# Patient Record
Sex: MALE | Race: BLACK OR AFRICAN AMERICAN | NOT HISPANIC OR LATINO | Employment: UNEMPLOYED | URBAN - METROPOLITAN AREA
[De-identification: names, ages, dates, MRNs, and addresses within clinical notes are randomized per-mention and may not be internally consistent; named-entity substitution may affect disease eponyms.]

---

## 2023-10-16 ENCOUNTER — PHARMACY VISIT (OUTPATIENT)
Dept: PHARMACY | Facility: CLINIC | Age: 53
End: 2023-10-16
Payer: MEDICAID

## 2023-10-16 PROCEDURE — RXMED WILLOW AMBULATORY MEDICATION CHARGE

## 2023-10-23 ENCOUNTER — APPOINTMENT (OUTPATIENT)
Dept: RADIOLOGY | Facility: HOSPITAL | Age: 53
End: 2023-10-23
Payer: COMMERCIAL

## 2023-10-23 ENCOUNTER — HOSPITAL ENCOUNTER (INPATIENT)
Facility: HOSPITAL | Age: 53
LOS: 2 days | Discharge: HOME | End: 2023-10-26
Attending: EMERGENCY MEDICINE | Admitting: INTERNAL MEDICINE
Payer: COMMERCIAL

## 2023-10-23 DIAGNOSIS — I50.9 CONGESTIVE HEART FAILURE, UNSPECIFIED HF CHRONICITY, UNSPECIFIED HEART FAILURE TYPE (MULTI): ICD-10-CM

## 2023-10-23 DIAGNOSIS — R09.02 HYPOXIA: Primary | ICD-10-CM

## 2023-10-23 LAB
ABO GROUP (TYPE) IN BLOOD: NORMAL
ALBUMIN SERPL BCP-MCNC: 3.6 G/DL (ref 3.4–5)
ALP SERPL-CCNC: 60 U/L (ref 33–120)
ALT SERPL W P-5'-P-CCNC: 30 U/L (ref 10–52)
ANION GAP SERPL CALC-SCNC: 11 MMOL/L (ref 10–20)
ANTIBODY SCREEN: NORMAL
APTT PPP: 31 SECONDS (ref 27–38)
AST SERPL W P-5'-P-CCNC: 26 U/L (ref 9–39)
BASOPHILS # BLD AUTO: 0.02 X10*3/UL (ref 0–0.1)
BASOPHILS NFR BLD AUTO: 0.3 %
BILIRUB SERPL-MCNC: 0.6 MG/DL (ref 0–1.2)
BNP SERPL-MCNC: 3921 PG/ML (ref 0–99)
BUN SERPL-MCNC: 23 MG/DL (ref 6–23)
CALCIUM SERPL-MCNC: 8.9 MG/DL (ref 8.6–10.6)
CARDIAC TROPONIN I PNL SERPL HS: 102 NG/L (ref 0–53)
CARDIAC TROPONIN I PNL SERPL HS: 109 NG/L (ref 0–53)
CHLORIDE SERPL-SCNC: 111 MMOL/L (ref 98–107)
CO2 SERPL-SCNC: 22 MMOL/L (ref 21–32)
CREAT SERPL-MCNC: 1.42 MG/DL (ref 0.5–1.3)
EOSINOPHIL # BLD AUTO: 0.03 X10*3/UL (ref 0–0.7)
EOSINOPHIL NFR BLD AUTO: 0.4 %
ERYTHROCYTE [DISTWIDTH] IN BLOOD BY AUTOMATED COUNT: 14.4 % (ref 11.5–14.5)
GFR SERPL CREATININE-BSD FRML MDRD: 59 ML/MIN/1.73M*2
GLUCOSE SERPL-MCNC: 159 MG/DL (ref 74–99)
HCT VFR BLD AUTO: 44.1 % (ref 41–52)
HGB BLD-MCNC: 15.1 G/DL (ref 13.5–17.5)
HOLD SPECIMEN: NORMAL
IMM GRANULOCYTES # BLD AUTO: 0.02 X10*3/UL (ref 0–0.7)
IMM GRANULOCYTES NFR BLD AUTO: 0.3 % (ref 0–0.9)
INR PPP: 1.3 (ref 0.9–1.1)
LYMPHOCYTES # BLD AUTO: 0.78 X10*3/UL (ref 1.2–4.8)
LYMPHOCYTES NFR BLD AUTO: 11.2 %
MAGNESIUM SERPL-MCNC: 1.91 MG/DL (ref 1.6–2.4)
MCH RBC QN AUTO: 32.1 PG (ref 26–34)
MCHC RBC AUTO-ENTMCNC: 34.2 G/DL (ref 32–36)
MCV RBC AUTO: 94 FL (ref 80–100)
MONOCYTES # BLD AUTO: 0.38 X10*3/UL (ref 0.1–1)
MONOCYTES NFR BLD AUTO: 5.5 %
NEUTROPHILS # BLD AUTO: 5.72 X10*3/UL (ref 1.2–7.7)
NEUTROPHILS NFR BLD AUTO: 82.3 %
NRBC BLD-RTO: 0 /100 WBCS (ref 0–0)
PLATELET # BLD AUTO: 137 X10*3/UL (ref 150–450)
PMV BLD AUTO: 11.3 FL (ref 7.5–11.5)
POTASSIUM SERPL-SCNC: 4.2 MMOL/L (ref 3.5–5.3)
PROT SERPL-MCNC: 6.5 G/DL (ref 6.4–8.2)
PROTHROMBIN TIME: 14.2 SECONDS (ref 9.8–12.8)
RBC # BLD AUTO: 4.7 X10*6/UL (ref 4.5–5.9)
RH FACTOR (ANTIGEN D): NORMAL
SODIUM SERPL-SCNC: 140 MMOL/L (ref 136–145)
WBC # BLD AUTO: 7 X10*3/UL (ref 4.4–11.3)

## 2023-10-23 PROCEDURE — 86900 BLOOD TYPING SEROLOGIC ABO: CPT | Performed by: STUDENT IN AN ORGANIZED HEALTH CARE EDUCATION/TRAINING PROGRAM

## 2023-10-23 PROCEDURE — 85025 COMPLETE CBC W/AUTO DIFF WBC: CPT | Mod: CMCLAB | Performed by: EMERGENCY MEDICINE

## 2023-10-23 PROCEDURE — 76604 US EXAM CHEST: CPT | Performed by: EMERGENCY MEDICINE

## 2023-10-23 PROCEDURE — 36415 COLL VENOUS BLD VENIPUNCTURE: CPT | Performed by: STUDENT IN AN ORGANIZED HEALTH CARE EDUCATION/TRAINING PROGRAM

## 2023-10-23 PROCEDURE — 71045 X-RAY EXAM CHEST 1 VIEW: CPT | Mod: FY

## 2023-10-23 PROCEDURE — 74177 CT ABD & PELVIS W/CONTRAST: CPT | Mod: RSC | Performed by: RADIOLOGY

## 2023-10-23 PROCEDURE — 99285 EMERGENCY DEPT VISIT HI MDM: CPT | Performed by: EMERGENCY MEDICINE

## 2023-10-23 PROCEDURE — 99285 EMERGENCY DEPT VISIT HI MDM: CPT | Mod: 25,27 | Performed by: EMERGENCY MEDICINE

## 2023-10-23 PROCEDURE — 74177 CT ABD & PELVIS W/CONTRAST: CPT | Mod: RSC,FR

## 2023-10-23 PROCEDURE — 80053 COMPREHEN METABOLIC PANEL: CPT | Mod: CMCLAB | Performed by: EMERGENCY MEDICINE

## 2023-10-23 PROCEDURE — 93010 ELECTROCARDIOGRAM REPORT: CPT | Performed by: EMERGENCY MEDICINE

## 2023-10-23 PROCEDURE — 84484 ASSAY OF TROPONIN QUANT: CPT | Mod: CMCLAB | Performed by: EMERGENCY MEDICINE

## 2023-10-23 PROCEDURE — 96374 THER/PROPH/DIAG INJ IV PUSH: CPT

## 2023-10-23 PROCEDURE — 71045 X-RAY EXAM CHEST 1 VIEW: CPT | Mod: FOREIGN READ | Performed by: RADIOLOGY

## 2023-10-23 PROCEDURE — 76604 US EXAM CHEST: CPT

## 2023-10-23 PROCEDURE — 2500000004 HC RX 250 GENERAL PHARMACY W/ HCPCS (ALT 636 FOR OP/ED): Mod: SE | Performed by: EMERGENCY MEDICINE

## 2023-10-23 PROCEDURE — 71275 CT ANGIOGRAPHY CHEST: CPT | Mod: FR

## 2023-10-23 PROCEDURE — 84484 ASSAY OF TROPONIN QUANT: CPT | Performed by: EMERGENCY MEDICINE

## 2023-10-23 PROCEDURE — 83880 ASSAY OF NATRIURETIC PEPTIDE: CPT | Mod: CMCLAB | Performed by: STUDENT IN AN ORGANIZED HEALTH CARE EDUCATION/TRAINING PROGRAM

## 2023-10-23 PROCEDURE — 93308 TTE F-UP OR LMTD: CPT | Performed by: EMERGENCY MEDICINE

## 2023-10-23 PROCEDURE — 85610 PROTHROMBIN TIME: CPT | Performed by: STUDENT IN AN ORGANIZED HEALTH CARE EDUCATION/TRAINING PROGRAM

## 2023-10-23 PROCEDURE — 2550000001 HC RX 255 CONTRASTS: Mod: SE | Performed by: EMERGENCY MEDICINE

## 2023-10-23 PROCEDURE — 71275 CT ANGIOGRAPHY CHEST: CPT | Mod: RSC | Performed by: RADIOLOGY

## 2023-10-23 PROCEDURE — 36415 COLL VENOUS BLD VENIPUNCTURE: CPT | Mod: CMCLAB | Performed by: EMERGENCY MEDICINE

## 2023-10-23 PROCEDURE — 83735 ASSAY OF MAGNESIUM: CPT | Mod: CMCLAB | Performed by: EMERGENCY MEDICINE

## 2023-10-23 PROCEDURE — 83735 ASSAY OF MAGNESIUM: CPT | Performed by: EMERGENCY MEDICINE

## 2023-10-23 RX ORDER — FUROSEMIDE 10 MG/ML
100 INJECTION INTRAMUSCULAR; INTRAVENOUS ONCE
Status: COMPLETED | OUTPATIENT
Start: 2023-10-23 | End: 2023-10-23

## 2023-10-23 RX ORDER — FUROSEMIDE 10 MG/ML
40 INJECTION INTRAMUSCULAR; INTRAVENOUS ONCE
Status: DISCONTINUED | OUTPATIENT
Start: 2023-10-23 | End: 2023-10-23

## 2023-10-23 RX ORDER — SODIUM CHLORIDE 9 MG/ML
3 INJECTION, SOLUTION INTRAMUSCULAR; INTRAVENOUS; SUBCUTANEOUS AS NEEDED
Status: DISCONTINUED | OUTPATIENT
Start: 2023-10-23 | End: 2023-10-26 | Stop reason: HOSPADM

## 2023-10-23 RX ADMIN — IOHEXOL 150 ML: 350 INJECTION, SOLUTION INTRAVENOUS at 20:31

## 2023-10-23 RX ADMIN — FUROSEMIDE 100 MG: 10 INJECTION, SOLUTION INTRAVENOUS at 20:00

## 2023-10-23 ASSESSMENT — LIFESTYLE VARIABLES
HAVE YOU EVER FELT YOU SHOULD CUT DOWN ON YOUR DRINKING: NO
EVER FELT BAD OR GUILTY ABOUT YOUR DRINKING: NO
REASON UNABLE TO ASSESS: NO
HAVE PEOPLE ANNOYED YOU BY CRITICIZING YOUR DRINKING: NO
EVER HAD A DRINK FIRST THING IN THE MORNING TO STEADY YOUR NERVES TO GET RID OF A HANGOVER: NO

## 2023-10-23 ASSESSMENT — COLUMBIA-SUICIDE SEVERITY RATING SCALE - C-SSRS
2. HAVE YOU ACTUALLY HAD ANY THOUGHTS OF KILLING YOURSELF?: NO
1. IN THE PAST MONTH, HAVE YOU WISHED YOU WERE DEAD OR WISHED YOU COULD GO TO SLEEP AND NOT WAKE UP?: NO
6. HAVE YOU EVER DONE ANYTHING, STARTED TO DO ANYTHING, OR PREPARED TO DO ANYTHING TO END YOUR LIFE?: NO

## 2023-10-23 ASSESSMENT — PAIN SCALES - GENERAL: PAINLEVEL_OUTOF10: 7

## 2023-10-23 ASSESSMENT — PAIN DESCRIPTION - ORIENTATION: ORIENTATION: LEFT

## 2023-10-23 ASSESSMENT — PAIN DESCRIPTION - LOCATION: LOCATION: CHEST

## 2023-10-23 ASSESSMENT — PAIN - FUNCTIONAL ASSESSMENT: PAIN_FUNCTIONAL_ASSESSMENT: 0-10

## 2023-10-23 ASSESSMENT — PAIN DESCRIPTION - DESCRIPTORS: DESCRIPTORS: SHARP

## 2023-10-23 NOTE — ED TRIAGE NOTES
Pt reports to ED for Coughing up blood SOB and bloody stool. Pt states his SOB has started a few days ago and has been getting worse. Pt states his stool is bright red all blood no stool bowel movement. Pt has been having bloody sputum. Pt also states he has had multiple episodes of syncope without falling. Pt states his dizziness comes all of a sudden when he stands

## 2023-10-23 NOTE — ED PROCEDURE NOTE
Procedure    Performed by: Abhijeet Nielson DO  Authorized by: Inocencio Singh MD    Procedure: Cardiac Ultrasound    Findings:   Views: parasternal long, parasternal short, apical four and subxiphoid  The pericardial space was visualized and was NEGATIVE for a significant pericardial effusion.  Activity: Ventricular contractions were visualized.  LV: LV systolic function was DECREASED.  RV: RV size was DILATED.    Impression:  Cardiac: The focused cardiac ultrasound exam had ABNORMAL findings as specified.  Procedure: Thoracic Ultrasound    Findings:  R Lung Sliding: The RIGHT chest was evaluated and LUNG SLIDING was visualized.  L Lung Sliding: The LEFT chest was evaluated and LUNG SLIDING was visualized.  R Effusion: The RIGHT chest was evaluated and there was NO PLEURAL EFFUSION.  L Effusion: The LEFT chest was evaluated and there was NO PLEURAL EFFUSION.  A-lines: The RIGHT chest was evaluated and there were NO A-LINES visualized  B-lines: The RIGHT chest was evaluated and multiple B-LINES were visualized  R Consolidation: The RIGHT chest was evaluated and there was NO RIGHT CONSOLIDATION.  L Consolidation: The LEFT chest was evaluated and there was NO LEFT CONSOLIDATION.    Impression:  Thorax: The focused thoracic ultrasound exam had ABNORMAL findings as specified.                   Abhijeet Nielson DO  Resident  10/23/23 1913

## 2023-10-23 NOTE — ED PROVIDER NOTES
"CC: Shortness of Breath and Black or Bloody Stool     HPI:  Patient is a 52-year-old male with past medical history significant for hypertension, heart failure with reduced ejection fraction (10 to 15% 3/2023), COPD, polysubstance use disorder who is presenting to the emergency department with a chief concern of chest pain and shortness of breath.  Patient reports utilization of crack cocaine immediately prior to symptom onset.  Patient also reports that he has not been taking his medications over the course the past 3 days.  Patient denies any associated fevers/chills, does endorse slight hemoptysis accompanying chest pain, denies any nausea/vomiting, changes in bowel bladder function, new focal numbness weakness to bilateral arms, legs, face.    Records Reviewed:  Recent available ED and inpatient notes reviewed in EMR.    PMHx/PSHx:  Per HPI.   - has no past medical history on file.  - has no past surgical history on file.    Medications:  Reviewed in EMR. See EMR for complete list of medications and doses.    Allergies:  Patient has no known allergies.    Social History:  - Tobacco:  reports that he has never smoked. He has never used smokeless tobacco.   - Alcohol:  reports no history of alcohol use.   - Illicit Drugs:  reports current drug use. Drug: \"Crack\" cocaine.     ROS:  Per HPI.       ???????????????????????????????????????????????????????????????  Triage Vitals:  T 35.8 °C (96.4 °F)  HR 67  BP (!) 130/96  RR 24  O2 98 % None (Room air)    Physical Exam  Vitals and nursing note reviewed.   Constitutional:       General: He is not in acute distress.     Appearance: Normal appearance. He is not toxic-appearing.   HENT:      Head: Normocephalic and atraumatic.      Nose: No congestion or rhinorrhea.      Mouth/Throat:      Mouth: Mucous membranes are moist.      Pharynx: Oropharynx is clear.   Eyes:      Extraocular Movements: Extraocular movements intact.      Conjunctiva/sclera: Conjunctivae normal. "      Pupils: Pupils are equal, round, and reactive to light.   Cardiovascular:      Rate and Rhythm: Normal rate and regular rhythm.      Pulses: Normal pulses.      Heart sounds: No murmur heard.     No friction rub. No gallop.   Pulmonary:      Effort: Pulmonary effort is normal.      Breath sounds: Examination of the right-lower field reveals rhonchi. Examination of the left-lower field reveals rhonchi. Rhonchi present. No wheezing or rales.   Abdominal:      General: There is no distension.      Palpations: Abdomen is soft.      Tenderness: There is no abdominal tenderness. There is no guarding or rebound.   Musculoskeletal:         General: No swelling, deformity or signs of injury. Normal range of motion.      Right lower le+ Edema present.      Left lower le+ Edema present.   Skin:     General: Skin is warm.      Findings: No bruising, lesion or rash.   Neurological:      General: No focal deficit present.      Mental Status: He is alert and oriented to person, place, and time. Mental status is at baseline.      Cranial Nerves: No cranial nerve deficit.      Sensory: No sensory deficit.      Coordination: Coordination normal.   Psychiatric:         Mood and Affect: Mood normal.         Thought Content: Thought content normal.         Judgment: Judgment normal.       ???????????????????????????????????????????????????????????????  EKG:  EKG is obtained at 1529 is noted be normal sinus rhythm with a ventricular rate of 88 bpm, UT interval 158, QRS duration of 102, QTc of 464 there is noted ST depression in lead V6 that is seen on previous EKG from 2023, noted T wave inversion in V5 also present on previous EKG, otherwise there is no significant ST elevation, no additional signs of ischemia or infarction.  Overall interpretation is a stable study with no new signs of ischemia or infarction.    Assessment and Plan:  Patient is a 53-year-old male with past medical history as noted above presenting  emergency department the chief concern of chest pain/shortness of breath, concern for CHF exacerbation in the setting of recent medication noncompliance, potential flash pulmonary edema in the setting of cocaine use however patient's blood pressure is now relatively normotensive range, no indication for aggressive blood pressure management, no indication for positive pressure at this time.  Bedside point-of-care ultrasound is performed reveals poor squeeze it is consistent with CHF, no obvious right heart strain, B-lines bilaterally.  Patient does have hemoptysis, chest pain, shortness of breath and I am concerned for acute pulmonary embolism this patient will follow-up perform a CT angiogram to evaluate for PE.  This is performed and does not reveal any acute pulmonary embolism.  Additional labs reviewed and there is a significant elevation of the patient's BNP.  Elevated troponin with continuing elevation of delta troponin.  I believe the patient is most likely experiencing a CHF exacerbation, patient case was discussed with admissions coordinator and the patient was accepted for admission to cardiology team.  Patient was given Lasix while in the emergency department, monitored on telemetry, did not require any additional intervention while under my care.  Patient admitted to the hospital in stable condition.    ED Course:  Diagnoses as of 10/24/23 2340   Congestive heart failure, unspecified HF chronicity, unspecified heart failure type (CMS/[facility])        Social Determinants Limiting Care:      Disposition:  Admit    Hussein Cotto MD       Procedures ? SmartLinks last updated 10/23/2023 6:15 PM        Hussein Cotto MD  Resident  10/24/23 [id number]

## 2023-10-24 ENCOUNTER — APPOINTMENT (OUTPATIENT)
Dept: CARDIOLOGY | Facility: HOSPITAL | Age: 53
End: 2023-10-24
Payer: COMMERCIAL

## 2023-10-24 PROBLEM — I50.9 CONGESTIVE HEART FAILURE, UNSPECIFIED HF CHRONICITY, UNSPECIFIED HEART FAILURE TYPE (MULTI): Status: ACTIVE | Noted: 2023-10-24

## 2023-10-24 LAB
ALBUMIN SERPL BCP-MCNC: 3.4 G/DL (ref 3.4–5)
ALBUMIN SERPL BCP-MCNC: 3.6 G/DL (ref 3.4–5)
ALP SERPL-CCNC: 74 U/L (ref 33–120)
ALT SERPL W P-5'-P-CCNC: 28 U/L (ref 10–52)
AMPHETAMINES UR QL SCN: ABNORMAL
ANION GAP SERPL CALC-SCNC: 16 MMOL/L (ref 10–20)
ANION GAP SERPL CALC-SCNC: 18 MMOL/L (ref 10–20)
AST SERPL W P-5'-P-CCNC: 24 U/L (ref 9–39)
ATRIAL RATE: 88 BPM
ATRIAL RATE: 88 BPM
BARBITURATES UR QL SCN: ABNORMAL
BASOPHILS # BLD AUTO: 0.04 X10*3/UL (ref 0–0.1)
BASOPHILS NFR BLD AUTO: 0.6 %
BENZODIAZ UR QL SCN: ABNORMAL
BILIRUB SERPL-MCNC: 0.6 MG/DL (ref 0–1.2)
BUN SERPL-MCNC: 24 MG/DL (ref 6–23)
BUN SERPL-MCNC: 25 MG/DL (ref 6–23)
BZE UR QL SCN: ABNORMAL
CALCIUM SERPL-MCNC: 8.9 MG/DL (ref 8.6–10.6)
CALCIUM SERPL-MCNC: 9.4 MG/DL (ref 8.6–10.6)
CANNABINOIDS UR QL SCN: ABNORMAL
CHLORIDE SERPL-SCNC: 104 MMOL/L (ref 98–107)
CHLORIDE SERPL-SCNC: 105 MMOL/L (ref 98–107)
CO2 SERPL-SCNC: 20 MMOL/L (ref 21–32)
CO2 SERPL-SCNC: 23 MMOL/L (ref 21–32)
CREAT SERPL-MCNC: 1.56 MG/DL (ref 0.5–1.3)
CREAT SERPL-MCNC: 1.72 MG/DL (ref 0.5–1.3)
EOSINOPHIL # BLD AUTO: 0.09 X10*3/UL (ref 0–0.7)
EOSINOPHIL NFR BLD AUTO: 1.4 %
ERYTHROCYTE [DISTWIDTH] IN BLOOD BY AUTOMATED COUNT: 14.5 % (ref 11.5–14.5)
ERYTHROCYTE [DISTWIDTH] IN BLOOD BY AUTOMATED COUNT: 14.6 % (ref 11.5–14.5)
FENTANYL+NORFENTANYL UR QL SCN: ABNORMAL
GFR SERPL CREATININE-BSD FRML MDRD: 47 ML/MIN/1.73M*2
GFR SERPL CREATININE-BSD FRML MDRD: 52 ML/MIN/1.73M*2
GLUCOSE SERPL-MCNC: 142 MG/DL (ref 74–99)
GLUCOSE SERPL-MCNC: 155 MG/DL (ref 74–99)
HCT VFR BLD AUTO: 46.2 % (ref 41–52)
HCT VFR BLD AUTO: 51 % (ref 41–52)
HGB BLD-MCNC: 15.2 G/DL (ref 13.5–17.5)
HGB BLD-MCNC: 16.6 G/DL (ref 13.5–17.5)
IMM GRANULOCYTES # BLD AUTO: 0.02 X10*3/UL (ref 0–0.7)
IMM GRANULOCYTES NFR BLD AUTO: 0.3 % (ref 0–0.9)
LYMPHOCYTES # BLD AUTO: 0.98 X10*3/UL (ref 1.2–4.8)
LYMPHOCYTES NFR BLD AUTO: 15.1 %
MAGNESIUM SERPL-MCNC: 2.19 MG/DL (ref 1.6–2.4)
MCH RBC QN AUTO: 31.5 PG (ref 26–34)
MCH RBC QN AUTO: 32 PG (ref 26–34)
MCHC RBC AUTO-ENTMCNC: 32.5 G/DL (ref 32–36)
MCHC RBC AUTO-ENTMCNC: 32.9 G/DL (ref 32–36)
MCV RBC AUTO: 96 FL (ref 80–100)
MCV RBC AUTO: 99 FL (ref 80–100)
MONOCYTES # BLD AUTO: 0.39 X10*3/UL (ref 0.1–1)
MONOCYTES NFR BLD AUTO: 6 %
NEUTROPHILS # BLD AUTO: 4.99 X10*3/UL (ref 1.2–7.7)
NEUTROPHILS NFR BLD AUTO: 76.6 %
NRBC BLD-RTO: 0 /100 WBCS (ref 0–0)
NRBC BLD-RTO: 0 /100 WBCS (ref 0–0)
OPIATES UR QL SCN: ABNORMAL
OXYCODONE+OXYMORPHONE UR QL SCN: ABNORMAL
P AXIS: 79 DEGREES
P AXIS: 84 DEGREES
P OFFSET: 184 MS
P OFFSET: 185 MS
P ONSET: 129 MS
P ONSET: 135 MS
PCP UR QL SCN: ABNORMAL
PHOSPHATE SERPL-MCNC: 4.5 MG/DL (ref 2.5–4.9)
PLATELET # BLD AUTO: 138 X10*3/UL (ref 150–450)
PLATELET # BLD AUTO: 172 X10*3/UL (ref 150–450)
PMV BLD AUTO: 11.9 FL (ref 7.5–11.5)
PMV BLD AUTO: 12.1 FL (ref 7.5–11.5)
POTASSIUM SERPL-SCNC: 3.9 MMOL/L (ref 3.5–5.3)
POTASSIUM SERPL-SCNC: 4.4 MMOL/L (ref 3.5–5.3)
PR INTERVAL: 158 MS
PR INTERVAL: 162 MS
PROT SERPL-MCNC: 6.2 G/DL (ref 6.4–8.2)
Q ONSET: 210 MS
Q ONSET: 214 MS
QRS COUNT: 14 BEATS
QRS COUNT: 15 BEATS
QRS DURATION: 100 MS
QRS DURATION: 102 MS
QT INTERVAL: 384 MS
QT INTERVAL: 388 MS
QTC CALCULATION(BAZETT): 464 MS
QTC CALCULATION(BAZETT): 469 MS
QTC FREDERICIA: 436 MS
QTC FREDERICIA: 441 MS
R AXIS: -21 DEGREES
R AXIS: 103 DEGREES
RBC # BLD AUTO: 4.83 X10*6/UL (ref 4.5–5.9)
RBC # BLD AUTO: 5.18 X10*6/UL (ref 4.5–5.9)
SODIUM SERPL-SCNC: 137 MMOL/L (ref 136–145)
SODIUM SERPL-SCNC: 141 MMOL/L (ref 136–145)
T AXIS: 12 DEGREES
T AXIS: 93 DEGREES
T OFFSET: 404 MS
T OFFSET: 406 MS
VENTRICULAR RATE: 88 BPM
VENTRICULAR RATE: 88 BPM
WBC # BLD AUTO: 6.5 X10*3/UL (ref 4.4–11.3)
WBC # BLD AUTO: 7.2 X10*3/UL (ref 4.4–11.3)

## 2023-10-24 PROCEDURE — 36415 COLL VENOUS BLD VENIPUNCTURE: CPT | Mod: CMCLAB | Performed by: STUDENT IN AN ORGANIZED HEALTH CARE EDUCATION/TRAINING PROGRAM

## 2023-10-24 PROCEDURE — 1200000002 HC GENERAL ROOM WITH TELEMETRY DAILY

## 2023-10-24 PROCEDURE — 2500000004 HC RX 250 GENERAL PHARMACY W/ HCPCS (ALT 636 FOR OP/ED)

## 2023-10-24 PROCEDURE — 80053 COMPREHEN METABOLIC PANEL: CPT | Mod: CMCLAB | Performed by: STUDENT IN AN ORGANIZED HEALTH CARE EDUCATION/TRAINING PROGRAM

## 2023-10-24 PROCEDURE — 93005 ELECTROCARDIOGRAM TRACING: CPT

## 2023-10-24 PROCEDURE — 2500000001 HC RX 250 WO HCPCS SELF ADMINISTERED DRUGS (ALT 637 FOR MEDICARE OP): Performed by: NURSE PRACTITIONER

## 2023-10-24 PROCEDURE — 2500000001 HC RX 250 WO HCPCS SELF ADMINISTERED DRUGS (ALT 637 FOR MEDICARE OP): Performed by: STUDENT IN AN ORGANIZED HEALTH CARE EDUCATION/TRAINING PROGRAM

## 2023-10-24 PROCEDURE — 36415 COLL VENOUS BLD VENIPUNCTURE: CPT | Performed by: NURSE PRACTITIONER

## 2023-10-24 PROCEDURE — 80307 DRUG TEST PRSMV CHEM ANLYZR: CPT | Mod: CMCLAB | Performed by: STUDENT IN AN ORGANIZED HEALTH CARE EDUCATION/TRAINING PROGRAM

## 2023-10-24 PROCEDURE — 85025 COMPLETE CBC W/AUTO DIFF WBC: CPT | Mod: CMCLAB | Performed by: STUDENT IN AN ORGANIZED HEALTH CARE EDUCATION/TRAINING PROGRAM

## 2023-10-24 PROCEDURE — 2500000004 HC RX 250 GENERAL PHARMACY W/ HCPCS (ALT 636 FOR OP/ED): Performed by: STUDENT IN AN ORGANIZED HEALTH CARE EDUCATION/TRAINING PROGRAM

## 2023-10-24 PROCEDURE — 99223 1ST HOSP IP/OBS HIGH 75: CPT | Performed by: STUDENT IN AN ORGANIZED HEALTH CARE EDUCATION/TRAINING PROGRAM

## 2023-10-24 PROCEDURE — 80069 RENAL FUNCTION PANEL: CPT | Mod: CCI | Performed by: NURSE PRACTITIONER

## 2023-10-24 PROCEDURE — 85027 COMPLETE CBC AUTOMATED: CPT | Performed by: NURSE PRACTITIONER

## 2023-10-24 PROCEDURE — 83735 ASSAY OF MAGNESIUM: CPT | Performed by: STUDENT IN AN ORGANIZED HEALTH CARE EDUCATION/TRAINING PROGRAM

## 2023-10-24 RX ORDER — ASPIRIN 81 MG/1
81 TABLET ORAL DAILY
Status: DISCONTINUED | OUTPATIENT
Start: 2023-10-24 | End: 2023-10-26 | Stop reason: HOSPADM

## 2023-10-24 RX ORDER — MAGNESIUM SULFATE HEPTAHYDRATE 40 MG/ML
2 INJECTION, SOLUTION INTRAVENOUS ONCE
Status: COMPLETED | OUTPATIENT
Start: 2023-10-24 | End: 2023-10-24

## 2023-10-24 RX ORDER — POTASSIUM CHLORIDE 1.5 G/1.58G
40 POWDER, FOR SOLUTION ORAL ONCE
Status: COMPLETED | OUTPATIENT
Start: 2023-10-24 | End: 2023-10-24

## 2023-10-24 RX ORDER — SPIRONOLACTONE 25 MG/1
12.5 TABLET ORAL DAILY
Status: DISCONTINUED | OUTPATIENT
Start: 2023-10-24 | End: 2023-10-26 | Stop reason: HOSPADM

## 2023-10-24 RX ORDER — ATORVASTATIN CALCIUM 20 MG/1
40 TABLET, FILM COATED ORAL DAILY
Status: DISCONTINUED | OUTPATIENT
Start: 2023-10-24 | End: 2023-10-26 | Stop reason: HOSPADM

## 2023-10-24 RX ORDER — AMOXICILLIN 250 MG
1 CAPSULE ORAL NIGHTLY
Status: DISCONTINUED | OUTPATIENT
Start: 2023-10-24 | End: 2023-10-26 | Stop reason: HOSPADM

## 2023-10-24 RX ORDER — SERTRALINE HYDROCHLORIDE 50 MG/1
50 TABLET, FILM COATED ORAL DAILY
Status: DISCONTINUED | OUTPATIENT
Start: 2023-10-24 | End: 2023-10-26 | Stop reason: HOSPADM

## 2023-10-24 RX ORDER — FUROSEMIDE 10 MG/ML
80 INJECTION INTRAMUSCULAR; INTRAVENOUS EVERY 12 HOURS
Status: DISCONTINUED | OUTPATIENT
Start: 2023-10-24 | End: 2023-10-24

## 2023-10-24 RX ORDER — CARVEDILOL 12.5 MG/1
6.25 TABLET ORAL 2 TIMES DAILY
Status: DISCONTINUED | OUTPATIENT
Start: 2023-10-24 | End: 2023-10-26 | Stop reason: HOSPADM

## 2023-10-24 RX ORDER — ENOXAPARIN SODIUM 100 MG/ML
40 INJECTION SUBCUTANEOUS EVERY 24 HOURS
Status: DISCONTINUED | OUTPATIENT
Start: 2023-10-24 | End: 2023-10-26 | Stop reason: HOSPADM

## 2023-10-24 RX ADMIN — SENNOSIDES AND DOCUSATE SODIUM 1 TABLET: 50; 8.6 TABLET ORAL at 20:35

## 2023-10-24 RX ADMIN — FUROSEMIDE 80 MG: 10 INJECTION, SOLUTION INTRAVENOUS at 10:14

## 2023-10-24 RX ADMIN — POTASSIUM CHLORIDE 40 MEQ: 1.5 POWDER, FOR SOLUTION ORAL at 16:28

## 2023-10-24 RX ADMIN — SERTRALINE 50 MG: 50 TABLET, FILM COATED ORAL at 10:14

## 2023-10-24 RX ADMIN — MAGNESIUM SULFATE HEPTAHYDRATE 2 G: 40 INJECTION, SOLUTION INTRAVENOUS at 02:17

## 2023-10-24 RX ADMIN — CARVEDILOL 6.25 MG: 12.5 TABLET, FILM COATED ORAL at 10:15

## 2023-10-24 RX ADMIN — SACUBITRIL AND VALSARTAN 1 TABLET: 24; 26 TABLET, FILM COATED ORAL at 10:15

## 2023-10-24 RX ADMIN — EMPAGLIFLOZIN 10 MG: 10 TABLET, FILM COATED ORAL at 10:15

## 2023-10-24 RX ADMIN — SPIRONOLACTONE 12.5 MG: 25 TABLET, FILM COATED ORAL at 10:15

## 2023-10-24 RX ADMIN — ATORVASTATIN CALCIUM 40 MG: 20 TABLET, FILM COATED ORAL at 10:14

## 2023-10-24 RX ADMIN — SACUBITRIL AND VALSARTAN 1 TABLET: 24; 26 TABLET, FILM COATED ORAL at 20:34

## 2023-10-24 RX ADMIN — CARVEDILOL 6.25 MG: 12.5 TABLET, FILM COATED ORAL at 20:35

## 2023-10-24 RX ADMIN — CARVEDILOL 6.25 MG: 12.5 TABLET, FILM COATED ORAL at 02:17

## 2023-10-24 SDOH — SOCIAL STABILITY: SOCIAL NETWORK: HOW OFTEN DO YOU ATTENT MEETINGS OF THE CLUB OR ORGANIZATION YOU BELONG TO?: NEVER

## 2023-10-24 SDOH — SOCIAL STABILITY: SOCIAL NETWORK
DO YOU BELONG TO ANY CLUBS OR ORGANIZATIONS SUCH AS CHURCH GROUPS UNIONS, FRATERNAL OR ATHLETIC GROUPS, OR SCHOOL GROUPS?: NO

## 2023-10-24 SDOH — SOCIAL STABILITY: SOCIAL INSECURITY
WITHIN THE LAST YEAR, HAVE TO BEEN RAPED OR FORCED TO HAVE ANY KIND OF SEXUAL ACTIVITY BY YOUR PARTNER OR EX-PARTNER?: NO

## 2023-10-24 SDOH — SOCIAL STABILITY: SOCIAL NETWORK: HOW OFTEN DO YOU GET TOGETHER WITH FRIENDS OR RELATIVES?: NEVER

## 2023-10-24 SDOH — SOCIAL STABILITY: SOCIAL NETWORK: ARE YOU MARRIED, WIDOWED, DIVORCED, SEPARATED, NEVER MARRIED, OR LIVING WITH A PARTNER?: DIVORCED

## 2023-10-24 SDOH — SOCIAL STABILITY: SOCIAL INSECURITY: WITHIN THE LAST YEAR, HAVE YOU BEEN HUMILIATED OR EMOTIONALLY ABUSED IN OTHER WAYS BY YOUR PARTNER OR EX-PARTNER?: NO

## 2023-10-24 SDOH — SOCIAL STABILITY: SOCIAL INSECURITY: WERE YOU ABLE TO COMPLETE ALL THE BEHAVIORAL HEALTH SCREENINGS?: YES

## 2023-10-24 SDOH — HEALTH STABILITY: PHYSICAL HEALTH: ON AVERAGE, HOW MANY DAYS PER WEEK DO YOU ENGAGE IN MODERATE TO STRENUOUS EXERCISE (LIKE A BRISK WALK)?: 5 DAYS

## 2023-10-24 SDOH — ECONOMIC STABILITY: INCOME INSECURITY: IN THE LAST 12 MONTHS, WAS THERE A TIME WHEN YOU WERE NOT ABLE TO PAY THE MORTGAGE OR RENT ON TIME?: YES

## 2023-10-24 SDOH — SOCIAL STABILITY: SOCIAL INSECURITY
WITHIN THE LAST YEAR, HAVE YOU BEEN KICKED, HIT, SLAPPED, OR OTHERWISE PHYSICALLY HURT BY YOUR PARTNER OR EX-PARTNER?: NO

## 2023-10-24 SDOH — HEALTH STABILITY: PHYSICAL HEALTH: ON AVERAGE, HOW MANY MINUTES DO YOU ENGAGE IN EXERCISE AT THIS LEVEL?: 60 MIN

## 2023-10-24 SDOH — ECONOMIC STABILITY: FOOD INSECURITY: WITHIN THE PAST 12 MONTHS, YOU WORRIED THAT YOUR FOOD WOULD RUN OUT BEFORE YOU GOT MONEY TO BUY MORE.: OFTEN TRUE

## 2023-10-24 SDOH — ECONOMIC STABILITY: FOOD INSECURITY: WITHIN THE PAST 12 MONTHS, THE FOOD YOU BOUGHT JUST DIDN'T LAST AND YOU DIDN'T HAVE MONEY TO GET MORE.: OFTEN TRUE

## 2023-10-24 SDOH — SOCIAL STABILITY: SOCIAL INSECURITY: HAS ANYONE EVER THREATENED TO HURT YOUR FAMILY OR YOUR PETS?: NO

## 2023-10-24 SDOH — SOCIAL STABILITY: SOCIAL INSECURITY: ARE THERE ANY APPARENT SIGNS OF INJURIES/BEHAVIORS THAT COULD BE RELATED TO ABUSE/NEGLECT?: NO

## 2023-10-24 SDOH — SOCIAL STABILITY: SOCIAL INSECURITY: WITHIN THE LAST YEAR, HAVE YOU BEEN AFRAID OF YOUR PARTNER OR EX-PARTNER?: NO

## 2023-10-24 SDOH — HEALTH STABILITY: MENTAL HEALTH
STRESS IS WHEN SOMEONE FEELS TENSE, NERVOUS, ANXIOUS, OR CAN'T SLEEP AT NIGHT BECAUSE THEIR MIND IS TROUBLED. HOW STRESSED ARE YOU?: VERY MUCH

## 2023-10-24 SDOH — ECONOMIC STABILITY: INCOME INSECURITY: HOW HARD IS IT FOR YOU TO PAY FOR THE VERY BASICS LIKE FOOD, HOUSING, MEDICAL CARE, AND HEATING?: VERY HARD

## 2023-10-24 SDOH — ECONOMIC STABILITY: HOUSING INSECURITY
IN THE LAST 12 MONTHS, WAS THERE A TIME WHEN YOU DID NOT HAVE A STEADY PLACE TO SLEEP OR SLEPT IN A SHELTER (INCLUDING NOW)?: YES

## 2023-10-24 SDOH — SOCIAL STABILITY: SOCIAL NETWORK: IN A TYPICAL WEEK, HOW MANY TIMES DO YOU TALK ON THE PHONE WITH FAMILY, FRIENDS, OR NEIGHBORS?: NEVER

## 2023-10-24 SDOH — SOCIAL STABILITY: SOCIAL INSECURITY: ARE YOU OR HAVE YOU BEEN THREATENED OR ABUSED PHYSICALLY, EMOTIONALLY, OR SEXUALLY BY ANYONE?: NO

## 2023-10-24 SDOH — SOCIAL STABILITY: SOCIAL INSECURITY: DO YOU FEEL UNSAFE GOING BACK TO THE PLACE WHERE YOU ARE LIVING?: NO

## 2023-10-24 SDOH — SOCIAL STABILITY: SOCIAL NETWORK: HOW OFTEN DO YOU ATTEND CHURCH OR RELIGIOUS SERVICES?: NEVER

## 2023-10-24 SDOH — SOCIAL STABILITY: SOCIAL INSECURITY: DO YOU FEEL ANYONE HAS EXPLOITED OR TAKEN ADVANTAGE OF YOU FINANCIALLY OR OF YOUR PERSONAL PROPERTY?: NO

## 2023-10-24 SDOH — ECONOMIC STABILITY: TRANSPORTATION INSECURITY
IN THE PAST 12 MONTHS, HAS THE LACK OF TRANSPORTATION KEPT YOU FROM MEDICAL APPOINTMENTS OR FROM GETTING MEDICATIONS?: YES

## 2023-10-24 SDOH — ECONOMIC STABILITY: TRANSPORTATION INSECURITY
IN THE PAST 12 MONTHS, HAS LACK OF TRANSPORTATION KEPT YOU FROM MEETINGS, WORK, OR FROM GETTING THINGS NEEDED FOR DAILY LIVING?: YES

## 2023-10-24 SDOH — SOCIAL STABILITY: SOCIAL INSECURITY: DOES ANYONE TRY TO KEEP YOU FROM HAVING/CONTACTING OTHER FRIENDS OR DOING THINGS OUTSIDE YOUR HOME?: NO

## 2023-10-24 SDOH — SOCIAL STABILITY: SOCIAL INSECURITY: HAVE YOU HAD THOUGHTS OF HARMING ANYONE ELSE?: NO

## 2023-10-24 SDOH — SOCIAL STABILITY: SOCIAL INSECURITY: ABUSE: ADULT

## 2023-10-24 ASSESSMENT — COGNITIVE AND FUNCTIONAL STATUS - GENERAL
DAILY ACTIVITIY SCORE: 24
DAILY ACTIVITIY SCORE: 24
CLIMB 3 TO 5 STEPS WITH RAILING: A LITTLE
PATIENT BASELINE BEDBOUND: NO
MOBILITY SCORE: 24
MOBILITY SCORE: 23
MOBILITY SCORE: 24
MOBILITY SCORE: 24
DAILY ACTIVITIY SCORE: 24
DAILY ACTIVITIY SCORE: 24

## 2023-10-24 ASSESSMENT — LIFESTYLE VARIABLES
HOW OFTEN DO YOU HAVE 6 OR MORE DRINKS ON ONE OCCASION: NEVER
PRESCIPTION_ABUSE_PAST_12_MONTHS: NO
HOW OFTEN DO YOU HAVE A DRINK CONTAINING ALCOHOL: NEVER
SKIP TO QUESTIONS 9-10: 1
AUDIT-C TOTAL SCORE: 0
HOW MANY STANDARD DRINKS CONTAINING ALCOHOL DO YOU HAVE ON A TYPICAL DAY: PATIENT DOES NOT DRINK
SUBSTANCE_ABUSE_PAST_12_MONTHS: YES
AUDIT-C TOTAL SCORE: 0

## 2023-10-24 ASSESSMENT — PAIN SCALES - GENERAL
PAINLEVEL_OUTOF10: 0 - NO PAIN

## 2023-10-24 ASSESSMENT — ACTIVITIES OF DAILY LIVING (ADL)
PATIENT'S MEMORY ADEQUATE TO SAFELY COMPLETE DAILY ACTIVITIES?: YES
GROOMING: INDEPENDENT
TOILETING: INDEPENDENT
HEARING - LEFT EAR: FUNCTIONAL
DRESSING YOURSELF: INDEPENDENT
WALKS IN HOME: INDEPENDENT
HEARING - RIGHT EAR: FUNCTIONAL
FEEDING YOURSELF: INDEPENDENT
LACK_OF_TRANSPORTATION: YES
BATHING: INDEPENDENT
JUDGMENT_ADEQUATE_SAFELY_COMPLETE_DAILY_ACTIVITIES: YES
ADEQUATE_TO_COMPLETE_ADL: YES

## 2023-10-24 ASSESSMENT — COLUMBIA-SUICIDE SEVERITY RATING SCALE - C-SSRS
1. IN THE PAST MONTH, HAVE YOU WISHED YOU WERE DEAD OR WISHED YOU COULD GO TO SLEEP AND NOT WAKE UP?: NO
6. HAVE YOU EVER DONE ANYTHING, STARTED TO DO ANYTHING, OR PREPARED TO DO ANYTHING TO END YOUR LIFE?: NO
2. HAVE YOU ACTUALLY HAD ANY THOUGHTS OF KILLING YOURSELF?: NO

## 2023-10-24 ASSESSMENT — PATIENT HEALTH QUESTIONNAIRE - PHQ9
2. FEELING DOWN, DEPRESSED OR HOPELESS: NOT AT ALL
1. LITTLE INTEREST OR PLEASURE IN DOING THINGS: NOT AT ALL
SUM OF ALL RESPONSES TO PHQ9 QUESTIONS 1 & 2: 0

## 2023-10-24 ASSESSMENT — PAIN - FUNCTIONAL ASSESSMENT
PAIN_FUNCTIONAL_ASSESSMENT: 0-10
PAIN_FUNCTIONAL_ASSESSMENT: 0-10

## 2023-10-24 NOTE — CARE PLAN
The patient's goals for the shift include  to not pass out    The clinical goals for the shift include not to pass out

## 2023-10-24 NOTE — PROGRESS NOTES
Transitional Care Coordination Progress Note:  Patient discussed during interdisciplinary rounds.   Team members present: Abhijeet Prieto RN [facility], [facility] team, Nurse Manager  Plan per Medical/Surgical team: SOB, ECHO, CT PE, monitor Hgb, GI consult  Payer: [employer]  Status: inpatient  Discharge disposition: Home no needs  Potential Barriers: None  ADOD: End week vs weeknd  Abhijeet Prieto RN Transitional Care Coordinator [phone]

## 2023-10-24 NOTE — CONSULTS
"Nutrition Initial Assessment:  Reason for Assessment: Dietitian discretion (MST protocol)  Patient is a 53 y.o. male presenting with CHF, PMH of HTN, substance disorder, CKD    Nutrition History:  Food and Nutrient History: Pt reports to have a good appetite and to be tolerating diet well.  Pt denies any difficulty  chewing or swallowing. Pt with no known food allergies. Pt denies any recent weight changes, does not know what his UBW is. No other nutrition related questions or concerns.  Food Allergies/Intolerances:  None  Energy intake: Energy Intake: Good > 75 %  GI Symptoms: None     Oral Problems: None    Anthropometrics:  Height: 177.8 cm (5' 10\")  Weight: 75.8 kg (167 lb 1.7 oz)  BMI (Calculated): 23.98  IBW/kg (Dietitian Calculated): 75.45 kg  Percent of IBW: 100 %     Objective/Subjective Weight History:   10/24/23 1322 75.8 kg (167 lb 1.7 oz)   10/24/23 0107 75.8 kg (167 lb 1.7 oz)   10/23/23 1521 70.3 kg (155 lb)     Weight Change %:  Weight History / % Weight Change: No significant weight changes noted  Significant Weight Loss: No     Nutrition Focused Physical Exam Findings:    Subcutaneous Fat Loss:   Orbital Fat Pads: Well nourshed (slightly bulging fat pads)   Buccal Fat Pads: Well nourished (full, rounded cheeks)   Triceps: Well nourished (ample fat tissue)   Muscle Wasting:  Temporalis: Well nourished (well-defined muscle)  Pectoralis (Clavicular Region): Well nourished (clavicle not visible)  Deltoid/Trapezius: Well nourished (rounded appearance at arm, shoulder, neck)    Objective Data:  Nutrition Significant Labs: Reviewed  Results for orders placed or performed during the hospital encounter of 10/23/23 (from the past 24 hour(s))   Comprehensive Metabolic Panel   Result Value Ref Range    Glucose 159 (H) 74 - 99 mg/dL    Sodium 140 136 - 145 mmol/L    Potassium 4.2 3.5 - 5.3 mmol/L    Chloride 111 (H) 98 - 107 mmol/L    Bicarbonate 22 21 - 32 mmol/L    Anion Gap 11 10 - 20 mmol/L    Urea Nitrogen " 23 6 - 23 mg/dL    Creatinine 1.42 (H) 0.50 - 1.30 mg/dL    eGFR 59 (L) >60 mL/min/1.73m*2    Calcium 8.9 8.6 - 10.6 mg/dL    Albumin 3.6 3.4 - 5.0 g/dL    Alkaline Phosphatase 60 33 - 120 U/L    Total Protein 6.5 6.4 - 8.2 g/dL    AST 26 9 - 39 U/L    Bilirubin, Total 0.6 0.0 - 1.2 mg/dL    ALT 30 10 - 52 U/L   Magnesium   Result Value Ref Range    Magnesium 1.91 1.60 - 2.40 mg/dL   Comprehensive metabolic panel   Result Value Ref Range    Glucose 155 (H) 74 - 99 mg/dL    Sodium 137 136 - 145 mmol/L    Potassium 3.9 3.5 - 5.3 mmol/L    Chloride 105 98 - 107 mmol/L    Bicarbonate 20 (L) 21 - 32 mmol/L    Anion Gap 16 10 - 20 mmol/L    Urea Nitrogen 25 (H) 6 - 23 mg/dL    Creatinine 1.56 (H) 0.50 - 1.30 mg/dL    eGFR 52 (L) >60 mL/min/1.73m*2    Calcium 8.9 8.6 - 10.6 mg/dL    Albumin 3.4 3.4 - 5.0 g/dL    Alkaline Phosphatase 74 33 - 120 U/L    Total Protein 6.2 (L) 6.4 - 8.2 g/dL    AST 24 9 - 39 U/L    Bilirubin, Total 0.6 0.0 - 1.2 mg/dL    ALT 28 10 - 52 U/L   Magnesium   Result Value Ref Range    Magnesium 2.19 1.60 - 2.40 mg/dL     Nutrition Specific Mediations: All med's reviewed noting- Atorvastatin, carvedilol, enoxaparin, furosemide, sacubitril-valsartan , Sertraline, spironolactone  I/O:     Intake/Output Summary (Last 24 hours) at 10/24/2023 1326  Last data filed at 10/24/2023 1147  Gross per 24 hour   Intake 480 ml   Output 2250 ml   Net -1770 ml     Dietary Orders (From admission, onward)       Start     Ordered    10/24/23 0925  Adult diet Regular  Diet effective now        Question:  Diet type  Answer:  Regular    10/24/23 0924                     Estimated Needs:   Total Energy Estimated Needs (kCal): 1895 kCal  Total Estimated Energy Need per Day (kCal/kg): 25 kCal/kg  Total Protein Estimated Needs (g): 97.5 g  Total Protein Estimated Needs (g/kg): 1.3 g/kg    Fluids   Per team     Nutrition Diagnosis:  Malnutrition Diagnosis  Patient has Malnutrition Diagnosis: No    Nutrition Diagnosis  Patient  has Nutrition Diagnosis: No    Nutrition Interventions and Recommendations:        Nutrition Prescription:  Continue Adult regular diet as tolerated    Monitoring/Evaluation:   Food/Nutrient Related History Monitoring  Monitoring and Evaluation Plan: Energy intake  Energy Intake: Estimated energy intake  Criteria: Pt will consume >50% of meals    Time Spent/Follow-up Reminder:   Follow Up  Time Spent (min): 60 minutes  Last Date of Nutrition Visit: 10/24/23  Nutrition Follow-Up Needed?: Dietitian to reassess per policy

## 2023-10-24 NOTE — SIGNIFICANT EVENT
Mr. Leonel Yu is a 51 yo M with PMHx of HTN, HFrEF (10-15% 3/2023), substance use disorder (tobacco, crack cocaine), CKD, suspected  COPD w/Emphysema (no PFTs on file) and history of medication non-compliance who presented to [facility] ED due to c/f a CHF exacerbation.      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  - per pt report, Cath done by [facility] 2022, unknown results, unable to find records in [facility]  - admit BNP: 3921, appears only mildly overloaded on exam   - dc weight 06/17/2023: 72.8kg   - Admit weight: 75.8 (actual)   - s/p 100mg IVP Lasix ED  - CT Chest: no significant pulm edema  - no need to repeat tte at this time   - can defer ischemic eval to outpatient  - cont home Entresto 24/26mg BID, Carvedilol 6.25mg BID, Empagliflozin 10mg daily, and Spironolactone 12.5mg daily  - hold home diuretic: Torsemide 50mg daily PRN for weight gain  - 80mg IV bid lasix today   - patient with no edema of BLE but with crackles in Bilateral bases and JVD to the jaw at 45 degrees   - fluid restriction, daily standing weights, strict I&O's      Suspected COPD, stable  Small Volume Hemoptysis  - expect hemoptysis is irritation of the larynx from coughing given that he describes small specks of blood  - hx treatment for multiple COPD exacerbation's, lost to follow up (PFTs, 6MWT, f/u pulmonary nodules, as well as pulmonary rehab)  - CTPE Severe emphysema with bullous changes in the upper lungs more in the Lt side.   - CT Chest: Severe diffuse emphysematous changes with numerous large bullae, predominantly within the bilateral apices.  - on RA, no signs of acute exacerbation  - cont Spiriva Respimat, PRN Duoneb's while inpatient     BRBPR-> Hemorrhoid  -holding home ASA, will discuss resuming tomorrow   -holding DVT PPX  - Hgb stable  - had small streak of blood on stool and toilet paper, reports straining will add stool softener   - GI  no need for GI consult at this time      Hx Cocaine use  Anxiety and Depression  - UDS pos for cocaine   - cont home Sertraline 50mg daily     CKD3a  - Baseline Cr ~1.5-1.8, last normal 04/2021 was 1.15  - Cr at admission in 1.4   - Cr today 1.56  - Avoid nephrotoxins and hypotension  - Daily RFP while admitted, monitor with diuresis     Code Status: Full Code      Seen and discussed with Dr. Barraza

## 2023-10-24 NOTE — CARE PLAN
The patient's goals for the shift include  to be able to breathe better    The clinical goals for the shift include Patient will maintain pulse-ox 92% or > through shift    Patient remained stable. Diuresed with IV lasix. Continue to monitor I&Os.

## 2023-10-24 NOTE — H&P
History Of Present Illness:    Mr. Leonel Yu is a 53 yo M with PMHx of HTN, HFrEF (10-15% 3/2023), substance use disorder (tobacco, crack cocaine), CKD, suspected COPD w/Emphysema (no PFTs  on file) and history of medication non-compliance who presented to [facility] ED with SOB and c/f CHF exacerbation.     Of note, he was recently admitted in 7/2023 following significant cocaine use with c/f stroke like symptoms. He was thought to be volume overloaded and diuresed with IV Lasix boluses. Successfully weaned to RA with improvement in symptoms of SOB and no further CP with down trending troponin. Per Neuro-Stroke, MRI brain w/o pursued, completed 7/25: showing no acute infarct, recent hemorrhage,  or intracranial mass effect. Unchanged remote left cerebellar infarct. Additional trace white matter signal abnormality that may reflect trace chronic small vessel ischemic disease or sequela of migraines. LDL 97, A1C 7.1%. Per Neuro-Stroke, patient started  on ASA 81mg daily and Atorvastatin 40 mg daily.     He is now presenting again with SOB along with complaints of BRBPR and hemoptysis. Regarding SOB, he denies CP but endorses PND, orthopnea, and dyspnea with mild exertion which has been chronic over the past two years and rather unchanged now. He occasionally has FRANK for which he takes Torsemide at home which helps relieve this. Regarding his other HF medications, he does not take them regularly but takes them a few days out of the week randomly. He has not taken these in the past few days. Additionally, he endorses small specks of blood while coughing over the past few days.  Regarding BRBPR, this has occurred on and off the last few years but has been occurring the past two days and is the main reason that he led him to present today.     He also initially raises concern for passing out but upon further questioning, these episodes sound more typical of just falling asleep quickly. He denies any falls or hitting his  "head. From a social standpoint, he has been  from his wife for about 5 years and has been living in a car repair shop. He is actively using cocaine and admits to being homeless without any source of income and having tremendous difficulty in obtaining food. The car repair shop owner providing him housing and cocaine in exchange for him working there. He has been out of touch with his 5 for 5 years.     CXR in the ED showed cardiomegaly and pulm vascular congestion. CTPE with severe emphysema but no PE or overt pulmonary edema. Labs revealed an elevated BNP of 3921. Cr of 1.41 (baseline: 1.4 - 1.7). He was given 100 mg of IV Lasix and admitted for further w/u.      Last Recorded Vitals:  Vitals:    10/23/23 2030 10/23/23 2200 10/23/23 2300 10/24/23 0107   BP: (!) 135/97 128/90 (!) 122/100 126/90   BP Location: Left arm Left arm Left arm Left arm   Patient Position: Sitting Lying Lying Lying   Pulse: 95 96 86 65   Resp: (!) 37 22 26 22   Temp:    36.6 °C (97.8 °F)   TempSrc:    Temporal   SpO2: 95% 97% 97% 97%   Weight:    75.8 kg (167 lb 1.7 oz)   Height:    1.778 m (5' 10\")       Last Labs:  CBC - 10/23/2023:  3:53 PM  7.0 15.1 137    44.1      CMP - 10/23/2023:  3:53 PM  8.9 6.5 26 --- 0.6   CANCELED 3.6 30 60      PTT - 10/23/2023:  6:51 PM  1.3   14.2 31     Troponin I, High Sensitivity   Date/Time Value Ref Range Status   10/23/2023 06:51  (H) 0 - 53 ng/L Final   10/23/2023 03:53  (H) 0 - 53 ng/L Final     Troponin I   Date/Time Value Ref Range Status   07/25/2023 08:20  (H) 0 - 53 ng/L Final     Comment:     .  Less than 99th percentile of normal range cutoff-  Female and children under 18 years old <35 ng/L; Male <54 ng/L: Negative  Repeat testing should be performed if clinically indicated.   .  Female and children under 18 years old  ng/L; Male  ng/L:  Consistent with possible cardiac damage and possible increased clinical   risk. Serial measurements may help to assess " extent of myocardial damage.   .  >120 ng/L: Consistent with cardiac damage, increased clinical risk and  myocardial infarction. Serial measurements may help assess extent of   myocardial damage.   .   NOTE: Children less than 1 year old may have higher baseline troponin   levels and results should be interpreted in conjunction with the overall   clinical context.  .  NOTE: Troponin I testing is performed using a different   testing methodology at [facility] than at other   system [facility]. Direct result comparisons should only   be made within the same method.     07/24/2023 03:15  (H) 0 - 53 ng/L Final     Comment:     .  Less than 99th percentile of normal range cutoff-  Female and children under 18 years old <35 ng/L; Male <54 ng/L: Negative  Repeat testing should be performed if clinically indicated.   .  Female and children under 18 years old  ng/L; Male  ng/L:  Consistent with possible cardiac damage and possible increased clinical   risk. Serial measurements may help to assess extent of myocardial damage.   .  >120 ng/L: Consistent with cardiac damage, increased clinical risk and  myocardial infarction. Serial measurements may help assess extent of   myocardial damage.   .   NOTE: Children less than 1 year old may have higher baseline troponin   levels and results should be interpreted in conjunction with the overall   clinical context.  .  NOTE: Troponin I testing is performed using a different   testing methodology at [facility] than at other   [facility]. Direct result comparisons should only   be made within the same method.  This is a critical result.    Per Laboratory policy, critical results for this test   only qualify to the call list once per 24 hours.      07/24/2023 01:30  (HH) 0 - 53 ng/L Final     Comment:     .  Less than 99th percentile of normal range cutoff-  Female and children under 18 years old <35 ng/L; Male <54 ng/L:  Negative  Repeat testing should be performed if clinically indicated.   .  Female and children under 18 years old  ng/L; Male  ng/L:  Consistent with possible cardiac damage and possible increased clinical   risk. Serial measurements may help to assess extent of myocardial damage.   .  >120 ng/L: Consistent with cardiac damage, increased clinical risk and  myocardial infarction. Serial measurements may help assess extent of   myocardial damage.   .   NOTE: Children less than 1 year old may have higher baseline troponin   levels and results should be interpreted in conjunction with the overall   clinical context.  .  NOTE: Troponin I testing is performed using a different   testing methodology at [facility] than at other   [facility]. Direct result comparisons should only   be made within the same method.  [facility] REPORTED TO SIVAN COVINGTON, 07/24/2023 14:47       BNP   Date/Time Value Ref Range Status   10/23/2023 06:51 PM 3,921 (H) 0 - 99 pg/mL Final   07/24/2023 01:30 PM 2,257 (H) 0 - 99 pg/mL Final     Comment:     .  <100 pg/mL - Heart failure unlikely  100-299 pg/mL - Intermediate probability of acute heart  .               failure exacerbation. Correlate with clinical  .               context and patient history.    >=300 pg/mL - Heart Failure likely. Correlate with clinical  .               context and patient history.   Biotin interference may cause falsely decreased results.   Patients taking a Biotin dose of up to 5 mg/day should   refrain from taking Biotin for 24 hours before sample   collection. Providers may contact their local laboratory   for further information.     06/13/2023 08:33 AM >5000 (A) 0 - 99 pg/mL Final     Comment:     .  <100 pg/mL - Heart failure unlikely  100-299 pg/mL - Intermediate probability of acute heart  .               failure exacerbation. Correlate with clinical  .               context and patient history.    >=300 pg/mL - Heart Failure  likely. Correlate with clinical  .               context and patient history.   Biotin interference may cause falsely decreased results.   Patients taking a Biotin dose of up to 5 mg/day should   refrain from taking Biotin for 24 hours before sample   collection. Providers may contact their local laboratory   for further information.       Hemoglobin A1C   Date/Time Value Ref Range Status   07/25/2023 08:20 AM 7.1 (A) % Final     Comment:          Diagnosis of Diabetes-Adults   Non-Diabetic: < or = 5.6%   Increased risk for developing diabetes: 5.7-6.4%   Diagnostic of diabetes: > or = 6.5%  .       Monitoring of Diabetes                Age (y)     Therapeutic Goal (%)   Adults:          >18           <7.0   Pediatrics:    13-18           <7.5                   7-12           <8.0                   0- 6            7.5-8.5   American Diabetes Association. Diabetes Care 33(S1), Jan 2010.     06/13/2023 05:46 PM 7.4 (A) % Final     Comment:          Diagnosis of Diabetes-Adults   Non-Diabetic: < or = 5.6%   Increased risk for developing diabetes: 5.7-6.4%   Diagnostic of diabetes: > or = 6.5%  .       Monitoring of Diabetes                Age (y)     Therapeutic Goal (%)   Adults:          >18           <7.0   Pediatrics:    13-18           <7.5                   7-12           <8.0                   0- 6            7.5-8.5   American Diabetes Association. Diabetes Care 33(S1), Jan 2010.       VLDL   Date/Time Value Ref Range Status   07/25/2023 08:20 AM 18 0 - 40 mg/dL Final   06/18/2023 06:27 AM 29 0 - 40 mg/dL Final      Last I/O:  No intake/output data recorded.    TTE 7/2023:  CONCLUSIONS:  1. Left ventricular systolic function is severely decreased with a 15-20% estimated ejection fraction.  2. No LV thrombus seen, though there is continuous spontaneous echocontrast within the LV cavity consistent with low flow state.  3. Spectral Doppler shows an abnormal pattern of left ventricular diastolic filling.  4. There  "is severe eccentric left ventricular hypertrophy.  5. Moderately enlarged right ventricle.  6. There is reduced right ventricular systolic function.  7. The left atrium is moderately dilated.  8. Agitated saline contrast study was negative for intracardiac shunt.  9. Left ventricular cavity size is severely dilated.  10. Compared to prior echo from 3/2/2023 , no significant change is appreciated on today's study.  11. There is global hypokinesis of the left ventricle with minor regional variations.    Past Medical History:  As above      Social History:  He reports that he has never smoked. He has never used smokeless tobacco. He reports current drug use. Drug: \"Crack\" cocaine. He reports that he does not drink alcohol.    Family History:  No family history on file.     Allergies:  Patient has no known allergies.    Inpatient Medications:  Scheduled medications   Medication Dose Route Frequency    aspirin  81 mg oral Daily    atorvastatin  40 mg oral Daily    carvedilol  6.25 mg oral BID    empagliflozin  10 mg oral Daily    enoxaparin  40 mg subcutaneous q24h    magnesium sulfate  2 g intravenous Once    sacubitriL-valsartan  1 tablet oral BID    sertraline  50 mg oral Daily    spironolactone  12.5 mg oral Daily    tiotropium  2 Inhalation inhalation Daily    tiotropium  2 Inhalation inhalation Daily     PRN medications   Medication    sodium chloride (PF) 0.9%     Continuous Medications   Medication Dose Last Rate     Outpatient Medications:  Current Outpatient Medications   Medication Instructions    aspirin 81 mg EC tablet TAKE 1 TABLET BY MOUTH ONCE DAILY    atorvastatin (Lipitor) 40 mg tablet TAKE 1 TABLET BY MOUTH ONCE DAILY    carvedilol (Coreg) 6.25 mg tablet TAKE 1 TABLET BY MOUTH TWO TIMES A DAY    empagliflozin (Jardiance) 10 mg TAKE 1 TABLET BY MOUTH ONCE DAILY    sacubitriL-valsartan (Entresto) 24-26 mg tablet TAKE 1 TABLET BY MOUTH TWO TIMES A DAY    sertraline (Zoloft) 50 mg tablet TAKE 1 TABLET BY " MOUTH ONCE DAILY    spironolactone (Aldactone) 25 mg tablet TAKE 1/2 TABLET BY MOUTH ONCE DAILY    tiotropium (Spiriva Respimat) 2.5 mcg/actuation inhaler INHALE 2 PUFFS ONCE DAILY    tiotropium (Spiriva Respimat) 2.5 mcg/actuation inhaler INHALE 2 PUFFS BY MOUTH ONCE DAILY    torsemide (Demadex) 10 mg tablet TAKE 5 TABLETS BY MOUTH ONCE DAILY    torsemide (Demadex) 100 mg tablet TAKE 1/2 TABLET TABLET BY MOUTH ONCE DAILY AS NEEDED FOR WEIGHT GAIN OF 2 POUNDS OR MORE IN A DAY OR 5 POUNDS OR MORE IN A WEEK       Physical Exam:  HEENT: PEERL, EOMI, MMM, conjunctivae red  PULM: CTAB  CARDS: s1/s2, no m/r/g, trace FRANK, elevated JVD  ABD: soft, non-tender, and non-distended     Assessment/Plan   Mr. Leonel Yu is a 51 yo M with PMHx of HTN, HFrEF (10-15% 3/2023), substance use disorder (tobacco, crack cocaine), CKD, suspected  COPD w/Emphysema (no PFTs on file) and history of medication non-compliance who presented to [facility] ED due to c/f a CHF exacerbation.      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  - per pt report, Cath done by [facility] 2022, unknown results, unable to find records in HIE  - admit BNP: 3921, appears only mildly overloaded on exam   - dc weight 06/17/2023: 72.8kg   - Admit weight: 70.3 kg   - s/p 100mg IVP Lasix ED  - CT Chest: no significant pulm edema  - repeat TTE in the AM  - cont home Entresto 24/26mg BID, Carvedilol 6.25mg BID, Empagliflozin 10mg daily, and Spironolactone 12.5mg daily  - hold home diuretic: Torsemide 50mg daily PRN for weight gain  - fluid restriction, daily standing weights, strict I&O's      Suspected COPD, stable  Small Volume Hemoptysis  -expect hemoptysis is irritation of the larynx from coughing given that he describes small specks of blood  - hx treatment for multiple COPD exacerbation's, lost to follow up (PFTs, 6MWT, f/u pulmonary nodules, as well as pulmonary rehab)  - CTPE  Severe emphysema with bullous changes in the upper lungs more in the Lt side.   - CT Chest: Severe diffuse emphysematous changes with numerous large bullae, predominantly within the bilateral apices.  - on RA, no signs of acute exacerbation  - cont Spiriva Respimat, PRN Duoneb's while inpatient     BRBPR  -holding home ASA  -holding DVT PPX  -Hgb stable  -NPO at MN  -GI consult in the AM    Hx Cocaine use  Anxiety and Depression  -UDS pending  - cont home Sertraline 50mg daily     CKD3a  - Baseline Cr ~1.5-1.8, last normal 04/2021 was 1.15  - Cr at admission in 1.4   - Avoid nephrotoxins and hypotension  - Daily RFP while admitted, monitor with diuresis     Code Status: Full Code         Peripheral IV 10/23/23 18 G Right Antecubital (Active)   Site Assessment Clean;Dry;Intact 10/23/23 1554   Dressing Type Transparent 10/23/23 1554   Line Status Blood return noted;Flushed;Saline locked 10/23/23 1554   Number of days: 1       Code Status:  Full Code          Tabitha Mantilla MD

## 2023-10-25 LAB
ALBUMIN SERPL BCP-MCNC: 3.8 G/DL (ref 3.4–5)
ANION GAP SERPL CALC-SCNC: 14 MMOL/L (ref 10–20)
BUN SERPL-MCNC: 21 MG/DL (ref 6–23)
CALCIUM SERPL-MCNC: 9.7 MG/DL (ref 8.6–10.6)
CHLORIDE SERPL-SCNC: 101 MMOL/L (ref 98–107)
CO2 SERPL-SCNC: 28 MMOL/L (ref 21–32)
CREAT SERPL-MCNC: 1.63 MG/DL (ref 0.5–1.3)
ERYTHROCYTE [DISTWIDTH] IN BLOOD BY AUTOMATED COUNT: 14.3 % (ref 11.5–14.5)
GFR SERPL CREATININE-BSD FRML MDRD: 50 ML/MIN/1.73M*2
GLUCOSE SERPL-MCNC: 149 MG/DL (ref 74–99)
HCT VFR BLD AUTO: 54.3 % (ref 41–52)
HGB BLD-MCNC: 17.9 G/DL (ref 13.5–17.5)
MCH RBC QN AUTO: 31.4 PG (ref 26–34)
MCHC RBC AUTO-ENTMCNC: 33 G/DL (ref 32–36)
MCV RBC AUTO: 95 FL (ref 80–100)
NRBC BLD-RTO: 0 /100 WBCS (ref 0–0)
PHOSPHATE SERPL-MCNC: 5.2 MG/DL (ref 2.5–4.9)
PLATELET # BLD AUTO: 183 X10*3/UL (ref 150–450)
PMV BLD AUTO: 12 FL (ref 7.5–11.5)
POTASSIUM SERPL-SCNC: 4.1 MMOL/L (ref 3.5–5.3)
RBC # BLD AUTO: 5.7 X10*6/UL (ref 4.5–5.9)
SODIUM SERPL-SCNC: 139 MMOL/L (ref 136–145)
WBC # BLD AUTO: 8.6 X10*3/UL (ref 4.4–11.3)

## 2023-10-25 PROCEDURE — 2500000004 HC RX 250 GENERAL PHARMACY W/ HCPCS (ALT 636 FOR OP/ED): Performed by: NURSE PRACTITIONER

## 2023-10-25 PROCEDURE — 80069 RENAL FUNCTION PANEL: CPT | Performed by: NURSE PRACTITIONER

## 2023-10-25 PROCEDURE — 94640 AIRWAY INHALATION TREATMENT: CPT

## 2023-10-25 PROCEDURE — 2500000001 HC RX 250 WO HCPCS SELF ADMINISTERED DRUGS (ALT 637 FOR MEDICARE OP): Performed by: STUDENT IN AN ORGANIZED HEALTH CARE EDUCATION/TRAINING PROGRAM

## 2023-10-25 PROCEDURE — 2500000004 HC RX 250 GENERAL PHARMACY W/ HCPCS (ALT 636 FOR OP/ED): Performed by: STUDENT IN AN ORGANIZED HEALTH CARE EDUCATION/TRAINING PROGRAM

## 2023-10-25 PROCEDURE — 2500000002 HC RX 250 W HCPCS SELF ADMINISTERED DRUGS (ALT 637 FOR MEDICARE OP, ALT 636 FOR OP/ED): Performed by: STUDENT IN AN ORGANIZED HEALTH CARE EDUCATION/TRAINING PROGRAM

## 2023-10-25 PROCEDURE — 99233 SBSQ HOSP IP/OBS HIGH 50: CPT | Performed by: INTERNAL MEDICINE

## 2023-10-25 PROCEDURE — 94760 N-INVAS EAR/PLS OXIMETRY 1: CPT

## 2023-10-25 PROCEDURE — 36415 COLL VENOUS BLD VENIPUNCTURE: CPT | Performed by: NURSE PRACTITIONER

## 2023-10-25 PROCEDURE — 2500000001 HC RX 250 WO HCPCS SELF ADMINISTERED DRUGS (ALT 637 FOR MEDICARE OP): Performed by: NURSE PRACTITIONER

## 2023-10-25 PROCEDURE — 85027 COMPLETE CBC AUTOMATED: CPT | Performed by: NURSE PRACTITIONER

## 2023-10-25 PROCEDURE — 1200000002 HC GENERAL ROOM WITH TELEMETRY DAILY

## 2023-10-25 RX ORDER — TALC
6 POWDER (GRAM) TOPICAL NIGHTLY PRN
Status: DISCONTINUED | OUTPATIENT
Start: 2023-10-25 | End: 2023-10-26 | Stop reason: HOSPADM

## 2023-10-25 RX ORDER — FUROSEMIDE 10 MG/ML
80 INJECTION INTRAMUSCULAR; INTRAVENOUS ONCE
Status: COMPLETED | OUTPATIENT
Start: 2023-10-25 | End: 2023-10-25

## 2023-10-25 RX ADMIN — CARVEDILOL 6.25 MG: 12.5 TABLET, FILM COATED ORAL at 08:16

## 2023-10-25 RX ADMIN — TIOTROPIUM BROMIDE INHALATION SPRAY 2 PUFF: 3.12 SPRAY, METERED RESPIRATORY (INHALATION) at 10:18

## 2023-10-25 RX ADMIN — FUROSEMIDE 80 MG: 10 INJECTION, SOLUTION INTRAVENOUS at 12:36

## 2023-10-25 RX ADMIN — Medication 6 MG: at 21:43

## 2023-10-25 RX ADMIN — CARVEDILOL 6.25 MG: 12.5 TABLET, FILM COATED ORAL at 20:34

## 2023-10-25 RX ADMIN — SACUBITRIL AND VALSARTAN 1 TABLET: 24; 26 TABLET, FILM COATED ORAL at 20:34

## 2023-10-25 RX ADMIN — SPIRONOLACTONE 12.5 MG: 25 TABLET, FILM COATED ORAL at 08:16

## 2023-10-25 RX ADMIN — ATORVASTATIN CALCIUM 40 MG: 20 TABLET, FILM COATED ORAL at 08:16

## 2023-10-25 RX ADMIN — EMPAGLIFLOZIN 10 MG: 10 TABLET, FILM COATED ORAL at 08:16

## 2023-10-25 RX ADMIN — SERTRALINE 50 MG: 50 TABLET, FILM COATED ORAL at 08:17

## 2023-10-25 RX ADMIN — SENNOSIDES AND DOCUSATE SODIUM 1 TABLET: 50; 8.6 TABLET ORAL at 20:34

## 2023-10-25 RX ADMIN — ASPIRIN 81 MG: 81 TABLET, COATED ORAL at 12:36

## 2023-10-25 ASSESSMENT — COGNITIVE AND FUNCTIONAL STATUS - GENERAL
MOBILITY SCORE: 24
DAILY ACTIVITIY SCORE: 24

## 2023-10-25 ASSESSMENT — PAIN SCALES - GENERAL: PAINLEVEL_OUTOF10: 0 - NO PAIN

## 2023-10-25 ASSESSMENT — PAIN - FUNCTIONAL ASSESSMENT: PAIN_FUNCTIONAL_ASSESSMENT: 0-10

## 2023-10-25 NOTE — PROGRESS NOTES
10/25/23        Patient recommended  by thrice to see psych information passed on to provider

## 2023-10-25 NOTE — CARE PLAN
The patient's goals for the shift include  to remain safe    The clinical goals for the shift include Patient will maintain pulse-ox 92% or > through shift

## 2023-10-25 NOTE — PROGRESS NOTES
Subjective Data:  - Diuresed well overnight with one dose 80mg IV   - repeat 80mg IV once today   - consult thrive today patient agreeable to speak with them  - plan to discharge home tomorrow    Overnight Events:         Objective Data:  Last Recorded Vitals:  Vitals:    10/25/23 0615 10/25/23 0816 10/25/23 1019 10/25/23 1239   BP:  114/75  103/75   BP Location:  Left arm  Right arm   Patient Position:  Sitting  Lying   Pulse:  51 78 88   Resp:  19 22 18   Temp:  36.1 °C (96.9 °F)  36.3 °C (97.3 °F)   TempSrc:  Temporal  Temporal   SpO2:  94% 97% 97%   Weight: 73.8 kg (162 lb 11.2 oz)      Height:           Last Labs:  CBC - 10/24/2023:  7:18 PM  7.2 16.6 172    51.0      CMP - 10/24/2023:  7:18 PM  9.4 6.2 24 --- 0.6   4.5 3.6 28 74      PTT - 10/23/2023:  6:51 PM  1.3   14.2 31          Last I/O:  I/O last 3 completed shifts:  In: 1680 (22.8 mL/kg) [P.O.:1680]  Out: 3875 (52.5 mL/kg) [Urine:3875 (1.5 mL/kg/hr)]  Weight: 73.8 kg       Inpatient Medications:  Scheduled medications   Medication Dose Route Frequency    aspirin  81 mg oral Daily    atorvastatin  40 mg oral Daily    carvedilol  6.25 mg oral BID    empagliflozin  10 mg oral Daily    [Held by provider] enoxaparin  40 mg subcutaneous q24h    sacubitriL-valsartan  1 tablet oral BID    sennosides-docusate sodium  1 tablet oral Nightly    sertraline  50 mg oral Daily    spironolactone  12.5 mg oral Daily    tiotropium  2 Inhalation inhalation Daily     PRN medications   Medication    sodium chloride (PF) 0.9%     Continuous Medications   Medication Dose Last Rate       Physical Exam:  Physical Exam  Constitutional:       Appearance: He is normal weight.   HENT:      Head: Normocephalic.      Nose: Nose normal.      Mouth/Throat:      Mouth: Mucous membranes are moist.   Eyes:      Pupils: Pupils are equal, round, and reactive to light.   Neck:      Vascular: JVD (to the midneck at 45 degrees) present.   Cardiovascular:      Rate and Rhythm: Normal rate and  regular rhythm.      Pulses: Normal pulses.      Heart sounds: Normal heart sounds.   Pulmonary:      Effort: Pulmonary effort is normal.   Abdominal:      General: Bowel sounds are normal.      Palpations: Abdomen is soft.   Musculoskeletal:         General: Normal range of motion.      Cervical back: Normal range of motion.   Skin:     General: Skin is warm and dry.      Capillary Refill: Capillary refill takes less than 2 seconds.   Neurological:      General: No focal deficit present.      Mental Status: He is alert and oriented to person, place, and time. Mental status is at baseline.   Psychiatric:         Mood and Affect: Mood normal.           Assessment/Plan     Mr. Leonel Yu is a 53 yo M with PMHx of HTN, HFrEF (10-15% 3/2023), substance use disorder (tobacco, crack cocaine), CKD, suspected  COPD w/Emphysema (no PFTs on file) and history of medication non-compliance who presented to [facility] ED due to c/f a CHF exacerbation.      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  - per pt report, Cath done by [facility] 2022, unknown results, unable to find records in HIE  - admit BNP: 3921, appears only mildly overloaded on exam   - dc weight 06/17/2023: 72.8kg   - Admit weight: 75.8 (actual)   - s/p 100mg IVP Lasix ED  - CT Chest: no significant pulm edema  - no need to repeat tte at this time   - can defer ischemic eval to outpatient  - cont home Entresto 24/26mg BID, Carvedilol 6.25mg BID, Empagliflozin 10mg daily, and Spironolactone 12.5mg daily  - hold home diuretic: Torsemide 50mg daily PRN for weight gain  - 80mg IV once yesterday was negative 2.1L  - repeat 80mg IV once today and plan to transition to PO diuretics tomorrow   - patient with no edema of BLE but with crackles in Bilateral bases and JVD to the jaw at 45 degrees   - fluid restriction, daily standing weights, strict I&O's      Suspected COPD, stable  Small  Volume Hemoptysis  - expect hemoptysis is irritation of the larynx from coughing given that he describes small specks of blood  - hx treatment for multiple COPD exacerbation's, lost to follow up (PFTs, 6MWT, f/u pulmonary nodules, as well as pulmonary rehab)  - CTPE Severe emphysema with bullous changes in the upper lungs more in the Lt side.   - CT Chest: Severe diffuse emphysematous changes with numerous large bullae, predominantly within the bilateral apices.  - on RA, no signs of acute exacerbation  - cont Spiriva Respimat, PRN Duoneb's while inpatient     BRBPR-> Hemorrhoid  - resume ASA   -holding DVT PPX  - Hgb stable  - had small streak of blood on stool and toilet paper, reports straining   - added stool softener   - GI no need for GI consult at this time      Hx Cocaine use  Anxiety and Depression  - UDS pos for cocaine   - cont home Sertraline 50mg daily  - consult thrive today, patient was open to speaking with them     CKD3a  - Baseline Cr ~1.5-1.8, last normal 04/2021 was 1.15  - Cr at admission in 1.4   - Cr today 1.72 (1.56)  - Avoid nephrotoxins and hypotension  - Daily RFP while admitted, monitor with diuresis     Code Status: Full Code     Dispo pending thrive discussion to rehab vs home, diuresis      Seen and discussed with Dr. Barraza    Code Status:  Full Code      Bart Casas, APRN-CNP, DNP

## 2023-10-25 NOTE — PROGRESS NOTES
"SW consulted for pt with substance use. SW met with pt at bedside. Pt admits to using cocaine. Pt states he started using cocaine years ago to help with stress. Pt reports significant life events (losing his home, spouse and family) also contributed to his drug use. Pt states he wants a \"lady friend\" and he will stop using drugs. SW challenged  pt on his perspective and encouraged pt to consider himself when deciding to abstain from drugs. Pt reiterated he feels he needs a \"lady friend\" to help stop using drugs. Sw asked if pt would be interested in a female therapist to assist with pt's grieve following the significant life events. Pt states he would consider therapy.  SW will remain available.    CLARK Rodriguez  Socail Worker   Secure Chat    "

## 2023-10-25 NOTE — PROGRESS NOTES
10/25/23        Transitional Care Coordination Progress Note:   Patient discussed during interdisciplinary rounds.   Team members present: RN TCC interdisciplinary team   Plan per Medical/Surgical team:  SOB, ECHO, CT PE, monitor Hgb, GI consult   Discharge disposition: Home   Status-Inpatient   Payer- Payor: [employer] [facility] / Plan: [employer] [facility] / Product Type: *No Product type* /    Potential Barriers: None   ADOD: 10.27.2023   Dionicio Victoria RN [facility] [phone]

## 2023-10-26 ENCOUNTER — PHARMACY VISIT (OUTPATIENT)
Dept: PHARMACY | Facility: CLINIC | Age: 53
End: 2023-10-26
Payer: MEDICAID

## 2023-10-26 VITALS
OXYGEN SATURATION: 92 % | WEIGHT: 162.26 LBS | DIASTOLIC BLOOD PRESSURE: 66 MMHG | HEIGHT: 70 IN | TEMPERATURE: 97.1 F | RESPIRATION RATE: 20 BRPM | BODY MASS INDEX: 23.23 KG/M2 | HEART RATE: 67 BPM | SYSTOLIC BLOOD PRESSURE: 92 MMHG

## 2023-10-26 PROCEDURE — RXMED WILLOW AMBULATORY MEDICATION CHARGE

## 2023-10-26 PROCEDURE — 2500000004 HC RX 250 GENERAL PHARMACY W/ HCPCS (ALT 636 FOR OP/ED): Performed by: STUDENT IN AN ORGANIZED HEALTH CARE EDUCATION/TRAINING PROGRAM

## 2023-10-26 PROCEDURE — 2500000001 HC RX 250 WO HCPCS SELF ADMINISTERED DRUGS (ALT 637 FOR MEDICARE OP): Performed by: STUDENT IN AN ORGANIZED HEALTH CARE EDUCATION/TRAINING PROGRAM

## 2023-10-26 PROCEDURE — 99239 HOSP IP/OBS DSCHRG MGMT >30: CPT | Performed by: INTERNAL MEDICINE

## 2023-10-26 RX ORDER — TORSEMIDE 100 MG/1
100 TABLET ORAL DAILY
Qty: 15 TABLET | Refills: 3 | Status: SHIPPED | OUTPATIENT
Start: 2023-10-26 | End: 2023-12-14 | Stop reason: HOSPADM

## 2023-10-26 RX ADMIN — ASPIRIN 81 MG: 81 TABLET, COATED ORAL at 09:11

## 2023-10-26 RX ADMIN — SPIRONOLACTONE 12.5 MG: 25 TABLET, FILM COATED ORAL at 09:11

## 2023-10-26 RX ADMIN — SERTRALINE 50 MG: 50 TABLET, FILM COATED ORAL at 09:11

## 2023-10-26 RX ADMIN — SACUBITRIL AND VALSARTAN 1 TABLET: 24; 26 TABLET, FILM COATED ORAL at 09:11

## 2023-10-26 RX ADMIN — ATORVASTATIN CALCIUM 40 MG: 20 TABLET, FILM COATED ORAL at 09:11

## 2023-10-26 RX ADMIN — CARVEDILOL 6.25 MG: 12.5 TABLET, FILM COATED ORAL at 09:11

## 2023-10-26 RX ADMIN — EMPAGLIFLOZIN 10 MG: 10 TABLET, FILM COATED ORAL at 09:11

## 2023-10-26 RX ADMIN — TIOTROPIUM BROMIDE INHALATION SPRAY 2 PUFF: 3.12 SPRAY, METERED RESPIRATORY (INHALATION) at 09:35

## 2023-10-26 ASSESSMENT — COGNITIVE AND FUNCTIONAL STATUS - GENERAL
DAILY ACTIVITIY SCORE: 24
MOBILITY SCORE: 24

## 2023-10-26 ASSESSMENT — PAIN - FUNCTIONAL ASSESSMENT: PAIN_FUNCTIONAL_ASSESSMENT: 0-10

## 2023-10-26 ASSESSMENT — PAIN SCALES - GENERAL: PAINLEVEL_OUTOF10: 0 - NO PAIN

## 2023-10-26 NOTE — NURSING NOTE
Walking pulse ox completed with patient. Patient walked the nursing unit on RA with an O2 saturation greater than 92%.     Clara Rubi RN

## 2023-10-26 NOTE — DISCHARGE SUMMARY
Discharge Diagnosis  Congestive heart failure, unspecified HF chronicity, unspecified heart failure type (CMS/[facility])    Issues Requiring Follow-Up  Follow up with Heart failure clinic   Follow up with [facility] chem dependency resources     Discharge Meds     Your medication list        CHANGE how you take these medications        Instructions Last Dose Given Next Dose Due   Spiriva Respimat 2.5 mcg/actuation inhaler  Generic drug: tiotropium  What changed: Another medication with the same name was removed. Continue taking this medication, and follow the directions you see here.      INHALE 2 PUFFS ONCE DAILY       torsemide 100 mg tablet  Commonly known as: Demadex  What changed:   how much to take  how to take this  when to take this  Another medication with the same name was removed. Continue taking this medication, and follow the directions you see here.      Take 1 tablet (100 mg) by mouth once daily.              CONTINUE taking these medications        Instructions Last Dose Given Next Dose Due   aspirin 81 mg EC tablet      TAKE 1 TABLET BY MOUTH ONCE DAILY       atorvastatin 40 mg tablet  Commonly known as: Lipitor      TAKE 1 TABLET BY MOUTH ONCE DAILY       carvedilol 6.25 mg tablet  Commonly known as: Coreg      TAKE 1 TABLET BY MOUTH TWO TIMES A DAY       Entresto 24-26 mg tablet  Generic drug: sacubitriL-valsartan      TAKE 1 TABLET BY MOUTH TWO TIMES A DAY       Jardiance 10 mg  Generic drug: empagliflozin      TAKE 1 TABLET BY MOUTH ONCE DAILY       sertraline 50 mg tablet  Commonly known as: Zoloft      TAKE 1 TABLET BY MOUTH ONCE DAILY       spironolactone 25 mg tablet  Commonly known as: Aldactone      TAKE 1/2 TABLET BY MOUTH ONCE DAILY                 Where to Get Your Medications        These medications were sent to [facility]  [id number], [place] [id number]      Hours: 8AM to 6PM Mon-Fri, 8AM to 4PM Sat, 9AM to 1PM Sun Phone: [phone]   torsemide 100 mg  tablet         Test Results Pending At Discharge  Pending Labs       No current pending labs.            Hospital Course   Mr. Leonel Yu is a 51 yo M with PMHx of HTN, HFrEF (10-15% 3/2023), substance use disorder (tobacco, crack cocaine), CKD, suspected COPD w/Emphysema (no PFTs  on file) and history of medication non-compliance who presented to [facility] ED with SOB and c/f CHF exacerbation.      Of note, he was recently admitted in 7/2023 following significant cocaine use with c/f stroke like symptoms. He was thought to be volume overloaded and diuresed with IV Lasix boluses. Successfully weaned to RA with improvement in symptoms of SOB and no further CP with down trending troponin. Per Neuro-Stroke, MRI brain w/o pursued, completed 7/25: showing no acute infarct, recent hemorrhage,  or intracranial mass effect. Unchanged remote left cerebellar infarct. Additional trace white matter signal abnormality that may reflect trace chronic small vessel ischemic disease or sequela of migraines. LDL 97, A1C 7.1%. Per Neuro-Stroke, patient started  on ASA 81mg daily and Atorvastatin 40 mg daily.      He is now presenting again with SOB along with complaints of BRBPR and hemoptysis. Regarding SOB, he denies CP but endorses PND, orthopnea, and dyspnea with mild exertion which has been chronic over the past two years and rather unchanged now. He occasionally has FRANK for which he takes Torsemide at home which helps relieve this. Regarding his other HF medications, he does not take them regularly but takes them a few days out of the week randomly. He has not taken these in the past few days. Additionally, he endorses small specks of blood while coughing over the past few days.  Regarding BRBPR, this has occurred on and off the last few years but has been occurring the past two days and is the main reason that he led him to present today.      He also initially raises concern for passing out but upon further questioning, these  episodes sound more typical of just falling asleep quickly. He denies any falls or hitting his head. From a social standpoint, he has been  from his wife for about 5 years and has been living in a car repair shop. He is actively using cocaine and admits to being homeless without any source of income and having tremendous difficulty in obtaining food. The car repair shop owner providing him housing and cocaine in exchange for him working there. He has been out of touch with his 5 for 5 years.      CXR in the ED showed cardiomegaly and pulm vascular congestion. CTPE with severe emphysema but no PE or overt pulmonary edema. Labs revealed an elevated BNP of 3921. Cr of 1.41 (baseline: 1.4 - 1.7). He was given 100 mg of IV Lasix and admitted for further w/u.     Walking pulse ox by nursing above 90% and majority 95%    Diuresis over the floor course with IV lasix, total of 3.5 liters off.  Discharge weight 73.6 kg  Discharge creatine 1.61    He has follow up with heart failure clinic and new script for his home toresmide to [facility] pharmacy     Pertinent Physical Exam At Time of Discharge  Alert and oriented x 3  NAD  Warm and dry  Lungs clear  Abd soft, non distended   No swelling to legs warm       Outpatient Follow-Up  Future Appointments   Date Time Provider Department Center   11/8/2023  2:00 PM JACKIE Lynn-CNP [id number] Academic   11/30/2023  4:00 PM Bart La DO [id number] Academic         JACKIE Dinero-CNP    Seen and discussed with Dr Barraza

## 2023-10-26 NOTE — CARE PLAN
The patient's goals for the shift include      The clinical goals for the shift include Patient will maintain pulse-ox 92% or > through shift    Over the shift, the patient did make progress toward the following goals. Barriers to progression include continue to use nasal canula oxygen as needed. Recommendations to address these barriers include using nasal cannula.

## 2023-11-01 NOTE — DOCUMENTATION CLARIFICATION NOTE
"    PATIENT:               VINOD SHAFER  ACCT #:                  [id number]  MRN:                       [id number]  :                       1970  ADMIT DATE:       10/23/2023 6:08 PM  DISCH DATE:        10/26/2023 3:00 PM  RESPONDING PROVIDER #:        [id number]          PROVIDER RESPONSE TEXT:    Acute on chronic systolic HF due to chronic use of cocaine and medication noncompliance    CDI QUERY TEXT:    UH_Adverse Effect Poisoning    Instruction:    Based on your assessment of the patient and the clinical information, please provide the requested documentation by clicking on the appropriate radio button and enter any additional information if prompted.    Question: Please further clarify if there is a relationship between patient's cocaine use, cardiomyopathy and heart failure        When answering this query, please exercise your independent professional judgment. The fact that a question is being asked, does not imply that any particular answer is desired or expected.    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\"    DC Summary: \"He is actively using cocaine\"    Urine Drug Screen, 10/24: Positive for Cocaine    Treatment: IV Diuresis and GDMT for heart failure, daily weights, monitoring intake and output    Risk Factors: Homelessness, medication non-compliance  Options provided:  -- Acute on chronic systolic HF due to acute use of cocaine and cocaine induced cardiomyopathy  -- Acute on " chronic systolic HF due to chronic use of cocaine and medication noncompliance  -- Other - I will add my own diagnosis  -- Refer to Clinical Documentation Reviewer    Query created by: Eli Gonzales on 10/30/2023 5:42 PM      Electronically signed by:  SAMANTHA GONZALEZ MD 11/1/2023 8:09 AM

## 2023-12-09 ENCOUNTER — HOSPITAL ENCOUNTER (INPATIENT)
Facility: HOSPITAL | Age: 53
LOS: 5 days | Discharge: HOME | End: 2023-12-14
Attending: EMERGENCY MEDICINE | Admitting: INTERNAL MEDICINE
Payer: COMMERCIAL

## 2023-12-09 ENCOUNTER — APPOINTMENT (OUTPATIENT)
Dept: RADIOLOGY | Facility: HOSPITAL | Age: 53
End: 2023-12-09
Payer: COMMERCIAL

## 2023-12-09 DIAGNOSIS — I50.9 ACUTE ON CHRONIC CONGESTIVE HEART FAILURE, UNSPECIFIED HEART FAILURE TYPE (MULTI): ICD-10-CM

## 2023-12-09 DIAGNOSIS — I50.9 CONGESTIVE HEART FAILURE, UNSPECIFIED HF CHRONICITY, UNSPECIFIED HEART FAILURE TYPE (MULTI): ICD-10-CM

## 2023-12-09 DIAGNOSIS — F14.90 CRACK COCAINE USE: ICD-10-CM

## 2023-12-09 DIAGNOSIS — R06.02 SHORTNESS OF BREATH: Primary | ICD-10-CM

## 2023-12-09 LAB
ALBUMIN SERPL BCP-MCNC: 3.4 G/DL (ref 3.4–5)
ALP SERPL-CCNC: 72 U/L (ref 33–120)
ALT SERPL W P-5'-P-CCNC: 20 U/L (ref 10–52)
AMMONIA PLAS-SCNC: 49 UMOL/L (ref 16–53)
AMPHETAMINES UR QL SCN: ABNORMAL
ANION GAP SERPL CALC-SCNC: 11 MMOL/L (ref 10–20)
APPEARANCE UR: CLEAR
AST SERPL W P-5'-P-CCNC: 18 U/L (ref 9–39)
BARBITURATES UR QL SCN: ABNORMAL
BASOPHILS # BLD AUTO: 0.05 X10*3/UL (ref 0–0.1)
BASOPHILS NFR BLD AUTO: 0.7 %
BENZODIAZ UR QL SCN: ABNORMAL
BILIRUB SERPL-MCNC: 0.7 MG/DL (ref 0–1.2)
BILIRUB UR STRIP.AUTO-MCNC: NEGATIVE MG/DL
BNP SERPL-MCNC: 3439 PG/ML (ref 0–99)
BUN SERPL-MCNC: 24 MG/DL (ref 6–23)
BZE UR QL SCN: ABNORMAL
CALCIUM SERPL-MCNC: 8.9 MG/DL (ref 8.6–10.6)
CANNABINOIDS UR QL SCN: ABNORMAL
CARDIAC TROPONIN I PNL SERPL HS: 163 NG/L (ref 0–53)
CARDIAC TROPONIN I PNL SERPL HS: 171 NG/L (ref 0–53)
CARDIAC TROPONIN I PNL SERPL HS: 180 NG/L (ref 0–53)
CHLORIDE SERPL-SCNC: 106 MMOL/L (ref 98–107)
CO2 SERPL-SCNC: 25 MMOL/L (ref 21–32)
COLOR UR: COLORLESS
CREAT SERPL-MCNC: 1.4 MG/DL (ref 0.5–1.3)
EOSINOPHIL # BLD AUTO: 0.04 X10*3/UL (ref 0–0.7)
EOSINOPHIL NFR BLD AUTO: 0.6 %
ERYTHROCYTE [DISTWIDTH] IN BLOOD BY AUTOMATED COUNT: 14 % (ref 11.5–14.5)
EST. AVERAGE GLUCOSE BLD GHB EST-MCNC: 171 MG/DL
FENTANYL+NORFENTANYL UR QL SCN: ABNORMAL
FENTANYL+NORFENTANYL UR QL SCN: NORMAL
FLUAV RNA RESP QL NAA+PROBE: NOT DETECTED
FLUBV RNA RESP QL NAA+PROBE: NOT DETECTED
GFR SERPL CREATININE-BSD FRML MDRD: 60 ML/MIN/1.73M*2
GLUCOSE BLD MANUAL STRIP-MCNC: 115 MG/DL (ref 74–99)
GLUCOSE SERPL-MCNC: 209 MG/DL (ref 74–99)
GLUCOSE UR STRIP.AUTO-MCNC: ABNORMAL MG/DL
HBA1C MFR BLD: 7.6 %
HCT VFR BLD AUTO: 49.5 % (ref 41–52)
HGB BLD-MCNC: 16.6 G/DL (ref 13.5–17.5)
IMM GRANULOCYTES # BLD AUTO: 0.02 X10*3/UL (ref 0–0.7)
IMM GRANULOCYTES NFR BLD AUTO: 0.3 % (ref 0–0.9)
INR PPP: 1.2 (ref 0.9–1.1)
KETONES UR STRIP.AUTO-MCNC: NEGATIVE MG/DL
LEUKOCYTE ESTERASE UR QL STRIP.AUTO: NEGATIVE
LYMPHOCYTES # BLD AUTO: 0.78 X10*3/UL (ref 1.2–4.8)
LYMPHOCYTES NFR BLD AUTO: 10.8 %
MCH RBC QN AUTO: 31.6 PG (ref 26–34)
MCHC RBC AUTO-ENTMCNC: 33.5 G/DL (ref 32–36)
MCV RBC AUTO: 94 FL (ref 80–100)
MONOCYTES # BLD AUTO: 0.32 X10*3/UL (ref 0.1–1)
MONOCYTES NFR BLD AUTO: 4.4 %
NEUTROPHILS # BLD AUTO: 6.04 X10*3/UL (ref 1.2–7.7)
NEUTROPHILS NFR BLD AUTO: 83.2 %
NITRITE UR QL STRIP.AUTO: NEGATIVE
NRBC BLD-RTO: 0 /100 WBCS (ref 0–0)
OPIATES UR QL SCN: ABNORMAL
OXYCODONE+OXYMORPHONE UR QL SCN: ABNORMAL
PCP UR QL SCN: ABNORMAL
PH UR STRIP.AUTO: 7 [PH]
PLATELET # BLD AUTO: 192 X10*3/UL (ref 150–450)
POTASSIUM SERPL-SCNC: 4.1 MMOL/L (ref 3.5–5.3)
PROT SERPL-MCNC: 6.9 G/DL (ref 6.4–8.2)
PROT UR STRIP.AUTO-MCNC: NEGATIVE MG/DL
PROTHROMBIN TIME: 13.8 SECONDS (ref 9.8–12.8)
RBC # BLD AUTO: 5.25 X10*6/UL (ref 4.5–5.9)
RBC # UR STRIP.AUTO: NEGATIVE /UL
SARS-COV-2 RNA RESP QL NAA+PROBE: NOT DETECTED
SODIUM SERPL-SCNC: 138 MMOL/L (ref 136–145)
SP GR UR STRIP.AUTO: 1
TSH SERPL-ACNC: 1.46 MIU/L (ref 0.44–3.98)
UROBILINOGEN UR STRIP.AUTO-MCNC: <2 MG/DL
WBC # BLD AUTO: 7.3 X10*3/UL (ref 4.4–11.3)

## 2023-12-09 PROCEDURE — 81003 URINALYSIS AUTO W/O SCOPE: CPT | Performed by: EMERGENCY MEDICINE

## 2023-12-09 PROCEDURE — 83880 ASSAY OF NATRIURETIC PEPTIDE: CPT | Performed by: EMERGENCY MEDICINE

## 2023-12-09 PROCEDURE — 82330 ASSAY OF CALCIUM: CPT | Performed by: EMERGENCY MEDICINE

## 2023-12-09 PROCEDURE — 84484 ASSAY OF TROPONIN QUANT: CPT

## 2023-12-09 PROCEDURE — 99223 1ST HOSP IP/OBS HIGH 75: CPT | Performed by: INTERNAL MEDICINE

## 2023-12-09 PROCEDURE — 2500000001 HC RX 250 WO HCPCS SELF ADMINISTERED DRUGS (ALT 637 FOR MEDICARE OP)

## 2023-12-09 PROCEDURE — 36415 COLL VENOUS BLD VENIPUNCTURE: CPT

## 2023-12-09 PROCEDURE — 70450 CT HEAD/BRAIN W/O DYE: CPT | Performed by: RADIOLOGY

## 2023-12-09 PROCEDURE — 80053 COMPREHEN METABOLIC PANEL: CPT | Performed by: EMERGENCY MEDICINE

## 2023-12-09 PROCEDURE — 80307 DRUG TEST PRSMV CHEM ANLYZR: CPT

## 2023-12-09 PROCEDURE — 2500000001 HC RX 250 WO HCPCS SELF ADMINISTERED DRUGS (ALT 637 FOR MEDICARE OP): Mod: SE

## 2023-12-09 PROCEDURE — 99285 EMERGENCY DEPT VISIT HI MDM: CPT | Performed by: EMERGENCY MEDICINE

## 2023-12-09 PROCEDURE — 72125 CT NECK SPINE W/O DYE: CPT

## 2023-12-09 PROCEDURE — 93010 ELECTROCARDIOGRAM REPORT: CPT | Performed by: INTERNAL MEDICINE

## 2023-12-09 PROCEDURE — 84443 ASSAY THYROID STIM HORMONE: CPT

## 2023-12-09 PROCEDURE — 1200000002 HC GENERAL ROOM WITH TELEMETRY DAILY

## 2023-12-09 PROCEDURE — 85025 COMPLETE CBC W/AUTO DIFF WBC: CPT | Performed by: EMERGENCY MEDICINE

## 2023-12-09 PROCEDURE — 82947 ASSAY GLUCOSE BLOOD QUANT: CPT

## 2023-12-09 PROCEDURE — 84484 ASSAY OF TROPONIN QUANT: CPT | Performed by: EMERGENCY MEDICINE

## 2023-12-09 PROCEDURE — 82805 BLOOD GASES W/O2 SATURATION: CPT | Performed by: EMERGENCY MEDICINE

## 2023-12-09 PROCEDURE — 85610 PROTHROMBIN TIME: CPT

## 2023-12-09 PROCEDURE — 36415 COLL VENOUS BLD VENIPUNCTURE: CPT | Performed by: EMERGENCY MEDICINE

## 2023-12-09 PROCEDURE — 2500000004 HC RX 250 GENERAL PHARMACY W/ HCPCS (ALT 636 FOR OP/ED)

## 2023-12-09 PROCEDURE — 83036 HEMOGLOBIN GLYCOSYLATED A1C: CPT

## 2023-12-09 PROCEDURE — 82140 ASSAY OF AMMONIA: CPT

## 2023-12-09 PROCEDURE — 93010 ELECTROCARDIOGRAM REPORT: CPT | Performed by: EMERGENCY MEDICINE

## 2023-12-09 PROCEDURE — 70450 CT HEAD/BRAIN W/O DYE: CPT

## 2023-12-09 PROCEDURE — 87636 SARSCOV2 & INF A&B AMP PRB: CPT | Performed by: EMERGENCY MEDICINE

## 2023-12-09 PROCEDURE — 72125 CT NECK SPINE W/O DYE: CPT | Performed by: RADIOLOGY

## 2023-12-09 RX ORDER — HEPARIN SODIUM 5000 [USP'U]/ML
5000 INJECTION, SOLUTION INTRAVENOUS; SUBCUTANEOUS EVERY 8 HOURS SCHEDULED
Status: DISCONTINUED | OUTPATIENT
Start: 2023-12-09 | End: 2023-12-09

## 2023-12-09 RX ORDER — SERTRALINE HYDROCHLORIDE 50 MG/1
50 TABLET, FILM COATED ORAL DAILY
Status: DISCONTINUED | OUTPATIENT
Start: 2023-12-10 | End: 2023-12-14 | Stop reason: HOSPADM

## 2023-12-09 RX ORDER — HEPARIN SODIUM 5000 [USP'U]/ML
2000-4000 INJECTION, SOLUTION INTRAVENOUS; SUBCUTANEOUS EVERY 4 HOURS PRN
Status: DISCONTINUED | OUTPATIENT
Start: 2023-12-09 | End: 2023-12-10

## 2023-12-09 RX ORDER — ASPIRIN 81 MG/1
81 TABLET ORAL DAILY
Status: DISCONTINUED | OUTPATIENT
Start: 2023-12-09 | End: 2023-12-14 | Stop reason: HOSPADM

## 2023-12-09 RX ORDER — SPIRONOLACTONE 25 MG/1
25 TABLET ORAL DAILY
Status: DISCONTINUED | OUTPATIENT
Start: 2023-12-09 | End: 2023-12-14 | Stop reason: HOSPADM

## 2023-12-09 RX ORDER — FUROSEMIDE 10 MG/ML
40 INJECTION INTRAMUSCULAR; INTRAVENOUS ONCE
Status: COMPLETED | OUTPATIENT
Start: 2023-12-09 | End: 2023-12-09

## 2023-12-09 RX ORDER — POLYETHYLENE GLYCOL 3350 17 G/17G
17 POWDER, FOR SOLUTION ORAL DAILY PRN
Status: DISCONTINUED | OUTPATIENT
Start: 2023-12-09 | End: 2023-12-14 | Stop reason: HOSPADM

## 2023-12-09 RX ORDER — NAPROXEN SODIUM 220 MG/1
324 TABLET, FILM COATED ORAL ONCE
Status: COMPLETED | OUTPATIENT
Start: 2023-12-09 | End: 2023-12-09

## 2023-12-09 RX ORDER — CARVEDILOL 6.25 MG/1
6.25 TABLET ORAL 2 TIMES DAILY
Status: DISCONTINUED | OUTPATIENT
Start: 2023-12-09 | End: 2023-12-14 | Stop reason: HOSPADM

## 2023-12-09 RX ORDER — HEPARIN SODIUM 10000 [USP'U]/100ML
0-4000 INJECTION, SOLUTION INTRAVENOUS CONTINUOUS
Status: DISCONTINUED | OUTPATIENT
Start: 2023-12-09 | End: 2023-12-10

## 2023-12-09 RX ORDER — ATORVASTATIN CALCIUM 80 MG/1
80 TABLET, FILM COATED ORAL DAILY
Status: DISCONTINUED | OUTPATIENT
Start: 2023-12-09 | End: 2023-12-14 | Stop reason: HOSPADM

## 2023-12-09 RX ADMIN — FUROSEMIDE 40 MG: 10 INJECTION, SOLUTION INTRAVENOUS at 19:37

## 2023-12-09 RX ADMIN — CARVEDILOL 6.25 MG: 6.25 TABLET, FILM COATED ORAL at 20:59

## 2023-12-09 RX ADMIN — ATORVASTATIN CALCIUM 80 MG: 80 TABLET, FILM COATED ORAL at 20:58

## 2023-12-09 RX ADMIN — SPIRONOLACTONE 25 MG: 25 TABLET, FILM COATED ORAL at 19:36

## 2023-12-09 RX ADMIN — HEPARIN SODIUM 900 UNITS/HR: 10000 INJECTION, SOLUTION INTRAVENOUS at 22:56

## 2023-12-09 RX ADMIN — SACUBITRIL AND VALSARTAN 1 TABLET: 24; 26 TABLET, FILM COATED ORAL at 20:59

## 2023-12-09 RX ADMIN — ASPIRIN 81 MG: 81 TABLET, COATED ORAL at 19:36

## 2023-12-09 RX ADMIN — ASPIRIN 81 MG CHEWABLE TABLET 324 MG: 81 TABLET CHEWABLE at 13:53

## 2023-12-09 SDOH — SOCIAL STABILITY: SOCIAL INSECURITY: DO YOU FEEL ANYONE HAS EXPLOITED OR TAKEN ADVANTAGE OF YOU FINANCIALLY OR OF YOUR PERSONAL PROPERTY?: NO

## 2023-12-09 SDOH — SOCIAL STABILITY: SOCIAL INSECURITY: DOES ANYONE TRY TO KEEP YOU FROM HAVING/CONTACTING OTHER FRIENDS OR DOING THINGS OUTSIDE YOUR HOME?: NO

## 2023-12-09 SDOH — SOCIAL STABILITY: SOCIAL INSECURITY: ARE YOU OR HAVE YOU BEEN THREATENED OR ABUSED PHYSICALLY, EMOTIONALLY, OR SEXUALLY BY ANYONE?: NO

## 2023-12-09 SDOH — SOCIAL STABILITY: SOCIAL INSECURITY: HAVE YOU HAD THOUGHTS OF HARMING ANYONE ELSE?: NO

## 2023-12-09 SDOH — SOCIAL STABILITY: SOCIAL INSECURITY: ABUSE: ADULT

## 2023-12-09 SDOH — SOCIAL STABILITY: SOCIAL INSECURITY: DO YOU FEEL UNSAFE GOING BACK TO THE PLACE WHERE YOU ARE LIVING?: NO

## 2023-12-09 SDOH — SOCIAL STABILITY: SOCIAL INSECURITY: WERE YOU ABLE TO COMPLETE ALL THE BEHAVIORAL HEALTH SCREENINGS?: YES

## 2023-12-09 SDOH — SOCIAL STABILITY: SOCIAL INSECURITY: ARE THERE ANY APPARENT SIGNS OF INJURIES/BEHAVIORS THAT COULD BE RELATED TO ABUSE/NEGLECT?: NO

## 2023-12-09 SDOH — SOCIAL STABILITY: SOCIAL INSECURITY: HAS ANYONE EVER THREATENED TO HURT YOUR FAMILY OR YOUR PETS?: NO

## 2023-12-09 ASSESSMENT — LIFESTYLE VARIABLES
AUDIT-C TOTAL SCORE: 0
TOTAL_SCORE: 1
TOTAL_SCORE: 0
HOW OFTEN DO YOU HAVE 6 OR MORE DRINKS ON ONE OCCASION: NEVER
SKIP TO QUESTIONS 9-10: 1
HOW MANY STANDARD DRINKS CONTAINING ALCOHOL DO YOU HAVE ON A TYPICAL DAY: PATIENT DOES NOT DRINK
AUDIT-C TOTAL SCORE: 0
HOW OFTEN DO YOU HAVE A DRINK CONTAINING ALCOHOL: NEVER

## 2023-12-09 ASSESSMENT — PAIN SCALES - GENERAL
PAINLEVEL_OUTOF10: 0 - NO PAIN
PAINLEVEL_OUTOF10: 0 - NO PAIN

## 2023-12-09 ASSESSMENT — PATIENT HEALTH QUESTIONNAIRE - PHQ9
2. FEELING DOWN, DEPRESSED OR HOPELESS: SEVERAL DAYS
SUM OF ALL RESPONSES TO PHQ9 QUESTIONS 1 & 2: 1
1. LITTLE INTEREST OR PLEASURE IN DOING THINGS: NOT AT ALL

## 2023-12-09 ASSESSMENT — PAIN - FUNCTIONAL ASSESSMENT
PAIN_FUNCTIONAL_ASSESSMENT: 0-10
PAIN_FUNCTIONAL_ASSESSMENT: 0-10

## 2023-12-09 ASSESSMENT — COLUMBIA-SUICIDE SEVERITY RATING SCALE - C-SSRS
6. HAVE YOU EVER DONE ANYTHING, STARTED TO DO ANYTHING, OR PREPARED TO DO ANYTHING TO END YOUR LIFE?: NO
2. HAVE YOU ACTUALLY HAD ANY THOUGHTS OF KILLING YOURSELF?: NO
1. IN THE PAST MONTH, HAVE YOU WISHED YOU WERE DEAD OR WISHED YOU COULD GO TO SLEEP AND NOT WAKE UP?: NO

## 2023-12-09 ASSESSMENT — PAIN DESCRIPTION - DESCRIPTORS: DESCRIPTORS: STABBING;PRESSURE

## 2023-12-09 NOTE — ED TRIAGE NOTES
Reports SOB, insomnia and family reporting confusion. Pt states he had been speaking of things that aren't true or present. Also states decreased sensation and motor in left hand. Reports increase in falls, most recent fall was 3 days ago when he fell off a stool. No head injury, no thinners.     Pt is oriented during assessment, increased WOB, LS clear but diminished, lower extremity edema.

## 2023-12-09 NOTE — SIGNIFICANT EVENT
"Senior staffing note    Subjective  See excellent H+P by Dr. Ellington for more details.    Mr. Yu reports increasing orthopnea for months.  He refused to answer any further questions and became agitated.  Stated \"everyone here is lying to me, I want to get out of here.\"  Asked for food.    Per chart review he has been having orthopnea recently worsened.  He has also had left upper extremity weakness for 1 year which has also been worsening.  Over the past few days he has had inability to sleep due to chest pressure.    Review of systems: Unable to obtain due to patient's refusal to answer questions.    Medical history: Hypertension, HFrEF (EF 15-20%), polysubstance use disorder (with tobacco, crack cocaine, marijuana), CKD, depression    ED course:  He was loaded with aspirin 324 mg.    Objective  Vital signs  BP (!) 138/98   Pulse 80   Temp 36.9 °C (98.4 °F)   Resp 18   Ht 1.778 m (5' 10\")   Wt 77.1 kg (170 lb)   SpO2 99%   BMI 24.39 kg/m²     Physical exam  He refused a physical exam.  General: Awake, comfortable  HEENT: EOMI grossly, moist mucus membranes, no ear or nasal drainage  Respiratory: Breathing comfortably on nasal cannula  Neurology: Alert, no gross deficits    Labs and Imaging  /4.1/106/25/24/1 0.4  CBC 10.3/16.6/192  BNP 3400  Troponin 163 --> 180  Flu and covid negative  CT head negative for acute trauma and with C4-C5 disc protrusion and stenosis  EKG with normal sinus rhythm, Q waves in leads II and III, no ST elevations, T wave inversions in V5 and V6, ST depression in lead V6    7/2023 echo with EF 15-20% and severe LVH    Assessment and Plan  Mr. Yu is a 53-year-old man with HFrEF exacerbation and NSTEMI.  Medical history significant for hypertension, HFrEF 15-20%, polysubstance use disorder (with tobacco, crack cocaine, marijuana), CKD, depression.    NSTEMI: Troponins elevated to 100s.  No new EKG changes suggestive of ischemia.  Will trend troponin to peak with EKGs. "  Heparin drip.  NPO at midnight for possible LHC.  Increase atorvastatin to 80 mg daily, continue aspirin 81 mg daily.  Telemetry bed.    HFrEF exacerbation: EF 15-20% in July 2023.  BNP elevated to 3400 on admission.  Chest x-ray pending.  Continue home GDMT with carvedilol 6.25 mg twice daily, empagliflozin 10 mg daily, sacubitril-valsartan 24-26 mg twice daily, spironolactone 25 mg daily.  Diuresis with IV furosemide 40 mg tonight, strict I's and O's.  Discharge weight 73.6 kg on last admission.  Consider repeat echo.    Polysubstance use disorder: Urine tox screen ordered.  COWS protocol.    Fluids caution given EF  Electrolytes as needed  Cardiac diet  DVT prophylaxis: Anticoagulated with heparin    FULL CODE  NOK friend Sang Santana [phone]

## 2023-12-09 NOTE — ED PROVIDER NOTES
"HPI:      Limitations to History: somewhat sleepy during exam   Additional History Obtained from: none   External records reviewed: prior ED notes     Chief Complaint   Patient presents with    Shortness of Breath    Altered Mental Status          Leonel Yu is a 53 y.o. male with past medical history of HTN, HFrEF (10-15% 3/2023), COPD, and polysubstance abuse (smokes crack cocaine and marijuana) disorder presenting to the ED for orthopnea, with chest pressure.  Patient states he has been unable to sleep for quite some time.  He notes every time he tries to lay flat he gets very short of breath, feels as though he is unable to breathe, and has significant chest pressure.  Noted to be worse over the last few days.  Denies any fevers, chills, upper respiratory symptoms, nausea, or any vomiting. Patient noted that his weakness in his left upper extremity have been going on for the last year, but they have gotten much worse.  States that he has difficulty even holding a coffee cup.  States that he has been falling to the left as well and has had difficulty focusing his eyes when he is reading things.  Last noted crack cocaine use yesterday.      No past medical history on file.  No past surgical history on file.  Social History     Tobacco Use    Smoking status: Never    Smokeless tobacco: Never   Substance Use Topics    Alcohol use: Never    Drug use: Yes     Types: \"Crack\" cocaine     No Known Allergies    --------------------------------------------------------------------------------------------------------------------------------------    VS: As documented in the triage note and EMR flowsheet from this visit were reviewed.  Temp 36.9 °C (98.4 °F) HR 91 BP (!) 139/91 RR 20 Sat 97 % on      Physical Exam:  GEN:  no acute distress, appears comfortable. Conversational intermittently sleepy on examination.    HEENT: Normocephalic, atraumatic. Conjunctiva pink with no redness or exudates. Hearing grossly intact. " Moist mucous membranes.   CARDIO: tachycardic and regular rhythm. Normal S1, S2  without murmurs, rubs, or gallops.   PULM: Clear to auscultation bilaterally. No rales, rhonchi, or wheezes. No accessory muscle use or stridor. Speaking in full sentences.   GI: Soft, non-tender, non-distended. No rebound tenderness or guarding.   SKIN: Warm and dry, no rashes, lesions, petechiae, or purpura.  MSK: ROM intact in all 4 extremities without contractures or pain. No peripheral edema, contusions, or wounds.    NEURO: A&Ox3, No focal findings identified. Nodding off during physical exam. CN II-XII intact.  Weakness normal strength and sensation in the bilateral upper and lower extremities. No facial asymmetry. Normal finger-to-nose/heel-to-shin. No pronator drift. Steady gait. No nystagmus. A&Ox3.  PSYCH: Appropriate mood and behavior, converses and responds appropriately during exam.        ---------------------------------------------------------------------------------------------------------------------------------------    Given in the ED:   Medications   aspirin chewable tablet 324 mg (324 mg oral Given 12/9/23 0343)          Work up: All labs and imaging were independently reviewed by me.    Labs Reviewed   CBC WITH AUTO DIFFERENTIAL - Abnormal       Result Value    WBC 7.3      nRBC 0.0      RBC 5.25      Hemoglobin 16.6      Hematocrit 49.5      MCV 94      MCH 31.6      MCHC 33.5      RDW 14.0      Platelets 192      Neutrophils % 83.2      Immature Granulocytes %, Automated 0.3      Lymphocytes % 10.8      Monocytes % 4.4      Eosinophils % 0.6      Basophils % 0.7      Neutrophils Absolute 6.04      Immature Granulocytes Absolute, Automated 0.02      Lymphocytes Absolute 0.78 (*)     Monocytes Absolute 0.32      Eosinophils Absolute 0.04      Basophils Absolute 0.05     COMPREHENSIVE METABOLIC PANEL - Abnormal    Glucose 209 (*)     Sodium 138      Potassium 4.1      Chloride 106      Bicarbonate 25      Anion  Gap 11      Urea Nitrogen 24 (*)     Creatinine 1.40 (*)     eGFR 60 (*)     Calcium 8.9      Albumin 3.4      Alkaline Phosphatase 72      Total Protein 6.9      AST 18      Bilirubin, Total 0.7      ALT 20     TROPONIN I, HIGH SENSITIVITY - Abnormal    Troponin I, High Sensitivity 163 (*)     Narrative:     Less than 99th percentile of normal range cutoff-  Female and children under 18 years old <35 ng/L; Male <54 ng/L: Negative  Repeat testing should be performed if clinically indicated.     Female and children under 18 years old  ng/L; Male  ng/L:  Consistent with possible cardiac damage and possible increased clinical   risk. Serial measurements may help to assess extent of myocardial damage.     >120 ng/L: Consistent with cardiac damage, increased clinical risk and  myocardial infarction. Serial measurements may help assess extent of   myocardial damage.      NOTE: Children less than 1 year old may have higher baseline troponin   levels and results should be interpreted in conjunction with the overall   clinical context.    NOTE: Troponin I testing is performed using a different   testing methodology at [facility] than at other   [facility]. Direct result comparisons should only   be made within the same method.     B-TYPE NATRIURETIC PEPTIDE - Abnormal    BNP 3,439 (*)     Narrative:        <100 pg/mL - Heart failure unlikely  100-299 pg/mL - Intermediate probability of acute heart                  failure exacerbation. Correlate with clinical                  context and patient history.    >=300 pg/mL - Heart Failure likely. Correlate with clinical                  context and patient history.     Biotin interference may cause falsely decreased results. Patients taking a Biotin dose of up to 5 mg/day should refrain from taking Biotin for 24 hours before sample  collection. Providers may contact their local laboratory for further information.   TROPONIN I, HIGH SENSITIVITY -  Abnormal    Troponin I, High Sensitivity 180 (*)     Narrative:     Less than 99th percentile of normal range cutoff-  Female and children under 18 years old <35 ng/L; Male <54 ng/L: Negative  Repeat testing should be performed if clinically indicated.     Female and children under 18 years old  ng/L; Male  ng/L:  Consistent with possible cardiac damage and possible increased clinical   risk. Serial measurements may help to assess extent of myocardial damage.     >120 ng/L: Consistent with cardiac damage, increased clinical risk and  myocardial infarction. Serial measurements may help assess extent of   myocardial damage.      NOTE: Children less than 1 year old may have higher baseline troponin   levels and results should be interpreted in conjunction with the overall   clinical context.    NOTE: Troponin I testing is performed using a different   testing methodology at [facility] than at other   [facility]. Direct result comparisons should only   be made within the same method.     AMMONIA - Normal    Ammonia 49     SARS-COV-2 PCR, SYMPTOMATIC - Normal    Coronavirus 2019, PCR Not Detected      Narrative:     This assay has received FDA Emergency Use Authorization (EUA) and is only authorized for the duration of time that circumstances exist to justify the authorization of the emergency use of in vitro diagnostic tests for the detection of SARS-CoV-2 virus and/or diagnosis of COVID-19 infection under section 564(b)(1) of the Act, 21 U.S.C. 360bbb-3(b)(1). This assay is an in vitro diagnostic nucleic acid amplification test for the qualitative detection of SARS-CoV-2 from nasopharyngeal specimens and has been validated for use at [facility]. Negative results do not preclude COVID-19 infections and should not be used as the sole basis for diagnosis, treatment, or other management decisions.     INFLUENZA A AND B PCR - Normal    Flu A Result Not Detected       Flu B Result Not Detected      Narrative:     This assay is an in vitro diagnostic multiplex nucleic acid amplification test for the detection and discrimination of Influenza A & B from nasopharyngeal specimens, and has been validated for use at [facility]. Negative results do not preclude Influenza A/B infections, and should not be used as the sole basis for diagnosis, treatment, or other management decisions. If Influenza A/B and RSV PCR results are negative, testing for Parainfluenza virus, Adenovirus and Metapneumovirus is routinely performed for [facility] pediatric oncology and intensive care inpatients, and is available on other patients by placing an add-on request.   BLOOD GAS VENOUS FULL PANEL   URINALYSIS WITH REFLEX MICROSCOPIC AND CULTURE       CT head wo IV contrast   Final Result   CT head:        No acute intracranial hemorrhage or mass effect.        Encephalomalacia involves the left cerebellar hemisphere, similar to   previous.        CT cervical spine:        No acute fracture or traumatic subluxation of the cervical spine.        Multilevel degenerative changes of the cervical spine most pronounced   at C4-C5 with a disc protrusion resulting in at least moderate spinal   canal stenosis. Noncontrast CT is inadequate to evaluate the cervical   cord. If there symptoms of myelopathy consider MRI to further   evaluate.        I personally reviewed the images/study and I agree with the findings   as stated by Dr. Cullen.        MACRO:   None        Signed by: Kylie Wellington 12/9/2023 2:22 PM   Dictation workstation:   [id number]      CT cervical spine wo IV contrast   Final Result   CT head:        No acute intracranial hemorrhage or mass effect.        Encephalomalacia involves the left cerebellar hemisphere, similar to   previous.        CT cervical spine:        No acute fracture or traumatic subluxation of the cervical spine.        Multilevel degenerative changes of the cervical  spine most pronounced   at C4-C5 with a disc protrusion resulting in at least moderate spinal   canal stenosis. Noncontrast CT is inadequate to evaluate the cervical   cord. If there symptoms of myelopathy consider MRI to further   evaluate.        I personally reviewed the images/study and I agree with the findings   as stated by Dr. Cullen.        MACRO:   None        Signed by: Kylie Wellington 12/9/2023 2:22 PM   Dictation workstation:   [id number]      XR chest 1 view    (Results Pending)           MDM:  Briefly, this is a 53 y.o. male with past medical history of HTN, HFrEF (10-15% 3/2023), COPD, and polysubstance abuse (smokes crack cocaine and marijuana) disorder presenting to the ED with worsening heart failure in the setting of continued crack cocaine use.  Noting inability to sleep due to orthopnea and significant chest pressure.  Cardiac workup was remarkable for slightly elevated troponins from baseline, and elevated BNP, which appears to be at his baseline.  EKG showing normal sinus rhythm at 94 bpm, normal OR interval, prolonged QT interval, with left axis deviation and left ventricular hypertrophy, similar to prior EKG performed on 10/24/2023.  Currently he is on 4 L nasal cannula supplementary oxygen for persistent desaturations while moving and talking.  Lower suspicion for PE given lack of tachycardia, and symptoms which are more consistent with CHF than PE.  Pneumonia was also sent given patient's increased sleepiness on examination, and was unremarkable.  I do believe that his somnolence is likely the result of lack of sleep due to his symptoms and substance abuse.  I discussed admission with the patient for further management of his symptoms, which she was agreeable to.  Discussed the detrimental effects of continued crack cocaine use in the setting of his severe HFrEF, which she voiced understanding.  Chest x-ray is pending at the time of admission, however flu and COVID-19 were both  negative.    CT head and cervical spine were also performed given patient's significant weakness in his right upper extremity, and intermittent falling towards the left which has been present for months.  CT cervical spine did show findings of spinal canal stenosis, recommending MRI cervical spine for further workup.  Patient remained stable in the emergency department, awaiting floor bed.      Impression:   1. Shortness of breath    2. Acute on chronic congestive heart failure, unspecified heart failure type (CMS/HCC)    3. Crack cocaine use          Plan and Disposition: Admission for further cardiac workup in the setting of likely worsening EF, will need MRI of cervical spine for further workup of right upper extremity weakness in the setting of CT cervical spine findings of canal stenosis.             Symone Adrian DO  Emergency Medicine, PGY-2      Patient was seen and evaluated by the attending physician. The attending ED physician agrees with the plan. Patient and/or patient´s representative was counseled regarding labs, imaging, likely diagnosis, and plan. All questions were answered.    Disclaimer: This note was dictated by speech recognition.  Attempt at proofreading was made to minimize errors.  Errors in transcription may be present.  Please call if questions.     Symone Adrian DO  Resident  12/09/23 [id number]

## 2023-12-10 ENCOUNTER — APPOINTMENT (OUTPATIENT)
Dept: CARDIOLOGY | Facility: HOSPITAL | Age: 53
End: 2023-12-10
Payer: COMMERCIAL

## 2023-12-10 LAB
ALBUMIN SERPL BCP-MCNC: 3.3 G/DL (ref 3.4–5)
ANION GAP SERPL CALC-SCNC: 14 MMOL/L (ref 10–20)
ATRIAL RATE: 94 BPM
BASOPHILS # BLD AUTO: 0.05 X10*3/UL (ref 0–0.1)
BASOPHILS NFR BLD AUTO: 0.7 %
BUN SERPL-MCNC: 23 MG/DL (ref 6–23)
CALCIUM SERPL-MCNC: 8.9 MG/DL (ref 8.6–10.6)
CHLORIDE SERPL-SCNC: 104 MMOL/L (ref 98–107)
CO2 SERPL-SCNC: 25 MMOL/L (ref 21–32)
CREAT SERPL-MCNC: 1.56 MG/DL (ref 0.5–1.3)
EOSINOPHIL # BLD AUTO: 0.05 X10*3/UL (ref 0–0.7)
EOSINOPHIL NFR BLD AUTO: 0.7 %
ERYTHROCYTE [DISTWIDTH] IN BLOOD BY AUTOMATED COUNT: 13.6 % (ref 11.5–14.5)
GFR SERPL CREATININE-BSD FRML MDRD: 52 ML/MIN/1.73M*2
GLUCOSE SERPL-MCNC: 223 MG/DL (ref 74–99)
HCT VFR BLD AUTO: 52.9 % (ref 41–52)
HGB BLD-MCNC: 17.5 G/DL (ref 13.5–17.5)
HOLD SPECIMEN: NORMAL
IMM GRANULOCYTES # BLD AUTO: 0.02 X10*3/UL (ref 0–0.7)
IMM GRANULOCYTES NFR BLD AUTO: 0.3 % (ref 0–0.9)
LYMPHOCYTES # BLD AUTO: 1 X10*3/UL (ref 1.2–4.8)
LYMPHOCYTES NFR BLD AUTO: 14.7 %
MAGNESIUM SERPL-MCNC: 2.17 MG/DL (ref 1.6–2.4)
MCH RBC QN AUTO: 31.1 PG (ref 26–34)
MCHC RBC AUTO-ENTMCNC: 33.1 G/DL (ref 32–36)
MCV RBC AUTO: 94 FL (ref 80–100)
MONOCYTES # BLD AUTO: 0.36 X10*3/UL (ref 0.1–1)
MONOCYTES NFR BLD AUTO: 5.3 %
NEUTROPHILS # BLD AUTO: 5.34 X10*3/UL (ref 1.2–7.7)
NEUTROPHILS NFR BLD AUTO: 78.3 %
NRBC BLD-RTO: 0 /100 WBCS (ref 0–0)
P AXIS: 86 DEGREES
P OFFSET: 180 MS
P ONSET: 132 MS
PHOSPHATE SERPL-MCNC: 3.8 MG/DL (ref 2.5–4.9)
PLATELET # BLD AUTO: 187 X10*3/UL (ref 150–450)
POTASSIUM SERPL-SCNC: 4.5 MMOL/L (ref 3.5–5.3)
PR INTERVAL: 160 MS
Q ONSET: 212 MS
QRS COUNT: 16 BEATS
QRS DURATION: 102 MS
QT INTERVAL: 390 MS
QTC CALCULATION(BAZETT): 487 MS
QTC FREDERICIA: 453 MS
R AXIS: -55 DEGREES
RBC # BLD AUTO: 5.63 X10*6/UL (ref 4.5–5.9)
SODIUM SERPL-SCNC: 138 MMOL/L (ref 136–145)
T AXIS: 94 DEGREES
T OFFSET: 407 MS
UFH PPP CHRO-ACNC: 0.1 IU/ML
VENTRICULAR RATE: 94 BPM
WBC # BLD AUTO: 6.8 X10*3/UL (ref 4.4–11.3)

## 2023-12-10 PROCEDURE — 85520 HEPARIN ASSAY: CPT

## 2023-12-10 PROCEDURE — 96372 THER/PROPH/DIAG INJ SC/IM: CPT

## 2023-12-10 PROCEDURE — 2500000001 HC RX 250 WO HCPCS SELF ADMINISTERED DRUGS (ALT 637 FOR MEDICARE OP)

## 2023-12-10 PROCEDURE — 93005 ELECTROCARDIOGRAM TRACING: CPT

## 2023-12-10 PROCEDURE — 1200000002 HC GENERAL ROOM WITH TELEMETRY DAILY

## 2023-12-10 PROCEDURE — 94640 AIRWAY INHALATION TREATMENT: CPT

## 2023-12-10 PROCEDURE — 83735 ASSAY OF MAGNESIUM: CPT

## 2023-12-10 PROCEDURE — 2500000004 HC RX 250 GENERAL PHARMACY W/ HCPCS (ALT 636 FOR OP/ED)

## 2023-12-10 PROCEDURE — 80069 RENAL FUNCTION PANEL: CPT

## 2023-12-10 PROCEDURE — 2500000002 HC RX 250 W HCPCS SELF ADMINISTERED DRUGS (ALT 637 FOR MEDICARE OP, ALT 636 FOR OP/ED)

## 2023-12-10 PROCEDURE — 99232 SBSQ HOSP IP/OBS MODERATE 35: CPT

## 2023-12-10 PROCEDURE — 85025 COMPLETE CBC W/AUTO DIFF WBC: CPT

## 2023-12-10 PROCEDURE — 36415 COLL VENOUS BLD VENIPUNCTURE: CPT

## 2023-12-10 RX ORDER — HYDROXYZINE HYDROCHLORIDE 10 MG/1
10 TABLET, FILM COATED ORAL ONCE
Status: DISCONTINUED | OUTPATIENT
Start: 2023-12-10 | End: 2023-12-14 | Stop reason: HOSPADM

## 2023-12-10 RX ORDER — ONDANSETRON 4 MG/1
4 TABLET, FILM COATED ORAL EVERY 8 HOURS PRN
Status: DISCONTINUED | OUTPATIENT
Start: 2023-12-10 | End: 2023-12-14 | Stop reason: HOSPADM

## 2023-12-10 RX ORDER — ALBUTEROL SULFATE 90 UG/1
2 AEROSOL, METERED RESPIRATORY (INHALATION) EVERY 6 HOURS PRN
Status: DISCONTINUED | OUTPATIENT
Start: 2023-12-10 | End: 2023-12-14 | Stop reason: HOSPADM

## 2023-12-10 RX ORDER — HYDROXYZINE HYDROCHLORIDE 25 MG/1
25 TABLET, FILM COATED ORAL EVERY 8 HOURS PRN
Status: DISCONTINUED | OUTPATIENT
Start: 2023-12-10 | End: 2023-12-11

## 2023-12-10 RX ORDER — ENOXAPARIN SODIUM 100 MG/ML
40 INJECTION SUBCUTANEOUS EVERY 24 HOURS
Status: DISCONTINUED | OUTPATIENT
Start: 2023-12-10 | End: 2023-12-14 | Stop reason: HOSPADM

## 2023-12-10 RX ORDER — DICYCLOMINE HYDROCHLORIDE 10 MG/1
10 CAPSULE ORAL EVERY 6 HOURS PRN
Status: DISCONTINUED | OUTPATIENT
Start: 2023-12-10 | End: 2023-12-14 | Stop reason: HOSPADM

## 2023-12-10 RX ORDER — ALBUTEROL SULFATE 0.83 MG/ML
2.5 SOLUTION RESPIRATORY (INHALATION) EVERY 6 HOURS PRN
Status: DISCONTINUED | OUTPATIENT
Start: 2023-12-10 | End: 2023-12-14 | Stop reason: HOSPADM

## 2023-12-10 RX ADMIN — SERTRALINE HYDROCHLORIDE 50 MG: 50 TABLET ORAL at 10:18

## 2023-12-10 RX ADMIN — SACUBITRIL AND VALSARTAN 1 TABLET: 24; 26 TABLET, FILM COATED ORAL at 20:52

## 2023-12-10 RX ADMIN — SPIRONOLACTONE 25 MG: 25 TABLET, FILM COATED ORAL at 10:18

## 2023-12-10 RX ADMIN — TIOTROPIUM BROMIDE INHALATION SPRAY 2 PUFF: 3.12 SPRAY, METERED RESPIRATORY (INHALATION) at 10:05

## 2023-12-10 RX ADMIN — CARVEDILOL 6.25 MG: 6.25 TABLET, FILM COATED ORAL at 20:51

## 2023-12-10 RX ADMIN — ASPIRIN 81 MG: 81 TABLET, COATED ORAL at 10:18

## 2023-12-10 RX ADMIN — EMPAGLIFLOZIN 10 MG: 10 TABLET, FILM COATED ORAL at 10:18

## 2023-12-10 RX ADMIN — HYDROXYZINE HYDROCHLORIDE 25 MG: 25 TABLET, FILM COATED ORAL at 18:09

## 2023-12-10 RX ADMIN — HEPARIN SODIUM 1100 UNITS/HR: 10000 INJECTION, SOLUTION INTRAVENOUS at 03:46

## 2023-12-10 RX ADMIN — HEPARIN SODIUM 2000 UNITS: 5000 INJECTION, SOLUTION INTRAVENOUS; SUBCUTANEOUS at 03:49

## 2023-12-10 RX ADMIN — ATORVASTATIN CALCIUM 80 MG: 80 TABLET, FILM COATED ORAL at 20:52

## 2023-12-10 RX ADMIN — SACUBITRIL AND VALSARTAN 1 TABLET: 24; 26 TABLET, FILM COATED ORAL at 10:18

## 2023-12-10 RX ADMIN — ENOXAPARIN SODIUM 40 MG: 100 INJECTION SUBCUTANEOUS at 15:50

## 2023-12-10 RX ADMIN — ALBUTEROL SULFATE 2 PUFF: 90 AEROSOL, METERED RESPIRATORY (INHALATION) at 10:26

## 2023-12-10 RX ADMIN — CARVEDILOL 6.25 MG: 6.25 TABLET, FILM COATED ORAL at 10:18

## 2023-12-10 ASSESSMENT — ACTIVITIES OF DAILY LIVING (ADL)
GROOMING: INDEPENDENT
TOILETING: INDEPENDENT
PATIENT'S MEMORY ADEQUATE TO SAFELY COMPLETE DAILY ACTIVITIES?: YES
HEARING - RIGHT EAR: FUNCTIONAL
WALKS IN HOME: INDEPENDENT
FEEDING YOURSELF: INDEPENDENT
JUDGMENT_ADEQUATE_SAFELY_COMPLETE_DAILY_ACTIVITIES: YES
ADEQUATE_TO_COMPLETE_ADL: YES
BATHING: INDEPENDENT
DRESSING YOURSELF: INDEPENDENT
HEARING - LEFT EAR: FUNCTIONAL

## 2023-12-10 ASSESSMENT — COGNITIVE AND FUNCTIONAL STATUS - GENERAL
PATIENT BASELINE BEDBOUND: NO
MOBILITY SCORE: 24
DAILY ACTIVITIY SCORE: 24
MOBILITY SCORE: 24
DAILY ACTIVITIY SCORE: 24
MOBILITY SCORE: 24
DAILY ACTIVITIY SCORE: 24

## 2023-12-10 ASSESSMENT — PAIN SCALES - GENERAL
PAINLEVEL_OUTOF10: 0 - NO PAIN
PAINLEVEL_OUTOF10: 0 - NO PAIN

## 2023-12-10 ASSESSMENT — LIFESTYLE VARIABLES
TOTAL_SCORE: 0
TOTAL_SCORE: 1
TOTAL_SCORE: 3
TOTAL_SCORE: 1
TOTAL_SCORE: 11

## 2023-12-10 ASSESSMENT — PAIN - FUNCTIONAL ASSESSMENT: PAIN_FUNCTIONAL_ASSESSMENT: 0-10

## 2023-12-10 NOTE — ED NOTES
Pharmacy Medication History Review    Leonel Yu is a 53 y.o. male admitted for Shortness of breath. Pharmacy reviewed the patient's [id number] medications and allergies for accuracy.    The list below reflects the updated PTA list. Comments regarding how patient may be taking medications differently can be found in the Admit Orders Activity  Prior to Admission Medications   Prescriptions Last Dose Informant Patient Reported? Taking?   aspirin 81 mg EC tablet   Yes Yes   Sig: TAKE 1 TABLET BY MOUTH ONCE DAILY   atorvastatin (Lipitor) 40 mg tablet   Yes Yes   Sig: TAKE 1 TABLET BY MOUTH ONCE DAILY   carvedilol (Coreg) 6.25 mg tablet   Yes Yes   Sig: TAKE 1 TABLET BY MOUTH TWO TIMES A DAY   empagliflozin (Jardiance) 10 mg   Yes Yes   Sig: TAKE 1 TABLET BY MOUTH ONCE DAILY   sacubitriL-valsartan (Entresto) 24-26 mg tablet   Yes Yes   Sig: TAKE 1 TABLET BY MOUTH TWO TIMES A DAY   sertraline (Zoloft) 50 mg tablet   Yes Yes   Sig: TAKE 1 TABLET BY MOUTH ONCE DAILY   spironolactone (Aldactone) 25 mg tablet   Yes Yes   Sig: TAKE 1/2 TABLET BY MOUTH ONCE DAILY   tiotropium (Spiriva Respimat) 2.5 mcg/actuation inhaler   Yes Yes   Sig: INHALE 2 PUFFS ONCE DAILY   torsemide (Demadex) 100 mg tablet   Yes Yes   Sig: Take 1 tablet (100 mg) by mouth once daily.      Facility-Administered Medications: None        The list below reflects the updated allergy list. Please review each documented allergy for additional clarification and justification.  Allergies  Reviewed by Amaya Pérez CPhT on 12/10/2023   No Known Allergies         Patient accepts M2B at discharge. Pharmacy has been updated to [facility].    Sources used to complete the med history include:  - SureScripts  - Discharged Summary 10/26/23  - Care everywhere ([facility])  - Patient Interview (RX Vials)    Below are additional concerns with the patient's PTA list.  - Patient was drowsy during the interview however, he was able to  provide his home medications (RX vials).     Amaya Pérez  PGY-1 Pharmacy Resident   [facility]  Please reach out via [email or web address] Secure Chat for questions, or if no response call [employer] or [employer] “[email or web address]”

## 2023-12-10 NOTE — CARE PLAN
Problem: Pain - Adult  Goal: Verbalizes/displays adequate comfort level or baseline comfort level  Outcome: Progressing     Problem: Safety - Adult  Goal: Free from fall injury  Outcome: Progressing     Problem: Discharge Planning  Goal: Discharge to home or other facility with appropriate resources  Outcome: Progressing     Problem: Chronic Conditions and Co-morbidities  Goal: Patient's chronic conditions and co-morbidity symptoms are monitored and maintained or improved  Outcome: Progressing   The patient's goals for the shift include      The clinical goals for the shift include pt will remain HD stable throughout shift

## 2023-12-10 NOTE — H&P
"History and Physical    Chief Complaint   Patient presents with    Shortness of Breath    Altered Mental Status       History Of Present Illness  Leonel Yu is a 53 y.o. male  with PMHx of HTN, HFrEF (10-15% 3/2023), substance use disorder (tobacco, crack cocaine), CKD, suspected COPD w/Emphysema (no PFTs  on file) and history of medication non-compliance who presented with orthopnea.  Patient reports that he has been having shortness of breath for the past year that have gradually progressed in the last month is gotten significantly worsen than usual.  Also notes that he has been experiencing left arm weakness and slurring of speech; citing that his friends have mentioned to him that he is incoherent when having conversations, all of this associated with decrease in alertness.   In the case of shortness of breath patient reports that it is hindered his work and usual household activities.  He notes the inability to lay flat as well as waking up at night  gasping for air, his roommate has told him that he snores quite loud, and sometimes feels like he is \"choking for air\".  Patient works as a  and notes that in the past several months is related to his job has decreased quite significantly.  Patient also reports dizziness when getting up last year but seem to have resolved its own  As for his left arm weakness patient notes that began couple days ago, citing that he is unaware when things are but is slipped out of his hand but according to him overall  strength have not been too much of a problem for him.  He also notes that he is been told of how groggy he is during the day, citing that he falls asleep frequently during conversations with friends.   Patient denies current chest pain, although did endorse some when first presented to the ED. Also denies abdominal pain, headaches, nausea/vomiting, lower extremity pain.      In the ED:  He was placed on 4L of O2 for his hypoxia while ambulating " "and loaded on ASA 325mg.   EKG: normal sinus rhythm at 94 bpm, normal PA interval, prolonged QT interval, with left axis deviation and left ventricular hypertrophy. Whist is similar to previous study.     Past Medical History  Hypertension heart failure COPD CKD     Surgical History  None     Social History  Smoke; \"2 sticks\" per month  Alcohol; none  Illicit drug; crack cocaine as frequent as every day (for emotional pain)    Family History  No family history on file.  Mom; glaucoma  Father; passed away from an MI  Brother; MI    Allergies  Patient has no known allergies.    Review of Systems  Negative except noted in the HPI    Prior to Admission medications    Medication Sig Start Date End Date Taking? Authorizing Provider   aspirin 81 mg EC tablet TAKE 1 TABLET BY MOUTH ONCE DAILY 7/26/23 7/25/24  DEYVI Ramos   atorvastatin (Lipitor) 40 mg tablet TAKE 1 TABLET BY MOUTH ONCE DAILY 7/26/23 7/25/24  JACKIE Ramos-CNP   carvedilol (Coreg) 6.25 mg tablet TAKE 1 TABLET BY MOUTH TWO TIMES A DAY 7/26/23 7/25/24  DEYVI Ramos   empagliflozin (Jardiance) 10 mg TAKE 1 TABLET BY MOUTH ONCE DAILY 7/26/23 7/25/24  JACKIE Ramos-CORAZON   sacubitriL-valsartan (Entresto) 24-26 mg tablet TAKE 1 TABLET BY MOUTH TWO TIMES A DAY 7/26/23 7/25/24  JACKIE Ramos-CNP   sertraline (Zoloft) 50 mg tablet TAKE 1 TABLET BY MOUTH ONCE DAILY 7/26/23 7/25/24  DEYVI Ramos   spironolactone (Aldactone) 25 mg tablet TAKE 1/2 TABLET BY MOUTH ONCE DAILY 7/26/23 7/25/24  Chase Blanca APRN-CNP   tiotropium (Spiriva Respimat) 2.5 mcg/actuation inhaler INHALE 2 PUFFS ONCE DAILY 7/26/23 7/25/24  JACKIE Ramos-CNP   torsemide (Demadex) 100 mg tablet Take 1 tablet (100 mg) by mouth once daily. 10/26/23   DEYVI Dinero      Objective:  BP (!) 138/98   Pulse 80   Temp 36.9 °C (98.4 °F)   Resp 18   Ht 1.778 m (5' 10\")   Wt 77.1 kg (170 lb)   SpO2 99%   BMI 24.39 kg/m²    "   Physical Exam   Constitutional: Well-developed male in no acute distress.  HEENT: Normocephalic, atraumatic. PERRL. EOMI. No cervical lymphadenopathy.  Respiratory: CTA bilaterally. No wheezes, rales, or rhonchi. Normal respiratory effort.  Cardiovascular: RRR. No murmurs, gallops, or rubs. No JVD. Radial pulses 2+.  Abdominal: Soft, nondistended, nontender to palpation. Bowel sounds present. No hepatosplenomegaly or masses. No CVA tenderness.  Neuro: CN II-XII intact. UE and LE strength 5/5 bilaterally and sensation intact. Normal FTN testing.  MSK: No LE edema bilaterally.  Skin: Warm, dry. No rashes or wounds.  Psych: Appropriate mood and affect.     Relevant Results    CT head 12/9:   IMPRESSION:  CT head:  -No acute intracranial hemorrhage or mass effect.  -Encephalomalacia involves the left cerebellar hemisphere, similar to  previous.       TTE 7/2023:  CONCLUSIONS:  1. Left ventricular systolic function is severely decreased with a 15-20% estimated ejection fraction.  2. No LV thrombus seen, though there is continuous spontaneous echocontrast within the LV cavity consistent with low flow state.  3. Spectral Doppler shows an abnormal pattern of left ventricular diastolic filling.  4. There is severe eccentric left ventricular hypertrophy.  5. Moderately enlarged right ventricle.  6. There is reduced right ventricular systolic function.  7. The left atrium is moderately dilated.  8. Agitated saline contrast study was negative for intracardiac shunt.  9. Left ventricular cavity size is severely dilated.  10. Compared to prior echo from 3/2/2023 , no significant change is appreciated on today's study.  11. There is global hypokinesis of the left ventricle with minor regional variations.     Principal Problem:    Shortness of breath       Assessment/Plan:  Leonel Yu is a 53 y.o. male with PMH Leonel Yu is a 53 y.o. male  with PMHx of HTN, HFrEF (10-15% 3/2023), substance use disorder (tobacco, crack  cocaine), CKD, suspected COPD w/Emphysema (no PFTs  on file) and history of medication non-compliance who presented with orthopnea.  On presentation patient had normal vitals with elevated but stable Trop of 163 but then rise to 180, BNP of 3439.  Chest x-ray pending.  Patient dry weight on last admission (October) 73 kg, currently on admission 77.  Echo done in July showed an EF 15-20% with reduced LV systolic function. EKG showed NSR with LVH (unchanged from previous study Considering patient's current symptoms of shortness of breath and PND, patient is being worked up for CHF exacerbation versus COPD exacerbation.     #NSTEMI  #HFrEF ,with EF 15-20%  Ddx: ADHF vs PE vs myocardial drug toxicity vs arrhythmia   >patient reports he has been having chest tightness on presentation to the ED and was given ASA 324mg  >Very low suspicion for PE  d/t chest pain not pleuritic tic in nature and  normal hemodynamics with no compensatory mechanism such as tachycardia. Arrhythmia also unlikely as EKG showed NSR on admission.  >Patient last use of crack cocaine was self reported to be yesterday, although could be the cause of his chest pain, his chronic use of said illicit drug makes it not a strong causative factor, but can't be ruled out.   >Patient was recently admitted in October 2023, for CHF exacerbation, seems  to a frequent cause of admitt every few months.   >Trop 180, 163 on admission. In prior admission (109-1214)  >BNP: 3,439, (prev 3921, 2257)  >TTE 7/2023: EF 15-20% with reduced LV systolic function  >home meds: Jiadiance, carvedilol, entresto,  -Continue home meds  -Admit to tele  -NPO for possible LHC  -started heparin gtt   -Given lasix 40mg       #Unilateral upper extremity weakness  #Somnolence  >Patient notes things falling out of his hands without him knowing   > Notes a recent history of a friend's commenting on his decreased alertness during the day  >TIA  vs stroke vs hypothyroidism vs substance use.  "  -CT head showed no acute intracranial hemorrhage or mass effect. Prior Admission in July, patient  was wordked up for stroke-like symptoms then as well, with no evidence noted on CTA-head. Low suspicion and would not get an MRI at this time.   -Last recorded \"crack cocaine\" use was yesterday - should be noted, the components of said substance could be unknown.   -Urine Tox, Fentanyl unrine - ordered    -TSH ordered     #COPD  No PFTs on file  Patient not on baseline home oxygen  Currently on 4 L of oxygen, place din the Ed while patient was hypoxic  Shortness of breath on exertion and while lying down, with PND  >Home meds Spiriva  -Continue with home spiriva    #CKD  Creatinine on admission 1.40 (baseline 1.6)  On home torsemide as needed for when he notices increase in weight  Holding home torsemide  -On Lasix 40 mg p.o.    #Polysubstance misuse  History and current use of crack cocaine  Last use yesterday  -Placed COWS  -Educate patient on cessation     Fluids caution given EF  Electrolytes as needed  Cardiac diet  DVT prophylaxis: Anticoagulated with heparin     FULL CODE  NOK friend Sang Holdencock [phone]         iKm Ellington MD   PGY1, Internal Medicine  "

## 2023-12-10 NOTE — PROGRESS NOTES
" Progress Note    Leonel Yu is a 53 y.o. male on day 1 of admission presenting with Shortness of breath.    Subjective     Overnight: NAEO     Patient reports no issues this morning. Denies chest pain, abd pain, HA, f/c, n/v, Le pain. Still endroses SOB, orthopnea       Objective   Vitals:  BP (!) 133/98   Pulse 74   Temp 36.5 °C (97.7 °F)   Resp 18   Ht 1.778 m (5' 10\")   Wt 77.1 kg (170 lb)   SpO2 97%   BMI 24.39 kg/m²     Intake/Output last 3 Shifts:  I/O last 3 completed shifts:  In: - (0 mL/kg)   Out: 1450 (18.8 mL/kg) [Urine:1450 (0.5 mL/kg/hr)]  Weight: 77.1 kg     Physical Exam  Constitutional: Well-developed male in no acute distress.  HEENT: Normocephalic, atraumatic. PERRL. EOMI. No cervical lymphadenopathy.  Respiratory: mildy labored breathing, presence of crackle and rhonchi b/l , no wheezes, rales, or rhonchi. Normal respiratory effort.  Cardiovascular: RRR. No murmurs, gallops, or rubs. No JVD. Radial pulses 2+.  Abdominal: Soft, nondistended, nontender to palpation. Bowel sounds present. No hepatosplenomegaly or masses. No CVA tenderness.  Neuro: CN II-XII intact. UE and LE strength 5/5 bilaterally and sensation intact. Normal FTN testing.  MSK: No LE edema bilaterally.  Skin: Warm, dry. No rashes or wounds.  Psych: Appropriate mood and affect.         Relevant Results         Assessment/Plan   Principal Problem:    Shortness of breath    Leonel Yu is a 53 y.o. male with PMH Leonel Yu is a 53 y.o. male  with PMHx of HTN, HFrEF (10-15% 3/2023), substance use disorder (tobacco, crack cocaine), CKD, suspected COPD w/Emphysema (no PFTs  on file) and history of medication non-compliance who presented with orthopnea.  On presentation patient had normal vitals with elevated but stable Trop of 163 but then rise to 180, BNP of 3439.  Chest x-ray pending.  Patient dry weight on last admission (October) 73 kg, currently on admission 77.  Echo done in July showed an EF 15-20% with " reduced LV systolic function. EKG showed NSR with LVH (unchanged from previous study Considering patient's current symptoms of shortness of breath and PND, patient is being worked up for CHF exacerbation versus COPD exacerbation.      Update 12/10:  -Will consider starting torsemide (100mg home dose) tomorrow 12/10  -Plan is to  to get him back to dry weight 73kg. Currently at 77kg  -Goal net negative 1L. Monitoring strict I/Os  -Utox: cocaine     #NSTEMI  #HFrEF ,with EF 15-20%  Ddx: ADHF vs PE vs myocardial drug toxicity vs arrhythmia   >patient reports he has been having chest tightness on presentation to the ED and was given ASA 324mg  >Very low suspicion for PE  d/t chest pain not pleuritic tic in nature and  normal hemodynamics with no compensatory mechanism such as tachycardia. Arrhythmia also unlikely as EKG showed NSR on admission.  >Patient last use of crack cocaine was self reported to be yesterday, although could be the cause of his chest pain, his chronic use of said illicit drug makes it not a strong causative factor, but can't be ruled out.   >Patient was recently admitted in October 2023, for CHF exacerbation, seems  to a frequent cause of admitt every few months.   >Trop 180, 163 on admission. In prior admission (109-1214)  >BNP: 3,439, (prev 3921, 2257)  >TTE 7/2023: EF 15-20% with reduced LV systolic function  >home meds: Jiadiance, carvedilol, entresto,  -Continue home meds  -Admit to tele  -NPO for possible LHC  -started heparin gtt   -Started Torsemide 100mg         #Unilateral upper extremity weakness  #Somnolence  >Patient notes things falling out of his hands without him knowing   > Notes a recent history of a friend's commenting on his decreased alertness during the day  >TIA  vs stroke vs hypothyroidism vs substance use.   -CT head showed no acute intracranial hemorrhage or mass effect. Prior Admission in July, patient  was wordked up for stroke-like symptoms then as well, with no evidence  "noted on CTA-head. Low suspicion and would not get an MRI at this time.   -Last recorded \"crack cocaine\" use was yesterday - should be noted, the components of said substance could be unknown.   -Urine Tox, Fentanyl unrine - ordered    -TSH ordered      #COPD  No PFTs on file  Patient not on baseline home oxygen  Currently on 4 L of oxygen, place din the Ed while patient was hypoxic  Shortness of breath on exertion and while lying down, with PND  >Home meds Spiriva  -Continue with home spiriva     #CKD  Creatinine on admission 1.40 (baseline 1.6)  On home torsemide as needed for when he notices increase in weight  Holding home torsemide  -On Lasix 40 mg p.o.     #Polysubstance misuse  History and current use of crack cocaine  Last use yesterday  -Placed COWS  -Educate patient on cessation      Fluids caution given EF  Electrolytes as needed  Cardiac diet  DVT prophylaxis: Anticoagulated with heparin     FULL CODE  NOK friend Sang Santana [phone]         Kim Ellington MD  PGY1, Internal Medicine  "

## 2023-12-11 ENCOUNTER — APPOINTMENT (OUTPATIENT)
Dept: RADIOLOGY | Facility: HOSPITAL | Age: 53
End: 2023-12-11
Payer: COMMERCIAL

## 2023-12-11 ENCOUNTER — APPOINTMENT (OUTPATIENT)
Dept: CARDIOLOGY | Facility: HOSPITAL | Age: 53
End: 2023-12-11
Payer: COMMERCIAL

## 2023-12-11 LAB
ANION GAP BLDV CALCULATED.4IONS-SCNC: 4 MMOL/L (ref 10–25)
BASE EXCESS BLDV CALC-SCNC: 3.7 MMOL/L (ref -2–3)
BODY TEMPERATURE: 37 DEGREES CELSIUS
CA-I BLDV-SCNC: 1.15 MMOL/L (ref 1.1–1.33)
CHLORIDE BLDV-SCNC: 105 MMOL/L (ref 98–107)
GLUCOSE BLDV-MCNC: 229 MG/DL (ref 74–99)
HCO3 BLDV-SCNC: 29.7 MMOL/L (ref 22–26)
HCT VFR BLD EST: 51 % (ref 41–52)
HGB BLDV-MCNC: 17.1 G/DL (ref 13.5–17.5)
INHALED O2 CONCENTRATION: 21 %
LACTATE BLDV-SCNC: 1.2 MMOL/L (ref 0.4–2)
OXYHGB MFR BLDV: 59.3 % (ref 45–75)
PCO2 BLDV: 48 MM HG (ref 41–51)
PH BLDV: 7.4 PH (ref 7.33–7.43)
PO2 BLDV: 41 MM HG (ref 35–45)
POTASSIUM BLDV-SCNC: 4.4 MMOL/L (ref 3.5–5.3)
SAO2 % BLDV: 62 % (ref 45–75)
SODIUM BLDV-SCNC: 134 MMOL/L (ref 136–145)

## 2023-12-11 PROCEDURE — 2500000004 HC RX 250 GENERAL PHARMACY W/ HCPCS (ALT 636 FOR OP/ED)

## 2023-12-11 PROCEDURE — 96372 THER/PROPH/DIAG INJ SC/IM: CPT

## 2023-12-11 PROCEDURE — 71045 X-RAY EXAM CHEST 1 VIEW: CPT

## 2023-12-11 PROCEDURE — 1200000002 HC GENERAL ROOM WITH TELEMETRY DAILY

## 2023-12-11 PROCEDURE — 99232 SBSQ HOSP IP/OBS MODERATE 35: CPT

## 2023-12-11 PROCEDURE — 93005 ELECTROCARDIOGRAM TRACING: CPT

## 2023-12-11 PROCEDURE — 2500000001 HC RX 250 WO HCPCS SELF ADMINISTERED DRUGS (ALT 637 FOR MEDICARE OP)

## 2023-12-11 PROCEDURE — 71045 X-RAY EXAM CHEST 1 VIEW: CPT | Performed by: RADIOLOGY

## 2023-12-11 RX ORDER — FUROSEMIDE 10 MG/ML
40 INJECTION INTRAMUSCULAR; INTRAVENOUS ONCE
Status: COMPLETED | OUTPATIENT
Start: 2023-12-11 | End: 2023-12-11

## 2023-12-11 RX ADMIN — TIOTROPIUM BROMIDE INHALATION SPRAY 2 PUFF: 3.12 SPRAY, METERED RESPIRATORY (INHALATION) at 09:16

## 2023-12-11 RX ADMIN — ATORVASTATIN CALCIUM 80 MG: 80 TABLET, FILM COATED ORAL at 20:30

## 2023-12-11 RX ADMIN — ASPIRIN 81 MG: 81 TABLET, COATED ORAL at 09:16

## 2023-12-11 RX ADMIN — CARVEDILOL 6.25 MG: 6.25 TABLET, FILM COATED ORAL at 09:16

## 2023-12-11 RX ADMIN — SACUBITRIL AND VALSARTAN 1 TABLET: 24; 26 TABLET, FILM COATED ORAL at 20:30

## 2023-12-11 RX ADMIN — FUROSEMIDE 40 MG: 10 INJECTION, SOLUTION INTRAVENOUS at 11:03

## 2023-12-11 RX ADMIN — SERTRALINE HYDROCHLORIDE 50 MG: 50 TABLET ORAL at 09:16

## 2023-12-11 RX ADMIN — SPIRONOLACTONE 25 MG: 25 TABLET, FILM COATED ORAL at 09:16

## 2023-12-11 RX ADMIN — EMPAGLIFLOZIN 10 MG: 10 TABLET, FILM COATED ORAL at 09:00

## 2023-12-11 RX ADMIN — CARVEDILOL 6.25 MG: 6.25 TABLET, FILM COATED ORAL at 20:30

## 2023-12-11 RX ADMIN — SACUBITRIL AND VALSARTAN 1 TABLET: 24; 26 TABLET, FILM COATED ORAL at 09:16

## 2023-12-11 ASSESSMENT — LIFESTYLE VARIABLES: TOTAL_SCORE: 0

## 2023-12-11 ASSESSMENT — COGNITIVE AND FUNCTIONAL STATUS - GENERAL
DAILY ACTIVITIY SCORE: 24
MOBILITY SCORE: 24

## 2023-12-11 ASSESSMENT — PAIN SCALES - GENERAL
PAINLEVEL_OUTOF10: 0 - NO PAIN
PAINLEVEL_OUTOF10: 0 - NO PAIN

## 2023-12-11 ASSESSMENT — PAIN - FUNCTIONAL ASSESSMENT: PAIN_FUNCTIONAL_ASSESSMENT: 0-10

## 2023-12-11 NOTE — NURSING NOTE
Patient did ambulate with the aide and a continuous pulse ox in the hallway.  His saturation stayed consistent at 96% on RA for the majority of the walk, however, the patient was noted to have labored breathing and a his SpO2 dropped to 87% on RA upon returning to his room.  Oxygen was applied, 2L via NC, and the patient did recover quickly with the pulse ox coming up to 97%.

## 2023-12-11 NOTE — PROGRESS NOTES
12/11/2023 Care coordination  Ddx: ADHF vs PE vs myocardial drug toxicity vs arrhythmia .  Pt  states he has no services to resume, denies home 02, however does voice  some food insecurity.  States he lives with a roommate and is able to return at  discharge. SW aware. Will cont to follow.

## 2023-12-11 NOTE — HOSPITAL COURSE
Leonel Yu is a 53 y.o. male with PMH of HTN, HFrEF (10-15% 3/2023), substance use disorder (tobacco, crack cocaine), CKD, suspected COPD w/Emphysema (no PFTs  on file), and history of medication non-compliance who presented with orthopnea.  On presentation patient had normal vitals with elevated but stable Trop of 163 but then rise to 180, BNP of 3439. Echo done 07/23 showed an EF 15-20% with reduced LV systolic function. EKG showed NSR with LVH (unchanged from previous study). The patient was ultimately admitted for CHF exacerbation and was treated with lasix until his dry weight (73 kg) was achieved. During his stay the patient expressed concerns regarding food and housing insecurities so social work was consulted and the patient was provided with appropriate resources.  His hospital course was complicated by reports of SI.  Psychiatry was consulted; however, reported patient did not have any active thoughts or plan for SI and deemed him safe for discharge with outpatient follow-up.  They also started him on sertraline.  At the time of discharge, the patient was stable and his symptoms were improved.    Vitals:    12/14/23 1152   BP: 100/69   Pulse: 71   Resp: 18   Temp: 36.4 °C (97.5 °F)   SpO2: 96%     General: Resting comfortably in bed. Well developed, well nourished. Appears stated age. In no acute distress   HEENT: Normocephalic and atraumatic. EOMI, sclera non-icteric, MMM, no JVD  Cardiovascular: RRR, no r/m/g  Pulmonary: Lungs clear to auscultation bilaterally. No wheezes/ rhonchi/ rales   GI: +BS, soft, non-tender, non-distended  : No suprapubic/ flank pain  Extremities: No LE edema   Skin: warm and dry. No rashes or lesions   Neurologic: CN II-XII grossly intact. No focal neurologic deficit   Psych: Pleasant. Appropriate mood and affect     Things to follow up:  [  ] follow up medication compliance, titrate GDMT as tolerated

## 2023-12-11 NOTE — CARE PLAN
Problem: Pain - Adult  Goal: Verbalizes/displays adequate comfort level or baseline comfort level  Outcome: Progressing     Problem: Safety - Adult  Goal: Free from fall injury  Outcome: Progressing     Problem: Discharge Planning  Goal: Discharge to home or other facility with appropriate resources  Outcome: Progressing     Problem: Chronic Conditions and Co-morbidities  Goal: Patient's chronic conditions and co-morbidity symptoms are monitored and maintained or improved  Outcome: Progressing   The patient's goals for the shift include  getting better.    The clinical goals for the shift include Patient will remain free of falls during this shift.

## 2023-12-11 NOTE — PROGRESS NOTES
" Progress Note    Leonel Yu is a 53 y.o. male on day 2 of admission presenting with Shortness of breath.    Subjective   Patient endorses poor sleep due to orthopnea. He also endorses KING with walking. At this time he denies chest pain, however, continues to have shortness of breath that has since improved from yesterday.      Objective   Vitals:  /87   Pulse 73   Temp 36.7 °C (98.1 °F)   Resp 16   Ht 1.778 m (5' 10\")   Wt 77.1 kg (170 lb)   SpO2 100%   BMI 24.39 kg/m²     Intake/Output last 3 Shifts:  I/O last 3 completed shifts:  In: 260 (3.4 mL/kg) [P.O.:260]  Out: 3250 (42.1 mL/kg) [Urine:3250 (1.2 mL/kg/hr)]  Weight: 77.1 kg     Physical Exam  Constitutional: Well-developed male in no acute distress.  HEENT: Normocephalic, atraumatic. PERRL. EOMI. No cervical lymphadenopathy.  Respiratory: mildy labored breathing, presence of crackle and rhonchi b/l , no wheezes, rales, or rhonchi. Normal respiratory effort.  Cardiovascular: RRR. No murmurs, gallops, or rubs. JVD present  Abdominal: Soft, nondistended, nontender to palpation.   Neuro: CN II-XII intact. UE and LE strength 5/5 bilaterally and sensation intact. Normal FTN testing.  MSK: No LE edema bilaterally.  Skin: Warm, dry. No rashes or wounds.  Psych: Appropriate mood and affect.       Relevant Results  Results for orders placed or performed during the hospital encounter of 12/09/23 (from the past 24 hour(s))   ECG 12 Lead   Result Value Ref Range    Ventricular Rate 98 BPM    Atrial Rate 98 BPM    MT Interval 156 ms    QRS Duration 110 ms    QT Interval 390 ms    QTC Calculation(Bazett) 497 ms    P Axis 80 degrees    R Axis -36 degrees    T Axis 106 degrees    QRS Count 17 beats    Q Onset 205 ms    P Onset 127 ms    P Offset 180 ms    T Offset 400 ms    QTC Fredericia 459 ms           Assessment/Plan   Principal Problem:    Shortness of breath    Leonel Yu is a 53 y.o. male with PMH Leonel Yu is a 53 y.o. male  with PMHx of " HTN, HFrEF (10-15% 3/2023), substance use disorder (tobacco, crack cocaine), CKD, suspected COPD w/Emphysema (no PFTs  on file) and history of medication non-compliance who presented with orthopnea.  On presentation patient had normal vitals with elevated but stable Trop of 163 but then rise to 180, BNP of 3439.  Chest x-ray pending.  Patient dry weight on last admission (October) 73 kg, currently on admission 77.  Echo done in July showed an EF 15-20% with reduced LV systolic function. EKG showed NSR with LVH (unchanged from previous study Considering patient's current symptoms of shortness of breath and PND, patient is being worked up for CHF exacerbation versus COPD exacerbation.      Updates 12/11:   -Fluid status is improving; however, will continue to diaries and give Lasix 40 once.  -Will obtain walking pulse ox to assess respiratory status  -Social work consulted due to homelessness and food insecurity. Fu with recs       #NSTEMI  #HFrEF ,with EF 15-20%  # CHF Exacerbation   >Very low suspicion for PE  d/t chest pain not pleuritic tic in nature and  normal hemodynamics with no compensatory mechanism such as tachycardia. Arrhythmia also unlikely as EKG showed NSR on admission.  - dry weight on last admission (October) 73 kg, currently on admission 77.   -Patient was recently admitted in October 2023, for CHF exacerbation, seems  to a frequent cause of admitt every few months.   -Trop 180, 163 on admission. In prior admission (109-1214)  -BNP: 3,439, on admission (prev 3921, 2257)  -TTE 7/2023: EF 15-20% with reduced LV systolic function  -home meds: Jiadiance, carvedilol, entresto,    Plan   -Continue home meds  -on tele  -heparin gtt   -Fluid status is improving; however, will continue to diaries and give Lasix 40 once.  -Will obtain walking pulse ox to assess respiratory status     #Unilateral upper extremity weakness  #Somnolence  -CT head showed no acute intracranial hemorrhage or mass effect. Prior  Admission in July, patient  was wordked up for stroke-like symptoms then as well, with no evidence noted on CTA-head. Low suspicion and would not get an MRI at this time.        #COPD  No PFTs on file  Patient not on baseline home oxygen  Currently on 4 L of oxygen, place din the Ed while patient was hypoxic  Shortness of breath on exertion and while lying down, with PND  >Home meds Spiriva  -Continue with home spiriva     #CKD  Creatinine on admission 1.40 (baseline 1.6)  On home torsemide as needed for when he notices increase in weight  Holding home torsemide  -On Lasix 40 mg p.o.     #Polysubstance misuse  History and current use of crack cocaine  Last use cocaine 12/9  -Placed COWS  -Educate patient on cessation     #Housing Insecurity   #Food Insecurity   Plan  -Social work consulted due to homelessness and food insecurity.     Fluids caution given EF  Electrolytes as needed  Cardiac diet  DVT prophylaxis: Anticoagulated with heparin     FULL CODE  NOK friend Sang Santana [phone]    The patient was seen, evaluated, and discussed with Dr. Micha Posey MD   PGY-1 Internal Medicine

## 2023-12-11 NOTE — PROGRESS NOTES
SW consulted community health worker to assist with resources for food insecurity and some housing assist if patient decides to move out of friend's home. CHW saw patient at bedside and provided resources and will follow up.     Chula Posey, CASSIDYW

## 2023-12-12 LAB
ALBUMIN SERPL BCP-MCNC: 3.2 G/DL (ref 3.4–5)
ANION GAP SERPL CALC-SCNC: 12 MMOL/L (ref 10–20)
BUN SERPL-MCNC: 25 MG/DL (ref 6–23)
CALCIUM SERPL-MCNC: 8.8 MG/DL (ref 8.6–10.6)
CHLORIDE SERPL-SCNC: 105 MMOL/L (ref 98–107)
CO2 SERPL-SCNC: 26 MMOL/L (ref 21–32)
CREAT SERPL-MCNC: 1.47 MG/DL (ref 0.5–1.3)
ERYTHROCYTE [DISTWIDTH] IN BLOOD BY AUTOMATED COUNT: 13.7 % (ref 11.5–14.5)
GFR SERPL CREATININE-BSD FRML MDRD: 56 ML/MIN/1.73M*2
GLUCOSE SERPL-MCNC: 201 MG/DL (ref 74–99)
HCT VFR BLD AUTO: 53.3 % (ref 41–52)
HGB BLD-MCNC: 18.1 G/DL (ref 13.5–17.5)
MAGNESIUM SERPL-MCNC: 2.16 MG/DL (ref 1.6–2.4)
MCH RBC QN AUTO: 31.6 PG (ref 26–34)
MCHC RBC AUTO-ENTMCNC: 34 G/DL (ref 32–36)
MCV RBC AUTO: 93 FL (ref 80–100)
NRBC BLD-RTO: 0 /100 WBCS (ref 0–0)
PHOSPHATE SERPL-MCNC: 4.3 MG/DL (ref 2.5–4.9)
PLATELET # BLD AUTO: 207 X10*3/UL (ref 150–450)
POTASSIUM SERPL-SCNC: 4.2 MMOL/L (ref 3.5–5.3)
RBC # BLD AUTO: 5.72 X10*6/UL (ref 4.5–5.9)
SODIUM SERPL-SCNC: 139 MMOL/L (ref 136–145)
WBC # BLD AUTO: 7.9 X10*3/UL (ref 4.4–11.3)

## 2023-12-12 PROCEDURE — 83735 ASSAY OF MAGNESIUM: CPT

## 2023-12-12 PROCEDURE — 2500000004 HC RX 250 GENERAL PHARMACY W/ HCPCS (ALT 636 FOR OP/ED)

## 2023-12-12 PROCEDURE — 36415 COLL VENOUS BLD VENIPUNCTURE: CPT

## 2023-12-12 PROCEDURE — 1200000002 HC GENERAL ROOM WITH TELEMETRY DAILY

## 2023-12-12 PROCEDURE — 96372 THER/PROPH/DIAG INJ SC/IM: CPT

## 2023-12-12 PROCEDURE — 99232 SBSQ HOSP IP/OBS MODERATE 35: CPT | Performed by: INTERNAL MEDICINE

## 2023-12-12 PROCEDURE — 2500000001 HC RX 250 WO HCPCS SELF ADMINISTERED DRUGS (ALT 637 FOR MEDICARE OP)

## 2023-12-12 PROCEDURE — 85027 COMPLETE CBC AUTOMATED: CPT

## 2023-12-12 PROCEDURE — 80069 RENAL FUNCTION PANEL: CPT

## 2023-12-12 RX ORDER — FUROSEMIDE 10 MG/ML
60 INJECTION INTRAMUSCULAR; INTRAVENOUS ONCE
Status: COMPLETED | OUTPATIENT
Start: 2023-12-12 | End: 2023-12-12

## 2023-12-12 RX ORDER — ACETAMINOPHEN 500 MG
5 TABLET ORAL NIGHTLY PRN
Status: DISCONTINUED | OUTPATIENT
Start: 2023-12-12 | End: 2023-12-14 | Stop reason: HOSPADM

## 2023-12-12 RX ADMIN — CARVEDILOL 6.25 MG: 6.25 TABLET, FILM COATED ORAL at 21:27

## 2023-12-12 RX ADMIN — EMPAGLIFLOZIN 10 MG: 10 TABLET, FILM COATED ORAL at 08:46

## 2023-12-12 RX ADMIN — SACUBITRIL AND VALSARTAN 1 TABLET: 24; 26 TABLET, FILM COATED ORAL at 21:26

## 2023-12-12 RX ADMIN — SACUBITRIL AND VALSARTAN 1 TABLET: 24; 26 TABLET, FILM COATED ORAL at 08:46

## 2023-12-12 RX ADMIN — ATORVASTATIN CALCIUM 80 MG: 80 TABLET, FILM COATED ORAL at 21:27

## 2023-12-12 RX ADMIN — SPIRONOLACTONE 25 MG: 25 TABLET, FILM COATED ORAL at 08:46

## 2023-12-12 RX ADMIN — CARVEDILOL 6.25 MG: 6.25 TABLET, FILM COATED ORAL at 08:46

## 2023-12-12 RX ADMIN — FUROSEMIDE 60 MG: 10 INJECTION, SOLUTION INTRAVENOUS at 10:45

## 2023-12-12 RX ADMIN — SERTRALINE HYDROCHLORIDE 50 MG: 50 TABLET ORAL at 08:46

## 2023-12-12 RX ADMIN — ASPIRIN 81 MG: 81 TABLET, COATED ORAL at 08:46

## 2023-12-12 ASSESSMENT — COGNITIVE AND FUNCTIONAL STATUS - GENERAL
DAILY ACTIVITIY SCORE: 24
MOBILITY SCORE: 24

## 2023-12-12 ASSESSMENT — PAIN SCALES - GENERAL
PAINLEVEL_OUTOF10: 0 - NO PAIN
PAINLEVEL_OUTOF10: 0 - NO PAIN

## 2023-12-12 ASSESSMENT — PAIN - FUNCTIONAL ASSESSMENT: PAIN_FUNCTIONAL_ASSESSMENT: 0-10

## 2023-12-12 NOTE — CARE PLAN
Patient needed information for housing and food insecurity. I provided pantry information and a packet of low income apartments. In addition, I provided  information on rental assistance.  Will follow up.

## 2023-12-12 NOTE — PROGRESS NOTES
" Progress Note    Leonel Yu is a 53 y.o. male on day 3 of admission presenting with Shortness of breath.    Subjective   Patient endorses worsening shortness of breath this morning. Reports poor sleep due to orthopnea. He denies chest pain, palpitations, nausea, or vomiting. He is currently on 3 L NC saturating at 93% on NC.      Objective   Vitals:  /86   Pulse 74   Temp 36.4 °C (97.5 °F)   Resp 12   Ht 1.778 m (5' 10\")   Wt 77.1 kg (170 lb)   SpO2 99%   BMI 24.39 kg/m²     Intake/Output last 3 Shifts:  I/O last 3 completed shifts:  In: 115 (1.5 mL/kg) [I.V.:115 (1.5 mL/kg)]  Out: 1950 (25.3 mL/kg) [Urine:1950 (0.7 mL/kg/hr)]  Weight: 77.1 kg     Physical Exam  Constitutional: Well-developed male in no acute distress.  HEENT: Normocephalic, atraumatic. PERRL. EOMI. No cervical lymphadenopathy.  Respiratory: mildy labored breathing, presence of crackle and rhonchi b/l , no wheezes, rales, or rhonchi. Normal respiratory effort.  Cardiovascular: RRR. No murmurs, gallops, or rubs. JVD present  Abdominal: Soft, nondistended, nontender to palpation.   Neuro: CN II-XII intact. UE and LE strength 5/5 bilaterally and sensation intact. Normal FTN testing.  MSK: No LE edema bilaterally.  Skin: Warm, dry. No rashes or wounds.  Psych: Appropriate mood and affect.       Relevant Results  Results for orders placed or performed during the hospital encounter of 12/09/23 (from the past 24 hour(s))   ECG 12 Lead   Result Value Ref Range    Ventricular Rate 98 BPM    Atrial Rate 98 BPM    WI Interval 156 ms    QRS Duration 110 ms    QT Interval 390 ms    QTC Calculation(Bazett) 497 ms    P Axis 80 degrees    R Axis -36 degrees    T Axis 106 degrees    QRS Count 17 beats    Q Onset 205 ms    P Onset 127 ms    P Offset 180 ms    T Offset 400 ms    QTC Fredericia 459 ms           Assessment/Plan   Principal Problem:    Shortness of breath    Leonel Yu is a 53 y.o. male with PMH Leonel Yu is a 53 y.o. male  " with PMHx of HTN, HFrEF (10-15% 3/2023), substance use disorder (tobacco, crack cocaine), CKD, suspected COPD w/Emphysema (no PFTs  on file) and history of medication non-compliance who presented with orthopnea.  On presentation patient had normal vitals with elevated but stable Trop of 163 but then rise to 180, BNP of 3439.  Chest x-ray pending.  Patient dry weight on last admission (October) 73 kg, currently on admission 77.  Echo done in July showed an EF 15-20% with reduced LV systolic function. EKG showed NSR with LVH (unchanged from previous study Considering patient's current symptoms of shortness of breath and PND, patient is being worked up for CHF exacerbation versus COPD exacerbation.      Updates 12/12:   - continue to spot dose with Lasix for fluid removal. Gave Lasix 60 mg once will assess responsiveness with UOP  - obtain repeat walking Pulm evaluate respiratory function following Lasix.  - PT/OT eval      #HFrEF ,with EF 15-20%  # CHF Exacerbation   >Very low suspicion for PE  d/t chest pain not pleuritic tic in nature and  normal hemodynamics with no compensatory mechanism such as tachycardia. Arrhythmia also unlikely as EKG showed NSR on admission.  - dry weight on last admission (October) 73 kg, currently on admission 77.   -Patient was recently admitted in October 2023, for CHF exacerbation, seems  to a frequent cause of admitt every few months.   -Trop 180, 163 on admission. In prior admission (109-1214) likely elevated due to CHF exacerbation.   -BNP: 3,439, on admission (prev 3921, 2257)  -TTE 7/2023: EF 15-20% with reduced LV systolic function  -home meds: Jiadiance, carvedilol, entresto,    Plan   -Continue home meds  -on tele  -heparin gtt   -Fluid status is improving; however, will continue to diurese with spot dose lasix.  -Will obtain walking pulse ox to assess respiratory status     #Unilateral upper extremity weakness  #Somnolence  -CT head showed no acute intracranial hemorrhage or  mass effect. Prior Admission in July, patient  was wordked up for stroke-like symptoms then as well, with no evidence noted on CTA-head. Low suspicion and would not get an MRI at this time.        #COPD  No PFTs on file  Patient not on baseline home oxygen  Currently on 4 L of oxygen, place din the Ed while patient was hypoxic  Shortness of breath on exertion and while lying down, with PND  >Home meds Spiriva  -Continue with home spiriva     #CKD  Creatinine on admission 1.40 (baseline 1.6)  On home torsemide as needed for when he notices increase in weight  Holding home torsemide  -On Lasix 40 mg p.o.     #Polysubstance misuse  History and current use of crack cocaine  Last use cocaine 12/9  -Placed COWS  -Educate patient on cessation     #Housing Insecurity   #Food Insecurity   Plan  -Social work consulted due to homelessness and food insecurity.     Fluids caution given EF  Electrolytes as needed  Cardiac diet  DVT prophylaxis: Anticoagulated with heparin     Code Status: FULL CODE  NOK friend Sang Santana [phone]    The patient was seen, evaluated, and discussed with Dr. Micha Posey MD   PGY-1 Internal Medicine

## 2023-12-13 LAB
ALBUMIN SERPL BCP-MCNC: 3.6 G/DL (ref 3.4–5)
ANION GAP SERPL CALC-SCNC: 15 MMOL/L (ref 10–20)
BUN SERPL-MCNC: 25 MG/DL (ref 6–23)
CALCIUM SERPL-MCNC: 9.1 MG/DL (ref 8.6–10.6)
CHLORIDE SERPL-SCNC: 103 MMOL/L (ref 98–107)
CO2 SERPL-SCNC: 23 MMOL/L (ref 21–32)
CREAT SERPL-MCNC: 1.54 MG/DL (ref 0.5–1.3)
ERYTHROCYTE [DISTWIDTH] IN BLOOD BY AUTOMATED COUNT: 13.5 % (ref 11.5–14.5)
GFR SERPL CREATININE-BSD FRML MDRD: 53 ML/MIN/1.73M*2
GLUCOSE SERPL-MCNC: 223 MG/DL (ref 74–99)
HCT VFR BLD AUTO: 55.1 % (ref 41–52)
HGB BLD-MCNC: 18.6 G/DL (ref 13.5–17.5)
MAGNESIUM SERPL-MCNC: 2.41 MG/DL (ref 1.6–2.4)
MCH RBC QN AUTO: 31.3 PG (ref 26–34)
MCHC RBC AUTO-ENTMCNC: 33.8 G/DL (ref 32–36)
MCV RBC AUTO: 93 FL (ref 80–100)
NRBC BLD-RTO: 0 /100 WBCS (ref 0–0)
PHOSPHATE SERPL-MCNC: 4.3 MG/DL (ref 2.5–4.9)
PLATELET # BLD AUTO: 197 X10*3/UL (ref 150–450)
POTASSIUM SERPL-SCNC: 4.6 MMOL/L (ref 3.5–5.3)
RBC # BLD AUTO: 5.95 X10*6/UL (ref 4.5–5.9)
SODIUM SERPL-SCNC: 136 MMOL/L (ref 136–145)
WBC # BLD AUTO: 8.3 X10*3/UL (ref 4.4–11.3)

## 2023-12-13 PROCEDURE — 1200000002 HC GENERAL ROOM WITH TELEMETRY DAILY

## 2023-12-13 PROCEDURE — 2500000004 HC RX 250 GENERAL PHARMACY W/ HCPCS (ALT 636 FOR OP/ED)

## 2023-12-13 PROCEDURE — 97530 THERAPEUTIC ACTIVITIES: CPT | Mod: GP | Performed by: PHYSICAL THERAPIST

## 2023-12-13 PROCEDURE — 97530 THERAPEUTIC ACTIVITIES: CPT | Mod: GO

## 2023-12-13 PROCEDURE — 97161 PT EVAL LOW COMPLEX 20 MIN: CPT | Mod: GP | Performed by: PHYSICAL THERAPIST

## 2023-12-13 PROCEDURE — 80069 RENAL FUNCTION PANEL: CPT

## 2023-12-13 PROCEDURE — 99221 1ST HOSP IP/OBS SF/LOW 40: CPT | Performed by: PSYCHIATRY & NEUROLOGY

## 2023-12-13 PROCEDURE — 97165 OT EVAL LOW COMPLEX 30 MIN: CPT | Mod: GO

## 2023-12-13 PROCEDURE — 85027 COMPLETE CBC AUTOMATED: CPT

## 2023-12-13 PROCEDURE — 99232 SBSQ HOSP IP/OBS MODERATE 35: CPT | Performed by: INTERNAL MEDICINE

## 2023-12-13 PROCEDURE — 83735 ASSAY OF MAGNESIUM: CPT

## 2023-12-13 PROCEDURE — 36415 COLL VENOUS BLD VENIPUNCTURE: CPT

## 2023-12-13 PROCEDURE — 2500000001 HC RX 250 WO HCPCS SELF ADMINISTERED DRUGS (ALT 637 FOR MEDICARE OP)

## 2023-12-13 RX ORDER — FUROSEMIDE 10 MG/ML
20 INJECTION INTRAMUSCULAR; INTRAVENOUS ONCE
Status: COMPLETED | OUTPATIENT
Start: 2023-12-13 | End: 2023-12-13

## 2023-12-13 RX ADMIN — SACUBITRIL AND VALSARTAN 1 TABLET: 24; 26 TABLET, FILM COATED ORAL at 21:00

## 2023-12-13 RX ADMIN — CARVEDILOL 6.25 MG: 6.25 TABLET, FILM COATED ORAL at 08:23

## 2023-12-13 RX ADMIN — SPIRONOLACTONE 25 MG: 25 TABLET, FILM COATED ORAL at 08:23

## 2023-12-13 RX ADMIN — EMPAGLIFLOZIN 10 MG: 10 TABLET, FILM COATED ORAL at 08:23

## 2023-12-13 RX ADMIN — SACUBITRIL AND VALSARTAN 1 TABLET: 24; 26 TABLET, FILM COATED ORAL at 08:23

## 2023-12-13 RX ADMIN — TIOTROPIUM BROMIDE INHALATION SPRAY 2 PUFF: 3.12 SPRAY, METERED RESPIRATORY (INHALATION) at 09:36

## 2023-12-13 RX ADMIN — ASPIRIN 81 MG: 81 TABLET, COATED ORAL at 08:23

## 2023-12-13 RX ADMIN — CARVEDILOL 6.25 MG: 6.25 TABLET, FILM COATED ORAL at 21:00

## 2023-12-13 RX ADMIN — ATORVASTATIN CALCIUM 80 MG: 80 TABLET, FILM COATED ORAL at 21:00

## 2023-12-13 RX ADMIN — SERTRALINE HYDROCHLORIDE 50 MG: 50 TABLET ORAL at 08:23

## 2023-12-13 RX ADMIN — FUROSEMIDE 20 MG: 10 INJECTION, SOLUTION INTRAVENOUS at 11:27

## 2023-12-13 ASSESSMENT — COGNITIVE AND FUNCTIONAL STATUS - GENERAL
WALKING IN HOSPITAL ROOM: A LITTLE
TURNING FROM BACK TO SIDE WHILE IN FLAT BAD: A LITTLE
WALKING IN HOSPITAL ROOM: A LITTLE
DAILY ACTIVITIY SCORE: 23
MOVING FROM LYING ON BACK TO SITTING ON SIDE OF FLAT BED WITH BEDRAILS: A LITTLE
TURNING FROM BACK TO SIDE WHILE IN FLAT BAD: A LITTLE
MOBILITY SCORE: 18
MOBILITY SCORE: 18
MOBILITY SCORE: 24
MOVING FROM LYING ON BACK TO SITTING ON SIDE OF FLAT BED WITH BEDRAILS: A LITTLE
CLIMB 3 TO 5 STEPS WITH RAILING: A LITTLE
MOVING TO AND FROM BED TO CHAIR: A LITTLE
DAILY ACTIVITIY SCORE: 24
DAILY ACTIVITIY SCORE: 23
CLIMB 3 TO 5 STEPS WITH RAILING: A LITTLE
HELP NEEDED FOR BATHING: A LITTLE
DAILY ACTIVITIY SCORE: 24
HELP NEEDED FOR BATHING: A LITTLE
MOVING TO AND FROM BED TO CHAIR: A LITTLE
STANDING UP FROM CHAIR USING ARMS: A LITTLE
MOBILITY SCORE: 24
STANDING UP FROM CHAIR USING ARMS: A LITTLE

## 2023-12-13 ASSESSMENT — ACTIVITIES OF DAILY LIVING (ADL)
BATHING_ASSISTANCE: STAND BY
ADL_ASSISTANCE: INDEPENDENT
ADL_ASSISTANCE: INDEPENDENT

## 2023-12-13 ASSESSMENT — PAIN SCALES - GENERAL
PAINLEVEL_OUTOF10: 0 - NO PAIN

## 2023-12-13 ASSESSMENT — PAIN - FUNCTIONAL ASSESSMENT
PAIN_FUNCTIONAL_ASSESSMENT: 0-10

## 2023-12-13 NOTE — PROGRESS NOTES
" Progress Note    Leonel Yu is a 53 y.o. male on day 4 of admission presenting with Shortness of breath.    Subjective   Patient laying in bed comfortably. He reports his breathing has improved and he has been making appropriate urine. He has no concerns at this time.    Objective   Vitals:  /74   Pulse 74   Temp 36.1 °C (97 °F)   Resp 18   Ht 1.778 m (5' 10\")   Wt 72.9 kg (160 lb 11.5 oz)   SpO2 92%   BMI 23.06 kg/m²     Intake/Output last 3 Shifts:  I/O last 3 completed shifts:  In: - (0 mL/kg)   Out: 320 (4.4 mL/kg) [Urine:320 (0.1 mL/kg/hr)]  Weight: 72.9 kg     Physical Exam  Constitutional: Well-developed male in no acute distress.  HEENT: Normocephalic, atraumatic. PERRL. EOMI. No cervical lymphadenopathy.  Respiratory: mildy labored breathing, presence of crackle and rhonchi b/l , no wheezes, rales, or rhonchi. Normal respiratory effort.  Cardiovascular: RRR. No murmurs, gallops, or rubs.   Abdominal: Soft, nondistended, nontender to palpation.   Neuro: CN II-XII intact. UE and LE strength 5/5 bilaterally and sensation intact.   MSK: No LE edema bilaterally.  Skin: Warm, dry. No rashes or wounds.  Psych: Appropriate mood and affect.       Relevant Results  Results for orders placed or performed during the hospital encounter of 12/09/23 (from the past 24 hour(s))   Magnesium   Result Value Ref Range    Magnesium 2.16 1.60 - 2.40 mg/dL   CBC   Result Value Ref Range    WBC 7.9 4.4 - 11.3 x10*3/uL    nRBC 0.0 0.0 - 0.0 /100 WBCs    RBC 5.72 4.50 - 5.90 x10*6/uL    Hemoglobin 18.1 (H) 13.5 - 17.5 g/dL    Hematocrit 53.3 (H) 41.0 - 52.0 %    MCV 93 80 - 100 fL    MCH 31.6 26.0 - 34.0 pg    MCHC 34.0 32.0 - 36.0 g/dL    RDW 13.7 11.5 - 14.5 %    Platelets 207 150 - 450 x10*3/uL   Renal Function Panel   Result Value Ref Range    Glucose 201 (H) 74 - 99 mg/dL    Sodium 139 136 - 145 mmol/L    Potassium 4.2 3.5 - 5.3 mmol/L    Chloride 105 98 - 107 mmol/L    Bicarbonate 26 21 - 32 mmol/L    Anion " "Gap 12 10 - 20 mmol/L    Urea Nitrogen 25 (H) 6 - 23 mg/dL    Creatinine 1.47 (H) 0.50 - 1.30 mg/dL    eGFR 56 (L) >60 mL/min/1.73m*2    Calcium 8.8 8.6 - 10.6 mg/dL    Phosphorus 4.3 2.5 - 4.9 mg/dL    Albumin 3.2 (L) 3.4 - 5.0 g/dL           Assessment/Plan   Principal Problem:    Shortness of breath    Leonel Yu is a 53 y.o. male with PMH Leonel Yu is a 53 y.o. male  with PMHx of HTN, HFrEF (10-15% 3/2023), substance use disorder (tobacco, crack cocaine), CKD, suspected COPD w/Emphysema (no PFTs  on file) and history of medication non-compliance who presented with orthopnea.  On presentation patient had normal vitals with elevated but stable Trop of 163 but then rise to 180, BNP of 3439.  Chest x-ray pending.  Patient dry weight on last admission (October) 73 kg, currently on admission 77.  Echo done in July showed an EF 15-20% with reduced LV systolic function. EKG showed NSR with LVH (unchanged from previous study Considering patient's current symptoms of shortness of breath and PND, patient is being worked up for CHF exacerbation versus COPD exacerbation.      Updates 12/13:   -Psychiatry consulted due to reports of patient expressing SI.  Patient did not endorse active thoughts of SI or HI. However, he states\" I wish I was not around sometimes\"  and endorsed loneliness.  -Patient is otherwise medically cleared for discharge.  Will follow-up with social work for resources regarding housing insecurity, food insecurity,  and transportation to appointments.  - Sp Lasix 20 mg once today. Will fu with walking pulse ox     #NSTEMI  #HFrEF ,with EF 15-20%  # CHF Exacerbation   >Very low suspicion for PE  d/t chest pain not pleuritic tic in nature and  normal hemodynamics with no compensatory mechanism such as tachycardia. Arrhythmia also unlikely as EKG showed NSR on admission.  - dry weight on last admission (October) 73 kg, currently on admission 77.   -Patient was recently admitted in October 2023, for " "CHF exacerbation, seems  to a frequent cause of admitt every few months.   -Trop 180, 163 on admission. In prior admission (109-1214)  -BNP: 3,439, on admission (prev 3921, 2257)  -TTE 7/2023: EF 15-20% with reduced LV systolic function  -home meds: Jiadiance, carvedilol, entresto,    Plan   -Continue home meds  -on tele  -heparin gtt   -Fluid status is improving; however, will continue to diurese with spot dose lasix.  -Will obtain walking pulse ox to assess respiratory status     #Unilateral upper extremity weakness  #Somnolence  -CT head showed no acute intracranial hemorrhage or mass effect. Prior Admission in July, patient  was wordked up for stroke-like symptoms then as well, with no evidence noted on CTA-head. Low suspicion and would not get an MRI at this time.        #COPD  No PFTs on file  Patient not on baseline home oxygen  Currently on 4 L of oxygen, place din the Ed while patient was hypoxic  Shortness of breath on exertion and while lying down, with PND  >Home meds Spiriva  -Continue with home spiriva     #CKD  Creatinine on admission 1.40 (baseline 1.6)  On home torsemide as needed for when he notices increase in weight  Holding home torsemide  -On Lasix 40 mg p.o.     #Polysubstance misuse  History and current use of crack cocaine  Last use cocaine 12/9  -Placed COWS  -Educate patient on cessation     #Suicidal Ideation   #Housing Insecurity   #Food Insecurity   -Psychiatry consulted due to reports of patient expressing SI.  Patient did not endorse active thoughts of SI or HI. However, he states\" I wish I was not around sometimes\"  and endorsed loneliness.  -Patient is otherwise medically cleared for discharge.  Will follow-up with social work for resources regarding housing insecurity, food insecurity,  and transportation to appointments.     Fluids caution given EF  Electrolytes as needed  Cardiac diet  DVT prophylaxis: heparin     Code Status: FULL CODE  NOK friend Sang Santana " [phone]    The patient was seen, evaluated, and discussed with Dr. Micha Posey MD   PGY-1 Internal Medicine

## 2023-12-13 NOTE — PROGRESS NOTES
Physical Therapy    Physical Therapy Evaluation & Treatment    Patient Name: Leonel Yu  MRN: [id number]  Today's Date: 12/13/2023   Time Calculation  Start Time: 1150  Stop Time: 1220  Time Calculation (min): 30 min    Assessment/Plan   PT Assessment  PT Assessment Results: Decreased endurance, Impaired balance, Decreased mobility, Decreased strength  Rehab Prognosis: Good  End of Session Communication: Bedside nurse  End of Session Patient Position: Bed, 2 rail up, Alarm off, not on at start of session   IP OR SWING BED PT PLAN  Inpatient or Swing Bed: Inpatient  PT Plan  Treatment/Interventions: Bed mobility, Transfer training, Gait training, Stair training, Balance training, Therapeutic exercise, Therapeutic activity, Home exercise program, Strengthening  PT Plan: Skilled PT  PT Frequency: 2 times per week  PT Discharge Recommendations: Low intensity level of continued care  PT Recommended Transfer Status: Assist x1  PT - OK to Discharge: Yes      Subjective     General Visit Information:  General  Reason for Referral: Presented with orthopnea. patient is being worked up for CHF exacerbation versus COPD exacerbation. Psychiatry consulted due to reports of patient expressing SI  Past Medical History Relevant to Rehab: HTN, HFrEF (10-15% 3/2023), substance use disorder (tobacco, crack cocaine), CKD, suspected COPD w/Emphysema (no PFTs on file) and history of medication non-compliance  Prior to Session Communication: Bedside nurse  Patient Position Received: Bed, 2 rail up, Alarm off, not on at start of session  Preferred Learning Style: verbal  General Comment: Pt supine in bed upon arrival, pleasant and willing to particpate in PT session.  Home Living:  Home Living  Type of Home: House  Lives With:  (roommate)  Home Layout:  (2 level with basement, pt stays in basement.)  Alternate Level Stairs-Rails:  (1 handrail)  Alternate Level Stairs-Number of Steps: 10  Home Access: Stairs to enter with rails  Entrance  "Stairs-Rails: Left  Entrance Stairs-Number of Steps: 5  Bathroom Shower/Tub: Tub/shower unit  Prior Level of Function:  Prior Function Per Pt/Caregiver Report  Level of Mora: Independent with ADLs and functional transfers  ADL Assistance: Independent  Homemaking Assistance: Independent  Ambulatory Assistance: Independent  Hand Dominance: Right  Precautions:  Precautions  Medical Precautions: Fall precautions, Oxygen therapy device and L/min (3LO2, nenita falls)  Vital Signs:  Vital Signs  Heart Rate: 78  SpO2: 96 %  BP: 103/71  Patient Position: Sitting    Objective   Pain:  Pain Assessment  Pain Assessment: 0-10  Pain Score: 0 - No pain  Cognition:  Cognition  Overall Cognitive Status: Within Functional Limits  Orientation Level: Oriented X4    General Assessments:        Sensation  Light Touch: No apparent deficits    Strength  Strength Comments: BLE's WFL                 Static Sitting Balance  Static Sitting-Level of Assistance: Independent  Dynamic Sitting Balance  Dynamic Sitting-Balance:  (Independent)    Static Standing Balance  Static Standing-Level of Assistance:  (Supervision)  Dynamic Standing Balance  Dynamic Standing-Balance:  (SBA)  Functional Assessments:  Bed Mobility  Bed Mobility: Yes  Bed Mobility 1  Bed Mobility 1: Supine to sitting, Sitting to supine  Level of Assistance 1: Distant supervision  Bed Mobility Comments 1: HOB elevated, bedrail    Transfer 1  Transfer From 1: Sit to, Stand to  Transfer to 1: Stand, Sit  Technique 1: Sit to stand, Stand to sit  Transfer Device 1:  (no AD)  Transfer Level of Assistance 1: Close supervision    Ambulation/Gait Training  Ambulation/Gait Training Performed: Yes  Ambulation/Gait Training 1  Device 1: No device  Assistance 1: Close supervision  Comments/Distance (ft) 1: 2x50 ft, 80 ft, to/from bathroom (~20 ft)    Stairs  Stairs: Yes  Stairs  Rails 1: Right  Device 1: No device  Assistance 1: Close supervision  Comment/Number of Steps 1: 4-7\" steps " "(cues for sequencing and safety)  Extremity/Trunk Assessments:  RLE   RLE : Within Functional Limits  LLE   LLE : Within Functional Limits  Treatments:  Therapeutic Exercise  Therapeutic Exercise Performed:  (Seated BLE: AP, LAQ, Hip F 1x10 reps. Cued for techniques.)    Ambulation/Gait Training  Ambulation/Gait Training Performed: Yes  Ambulation/Gait Training 1  Device 1: No device  Assistance 1: Close supervision  Comments/Distance (ft) 1: 2x50 ft, 80 ft, to/from bathroom (~20 ft)    Stairs  Stairs: Yes  Stairs  Rails 1: Right  Device 1: No device  Assistance 1: Close supervision  Comment/Number of Steps 1: 4-7\" steps (cues for sequencing and safety)  Outcome Measures:  [facility] Basic Mobility  Turning from your back to your side while in a flat bed without using bedrails: A little  Moving from lying on your back to sitting on the side of a flat bed without using bedrails: A little  Moving to and from bed to chair (including a wheelchair): A little  Standing up from a chair using your arms (e.g. wheelchair or bedside chair): A little  To walk in hospital room: A little  Climbing 3-5 steps with railing: A little  Basic Mobility - Total Score: 18    Encounter Problems       Encounter Problems (Active)       Mobility       Patient will be Independent ambulation>200 ft (Progressing)       Start:  12/13/23    Expected End:  12/27/23            Pt will be Derrek ascend/descend 10 steps with 1 handrail (Progressing)       Start:  12/13/23    Expected End:  12/27/23               Pain - Adult          Transfers       Patient will be Independent with sit to stand and bed to chair transfers (Progressing)       Start:  12/13/23    Expected End:  12/27/23            Patient will be Independent with bed mobility (Progressing)       Start:  12/13/23    Expected End:  12/27/23                   Education Documentation  Precautions, taught by Marybel Vásquez, PT at 12/13/2023  4:05 PM.  Learner: Patient  Readiness: Acceptance  Method: " Explanation  Response: Verbalizes Understanding    Home Exercise Program, taught by Marybel Vásquez PT at 12/13/2023  4:05 PM.  Learner: Patient  Readiness: Acceptance  Method: Explanation  Response: Verbalizes Understanding    Mobility Training, taught by Marybel Vásquez PT at 12/13/2023  4:05 PM.  Learner: Patient  Readiness: Acceptance  Method: Explanation  Response: Verbalizes Understanding    Education Comments  No comments found.

## 2023-12-13 NOTE — CONSULTS
"Inpatient consult to Psychiatry  Consult performed by: Jordan Garrett MD  Consult ordered by: Regino Muse MD PhD  Reason for consult: suicidal ideation        HISTORY OF PRESENT ILLNESS:  Leonel Yu is a 53 y.o. male with PMH Leonel Yu is a 53 y.o. male  with PMHx of HTN, HFrEF (10-15% 3/2023), substance use disorder (tobacco, crack cocaine), CKD, suspected COPD w/Emphysema (no PFTs on file) and history of medication non-compliance who presented with orthopnea.  On presentation patient had normal vitals with elevated but stable Trop of 163 but then rise to 180, BNP of 3439.  Chest x-ray pending.  Patient dry weight on last admission (October) 73 kg, currently on admission 77.  Echo done in July showed an EF 15-20% with reduced LV systolic function. EKG showed NSR with LVH (unchanged from previous study Considering patient's current symptoms of shortness of breath and PND, patient is being worked up for CHF exacerbation versus COPD exacerbation. Psychiatry was consulted on 12/13 due to patient expressing SI and \"wish I was not around sometimes\".    On chart review, patient has a previous history of unspecified depression, anxiety, and substance use disorder (specifically cocaine). Patient was recently admitted on 6/2023 and at that time endorsed depression with suicidal ideation. From his previous admission, the patient \"states that 5 years ago, his wife of 30 years kicked him out of the house and took their 3 kids. He hasn't seen his wife or kids since then, and he states this has caused him to become depressed. He states that he has been doing crack \"ever since\", an it is the only thing that makes him forget about his pain\" (per admission note 6/2023). Prior documented suicide attempts include 3 years ago (vague endorsement of SI by overdose), 2 years ago (by antifreeze in his drink), and approximately 6 months ago (where he took all of his prescribed pills). No documented outpatient " "psychiatrist. On chart review, his outpatient psychiatric medications include Sertraline 50mg PO daily (last dispensed on 9/9/2023).    On interview, he states that he is here in the hospital because he had shortness of breath and was \"slurring his words\" and his friends were \"worried about a stroke\". He states that he has been \"sad lately\". He states that he has had thoughts of hurting himself that get worse during the holidays. He states that seeing all of the commercials and talking about people being with their families is triggering for his thoughts of self harm. He states that he has had these thoughts \"off-and-on\" for the past 5 years. He has had thoughts of putting a gun to his head and hanging himself. He states that he has never moved towards acting upon these thoughts and has not purchased any materials such as rope or a gun. He denies current access to a firearm. He states that he would never act on these thoughts because: \"I don't want my wife to have my things\" in the context of him passing away. He notes that he is estranged from his wife, but notes they are legally  but he is not allowed at the house per court order.      He states that he can help move past these thoughts by listening to country music, specifically [place]. He states that he is \"not going to survive out there.\" He denies any HI or history of violence. He does have a legal history about 4 years ago where his daughter called the  about him \"pushing\" her, denies any outstanding charges or warrants. When asked about the sertraline, he states that he \"ran out\" of his medication but later found is bottle with approximately 5 pills in the bottle. When asked about his cocaine use, he states that [facility] came to see him during last admission and says that it \"didn't work\" because he was too sad around the holidays. Denies current active SI, HI, or AVH.     PSYCHIATRIC REVIEW OF SYSTEMS  Depression: depressed mood, tearfulness, " "and recurrent thoughts of death or suicidal ideation  Anxiety: excessive worry that is difficult to control  Psychosis: negative      PSYCHIATRIC HISTORY  Prior psychiatric diagnoses: Per patient, depression and anxiety.   Prior psychiatric hospitalizations: States that he had a previous psychiatric hold at [facility]. Admission note from 6/2023 denies  Prior evaluation by [facility]: 6/2023  Prior suicide attempts:   ~3 years ago: vague endorsement of SI by OD? (Per chart review)   - 2 years ago: he put antifreeze in his drink, patient reported   - 6 month ago: took all his prescribed pills, patient reported, noting triggering around Father's day  Access to firearms: patient denies  History of self-injurious behavior: did not assess, admission note from 6/2023 denies  History of trauma/abuse: patient says he was kicked out of his house by his wife, and hasn't seen his wife or kids in 5 years  Current mental health agency: none  Current psychiatric medications: Sertraline 50 mg PO daily  Prior psychiatric medication trials/response: unknown antidepressant  Past Psychiatric Meds/Treatments/ECT: Possibly Prozac per 6/2023 note  Family psychiatric history: did not assess, denies per 6/2023 note    SUBSTANCE USE HISTORY   He reports that he has never smoked. He has never used smokeless tobacco. He reports current drug use. Drug: \"Crack\" cocaine. He reports that he does not drink alcohol.    Tobacco: denies current use, previously vaped daily  Alcohol: denies  Other substances: cocaine     - Last use: 12/9  Prior substance use disorder treatment: per patient has previously had rehab treatment at [facility] and a facility in [place]    SOCIAL HISTORY  Social History     Socioeconomic History    Marital status: Single     Spouse name: Not on file    Number of children: Not on file    Years of education: Not on file    Highest education level: Not on file   Occupational History    Not on file   Tobacco Use    Smoking status: " "Never    Smokeless tobacco: Never   Substance and Sexual Activity    Alcohol use: Never    Drug use: Yes     Types: \"Crack\" cocaine    Sexual activity: Defer   Other Topics Concern    Not on file   Social History Narrative    Not on file     Social Determinants of Health     Financial Resource Strain: High Risk (10/24/2023)    Overall Financial Resource Strain (CARDIA)     Difficulty of Paying Living Expenses: Very hard   Food Insecurity: Food Insecurity Present (10/24/2023)    Hunger Vital Sign     Worried About Running Out of Food in the Last Year: Often true     Ran Out of Food in the Last Year: Often true   Transportation Needs: Unmet Transportation Needs (10/24/2023)    PRAPARE - Transportation     Lack of Transportation (Medical): Yes     Lack of Transportation (Non-Medical): Yes   Physical Activity: Sufficiently Active (10/24/2023)    Exercise Vital Sign     Days of Exercise per Week: 5 days     Minutes of Exercise per Session: 60 min   Stress: Stress Concern Present (10/24/2023)    [place] [employer] - Occupational Stress Questionnaire     Feeling of Stress : Very much   Social Connections: Socially Isolated (10/24/2023)    Social Connection and Isolation Panel [NHANES]     Frequency of Communication with Friends and Family: Never     Frequency of Social Gatherings with Friends and Family: Never     Attends [employer]ism Services: Never     Active Member of Clubs or Organizations: No     Attends Club or Organization Meetings: Never     Marital Status:    Intimate Partner Violence: Not At Risk (10/24/2023)    Humiliation, Afraid, Rape, and Kick questionnaire     Fear of Current or Ex-Partner: No     Emotionally Abused: No     Physically Abused: No     Sexually Abused: No   Housing Stability: High Risk (10/24/2023)    Housing Stability Vital Sign     Unable to Pay for Housing in the Last Year: Yes     Number of Places Lived in the Last Year: 3     Unstable Housing in the Last Year: " "Yes      Current living situation: lives with a roommate that seems to be an unstable situation and he reports that the roommate is \"bipolar\" and frequently kicks him out of the house. 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    History of learning difficulty: from filling out safety plan with patient, evident that patient has difficulties with reading and writing. Patient endorsing some college.   Marital status:  but not living with wife for 5 years  Children: 3 adult children- thinks his children are around 30 years old, 25 years old, and 18 years old; thinks that he may have a grandchild now  Social support: states that he has very little support  Legal history: Denies outstanding charges/warrants; previous legal issue regarding his wife and children    PAST MEDICAL HISTORY  No past medical history on file.   HTN, HFrEF (10-15% 3/2023), substance use disorder (tobacco, crack cocaine), CKD, suspected COPD w/Emphysema    PAST SURGICAL HISTORY  No past surgical history on file.       FAMILY HISTORY  No family history on file.     ALLERGIES  Patient has no known allergies.    OARRS REVIEW  OARRS checked: yes  OARRS comments: reviewed and unremarkable     OBJECTIVE    VITALS      12/12/2023     7:26 PM 12/12/2023    11:03 PM 12/13/2023     4:29 AM 12/13/2023     9:02 AM 12/13/2023    11:50 AM 12/13/2023    12:00 PM 12/13/2023     3:27 PM   Vitals   Systolic 101 97 104 101 103 103 113   Diastolic 71 66 74 63 71 71 66   Heart Rate 80 83 74 72 78 78 87   Temp 36.3 °C (97.3 °F) 36.6 °C (97.9 °F) 36.1 °C (97 °F) 35.9 °C (96.6 °F)   36.5 °C (97.7 °F)   Resp 19 18 18 18   18   Weight (lb)   160.72       BMI   23.06 kg/m2       BSA (m2)   1.9 m2            MENTAL STATUS EXAM  Appearance: 54 yo M, appears stated age, appropriate grooming, in hospital " "clothes open on the front, poor dentition   Behavior/Attitude: Appropriate eye contact, calm, cooperative  Motor: No psychomotor agitation or slowing; no ataxia with gait when walking from bed to bench by window  Speech: conversational rate, tone, and volume  Mood: \"sad lately\"   Affect: constricted, mood congruent. He jokes and smiles appropriately at various times throughout the conversation.   Thought process: Linear, Goal directed, at times has to be redirected to questions.  Thought content: Interview focused, Denies current active SI. Denies HI. No paranoia, delusions, or ideas of reference. No visual or auditory hallucinations.  Orientation: grossly orientated  Attention/concentration: intact to conversation  Language: No aphasia during conversation  Fund of knowledge: fair  Insight: fair/limited- expressed that he understands many of his medical issues are related to cocaine use, but was unsure of any diagnosis from the medicine team, also unsure about use of sertraline and how often he takes his medication  Judgment: fair      MEDICAL REVIEW OF SYSTEMS  Positive for shortness of breath and diaphoresis.     HOME MEDICATIONS  Medication Documentation Review Audit       Reviewed by Amaya Pérez CPhT (Technician) on 12/10/23 at 1029      Medication Order Taking? Sig Documenting Provider Last Dose Status   aspirin 81 mg EC tablet [id number]  TAKE 1 TABLET BY MOUTH ONCE DAILY DEYVI Ramos  Active   atorvastatin (Lipitor) 40 mg tablet [id number]  TAKE 1 TABLET BY MOUTH ONCE DAILY DEYVI Ramos  Active   carvedilol (Coreg) 6.25 mg tablet [id number]  TAKE 1 TABLET BY MOUTH TWO TIMES A DAY DEYVI Ramos  Active   empagliflozin (Jardiance) 10 mg [id number]  TAKE 1 TABLET BY MOUTH ONCE DAILY DEYVI Ramos  Active   sacubitriL-valsartan (Entresto) 24-26 mg tablet [id number]  TAKE 1 TABLET BY MOUTH TWO TIMES A DAY DEYVI Ramos  Active   sertraline (Zoloft) 50 mg " tablet [id number]  TAKE 1 TABLET BY MOUTH ONCE DAILY Chase ALVERTO Evangelist, APRN-CNP  Active   spironolactone (Aldactone) 25 mg tablet [id number]  TAKE 1/2 TABLET BY MOUTH ONCE DAILY DEYVI Ramos  Active   tiotropium (Spiriva Respimat) 2.5 mcg/actuation inhaler [id number]  INHALE 2 PUFFS ONCE DAILY DEYVI Ramos  Active   torsemide (Demadex) 100 mg tablet [id number]  Take 1 tablet (100 mg) by mouth once daily. DEYVI Dinero  Active                     CURRENT MEDICATIONS  Scheduled medications  aspirin, 81 mg, oral, Daily  atorvastatin, 80 mg, oral, Daily  carvedilol, 6.25 mg, oral, BID  empagliflozin, 10 mg, oral, Daily  enoxaparin, 40 mg, subcutaneous, q24h  hydrOXYzine HCL, 10 mg, oral, Once  sacubitriL-valsartan, 1 tablet, oral, BID  sertraline, 50 mg, oral, Daily  spironolactone, 25 mg, oral, Daily  tiotropium, 2 Inhalation, inhalation, Daily        Continuous medications       PRN medications  PRN medications: albuterol, albuterol, dicyclomine, melatonin, ondansetron, polyethylene glycol     LABS  Results for orders placed or performed during the hospital encounter of 12/09/23 (from the past 24 hour(s))   Magnesium   Result Value Ref Range    Magnesium 2.41 (H) 1.60 - 2.40 mg/dL   CBC   Result Value Ref Range    WBC 8.3 4.4 - 11.3 x10*3/uL    nRBC 0.0 0.0 - 0.0 /100 WBCs    RBC 5.95 (H) 4.50 - 5.90 x10*6/uL    Hemoglobin 18.6 (H) 13.5 - 17.5 g/dL    Hematocrit 55.1 (H) 41.0 - 52.0 %    MCV 93 80 - 100 fL    MCH 31.3 26.0 - 34.0 pg    MCHC 33.8 32.0 - 36.0 g/dL    RDW 13.5 11.5 - 14.5 %    Platelets 197 150 - 450 x10*3/uL   Renal Function Panel   Result Value Ref Range    Glucose 223 (H) 74 - 99 mg/dL    Sodium 136 136 - 145 mmol/L    Potassium 4.6 3.5 - 5.3 mmol/L    Chloride 103 98 - 107 mmol/L    Bicarbonate 23 21 - 32 mmol/L    Anion Gap 15 10 - 20 mmol/L    Urea Nitrogen 25 (H) 6 - 23 mg/dL    Creatinine 1.54 (H) 0.50 - 1.30 mg/dL    eGFR 53 (L) >60 mL/min/1.73m*2    Calcium 9.1 8.6  "- 10.6 mg/dL    Phosphorus 4.3 2.5 - 4.9 mg/dL    Albumin 3.6 3.4 - 5.0 g/dL        IMAGING  No results found.     EKG on 12/11 with QTC of 497 ms     PSYCHIATRIC RISK ASSESSMENT  Violence Risk Factors:  male, current psychiatric illness, substance abuse , lower socioeconomic status, and stress/destabilizers  Acute Risk of Harm to Others is Considered: Low  Suicide Risk Factors: male, prior suicide attempts , history of trauma or abuse, current psychiatric illness, and substance abuse   Protective Factors: employment, motivation for mental health treatment   Acute Risk of Harm to Self is Considered: Low    ASSESSMENT AND PLAN  Leonel Yu is a 53 y.o. male with PMH Leonel Yu is a 53 y.o. male  with PMHx of HTN, HFrEF (10-15% 3/2023), substance use disorder (tobacco, crack cocaine), CKD, suspected COPD w/Emphysema (no PFTs  on file) and history of medication non-compliance who presented with orthopnea.  On presentation patient had normal vitals with elevated but stable Trop of 163 but then rise to 180, BNP of 3439.  Chest x-ray pending.  Patient dry weight on last admission (October) 73 kg, currently on admission 77.  Echo done in July showed an EF 15-20% with reduced LV systolic function. EKG showed NSR with LVH (unchanged from previous study Considering patient's current symptoms of shortness of breath and PND, patient is being worked up for CHF exacerbation versus COPD exacerbation. Psychiatry was consulted on 12/13 due to patient expressing SI and \"wish I was not around sometimes\".    Given the patients lack of current active suicidal ideation and lack of preparatory behaviors, we believe he has a low risk for acute harm to self at this time. We believe his symptoms are likely in the setting of the holiday season as this is a known trigger for his feelings of loneliness. His depressive symptoms and history of SI could be in the setting of adjustment disorder surrounding the trauma from the situation " with his wife and children. His depressive symptoms could also be related to withdrawal from his stimulant use. Patient was counseled on abstaining from cocaine usage and the importance of his sertraline medication and was shown which bottle of medication is to help with his mood symptoms. We also provided a mental health resources pamphlet and highlighted the Centers and [facility] as options for him which may also help with transportation. He was given the crisis hotline number (988) and a Mental Health Safety Plan was made and placed in the chart. At this time, he does not meet criteria for inpatient psych admission and there are no psychiatric barriers to discharge.    IMPRESSION  #Other specified depressive disorder   #vs stimulant induced mood disorder, depressive type  #vs MDD  #R/o adjustment disorder due to holiday triggers    #Stimulant use disorder (cocaine)  #Anxiety, per patient    RECOMMENDATIONS    Safety:  - Patient does not currently meet criteria for inpatient psychiatric admission.   - Patient does not require a 1:1 sitter from a psychiatric perspective at this time.  - Safety plan was completed with patient and copy was filed to his paper chart    Work-up:  - per primary team    Medications:  - Continue sertraline 50 mg PO daily for mood symptoms as home going medication    Follow-up:  - Patient was given list of outpatient mental health resources on 12/13/2023. In particular, discussed 988 for MH crisis and options that could support transportation.   - [facility] was discussed, however, patient declined referral while inpatient due to previous referral experience    - Discussed recommendations with primary team.  - Psychiatry will sign off    Thank you for allowing us to participate in the care of this patient. Please page [phone] with any questions or concerns.    Medication Consent  Medication Consent: n/a; consult service    Vivi Mendez, MS3    Resident Attestation    I have  reviewed and personally edited MS3 Vivi Mendez's note and made corrections where appropriate. Please see their excellent note for documentation.     Patient discussed with Dr. Garrett, who agrees with plan.     Bart Crawford MD  Adult Psychiatry, PGY-2  [facility]

## 2023-12-13 NOTE — NURSING NOTE
Walking pulse ox: starting at RA satting 95% walked around unit dropping lowest 93%. HR 120s. Pt c/o minimal SOB definitely improved from a few days ago

## 2023-12-13 NOTE — PROGRESS NOTES
12/13/2023 Care coordination  Pt was voicing difficulty keeping appointments due to transport issues.  Provided pt with phone numbers for [facility] to call and arrange transport.

## 2023-12-13 NOTE — CARE PLAN
Problem: Pain - Adult  Goal: Verbalizes/displays adequate comfort level or baseline comfort level  Outcome: Progressing     Problem: Safety - Adult  Goal: Free from fall injury  Outcome: Progressing     Problem: Discharge Planning  Goal: Discharge to home or other facility with appropriate resources  Outcome: Progressing     Problem: Chronic Conditions and Co-morbidities  Goal: Patient's chronic conditions and co-morbidity symptoms are monitored and maintained or improved  Outcome: Progressing   The patient's goals for the shift include      The clinical goals for the shift include pt will remain free from injuries r/t fall throughout the shift    Over the shift, the patient did make progress toward the following goals.

## 2023-12-13 NOTE — PROGRESS NOTES
Occupational Therapy    Evaluation/Treatment    Patient Name: Leonel Yu  MRN: [id number]  : 1970  Today's Date: 23  Time Calculation  Start Time: 934  Stop Time:   Time Calculation (min): 57 min       Assessment:  End of Session Communication: Bedside nurse  End of Session Patient Position:  (EOB- eating breakfast; RN aware)  OT Assessment Results: Decreased ADL status, Decreased endurance, Decreased IADLs (Decreased psychosocial health)    Plan:  Treatment Interventions: ADL retraining, Functional transfer training, UE strengthening/ROM, Endurance training, Compensatory technique education (Psychosocial health intervention)  OT Frequency: 1 time per week  OT Discharge Recommendations: Low intensity level of continued care      Subjective   General:   OT Received On: 23  General  Prior to Session Communication: Bedside nurse  Patient Position Received: Bed, 2 rail up  General Comment: Pt supine in bed upon arrival, willing to participate in OT eval. Session primarily focused on psychosocial health education and intervention to maximize pt safety, independence, and engagement in positive /desired I/ADLs. Pt endorses depressive symptoms, suicidal ideation, and desire for substance abuse recovery /resources- OT communicated with primary team. Pt highly appreciative of OT session this date.    Pain:  Pain Assessment  Pain Assessment: 0-10  Pain Score: 0 - No pain    Objective   Cognition:  Overall Cognitive Status: Within Functional Limits  Orientation Level: Oriented X4    Home Living:  Type of Home: House  Lives With:  (Roommate /co-worker)  Home Layout: One level  Home Access: Stairs to enter with rails  Entrance Stairs-Number of Steps: 2  Bathroom Shower/Tub: Tub/shower unit  Bathroom Toilet: Standard  Home Living Comments: Pt reports living with co-worker in current home ~3-4 years    Pt reports decreased availability to food, therefore, with decreased daily energy and  "motivation.    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    ADL:  Eating Assistance: Independent (pt eating breakfast supine in bed, HOB elevated, upon arrival)  Grooming Assistance: Independent (anticipate)  Bathing Assistance: Stand by (anticipate)  UE Dressing Assistance: Stand by (anticipate)  LE Dressing Assistance: Stand by (don /doff B socks)  Toileting Assistance with Device: Stand by (anticipate)    Bed Mobility/Transfers:   Bed Mobility 1  Bed Mobility 1: Supine to sitting, Sitting to supine  Level of Assistance 1: Distant supervision  Bed Mobility Comments 1: HOB slightly elevated, use of bedrails    Transfer 1  Transfer From 1: Sit to, Stand to  Transfer to 1: Stand, Sit  Transfer Level of Assistance 1: Close supervision  Trials/Comments 1: 2x trials during session; no AD /LOB    Sitting Balance:  Static Sitting Balance  Static Sitting-Comment/Number of Minutes: Pt sits EOB 45 minutes with close SUP to address activity tolerance, ADLs, and psychsocial health intervention    Therapy/Activity: Pt endorses increased depressive symptoms, suicidal ideation, and desire for substance abuse recovery. Pt states that his current state of mind and substance abuse is preventing him from participating in I/ADLs. Pt expresses increased \"loneliness, sadness, and frustration\" re: no contact from ex-wife and (3) children the past few years and difficulty coping with situation. Pt has identified the holiday season as a trigger for him re: cocaine use as he feels that is the only way to cope with his current struggles.     Pt and OT with long discussion re: psychosocial health intervention and resources; OT provides pt with increased " education and interactive handouts- specifically addressing substance abuse (stages of change) and positive affirmations. 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 Pt also endorses being passionate about helping and connecting with people; OT provides options towards positive community group to promote recovery and healing. Pt mildly restless and very tearful during conversation, however, expresses increased appreciation towards OT session.     Discussion communicated with RN and primary team.     Vision:  Vision - Basic Assessment  Patient Visual Report:  (Pt denies acute visual deficits)    Sensation:  Sensation Comment: Denies N/T; no apparent deficits    Strength:  Strength Comments: BUE WFL    Outcome Measures:   [facility] Daily Activity  Putting on and taking off regular lower body clothing: None  Bathing (including washing, rinsing, drying): A little  Putting on and taking off regular upper body clothing: None  Toileting, which includes using toilet, bedpan or urinal: None  Taking care of personal grooming such as brushing teeth: None  Eating Meals: None  Daily Activity - Total Score: 23    OT Adult Other Outcome Measures  4AT: Negative      Education Documentation  Handouts, taught by Rossi Kenny OT at 12/13/2023  3:17 PM.  Learner: Patient  Readiness: Acceptance  Method: Explanation  Response: Verbalizes Understanding    Body Mechanics, taught by Rossi Kenny OT at 12/13/2023  3:17 PM.  Learner: Patient  Readiness: Acceptance  Method: Explanation  Response: Verbalizes Understanding    Precautions, taught by Rossi Kenny OT at 12/13/2023  3:17 PM.  Learner: Patient  Readiness: Acceptance  Method: Explanation  Response: Verbalizes Understanding    ADL Training, taught by Rossi Kenny OT at 12/13/2023  3:17 PM.  Learner: Patient  Readiness: Acceptance  Method: Explanation  Response: Verbalizes  Understanding    Education Comments  No comments found.        Goals:  Encounter Problems       Encounter Problems (Active)       BALANCE       Pt will demo improved dynamic sitting /standing balance while engaging in I/ADL task with independent level of assistance and no LOB.  (Progressing)       Start:  12/13/23    Expected End:  12/27/23               MOBILITY       Pt will complete functional ambulation household /community distance with independent level of assistance and LRAD.  (Progressing)       Start:  12/13/23    Expected End:  12/27/23               PSYCHOSOCIAL HEALTH        Pt will identify and implement >/= two positive coping strategies during symptoms of anxiety and/or depression with minimal vc's. (Progressing)       Start:  12/13/23    Expected End:  12/27/23            Pt will identify >/= two positive I/ADL tasks to participate in on a daily and/or weekly basis with minimal verbal cues.  (Progressing)       Start:  12/13/23    Expected End:  12/27/23                   Rossi Kenny (OTR/L, OTD)  Inpatient Occupational Therapist   Rehab Office: [phone]

## 2023-12-14 ENCOUNTER — PHARMACY VISIT (OUTPATIENT)
Dept: PHARMACY | Facility: CLINIC | Age: 53
End: 2023-12-14
Payer: MEDICAID

## 2023-12-14 VITALS
SYSTOLIC BLOOD PRESSURE: 100 MMHG | RESPIRATION RATE: 18 BRPM | TEMPERATURE: 97.5 F | OXYGEN SATURATION: 96 % | WEIGHT: 159.83 LBS | HEART RATE: 71 BPM | BODY MASS INDEX: 22.88 KG/M2 | DIASTOLIC BLOOD PRESSURE: 69 MMHG | HEIGHT: 70 IN

## 2023-12-14 LAB
ALBUMIN SERPL BCP-MCNC: 3.6 G/DL (ref 3.4–5)
ANION GAP SERPL CALC-SCNC: 13 MMOL/L (ref 10–20)
ATRIAL RATE: 98 BPM
BUN SERPL-MCNC: 24 MG/DL (ref 6–23)
CALCIUM SERPL-MCNC: 9.1 MG/DL (ref 8.6–10.6)
CHLORIDE SERPL-SCNC: 104 MMOL/L (ref 98–107)
CO2 SERPL-SCNC: 25 MMOL/L (ref 21–32)
CREAT SERPL-MCNC: 1.43 MG/DL (ref 0.5–1.3)
ERYTHROCYTE [DISTWIDTH] IN BLOOD BY AUTOMATED COUNT: 13.4 % (ref 11.5–14.5)
GFR SERPL CREATININE-BSD FRML MDRD: 58 ML/MIN/1.73M*2
GLUCOSE SERPL-MCNC: 168 MG/DL (ref 74–99)
HCT VFR BLD AUTO: 53.6 % (ref 41–52)
HGB BLD-MCNC: 18.2 G/DL (ref 13.5–17.5)
MAGNESIUM SERPL-MCNC: 2.45 MG/DL (ref 1.6–2.4)
MCH RBC QN AUTO: 31.7 PG (ref 26–34)
MCHC RBC AUTO-ENTMCNC: 34 G/DL (ref 32–36)
MCV RBC AUTO: 93 FL (ref 80–100)
NRBC BLD-RTO: 0 /100 WBCS (ref 0–0)
P AXIS: 80 DEGREES
P OFFSET: 180 MS
P ONSET: 127 MS
PHOSPHATE SERPL-MCNC: 4.9 MG/DL (ref 2.5–4.9)
PLATELET # BLD AUTO: 208 X10*3/UL (ref 150–450)
POTASSIUM SERPL-SCNC: 4.8 MMOL/L (ref 3.5–5.3)
PR INTERVAL: 156 MS
Q ONSET: 205 MS
QRS COUNT: 17 BEATS
QRS DURATION: 110 MS
QT INTERVAL: 390 MS
QTC CALCULATION(BAZETT): 497 MS
QTC FREDERICIA: 459 MS
R AXIS: -36 DEGREES
RBC # BLD AUTO: 5.74 X10*6/UL (ref 4.5–5.9)
SODIUM SERPL-SCNC: 137 MMOL/L (ref 136–145)
T AXIS: 106 DEGREES
T OFFSET: 400 MS
VENTRICULAR RATE: 98 BPM
WBC # BLD AUTO: 7.3 X10*3/UL (ref 4.4–11.3)

## 2023-12-14 PROCEDURE — 83735 ASSAY OF MAGNESIUM: CPT

## 2023-12-14 PROCEDURE — 80069 RENAL FUNCTION PANEL: CPT

## 2023-12-14 PROCEDURE — 99238 HOSP IP/OBS DSCHRG MGMT 30/<: CPT | Performed by: INTERNAL MEDICINE

## 2023-12-14 PROCEDURE — 85027 COMPLETE CBC AUTOMATED: CPT

## 2023-12-14 PROCEDURE — 36415 COLL VENOUS BLD VENIPUNCTURE: CPT

## 2023-12-14 PROCEDURE — RXMED WILLOW AMBULATORY MEDICATION CHARGE

## 2023-12-14 PROCEDURE — 2500000001 HC RX 250 WO HCPCS SELF ADMINISTERED DRUGS (ALT 637 FOR MEDICARE OP)

## 2023-12-14 RX ORDER — ATORVASTATIN CALCIUM 40 MG/1
80 TABLET, FILM COATED ORAL DAILY
Qty: 60 TABLET | Refills: 1 | Status: SHIPPED | OUTPATIENT
Start: 2023-12-14

## 2023-12-14 RX ORDER — TORSEMIDE 100 MG/1
TABLET ORAL
Qty: 30 TABLET | Refills: 1 | Status: SHIPPED | OUTPATIENT
Start: 2023-12-14 | End: 2023-12-28 | Stop reason: HOSPADM

## 2023-12-14 RX ORDER — HYDROXYZINE HYDROCHLORIDE 10 MG/1
10 TABLET, FILM COATED ORAL ONCE
Qty: 1 TABLET | Refills: 0 | Status: SHIPPED | OUTPATIENT
Start: 2023-12-14 | End: 2023-12-28 | Stop reason: HOSPADM

## 2023-12-14 RX ORDER — DICYCLOMINE HYDROCHLORIDE 10 MG/1
10 CAPSULE ORAL EVERY 6 HOURS PRN
Qty: 120 CAPSULE | Refills: 0 | Status: SHIPPED | OUTPATIENT
Start: 2023-12-14 | End: 2023-12-28 | Stop reason: HOSPADM

## 2023-12-14 RX ORDER — ACETAMINOPHEN 500 MG
5 TABLET ORAL NIGHTLY PRN
Qty: 30 TABLET | Refills: 1 | Status: ON HOLD | OUTPATIENT
Start: 2023-12-14 | End: 2024-02-16

## 2023-12-14 RX ORDER — POLYETHYLENE GLYCOL 3350 17 G/17G
17 POWDER, FOR SOLUTION ORAL DAILY PRN
Qty: 30 PACKET | Refills: 1 | Status: SHIPPED | OUTPATIENT
Start: 2023-12-14 | End: 2024-02-16 | Stop reason: HOSPADM

## 2023-12-14 RX ADMIN — Medication 5 MG: at 00:28

## 2023-12-14 ASSESSMENT — PAIN SCALES - WONG BAKER: WONGBAKER_NUMERICALRESPONSE: NO HURT

## 2023-12-14 ASSESSMENT — PAIN SCALES - GENERAL
PAINLEVEL_OUTOF10: 0 - NO PAIN
PAINLEVEL_OUTOF10: 0 - NO PAIN

## 2023-12-14 ASSESSMENT — COGNITIVE AND FUNCTIONAL STATUS - GENERAL
DAILY ACTIVITIY SCORE: 24
MOBILITY SCORE: 24

## 2023-12-14 ASSESSMENT — PAIN - FUNCTIONAL ASSESSMENT: PAIN_FUNCTIONAL_ASSESSMENT: 0-10

## 2023-12-14 NOTE — NURSING NOTE
Patient has expressed that he feels depressed, anxious and tearful. He also states that he thinks that nobody understands what he is going through. 1:1 and reassurance given. This nurse offered to notify MD to see if there was something that can be ordered to help him relax and rest. Patient states that he does not want anymore medication and would prefer to deal with his feelings in his own way. Patient encouraged to call this nurse if he needs anything or if he feels that he needs to talk. Patient continues to walk around the unit. States it allows him to think.

## 2023-12-14 NOTE — DISCHARGE INSTRUCTIONS
Dear Leonel Yu,    You were admitted to [facility] 12/9/23 for a heart failure exacerbation.  Heart failure occurs when the heart does not pump blood as well as that should.  When this happens fluid often backs up and fluid can build up in your lungs causing shortness of breath.  During your hospital stay, you were given medication to remove this fluid which you will need to continue following your discharge.  Due to your concerns of stress, you were seen by psychiatry who recommended beginning sertraline, and antidepressant medication, daily and an outpatient psychiatry visit.    Medication changes:  Sertraline 50 mg once per day added    Cardiac Meds:  - Take:  - Atorvastatin 40 mg 2 tabs each night   - Aspirin 81 mg daily  Coreg 6.125 mg twice daily  Entresto 24-26 1 tablet twice daily   Jardiance 10 mg daily   Spironolactone 25 mg 0.5 tablets daily   Torsemide 100 mg daily       Appointments/follow-up:  Psychiatry appointment  Cardiology appointment  Primary care appointment    It was a pleasure taking care of you,    Your  Care Team

## 2023-12-14 NOTE — DISCHARGE SUMMARY
Discharge Diagnosis  Shortness of breath      Test Results Pending At Discharge  Pending Labs       No current pending labs.            Hospital Course  Leonel Yu is a 53 y.o. male with PMH of HTN, HFrEF (10-15% 3/2023), substance use disorder (tobacco, crack cocaine), CKD, suspected COPD w/Emphysema (no PFTs  on file), and history of medication non-compliance who presented with orthopnea.  On presentation patient had normal vitals with elevated but stable Trop of 163 but then rise to 180, BNP of 3439. Echo done 07/23 showed an EF 15-20% with reduced LV systolic function. EKG showed NSR with LVH (unchanged from previous study). The patient was ultimately admitted for CHF exacerbation and was treated with lasix until his dry weight (73 kg) was achieved. During his stay the patient expressed concerns regarding food and housing insecurities so social work was consulted and the patient was provided with appropriate resources.  His hospital course was complicated by reports of SI.  Psychiatry was consulted; however, reported patient did not have any active thoughts or plan for SI and deemed him safe for discharge with outpatient follow-up.  They also started him on sertraline.  At the time of discharge, the patient was stable and his symptoms were improved.    Vitals:    12/14/23 1152   BP: 100/69   Pulse: 71   Resp: 18   Temp: 36.4 °C (97.5 °F)   SpO2: 96%     General: Resting comfortably in bed. Well developed, well nourished. Appears stated age. In no acute distress   HEENT: Normocephalic and atraumatic. EOMI, sclera non-icteric, MMM, no JVD  Cardiovascular: RRR, no r/m/g  Pulmonary: Lungs clear to auscultation bilaterally. No wheezes/ rhonchi/ rales   GI: +BS, soft, non-tender, non-distended  : No suprapubic/ flank pain  Extremities: No LE edema   Skin: warm and dry. No rashes or lesions   Neurologic: CN II-XII grossly intact. No focal neurologic deficit   Psych: Pleasant. Appropriate mood and affect      Things to follow up:  [  ] follow up medication compliance, titrate GDMT as tolerated             Home Medications     Medication List      START taking these medications     dicyclomine 10 mg capsule; Commonly known as: Bentyl; Take 1 capsule (10   mg) by mouth every 6 hours if needed (Abdominal discomfort).   hydrOXYzine HCL 10 mg tablet; Commonly known as: Atarax; Take 1 tablet   (10 mg) by mouth 1 time for 1 dose.   melatonin 5 mg tablet; Take 1 tablet (5 mg) by mouth as needed at   bedtime for sleep.   polyethylene glycol 17 gram packet; Commonly known as: Glycolax,   Miralax; Mix 17g (1 packet) in liquid and take by mouth once daily as   needed (Constipation).     CHANGE how you take these medications     atorvastatin 40 mg tablet; Commonly known as: Lipitor; Take 2 tablets   (80 mg) by mouth once daily.; What changed: how much to take   torsemide 100 mg tablet; Commonly known as: Demadex; Take 50mg (half   tablet daily) for fluid retention). Please increase dosage to 100mg if you   notice >2lbs weight gain in 2 days; What changed: how much to take, how to   take this, when to take this, additional instructions     CONTINUE taking these medications     aspirin 81 mg EC tablet; TAKE 1 TABLET BY MOUTH ONCE DAILY   carvedilol 6.25 mg tablet; Commonly known as: Coreg; TAKE 1 TABLET BY   MOUTH TWO TIMES A DAY   Entresto 24-26 mg tablet; Generic drug: sacubitriL-valsartan; TAKE 1   TABLET BY MOUTH TWO TIMES A DAY   Jardiance 10 mg; Generic drug: empagliflozin; TAKE 1 TABLET BY MOUTH   ONCE DAILY   sertraline 50 mg tablet; Commonly known as: Zoloft; TAKE 1 TABLET BY   MOUTH ONCE DAILY   Spiriva Respimat 2.5 mcg/actuation inhaler; Generic drug: tiotropium;   INHALE 2 PUFFS ONCE DAILY   spironolactone 25 mg tablet; Commonly known as: Aldactone; TAKE 1/2   TABLET BY MOUTH ONCE DAILY       Outpatient Follow-Up  No future appointments.    Leonel Coyne MD

## 2023-12-14 NOTE — CARE PLAN
The patient's goals for the shift include      The clinical goals for the shift include pt will ambulate TID & remain HDS      Problem: Pain - Adult  Goal: Verbalizes/displays adequate comfort level or baseline comfort level  Outcome: Progressing     Problem: Safety - Adult  Goal: Free from fall injury  Outcome: Progressing     Problem: Discharge Planning  Goal: Discharge to home or other facility with appropriate resources  Outcome: Progressing     Problem: Chronic Conditions and Co-morbidities  Goal: Patient's chronic conditions and co-morbidity symptoms are monitored and maintained or improved  Outcome: Progressing

## 2023-12-21 NOTE — DOCUMENTATION CLARIFICATION NOTE
"    PATIENT:               VINOD SHAFER  ACCT #:                  [id number]  MRN:                       [id number]  :                       1970  ADMIT DATE:       2023 11:43 AM  DISCH DATE:        2023 3:37 PM  RESPONDING PROVIDER #:        [id number]          PROVIDER RESPONSE TEXT:    NSTEMI ruled out Non-ischemic myocardial injury ruled in    CDI QUERY TEXT:    [facility]    Instruction:    Based on your assessment of the patient and the clinical information, please provide the requested documentation by clicking on the appropriate radio button and enter any additional information if prompted.    Question: Is there a diagnosis indicative of the patient elevated Troponins and symptoms    When answering this query, please exercise your independent professional judgment. The fact that a question is being asked, does not imply that any particular answer is desired or expected.    The patient's clinical indicators include:  Clinical Information: 23 ED note Dr. HOA Weaver and Dr. Wolfe: \"53 y.o. male with past medical history of HTN, HFrEF (10-15% 3/2023), COPD, and polysubstance abuse (smokes crack cocaine and marijuana) disorder presenting to the ED for orthopnea, with chest pressure.\"    Clinical Indicators:  23 1106 36.9-91-/91 97percent ra    23 Positive Drug Screen for cocaine    23 H&P Dr. Phyllis Ellington  \"EKG: normal sinus rhythm at 94 bpm, normal VA interval, prolonged QT interval, with left axis deviation and left ventricular hypertrophy. Whist is similar to previous study. \"    \"Respiratory: CTA bilaterally. No wheezes, rales, or rhonchi. Normal respiratory effort.  Cardiovascular: RRR. No murmurs, gallops, or rubs. No JVD. Radial pulses 2 2 plus.\"    \"NSTEMI\"    \"patient reports he has been having chest tightness on presentation to the ED and was given ASA 324mg  Patient last use of crack cocaine was self reported to be yesterday, although could be the cause of " "his chest pain, his chronic use of said illicit drug makes it not a strong causative factor, but can't be ruled out.  Patient was recently admitted in October 2023, for CHF exacerbation, seems  to a frequent cause of admitt every few months.\"    >Trop 171, 180, 163 on admission. In prior admission (109-1214)  >BNP: 3,439, (prev 3921, 2257)    12/13/23 Card PN Dr. CANDY Posey  \"NSTEMI\"    12/14/23 Discharge Summary Dr. THELMA Coyne  \"The patient was ultimately admitted for CHF exacerbation and was treated with lasix until his dry weight (73 kg) was achieved.\"    Treatment:  oxygen  ASA  Heparin  Lasix  EKG  labs  CT Angio for PE  CXR  Telemetry    Risk Factors: CHF, HTN, COPD, polysubstance abuse (smokes crack cocaine and marijuana) disorder, homelessness, history of medication non-compliance  Options provided:  -- NSTEMI ruled out Type II MI due to cocaine ruled in  -- NSTEMI ruled out Non-ischemic myocardial injury ruled in  -- Troponin elevation due to other, Please specify additional information below  -- Other - I will add my own diagnosis  -- Refer to Clinical Documentation Reviewer    Query created by: Priyanka Norman on 12/21/2023 6:59 AM      Electronically signed by:  MELISSA POSEY MD 12/21/2023 6:50 PM          "

## 2023-12-24 ENCOUNTER — HOSPITAL ENCOUNTER (OUTPATIENT)
Facility: HOSPITAL | Age: 53
Setting detail: OBSERVATION
LOS: 1 days | Discharge: HOSPICE | End: 2023-12-28
Attending: STUDENT IN AN ORGANIZED HEALTH CARE EDUCATION/TRAINING PROGRAM | Admitting: INTERNAL MEDICINE
Payer: COMMERCIAL

## 2023-12-24 ENCOUNTER — ANCILLARY PROCEDURE (OUTPATIENT)
Dept: EMERGENCY MEDICINE | Facility: HOSPITAL | Age: 53
End: 2023-12-24
Payer: COMMERCIAL

## 2023-12-24 ENCOUNTER — APPOINTMENT (OUTPATIENT)
Dept: RADIOLOGY | Facility: HOSPITAL | Age: 53
End: 2023-12-24
Payer: COMMERCIAL

## 2023-12-24 DIAGNOSIS — I50.9 CONGESTIVE HEART FAILURE, UNSPECIFIED HF CHRONICITY, UNSPECIFIED HEART FAILURE TYPE (MULTI): ICD-10-CM

## 2023-12-24 DIAGNOSIS — R06.02 SHORTNESS OF BREATH: ICD-10-CM

## 2023-12-24 DIAGNOSIS — R45.851 SUICIDAL IDEATION: ICD-10-CM

## 2023-12-24 DIAGNOSIS — R07.2 PRECORDIAL PAIN: Primary | ICD-10-CM

## 2023-12-24 LAB
ALBUMIN SERPL BCP-MCNC: 3.5 G/DL (ref 3.4–5)
ALP SERPL-CCNC: 68 U/L (ref 33–120)
ALT SERPL W P-5'-P-CCNC: 24 U/L (ref 10–52)
AMPHETAMINES UR QL SCN: ABNORMAL
ANION GAP SERPL CALC-SCNC: 12 MMOL/L (ref 10–20)
APAP SERPL-MCNC: <10 UG/ML
AST SERPL W P-5'-P-CCNC: 19 U/L (ref 9–39)
BARBITURATES UR QL SCN: ABNORMAL
BASOPHILS # BLD AUTO: 0.06 X10*3/UL (ref 0–0.1)
BASOPHILS NFR BLD AUTO: 0.7 %
BENZODIAZ UR QL SCN: ABNORMAL
BILIRUB SERPL-MCNC: 0.5 MG/DL (ref 0–1.2)
BNP SERPL-MCNC: 1425 PG/ML (ref 0–99)
BUN SERPL-MCNC: 22 MG/DL (ref 6–23)
BZE UR QL SCN: ABNORMAL
CALCIUM SERPL-MCNC: 8.8 MG/DL (ref 8.6–10.6)
CANNABINOIDS UR QL SCN: ABNORMAL
CARDIAC TROPONIN I PNL SERPL HS: 109 NG/L (ref 0–53)
CHLORIDE SERPL-SCNC: 108 MMOL/L (ref 98–107)
CO2 SERPL-SCNC: 22 MMOL/L (ref 21–32)
CREAT SERPL-MCNC: 1.48 MG/DL (ref 0.5–1.3)
EOSINOPHIL # BLD AUTO: 0.06 X10*3/UL (ref 0–0.7)
EOSINOPHIL NFR BLD AUTO: 0.7 %
ERYTHROCYTE [DISTWIDTH] IN BLOOD BY AUTOMATED COUNT: 13.2 % (ref 11.5–14.5)
ETHANOL SERPL-MCNC: <10 MG/DL
FENTANYL+NORFENTANYL UR QL SCN: ABNORMAL
GFR SERPL CREATININE-BSD FRML MDRD: 56 ML/MIN/1.73M*2
GLUCOSE SERPL-MCNC: 152 MG/DL (ref 74–99)
HCT VFR BLD AUTO: 47.1 % (ref 41–52)
HGB BLD-MCNC: 16.5 G/DL (ref 13.5–17.5)
HOLD SPECIMEN: NORMAL
IMM GRANULOCYTES # BLD AUTO: 0.02 X10*3/UL (ref 0–0.7)
IMM GRANULOCYTES NFR BLD AUTO: 0.2 % (ref 0–0.9)
LYMPHOCYTES # BLD AUTO: 1.02 X10*3/UL (ref 1.2–4.8)
LYMPHOCYTES NFR BLD AUTO: 12.3 %
MAGNESIUM SERPL-MCNC: 2 MG/DL (ref 1.6–2.4)
MCH RBC QN AUTO: 31.5 PG (ref 26–34)
MCHC RBC AUTO-ENTMCNC: 35 G/DL (ref 32–36)
MCV RBC AUTO: 90 FL (ref 80–100)
MONOCYTES # BLD AUTO: 0.43 X10*3/UL (ref 0.1–1)
MONOCYTES NFR BLD AUTO: 5.2 %
NEUTROPHILS # BLD AUTO: 6.68 X10*3/UL (ref 1.2–7.7)
NEUTROPHILS NFR BLD AUTO: 80.9 %
NRBC BLD-RTO: 0 /100 WBCS (ref 0–0)
OPIATES UR QL SCN: ABNORMAL
OXYCODONE+OXYMORPHONE UR QL SCN: ABNORMAL
PCP UR QL SCN: ABNORMAL
PLATELET # BLD AUTO: 164 X10*3/UL (ref 150–450)
POTASSIUM SERPL-SCNC: 4.4 MMOL/L (ref 3.5–5.3)
PROT SERPL-MCNC: 6.8 G/DL (ref 6.4–8.2)
RBC # BLD AUTO: 5.23 X10*6/UL (ref 4.5–5.9)
SALICYLATES SERPL-MCNC: <3 MG/DL
SARS-COV-2 RNA RESP QL NAA+PROBE: NOT DETECTED
SODIUM SERPL-SCNC: 138 MMOL/L (ref 136–145)
WBC # BLD AUTO: 8.3 X10*3/UL (ref 4.4–11.3)

## 2023-12-24 PROCEDURE — 84484 ASSAY OF TROPONIN QUANT: CPT | Performed by: EMERGENCY MEDICINE

## 2023-12-24 PROCEDURE — 71045 X-RAY EXAM CHEST 1 VIEW: CPT

## 2023-12-24 PROCEDURE — 87635 SARS-COV-2 COVID-19 AMP PRB: CPT

## 2023-12-24 PROCEDURE — 83735 ASSAY OF MAGNESIUM: CPT

## 2023-12-24 PROCEDURE — 94640 AIRWAY INHALATION TREATMENT: CPT | Mod: 59

## 2023-12-24 PROCEDURE — 87635 SARS-COV-2 COVID-19 AMP PRB: CPT | Performed by: STUDENT IN AN ORGANIZED HEALTH CARE EDUCATION/TRAINING PROGRAM

## 2023-12-24 PROCEDURE — 99285 EMERGENCY DEPT VISIT HI MDM: CPT | Mod: 25 | Performed by: EMERGENCY MEDICINE

## 2023-12-24 PROCEDURE — 36415 COLL VENOUS BLD VENIPUNCTURE: CPT | Performed by: EMERGENCY MEDICINE

## 2023-12-24 PROCEDURE — 85025 COMPLETE CBC W/AUTO DIFF WBC: CPT

## 2023-12-24 PROCEDURE — 80143 DRUG ASSAY ACETAMINOPHEN: CPT

## 2023-12-24 PROCEDURE — 99285 EMERGENCY DEPT VISIT HI MDM: CPT | Performed by: EMERGENCY MEDICINE

## 2023-12-24 PROCEDURE — 71045 X-RAY EXAM CHEST 1 VIEW: CPT | Performed by: RADIOLOGY

## 2023-12-24 PROCEDURE — 93005 ELECTROCARDIOGRAM TRACING: CPT

## 2023-12-24 PROCEDURE — 80307 DRUG TEST PRSMV CHEM ANLYZR: CPT

## 2023-12-24 PROCEDURE — 84075 ASSAY ALKALINE PHOSPHATASE: CPT

## 2023-12-24 PROCEDURE — 36415 COLL VENOUS BLD VENIPUNCTURE: CPT

## 2023-12-24 PROCEDURE — 2500000002 HC RX 250 W HCPCS SELF ADMINISTERED DRUGS (ALT 637 FOR MEDICARE OP, ALT 636 FOR OP/ED): Mod: SE

## 2023-12-24 PROCEDURE — 83880 ASSAY OF NATRIURETIC PEPTIDE: CPT | Performed by: EMERGENCY MEDICINE

## 2023-12-24 RX ORDER — FUROSEMIDE 10 MG/ML
40 INJECTION INTRAMUSCULAR; INTRAVENOUS ONCE
Status: COMPLETED | OUTPATIENT
Start: 2023-12-24 | End: 2023-12-25

## 2023-12-24 RX ORDER — IPRATROPIUM BROMIDE AND ALBUTEROL SULFATE 2.5; .5 MG/3ML; MG/3ML
3 SOLUTION RESPIRATORY (INHALATION)
Status: COMPLETED | OUTPATIENT
Start: 2023-12-24 | End: 2023-12-24

## 2023-12-24 RX ORDER — NAPROXEN SODIUM 220 MG/1
324 TABLET, FILM COATED ORAL ONCE
Status: COMPLETED | OUTPATIENT
Start: 2023-12-24 | End: 2023-12-25

## 2023-12-24 RX ADMIN — IPRATROPIUM BROMIDE AND ALBUTEROL SULFATE 3 ML: .5; 3 SOLUTION RESPIRATORY (INHALATION) at 20:25

## 2023-12-24 RX ADMIN — IPRATROPIUM BROMIDE AND ALBUTEROL SULFATE 3 ML: .5; 3 SOLUTION RESPIRATORY (INHALATION) at 20:10

## 2023-12-24 ASSESSMENT — PAIN SCALES - GENERAL: PAINLEVEL_OUTOF10: 0 - NO PAIN

## 2023-12-24 ASSESSMENT — COLUMBIA-SUICIDE SEVERITY RATING SCALE - C-SSRS
1. IN THE PAST MONTH, HAVE YOU WISHED YOU WERE DEAD OR WISHED YOU COULD GO TO SLEEP AND NOT WAKE UP?: YES
2. HAVE YOU ACTUALLY HAD ANY THOUGHTS OF KILLING YOURSELF?: YES
6. HAVE YOU EVER DONE ANYTHING, STARTED TO DO ANYTHING, OR PREPARED TO DO ANYTHING TO END YOUR LIFE?: YES
5. HAVE YOU STARTED TO WORK OUT OR WORKED OUT THE DETAILS OF HOW TO KILL YOURSELF? DO YOU INTEND TO CARRY OUT THIS PLAN?: YES
4. HAVE YOU HAD THESE THOUGHTS AND HAD SOME INTENTION OF ACTING ON THEM?: YES

## 2023-12-24 ASSESSMENT — PAIN - FUNCTIONAL ASSESSMENT: PAIN_FUNCTIONAL_ASSESSMENT: 0-10

## 2023-12-25 PROBLEM — R07.2 PRECORDIAL PAIN: Status: ACTIVE | Noted: 2023-12-25

## 2023-12-25 LAB
ANION GAP SERPL CALC-SCNC: 13 MMOL/L (ref 10–20)
BUN SERPL-MCNC: 21 MG/DL (ref 6–23)
CALCIUM SERPL-MCNC: 8.8 MG/DL (ref 8.6–10.6)
CARDIAC TROPONIN I PNL SERPL HS: 103 NG/L (ref 0–53)
CHLORIDE SERPL-SCNC: 106 MMOL/L (ref 98–107)
CO2 SERPL-SCNC: 24 MMOL/L (ref 21–32)
CREAT SERPL-MCNC: 1.48 MG/DL (ref 0.5–1.3)
ERYTHROCYTE [DISTWIDTH] IN BLOOD BY AUTOMATED COUNT: 13.6 % (ref 11.5–14.5)
ETHANOL SERPL-MCNC: <10 MG/DL
GFR SERPL CREATININE-BSD FRML MDRD: 56 ML/MIN/1.73M*2
GLUCOSE SERPL-MCNC: 111 MG/DL (ref 74–99)
HCT VFR BLD AUTO: 51.7 % (ref 41–52)
HGB BLD-MCNC: 17.3 G/DL (ref 13.5–17.5)
MCH RBC QN AUTO: 31.3 PG (ref 26–34)
MCHC RBC AUTO-ENTMCNC: 33.5 G/DL (ref 32–36)
MCV RBC AUTO: 94 FL (ref 80–100)
NRBC BLD-RTO: 0 /100 WBCS (ref 0–0)
PLATELET # BLD AUTO: 161 X10*3/UL (ref 150–450)
POTASSIUM SERPL-SCNC: 4.1 MMOL/L (ref 3.5–5.3)
RBC # BLD AUTO: 5.52 X10*6/UL (ref 4.5–5.9)
SODIUM SERPL-SCNC: 139 MMOL/L (ref 136–145)
WBC # BLD AUTO: 5.7 X10*3/UL (ref 4.4–11.3)

## 2023-12-25 PROCEDURE — 2500000001 HC RX 250 WO HCPCS SELF ADMINISTERED DRUGS (ALT 637 FOR MEDICARE OP)

## 2023-12-25 PROCEDURE — 1100000001 HC PRIVATE ROOM DAILY

## 2023-12-25 PROCEDURE — 2500000004 HC RX 250 GENERAL PHARMACY W/ HCPCS (ALT 636 FOR OP/ED)

## 2023-12-25 PROCEDURE — 99222 1ST HOSP IP/OBS MODERATE 55: CPT | Performed by: PSYCHIATRY & NEUROLOGY

## 2023-12-25 PROCEDURE — 84484 ASSAY OF TROPONIN QUANT: CPT | Performed by: EMERGENCY MEDICINE

## 2023-12-25 PROCEDURE — 85027 COMPLETE CBC AUTOMATED: CPT

## 2023-12-25 PROCEDURE — 96374 THER/PROPH/DIAG INJ IV PUSH: CPT

## 2023-12-25 PROCEDURE — 80048 BASIC METABOLIC PNL TOTAL CA: CPT

## 2023-12-25 PROCEDURE — 36415 COLL VENOUS BLD VENIPUNCTURE: CPT | Performed by: EMERGENCY MEDICINE

## 2023-12-25 PROCEDURE — 99232 SBSQ HOSP IP/OBS MODERATE 35: CPT

## 2023-12-25 PROCEDURE — 82077 ASSAY SPEC XCP UR&BREATH IA: CPT

## 2023-12-25 PROCEDURE — 36415 COLL VENOUS BLD VENIPUNCTURE: CPT

## 2023-12-25 PROCEDURE — 2500000004 HC RX 250 GENERAL PHARMACY W/ HCPCS (ALT 636 FOR OP/ED): Mod: SE | Performed by: EMERGENCY MEDICINE

## 2023-12-25 PROCEDURE — 2500000001 HC RX 250 WO HCPCS SELF ADMINISTERED DRUGS (ALT 637 FOR MEDICARE OP): Mod: SE | Performed by: EMERGENCY MEDICINE

## 2023-12-25 PROCEDURE — 96372 THER/PROPH/DIAG INJ SC/IM: CPT | Mod: 59

## 2023-12-25 RX ORDER — ACETAMINOPHEN 500 MG
5 TABLET ORAL NIGHTLY PRN
Status: DISCONTINUED | OUTPATIENT
Start: 2023-12-25 | End: 2023-12-28 | Stop reason: HOSPADM

## 2023-12-25 RX ORDER — FUROSEMIDE 10 MG/ML
40 INJECTION INTRAMUSCULAR; INTRAVENOUS ONCE
Status: DISCONTINUED | OUTPATIENT
Start: 2023-12-25 | End: 2023-12-27

## 2023-12-25 RX ORDER — ASPIRIN 81 MG/1
81 TABLET ORAL DAILY
Status: DISCONTINUED | OUTPATIENT
Start: 2023-12-25 | End: 2023-12-28 | Stop reason: HOSPADM

## 2023-12-25 RX ORDER — CARVEDILOL 6.25 MG/1
6.25 TABLET ORAL 2 TIMES DAILY
Status: DISCONTINUED | OUTPATIENT
Start: 2023-12-25 | End: 2023-12-28 | Stop reason: HOSPADM

## 2023-12-25 RX ORDER — ATORVASTATIN CALCIUM 80 MG/1
80 TABLET, FILM COATED ORAL DAILY
Status: DISCONTINUED | OUTPATIENT
Start: 2023-12-25 | End: 2023-12-28 | Stop reason: HOSPADM

## 2023-12-25 RX ORDER — SERTRALINE HYDROCHLORIDE 50 MG/1
50 TABLET, FILM COATED ORAL DAILY
Status: DISCONTINUED | OUTPATIENT
Start: 2023-12-25 | End: 2023-12-28 | Stop reason: HOSPADM

## 2023-12-25 RX ORDER — ENOXAPARIN SODIUM 100 MG/ML
40 INJECTION SUBCUTANEOUS EVERY 24 HOURS
Status: DISCONTINUED | OUTPATIENT
Start: 2023-12-25 | End: 2023-12-28 | Stop reason: HOSPADM

## 2023-12-25 RX ORDER — TORSEMIDE 20 MG/1
50 TABLET ORAL DAILY
Status: DISCONTINUED | OUTPATIENT
Start: 2023-12-25 | End: 2023-12-28 | Stop reason: HOSPADM

## 2023-12-25 RX ORDER — POLYETHYLENE GLYCOL 3350 17 G/17G
17 POWDER, FOR SOLUTION ORAL DAILY PRN
Status: DISCONTINUED | OUTPATIENT
Start: 2023-12-25 | End: 2023-12-28 | Stop reason: HOSPADM

## 2023-12-25 RX ORDER — SPIRONOLACTONE 25 MG/1
12.5 TABLET ORAL DAILY
Status: DISCONTINUED | OUTPATIENT
Start: 2023-12-25 | End: 2023-12-28 | Stop reason: HOSPADM

## 2023-12-25 RX ADMIN — ATORVASTATIN CALCIUM 80 MG: 80 TABLET, FILM COATED ORAL at 20:45

## 2023-12-25 RX ADMIN — CARVEDILOL 6.25 MG: 6.25 TABLET, FILM COATED ORAL at 09:44

## 2023-12-25 RX ADMIN — CARVEDILOL 6.25 MG: 6.25 TABLET, FILM COATED ORAL at 03:18

## 2023-12-25 RX ADMIN — SACUBITRIL AND VALSARTAN 1 TABLET: 24; 26 TABLET, FILM COATED ORAL at 09:44

## 2023-12-25 RX ADMIN — ASPIRIN 81 MG CHEWABLE TABLET 324 MG: 81 TABLET CHEWABLE at 00:47

## 2023-12-25 RX ADMIN — FUROSEMIDE 40 MG: 10 INJECTION, SOLUTION INTRAVENOUS at 00:47

## 2023-12-25 RX ADMIN — CARVEDILOL 6.25 MG: 6.25 TABLET, FILM COATED ORAL at 20:46

## 2023-12-25 RX ADMIN — SERTRALINE HYDROCHLORIDE 50 MG: 50 TABLET ORAL at 09:45

## 2023-12-25 RX ADMIN — SPIRONOLACTONE 12.5 MG: 25 TABLET, FILM COATED ORAL at 09:44

## 2023-12-25 RX ADMIN — SACUBITRIL AND VALSARTAN 1 TABLET: 24; 26 TABLET, FILM COATED ORAL at 20:46

## 2023-12-25 RX ADMIN — SACUBITRIL AND VALSARTAN 1 TABLET: 24; 26 TABLET, FILM COATED ORAL at 03:18

## 2023-12-25 RX ADMIN — ENOXAPARIN SODIUM 40 MG: 100 INJECTION SUBCUTANEOUS at 03:23

## 2023-12-25 RX ADMIN — ATORVASTATIN CALCIUM 80 MG: 80 TABLET, FILM COATED ORAL at 03:18

## 2023-12-25 SDOH — ECONOMIC STABILITY: INCOME INSECURITY: IN THE LAST 12 MONTHS, WAS THERE A TIME WHEN YOU WERE NOT ABLE TO PAY THE MORTGAGE OR RENT ON TIME?: YES

## 2023-12-25 SDOH — SOCIAL STABILITY: SOCIAL INSECURITY: WITHIN THE LAST YEAR, HAVE YOU BEEN AFRAID OF YOUR PARTNER OR EX-PARTNER?: NO

## 2023-12-25 SDOH — SOCIAL STABILITY: SOCIAL NETWORK: HOW OFTEN DO YOU GET TOGETHER WITH FRIENDS OR RELATIVES?: NEVER

## 2023-12-25 SDOH — SOCIAL STABILITY: SOCIAL NETWORK: IN A TYPICAL WEEK, HOW MANY TIMES DO YOU TALK ON THE PHONE WITH FAMILY, FRIENDS, OR NEIGHBORS?: NEVER

## 2023-12-25 SDOH — SOCIAL STABILITY: SOCIAL INSECURITY: ABUSE: ADULT

## 2023-12-25 SDOH — SOCIAL STABILITY: SOCIAL INSECURITY: ARE THERE ANY APPARENT SIGNS OF INJURIES/BEHAVIORS THAT COULD BE RELATED TO ABUSE/NEGLECT?: UNABLE TO ASSESS

## 2023-12-25 SDOH — ECONOMIC STABILITY: FOOD INSECURITY: WITHIN THE PAST 12 MONTHS, YOU WORRIED THAT YOUR FOOD WOULD RUN OUT BEFORE YOU GOT MONEY TO BUY MORE.: OFTEN TRUE

## 2023-12-25 SDOH — SOCIAL STABILITY: SOCIAL INSECURITY: WITHIN THE LAST YEAR, HAVE YOU BEEN HUMILIATED OR EMOTIONALLY ABUSED IN OTHER WAYS BY YOUR PARTNER OR EX-PARTNER?: NO

## 2023-12-25 SDOH — ECONOMIC STABILITY: INCOME INSECURITY: HOW HARD IS IT FOR YOU TO PAY FOR THE VERY BASICS LIKE FOOD, HOUSING, MEDICAL CARE, AND HEATING?: VERY HARD

## 2023-12-25 SDOH — SOCIAL STABILITY: SOCIAL NETWORK: HOW OFTEN DO YOU ATTENT MEETINGS OF THE CLUB OR ORGANIZATION YOU BELONG TO?: NEVER

## 2023-12-25 SDOH — SOCIAL STABILITY: SOCIAL NETWORK: HOW OFTEN DO YOU ATTEND CHURCH OR RELIGIOUS SERVICES?: NEVER

## 2023-12-25 SDOH — SOCIAL STABILITY: SOCIAL INSECURITY: WERE YOU ABLE TO COMPLETE ALL THE BEHAVIORAL HEALTH SCREENINGS?: YES

## 2023-12-25 SDOH — HEALTH STABILITY: PHYSICAL HEALTH: ON AVERAGE, HOW MANY DAYS PER WEEK DO YOU ENGAGE IN MODERATE TO STRENUOUS EXERCISE (LIKE A BRISK WALK)?: 5 DAYS

## 2023-12-25 SDOH — SOCIAL STABILITY: SOCIAL INSECURITY: DOES ANYONE TRY TO KEEP YOU FROM HAVING/CONTACTING OTHER FRIENDS OR DOING THINGS OUTSIDE YOUR HOME?: UNABLE TO ASSESS

## 2023-12-25 SDOH — SOCIAL STABILITY: SOCIAL INSECURITY: ARE YOU OR HAVE YOU BEEN THREATENED OR ABUSED PHYSICALLY, EMOTIONALLY, OR SEXUALLY BY ANYONE?: UNABLE TO ASSESS

## 2023-12-25 SDOH — ECONOMIC STABILITY: HOUSING INSECURITY: IN THE LAST 12 MONTHS, HOW MANY PLACES HAVE YOU LIVED?: 3

## 2023-12-25 SDOH — SOCIAL STABILITY: SOCIAL NETWORK: ARE YOU MARRIED, WIDOWED, DIVORCED, SEPARATED, NEVER MARRIED, OR LIVING WITH A PARTNER?: DIVORCED

## 2023-12-25 SDOH — ECONOMIC STABILITY: FOOD INSECURITY: WITHIN THE PAST 12 MONTHS, THE FOOD YOU BOUGHT JUST DIDN'T LAST AND YOU DIDN'T HAVE MONEY TO GET MORE.: OFTEN TRUE

## 2023-12-25 SDOH — SOCIAL STABILITY: SOCIAL INSECURITY: HAVE YOU HAD THOUGHTS OF HARMING ANYONE ELSE?: NO

## 2023-12-25 SDOH — SOCIAL STABILITY: SOCIAL INSECURITY: DO YOU FEEL UNSAFE GOING BACK TO THE PLACE WHERE YOU ARE LIVING?: UNABLE TO ASSESS

## 2023-12-25 SDOH — HEALTH STABILITY: PHYSICAL HEALTH: ON AVERAGE, HOW MANY MINUTES DO YOU ENGAGE IN EXERCISE AT THIS LEVEL?: 60 MIN

## 2023-12-25 SDOH — SOCIAL STABILITY: SOCIAL INSECURITY: HAS ANYONE EVER THREATENED TO HURT YOUR FAMILY OR YOUR PETS?: UNABLE TO ASSESS

## 2023-12-25 SDOH — SOCIAL STABILITY: SOCIAL INSECURITY: DO YOU FEEL ANYONE HAS EXPLOITED OR TAKEN ADVANTAGE OF YOU FINANCIALLY OR OF YOUR PERSONAL PROPERTY?: UNABLE TO ASSESS

## 2023-12-25 ASSESSMENT — COGNITIVE AND FUNCTIONAL STATUS - GENERAL
DAILY ACTIVITIY SCORE: 24
MOBILITY SCORE: 23
CLIMB 3 TO 5 STEPS WITH RAILING: A LITTLE
PATIENT BASELINE BEDBOUND: NO
CLIMB 3 TO 5 STEPS WITH RAILING: A LITTLE
MOBILITY SCORE: 23
CLIMB 3 TO 5 STEPS WITH RAILING: A LITTLE
MOBILITY SCORE: 23
CLIMB 3 TO 5 STEPS WITH RAILING: A LITTLE
MOBILITY SCORE: 23

## 2023-12-25 ASSESSMENT — COLUMBIA-SUICIDE SEVERITY RATING SCALE - C-SSRS
5. HAVE YOU STARTED TO WORK OUT OR WORKED OUT THE DETAILS OF HOW TO KILL YOURSELF? DO YOU INTEND TO CARRY OUT THIS PLAN?: YES
2. HAVE YOU ACTUALLY HAD ANY THOUGHTS OF KILLING YOURSELF?: YES
6. HAVE YOU EVER DONE ANYTHING, STARTED TO DO ANYTHING, OR PREPARED TO DO ANYTHING TO END YOUR LIFE?: YES
6. HAVE YOU EVER DONE ANYTHING, STARTED TO DO ANYTHING, OR PREPARED TO DO ANYTHING TO END YOUR LIFE?: YES
1. IN THE PAST MONTH, HAVE YOU WISHED YOU WERE DEAD OR WISHED YOU COULD GO TO SLEEP AND NOT WAKE UP?: NO
4. HAVE YOU HAD THESE THOUGHTS AND HAD SOME INTENTION OF ACTING ON THEM?: YES

## 2023-12-25 ASSESSMENT — ACTIVITIES OF DAILY LIVING (ADL)
BATHING: INDEPENDENT
PATIENT'S MEMORY ADEQUATE TO SAFELY COMPLETE DAILY ACTIVITIES?: YES
HEARING - RIGHT EAR: FUNCTIONAL
JUDGMENT_ADEQUATE_SAFELY_COMPLETE_DAILY_ACTIVITIES: YES
ADEQUATE_TO_COMPLETE_ADL: YES
GROOMING: INDEPENDENT
TOILETING: INDEPENDENT
LACK_OF_TRANSPORTATION: YES
DRESSING YOURSELF: INDEPENDENT
FEEDING YOURSELF: INDEPENDENT
HEARING - LEFT EAR: FUNCTIONAL
WALKS IN HOME: INDEPENDENT

## 2023-12-25 ASSESSMENT — LIFESTYLE VARIABLES
HOW MANY STANDARD DRINKS CONTAINING ALCOHOL DO YOU HAVE ON A TYPICAL DAY: PATIENT DOES NOT DRINK
HOW OFTEN DO YOU HAVE A DRINK CONTAINING ALCOHOL: NEVER
PRESCIPTION_ABUSE_PAST_12_MONTHS: YES
EVER FELT BAD OR GUILTY ABOUT YOUR DRINKING: NO
HAVE YOU EVER FELT YOU SHOULD CUT DOWN ON YOUR DRINKING: NO
REASON UNABLE TO ASSESS: NO
HAVE PEOPLE ANNOYED YOU BY CRITICIZING YOUR DRINKING: NO
HOW OFTEN DO YOU HAVE 6 OR MORE DRINKS ON ONE OCCASION: NEVER
EVER HAD A DRINK FIRST THING IN THE MORNING TO STEADY YOUR NERVES TO GET RID OF A HANGOVER: NO
AUDIT-C TOTAL SCORE: 0
SUBSTANCE_ABUSE_PAST_12_MONTHS: YES
AUDIT-C TOTAL SCORE: 0
SKIP TO QUESTIONS 9-10: 1

## 2023-12-25 ASSESSMENT — PATIENT HEALTH QUESTIONNAIRE - PHQ9
SUM OF ALL RESPONSES TO PHQ9 QUESTIONS 1 & 2: 3
1. LITTLE INTEREST OR PLEASURE IN DOING THINGS: SEVERAL DAYS
2. FEELING DOWN, DEPRESSED OR HOPELESS: MORE THAN HALF THE DAYS

## 2023-12-25 ASSESSMENT — PAIN - FUNCTIONAL ASSESSMENT
PAIN_FUNCTIONAL_ASSESSMENT: 0-10

## 2023-12-25 ASSESSMENT — PAIN SCALES - GENERAL
PAINLEVEL_OUTOF10: 0 - NO PAIN

## 2023-12-25 NOTE — H&P
"History Of Present Illness  Leonel Yu is a 52 y/o M with PMHx HTN, HFrEF (EF 15-20% in 7/2023), substance use disorder (tobacco, crack cocaine), CKD, suspected COPD w/Emphysema (no PFTs  on file)who presented to ED with SI.  Patient states he needs to speak with a psychiatrist.  Has had ongoing thoughts of suicidal ideation, with plan to shoot himself in the head with a gun.  Smoked crack today to prevent him from doing so.  States he uses crack as a way to \"escape his emotional pain\".  Is unsure whether he takes any medication for depression.  States he picks up his medications from the pharmacy and does not ask what they are.  Stopped taking all of his medications a few days ago regardless.  States he gets into episodes where he just does not care and stops taking his medication.  Does note shortness of breath.  Has not been taking home torsemide.    ED Course:     Vitals:     T 36.7, , RR 18, /86, SaO2 96%     Labs:   CBC: WBC 8.3, Hgb 16.5, Plt 164   BMP: Na 138, K 4.4, Cl 108, CO2 22, BUN 22, Cr 1.48   Calcium 8.8   AST 19, ALT 24, alk phos 68, t. bili 0.5, t. protein 6.8, albumin 3.5   Troponin 109   BNP 1425   Urine tox positive for cocaine     Imaging:     CXR   IMPRESSION:  1. Stable severe cardiomegaly and/or pericardial effusion.  2. Mild perihilar interstitial edema. Please correlate for CHF    Interventions:   Lasix 40 IV        Home meds:   ASA 81  Atorva 40   Carvedilol 6.25 BID   Dicyclomine 10mg prn   Empagliflozin 10mg daily   Melatonin  Entresto 24-26mg   Sertraline 50mg daily   Eldon 12.5mg daily   Tiotropiom inhaler  Torsemide 50 daily, 100mg if weight gain      Past Medical History  History reviewed. No pertinent past medical history.    Surgical History  History reviewed. No pertinent surgical history.     Social History  He reports that he has never smoked. He has never used smokeless tobacco. He reports current drug use. Drug: \"Crack\" cocaine. He reports that he does " "not drink alcohol.    Family History  No family history on file.     Allergies  Patient has no known allergies.    Review of Systems  ROS as per HPI, otherwise reviewed and negative.     Physical Exam  General: pleasant, in NAD  HEENT: normocephalic, atraumatic  CV: heart regular rate and rhythm w/o murmurs  Resp: lungs with mild crackles at the bases, R > L  Abd: soft, nondistended, nontender  Ext: no peripheral edema  Neuro: grossly nonfocal, speech clear  Psych: endorses SI     Last Recorded Vitals  Blood pressure (!) 128/91, pulse 87, temperature 36.7 °C (98 °F), temperature source Oral, resp. rate 18, height 1.778 m (5' 10\"), weight 72.5 kg (159 lb 13.3 oz), SpO2 95 %.    Relevant Results  See HPI section.     Assessment/Plan   Principal Problem:    Precordial pain    Leonel Yu is a 52 y/o M with PMHx HTN, HFrEF (EF 15-20% in 7/2023), substance use disorder (tobacco, crack cocaine), CKD, suspected COPD w/Emphysema (no PFTs  on file)who presented to ED with SI, shortness of breath in the setting of not taking medications for the last couple of days.  On exam patient has mild bibasilar crackles and chest x-ray with mild perihilar edema.  Per last discharge summary, dry weight is 73 kg.  Today weight is 72.5 kg BNP is over 1000, however it was over 3000 on recent admission for heart failure exacerbation.  Patient also does not have increasing oxygen requirement.  Will start him back on his home medications and plan for psychiatry consult in the morning.    #SI  - consult Psych in AM  - home sertraline 50     #hx of HFrEF  :: EF 15-20% in 7/2023   - home regimen is torsemide 50mg daily, increase to 100mg for weight gain   - pt has not been taking for last few days -- will restart at 50mg daily given that pt is at dry weight and no increased O2 requirement   - continue home carvedilol, Entresto, ASA, atorvastatin     #hx of substance use disorder  - consider chemical dependency consult in AM   - urine tox " positive for cocaine     F: prn   E: prn   N: regular  GI: none  DVT: Lovenox     CODE STATUS: PRESUMED FULL CODE               Lis Godfrey MD

## 2023-12-25 NOTE — ED TRIAGE NOTES
Pt came in via [facility] for c/o SOB and SI pt states that about an hour ago he smoked crack to make him not feel anything anymore. Pt also endorses not taking his medications for the past week.

## 2023-12-25 NOTE — CARE PLAN
Pt admitted to [facility] from ED during shift. Pt remained HDS and free of injury. Pt has sitter at bedside - awaiting psychiatry consult in am.     Problem: Risk for Suicide  Goal: Accepts medications as prescribed/needed this shift  12/25/2023 0502 by Chen Alamo RN  Outcome: Progressing  12/25/2023 0502 by Chen Alamo RN  Outcome: Progressing     Problem: Risk for Suicide  Goal: Identifies supports this shift  12/25/2023 0502 by Chen Alamo RN  Outcome: Progressing  12/25/2023 0502 by Chen Alamo RN  Outcome: Progressing     Problem: Risk for Suicide  Goal: Makes needs known through verbalization or behaviors this shift  12/25/2023 0502 by Chen Alamo RN  Outcome: Progressing  12/25/2023 0502 by Chen Alamo RN  Outcome: Progressing     Problem: Risk for Suicide  Goal: No self harm this shift  12/25/2023 0502 by Chen Alamo RN  Outcome: Progressing  12/25/2023 0502 by Chen Alamo RN  Outcome: Progressing     Problem: Risk for Suicide  Goal: Read Safety Guidelines this shift  12/25/2023 0502 by Chen Alamo RN  Outcome: Progressing  12/25/2023 0502 by Chen Alamo RN  Outcome: Progressing     Problem: Pain  Goal: My pain/discomfort is manageable  Outcome: Progressing     Problem: Safety  Goal: Patient will be injury free during hospitalization  Outcome: Progressing     Problem: Safety  Goal: I will remain free of falls  Outcome: Progressing     Problem: Daily Care  Goal: Daily care needs are met  Outcome: Progressing

## 2023-12-25 NOTE — ED PROVIDER NOTES
"CC: No chief complaint on file.     HPI:  This patient is a 53-year-old male with history of cardiomyopathy and heart failure who presents to the emergency department with shortness of breath and suicidal ideation.  According to EMS when they picked up the patient he was tripoding, wheezing, and significant distress.  They administered a DuoNeb's and the patient had significant improvement in his symptoms.  Upon obtaining oxygen saturation at this time he was satting in the well 90s.  Upon further evaluation he endorse suicidal ideation.  Upon my evaluation in the emergency department patient states he has a history of an enlarged heart and has not been taking any of his medications.  He smoked crack 2 hours ago today which he states he does on purpose to \"blow out his heart.\"  States he has had thoughts of harming himself for a while.  He endorses a plan but states \"I cannot tell you that.\"  States he has been  for 30 years but the only reason why he does not kill himself is because he does not want his wife to get anything.  He he states \"I have some unfinished business before I leave this earth.\"  He denies any shortness of breath or chest pain at this time.  He denies any abdominal pain.  He denies nausea vomiting and has been eating okay.    Limitations to history: None  Independent historian(s): Patient  Records Reviewed: Recent available ED and inpatient notes reviewed in EMR.    PMHx/PSHx:  Per HPI.   - has no past medical history on file.  - has no past surgical history on file.    Medications:  Reviewed in EMR. See EMR for complete list of medications and doses.    Allergies:  Patient has no known allergies.    Social History:  - Tobacco:  reports that he has never smoked. He has never used smokeless tobacco.   - Alcohol:  reports no history of alcohol use.   - Illicit Drugs:  reports current drug use. Drug: \"Crack\" cocaine.     ROS:  Per HPI. "       ???????????????????????????????????????????????????????????????  Triage Vitals:  T 36.7 °C (98 °F)    /86  RR 18  O2 96 %      Physical Exam    General: Patient resting comfortably in bed, no acute distress, breathing easily, well appearing, and appropriately conversational without confusion or gross mental status changes.  Head: Normocephalic. Atraumatic.  Neck:  FROM. No gross masses.   Eyes: EOMI. No scleral icterus or injection.  ENT: Moist mucous membranes, no apparent trauma or lesions.  CV: Regular rhythm. No murmurs, rubs, gallops appreciated. 2+ radial pulses bilaterally.  Resp: Mild wheezing bilaterally. No respiratory distress.   GI: Soft, non-distended.  No tenderness with palpation.     EXT: No peripheral edema, contusions, or wounds.  Skin: Warm and dry, no rashes or lesions.  Neuro: Alert and oriented.  No focal neurological deficits.  Motor and sensation intact throughout.  Speech fluent.    ???????????????????????????????????????????????????????????????  Labs:   Labs Reviewed   CBC WITH AUTO DIFFERENTIAL   COMPREHENSIVE METABOLIC PANEL   DRUG SCREEN,URINE   ACUTE TOXICOLOGY PANEL, BLOOD   MAGNESIUM        Imaging:   XR chest 1 view    (Results Pending)        EKG:  Obtained however unfortunately unavailable on chart review.     MDM:  This patient is a 53-year-old male who presents to the emergency department with shortness of breath and suicidal ideation.  The patient is hemodynamically stable and in no respiratory distress on arrival.  His vital signs are within normal limits.  His physical exam shows mild wheezing however is otherwise unremarkable.  Differential diagnosis for his presentation includes possible flash pulmonary edema due to acute cocaine intoxication, asthma or COPD exacerbation, congestive heart failure exacerbation, ACS.  Furthermore patient is endorsing suicidal ideation with a plan and will require psychiatric evaluation after being medically cleared.  Labs  and imaging obtained, and the patient is given a DuoNeb treatment initially.  There is no leukocytosis or anemia on CBC.  Metabolic panel showing decreased renal function with a creatinine of 1.48 which appears to be consistent with the patient's baseline on prior visits.  Troponins and BNP added on.  Acute tox panel is negative.  Urine drug screen ordered.  At this time the patient will be handed off to the oncoming emergency department provider pending results of complete workup.  Anticipate patient may require [facility] services due to his medical presentation in addition to his suicidal ideation.    ED Course:  Diagnoses as of 12/26/23 1214   Precordial pain   Shortness of breath   Suicidal ideation       Social Determinants Limiting Care:  Mental health issues    Disposition:  Handoff    Blu Javier DO   Emergency Medicine PGY-2  [facility]      Procedures ? SmartLinks last updated 12/24/2023 7:51 PM        Blu Javier DO  Resident  12/26/23 1222

## 2023-12-25 NOTE — PROGRESS NOTES
"Leonel Yu is a 53 y.o. male on day 0 of admission presenting with Precordial pain.    Subjective   No acute events overnight. Patient denied, nausea, vomiting, fever, and cough.          Objective     Physical Exam    Physical Exam  General: pleasant, in NAD  HEENT: normocephalic, atraumatic  CV: heart regular rate and rhythm w/o murmurs  Resp: lungs with mild crackles at the bases, R > L  Abd: soft, nondistended, nontender  Ext: no peripheral edema  Neuro: grossly nonfocal, speech clear  Psych: endorses SI  Last Recorded Vitals  Blood pressure 115/80, pulse 90, temperature 37.1 °C (98.8 °F), temperature source Temporal, resp. rate 17, height 1.778 m (5' 10\"), weight 74.8 kg (164 lb 14.5 oz), SpO2 96 %.  Intake/Output last 3 Shifts:  I/O last 3 completed shifts:  In: - (0 mL/kg)   Out: 1475 (19.7 mL/kg) [Urine:1475 (0.5 mL/kg/hr)]  Weight: 74.8 kg     Relevant Results                  Active Medications  Scheduled medications  aspirin, 81 mg, oral, Daily  atorvastatin, 80 mg, oral, Daily  carvedilol, 6.25 mg, oral, BID  enoxaparin, 40 mg, subcutaneous, q24h  sacubitriL-valsartan, 1 tablet, oral, BID  sertraline, 50 mg, oral, Daily  spironolactone, 12.5 mg, oral, Daily  tiotropium, 2 Inhalation, inhalation, Daily  torsemide, 50 mg, oral, Daily      Continuous medications     PRN medications  PRN medications: melatonin, polyethylene glycol     Recent Labs  Results for orders placed or performed during the hospital encounter of 12/24/23 (from the past 24 hour(s))   Red Top   Result Value Ref Range    Extra Tube Hold for add-ons.    CBC and Auto Differential   Result Value Ref Range    WBC 8.3 4.4 - 11.3 x10*3/uL    nRBC 0.0 0.0 - 0.0 /100 WBCs    RBC 5.23 4.50 - 5.90 x10*6/uL    Hemoglobin 16.5 13.5 - 17.5 g/dL    Hematocrit 47.1 41.0 - 52.0 %    MCV 90 80 - 100 fL    MCH 31.5 26.0 - 34.0 pg    MCHC 35.0 32.0 - 36.0 g/dL    RDW 13.2 11.5 - 14.5 %    Platelets 164 150 - 450 x10*3/uL    Neutrophils % 80.9 40.0 - " 80.0 %    Immature Granulocytes %, Automated 0.2 0.0 - 0.9 %    Lymphocytes % 12.3 13.0 - 44.0 %    Monocytes % 5.2 2.0 - 10.0 %    Eosinophils % 0.7 0.0 - 6.0 %    Basophils % 0.7 0.0 - 2.0 %    Neutrophils Absolute 6.68 1.20 - 7.70 x10*3/uL    Immature Granulocytes Absolute, Automated 0.02 0.00 - 0.70 x10*3/uL    Lymphocytes Absolute 1.02 (L) 1.20 - 4.80 x10*3/uL    Monocytes Absolute 0.43 0.10 - 1.00 x10*3/uL    Eosinophils Absolute 0.06 0.00 - 0.70 x10*3/uL    Basophils Absolute 0.06 0.00 - 0.10 x10*3/uL   Comprehensive Metabolic Panel   Result Value Ref Range    Glucose 152 (H) 74 - 99 mg/dL    Sodium 138 136 - 145 mmol/L    Potassium 4.4 3.5 - 5.3 mmol/L    Chloride 108 (H) 98 - 107 mmol/L    Bicarbonate 22 21 - 32 mmol/L    Anion Gap 12 10 - 20 mmol/L    Urea Nitrogen 22 6 - 23 mg/dL    Creatinine 1.48 (H) 0.50 - 1.30 mg/dL    eGFR 56 (L) >60 mL/min/1.73m*2    Calcium 8.8 8.6 - 10.6 mg/dL    Albumin 3.5 3.4 - 5.0 g/dL    Alkaline Phosphatase 68 33 - 120 U/L    Total Protein 6.8 6.4 - 8.2 g/dL    AST 19 9 - 39 U/L    Bilirubin, Total 0.5 0.0 - 1.2 mg/dL    ALT 24 10 - 52 U/L   Acute Toxicology Panel, Blood   Result Value Ref Range    Acetaminophen <10.0 10.0 - 30.0 ug/mL    Salicylate  <3 4 - 20 mg/dL    Alcohol <10 <=10 mg/dL   Magnesium   Result Value Ref Range    Magnesium 2.00 1.60 - 2.40 mg/dL   Sars-CoV-2 PCR, Screen Asymptomatic   Result Value Ref Range    Coronavirus 2019, PCR Not Detected Not Detected   B-type natriuretic peptide   Result Value Ref Range    BNP 1,425 (H) 0 - 99 pg/mL   Troponin I, High Sensitivity, Initial   Result Value Ref Range    Troponin I, High Sensitivity 109 (H) 0 - 53 ng/L   Drug Screen, Urine   Result Value Ref Range    Amphetamine Screen, Urine Presumptive Negative Presumptive Negative    Barbiturate Screen, Urine Presumptive Negative Presumptive Negative    Benzodiazepines Screen, Urine Presumptive Negative Presumptive Negative    Cannabinoid Screen, Urine Presumptive  Negative Presumptive Negative    Cocaine Metabolite Screen, Urine Presumptive Positive (A) Presumptive Negative    Fentanyl Screen, Urine Presumptive Negative Presumptive Negative    Opiate Screen, Urine Presumptive Negative Presumptive Negative    Oxycodone Screen, Urine Presumptive Negative Presumptive Negative    PCP Screen, Urine Presumptive Negative Presumptive Negative   Troponin, High Sensitivity, 1 Hour   Result Value Ref Range    Troponin I, High Sensitivity 103 (H) 0 - 53 ng/L       Imaging  XR chest 1 view    Result Date: 12/24/2023  Interpreted By:  Elaina Reynoso,  and Estevan Avila STUDY: XR CHEST 1 VIEW;  12/24/2023 8:14 pm   INDICATION: Signs/Symptoms:short of breath, wheezing, suspect copd.   COMPARISON: Chest radiograph dated 12/11/2023   ACCESSION NUMBER(S): [id number]   ORDERING CLINICIAN: KIMBERLY SAUCEDO   FINDINGS: AP radiograph of the chest is provided.   CARDIOMEDIASTINAL SILHOUETTE: Cardiomediastinal silhouette is severely enlarged, however similar to prior.   LUNGS: There is mild perihilar interstitial prominence, stable. Emphysematous changes are again visualized with the left upper lobe medial bulla. No consolidation, pleural effusion or pneumothorax.   ABDOMEN: No remarkable upper abdominal findings.   BONES: No acute osseous abnormality.       1. Stable severe cardiomegaly and/or pericardial effusion. 2. Mild perihilar interstitial edema. Please correlate for CHF   I personally reviewed the images/study and I agree with the findings as stated. This study was interpreted at [facility], [place], [place].   Signed by: Elaina Reynoso 12/24/2023 9:10 PM Dictation workstation:   [id number]                  Assessment/Plan   Principal Problem:    Precordial pain    Leonel Yu is a 54 y/o M with PMHx HTN, HFrEF (EF 15-20% in 7/2023), substance use disorder (tobacco, crack cocaine), CKD, suspected COPD w/Emphysema (no PFTs  on file)who presented to ED  with SI, shortness of breath in the setting of not taking medications for the last couple of days.  On exam patient has mild bibasilar crackles and chest x-ray with mild perihilar edema.  Per last discharge summary, dry weight is 73 kg.  Today weight is 72.5 kg BNP is over 1000, however it was over 3000 on recent admission for heart failure exacerbation.  Patient also does not have increasing oxygen requirement.  was started on home medications and psych was consulted for SI.    Updates:  - s/p lasix 40mg IV Lasix     #SI  - Psych consulted  - Social work consulted  - cw home sertraline 50   - suicide precauttions     #hx of HFrEF  :: EF 15-20% in 7/2023   :: no ischemic workup undergone  :: Substance (crack cocaine abuse)  - home regimen is torsemide 50mg daily, increase to 100mg for weight gain   - pt has not been taking for last few days -- will restart at 50mg daily given that pt is at dry weight and no increased O2 requirement   - continue home carvedilol, Entresto, ASA, atorvastatin      #hx of substance use disorder  - consider chemical dependency consult in AM   - urine tox positive for cocaine      F: prn   E: prn   N: regular  GI: none  DVT: Lovenox      CODE STATUS: PRESUMED FULL CODE           Yvan Ordaz MD

## 2023-12-25 NOTE — CONSULTS
"Psychiatry Consult-Liaison Initial Note    Inpatient consult to Psychiatry  Consult performed by: Atif Barron MD  Consult ordered by: Neo Ferguson MD  Reason for consult: SI  Assessment/Recommendations: See assessment/recommendations below        HISTORY OF PRESENT ILLNESS:     Leonel Yu is a 54 YO male with PMHx of HTN, HFrEF (10-15% 3/2023), substance use disorder (tobacco, crack cocaine), CKD, suspected COPD w/Emphysema (no PFTs on file); PPH of MDD/RADHA with previous admissions and history of medication non-compliance. Patient presented to ED with SOB and SI in setting of crack cocaine use. CL consulted for SI    On chart review, patient was recently admitted to medicine for similar presentation of SOB in setting of CHF exacerbation and SI, dc on 12/14/2023. CL was consulted and started patient on Zoloft 50mg QD. Labs significant for Hb 18.5, Cr 1.48 (stable), Glu 152, eGFR 56, trop 103, BNP 1425, utox +cocaine. EKG pending, VSS.     On interview, patient was drowsy and was nodding off throughout the interview, but was able to answer simple questions. He reported that he called 911 as he was having difficulty breathing s/p crack cocaine use, which is also complicated by Si with intent but no plan, and AVH. He was unable to discuss what the extent of SI/AVH, triggers, or further details. Patient was unsure about medication adherence since discharge on 12/14, and might not be taking both his medical/psych meds as prescribed.     At time of assessment, patient reports \"fine\" mood, and gave a drowsy smile. Denies any depression/manic like symptoms. Currently denies HI/AVH. However, continues to report intermittent SI, unable to discuss if there are intent or plan, given his drowsiness.     He reports multiple IP [facility] rehabs in the past, currently homeless, sleeping in abandon homes. Patient with food insecurity, shelter, and resources. Last use of crack cocaine yesterday, $20 worth. Denies " "alcohol, tobacco, or other illicit substances.     Past Psychiatric History - Per chart given patient was not able to discuss  \"Prior psychiatric diagnoses: Per patient, depression and anxiety.   Prior psychiatric hospitalizations: States that he had a previous psychiatric hold at [facility]  Prior evaluation by [facility] Psychiatry: 12/13/2023  Prior suicide attempts:   ~3 years ago: vague endorsement of SI by OD? (Per chart review)   - 2 years ago: he put antifreeze in his drink, patient reported   - 6 month ago: took all his prescribed pills, patient reported, noting triggering around Father's day  Access to firearms: patient denies  History of trauma/abuse: patient says he was kicked out of his house by his wife, and hasn't seen his wife or kids in 5 years  Current mental health agency: none  Current psychiatric medications: Sertraline 50 mg PO daily  Prior psychiatric medication trials/response: unknown antidepressant  Past Psychiatric Meds/Treatments/ECT: ?Prozac  Family psychiatric history: Denies\"    Substance Abuse History  Tobacco use history: Denies  Alcohol use history: Denies  Cannabis use history: Denies  Illicit Drug Use History: Crack cocaine, last use yesterday $20 worth    Prior substance use disorder treatment: per patient has previously had rehab treatment at [facility] and a facility in [place]     Social History  He reports that he has never smoked. He has never used smokeless tobacco. He reports current drug use. Drug: \"Crack\" cocaine. He reports that he does not drink alcohol.  Household: street homeless  Occupation: Unclear  Marital status:  but not living with wife for 5 years  Children: 3 adult children- thinks his children are around 30 years old, 25 years old, and 18 years old; thinks that he may have a grandchild now  Social support: states that he has very little support  Legal history: Denies outstanding charges/warrants; previous legal issue regarding his wife and children    OARRS " "REVIEW  OARRS checked: No data recorded   OARRS comments: NA    Past Medical History  See above    ALLERGIES  Patient has no known allergies.    Surgical History  He has no past surgical history on file.    FAMILY HISTORY  No family history on file.     PSYCHIATRIC REVIEW OF SYSTEMS  Depression: recurrent thoughts of death or suicidal ideation  Anxiety: negative  Sonia: negative  Psychosis: negative  Delirium: negative   Trauma: negative    OBJECTIVE:     VITALS      12/14/2023     8:35 AM 12/14/2023    11:52 AM 12/24/2023     7:07 PM 12/24/2023    10:48 PM 12/25/2023     1:09 AM 12/25/2023     2:55 AM 12/25/2023     9:31 AM   Vitals   Systolic 103 100 132 114 128 115 113   Diastolic 70 69 86 78 91 80 79   Heart Rate 72 71 100 84 87 90 87   Temp 36.4 °C (97.5 °F) 36.4 °C (97.5 °F) 36.7 °C (98 °F)   37.1 °C (98.8 °F) 36.8 °C (98.2 °F)   Resp 18 18 18 18 18 17 22   Height (in)   1.778 m (5' 10\")       Weight (lb)   159.83   164.9    BMI   22.93 kg/m2   23.66 kg/m2    BSA (m2)   1.89 m2   1.92 m2       Body mass index is 23.66 kg/m².  Facility age limit for growth %nicole is 20 years.  Wt Readings from Last 4 Encounters:   12/25/23 74.8 kg (164 lb 14.5 oz)   12/14/23 72.5 kg (159 lb 13.3 oz)   10/26/23 73.6 kg (162 lb 4.1 oz)       Mental Status Exam   Appearance: appears older than stated age, disheveled, malodorous, in hospital clothes, poor dentition   Behavior/Attitude: calm and superficially cooperative, drowsy  Motor: PMR  Speech: Soft, slurring, few word answers at a time  Mood: \"fine\"   Affect: constricted  Thought process: Goal directed  Thought content: +SI, unclear intent or plan, denies HI. -AVH  Orientation: grossly orientated  Attention/concentration: inattentive, drowsy  Language: No aphasia during conversation  Fund of knowledge: fair  Insight: Fair  Judgment: Poor    HOME MEDICATIONS  Medication Documentation Review Audit       Reviewed by Dawson Glover MD (Physician) on 12/24/23 at 2028      Medication " Order Taking? Sig Documenting Provider Last Dose Status   aspirin 81 mg EC tablet [id number]  TAKE 1 TABLET BY MOUTH ONCE DAILY DEYVI Ramos  Active   atorvastatin (Lipitor) 40 mg tablet [id number]  Take 2 tablets (80 mg) by mouth once daily. Kimmy Gauthier MD  Active   carvedilol (Coreg) 6.25 mg tablet [id number]  TAKE 1 TABLET BY MOUTH TWO TIMES A DAY DEYVI Ramos  Active   dicyclomine (Bentyl) 10 mg capsule [id number]  Take 1 capsule (10 mg) by mouth every 6 hours if needed (Abdominal discomfort). Kimmy Gauthier MD  Active   empagliflozin (Jardiance) 10 mg [id number]  TAKE 1 TABLET BY MOUTH ONCE DAILY DEYVI Ramos  Active   hydrOXYzine HCL (Atarax) 10 mg tablet [id number]  Take 1 tablet (10 mg) by mouth 1 time for 1 dose. Kimmy Gauthier MD   12/15/23 2359   melatonin 5 mg tablet [id number]  Take 1 tablet (5 mg) by mouth as needed at bedtime for sleep. Kimmy Gauthier MD  Active   polyethylene glycol (Glycolax, Miralax) 17 gram packet [id number]  Mix 17g (1 packet) in liquid and take by mouth once daily as needed (Constipation). Kimmy Gauthier MD  Active   sacubitriL-valsartan (Entresto) 24-26 mg tablet [id number]  TAKE 1 TABLET BY MOUTH TWO TIMES A DAY DEYVI Ramos  Active   sertraline (Zoloft) 50 mg tablet [id number]  TAKE 1 TABLET BY MOUTH ONCE DAILY DEYVI Ramos  Active   spironolactone (Aldactone) 25 mg tablet [id number]  TAKE 1/2 TABLET BY MOUTH ONCE DAILY DEYVI Ramos  Active   tiotropium (Spiriva Respimat) 2.5 mcg/actuation inhaler [id number]  INHALE 2 PUFFS ONCE DAILY DEYVI Ramos  Active   torsemide (Demadex) 100 mg tablet [id number]  Take 50mg (half tablet daily) for fluid retention). Please increase dosage to 100mg if you notice >2lbs weight gain in 2 days Kimmy Gauthier MD  Active                     CURRENT MEDICATIONS  Scheduled medications  aspirin, 81 mg, oral,  Daily  atorvastatin, 80 mg, oral, Daily  carvedilol, 6.25 mg, oral, BID  enoxaparin, 40 mg, subcutaneous, q24h  sacubitriL-valsartan, 1 tablet, oral, BID  sertraline, 50 mg, oral, Daily  spironolactone, 12.5 mg, oral, Daily  tiotropium, 2 Inhalation, inhalation, Daily  torsemide, 50 mg, oral, Daily        Continuous medications       PRN medications  PRN medications: melatonin, polyethylene glycol     LABS  Admission on 12/24/2023   Component Date Value Ref Range Status    WBC 12/24/2023 8.3  4.4 - 11.3 x10*3/uL Final    nRBC 12/24/2023 0.0  0.0 - 0.0 /100 WBCs Final    RBC 12/24/2023 5.23  4.50 - 5.90 x10*6/uL Final    Hemoglobin 12/24/2023 16.5  13.5 - 17.5 g/dL Final    Hematocrit 12/24/2023 47.1  41.0 - 52.0 % Final    MCV 12/24/2023 90  80 - 100 fL Final    MCH 12/24/2023 31.5  26.0 - 34.0 pg Final    MCHC 12/24/2023 35.0  32.0 - 36.0 g/dL Final    RDW 12/24/2023 13.2  11.5 - 14.5 % Final    Platelets 12/24/2023 164  150 - 450 x10*3/uL Final    Neutrophils % 12/24/2023 80.9  40.0 - 80.0 % Final    Immature Granulocytes %, Automated 12/24/2023 0.2  0.0 - 0.9 % Final    Immature Granulocyte Count (IG) includes promyelocytes, myelocytes and metamyelocytes but does not include bands. Percent differential counts (%) should be interpreted in the context of the absolute cell counts (cells/UL).    Lymphocytes % 12/24/2023 12.3  13.0 - 44.0 % Final    Monocytes % 12/24/2023 5.2  2.0 - 10.0 % Final    Eosinophils % 12/24/2023 0.7  0.0 - 6.0 % Final    Basophils % 12/24/2023 0.7  0.0 - 2.0 % Final    Neutrophils Absolute 12/24/2023 6.68  1.20 - 7.70 x10*3/uL Final    Percent differential counts (%) should be interpreted in the context of the absolute cell counts (cells/uL).    Immature Granulocytes Absolute, Au* 12/24/2023 0.02  0.00 - 0.70 x10*3/uL Final    Lymphocytes Absolute 12/24/2023 1.02 (L)  1.20 - 4.80 x10*3/uL Final    Monocytes Absolute 12/24/2023 0.43  0.10 - 1.00 x10*3/uL Final    Eosinophils Absolute  12/24/2023 0.06  0.00 - 0.70 x10*3/uL Final    Basophils Absolute 12/24/2023 0.06  0.00 - 0.10 x10*3/uL Final    Glucose 12/24/2023 152 (H)  74 - 99 mg/dL Final    Sodium 12/24/2023 138  136 - 145 mmol/L Final    Potassium 12/24/2023 4.4  3.5 - 5.3 mmol/L Final    Chloride 12/24/2023 108 (H)  98 - 107 mmol/L Final    Bicarbonate 12/24/2023 22  21 - 32 mmol/L Final    Anion Gap 12/24/2023 12  10 - 20 mmol/L Final    Urea Nitrogen 12/24/2023 22  6 - 23 mg/dL Final    Creatinine 12/24/2023 1.48 (H)  0.50 - 1.30 mg/dL Final    eGFR 12/24/2023 56 (L)  >60 mL/min/1.73m*2 Final    Calculations of estimated GFR are performed using the 2021 CKD-EPI Study Refit equation without the race variable for the IDMS-Traceable creatinine methods.  https://jasn.asnjournals.org/content/early/2021/09/22/ASN.2693776829    Calcium 12/24/2023 8.8  8.6 - 10.6 mg/dL Final    Albumin 12/24/2023 3.5  3.4 - 5.0 g/dL Final    Alkaline Phosphatase 12/24/2023 68  33 - 120 U/L Final    Total Protein 12/24/2023 6.8  6.4 - 8.2 g/dL Final    AST 12/24/2023 19  9 - 39 U/L Final    Bilirubin, Total 12/24/2023 0.5  0.0 - 1.2 mg/dL Final    ALT 12/24/2023 24  10 - 52 U/L Final    Patients treated with Sulfasalazine may generate falsely decreased results for ALT.    Amphetamine Screen, Urine 12/24/2023 Presumptive Negative  Presumptive Negative Final    CUTOFF LEVEL: 500 NG/ML   Cross-reactivity has been reported with high concentrations   of the following drugs: buproprion, chloroquine, chlorpromazine,   ephedrine, mephentermine, fenfluramine, phentermine,   phenylpropanolamine, pseudoephedrine, and propranolol.    Barbiturate Screen, Urine 12/24/2023 Presumptive Negative  Presumptive Negative Final    CUTOFF LEVEL: 200 NG/ML    Benzodiazepines Screen, Urine 12/24/2023 Presumptive Negative  Presumptive Negative Final    CUTOFF LEVEL: 200 NG/ML    Cannabinoid Screen, Urine 12/24/2023 Presumptive Negative  Presumptive Negative Final    CUTOFF LEVEL: 50 NG/ML     Cocaine Metabolite Screen, Urine 12/24/2023 Presumptive Positive (A)  Presumptive Negative Final    CUTOFF LEVEL: 150 NG/ML    Fentanyl Screen, Urine 12/24/2023 Presumptive Negative  Presumptive Negative Final    CUTOFF LEVEL: 5 NG/ML    Opiate Screen, Urine 12/24/2023 Presumptive Negative  Presumptive Negative Final    CUTOFF LEVEL: 300 NG/ML  The opiate screen does not detect fentanyl, meperidine, or   tramadol. Oxycodone is not consistently detected (refer to  Oxycodone Screen, Urine result).    Oxycodone Screen, Urine 12/24/2023 Presumptive Negative  Presumptive Negative Final    CUTOFF LEVEL: 100 NG/ML  This test will accurately detect both oxycodone and oxymorphone.    PCP Screen, Urine 12/24/2023 Presumptive Negative  Presumptive Negative Final    CUTOFF LEVEL:  25 NG/ML  Cross-reactivity has been reported with dextromethorphan.    Acetaminophen 12/24/2023 <10.0  10.0 - 30.0 ug/mL Final    Salicylate  12/24/2023 <3  4 - 20 mg/dL Final    Alcohol 12/24/2023 <10  <=10 mg/dL Final    Magnesium 12/24/2023 2.00  1.60 - 2.40 mg/dL Final    Extra Tube 12/24/2023 Hold for add-ons.   Final    Auto resulted.    Coronavirus 2019, PCR 12/24/2023 Not Detected  Not Detected Final    BNP 12/24/2023 1,425 (H)  0 - 99 pg/mL Final    Troponin I, High Sensitivity 12/24/2023 109 (H)  0 - 53 ng/L Final    Troponin I, High Sensitivity 12/25/2023 103 (H)  0 - 53 ng/L Final       IMAGING  CXR 12/24: IMPRESSION:  1. Stable severe cardiomegaly and/or pericardial effusion.  2. Mild perihilar interstitial edema. Please correlate for CHF    ASSESSMENT:     PSYCHIATRIC RISK ASSESSMENT  Violence Risk Factors:  male, substance abuse , and 1st psychiatric hospitalization by age 17   Acute Risk of Harm to Others is Considered: Low  Suicide Risk Factors: male, lives alone or lack of social support, current psychiatric illness, substance abuse , lack of treatment access, discontinuities in treatment, or recent discharge from hospital, and  "nonadherence to medication treatment for psychosis   Protective Factors:  RAKAN  Acute Risk of Harm to Self is Considered: Moderate    DIAGNOSIS  Other specified depressive disorder  Substance induced mood disorder vs MDD  Stimulant use disorder, severe, in withdrawal    IMPRESSION  Leonel Yu is a 54 YO male with PMHx of HTN, HFrEF (10-15% 3/2023), substance use disorder (tobacco, crack cocaine), CKD, suspected COPD w/Emphysema (no PFTs on file); PPH of MDD/RADHA with previous admissions and history of medication non-compliance. Patient presented to ED with SOB and SI in setting of crack cocaine use. CL consulted for SI. On assessment, patient was drowsy and only able to give limited information. However, reported SI/AVH in setting of crack cocaine use, currently with euthymic mood and intermittent SI. Current presentation likely SIMD, which was exacerbation by crack cocaine use, and non-adherence with both medical/psychiatric medications. At this time, patient is at an moderate risk of suicide, but is currently medically unstable. Patient does note meet criteria for IP psychiatric level of care and to continue to monitor in the medical unit, and [facility] psychiatry to follow.     RECOMMENDATIONS:   SAFETY  - Patient does not currently meet criteria for inpatient psychiatric admission.   - Patient does not meet criteria for MPU admission  - To evaluate decision-making capacity, recommend use of the Capacity Evaluation Tool. Search “ IP Capacity Evaluation under SmartText\" unless the patient has a legal guardian, in which case all decisions per the legal guardian.  - Patient require a 1:1 sitter from a psychiatric perspective at this time.    WORKUP  - EKG  - Please engage SW for dispo issues     MEDICATIONS  - Continue Zoloft 50mg QD      - Discussed recommendations with primary team.  - Psychiatry will continue to follow    Patient staffed/discussed with Dr. Garrett, who agrees with above plan.    Thank you for allowing " us to participate in the care of this patient. Please page [phone] with any questions or concerns.    Atif Barron MD  Addiction Psychiatry Fellow, PGY5  [facility]

## 2023-12-25 NOTE — ED NOTES
Pharmacy Medication History Review    Leonel Yu is a 53 y.o. male admitted for Precordial pain. Pharmacy reviewed the patient's [id number]-eabfwirmo medications and allergies for accuracy.    The list below reflects the updated PTA list. Comments regarding how patient may be taking medications differently can be found in the Admit Orders Activity  Prior to Admission Medications   Prescriptions Last Dose Informant   aspirin 81 mg EC tablet Not Taking Self   Sig: TAKE 1 TABLET BY MOUTH ONCE DAILY   Patient not taking: Reported on 12/25/2023   atorvastatin (Lipitor) 40 mg tablet  Self   Sig: Take 2 tablets (80 mg) by mouth once daily.   carvedilol (Coreg) 6.25 mg tablet  Self   Sig: TAKE 1 TABLET BY MOUTH TWO TIMES A DAY   dicyclomine (Bentyl) 10 mg capsule Not Taking Self   Sig: Take 1 capsule (10 mg) by mouth every 6 hours if needed (Abdominal discomfort).   Patient not taking: Reported on 12/25/2023   empagliflozin (Jardiance) 10 mg  Self   Sig: TAKE 1 TABLET BY MOUTH ONCE DAILY   hydrOXYzine HCL (Atarax) 10 mg tablet Not Taking    Sig: Take 1 tablet (10 mg) by mouth 1 time for 1 dose.   Patient not taking: Reported on 12/25/2023   melatonin 5 mg tablet  Self   Sig: Take 1 tablet (5 mg) by mouth as needed at bedtime for sleep.   polyethylene glycol (Glycolax, Miralax) 17 gram packet  Self   Sig: Mix 17g (1 packet) in liquid and take by mouth once daily as needed (Constipation).   sacubitriL-valsartan (Entresto) 24-26 mg tablet  Self   Sig: TAKE 1 TABLET BY MOUTH TWO TIMES A DAY   sertraline (Zoloft) 50 mg tablet Not Taking Self   Sig: TAKE 1 TABLET BY MOUTH ONCE DAILY   Patient not taking: Reported on 12/25/2023   spironolactone (Aldactone) 25 mg tablet Not Taking Self   Sig: TAKE 1/2 TABLET BY MOUTH ONCE DAILY   Patient not taking: Reported on 12/25/2023   tiotropium (Spiriva Respimat) 2.5 mcg/actuation inhaler  Self   Sig: INHALE 2 PUFFS ONCE DAILY   torsemide (Demadex) 100 mg tablet  Self   Sig: Take 50mg  (half tablet daily) for fluid retention). Please increase dosage to 100mg if you notice >2lbs weight gain in 2 days      Facility-Administered Medications: None        The list below reflects the updated allergy list. Please review each documented allergy for additional clarification and justification.  Allergies  Reviewed by Kim Coffey PharmD on 12/25/2023   No Known Allergies         Patient accepts M2B at discharge. Pharmacy has been updated to [facility].    Sources used to complete the med history include dispense history, Medication Reconciliation Team Med Rec 12/10/2023, and patient interview.    Below are additional concerns with the patient's PTA list.  Patient was not the best historian. He was familiar with some of the medication names but was not aware of their dosing.     Kim Coffey PharmD   [facility] PGY1 Community Pharmacy Resident   Medication Reconciliation Complete   Please reach out via EPIC chat with questions, or if no response call [phone] or [facility]

## 2023-12-25 NOTE — CARE PLAN
Problem: Risk for Suicide  Goal: Accepts medications as prescribed/needed this shift  Outcome: Progressing  Goal: Identifies supports this shift  Outcome: Progressing  Goal: No self harm this shift  Outcome: Progressing     Problem: Safety  Goal: Patient will be injury free during hospitalization  Outcome: Progressing  Goal: I will remain free of falls  Outcome: Progressing     Patient remained safe this shift with 1:1 sitter at bedside. No c/o chest pain or SOB. HF meds adjusted.

## 2023-12-26 ENCOUNTER — ANCILLARY PROCEDURE (OUTPATIENT)
Dept: EMERGENCY MEDICINE | Facility: HOSPITAL | Age: 53
End: 2023-12-26
Payer: COMMERCIAL

## 2023-12-26 LAB
ANION GAP SERPL CALC-SCNC: 13 MMOL/L (ref 10–20)
BASOPHILS # BLD AUTO: 0.05 X10*3/UL (ref 0–0.1)
BASOPHILS NFR BLD AUTO: 0.7 %
BUN SERPL-MCNC: 19 MG/DL (ref 6–23)
CALCIUM SERPL-MCNC: 8.8 MG/DL (ref 8.6–10.6)
CHLORIDE SERPL-SCNC: 106 MMOL/L (ref 98–107)
CO2 SERPL-SCNC: 23 MMOL/L (ref 21–32)
CREAT SERPL-MCNC: 1.34 MG/DL (ref 0.5–1.3)
EOSINOPHIL # BLD AUTO: 0.07 X10*3/UL (ref 0–0.7)
EOSINOPHIL NFR BLD AUTO: 0.9 %
ERYTHROCYTE [DISTWIDTH] IN BLOOD BY AUTOMATED COUNT: 13.6 % (ref 11.5–14.5)
GFR SERPL CREATININE-BSD FRML MDRD: 63 ML/MIN/1.73M*2
GLUCOSE SERPL-MCNC: 179 MG/DL (ref 74–99)
HCT VFR BLD AUTO: 50.8 % (ref 41–52)
HGB BLD-MCNC: 17.3 G/DL (ref 13.5–17.5)
IMM GRANULOCYTES # BLD AUTO: 0.01 X10*3/UL (ref 0–0.7)
IMM GRANULOCYTES NFR BLD AUTO: 0.1 % (ref 0–0.9)
LYMPHOCYTES # BLD AUTO: 1.22 X10*3/UL (ref 1.2–4.8)
LYMPHOCYTES NFR BLD AUTO: 16.5 %
MAGNESIUM SERPL-MCNC: 1.92 MG/DL (ref 1.6–2.4)
MCH RBC QN AUTO: 31.9 PG (ref 26–34)
MCHC RBC AUTO-ENTMCNC: 34.1 G/DL (ref 32–36)
MCV RBC AUTO: 94 FL (ref 80–100)
MONOCYTES # BLD AUTO: 0.42 X10*3/UL (ref 0.1–1)
MONOCYTES NFR BLD AUTO: 5.7 %
NEUTROPHILS # BLD AUTO: 5.63 X10*3/UL (ref 1.2–7.7)
NEUTROPHILS NFR BLD AUTO: 76.1 %
NRBC BLD-RTO: 0 /100 WBCS (ref 0–0)
PHOSPHATE SERPL-MCNC: 2.9 MG/DL (ref 2.5–4.9)
PLATELET # BLD AUTO: 171 X10*3/UL (ref 150–450)
POTASSIUM SERPL-SCNC: 4 MMOL/L (ref 3.5–5.3)
RBC # BLD AUTO: 5.43 X10*6/UL (ref 4.5–5.9)
SODIUM SERPL-SCNC: 138 MMOL/L (ref 136–145)
WBC # BLD AUTO: 7.4 X10*3/UL (ref 4.4–11.3)

## 2023-12-26 PROCEDURE — G0378 HOSPITAL OBSERVATION PER HR: HCPCS

## 2023-12-26 PROCEDURE — 93005 ELECTROCARDIOGRAM TRACING: CPT

## 2023-12-26 PROCEDURE — 99232 SBSQ HOSP IP/OBS MODERATE 35: CPT

## 2023-12-26 PROCEDURE — 2500000004 HC RX 250 GENERAL PHARMACY W/ HCPCS (ALT 636 FOR OP/ED)

## 2023-12-26 PROCEDURE — 2500000001 HC RX 250 WO HCPCS SELF ADMINISTERED DRUGS (ALT 637 FOR MEDICARE OP)

## 2023-12-26 PROCEDURE — 83735 ASSAY OF MAGNESIUM: CPT

## 2023-12-26 PROCEDURE — 85025 COMPLETE CBC W/AUTO DIFF WBC: CPT

## 2023-12-26 PROCEDURE — 36415 COLL VENOUS BLD VENIPUNCTURE: CPT

## 2023-12-26 PROCEDURE — 84100 ASSAY OF PHOSPHORUS: CPT

## 2023-12-26 PROCEDURE — 97161 PT EVAL LOW COMPLEX 20 MIN: CPT | Mod: GP

## 2023-12-26 PROCEDURE — 80048 BASIC METABOLIC PNL TOTAL CA: CPT

## 2023-12-26 PROCEDURE — 96372 THER/PROPH/DIAG INJ SC/IM: CPT

## 2023-12-26 PROCEDURE — 99232 SBSQ HOSP IP/OBS MODERATE 35: CPT | Performed by: PSYCHIATRY & NEUROLOGY

## 2023-12-26 RX ADMIN — SACUBITRIL AND VALSARTAN 1 TABLET: 24; 26 TABLET, FILM COATED ORAL at 08:58

## 2023-12-26 RX ADMIN — ASPIRIN 81 MG: 81 TABLET, COATED ORAL at 09:00

## 2023-12-26 RX ADMIN — SPIRONOLACTONE 12.5 MG: 25 TABLET, FILM COATED ORAL at 08:59

## 2023-12-26 RX ADMIN — SERTRALINE HYDROCHLORIDE 50 MG: 50 TABLET ORAL at 08:59

## 2023-12-26 RX ADMIN — CARVEDILOL 6.25 MG: 6.25 TABLET, FILM COATED ORAL at 20:45

## 2023-12-26 RX ADMIN — TORSEMIDE 50 MG: 20 TABLET ORAL at 08:58

## 2023-12-26 RX ADMIN — CARVEDILOL 6.25 MG: 6.25 TABLET, FILM COATED ORAL at 08:59

## 2023-12-26 RX ADMIN — ENOXAPARIN SODIUM 40 MG: 100 INJECTION SUBCUTANEOUS at 01:46

## 2023-12-26 RX ADMIN — ATORVASTATIN CALCIUM 80 MG: 80 TABLET, FILM COATED ORAL at 20:45

## 2023-12-26 ASSESSMENT — COGNITIVE AND FUNCTIONAL STATUS - GENERAL
DAILY ACTIVITIY SCORE: 24
MOBILITY SCORE: 24
CLIMB 3 TO 5 STEPS WITH RAILING: A LITTLE
DAILY ACTIVITIY SCORE: 24
MOBILITY SCORE: 24
MOBILITY SCORE: 23

## 2023-12-26 ASSESSMENT — COLUMBIA-SUICIDE SEVERITY RATING SCALE - C-SSRS
1. IN THE PAST MONTH, HAVE YOU WISHED YOU WERE DEAD OR WISHED YOU COULD GO TO SLEEP AND NOT WAKE UP?: YES
5. HAVE YOU STARTED TO WORK OUT OR WORKED OUT THE DETAILS OF HOW TO KILL YOURSELF? DO YOU INTEND TO CARRY OUT THIS PLAN?: YES
6. HAVE YOU EVER DONE ANYTHING, STARTED TO DO ANYTHING, OR PREPARED TO DO ANYTHING TO END YOUR LIFE?: NO
4. HAVE YOU HAD THESE THOUGHTS AND HAD SOME INTENTION OF ACTING ON THEM?: YES
6. HAVE YOU EVER DONE ANYTHING, STARTED TO DO ANYTHING, OR PREPARED TO DO ANYTHING TO END YOUR LIFE?: YES
2. HAVE YOU ACTUALLY HAD ANY THOUGHTS OF KILLING YOURSELF?: YES

## 2023-12-26 ASSESSMENT — PAIN - FUNCTIONAL ASSESSMENT
PAIN_FUNCTIONAL_ASSESSMENT: 0-10
PAIN_FUNCTIONAL_ASSESSMENT: 0-10

## 2023-12-26 ASSESSMENT — PAIN SCALES - GENERAL
PAINLEVEL_OUTOF10: 0 - NO PAIN
PAINLEVEL_OUTOF10: 0 - NO PAIN

## 2023-12-26 NOTE — CARE PLAN
Pt remained HDS and free of injury this shift. Pt had an unevenful night. Sitter remains at bedside with pt. Pt has no c/o dizziness or lightheaded through shift    Problem: Risk for Suicide  Goal: Accepts medications as prescribed/needed this shift  Outcome: Progressing     Problem: Risk for Suicide  Goal: Identifies supports this shift  Outcome: Progressing     Problem: Risk for Suicide  Goal: Makes needs known through verbalization or behaviors this shift  Outcome: Progressing     Problem: Risk for Suicide  Goal: No self harm this shift  Outcome: Progressing     Problem: Risk for Suicide  Goal: Read Safety Guidelines this shift  Outcome: Progressing     Problem: Pain  Goal: My pain/discomfort is manageable  Outcome: Progressing

## 2023-12-26 NOTE — PROGRESS NOTES
Physical Therapy    Physical Therapy Evaluation    Patient Name: Leonel Yu  MRN: [id number]  Today's Date: 12/26/2023   Time Calculation  Start Time: 1038  Stop Time: 1115  Time Calculation (min): 37 min    Assessment/Plan   PT Assessment  PT Assessment Results:  (none)  Rehab Prognosis: Excellent  Barriers to Discharge: none  Evaluation/Treatment Tolerance: Patient tolerated treatment well  Medical Staff Made Aware: Yes  Strengths: Attitude of self  Barriers to Participation:  (none)  End of Session Communication: Bedside nurse  Assessment Comment: The pt demonstrated independent and safe mobility without an assistive device.  End of Session Patient Position: Bed, 2 rail up, Alarm off, caregiver present  IP OR SWING BED PT PLAN  Inpatient or Swing Bed: Inpatient  PT Plan  Treatment/Interventions: Bed mobility, Transfer training, Gait training, Balance training, Endurance training  PT Plan: PT Eval only  PT Eval Only Reason: No acute PT needs identified  PT Frequency: PT eval only  PT Discharge Recommendations: No further acute PT  Equipment Recommended upon Discharge:  (none)  PT Recommended Transfer Status: Independent  PT - OK to Discharge: Yes      Subjective   General Visit Information:  General  Reason for Referral: Suicidal ideations and SOB  Past Medical History Relevant to Rehab: HTN, HFrEF(15-20% 7/23), crack/cocaine use, CKD  Prior to Session Communication: Bedside nurse  Patient Position Received: Bed, 3 rail up, Alarm off, not on at start of session  Preferred Learning Style: verbal, visual, written  General Comment: The pt was pleasant, cooperative and willing to partcipate in therapy.  Home Living:  Home Living  Type of Home: Homeless  Prior Level of Function:  Prior Function Per Pt/Caregiver Report  Prior Function Comments: Pt is independent with all mobility without an assistive device in the household and in the community.  Precautions:  Precautions  Hearing/Visual Limitations: Hearing WFL;  vision farsighted no glasses  Medical Precautions: Fall precautions     Objective   Pain:  Pain Assessment  Pain Assessment: 0-10  Pain Score: 0 - No pain  Cognition:  Cognition  Overall Cognitive Status: Within Functional Limits  Orientation Level: Oriented X4    General Assessments:  General Observation  General Observation: The pt presented with good sitting and standing posture without an assistive device.       Activity Tolerance  Endurance: Endurance does not limit participation in activity    Strength  Strength Comments: WFL  Postural Control  Postural Control: Within Functional Limits    Static Sitting Balance  Static Sitting-Balance Support: Bilateral upper extremity supported, Feet supported  Static Sitting-Level of Assistance: Independent  Dynamic Sitting Balance  Dynamic Sitting-Balance Support: Bilateral upper extremity supported, Feet supported  Dynamic Sitting-Comments: independent    Static Standing Balance  Static Standing-Balance Support: No upper extremity supported  Static Standing-Level of Assistance: Independent  Dynamic Standing Balance  Dynamic Standing-Balance Support: No upper extremity supported  Dynamic Standing-Comments: independent with no device  Functional Assessments:  Bed Mobility  Bed Mobility: Yes  Bed Mobility 1  Bed Mobility 1: Supine to sitting  Level of Assistance 1: Independent  Bed Mobility Comments 1: HOB flat  Bed Mobility 2  Bed Mobility  2: Sitting to supine  Level of Assistance 2: Independent  Bed Mobility Comments 2: HOB flat    Transfers  Transfer: Yes  Transfer 1  Transfer From 1: Sit to  Transfer to 1: Stand  Transfer Device 1:  (none)  Transfer Level of Assistance 1: Independent  Transfers 2  Transfer From 2: Stand to  Transfer to 2: Sit  Transfer Device 2:  (none)  Transfer Level of Assistance 2: Independent    Ambulation/Gait Training  Ambulation/Gait Training Performed: Yes  Ambulation/Gait Training 1  Surface 1: Level tile  Device 1: No device  Assistance 1:  Independent  Quality of Gait 1:  (steady)  Comments/Distance (ft) 1: 150ft  Extremity/Trunk Assessments:  Cervical Spine   Cervical Spine: Within Functional Limits  Lumbar Spine   Lumbar Spine : Within Functional Limits    RUE   RUE : Within Functional Limits  LUE   LUE: Within Functional Limits  RLE   RLE : Within Functional Limits  LLE   LLE : Within Functional Limits  Outcome Measures:  [facility] Basic Mobility  Turning from your back to your side while in a flat bed without using bedrails: None  Moving from lying on your back to sitting on the side of a flat bed without using bedrails: None  Moving to and from bed to chair (including a wheelchair): None  Standing up from a chair using your arms (e.g. wheelchair or bedside chair): None  To walk in hospital room: None  Climbing 3-5 steps with railing: None  Basic Mobility - Total Score: 24      Education Documentation  Body Mechanics, taught by Colby Lal PT at 12/26/2023 11:27 AM.  Learner: Patient  Readiness: Acceptance  Method: Explanation, Demonstration  Response: Verbalizes Understanding, Demonstrated Understanding    Home Exercise Program, taught by Colby Lal PT at 12/26/2023 11:27 AM.  Learner: Patient  Readiness: Acceptance  Method: Explanation, Demonstration  Response: Verbalizes Understanding, Demonstrated Understanding    Mobility Training, taught by Colby Lal PT at 12/26/2023 11:27 AM.  Learner: Patient  Readiness: Acceptance  Method: Explanation, Demonstration  Response: Verbalizes Understanding, Demonstrated Understanding    Education Comments  No comments found.

## 2023-12-26 NOTE — PROGRESS NOTES
"Leonel Yu is a 53 y.o. male on day 1 of admission presenting with Precordial pain.    Subjective   No acute events overnight. Patient denied, nausea, vomiting, fever, and cough. Patient still fairly solmulent.         Objective     Physical Exam    Physical Exam  General: pleasant, in NAD  HEENT: normocephalic, atraumatic  CV: heart regular rate and rhythm w/o murmurs  Resp: lungs with mild crackles at the bases, R > L  Abd: soft, nondistended, nontender  Ext: no peripheral edema  Neuro: grossly nonfocal, speech clear  Psych: endorses SI  Last Recorded Vitals  Blood pressure 100/77, pulse 77, temperature 36.4 °C (97.5 °F), temperature source Temporal, resp. rate 20, height 1.778 m (5' 10\"), weight 74.5 kg (164 lb 3.9 oz), SpO2 96 %.  Intake/Output last 3 Shifts:  I/O last 3 completed shifts:  In: 960 (12.9 mL/kg) [P.O.:960]  Out: 2625 (35.2 mL/kg) [Urine:2625 (1 mL/kg/hr)]  Weight: 74.5 kg     Relevant Results                  Active Medications  Scheduled medications  aspirin, 81 mg, oral, Daily  atorvastatin, 80 mg, oral, Daily  carvedilol, 6.25 mg, oral, BID  enoxaparin, 40 mg, subcutaneous, q24h  furosemide, 40 mg, intravenous, Once  sacubitriL-valsartan, 1 tablet, oral, BID  sertraline, 50 mg, oral, Daily  spironolactone, 12.5 mg, oral, Daily  tiotropium, 2 Inhalation, inhalation, Daily  torsemide, 50 mg, oral, Daily      Continuous medications     PRN medications  PRN medications: melatonin, polyethylene glycol     Recent Labs  Results for orders placed or performed during the hospital encounter of 12/24/23 (from the past 24 hour(s))   CBC   Result Value Ref Range    WBC 5.7 4.4 - 11.3 x10*3/uL    nRBC 0.0 0.0 - 0.0 /100 WBCs    RBC 5.52 4.50 - 5.90 x10*6/uL    Hemoglobin 17.3 13.5 - 17.5 g/dL    Hematocrit 51.7 41.0 - 52.0 %    MCV 94 80 - 100 fL    MCH 31.3 26.0 - 34.0 pg    MCHC 33.5 32.0 - 36.0 g/dL    RDW 13.6 11.5 - 14.5 %    Platelets 161 150 - 450 x10*3/uL   Basic metabolic panel   Result Value " Ref Range    Glucose 111 (H) 74 - 99 mg/dL    Sodium 139 136 - 145 mmol/L    Potassium 4.1 3.5 - 5.3 mmol/L    Chloride 106 98 - 107 mmol/L    Bicarbonate 24 21 - 32 mmol/L    Anion Gap 13 10 - 20 mmol/L    Urea Nitrogen 21 6 - 23 mg/dL    Creatinine 1.48 (H) 0.50 - 1.30 mg/dL    eGFR 56 (L) >60 mL/min/1.73m*2    Calcium 8.8 8.6 - 10.6 mg/dL   Ethanol   Result Value Ref Range    Alcohol <10 <=10 mg/dL       Imaging  XR chest 1 view    Result Date: 12/24/2023  Interpreted By:  Elaina Reynoso,  [facility] STUDY: XR CHEST 1 VIEW;  12/24/2023 8:14 pm   INDICATION: Signs/Symptoms:short of breath, wheezing, suspect copd.   COMPARISON: Chest radiograph dated 12/11/2023   ACCESSION NUMBER(S): [id number]   ORDERING CLINICIAN: KIMBERLY SAUCEDO   FINDINGS: AP radiograph of the chest is provided.   CARDIOMEDIASTINAL SILHOUETTE: Cardiomediastinal silhouette is severely enlarged, however similar to prior.   LUNGS: There is mild perihilar interstitial prominence, stable. Emphysematous changes are again visualized with the left upper lobe medial bulla. No consolidation, pleural effusion or pneumothorax.   ABDOMEN: No remarkable upper abdominal findings.   BONES: No acute osseous abnormality.       1. Stable severe cardiomegaly and/or pericardial effusion. 2. Mild perihilar interstitial edema. Please correlate for CHF   I personally reviewed the images/study and I agree with the findings as stated. This study was interpreted at [facility], [place], [place].   Signed by: Elaina Reynoso 12/24/2023 9:10 PM Dictation workstation:   [id number]                  Assessment/Plan   Principal Problem:    Precordial pain    Leonel Yu is a 54 y/o M with PMHx HTN, HFrEF (EF 15-20% in 7/2023), substance use disorder (tobacco, crack cocaine), CKD, suspected COPD w/Emphysema (no PFTs  on file)who presented to ED with SI, shortness of breath in the setting of not taking medications for the last couple  of days.  On exam patient has mild bibasilar crackles and chest x-ray with mild perihilar edema.  Per last discharge summary, dry weight is 73 kg.  Today weight is 72.5 kg BNP is over 1000, however it was over 3000 on recent admission for heart failure exacerbation.  Patient also does not have increasing oxygen requirement.  was started on home medications and psych was consulted for SI.    Updates 12/26/23  - back on home diuretics  - Pending clearance from psych to discharge     #SI  - Psych consulted  - Social work consulted  - cw home sertraline 50   - suicide precauttions     #hx of HFrEF  :: EF 15-20% in 7/2023   :: no ischemic workup undergone  :: Substance (crack cocaine abuse)  - home regimen is torsemide 50mg daily, increase to 100mg for weight gain   - pt has not been taking for last few days -- will restart at 50mg daily given that pt is at dry weight and no increased O2 requirement   - continue home carvedilol, Entresto, ASA, atorvastatin      #hx of substance use disorder  - consider chemical dependency consult in AM   - urine tox positive for cocaine      F: prn   E: prn   N: regular  GI: none  DVT: Lovenox      CODE STATUS: PRESUMED FULL CODE           Yvan Ordaz MD

## 2023-12-26 NOTE — PROGRESS NOTES
SW met with patient at bedside to discuss his homeless situation and drug abuse. He states that he is not technically homeless as he has a friend he can stay with upon discharge, however, that friend is a bad influence on him when it comes to his crack cocaine use. He does admit that the crack use is his biggest issue at this time. He is agreeable to talk about inpatient rehab options with [facility]. SW made referral to [facility] at this time and they will see him either this afternoon or tomorrow. If patient does not end up going to inpatient rehab, he is agreeable to talk about homeless shelters. Will follow.    Update 1428:  Patient not appropriate for inpatient rehab due to active suicidal ideations and plan. Patient will transfer to inpatient psych.    Chula Posey LCSW

## 2023-12-26 NOTE — PROGRESS NOTES
Patient was signed out to me by previous provider, for full history and physical please see their note.  3, this is a 53-year-old male with a past medical history of cocaine use disorder as well as CHF who presented today for shortness of breath and also endorsed SI.  Patient presentation was initially concerning for possible flash pulmonary edema secondary to cocaine use, and did have elevated troponins.  Patient did also endorse SI, but given his acute medical condition, not appropriate for psychiatric evaluation at this time.  I believe the patient needs to be admitted to a medicine service, which she was accepted to.  I believe that he would be more appropriate for a psychiatric evaluation inpatient and possible med psych transfer.

## 2023-12-26 NOTE — SIGNIFICANT EVENT
Pt is now endorsing SI with a plan and now meets inpatient psych criteria, per psychiatry. Pt is deemed MEDICALLY CLEARED and is ready for transfer to inpatient psychiatry, pending inpatient psych facility acceptance.

## 2023-12-26 NOTE — PROGRESS NOTES
SUBJECTIVE  Patient seen at bedside, he vaguely remembers this interviewer from previous admission. Notes that since discharge, he went back to his unstable housing and relapsed on cocaine use.     Reiterates that holidays are difficult and triggering from him being estranged from his family. States that he tells people he is Catholic so that people would not wish him Mabel Palacios. Notes that he previously did not wish to harm himself as his assets would revert to his wife's possession, however now notes that he is expressing apathy and does not care if he does not live and she receives his possessions.     Disclosed that he works as a  and works under the table and is paid via crack cocaine which he admits to escalating daily use. Reveals that his current roommate is a drug dealer and is around substances all the time. He endorses SI with plan to start a vehicle and suffocate himself with carbon monoxide in a garage which he has access to. Continues to deny HI/AVH.     Per sitter, patient has been calm without behavioral. Note that patient woke up late after breakfast arrived, but no sleep issues reported.     OBJECTIVE    VITALS      12/25/2023     4:07 PM 12/25/2023     8:20 PM 12/25/2023    11:44 PM 12/26/2023     3:22 AM 12/26/2023     5:19 AM 12/26/2023     9:00 AM 12/26/2023    11:27 AM   Vitals   Systolic 101 100 102 100  123 96   Diastolic 69 68 70 77  87 62   Heart Rate 72 85 86 77  89 84   Temp 36.2 °C (97.2 °F) 36 °C (96.8 °F) 36.4 °C (97.5 °F) 36.4 °C (97.5 °F)  35.8 °C (96.4 °F) 36.4 °C (97.5 °F)   Resp 22 20 22 20  20 20   Weight (lb)     164.24     BMI     23.57 kg/m2     BSA (m2)     1.92 m2          MENTAL STATUS EXAM  Appearance: 54 yo M, appears older than stated age, disheveled, malodorous, in hospital clothes, poor dentition, eating breakfast  Behavior/Attitude: calm and superficially cooperative   Motor: No PMA/PMR noted, RAKAN gait  Speech: Conversational volume, rate, rhythm,  "tone  Mood: \"not good\"   Affect: full ranged, mood incongruent, apologetic   Thought process: Goal directed  Thought content: +SI, with specific plan to asphyxiate with carbon monoxide.  Denies HI. Denies AVH.  Orientation: Orientated to person, place, day after Christmas 2023, situation  Attention/concentration: Awake, easily distracted, excuses self in middle of interview to urinate, grossly intact to conversation  Language: No aphasia during conversation  Fund of knowledge: fair  Insight: Fair, able to discuss triggers and substance use.   Judgment: Poor    CURRENT MEDICATIONS  Scheduled medications  aspirin, 81 mg, oral, Daily  atorvastatin, 80 mg, oral, Daily  carvedilol, 6.25 mg, oral, BID  enoxaparin, 40 mg, subcutaneous, q24h  furosemide, 40 mg, intravenous, Once  sacubitriL-valsartan, 1 tablet, oral, BID  sertraline, 50 mg, oral, Daily  spironolactone, 12.5 mg, oral, Daily  tiotropium, 2 Inhalation, inhalation, Daily  torsemide, 50 mg, oral, Daily        Continuous medications       PRN medications  PRN medications: melatonin, polyethylene glycol     LABS  Results for orders placed or performed during the hospital encounter of 12/24/23 (from the past 24 hour(s))   CBC and Auto Differential   Result Value Ref Range    WBC 7.4 4.4 - 11.3 x10*3/uL    nRBC 0.0 0.0 - 0.0 /100 WBCs    RBC 5.43 4.50 - 5.90 x10*6/uL    Hemoglobin 17.3 13.5 - 17.5 g/dL    Hematocrit 50.8 41.0 - 52.0 %    MCV 94 80 - 100 fL    MCH 31.9 26.0 - 34.0 pg    MCHC 34.1 32.0 - 36.0 g/dL    RDW 13.6 11.5 - 14.5 %    Platelets 171 150 - 450 x10*3/uL    Neutrophils % 76.1 40.0 - 80.0 %    Immature Granulocytes %, Automated 0.1 0.0 - 0.9 %    Lymphocytes % 16.5 13.0 - 44.0 %    Monocytes % 5.7 2.0 - 10.0 %    Eosinophils % 0.9 0.0 - 6.0 %    Basophils % 0.7 0.0 - 2.0 %    Neutrophils Absolute 5.63 1.20 - 7.70 x10*3/uL    Immature Granulocytes Absolute, Automated 0.01 0.00 - 0.70 x10*3/uL    Lymphocytes Absolute 1.22 1.20 - 4.80 x10*3/uL    " Monocytes Absolute 0.42 0.10 - 1.00 x10*3/uL    Eosinophils Absolute 0.07 0.00 - 0.70 x10*3/uL    Basophils Absolute 0.05 0.00 - 0.10 x10*3/uL   Basic Metabolic Panel   Result Value Ref Range    Glucose 179 (H) 74 - 99 mg/dL    Sodium 138 136 - 145 mmol/L    Potassium 4.0 3.5 - 5.3 mmol/L    Chloride 106 98 - 107 mmol/L    Bicarbonate 23 21 - 32 mmol/L    Anion Gap 13 10 - 20 mmol/L    Urea Nitrogen 19 6 - 23 mg/dL    Creatinine 1.34 (H) 0.50 - 1.30 mg/dL    eGFR 63 >60 mL/min/1.73m*2    Calcium 8.8 8.6 - 10.6 mg/dL   Magnesium   Result Value Ref Range    Magnesium 1.92 1.60 - 2.40 mg/dL   Phosphorus   Result Value Ref Range    Phosphorus 2.9 2.5 - 4.9 mg/dL        IMAGING  XR chest 1 view    Result Date: 12/24/2023  Interpreted By:  Elaina Reynoso,  [facility] STUDY: XR CHEST 1 VIEW;  12/24/2023 8:14 pm   INDICATION: Signs/Symptoms:short of breath, wheezing, suspect copd.   COMPARISON: Chest radiograph dated 12/11/2023   ACCESSION NUMBER(S): [id number]   ORDERING CLINICIAN: KIMBERLY SAUCEDO   FINDINGS: AP radiograph of the chest is provided.   CARDIOMEDIASTINAL SILHOUETTE: Cardiomediastinal silhouette is severely enlarged, however similar to prior.   LUNGS: There is mild perihilar interstitial prominence, stable. Emphysematous changes are again visualized with the left upper lobe medial bulla. No consolidation, pleural effusion or pneumothorax.   ABDOMEN: No remarkable upper abdominal findings.   BONES: No acute osseous abnormality.       1. Stable severe cardiomegaly and/or pericardial effusion. 2. Mild perihilar interstitial edema. Please correlate for CHF   I personally reviewed the images/study and I agree with the findings as stated. This study was interpreted at [place], [place].   Signed by: Elaina Reynoso 12/24/2023 9:10 PM Dictation workstation:   [id number]       PSYCHIATRIC RISK ASSESSMENT  Acute Risk of Harm to Others is Considered: Low  Acute Risk  "of Harm to Self is Considered: High    ASSESSMENT AND PLAN  Leonel Yu is a 52 YO male with PMHx of HTN, HFrEF (10-15% 3/2023), substance use disorder (tobacco, crack cocaine), CKD, suspected COPD w/Emphysema (no PFTs on file); PPH of MDD/RADHA with previous admissions and history of medication non-compliance. Patient presented to ED with SOB and SI in setting of crack cocaine use. CL consulted for SI. On assessment, patient was drowsy and only able to give limited information. However, reported SI/AVH in setting of crack cocaine use, currently with euthymic mood and intermittent SI. Current presentation likely SIMD, which was exacerbation by crack cocaine use, and non-adherence with both medical/psychiatric medications.     Update 12/26: Patient is well known to this interviewer. Continues to endorse increased psychosocial stressors, especially during the holidays. He continues to endorse SI now with specific plan to die with carbon monoxide poisoning if discharged to current living circumstances. He is also noting some apathy towards previously established self-protective mechanisms which would elevate his risk assessment. He meets criteria for inpatient psychiatric hospitalization.     SAFETY  - Patient DOES currently meet criteria for inpatient psychiatric admission. Once patient is deemed medically cleared, please document in note that patient is MEDICALLY CLEARED and [facility] at [phone], pager [phone]. Issue Application for Emergency Admission (pink slip) only after patient is accepted to an inpatient psychiatric unit and is ready to be discharged. Search “Application for Emergency Admission” under SmartText.”  - Patient lacks the capacity to leave AMA at this time and thus cannot leave AMA. Call [facility]IOLET if patient attempts to leave AMA.  - To evaluate decision-making capacity, recommend use of the Capacity Evaluation Tool. Search “ IP Capacity Evaluation under SmartText\" unless the " patient has a legal guardian, in which case all decisions per the legal guardian.  - Patient DOES require a 1:1 sitter from a psychiatric perspective at this time.  - No changes to safety plan completed on 12/13/2023 during last admission.   - As with all hospitalized patients, would recommend delirium precautions, as below.   -- Minimize use of benzos, opiates, anticholinergics, as these may worsen mental status   -- Would use caution with narcotic pain medications   -- Would still adequately controlling pain, as uncontrolled pain is also a risk factor for delirium   -- Reinforce sleep hygiene; encourage patient to stay awake during the day   -- Keep curtains/blinds open during the day and closed at night.   -- Would recommend reorienting/redirecting patient as much as possible,    -- Aim for consistent staffing, familiar objects, avoiding bright lights and loud noises, etc.      WORKUP  - EKG  - Please engage SW for dispo issues      MEDICATIONS  - Continue Zoloft 50mg PO Daily     Ancillary Services:  - Recommend , pet/music/art therapy consult as patient tolerates     - Discussed recommendations with primary team.  - Psychiatry will continue to follow     Patient discussed with Dr. Garrett, who agrees with above plan.     Thank you for allowing us to participate in the care of this patient. Please page [phone] with any questions or concerns.      Medication Consent  Medication Consent: n/a; consult service    Bart Crawford MD  Adult Psychiatry, PGY-2  [facility]

## 2023-12-27 ENCOUNTER — APPOINTMENT (OUTPATIENT)
Dept: CARDIOLOGY | Facility: HOSPITAL | Age: 53
End: 2023-12-27
Payer: COMMERCIAL

## 2023-12-27 LAB
ANION GAP SERPL CALC-SCNC: 12 MMOL/L (ref 10–20)
APPEARANCE UR: CLEAR
BASOPHILS # BLD AUTO: 0.04 X10*3/UL (ref 0–0.1)
BASOPHILS NFR BLD AUTO: 0.5 %
BILIRUB UR STRIP.AUTO-MCNC: NEGATIVE MG/DL
BUN SERPL-MCNC: 17 MG/DL (ref 6–23)
CALCIUM SERPL-MCNC: 9.2 MG/DL (ref 8.6–10.6)
CHLORIDE SERPL-SCNC: 102 MMOL/L (ref 98–107)
CK MB CFR SERPL CALC: 3 %MB OF CK
CK MB SERPL-CCNC: 1.6 NG/ML
CK SERPL-CCNC: 52 U/L (ref 0–325)
CO2 SERPL-SCNC: 27 MMOL/L (ref 21–32)
COLOR UR: ABNORMAL
CREAT SERPL-MCNC: 1.52 MG/DL (ref 0.5–1.3)
EOSINOPHIL # BLD AUTO: 0.09 X10*3/UL (ref 0–0.7)
EOSINOPHIL NFR BLD AUTO: 1 %
ERYTHROCYTE [DISTWIDTH] IN BLOOD BY AUTOMATED COUNT: 13.2 % (ref 11.5–14.5)
GFR SERPL CREATININE-BSD FRML MDRD: 54 ML/MIN/1.73M*2
GLUCOSE SERPL-MCNC: 271 MG/DL (ref 74–99)
GLUCOSE UR STRIP.AUTO-MCNC: ABNORMAL MG/DL
HCT VFR BLD AUTO: 51.2 % (ref 41–52)
HGB BLD-MCNC: 17.5 G/DL (ref 13.5–17.5)
IMM GRANULOCYTES # BLD AUTO: 0.02 X10*3/UL (ref 0–0.7)
IMM GRANULOCYTES NFR BLD AUTO: 0.2 % (ref 0–0.9)
KETONES UR STRIP.AUTO-MCNC: NEGATIVE MG/DL
LEUKOCYTE ESTERASE UR QL STRIP.AUTO: NEGATIVE
LYMPHOCYTES # BLD AUTO: 0.98 X10*3/UL (ref 1.2–4.8)
LYMPHOCYTES NFR BLD AUTO: 11.3 %
MAGNESIUM SERPL-MCNC: 1.79 MG/DL (ref 1.6–2.4)
MCH RBC QN AUTO: 31.4 PG (ref 26–34)
MCHC RBC AUTO-ENTMCNC: 34.2 G/DL (ref 32–36)
MCV RBC AUTO: 92 FL (ref 80–100)
MONOCYTES # BLD AUTO: 0.4 X10*3/UL (ref 0.1–1)
MONOCYTES NFR BLD AUTO: 4.6 %
NEUTROPHILS # BLD AUTO: 7.15 X10*3/UL (ref 1.2–7.7)
NEUTROPHILS NFR BLD AUTO: 82.4 %
NITRITE UR QL STRIP.AUTO: NEGATIVE
NRBC BLD-RTO: 0 /100 WBCS (ref 0–0)
PH UR STRIP.AUTO: 6 [PH]
PHOSPHATE SERPL-MCNC: 3.6 MG/DL (ref 2.5–4.9)
PLATELET # BLD AUTO: 173 X10*3/UL (ref 150–450)
POTASSIUM SERPL-SCNC: 3.9 MMOL/L (ref 3.5–5.3)
PROT UR STRIP.AUTO-MCNC: NEGATIVE MG/DL
RBC # BLD AUTO: 5.58 X10*6/UL (ref 4.5–5.9)
RBC # UR STRIP.AUTO: NEGATIVE /UL
SODIUM SERPL-SCNC: 137 MMOL/L (ref 136–145)
SP GR UR STRIP.AUTO: 1.01
UROBILINOGEN UR STRIP.AUTO-MCNC: <2 MG/DL
WBC # BLD AUTO: 8.7 X10*3/UL (ref 4.4–11.3)

## 2023-12-27 PROCEDURE — 81003 URINALYSIS AUTO W/O SCOPE: CPT

## 2023-12-27 PROCEDURE — 2500000001 HC RX 250 WO HCPCS SELF ADMINISTERED DRUGS (ALT 637 FOR MEDICARE OP): Mod: SE

## 2023-12-27 PROCEDURE — 82553 CREATINE MB FRACTION: CPT

## 2023-12-27 PROCEDURE — 82550 ASSAY OF CK (CPK): CPT

## 2023-12-27 PROCEDURE — 96372 THER/PROPH/DIAG INJ SC/IM: CPT

## 2023-12-27 PROCEDURE — 36415 COLL VENOUS BLD VENIPUNCTURE: CPT

## 2023-12-27 PROCEDURE — 94640 AIRWAY INHALATION TREATMENT: CPT

## 2023-12-27 PROCEDURE — 2500000004 HC RX 250 GENERAL PHARMACY W/ HCPCS (ALT 636 FOR OP/ED): Mod: SE

## 2023-12-27 PROCEDURE — 85025 COMPLETE CBC W/AUTO DIFF WBC: CPT

## 2023-12-27 PROCEDURE — 99232 SBSQ HOSP IP/OBS MODERATE 35: CPT | Performed by: STUDENT IN AN ORGANIZED HEALTH CARE EDUCATION/TRAINING PROGRAM

## 2023-12-27 PROCEDURE — 93005 ELECTROCARDIOGRAM TRACING: CPT

## 2023-12-27 PROCEDURE — G0378 HOSPITAL OBSERVATION PER HR: HCPCS

## 2023-12-27 PROCEDURE — 83735 ASSAY OF MAGNESIUM: CPT

## 2023-12-27 PROCEDURE — 2500000002 HC RX 250 W HCPCS SELF ADMINISTERED DRUGS (ALT 637 FOR MEDICARE OP, ALT 636 FOR OP/ED): Mod: SE,MUE

## 2023-12-27 PROCEDURE — 84100 ASSAY OF PHOSPHORUS: CPT

## 2023-12-27 PROCEDURE — 99232 SBSQ HOSP IP/OBS MODERATE 35: CPT

## 2023-12-27 PROCEDURE — 80048 BASIC METABOLIC PNL TOTAL CA: CPT

## 2023-12-27 RX ADMIN — SACUBITRIL AND VALSARTAN 1 TABLET: 24; 26 TABLET, FILM COATED ORAL at 08:49

## 2023-12-27 RX ADMIN — CARVEDILOL 6.25 MG: 6.25 TABLET, FILM COATED ORAL at 20:07

## 2023-12-27 RX ADMIN — TORSEMIDE 50 MG: 20 TABLET ORAL at 08:49

## 2023-12-27 RX ADMIN — ASPIRIN 81 MG: 81 TABLET, COATED ORAL at 08:49

## 2023-12-27 RX ADMIN — ENOXAPARIN SODIUM 40 MG: 100 INJECTION SUBCUTANEOUS at 02:19

## 2023-12-27 RX ADMIN — CARVEDILOL 6.25 MG: 6.25 TABLET, FILM COATED ORAL at 08:49

## 2023-12-27 RX ADMIN — SPIRONOLACTONE 12.5 MG: 25 TABLET, FILM COATED ORAL at 08:49

## 2023-12-27 RX ADMIN — SACUBITRIL AND VALSARTAN 1 TABLET: 24; 26 TABLET, FILM COATED ORAL at 20:07

## 2023-12-27 RX ADMIN — ATORVASTATIN CALCIUM 80 MG: 80 TABLET, FILM COATED ORAL at 20:07

## 2023-12-27 RX ADMIN — TIOTROPIUM BROMIDE INHALATION SPRAY 2 PUFF: 3.12 SPRAY, METERED RESPIRATORY (INHALATION) at 10:49

## 2023-12-27 RX ADMIN — SERTRALINE HYDROCHLORIDE 50 MG: 50 TABLET ORAL at 08:49

## 2023-12-27 ASSESSMENT — COGNITIVE AND FUNCTIONAL STATUS - GENERAL
MOBILITY SCORE: 24
DAILY ACTIVITIY SCORE: 24
MOBILITY SCORE: 24
DAILY ACTIVITIY SCORE: 24

## 2023-12-27 ASSESSMENT — PAIN SCALES - GENERAL
PAINLEVEL_OUTOF10: 0 - NO PAIN
PAINLEVEL_OUTOF10: 0 - NO PAIN

## 2023-12-27 ASSESSMENT — PAIN - FUNCTIONAL ASSESSMENT
PAIN_FUNCTIONAL_ASSESSMENT: 0-10
PAIN_FUNCTIONAL_ASSESSMENT: 0-10

## 2023-12-27 ASSESSMENT — ACTIVITIES OF DAILY LIVING (ADL): LACK_OF_TRANSPORTATION: YES

## 2023-12-27 NOTE — PROGRESS NOTES
Leonel Yu is a 53 y.o. male on day 1 of admission presenting with Precordial pain.    Subjective   No acute events overnight. Feels the same as yesterday.         Objective     Vitals  Vitals:    12/27/23 0719   BP: 116/80   Pulse: 78   Resp: 18   Temp: 36.1 °C (96.9 °F)   SpO2: 95%         Physical Exam    Physical Exam  General: pleasant, in NAD  HEENT: normocephalic, atraumatic  CV: heart regular rate and rhythm w/o murmurs  Resp: normal respiratory effort  Ext: no peripheral edema  Neuro: grossly nonfocal, speech clear      Intake/Output last 3 Shifts:  I/O last 3 completed shifts:  In: 480 (6.5 mL/kg) [P.O.:480]  Out: 1150 (15.6 mL/kg) [Urine:1150 (0.4 mL/kg/hr)]  Weight: 73.5 kg          Imaging  XR chest 1 view    Result Date: 12/24/2023  Interpreted By:  Elaina Reynoso and Bera Kaustav STUDY: XR CHEST 1 VIEW;  12/24/2023 8:14 pm   INDICATION: Signs/Symptoms:short of breath, wheezing, suspect copd.   COMPARISON: Chest radiograph dated 12/11/2023   ACCESSION NUMBER(S): [id number]   ORDERING CLINICIAN: KIMBERLY SAUCEDO   FINDINGS: AP radiograph of the chest is provided.   CARDIOMEDIASTINAL SILHOUETTE: Cardiomediastinal silhouette is severely enlarged, however similar to prior.   LUNGS: There is mild perihilar interstitial prominence, stable. Emphysematous changes are again visualized with the left upper lobe medial bulla. No consolidation, pleural effusion or pneumothorax.   ABDOMEN: No remarkable upper abdominal findings.   BONES: No acute osseous abnormality.       1. Stable severe cardiomegaly and/or pericardial effusion. 2. Mild perihilar interstitial edema. Please correlate for CHF   I personally reviewed the images/study and I agree with the findings as stated. This study was interpreted at [facility], [place], [place].   Signed by: Elaina Reynoso 12/24/2023 9:10 PM Dictation workstation:   [id number]                  Assessment/Plan   Principal Problem:     Precordial pain    Leonel Yu is a 52 y/o M with PMHx HTN, HFrEF (EF 15-20% in 7/2023), substance use disorder (tobacco, crack cocaine), CKD, suspected COPD w/Emphysema (no PFTs  on file) here for SI. Active issues include SI. Medically cleared for transfer to psychiatry.    Updates 12/27/23  - EKG, UA, CK ordered per psych request  - medically cleared for transfer to psychiatry  - discussed with psychiatry at bedside that patient has no more cardiology needs     #SI  - Psych consulted  - Social work consulted  - cw home sertraline 50   - suicide precauttions     #hx of HFrEF  :: EF 15-20% in 7/2023   :: no ischemic workup undergone  :: Substance (crack cocaine abuse)  - home regimen is torsemide 50mg daily, increase to 100mg for weight gain   - pt has not been taking for last few days -- will restart at 50mg daily given that pt is at dry weight and no increased O2 requirement   - continue home carvedilol, Entresto, ASA, atorvastatin      #hx of substance use disorder  - consider chemical dependency consult in AM   - urine tox positive for cocaine      F: prn   E: prn   N: regular  GI: none  DVT: Lovenox      CODE STATUS: PRESUMED FULL CODE           Souleymane Alva MD PhD

## 2023-12-27 NOTE — PROGRESS NOTES
SUBJECTIVE  -No acute events overnight. Discussed with nursing. Patient wearing O2 canula - was feeling subjectively SOB but was still satting 96% on RA - placed O2 for comfort.     -Calm and cooperative with exam. Relates pleasantly. Immediately notes that his 'thoughts are getting worse' - explains that he has had continued intrusive ruminations of self-harm. The reality of his situation is setting in more and more, especially now that he does not have his main coping mechanism - cocaine - to distract him.   -Notes continued plan of getting in a car at work and running it in closed garage with hose attached to exhaust (describes in detail how he would do so).   -Denies any social supports - his main acquaintances are dealers and heavy substance users. No family or friends.   -Walked away from his job 3 days ago - they were paying him in crack cocaine.   -Remains precontemplative/contemplative towards his cocaine use. He is willing to attempt treatment, but does not feel that his cocaine use has contributed to any negative consequences of his life. He feels that negative circumstances (losing his family, homelessness, unemployment) are the primary , and that he only uses cocaine as a coping mechanism  - 'I could put that stuff down whenever I want'. Does not feel his mood or SI would improve with substance use treatment  -He is amenable to transfer to inpatient psychiatric unit, but is concerned about access to food, shelter, necessities even if he were to go to inpatient psychiatry. 'I feel like you all are wasting your time with me'.   -Expresses hopleessness, unable to identify protective factors.   -Accepted housing, food, clothing resources.     OBJECTIVE    VITALS      12/26/2023     9:00 AM 12/26/2023    11:27 AM 12/26/2023     3:48 PM 12/26/2023     7:50 PM 12/26/2023    11:30 PM 12/27/2023     4:00 AM 12/27/2023     7:19 AM   Vitals   Systolic 123 96 93 99 102 101 116   Diastolic 87 62 58 65 96 69 80    Heart Rate 89 84 68 88 76 79 78   Temp 35.8 °C (96.4 °F) 36.4 °C (97.5 °F) 36.3 °C (97.4 °F) 36 °C (96.8 °F) 36.4 °C (97.5 °F) 36.1 °C (97 °F) 36.1 °C (96.9 °F)   Resp 20 20 18 18 18 18 18   Weight (lb)      162.04    BMI      23.25 kg/m2    BSA (m2)      1.91 m2         MENTAL STATUS EXAM  Appearance: thin, hospital gown, poor dentition.   Behavior/Attitude: Calm and cooperative  Motor: No PMA/PMR noted  Speech: Conversational volume, rate, rhythm, tone  Mood: depressed  Affect: full ranged, mood incongruent, apologetic   Thought process: Goal directed  Thought content: +SI, with specific plan to asphyxiate with carbon monoxide. Conditional intent if discharged to current situation. Denies AH/VH.  Orientation: A&Ox4  Attention/concentration: attends to interview without issue  Language: No aphasia during conversation  Fund of knowledge: fair  Insight: Poor insight into substance use contributions to his mental health and psychosocial difficulties.   Judgment: Fair - accepting of treatment plan      Physical exam:  -No mydriasis, yawning, goosebumps, tremor, tongue fasciculations, diaphoresis, hyperreflexia, hypertonia observed.   -HR on exam ~92    CURRENT MEDICATIONS  Scheduled medications  aspirin, 81 mg, oral, Daily  atorvastatin, 80 mg, oral, Daily  carvedilol, 6.25 mg, oral, BID  enoxaparin, 40 mg, subcutaneous, q24h  furosemide, 40 mg, intravenous, Once  sacubitriL-valsartan, 1 tablet, oral, BID  sertraline, 50 mg, oral, Daily  spironolactone, 12.5 mg, oral, Daily  tiotropium, 2 Inhalation, inhalation, Daily  torsemide, 50 mg, oral, Daily        Continuous medications       PRN medications  PRN medications: melatonin, polyethylene glycol     LABS  Results for orders placed or performed during the hospital encounter of 12/24/23 (from the past 24 hour(s))   CBC and Auto Differential   Result Value Ref Range    WBC 8.7 4.4 - 11.3 x10*3/uL    nRBC 0.0 0.0 - 0.0 /100 WBCs    RBC 5.58 4.50 - 5.90 x10*6/uL     Hemoglobin 17.5 13.5 - 17.5 g/dL    Hematocrit 51.2 41.0 - 52.0 %    MCV 92 80 - 100 fL    MCH 31.4 26.0 - 34.0 pg    MCHC 34.2 32.0 - 36.0 g/dL    RDW 13.2 11.5 - 14.5 %    Platelets 173 150 - 450 x10*3/uL    Neutrophils % 82.4 40.0 - 80.0 %    Immature Granulocytes %, Automated 0.2 0.0 - 0.9 %    Lymphocytes % 11.3 13.0 - 44.0 %    Monocytes % 4.6 2.0 - 10.0 %    Eosinophils % 1.0 0.0 - 6.0 %    Basophils % 0.5 0.0 - 2.0 %    Neutrophils Absolute 7.15 1.20 - 7.70 x10*3/uL    Immature Granulocytes Absolute, Automated 0.02 0.00 - 0.70 x10*3/uL    Lymphocytes Absolute 0.98 (L) 1.20 - 4.80 x10*3/uL    Monocytes Absolute 0.40 0.10 - 1.00 x10*3/uL    Eosinophils Absolute 0.09 0.00 - 0.70 x10*3/uL    Basophils Absolute 0.04 0.00 - 0.10 x10*3/uL   Basic Metabolic Panel   Result Value Ref Range    Glucose 271 (H) 74 - 99 mg/dL    Sodium 137 136 - 145 mmol/L    Potassium 3.9 3.5 - 5.3 mmol/L    Chloride 102 98 - 107 mmol/L    Bicarbonate 27 21 - 32 mmol/L    Anion Gap 12 10 - 20 mmol/L    Urea Nitrogen 17 6 - 23 mg/dL    Creatinine 1.52 (H) 0.50 - 1.30 mg/dL    eGFR 54 (L) >60 mL/min/1.73m*2    Calcium 9.2 8.6 - 10.6 mg/dL   Magnesium   Result Value Ref Range    Magnesium 1.79 1.60 - 2.40 mg/dL   Phosphorus   Result Value Ref Range    Phosphorus 3.6 2.5 - 4.9 mg/dL        IMAGING  XR chest 1 view    Result Date: 12/24/2023  Interpreted By:  Elaina Reynoso and Bera Kaustav STUDY: XR CHEST 1 VIEW;  12/24/2023 8:14 pm   INDICATION: Signs/Symptoms:short of breath, wheezing, suspect copd.   COMPARISON: Chest radiograph dated 12/11/2023   ACCESSION NUMBER(S): [id number]   ORDERING CLINICIAN: KIMBERLY SAUCEDO   FINDINGS: AP radiograph of the chest is provided.   CARDIOMEDIASTINAL SILHOUETTE: Cardiomediastinal silhouette is severely enlarged, however similar to prior.   LUNGS: There is mild perihilar interstitial prominence, stable. Emphysematous changes are again visualized with the left upper lobe medial bulla. No  consolidation, pleural effusion or pneumothorax.   ABDOMEN: No remarkable upper abdominal findings.   BONES: No acute osseous abnormality.       1. Stable severe cardiomegaly and/or pericardial effusion. 2. Mild perihilar interstitial edema. Please correlate for CHF   I personally reviewed the images/study and I agree with the findings as stated. This study was interpreted at [facility], [place], [place].   Signed by: Elaina Reynoso 12/24/2023 9:10 PM Dictation workstation:   [id number]       PSYCHIATRIC RISK ASSESSMENT  Acute Risk of Harm to Others is Considered: Low  Acute Risk of Harm to Self is Considered: High    ASSESSMENT AND PLAN  Leonel Yu is a 52 YO male with PMHx of HTN, HFrEF (10-15% 3/2023), substance use disorder (tobacco, crack cocaine), CKD, suspected COPD w/Emphysema (no PFTs on file); PPH of MDD/RADHA with previous admissions and history of medication non-compliance. Patient presented to ED with SOB and SI in setting of crack cocaine use. CL consulted for SI. On assessment, patient was drowsy and only able to give limited information. However, reported SI/AVH in setting of crack cocaine use, currently with euthymic mood and intermittent SI. Current presentation likely SIMD, which was exacerbation by crack cocaine use, and non-adherence with both medical/psychiatric medications.     Update 12/26: Patient is well known to this interviewer. Continues to endorse increased psychosocial stressors, especially during the holidays. He continues to endorse SI now with specific plan to die with carbon monoxide poisoning if discharged to current living circumstances. He is also noting some apathy towards previously established self-protective mechanisms which would elevate his risk assessment. He meets criteria for inpatient psychiatric hospitalization.     Update 12/27: Patient continues to endorse intrusive suicidal ruminations, with specific plan and conditional  "intent. Many risk factors present - substance use, lack of social support, homelessness, hopelessness, history of suicide attempts. He is unable to discuss protective factors.  Inpatient psychiatry remains the best course of action - discussed with primary team to order additional studies needed for transfer.     Poor insight into his substance use, but is accepting of treatment plan. Provided additional social resources aimed at housing and basic needs that he can access once discharged from inpatient psychiatry.       IMPRESSION  Unspecified depressive disorder  Stimulant use disorder (cocaine), severe    SAFETY  - Patient DOES currently meet criteria for inpatient psychiatric admission. Once patient is deemed medically cleared, please document in note that patient is MEDICALLY CLEARED and contact [facility] for referral at [phone], pager [phone]. Issue Application for Emergency Admission (pink slip) only after patient is accepted to an inpatient psychiatric unit and is ready to be discharged. Search “Application for Emergency Admission” under SmartText.”  - Patient lacks the capacity to leave AMA at this time and thus cannot leave AMA. Call [facility] if patient attempts to leave AMA.  - To evaluate decision-making capacity, recommend use of the Capacity Evaluation Tool. Search “ IP Capacity Evaluation under SmartText\" unless the patient has a legal guardian, in which case all decisions per the legal guardian.  - Patient DOES require a 1:1 sitter from a psychiatric perspective at this time.  - No changes to safety plan completed on 12/13/2023 during last admission.   - As with all hospitalized patients, would recommend delirium precautions, as below.   -- Minimize use of benzos, opiates, anticholinergics, as these may worsen mental status   -- Would use caution with narcotic pain medications   -- Would still adequately controlling pain, as uncontrolled pain is also a risk factor for delirium   -- Reinforce sleep " hygiene; encourage patient to stay awake during the day   -- Keep curtains/blinds open during the day and closed at night.   -- Would recommend reorienting/redirecting patient as much as possible,    -- Aim for consistent staffing, familiar objects, avoiding bright lights and loud noises, etc.      WORKUP  - EKG, UA, CK (requested from [facility] prior to transfer)  - Please engage SW for dispo issues      MEDICATIONS  - Continue Zoloft 50mg PO Daily     Ancillary Services:  - Recommend , pet/music/art therapy consult as patient tolerates  - Provided resources on the [facility] ([facility]) and the [facility].      - Discussed recommendations with primary team.  - Psychiatry will continue to follow     Patient discussed with Dr. Varela, who agrees with above plan.     Thank you for allowing us to participate in the care of this patient. Please page [phone] with any questions or concerns.      Medication Consent  Medication Consent: n/a; consult service    Mohit Russell MD  12/27/2023  10:11 AM

## 2023-12-27 NOTE — PROGRESS NOTES
Transitional Care Coordination Progress Note:  Patient discussed during interdisciplinary rounds.   Team members present: Abhijeet Prieto RN [facility], [facility] Team, Nurse Manager  Plan per Medical/Surgical team: Psych consult, PT/OT, Suicide precautions, 1:1 sitter  Payer: [facility] [employer]  Status: inpatient  Discharge disposition: Inpatient Psych  Potential Barriers: EPAT bed availability  ADOD: Medically ready  Abhijeet Prieto RN Transitional Care Coordinator [phone]

## 2023-12-27 NOTE — CARE PLAN
Pt remained HDS and free of injury this shift. Pt still has psych ordered sitter at bedside for SI. Pt had an uneventful night.    Problem: Risk for Suicide  Goal: Accepts medications as prescribed/needed this shift  Outcome: Progressing     Problem: Risk for Suicide  Goal: Identifies supports this shift  Outcome: Progressing     Problem: Risk for Suicide  Goal: Makes needs known through verbalization or behaviors this shift  Outcome: Progressing     Problem: Risk for Suicide  Goal: No self harm this shift  Outcome: Progressing     Problem: Risk for Suicide  Goal: Read Safety Guidelines this shift  Outcome: Progressing     Problem: Risk for Suicide  Goal: Complete Mental Health Safety Plan (psychiatry only) this shift  Outcome: Progressing     Problem: Pain  Goal: My pain/discomfort is manageable  Outcome: Progressing     Problem: Safety  Goal: Patient will be injury free during hospitalization  Outcome: Progressing

## 2023-12-27 NOTE — SIGNIFICANT EVENT
Application for Emergency Admission      Ready for Transfer?  Is the patient medically cleared for transfer to inpatient psychiatry: Yes  Has the patient been accepted to an inpatient psychiatric hospital: Yes    Application for Emergency Admission  IN ACCORDANCE [place]  The Chief Clinical Officer of[facility] 12/27/2023 .11:33 AM    Reason for Hospitalization  The undersigned has reason to believe that: Leonel Yu Is a mentally ill person subject to hospitalization by court order under division B Section 5122.01 of the Revised Code, i.e., this person:    1.Yes  Represents a substantial risk of physical harm to self as manifested by evidence of threats of, or attempts at, suicide or serious self-inflicted bodily harm    2.No Represents a substantial risk of physical harm to others as manifested by evidence of recent homicidal or other violent behavior, evidence of recent threats that place another in reasonable fear of violent behavior and serious physical harm, or other evidence of present dangerousness    3.No Represents a substantial and immediate risk of serious physical impairment or injury to self as manifested by  evidence that the person is unable to provide for and is not providing for the person's basic physical needs because of the person's mental illness and that appropriate provision for those needs cannot be made  immediately available in the community    4.Yes Would benefit from treatment in a hospital for his mental illness and is in need of such treatment as manifested by evidence of behavior that creates a grave and imminent risk to substantial rights of others or  himself.    5.Yes Would benefit from treatment as manifested by evidence of behavior that indicates all of the following:       (a) The person is unlikely to survive safely in the community without supervision, based on a clinical determination.       (b) The person has a history of lack of compliance  with treatment for mental illness and one of the following applies:      (i) At least twice within the thirty-six months prior to the filing of an affidavit seeking court-ordered treatment of the person under section 5122.111 of the Revised Code, the lack of compliance has been a significant factor in necessitating hospitalization in a hospital or receipt of services in a forensic or other mental health unit of a correctional facility, provided that the thirty-six-month period shall be extended by the length of any hospitalization or incarceration of the person that occurred within the thirty-six-month period.      (ii) Within the forty-eight months prior to the filing of an affidavit seeking court-ordered treatment of the person under section 5122.111 of the Revised Code, the lack of compliance resulted in one or more acts of serious violent behavior toward self or others or threats of, or attempts at, serious physical harm to self or others, provided that the forty-eight-month period shall be extended by the length of any hospitalization or incarceration of the person that occurred within the forty-eight-month period.      (c) The person, as a result of mental illness, is unlikely to voluntarily participate in necessary treatment.       (d) In view of the person's treatment history and current behavior, the person is in need of treatment in order to prevent a relapse or deterioration that would be likely to result in substantial risk of serious harm to the person or others.    (e) Represents a substantial risk of physical harm to self or others if allowed to remain at liberty pending examination.    Therefore, it is requested that said person be admitted to the above named facility.    STATEMENT OF BELIEF    Must be filled out by one of the following: a psychiatrist, licensed physician, licensed clinical psychologist, health or ,  or .  (Statement shall include the circumstances  "under which the individual was taken into custody and the reason for the person's belief that hospitalization is necessary. The statement shall also include a reference to efforts made to secure the individual's property at his residence if he was taken into custody there. Every reasonable and appropriate effort should be made to take this person into custody in the least conspicuous manner possible.)    Per psychiatry note on 12/26: \"He endorses SI with plan to start a vehicle and suffocate himself with carbon monoxide in a garage which he has access to. Continues to deny HI/AVH.\"    Souleymane Alva MD PhD 12/27/2023     _____________________________________________________________   Place of Employment: [employer]    STATEMENT OF OBSERVATION BY PSYCHIATRIST, LICENSED PHYSICIAN, OR LICENSED CLINICAL PSYCHOLOGIST, IF APPLICABLE    Place of Observation (e.g., [facility], general hospital, office, emergency facility)  (If applicable, please complete)    Souleymane Alva MD PhD 12/27/2023    _____________________________________________________________     "

## 2023-12-28 VITALS
HEART RATE: 75 BPM | WEIGHT: 161.38 LBS | RESPIRATION RATE: 18 BRPM | HEIGHT: 70 IN | OXYGEN SATURATION: 94 % | BODY MASS INDEX: 23.1 KG/M2 | DIASTOLIC BLOOD PRESSURE: 64 MMHG | SYSTOLIC BLOOD PRESSURE: 96 MMHG | TEMPERATURE: 97.1 F

## 2023-12-28 PROBLEM — R07.2 PRECORDIAL PAIN: Status: RESOLVED | Noted: 2023-12-25 | Resolved: 2023-12-28

## 2023-12-28 LAB
ANION GAP SERPL CALC-SCNC: 13 MMOL/L (ref 10–20)
BASOPHILS # BLD AUTO: 0.07 X10*3/UL (ref 0–0.1)
BASOPHILS NFR BLD AUTO: 0.8 %
BUN SERPL-MCNC: 22 MG/DL (ref 6–23)
CALCIUM SERPL-MCNC: 9.9 MG/DL (ref 8.6–10.6)
CHLORIDE SERPL-SCNC: 96 MMOL/L (ref 98–107)
CO2 SERPL-SCNC: 28 MMOL/L (ref 21–32)
CREAT SERPL-MCNC: 1.51 MG/DL (ref 0.5–1.3)
EOSINOPHIL # BLD AUTO: 0.07 X10*3/UL (ref 0–0.7)
EOSINOPHIL NFR BLD AUTO: 0.8 %
ERYTHROCYTE [DISTWIDTH] IN BLOOD BY AUTOMATED COUNT: 13.2 % (ref 11.5–14.5)
GFR SERPL CREATININE-BSD FRML MDRD: 55 ML/MIN/1.73M*2
GLUCOSE SERPL-MCNC: 336 MG/DL (ref 74–99)
HCT VFR BLD AUTO: 58.1 % (ref 41–52)
HGB BLD-MCNC: 19.9 G/DL (ref 13.5–17.5)
IMM GRANULOCYTES # BLD AUTO: 0.03 X10*3/UL (ref 0–0.7)
IMM GRANULOCYTES NFR BLD AUTO: 0.3 % (ref 0–0.9)
LYMPHOCYTES # BLD AUTO: 1.05 X10*3/UL (ref 1.2–4.8)
LYMPHOCYTES NFR BLD AUTO: 11.6 %
MAGNESIUM SERPL-MCNC: 2.05 MG/DL (ref 1.6–2.4)
MCH RBC QN AUTO: 31.6 PG (ref 26–34)
MCHC RBC AUTO-ENTMCNC: 34.3 G/DL (ref 32–36)
MCV RBC AUTO: 92 FL (ref 80–100)
MONOCYTES # BLD AUTO: 0.42 X10*3/UL (ref 0.1–1)
MONOCYTES NFR BLD AUTO: 4.6 %
NEUTROPHILS # BLD AUTO: 7.43 X10*3/UL (ref 1.2–7.7)
NEUTROPHILS NFR BLD AUTO: 81.9 %
NRBC BLD-RTO: 0 /100 WBCS (ref 0–0)
PHOSPHATE SERPL-MCNC: 3.7 MG/DL (ref 2.5–4.9)
PLATELET # BLD AUTO: 201 X10*3/UL (ref 150–450)
POTASSIUM SERPL-SCNC: 4.2 MMOL/L (ref 3.5–5.3)
RBC # BLD AUTO: 6.29 X10*6/UL (ref 4.5–5.9)
SODIUM SERPL-SCNC: 133 MMOL/L (ref 136–145)
WBC # BLD AUTO: 9.1 X10*3/UL (ref 4.4–11.3)

## 2023-12-28 PROCEDURE — 2500000004 HC RX 250 GENERAL PHARMACY W/ HCPCS (ALT 636 FOR OP/ED): Mod: SE

## 2023-12-28 PROCEDURE — 83735 ASSAY OF MAGNESIUM: CPT

## 2023-12-28 PROCEDURE — 99238 HOSP IP/OBS DSCHRG MGMT 30/<: CPT

## 2023-12-28 PROCEDURE — G0378 HOSPITAL OBSERVATION PER HR: HCPCS

## 2023-12-28 PROCEDURE — 36415 COLL VENOUS BLD VENIPUNCTURE: CPT

## 2023-12-28 PROCEDURE — 2500000001 HC RX 250 WO HCPCS SELF ADMINISTERED DRUGS (ALT 637 FOR MEDICARE OP): Mod: SE

## 2023-12-28 PROCEDURE — 85025 COMPLETE CBC W/AUTO DIFF WBC: CPT

## 2023-12-28 PROCEDURE — 84100 ASSAY OF PHOSPHORUS: CPT

## 2023-12-28 PROCEDURE — 82374 ASSAY BLOOD CARBON DIOXIDE: CPT

## 2023-12-28 RX ORDER — TORSEMIDE 10 MG/1
50 TABLET ORAL DAILY
Qty: 150 TABLET | Refills: 0 | Status: SHIPPED | OUTPATIENT
Start: 2023-12-29 | End: 2023-12-28 | Stop reason: SDUPTHER

## 2023-12-28 RX ORDER — SPIRONOLACTONE 25 MG/1
12.5 TABLET ORAL DAILY
Qty: 300 TABLET | Refills: 0
Start: 2023-12-29

## 2023-12-28 RX ORDER — TORSEMIDE 10 MG/1
50 TABLET ORAL DAILY
Start: 2023-12-29 | End: 2024-02-16 | Stop reason: HOSPADM

## 2023-12-28 RX ADMIN — SERTRALINE HYDROCHLORIDE 50 MG: 50 TABLET ORAL at 08:45

## 2023-12-28 RX ADMIN — SPIRONOLACTONE 12.5 MG: 25 TABLET, FILM COATED ORAL at 08:45

## 2023-12-28 RX ADMIN — ASPIRIN 81 MG: 81 TABLET, COATED ORAL at 08:45

## 2023-12-28 RX ADMIN — CARVEDILOL 6.25 MG: 6.25 TABLET, FILM COATED ORAL at 08:45

## 2023-12-28 RX ADMIN — SACUBITRIL AND VALSARTAN 1 TABLET: 24; 26 TABLET, FILM COATED ORAL at 08:45

## 2023-12-28 ASSESSMENT — COGNITIVE AND FUNCTIONAL STATUS - GENERAL
MOBILITY SCORE: 24
DAILY ACTIVITIY SCORE: 24

## 2023-12-28 ASSESSMENT — PAIN SCALES - GENERAL: PAINLEVEL_OUTOF10: 0 - NO PAIN

## 2023-12-28 NOTE — PROGRESS NOTES
"SUBJECTIVE  Patient seen at bedside. He denies any concerns. Denies any issues with sleep or appetite. He states that it is difficult to talk about sensitive topics and requests the sitter leave the room. He continues to endorse SI which he notes has increased in intensity this morning. Denies HI/AVH. He expresses appreciation for care and states he wishes this interviewer continued caring for him at his next location.     Per sitter, no sleep or behavioral issues. Patient has been pleasant and calm.     OBJECTIVE    VITALS      12/27/2023     7:19 AM 12/27/2023    11:19 AM 12/27/2023     3:47 PM 12/27/2023     8:07 PM 12/28/2023    12:24 AM 12/28/2023     4:50 AM 12/28/2023     7:10 AM   Vitals   Systolic 116 102 109 101 90 97 92   Diastolic 80 62 75 73 53 66 66   Heart Rate 78 90 81 83 75 69 80   Temp 36.1 °C (96.9 °F) 36 °C (96.8 °F) 36.1 °C (97 °F) 35.8 °C (96.4 °F) 35.6 °C (96 °F) 35.6 °C (96 °F) 36.1 °C (96.9 °F)   Resp 18 18 18 18 18 18 18   Weight (lb)     161.38     BMI     23.16 kg/m2     BSA (m2)     1.9 m2          MENTAL STATUS EXAM  Appearance: 52 yo M, appears older than stated age, thin, hospital gown, poor dentition, poor grooming/hygiene.   Behavior/Attitude: Calm and cooperative, familiar. Body turned away from interviewer.   Motor: No PMA/PMR noted  Speech: Conversational volume, rate, rhythm, tone  Mood: \"alright\"   Affect: full ranged, laughing at times.    Thought process: Goal directed  Thought content: +SI, with specific plan to asphyxiate with carbon monoxide. Conditional intent if discharged to current situation. Denies AH/VH.  Orientation: Orientated to person, place, year, month.   Attention/concentration: intact to conversation  Language: No aphasia during conversation  Fund of knowledge: fair  Insight: Poor insight into substance use contributions to his mental health and psychosocial difficulties.   Judgment: Fair - accepting of treatment plan      CURRENT MEDICATIONS  Scheduled " medications  aspirin, 81 mg, oral, Daily  atorvastatin, 80 mg, oral, Daily  carvedilol, 6.25 mg, oral, BID  enoxaparin, 40 mg, subcutaneous, q24h  sacubitriL-valsartan, 1 tablet, oral, BID  sertraline, 50 mg, oral, Daily  spironolactone, 12.5 mg, oral, Daily  tiotropium, 2 Inhalation, inhalation, Daily  torsemide, 50 mg, oral, Daily        Continuous medications       PRN medications  PRN medications: melatonin, polyethylene glycol     LABS  Results for orders placed or performed during the hospital encounter of 12/24/23 (from the past 24 hour(s))   Urinalysis with Reflex Microscopic   Result Value Ref Range    Color, Urine Straw Straw, Yellow    Appearance, Urine Clear Clear    Specific Gravity, Urine 1.013 1.005 - 1.035    pH, Urine 6.0 5.0, 5.5, 6.0, 6.5, 7.0, 7.5, 8.0    Protein, Urine NEGATIVE NEGATIVE mg/dL    Glucose, Urine >=500 (3+) (A) NEGATIVE mg/dL    Blood, Urine NEGATIVE NEGATIVE    Ketones, Urine NEGATIVE NEGATIVE mg/dL    Bilirubin, Urine NEGATIVE NEGATIVE    Urobilinogen, Urine <2.0 <2.0 mg/dL    Nitrite, Urine NEGATIVE NEGATIVE    Leukocyte Esterase, Urine NEGATIVE NEGATIVE   CBC and Auto Differential   Result Value Ref Range    WBC 9.1 4.4 - 11.3 x10*3/uL    nRBC 0.0 0.0 - 0.0 /100 WBCs    RBC 6.29 (H) 4.50 - 5.90 x10*6/uL    Hemoglobin 19.9 (H) 13.5 - 17.5 g/dL    Hematocrit 58.1 (H) 41.0 - 52.0 %    MCV 92 80 - 100 fL    MCH 31.6 26.0 - 34.0 pg    MCHC 34.3 32.0 - 36.0 g/dL    RDW 13.2 11.5 - 14.5 %    Platelets 201 150 - 450 x10*3/uL    Neutrophils % 81.9 40.0 - 80.0 %    Immature Granulocytes %, Automated 0.3 0.0 - 0.9 %    Lymphocytes % 11.6 13.0 - 44.0 %    Monocytes % 4.6 2.0 - 10.0 %    Eosinophils % 0.8 0.0 - 6.0 %    Basophils % 0.8 0.0 - 2.0 %    Neutrophils Absolute 7.43 1.20 - 7.70 x10*3/uL    Immature Granulocytes Absolute, Automated 0.03 0.00 - 0.70 x10*3/uL    Lymphocytes Absolute 1.05 (L) 1.20 - 4.80 x10*3/uL    Monocytes Absolute 0.42 0.10 - 1.00 x10*3/uL    Eosinophils Absolute  0.07 0.00 - 0.70 x10*3/uL    Basophils Absolute 0.07 0.00 - 0.10 x10*3/uL   Basic Metabolic Panel   Result Value Ref Range    Glucose 336 (H) 74 - 99 mg/dL    Sodium 133 (L) 136 - 145 mmol/L    Potassium 4.2 3.5 - 5.3 mmol/L    Chloride 96 (L) 98 - 107 mmol/L    Bicarbonate 28 21 - 32 mmol/L    Anion Gap 13 10 - 20 mmol/L    Urea Nitrogen 22 6 - 23 mg/dL    Creatinine 1.51 (H) 0.50 - 1.30 mg/dL    eGFR 55 (L) >60 mL/min/1.73m*2    Calcium 9.9 8.6 - 10.6 mg/dL   Magnesium   Result Value Ref Range    Magnesium 2.05 1.60 - 2.40 mg/dL   Phosphorus   Result Value Ref Range    Phosphorus 3.7 2.5 - 4.9 mg/dL        IMAGING  XR chest 1 view    Result Date: 12/24/2023  Interpreted By:  Elaina Reynoso,  [facility] STUDY: XR CHEST 1 VIEW;  12/24/2023 8:14 pm   INDICATION: Signs/Symptoms:short of breath, wheezing, suspect copd.   COMPARISON: Chest radiograph dated 12/11/2023   ACCESSION NUMBER(S): [id number]   ORDERING CLINICIAN: KIMBERLY SAUCEDO   FINDINGS: AP radiograph of the chest is provided.   CARDIOMEDIASTINAL SILHOUETTE: Cardiomediastinal silhouette is severely enlarged, however similar to prior.   LUNGS: There is mild perihilar interstitial prominence, stable. Emphysematous changes are again visualized with the left upper lobe medial bulla. No consolidation, pleural effusion or pneumothorax.   ABDOMEN: No remarkable upper abdominal findings.   BONES: No acute osseous abnormality.       1. Stable severe cardiomegaly and/or pericardial effusion. 2. Mild perihilar interstitial edema. Please correlate for CHF   I personally reviewed the images/study and I agree with the findings as stated. This study was interpreted at [place], [place].   Signed by: Elaina Reynoso 12/24/2023 9:10 PM Dictation workstation:   [id number]       PSYCHIATRIC RISK ASSESSMENT  Acute Risk of Harm to Others is Considered: Low  Acute Risk of Harm to Self is Considered: High    ASSESSMENT AND  PLAN  Leonel Yu is a 52 YO male with PMHx of HTN, HFrEF (10-15% 3/2023), substance use disorder (tobacco, crack cocaine), CKD, suspected COPD w/Emphysema (no PFTs on file); PPH of MDD/RADHA with previous admissions and history of medication non-compliance. Patient presented to ED with SOB and SI in setting of crack cocaine use. CL consulted for SI. On assessment, patient was drowsy and only able to give limited information. However, reported SI/AVH in setting of crack cocaine use, currently with euthymic mood and intermittent SI. Current presentation likely SIMD, which was exacerbation by crack cocaine use, and non-adherence with both medical/psychiatric medications.     Update 12/26: Patient is well known to this interviewer. Continues to endorse increased psychosocial stressors, especially during the holidays. He continues to endorse SI now with specific plan to die with carbon monoxide poisoning if discharged to current living circumstances. He is also noting some apathy towards previously established self-protective mechanisms which would elevate his risk assessment. He meets criteria for inpatient psychiatric hospitalization.     Update 12/27: Patient continues to endorse intrusive suicidal ruminations, with specific plan and conditional intent. Many risk factors present - substance use, lack of social support, homelessness, hopelessness, history of suicide attempts. He is unable to discuss protective factors.  Inpatient psychiatry remains the best course of action - discussed with primary team to order additional studies needed for transfer.     Poor insight into his substance use, but is accepting of treatment plan. Provided additional social resources aimed at housing and basic needs that he can access once discharged from inpatient psychiatry.       Update 12/28: Patient continues to express suicidal ideation with plan. He is anticipating discharge and transfer to inpatient psychiatry for further  "stabilization this afternoon. No medication changes today.     IMPRESSION  Unspecified depressive disorder  Stimulant use disorder (cocaine), severe    SAFETY  - Patient DOES currently meet criteria for inpatient psychiatric admission. Once patient is deemed medically cleared, please document in note that patient is MEDICALLY CLEARED and contact [facility] for referral at [phone], pager [phone]. Issue Application for Emergency Admission (pink slip) only after patient is accepted to an inpatient psychiatric unit and is ready to be discharged. Search “Application for Emergency Admission” under SmartText.”  - Patient lacks the capacity to leave AMA at this time and thus cannot leave AMA. Call [facility] if patient attempts to leave AMA.  - To evaluate decision-making capacity, recommend use of the Capacity Evaluation Tool. Search “ IP Capacity Evaluation under SmartText\" unless the patient has a legal guardian, in which case all decisions per the legal guardian.  - Patient DOES require a 1:1 sitter from a psychiatric perspective at this time.  - No changes to safety plan completed on 12/13/2023 during last admission.   - As with all hospitalized patients, would recommend delirium precautions, as below.   -- Minimize use of benzos, opiates, anticholinergics, as these may worsen mental status   -- Would use caution with narcotic pain medications   -- Would still adequately controlling pain, as uncontrolled pain is also a risk factor for delirium   -- Reinforce sleep hygiene; encourage patient to stay awake during the day   -- Keep curtains/blinds open during the day and closed at night.   -- Would recommend reorienting/redirecting patient as much as possible,    -- Aim for consistent staffing, familiar objects, avoiding bright lights and loud noises, etc.      WORKUP  - EKG, UA, CK (requested from [facility] prior to transfer)  - Please engage SW for dispo issues      MEDICATIONS  - Continue Zoloft 50mg PO Daily     Ancillary " Services:  - Recommend , pet/music/art therapy consult as patient tolerates  - Provided resources on the [facility] ([facility]) and the [facility].      - Discussed recommendations with primary team.  - Psychiatry will continue to follow     Patient discussed with Dr. Garrett, who agrees with above plan.     Thank you for allowing us to participate in the care of this patient. Please page [phone] with any questions or concerns.      Medication Consent  Medication Consent: n/a; consult service    Bart Crawford MD  Adult Psychiatry, PGY-2  [facility]

## 2023-12-28 NOTE — DISCHARGE SUMMARY
"Discharge Diagnosis  Precordial pain    Issues Requiring Follow-Up  Suicidal ideation  HFrEF    Test Results Pending At Discharge  Pending Labs       No current pending labs.            Hospital Course  Lenoel Yu is a 52 y/o M with PMHx HTN, HFrEF (EF 15-20% in 7/2023), substance use disorder (tobacco, crack cocaine), CKD, suspected COPD w/Emphysema (no PFTs  on file)who presented to ED with SI, shortness of breath in the setting of not taking medications for the last couple of days.  On exam patient has mild bibasilar crackles and chest x-ray with mild perihilar edema. Patient was restarted on home medications and psych was consulted for SI. Per psych, \"SI with plan to start a vehicle and suffocate himself with carbon monoxide in a garage which he has access to. \" Patient was medically optimized and then transferred to inpatient psychiatry.    Pertinent Physical Exam At Time of Discharge  Physical Exam  General: pleasant, in NAD  HEENT: normocephalic, atraumatic  CV: heart regular rate and rhythm w/o murmurs  Resp: normal respiratory effort  Ext: no peripheral edema  Neuro: grossly nonfocal, speech clear    Vitals  Vitals:    12/28/23 0710   BP: 92/66   Pulse: 80   Resp: 18   Temp: 36.1 °C (96.9 °F)   SpO2: 94%           Home Medications     Medication List      START taking these medications     sertraline 50 mg tablet; Commonly known as: Zoloft; TAKE 1 TABLET BY   MOUTH ONCE DAILY     CHANGE how you take these medications     spironolactone 25 mg tablet; Commonly known as: Aldactone; Take 0.5   tablets (12.5 mg) by mouth once daily. Do not start before December 29, 2023.; Start taking on: December 29, 2023; What changed: how much to take   torsemide 10 mg tablet; Commonly known as: Demadex; Take 5 tablets (50   mg) by mouth once daily. Do not start before December 29, 2023.; Start   taking on: December 29, 2023; What changed: medication strength, how much   to take, how to take this, when to take this, " additional instructions     CONTINUE taking these medications     atorvastatin 40 mg tablet; Commonly known as: Lipitor; Take 2 tablets   (80 mg) by mouth once daily.   carvedilol 6.25 mg tablet; Commonly known as: Coreg; TAKE 1 TABLET BY   MOUTH TWO TIMES A DAY   Entresto 24-26 mg tablet; Generic drug: sacubitriL-valsartan; TAKE 1   TABLET BY MOUTH TWO TIMES A DAY   Jardiance 10 mg; Generic drug: empagliflozin; TAKE 1 TABLET BY MOUTH   ONCE DAILY   melatonin 5 mg tablet; Take 1 tablet (5 mg) by mouth as needed at   bedtime for sleep.   polyethylene glycol 17 gram packet; Commonly known as: Glycolax,   Miralax; Mix 17g (1 packet) in liquid and take by mouth once daily as   needed (Constipation).   tiotropium 2.5 mcg/actuation inhaler; Commonly known as: Spiriva   Respimat; Inhale 2 puffs once daily.     STOP taking these medications     aspirin 81 mg EC tablet   dicyclomine 10 mg capsule; Commonly known as: Bentyl   hydrOXYzine HCL 10 mg tablet; Commonly known as: Atarax       Outpatient Follow-Up  [facility]    Souleymane Alva MD PhD

## 2023-12-28 NOTE — DISCHARGE INSTRUCTIONS
Dear Basil Gigi,    You were treated at [facility] for shortness of breath. This improved after we restarted your home medications. We have requested for the heart failure clinic to follow up with you after you discharge to help with your heart failure. You also had suicidal thoughts so psychiatry was asked to see you and treat you. They recommended inpatient psychiatry treatment to help you so you will transfer to their care. Thank you for allowing us to care for you and we wish you all the best.    Sincerely,  Your medical team

## 2023-12-28 NOTE — CARE PLAN
The patient's goals for the shift include      The clinical goals for the shift include patient will not report any dizziness or shortness of breath throughout shift    Over the shift, the patient did not report any pain,dizziness or shortness of breath; did not verbalize or show any signs of self harm; continues to be 1:1 observation

## 2023-12-28 NOTE — HOSPITAL COURSE
"Leonel Yu is a 52 y/o M with PMHx HTN, HFrEF (EF 15-20% in 7/2023), substance use disorder (tobacco, crack cocaine), CKD, suspected COPD w/Emphysema (no PFTs  on file)who presented to ED with SI, shortness of breath in the setting of not taking medications for the last couple of days.  On exam patient has mild bibasilar crackles and chest x-ray with mild perihilar edema. Patient was restarted on home medications and psych was consulted for SI. Per psych, \"SI with plan to start a vehicle and suffocate himself with carbon monoxide in a garage which he has access to. \" Patient was medically optimized and then transferred to inpatient psychiatry.  "

## 2023-12-28 NOTE — PROGRESS NOTES
Transitional Care Coordination Progress Note:  Per bedside RN, patient has bed at [facility] and needs transport setup. This TCC placed request in [facility] for transport to [facility]. Awaiting confirmed time.   Update @ 0910 Patient transport confirmed by [facility] [phone] by stretcher for 1530. Blue slip delivered to  and copy given to bedside nurse. Bedside nurse was given report number by [facility]. Team notified   Abhijeet Prieto RN Transitional Care Coordinator [phone]

## 2023-12-28 NOTE — NURSING NOTE
Patient discharged from LT5 to [facility] in [place], [place] for inpatient psych. Patient was taken off of telemetry monitoring and IV was removed intact. Report was called to number [phone]. Patient's belongings were given to transport. [facility] took patient off floor in stretcher.   Lilibeth Kincaid RN

## 2024-01-25 ENCOUNTER — APPOINTMENT (OUTPATIENT)
Dept: GENERAL RADIOLOGY | Age: 54
DRG: 751 | End: 2024-01-25
Payer: MEDICAID

## 2024-01-25 ENCOUNTER — HOSPITAL ENCOUNTER (INPATIENT)
Age: 54
LOS: 6 days | Discharge: NURSING, REHAB OR LONG-TERM CARE | DRG: 751 | End: 2024-01-31
Attending: EMERGENCY MEDICINE | Admitting: PSYCHIATRY & NEUROLOGY
Payer: MEDICAID

## 2024-01-25 DIAGNOSIS — R45.851 DEPRESSION WITH SUICIDAL IDEATION: Primary | ICD-10-CM

## 2024-01-25 DIAGNOSIS — F32.A DEPRESSION WITH SUICIDAL IDEATION: Primary | ICD-10-CM

## 2024-01-25 DIAGNOSIS — R06.01 ORTHOPNEA: ICD-10-CM

## 2024-01-25 LAB
ALBUMIN SERPL-MCNC: 4 G/DL (ref 3.5–5.2)
ALP SERPL-CCNC: 88 U/L (ref 40–129)
ALT SERPL-CCNC: 28 U/L (ref 5–41)
AMPHET UR QL SCN: NEGATIVE
ANION GAP SERPL CALCULATED.3IONS-SCNC: 12 MMOL/L (ref 9–17)
APAP SERPL-MCNC: <5 UG/ML (ref 10–30)
AST SERPL-CCNC: 19 U/L
BARBITURATES UR QL SCN: NEGATIVE
BASOPHILS # BLD: 0.1 K/UL (ref 0–0.2)
BASOPHILS NFR BLD: 2 % (ref 0–2)
BENZODIAZ UR QL: NEGATIVE
BILIRUB SERPL-MCNC: 0.7 MG/DL (ref 0.3–1.2)
BNP SERPL-MCNC: 9371 PG/ML
BUN SERPL-MCNC: 21 MG/DL (ref 6–20)
CALCIUM SERPL-MCNC: 9.2 MG/DL (ref 8.6–10.4)
CANNABINOIDS UR QL SCN: NEGATIVE
CHLORIDE SERPL-SCNC: 101 MMOL/L (ref 98–107)
CO2 SERPL-SCNC: 22 MMOL/L (ref 20–31)
COCAINE UR QL SCN: NEGATIVE
CREAT SERPL-MCNC: 1.2 MG/DL (ref 0.7–1.2)
EOSINOPHIL # BLD: 0.1 K/UL (ref 0–0.4)
EOSINOPHILS RELATIVE PERCENT: 1 % (ref 0–4)
ERYTHROCYTE [DISTWIDTH] IN BLOOD BY AUTOMATED COUNT: 14.1 % (ref 11.5–14.9)
ETHANOL PERCENT: <0.01 %
ETHANOLAMINE SERPL-MCNC: <10 MG/DL
FENTANYL UR QL: NEGATIVE
GFR SERPL CREATININE-BSD FRML MDRD: >60 ML/MIN/1.73M2
GLUCOSE BLD-MCNC: 224 MG/DL (ref 75–110)
GLUCOSE BLD-MCNC: 247 MG/DL (ref 75–110)
GLUCOSE SERPL-MCNC: 324 MG/DL (ref 70–99)
HCT VFR BLD AUTO: 52 % (ref 41–53)
HGB BLD-MCNC: 17.5 G/DL (ref 13.5–17.5)
INR PPP: 1.1
LYMPHOCYTES NFR BLD: 1 K/UL (ref 1–4.8)
LYMPHOCYTES RELATIVE PERCENT: 18 % (ref 24–44)
MAGNESIUM SERPL-MCNC: 2 MG/DL (ref 1.6–2.6)
MCH RBC QN AUTO: 31.6 PG (ref 26–34)
MCHC RBC AUTO-ENTMCNC: 33.6 G/DL (ref 31–37)
MCV RBC AUTO: 94 FL (ref 80–100)
METHADONE UR QL: NEGATIVE
MONOCYTES NFR BLD: 0.3 K/UL (ref 0.1–1.3)
MONOCYTES NFR BLD: 6 % (ref 1–7)
NEUTROPHILS NFR BLD: 73 % (ref 36–66)
NEUTS SEG NFR BLD: 3.8 K/UL (ref 1.3–9.1)
OPIATES UR QL SCN: NEGATIVE
OXYCODONE UR QL SCN: NEGATIVE
PCP UR QL SCN: NEGATIVE
PLATELET # BLD AUTO: 159 K/UL (ref 150–450)
PMV BLD AUTO: 9.1 FL (ref 6–12)
POTASSIUM SERPL-SCNC: 4.5 MMOL/L (ref 3.7–5.3)
PROT SERPL-MCNC: 7.8 G/DL (ref 6.4–8.3)
PROTHROMBIN TIME: 14.9 SEC (ref 11.8–14.6)
RBC # BLD AUTO: 5.53 M/UL (ref 4.5–5.9)
SALICYLATES SERPL-MCNC: <1 MG/DL (ref 3–10)
SODIUM SERPL-SCNC: 135 MMOL/L (ref 135–144)
TEST INFORMATION: NORMAL
TROPONIN I SERPL HS-MCNC: 27 NG/L (ref 0–22)
TROPONIN I SERPL HS-MCNC: 27 NG/L (ref 0–22)
WBC OTHER # BLD: 5.3 K/UL (ref 3.5–11)

## 2024-01-25 PROCEDURE — 99285 EMERGENCY DEPT VISIT HI MDM: CPT

## 2024-01-25 PROCEDURE — 80053 COMPREHEN METABOLIC PANEL: CPT

## 2024-01-25 PROCEDURE — 1240000000 HC EMOTIONAL WELLNESS R&B

## 2024-01-25 PROCEDURE — 93005 ELECTROCARDIOGRAM TRACING: CPT | Performed by: EMERGENCY MEDICINE

## 2024-01-25 PROCEDURE — 82947 ASSAY GLUCOSE BLOOD QUANT: CPT

## 2024-01-25 PROCEDURE — 85025 COMPLETE CBC W/AUTO DIFF WBC: CPT

## 2024-01-25 PROCEDURE — 6370000000 HC RX 637 (ALT 250 FOR IP): Performed by: PSYCHIATRY & NEUROLOGY

## 2024-01-25 PROCEDURE — 6370000000 HC RX 637 (ALT 250 FOR IP): Performed by: EMERGENCY MEDICINE

## 2024-01-25 PROCEDURE — 80307 DRUG TEST PRSMV CHEM ANLYZR: CPT

## 2024-01-25 PROCEDURE — 94640 AIRWAY INHALATION TREATMENT: CPT

## 2024-01-25 PROCEDURE — 36415 COLL VENOUS BLD VENIPUNCTURE: CPT

## 2024-01-25 PROCEDURE — 80179 DRUG ASSAY SALICYLATE: CPT

## 2024-01-25 PROCEDURE — 85610 PROTHROMBIN TIME: CPT

## 2024-01-25 PROCEDURE — 84484 ASSAY OF TROPONIN QUANT: CPT

## 2024-01-25 PROCEDURE — 83735 ASSAY OF MAGNESIUM: CPT

## 2024-01-25 PROCEDURE — 80143 DRUG ASSAY ACETAMINOPHEN: CPT

## 2024-01-25 PROCEDURE — G0480 DRUG TEST DEF 1-7 CLASSES: HCPCS

## 2024-01-25 PROCEDURE — 94664 DEMO&/EVAL PT USE INHALER: CPT

## 2024-01-25 PROCEDURE — 96372 THER/PROPH/DIAG INJ SC/IM: CPT

## 2024-01-25 PROCEDURE — 83880 ASSAY OF NATRIURETIC PEPTIDE: CPT

## 2024-01-25 PROCEDURE — 71045 X-RAY EXAM CHEST 1 VIEW: CPT

## 2024-01-25 RX ORDER — HALOPERIDOL 5 MG/ML
5 INJECTION INTRAMUSCULAR EVERY 6 HOURS PRN
Status: DISCONTINUED | OUTPATIENT
Start: 2024-01-25 | End: 2024-01-31 | Stop reason: HOSPADM

## 2024-01-25 RX ORDER — ASPIRIN 81 MG/1
81 TABLET, CHEWABLE ORAL DAILY
Status: ON HOLD | COMMUNITY
End: 2024-01-31

## 2024-01-25 RX ORDER — LANOLIN ALCOHOL/MO/W.PET/CERES
9 CREAM (GRAM) TOPICAL NIGHTLY
Status: ON HOLD | COMMUNITY
End: 2024-01-31 | Stop reason: HOSPADM

## 2024-01-25 RX ORDER — CARVEDILOL 3.12 MG/1
3.12 TABLET ORAL 2 TIMES DAILY WITH MEALS
Status: ON HOLD | COMMUNITY
End: 2024-01-31

## 2024-01-25 RX ORDER — POLYETHYLENE GLYCOL 3350 17 G/17G
17 POWDER, FOR SOLUTION ORAL DAILY PRN
Status: DISCONTINUED | OUTPATIENT
Start: 2024-01-25 | End: 2024-01-31 | Stop reason: HOSPADM

## 2024-01-25 RX ORDER — MAGNESIUM HYDROXIDE/ALUMINUM HYDROXICE/SIMETHICONE 120; 1200; 1200 MG/30ML; MG/30ML; MG/30ML
30 SUSPENSION ORAL EVERY 6 HOURS PRN
Status: DISCONTINUED | OUTPATIENT
Start: 2024-01-25 | End: 2024-01-31 | Stop reason: HOSPADM

## 2024-01-25 RX ORDER — ACETAMINOPHEN 325 MG/1
650 TABLET ORAL EVERY 6 HOURS PRN
Status: DISCONTINUED | OUTPATIENT
Start: 2024-01-25 | End: 2024-01-31 | Stop reason: HOSPADM

## 2024-01-25 RX ORDER — DICYCLOMINE HYDROCHLORIDE 10 MG/1
10 CAPSULE ORAL EVERY 6 HOURS PRN
Status: ON HOLD | COMMUNITY
End: 2024-01-31

## 2024-01-25 RX ORDER — POLYETHYLENE GLYCOL 3350 17 G
2 POWDER IN PACKET (EA) ORAL
Status: DISCONTINUED | OUTPATIENT
Start: 2024-01-25 | End: 2024-01-25

## 2024-01-25 RX ORDER — INSULIN LISPRO 100 [IU]/ML
0-8 INJECTION, SOLUTION INTRAVENOUS; SUBCUTANEOUS
Status: DISCONTINUED | OUTPATIENT
Start: 2024-01-26 | End: 2024-01-31 | Stop reason: HOSPADM

## 2024-01-25 RX ORDER — FAMOTIDINE 20 MG/1
20 TABLET, FILM COATED ORAL DAILY
Status: ON HOLD | COMMUNITY
End: 2024-01-31

## 2024-01-25 RX ORDER — NICOTINE 21 MG/24HR
1 PATCH, TRANSDERMAL 24 HOURS TRANSDERMAL DAILY
Status: DISCONTINUED | OUTPATIENT
Start: 2024-01-26 | End: 2024-01-29

## 2024-01-25 RX ORDER — LORAZEPAM 1 MG/1
2 TABLET ORAL EVERY 6 HOURS PRN
Status: DISCONTINUED | OUTPATIENT
Start: 2024-01-25 | End: 2024-01-31 | Stop reason: HOSPADM

## 2024-01-25 RX ORDER — HYDROXYZINE 50 MG/1
50 TABLET, FILM COATED ORAL 3 TIMES DAILY PRN
Status: DISCONTINUED | OUTPATIENT
Start: 2024-01-25 | End: 2024-01-31 | Stop reason: HOSPADM

## 2024-01-25 RX ORDER — VALSARTAN 40 MG/1
40 TABLET ORAL DAILY
Status: ON HOLD | COMMUNITY
End: 2024-01-31

## 2024-01-25 RX ORDER — ATORVASTATIN CALCIUM 80 MG/1
80 TABLET, FILM COATED ORAL DAILY
Status: ON HOLD | COMMUNITY
End: 2024-01-31 | Stop reason: HOSPADM

## 2024-01-25 RX ORDER — HALOPERIDOL 5 MG/1
5 TABLET ORAL EVERY 6 HOURS PRN
Status: DISCONTINUED | OUTPATIENT
Start: 2024-01-25 | End: 2024-01-31 | Stop reason: HOSPADM

## 2024-01-25 RX ORDER — IBUPROFEN 400 MG/1
400 TABLET ORAL EVERY 6 HOURS PRN
Status: DISCONTINUED | OUTPATIENT
Start: 2024-01-25 | End: 2024-01-31 | Stop reason: HOSPADM

## 2024-01-25 RX ORDER — INSULIN GLARGINE 100 [IU]/ML
10 INJECTION, SOLUTION SUBCUTANEOUS DAILY
Status: DISCONTINUED | OUTPATIENT
Start: 2024-01-26 | End: 2024-01-28

## 2024-01-25 RX ORDER — DEXTROSE MONOHYDRATE 100 MG/ML
INJECTION, SOLUTION INTRAVENOUS CONTINUOUS PRN
Status: DISCONTINUED | OUTPATIENT
Start: 2024-01-25 | End: 2024-01-31 | Stop reason: HOSPADM

## 2024-01-25 RX ORDER — DIPHENHYDRAMINE HYDROCHLORIDE 50 MG/ML
50 INJECTION INTRAMUSCULAR; INTRAVENOUS EVERY 6 HOURS PRN
Status: DISCONTINUED | OUTPATIENT
Start: 2024-01-25 | End: 2024-01-31 | Stop reason: HOSPADM

## 2024-01-25 RX ORDER — INSULIN LISPRO 100 [IU]/ML
INJECTION, SOLUTION INTRAVENOUS; SUBCUTANEOUS
Status: ON HOLD | COMMUNITY
End: 2024-01-31 | Stop reason: HOSPADM

## 2024-01-25 RX ORDER — LORAZEPAM 2 MG/ML
2 INJECTION INTRAMUSCULAR EVERY 6 HOURS PRN
Status: DISCONTINUED | OUTPATIENT
Start: 2024-01-25 | End: 2024-01-31 | Stop reason: HOSPADM

## 2024-01-25 RX ORDER — HYDROXYZINE HYDROCHLORIDE 25 MG/1
25 TABLET, FILM COATED ORAL EVERY 6 HOURS PRN
Status: ON HOLD | COMMUNITY
End: 2024-01-31

## 2024-01-25 RX ORDER — SERTRALINE HYDROCHLORIDE 100 MG/1
100 TABLET, FILM COATED ORAL DAILY
Status: ON HOLD | COMMUNITY
End: 2024-01-31 | Stop reason: HOSPADM

## 2024-01-25 RX ORDER — TRAZODONE HYDROCHLORIDE 50 MG/1
50 TABLET ORAL NIGHTLY PRN
Status: DISCONTINUED | OUTPATIENT
Start: 2024-01-25 | End: 2024-01-31 | Stop reason: HOSPADM

## 2024-01-25 RX ORDER — ALBUTEROL SULFATE 90 UG/1
2 AEROSOL, METERED RESPIRATORY (INHALATION) EVERY 6 HOURS PRN
Status: DISCONTINUED | OUTPATIENT
Start: 2024-01-25 | End: 2024-01-31 | Stop reason: HOSPADM

## 2024-01-25 RX ORDER — INSULIN LISPRO 100 [IU]/ML
0-4 INJECTION, SOLUTION INTRAVENOUS; SUBCUTANEOUS NIGHTLY
Status: DISCONTINUED | OUTPATIENT
Start: 2024-01-26 | End: 2024-01-31 | Stop reason: HOSPADM

## 2024-01-25 RX ORDER — INSULIN GLARGINE 100 [IU]/ML
10 INJECTION, SOLUTION SUBCUTANEOUS DAILY
Status: ON HOLD | COMMUNITY
End: 2024-01-31 | Stop reason: HOSPADM

## 2024-01-25 RX ADMIN — INSULIN HUMAN 7 UNITS: 100 INJECTION, SOLUTION PARENTERAL at 15:46

## 2024-01-25 RX ADMIN — TRAZODONE HYDROCHLORIDE 50 MG: 50 TABLET ORAL at 20:10

## 2024-01-25 RX ADMIN — ALBUTEROL SULFATE 2 PUFF: 90 AEROSOL, METERED RESPIRATORY (INHALATION) at 15:11

## 2024-01-25 RX ADMIN — ACETAMINOPHEN 650 MG: 325 TABLET, FILM COATED ORAL at 20:10

## 2024-01-25 RX ADMIN — HYDROXYZINE HYDROCHLORIDE 50 MG: 50 TABLET, FILM COATED ORAL at 20:10

## 2024-01-25 ASSESSMENT — PATIENT HEALTH QUESTIONNAIRE - PHQ9
SUM OF ALL RESPONSES TO PHQ QUESTIONS 1-9: 14
SUM OF ALL RESPONSES TO PHQ QUESTIONS 1-9: 16
7. TROUBLE CONCENTRATING ON THINGS, SUCH AS READING THE NEWSPAPER OR WATCHING TELEVISION: 1
3. TROUBLE FALLING OR STAYING ASLEEP: 2
2. FEELING DOWN, DEPRESSED OR HOPELESS: 3
SUM OF ALL RESPONSES TO PHQ QUESTIONS 1-9: 16
SUM OF ALL RESPONSES TO PHQ QUESTIONS 1-9: 16
6. FEELING BAD ABOUT YOURSELF - OR THAT YOU ARE A FAILURE OR HAVE LET YOURSELF OR YOUR FAMILY DOWN: 3
9. THOUGHTS THAT YOU WOULD BE BETTER OFF DEAD, OR OF HURTING YOURSELF: 2
4. FEELING TIRED OR HAVING LITTLE ENERGY: 0
10. IF YOU CHECKED OFF ANY PROBLEMS, HOW DIFFICULT HAVE THESE PROBLEMS MADE IT FOR YOU TO DO YOUR WORK, TAKE CARE OF THINGS AT HOME, OR GET ALONG WITH OTHER PEOPLE: 3
SUM OF ALL RESPONSES TO PHQ9 QUESTIONS 1 & 2: 6
5. POOR APPETITE OR OVEREATING: 1
1. LITTLE INTEREST OR PLEASURE IN DOING THINGS: 3
8. MOVING OR SPEAKING SO SLOWLY THAT OTHER PEOPLE COULD HAVE NOTICED. OR THE OPPOSITE, BEING SO FIGETY OR RESTLESS THAT YOU HAVE BEEN MOVING AROUND A LOT MORE THAN USUAL: 1

## 2024-01-25 ASSESSMENT — SLEEP AND FATIGUE QUESTIONNAIRES
DO YOU USE A SLEEP AID: NO
DO YOU HAVE DIFFICULTY SLEEPING: YES
SLEEP PATTERN: DIFFICULTY FALLING ASLEEP;DISTURBED/INTERRUPTED SLEEP;RESTLESSNESS

## 2024-01-25 ASSESSMENT — LIFESTYLE VARIABLES
HOW OFTEN DO YOU HAVE A DRINK CONTAINING ALCOHOL: NEVER
HOW MANY STANDARD DRINKS CONTAINING ALCOHOL DO YOU HAVE ON A TYPICAL DAY: PATIENT DOES NOT DRINK
HOW OFTEN DO YOU HAVE A DRINK CONTAINING ALCOHOL: NEVER
HOW MANY STANDARD DRINKS CONTAINING ALCOHOL DO YOU HAVE ON A TYPICAL DAY: PATIENT DOES NOT DRINK

## 2024-01-25 ASSESSMENT — PAIN DESCRIPTION - LOCATION: LOCATION: BACK

## 2024-01-25 ASSESSMENT — PAIN SCALES - GENERAL
PAINLEVEL_OUTOF10: 1
PAINLEVEL_OUTOF10: 3

## 2024-01-25 ASSESSMENT — PAIN DESCRIPTION - DESCRIPTORS: DESCRIPTORS: ACHING

## 2024-01-25 NOTE — ED PROVIDER NOTES
EMERGENCY DEPARTMENT ENCOUNTER    Pt Name: Nitish Duran  MRN: [id number]  Birthdate 1970  Date of evaluation: 1/25/24  CHIEF COMPLAINT       Chief Complaint   Patient presents with    Suicidal     Pt brought to ED via TPD per his request for suicidal thoughts with plan to cut self with knife.      HISTORY OF PRESENT ILLNESS   53-year-old male presents for mental health evaluation.  Patient is initially from the [facility] states that he was here at a rehab facility and has since relocated to the [facility].  He states that he has been off of his medication, he states that due to being off his meds he has been feeling increasingly suicidal recently.  He reports he had a plan to cut himself with a knife.  He reports history of prior suicide attempt states that he tried to overdose as well as drink antifreeze.  He denies any homicidal ideation denies visual or auditory hallucinations denies recent drug or alcohol use reports he has been clean for the last 3 months he reports that he has been feeling little short of breath recently but states he has not been with his inhaler recently.  Denies any chest pain, dizziness, lightheadedness.    The history is provided by the patient.           REVIEW OF SYSTEMS     Review of Systems   Constitutional:  Negative for chills and fever.   HENT:  Negative for congestion and ear pain.    Eyes:  Negative for pain.   Respiratory:  Positive for shortness of breath.    Cardiovascular:  Negative for chest pain, palpitations and leg swelling.   Gastrointestinal:  Negative for abdominal pain.   Genitourinary:  Negative for dysuria and flank pain.   Musculoskeletal:  Negative for back pain.   Skin:  Negative for color change.   Neurological:  Negative for numbness and headaches.   Psychiatric/Behavioral:  Positive for suicidal ideas. Negative for confusion.    All other systems reviewed and are negative.    PASTMEDICAL HISTORY     Past Medical History:   Diagnosis Date    CAD

## 2024-01-25 NOTE — ED NOTES
Pt has been off Rx meds (psych, cardio, DM, respiratory) for about four weeks now, states that he's been feeling suicidal x 3 days with plan to cut self with knife. Pt was staying at a rehab facility in [facility] recovering from crack and up discharge was placed in shelter in [place] per his request to stay away from temptations in [place]. Pt states that he has not been able to get his medication filled since he's been in [place]. Rx was last filled through the [facility]. Pt does have a Hx of self harm with drinking antifreeze and attempting to use his IV during a hospital stay to spit mouthwash into his veins. Pt is pleasant and cooperative in conversation. Safeguard in [place] for patient watch. Safeguard informed that they need to stay with the patient at all time, must be present in the room and if they need a break or relief to let the nurse know so they can be replaced. Safeguard verbalizes understanding. Belongings and patient checked by security. Belongings locked up. Pt in blue gown.

## 2024-01-25 NOTE — ED NOTES
Provisional Diagnosis:   Depression with SI    Psychosocial and Contextual Factors:   Patient presents at the ED due to suicidal ideations with a plan to cut himself.     C-SSRS Summary:  X    Patient: X  Family:    Agency:      Substance Abuse: 3 months sober from crack cocaine     Present Suicidal Behavior:  X    Verbal: X    Attempt:     Past Suicidal Behavior: X    Verbal:X    Attempt:X      Self-Injurious/Self-Mutilation:       Violence Current or Past        Trauma Identified:       Protective Factors:           Risk Factors:    Patient has no housing, not linked with mental health agency, no support system, no identification documents, no income       Clinical Summary:    Nitish Duran is a 53 y.o. male who presents at the ED due to suicidal ideations with a plan to cut himself.     Patient reports suicidal ideations \"almost always.\" Patient reports an increase in suicidal thoughts 2-3 days ago. Patient stated he is originally from [place] but came to [place] 3 days ago. Patient reports he was in rehab in [facility] for crack cocaine. Patient reports being sober for 3 months. Upon discharge patient requested not to go back into the [facility] area but somewhere else. Patient was sent to [facility].     Patient reported he went to the  woman at [facility] and asked for them to call police. Patient stated he had a plastic knife on him and was planning to cut his wrist.     Patient reports he has been off his medication for four weeks. Patient stated he was getting his medication through [facility]. Patient is unsure what all medication he was on.   *Patient reports he is suppose to be on hypotension, diabetic medication and on inhalers.     Patient reports previous suicide attempts of drinking anti-freeze, spitting mouthwash into his IV one time he was at the hospital, and most recently, about a year ago, overdosed on his medication.     Patient reported about 4 years ago his wife had wanted a divorce. Patient stated \"I

## 2024-01-26 PROBLEM — F33.2 MAJOR DEPRESSIVE DISORDER, RECURRENT SEVERE WITHOUT PSYCHOTIC FEATURES (HCC): Status: ACTIVE | Noted: 2024-01-26

## 2024-01-26 LAB
EKG ATRIAL RATE: 75 BPM
EKG P AXIS: 81 DEGREES
EKG P-R INTERVAL: 162 MS
EKG Q-T INTERVAL: 410 MS
EKG QRS DURATION: 110 MS
EKG QTC CALCULATION (BAZETT): 457 MS
EKG R AXIS: -40 DEGREES
EKG T AXIS: 101 DEGREES
EKG VENTRICULAR RATE: 75 BPM
GLUCOSE BLD-MCNC: 151 MG/DL (ref 75–110)
GLUCOSE BLD-MCNC: 200 MG/DL (ref 75–110)

## 2024-01-26 PROCEDURE — 1240000000 HC EMOTIONAL WELLNESS R&B

## 2024-01-26 PROCEDURE — 90792 PSYCH DIAG EVAL W/MED SRVCS: CPT | Performed by: PSYCHIATRY & NEUROLOGY

## 2024-01-26 PROCEDURE — 82947 ASSAY GLUCOSE BLOOD QUANT: CPT

## 2024-01-26 PROCEDURE — 6370000000 HC RX 637 (ALT 250 FOR IP): Performed by: PSYCHIATRY & NEUROLOGY

## 2024-01-26 PROCEDURE — 93010 ELECTROCARDIOGRAM REPORT: CPT | Performed by: INTERNAL MEDICINE

## 2024-01-26 PROCEDURE — 6370000000 HC RX 637 (ALT 250 FOR IP): Performed by: NURSE PRACTITIONER

## 2024-01-26 PROCEDURE — APPSS60 APP SPLIT SHARED TIME 46-60 MINUTES: Performed by: NURSE PRACTITIONER

## 2024-01-26 RX ORDER — ATORVASTATIN CALCIUM 20 MG/1
80 TABLET, FILM COATED ORAL NIGHTLY
Status: DISCONTINUED | OUTPATIENT
Start: 2024-01-26 | End: 2024-01-31 | Stop reason: HOSPADM

## 2024-01-26 RX ORDER — ASPIRIN 81 MG/1
81 TABLET, CHEWABLE ORAL DAILY
Status: DISCONTINUED | OUTPATIENT
Start: 2024-01-26 | End: 2024-01-31 | Stop reason: HOSPADM

## 2024-01-26 RX ORDER — DICYCLOMINE HYDROCHLORIDE 10 MG/1
10 CAPSULE ORAL EVERY 6 HOURS PRN
Status: DISCONTINUED | OUTPATIENT
Start: 2024-01-26 | End: 2024-01-31 | Stop reason: HOSPADM

## 2024-01-26 RX ORDER — MIRTAZAPINE 15 MG/1
7.5 TABLET, FILM COATED ORAL NIGHTLY
Status: DISCONTINUED | OUTPATIENT
Start: 2024-01-26 | End: 2024-01-31 | Stop reason: HOSPADM

## 2024-01-26 RX ORDER — VENLAFAXINE HYDROCHLORIDE 37.5 MG/1
37.5 CAPSULE, EXTENDED RELEASE ORAL
Status: DISCONTINUED | OUTPATIENT
Start: 2024-01-27 | End: 2024-01-31 | Stop reason: HOSPADM

## 2024-01-26 RX ORDER — CARVEDILOL 3.12 MG/1
3.12 TABLET ORAL 2 TIMES DAILY WITH MEALS
Status: DISCONTINUED | OUTPATIENT
Start: 2024-01-26 | End: 2024-01-31 | Stop reason: HOSPADM

## 2024-01-26 RX ORDER — FAMOTIDINE 20 MG/1
20 TABLET, FILM COATED ORAL DAILY
Status: DISCONTINUED | OUTPATIENT
Start: 2024-01-26 | End: 2024-01-31 | Stop reason: HOSPADM

## 2024-01-26 RX ORDER — VALSARTAN 40 MG/1
40 TABLET ORAL DAILY
Status: DISCONTINUED | OUTPATIENT
Start: 2024-01-26 | End: 2024-01-31 | Stop reason: HOSPADM

## 2024-01-26 RX ADMIN — EMPAGLIFLOZIN 10 MG: 10 TABLET, FILM COATED ORAL at 09:45

## 2024-01-26 RX ADMIN — VALSARTAN 40 MG: 40 TABLET, FILM COATED ORAL at 15:48

## 2024-01-26 RX ADMIN — FAMOTIDINE 20 MG: 20 TABLET ORAL at 15:48

## 2024-01-26 RX ADMIN — CARVEDILOL 3.12 MG: 3.12 TABLET, FILM COATED ORAL at 15:48

## 2024-01-26 RX ADMIN — ATORVASTATIN CALCIUM 80 MG: 20 TABLET, FILM COATED ORAL at 20:52

## 2024-01-26 RX ADMIN — INSULIN GLARGINE 10 UNITS: 100 INJECTION, SOLUTION SUBCUTANEOUS at 08:35

## 2024-01-26 RX ADMIN — MIRTAZAPINE 7.5 MG: 15 TABLET, FILM COATED ORAL at 20:52

## 2024-01-26 RX ADMIN — TRAZODONE HYDROCHLORIDE 50 MG: 50 TABLET ORAL at 20:52

## 2024-01-26 RX ADMIN — INSULIN LISPRO 2 UNITS: 100 INJECTION, SOLUTION INTRAVENOUS; SUBCUTANEOUS at 12:44

## 2024-01-26 RX ADMIN — ASPIRIN 81 MG: 81 TABLET, CHEWABLE ORAL at 15:48

## 2024-01-26 RX ADMIN — ACETAMINOPHEN 650 MG: 325 TABLET, FILM COATED ORAL at 20:55

## 2024-01-26 RX ADMIN — HYDROXYZINE HYDROCHLORIDE 50 MG: 50 TABLET, FILM COATED ORAL at 20:52

## 2024-01-26 ASSESSMENT — PAIN SCALES - GENERAL: PAINLEVEL_OUTOF10: 4

## 2024-01-26 NOTE — GROUP NOTE
Psych-Ed/Relapse Prevention Group Note        Date: January 26, 2024 Start Time:  10:45am   End Time: 11:30am      Number of Participants in Group & Unit Census:  10/16    Topic: Coping skills    Goal of Group:Patient will identify benefits of music for coping and stress management      Comments:     Patient did not participate in Psych-Ed/Relapse Prevention group, despite staff encouragement and explanation of benefits.  Patient remain seclusive to self.  Q15 minute safety checks maintained for patient safety and will continue to encourage patient to attend unit programming.         Signature:  Marilynn Peters CTRS

## 2024-01-26 NOTE — BH NOTE
[facility]  Admission Note     Admission Type:   Voluntary     Reason for admission:  Reason for Admission: Increase in suicidal thoughts to cut self, sober for 2 months having hard time coping,      Addictive Behavior:   Addictive Behavior  In the Past 3 Months, Have You Felt or Has Someone Told You That You Have a Problem With  : None    Medical Problems:   Past Medical History:   Diagnosis Date    CAD (coronary artery disease)     Diabetes mellitus (HCC)     Hypertension     Psychiatric problem        Status EXAM:  Mental Status and Behavioral Exam  Normal: No  Level of Assistance: Independent/Self  Facial Expression: Avoids Gaze, Flat, Sad  Affect: Appropriate  Level of Consciousness: Alert  Frequency of Checks: 4 times per hour, close  Mood:Normal: No  Mood: Depressed, Anxious, Sad, Helpless, Despair  Motor Activity:Normal: Yes  Eye Contact: Fair  Observed Behavior: Friendly, Guarded, Withdrawn, Tearful  Sexual Misconduct History: Past - no  Preception: [place] to person, [place] to time, [place] to place, [place] to situation  Attention:Normal: No  Attention: Distractible  Thought Processes: Circumstantial  Thought Content:Normal: No  Thought Content: Preoccupations  Depression Symptoms: Impaired concentration, Loss of interest, Crying, Feelings of helplessness, Feelings of hopelessess, Feelings of worthlessness, Sleep disturbance  Anxiety Symptoms: Generalized  Lani Symptoms: No problems reported or observed.  Hallucinations: None  Delusions: No  Memory:Normal: Yes  Insight and Judgment: No  Insight and Judgment: Poor judgment, Poor insight, Unmotivated    Tobacco Screening:  Practical Counseling, on admission, clarisa X, if applicable and completed (first 3 are required if patient doesn't refuse):            ( ) Recognizing danger situations (included triggers and roadblocks)                    ( ) Coping skills (new ways to manage stress,relaxation techniques, changing routine, distraction)

## 2024-01-26 NOTE — H&P
Department of Psychiatry  Attending Physician Psychiatric Assessment     Reason for Admission to Psychiatric Unit:  A mental disorder causing major disability in social, interpersonal, occupational, and/or educational functioning that is leading to dangerous or life-threatening functioning, and that can only be addressed in an acute inpatient setting     Concerns about patient's safety in the community    CHIEF COMPLAINT: Depression with suicidal ideation with plan and intent to cut self    History obtained from: Patient, electronic medical record          HISTORY OF PRESENT ILLNESS:    Nitish Duran is a 53 y.o. male who has a past medical history of coronary artery disease, hypertension, depression, and crack cocaine use. Patient presented to the ED via self reporting increased suicidal thoughts to cut himself.  Patient had recently been released from an AOD rehab facility near [place] and came to [place] to avoid temptations.  Patient is homeless and has been staying at [facility].  He began experiencing significant suicidal thoughts and has been struggling with symptoms of depression.  This is patient's first admission to [facility].    Patient was seen for initial evaluation today.  He was resting in bed on approach but set up to engage in conversation with writer.  He presented as sad and withdrawn.  He did become tearful when discussing his family.  Patient reports that life has been going fairly well up until 4 years ago when his wife kicked him out of their home after 30 years of marriage.  He believes this was due to another man in her life.  He is evasive regarding circumstances.  He states following their separation he began abusing crack cocaine daily.  He denies that he used crack prior their relationship ending.  Patient expresses having \"nothing to lose, nothing to live for\".  He is significantly depressed and states there is no use living because he cannot get back what he used to have.  He

## 2024-01-26 NOTE — BH NOTE
Best practice advisory for suicide precautions popped up for patient. Patient denies suicidal ideation at this time. On call provider notified and states that q15min safety rounding on patient is sufficient. Order for suicidal precautions discontinued.

## 2024-01-26 NOTE — GROUP NOTE
Psych-Ed/Relapse Prevention Group Note        Date: January 26, 2024 Start Time: 1:30pm  End Time: 2:30pm      Number of Participants in Group & Unit Census:  8/15    Topic: Socialization    Goal of Group:Patient will demonstrate improved interpersonal skills and offer peer support      Comments:     Patient did not participate in Psych-Ed/Relapse Prevention group, despite staff encouragement and explanation of benefits.  Patient remain seclusive to self.  Q15 minute safety checks maintained for patient safety and will continue to encourage patient to attend unit programming.          Signature:  Marilynn Peters CTRS

## 2024-01-26 NOTE — BH NOTE
Patient given tobacco quitline number 64071793635 at this time, refusing to call at this time, states \" I just dont want to quit now\"- patient given information as to the dangers of long term tobacco use. Continue to reinforce the importance of tobacco cessation.

## 2024-01-27 PROBLEM — E78.2 MIXED HYPERLIPIDEMIA: Status: ACTIVE | Noted: 2024-01-27

## 2024-01-27 PROBLEM — E11.9 DIABETES MELLITUS TYPE 2 IN NONOBESE (HCC): Status: ACTIVE | Noted: 2024-01-27

## 2024-01-27 PROBLEM — I10 ESSENTIAL HYPERTENSION: Status: ACTIVE | Noted: 2024-01-27

## 2024-01-27 LAB
GLUCOSE BLD-MCNC: 154 MG/DL (ref 75–110)
GLUCOSE BLD-MCNC: 210 MG/DL (ref 75–110)
GLUCOSE BLD-MCNC: 225 MG/DL (ref 75–110)

## 2024-01-27 PROCEDURE — 6370000000 HC RX 637 (ALT 250 FOR IP): Performed by: NURSE PRACTITIONER

## 2024-01-27 PROCEDURE — 99232 SBSQ HOSP IP/OBS MODERATE 35: CPT | Performed by: NURSE PRACTITIONER

## 2024-01-27 PROCEDURE — 6370000000 HC RX 637 (ALT 250 FOR IP): Performed by: PSYCHIATRY & NEUROLOGY

## 2024-01-27 PROCEDURE — 1240000000 HC EMOTIONAL WELLNESS R&B

## 2024-01-27 PROCEDURE — 99223 1ST HOSP IP/OBS HIGH 75: CPT | Performed by: INTERNAL MEDICINE

## 2024-01-27 RX ADMIN — CARVEDILOL 3.12 MG: 3.12 TABLET, FILM COATED ORAL at 08:51

## 2024-01-27 RX ADMIN — ASPIRIN 81 MG: 81 TABLET, CHEWABLE ORAL at 08:51

## 2024-01-27 RX ADMIN — CARVEDILOL 3.12 MG: 3.12 TABLET, FILM COATED ORAL at 17:58

## 2024-01-27 RX ADMIN — ATORVASTATIN CALCIUM 80 MG: 20 TABLET, FILM COATED ORAL at 21:37

## 2024-01-27 RX ADMIN — EMPAGLIFLOZIN 10 MG: 10 TABLET, FILM COATED ORAL at 10:14

## 2024-01-27 RX ADMIN — VENLAFAXINE HYDROCHLORIDE 37.5 MG: 37.5 CAPSULE, EXTENDED RELEASE ORAL at 08:51

## 2024-01-27 RX ADMIN — VALSARTAN 40 MG: 40 TABLET, FILM COATED ORAL at 10:14

## 2024-01-27 RX ADMIN — FAMOTIDINE 20 MG: 20 TABLET ORAL at 08:51

## 2024-01-27 RX ADMIN — MIRTAZAPINE 7.5 MG: 15 TABLET, FILM COATED ORAL at 21:37

## 2024-01-27 RX ADMIN — TRAZODONE HYDROCHLORIDE 50 MG: 50 TABLET ORAL at 21:38

## 2024-01-27 ASSESSMENT — PAIN SCALES - GENERAL: PAINLEVEL_OUTOF10: 0

## 2024-01-27 NOTE — H&P
[facility] Internal Medicine  Khanh Maravilla MD; Erick Li MD; Adelfo Steen MD; MD Eduarda Salcido MD; Rosette Olson MD    [facility] Internal Medicine   IN-PATIENT SERVICE   [facility]     HISTORY AND PHYSICAL EXAMINATION            Date:   1/27/2024  Patient name:  Nitish Duran  Date of admission:  1/25/2024  1:44 PM  MRN:   [id number]  Account:  [id number]  YOB: 1970  PCP:    No primary care provider on file.  Room:   [address] [place] [address]  Code Status:    Full Code      Chief Complaint:     Suicidal /Ac Psychosis    History Obtained From:     Patient/EMR/bedside RN     History of Present Illness:     53-year-old gentleman with underlying history of hypertension, hyperlipidemia, diabetes, coronary disease, BNP more than 9000 on labs day before yesterday, admitted inpatient psych with suicidal ideations    Past Medical History:     Past Medical History:   Diagnosis Date    CAD (coronary artery disease)     Diabetes mellitus (HCC)     Hypertension     Psychiatric problem         Past Surgical History:     History reviewed. No pertinent surgical history.     Medications Prior to Admission:     Prior to Admission medications    Medication Sig Start Date End Date Taking? Authorizing Provider   empagliflozin (JARDIANCE) 10 MG tablet Take 1 tablet by mouth daily   Yes Jenna Calles MD   dicyclomine (BENTYL) 10 MG capsule Take 1 capsule by mouth every 6 hours as needed (Cramping)   Yes Jenna Calles MD   atorvastatin (LIPITOR) 80 MG tablet Take 1 tablet by mouth daily   Yes Jenna Calles MD   sertraline (ZOLOFT) 100 MG tablet Take 1 tablet by mouth daily   Yes Jenna Calles MD   melatonin 3 MG TABS tablet Take 3 tablets by mouth at bedtime   Yes Jenna Calles MD   insulin lispro (HUMALOG) 100 UNIT/ML SOLN injection vial Inject into the skin 3 times daily (with meals) Sliding scale: <150 units: 0 units,

## 2024-01-27 NOTE — GROUP NOTE
Group Therapy Note    Date: 1/27/2024    Group Start Time: 0900  Group End Time: 0930  Group Topic: Orientation Group    Samantha Steele LPN        Group Therapy Note    Attendees: 6/13       Patient's Goal:  Goal setting.    Notes:   The understanding of setting goals.    Status After Intervention:  Improved    Participation Level: Active Listener    Participation Quality: Appropriate, Sharing, and Supportive      Speech:  normal      Thought Process/Content: Logical      Affective Functioning: Congruent      Mood: anxious      Level of consciousness:  Alert and Attentive      Response to Learning: Able to verbalize/acknowledge new learning, Able to retain information, and Capable of insight      Endings: None Reported    Modes of Intervention: Education, Support, and Socialization      Discipline Responsible: Licensed Practical Nurse      Signature:  Samantha Arredondo LPN

## 2024-01-27 NOTE — GROUP NOTE
Group Therapy Note    Date: 1/27/2024    Group Start Time: 1400  Group End Time: 1440  Group Topic: Cognitive Skills    Jo Wells CTRS        Group Therapy Note    Attendees: 5/13    Cognitive Skills Group Note        Date: January 27, 2024 Start Time: 2pm  End Time: 2:40pm      Number of Participants in Group & Unit Census:  5/13    Topic:  interpersonal skills, memory recall, concentration     Goal of Group: To improve interpersonal skills and memory recall through collaborating with peers and concentrating on a presented task.       Comments:     Patient did not participate in Cognitive Skills group, despite staff encouragement and explanation of benefits.  Patient remain seclusive to self.  Q15 minute safety checks maintained for patient safety and will continue to encourage patient to attend unit programming.        Signature:  BITA Power

## 2024-01-27 NOTE — GROUP NOTE
Group Note    1/27/24           Start time: 1000      Number of participants in Group & unit census: 5/13    Topic:coping skills     Goal of Group:education     Comments:  Patient did not participate in coping skills  group, despite staff encouragement and explanation of benefits. Patient remains seclusive to self. Every 15 minute safety checks maintained for patient safety and will continue to encourage patient to attend unit programming.

## 2024-01-28 LAB — GLUCOSE BLD-MCNC: 183 MG/DL (ref 75–110)

## 2024-01-28 PROCEDURE — 6370000000 HC RX 637 (ALT 250 FOR IP): Performed by: PSYCHIATRY & NEUROLOGY

## 2024-01-28 PROCEDURE — 1240000000 HC EMOTIONAL WELLNESS R&B

## 2024-01-28 PROCEDURE — 99232 SBSQ HOSP IP/OBS MODERATE 35: CPT | Performed by: INTERNAL MEDICINE

## 2024-01-28 PROCEDURE — 99232 SBSQ HOSP IP/OBS MODERATE 35: CPT | Performed by: NURSE PRACTITIONER

## 2024-01-28 PROCEDURE — 82947 ASSAY GLUCOSE BLOOD QUANT: CPT

## 2024-01-28 RX ORDER — INSULIN GLARGINE 100 [IU]/ML
15 INJECTION, SOLUTION SUBCUTANEOUS DAILY
Status: DISCONTINUED | OUTPATIENT
Start: 2024-01-29 | End: 2024-01-31

## 2024-01-28 RX ADMIN — ATORVASTATIN CALCIUM 80 MG: 20 TABLET, FILM COATED ORAL at 20:39

## 2024-01-28 RX ADMIN — TRAZODONE HYDROCHLORIDE 50 MG: 50 TABLET ORAL at 20:39

## 2024-01-28 RX ADMIN — MIRTAZAPINE 7.5 MG: 15 TABLET, FILM COATED ORAL at 20:39

## 2024-01-28 ASSESSMENT — PAIN SCALES - GENERAL: PAINLEVEL_OUTOF10: 0

## 2024-01-28 NOTE — GROUP NOTE
Group Therapy Note    Date: 1/28/2024    Group Start Time: 0850  Group End Time: 0905  Group Topic: Group Documentation    Edinson Hamilton        Group Therapy Note    Attendees: 10/17    Community Meeting Group Note        Date: 1/28/2024 Start Time: 0850 End Time: 0905      Number of Participants in Group & Unit Census:  10/17    Topic: community meeting    Goal of Group: patient has opportunity to reflect on what they need to accomplish to achieve discharge, and decide on a step towards that that they would like to accomplish by the end of the day today; patients receive information about the unit and daily events      Comments:     Patient did not participate in Community Meeting group, despite staff encouragement and explanation of benefits.  Patient remain seclusive to self.  Q15 minute safety checks maintained for patient safety and will continue to encourage patient to attend unit programming.

## 2024-01-28 NOTE — BH NOTE
Patient participated in safety checks. No contraband was found. Any food and drinks were removed.

## 2024-01-28 NOTE — GROUP NOTE
Patient cooperated with safety/room check no items were found.

## 2024-01-28 NOTE — GROUP NOTE
Group Therapy Note    Date: 1/27/2024    Group Start Time: 2000  Group End Time: 2030  Group Topic: Recreational    STGabino Truong RN        Group Therapy Note    Attendees: 9    patient refused to attend recreational group at 8 PM after encouragement from staff.  1:1 talk time provided as alternative to group session          Signature:  Gabino Sarabia RN     yes

## 2024-01-28 NOTE — GROUP NOTE
Group Therapy Note    Date: 1/28/2024    Group Start Time: 0850  Group End Time: 0905  Group Topic: Group Documentation    [facility] BHSamantha Victor LPN; Edinson Alvarez        Group Therapy Note    Attendees: 8/17    CORRECTED Community Meeting Group Note        Date: 1/28/2024 Start Time: 0850  End Time: 0905      Number of Participants in Group & Unit Census:  8/17    Topic: community meeting    Goal of Group: patient has opportunity to reflect on what they need to accomplish to achieve discharge, and decide on a step towards that that they would like to accomplish by the end of the day today; patients receive information about unit and daily schedule      Comments:     Patient did not participate in Community Meeting group, despite staff encouragement and explanation of benefits.  Patient remain seclusive to self.  Q15 minute safety checks maintained for patient safety and will continue to encourage patient to attend unit programming.

## 2024-01-28 NOTE — GROUP NOTE
Group Therapy Note    Date: 1/28/2024    Group Start Time: 1400  Group End Time: 1445  Group Topic: Psychoeducation    Jo Wells CTRS        Group Therapy Note    Attendees: 10/18    Psych-Ed/Relapse Prevention Group Note        Date: January 28, 2024 Start Time: 2pm  End Time: 2:45pm      Number of Participants in Group & Unit Census:  10/18    Topic:  interpersonal skills, coping skills, self-expression    Goal of Group: To improve interpersonal skills and coping skills awareness through collaborating with peers and demonstrating self-expression.      Comments:     Patient did not participate in Psych-Ed/Relapse Prevention group, despite staff encouragement and explanation of benefits.  Patient remain seclusive to self.  Q15 minute safety checks maintained for patient safety and will continue to encourage patient to attend unit programming.        Signature:  BITA Power

## 2024-01-29 LAB
GLUCOSE BLD-MCNC: 258 MG/DL (ref 75–110)
GLUCOSE BLD-MCNC: 272 MG/DL (ref 75–110)
GLUCOSE BLD-MCNC: 274 MG/DL (ref 75–110)
HIV 1+2 AB+HIV1 P24 AG SERPL QL IA: NONREACTIVE
T PALLIDUM AB SER QL IA: NONREACTIVE

## 2024-01-29 PROCEDURE — 86780 TREPONEMA PALLIDUM: CPT

## 2024-01-29 PROCEDURE — APPSS30 APP SPLIT SHARED TIME 16-30 MINUTES: Performed by: NURSE PRACTITIONER

## 2024-01-29 PROCEDURE — 36415 COLL VENOUS BLD VENIPUNCTURE: CPT

## 2024-01-29 PROCEDURE — 6370000000 HC RX 637 (ALT 250 FOR IP): Performed by: NURSE PRACTITIONER

## 2024-01-29 PROCEDURE — 87389 HIV-1 AG W/HIV-1&-2 AB AG IA: CPT

## 2024-01-29 PROCEDURE — 1240000000 HC EMOTIONAL WELLNESS R&B

## 2024-01-29 PROCEDURE — 6370000000 HC RX 637 (ALT 250 FOR IP): Performed by: PSYCHIATRY & NEUROLOGY

## 2024-01-29 PROCEDURE — 99232 SBSQ HOSP IP/OBS MODERATE 35: CPT | Performed by: PSYCHIATRY & NEUROLOGY

## 2024-01-29 PROCEDURE — 82947 ASSAY GLUCOSE BLOOD QUANT: CPT

## 2024-01-29 PROCEDURE — 6370000000 HC RX 637 (ALT 250 FOR IP): Performed by: INTERNAL MEDICINE

## 2024-01-29 RX ADMIN — VENLAFAXINE HYDROCHLORIDE 37.5 MG: 37.5 CAPSULE, EXTENDED RELEASE ORAL at 09:01

## 2024-01-29 RX ADMIN — INSULIN LISPRO 4 UNITS: 100 INJECTION, SOLUTION INTRAVENOUS; SUBCUTANEOUS at 08:59

## 2024-01-29 RX ADMIN — INSULIN LISPRO 4 UNITS: 100 INJECTION, SOLUTION INTRAVENOUS; SUBCUTANEOUS at 17:14

## 2024-01-29 RX ADMIN — HYDROXYZINE HYDROCHLORIDE 50 MG: 50 TABLET, FILM COATED ORAL at 22:27

## 2024-01-29 RX ADMIN — CARVEDILOL 3.12 MG: 3.12 TABLET, FILM COATED ORAL at 17:14

## 2024-01-29 RX ADMIN — VALSARTAN 40 MG: 40 TABLET, FILM COATED ORAL at 09:01

## 2024-01-29 RX ADMIN — INSULIN GLARGINE 15 UNITS: 100 INJECTION, SOLUTION SUBCUTANEOUS at 09:00

## 2024-01-29 RX ADMIN — FAMOTIDINE 20 MG: 20 TABLET ORAL at 09:01

## 2024-01-29 RX ADMIN — ACETAMINOPHEN 650 MG: 325 TABLET, FILM COATED ORAL at 22:29

## 2024-01-29 RX ADMIN — ATORVASTATIN CALCIUM 80 MG: 20 TABLET, FILM COATED ORAL at 22:27

## 2024-01-29 RX ADMIN — ASPIRIN 81 MG: 81 TABLET, CHEWABLE ORAL at 09:01

## 2024-01-29 RX ADMIN — TRAZODONE HYDROCHLORIDE 50 MG: 50 TABLET ORAL at 22:27

## 2024-01-29 RX ADMIN — CARVEDILOL 3.12 MG: 3.12 TABLET, FILM COATED ORAL at 09:01

## 2024-01-29 RX ADMIN — MIRTAZAPINE 7.5 MG: 15 TABLET, FILM COATED ORAL at 22:27

## 2024-01-29 RX ADMIN — EMPAGLIFLOZIN 10 MG: 10 TABLET, FILM COATED ORAL at 09:01

## 2024-01-29 ASSESSMENT — PAIN DESCRIPTION - DESCRIPTORS: DESCRIPTORS: ACHING

## 2024-01-29 ASSESSMENT — PAIN SCALES - GENERAL: PAINLEVEL_OUTOF10: 4

## 2024-01-29 ASSESSMENT — PAIN DESCRIPTION - ORIENTATION: ORIENTATION: LOWER

## 2024-01-29 ASSESSMENT — PAIN DESCRIPTION - LOCATION: LOCATION: BACK

## 2024-01-29 NOTE — GROUP NOTE
Psych-Ed/Relapse Prevention Group Note        Date: January 29, 2024 Start Time:  10:30am   End Time:  11:15am      Number of Participants in Group & Unit Census:  8/19    Topic: Stress Management and Coping    Goal of Group:Patient will identify benefits of self care and stress reduction for mental health      Comments:     Patient did not participate in Psych-Ed/Relapse Prevention group, despite staff encouragement and explanation of benefits.  Patient remain seclusive to self.  Q15 minute safety checks maintained for patient safety and will continue to encourage patient to attend unit programming.         Signature:  TAWNY EscalanteS

## 2024-01-29 NOTE — BH NOTE
Patient refused AM glucose check.  Education provided on importance of this, patient continued to refuse.        Patient approached writer stating that he would allow morning glucose check.

## 2024-01-29 NOTE — GROUP NOTE
Psych-Ed/Relapse Prevention Group Note        Date: January 29, 2024 Start Time: 1:30pm  End Time:  2:00pm      Number of Participants in Group & Unit Census:  8/17    Topic: Recreation    Goal of Group:Patient will demonstrate improved interpersonal skills      Comments:     Patient did not participate in Recreation group, despite staff encouragement and explanation of benefits.  Patient remain seclusive to self.  Q15 minute safety checks maintained for patient safety and will continue to encourage patient to attend unit programming.         Signature:  TAWNY EscalanteS

## 2024-01-30 ENCOUNTER — HOSPITAL ENCOUNTER (INPATIENT)
Age: 54
Discharge: HOME | DRG: 751 | End: 2024-02-01
Attending: INTERNAL MEDICINE
Payer: MEDICAID

## 2024-01-30 LAB
ECHO AO ROOT DIAM: 2.7 CM
ECHO AO ROOT INDEX: 1.45 CM/M2
ECHO AV AREA PEAK VELOCITY: 2.6 CM2
ECHO AV AREA VTI: 2.7 CM2
ECHO AV AREA/BSA PEAK VELOCITY: 1.4 CM2/M2
ECHO AV AREA/BSA VTI: 1.5 CM2/M2
ECHO AV MEAN GRADIENT: 3 MMHG
ECHO AV MEAN VELOCITY: 0.8 M/S
ECHO AV PEAK GRADIENT: 5 MMHG
ECHO AV PEAK VELOCITY: 1.2 M/S
ECHO AV VELOCITY RATIO: 0.83
ECHO AV VTI: 19.5 CM
ECHO BSA: 1.88 M2
ECHO EST RA PRESSURE: 3 MMHG
ECHO LA DIAMETER INDEX: 2.1 CM/M2
ECHO LA DIAMETER: 3.9 CM
ECHO LA TO AORTIC ROOT RATIO: 1.44
ECHO LV E' LATERAL VELOCITY: 6 CM/S
ECHO LV E' SEPTAL VELOCITY: 3 CM/S
ECHO LV EDV A2C: 403 ML
ECHO LV EDV A4C: 247 ML
ECHO LV EDV INDEX A4C: 133 ML/M2
ECHO LV EDV NDEX A2C: 217 ML/M2
ECHO LV EJECTION FRACTION A2C: 10 %
ECHO LV EJECTION FRACTION A4C: 20 %
ECHO LV ESV A2C: 363 ML
ECHO LV ESV A4C: 198 ML
ECHO LV ESV INDEX A2C: 195 ML/M2
ECHO LV ESV INDEX A4C: 106 ML/M2
ECHO LV FRACTIONAL SHORTENING: 7 % (ref 28–44)
ECHO LV INTERNAL DIMENSION DIASTOLE INDEX: 4.57 CM/M2
ECHO LV INTERNAL DIMENSION DIASTOLIC: 8.5 CM (ref 4.2–5.9)
ECHO LV INTERNAL DIMENSION SYSTOLIC INDEX: 4.25 CM/M2
ECHO LV INTERNAL DIMENSION SYSTOLIC: 7.9 CM
ECHO LV IVSD: 1.2 CM (ref 0.6–1)
ECHO LV MASS 2D: 567.1 G (ref 88–224)
ECHO LV MASS INDEX 2D: 304.9 G/M2 (ref 49–115)
ECHO LV POSTERIOR WALL DIASTOLIC: 1.2 CM (ref 0.6–1)
ECHO LV RELATIVE WALL THICKNESS RATIO: 0.28
ECHO LVOT AREA: 3.1 CM2
ECHO LVOT AV VTI INDEX: 0.85
ECHO LVOT DIAM: 2 CM
ECHO LVOT MEAN GRADIENT: 2 MMHG
ECHO LVOT PEAK GRADIENT: 4 MMHG
ECHO LVOT PEAK VELOCITY: 1 M/S
ECHO LVOT STROKE VOLUME INDEX: 28 ML/M2
ECHO LVOT SV: 52.1 ML
ECHO LVOT VTI: 16.6 CM
ECHO MV A VELOCITY: 0.6 M/S
ECHO MV AREA VTI: 3.4 CM2
ECHO MV E DECELERATION TIME (DT): 185 MS
ECHO MV E VELOCITY: 0.37 M/S
ECHO MV E/A RATIO: 0.62
ECHO MV E/E' LATERAL: 6.17
ECHO MV E/E' RATIO (AVERAGED): 9.25
ECHO MV LVOT VTI INDEX: 0.93
ECHO MV MAX VELOCITY: 0.9 M/S
ECHO MV MEAN GRADIENT: 1 MMHG
ECHO MV MEAN VELOCITY: 0.3 M/S
ECHO MV PEAK GRADIENT: 3 MMHG
ECHO MV VTI: 15.5 CM
ECHO RA AREA 4C: 22.5 CM2
ECHO RIGHT VENTRICULAR SYSTOLIC PRESSURE (RVSP): 15 MMHG
ECHO RV TAPSE: 1.1 CM (ref 1.7–?)
ECHO TV REGURGITANT MAX VELOCITY: 1.75 M/S
ECHO TV REGURGITANT PEAK GRADIENT: 12 MMHG
GLUCOSE BLD-MCNC: 172 MG/DL (ref 75–110)
GLUCOSE BLD-MCNC: 196 MG/DL (ref 75–110)
GLUCOSE BLD-MCNC: 278 MG/DL (ref 75–110)
GLUCOSE BLD-MCNC: 283 MG/DL (ref 75–110)
GLUCOSE BLD-MCNC: 335 MG/DL (ref 75–110)

## 2024-01-30 PROCEDURE — 6370000000 HC RX 637 (ALT 250 FOR IP): Performed by: EMERGENCY MEDICINE

## 2024-01-30 PROCEDURE — 93306 TTE W/DOPPLER COMPLETE: CPT | Performed by: INTERNAL MEDICINE

## 2024-01-30 PROCEDURE — 90833 PSYTX W PT W E/M 30 MIN: CPT | Performed by: PSYCHIATRY & NEUROLOGY

## 2024-01-30 PROCEDURE — 6370000000 HC RX 637 (ALT 250 FOR IP): Performed by: PSYCHIATRY & NEUROLOGY

## 2024-01-30 PROCEDURE — 1240000000 HC EMOTIONAL WELLNESS R&B

## 2024-01-30 PROCEDURE — 93306 TTE W/DOPPLER COMPLETE: CPT

## 2024-01-30 PROCEDURE — 99232 SBSQ HOSP IP/OBS MODERATE 35: CPT | Performed by: PSYCHIATRY & NEUROLOGY

## 2024-01-30 PROCEDURE — 6370000000 HC RX 637 (ALT 250 FOR IP): Performed by: NURSE PRACTITIONER

## 2024-01-30 PROCEDURE — 6370000000 HC RX 637 (ALT 250 FOR IP): Performed by: INTERNAL MEDICINE

## 2024-01-30 PROCEDURE — APPSS30 APP SPLIT SHARED TIME 16-30 MINUTES: Performed by: NURSE PRACTITIONER

## 2024-01-30 PROCEDURE — 82947 ASSAY GLUCOSE BLOOD QUANT: CPT

## 2024-01-30 RX ADMIN — ALBUTEROL SULFATE 2 PUFF: 90 AEROSOL, METERED RESPIRATORY (INHALATION) at 15:05

## 2024-01-30 RX ADMIN — FAMOTIDINE 20 MG: 20 TABLET ORAL at 08:31

## 2024-01-30 RX ADMIN — VENLAFAXINE HYDROCHLORIDE 37.5 MG: 37.5 CAPSULE, EXTENDED RELEASE ORAL at 08:32

## 2024-01-30 RX ADMIN — TRAZODONE HYDROCHLORIDE 50 MG: 50 TABLET ORAL at 22:01

## 2024-01-30 RX ADMIN — HYDROXYZINE HYDROCHLORIDE 50 MG: 50 TABLET, FILM COATED ORAL at 22:01

## 2024-01-30 RX ADMIN — MIRTAZAPINE 7.5 MG: 15 TABLET, FILM COATED ORAL at 22:01

## 2024-01-30 RX ADMIN — EMPAGLIFLOZIN 10 MG: 10 TABLET, FILM COATED ORAL at 08:32

## 2024-01-30 RX ADMIN — INSULIN GLARGINE 15 UNITS: 100 INJECTION, SOLUTION SUBCUTANEOUS at 08:31

## 2024-01-30 RX ADMIN — VALSARTAN 40 MG: 40 TABLET, FILM COATED ORAL at 08:32

## 2024-01-30 RX ADMIN — CARVEDILOL 3.12 MG: 3.12 TABLET, FILM COATED ORAL at 08:32

## 2024-01-30 RX ADMIN — ASPIRIN 81 MG: 81 TABLET, CHEWABLE ORAL at 08:32

## 2024-01-30 RX ADMIN — ATORVASTATIN CALCIUM 80 MG: 20 TABLET, FILM COATED ORAL at 22:01

## 2024-01-30 RX ADMIN — INSULIN LISPRO 4 UNITS: 100 INJECTION, SOLUTION INTRAVENOUS; SUBCUTANEOUS at 17:30

## 2024-01-30 RX ADMIN — CARVEDILOL 3.12 MG: 3.12 TABLET, FILM COATED ORAL at 17:30

## 2024-01-30 RX ADMIN — INSULIN LISPRO 4 UNITS: 100 INJECTION, SOLUTION INTRAVENOUS; SUBCUTANEOUS at 12:22

## 2024-01-30 ASSESSMENT — PAIN SCALES - GENERAL: PAINLEVEL_OUTOF10: 0

## 2024-01-30 NOTE — GROUP NOTE
Group Therapy Note    Date: 1/29/2024    Group Start Time: 2000  Group End Time: 2030  Group Topic: Wrap-Up    Mariam Dewitt        Group Therapy Note    Attendees: 6/14         Participation Level: Active Listener    Participation Quality: Appropriate      Modes of Intervention: Socialization and Media      Discipline Responsible: Behavorial Health Tech      Signature:  Mariam Wise

## 2024-01-30 NOTE — GROUP NOTE
Psych-Ed/Relapse Prevention Group Note        Date: January 30, 2024 Start Time: 1:30pm  End Time:  2:15pm      Number of Participants in Group & Unit Census:  5/15    Topic: Socialization and Peer Support    Goal of Group:Patient will demonstrate improved interpersonal skills and offer peer support      Comments:     Patient did not participate in Psych-Ed/Relapse Prevention group, despite staff encouragement and explanation of benefits.  Patient remain seclusive to self.  Q15 minute safety checks maintained for patient safety and will continue to encourage patient to attend unit programming.         Signature:  TAWNY EscalanteS

## 2024-01-30 NOTE — GROUP NOTE
Psych-Ed/Relapse Prevention Group Note        Date: January 30, 2024 Start Time: 11am  End Time: 11:45am      Number of Participants in Group & Unit Census:  7/18    Topic: Socialization skills    Goal of Group:Patient will demonstrate improved interpersonal skills      Comments:     Patient did not participate in Psych-Ed/Relapse Prevention group, despite staff encouragement and explanation of benefits.  Patient remain seclusive to self.  Q15 minute safety checks maintained for patient safety and will continue to encourage patient to attend unit programming.         Signature:  TAWNY EscalanteS

## 2024-01-30 NOTE — GROUP NOTE
Psych-Ed/Relapse Prevention and Recovery Group Note        Date: January 30, 2024 Start Time: 2:30pm  End Time: 3pm      Number of Participants in Group & Unit Census:  3/15    Topic: Recovery       Comments:     Patient did not participate in Psych-Ed/Relapse Prevention and Recovery group, despite staff encouragement and explanation of benefits.  Patient remain seclusive to self.  Q15 minute safety checks maintained for patient safety and will continue to encourage patient to attend unit programming.         Signature:  BITA Escalante

## 2024-01-30 NOTE — GROUP NOTE
Group Therapy Note    Date: 1/30/2024    Group Start Time: 1000  Group End Time: 1032  Group Topic: Psychotherapy    [facility] Inge Ballard MSW, NAN        Group Therapy Note    Attendees: 5/18     Patient refused to attend psychotherapy group at 10:00 am after encouragement from staff.  1:1 talk time provided as alternative to group session.    Discipline Responsible: /Counselor      Signature:  KASSANDRA Pack LSW

## 2024-01-31 VITALS
HEIGHT: 67 IN | HEART RATE: 78 BPM | TEMPERATURE: 97.6 F | OXYGEN SATURATION: 98 % | DIASTOLIC BLOOD PRESSURE: 76 MMHG | WEIGHT: 165 LBS | RESPIRATION RATE: 16 BRPM | BODY MASS INDEX: 25.9 KG/M2 | SYSTOLIC BLOOD PRESSURE: 128 MMHG

## 2024-01-31 LAB
GLUCOSE BLD-MCNC: 192 MG/DL (ref 75–110)
GLUCOSE BLD-MCNC: 232 MG/DL (ref 75–110)
GLUCOSE BLD-MCNC: 476 MG/DL (ref 75–110)

## 2024-01-31 PROCEDURE — 6370000000 HC RX 637 (ALT 250 FOR IP): Performed by: PSYCHIATRY & NEUROLOGY

## 2024-01-31 PROCEDURE — 6370000000 HC RX 637 (ALT 250 FOR IP): Performed by: EMERGENCY MEDICINE

## 2024-01-31 PROCEDURE — 82947 ASSAY GLUCOSE BLOOD QUANT: CPT

## 2024-01-31 PROCEDURE — 6370000000 HC RX 637 (ALT 250 FOR IP): Performed by: NURSE PRACTITIONER

## 2024-01-31 PROCEDURE — 6370000000 HC RX 637 (ALT 250 FOR IP): Performed by: INTERNAL MEDICINE

## 2024-01-31 PROCEDURE — 99232 SBSQ HOSP IP/OBS MODERATE 35: CPT | Performed by: INTERNAL MEDICINE

## 2024-01-31 RX ORDER — INSULIN GLARGINE 100 [IU]/ML
15 INJECTION, SOLUTION SUBCUTANEOUS DAILY
Qty: 10 ML | Refills: 0 | Status: SHIPPED | OUTPATIENT
Start: 2024-02-01

## 2024-01-31 RX ORDER — INSULIN GLARGINE 100 [IU]/ML
20 INJECTION, SOLUTION SUBCUTANEOUS DAILY
Status: DISCONTINUED | OUTPATIENT
Start: 2024-02-01 | End: 2024-01-31 | Stop reason: HOSPADM

## 2024-01-31 RX ORDER — HYDROXYZINE HYDROCHLORIDE 25 MG/1
25 TABLET, FILM COATED ORAL EVERY 6 HOURS PRN
Qty: 10 TABLET | Refills: 0 | Status: SHIPPED | OUTPATIENT
Start: 2024-01-31

## 2024-01-31 RX ORDER — ALBUTEROL SULFATE 90 UG/1
2 AEROSOL, METERED RESPIRATORY (INHALATION) EVERY 6 HOURS PRN
Qty: 18 G | Refills: 3 | Status: SHIPPED | OUTPATIENT
Start: 2024-01-31

## 2024-01-31 RX ORDER — ASPIRIN 81 MG/1
81 TABLET, CHEWABLE ORAL DAILY
Qty: 10 TABLET | Refills: 0 | Status: SHIPPED | OUTPATIENT
Start: 2024-01-31

## 2024-01-31 RX ORDER — TRAZODONE HYDROCHLORIDE 50 MG/1
50 TABLET ORAL NIGHTLY PRN
Qty: 10 TABLET | Refills: 0 | Status: SHIPPED | OUTPATIENT
Start: 2024-01-31

## 2024-01-31 RX ORDER — ATORVASTATIN CALCIUM 80 MG/1
80 TABLET, FILM COATED ORAL NIGHTLY
Qty: 10 TABLET | Refills: 0 | Status: SHIPPED | OUTPATIENT
Start: 2024-01-31

## 2024-01-31 RX ORDER — DICYCLOMINE HYDROCHLORIDE 10 MG/1
10 CAPSULE ORAL EVERY 6 HOURS PRN
Qty: 10 CAPSULE | Refills: 0 | Status: SHIPPED | OUTPATIENT
Start: 2024-01-31

## 2024-01-31 RX ORDER — CARVEDILOL 3.12 MG/1
3.12 TABLET ORAL 2 TIMES DAILY WITH MEALS
Qty: 20 TABLET | Refills: 0 | Status: SHIPPED | OUTPATIENT
Start: 2024-01-31

## 2024-01-31 RX ORDER — MIRTAZAPINE 7.5 MG/1
7.5 TABLET, FILM COATED ORAL NIGHTLY
Qty: 10 TABLET | Refills: 0 | Status: SHIPPED | OUTPATIENT
Start: 2024-01-31

## 2024-01-31 RX ORDER — INSULIN LISPRO 100 [IU]/ML
0-8 INJECTION, SOLUTION INTRAVENOUS; SUBCUTANEOUS
Qty: 10 ML | Refills: 0 | Status: SHIPPED | OUTPATIENT
Start: 2024-01-31

## 2024-01-31 RX ORDER — VALSARTAN 40 MG/1
40 TABLET ORAL DAILY
Qty: 10 TABLET | Refills: 0 | Status: SHIPPED | OUTPATIENT
Start: 2024-01-31

## 2024-01-31 RX ORDER — INSULIN LISPRO 100 [IU]/ML
0-4 INJECTION, SOLUTION INTRAVENOUS; SUBCUTANEOUS NIGHTLY
Qty: 10 ML | Refills: 0 | Status: SHIPPED | OUTPATIENT
Start: 2024-01-31

## 2024-01-31 RX ORDER — VENLAFAXINE HYDROCHLORIDE 37.5 MG/1
37.5 CAPSULE, EXTENDED RELEASE ORAL
Qty: 10 CAPSULE | Refills: 0 | Status: SHIPPED | OUTPATIENT
Start: 2024-02-01

## 2024-01-31 RX ORDER — FAMOTIDINE 20 MG/1
20 TABLET, FILM COATED ORAL DAILY
Qty: 10 TABLET | Refills: 0 | Status: SHIPPED | OUTPATIENT
Start: 2024-01-31

## 2024-01-31 RX ADMIN — ALBUTEROL SULFATE 2 PUFF: 90 AEROSOL, METERED RESPIRATORY (INHALATION) at 09:07

## 2024-01-31 RX ADMIN — INSULIN LISPRO 8 UNITS: 100 INJECTION, SOLUTION INTRAVENOUS; SUBCUTANEOUS at 12:27

## 2024-01-31 RX ADMIN — FAMOTIDINE 20 MG: 20 TABLET ORAL at 09:06

## 2024-01-31 RX ADMIN — ASPIRIN 81 MG: 81 TABLET, CHEWABLE ORAL at 09:06

## 2024-01-31 RX ADMIN — VALSARTAN 40 MG: 40 TABLET, FILM COATED ORAL at 09:06

## 2024-01-31 RX ADMIN — CARVEDILOL 3.12 MG: 3.12 TABLET, FILM COATED ORAL at 09:06

## 2024-01-31 RX ADMIN — VENLAFAXINE HYDROCHLORIDE 37.5 MG: 37.5 CAPSULE, EXTENDED RELEASE ORAL at 09:06

## 2024-01-31 RX ADMIN — ACETAMINOPHEN 650 MG: 325 TABLET, FILM COATED ORAL at 10:03

## 2024-01-31 RX ADMIN — CARVEDILOL 3.12 MG: 3.12 TABLET, FILM COATED ORAL at 17:38

## 2024-01-31 RX ADMIN — INSULIN GLARGINE 15 UNITS: 100 INJECTION, SOLUTION SUBCUTANEOUS at 09:09

## 2024-01-31 RX ADMIN — EMPAGLIFLOZIN 10 MG: 10 TABLET, FILM COATED ORAL at 09:06

## 2024-01-31 RX ADMIN — INSULIN LISPRO 2 UNITS: 100 INJECTION, SOLUTION INTRAVENOUS; SUBCUTANEOUS at 17:38

## 2024-01-31 ASSESSMENT — PAIN DESCRIPTION - LOCATION: LOCATION: BACK

## 2024-01-31 ASSESSMENT — PAIN DESCRIPTION - ORIENTATION: ORIENTATION: LOWER

## 2024-01-31 ASSESSMENT — PAIN SCALES - GENERAL: PAINLEVEL_OUTOF10: 5

## 2024-01-31 NOTE — GROUP NOTE
Psych-Ed/Relapse Prevention Group Note        Date: January 31, 2024 Start Time: 2:30pm  End Time:  3:15pm      Number of Participants in Group & Unit Census:  4/15    Topic: Wellness and Movement    Goal of Group:Patient will identify the benefits of physical movement and exercise for mental health      Comments:     Patient did not participate in Psych-Ed/Relapse Prevention group, despite staff encouragement and explanation of benefits.  Patient remain seclusive to self.  Q15 minute safety checks maintained for patient safety and will continue to encourage patient to attend unit programming.         Signature:  TAWNY EscalanteS

## 2024-01-31 NOTE — DISCHARGE INSTR - PHARMACY
Take medications as prescribed. Do not consume drugs or alcohol while taking medications.  Medications filled at hospital pharmacy and sent home with patient.

## 2024-01-31 NOTE — BH NOTE
Cab to  patient at 8:45pm.  [id number] will call when trip is picked up.  Trip number- [phone].  D/C to bus station.

## 2024-01-31 NOTE — DISCHARGE INSTRUCTIONS
Information:  Medications:   Medication summary provided   I understand that I should take only the medications on my list.     -why and when I need to take each medicine.     -which side effects to watch for.     -that I should carry my medication information at all times in case of     Emergency situations.    I will take all of my medicines to follow up appointments.     -check with my physician or pharmacist before taking any new    Medication, over the counter product or drink alcohol.    -Ask about food, drug or dietary supplement interactions.    -discard old lists and update records with medication providers.    Notify Physician:  Notify physician if you notice:   Always call 911 if you feel your life is in danger  In case of an emergency call 911 immediately!  If 911 is not available call your local emergency medical system for help    Behavioral Health Follow Up:  Original Referral Source:[facility]  Discharge Diagnosis: Depression with suicidal ideation [F32.A, R45.851]  Recommendations for Level of Care: Take medications as prescribed. Attend follow up appointments   Patient status at discharge: alert/oriented/independent/Denies suicidal and homicidal thoughts   My hospital  was: [facility]   Aftercare plan faxed: yes   -faxed by: staff   -date: 1/31/2024   -time: 1800  Prescriptions: filled at hospital pharmacy and sent home with patient     Smoking: Quit Smoking.   Call the [employer]'s smoking quitline at 4-932-55K-QUIT  Know the signs of a heart attack   If you have any of the following symptoms call 911 immediately, do not wait more    Than five minutes.    1. Pressure, fullness and/ or squeezing in the center of the chest spreading to    The jaw, neck or shoulder.    2. Chest discomfort with light headedness, fainting, sweating, nausea or    Shortness of breath.   3. Upper abdominal pressure or discomfort.   4. Lower chest pain, back pain, unusual fatigue, shortness of breath, nausea   Or

## 2024-01-31 NOTE — GROUP NOTE
Psych-Ed/Relapse Prevention Group Note        Date: January 31, 2024 Start Time: 11am  End Time: 11:45am      Number of Participants in Group & Unit Census:  9/15    Topic: Mental health education and advocacy    Goal of Group:Patient will identify benefits of education and advocacy in mental health to decrease stigma and promote wellness      Comments:     Patient did not participate in Psych-Ed/Relapse Prevention group, despite staff encouragement and explanation of benefits.  Patient remain seclusive to self.  Q15 minute safety checks maintained for patient safety and will continue to encourage patient to attend unit programming.         Signature:  TAWNY EscalanteS

## 2024-01-31 NOTE — TRANSITION OF CARE
Behavioral Health Transition Record to Provider    Patient Name: Nitish Duran  YOB: 1970   Medical Record Number: [id number]  Date of Admission: 1/25/2024  1:44 PM   Date of Discharge: 1/31/2024      Attending Provider: Tom Somers MD   Discharging Provider: Yonis  To contact this individual call [phone] and ask the  to page.  If unavailable, ask to be transferred to Behavioral Health Provider on call.  A Behavioral Health Provider will be available on call 24/7 and during holidays.    Primary Care Provider: No primary care provider on file.    No Known Allergies    Reason for Admission: Increase in suicidal thoughts to cut self- sober for 2 months, difficulty coping     Admission Diagnosis: Depression with suicidal ideation [F32.A, R45.851]    * No surgery found *    Results for orders placed or performed during the hospital encounter of 01/25/24   CBC with Auto Differential   Result Value Ref Range    WBC 5.3 3.5 - 11.0 k/uL    RBC 5.53 4.5 - 5.9 m/uL    Hemoglobin 17.5 13.5 - 17.5 g/dL    Hematocrit 52.0 41 - 53 %    MCV 94.0 80 - 100 fL    MCH 31.6 26 - 34 pg    MCHC 33.6 31 - 37 g/dL    RDW 14.1 11.5 - 14.9 %    Platelets 159 150 - 450 k/uL    MPV 9.1 6.0 - 12.0 fL    Neutrophils % 73 (H) 36 - 66 %    Lymphocytes % 18 (L) 24 - 44 %    Monocytes % 6 1 - 7 %    Eosinophils % 1 0 - 4 %    Basophils % 2 0 - 2 %    Neutrophils Absolute 3.80 1.3 - 9.1 k/uL    Lymphocytes Absolute 1.00 1.0 - 4.8 k/uL    Monocytes Absolute 0.30 0.1 - 1.3 k/uL    Eosinophils Absolute 0.10 0.0 - 0.4 k/uL    Basophils Absolute 0.10 0.0 - 0.2 k/uL   CMP   Result Value Ref Range    Sodium 135 135 - 144 mmol/L    Potassium 4.5 3.7 - 5.3 mmol/L    Chloride 101 98 - 107 mmol/L    CO2 22 20 - 31 mmol/L    Anion Gap 12 9 - 17 mmol/L    Glucose 324 (H) 70 - 99 mg/dL    BUN 21 (H) 6 - 20 mg/dL    Creatinine 1.2 0.7 - 1.2 mg/dL    Est, Glom Filt Rate >60 >60 mL/min/1.73m2    Calcium 9.2 8.6 - 10.4 mg/dL    Total

## 2024-01-31 NOTE — PROGRESS NOTES
Behavioral Services  Medicare Certification Upon Admission    I certify that this patient's inpatient psychiatric hospital admission is medically necessary for:    [x] (1) Treatment which could reasonably be expected to improve this patient's condition,       [x] (2) Or for diagnostic study;     AND     [x](2) The inpatient psychiatric services are provided while the individual is under the care of a physician and are included in the individualized plan of care.    Estimated length of stay/service 4 to 7 days    Plan for post-hospital care home with outpatient community mental health follow-up    Electronically signed by TANI LOPEZ MD on 1/26/2024 at 2:51 PM      
    [facility] Internal Medicine  Khanh Maravilla MD; Erick Li MD; Adelfo Steen MD; MD Eduarda Salcido MD; Rosette Olson MD    [facility] Internal Medicine   IN-PATIENT SERVICE   [facility]     HISTORY AND PHYSICAL EXAMINATION            Date:   1/31/2024  Patient name:  Nitish Duran  Date of admission:  1/25/2024  1:44 PM  MRN:   [id number]  Account:  [id number]  YOB: 1970  PCP:    No primary care provider on file.  Room:   [address] [place] [address]  Code Status:    Full Code      Chief Complaint:     Suicidal /Ac Psychosis    History Obtained From:     Patient/EMR/bedside RN     History of Present Illness:     53-year-old gentleman with underlying history of hypertension, hyperlipidemia, diabetes, coronary disease, BNP more than 9000 on labs day before yesterday, admitted inpatient psych with suicidal ideations  1/31  Intervention, reconsulted for readings of high blood sugars, some of the blood sugars are more than 400  Patient, noncompliance with diet    Past Medical History:     Past Medical History:   Diagnosis Date    CAD (coronary artery disease)     Diabetes mellitus (HCC)     Hypertension     Psychiatric problem         Past Surgical History:     History reviewed. No pertinent surgical history.     Medications Prior to Admission:     Prior to Admission medications    Medication Sig Start Date End Date Taking? Authorizing Provider   albuterol sulfate HFA (PROVENTIL;VENTOLIN;PROAIR) 108 (90 Base) MCG/ACT inhaler Inhale 2 puffs into the lungs every 6 hours as needed for Wheezing 1/31/24  Yes Tom Somers MD   aspirin 81 MG chewable tablet Take 1 tablet by mouth daily 1/31/24  Yes Tom Somers MD   atorvastatin (LIPITOR) 80 MG tablet Take 1 tablet by mouth at bedtime 1/31/24  Yes Tom Somers MD   carvedilol (COREG) 3.125 MG tablet Take 1 tablet by mouth 2 times daily (with meals) 1/31/24  Yes Tom Somers MD   dicyclomine 
  Daily Progress Note  1/28/2024    Patient Name: Nitish Duran    CHIEF COMPLAINT:  Depression with suicidal ideation with plan and intent to cut self          SUBJECTIVE:    Niitsh was seen for follow-up assessment today.  He has been refusing more of his scheduled medications today.  He has not required any emergency medications for agitation since admission.  Patient continues to minimize suicidal ideation to staff.  He is now refusing blood glucose checks.  Patient was resting in bed but awake.  He reported that he was very irritable earlier because one of his peers approached him and told him to \"stay away from [place] because she has a boyfriend\".  Patient was angered by this and was worried that he would react physically towards the other patient so he has been resting in his room.  He stated he is calmer now.  Patient continues to have little desire to be alive.  He reported he is now going to refuse most of his medication in hopes that it hastens his death.  Patient is not safe for discharge.  He continues to minimize suicidal thoughts and plan to staff.  Patient continues to report to this writer he would end his life if he is discharged.    Appetite:  [] Adequate/Unchanged  [] Increased  [x] Decreased      Sleep:       [] Adequate/Unchanged  [x] Fair  [] Poor      Group Attendance on Unit:   [] Yes   [x] Selectively    [] No    Compliant with scheduled medications: [] Yes  [x] No    Received emergency medications in past 24 hrs: [] Yes   [x] No    Medication Side Effects: Denies         Mental Status Exam  Level of consciousness: Awake and alert  Appearance:  Appropriate attire, resting in bed, fair grooming   Behavior/Motor: Approachable, engages with interviewer, calm  Attitude toward examiner: Cooperative, attentive, fair eye contact  Speech: Normal rate, volume, and depressed tone.  Mood: Depressed  Affect: Mood congruent  Thought processes:  Goal directed, linear, coherent  Thought content: Active 
CLINICAL PHARMACY NOTE: MEDS TO BEDS    Total # of Prescriptions Filled:  14   The following medications were delivered to the patient:  Humalog Kwikpen ([facility] Nurse Stefany aware needs refrigerated)  Lantus Solostar ([facility] Nurse Stefany aware needs refrigerated)  Pen needles  Albuterol inhaler  Trazodone 50mg  Aspirin 81  Atorvastatin 80mg  Carvedilol 3.125mg  Dicyclomine 10mg  Jardiance 10mg  Famotidine 20mg  Venlafaxine ER 37.5mg  Hydroxyzine HCL 25mg  Mirtazapine 7.5mg     Additional Documentation: delivered to STEVE Ferrara 1/31/24 4:41pm kbg - Diovan transferred to [facility] [phone]  
Pharmacy Medication History Note      List of current medications patient is taking is complete.     Source of information: patient, dispense report, OARRS, [facility] Care Everywhere, [facility] Care Everywhere    Changes made to medication list:  Medications flagged for removal (include reason, ex. noncompliance):  None    Medications removed (include reason, ex. therapy complete or physician discontinued):  None    Medications added/doses adjusted:  Aspirin 81mg  Atorvastatin 80mg  Carvedilol 3.125mg  Dicyclomine 10mg  Empagliflozin 10mg  Famotidine 20mg  Hydroxyzine 25mg  Insulin glargine  Insulin lispro  Melatonin 3mg  Sertraline 100mg  Valsartan 40mg    Other notes (ex. Recent course of antibiotics, Coumadin dosing):  OARRS Report: Gabpanetin 300mg 14 for 7 on 1/3/24  Patient stated that he receives his medication from [facility]. After calling [facility], they stated that there were no active prescriptions and that the most recent dispense was from 2022. Looking at Care Everywhere from [facility], it appears patient received this medication at recent admission, but did not receive or fill any discharge medications since his most recent admission.   Patient also states he remembers taking Torsemide and feels he needs it now due to swelling in his feet. Per [facility] Everywhere, they initiated Torsemide 10mg taking 5 tablets daily back in December for a recent admission, but this medication also was not filled after discharge.     Denies use of other OTC or herbal medications.      Allergies clarified    Medication list provided to the patient:no  Medication education provided to the patient:none      Electronically signed by Nathaniel Vásquez on 1/25/2024 at 3:57 PM                                                     
RT ASSESSMENT TREATMENT GOALS    [x]Pt Goal:  Pt will identify 1-2 positive coping skills by time of discharge.    []Pt Goal:  Pt will identify 1-2 positive aspects of self by time of discharge.    []Pt Goal:  Pt will remain on task/topic for 15-30 minutes during group by time of discharge.    [x]Pt Goal:  Pt will identify 1-2 aspects of relapse prevention plan by time of discharge.    [x]Pt Goal:  Pt will join in conversation with peers 1-2 times per group by time of discharge.    []Pt Goal:  Pt will identify 1-2 new leisure interests by time of discharge.    []Pt Goal: Pt will maintain behavorial control until the time of discharge.     
151-200: 1 unit, 201-250: 2 units, 251-300: 3 units, 301-350: 4 units, 351-400: 5 units, >400: call physician   Yes Jenna Calles MD   aspirin 81 MG chewable tablet Take 1 tablet by mouth daily   Yes Jenna Calles MD   famotidine (PEPCID) 20 MG tablet Take 1 tablet by mouth daily   Yes Jenna Calles MD   hydrOXYzine HCl (ATARAX) 25 MG tablet Take 1 tablet by mouth every 6 hours as needed for Anxiety   Yes Jenna Calles MD   insulin glargine (LANTUS) 100 UNIT/ML injection vial Inject 10 Units into the skin daily   Yes Jenna Calles MD   valsartan (DIOVAN) 40 MG tablet Take 1 tablet by mouth daily   Yes Jenna Calles MD   carvedilol (COREG) 3.125 MG tablet Take 1 tablet by mouth 2 times daily (with meals)   Yes Jenna Calles MD        Allergies:     Patient has no known allergies.    Social History:     Tobacco:    reports that he has been smoking cigarettes. He has never used smokeless tobacco.  Alcohol:      reports no history of alcohol use.  Drug Use:  reports current drug use. Drug: Cocaine.    Family History:     History reviewed. No pertinent family history.    Review of Systems:     Positive and Negative as described in HPI.        Physical Exam:     /73   Pulse 71   Temp 97.2 °F (36.2 °C) (Temporal)   Resp 14   Ht 1.702 m (5' 7\")   Wt 74.8 kg (165 lb)   SpO2 98%   BMI 25.84 kg/m²   Temp (24hrs), Av.4 °F (36.3 °C), Min:97.2 °F (36.2 °C), Max:97.5 °F (36.4 °C)    Recent Labs     24  2031 24  0802 24  1203 24  0808   POCGLU 210* 154* 225* 183*     No intake or output data in the 24 hours ending 24 1650    General Appearance:  alert, well appearing, and in no acute distress  Mental status: oriented to person, place, and time   Head:  normocephalic, atraumatic.  Neck: supple, no carotid bruits, thyroid not palpable  Lungs: Bilateral equal air entry, clear to ausculation, no wheezing, rales or rhonchi, normal 
collection.   Final    Comment: Testing for legal purposes should be confirmed by another method.  To request confirmation   of test result, please call the lab within 7 days of sample submission.      POC Glucose 01/25/2024 247 (H)  75 - 110 mg/dL Final    POC Glucose 01/25/2024 224 (H)  75 - 110 mg/dL Final    POC Glucose 01/26/2024 151 (H)  75 - 110 mg/dL Final    POC Glucose 01/26/2024 200 (H)  75 - 110 mg/dL Final    POC Glucose 01/26/2024 210 (H)  75 - 110 mg/dL Final    POC Glucose 01/27/2024 154 (H)  75 - 110 mg/dL Final    POC Glucose 01/27/2024 225 (H)  75 - 110 mg/dL Final         Reviewed patient's current plan of care and vital signs with nursing staff.    Labs reviewed: [x] Yes    Medications  Current Facility-Administered Medications: aspirin chewable tablet 81 mg, 81 mg, Oral, Daily  atorvastatin (LIPITOR) tablet 80 mg, 80 mg, Oral, Nightly  carvedilol (COREG) tablet 3.125 mg, 3.125 mg, Oral, BID WC  dicyclomine (BENTYL) capsule 10 mg, 10 mg, Oral, Q6H PRN  famotidine (PEPCID) tablet 20 mg, 20 mg, Oral, Daily  valsartan (DIOVAN) tablet 40 mg, 40 mg, Oral, Daily  venlafaxine (EFFEXOR XR) extended release capsule 37.5 mg, 37.5 mg, Oral, Daily with breakfast  mirtazapine (REMERON) tablet 7.5 mg, 7.5 mg, Oral, Nightly  albuterol sulfate HFA (PROVENTIL;VENTOLIN;PROAIR) 108 (90 Base) MCG/ACT inhaler 2 puff, 2 puff, Inhalation, Q6H PRN  haloperidol lactate (HALDOL) injection 5 mg, 5 mg, IntraMUSCular, Q6H PRN **AND** LORazepam (ATIVAN) injection 2 mg, 2 mg, IntraMUSCular, Q6H PRN **AND** diphenhydrAMINE (BENADRYL) injection 50 mg, 50 mg, IntraMUSCular, Q6H PRN  haloperidol (HALDOL) tablet 5 mg, 5 mg, Oral, Q6H PRN **AND** LORazepam (ATIVAN) tablet 2 mg, 2 mg, Oral, Q6H PRN  acetaminophen (TYLENOL) tablet 650 mg, 650 mg, Oral, Q6H PRN  ibuprofen (ADVIL;MOTRIN) tablet 400 mg, 400 mg, Oral, Q6H PRN  hydrOXYzine HCl (ATARAX) tablet 50 mg, 50 mg, Oral, TID PRN  traZODone (DESYREL) tablet 50 mg, 50 mg, Oral, 
    PLAN  Patient symptoms: Not safe for discharge  Encourage compliance with scheduled medications   Continue LOS observation for safety  Monitor need and frequency of PRN medications.  Encourage participation in groups and milieu.  Attempt to develop insight.  Psycho-education conducted.  Medication management and discharge planning per attending  Follow-up daily while inpatient.      Patient continues to be monitored in the inpatient psychiatric facility at [facility] for safety and stabilization. Patient continues to need, on a daily basis, active treatment furnished directly by or requiring the supervision of inpatient psychiatric personnel.    Electronically signed by GRAZYNA De CNP on 1/29/2024 at 5:26 PM    **This report has been created using voice recognition software. It may contain minor errors which are inherent in voice recognition technology.**     I independently saw and evaluated the patient.  I reviewed the nurse practitioners documentation above.  Principle diagnosis we are treating for is Major depressive disorder, recurrent severe without psychotic features (HCC). Any additional comments or changes to the nurse practitioners documentation are stated below otherwise agree with assessment.  Plan will be as follows:  As the patient is splitting between providers.  Patient told this provider he was doing much better on medication.  After discussion of risk benefits and alternatives he felt like there was no need to increase medication because he was feeling better and was not having suicidal ideation.  Completely contradicts his conversation with the midlevel.  There is concern that the patient may be manipulating or malingering.  He told this author that he was planning to go back to [place], that he had talked to an old boss.  That he was to try to reconnect with a job.  It is a completely disconnected conversation compared to the conversation he had with the midlevel and consistent with 
and evaluated the patient.  I reviewed the nurse practitioners documentation above. Principle diagnosis we are treating for is Major depressive disorder, recurrent severe without psychotic features (HCC).  Any additional comments or changes to the nurse practitioners documentation are stated below otherwise agree with assessment. Spent 17 minutes with the patient in supportive psychotherapy.  Plan will be as follows:  Patient continues to tell this author he is feeling better.  Denying intention or plan of acting on any suicidal thoughts.  Requesting discharge as he states he has reached out to a friend in the [facility] who has an opportunity for him for employment.  Requesting to go back to the [place] area.  Forward-looking and constructive.  Does not want any further adjustments in medication at present time.    PLAN  Patient s symptoms   are improving  Continue with current medication for now  Attempt to develop insight  Psycho-education conducted.  Supportive Therapy conducted.  Probable discharge is Wednesday  Follow-up daily while on inpatient unit

## 2024-01-31 NOTE — BH NOTE
IM  contacted writer regarding discharge information for this patient.  Patient to follow up out patient with DR DE LA ROSA Cardiologist at [facility].   Patient informed and educated on importance of this follow up.  Patient verbalized understanding

## 2024-01-31 NOTE — GROUP NOTE
Group Therapy Note    Date: 1/31/2024    Group Start Time: 1000  Group End Time: 1050  Group Topic: Psychotherapy    [facility] Inge Ballard MSW, NAN        Group Therapy Note    Attendees: 8/16     Patient refused to attend psychotherapy group at 10:00 am after encouragement from staff.  1:1 talk time provided as alternative to group session.      Discipline Responsible: /Counselor      Signature:  KASSANDRA Pack LSW

## 2024-01-31 NOTE — BH NOTE
Patient given tobacco quitline number 51799016885 at this time, refusing to call at this time, states \" I just dont want to quit now\"- patient given information as to the dangers of long term tobacco use. Continue to reinforce the importance of tobacco cessation.

## 2024-02-01 NOTE — DISCHARGE SUMMARY
tablet Take 1 tablet by mouth 2 times daily (with meals), Disp-20 tablet, R-0Normal      dicyclomine (BENTYL) 10 MG capsule Take 1 capsule by mouth every 6 hours as needed (Cramping), Disp-10 capsule, R-0Normal      empagliflozin (JARDIANCE) 10 MG tablet Take 1 tablet by mouth daily, Disp-10 tablet, R-0Normal      famotidine (PEPCID) 20 MG tablet Take 1 tablet by mouth daily, Disp-10 tablet, R-0Normal      hydrOXYzine HCl (ATARAX) 25 MG tablet Take 1 tablet by mouth every 6 hours as needed for Anxiety, Disp-10 tablet, R-0Normal      insulin glargine (LANTUS) 100 UNIT/ML injection vial Inject 15 Units into the skin daily, Disp-10 mL, R-0Normal      !! insulin lispro (HUMALOG) 100 UNIT/ML SOLN injection vial Inject 0-8 Units into the skin 3 times daily (with meals), Disp-10 mL, R-0Normal      valsartan (DIOVAN) 40 MG tablet Take 1 tablet by mouth daily, Disp-10 tablet, R-0Normal      !! insulin lispro (HUMALOG) 100 UNIT/ML SOLN injection vial Inject 0-4 Units into the skin nightly, Disp-10 mL, R-0Normal       !! - Potential duplicate medications found. Please discuss with provider.        STOP taking these medications       sertraline (ZOLOFT) 100 MG tablet Comments:   Reason for Stopping:         melatonin 3 MG TABS tablet Comments:   Reason for Stopping:                Core Measures statement:   Not applicable    Discharge Exam:  Level of consciousness:  Within normal limits  Appearance: Street clothes, seated, with good grooming  Behavior/Motor: No abnormalities noted  Attitude toward examiner:  Cooperative, attentive, good eye contact  Speech:  spontaneous, normal rate, normal volume and well articulated  Mood:  euthymic  Affect:  Full range  Thought processes:  linear, goal directed and coherent  Thought content:  denies homicidal ideation  Suicidal Ideation:  denies suicidal ideation  Delusions:  no evidence of delusions  Perceptual Disturbance:  denies any perceptual disturbance  Cognition:  Intact  Memory: age

## 2024-02-01 NOTE — CARE COORDINATION
BHI Biopsychosocial Assessment    Current Level of Psychosocial Functioning     Independent xxx  Dependent    Minimal Assist     Comments:    Psychosocial High Risk Factors (check all that apply)    Unable to obtain meds   Chronic illness/pain  xx  Substance abuse xx  Lack of Family Support xx  Financial stress   Isolation  xx  Inadequate Community Resources xx  Suicide attempt(s)  Not taking medications   Victim of crime   Developmental Delay  Unable to manage personal needs    Age 65 or older   Homeless  No transportation   Readmission within 30 days  Unemployment  Traumatic Event    Comments:   Psychiatric Advanced Directives: none reported     Family to Involve in Treatment: pt denies having family support     Sexual Orientation:  n/a    Patient Strengths: pt receives social security income     Patient Barriers: pt has history of substance abuse, currently homeless, no contact with children, chronic health conditions       Opiate Education Provided:  pt denies opiate abuse       CMHC/mental health history:  pt is new to [facility], not linked     Plan of Care   medication management, group/individual therapies, family meetings, psycho -education, treatment team meetings to assist with stabilization    Initial Discharge Plan:  offered AOD/recovery programming, pt refused. States he does not want to return to the [facility], discussed alternative homeless shelters       Clinical Summary:  Nitish is a 53 year old single male who has been admitted to [facility] with report of suicidal ideation to cut self. Pt states history of \"many\" suicide attempts in his past, states he was  for 30 years and when his marriage ended, he \"lost hope completely.\" Pt reports he has 3 grown children, no contact with them. Pt reports history of cocaine abuse, states last use 1 month ago. Pt denies legal concerns.   SW offered ongoing support and encouragement.         
Social work spoke with Sana Cruz regarding bus ticket to [place]. Sana purchased ticket and emailed it to this social work. Pts bus will leave at 10:15 pm. Social work contacted dietary for a brown bag meal for pt to take with him. Dietary will send the meal with the dinner tray tonight.   
directions carefully, and ask your doctor or other care provider to review them with you.                CONTINUE taking these medications      aspirin 81 MG chewable tablet  Take 1 tablet by mouth daily  Notes to patient: Clot management      carvedilol 3.125 MG tablet  Commonly known as: COREG  Take 1 tablet by mouth 2 times daily (with meals)  Notes to patient: Blood pressure      dicyclomine 10 MG capsule  Commonly known as: BENTYL  Take 1 capsule by mouth every 6 hours as needed (Cramping)  Notes to patient: Cramping      empagliflozin 10 MG tablet  Commonly known as: Jardiance  Take 1 tablet by mouth daily  Notes to patient: Glucose management      famotidine 20 MG tablet  Commonly known as: PEPCID  Take 1 tablet by mouth daily  Notes to patient: reflux     hydrOXYzine HCl 25 MG tablet  Commonly known as: ATARAX  Take 1 tablet by mouth every 6 hours as needed for Anxiety     valsartan 40 MG tablet  Commonly known as: DIOVAN  Take 1 tablet by mouth daily            STOP taking these medications      melatonin 3 MG Tabs tablet     sertraline 100 MG tablet  Commonly known as: ZOLOFT               Where to Get Your Medications        These medications were sent to [id number] - [address] -  [phone] - [phone]  [address] [place], [place] [address]      Phone: [phone]   albuterol sulfate  (90 Base) MCG/ACT inhaler  aspirin 81 MG chewable tablet  atorvastatin 80 MG tablet  carvedilol 3.125 MG tablet  dicyclomine 10 MG capsule  empagliflozin 10 MG tablet  famotidine 20 MG tablet  hydrOXYzine HCl 25 MG tablet  insulin glargine 100 UNIT/ML injection vial  insulin lispro 100 UNIT/ML Soln injection vial  insulin lispro 100 UNIT/ML Soln injection vial  mirtazapine 7.5 MG tablet  traZODone 50 MG tablet  valsartan 40 MG tablet  venlafaxine 37.5 MG extended release capsule     Pharmacy Instructions:    Take medications as prescribed. Do not consume drugs or

## 2024-02-01 NOTE — BH NOTE
[facility] [place]  Discharge Note    Pt discharged with followings belongings:   Dental Appliances: None  Vision - Corrective Lenses: Eyeglasses  Hearing Aid: None  Jewelry: None  Body Piercings Removed: N/A  Clothing: Pants, Shirt, Socks, Jacket/Coat, Footwear  Other Valuables: Other (Comment) (none)   Valuables sent home with patient or returned to patient. Patient educated on aftercare instructions: follow up at [facility].  Information faxed to [facility] by dayshift  during day shift Patient verbalize understanding of AVS:  yes.    Status EXAM upon discharge:  Mental Status and Behavioral Exam  Normal: No  Level of Assistance: Independent/Self  Facial Expression: Brightened  Affect: Appropriate  Level of Consciousness: Alert  Frequency of Checks: 4 times per hour, close  Mood:Normal: No  Mood: Anxious, Sad  Motor Activity:Normal: Yes  Eye Contact: Fair  Observed Behavior: Friendly, Guarded, Cooperative, Preoccupied  Sexual Misconduct History: Current - no  Preception: [place] to person, [place] to time, [place] to situation, [place] to place  Attention:Normal: No  Attention: Distractible  Thought Processes: Unremarkable  Thought Content:Normal: No  Thought Content: Preoccupations  Depression Symptoms: Feelings of hopelessess  Anxiety Symptoms: Generalized  Lani Symptoms: No problems reported or observed.  Hallucinations: None (patient denies hallucinations)  Delusions: No  Memory:Normal: Yes  Insight and Judgment: No  Insight and Judgment: Poor judgment, Poor insight    Tobacco Screening:  Practical Counseling, on admission, clarisa X, if applicable and completed (first 3 are required if patient doesn't refuse):            ( x) Recognizing danger situations (included triggers and roadblocks)                    (x ) Coping skills (new ways to manage stress,relaxation techniques, changing routine, distraction)                                                           (x ) Basic information about

## 2024-02-01 NOTE — BH NOTE
PT discharged to [facility] to go to [place]. per Dr. Somers.  Belongings returned and follow up appointment scheduled.  Discharge paperwork signed and reviewed.  Medications filled at [facility] and reviewed with RN. Follow up information was faxed during the day to accepting facility .   Pt verbalizes understanding of all discharge instructions.  Denies thoughts of self harm at  time of discharge.

## 2024-02-01 NOTE — PLAN OF CARE
Problem: Self Harm/Suicidality  Goal: Will have no self-injury during hospital stay  Description: INTERVENTIONS:  1.  Ensure constant observer at bedside with Q15M safety checks  2.  Maintain a safe environment  3.  Secure patient belongings  4.  Ensure family/visitors adhere to safety recommendations  5.  Ensure safety tray has been added to patient's diet order  6.  Every shift and PRN: Re-assess suicidal risk via Frequent Screener    1/26/2024 1615 by Lilly Jacobs LPN  Outcome: Progressing  Note: Patient is positive for suicidal ideation.Patient denies homicidal ideation. Patient agreeable to seek out staff should thoughts of self harm/harming others arise/worsen. Patient moved to 135 and made a line of sight. Patient denies hallucinations. Patient is positive for anxiety/depression but does not rate. Patient is pleasant and cooperative, and social with select peers. Patient is medication compliant and behavior controlled. Comfort and reassurance provided. Safety checks maintained every 15 minutes and as needed.       Problem: Chronic Conditions and Co-morbidities  Goal: Patient's chronic conditions and co-morbidity symptoms are monitored and maintained or improved  1/26/2024 1615 by Lilly Jacobs LPN  Outcome: Progressing     Problem: Pain  Goal: Verbalizes/displays adequate comfort level or baseline comfort level  1/26/2024 1615 by Lilly Jacobs LPN  Outcome: Progressing     
  Problem: Self Harm/Suicidality  Goal: Will have no self-injury during hospital stay  Description: INTERVENTIONS:  1.  Ensure constant observer at bedside with Q15M safety checks  2.  Maintain a safe environment  3.  Secure patient belongings  4.  Ensure family/visitors adhere to safety recommendations  5.  Ensure safety tray has been added to patient's diet order  6.  Every shift and PRN: Re-assess suicidal risk via Frequent Screener    1/27/2024 0959 by Toño Suresh LPN  Outcome: Progressing     Problem: Pain  Goal: Verbalizes/displays adequate comfort level or baseline comfort level  1/27/2024 0959 by Toño Suresh LPN  Outcome: Progressing   Patient denies intent to harm self, remains free from self harm. Support and encouragement provided  
  Problem: Self Harm/Suicidality  Goal: Will have no self-injury during hospital stay  Description: INTERVENTIONS:  1.  Ensure constant observer at bedside with Q15M safety checks  2.  Maintain a safe environment  3.  Secure patient belongings  4.  Ensure family/visitors adhere to safety recommendations  5.  Ensure safety tray has been added to patient's diet order  6.  Every shift and PRN: Re-assess suicidal risk via Frequent Screener    1/28/2024 1010 by Toño Suresh LPN  Outcome: Progressing     Problem: Safety - Adult  Goal: Free from fall injury  1/28/2024 1010 by Toño Suresh LPN  Outcome: Progressing     Problem: Pain  Goal: Verbalizes/displays adequate comfort level or baseline comfort level  1/28/2024 1010 by Toño Suresh LPN  Outcome: Progressing     
  Problem: Self Harm/Suicidality  Goal: Will have no self-injury during hospital stay  Description: INTERVENTIONS:  1.  Ensure constant observer at bedside with Q15M safety checks  2.  Maintain a safe environment  3.  Secure patient belongings  4.  Ensure family/visitors adhere to safety recommendations  5.  Ensure safety tray has been added to patient's diet order  6.  Every shift and PRN: Re-assess suicidal risk via Frequent Screener    1/29/2024 2139 by Mariam Wise  Outcome: Progressing     Patient is out in dayroom watching tv and socializing with peers. Patient has some generalized anxiety and depression. Patient has been cooperative and friendly. Patient denies suicidal and homicidal ideation at this time. Patient agrees to be safe on unit and seek out staff if needed. Q15 minute Safety checks maintained   
  Problem: Self Harm/Suicidality  Goal: Will have no self-injury during hospital stay  Description: INTERVENTIONS:  1.  Ensure constant observer at bedside with Q15M safety checks  2.  Maintain a safe environment  3.  Secure patient belongings  4.  Ensure family/visitors adhere to safety recommendations  5.  Ensure safety tray has been added to patient's diet order  6.  Every shift and PRN: Re-assess suicidal risk via Frequent Screener    1/31/2024 2014 by Gabino Sarabia, RN  Outcome: Completed   Patient no longer suicidal. Patient being discharged to [facility] for additional treatment.     Problem: Chronic Conditions and Co-morbidities  Goal: Patient's chronic conditions and co-morbidity symptoms are monitored and maintained or improved  1/31/2024 2014 by Gabino Sarabia, RN  Outcome: Completed     Problem: Safety - Adult  Goal: Free from fall injury  1/31/2024 2014 by Gabino Sarabia, RN  Outcome: Completed     Problem: Pain  Goal: Verbalizes/displays adequate comfort level or baseline comfort level  1/31/2024 2014 by Gabino Sarabia, RN  Outcome: Completed     
  Problem: Self Harm/Suicidality  Goal: Will have no self-injury during hospital stay  Description: INTERVENTIONS:  1.  Ensure constant observer at bedside with Q15M safety checks  2.  Maintain a safe environment  3.  Secure patient belongings  4.  Ensure family/visitors adhere to safety recommendations  5.  Ensure safety tray has been added to patient's diet order  6.  Every shift and PRN: Re-assess suicidal risk via Frequent Screener    Outcome: Progressing     Problem: Safety - Adult  Goal: Free from fall injury  Outcome: Progressing     Problem: Pain  Goal: Verbalizes/displays adequate comfort level or baseline comfort level  Outcome: Progressing       Patient denies suicidal thoughts and thoughts to self harm thus far.  Patient does report a \"disinterest\" in life but denies suicidal thoughts.  Patient has been compliant with morning scheduled medications, though he his selective with glucose checks. Patient has been observed in the milieu more often and selectively social with peers.  Patient encouraged to seek staff for needs or concerns that may arise.  Safe environment maintained.  Safety checks in place q15 min per protocol.   
  Problem: Self Harm/Suicidality  Goal: Will have no self-injury during hospital stay  Description: INTERVENTIONS:  1.  Ensure constant observer at bedside with Q15M safety checks  2.  Maintain a safe environment  3.  Secure patient belongings  4.  Ensure family/visitors adhere to safety recommendations  5.  Ensure safety tray has been added to patient's diet order  6.  Every shift and PRN: Re-assess suicidal risk via Frequent Screener    Outcome: Progressing     Problem: Safety - Adult  Goal: Free from fall injury  Outcome: Progressing     Problem: Pain  Goal: Verbalizes/displays adequate comfort level or baseline comfort level  Outcome: Progressing     Patient denies suicidal thoughts, thoughts to harm self, and has remained free from self injury thus far.  Patient endorses some worry about taking the bus to [place] at discharge because of how heavy the presence of drugs down town.  Patient encouraged to seek out facilities that help with sobriety.  Patient encouraged to seek staff for any needs or concerns that may arise.  Safe environment maintained. Safety checks in place q15min per protocol.   
  Problem: Self Harm/Suicidality  Goal: Will have no self-injury during hospital stay  Description: INTERVENTIONS:  1.  Ensure constant observer at bedside with Q15M safety checks  2.  Maintain a safe environment  3.  Secure patient belongings  4.  Ensure family/visitors adhere to safety recommendations  5.  Ensure safety tray has been added to patient's diet order  6.  Every shift and PRN: Re-assess suicidal risk via Frequent Screener    Outcome: Progressing     Problem: Safety - Adult  Goal: Free from fall injury  Outcome: Progressing     Problem: Pain  Goal: Verbalizes/displays adequate comfort level or baseline comfort level  Outcome: Progressing     Patient denies suicidal thoughts, thoughts, to self harm, and has remained free from self injury thus far.  Patient has been observed as social with peers and staff.  Patient reports improvement in mood today from yesterday.  Patient encouraged to seek staff for any needs or concerns that may arise. Safe environment maintained.  Safety checks in place q15 min per protocol.   
Patient has been out in the milieu and social with peers. Patient states he has been eating okay and sleep is poor. Patient admits to feeling depressed and anxious. Patient was compliant with scheduled medications. Patient admits to suicidal thoughts with no specific plan. Patient agrees to come talk with staff if having any thoughts to harm himself this shift. 15 min rounds continued for patient safety.   Problem: Self Harm/Suicidality  Goal: Will have no self-injury during hospital stay  Description: INTERVENTIONS:  1.  Ensure constant observer at bedside with Q15M safety checks  2.  Maintain a safe environment  3.  Secure patient belongings  4.  Ensure family/visitors adhere to safety recommendations  5.  Ensure safety tray has been added to patient's diet order  6.  Every shift and PRN: Re-assess suicidal risk via Frequent Screener    1/27/2024 0130 by Sana oFrd RN  Outcome: Progressing     Problem: Chronic Conditions and Co-morbidities  Goal: Patient's chronic conditions and co-morbidity symptoms are monitored and maintained or improved  1/27/2024 0130 by Sana Ford, RN  Outcome: Progressing     Problem: Safety - Adult  Goal: Free from fall injury  1/27/2024 0130 by Sana Ford, RN  Outcome: Progressing     Problem: Pain  Goal: Verbalizes/displays adequate comfort level or baseline comfort level  1/27/2024 0130 by Sana Ford, RN  Outcome: Progressing     
Patient is brightened and social with peers. Reports no issues with physical concerns. Refused blood glucose check. Remains free from falls, denies pain. Will continue to monitor for safety and changes in mental status while on the unit.    Problem: Self Harm/Suicidality  Goal: Will have no self-injury during hospital stay  Description: INTERVENTIONS:  1.  Ensure constant observer at bedside with Q15M safety checks  2.  Maintain a safe environment  3.  Secure patient belongings  4.  Ensure family/visitors adhere to safety recommendations  5.  Ensure safety tray has been added to patient's diet order  6.  Every shift and PRN: Re-assess suicidal risk via Frequent Screener    1/28/2024 2157 by Alex Martinez, RN  Outcome: Progressing     Problem: Chronic Conditions and Co-morbidities  Goal: Patient's chronic conditions and co-morbidity symptoms are monitored and maintained or improved  1/28/2024 2157 by Alex Martinez, RN  Outcome: Progressing     Problem: Safety - Adult  Goal: Free from fall injury  1/28/2024 2157 by Alex Martinez, RN  Outcome: Progressing     Problem: Pain  Goal: Verbalizes/displays adequate comfort level or baseline comfort level  1/28/2024 2157 by Alex Martinez, RN  Outcome: Progressing     
Patient remains free from self harm, denies pain, remains free from falls. States always has some suicidal ideation with no means or intent. Denies hallucinations. Reports fair sleep and appetite. Denies physical concerns. Will continue to monitor for safety and changes in mental status while admitted.    Problem: Self Harm/Suicidality  Goal: Will have no self-injury during hospital stay  Description: INTERVENTIONS:  1.  Ensure constant observer at bedside with Q15M safety checks  2.  Maintain a safe environment  3.  Secure patient belongings  4.  Ensure family/visitors adhere to safety recommendations  5.  Ensure safety tray has been added to patient's diet order  6.  Every shift and PRN: Re-assess suicidal risk via Frequent Screener    Outcome: Progressing     Problem: Chronic Conditions and Co-morbidities  Goal: Patient's chronic conditions and co-morbidity symptoms are monitored and maintained or improved  Outcome: Progressing     Problem: Safety - Adult  Goal: Free from fall injury  Outcome: Progressing     Problem: Pain  Goal: Verbalizes/displays adequate comfort level or baseline comfort level  Outcome: Progressing     
and Judgment: No  Insight and Judgment: Poor judgment, Poor insight    Daily Assessment Last Entry:   Daily Sleep (WDL): Within Defined Limits            Daily Nutrition (WDL): Within Defined Limits  Level of Assistance: Independent/Self    Patient Monitoring:  Frequency of Checks: 4 times per hour, close    Psychiatric Symptoms:   Depression Symptoms  Depression Symptoms: Feelings of helplessness, Feelings of hopelessess  Anxiety Symptoms  Anxiety Symptoms: Generalized  Lani Symptoms  Lani Symptoms: No problems reported or observed.          Suicide Risk CSSR-S:  1) Within the past month, have you wished you were dead or wished you could go to sleep and not wake up? : Yes  2) Have you actually had any thoughts of killing yourself? : Yes  3) Have you been thinking about how you might kill yourself? : Yes  5) Have you started to work out or worked out the details of how to kill yourself? Do you intend to carry out this plan? : No  6) Have you ever done anything, started to do anything, or prepared to do anything to end your life?: Yes  Change in Result: no Change in Plan of care: no      EDUCATION:   Learner Progress Toward Treatment Goals:  Reviewed results and recommendations of this team, Reviewed group plan and strategies, Reviewed signs, symptoms and risk of self harm and violent behavior, Reviewed goals and plan of care    Method:  small group, individual verbal education    Outcome: Verbalized by patient but needs reinforcement to obtain goals    PATIENT GOALS:  Short term: Refused    Long term:      PLAN/TREATMENT RECOMMENDATIONS UPDATE:  continue with group therapies, increased socialization, continue planning for after discharge goals, continue with medication compliance    SHORT-TERM GOALS UPDATE:   Time frame for Short-Term Goals:  5-7 days    LONG-TERM GOALS UPDATE:   Time frame for Long-Term Goals:  6 months    Members Present in Team Meeting:   See signature sheet  Deepti Marcum RN   
care.    Method: Group therapy, Medication compliance, Individualized assessments and Care planning.    Outcome: Needs Reinforcement    PATIENT GOALS: To be discussed with patient within 72 hours    PLAN/TREATMENT RECOMMENDATIONS:     Continue group therapy , Medications compliance, Goal setting, Individualized assessments and Care planning, continue to monitor patient on unit.      SHORT-TERM GOALS:   Time frame for Short-Term Goals: 5-7 days    LONG-TERM GOALS:  Time frame for Long-Term Goals: 6 months  Members Present in Team Meeting: See Signature Sheet    Nohemy Chan RN   
response  Note: Patient denies any pain currently. Patient encouraged to inform staff if he develops any pain. Patient voiced understanding.  Q 15 minute checks done on pt for safety

## 2024-02-12 ENCOUNTER — CLINICAL SUPPORT (OUTPATIENT)
Dept: EMERGENCY MEDICINE | Facility: HOSPITAL | Age: 54
End: 2024-02-12
Payer: COMMERCIAL

## 2024-02-12 ENCOUNTER — APPOINTMENT (OUTPATIENT)
Dept: CARDIOLOGY | Facility: HOSPITAL | Age: 54
End: 2024-02-12
Payer: COMMERCIAL

## 2024-02-12 ENCOUNTER — HOSPITAL ENCOUNTER (INPATIENT)
Facility: HOSPITAL | Age: 54
LOS: 4 days | Discharge: HOME | End: 2024-02-16
Attending: EMERGENCY MEDICINE | Admitting: INTERNAL MEDICINE
Payer: COMMERCIAL

## 2024-02-12 ENCOUNTER — APPOINTMENT (OUTPATIENT)
Dept: RADIOLOGY | Facility: HOSPITAL | Age: 54
End: 2024-02-12
Payer: COMMERCIAL

## 2024-02-12 DIAGNOSIS — F41.9 ANXIETY DISORDER, UNSPECIFIED TYPE: ICD-10-CM

## 2024-02-12 DIAGNOSIS — I50.43 ACUTE ON CHRONIC COMBINED SYSTOLIC AND DIASTOLIC CONGESTIVE HEART FAILURE (MULTI): ICD-10-CM

## 2024-02-12 DIAGNOSIS — Z79.4 TYPE 2 DIABETES MELLITUS WITH HYPERGLYCEMIA, WITH LONG-TERM CURRENT USE OF INSULIN (MULTI): ICD-10-CM

## 2024-02-12 DIAGNOSIS — G81.91 RIGHT HEMIPARESIS (MULTI): ICD-10-CM

## 2024-02-12 DIAGNOSIS — J44.1 COPD EXACERBATION (MULTI): Primary | ICD-10-CM

## 2024-02-12 DIAGNOSIS — I50.43 CHF (CONGESTIVE HEART FAILURE), NYHA CLASS I, ACUTE ON CHRONIC, COMBINED (MULTI): ICD-10-CM

## 2024-02-12 DIAGNOSIS — E11.65 TYPE 2 DIABETES MELLITUS WITH HYPERGLYCEMIA, WITH LONG-TERM CURRENT USE OF INSULIN (MULTI): ICD-10-CM

## 2024-02-12 DIAGNOSIS — I50.9 CONGESTIVE HEART FAILURE, UNSPECIFIED HF CHRONICITY, UNSPECIFIED HEART FAILURE TYPE (MULTI): ICD-10-CM

## 2024-02-12 PROBLEM — I63.9 CEREBROVASCULAR ACCIDENT (CVA) (MULTI): Status: ACTIVE | Noted: 2024-01-13

## 2024-02-12 PROBLEM — N18.31 STAGE 3A CHRONIC KIDNEY DISEASE (CKD) (MULTI): Status: ACTIVE | Noted: 2023-06-17

## 2024-02-12 PROBLEM — J44.9 COPD (CHRONIC OBSTRUCTIVE PULMONARY DISEASE) (MULTI): Status: ACTIVE | Noted: 2024-02-12

## 2024-02-12 PROBLEM — F14.20 COCAINE USE DISORDER, SEVERE, DEPENDENCE (MULTI): Status: ACTIVE | Noted: 2024-02-12

## 2024-02-12 LAB
ALBUMIN SERPL BCP-MCNC: 4.1 G/DL (ref 3.4–5)
ALP SERPL-CCNC: 81 U/L (ref 33–120)
ALT SERPL W P-5'-P-CCNC: 68 U/L (ref 10–52)
ANION GAP BLDV CALCULATED.4IONS-SCNC: 12 MMOL/L (ref 10–25)
ANION GAP SERPL CALC-SCNC: 14 MMOL/L (ref 10–20)
AST SERPL W P-5'-P-CCNC: 40 U/L (ref 9–39)
ATRIAL RATE: 103 BPM
BASE EXCESS BLDV CALC-SCNC: -3.7 MMOL/L (ref -2–3)
BASOPHILS # BLD AUTO: 0.04 X10*3/UL (ref 0–0.1)
BASOPHILS NFR BLD AUTO: 0.5 %
BILIRUB SERPL-MCNC: 1.2 MG/DL (ref 0–1.2)
BNP SERPL-MCNC: >5000 PG/ML (ref 0–99)
BODY TEMPERATURE: 37 DEGREES CELSIUS
BUN SERPL-MCNC: 22 MG/DL (ref 6–23)
CA-I BLDV-SCNC: 1.1 MMOL/L (ref 1.1–1.33)
CALCIUM SERPL-MCNC: 9.3 MG/DL (ref 8.6–10.6)
CARDIAC TROPONIN I PNL SERPL HS: 130 NG/L (ref 0–53)
CARDIAC TROPONIN I PNL SERPL HS: 132 NG/L (ref 0–53)
CARDIAC TROPONIN I PNL SERPL HS: 138 NG/L (ref 0–53)
CHLORIDE BLDV-SCNC: 107 MMOL/L (ref 98–107)
CHLORIDE SERPL-SCNC: 104 MMOL/L (ref 98–107)
CO2 SERPL-SCNC: 23 MMOL/L (ref 21–32)
CREAT SERPL-MCNC: 1.48 MG/DL (ref 0.5–1.3)
EGFRCR SERPLBLD CKD-EPI 2021: 56 ML/MIN/1.73M*2
EOSINOPHIL # BLD AUTO: 0.04 X10*3/UL (ref 0–0.7)
EOSINOPHIL NFR BLD AUTO: 0.5 %
ERYTHROCYTE [DISTWIDTH] IN BLOOD BY AUTOMATED COUNT: 15.6 % (ref 11.5–14.5)
FLUAV RNA RESP QL NAA+PROBE: NOT DETECTED
FLUBV RNA RESP QL NAA+PROBE: NOT DETECTED
GLUCOSE BLD MANUAL STRIP-MCNC: 178 MG/DL (ref 74–99)
GLUCOSE BLD MANUAL STRIP-MCNC: 208 MG/DL (ref 74–99)
GLUCOSE BLD MANUAL STRIP-MCNC: 325 MG/DL (ref 74–99)
GLUCOSE BLDV-MCNC: 163 MG/DL (ref 74–99)
GLUCOSE SERPL-MCNC: 187 MG/DL (ref 74–99)
HCO3 BLDV-SCNC: 20.7 MMOL/L (ref 22–26)
HCT VFR BLD AUTO: 46.5 % (ref 41–52)
HCT VFR BLD EST: 47 % (ref 41–52)
HGB BLD-MCNC: 16 G/DL (ref 13.5–17.5)
HGB BLDV-MCNC: 15.5 G/DL (ref 13.5–17.5)
IMM GRANULOCYTES # BLD AUTO: 0.02 X10*3/UL (ref 0–0.7)
IMM GRANULOCYTES NFR BLD AUTO: 0.3 % (ref 0–0.9)
INHALED O2 CONCENTRATION: 21 %
LACTATE BLDV-SCNC: 2.3 MMOL/L (ref 0.4–2)
LYMPHOCYTES # BLD AUTO: 0.96 X10*3/UL (ref 1.2–4.8)
LYMPHOCYTES NFR BLD AUTO: 12.7 %
MAGNESIUM SERPL-MCNC: 2.03 MG/DL (ref 1.6–2.4)
MCH RBC QN AUTO: 30.9 PG (ref 26–34)
MCHC RBC AUTO-ENTMCNC: 34.4 G/DL (ref 32–36)
MCV RBC AUTO: 90 FL (ref 80–100)
MONOCYTES # BLD AUTO: 0.49 X10*3/UL (ref 0.1–1)
MONOCYTES NFR BLD AUTO: 6.5 %
NEUTROPHILS # BLD AUTO: 6.01 X10*3/UL (ref 1.2–7.7)
NEUTROPHILS NFR BLD AUTO: 79.5 %
NRBC BLD-RTO: 0 /100 WBCS (ref 0–0)
OXYHGB MFR BLDV: 55.8 % (ref 45–75)
P AXIS: 89 DEGREES
P OFFSET: 176 MS
P ONSET: 121 MS
PCO2 BLDV: 35 MM HG (ref 41–51)
PH BLDV: 7.38 PH (ref 7.33–7.43)
PLATELET # BLD AUTO: 191 X10*3/UL (ref 150–450)
PO2 BLDV: 41 MM HG (ref 35–45)
POTASSIUM BLDV-SCNC: 5.5 MMOL/L (ref 3.5–5.3)
POTASSIUM SERPL-SCNC: 4.5 MMOL/L (ref 3.5–5.3)
POTASSIUM SERPL-SCNC: 4.6 MMOL/L (ref 3.5–5.3)
PR INTERVAL: 174 MS
PROT SERPL-MCNC: 7.7 G/DL (ref 6.4–8.2)
Q ONSET: 208 MS
QRS COUNT: 17 BEATS
QRS DURATION: 102 MS
QT INTERVAL: 360 MS
QTC CALCULATION(BAZETT): 471 MS
QTC FREDERICIA: 431 MS
R AXIS: -46 DEGREES
RBC # BLD AUTO: 5.17 X10*6/UL (ref 4.5–5.9)
RSV RNA RESP QL NAA+PROBE: NOT DETECTED
SAO2 % BLDV: 57 % (ref 45–75)
SARS-COV-2 RNA RESP QL NAA+PROBE: NOT DETECTED
SODIUM BLDV-SCNC: 134 MMOL/L (ref 136–145)
SODIUM SERPL-SCNC: 136 MMOL/L (ref 136–145)
T AXIS: 100 DEGREES
T OFFSET: 388 MS
VENTRICULAR RATE: 103 BPM
WBC # BLD AUTO: 7.6 X10*3/UL (ref 4.4–11.3)

## 2024-02-12 PROCEDURE — 83605 ASSAY OF LACTIC ACID: CPT | Performed by: STUDENT IN AN ORGANIZED HEALTH CARE EDUCATION/TRAINING PROGRAM

## 2024-02-12 PROCEDURE — 1100000001 HC PRIVATE ROOM DAILY

## 2024-02-12 PROCEDURE — 36415 COLL VENOUS BLD VENIPUNCTURE: CPT | Performed by: EMERGENCY MEDICINE

## 2024-02-12 PROCEDURE — 2500000002 HC RX 250 W HCPCS SELF ADMINISTERED DRUGS (ALT 637 FOR MEDICARE OP, ALT 636 FOR OP/ED): Mod: SE | Performed by: EMERGENCY MEDICINE

## 2024-02-12 PROCEDURE — 82947 ASSAY GLUCOSE BLOOD QUANT: CPT

## 2024-02-12 PROCEDURE — 2500000002 HC RX 250 W HCPCS SELF ADMINISTERED DRUGS (ALT 637 FOR MEDICARE OP, ALT 636 FOR OP/ED)

## 2024-02-12 PROCEDURE — 84132 ASSAY OF SERUM POTASSIUM: CPT | Performed by: STUDENT IN AN ORGANIZED HEALTH CARE EDUCATION/TRAINING PROGRAM

## 2024-02-12 PROCEDURE — 84484 ASSAY OF TROPONIN QUANT: CPT

## 2024-02-12 PROCEDURE — 85025 COMPLETE CBC W/AUTO DIFF WBC: CPT | Performed by: EMERGENCY MEDICINE

## 2024-02-12 PROCEDURE — 80053 COMPREHEN METABOLIC PANEL: CPT | Performed by: EMERGENCY MEDICINE

## 2024-02-12 PROCEDURE — 87637 SARSCOV2&INF A&B&RSV AMP PRB: CPT | Performed by: STUDENT IN AN ORGANIZED HEALTH CARE EDUCATION/TRAINING PROGRAM

## 2024-02-12 PROCEDURE — 83735 ASSAY OF MAGNESIUM: CPT

## 2024-02-12 PROCEDURE — 84484 ASSAY OF TROPONIN QUANT: CPT | Performed by: STUDENT IN AN ORGANIZED HEALTH CARE EDUCATION/TRAINING PROGRAM

## 2024-02-12 PROCEDURE — 93308 TTE F-UP OR LMTD: CPT | Performed by: EMERGENCY MEDICINE

## 2024-02-12 PROCEDURE — 71046 X-RAY EXAM CHEST 2 VIEWS: CPT | Performed by: RADIOLOGY

## 2024-02-12 PROCEDURE — 83880 ASSAY OF NATRIURETIC PEPTIDE: CPT | Performed by: EMERGENCY MEDICINE

## 2024-02-12 PROCEDURE — 76604 US EXAM CHEST: CPT

## 2024-02-12 PROCEDURE — 99285 EMERGENCY DEPT VISIT HI MDM: CPT | Performed by: EMERGENCY MEDICINE

## 2024-02-12 PROCEDURE — 99285 EMERGENCY DEPT VISIT HI MDM: CPT | Mod: 25 | Performed by: EMERGENCY MEDICINE

## 2024-02-12 PROCEDURE — 2500000004 HC RX 250 GENERAL PHARMACY W/ HCPCS (ALT 636 FOR OP/ED)

## 2024-02-12 PROCEDURE — 93005 ELECTROCARDIOGRAM TRACING: CPT

## 2024-02-12 PROCEDURE — 76604 US EXAM CHEST: CPT | Performed by: EMERGENCY MEDICINE

## 2024-02-12 PROCEDURE — 99223 1ST HOSP IP/OBS HIGH 75: CPT

## 2024-02-12 PROCEDURE — 2500000004 HC RX 250 GENERAL PHARMACY W/ HCPCS (ALT 636 FOR OP/ED): Mod: SE | Performed by: STUDENT IN AN ORGANIZED HEALTH CARE EDUCATION/TRAINING PROGRAM

## 2024-02-12 PROCEDURE — 36415 COLL VENOUS BLD VENIPUNCTURE: CPT | Performed by: STUDENT IN AN ORGANIZED HEALTH CARE EDUCATION/TRAINING PROGRAM

## 2024-02-12 PROCEDURE — 2500000001 HC RX 250 WO HCPCS SELF ADMINISTERED DRUGS (ALT 637 FOR MEDICARE OP)

## 2024-02-12 PROCEDURE — 96374 THER/PROPH/DIAG INJ IV PUSH: CPT | Mod: 59

## 2024-02-12 PROCEDURE — 2500000002 HC RX 250 W HCPCS SELF ADMINISTERED DRUGS (ALT 637 FOR MEDICARE OP, ALT 636 FOR OP/ED): Mod: SE | Performed by: STUDENT IN AN ORGANIZED HEALTH CARE EDUCATION/TRAINING PROGRAM

## 2024-02-12 PROCEDURE — 93010 ELECTROCARDIOGRAM REPORT: CPT | Performed by: INTERNAL MEDICINE

## 2024-02-12 PROCEDURE — 93010 ELECTROCARDIOGRAM REPORT: CPT | Performed by: EMERGENCY MEDICINE

## 2024-02-12 PROCEDURE — 71046 X-RAY EXAM CHEST 2 VIEWS: CPT

## 2024-02-12 RX ORDER — FUROSEMIDE 10 MG/ML
40 INJECTION INTRAMUSCULAR; INTRAVENOUS ONCE
Status: COMPLETED | OUTPATIENT
Start: 2024-02-12 | End: 2024-02-12

## 2024-02-12 RX ORDER — CARVEDILOL 3.12 MG/1
3.12 TABLET ORAL 2 TIMES DAILY
Status: DISCONTINUED | OUTPATIENT
Start: 2024-02-12 | End: 2024-02-16 | Stop reason: HOSPADM

## 2024-02-12 RX ORDER — INSULIN GLARGINE 100 [IU]/ML
5 INJECTION, SOLUTION SUBCUTANEOUS NIGHTLY
Status: DISCONTINUED | OUTPATIENT
Start: 2024-02-12 | End: 2024-02-13

## 2024-02-12 RX ORDER — IPRATROPIUM BROMIDE AND ALBUTEROL SULFATE 2.5; .5 MG/3ML; MG/3ML
3 SOLUTION RESPIRATORY (INHALATION) EVERY 20 MIN
Status: COMPLETED | OUTPATIENT
Start: 2024-02-12 | End: 2024-02-12

## 2024-02-12 RX ORDER — FAMOTIDINE 40 MG/1
20 TABLET, FILM COATED ORAL DAILY
Status: DISCONTINUED | OUTPATIENT
Start: 2024-02-13 | End: 2024-02-16 | Stop reason: HOSPADM

## 2024-02-12 RX ORDER — TALC
3 POWDER (GRAM) TOPICAL NIGHTLY
Status: DISCONTINUED | OUTPATIENT
Start: 2024-02-12 | End: 2024-02-16 | Stop reason: HOSPADM

## 2024-02-12 RX ORDER — PREDNISONE 20 MG/1
60 TABLET ORAL ONCE
Status: COMPLETED | OUTPATIENT
Start: 2024-02-12 | End: 2024-02-12

## 2024-02-12 RX ORDER — HYDROXYZINE HYDROCHLORIDE 25 MG/1
25 TABLET, FILM COATED ORAL EVERY 6 HOURS PRN
Status: DISCONTINUED | OUTPATIENT
Start: 2024-02-12 | End: 2024-02-16 | Stop reason: HOSPADM

## 2024-02-12 RX ORDER — IPRATROPIUM BROMIDE AND ALBUTEROL SULFATE 2.5; .5 MG/3ML; MG/3ML
SOLUTION RESPIRATORY (INHALATION)
Status: COMPLETED
Start: 2024-02-12 | End: 2024-02-12

## 2024-02-12 RX ORDER — DEXTROSE 50 % IN WATER (D50W) INTRAVENOUS SYRINGE
25
Status: DISCONTINUED | OUTPATIENT
Start: 2024-02-12 | End: 2024-02-16 | Stop reason: HOSPADM

## 2024-02-12 RX ORDER — HEPARIN SODIUM 5000 [USP'U]/ML
5000 INJECTION, SOLUTION INTRAVENOUS; SUBCUTANEOUS EVERY 8 HOURS
Status: DISCONTINUED | OUTPATIENT
Start: 2024-02-12 | End: 2024-02-16 | Stop reason: HOSPADM

## 2024-02-12 RX ORDER — VALSARTAN 80 MG/1
40 TABLET ORAL DAILY
Status: DISCONTINUED | OUTPATIENT
Start: 2024-02-13 | End: 2024-02-16 | Stop reason: HOSPADM

## 2024-02-12 RX ORDER — PREDNISONE 20 MG/1
40 TABLET ORAL DAILY
Status: COMPLETED | OUTPATIENT
Start: 2024-02-13 | End: 2024-02-16

## 2024-02-12 RX ORDER — SERTRALINE HYDROCHLORIDE 50 MG/1
100 TABLET, FILM COATED ORAL DAILY
Status: DISCONTINUED | OUTPATIENT
Start: 2024-02-13 | End: 2024-02-16 | Stop reason: HOSPADM

## 2024-02-12 RX ORDER — ATORVASTATIN CALCIUM 20 MG/1
80 TABLET, FILM COATED ORAL NIGHTLY
Status: DISCONTINUED | OUTPATIENT
Start: 2024-02-12 | End: 2024-02-16 | Stop reason: HOSPADM

## 2024-02-12 RX ORDER — DEXTROSE MONOHYDRATE 100 MG/ML
0.3 INJECTION, SOLUTION INTRAVENOUS ONCE AS NEEDED
Status: DISCONTINUED | OUTPATIENT
Start: 2024-02-12 | End: 2024-02-16 | Stop reason: HOSPADM

## 2024-02-12 RX ORDER — INSULIN LISPRO 100 [IU]/ML
0-5 INJECTION, SOLUTION INTRAVENOUS; SUBCUTANEOUS
Status: DISCONTINUED | OUTPATIENT
Start: 2024-02-13 | End: 2024-02-13

## 2024-02-12 RX ORDER — NAPROXEN SODIUM 220 MG/1
81 TABLET, FILM COATED ORAL DAILY
Status: DISCONTINUED | OUTPATIENT
Start: 2024-02-13 | End: 2024-02-16 | Stop reason: HOSPADM

## 2024-02-12 RX ORDER — INSULIN LISPRO 100 [IU]/ML
8 INJECTION, SOLUTION INTRAVENOUS; SUBCUTANEOUS ONCE
Status: COMPLETED | OUTPATIENT
Start: 2024-02-12 | End: 2024-02-12

## 2024-02-12 RX ORDER — IPRATROPIUM BROMIDE AND ALBUTEROL SULFATE 2.5; .5 MG/3ML; MG/3ML
3 SOLUTION RESPIRATORY (INHALATION) EVERY 6 HOURS PRN
Status: DISCONTINUED | OUTPATIENT
Start: 2024-02-12 | End: 2024-02-15

## 2024-02-12 RX ADMIN — FUROSEMIDE 40 MG: 10 INJECTION, SOLUTION INTRAMUSCULAR; INTRAVENOUS at 23:34

## 2024-02-12 RX ADMIN — CARVEDILOL 3.12 MG: 3.12 TABLET, FILM COATED ORAL at 23:34

## 2024-02-12 RX ADMIN — IPRATROPIUM BROMIDE AND ALBUTEROL SULFATE 3 ML: .5; 3 SOLUTION RESPIRATORY (INHALATION) at 18:15

## 2024-02-12 RX ADMIN — INSULIN LISPRO 8 UNITS: 100 INJECTION, SOLUTION INTRAVENOUS; SUBCUTANEOUS at 21:48

## 2024-02-12 RX ADMIN — IPRATROPIUM BROMIDE AND ALBUTEROL SULFATE 3 ML: .5; 3 SOLUTION RESPIRATORY (INHALATION) at 18:26

## 2024-02-12 RX ADMIN — INSULIN GLARGINE 5 UNITS: 100 INJECTION, SOLUTION SUBCUTANEOUS at 21:56

## 2024-02-12 RX ADMIN — HEPARIN SODIUM 5000 UNITS: 5000 INJECTION INTRAVENOUS; SUBCUTANEOUS at 23:34

## 2024-02-12 RX ADMIN — MELATONIN 3 MG: 3 TAB ORAL at 23:34

## 2024-02-12 RX ADMIN — ATORVASTATIN CALCIUM 80 MG: 20 TABLET, FILM COATED ORAL at 23:33

## 2024-02-12 RX ADMIN — IPRATROPIUM BROMIDE AND ALBUTEROL SULFATE 3 ML: .5; 3 SOLUTION RESPIRATORY (INHALATION) at 18:25

## 2024-02-12 RX ADMIN — FUROSEMIDE 40 MG: 10 INJECTION, SOLUTION INTRAMUSCULAR; INTRAVENOUS at 18:17

## 2024-02-12 RX ADMIN — PREDNISONE 60 MG: 20 TABLET ORAL at 18:17

## 2024-02-12 ASSESSMENT — PAIN DESCRIPTION - DESCRIPTORS: DESCRIPTORS: HEAVINESS

## 2024-02-12 ASSESSMENT — PAIN SCALES - GENERAL
PAINLEVEL_OUTOF10: 3
PAINLEVEL_OUTOF10: 0 - NO PAIN
PAINLEVEL_OUTOF10: 7

## 2024-02-12 ASSESSMENT — COLUMBIA-SUICIDE SEVERITY RATING SCALE - C-SSRS
2. HAVE YOU ACTUALLY HAD ANY THOUGHTS OF KILLING YOURSELF?: NO
6. HAVE YOU EVER DONE ANYTHING, STARTED TO DO ANYTHING, OR PREPARED TO DO ANYTHING TO END YOUR LIFE?: NO
1. IN THE PAST MONTH, HAVE YOU WISHED YOU WERE DEAD OR WISHED YOU COULD GO TO SLEEP AND NOT WAKE UP?: NO

## 2024-02-12 ASSESSMENT — PAIN DESCRIPTION - PROGRESSION: CLINICAL_PROGRESSION: GRADUALLY IMPROVING

## 2024-02-12 ASSESSMENT — PAIN - FUNCTIONAL ASSESSMENT
PAIN_FUNCTIONAL_ASSESSMENT: 0-10
PAIN_FUNCTIONAL_ASSESSMENT: 0-10

## 2024-02-12 ASSESSMENT — LIFESTYLE VARIABLES
EVER HAD A DRINK FIRST THING IN THE MORNING TO STEADY YOUR NERVES TO GET RID OF A HANGOVER: NO
HAVE PEOPLE ANNOYED YOU BY CRITICIZING YOUR DRINKING: NO
EVER FELT BAD OR GUILTY ABOUT YOUR DRINKING: NO
HAVE YOU EVER FELT YOU SHOULD CUT DOWN ON YOUR DRINKING: NO

## 2024-02-12 ASSESSMENT — PAIN DESCRIPTION - LOCATION: LOCATION: CHEST

## 2024-02-12 NOTE — ED PROVIDER NOTES
"CC: Shortness of Breath     HPI:  53-year-old male with history of HFrEF (22% in January 2024) thought to be NICM from crack cocaine use, HTN, HLD, T2DM, COPD, presents to the emergency department with 3 to 4 days of shortness of breath.  Patient has had cough and shortness of breath over the last several days, worse with exertion and laying flat.  Has had increased swelling in his legs as well.  Describes as similar to prior episodes of fluid overload and heart failure exacerbation.  Is supposed to be on Lasix 40 mg daily as needed for lower extremity edema, but states he ran out of this medication 1 week ago.  No chest pain, no nasal congestion, no fevers, nausea, vomiting, or diarrhea.    Records Reviewed:  Recent available ED and inpatient notes reviewed in EMR.    PMHx/PSHx:  Per HPI.   - has no past medical history on file.  - has no past surgical history on file.  - has Congestive heart failure, unspecified HF chronicity, unspecified heart failure type (CMS/HCC); Shortness of breath; COPD (chronic obstructive pulmonary disease) (CMS/[facility]); Stage 3a chronic kidney disease (CKD) (CMS/[facility]); Anxiety disorder, unspecified; Cocaine use disorder, severe, dependence (CMS/[facility]); Cerebrovascular accident (CVA) (CMS/[facility]); CHF (congestive heart failure), NYHA class I, acute on chronic, combined (CMS/[facility]); Type 2 diabetes mellitus with hyperglycemia (CMS/[facility]); and Right hemiparesis (CMS/[facility]) on their problem list.    Medications:  Reviewed in EMR. See EMR for complete list of medications and doses.    Allergies:  Patient has no known allergies.    Social History:  - Tobacco:  reports that he has never smoked. He has never used smokeless tobacco.   - Alcohol:  reports no history of alcohol use.   - Illicit Drugs:  reports current drug use. Drug: \"Crack\" cocaine.     ROS:  Per HPI.       ???????????????????????????????????????????????????????????????  Triage Vitals:  T 37.1 °C (98.7 °F)  HR (!) 102  /82  RR 19  O2 " 97 % None (Room air)    Physical Exam  Vitals and nursing note reviewed.   Constitutional:       General: He is not in acute distress.  HENT:      Mouth/Throat:      Mouth: Mucous membranes are moist.   Eyes:      Conjunctiva/sclera: Conjunctivae normal.   Cardiovascular:      Rate and Rhythm: Normal rate and regular rhythm.      Heart sounds:      Gallop (S3) present.   Pulmonary:      Effort: No respiratory distress.      Breath sounds: No stridor. Wheezing (expiratory bilaterally) present. No rales.      Comments: Wet cough during exam  Abdominal:      Palpations: Abdomen is soft.      Tenderness: There is no abdominal tenderness.   Musculoskeletal:      Right lower leg: Edema (2+ pitting) present.      Left lower leg: Edema (2+ pitting) present.   Skin:     General: Skin is warm and dry.   Neurological:      General: No focal deficit present.      Mental Status: He is alert.   Psychiatric:         Mood and Affect: Mood normal.         Behavior: Behavior normal.       ???????????????????????????????????????????????????????????????  [id number] 1214 -- sinus tachycardia rate 103.  Leftward axis.  Normal intervals.  Evidence for LVH.  Isolated ST segment elevation in V3 with inversions and ST segment depressions in lateral precordial and limb leads.  Does not meet criteria for STEMI.  Similar in appearance to prior EKG dated 2023.  Abnormal EKG.    2024 2152 -- NSR rate 94.  Leftward axis.  Normal intervals.  Apparent resolution of previously seen ST segment elevations, although still with inversions in the lateral leads.  Abnormal EKG.    Assessment and Plan:  53-year-old male presents to the emergency department with concern for shortness of breath.  Presentation is most concerning for heart failure exacerbation versus component of COPD exacerbation.  Wheezing on my exam, was ordered DuoNebs and prednisone.  Also with significant lower extremity edema, I reviewed lab work drawn prior to my  evaluation of the patient, BNP greater than 5000 consistent with fluid retention.  Was given 50 mg IV Lasix.  Viral swabs and CXR ordered.  At this time is stable on room air.      ED Course:  Initial troponin elevated to 130, which is at the patient's baseline, repeat is unchanged.  Viral swabs are negative.  Point-of-care ultrasound demonstrates an EF lower than previously documented 22%.  Patient reevaluated after breathing treatments, wheezing is improved, although still with slight increased work of breathing.  Chest x-ray with interstitial edema without evidence for focal infectious process.    I reviewed the patient's EKG, does have a isolated ST elevation with some depressions, actually appears similar to a prior EKG in December, likely secondary to severe dilated cardiomyopathy and early LBBB.  Does not meet criteria for STEMI.  With unchanged serial high-sensitivity troponins, is not consistent with an acute ischemic process.    The patient does appear mildly tremulous on exam.  Denies any history of alcohol use or withdrawal, and on review of prior hospitalizations has no history of such either.  Does appear that he was previously on COWS, although denies any regular opioid use.    Hemodynamically stable, mildly tachycardic but otherwise blood pressure is normal.  I discussed with admissions coordinator, the patient was admitted to cardiology for further management of CHF exacerbation and COPD exacerbation.    Social Determinants Limiting Care:  Difficulty paying for/obtaining medications and substance use disorder    Disposition:  Admit to cardiology, telemetry    --  Rosa Yoder MD  Emergency Medicine, PGY-3      Diagnoses as of 02/12/24 2215   COPD exacerbation (CMS/HCC)   Acute on chronic combined systolic and diastolic congestive heart failure (CMS/HCC)     Procedures ? [employer] last updated 2/12/2024 5:54 PM         Rosa Yoder MD  Resident  02/12/24 2221

## 2024-02-12 NOTE — ED TRIAGE NOTES
Pt to ED with c/o SOB. Pt states it started about 4 days ago and comes and gos in waves. Pt states it began when he woke up. Pt winded when first coming to triage but able to speak in full sentences. Hx DM and HTN.

## 2024-02-13 ENCOUNTER — APPOINTMENT (OUTPATIENT)
Dept: CARDIOLOGY | Facility: HOSPITAL | Age: 54
End: 2024-02-13
Payer: COMMERCIAL

## 2024-02-13 LAB
ALBUMIN SERPL BCP-MCNC: 3.4 G/DL (ref 3.4–5)
AMPHETAMINES UR QL SCN: ABNORMAL
ANION GAP SERPL CALC-SCNC: 14 MMOL/L (ref 10–20)
BARBITURATES UR QL SCN: ABNORMAL
BASOPHILS # BLD AUTO: 0.01 X10*3/UL (ref 0–0.1)
BASOPHILS NFR BLD AUTO: 0.1 %
BENZODIAZ UR QL SCN: ABNORMAL
BUN SERPL-MCNC: 27 MG/DL (ref 6–23)
BZE UR QL SCN: ABNORMAL
CALCIUM SERPL-MCNC: 9.2 MG/DL (ref 8.6–10.6)
CANNABINOIDS UR QL SCN: ABNORMAL
CHLORIDE SERPL-SCNC: 103 MMOL/L (ref 98–107)
CO2 SERPL-SCNC: 25 MMOL/L (ref 21–32)
CREAT SERPL-MCNC: 1.43 MG/DL (ref 0.5–1.3)
EGFRCR SERPLBLD CKD-EPI 2021: 58 ML/MIN/1.73M*2
EOSINOPHIL # BLD AUTO: 0 X10*3/UL (ref 0–0.7)
EOSINOPHIL NFR BLD AUTO: 0 %
ERYTHROCYTE [DISTWIDTH] IN BLOOD BY AUTOMATED COUNT: 15.9 % (ref 11.5–14.5)
ETHANOL SERPL-MCNC: <10 MG/DL
FENTANYL+NORFENTANYL UR QL SCN: ABNORMAL
GLUCOSE BLD MANUAL STRIP-MCNC: 176 MG/DL (ref 74–99)
GLUCOSE BLD MANUAL STRIP-MCNC: 218 MG/DL (ref 74–99)
GLUCOSE BLD MANUAL STRIP-MCNC: 237 MG/DL (ref 74–99)
GLUCOSE BLD MANUAL STRIP-MCNC: 253 MG/DL (ref 74–99)
GLUCOSE BLD MANUAL STRIP-MCNC: 274 MG/DL (ref 74–99)
GLUCOSE BLD MANUAL STRIP-MCNC: 490 MG/DL (ref 74–99)
GLUCOSE SERPL-MCNC: 225 MG/DL (ref 74–99)
HCT VFR BLD AUTO: 47.3 % (ref 41–52)
HGB BLD-MCNC: 15.8 G/DL (ref 13.5–17.5)
IMM GRANULOCYTES # BLD AUTO: 0.03 X10*3/UL (ref 0–0.7)
IMM GRANULOCYTES NFR BLD AUTO: 0.3 % (ref 0–0.9)
LACTATE BLDV-SCNC: 2.9 MMOL/L (ref 0.4–2)
LYMPHOCYTES # BLD AUTO: 0.49 X10*3/UL (ref 1.2–4.8)
LYMPHOCYTES NFR BLD AUTO: 5.3 %
MAGNESIUM SERPL-MCNC: 2.14 MG/DL (ref 1.6–2.4)
MCH RBC QN AUTO: 30.9 PG (ref 26–34)
MCHC RBC AUTO-ENTMCNC: 33.4 G/DL (ref 32–36)
MCV RBC AUTO: 92 FL (ref 80–100)
MONOCYTES # BLD AUTO: 0.45 X10*3/UL (ref 0.1–1)
MONOCYTES NFR BLD AUTO: 4.9 %
NEUTROPHILS # BLD AUTO: 8.25 X10*3/UL (ref 1.2–7.7)
NEUTROPHILS NFR BLD AUTO: 89.4 %
NRBC BLD-RTO: 0 /100 WBCS (ref 0–0)
OPIATES UR QL SCN: ABNORMAL
OXYCODONE+OXYMORPHONE UR QL SCN: ABNORMAL
PCP UR QL SCN: ABNORMAL
PHOSPHATE SERPL-MCNC: 3.2 MG/DL (ref 2.5–4.9)
PLATELET # BLD AUTO: 203 X10*3/UL (ref 150–450)
POTASSIUM SERPL-SCNC: 5 MMOL/L (ref 3.5–5.3)
RBC # BLD AUTO: 5.12 X10*6/UL (ref 4.5–5.9)
SODIUM SERPL-SCNC: 137 MMOL/L (ref 136–145)
WBC # BLD AUTO: 9.2 X10*3/UL (ref 4.4–11.3)

## 2024-02-13 PROCEDURE — 93005 ELECTROCARDIOGRAM TRACING: CPT

## 2024-02-13 PROCEDURE — 2500000002 HC RX 250 W HCPCS SELF ADMINISTERED DRUGS (ALT 637 FOR MEDICARE OP, ALT 636 FOR OP/ED): Mod: SE

## 2024-02-13 PROCEDURE — 1100000001 HC PRIVATE ROOM DAILY

## 2024-02-13 PROCEDURE — 2500000001 HC RX 250 WO HCPCS SELF ADMINISTERED DRUGS (ALT 637 FOR MEDICARE OP)

## 2024-02-13 PROCEDURE — 2500000002 HC RX 250 W HCPCS SELF ADMINISTERED DRUGS (ALT 637 FOR MEDICARE OP, ALT 636 FOR OP/ED)

## 2024-02-13 PROCEDURE — 80069 RENAL FUNCTION PANEL: CPT

## 2024-02-13 PROCEDURE — 93010 ELECTROCARDIOGRAM REPORT: CPT | Performed by: INTERNAL MEDICINE

## 2024-02-13 PROCEDURE — 80307 DRUG TEST PRSMV CHEM ANLYZR: CPT

## 2024-02-13 PROCEDURE — 36415 COLL VENOUS BLD VENIPUNCTURE: CPT

## 2024-02-13 PROCEDURE — 82077 ASSAY SPEC XCP UR&BREATH IA: CPT

## 2024-02-13 PROCEDURE — 83735 ASSAY OF MAGNESIUM: CPT

## 2024-02-13 PROCEDURE — 85025 COMPLETE CBC W/AUTO DIFF WBC: CPT

## 2024-02-13 PROCEDURE — 2500000004 HC RX 250 GENERAL PHARMACY W/ HCPCS (ALT 636 FOR OP/ED)

## 2024-02-13 PROCEDURE — 82947 ASSAY GLUCOSE BLOOD QUANT: CPT

## 2024-02-13 PROCEDURE — G0378 HOSPITAL OBSERVATION PER HR: HCPCS

## 2024-02-13 RX ORDER — FUROSEMIDE 40 MG/1
40 TABLET ORAL DAILY
Status: DISCONTINUED | OUTPATIENT
Start: 2024-02-13 | End: 2024-02-15

## 2024-02-13 RX ORDER — INSULIN LISPRO 100 [IU]/ML
0-10 INJECTION, SOLUTION INTRAVENOUS; SUBCUTANEOUS
Status: DISCONTINUED | OUTPATIENT
Start: 2024-02-13 | End: 2024-02-16 | Stop reason: HOSPADM

## 2024-02-13 RX ORDER — SPIRONOLACTONE 25 MG/1
25 TABLET ORAL DAILY
Status: DISCONTINUED | OUTPATIENT
Start: 2024-02-13 | End: 2024-02-16 | Stop reason: HOSPADM

## 2024-02-13 RX ORDER — SPIRONOLACTONE 25 MG/1
25 TABLET ORAL DAILY
Status: DISCONTINUED | OUTPATIENT
Start: 2024-02-13 | End: 2024-02-13

## 2024-02-13 RX ORDER — FUROSEMIDE 40 MG/1
40 TABLET ORAL DAILY
Status: DISCONTINUED | OUTPATIENT
Start: 2024-02-13 | End: 2024-02-13

## 2024-02-13 RX ORDER — ACETAMINOPHEN 325 MG/1
650 TABLET ORAL EVERY 6 HOURS PRN
Status: DISCONTINUED | OUTPATIENT
Start: 2024-02-13 | End: 2024-02-16 | Stop reason: HOSPADM

## 2024-02-13 RX ORDER — INSULIN LISPRO 100 [IU]/ML
5 INJECTION, SOLUTION INTRAVENOUS; SUBCUTANEOUS ONCE
Status: DISCONTINUED | OUTPATIENT
Start: 2024-02-13 | End: 2024-02-13

## 2024-02-13 RX ORDER — INSULIN GLARGINE 100 [IU]/ML
10 INJECTION, SOLUTION SUBCUTANEOUS NIGHTLY
Status: DISCONTINUED | OUTPATIENT
Start: 2024-02-13 | End: 2024-02-16 | Stop reason: HOSPADM

## 2024-02-13 RX ORDER — FUROSEMIDE 40 MG/1
40 TABLET ORAL DAILY
Status: DISCONTINUED | OUTPATIENT
Start: 2024-02-14 | End: 2024-02-13

## 2024-02-13 RX ORDER — INSULIN LISPRO 100 [IU]/ML
6 INJECTION, SOLUTION INTRAVENOUS; SUBCUTANEOUS ONCE
Status: COMPLETED | OUTPATIENT
Start: 2024-02-13 | End: 2024-02-13

## 2024-02-13 RX ORDER — SPIRONOLACTONE 25 MG/1
25 TABLET ORAL DAILY
Status: DISCONTINUED | OUTPATIENT
Start: 2024-02-14 | End: 2024-02-13

## 2024-02-13 RX ADMIN — ASPIRIN 81 MG CHEWABLE TABLET 81 MG: 81 TABLET CHEWABLE at 09:25

## 2024-02-13 RX ADMIN — CARVEDILOL 3.12 MG: 3.12 TABLET, FILM COATED ORAL at 20:40

## 2024-02-13 RX ADMIN — HEPARIN SODIUM 5000 UNITS: 5000 INJECTION INTRAVENOUS; SUBCUTANEOUS at 14:07

## 2024-02-13 RX ADMIN — INSULIN LISPRO 6 UNITS: 100 INJECTION, SOLUTION INTRAVENOUS; SUBCUTANEOUS at 13:00

## 2024-02-13 RX ADMIN — PREDNISONE 40 MG: 20 TABLET ORAL at 09:24

## 2024-02-13 RX ADMIN — INSULIN LISPRO 3 UNITS: 100 INJECTION, SOLUTION INTRAVENOUS; SUBCUTANEOUS at 07:52

## 2024-02-13 RX ADMIN — MELATONIN 3 MG: 3 TAB ORAL at 20:40

## 2024-02-13 RX ADMIN — INSULIN LISPRO 6 UNITS: 100 INJECTION, SOLUTION INTRAVENOUS; SUBCUTANEOUS at 17:02

## 2024-02-13 RX ADMIN — SPIRONOLACTONE 25 MG: 25 TABLET, FILM COATED ORAL at 11:09

## 2024-02-13 RX ADMIN — INSULIN GLARGINE 10 UNITS: 100 INJECTION, SOLUTION SUBCUTANEOUS at 20:43

## 2024-02-13 RX ADMIN — HEPARIN SODIUM 5000 UNITS: 5000 INJECTION INTRAVENOUS; SUBCUTANEOUS at 05:39

## 2024-02-13 RX ADMIN — VALSARTAN 40 MG: 80 TABLET, FILM COATED ORAL at 09:25

## 2024-02-13 RX ADMIN — CARVEDILOL 3.12 MG: 3.12 TABLET, FILM COATED ORAL at 09:25

## 2024-02-13 RX ADMIN — SERTRALINE HYDROCHLORIDE 100 MG: 50 TABLET ORAL at 09:25

## 2024-02-13 RX ADMIN — EMPAGLIFLOZIN 10 MG: 10 TABLET, FILM COATED ORAL at 09:25

## 2024-02-13 RX ADMIN — FAMOTIDINE 20 MG: 40 TABLET ORAL at 09:25

## 2024-02-13 RX ADMIN — FUROSEMIDE 40 MG: 40 TABLET ORAL at 11:08

## 2024-02-13 RX ADMIN — HEPARIN SODIUM 5000 UNITS: 5000 INJECTION INTRAVENOUS; SUBCUTANEOUS at 22:30

## 2024-02-13 RX ADMIN — ATORVASTATIN CALCIUM 80 MG: 20 TABLET, FILM COATED ORAL at 20:39

## 2024-02-13 SDOH — ECONOMIC STABILITY: FOOD INSECURITY: WITHIN THE PAST 12 MONTHS, THE FOOD YOU BOUGHT JUST DIDN'T LAST AND YOU DIDN'T HAVE MONEY TO GET MORE.: OFTEN TRUE

## 2024-02-13 SDOH — ECONOMIC STABILITY: INCOME INSECURITY: HOW HARD IS IT FOR YOU TO PAY FOR THE VERY BASICS LIKE FOOD, HOUSING, MEDICAL CARE, AND HEATING?: HARD

## 2024-02-13 SDOH — HEALTH STABILITY: MENTAL HEALTH: HOW OFTEN DO YOU HAVE A DRINK CONTAINING ALCOHOL?: NEVER

## 2024-02-13 SDOH — SOCIAL STABILITY: SOCIAL NETWORK: ARE YOU MARRIED, WIDOWED, DIVORCED, SEPARATED, NEVER MARRIED, OR LIVING WITH A PARTNER?: SEPARATED

## 2024-02-13 SDOH — ECONOMIC STABILITY: FOOD INSECURITY: WITHIN THE PAST 12 MONTHS, YOU WORRIED THAT YOUR FOOD WOULD RUN OUT BEFORE YOU GOT MONEY TO BUY MORE.: OFTEN TRUE

## 2024-02-13 SDOH — SOCIAL STABILITY: SOCIAL INSECURITY: DOES ANYONE TRY TO KEEP YOU FROM HAVING/CONTACTING OTHER FRIENDS OR DOING THINGS OUTSIDE YOUR HOME?: NO

## 2024-02-13 SDOH — SOCIAL STABILITY: SOCIAL INSECURITY: DO YOU FEEL UNSAFE GOING BACK TO THE PLACE WHERE YOU ARE LIVING?: NO

## 2024-02-13 SDOH — SOCIAL STABILITY: SOCIAL INSECURITY: ARE YOU OR HAVE YOU BEEN THREATENED OR ABUSED PHYSICALLY, EMOTIONALLY, OR SEXUALLY BY ANYONE?: NO

## 2024-02-13 SDOH — HEALTH STABILITY: MENTAL HEALTH: HOW OFTEN DO YOU HAVE 6 OR MORE DRINKS ON ONE OCCASION?: NEVER

## 2024-02-13 SDOH — SOCIAL STABILITY: SOCIAL INSECURITY: DO YOU FEEL ANYONE HAS EXPLOITED OR TAKEN ADVANTAGE OF YOU FINANCIALLY OR OF YOUR PERSONAL PROPERTY?: NO

## 2024-02-13 SDOH — ECONOMIC STABILITY: INCOME INSECURITY: IN THE LAST 12 MONTHS, WAS THERE A TIME WHEN YOU WERE NOT ABLE TO PAY THE MORTGAGE OR RENT ON TIME?: YES

## 2024-02-13 SDOH — HEALTH STABILITY: MENTAL HEALTH: HOW MANY STANDARD DRINKS CONTAINING ALCOHOL DO YOU HAVE ON A TYPICAL DAY?: PATIENT DOES NOT DRINK

## 2024-02-13 SDOH — SOCIAL STABILITY: SOCIAL INSECURITY: WERE YOU ABLE TO COMPLETE ALL THE BEHAVIORAL HEALTH SCREENINGS?: YES

## 2024-02-13 SDOH — SOCIAL STABILITY: SOCIAL INSECURITY: HAVE YOU HAD THOUGHTS OF HARMING ANYONE ELSE?: NO

## 2024-02-13 SDOH — ECONOMIC STABILITY: HOUSING INSECURITY: IN THE LAST 12 MONTHS, HOW MANY PLACES HAVE YOU LIVED?: 3

## 2024-02-13 SDOH — SOCIAL STABILITY: SOCIAL INSECURITY: HAS ANYONE EVER THREATENED TO HURT YOUR FAMILY OR YOUR PETS?: NO

## 2024-02-13 SDOH — SOCIAL STABILITY: SOCIAL INSECURITY: ABUSE: ADULT

## 2024-02-13 ASSESSMENT — PATIENT HEALTH QUESTIONNAIRE - PHQ9
SUM OF ALL RESPONSES TO PHQ9 QUESTIONS 1 & 2: 0
1. LITTLE INTEREST OR PLEASURE IN DOING THINGS: NOT AT ALL
2. FEELING DOWN, DEPRESSED OR HOPELESS: NOT AT ALL

## 2024-02-13 ASSESSMENT — COGNITIVE AND FUNCTIONAL STATUS - GENERAL
DAILY ACTIVITIY SCORE: 24
DAILY ACTIVITIY SCORE: 24
PATIENT BASELINE BEDBOUND: NO
MOBILITY SCORE: 24
MOBILITY SCORE: 24

## 2024-02-13 ASSESSMENT — LIFESTYLE VARIABLES
SUBSTANCE_ABUSE_PAST_12_MONTHS: YES
AUDIT-C TOTAL SCORE: 0
SKIP TO QUESTIONS 9-10: 1
HOW MANY STANDARD DRINKS CONTAINING ALCOHOL DO YOU HAVE ON A TYPICAL DAY: PATIENT DOES NOT DRINK
AUDIT-C TOTAL SCORE: 0
HOW OFTEN DO YOU HAVE 6 OR MORE DRINKS ON ONE OCCASION: NEVER
AUDIT-C TOTAL SCORE: 0
PRESCIPTION_ABUSE_PAST_12_MONTHS: YES
SKIP TO QUESTIONS 9-10: 1
HOW OFTEN DO YOU HAVE A DRINK CONTAINING ALCOHOL: NEVER

## 2024-02-13 ASSESSMENT — PAIN - FUNCTIONAL ASSESSMENT
PAIN_FUNCTIONAL_ASSESSMENT: 0-10

## 2024-02-13 ASSESSMENT — ACTIVITIES OF DAILY LIVING (ADL)
JUDGMENT_ADEQUATE_SAFELY_COMPLETE_DAILY_ACTIVITIES: YES
TOILETING: INDEPENDENT
WALKS IN HOME: INDEPENDENT
HEARING - LEFT EAR: FUNCTIONAL
FEEDING YOURSELF: INDEPENDENT
PATIENT'S MEMORY ADEQUATE TO SAFELY COMPLETE DAILY ACTIVITIES?: YES
DRESSING YOURSELF: INDEPENDENT
GROOMING: INDEPENDENT
LACK_OF_TRANSPORTATION: YES
ADEQUATE_TO_COMPLETE_ADL: YES
BATHING: INDEPENDENT
HEARING - RIGHT EAR: FUNCTIONAL

## 2024-02-13 ASSESSMENT — PAIN SCALES - GENERAL
PAINLEVEL_OUTOF10: 0 - NO PAIN

## 2024-02-13 NOTE — CARE PLAN
Problem: Diabetes  Goal: Achieve decreasing blood glucose levels by end of shift  Outcome: Progressing  Goal: Maintain glucose levels >70mg/dl to <250mg/dl throughout shift  Outcome: Progressing     Problem: Pain  Goal: My pain/discomfort is manageable  Outcome: Progressing     Problem: Safety  Goal: Patient will be injury free during hospitalization  Outcome: Progressing     Problem: Daily Care  Goal: Daily care needs are met  Outcome: Progressing     Problem: Psychosocial Needs  Goal: Demonstrates ability to cope with hospitalization/illness  Outcome: Progressing     Problem: Fall/Injury  Goal: Not fall by end of shift  Outcome: Progressing   The patient's goals for the shift include  no SOB    The clinical goals for the shift include blood glucose to remain <250    Pt remained safe during this shift. Sliding scale insulin adjusted d/t blood glucose level of 490. Pt continues to diurese with PO lasix

## 2024-02-13 NOTE — ED PROCEDURE NOTE
Procedure    Performed by: Ki Ortiz MD  Authorized by: Ximena Ventura MD  Cardiac Indications: fluid overload              Respiratory Indications: shortness of breath  Procedure: Cardiac Ultrasound    Findings:   Views: parasternal long, parasternal short, apical four and subxiphoid  The pericardial space was visualized and was NEGATIVE for a significant pericardial effusion.  Activity: Ventricular contractions were visualized.  LV: LV systolic function was DECREASED.  RV: RV size was NORMAL.    Impression:  Cardiac: The focused cardiac ultrasound exam had ABNORMAL findings as specified.  Procedure: Thoracic Ultrasound    Findings:  R Lung Sliding: The RIGHT chest was evaluated and LUNG SLIDING was visualized.  L Lung Sliding: The LEFT chest was evaluated and LUNG SLIDING was visualized.  R Effusion: The RIGHT chest was evaluated and there was NO PLEURAL EFFUSION.  L Effusion: The LEFT chest was evaluated and there was NO PLEURAL EFFUSION.  A-lines: The RIGHT chest was evaluated and there were NO A-LINES visualized  B-lines: The RIGHT chest was evaluated and multiple B-LINES were visualized (bilateral B lines lines in lower lung fields)        Impression:  Thorax: The focused thoracic ultrasound exam had ABNORMAL findings as specified.    Comments: Dilated LV with myocardial thinning, LV function is severely decreased                Ki Ortiz MD  Resident  02/12/24 [id number]       Ki Ortiz MD  Resident  02/12/24 [id number]      ATTENDING ATTESTATION  I was present during all critical and key portions of the procedure(s) and immediately available to furnish services the entire duration. I reviewed images and agree with final interpretation above.     SIGNED BY MD Ximena PEDRAZA MD  02/15/24 0743

## 2024-02-13 NOTE — PROGRESS NOTES
Transitional Care Coordination Progress Note:  Patient discussed during interdisciplinary rounds.   Team members present: Abhijeet Prieto RN TCC, [facility] Team, Nurse Manager  Plan per Medical/Surgical team: Monitor SOB complaint, wean O2 to baseline., prednisone taper, PT/OT  Payer: [facility]  Status: inpatient  Discharge disposition: Home no needs  Potential Barriers: None  ADOD: 02/14 vs 02/15  This TCC spoke with patient regarding discharge disposition. This TCC notified by team of concerns of patient being homeless and unable to secure transport to follow up appointments. Per patient, he currently is living with a friend and is not interested in any homeless shelters or inpatient drug rehab. Patient did verify he has issues with transport to and from appointments. This TCC to provide patient with transportation information for [employer]. This TCC will continue to follow.  Abhijeet Prieto RN Transitional Care Coordinator [phone]

## 2024-02-13 NOTE — SIGNIFICANT EVENT
Rapid Response RN responding for RADAR score of 6 due to the following VS: T 37.0 °Celsius;  ; RR 32; /86; SPO2 96% .      Patient denied any difficulty breathing at present time, adding that it was 'better than before.'  Bedside RN who expressed no concerns regarding the patient's current condition based upon these VS.  No interventions by Rapid Response team indicated at this time.

## 2024-02-13 NOTE — H&P
History Of Present Illness  Leonel Yu is a 53-year-old man with a past medical history of HFrEF (NICM due to cocaine, reportedly has LifeVest, EF 20% January 2024), CKD3, HTN, HLD, substance use disorder (cocaine), tobacco use, COPD, T2DM, anxiety, and depression who presented to the ED tonight with shortness of breath. The patient states he's had about two to three days of shortness of breath, which he attributes to not having any of his medications as he lost them about a week ago. He endorses bilateral leg swelling over this period. His current symptoms are consistent with his prior CHF exacerbations, and he's feeling better since being in the ED and receiving IV Lasix. He denies any chest pain, cough, fevers, chills, abdominal pain, nausea, headaches, dizziness. The patient does endorse recent cocaine use (two days ago), and states that he's going to stop using now. Per review of records, the patient has had recurrent admissions for CHF exacerbations, the most recent from 1/6 to 1/16 at [facility] with shock initially requiring levophed/epinephrine, with an echocardiogram showing an EF of 22% (improved from 15 to 20% in July 2023). His hospital course was further complicated by a stroke alert on 1/11 for new onset right-sided upper extremity weakness and numbness for which he received TNK, with MRI brain showing no acute infarction but a remote left cerebellar hemorrhagic infarct. The patient states he has no residual deficits from this event.    In the ED:    - Vitals:   T 37.1, , /82, RR 19, SpO2 97% on RA  - Labs:   CBC: WBC 7.6, Hgb 16.0, plt 191   BMP: Na 136, K 4.5, Cl 104, HCO3 23, BUN 22, Cr 1.48, glu 187   LFT: Ca 9.3, tprot 7.7, alb 4.1, alkphos 81, AST 40, ALT 68, tbili 1.2   hs-TnI 130 -> 138, BNP > 5,000              VBG: pH 7.38, pCO2 25, pO2 41, lactate 2.3    - ECG:      Sinus tachycardia. Left ventricular hypertrophy. ST elevation noted in V3, ST depression in V6, possibly  V5. Reviewed with cardiology fellow, does not meet Sgarbossa criteria and similar findings in previous EKGs (12/9). Repeat EKG without these changes.    - Imaging:     IMPRESSION:  Stable marked enlargement of the cardiac silhouette. Mild  interstitial edema. No focal consolidation, pleural effusion, or  pneumothorax.    ED COURSE:   The above labs/imaging were obtained. Received Duo-Neb inhaler, prednisone 60 mg, 40 mg IV Lasix. Placed on 2L NC. Upon arrival to the floor, given 8 units lispro for BG > 300.     ECHO 1/7/24  The left ventricle is severely dilated. Left ventricular systolic function is severely decreased. EF = 22 ± 5% (2D biplane) Left ventricular diastolic function was not evaluated due to AF and an arrhythmia. The right ventricle is normal in size. Right ventricular systolic function is moderately decreased.   The right atrial cavity is mildly dilated. Neg. saline/bubble.    Stress test 1/10/24:   1. SPECT Perfusion Study: Non-diagnostic due to poor image quality.    2. Left ventricle is severely dilated. The left ventricle systolic   function is severely decreased.    3. Right ventricle is small. The right ventricle systolic function is   normal.    4. This is a high risk scan due to calculated LVEF.    5. There is significant GI uptake of the radiotracer that only mildly improve with AC. This makes the inferior wall uninterpretable. There is no ischemia or scar in the anterior, septal or anterolateral wall segments.    6. Given the LV is severely dilated Will recommend to assess for   ischemic heart disease using diferent imaging modalities such as cardiac MRI or coronary angiography.     Discharge medication list from [facility]:  ASA 81 mg  Famotidine 20 mg  Hydroxyzine 25 mg q6h PRN  Glargine 10 units  Melatonin 3 mg  Valsartan 40 mg  Atorvastatin 80 mg  Carvedilol 3.125 mg BID  SSI  Sertraline 100 mg  Gabapentin 300 mg  Empagliflozin 10 mg  Dicyclomine 10 mg PRN  Lasix 40 mg PRN     Past  "Medical History  As above.    Surgical History  Denies any surgical history.     Social History  Denies alcohol use. Endorses tobacco use, 1 pack per day for two years while in high school. Since, has smoked intermittently (last had one cigarette two days ago). Endorses cocaine use over a period of several years, last used two days ago. Endorsed to me that he [place] with a roommate, however reportedly told nurse that he is homeless.    Family History  No family history on file.     Allergies  Patient has no known allergies.    Review of Systems:    12-pont review of systems completed and negative except as stated above.      Physical exam:   General: Appears stated age, normal hygiene, poor dentition, in no acute distress.  Head: Normocephalic, atraumatic.  Eyes: No icterus, EOMI.  ENT: No nasal discharge, moist mucus membranes.  Cardiovascular: Regular rate/rhythm, no murmurs, 1+ leg edema.  Respiratory: Diffuse wheezing bilaterally, on supplemental nasal cannula, no accessory muscle use, able to converse without difficulty.   Abdominal: Soft, non-tender to palpation, normal bowel sounds, no masses or organomegaly.  Skin: Warm and dry, no bruises or rashes noted.  Neurologic: Alert & oriented x 3, no focal deficits.  MSK: Normal ROM, 5/5 strength in all extremities, no joint swelling noted.  Psychiatric: Cooperative, appropriate mood and affect.      Last Recorded Vitals  Blood pressure 116/86, pulse 101, temperature 37 °C (98.6 °F), temperature source Temporal, resp. rate (!) 55, height 1.702 m (5' 7\"), weight 74.8 kg (165 lb), SpO2 96 %.    Assessment/Plan   Principal Problem:    COPD exacerbation (CMS/HCC)    Leonel Yu is a 53-year-old man with a past medical history of HFrEF (NICM due to cocaine, on LifeVest, EF 20% January 2024), CKD3, HTN, HLD, substance use disorder (cocaine), tobacco use, COPD, T2DM, anxiety, and depression who presented to the ED tonight with shortness of " breath. Presentation most consistent with CHF exacerbation secondary to medication non-compliance as in prior admissions, will continue diuresis.     #Decompensated HFrEF  #HTN  ::EF 20% January 2024  ::[facility] discharge weight 161 lb, admission weight 165 lb  - S/p lasix 40 mg IV in ED, responding well, re-dose at midnight  - Resume GDMT (carvedilol 3.125 mg BID, empagliflozin 10 mg, valsartan 40 mg), discharged with lasix 40 mg daily PRN leg swelling, SOB, weight gain from [facility]  - Strict I&Os, daily weights    #CKDIII  ::Baseline Cr ~1.5, 1.48 on admission  - Continue to monitor    #T2DM  ::Home regimen: 10 units lantus, SSI with meals  - Start 5 units lantus, continue SSI    #Anxiety  #Depression  - Continue sertraline 100 mg  - Continue hydoxyzine 25 mg q6h PRN    #COPD  ::No PFTs on file.  - S/p 60 mg prednisone in ED for wheezing, will continue 40 mg tomorrow for 5 total doses.    #Prior stroke  #HLD  - Continue atorvastatin 80 mg  - Continue ASA 81 mg    #GERD  - Continue famotidine 20 mg    F: Cautious given HFrEF  E: K > 4, Mg > 2  N: Cardiac  A: PIV    DVT: SQH  Full code (confirmed on admission)  NOK: Patient states he has no family/friends, unable to provide contact information.  Brody Jenkins MD

## 2024-02-13 NOTE — CARE PLAN
COPD Education    Patient Characteristics:   Comorbidities: COPD, HTN, HF, CKD3, DMII, CVA                                         Exacerbation last year:      None noted              Moderate: >= 2 exacerbations or >= 1 exacerbation leading to hospitalization    Spirometry: No  Date:             FVC:    (   %) FEV1:    (   %)  FEV1/FVC:    (   %)    Uses of Oxygen/CPAP/BIPAP:  None noted                                                               Smoking status:  Smoker:   Yes    PPY:      Quit date:      Smoking cessation counseling: Yes    Booklet given:   Yes          Pulmonary physician:   None noted                   Name:                   Date last seen:                                     Pharmacotherapy:   Maintenance medications   LABA, LAMA, LABA/LAMA, ICS/LABA, ICS/LAMA, ICS/LABA/LAMA,   Name of the medication:  Albuterol, Spiriva    Prednisone: No Theophylline: No  Roflumilast: No  Macrolides: No     If not on maintenance meds:  Consider LAMA or LAMA/LABA   Consider ICS (if peripheral eosinophilia >300cells/microl, COPD exacerbation requiring hospitalization, >= 2 moderated COPD exacerbation, History of or concomitant asthma)    If low inspiratory force or challenges with inhalers   Consider Nebulizers   Consider RESPIMAT   Spacer Given     COPD Education   COPD patient education book: Yes     Inhaled medication teach-back: Yes     CAMILA, other videos:   COPD, Smoking   Patient demonstrated understanding/teach back method: Yes         Home Oxygen Evaluation:    No                                             Pulmonary Rehabilitation referral:  Already attended:  No     When:                                Info in COPD patient education book              Vaccines   None noted                 Influenza:          Pneumococcal:       COVID 19:      Pertussis:                    It is recommended that the patient should have a follow-up appointment with the pulmonary team within two  weeks after discharge. The patient may benefit from pulmonary rehabilitation and a smoking cessation program. Additionally, PFTs were not noted in the chart. Therefore, the patient should be encouraged to follow up with the pulmonary team.

## 2024-02-13 NOTE — PROGRESS NOTES
"Leonel Yu is a 53 y.o. male on day 1 of admission presenting with COPD exacerbation (CMS/HCC).    Subjective   Pt feeling better this AM - improvement in SOB and leg edema post IV diuresis in ED - was on RA earlier this AM but later on 2L NC after experiencing some dyspnea with exertion. No chest pain or palpitations.  Looked fatigued overall with some coughing intermittently during interview. Endorsed that he last used cocaine 4 days ago and has been living in his friend's apartment earlier but currently is homeless. He has been in and out of hospitals a lot and hasn't consistently followed up with a PCP and says his lack of employment and transportation prevent him from following up with appointments. He also mentioned he \"missed out on taking a lot of meds\".        Objective     General: Appears stated age, poor dentition, in no acute distress.  Head: Normocephalic, atraumatic.  Eyes: No icterus, EOMI. Teary eyes with some erythema noted.   ENT: No nasal discharge, moist mucus membranes.  Cardiovascular: Regular rate/rhythm, no murmurs, no leg edema.   Respiratory: Diffuse wheezing bilaterally with bibasilar crackles at bases, on room air, no accessory muscle use, able to converse without difficulty.   Abdominal: Soft, non-tender to palpation, normal bowel sounds, no masses or organomegaly.   Skin: Warm and dry, no bruises or rashes noted.   Neurologic: Alert & oriented x 3, sensation intact across all extremities, 5/5 strength in bilateral upper and lower extremities. Tremors noted in hands bilaterally.   Psychiatric: Cooperative     Last Recorded Vitals  Blood pressure 120/81, pulse 84, temperature 35.7 °C (96.3 °F), temperature source Temporal, resp. rate 20, height 1.702 m (5' 7\"), weight 74.8 kg (164 lb 14.5 oz), SpO2 97 %.  Intake/Output last 3 Shifts:  I/O last 3 completed shifts:  In: 360 (4.8 mL/kg) [P.O.:360]  Out: 800 (10.7 mL/kg) [Urine:800 (0.3 mL/kg/hr)]  Weight: 74.8 kg     Relevant " Results  Results for orders placed or performed during the hospital encounter of 02/12/24 (from the past 24 hour(s))   POCT GLUCOSE   Result Value Ref Range    POCT Glucose 178 (H) 74 - 99 mg/dL   Blood Gas Venous Full Panel   Result Value Ref Range    POCT pH, Venous 7.38 7.33 - 7.43 pH    POCT pCO2, Venous 35 (L) 41 - 51 mm Hg    POCT pO2, Venous 41 35 - 45 mm Hg    POCT SO2, Venous 57 45 - 75 %    POCT Oxy Hemoglobin, Venous 55.8 45.0 - 75.0 %    POCT Hematocrit Calculated, Venous 47.0 41.0 - 52.0 %    POCT Sodium, Venous 134 (L) 136 - 145 mmol/L    POCT Potassium, Venous 5.5 (H) 3.5 - 5.3 mmol/L    POCT Chloride, Venous 107 98 - 107 mmol/L    POCT Ionized Calicum, Venous 1.10 1.10 - 1.33 mmol/L    POCT Glucose, Venous 163 (H) 74 - 99 mg/dL    POCT Lactate, Venous 2.3 (H) 0.4 - 2.0 mmol/L    POCT Base Excess, Venous -3.7 (L) -2.0 - 3.0 mmol/L    POCT HCO3 Calculated, Venous 20.7 (L) 22.0 - 26.0 mmol/L    POCT Hemoglobin, Venous 15.5 13.5 - 17.5 g/dL    POCT Anion Gap, Venous 12.0 10.0 - 25.0 mmol/L    Patient Temperature 37.0 degrees Celsius    FiO2 21 %   Troponin I, High Sensitivity   Result Value Ref Range    Troponin I, High Sensitivity 138 (HH) 0 - 53 ng/L   RSV PCR   Result Value Ref Range    RSV PCR Not Detected Not Detected   Influenza A, and B PCR   Result Value Ref Range    Flu A Result Not Detected Not Detected    Flu B Result Not Detected Not Detected   Sars-CoV-2 PCR   Result Value Ref Range    Coronavirus 2019, PCR Not Detected Not Detected   Blood Gas Lactic Acid, Venous   Result Value Ref Range    POCT Lactate, Venous 2.9 (H) 0.4 - 2.0 mmol/L   POCT GLUCOSE   Result Value Ref Range    POCT Glucose 208 (H) 74 - 99 mg/dL   POCT GLUCOSE   Result Value Ref Range    POCT Glucose 325 (H) 74 - 99 mg/dL   Magnesium   Result Value Ref Range    Magnesium 2.03 1.60 - 2.40 mg/dL   Troponin I, High Sensitivity   Result Value Ref Range    Troponin I, High Sensitivity 132 (HH) 0 - 53 ng/L   ECG 12 lead   Result  Value Ref Range    Ventricular Rate 94 BPM    Atrial Rate 94 BPM    OR Interval 152 ms    QRS Duration 104 ms    QT Interval 392 ms    QTC Calculation(Bazett) 490 ms    P Axis 79 degrees    R Axis -32 degrees    T Axis 110 degrees    QRS Count 16 beats    Q Onset 209 ms    P Onset 133 ms    P Offset 187 ms    T Offset 405 ms    QTC Fredericia 455 ms   Drug Screen, Urine   Result Value Ref Range    Amphetamine Screen, Urine Presumptive Negative Presumptive Negative    Barbiturate Screen, Urine Presumptive Negative Presumptive Negative    Benzodiazepines Screen, Urine Presumptive Negative Presumptive Negative    Cannabinoid Screen, Urine Presumptive Negative Presumptive Negative    Cocaine Metabolite Screen, Urine Presumptive Positive (A) Presumptive Negative    Fentanyl Screen, Urine Presumptive Negative Presumptive Negative    Opiate Screen, Urine Presumptive Negative Presumptive Negative    Oxycodone Screen, Urine Presumptive Negative Presumptive Negative    PCP Screen, Urine Presumptive Negative Presumptive Negative   POCT GLUCOSE   Result Value Ref Range    POCT Glucose 237 (H) 74 - 99 mg/dL   ECG 12 Lead   Result Value Ref Range    Ventricular Rate 78 BPM    Atrial Rate 78 BPM    OR Interval 152 ms    QRS Duration 106 ms    QT Interval 430 ms    QTC Calculation(Bazett) 490 ms    P Axis 81 degrees    R Axis -39 degrees    T Axis 107 degrees    QRS Count 12 beats    Q Onset 210 ms    P Onset 134 ms    P Offset 191 ms    T Offset 425 ms    QTC Fredericia 469 ms   POCT GLUCOSE   Result Value Ref Range    POCT Glucose 176 (H) 74 - 99 mg/dL   CBC and Auto Differential   Result Value Ref Range    WBC 9.2 4.4 - 11.3 x10*3/uL    nRBC 0.0 0.0 - 0.0 /100 WBCs    RBC 5.12 4.50 - 5.90 x10*6/uL    Hemoglobin 15.8 13.5 - 17.5 g/dL    Hematocrit 47.3 41.0 - 52.0 %    MCV 92 80 - 100 fL    MCH 30.9 26.0 - 34.0 pg    MCHC 33.4 32.0 - 36.0 g/dL    RDW 15.9 (H) 11.5 - 14.5 %    Platelets 203 150 - 450 x10*3/uL    Neutrophils % 89.4  40.0 - 80.0 %    Immature Granulocytes %, Automated 0.3 0.0 - 0.9 %    Lymphocytes % 5.3 13.0 - 44.0 %    Monocytes % 4.9 2.0 - 10.0 %    Eosinophils % 0.0 0.0 - 6.0 %    Basophils % 0.1 0.0 - 2.0 %    Neutrophils Absolute 8.25 (H) 1.20 - 7.70 x10*3/uL    Immature Granulocytes Absolute, Automated 0.03 0.00 - 0.70 x10*3/uL    Lymphocytes Absolute 0.49 (L) 1.20 - 4.80 x10*3/uL    Monocytes Absolute 0.45 0.10 - 1.00 x10*3/uL    Eosinophils Absolute 0.00 0.00 - 0.70 x10*3/uL    Basophils Absolute 0.01 0.00 - 0.10 x10*3/uL   Renal Function Panel   Result Value Ref Range    Glucose 225 (H) 74 - 99 mg/dL    Sodium 137 136 - 145 mmol/L    Potassium 5.0 3.5 - 5.3 mmol/L    Chloride 103 98 - 107 mmol/L    Bicarbonate 25 21 - 32 mmol/L    Anion Gap 14 10 - 20 mmol/L    Urea Nitrogen 27 (H) 6 - 23 mg/dL    Creatinine 1.43 (H) 0.50 - 1.30 mg/dL    eGFR 58 (L) >60 mL/min/1.73m*2    Calcium 9.2 8.6 - 10.6 mg/dL    Phosphorus 3.2 2.5 - 4.9 mg/dL    Albumin 3.4 3.4 - 5.0 g/dL   Magnesium   Result Value Ref Range    Magnesium 2.14 1.60 - 2.40 mg/dL   Ethanol   Result Value Ref Range    Alcohol <10 <=10 mg/dL   POCT GLUCOSE   Result Value Ref Range    POCT Glucose 253 (H) 74 - 99 mg/dL   POCT GLUCOSE   Result Value Ref Range    POCT Glucose 490 (H) 74 - 99 mg/dL     Scheduled medications  aspirin, 81 mg, oral, Daily  atorvastatin, 80 mg, oral, Nightly  carvedilol, 3.125 mg, oral, BID  empagliflozin, 10 mg, oral, Daily  famotidine, 20 mg, oral, Daily  furosemide, 40 mg, oral, Daily  heparin (porcine), 5,000 Units, subcutaneous, q8h  insulin glargine, 10 Units, subcutaneous, Nightly  insulin lispro, 0-10 Units, subcutaneous, TID with meals  melatonin, 3 mg, oral, Nightly  predniSONE, 40 mg, oral, Daily  sertraline, 100 mg, oral, Daily  spironolactone, 25 mg, oral, Daily  valsartan, 40 mg, oral, Daily      Continuous medications     PRN medications  PRN medications: dextrose 10 % in water (D10W), dextrose, glucagon, hydrOXYzine HCL,  ipratropium-albuteroL                                 Assessment/Plan   Principal Problem:    COPD exacerbation (CMS/[facility])  Active Problems:    COPD (chronic obstructive pulmonary disease) (CMS/[facility])  Leonel Yu is a 53-year-old man with a past medical history of HFrEF (NICM due to cocaine, on LifeVest, EF 22% January 2024), CKD3 (b/l cr 1.5), HTN, HLD, substance use disorder (cocaine), tobacco use, COPD  who presented to the ED with shortness of breath, productive cough and leg edema. Exam significant with bibasilar crackles and wheezing with peripheral edema on presentation most consistent with CHF exacerbation secondary to missing doses of GDMT medications along with component of COPD exacerbation. He will be resumed on his home GDMT regimen and treated with 5 days of prednisone for COPD exacerbation.     Updates 2/13:   - Start lasix 40 mg daily PO   - Start spironolactone 25 mg daily   - Increase lantus to home regimen of 10 units lantus and increase SSI to moderate #2 given elevated BG (also on prednisone currently)   - Continue prednisone 40 mg for total 5 days of therapy (2/16 end date)   - Will consult SW for assistance with transportation/housing given currently homeless - to [facility] being able to attend future appointments   - Counseled on cocaine cessation      #Decompensated HFrEF  ::EF 22% January 2024  ::[facility] discharge weight 161 lb, admission weight 165 lb  :: S/p lasix 40 mg IV x 2 in ED with improvement noted   Plan:   - Continue home GDMT:   Preload: ADD furosemide 40 mg PO daily   Afterload: Valsartan 40 mg daily   Neurohormonal blockade: carvedilol 3.125 mg BID and ADD spironolactone 25 mg daily   SGLT2 inhibitor: empagliflozin 10 mg  - Strict I&Os, daily weights   - Outpt cardiology f/up on 3/6/24 with Dr. Bart Liu at [facility] - will coordinate w/ SW to assist with transportation on discharge     #HTN  Plan:   - Continue valsartan 40 mg daily and coreg 3.125 mg BID       #CKDIII  ::Baseline Cr ~1.5, 1.48 on admission  Plan:  - Continue to monitor     #T2DM  ::Home regimen: 10 units lantus, SSI with meals  Plan:  - Increase to home regimen of 10 units lantus and increase SSI to moderate #2 given elevated BG (also on prednisone currently)   - Continue to monitor blood glucose AC HS and will titrate accordingly     #COPD (current suspicion for exacerbation)   :: No PFTs on file.  - S/p 60 mg prednisone in ED for wheezing  - No fevers and no leukocytosis so no indication for antibiotics at this time   Plan:  - Continue prednisone 40 mg for total 5 days of therapy (2/16 end date)     #Cocaine use   - Utox positive on admission   Plan:   - Counseled on cessation - will provide resources through  for rehab      #Anxiety  #Depression  Plan:  - Continue sertraline 100 mg  - Continue hydoxyzine 25 mg q6h PRN     #Prior stroke  #HLD  Plan:  - Continue atorvastatin 80 mg  - Continue ASA 81 mg     #GERD  Plan:  - Continue famotidine 20 mg     F: Cautious given HFrEF  E: K > 4, Mg > 2  N: Cardiac  A: PIV  DVT: SQH  Full code (confirmed on admission)  NOK: Patient states he has no family/friends, unable to provide contact information.           Melani Alaniz MD

## 2024-02-14 LAB
ALBUMIN SERPL BCP-MCNC: 3.3 G/DL (ref 3.4–5)
ALP SERPL-CCNC: 63 U/L (ref 33–120)
ALT SERPL W P-5'-P-CCNC: 40 U/L (ref 10–52)
ANION GAP SERPL CALC-SCNC: 13 MMOL/L (ref 10–20)
AST SERPL W P-5'-P-CCNC: 19 U/L (ref 9–39)
ATRIAL RATE: 78 BPM
ATRIAL RATE: 94 BPM
BASOPHILS # BLD AUTO: 0.01 X10*3/UL (ref 0–0.1)
BASOPHILS NFR BLD AUTO: 0.1 %
BILIRUB SERPL-MCNC: 0.9 MG/DL (ref 0–1.2)
BUN SERPL-MCNC: 26 MG/DL (ref 6–23)
CALCIUM SERPL-MCNC: 8.7 MG/DL (ref 8.6–10.6)
CHLORIDE SERPL-SCNC: 104 MMOL/L (ref 98–107)
CO2 SERPL-SCNC: 25 MMOL/L (ref 21–32)
CREAT SERPL-MCNC: 1.3 MG/DL (ref 0.5–1.3)
EGFRCR SERPLBLD CKD-EPI 2021: 65 ML/MIN/1.73M*2
EOSINOPHIL # BLD AUTO: 0.01 X10*3/UL (ref 0–0.7)
EOSINOPHIL NFR BLD AUTO: 0.1 %
ERYTHROCYTE [DISTWIDTH] IN BLOOD BY AUTOMATED COUNT: 15.9 % (ref 11.5–14.5)
GLUCOSE BLD MANUAL STRIP-MCNC: 132 MG/DL (ref 74–99)
GLUCOSE BLD MANUAL STRIP-MCNC: 202 MG/DL (ref 74–99)
GLUCOSE BLD MANUAL STRIP-MCNC: 241 MG/DL (ref 74–99)
GLUCOSE BLD MANUAL STRIP-MCNC: 256 MG/DL (ref 74–99)
GLUCOSE BLD MANUAL STRIP-MCNC: 300 MG/DL (ref 74–99)
GLUCOSE BLD MANUAL STRIP-MCNC: 456 MG/DL (ref 74–99)
GLUCOSE SERPL-MCNC: 117 MG/DL (ref 74–99)
HCT VFR BLD AUTO: 42.7 % (ref 41–52)
HGB BLD-MCNC: 14.9 G/DL (ref 13.5–17.5)
IMM GRANULOCYTES # BLD AUTO: 0.04 X10*3/UL (ref 0–0.7)
IMM GRANULOCYTES NFR BLD AUTO: 0.4 % (ref 0–0.9)
LYMPHOCYTES # BLD AUTO: 0.87 X10*3/UL (ref 1.2–4.8)
LYMPHOCYTES NFR BLD AUTO: 8.4 %
MAGNESIUM SERPL-MCNC: 1.91 MG/DL (ref 1.6–2.4)
MCH RBC QN AUTO: 32 PG (ref 26–34)
MCHC RBC AUTO-ENTMCNC: 34.9 G/DL (ref 32–36)
MCV RBC AUTO: 92 FL (ref 80–100)
MONOCYTES # BLD AUTO: 0.63 X10*3/UL (ref 0.1–1)
MONOCYTES NFR BLD AUTO: 6.1 %
NEUTROPHILS # BLD AUTO: 8.82 X10*3/UL (ref 1.2–7.7)
NEUTROPHILS NFR BLD AUTO: 84.9 %
NRBC BLD-RTO: 0 /100 WBCS (ref 0–0)
P AXIS: 79 DEGREES
P AXIS: 81 DEGREES
P OFFSET: 187 MS
P OFFSET: 191 MS
P ONSET: 133 MS
P ONSET: 134 MS
PHOSPHATE SERPL-MCNC: 3.5 MG/DL (ref 2.5–4.9)
PLATELET # BLD AUTO: 209 X10*3/UL (ref 150–450)
POTASSIUM SERPL-SCNC: 4 MMOL/L (ref 3.5–5.3)
PR INTERVAL: 152 MS
PR INTERVAL: 152 MS
PROT SERPL-MCNC: 6 G/DL (ref 6.4–8.2)
Q ONSET: 209 MS
Q ONSET: 210 MS
QRS COUNT: 12 BEATS
QRS COUNT: 16 BEATS
QRS DURATION: 104 MS
QRS DURATION: 106 MS
QT INTERVAL: 392 MS
QT INTERVAL: 430 MS
QTC CALCULATION(BAZETT): 490 MS
QTC CALCULATION(BAZETT): 490 MS
QTC FREDERICIA: 455 MS
QTC FREDERICIA: 469 MS
R AXIS: -32 DEGREES
R AXIS: -39 DEGREES
RBC # BLD AUTO: 4.66 X10*6/UL (ref 4.5–5.9)
SODIUM SERPL-SCNC: 138 MMOL/L (ref 136–145)
T AXIS: 107 DEGREES
T AXIS: 110 DEGREES
T OFFSET: 405 MS
T OFFSET: 425 MS
VENTRICULAR RATE: 78 BPM
VENTRICULAR RATE: 94 BPM
WBC # BLD AUTO: 10.4 X10*3/UL (ref 4.4–11.3)

## 2024-02-14 PROCEDURE — 2500000002 HC RX 250 W HCPCS SELF ADMINISTERED DRUGS (ALT 637 FOR MEDICARE OP, ALT 636 FOR OP/ED): Mod: SE

## 2024-02-14 PROCEDURE — 99233 SBSQ HOSP IP/OBS HIGH 50: CPT

## 2024-02-14 PROCEDURE — 2500000001 HC RX 250 WO HCPCS SELF ADMINISTERED DRUGS (ALT 637 FOR MEDICARE OP): Mod: SE

## 2024-02-14 PROCEDURE — 82947 ASSAY GLUCOSE BLOOD QUANT: CPT

## 2024-02-14 PROCEDURE — 2500000002 HC RX 250 W HCPCS SELF ADMINISTERED DRUGS (ALT 637 FOR MEDICARE OP, ALT 636 FOR OP/ED): Mod: SE | Performed by: PHARMACIST

## 2024-02-14 PROCEDURE — 36415 COLL VENOUS BLD VENIPUNCTURE: CPT

## 2024-02-14 PROCEDURE — 83735 ASSAY OF MAGNESIUM: CPT

## 2024-02-14 PROCEDURE — 97162 PT EVAL MOD COMPLEX 30 MIN: CPT | Mod: GP

## 2024-02-14 PROCEDURE — 85025 COMPLETE CBC W/AUTO DIFF WBC: CPT

## 2024-02-14 PROCEDURE — 97165 OT EVAL LOW COMPLEX 30 MIN: CPT | Mod: GO

## 2024-02-14 PROCEDURE — 1100000001 HC PRIVATE ROOM DAILY

## 2024-02-14 PROCEDURE — 84100 ASSAY OF PHOSPHORUS: CPT

## 2024-02-14 PROCEDURE — 80053 COMPREHEN METABOLIC PANEL: CPT

## 2024-02-14 PROCEDURE — G0378 HOSPITAL OBSERVATION PER HR: HCPCS

## 2024-02-14 PROCEDURE — 2500000004 HC RX 250 GENERAL PHARMACY W/ HCPCS (ALT 636 FOR OP/ED): Mod: SE

## 2024-02-14 RX ORDER — INSULIN LISPRO 100 [IU]/ML
2 INJECTION, SOLUTION INTRAVENOUS; SUBCUTANEOUS ONCE
Status: COMPLETED | OUTPATIENT
Start: 2024-02-14 | End: 2024-02-14

## 2024-02-14 RX ORDER — METFORMIN HYDROCHLORIDE 500 MG/1
500 TABLET ORAL
Status: DISCONTINUED | OUTPATIENT
Start: 2024-02-14 | End: 2024-02-16 | Stop reason: HOSPADM

## 2024-02-14 RX ORDER — GUAIFENESIN 100 MG/5ML
200 SOLUTION ORAL EVERY 6 HOURS PRN
Status: DISCONTINUED | OUTPATIENT
Start: 2024-02-14 | End: 2024-02-16 | Stop reason: HOSPADM

## 2024-02-14 RX ADMIN — METFORMIN HYDROCHLORIDE 500 MG: 500 TABLET ORAL at 09:48

## 2024-02-14 RX ADMIN — HEPARIN SODIUM 5000 UNITS: 5000 INJECTION INTRAVENOUS; SUBCUTANEOUS at 21:45

## 2024-02-14 RX ADMIN — FAMOTIDINE 20 MG: 40 TABLET ORAL at 09:35

## 2024-02-14 RX ADMIN — HEPARIN SODIUM 5000 UNITS: 5000 INJECTION INTRAVENOUS; SUBCUTANEOUS at 05:36

## 2024-02-14 RX ADMIN — VALSARTAN 40 MG: 80 TABLET, FILM COATED ORAL at 09:35

## 2024-02-14 RX ADMIN — CARVEDILOL 3.12 MG: 3.12 TABLET, FILM COATED ORAL at 21:45

## 2024-02-14 RX ADMIN — EMPAGLIFLOZIN 10 MG: 10 TABLET, FILM COATED ORAL at 09:34

## 2024-02-14 RX ADMIN — ATORVASTATIN CALCIUM 80 MG: 20 TABLET, FILM COATED ORAL at 21:45

## 2024-02-14 RX ADMIN — INSULIN LISPRO 10 UNITS: 100 INJECTION, SOLUTION INTRAVENOUS; SUBCUTANEOUS at 12:37

## 2024-02-14 RX ADMIN — FUROSEMIDE 40 MG: 40 TABLET ORAL at 09:33

## 2024-02-14 RX ADMIN — ASPIRIN 81 MG CHEWABLE TABLET 81 MG: 81 TABLET CHEWABLE at 09:34

## 2024-02-14 RX ADMIN — INSULIN GLARGINE 10 UNITS: 100 INJECTION, SOLUTION SUBCUTANEOUS at 21:55

## 2024-02-14 RX ADMIN — MELATONIN 3 MG: 3 TAB ORAL at 21:45

## 2024-02-14 RX ADMIN — CARVEDILOL 3.12 MG: 3.12 TABLET, FILM COATED ORAL at 09:34

## 2024-02-14 RX ADMIN — SPIRONOLACTONE 25 MG: 25 TABLET, FILM COATED ORAL at 09:34

## 2024-02-14 RX ADMIN — INSULIN LISPRO 2 UNITS: 100 INJECTION, SOLUTION INTRAVENOUS; SUBCUTANEOUS at 12:39

## 2024-02-14 RX ADMIN — PREDNISONE 40 MG: 20 TABLET ORAL at 09:33

## 2024-02-14 RX ADMIN — HEPARIN SODIUM 5000 UNITS: 5000 INJECTION INTRAVENOUS; SUBCUTANEOUS at 13:49

## 2024-02-14 RX ADMIN — SERTRALINE HYDROCHLORIDE 100 MG: 50 TABLET ORAL at 09:33

## 2024-02-14 RX ADMIN — INSULIN LISPRO 4 UNITS: 100 INJECTION, SOLUTION INTRAVENOUS; SUBCUTANEOUS at 18:47

## 2024-02-14 ASSESSMENT — COGNITIVE AND FUNCTIONAL STATUS - GENERAL
CLIMB 3 TO 5 STEPS WITH RAILING: A LOT
DRESSING REGULAR UPPER BODY CLOTHING: A LITTLE
WALKING IN HOSPITAL ROOM: A LITTLE
STANDING UP FROM CHAIR USING ARMS: A LITTLE
DRESSING REGULAR LOWER BODY CLOTHING: A LITTLE
HELP NEEDED FOR BATHING: A LITTLE
PERSONAL GROOMING: A LITTLE
MOVING TO AND FROM BED TO CHAIR: A LITTLE
MOVING TO AND FROM BED TO CHAIR: A LITTLE
STANDING UP FROM CHAIR USING ARMS: A LITTLE
DAILY ACTIVITIY SCORE: 24
DRESSING REGULAR UPPER BODY CLOTHING: A LITTLE
MOBILITY SCORE: 24
DRESSING REGULAR LOWER BODY CLOTHING: A LITTLE
TOILETING: A LITTLE
CLIMB 3 TO 5 STEPS WITH RAILING: A LOT
TOILETING: A LITTLE
HELP NEEDED FOR BATHING: A LITTLE
MOBILITY SCORE: 19
DAILY ACTIVITIY SCORE: 19
PERSONAL GROOMING: A LITTLE
WALKING IN HOSPITAL ROOM: A LITTLE
MOBILITY SCORE: 19
DAILY ACTIVITIY SCORE: 19

## 2024-02-14 ASSESSMENT — PAIN SCALES - GENERAL
PAINLEVEL_OUTOF10: 0 - NO PAIN

## 2024-02-14 ASSESSMENT — ACTIVITIES OF DAILY LIVING (ADL): ADL_ASSISTANCE: INDEPENDENT

## 2024-02-14 ASSESSMENT — PAIN - FUNCTIONAL ASSESSMENT
PAIN_FUNCTIONAL_ASSESSMENT: 0-10

## 2024-02-14 NOTE — CONSULTS
"Inpatient Diabetes Education Consult    Reason for Visit:  Leonel Yu is a 53 y.o. male who presents for HF, T2DM, COPD    Consulting Service/Provider: Dr. Landaverde    Visit Type: Initial visit    Visit Modality: In-person    Discharge Equipment/Supply Needs:       Patient has supplies at home: Lancets, Pen needles, and insulin pens    Patient History and Assessment:  New diagnosis:  n/a  Previous diagnosis: Type 2  Patient known to Diabetes Education department: No  Treatment prior to hospital admission: Insulin: Rapid acting, Long acting, via pen  He does NOT have a BG meter and has been \"guessing\" how much insulin to take with meals:  \"I usually take 2 units with each meal so I don't go too low\"  Complications: cardiovascular disease and steroids daily for 4 days 2/13-2/17/24  PTA Medications:    Current Outpatient Medications   Medication Instructions    atorvastatin (LIPITOR) 80 mg, oral, Daily    carvedilol (Coreg) 6.25 mg tablet TAKE 1 TABLET BY MOUTH TWO TIMES A DAY    empagliflozin (Jardiance) 10 mg TAKE 1 TABLET BY MOUTH ONCE DAILY    sacubitriL-valsartan (Entresto) 24-26 mg tablet TAKE 1 TABLET BY MOUTH TWO TIMES A DAY    sertraline (Zoloft) 50 mg tablet TAKE 1 TABLET BY MOUTH ONCE DAILY    spironolactone (ALDACTONE) 12.5 mg, oral, Daily    tiotropium (Spiriva Respimat) 2.5 mcg/actuation inhaler 2 puffs, inhalation, Daily    torsemide (DEMADEX) 50 mg, oral, Daily       Glucose   Date/Time Value Ref Range Status   02/14/2024 04:19  (H) 74 - 99 mg/dL Final   02/13/2024 07:11  (H) 74 - 99 mg/dL Final   02/12/2024 12:39  (H) 74 - 99 mg/dL Final   12/28/2023 08:31  (H) 74 - 99 mg/dL Final   12/27/2023 07:59  (H) 74 - 99 mg/dL Final   12/26/2023 06:12  (H) 74 - 99 mg/dL Final   12/25/2023 08:55  (H) 74 - 99 mg/dL Final   12/24/2023 08:04  (H) 74 - 99 mg/dL Final   12/14/2023 07:58  (H) 74 - 99 mg/dL Final   12/13/2023 08:50  (H) 74 - 99 mg/dL Final " "    No results found for: \"CPEPTIDE\"  Hemoglobin A1C   Date Value Ref Range Status   01/09/2024 10.1 (H) 4.3 - 5.6 % Final     Comment:     American Diabetes Association guidelines indicate that patients with HgbA1c in the range 5.7-6.4% are at increased risk for development of diabetes, and intervention by lifestyle modification may be beneficial. HgbA1c greater or equal to 6.5% is considered diagnostic of diabetes.   12/09/2023 7.6 (H) see below % Final   07/25/2023 7.1 (A) % Final     Comment:          Diagnosis of Diabetes-Adults   Non-Diabetic: < or = 5.6%   Increased risk for developing diabetes: 5.7-6.4%   Diagnostic of diabetes: > or = 6.5%  .       Monitoring of Diabetes                Age (y)     Therapeutic Goal (%)   Adults:          >18           <7.0   Pediatrics:    13-18           <7.5                   7-12           <8.0                   0- 6            7.5-8.5   American Diabetes Association. Diabetes Care 33(S1), Jan 2010.     06/13/2023 7.4 (A) % Final     Comment:          Diagnosis of Diabetes-Adults   Non-Diabetic: < or = 5.6%   Increased risk for developing diabetes: 5.7-6.4%   Diagnostic of diabetes: > or = 6.5%  .       Monitoring of Diabetes                Age (y)     Therapeutic Goal (%)   Adults:          >18           <7.0   Pediatrics:    13-18           <7.5                   7-12           <8.0                   0- 6            7.5-8.5   American Diabetes Association. Diabetes Care 33(S1), Jan 2010.         Patient Learning/Readiness Assessment:  Mr. Yu is agreeable to discussion re DM    Interventions/Topics Covered:  See After Visit Summary for handouts/information sheets provided to patient.  Education Documentation  No documentation found.        Additional topics covered: We reviewed his insulin regimen at home.  E first said sammyh takes 3 injections per day.  With further questions states he takes basal am and pm and humalog with meals.  He does not have a meter so he has " "been \"guessing\" about the meal time dose and usually self-administers 2 units humalog.  He was most recently discharged from [facility].  States he has no refills on his meds and ran out so came to the ED.  He states he would be interested in following with cardiology and/or endocrinology at [facility]. Spoke with [facility] re need for meter.     Additional materials provided: insulin regimen chart    CGM:  States he would be interested in one but also states that he sometimes doesn't take his meds because he left them in a friend's car or lost them somewhere.  There is concern he would lose the reader as well.      Education Outcome/Recommendations:        Recommendations for bedside nursing: Allow patient to self-inject insulin (supervised)    Recommendations for Providers: Follow-up w/ PCP and/or Endocrinology    Additional CommentsMrBasil Yu has been hospitalized often for HF and DM.  He needs much encouragement to follow up with providers after discharge rather than missing appts and using the ED as his primary care service.  States he understands this and will try to work with his insurance company's transportation resource.  Will follow up again in am.  He is agreeable to this.  Time spent:  45 mins.         "

## 2024-02-14 NOTE — PROGRESS NOTES
Occupational Therapy    Evaluation    Patient Name: Leonel Yu  MRN: [id number]  Today's Date: 2/14/2024  Time Calculation  Start Time: 1032  Stop Time: 1047  Time Calculation (min): 15 min    Assessment  IP OT Assessment  Barriers to Discharge:  (Lack of secure housing.)  End of Session Communication: Bedside nurse  End of Session Patient Position: Bed, 2 rail up, Alarm off, not on at start of session  Plan:  Treatment Interventions: ADL retraining, Endurance training, Compensatory technique education, Continued evaluation, Functional transfer training  OT Frequency: 2 times per week  OT Discharge Recommendations: Low intensity level of continued care  Equipment Recommended upon Discharge:  (Shower seat)  OT - OK to Discharge:  (OT evaluation completed.)    Subjective   Current Problem:  1. COPD exacerbation (CMS/[facility])  Referral to Pulmonology      2. Acute on chronic combined systolic and diastolic congestive heart failure (CMS/HCC)          General:  Reason for Referral: SOB, LE swelling, likely CHF exacerbation 2' medication non-compliance; suspected ischemic cardiomyopathy  Past Medical History Relevant to Rehab: HFr EF (NICM 2' cocaine, reportedly has live vest, EF=20% 1/2024, CKD3, HTN, HLD, substance abuse disorder, tobacco use, COPD, DM2, anxiety, depression, recurrent admissions for CHF exacerbation.  Co-Treatment: PT  Co-Treatment Reason: Seen with PT to maximize pt's functional performance and safety in session.  Prior to Session Communication: Bedside nurse  Patient Position Received: Bed, 2 rail up, Alarm off, not on at start of session  General Comment: Pt received in supine, agreeable to therapy, pleasant and cooperative.   Precautions:  Medical Precautions: Oxygen therapy device and L/min, Fall precautions  Vital Signs:  Heart Rate:  (105-112 HR with mobility in hallway)  SpO2:  (90 on RA (pt often tends to remove NC); 93% at rest on  3L NC; 98% on 3L with ambulation in hallway)  Pain:  Pain  "Assessment  Pain Assessment: 0-10  Pain Score: 0 - No pain         Objective   Cognition:  Orientation Level: Oriented X4           Home Living:  Type of Home:  (Per chart -- pt has been \"couch surfing\". Pt reports he has been staying with a friend in a split level home.)  Home Adaptive Equipment: None  Home Layout:  (Split level home, 4 steps to access kitchen level, 5 steps to access lower level.)  Bathroom Shower/Tub: Tub/shower unit   Prior Function:  Level of [place]: Independent with ADLs and functional transfers  Homemaking Assistance:  (Pt reports he must do some \"chores\" in his present living situation (such as taking out the trash), which he reports has been difficult given his fatigue and SOB issues.)       ADL:  LE Dressing Assistance: Stand by  Toileting Assistance with Device:  (Anticipate SBA -- pt rpeorts he has been removing NC to use restroom and gets SOB. Pt provided with extension tubing to reach into restroom, encouraged pt to maintain NC during mobility in room to maximize O2 sats.)  Activity Tolerance:  Endurance: Tolerates 10 - 20 min exercise with multiple rests  Balance:  Static Sitting Balance  Static Sitting-Level of Assistance: Independent  Static Standing Balance  Static Standing-Level of Assistance: Close supervision  Bed Mobility/Transfers: Bed Mobility  Bed Mobility: Yes  Bed Mobility 1  Bed Mobility 1: Supine to sitting  Level of Assistance 1: Independent   and Transfers  Transfer: Yes  Transfer 1  Transfer From 1: Sit to, Stand to  Transfer to 1: Stand, Sit  Technique 1: Sit to stand, Stand to sit  Transfer Device 1:  (No AD)  Transfer Level of Assistance 1: Close supervision  Trials/Comments 1: SBA for short distance functional mobility in room. Pt continued into hallway for longer distance ambulation. Pt with c/o fatigue and SOB on 3L, required seated rest break. Refer to PT notes re: gait/distance details.       Vision: Vision - Basic Assessment  Current Vision:  (Grossly " WFL for ADL's -- pt reports increased blurriness when blood sugar is elevated, but no c/o blurry vision in session.)   and    Sensation:  Light Touch: No apparent deficits (UE's)  Strength:  Strength Comments:  (Grossly WFL for basic ADL's)  Perception:     Coordination:      Hand Function:  Hand Function  Gross Grasp: Functional  Extremities: RUE   RUE : Within Functional Limits, LUE   LUE: Within Functional Limits,  , and      Outcome Measures: [facility] Daily Activity  Putting on and taking off regular lower body clothing: A little  Bathing (including washing, rinsing, drying): A little  Putting on and taking off regular upper body clothing: A little  Toileting, which includes using toilet, bedpan or urinal: A little  Taking care of personal grooming such as brushing teeth: A little  Eating Meals: None  Daily Activity - Total Score: 19         ,     OT Adult Other Outcome Measures  4AT: 0    Education Documentation  Precautions, taught by Saranya Quiles OT at 2/14/2024 11:31 AM.  Learner: Patient  Readiness: Acceptance  Method: Explanation  Response: Needs Reinforcement    ADL Training, taught by Saranya Quiles OT at 2/14/2024 11:31 AM.  Learner: Patient  Readiness: Acceptance  Method: Explanation  Response: Needs Reinforcement    Education Comments  No comments found.        Goals:   Encounter Problems       Encounter Problems (Active)       ADLs       Patient will perform UB and LB bathing  with modified independent level of assistance and shower chair.       Start:  02/14/24    Expected End:  02/28/24            Patient with complete lower body dressing with modified independent level of assistance donning and doffing all LE clothes  with PRN adaptive equipment for E/C as needed if pt agreeable while edge of bed  and standing       Start:  02/14/24    Expected End:  02/28/24            Patient will complete toileting including hygiene clothing management/hygiene with independent level of assistance and raised toilet  seat as needed for E/C.       Start:  02/14/24    Expected End:  02/28/24               MOBILITY       Patient will perform Functional mobility  Household distances/Community Distances with modified independent level of assistance and least restrictive device in order to improve safety and functional mobility.       Start:  02/14/24    Expected End:  02/28/24               TRANSFERS       Patient will complete functional transfer to all surfaces with least restrictive device with modified independent level of assistance.       Start:  02/14/24    Expected End:  02/28/24 02/14/24 at 11:32 AM   Saranya Quiles OT   Rehab Office: [phone]

## 2024-02-14 NOTE — PROGRESS NOTES
"Leonel Yu is a 53 y.o. male on day 1 of admission presenting with COPD exacerbation (CMS/HCC).    Subjective   NAOE. Pt denied any CP this AM - endorsed dyspnea with exertion but none at rest. On RA currently but intermittently being put on 2L NC. Was intermittently restless during interview and asking for food.     Later on this AM - pt was more awake, less restless and sitting up eating breakfast. Endorsed SOB with minimal exertion and was on 2L NC. With regards to his DM - he says he doesn't have a glucometer and \"guesses\" how much insulin he would need.        Objective     General: Appears stated age, poor dentition, in no acute distress.   Head: Normocephalic, atraumatic.  Eyes: No icterus, EOMI.   ENT: No nasal discharge, moist mucus membranes.  Cardiovascular: Regular rate/rhythm, no murmurs, no leg edema.   Respiratory: Diffuse wheezing bilaterally with mild bibasilar crackles at bases, on 2L NC and somewhat dyspneic with conversation  Abdominal: Soft, non-tender to palpation, normal bowel sounds, no masses or organomegaly.   Skin: Warm and dry, no bruises or rashes noted.   Neurologic: Alert & oriented x 3, sensation intact across all extremities, 5/5 strength in bilateral upper and lower extremities. Mild tremors noted in hands bilaterally.   Psychiatric: Cooperative     Last Recorded Vitals  Blood pressure 116/78, pulse 92, temperature 36.2 °C (97.2 °F), temperature source Temporal, resp. rate 20, height 1.702 m (5' 7\"), weight 77.5 kg (170 lb 13.7 oz), SpO2 96 %.  Intake/Output last 3 Shifts:  I/O last 3 completed shifts:  In: 1200 (15.5 mL/kg) [P.O.:1200]  Out: 3075 (39.7 mL/kg) [Urine:3075 (1.1 mL/kg/hr)]  Weight: 77.5 kg     Relevant Results  Results for orders placed or performed during the hospital encounter of 02/12/24 (from the past 24 hour(s))   POCT GLUCOSE   Result Value Ref Range    POCT Glucose 490 (H) 74 - 99 mg/dL   POCT GLUCOSE   Result Value Ref Range    POCT Glucose 274 (H) 74 - " 99 mg/dL   POCT GLUCOSE   Result Value Ref Range    POCT Glucose 218 (H) 74 - 99 mg/dL   CBC and Auto Differential   Result Value Ref Range    WBC 10.4 4.4 - 11.3 x10*3/uL    nRBC 0.0 0.0 - 0.0 /100 WBCs    RBC 4.66 4.50 - 5.90 x10*6/uL    Hemoglobin 14.9 13.5 - 17.5 g/dL    Hematocrit 42.7 41.0 - 52.0 %    MCV 92 80 - 100 fL    MCH 32.0 26.0 - 34.0 pg    MCHC 34.9 32.0 - 36.0 g/dL    RDW 15.9 (H) 11.5 - 14.5 %    Platelets 209 150 - 450 x10*3/uL    Neutrophils % 84.9 40.0 - 80.0 %    Immature Granulocytes %, Automated 0.4 0.0 - 0.9 %    Lymphocytes % 8.4 13.0 - 44.0 %    Monocytes % 6.1 2.0 - 10.0 %    Eosinophils % 0.1 0.0 - 6.0 %    Basophils % 0.1 0.0 - 2.0 %    Neutrophils Absolute 8.82 (H) 1.20 - 7.70 x10*3/uL    Immature Granulocytes Absolute, Automated 0.04 0.00 - 0.70 x10*3/uL    Lymphocytes Absolute 0.87 (L) 1.20 - 4.80 x10*3/uL    Monocytes Absolute 0.63 0.10 - 1.00 x10*3/uL    Eosinophils Absolute 0.01 0.00 - 0.70 x10*3/uL    Basophils Absolute 0.01 0.00 - 0.10 x10*3/uL   Comprehensive metabolic panel   Result Value Ref Range    Glucose 117 (H) 74 - 99 mg/dL    Sodium 138 136 - 145 mmol/L    Potassium 4.0 3.5 - 5.3 mmol/L    Chloride 104 98 - 107 mmol/L    Bicarbonate 25 21 - 32 mmol/L    Anion Gap 13 10 - 20 mmol/L    Urea Nitrogen 26 (H) 6 - 23 mg/dL    Creatinine 1.30 0.50 - 1.30 mg/dL    eGFR 65 >60 mL/min/1.73m*2    Calcium 8.7 8.6 - 10.6 mg/dL    Albumin 3.3 (L) 3.4 - 5.0 g/dL    Alkaline Phosphatase 63 33 - 120 U/L    Total Protein 6.0 (L) 6.4 - 8.2 g/dL    AST 19 9 - 39 U/L    Bilirubin, Total 0.9 0.0 - 1.2 mg/dL    ALT 40 10 - 52 U/L   Magnesium   Result Value Ref Range    Magnesium 1.91 1.60 - 2.40 mg/dL   Phosphorus   Result Value Ref Range    Phosphorus 3.5 2.5 - 4.9 mg/dL   POCT GLUCOSE   Result Value Ref Range    POCT Glucose 132 (H) 74 - 99 mg/dL   POCT GLUCOSE   Result Value Ref Range    POCT Glucose 456 (H) 74 - 99 mg/dL     Scheduled medications  aspirin, 81 mg, oral,  Daily  atorvastatin, 80 mg, oral, Nightly  carvedilol, 3.125 mg, oral, BID  empagliflozin, 10 mg, oral, Daily  famotidine, 20 mg, oral, Daily  furosemide, 40 mg, oral, Daily  heparin (porcine), 5,000 Units, subcutaneous, q8h  insulin glargine, 10 Units, subcutaneous, Nightly  insulin lispro, 0-10 Units, subcutaneous, TID with meals  melatonin, 3 mg, oral, Nightly  metFORMIN, 500 mg, oral, Daily with breakfast  predniSONE, 40 mg, oral, Daily  sertraline, 100 mg, oral, Daily  spironolactone, 25 mg, oral, Daily  valsartan, 40 mg, oral, Daily      Continuous medications     PRN medications  PRN medications: acetaminophen, dextrose 10 % in water (D10W), dextrose, glucagon, guaiFENesin, hydrOXYzine HCL, ipratropium-albuteroL                               Assessment/Plan   Principal Problem:    COPD exacerbation (CMS/[facility])  Active Problems:    COPD (chronic obstructive pulmonary disease) (CMS/[facility])  Leonel Yu is a 53-year-old man with a past medical history of HFrEF (NICM due to cocaine, on LifeVest, EF 22% January 2024), CKD3 (b/l cr 1.5), HTN, HLD, substance use disorder (cocaine), tobacco use, COPD  who presented to the ED with shortness of breath, productive cough and leg edema. Exam significant with bibasilar crackles and wheezing with peripheral edema on presentation most consistent with CHF exacerbation secondary to missing doses of GDMT medications along with component of COPD exacerbation. He will be resumed on his home GDMT regimen and treated with 5 days of prednisone for COPD exacerbation.      Updates 2/14:   - Continue current GDMT regimen with lasix 40 mg, valsartan 40 mg, coreg 3.125 mg BID, spironolactone 25 mg daily, [id number] mg daily   - Start metformin at 500 mg daily for now with up titration next week as an outpatient. Continue lantus at home regimen of 10 units lantus and SSI at moderate #2 given elevated BG (also on prednisone currently till 2/16)   - Given poor DM control and A1c of 10.1,  "will consult diabetes educator and prescribe glucometer on discharge.   - Pt seen by SW yesterday - denied inpatient drug rehab, facility and said he would go back to his friend's apartment. He was told of a # to call for transport for appointments      #Decompensated HFrEF  ::EF 22% January 2024  ::[facility] discharge weight 161 lb, admission weight 165 lb  :: S/p lasix 40 mg IV x 2 in ED with improvement noted   Plan:   - Continue home GDMT:   Preload: furosemide 40 mg PO daily   Afterload: Valsartan 40 mg daily   Neurohormonal blockade: carvedilol 3.125 mg BID and  spironolactone 25 mg daily   SGLT2 inhibitor: empagliflozin 10 mg  - Strict I&Os, daily weights   - Outpt cardiology f/up on 3/6/24 with Dr. Bart Liu at [facility] - will coordinate w/ SW to assist with transportation on discharge      #HTN  Plan:   - Continue valsartan 40 mg daily and coreg 3.125 mg BID      #CKDIII  ::Baseline Cr ~1.5, 1.48 on admission now 1.3  Plan:  - Continue to monitor     #T2DM  ::Home regimen: 10 units lantus, SSI with meals  :: A1c 10.1% 2/9/2024 -  says he doesn't have a glucometer and \"guesses\" how much insulin he would need   Plan:  - Start metformin at 500 mg daily for now with up titration next week as an outpatient. Continue lantus at home regimen of 10 units lantus and SSI at moderate #2 given elevated BG (also on prednisone currently till 2/16)   - Given poor DM control and A1c of 10.1, will consult diabetes educator and prescribe glucometer on discharge.  - Continue to monitor blood glucose AC HS and will titrate accordingly      #COPD (current suspicion for exacerbation)   :: No PFTs on file.  - S/p 60 mg prednisone in ED for wheezing  - No fevers and no leukocytosis so no indication for antibiotics at this time   Plan:  - Continue prednisone 40 mg for total 5 days of therapy (2/16 end date)      #Cocaine use   - Utox positive on admission   Plan:   - Counseled on cessation - will provide resources through SW for " rehab   - Pt seen by NOE yesterday - denied inpatient drug rehab, facility and said he would go back to his friend's apartment. He was told of a # to call for transport for appointments     #Anxiety  #Depression  Plan:  - Continue sertraline 100 mg  - Continue hydoxyzine 25 mg q6h PRN     #Prior stroke  #HLD  Plan:  - Continue atorvastatin 80 mg  - Continue ASA 81 mg     #GERD  Plan:  - Continue famotidine 20 mg     F: Cautious given HFrEF  E: K > 4, Mg > 2  N: Cardiac  A: PIV  DVT: SQH  Full code (confirmed on admission)  NOK: Patient states he has no family/friends, unable to provide contact information.           Melani Alaniz MD

## 2024-02-14 NOTE — PROGRESS NOTES
Physical Therapy    Physical Therapy Evaluation    Patient Name: Leonel Yu  MRN: [id number]  Today's Date: 2/14/2024   Time Calculation  Start Time: 1030  Stop Time: 1047  Time Calculation (min): 17 min    Assessment/Plan   PT Assessment  End of Session Communication: Bedside nurse  Assessment Comment: pt is a 52yo M presenting for worsening SOB. Patient is indep for mobility at baseline. tolerated session well on 3L NC but required rest breaks due to decreased endurance. dc rec low  End of Session Patient Position: Bed, 2 rail up, Alarm off, not on at start of session (sitting EOB)  IP OR SWING BED PT PLAN  Inpatient or Swing Bed: Inpatient  PT Plan  Treatment/Interventions: Bed mobility, Gait training, Transfer training, Stair training, Balance training, Strengthening, Endurance training, Range of motion, Therapeutic exercise, Therapeutic activity  PT Plan: Skilled PT  PT Frequency: 2 times per week  PT Discharge Recommendations: Low intensity level of continued care  PT - OK to Discharge: Yes      Subjective   General Visit Information:  General  Reason for Referral: SOB, bilateral LE swelling  Past Medical History Relevant to Rehab: HFr EF (NICM 2' cocaine, reportedly has live vest, EF=20% 1/2024, CKD3, HTN, HLD, substance abuse disorder, tobacco use, COPD, DM2, anxiety, depression, recurrent admissions for CHF exacerbation.  Co-Treatment: OT  Co-Treatment Reason: Seen with OT to maximize pt's functional performance and safety in session.  Prior to Session Communication: Bedside nurse  Patient Position Received: Bed, 2 rail up, Alarm off, not on at start of session  General Comment: pt is supine in bed, pleasant and cooperative with therapy. RN cleared.  Home Living:  Home Living  Home Living Comments: Per EMR, pt is homeless. Patient reports staying between friends houses and plans to dc back to friends. Patient it is a split level home home. Pt denies any DME and reports he lost it.  Prior Level of  Function:  Prior Function Per Pt/Caregiver Report  Level of [place]: Independent with ADLs and functional transfers  ADL Assistance: Independent  Homemaking Assistance:  (reports difficulty doing household tasks ie taking out trash due to SOB, friend requires pt to assist in household duties.)  Ambulatory Assistance: Independent  Precautions:  Precautions  Hearing/Visual Limitations: pt reports visual changes when his blood sugar is high or low due to noncompliance and poor management.  Medical Precautions: Oxygen therapy device and L/min (3L)  Vital Signs:  Vital Signs  Heart Rate: 104 (increased to 112 with mobility)  SpO2:  (90% in RA as pt removed NC; edu to return and 93%. Patient 98% when ambulating with 3L NC)    Objective   Pain:  Pain Assessment  Pain Assessment: 0-10  Pain Score: 0 - No pain  Cognition:  Cognition  Overall Cognitive Status: Within Functional Limits  Orientation Level: Oriented X4    General Assessments:     Activity Tolerance  Endurance: Tolerates 10 - 20 min exercise with multiple rests    Sensation  Light Touch: No apparent deficits  Functional Assessments:  Bed Mobility  Bed Mobility: Yes  Bed Mobility 1  Bed Mobility 1: Supine to sitting, Sitting to supine  Level of Assistance 1: Independent    Transfers  Transfer: Yes  Transfer 1  Transfer From 1: Sit to, Stand to  Transfer to 1: Stand, Sit  Technique 1: Sit to stand, Stand to sit  Transfer Device 1:  (no AD)  Transfer Level of Assistance 1:  (SBA)  Trials/Comments 1: Pt stood from EOB, no AD. also stood from hallway bench steady    Ambulation/Gait Training  Ambulation/Gait Training Performed: Yes  Ambulation/Gait Training 1  Surface 1: Level tile  Device 1: No device  Assistance 1:  (SBA)  Quality of Gait 1:  (decreased blu)  Comments/Distance (ft) 1: pt ambulated into [place] 100' x2 with seated rest break. pt also had a standing rest break about 85' on way back to room. vitals stable on 3L  Extremity/Trunk Assessments:         Outcome Measures:  [facility] Basic Mobility  Turning from your back to your side while in a flat bed without using bedrails: None  Moving from lying on your back to sitting on the side of a flat bed without using bedrails: None  Moving to and from bed to chair (including a wheelchair): A little  Standing up from a chair using your arms (e.g. wheelchair or bedside chair): A little  To walk in hospital room: A little  Climbing 3-5 steps with railing: A lot  Basic Mobility - Total Score: 19    Encounter Problems       Encounter Problems (Active)       Mobility       STG - Patient will ambulate > 500' with vitals stable and RPE < 7 (Progressing)       Start:  02/14/24    Expected End:  02/28/24            STG - Patient will ascend and descend 6 steps with LRAD SBA (Progressing)       Start:  02/14/24    Expected End:  02/28/24               Transfers       STG - Patient will transfer sit to and from stand indep LRAD (Progressing)       Start:  02/14/24    Expected End:  02/28/24                   Education Documentation  Precautions, taught by Inocencia Shah PT at 2/14/2024 11:45 AM.  Learner: Patient  Readiness: Acceptance  Method: Explanation  Response: Verbalizes Understanding  Comment: edu on role of PT, dc planning    Mobility Training, taught by Inocencia Shah PT at 2/14/2024 11:45 AM.  Learner: Patient  Readiness: Acceptance  Method: Explanation  Response: Verbalizes Understanding  Comment: edu on role of PT, dc planning    Education Comments  No comments found.

## 2024-02-15 LAB
ALBUMIN SERPL BCP-MCNC: 3.2 G/DL (ref 3.4–5)
ALBUMIN SERPL BCP-MCNC: 3.2 G/DL (ref 3.4–5)
ALP SERPL-CCNC: 61 U/L (ref 33–120)
ALT SERPL W P-5'-P-CCNC: 31 U/L (ref 10–52)
ANION GAP SERPL CALC-SCNC: 13 MMOL/L (ref 10–20)
ANION GAP SERPL CALC-SCNC: 15 MMOL/L (ref 10–20)
AST SERPL W P-5'-P-CCNC: 17 U/L (ref 9–39)
BASOPHILS # BLD AUTO: 0.02 X10*3/UL (ref 0–0.1)
BASOPHILS NFR BLD AUTO: 0.2 %
BILIRUB SERPL-MCNC: 1 MG/DL (ref 0–1.2)
BNP SERPL-MCNC: 3752 PG/ML (ref 0–99)
BUN SERPL-MCNC: 27 MG/DL (ref 6–23)
BUN SERPL-MCNC: 28 MG/DL (ref 6–23)
CALCIUM SERPL-MCNC: 8.8 MG/DL (ref 8.6–10.6)
CALCIUM SERPL-MCNC: 9.1 MG/DL (ref 8.6–10.6)
CHLORIDE SERPL-SCNC: 102 MMOL/L (ref 98–107)
CHLORIDE SERPL-SCNC: 102 MMOL/L (ref 98–107)
CO2 SERPL-SCNC: 24 MMOL/L (ref 21–32)
CO2 SERPL-SCNC: 29 MMOL/L (ref 21–32)
CREAT SERPL-MCNC: 1.19 MG/DL (ref 0.5–1.3)
CREAT SERPL-MCNC: 1.4 MG/DL (ref 0.5–1.3)
EGFRCR SERPLBLD CKD-EPI 2021: 60 ML/MIN/1.73M*2
EGFRCR SERPLBLD CKD-EPI 2021: 73 ML/MIN/1.73M*2
EOSINOPHIL # BLD AUTO: 0.02 X10*3/UL (ref 0–0.7)
EOSINOPHIL NFR BLD AUTO: 0.2 %
ERYTHROCYTE [DISTWIDTH] IN BLOOD BY AUTOMATED COUNT: 16.3 % (ref 11.5–14.5)
GLUCOSE BLD MANUAL STRIP-MCNC: 173 MG/DL (ref 74–99)
GLUCOSE BLD MANUAL STRIP-MCNC: 231 MG/DL (ref 74–99)
GLUCOSE BLD MANUAL STRIP-MCNC: 433 MG/DL (ref 74–99)
GLUCOSE BLD MANUAL STRIP-MCNC: 525 MG/DL (ref 74–99)
GLUCOSE BLD MANUAL STRIP-MCNC: 80 MG/DL (ref 74–99)
GLUCOSE SERPL-MCNC: 170 MG/DL (ref 74–99)
GLUCOSE SERPL-MCNC: 67 MG/DL (ref 74–99)
HCT VFR BLD AUTO: 48.3 % (ref 41–52)
HGB BLD-MCNC: 16.4 G/DL (ref 13.5–17.5)
IMM GRANULOCYTES # BLD AUTO: 0.03 X10*3/UL (ref 0–0.7)
IMM GRANULOCYTES NFR BLD AUTO: 0.3 % (ref 0–0.9)
LYMPHOCYTES # BLD AUTO: 1.09 X10*3/UL (ref 1.2–4.8)
LYMPHOCYTES NFR BLD AUTO: 10.7 %
MAGNESIUM SERPL-MCNC: 2.01 MG/DL (ref 1.6–2.4)
MCH RBC QN AUTO: 32.2 PG (ref 26–34)
MCHC RBC AUTO-ENTMCNC: 34 G/DL (ref 32–36)
MCV RBC AUTO: 95 FL (ref 80–100)
MONOCYTES # BLD AUTO: 0.74 X10*3/UL (ref 0.1–1)
MONOCYTES NFR BLD AUTO: 7.2 %
NEUTROPHILS # BLD AUTO: 8.33 X10*3/UL (ref 1.2–7.7)
NEUTROPHILS NFR BLD AUTO: 81.4 %
NRBC BLD-RTO: 0 /100 WBCS (ref 0–0)
PHOSPHATE SERPL-MCNC: 3.2 MG/DL (ref 2.5–4.9)
PHOSPHATE SERPL-MCNC: 4.1 MG/DL (ref 2.5–4.9)
PLATELET # BLD AUTO: 221 X10*3/UL (ref 150–450)
POTASSIUM SERPL-SCNC: 4.1 MMOL/L (ref 3.5–5.3)
POTASSIUM SERPL-SCNC: 4.8 MMOL/L (ref 3.5–5.3)
PROT SERPL-MCNC: 6.7 G/DL (ref 6.4–8.2)
RBC # BLD AUTO: 5.1 X10*6/UL (ref 4.5–5.9)
SODIUM SERPL-SCNC: 136 MMOL/L (ref 136–145)
SODIUM SERPL-SCNC: 140 MMOL/L (ref 136–145)
WBC # BLD AUTO: 10.2 X10*3/UL (ref 4.4–11.3)

## 2024-02-15 PROCEDURE — 36415 COLL VENOUS BLD VENIPUNCTURE: CPT

## 2024-02-15 PROCEDURE — 94640 AIRWAY INHALATION TREATMENT: CPT

## 2024-02-15 PROCEDURE — 2500000002 HC RX 250 W HCPCS SELF ADMINISTERED DRUGS (ALT 637 FOR MEDICARE OP, ALT 636 FOR OP/ED): Mod: SE | Performed by: PHARMACIST

## 2024-02-15 PROCEDURE — 2500000001 HC RX 250 WO HCPCS SELF ADMINISTERED DRUGS (ALT 637 FOR MEDICARE OP): Mod: SE

## 2024-02-15 PROCEDURE — 80069 RENAL FUNCTION PANEL: CPT | Mod: CCI

## 2024-02-15 PROCEDURE — 2500000002 HC RX 250 W HCPCS SELF ADMINISTERED DRUGS (ALT 637 FOR MEDICARE OP, ALT 636 FOR OP/ED)

## 2024-02-15 PROCEDURE — 2500000004 HC RX 250 GENERAL PHARMACY W/ HCPCS (ALT 636 FOR OP/ED): Mod: SE

## 2024-02-15 PROCEDURE — 2500000002 HC RX 250 W HCPCS SELF ADMINISTERED DRUGS (ALT 637 FOR MEDICARE OP, ALT 636 FOR OP/ED): Mod: SE

## 2024-02-15 PROCEDURE — 99233 SBSQ HOSP IP/OBS HIGH 50: CPT

## 2024-02-15 PROCEDURE — 83735 ASSAY OF MAGNESIUM: CPT

## 2024-02-15 PROCEDURE — 85025 COMPLETE CBC W/AUTO DIFF WBC: CPT

## 2024-02-15 PROCEDURE — 83880 ASSAY OF NATRIURETIC PEPTIDE: CPT

## 2024-02-15 PROCEDURE — 1100000001 HC PRIVATE ROOM DAILY

## 2024-02-15 PROCEDURE — 84100 ASSAY OF PHOSPHORUS: CPT

## 2024-02-15 PROCEDURE — 80053 COMPREHEN METABOLIC PANEL: CPT

## 2024-02-15 PROCEDURE — 82947 ASSAY GLUCOSE BLOOD QUANT: CPT

## 2024-02-15 RX ORDER — IPRATROPIUM BROMIDE AND ALBUTEROL SULFATE 2.5; .5 MG/3ML; MG/3ML
3 SOLUTION RESPIRATORY (INHALATION)
Status: DISCONTINUED | OUTPATIENT
Start: 2024-02-15 | End: 2024-02-16

## 2024-02-15 RX ORDER — INSULIN LISPRO 100 [IU]/ML
2 INJECTION, SOLUTION INTRAVENOUS; SUBCUTANEOUS ONCE
Status: COMPLETED | OUTPATIENT
Start: 2024-02-15 | End: 2024-02-15

## 2024-02-15 RX ORDER — FUROSEMIDE 40 MG/1
60 TABLET ORAL DAILY
Status: DISCONTINUED | OUTPATIENT
Start: 2024-02-15 | End: 2024-02-16 | Stop reason: HOSPADM

## 2024-02-15 RX ADMIN — CARVEDILOL 3.12 MG: 3.12 TABLET, FILM COATED ORAL at 21:55

## 2024-02-15 RX ADMIN — HEPARIN SODIUM 5000 UNITS: 5000 INJECTION INTRAVENOUS; SUBCUTANEOUS at 21:55

## 2024-02-15 RX ADMIN — IPRATROPIUM BROMIDE AND ALBUTEROL SULFATE 3 ML: .5; 3 SOLUTION RESPIRATORY (INHALATION) at 15:21

## 2024-02-15 RX ADMIN — SERTRALINE HYDROCHLORIDE 100 MG: 50 TABLET ORAL at 09:09

## 2024-02-15 RX ADMIN — INSULIN GLARGINE 10 UNITS: 100 INJECTION, SOLUTION SUBCUTANEOUS at 21:53

## 2024-02-15 RX ADMIN — EMPAGLIFLOZIN 10 MG: 10 TABLET, FILM COATED ORAL at 09:10

## 2024-02-15 RX ADMIN — CARVEDILOL 3.12 MG: 3.12 TABLET, FILM COATED ORAL at 09:08

## 2024-02-15 RX ADMIN — IPRATROPIUM BROMIDE AND ALBUTEROL SULFATE 3 ML: .5; 3 SOLUTION RESPIRATORY (INHALATION) at 23:25

## 2024-02-15 RX ADMIN — INSULIN LISPRO 2 UNITS: 100 INJECTION, SOLUTION INTRAVENOUS; SUBCUTANEOUS at 17:21

## 2024-02-15 RX ADMIN — ATORVASTATIN CALCIUM 80 MG: 20 TABLET, FILM COATED ORAL at 21:55

## 2024-02-15 RX ADMIN — HEPARIN SODIUM 5000 UNITS: 5000 INJECTION INTRAVENOUS; SUBCUTANEOUS at 15:03

## 2024-02-15 RX ADMIN — FAMOTIDINE 20 MG: 40 TABLET ORAL at 10:47

## 2024-02-15 RX ADMIN — MELATONIN 3 MG: 3 TAB ORAL at 21:55

## 2024-02-15 RX ADMIN — PREDNISONE 40 MG: 20 TABLET ORAL at 09:09

## 2024-02-15 RX ADMIN — INSULIN LISPRO 2 UNITS: 100 INJECTION, SOLUTION INTRAVENOUS; SUBCUTANEOUS at 13:27

## 2024-02-15 RX ADMIN — FUROSEMIDE 60 MG: 40 TABLET ORAL at 09:08

## 2024-02-15 RX ADMIN — METFORMIN HYDROCHLORIDE 500 MG: 500 TABLET ORAL at 09:20

## 2024-02-15 RX ADMIN — HEPARIN SODIUM 5000 UNITS: 5000 INJECTION INTRAVENOUS; SUBCUTANEOUS at 06:04

## 2024-02-15 RX ADMIN — ASPIRIN 81 MG CHEWABLE TABLET 81 MG: 81 TABLET CHEWABLE at 09:09

## 2024-02-15 RX ADMIN — VALSARTAN 40 MG: 80 TABLET, FILM COATED ORAL at 09:20

## 2024-02-15 RX ADMIN — SPIRONOLACTONE 25 MG: 25 TABLET, FILM COATED ORAL at 09:08

## 2024-02-15 RX ADMIN — INSULIN LISPRO 10 UNITS: 100 INJECTION, SOLUTION INTRAVENOUS; SUBCUTANEOUS at 13:27

## 2024-02-15 ASSESSMENT — COGNITIVE AND FUNCTIONAL STATUS - GENERAL
CLIMB 3 TO 5 STEPS WITH RAILING: A LOT
WALKING IN HOSPITAL ROOM: A LITTLE
STANDING UP FROM CHAIR USING ARMS: A LITTLE
DRESSING REGULAR LOWER BODY CLOTHING: A LITTLE
CLIMB 3 TO 5 STEPS WITH RAILING: A LOT
PERSONAL GROOMING: A LITTLE
MOBILITY SCORE: 21
DRESSING REGULAR UPPER BODY CLOTHING: A LITTLE
DRESSING REGULAR LOWER BODY CLOTHING: A LITTLE
MOVING TO AND FROM BED TO CHAIR: A LITTLE
HELP NEEDED FOR BATHING: A LITTLE
DRESSING REGULAR LOWER BODY CLOTHING: A LITTLE
DAILY ACTIVITIY SCORE: 23
TOILETING: A LITTLE
CLIMB 3 TO 5 STEPS WITH RAILING: A LOT
WALKING IN HOSPITAL ROOM: A LITTLE
WALKING IN HOSPITAL ROOM: A LITTLE
HELP NEEDED FOR BATHING: A LITTLE
MOBILITY SCORE: 21
DAILY ACTIVITIY SCORE: 22

## 2024-02-15 ASSESSMENT — PAIN SCALES - GENERAL: PAINLEVEL_OUTOF10: 0 - NO PAIN

## 2024-02-15 ASSESSMENT — PAIN - FUNCTIONAL ASSESSMENT: PAIN_FUNCTIONAL_ASSESSMENT: 0-10

## 2024-02-15 NOTE — PROGRESS NOTES
"Leonel Yu is a 53 y.o. male on day 1 of admission presenting with COPD exacerbation (CMS/HCC).    Subjective   NAOE. Pt continues to endorse dyspnea with exertion - had to stop walking with PT yesterday due to dyspnea. He was mildly dyspneic with conversation. Currently on 2L NC but saturating > 94% - goal 88-92% given COPD so will wean off. Denied any chest pain or palpitations. Having regular BM.         Objective     General: Appears stated age, poor dentition, in no acute distress. Sleepy during exam.   Head: Normocephalic, atraumatic.  Eyes: No icterus, EOMI.   ENT: No nasal discharge, moist mucus membranes.  Cardiovascular: Regular rate/rhythm, no murmurs, no leg edema.   Respiratory: Diffuse wheezing bilaterally with mild bibasilar crackles at bases, on 2L NC and somewhat dyspneic with conversation  Abdominal: Soft, non-tender to palpation, normal bowel sounds, no masses or organomegaly.   Skin: Warm and dry, no bruises or rashes noted.   Neurologic: Alert & oriented x 3, sensation intact across all extremities, 5/5 strength in bilateral upper and lower extremities.    Psychiatric: Cooperative        Last Recorded Vitals  Blood pressure 117/79, pulse 59, temperature 36.4 °C (97.5 °F), resp. rate 18, height 1.702 m (5' 7\"), weight 77.8 kg (171 lb 8.3 oz), SpO2 96 %.  Intake/Output last 3 Shifts:  I/O last 3 completed shifts:  In: 480 (6.2 mL/kg) [P.O.:480]  Out: 1259 (16.2 mL/kg) [Urine:1250 (0.4 mL/kg/hr); Emesis/NG output:9]  Weight: 77.5 kg     Relevant Results  Results for orders placed or performed during the hospital encounter of 02/12/24 (from the past 24 hour(s))   POCT GLUCOSE   Result Value Ref Range    POCT Glucose 456 (H) 74 - 99 mg/dL   POCT GLUCOSE   Result Value Ref Range    POCT Glucose 256 (H) 74 - 99 mg/dL   POCT GLUCOSE   Result Value Ref Range    POCT Glucose 241 (H) 74 - 99 mg/dL   POCT GLUCOSE   Result Value Ref Range    POCT Glucose 300 (H) 74 - 99 mg/dL   POCT GLUCOSE   Result " Value Ref Range    POCT Glucose 202 (H) 74 - 99 mg/dL   POCT GLUCOSE   Result Value Ref Range    POCT Glucose 80 74 - 99 mg/dL     Scheduled medications  aspirin, 81 mg, oral, Daily  atorvastatin, 80 mg, oral, Nightly  carvedilol, 3.125 mg, oral, BID  empagliflozin, 10 mg, oral, Daily  famotidine, 20 mg, oral, Daily  furosemide, 60 mg, oral, Daily  heparin (porcine), 5,000 Units, subcutaneous, q8h  insulin glargine, 10 Units, subcutaneous, Nightly  insulin lispro, 0-10 Units, subcutaneous, TID with meals  melatonin, 3 mg, oral, Nightly  metFORMIN, 500 mg, oral, Daily with breakfast  predniSONE, 40 mg, oral, Daily  sertraline, 100 mg, oral, Daily  spironolactone, 25 mg, oral, Daily  valsartan, 40 mg, oral, Daily      Continuous medications     PRN medications  PRN medications: acetaminophen, dextrose 10 % in water (D10W), dextrose, glucagon, guaiFENesin, hydrOXYzine HCL, ipratropium-albuteroL                               Assessment/Plan   Principal Problem:    COPD exacerbation (CMS/[facility])  Active Problems:    COPD (chronic obstructive pulmonary disease) (CMS/[facility])  Leonel Yu is a 53-year-old man with a past medical history of HFrEF (NICM due to cocaine, on LifeVest, EF 22% January 2024), CKD3 (b/l cr 1.5), HTN, HLD, substance use disorder (cocaine), tobacco use, COPD  who presented to the ED with shortness of breath, productive cough and leg edema. Exam significant with bibasilar crackles and wheezing with peripheral edema on presentation most consistent with CHF exacerbation secondary to missing doses of GDMT medications along with component of COPD exacerbation. He will be resumed on his home GDMT regimen and treated with 5 days of prednisone for COPD exacerbation.      Updates 2/15:   - Increase lasix to 60 mg PO given sub-optimal urine output and persistent dyspnea with exertion - will recheck in afternoon - if not net neg 1L - will diurese additionally as needed   - Repeat BNP   - Continue current GDMT  "regimen with valsartan 40 mg, coreg 3.125 mg BID, spironolactone 25 mg daily, jardiance 10 mg daily   - Continue current DM regimen with metformin 500 mg daily, lantus 10 units daily, moderate SSI - pt is also currently on prednisone for COPD exacerbation till 2/16  - Appreciate DM educator recs: will prescribe glucometer and ensure PCP f/up   - COPD: prednisone till 2/16 and schedule duonebs q6h. Pulm f/up on dc        #Decompensated HFrEF (EF 22%)  ::EF 22% January 2024  ::[facility] discharge weight 161 lb, admission weight 165 lb  :: S/p lasix 40 mg IV x 2 in ED with improvement noted   Plan:   - Continue home GDMT:   Preload: INCREASE to lasix 60 mg daily and then recheck in 6 hours for additional diuresis as needed   Afterload: Valsartan 40 mg daily   Neurohormonal blockade: carvedilol 3.125 mg BID and  spironolactone 25 mg daily   SGLT2 inhibitor: empagliflozin 10 mg  - Strict I&Os, daily weights   - Repeat BNP   - Outpt cardiology f/up on 3/6/24 with Dr. Bart Liu at [facility] -pt to call [facility] for setting up transport      #HTN  Plan:   - Continue valsartan 40 mg daily and coreg 3.125 mg BID      #CKDIII  ::Baseline Cr ~1.5, currently stable   Plan:  - Continue to monitor     #T2DM  ::Home regimen: 10 units lantus, SSI with meals  :: A1c 10.1% 2/9/2024 -  says he doesn't have a glucometer and \"guesses\" how much insulin he would need   Plan:  - Continue current DM regimen with metformin 500 mg daily, lantus 10 units daily, moderate SSI - pt is also currently on prednisone for COPD exacerbation till 2/16  - Continue to monitor blood glucose AC HS and will titrate accordingly      #COPD (current suspicion for exacerbation)   :: No PFTs on file.  - S/p 60 mg prednisone in ED for wheezing  - No fevers and no leukocytosis so no indication for antibiotics at this time   Plan:  - Continue prednisone 40 mg for total 5 days of therapy (2/16 end date)   - Scheduled duonebs q6h   - Pulm f/up on discharge    "   #Cocaine use   - Utox positive on admission   Plan:   - Counseled on cessation - will provide resources through SW for rehab   - Pt seen by SW yesterday - denied inpatient drug rehab, facility and said he would go back to his friend's apartment. He was told of a # to call for transport for appointments     #Anxiety  #Depression  Plan:  - Continue sertraline 100 mg  - Continue hydoxyzine 25 mg q6h PRN     #Prior stroke  #HLD  Plan:  - Continue atorvastatin 80 mg  - Continue ASA 81 mg     #GERD  Plan:  - Continue famotidine 20 mg     F: Cautious given HFrEF  E: K > 4, Mg > 2  N: Cardiac  A: PIV  DVT: SQH  Full code (confirmed on admission)  NOK: Patient states he has no family/friends, unable to provide contact information.                 Melani Alaniz MD

## 2024-02-15 NOTE — DISCHARGE INSTRUCTIONS
Mr. Yu,     You were admitted to the hospital for shortness of breath and leg swelling. You were started back on your home heart failure medications and your water pill after which your symptoms improved. We also gave you steroids and inhalers for your COPD. You were also seen by the diabetes educator while in the hospital for adjusting your insulin regimen and started on a new diabetes medication pill. We also had our  team see you and you are able to use transportation services provided through [employer] - call [phone] 2 days prior to your appointments     You have the following appointments scheduled for you:   Cardiology with Dr. Bart Liu 3/6/2024 at 8:30 AM   Location: [facility] [address] [place] [address]  2. Primary Care with Dr. Katia Pate 2/22/2024 at 1:00 PM   Location: [facility]  [address]  [facility] [address] [phone]   3. Pulmonology with Paulette Fernando, 3/21/2024 at 10:40 am   Location: [facility]  [address]  [facility] [place] [facility] [address] [phone]    We made some changes to your medications:   Heart Failure: Start taking lasix (water pill) 40 mg daily. Continue the rest of your medications: Valsartan 40 mg daily, Carvedilol 3.125 mg twice daily, Spironolactone 25 mg daily, Jardiance 10 mg daily   Diabetes: Start taking Metformin 500 mg daily. Continue the rest of your medications: Glargine (Lantus) 10 units every night, Lispro Sliding Scale based on your blood sugars with meals, and Jardiance 10 mg daily   COPD: Continue your home inhalers     We are also sending you home with a glucose meter and additional supplies - please make sure you check your blood sugar before each meal.

## 2024-02-15 NOTE — SIGNIFICANT EVENT
SKYLA   02/15/24 1610   Onset Documentation   Pager Time [id number]   Arrival Time 1635   Event End Time 1650   Primary Reason for Call Radar auto page     RADAR page auto generated from VS -see documented VS. Upon arrival pt sitting at the side of the bed. Intermittently SOB-almost appears as having a hard time catching his breath. Pt being weaned with O2- now on 1L NC with fluctuating pulse ox 92-96%. Spoke with bedside nurse and aware. Pt ok to remain on the floor for continued monitoring- and will call with any concerns.

## 2024-02-15 NOTE — CARE PLAN
The patient's goals for the shift include      The clinical goals for the shift include patient will remain HDS throughout shift    Over the shift, the patient has to be reminded to keep oxygen on and that he can't always have the food he requests throughout the shift. Systems remained stable, no c/o pain/discomfort, remained free from injury. Will continue to monitor.

## 2024-02-15 NOTE — PROGRESS NOTES
Transitional Care Coordination Progress Note:  Patient discussed during interdisciplinary rounds.   Team members present: Abhijeet Prieto RN TCC, [facility] Team, Nurse Manager  Plan per Medical/Surgical team: Diuresis, PT/OT, prednisone stop date 02/16, nivia diabetic educator  Payer: [employer] [facility]  Status: inpatient  Discharge disposition: Home no needs  Potential Barriers: None  ADOD: 02/15 vs 02/16  This TCC was notified by Diabetic Educator Lacy that patient will require diabetic supplies on discharge. This TCC notified team of patients need for diabetic supplies. This TCC will continue to follow.  Abhijeet Prieto RN Transitional Care Coordinator [phone]

## 2024-02-15 NOTE — SIGNIFICANT EVENT
"Diabetes Education Note  Mr. Yu is sitting at bedside.  He appears more SOB today and states has been wearing O2 \"all the time\"    We reviewed his recent BG values.  Reviewed that he is receiving prednisone daily with last dose tomorrow and reason for elevated BG.  He is comfortable with insulin regimen.  Insulin chart written out and we practiced BG scenarios for SSI at meal time.  He is able to state that his oral meds will be Metformin and jardiance.    During this visit he called down to order lunch.  He is trying to select appropriate foods based on carb count.  Will follow as needed while inpatient.  He is agreeable to this plan.  Time spent:  20 mins.  "

## 2024-02-15 NOTE — CARE PLAN
COPD Follow up Education    Review:  Medication with spacer use: Yes  Triggers and ways to avoid triggers: Yes    Recognize having trouble and need to call the doctor: Yes    View the CAMILA's and book: Encourage use             It is recommended that the patient should have a follow-up appointment with the pulmonary team within two weeks after discharge. The patient may benefit from pulmonary rehabilitation and a smoking cessation program. Additionally, PFTs were not noted in the chart. Therefore, the patient should be encouraged to follow up with the pulmonary team

## 2024-02-16 ENCOUNTER — PHARMACY VISIT (OUTPATIENT)
Dept: PHARMACY | Facility: CLINIC | Age: 54
End: 2024-02-16
Payer: MEDICAID

## 2024-02-16 VITALS
DIASTOLIC BLOOD PRESSURE: 88 MMHG | HEIGHT: 67 IN | RESPIRATION RATE: 19 BRPM | WEIGHT: 169.75 LBS | HEART RATE: 81 BPM | TEMPERATURE: 97.4 F | SYSTOLIC BLOOD PRESSURE: 128 MMHG | BODY MASS INDEX: 26.64 KG/M2 | OXYGEN SATURATION: 96 %

## 2024-02-16 LAB
ALBUMIN SERPL BCP-MCNC: 3.2 G/DL (ref 3.4–5)
ALP SERPL-CCNC: 63 U/L (ref 33–120)
ALT SERPL W P-5'-P-CCNC: 23 U/L (ref 10–52)
ANION GAP SERPL CALC-SCNC: 9 MMOL/L (ref 10–20)
AST SERPL W P-5'-P-CCNC: 13 U/L (ref 9–39)
BASOPHILS # BLD AUTO: 0.03 X10*3/UL (ref 0–0.1)
BASOPHILS NFR BLD AUTO: 0.3 %
BILIRUB SERPL-MCNC: 0.9 MG/DL (ref 0–1.2)
BNP SERPL-MCNC: 2900 PG/ML (ref 0–99)
BUN SERPL-MCNC: 23 MG/DL (ref 6–23)
CALCIUM SERPL-MCNC: 8.9 MG/DL (ref 8.6–10.6)
CHLORIDE SERPL-SCNC: 101 MMOL/L (ref 98–107)
CO2 SERPL-SCNC: 31 MMOL/L (ref 21–32)
CREAT SERPL-MCNC: 1.18 MG/DL (ref 0.5–1.3)
EGFRCR SERPLBLD CKD-EPI 2021: 73 ML/MIN/1.73M*2
EOSINOPHIL # BLD AUTO: 0.02 X10*3/UL (ref 0–0.7)
EOSINOPHIL NFR BLD AUTO: 0.2 %
ERYTHROCYTE [DISTWIDTH] IN BLOOD BY AUTOMATED COUNT: 16.2 % (ref 11.5–14.5)
GLUCOSE BLD MANUAL STRIP-MCNC: 108 MG/DL (ref 74–99)
GLUCOSE BLD MANUAL STRIP-MCNC: 279 MG/DL (ref 74–99)
GLUCOSE SERPL-MCNC: 79 MG/DL (ref 74–99)
HCT VFR BLD AUTO: 47.1 % (ref 41–52)
HGB BLD-MCNC: 16.2 G/DL (ref 13.5–17.5)
IMM GRANULOCYTES # BLD AUTO: 0.05 X10*3/UL (ref 0–0.7)
IMM GRANULOCYTES NFR BLD AUTO: 0.4 % (ref 0–0.9)
LYMPHOCYTES # BLD AUTO: 1.1 X10*3/UL (ref 1.2–4.8)
LYMPHOCYTES NFR BLD AUTO: 9.4 %
MAGNESIUM SERPL-MCNC: 1.95 MG/DL (ref 1.6–2.4)
MCH RBC QN AUTO: 32.3 PG (ref 26–34)
MCHC RBC AUTO-ENTMCNC: 34.4 G/DL (ref 32–36)
MCV RBC AUTO: 94 FL (ref 80–100)
MONOCYTES # BLD AUTO: 0.81 X10*3/UL (ref 0.1–1)
MONOCYTES NFR BLD AUTO: 6.9 %
NEUTROPHILS # BLD AUTO: 9.71 X10*3/UL (ref 1.2–7.7)
NEUTROPHILS NFR BLD AUTO: 82.8 %
NRBC BLD-RTO: 0 /100 WBCS (ref 0–0)
PHOSPHATE SERPL-MCNC: 3.2 MG/DL (ref 2.5–4.9)
PLATELET # BLD AUTO: 216 X10*3/UL (ref 150–450)
POTASSIUM SERPL-SCNC: 4.2 MMOL/L (ref 3.5–5.3)
PROT SERPL-MCNC: 6.6 G/DL (ref 6.4–8.2)
RBC # BLD AUTO: 5.02 X10*6/UL (ref 4.5–5.9)
SODIUM SERPL-SCNC: 137 MMOL/L (ref 136–145)
WBC # BLD AUTO: 11.7 X10*3/UL (ref 4.4–11.3)

## 2024-02-16 PROCEDURE — RXMED WILLOW AMBULATORY MEDICATION CHARGE

## 2024-02-16 PROCEDURE — 83880 ASSAY OF NATRIURETIC PEPTIDE: CPT

## 2024-02-16 PROCEDURE — 85025 COMPLETE CBC W/AUTO DIFF WBC: CPT

## 2024-02-16 PROCEDURE — 36415 COLL VENOUS BLD VENIPUNCTURE: CPT

## 2024-02-16 PROCEDURE — 82947 ASSAY GLUCOSE BLOOD QUANT: CPT

## 2024-02-16 PROCEDURE — 84100 ASSAY OF PHOSPHORUS: CPT

## 2024-02-16 PROCEDURE — 2500000002 HC RX 250 W HCPCS SELF ADMINISTERED DRUGS (ALT 637 FOR MEDICARE OP, ALT 636 FOR OP/ED)

## 2024-02-16 PROCEDURE — 2500000001 HC RX 250 WO HCPCS SELF ADMINISTERED DRUGS (ALT 637 FOR MEDICARE OP)

## 2024-02-16 PROCEDURE — 80053 COMPREHEN METABOLIC PANEL: CPT

## 2024-02-16 PROCEDURE — 2500000004 HC RX 250 GENERAL PHARMACY W/ HCPCS (ALT 636 FOR OP/ED)

## 2024-02-16 PROCEDURE — 83735 ASSAY OF MAGNESIUM: CPT

## 2024-02-16 PROCEDURE — 2500000002 HC RX 250 W HCPCS SELF ADMINISTERED DRUGS (ALT 637 FOR MEDICARE OP, ALT 636 FOR OP/ED): Performed by: PHARMACIST

## 2024-02-16 PROCEDURE — 99239 HOSP IP/OBS DSCHRG MGMT >30: CPT | Performed by: INTERNAL MEDICINE

## 2024-02-16 RX ORDER — VALSARTAN 40 MG/1
40 TABLET ORAL DAILY
Qty: 30 TABLET | Refills: 1 | Status: SHIPPED | OUTPATIENT
Start: 2024-02-17 | End: 2024-04-17

## 2024-02-16 RX ORDER — ALBUTEROL SULFATE 0.83 MG/ML
2.5 SOLUTION RESPIRATORY (INHALATION) EVERY 6 HOURS PRN
Status: CANCELLED | OUTPATIENT
Start: 2024-02-16

## 2024-02-16 RX ORDER — LANCETS 33 GAUGE
1 EACH MISCELLANEOUS 4 TIMES DAILY
Qty: 200 EACH | Refills: 0 | Status: SHIPPED | OUTPATIENT
Start: 2024-02-16

## 2024-02-16 RX ORDER — FUROSEMIDE 40 MG/1
40 TABLET ORAL DAILY
Qty: 30 TABLET | Refills: 1 | Status: SHIPPED | OUTPATIENT
Start: 2024-02-16 | End: 2024-04-16

## 2024-02-16 RX ORDER — NAPROXEN SODIUM 220 MG/1
81 TABLET, FILM COATED ORAL DAILY
Qty: 30 TABLET | Refills: 1 | Status: SHIPPED | OUTPATIENT
Start: 2024-02-17 | End: 2024-04-17

## 2024-02-16 RX ORDER — DEXTROSE 4 G
TABLET,CHEWABLE ORAL
Qty: 1 EACH | Refills: 0 | Status: SHIPPED | OUTPATIENT
Start: 2024-02-16

## 2024-02-16 RX ORDER — METFORMIN HYDROCHLORIDE 500 MG/1
500 TABLET, EXTENDED RELEASE ORAL
Qty: 30 TABLET | Refills: 1 | Status: SHIPPED | OUTPATIENT
Start: 2024-02-16 | End: 2024-04-16

## 2024-02-16 RX ORDER — INSULIN GLARGINE 100 [IU]/ML
10 INJECTION, SOLUTION SUBCUTANEOUS NIGHTLY
Qty: 15 ML | Refills: 0 | Status: SHIPPED | OUTPATIENT
Start: 2024-02-16

## 2024-02-16 RX ORDER — PEN NEEDLE, DIABETIC 29 G X1/2"
NEEDLE, DISPOSABLE MISCELLANEOUS
Qty: 100 EACH | Refills: 11 | Status: SHIPPED | OUTPATIENT
Start: 2024-02-16 | End: 2025-02-15

## 2024-02-16 RX ORDER — ACETAMINOPHEN 500 MG
5 TABLET ORAL NIGHTLY PRN
Qty: 30 TABLET | Refills: 1 | Status: SHIPPED | OUTPATIENT
Start: 2024-02-16

## 2024-02-16 RX ORDER — HYDROXYZINE HYDROCHLORIDE 25 MG/1
25 TABLET, FILM COATED ORAL EVERY 6 HOURS PRN
Qty: 30 TABLET | Refills: 0 | Status: SHIPPED | OUTPATIENT
Start: 2024-02-16 | End: 2024-03-17

## 2024-02-16 RX ORDER — IPRATROPIUM BROMIDE AND ALBUTEROL SULFATE 2.5; .5 MG/3ML; MG/3ML
3 SOLUTION RESPIRATORY (INHALATION)
Status: DISCONTINUED | OUTPATIENT
Start: 2024-02-16 | End: 2024-02-16 | Stop reason: HOSPADM

## 2024-02-16 RX ORDER — FAMOTIDINE 20 MG/1
20 TABLET, FILM COATED ORAL DAILY
Qty: 30 TABLET | Refills: 0 | Status: SHIPPED | OUTPATIENT
Start: 2024-02-17 | End: 2024-03-18

## 2024-02-16 RX ORDER — INSULIN LISPRO 100 [IU]/ML
0-10 INJECTION, SOLUTION INTRAVENOUS; SUBCUTANEOUS
Qty: 15 ML | Refills: 0 | Status: SHIPPED | OUTPATIENT
Start: 2024-02-16

## 2024-02-16 RX ORDER — CARVEDILOL 3.12 MG/1
3.12 TABLET ORAL 2 TIMES DAILY
Qty: 60 TABLET | Refills: 1 | Status: SHIPPED | OUTPATIENT
Start: 2024-02-16 | End: 2024-04-16

## 2024-02-16 RX ADMIN — INSULIN LISPRO 6 UNITS: 100 INJECTION, SOLUTION INTRAVENOUS; SUBCUTANEOUS at 13:09

## 2024-02-16 RX ADMIN — VALSARTAN 40 MG: 80 TABLET, FILM COATED ORAL at 09:00

## 2024-02-16 RX ADMIN — PREDNISONE 40 MG: 20 TABLET ORAL at 09:00

## 2024-02-16 RX ADMIN — SERTRALINE HYDROCHLORIDE 100 MG: 50 TABLET ORAL at 09:00

## 2024-02-16 RX ADMIN — ACETAMINOPHEN 650 MG: 325 TABLET ORAL at 03:01

## 2024-02-16 RX ADMIN — FUROSEMIDE 60 MG: 40 TABLET ORAL at 09:00

## 2024-02-16 RX ADMIN — CARVEDILOL 3.12 MG: 3.12 TABLET, FILM COATED ORAL at 09:00

## 2024-02-16 RX ADMIN — SPIRONOLACTONE 25 MG: 25 TABLET, FILM COATED ORAL at 09:00

## 2024-02-16 RX ADMIN — METFORMIN HYDROCHLORIDE 500 MG: 500 TABLET ORAL at 08:00

## 2024-02-16 RX ADMIN — FAMOTIDINE 20 MG: 40 TABLET ORAL at 09:00

## 2024-02-16 RX ADMIN — ASPIRIN 81 MG CHEWABLE TABLET 81 MG: 81 TABLET CHEWABLE at 09:00

## 2024-02-16 RX ADMIN — EMPAGLIFLOZIN 10 MG: 10 TABLET, FILM COATED ORAL at 09:00

## 2024-02-16 ASSESSMENT — COGNITIVE AND FUNCTIONAL STATUS - GENERAL
MOBILITY SCORE: 23
DRESSING REGULAR LOWER BODY CLOTHING: A LITTLE
HELP NEEDED FOR BATHING: A LITTLE
CLIMB 3 TO 5 STEPS WITH RAILING: A LITTLE
DAILY ACTIVITIY SCORE: 24
WALKING IN HOSPITAL ROOM: A LITTLE
CLIMB 3 TO 5 STEPS WITH RAILING: A LOT

## 2024-02-16 ASSESSMENT — PAIN SCALES - GENERAL
PAINLEVEL_OUTOF10: 7
PAINLEVEL_OUTOF10: 0 - NO PAIN

## 2024-02-16 ASSESSMENT — PAIN - FUNCTIONAL ASSESSMENT
PAIN_FUNCTIONAL_ASSESSMENT: 0-10

## 2024-02-16 ASSESSMENT — PAIN DESCRIPTION - LOCATION: LOCATION: MOUTH

## 2024-02-16 NOTE — CARE PLAN
The patient's goals for the shift include      The clinical goals for the shift include patient will remain HDS throughout shift    Over the shift, the patient's sats remained WNL most of the shift. Desats when sleeping and with activity, no other system issues throughout shift. c/o of toothache, given tylenol per request . Will continue to monitor

## 2024-02-16 NOTE — NURSING NOTE
Pt given discharge instructions and informed about new prescriptions which were delivered to bedside. Pt instructed to call transport through insurance x2 days before scheduled follow-ups.

## 2024-02-16 NOTE — NURSING NOTE
Diabetes Education Note  Mr. Yu is sitting out of bed.  States he is feeling much better than yesterday.  States he may be discharged home today as well.  He is comfortable with his insulin regimen.  We reviewed the action of Jardiance again and he verbalizes understanding.  Time spent:  15 mins.

## 2024-02-17 NOTE — DISCHARGE SUMMARY
Discharge Diagnosis  ADHF  COPD exacerbation     Issues Requiring Follow-Up  HFrEF: Follow up with cardiology on 3/6/24 with Dr. Bart Liu   GDMT regimen uptitration as an outpatient and ensuring medication compliance to prevent recurrent hospitalizations   2.   DM: Follow up with PCP on 2/22/24 with Dr. Katia HUNT DM regimen and up-titrating metformin/insulin as needed - A1c 10.1% on most recent check   3. COPD: Follow up with pulmonology on 3/21 with Pauletet HUNT Needs baseline PFTs and establishing care for appropriate COPD regimen and to ensure compliance with inhalers   4. Cocaine use: Needs continued counseling for cessation     Test Results Pending At Discharge  Pending Labs       No current pending labs.            Hospital Course  Leonel Yu is a 53-year-old man with a past medical history of HFrEF (NICM due to cocaine, on LifeVest, EF 22% January 2024), CKD3 (b/l cr 1.5), HTN, HLD, substance use disorder (cocaine), tobacco use, COPD who presented to the ED with shortness of breath, productive cough and leg edema. He has had multiple admissions in the past few months for ADHF. Exam significant with bibasilar crackles and wheezing with peripheral edema on presentation most consistent with CHF exacerbation secondary to missing doses of GDMT medications along with component of COPD exacerbation.  He was diuresed w/ IV lasix 40 x2 in the ED and resumed on his home GDMT regimen (valsartan 40mg daily, coreg 3.125 mg BID, spironolactone 25mg daily, jardiance 10 mg daily along with lasix 40 PO daily (increased to lasix 60 mg PO for 2 days). He was also treated for a COPD exacerbation with 5 days of prednisone - antibiotics were not started due to low suspicion for infection (afebrile, no leukocytosis). He was also seen by the COPD educator. He required 1-2L NC occasionally but was weaned off to RA by day of discharge. On the day of discharge, he was feeling much better - able to  "converse without significant dyspnea on RA and had no leg edema with minimal crackles on exam.     His DM regimen was also titrated d/t elevated BG to 400s - metformin 500 mg was added and he was discharged on metformin 500 mg XR for ease of administration. He was also seen by the diabetic educator and counseled on insulin use as he said he would \"guess\" his insulin dose at home. He was provided with a glucometer, and insulin supplies on discharge as well.     BNP on day of admission: > 5000  Admission weight: 74.8 kg   BNP on day of discharge: 2900 (unclear baseline)   Discharge weight: 77 kg    GDMT regimen on discharge:   Preload: lasix 40 mg PO daily   Afterload: Valsartan 40 mg daily   NHB: carvedilol 3.125 mg BID and spironolactone 25 mg daily   SGLT2 inhibitor: empagliflozin 10 mg  DM regimen on discharge: Metformin 500 mg XR, lantus 10 units at bedtime, SSI with meals     He was seen by SW with regards to assistance w/ housing and transportation and advised to use his insurance's resources for transportation to/fro appointments. He was also counseled on cocaine use cessation (had used cocaine prior to hospitalization and Utox positive) - he denied inpatient rehab facilities. He was counseled extensively on following up with his appointments and taking his medications.     He was provided with meds to beds on discharge and scheduled with follow up appointments as below:   Cardiology for HFrEF with Dr. Bart Liu on 3/6/2024 at [facility]   PCP with Dr. Dr. Katia Pate 2/22/2024   Pulmonology for COPD with Paulette Fernando, 3/21/2024           Vitals:    02/16/24 1230   BP: 128/88   Pulse: 81   Resp: 19   Temp: 36.3 °C (97.4 °F)   SpO2: 96%       Pertinent Physical Exam At Time of Discharge  General: Appears stated age, poor dentition, in no acute distress.Sitting up in chair.   Head: Normocephalic, atraumatic.  Eyes: No icterus, EOMI.   ENT: No nasal discharge, moist mucus " membranes.  Cardiovascular: Regular rate/rhythm, no murmurs, no leg edema.   Respiratory: Mild wheezing bilaterally with mild bibasilar crackles at bases, conversing comfortably on RA  Abdominal: Soft, non-tender to palpation, normal bowel sounds, no masses or organomegaly.   Skin: Warm and dry, no bruises or rashes noted.   Neurologic: Alert & oriented x 3, sensation intact across all extremities, 5/5 strength in bilateral upper and lower extremities.    Psychiatric: Cooperative     Results for orders placed or performed during the hospital encounter of 02/12/24 (from the past 24 hour(s))   POCT GLUCOSE   Result Value Ref Range    POCT Glucose 231 (H) 74 - 99 mg/dL   CBC and Auto Differential   Result Value Ref Range    WBC 11.7 (H) 4.4 - 11.3 x10*3/uL    nRBC 0.0 0.0 - 0.0 /100 WBCs    RBC 5.02 4.50 - 5.90 x10*6/uL    Hemoglobin 16.2 13.5 - 17.5 g/dL    Hematocrit 47.1 41.0 - 52.0 %    MCV 94 80 - 100 fL    MCH 32.3 26.0 - 34.0 pg    MCHC 34.4 32.0 - 36.0 g/dL    RDW 16.2 (H) 11.5 - 14.5 %    Platelets 216 150 - 450 x10*3/uL    Neutrophils % 82.8 40.0 - 80.0 %    Immature Granulocytes %, Automated 0.4 0.0 - 0.9 %    Lymphocytes % 9.4 13.0 - 44.0 %    Monocytes % 6.9 2.0 - 10.0 %    Eosinophils % 0.2 0.0 - 6.0 %    Basophils % 0.3 0.0 - 2.0 %    Neutrophils Absolute 9.71 (H) 1.20 - 7.70 x10*3/uL    Immature Granulocytes Absolute, Automated 0.05 0.00 - 0.70 x10*3/uL    Lymphocytes Absolute 1.10 (L) 1.20 - 4.80 x10*3/uL    Monocytes Absolute 0.81 0.10 - 1.00 x10*3/uL    Eosinophils Absolute 0.02 0.00 - 0.70 x10*3/uL    Basophils Absolute 0.03 0.00 - 0.10 x10*3/uL   Comprehensive metabolic panel   Result Value Ref Range    Glucose 79 74 - 99 mg/dL    Sodium 137 136 - 145 mmol/L    Potassium 4.2 3.5 - 5.3 mmol/L    Chloride 101 98 - 107 mmol/L    Bicarbonate 31 21 - 32 mmol/L    Anion Gap 9 (L) 10 - 20 mmol/L    Urea Nitrogen 23 6 - 23 mg/dL    Creatinine 1.18 0.50 - 1.30 mg/dL    eGFR 73 >60 mL/min/1.73m*2    Calcium  "8.9 8.6 - 10.6 mg/dL    Albumin 3.2 (L) 3.4 - 5.0 g/dL    Alkaline Phosphatase 63 33 - 120 U/L    Total Protein 6.6 6.4 - 8.2 g/dL    AST 13 9 - 39 U/L    Bilirubin, Total 0.9 0.0 - 1.2 mg/dL    ALT 23 10 - 52 U/L   Magnesium   Result Value Ref Range    Magnesium 1.95 1.60 - 2.40 mg/dL   Phosphorus   Result Value Ref Range    Phosphorus 3.2 2.5 - 4.9 mg/dL   B-type natriuretic peptide   Result Value Ref Range    BNP 2,900 (H) 0 - 99 pg/mL   POCT GLUCOSE   Result Value Ref Range    POCT Glucose 108 (H) 74 - 99 mg/dL   POCT GLUCOSE   Result Value Ref Range    POCT Glucose 279 (H) 74 - 99 mg/dL       CXR 2/12:  Impression:     Stable marked enlargement of the cardiac silhouette. Mild  interstitial edema. No focal consolidation, pleural effusion, or  pneumothorax.       Home Medications     Medication List      START taking these medications     aspirin 81 mg chewable tablet; Chew 1 tablet (81 mg) once daily. Do not   start before February 17, 2024.; Start taking on: February 17, 2024   BD Ultra-Fine Orig Pen Needle 29G X 12mm needle; Generic drug: pen   needle 1/2\"; Use to inject 1-4 times daily as directed.   famotidine 20 mg tablet; Commonly known as: Pepcid; Take 1 tablet (20   mg) by mouth once daily. Do not start before February 17, 2024.; Start   taking on: February 17, 2024   furosemide 40 mg tablet; Commonly known as: Lasix; Take 1 tablet (40 mg)   by mouth once daily.   hydrOXYzine HCL 25 mg tablet; Commonly known as: Atarax; Take 1 tablet   (25 mg) by mouth every 6 hours if needed for anxiety.   insulin glargine 100 unit/mL (3 mL) pen; Commonly known as: Lantus;   Inject 10 Units under the skin once daily at bedtime.   insulin lispro 100 unit/mL injection; Commonly known as: HumaLOG; Inject   0-10 Units under the skin 3 times a day with meals per insulin   instructions   metFORMIN  mg 24 hr tablet; Commonly known as: Glucophage-XR; Take   1 tablet (500 mg) by mouth once daily with breakfast. Do not " crush, chew,   or split.   OneTouch Delica Plus Lancet 33 gauge misc; Generic drug: lancets; 1 each   4 times a day.   OneTouch Verio Flex meter misc; Generic drug: blood-glucose meter; Use   as directed to test blood glucose daily.   OneTouch Verio test strips strip; Generic drug: blood sugar diagnostic;   Use as directed to test blood glucose up to four times daily and as   needed.   valsartan 40 mg tablet; Commonly known as: Diovan; Take 1 tablet (40 mg)   by mouth once daily. Do not start before February 17, 2024.; Start taking   on: February 17, 2024     CHANGE how you take these medications     carvedilol 3.125 mg tablet; Commonly known as: Coreg; Take 1 tablet   (3.125 mg) by mouth 2 times a day.; What changed: medication strength, how   much to take     CONTINUE taking these medications     atorvastatin 40 mg tablet; Commonly known as: Lipitor; Take 2 tablets   (80 mg) by mouth once daily.   Jardiance 10 mg; Generic drug: empagliflozin; TAKE 1 TABLET BY MOUTH   ONCE DAILY   melatonin 5 mg tablet; Take 1 tablet (5 mg) by mouth as needed at   bedtime for sleep.   sertraline 50 mg tablet; Commonly known as: Zoloft; TAKE 1 TABLET BY   MOUTH ONCE DAILY   spironolactone 25 mg tablet; Commonly known as: Aldactone; Take 0.5   tablets (12.5 mg) by mouth once daily. Do not start before December 29, 2023.   tiotropium 2.5 mcg/actuation inhaler; Commonly known as: Spiriva   Respimat; Inhale 2 puffs once daily.     STOP taking these medications     Entresto 24-26 mg tablet; Generic drug: sacubitriL-valsartan   polyethylene glycol 17 gram packet; Commonly known as: Glycolax, Miralax   torsemide 10 mg tablet; Commonly known as: Demadex       Outpatient Follow-Up  Future Appointments   Date Time Provider Department [place]   2/22/2024  1:00 PM Katia Pate MD [id number] [facility]   3/21/2024 10:40 AM Paulette Fernando, APRN-CNP [id number] [facility]       Melani Alaniz MD

## 2024-02-17 NOTE — HOSPITAL COURSE
"Leonel uY is a 53-year-old man with a past medical history of HFrEF (NICM due to cocaine, on LifeVest, EF 22% January 2024), CKD3 (b/l cr 1.5), HTN, HLD, substance use disorder (cocaine), tobacco use, COPD who presented to the ED with shortness of breath, productive cough and leg edema. He has had multiple admissions in the past few months for ADHF. Exam significant with bibasilar crackles and wheezing with peripheral edema on presentation most consistent with CHF exacerbation secondary to missing doses of GDMT medications along with component of COPD exacerbation.  He was diuresed w/ IV lasix 40 x2 in the ED and resumed on his home GDMT regimen (valsartan 40mg daily, coreg 3.125 mg BID, spironolactone 25mg daily, jardiance 10 mg daily along with lasix 40 PO daily (increased to lasix 60 mg PO for 2 days). He was also treated for a COPD exacerbation with 5 days of prednisone - antibiotics were not started due to low suspicion for infection (afebrile, no leukocytosis). He was also seen by the COPD educator. He required 1-2L NC occasionally but was weaned off to RA by day of discharge. On the day of discharge, he was feeling much better - able to converse without significant dyspnea on RA and had no leg edema with minimal crackles on exam.     His DM regimen was also titrated d/t elevated BG to 400s - metformin 500 mg was added and he was discharged on metformin 500 mg XR for ease of administration. He was also seen by the diabetic educator and counseled on insulin use as he said he would \"guess\" his insulin dose at home. He was provided with a glucometer, and insulin supplies on discharge as well.     BNP on day of admission: > 5000  Admission weight: 74.8 kg   BNP on day of discharge: 2900 (unclear baseline)   Discharge weight: 77 kg    GDMT regimen on discharge:   Preload: lasix 40 mg PO daily   Afterload: Valsartan 40 mg daily   NHB: carvedilol 3.125 mg BID and spironolactone 25 mg daily   SGLT2 inhibitor: " empagliflozin 10 mg  DM regimen on discharge: Metformin 500 mg XR, lantus 10 units at bedtime, SSI with meals     He was seen by SW with regards to assistance w/ housing and transportation and advised to use his insurance's resources for transportation to/fro appointments. He was also counseled on cocaine use cessation (had used cocaine prior to hospitalization and Utox positive) - he denied inpatient rehab facilities. He was counseled extensively on following up with his appointments and taking his medications.     He was provided with meds to beds on discharge and scheduled with follow up appointments as below:   Cardiology for HFrEF with Dr. Bart Liu on 3/6/2024 at [facility]   PCP with Dr. Dr. Katia Pate 2/22/2024   Pulmonology for COPD with Paulette Fernando, 3/21/2024

## 2024-02-21 ENCOUNTER — APPOINTMENT (OUTPATIENT)
Dept: PRIMARY CARE | Facility: CLINIC | Age: 54
End: 2024-02-21
Payer: COMMERCIAL

## 2024-02-22 ENCOUNTER — APPOINTMENT (OUTPATIENT)
Dept: PRIMARY CARE | Facility: CLINIC | Age: 54
End: 2024-02-22
Payer: COMMERCIAL

## 2024-02-28 NOTE — DOCUMENTATION CLARIFICATION NOTE
"    PATIENT:               VINOD SHAFER  ACCT #:                  [id number]  MRN:                       [id number]  :                       1970  ADMIT DATE:       2024 5:23 PM  DISCH DATE:        2024 6:03 PM  RESPONDING PROVIDER #:        [id number]          PROVIDER RESPONSE TEXT:    Chronic Hypoxemic Respiratory Failure    CDI QUERY TEXT:    [facility]_Respiratory Failure Chronic      Instruction:    Based on your assessment of the patient and the clinical information, please provide the requested documentation by clicking on the appropriate radio button and enter any additional information if prompted.    Question: Is there a diagnosis indicative of the clinical information    When answering this query, please exercise your independent professional judgment. The fact that a question is being asked, does not imply that any particular answer is desired or expected.    The patient's clinical indicators include:  Clinical Information: Progress notes indicate pt is a 53 yr old male presenting to ED with c/o SOB, cough and leg edema who was admitted for COPD and HF exacerbation    Clinical Indicators: Progress notes suggest patient is O2 dependent or requires supplemental oxygen at baseline.    Per ED, \"COPD on 2 L nasal cannula at home\" and \"Desatted to 70s when off oxygen but improved once back on home oxygen setting\"    DC Summary: \"He required 1-2L NC occasionally but was weaned off to RA by day of discharge\"    Treatment: Supplemental oxygen, Pulse oximetry, VBG    Risk Factors: COPD, Heart Failure  Options provided:  -- Chronic Hypoxemic Respiratory Failure  -- Chronic Hypercapnic Respiratory Failure  -- Chronic Hypoxemic and Hypercapnic Respiratory Failure  -- Other - I will add my own diagnosis  -- Refer to Clinical Documentation Reviewer    Query created by: Eli Gonzales on 2024 9:30 AM      Electronically signed by:  HERBIE BLISS MD 2024 12:13 PM          "

## 2024-02-28 NOTE — DOCUMENTATION CLARIFICATION NOTE
"    PATIENT:               VINOD SHAFER  ACCT #:                  [id number]  MRN:                       [id number]  :                       1970  ADMIT DATE:       2024 5:23 PM  DISCH DATE:        2024 6:03 PM  RESPONDING PROVIDER #:        [id number]          PROVIDER RESPONSE TEXT:    Type II MI    CDI QUERY TEXT:    [facility]_MI        Instruction:    Based on your assessment of the patient and the clinical information, please provide the requested documentation by clicking on the appropriate radio button and enter any additional information if prompted.    Question: Is there a diagnosis indicative of the patient elevated Troponins and symptoms    When answering this query, please exercise your independent professional judgment. The fact that a question is being asked, does not imply that any particular answer is desired or expected.    The patient's clinical indicators include:  Clinical Information: Progress notes indicate patient is a 53 yr old male presenting to ED for c/o SOB and cough. Admitted for Acute decompensated HFrEF and COPD exacerbation.  Troponin noted to be elevated on admission and trended.    Clinical Indicators:  Per ED Provider Note, \"His presentation is suggestive of mixed heart failure and COPD exacerbation... Initial troponin elevated to 130, which is at the patient's baseline, repeat is unchanged\" and \"I suspect his troponin elevation is secondary to demand rather than type 1 ischemia.\"    ED: \"I reviewed the patient's EKG, does have a isolated ST elevation with some depressions, actually appears similar to a prior EKG in December, likely secondary to severe dilated cardiomyopathy and early LBBB.\"    ED: \"Denies chest pain\"    History and Physical, Cardiology Progress Notes, -2/15: \"53 M with suspected non ischemic cardiomyopathy who presented with dyspnea\" and \"Acute decompensated HFrEF... Possibly from lack of access to medications plus cocaine use.\"    VS  [id number]: T " 37.1- - RR 19- /82, SPO2 97 RA    Troponin trended, 2/12: 130, 138, 132  UDS, 2/13: Positive for cocaine metabolites    Treatment: Telemetry, EKG, Supplemental oxygen    Risk Factors: Cocaine use, Heart failure, NICM, HTN, HLD, LifeVest, h/o smoking  Options provided:  -- Type II MI  -- Non-ischemic myocardial injury  -- Troponin elevation due to other, Please specify additional information below  -- Other - I will add my own diagnosis  -- Refer to Clinical Documentation Reviewer    Query created by: Eli Gonzales on 2/22/2024 2:54 PM      Electronically signed by:  HERBIE BLISS MD 2/28/2024 12:13 PM

## 2024-07-30 ENCOUNTER — APPOINTMENT (OUTPATIENT)
Dept: RADIOLOGY | Facility: HOSPITAL | Age: 54
End: 2024-07-30
Payer: COMMERCIAL

## 2024-07-30 ENCOUNTER — CLINICAL SUPPORT (OUTPATIENT)
Dept: EMERGENCY MEDICINE | Facility: HOSPITAL | Age: 54
End: 2024-07-30
Payer: COMMERCIAL

## 2024-07-30 ENCOUNTER — APPOINTMENT (OUTPATIENT)
Dept: CARDIOLOGY | Facility: HOSPITAL | Age: 54
End: 2024-07-30
Payer: COMMERCIAL

## 2024-07-30 ENCOUNTER — HOSPITAL ENCOUNTER (INPATIENT)
Facility: HOSPITAL | Age: 54
End: 2024-07-30
Attending: STUDENT IN AN ORGANIZED HEALTH CARE EDUCATION/TRAINING PROGRAM | Admitting: STUDENT IN AN ORGANIZED HEALTH CARE EDUCATION/TRAINING PROGRAM
Payer: COMMERCIAL

## 2024-07-30 DIAGNOSIS — I50.9 ACUTE ON CHRONIC CONGESTIVE HEART FAILURE, UNSPECIFIED HEART FAILURE TYPE (MULTI): Primary | ICD-10-CM

## 2024-07-30 DIAGNOSIS — R06.02 SHORTNESS OF BREATH: ICD-10-CM

## 2024-07-30 DIAGNOSIS — I50.43 ACUTE ON CHRONIC COMBINED SYSTOLIC AND DIASTOLIC HRT FAIL (MULTI): ICD-10-CM

## 2024-07-30 DIAGNOSIS — I50.43 CHF (CONGESTIVE HEART FAILURE), NYHA CLASS I, ACUTE ON CHRONIC, COMBINED (MULTI): ICD-10-CM

## 2024-07-30 DIAGNOSIS — Z00.00 EXAMINATION: ICD-10-CM

## 2024-07-30 DIAGNOSIS — F41.9 ANXIETY DISORDER, UNSPECIFIED TYPE: ICD-10-CM

## 2024-07-30 DIAGNOSIS — I50.9 CONGESTIVE HEART FAILURE, UNSPECIFIED HF CHRONICITY, UNSPECIFIED HEART FAILURE TYPE (MULTI): ICD-10-CM

## 2024-07-30 DIAGNOSIS — Z79.4 TYPE 2 DIABETES MELLITUS WITH HYPERGLYCEMIA, WITH LONG-TERM CURRENT USE OF INSULIN (MULTI): ICD-10-CM

## 2024-07-30 DIAGNOSIS — E11.65 TYPE 2 DIABETES MELLITUS WITH HYPERGLYCEMIA, WITH LONG-TERM CURRENT USE OF INSULIN (MULTI): ICD-10-CM

## 2024-07-30 LAB
ALBUMIN SERPL BCP-MCNC: 3.9 G/DL (ref 3.4–5)
ALBUMIN SERPL BCP-MCNC: 3.9 G/DL (ref 3.4–5)
ALP SERPL-CCNC: 70 U/L (ref 33–120)
ALT SERPL W P-5'-P-CCNC: 55 U/L (ref 10–52)
AMPHETAMINES UR QL SCN: ABNORMAL
ANION GAP SERPL CALC-SCNC: 12 MMOL/L (ref 10–20)
ANION GAP SERPL CALC-SCNC: 14 MMOL/L (ref 10–20)
AST SERPL W P-5'-P-CCNC: 28 U/L (ref 9–39)
BARBITURATES UR QL SCN: ABNORMAL
BASOPHILS # BLD AUTO: 0.03 X10*3/UL (ref 0–0.1)
BASOPHILS NFR BLD AUTO: 0.5 %
BENZODIAZ UR QL SCN: ABNORMAL
BILIRUB SERPL-MCNC: 1 MG/DL (ref 0–1.2)
BNP SERPL-MCNC: 3240 PG/ML (ref 0–99)
BUN SERPL-MCNC: 23 MG/DL (ref 6–23)
BUN SERPL-MCNC: 23 MG/DL (ref 6–23)
BZE UR QL SCN: ABNORMAL
CALCIUM SERPL-MCNC: 8.7 MG/DL (ref 8.6–10.6)
CALCIUM SERPL-MCNC: 9 MG/DL (ref 8.6–10.6)
CANNABINOIDS UR QL SCN: ABNORMAL
CARDIAC TROPONIN I PNL SERPL HS: 117 NG/L (ref 0–53)
CARDIAC TROPONIN I PNL SERPL HS: 125 NG/L (ref 0–53)
CHLORIDE SERPL-SCNC: 106 MMOL/L (ref 98–107)
CHLORIDE SERPL-SCNC: 111 MMOL/L (ref 98–107)
CO2 SERPL-SCNC: 20 MMOL/L (ref 21–32)
CO2 SERPL-SCNC: 22 MMOL/L (ref 21–32)
CREAT SERPL-MCNC: 1.34 MG/DL (ref 0.5–1.3)
CREAT SERPL-MCNC: 1.36 MG/DL (ref 0.5–1.3)
DIGOXIN SERPL-MCNC: <0.3 NG/ML (ref 0.8–?)
EGFRCR SERPLBLD CKD-EPI 2021: 61 ML/MIN/1.73M*2
EGFRCR SERPLBLD CKD-EPI 2021: 63 ML/MIN/1.73M*2
EOSINOPHIL # BLD AUTO: 0.03 X10*3/UL (ref 0–0.7)
EOSINOPHIL NFR BLD AUTO: 0.5 %
ERYTHROCYTE [DISTWIDTH] IN BLOOD BY AUTOMATED COUNT: 14.7 % (ref 11.5–14.5)
ERYTHROCYTE [DISTWIDTH] IN BLOOD BY AUTOMATED COUNT: 14.8 % (ref 11.5–14.5)
EST. AVERAGE GLUCOSE BLD GHB EST-MCNC: 194 MG/DL
FENTANYL+NORFENTANYL UR QL SCN: ABNORMAL
GLUCOSE BLD MANUAL STRIP-MCNC: 152 MG/DL (ref 74–99)
GLUCOSE BLD MANUAL STRIP-MCNC: 172 MG/DL (ref 74–99)
GLUCOSE BLD MANUAL STRIP-MCNC: 225 MG/DL (ref 74–99)
GLUCOSE SERPL-MCNC: 103 MG/DL (ref 74–99)
GLUCOSE SERPL-MCNC: 64 MG/DL (ref 74–99)
HBA1C MFR BLD: 8.4 %
HCT VFR BLD AUTO: 43.6 % (ref 41–52)
HCT VFR BLD AUTO: 49.2 % (ref 41–52)
HGB BLD-MCNC: 15 G/DL (ref 13.5–17.5)
HGB BLD-MCNC: 17.2 G/DL (ref 13.5–17.5)
HOLD SPECIMEN: NORMAL
HOLD SPECIMEN: NORMAL
IMM GRANULOCYTES # BLD AUTO: 0.02 X10*3/UL (ref 0–0.7)
IMM GRANULOCYTES NFR BLD AUTO: 0.3 % (ref 0–0.9)
LYMPHOCYTES # BLD AUTO: 0.8 X10*3/UL (ref 1.2–4.8)
LYMPHOCYTES NFR BLD AUTO: 12.4 %
MAGNESIUM SERPL-MCNC: 1.94 MG/DL (ref 1.6–2.4)
MAGNESIUM SERPL-MCNC: 2.1 MG/DL (ref 1.6–2.4)
MCH RBC QN AUTO: 31.1 PG (ref 26–34)
MCH RBC QN AUTO: 31.1 PG (ref 26–34)
MCHC RBC AUTO-ENTMCNC: 34.4 G/DL (ref 32–36)
MCHC RBC AUTO-ENTMCNC: 35 G/DL (ref 32–36)
MCV RBC AUTO: 89 FL (ref 80–100)
MCV RBC AUTO: 91 FL (ref 80–100)
METHADONE UR QL SCN: ABNORMAL
MONOCYTES # BLD AUTO: 0.29 X10*3/UL (ref 0.1–1)
MONOCYTES NFR BLD AUTO: 4.5 %
NEUTROPHILS # BLD AUTO: 5.27 X10*3/UL (ref 1.2–7.7)
NEUTROPHILS NFR BLD AUTO: 81.8 %
NRBC BLD-RTO: 0 /100 WBCS (ref 0–0)
NRBC BLD-RTO: 0 /100 WBCS (ref 0–0)
OPIATES UR QL SCN: ABNORMAL
OXYCODONE+OXYMORPHONE UR QL SCN: ABNORMAL
PCP UR QL SCN: ABNORMAL
PHOSPHATE SERPL-MCNC: 3.6 MG/DL (ref 2.5–4.9)
PLATELET # BLD AUTO: 155 X10*3/UL (ref 150–450)
PLATELET # BLD AUTO: 169 X10*3/UL (ref 150–450)
POTASSIUM SERPL-SCNC: 3.9 MMOL/L (ref 3.5–5.3)
POTASSIUM SERPL-SCNC: 4.4 MMOL/L (ref 3.5–5.3)
PROT SERPL-MCNC: 6.7 G/DL (ref 6.4–8.2)
RBC # BLD AUTO: 4.82 X10*6/UL (ref 4.5–5.9)
RBC # BLD AUTO: 5.53 X10*6/UL (ref 4.5–5.9)
SODIUM SERPL-SCNC: 138 MMOL/L (ref 136–145)
SODIUM SERPL-SCNC: 139 MMOL/L (ref 136–145)
TSH SERPL-ACNC: 2.65 MIU/L (ref 0.44–3.98)
WBC # BLD AUTO: 6.4 X10*3/UL (ref 4.4–11.3)
WBC # BLD AUTO: 7.1 X10*3/UL (ref 4.4–11.3)

## 2024-07-30 PROCEDURE — 2500000004 HC RX 250 GENERAL PHARMACY W/ HCPCS (ALT 636 FOR OP/ED): Mod: SE

## 2024-07-30 PROCEDURE — 71046 X-RAY EXAM CHEST 2 VIEWS: CPT | Mod: FOREIGN READ | Performed by: RADIOLOGY

## 2024-07-30 PROCEDURE — 99223 1ST HOSP IP/OBS HIGH 75: CPT | Performed by: STUDENT IN AN ORGANIZED HEALTH CARE EDUCATION/TRAINING PROGRAM

## 2024-07-30 PROCEDURE — 96374 THER/PROPH/DIAG INJ IV PUSH: CPT | Mod: 59

## 2024-07-30 PROCEDURE — G0378 HOSPITAL OBSERVATION PER HR: HCPCS

## 2024-07-30 PROCEDURE — 4B02XTZ MEASUREMENT OF CARDIAC DEFIBRILLATOR, EXTERNAL APPROACH: ICD-10-PCS | Performed by: STUDENT IN AN ORGANIZED HEALTH CARE EDUCATION/TRAINING PROGRAM

## 2024-07-30 PROCEDURE — 93010 ELECTROCARDIOGRAM REPORT: CPT | Performed by: STUDENT IN AN ORGANIZED HEALTH CARE EDUCATION/TRAINING PROGRAM

## 2024-07-30 PROCEDURE — 71046 X-RAY EXAM CHEST 2 VIEWS: CPT

## 2024-07-30 PROCEDURE — 36415 COLL VENOUS BLD VENIPUNCTURE: CPT | Performed by: STUDENT IN AN ORGANIZED HEALTH CARE EDUCATION/TRAINING PROGRAM

## 2024-07-30 PROCEDURE — 84484 ASSAY OF TROPONIN QUANT: CPT | Performed by: STUDENT IN AN ORGANIZED HEALTH CARE EDUCATION/TRAINING PROGRAM

## 2024-07-30 PROCEDURE — 85025 COMPLETE CBC W/AUTO DIFF WBC: CPT | Performed by: STUDENT IN AN ORGANIZED HEALTH CARE EDUCATION/TRAINING PROGRAM

## 2024-07-30 PROCEDURE — 80307 DRUG TEST PRSMV CHEM ANLYZR: CPT

## 2024-07-30 PROCEDURE — 76604 US EXAM CHEST: CPT | Performed by: EMERGENCY MEDICINE

## 2024-07-30 PROCEDURE — 83880 ASSAY OF NATRIURETIC PEPTIDE: CPT | Performed by: STUDENT IN AN ORGANIZED HEALTH CARE EDUCATION/TRAINING PROGRAM

## 2024-07-30 PROCEDURE — 93282 PRGRMG EVAL IMPLANTABLE DFB: CPT

## 2024-07-30 PROCEDURE — 2500000001 HC RX 250 WO HCPCS SELF ADMINISTERED DRUGS (ALT 637 FOR MEDICARE OP): Mod: SE

## 2024-07-30 PROCEDURE — 76604 US EXAM CHEST: CPT

## 2024-07-30 PROCEDURE — 84443 ASSAY THYROID STIM HORMONE: CPT

## 2024-07-30 PROCEDURE — 82947 ASSAY GLUCOSE BLOOD QUANT: CPT

## 2024-07-30 PROCEDURE — 83735 ASSAY OF MAGNESIUM: CPT

## 2024-07-30 PROCEDURE — 80162 ASSAY OF DIGOXIN TOTAL: CPT

## 2024-07-30 PROCEDURE — 93005 ELECTROCARDIOGRAM TRACING: CPT

## 2024-07-30 PROCEDURE — 85027 COMPLETE CBC AUTOMATED: CPT

## 2024-07-30 PROCEDURE — 80053 COMPREHEN METABOLIC PANEL: CPT | Performed by: STUDENT IN AN ORGANIZED HEALTH CARE EDUCATION/TRAINING PROGRAM

## 2024-07-30 PROCEDURE — 93308 TTE F-UP OR LMTD: CPT | Performed by: EMERGENCY MEDICINE

## 2024-07-30 PROCEDURE — 99285 EMERGENCY DEPT VISIT HI MDM: CPT | Mod: 25

## 2024-07-30 PROCEDURE — 83036 HEMOGLOBIN GLYCOSYLATED A1C: CPT

## 2024-07-30 PROCEDURE — 2500000002 HC RX 250 W HCPCS SELF ADMINISTERED DRUGS (ALT 637 FOR MEDICARE OP, ALT 636 FOR OP/ED): Mod: SE

## 2024-07-30 PROCEDURE — 99285 EMERGENCY DEPT VISIT HI MDM: CPT | Performed by: STUDENT IN AN ORGANIZED HEALTH CARE EDUCATION/TRAINING PROGRAM

## 2024-07-30 PROCEDURE — 80069 RENAL FUNCTION PANEL: CPT | Mod: CCI

## 2024-07-30 RX ORDER — DEXTROSE 50 % IN WATER (D50W) INTRAVENOUS SYRINGE
25
Status: DISCONTINUED | OUTPATIENT
Start: 2024-07-30 | End: 2024-08-06 | Stop reason: HOSPADM

## 2024-07-30 RX ORDER — INSULIN LISPRO 100 [IU]/ML
0-10 INJECTION, SOLUTION INTRAVENOUS; SUBCUTANEOUS
Status: DISCONTINUED | OUTPATIENT
Start: 2024-07-30 | End: 2024-08-06 | Stop reason: HOSPADM

## 2024-07-30 RX ORDER — INSULIN GLARGINE 100 [IU]/ML
15 INJECTION, SOLUTION SUBCUTANEOUS NIGHTLY
Status: DISCONTINUED | OUTPATIENT
Start: 2024-07-30 | End: 2024-08-01

## 2024-07-30 RX ORDER — SERTRALINE HYDROCHLORIDE 100 MG/1
100 TABLET, FILM COATED ORAL DAILY
Status: DISCONTINUED | OUTPATIENT
Start: 2024-07-30 | End: 2024-08-06 | Stop reason: HOSPADM

## 2024-07-30 RX ORDER — NAPROXEN SODIUM 220 MG/1
81 TABLET, FILM COATED ORAL
Status: ON HOLD | COMMUNITY
Start: 2024-01-17 | End: 2024-08-05

## 2024-07-30 RX ORDER — DEXTROSE 50 % IN WATER (D50W) INTRAVENOUS SYRINGE
12.5
Status: DISCONTINUED | OUTPATIENT
Start: 2024-07-30 | End: 2024-08-06 | Stop reason: HOSPADM

## 2024-07-30 RX ORDER — INSULIN GLARGINE 100 [IU]/ML
15 INJECTION, SOLUTION SUBCUTANEOUS NIGHTLY
Status: DISCONTINUED | OUTPATIENT
Start: 2024-07-31 | End: 2024-07-30

## 2024-07-30 RX ORDER — FUROSEMIDE 10 MG/ML
40 INJECTION INTRAMUSCULAR; INTRAVENOUS ONCE
Status: COMPLETED | OUTPATIENT
Start: 2024-07-30 | End: 2024-07-30

## 2024-07-30 RX ORDER — NAPROXEN SODIUM 220 MG/1
324 TABLET, FILM COATED ORAL ONCE
Status: COMPLETED | OUTPATIENT
Start: 2024-07-30 | End: 2024-07-30

## 2024-07-30 RX ORDER — HYDRALAZINE HYDROCHLORIDE 50 MG/1
50 TABLET, FILM COATED ORAL 3 TIMES DAILY
COMMUNITY
Start: 2024-06-24 | End: 2024-08-06 | Stop reason: HOSPADM

## 2024-07-30 RX ORDER — DEXTROSE 50 % IN WATER (D50W) INTRAVENOUS SYRINGE
12.5
Status: DISCONTINUED | OUTPATIENT
Start: 2024-07-30 | End: 2024-07-30

## 2024-07-30 RX ORDER — DIGOXIN 125 MCG
0.12 TABLET ORAL
COMMUNITY
Start: 2024-06-25 | End: 2024-08-06 | Stop reason: HOSPADM

## 2024-07-30 RX ORDER — DEXTROSE 50 % IN WATER (D50W) INTRAVENOUS SYRINGE
25
Status: DISCONTINUED | OUTPATIENT
Start: 2024-07-30 | End: 2024-07-30

## 2024-07-30 RX ORDER — ISOSORBIDE DINITRATE 20 MG/1
20 TABLET ORAL 3 TIMES DAILY
COMMUNITY
Start: 2024-06-24 | End: 2024-08-06 | Stop reason: HOSPADM

## 2024-07-30 RX ORDER — ATORVASTATIN CALCIUM 80 MG/1
80 TABLET, FILM COATED ORAL NIGHTLY
Status: DISCONTINUED | OUTPATIENT
Start: 2024-07-30 | End: 2024-08-06 | Stop reason: HOSPADM

## 2024-07-30 RX ORDER — CARVEDILOL 3.12 MG/1
3.12 TABLET ORAL 2 TIMES DAILY
Status: DISCONTINUED | OUTPATIENT
Start: 2024-07-30 | End: 2024-08-02

## 2024-07-30 RX ORDER — SPIRONOLACTONE 25 MG/1
12.5 TABLET ORAL DAILY
Status: DISCONTINUED | OUTPATIENT
Start: 2024-07-31 | End: 2024-08-05

## 2024-07-30 RX ORDER — HYDRALAZINE HYDROCHLORIDE 10 MG/1
25 TABLET, FILM COATED ORAL EVERY 8 HOURS
Status: DISCONTINUED | OUTPATIENT
Start: 2024-07-30 | End: 2024-08-01

## 2024-07-30 RX ORDER — ENOXAPARIN SODIUM 100 MG/ML
40 INJECTION SUBCUTANEOUS EVERY 24 HOURS
Status: DISCONTINUED | OUTPATIENT
Start: 2024-07-30 | End: 2024-08-06 | Stop reason: HOSPADM

## 2024-07-30 RX ORDER — POLYETHYLENE GLYCOL 3350 17 G/17G
17 POWDER, FOR SOLUTION ORAL DAILY PRN
Status: DISCONTINUED | OUTPATIENT
Start: 2024-07-30 | End: 2024-08-06 | Stop reason: HOSPADM

## 2024-07-30 RX ORDER — NAPROXEN SODIUM 220 MG/1
81 TABLET, FILM COATED ORAL
Status: DISCONTINUED | OUTPATIENT
Start: 2024-07-31 | End: 2024-08-06 | Stop reason: HOSPADM

## 2024-07-30 RX ORDER — VALSARTAN 80 MG/1
40 TABLET ORAL DAILY
Status: DISCONTINUED | OUTPATIENT
Start: 2024-07-30 | End: 2024-07-30

## 2024-07-30 ASSESSMENT — ENCOUNTER SYMPTOMS
AGITATION: 0
DIARRHEA: 0
PALPITATIONS: 1
DIAPHORESIS: 0
FATIGUE: 1
SINUS PAIN: 0
DYSURIA: 0
CONSTIPATION: 0
NAUSEA: 0
SHORTNESS OF BREATH: 1
VOMITING: 0
ACTIVITY CHANGE: 1
HEMATURIA: 1
CHILLS: 0
SLEEP DISTURBANCE: 1
COUGH: 0
ABDOMINAL DISTENTION: 1
CHEST TIGHTNESS: 0
LIGHT-HEADEDNESS: 0
WHEEZING: 0
FEVER: 0
ABDOMINAL PAIN: 1
DIZZINESS: 0
HALLUCINATIONS: 1
FREQUENCY: 1

## 2024-07-30 ASSESSMENT — COLUMBIA-SUICIDE SEVERITY RATING SCALE - C-SSRS
6. HAVE YOU EVER DONE ANYTHING, STARTED TO DO ANYTHING, OR PREPARED TO DO ANYTHING TO END YOUR LIFE?: NO
1. IN THE PAST MONTH, HAVE YOU WISHED YOU WERE DEAD OR WISHED YOU COULD GO TO SLEEP AND NOT WAKE UP?: NO
2. HAVE YOU ACTUALLY HAD ANY THOUGHTS OF KILLING YOURSELF?: NO

## 2024-07-30 ASSESSMENT — PAIN DESCRIPTION - LOCATION: LOCATION: CHEST

## 2024-07-30 ASSESSMENT — PAIN SCALES - GENERAL
PAINLEVEL_OUTOF10: 7
PAINLEVEL_OUTOF10: 0 - NO PAIN

## 2024-07-30 ASSESSMENT — PAIN - FUNCTIONAL ASSESSMENT: PAIN_FUNCTIONAL_ASSESSMENT: 0-10

## 2024-07-30 ASSESSMENT — PAIN DESCRIPTION - DESCRIPTORS: DESCRIPTORS: CRUSHING

## 2024-07-30 ASSESSMENT — PAIN DESCRIPTION - PAIN TYPE: TYPE: ACUTE PAIN

## 2024-07-30 NOTE — H&P
"History Of Present Illness:    Leonel Yu is a 54 y.o. male presenting with PMH of HFrEF (LVEF 15- 20% as of 7/2023) s/p ICD (3/2024), CKD3a, HTN, HLD, LUZ (crack cocaine), former tobacco use, COPD (on nocturnal 2L at baseline), DM2, remote left cerebellar hemorrhagic infarct, medication noncompliance, anxiety, and depression who presented to [facility] today 7/30 with progressive shortness of breath and palpitations over the last 3 days. He states he noticed his lower extremities becoming more edematous and his abdominal swelling increasing over the last week. He reports he can no longer climb a flight of stairs without severe dyspnea. He notices that his heart seems to be \"racing\" more lately. He denies chest pain but endorses epigastric discomfort/tightness. He denies early satiety, nausea, vomiting, constipation, or diarrhea. He wears 2L O2 at nighttime as baseline but now reports he's wearing it more frequently during the day.     Patient endorses difficulty with taking medications according to regimen. He states he takes \"some pills\" in the morning but struggles with medications that are TID or q8 hours. He has multiple pill bottles at the bedside, many are full. He states he \"took everything\" medication- wise 2 days ago but endorses it was \"probably too late\" for them to be effective. Mr. Yu has a long noted history of substance abuse with cocaine and endorses he last ingested cocaine 2 days ago (7/28).    Relevant ED findings:  Cr 1.34/ BUN 23; BNP 3240; troponin 125 > 117; CXR with no evidence of infiltrate or effusion, emphysematous changes.    Of note, Mr. Yu was recently admitted to [facility] 6/20-25 for similar complaints of SOB, where he was diuresed with IV lasix bolus/ infusion. Transitioned to torsemide and started on digoxin. Hydralazine and isordil were started for afterload reduction with plan for Entresto initiation as an outpatient. A R/LHC was attempted but aborted due to patient " experiencing acute anxiety episode. Does not appear to have been discharged with a diuretic. Psychiatry followed patient during that admission and recommended sertraline 100 mg daily.     Cardiology testing:    EKG 7/30/2024  NSR with sinus arrhythmia, right axis deviation, biventricular hypertrophy    Device interrogation 7/30/2024 7/26/24; VF detected at 217 BPM, successful Tx wit ATP X1  7/27/24: VF detected at 220 BPM successful Tx with ATP X1  No shocks delivered    Nuclear Stress Test 1/2024 (at [facility]):  CONCLUSIONS:    1. SPECT Perfusion Study: Non-diagnostic due to poor image quality.    2. Left ventricle is severely dilated. The left ventricle systolic   function is severely decreased.    3. Right ventricle is small. The right ventricle systolic function is   normal.    4. This is a high risk scan due to calculated LVEF.    5. There is significant GI uptake of the radiotracer that only mildly   improve with AC. This makes the inferior wall uninterpretable. There is   no ischemia or scar in the anterior, septal or anterolateral wall   segments.    6. Given the LV is severely dilated Will recommend to assess for   ischemic heart disease using diferent imaging modalities such as cardiac   MRI or coronary angiography.     TTE 7/2023   CONCLUSIONS:  1. Left ventricular systolic function is severely decreased with a 15-20% estimated ejection fraction.  2. No LV thrombus seen, though there is continuous spontaneous echocontrast within the LV cavity consistent with low flow state.  3. Spectral Doppler shows an abnormal pattern of left ventricular diastolic filling.  4. There is severe eccentric left ventricular hypertrophy.  5. Moderately enlarged right ventricle.  6. There is reduced right ventricular systolic function.  7. The left atrium is moderately dilated.  8. Agitated saline contrast study was negative for intracardiac shunt.  9. Left ventricular cavity size is severely dilated.  10. Compared to prior echo  from 3/2/2023 , no significant change is appreciated on today's study.  11. There is global hypokinesis of the left ventricle with minor regional variations.    Last Recorded Vitals:  Vitals:    07/30/24 0834 07/30/24 1000 07/30/24 1113 07/30/24 1253   BP: (!) 154/99  (!) 130/111    Pulse:  94 89 96   Resp:  18 16 20   Temp:       TempSrc:       SpO2:  95% 97% 97%   Weight:       Height:         Last Labs:  CBC - 7/30/2024:  7:31 AM  6.4 15.0 155    43.6      CMP - 7/30/2024:  7:31 AM  8.7 6.7 28 --- 1.0   3.2 3.9 55 70      PTT - 10/23/2023:  6:51 PM  1.2   13.8 31     Troponin I, High Sensitivity   Date/Time Value Ref Range Status   02/12/2024 09:47  (HH) 0 - 53 ng/L Final     Comment:     Previous result verified on 2/12/2024 1836 on specimen/case [id number] called with component TRPHS for procedure Troponin I, High Sensitivity with value 130 ng/L.   02/12/2024 06:11  (HH) 0 - 53 ng/L Final     Comment:     Previous result verified on 2/12/2024 1836 on specimen/case [id number] called with component TRPHS for procedure Troponin I, High Sensitivity with value 130 ng/L.   02/12/2024 12:39  (HH) 0 - 53 ng/L Final     Troponin I, High Sensitivity ([facility])   Date/Time Value Ref Range Status   07/30/2024 08:15  (H) 0 - 53 ng/L Final   07/30/2024 07:31  (HH) 0 - 53 ng/L Final     BNP   Date/Time Value Ref Range Status   07/30/2024 07:31 AM 3,240 (H) 0 - 99 pg/mL Final   02/16/2024 05:33 AM 2,900 (H) 0 - 99 pg/mL Final     Hemoglobin A1C   Date/Time Value Ref Range Status   06/20/2024 07:49 AM 6.9 (H) 4.3 - 5.6 % Final     Comment:     American Diabetes Association guidelines indicate that patients with HgbA1c in the range 5.7-6.4% are at increased risk for development of diabetes, and intervention by lifestyle modification may be beneficial. HgbA1c greater or equal to 6.5% is considered diagnostic of diabetes.   01/09/2024 06:53 AM 10.1 (H) 4.3 - 5.6 % Final     Comment:     American  "Diabetes Association guidelines indicate that patients with HgbA1c in the range 5.7-6.4% are at increased risk for development of diabetes, and intervention by lifestyle modification may be beneficial. HgbA1c greater or equal to 6.5% is considered diagnostic of diabetes.   12/09/2023 08:57 PM 7.6 (H) see below % Final     VLDL   Date/Time Value Ref Range Status   07/25/2023 08:20 AM 18 0 - 40 mg/dL Final   06/18/2023 06:27 AM 29 0 - 40 mg/dL Final      Last I/O:  No intake/output data recorded.    Past Medical History:  He has a past medical history of CHF (congestive heart failure) (Multi), Chronic kidney disease, COPD (chronic obstructive pulmonary disease) (Multi), Diabetes mellitus (Multi), Hyperlipidemia, Hypertension, Stroke (Multi), and Substance abuse (Multi).    Past Surgical History:  He has a past surgical history that includes Insert / replace / remove pacemaker.      Social History:  He reports that he has quit smoking. His smoking use included cigarettes. He has never used smokeless tobacco. He reports current drug use. Drug: \"Crack\" cocaine. He reports that he does not drink alcohol.    Family History:  Family History   Problem Relation Name Age of Onset    Heart disease Father          Allergies:  Patient has no known allergies.    Inpatient Medications:  Scheduled medications   Medication Dose Route Frequency    [START ON 7/31/2024] aspirin  81 mg oral Daily    atorvastatin  80 mg oral Nightly    [Held by provider] carvedilol  3.125 mg oral BID    [Held by provider] empagliflozin  10 mg oral Daily    hydrALAZINE  25 mg oral q8h    sertraline  100 mg oral Daily    [START ON 7/31/2024] spironolactone  12.5 mg oral Daily    tiotropium  2 puff inhalation Daily     PRN medications   Medication    polyethylene glycol     Continuous Medications   Medication Dose Last Rate     Outpatient Medications:  Current Outpatient Medications   Medication Instructions    aspirin 81 mg, oral, Daily RT    atorvastatin " "(LIPITOR) 80 mg, oral, Daily    blood sugar diagnostic strip Use as directed to test blood glucose up to four times daily and as needed.    blood-glucose meter misc Use as directed to test blood glucose daily.    carvedilol (COREG) 3.125 mg, oral, 2 times daily    digoxin (LANOXIN) 0.125 mg, oral, Daily RT    empagliflozin (Jardiance) 10 mg TAKE 1 TABLET BY MOUTH ONCE DAILY    famotidine (PEPCID) 20 mg, oral, Daily    furosemide (LASIX) 40 mg, oral, Daily    hydrALAZINE (APRESOLINE) 50 mg, oral, 3 times daily    hydrOXYzine HCL (ATARAX) 25 mg, oral, Every 6 hours PRN    insulin glargine (LANTUS) 10 Units, subcutaneous, Nightly    insulin lispro (HumaLOG) 100 unit/mL injection Inject 0-10 Units under the skin 3 times a day with meals per insulin instructions    isosorbide dinitrate (ISORDIL) 20 mg, oral, 3 times daily    lancets 33 gauge misc 1 each, 4 times daily    melatonin 5 mg, oral, Nightly PRN    metFORMIN XR (GLUCOPHAGE-XR) 500 mg, oral, Daily with breakfast, Do not crush, chew, or split.    pen needle 1/2\" 29G X 12mm needle Use to inject 1-4 times daily as directed.    sertraline (Zoloft) 50 mg tablet TAKE 1 TABLET BY MOUTH ONCE DAILY    spironolactone (ALDACTONE) 12.5 mg, oral, Daily    tiotropium (Spiriva Respimat) 2.5 mcg/actuation inhaler 2 puffs, inhalation, Daily    valsartan (DIOVAN) 40 mg, oral, Daily       Physical Exam  Constitutional:       Appearance: Normal appearance.   Eyes:      Comments: Reddened sclera    Neck:      Vascular: JVD present.      Comments: JVD elevated to jaw at 45 degrees   Cardiovascular:      Rate and Rhythm: Normal rate and regular rhythm.   Pulmonary:      Effort: Pulmonary effort is normal.      Comments: Diminished BL bases, currently on 2L  Abdominal:      General: There is distension.      Tenderness: There is no abdominal tenderness.   Musculoskeletal:      Right lower le+ Edema present.      Left lower le+ Edema present.   Skin:     General: Skin is warm and " dry.   Neurological:      General: No focal deficit present.      Mental Status: He is alert and oriented to person, place, and time.   Psychiatric:         Behavior: Behavior normal.       Review of Systems   Constitutional:  Positive for activity change and fatigue. Negative for chills, diaphoresis and fever.   HENT: Negative.  Negative for ear pain and sinus pain.    Eyes:  Positive for visual disturbance.   Respiratory:  Positive for shortness of breath. Negative for cough, chest tightness and wheezing.    Cardiovascular:  Positive for palpitations and leg swelling. Negative for chest pain.   Gastrointestinal:  Positive for abdominal distention and abdominal pain. Negative for constipation, diarrhea, nausea and vomiting.   Genitourinary:  Positive for frequency and hematuria. Negative for dysuria.   Neurological:  Negative for dizziness and light-headedness.   Psychiatric/Behavioral:  Positive for hallucinations and sleep disturbance. Negative for agitation, self-injury and suicidal ideas.       Assessment/Plan   Leonel Yu is a 54 y.o. male presenting with PMH of HFrEF (LVEF 15- 20% as of 7/2023) s/p ICD (3/2024), CKD3a, HTN, HLD, LUZ (crack cocaine), former tobacco use, COPD (on nocturnal 2L at baseline), DM2, remote left cerebellar hemorrhagic infarct, medication noncompliance, anxiety, and depression who presented to [facility] today 7/30 with progressive shortness of breath over the last 3 days. Admitted to [facility] for acute on chronic heart failure management.     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 No ischemia or scar in the anterior, septal or anterolateral wall segments. Recommended further ischemic eval with cMRI or LHC  - TTE 7/2023: LV EF 15-20%, abnormal LV diastolic filling, severe eccentric LVH, LV severely dilated  - consider need for repeat TTE or ischemic eval inpatient this admit once closer to euvolemia  - admit BNP: 3240 (prev. 2900 2/2024)  - tropinemia on admit: 125> 117. No ACS symptoms, EKG without ST changes. Likely 2/2 ADHF  - loaded with  on admit per ED  - DC weight 2/2024: 77kg  - Admit weight: 76.2 kg  - daily weights  - appears fluid overloaded on exam with abdominal swelling, 1+ LE edema, JVD to jaw  - received 40 mg IV lasix X1 in ED upon presentation--> additional 40 mg IV lasix X1 this afternoon  - admit CXR: no evidence of infiltrate or effusion, emphysematous changes noted  - current meds: spironolactone 12.5 mg daily, hydralazine 25 mg q8, empagliflozin 10 mg daily  - BB held iso +cocaine UDS  - recent admit [facility] 6/2024 meds upon discharge: empagliflozin 10 mg daily, carvedilol 3.125 mg BID, digoxin 125 mcg daily, hydralazine 50 mg q8 hours, isosorbide dinitrate 20 mg TID, valsartan 40 mg daily; no diuretic noted upon discharge  - digoxin level <0.3, will need to determine if this is an ideal med for this patient, no restarted on admit    - 2L fluid restriction, daily standing weights, strict I&O's     Palpitations  VF  - ICD placed 3/2024 (unclear where ICD was placed, not in our system)  - EKG on admit: NSR with sinus arrhythmia, right axis deviation, biventricular hypertrophy  - Device interrogation on admit:   --7/26/24; VF detected at 217 BPM, successful Tx wit ATP X1  --7/27/24: VF detected at 220 BPM successful Tx with ATP X1    HTN  - BP on admit: 128/93  - SBP range 128- 154  - started hydralazine 25 mg q8, can increase regimen as needed    HLD  - lipid panel 6/2024: cholesterol 124, triglyceride 79, HDL 37, LDL  71  - continue atorvastatin 80 mg nightly     Medication noncompliance  Housing insecurity  - 2/2 complex drug regimen, social barriers (homeless, unable to work due to HF symptoms), + cocaine on admit  - unclear which meds patient was taking upon discharge from [facility] last admit; has multiple pill bottles in his room that do not match [facility] DC summary  - will need to determine simple homegoing regimen for patient upon discharge  - offered social work services for homegoing concerns, patient declined at this time     Active Cocaine use  - UDS pos for cocaine, patient admitted last use on 7/28   - can consider thrive consult this admit    Anxiety and Depression  HX SI/HI  - during 6/2023 admit, patient had admitted to suicidal and homicidal ideation towards ex- wife-> psychiatry was consulted and patient was admitted to inpatient psychiatry unit  - has had psychiatry involved in previous admissions for SI  - currently denies SI/HI on admit  - cont Sertraline 100mg daily (was recently increased at [facility] per their psych team)     CKD3a  - Baseline Cr ~1.3-1.5  - Cr at admission in 1.34  - Avoid nephrotoxins and hypotension  - Daily RFP while admitted, diuresis as above    Suspected COPD, stable  - no PFTs on file  - CT Chest 10/2023: emphysematous changes are present; bronchial wall thickening is present; stable right middle lobe nodule  - on 2L O2 currently; typically wears 2L O2 at night only   - cont Spiriva Respimat while inpatient    DMII  - 6/2024 HgbA1c 6.9%   - per patient, has run out of BG test strips and has not tested recently  - per patient, insulin regimen: lantus 30 units in the AM, SSI with meals  - will start lantus at half home dose 15 units (due to unclear compliance) and can uptitrate as needed  - SSI #2, AC/HS    DVT prophylaxis: subcutaneous lovenox  Dispo: pending further diuresis, GDMT optimization; consider need for repeat TTE or ischemic eval inpatient this admit once closer to euvolemia    Code  Status:  DNR confirmed on admission with patient; Per patient, okay with intubation but if his heart were to stop, would not want CPR performed.     Patient to be discussed in AM with attending    JACKIE Lock-CORAZON

## 2024-07-30 NOTE — ED PROVIDER NOTES
"History of Present Illness   Information Gathering: History is collected from the patient and outside clinic discharge summary last month for past medical history    HPI:  Leonel Yu is a 54 y.o. male with PMH of HFrEF (15 to 20%) with ICD ([place] [facility]), CKD3, hypertension, hyperlipidemia, substance use disorder (crack cocaine), tobacco use, COPD, DM2 medication noncompliance, anxiety depression, presenting to the emergency department for chest pain and shortness of breath. Patient states that over the past several days, he has had progressive dyspnea and midsternal chest pain that is worse when lying down. He also states that it feels like he has \"a lot of water on me\". He states that his legs have become more swollen. He also states that he takes 40 mg of Lasix daily and has not skipped a dose. In addition, he states that yesterday he felt his heart beating very fast and a subsequent shock from his ICD. Afterwards, he felt that he was in a normal rhythm again. Denies headache. Denies weakness in any extremities. Denies fever, cough, chills, body aches.    Physical Exam   Triage vitals:  T 36.2 °C (97.2 °F)  HR 95  BP (!) 128/93  RR (!) 24  O2 96 % Supplemental oxygen    Physical Exam  Constitutional:       Appearance: Normal appearance.   HENT:      Head: Normocephalic and atraumatic.   Eyes:      Extraocular Movements: Extraocular movements intact.      Pupils: Pupils are equal, round, and reactive to light.   Cardiovascular:      Rate and Rhythm: Normal rate and regular rhythm.   Pulmonary:      Breath sounds: Normal breath sounds.      Comments: Tachypneic on exam on home 2 L oxygen nasal cannula. Rales in bilateral pulmonary bases. No wheezes or rhonchi. ICD in place subcutaneously over left chest.  Abdominal:      General: Abdomen is flat.      Palpations: Abdomen is soft.   Musculoskeletal:         General: No swelling or signs of injury. Normal range of motion.      Comments: Bilateral 2+ pitting " edema - patient states is worse than usual   Neurological:      General: No focal deficit present.      Mental Status: He is alert and oriented to person, place, and time.       Medical Decision Making & ED Course   Medical Decision Makin y.o. male with PMH of HFrEF on [employer], CKD3, hypertension, hyperlipidemia, substance use disorder (crack cocaine), tobacco use, COPD, DM2 medication noncompliance, anxiety depression, presenting to the emergency department for chest pain and shortness of breath. History is concerning for arrhythmia given patient's history of shock yesterday. EKG x 2 today shows no changes in ST segment and normal sinus rhythm. On exam, patient physical and history findings consistent with CHF exacerbation. Considered COPD exacerbation, ACS, and aortic pathology. For further workup, will obtain chest x-ray imaging, point-of-care ultrasound, troponin, BNP, CBC, CMP. Will also move to interrogate [employer] pacemaker (device card image uploaded into chart under clinical media). Device nurse consulted.  During workup, troponin slightly elevated at 125, repeat delta decreased to 117. No changes on EKG. 324 mg of aspirin administered. CBC and CMP demonstrate no evidence of leukocytosis or anemia and is without severe electrolyte derangements. Mild decrease in kidney function but due to concerns of volume overload, fluids were held. BNP markedly elevated at 3240 consistent with CHF exacerbation.  Given patient's need for ongoing diuresis, to patient was in cardiology for diuresis in stable condition.    ED Course:  ED Course as of 24 1021      0729 ECG 12 lead  EKG interpretation:  Heart rate 73 bpm  Normal ID interval and narrow QRS  Regular QTc  Rightward axis  No new ST changes appreciated [TRI]   0826 CBC and Auto Differential(!)  No evidence of leukocytosis or anemia [TRI]      ED Course User Index  [TRI] Fausto Sierra MD         Diagnoses as of 24 1021    Acute on chronic congestive heart failure, unspecified heart failure type (Multi)       ----EKG Independent Interpretation: EKG interpreted by myself. Please see ED Course for full interpretation.    Independent Result Review and Interpretation: Relevant laboratory and radiographic results were reviewed and independently interpreted by myself.  As necessary, they are commented on in the ED Course.    Chronic conditions affecting the patient's care: As documented above in MDM    The patient was discussed with the following consultants/services: As described in MDM      Disposition   As a result of their workup, the patient will require admission to the hospital.  The patient was informed of his diagnosis.  The patient was given the opportunity to ask questions and I answered them. The patient agreed to be admitted to the hospital.    Procedures   Procedures    Patient seen and discussed with ED attending physician.    Dillon Sierra MD  Emergency Medicine, PGY-2      Fausto Sierra MD  Resident  07/30/24 1100

## 2024-07-30 NOTE — ED PROCEDURE NOTE
Procedure    Performed by: Jayla Otto DO  Authorized by: Kyle Cuevas MD  Cardiac Indications: orthopnea dyspnea              Respiratory Indications: shortness of breath  Procedure: Cardiac Ultrasound    Findings:   Views: parasternal long, parasternal short, apical four and subxiphoid  The pericardial space was visualized and was NEGATIVE for a significant pericardial effusion.  Activity: Ventricular contractions were visualized.  LV: LV systolic function was DECREASED.  RV: RV size was NORMAL.    Impression:  Cardiac: The focused cardiac ultrasound exam had ABNORMAL findings as specified.  Procedure: Thoracic Ultrasound    Findings:  R Lung Sliding: The RIGHT chest was evaluated and LUNG SLIDING was visualized.  L Lung Sliding: The LEFT chest was evaluated and LUNG SLIDING was visualized.  R Effusion: The RIGHT chest was evaluated and there was NO PLEURAL EFFUSION.  L Effusion: The LEFT chest was evaluated and there was NO PLEURAL EFFUSION.    B-lines: The RIGHT chest was evaluated and multiple B-LINES were visualized        Impression:  Thorax: The focused thoracic ultrasound exam had ABNORMAL findings as specified.    Comments: IVC plethoric. Severely reduced systolic LV function. Mitral valve regurgitation visualized.                Jayla Otto DO  Resident  07/30/24 2935

## 2024-07-30 NOTE — ED TRIAGE NOTES
"Pt presented with c/o SOB and CP \"feels like my heart is going to blow up\" history of HFrEF (LVEF 22% as of 1/7/24) s/p ICD (3/2024), CKD3, HTN, HLD, LUZ (crack cocaine), tobacco use, COPD (on 2L at baseline), DM2 medication noncompliance, anxiety depression   "

## 2024-07-30 NOTE — PROGRESS NOTES
Pharmacy Medication History Review    Leonel Yu is a 54 y.o. male admitted for Acute on chronic combined systolic and diastolic hrt fail (Multi). Pharmacy reviewed the patient's [id number] medications and allergies for accuracy.    The list below reflects the updated PTA list. Comments regarding how patient may be taking medications differently can be found in the Admit Orders Activity  Prior to Admission Medications   Prescriptions Last Dose Informant   aspirin 81 mg chewable tablet Unknown Self   Sig: Chew 1 tablet (81 mg) once daily.   atorvastatin (Lipitor) 40 mg tablet  Self   Sig: Take 2 tablets (80 mg) by mouth once daily.  Not taking    blood sugar diagnostic strip  Self   Sig: Use as directed to test blood glucose up to four times daily and as needed.   blood-glucose meter misc  Self   Sig: Use as directed to test blood glucose daily.   carvedilol (Coreg) 3.125 mg tablet     Sig: Take 1 tablet (3.125 mg) by mouth 2 times a day.  Not taking    digoxin (Lanoxin) 125 MCG tablet Past Week Self   Sig: Take 1 tablet (0.125 mg) by mouth once daily.   empagliflozin (Jardiance) 10 mg 7/28/2024 Self   Sig: TAKE 1 TABLET BY MOUTH ONCE DAILY   famotidine (Pepcid) 20 mg tablet     Sig: Take 1 tablet (20 mg) by mouth once daily. Do not start before February 17, 2024.  Not taking    furosemide (Lasix) 40 mg tablet 7/28/2024    Sig: Take 1 tablet (40 mg) by mouth once daily.   hydrALAZINE (Apresoline) 50 mg tablet 7/29/2024 Self   Sig: Take 1 tablet (50 mg) by mouth 3 times a day.   hydrOXYzine HCL (Atarax) 25 mg tablet     Sig: Take 1 tablet (25 mg) by mouth every 6 hours if needed for anxiety.  Not taking    insulin glargine (Lantus) 100 unit/mL (3 mL) pen Unknown Self   Sig: Inject 10 Units under the skin once daily at bedtime.  30 units daily in the morning    insulin lispro (HumaLOG) 100 unit/mL injection Unknown Self   Sig: Inject 0-10 Units under the skin 3 times a day with meals per insulin  "instructions   isosorbide dinitrate (Isordil) 20 mg tablet 2024 Self   Sig: Take 1 tablet (20 mg) by mouth 3 times a day.   lancets 33 gauge misc  Self   Si each 4 times a day.   melatonin 5 mg tablet  Self   Sig: Take 1 tablet (5 mg) by mouth as needed at bedtime for sleep.  Not taking    metFORMIN XR (Glucophage-XR) 500 mg 24 hr tablet     Sig: Take 1 tablet (500 mg) by mouth once daily with breakfast. Do not crush, chew, or split.  Not taking    pen needle 1/2\" 29G X 12mm needle  Self   Sig: Use to inject 1-4 times daily as directed.   sertraline (Zoloft) 50 mg tablet 2024 Self   Sig: TAKE 1 TABLET BY MOUTH ONCE DAILY  Takes 100 mg once daily    spironolactone (Aldactone) 25 mg tablet  Self   Sig: Take 0.5 tablets (12.5 mg) by mouth once daily. Do not start before 2023.  Not taking    tiotropium (Spiriva Respimat) 2.5 mcg/actuation inhaler 2024 Self   Sig: Inhale 2 puffs once daily.   valsartan (Diovan) 40 mg tablet     Sig: Take 1 tablet (40 mg) by mouth once daily. Do not start before 2024.  Not taking       Facility-Administered Medications: None        The list below reflects the updated allergy list. Please review each documented allergy for additional clarification and justification.  Allergies  Reviewed by Felix Jonas RN on 2024   No Known Allergies         Patient accepts M2B at discharge. Pharmacy has been updated to [facility].    Sources used to confirm home medication list include: Patient interview, OARRS, Care Everywhere, medication fill history. [facility] Transitions of Care Note 24.    Medications added: Digoxin, Hydralazine, Isosorbide Dinitrate, Aspirin     Medications modified: Insulin Glargine (Lantus), Sertraline    Medications to be removed: Atorvastatin, Carvedilol, Famotidine, Hydroxyzine, Melatonin, Metformin XR, Spironolactone    Below are additional concerns with the patient's PTA list.  ~Patient was a poor " historian and admitted to being non-compliant with many of his medications. He did have bottles of his medications with him, however many were not recently dispensed.  ~Patient cannot remember to take his Isosorbide and Hydralazine the three times a day that they were ordered.   ~Patient has not taken both insulins in over a month because he ran out of supplies to test his blood sugar. He also states he should be taking 30 units of Lantus daily in the morning.  ~Patient states he still takes Furosemide, and had a bottle, however this was not on the [facility] Transitions of Care Note 6/25/24 and has not been dispensed since February 2024.  ~Patient has a Tiotropium (Spiriva Respimat) 2.5 mcg/actuation inhaler and a Fluticasone/Salmoterol (Advair Diskus) 250/50 inhaler that he states he uses, however neither have been dispensed in the past 6 months.   ~Patient states he has been using Tiotropium as a rescue inhaler.  ~Patient states he has not been taking Aspirin 81 mg because it does not help with pain.    Stella Kaplan [facility]   Transitions of Care Pharmacist  [facility]  Please reach out via Secure Chat for questions, or if no response call Just Sing It or [employer]

## 2024-07-31 ENCOUNTER — APPOINTMENT (OUTPATIENT)
Dept: CARDIOLOGY | Facility: HOSPITAL | Age: 54
End: 2024-07-31
Payer: COMMERCIAL

## 2024-07-31 LAB
ALBUMIN SERPL BCP-MCNC: 3.6 G/DL (ref 3.4–5)
ANION GAP SERPL CALC-SCNC: 16 MMOL/L (ref 10–20)
AORTIC VALVE PEAK VELOCITY: 1.13 M/S
ATRIAL RATE: 73 BPM
AV PEAK GRADIENT: 5.1 MMHG
AVA (PEAK VEL): 2.46 CM2
BUN SERPL-MCNC: 28 MG/DL (ref 6–23)
CALCIUM SERPL-MCNC: 9.2 MG/DL (ref 8.6–10.6)
CHLORIDE SERPL-SCNC: 101 MMOL/L (ref 98–107)
CO2 SERPL-SCNC: 23 MMOL/L (ref 21–32)
CREAT SERPL-MCNC: 1.58 MG/DL (ref 0.5–1.3)
EGFRCR SERPLBLD CKD-EPI 2021: 51 ML/MIN/1.73M*2
EJECTION FRACTION APICAL 4 CHAMBER: 10.1
EJECTION FRACTION: 10 %
ERYTHROCYTE [DISTWIDTH] IN BLOOD BY AUTOMATED COUNT: 14.6 % (ref 11.5–14.5)
GLUCOSE BLD MANUAL STRIP-MCNC: 109 MG/DL (ref 74–99)
GLUCOSE BLD MANUAL STRIP-MCNC: 136 MG/DL (ref 74–99)
GLUCOSE BLD MANUAL STRIP-MCNC: 141 MG/DL (ref 74–99)
GLUCOSE BLD MANUAL STRIP-MCNC: 224 MG/DL (ref 74–99)
GLUCOSE SERPL-MCNC: 108 MG/DL (ref 74–99)
HCT VFR BLD AUTO: 47.5 % (ref 41–52)
HGB BLD-MCNC: 16.4 G/DL (ref 13.5–17.5)
LEFT ATRIUM VOLUME AREA LENGTH INDEX BSA: 50.5 ML/M2
LEFT VENTRICLE INTERNAL DIMENSION DIASTOLE: 8.9 CM (ref 3.5–6)
LEFT VENTRICULAR OUTFLOW TRACT DIAMETER: 2.2 CM
MAGNESIUM SERPL-MCNC: 2.05 MG/DL (ref 1.6–2.4)
MCH RBC QN AUTO: 31.1 PG (ref 26–34)
MCHC RBC AUTO-ENTMCNC: 34.5 G/DL (ref 32–36)
MCV RBC AUTO: 90 FL (ref 80–100)
NRBC BLD-RTO: 0 /100 WBCS (ref 0–0)
P OFFSET: 201 MS
P ONSET: 157 MS
PHOSPHATE SERPL-MCNC: 4.1 MG/DL (ref 2.5–4.9)
PLATELET # BLD AUTO: 170 X10*3/UL (ref 150–450)
POTASSIUM SERPL-SCNC: 4.9 MMOL/L (ref 3.5–5.3)
PR INTERVAL: 112 MS
Q ONSET: 213 MS
QRS COUNT: 12 BEATS
QRS DURATION: 110 MS
QT INTERVAL: 386 MS
QTC CALCULATION(BAZETT): 425 MS
QTC FREDERICIA: 412 MS
R AXIS: 134 DEGREES
RBC # BLD AUTO: 5.28 X10*6/UL (ref 4.5–5.9)
RIGHT VENTRICLE FREE WALL PEAK S': 7 CM/S
RIGHT VENTRICLE PEAK SYSTOLIC PRESSURE: 39.4 MMHG
SODIUM SERPL-SCNC: 135 MMOL/L (ref 136–145)
T AXIS: 30 DEGREES
T OFFSET: 406 MS
TRICUSPID ANNULAR PLANE SYSTOLIC EXCURSION: 1.6 CM
VENTRICULAR RATE: 73 BPM
WBC # BLD AUTO: 8.2 X10*3/UL (ref 4.4–11.3)

## 2024-07-31 PROCEDURE — 2500000001 HC RX 250 WO HCPCS SELF ADMINISTERED DRUGS (ALT 637 FOR MEDICARE OP): Mod: SE

## 2024-07-31 PROCEDURE — 2500000002 HC RX 250 W HCPCS SELF ADMINISTERED DRUGS (ALT 637 FOR MEDICARE OP, ALT 636 FOR OP/ED): Mod: SE

## 2024-07-31 PROCEDURE — 83735 ASSAY OF MAGNESIUM: CPT

## 2024-07-31 PROCEDURE — G0378 HOSPITAL OBSERVATION PER HR: HCPCS

## 2024-07-31 PROCEDURE — 82947 ASSAY GLUCOSE BLOOD QUANT: CPT

## 2024-07-31 PROCEDURE — 96372 THER/PROPH/DIAG INJ SC/IM: CPT

## 2024-07-31 PROCEDURE — 83880 ASSAY OF NATRIURETIC PEPTIDE: CPT

## 2024-07-31 PROCEDURE — 2500000004 HC RX 250 GENERAL PHARMACY W/ HCPCS (ALT 636 FOR OP/ED): Mod: SE

## 2024-07-31 PROCEDURE — 36415 COLL VENOUS BLD VENIPUNCTURE: CPT

## 2024-07-31 PROCEDURE — 2500000001 HC RX 250 WO HCPCS SELF ADMINISTERED DRUGS (ALT 637 FOR MEDICARE OP): Mod: SE | Performed by: STUDENT IN AN ORGANIZED HEALTH CARE EDUCATION/TRAINING PROGRAM

## 2024-07-31 PROCEDURE — 99233 SBSQ HOSP IP/OBS HIGH 50: CPT | Performed by: STUDENT IN AN ORGANIZED HEALTH CARE EDUCATION/TRAINING PROGRAM

## 2024-07-31 PROCEDURE — 2500000004 HC RX 250 GENERAL PHARMACY W/ HCPCS (ALT 636 FOR OP/ED): Mod: SE | Performed by: STUDENT IN AN ORGANIZED HEALTH CARE EDUCATION/TRAINING PROGRAM

## 2024-07-31 PROCEDURE — 94640 AIRWAY INHALATION TREATMENT: CPT

## 2024-07-31 PROCEDURE — 85027 COMPLETE CBC AUTOMATED: CPT

## 2024-07-31 PROCEDURE — 80069 RENAL FUNCTION PANEL: CPT

## 2024-07-31 PROCEDURE — 93306 TTE W/DOPPLER COMPLETE: CPT

## 2024-07-31 PROCEDURE — 93306 TTE W/DOPPLER COMPLETE: CPT | Performed by: INTERNAL MEDICINE

## 2024-07-31 RX ORDER — FUROSEMIDE 10 MG/ML
40 INJECTION INTRAMUSCULAR; INTRAVENOUS EVERY 12 HOURS
Status: DISCONTINUED | OUTPATIENT
Start: 2024-07-31 | End: 2024-08-01

## 2024-07-31 RX ORDER — ISOSORBIDE DINITRATE 10 MG/1
20 TABLET ORAL
Status: DISCONTINUED | OUTPATIENT
Start: 2024-07-31 | End: 2024-08-05

## 2024-07-31 SDOH — SOCIAL STABILITY: SOCIAL INSECURITY: ARE YOU OR HAVE YOU BEEN THREATENED OR ABUSED PHYSICALLY, EMOTIONALLY, OR SEXUALLY BY ANYONE?: NO

## 2024-07-31 SDOH — SOCIAL STABILITY: SOCIAL INSECURITY: ABUSE: ADULT

## 2024-07-31 SDOH — SOCIAL STABILITY: SOCIAL INSECURITY: DO YOU FEEL ANYONE HAS EXPLOITED OR TAKEN ADVANTAGE OF YOU FINANCIALLY OR OF YOUR PERSONAL PROPERTY?: NO

## 2024-07-31 SDOH — SOCIAL STABILITY: SOCIAL INSECURITY: HAVE YOU HAD THOUGHTS OF HARMING ANYONE ELSE?: NO

## 2024-07-31 SDOH — SOCIAL STABILITY: SOCIAL INSECURITY: DO YOU FEEL UNSAFE GOING BACK TO THE PLACE WHERE YOU ARE LIVING?: NO

## 2024-07-31 SDOH — SOCIAL STABILITY: SOCIAL INSECURITY: DOES ANYONE TRY TO KEEP YOU FROM HAVING/CONTACTING OTHER FRIENDS OR DOING THINGS OUTSIDE YOUR HOME?: NO

## 2024-07-31 SDOH — SOCIAL STABILITY: SOCIAL INSECURITY: HAS ANYONE EVER THREATENED TO HURT YOUR FAMILY OR YOUR PETS?: NO

## 2024-07-31 SDOH — SOCIAL STABILITY: SOCIAL INSECURITY: ARE THERE ANY APPARENT SIGNS OF INJURIES/BEHAVIORS THAT COULD BE RELATED TO ABUSE/NEGLECT?: NO

## 2024-07-31 SDOH — SOCIAL STABILITY: SOCIAL INSECURITY: WERE YOU ABLE TO COMPLETE ALL THE BEHAVIORAL HEALTH SCREENINGS?: YES

## 2024-07-31 SDOH — SOCIAL STABILITY: SOCIAL INSECURITY: HAVE YOU HAD ANY THOUGHTS OF HARMING ANYONE ELSE?: NO

## 2024-07-31 ASSESSMENT — COGNITIVE AND FUNCTIONAL STATUS - GENERAL
MOBILITY SCORE: 24
DAILY ACTIVITIY SCORE: 24
MOBILITY SCORE: 24
MOBILITY SCORE: 24
DAILY ACTIVITIY SCORE: 24
MOBILITY SCORE: 24
PATIENT BASELINE BEDBOUND: NO
DAILY ACTIVITIY SCORE: 24
DAILY ACTIVITIY SCORE: 24

## 2024-07-31 ASSESSMENT — PAIN SCALES - PAIN ASSESSMENT IN ADVANCED DEMENTIA (PAINAD)
TOTALSCORE: 0
CONSOLABILITY: NO NEED TO CONSOLE
BODYLANGUAGE: RELAXED
BREATHING: NORMAL
FACIALEXPRESSION: SMILING OR INEXPRESSIVE

## 2024-07-31 ASSESSMENT — PAIN SCALES - GENERAL
PAINLEVEL_OUTOF10: 0 - NO PAIN

## 2024-07-31 ASSESSMENT — ACTIVITIES OF DAILY LIVING (ADL)
DRESSING YOURSELF: INDEPENDENT
JUDGMENT_ADEQUATE_SAFELY_COMPLETE_DAILY_ACTIVITIES: YES
HEARING - LEFT EAR: FUNCTIONAL
BATHING: INDEPENDENT
TOILETING: INDEPENDENT
WALKS IN HOME: INDEPENDENT
ADEQUATE_TO_COMPLETE_ADL: YES
LACK_OF_TRANSPORTATION: YES
FEEDING YOURSELF: INDEPENDENT
GROOMING: INDEPENDENT
HEARING - RIGHT EAR: FUNCTIONAL
PATIENT'S MEMORY ADEQUATE TO SAFELY COMPLETE DAILY ACTIVITIES?: YES

## 2024-07-31 ASSESSMENT — PATIENT HEALTH QUESTIONNAIRE - PHQ9
SUM OF ALL RESPONSES TO PHQ9 QUESTIONS 1 & 2: 0
2. FEELING DOWN, DEPRESSED OR HOPELESS: NOT AT ALL
1. LITTLE INTEREST OR PLEASURE IN DOING THINGS: NOT AT ALL

## 2024-07-31 ASSESSMENT — LIFESTYLE VARIABLES
AUDIT-C TOTAL SCORE: 0
HOW OFTEN DO YOU HAVE 6 OR MORE DRINKS ON ONE OCCASION: NEVER
SKIP TO QUESTIONS 9-10: 1
PRESCIPTION_ABUSE_PAST_12_MONTHS: NO
AUDIT-C TOTAL SCORE: 0
HOW MANY STANDARD DRINKS CONTAINING ALCOHOL DO YOU HAVE ON A TYPICAL DAY: PATIENT DOES NOT DRINK
HOW OFTEN DO YOU HAVE A DRINK CONTAINING ALCOHOL: NEVER
SUBSTANCE_ABUSE_PAST_12_MONTHS: YES

## 2024-07-31 ASSESSMENT — PAIN SCALES - WONG BAKER: WONGBAKER_NUMERICALRESPONSE: NO HURT

## 2024-07-31 NOTE — PROGRESS NOTES
"Subjective Data: Patient lying in bed on supplemental oxygen, he reports continued SOB but mildly improved from PTA. He admits to orthopnea and PND. Denies CP or palpitations at this time. Discussed at length recommendation of medication compliance and drug use cessation. Patient not interested in support for cocaine cessation.  - IV Diuresis with Lasix 40mg BID  - Start Isordil   - Obtain TTE     Overnight Events:    NAEON     Objective Data:  Last Recorded Vitals:  Vitals:    07/31/24 0101 07/31/24 0356 07/31/24 0714 07/31/24 1056   BP: (!) 133/91 (!) 132/95 (!) 137/92 102/78   BP Location: Left arm Right arm     Patient Position: Sitting Sitting     Pulse: 79 70 81 85   Resp: (!) 28 18 18 19   Temp: 36.4 °C (97.5 °F) 36.2 °C (97.2 °F) 36.3 °C (97.3 °F) 36.4 °C (97.5 °F)   TempSrc:       SpO2: 100% 98% 96% 95%   Weight: 76.7 kg (169 lb)      Height: 1.753 m (5' 9\")          Last Labs:  CBC - 7/30/2024:  5:38 PM  7.1 17.2 169    49.2      CMP - 7/30/2024:  5:38 PM  9.0 6.7 28 --- 1.0   3.6 3.9 55 70      PTT - 10/23/2023:  6:51 PM  1.2   13.8 31     TROPHS   Date/Time Value Ref Range Status   07/30/2024 08:15  0 - 53 ng/L Final   07/30/2024 07:31  0 - 53 ng/L Final   02/12/2024 09:47  0 - 53 ng/L Final     Comment:     Previous result verified on 2/12/2024 1836 on specimen/case [id number] called with component TRPHS for procedure Troponin I, High Sensitivity with value 130 ng/L.   02/12/2024 06:11  0 - 53 ng/L Final     Comment:     Previous result verified on 2/12/2024 1836 on specimen/case [id number] called with component TRPHS for procedure Troponin I, High Sensitivity with value 130 ng/L.   02/12/2024 12:39  0 - 53 ng/L Final     BNP   Date/Time Value Ref Range Status   07/30/2024 07:31 AM 3,240 0 - 99 pg/mL Final   02/16/2024 05:33 AM 2,900 0 - 99 pg/mL Final     HGBA1C   Date/Time Value Ref Range Status   07/30/2024 07:31 AM 8.4 see below % Final   06/20/2024 07:49 AM " "6.9 4.3 - 5.6 % Final     Comment:     American Diabetes Association guidelines indicate that patients with HgbA1c in the range 5.7-6.4% are at increased risk for development of diabetes, and intervention by lifestyle modification may be beneficial. HgbA1c greater or equal to 6.5% is considered diagnostic of diabetes.   01/09/2024 06:53 AM 10.1 4.3 - 5.6 % Final     Comment:     American Diabetes Association guidelines indicate that patients with HgbA1c in the range 5.7-6.4% are at increased risk for development of diabetes, and intervention by lifestyle modification may be beneficial. HgbA1c greater or equal to 6.5% is considered diagnostic of diabetes.   12/09/2023 08:57 PM 7.6 see below % Final     VLDL   Date/Time Value Ref Range Status   07/25/2023 08:20 AM 18 0 - 40 mg/dL Final   06/18/2023 06:27 AM 29 0 - 40 mg/dL Final      Last I/O:  I/O last 3 completed shifts:  In: - (0 mL/kg)   Out: 600 (7.8 mL/kg) [Urine:600 (0.2 mL/kg/hr)]  Weight: 76.7 kg     Ejection Fractions:  No results found for: \"EF\"      Inpatient Medications:  Scheduled medications   Medication Dose Route Frequency    aspirin  81 mg oral Daily    atorvastatin  80 mg oral Nightly    [Held by provider] carvedilol  3.125 mg oral BID    empagliflozin  10 mg oral Daily    enoxaparin  40 mg subcutaneous q24h    furosemide  40 mg intravenous q12h    hydrALAZINE  25 mg oral q8h    insulin glargine  15 Units subcutaneous Nightly    insulin lispro  0-10 Units subcutaneous TID    isosorbide dinitrate  20 mg oral TID    sertraline  100 mg oral Daily    spironolactone  12.5 mg oral Daily    tiotropium  2 puff inhalation Daily     PRN medications   Medication    dextrose    dextrose    glucagon    glucagon    polyethylene glycol     Continuous Medications   Medication Dose Last Rate       Physical Exam:  General: NAD, alert  Skin: warm and dry throughout, no abrasions or ecchymosis  Head/ neck: + JVD at 45 degrees  Cardiac: RRR, no murmurs or rubs  Pulm: " Diminished breath sounds, + Crackles, on 3L NC  GI: soft, nontender, non-distended, +BS  Extremities: trace-1+ LE edema   Neuro: no focal neuro deficits, A&Ox3  Psych: appropriate mood and behavior, pleasant            Assessment/Plan   Leonel Yu is a 54 y.o. male presenting with PMH of HFrEF (LVEF 15- 20% as of 7/2023) s/p ICD (3/2024), CKD3a, HTN, HLD, LUZ (crack cocaine), former tobacco use, COPD (on nocturnal 2L at baseline), DM2, remote left cerebellar hemorrhagic infarct, medication noncompliance, anxiety, and depression who presented to [facility] today 7/30 with progressive shortness of breath over the last 3 days. Admitted to [facility] for acute on chronic heart failure management.      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 No ischemia or scar in the anterior, septal or anterolateral wall segments. Recommended further ischemic eval with cMRI or LHC  - TTE 7/2023: LV EF 15-20%, abnormal LV diastolic filling, severe eccentric LVH, LV severely dilated  - admit BNP: 3240 (prev. 2900 2/2024)  - tropinemia on admit: 125> 117. No ACS symptoms, EKG without ST changes. Likely 2/2 ADHF  - loaded with  on admit per ED  - DC weight 2/2024: 77kg  - Admit weight: 76.2 kg  - appears fluid overloaded on exam   - admit CXR: no evidence of infiltrate or effusion, emphysematous changes noted  - current meds: spironolactone 12.5 mg daily, hydralazine 25 mg q8, empagliflozin 10 mg daily  - BB held iso +cocaine UDS  - Start Isordil   - 40mg Lasix IV BID, goal for 1.5-2L net  output per day  - 2L fluid restriction, daily standing weights, strict I&O's      Palpitations  VF  - ICD placed 3/2024 (unclear where ICD was placed, not in our system)  - EKG on admit: NSR with sinus arrhythmia, right axis deviation, biventricular hypertrophy  - Device interrogation on admit:   --7/26/24; VF detected at 217 BPM, successful Tx wit ATP X1  --7/27/24: VF detected at 220 BPM successful Tx with ATP X1     HTN  - Controlled  - Continue Hydralazine  - Start Isordil  - Hold BB with cocaine use  - Continue [place]     HLD  - lipid panel 6/2024: cholesterol 124, triglyceride 79, HDL 37, LDL 71  - continue atorvastatin 80 mg nightly      Medication noncompliance  Housing insecurity  - 2/2 complex drug regimen, social barriers (homeless, unable to work due to HF symptoms), + cocaine on admit  - unclear which meds patient was taking upon discharge from [facility] last admit; has multiple pill bottles in his room that do not match [facility] DC summary  - will need to determine simple homegoing regimen for patient upon discharge  - offered social work services for homegoing concerns, patient declined at this time      Active Cocaine use  - UDS pos for cocaine, patient admitted last use on 7/28   - Declined thrive consult this admit     Anxiety and Depression  HX SI/HI  - during 6/2023 admit, patient had admitted to suicidal and homicidal ideation towards ex- wife-> psychiatry was consulted and patient was admitted to inpatient psychiatry unit  - has had psychiatry involved in previous admissions for SI  - currently denies SI/HI on admit  - cont Sertraline 100mg daily (was recently increased at [facility] per their psych team)     CKD3a  - Baseline Cr ~1.3-1.5  - Cr at admission in 1.34  - Avoid nephrotoxins and hypotension  - Daily RFP while admitted, diuresis as above     Suspected COPD, stable  - no PFTs on file  - CT Chest 10/2023: emphysematous changes are present; bronchial wall thickening is present; stable right middle lobe  nodule  - on 2L O2 currently; typically wears 2L O2 at night only   - cont Spiriva Respimat while inpatient     DMII  - 6/2024 HgbA1c 6.9%   - per patient, has run out of BG test strips and has not tested recently  - per patient, insulin regimen: lantus 30 units in the AM, SSI with meals  - will start lantus at half home dose 15 units (due to unclear compliance) and can uptitrate as needed  - SSI #2, AC/HS     DVT prophylaxis: subcutaneous lovenox  Dispo: pending further diuresis, GDMT optimization     Code Status:  DNR confirmed on admission with patient; Per patient, okay with intubation but if his heart were to stop, would not want CPR performed.       Peripheral IV 07/30/24 20 G Distal;Right;Ventral Forearm (Active)   Site Assessment Clean;Dry;Intact 07/31/24 0841   Dressing Status Clean 07/31/24 0841   Number of days: 1       Code Status:  DNR    I spent>60 minutes in the professional and overall care of this patient.    Regino Wen PA-C

## 2024-07-31 NOTE — CARE PLAN
The patient's goals for the shift include  decreased shortness of breath throughout shift.    The clinical goals for the shift include Patient will remain comfortable throughout shift    Over the shift, the patient did make progress toward the following goals.       Problem: Heart Failure  Goal: Improved gas exchange this shift  Outcome: Progressing  Goal: Improved urinary output this shift  Outcome: Progressing  Goal: Reduction in peripheral edema within 24 hours  Outcome: Progressing  Goal: Report improvement of dyspnea/breathlessness this shift  Outcome: Progressing  Goal: Weight from fluid excess reduced over 2-3 days, then stabilize  Outcome: Progressing  Goal: Increase self care and/or family involvement in 24 hours  Outcome: Progressing

## 2024-08-01 PROBLEM — I50.9 ACUTE ON CHRONIC CONGESTIVE HEART FAILURE, UNSPECIFIED HEART FAILURE TYPE (MULTI): Status: ACTIVE | Noted: 2024-08-01

## 2024-08-01 LAB
ALBUMIN SERPL BCP-MCNC: 4 G/DL (ref 3.4–5)
ANION GAP SERPL CALC-SCNC: 14 MMOL/L (ref 10–20)
BNP SERPL-MCNC: 1014 PG/ML (ref 0–99)
BUN SERPL-MCNC: 23 MG/DL (ref 6–23)
CALCIUM SERPL-MCNC: 9.4 MG/DL (ref 8.6–10.6)
CHLORIDE SERPL-SCNC: 99 MMOL/L (ref 98–107)
CO2 SERPL-SCNC: 23 MMOL/L (ref 21–32)
CREAT SERPL-MCNC: 1.43 MG/DL (ref 0.5–1.3)
EGFRCR SERPLBLD CKD-EPI 2021: 58 ML/MIN/1.73M*2
ERYTHROCYTE [DISTWIDTH] IN BLOOD BY AUTOMATED COUNT: 14.3 % (ref 11.5–14.5)
GLUCOSE BLD MANUAL STRIP-MCNC: 113 MG/DL (ref 74–99)
GLUCOSE BLD MANUAL STRIP-MCNC: 121 MG/DL (ref 74–99)
GLUCOSE BLD MANUAL STRIP-MCNC: 133 MG/DL (ref 74–99)
GLUCOSE BLD MANUAL STRIP-MCNC: 281 MG/DL (ref 74–99)
GLUCOSE SERPL-MCNC: 143 MG/DL (ref 74–99)
HCT VFR BLD AUTO: 50.7 % (ref 41–52)
HGB BLD-MCNC: 17.2 G/DL (ref 13.5–17.5)
MAGNESIUM SERPL-MCNC: 2.16 MG/DL (ref 1.6–2.4)
MCH RBC QN AUTO: 31.3 PG (ref 26–34)
MCHC RBC AUTO-ENTMCNC: 33.9 G/DL (ref 32–36)
MCV RBC AUTO: 92 FL (ref 80–100)
NRBC BLD-RTO: 0 /100 WBCS (ref 0–0)
PHOSPHATE SERPL-MCNC: 3.7 MG/DL (ref 2.5–4.9)
PLATELET # BLD AUTO: 178 X10*3/UL (ref 150–450)
POTASSIUM SERPL-SCNC: 4 MMOL/L (ref 3.5–5.3)
RBC # BLD AUTO: 5.49 X10*6/UL (ref 4.5–5.9)
SODIUM SERPL-SCNC: 132 MMOL/L (ref 136–145)
WBC # BLD AUTO: 6.7 X10*3/UL (ref 4.4–11.3)

## 2024-08-01 PROCEDURE — 82947 ASSAY GLUCOSE BLOOD QUANT: CPT

## 2024-08-01 PROCEDURE — 2500000001 HC RX 250 WO HCPCS SELF ADMINISTERED DRUGS (ALT 637 FOR MEDICARE OP)

## 2024-08-01 PROCEDURE — 2500000002 HC RX 250 W HCPCS SELF ADMINISTERED DRUGS (ALT 637 FOR MEDICARE OP, ALT 636 FOR OP/ED)

## 2024-08-01 PROCEDURE — 83735 ASSAY OF MAGNESIUM: CPT

## 2024-08-01 PROCEDURE — 80069 RENAL FUNCTION PANEL: CPT

## 2024-08-01 PROCEDURE — 2500000004 HC RX 250 GENERAL PHARMACY W/ HCPCS (ALT 636 FOR OP/ED)

## 2024-08-01 PROCEDURE — 2500000001 HC RX 250 WO HCPCS SELF ADMINISTERED DRUGS (ALT 637 FOR MEDICARE OP): Performed by: STUDENT IN AN ORGANIZED HEALTH CARE EDUCATION/TRAINING PROGRAM

## 2024-08-01 PROCEDURE — 94640 AIRWAY INHALATION TREATMENT: CPT

## 2024-08-01 PROCEDURE — 1200000002 HC GENERAL ROOM WITH TELEMETRY DAILY

## 2024-08-01 PROCEDURE — 2500000005 HC RX 250 GENERAL PHARMACY W/O HCPCS: Mod: SE | Performed by: STUDENT IN AN ORGANIZED HEALTH CARE EDUCATION/TRAINING PROGRAM

## 2024-08-01 PROCEDURE — 36415 COLL VENOUS BLD VENIPUNCTURE: CPT

## 2024-08-01 PROCEDURE — 99233 SBSQ HOSP IP/OBS HIGH 50: CPT | Performed by: STUDENT IN AN ORGANIZED HEALTH CARE EDUCATION/TRAINING PROGRAM

## 2024-08-01 PROCEDURE — 85027 COMPLETE CBC AUTOMATED: CPT

## 2024-08-01 RX ORDER — INSULIN GLARGINE 100 [IU]/ML
20 INJECTION, SOLUTION SUBCUTANEOUS NIGHTLY
Status: DISCONTINUED | OUTPATIENT
Start: 2024-08-01 | End: 2024-08-06 | Stop reason: HOSPADM

## 2024-08-01 RX ORDER — AMINOPHYLLINE 25 MG/ML
100 INJECTION, SOLUTION INTRAVENOUS ONCE
OUTPATIENT
Start: 2024-08-01 | End: 2024-08-01

## 2024-08-01 RX ORDER — ACETAMINOPHEN 500 MG
5 TABLET ORAL NIGHTLY
Status: DISCONTINUED | OUTPATIENT
Start: 2024-08-01 | End: 2024-08-06 | Stop reason: HOSPADM

## 2024-08-01 RX ORDER — ACETAMINOPHEN 325 MG/1
975 TABLET ORAL EVERY 8 HOURS PRN
Status: DISCONTINUED | OUTPATIENT
Start: 2024-08-01 | End: 2024-08-06 | Stop reason: HOSPADM

## 2024-08-01 RX ORDER — HYDRALAZINE HYDROCHLORIDE 10 MG/1
50 TABLET, FILM COATED ORAL EVERY 8 HOURS
Status: DISCONTINUED | OUTPATIENT
Start: 2024-08-01 | End: 2024-08-04

## 2024-08-01 RX ORDER — REGADENOSON 0.08 MG/ML
0.4 INJECTION, SOLUTION INTRAVENOUS
OUTPATIENT
Start: 2024-08-01

## 2024-08-01 RX ORDER — FUROSEMIDE 10 MG/ML
40 INJECTION INTRAMUSCULAR; INTRAVENOUS
Status: DISCONTINUED | OUTPATIENT
Start: 2024-08-01 | End: 2024-08-02

## 2024-08-01 ASSESSMENT — PAIN SCALES - WONG BAKER: WONGBAKER_NUMERICALRESPONSE: NO HURT

## 2024-08-01 ASSESSMENT — PAIN SCALES - PAIN ASSESSMENT IN ADVANCED DEMENTIA (PAINAD)
FACIALEXPRESSION: SMILING OR INEXPRESSIVE
BREATHING: NORMAL
TOTALSCORE: 0
CONSOLABILITY: NO NEED TO CONSOLE
BODYLANGUAGE: RELAXED

## 2024-08-01 ASSESSMENT — COGNITIVE AND FUNCTIONAL STATUS - GENERAL
DAILY ACTIVITIY SCORE: 24
MOBILITY SCORE: 24

## 2024-08-01 ASSESSMENT — PAIN SCALES - GENERAL
PAINLEVEL_OUTOF10: 0 - NO PAIN
PAINLEVEL_OUTOF10: 1
PAINLEVEL_OUTOF10: 4

## 2024-08-01 ASSESSMENT — PAIN - FUNCTIONAL ASSESSMENT: PAIN_FUNCTIONAL_ASSESSMENT: 0-10

## 2024-08-01 NOTE — CARE PLAN
The patient's goals for the shift include      The clinical goals for the shift include pt will remain free from injury this shift      Problem: Heart Failure  Goal: Improved gas exchange this shift  Outcome: Progressing  Goal: Improved urinary output this shift  Outcome: Progressing  Goal: Reduction in peripheral edema within 24 hours  Outcome: Progressing  Goal: Report improvement of dyspnea/breathlessness this shift  Outcome: Progressing  Goal: Weight from fluid excess reduced over 2-3 days, then stabilize  Outcome: Progressing  Goal: Increase self care and/or family involvement in 24 hours  Outcome: Progressing

## 2024-08-01 NOTE — PROGRESS NOTES
8/1/2024  Leonel Yu is a 54 y.o. male on day 0 of admission presenting with Acute on chronic combined systolic and diastolic hrt fail (Multi).  Met with pt.  States he is supposed to use home 02, does not recall  02 supplier.  Concern for homelessness/homegoing.  SW consult placed.  Pt denies any homegoing needs.

## 2024-08-01 NOTE — PROGRESS NOTES
Patient is discussed during interdisciplinary round today.     Plan per medical team : Pt is not medically ready to be discharged currently.   Planned disposition : TBD  Discharge destination : TBD  Payor : Kamar LIU met with pt at bedside to complete initial assessment for offering support and obtaining information for pt's discharge plan.  Pt is alert and fully oriented.   Pt reported, pt lives with his friend, Elizabet at Elizabet's house. Pt declines any family member for his primary contact, and offered Elizabet's phone number to NOE. NOE will update pt's contact on Epic.   Pt is independent with his ADLs, but being assisted with IADLs by Elizabet. Pt has no vehicle, and not utilizing any transportation support service, and Elizabet usually takes care of pt's IADLs, such as grocery shopping and cooking. When NOE asked how pt handles transportation needs, pt stated he rarely goes out, so no need of transportation.   PT/OT pending and no recommended dc level of care as of now. Pt denied any further issues or questions on social work at the moment of assessment. NOE will continue to follow and assist.     Bernard Torrez, BLAKE, [facility]     08/01/24 8203   Discharge Planning   Living Arrangements Friends   Support Systems Friends/neighbors   Assistance Needed independent with ADLs and dependent with IADLs.   Type of Residence Private residence   Number of Stairs to Enter Residence 5   Number of Stairs Within Residence 12   Do you have animals or pets at home? No   Who is requesting discharge planning? Provider   Home or Post Acute Services Other (Comment)   Does the patient need discharge transport arranged? Yes   RoundTrip coordination needed? Yes

## 2024-08-01 NOTE — PROGRESS NOTES
Subjective Data:     Patient sitting up in room, eating breakfast. Remains on supplemental O2. Trace LE edema, +distended abdomen. Reports he can lie flat now but is still feeling quite dyspneic with activity.     - refused lasix overnight due to leg cramping-> agreeable to take IV lasix this AM with tylenol  - PT/OT eval  - discussed at length the severity of HF with patient; he is not interested in substance abuse cessation at this time and vocalizes he is okay if his heart stops because of his substance use  - hydralazine increased to 59 mg q8    Overnight Events:    None     Objective Data:  Last Recorded Vitals:  Vitals:    07/31/24 2058 07/31/24 2329 08/01/24 0418 08/01/24 0848   BP: 123/82 118/73 (!) 134/100 121/81   BP Location:       Patient Position:       Pulse: 90 87 74 79   Resp: 17 16 20 18   Temp: 36.3 °C (97.3 °F) 36.7 °C (98.1 °F) 36.2 °C (97.2 °F) 36.3 °C (97.3 °F)   TempSrc:       SpO2: 97% 96% 92% 98%   Weight:       Height:           Last Labs:  CBC - 7/31/2024:  6:51 PM  8.2 16.4 170    47.5      CMP - 7/31/2024:  6:51 PM  9.2 6.7 28 --- 1.0   4.1 3.6 55 70      PTT - 10/23/2023:  6:51 PM  1.2   13.8 31     TROPHS   Date/Time Value Ref Range Status   07/30/2024 08:15  0 - 53 ng/L Final   07/30/2024 07:31  0 - 53 ng/L Final   02/12/2024 09:47  0 - 53 ng/L Final     Comment:     Previous result verified on 2/12/2024 1836 on specimen/case [id number] called with component TRPHS for procedure Troponin I, High Sensitivity with value 130 ng/L.   02/12/2024 06:11  0 - 53 ng/L Final     Comment:     Previous result verified on 2/12/2024 1836 on specimen/case [id number] called with component TRPHS for procedure Troponin I, High Sensitivity with value 130 ng/L.   02/12/2024 12:39  0 - 53 ng/L Final     BNP   Date/Time Value Ref Range Status   07/30/2024 07:31 AM 3,240 0 - 99 pg/mL Final   02/16/2024 05:33 AM 2,900 0 - 99 pg/mL Final     HGBA1C   Date/Time Value Ref  Range Status   07/30/2024 07:31 AM 8.4 see below % Final   06/20/2024 07:49 AM 6.9 4.3 - 5.6 % Final     Comment:     American Diabetes Association guidelines indicate that patients with HgbA1c in the range 5.7-6.4% are at increased risk for development of diabetes, and intervention by lifestyle modification may be beneficial. HgbA1c greater or equal to 6.5% is considered diagnostic of diabetes.   01/09/2024 06:53 AM 10.1 4.3 - 5.6 % Final     Comment:     American Diabetes Association guidelines indicate that patients with HgbA1c in the range 5.7-6.4% are at increased risk for development of diabetes, and intervention by lifestyle modification may be beneficial. HgbA1c greater or equal to 6.5% is considered diagnostic of diabetes.   12/09/2023 08:57 PM 7.6 see below % Final     VLDL   Date/Time Value Ref Range Status   07/25/2023 08:20 AM 18 0 - 40 mg/dL Final   06/18/2023 06:27 AM 29 0 - 40 mg/dL Final      Last I/O:  I/O last 3 completed shifts:  In: 560 (7.3 mL/kg) [P.O.:560]  Out: 3450 (45 mL/kg) [Urine:3450 (1.3 mL/kg/hr)]  Weight: 76.7 kg     Ejection Fractions:  EF   Date/Time Value Ref Range Status   07/31/2024 03:07 PM 10 %          Inpatient Medications:  Scheduled medications   Medication Dose Route Frequency    aspirin  81 mg oral Daily    atorvastatin  80 mg oral Nightly    [Held by provider] carvedilol  3.125 mg oral BID    empagliflozin  10 mg oral Daily    enoxaparin  40 mg subcutaneous q24h    furosemide  40 mg intravenous BID AC    hydrALAZINE  25 mg oral q8h    insulin glargine  15 Units subcutaneous Nightly    insulin lispro  0-10 Units subcutaneous TID    isosorbide dinitrate  20 mg oral TID    sertraline  100 mg oral Daily    spironolactone  12.5 mg oral Daily    tiotropium  2 puff inhalation Daily     PRN medications   Medication    dextrose    dextrose    glucagon    glucagon    polyethylene glycol     Continuous Medications   Medication Dose Last Rate     Physical Exam  Constitutional:        General: He is not in acute distress.     Appearance: Normal appearance.   HENT:      Head:      Comments: Multiple missing teeth     Mouth/Throat:      Mouth: Mucous membranes are moist.   Neck:      Vascular: JVD present.   Cardiovascular:      Rate and Rhythm: Normal rate and regular rhythm.      Heart sounds: Normal heart sounds.      Comments: 5 beat NSVT overnight    Trace BL LE edema  Pulmonary:      Breath sounds: Normal breath sounds.      Comments: Conversationally dyspneic  Abdominal:      General: There is distension.      Tenderness: There is no abdominal tenderness.   Skin:     General: Skin is warm and dry.   Neurological:      General: No focal deficit present.      Mental Status: He is alert and oriented to person, place, and time.   Psychiatric:         Mood and Affect: Mood is depressed.         Behavior: Behavior normal.        Assessment/Plan   Leonel Yu is a 54 y.o. male presenting with PMH of HFrEF (LVEF 15- 20% as of 7/2023) s/p ICD (3/2024), CKD3a, HTN, HLD, LUZ (crack cocaine), former tobacco use, COPD (on nocturnal 2L at baseline), DM2, remote left cerebellar hemorrhagic infarct, medication noncompliance, anxiety, and depression who presented to [facility] today 7/30 with progressive shortness of breath over the last 3 days. Admitted to [facility] for acute on chronic heart failure management.      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 No ischemia or scar in the anterior, septal or anterolateral wall segments. Recommended further ischemic eval  with cMRI or LHC  - TTE 7/2023: LV EF 15-20%, abnormal LV diastolic filling, severe eccentric LVH, LV severely dilated  - TTE 7/31/2024: LV EF 10%, abnormal pattern of DF, LV severely dilated, severe LVH, severely enlarged RV, mild- mod TR  - will defer cMRI to outpatient as patient currently reports his intent is to continue using cocaine after admission -> can follow up in outpatient setting if patient would like to pursue further HF work-up  - admit BNP: 3240 (prev. 2900 2/2024)  - repeat BNP 1014  - tropinemia on admit: 125> 117. No ACS symptoms, EKG without ST changes. Likely 2/2 ADHF  - loaded with  on admit per ED  - DC weight 2/2024: 77kg  - Admit weight: 76.2 kg  - daily weight: pending (76.7kg)  - admit CXR: no evidence of infiltrate or effusion, emphysematous changes noted  - current meds: spironolactone 12.5 mg daily, hydralazine increased to 50 mg q8, empagliflozin 10 mg daily, isordil 20 mg TID  - BB held iso +cocaine UDS  - continue 40mg Lasix IV BID-> of note, refused lasix overnight due to leg cramping, agreeable to take IV lasix this AM with tylenol  - 2L fluid restriction, daily standing weights, strict I&O's      Palpitations  VF  - ICD placed 3/2024 (unclear where ICD was placed, not in our system)  - EKG on admit: NSR with sinus arrhythmia, right axis deviation, biventricular hypertrophy  - Device interrogation on admit:   --7/26/24; VF detected at 217 BPM, successful Tx wit ATP X1  --7/27/24: VF detected at 220 BPM successful Tx with ATP X1  - 5 beat NSVT overnight  - will optimize lytes, Mag >2 and K >4     HTN  - SBP last 24 hour range: 102- 134  - Hydralazine increased to 50 mg q8  - continue isordil 20 mg TID  - Hold BB with cocaine use     HLD  - lipid panel 6/2024: cholesterol 124, triglyceride 79, HDL 37, LDL 71  - continue atorvastatin 80 mg nightly      Medication noncompliance  Housing insecurity  - 2/2 complex drug regimen, social barriers (homeless, unable to work due to HF  symptoms), + cocaine on admit  - unclear which meds patient was taking upon discharge from [facility] last admit; has multiple pill bottles in his room that do not match [facility] DC summary  - will need to determine simple homegoing regimen for patient upon discharge  - offered social work services for homegoing concerns-> initially declined but agreeable to SW involvement now (consult placed)     Active Cocaine use  - UDS pos for cocaine, patient admitted last use on 7/28   - Declined thrive consult this admit     Anxiety and Depression  HX SI/HI  - during 6/2023 admit, patient had admitted to suicidal and homicidal ideation towards ex- wife-> psychiatry was consulted and patient was admitted to inpatient psychiatry unit  - has had psychiatry involved in previous admissions for SI  - currently denies SI/HI on admit  - cont Sertraline 100mg daily (was recently increased at [facility] per their psych team)     CKD3a  - Baseline Cr ~1.3-1.5  - Cr at admission in 1.34  - daily Cr 1.58 (1.36)  - Avoid nephrotoxins and hypotension  - Daily RFP while admitted, diuresis as above     Suspected COPD, stable  - no PFTs on file  - CT Chest 10/2023: emphysematous changes are present; bronchial wall thickening is present; stable right middle lobe nodule  - on 2L O2 currently, weaning as tolerated   - typically wears 2L O2 at night only   - cont Spiriva Respimat while inpatient     DMII  - 6/2024 HgbA1c 6.9%   - per patient, has run out of BG test strips and has not tested recently  - per patient, insulin regimen: lantus 30 units in the AM, SSI with meals  - increased lantus to 20 units, can uptitrate as needed  - SSI #2, AC/HS     DVT prophylaxis: subcutaneous lovenox  Dispo: pending further diuresis, GDMT optimization     Code Status:  DNR confirmed on admission with patient; Per patient, okay with intubation but if his heart were to stop, would not want CPR performed.     Patient seen and discussed with Dr. Roberto Olvera, APRN-CNP

## 2024-08-01 NOTE — CARE PLAN
Problem: Heart Failure  Goal: Improved gas exchange this shift  Outcome: Progressing  Goal: Improved urinary output this shift  Outcome: Progressing  Goal: Reduction in peripheral edema within 24 hours  Outcome: Progressing  Goal: Report improvement of dyspnea/breathlessness this shift  Outcome: Progressing  Goal: Weight from fluid excess reduced over 2-3 days, then stabilize  Outcome: Progressing  Goal: Increase self care and/or family involvement in 24 hours  Outcome: Progressing   The patient's goals for the shift include      The clinical goals for the shift include pt will remain free from injury this shift

## 2024-08-01 NOTE — PROGRESS NOTES
Occupational Therapy                 Therapy Communication Note    Patient Name: Leonel Yu  MRN: [id number]  Today's Date: 8/1/2024     Discipline: Occupational Therapy    Missed Visit Reason: Missed Visit Reason: Other (Comment) (Screen- pt is functioning at reported baseline demonstrating fatigue limiting all daily tasks. Pt was able to ambulate MIN household distance, transfer independently and complete simulated ADLs with independence although reported and demonstrated increased fatigue. Pt educated on OT's role in medication management and coping but pt pleasantly refusing OT services at this time. Pt is safe from a mobility stand point and is able to complete ADLs independently. Pt educated on energy conservation techniques while performing ADLs and IADLs and verbalized understanding. Pt given OT name and educated to reach out to team if interested in receiving OT services for coping and med management.     Missed Time: Attempt    Comment: Pt reports he has many stressors that hinder his ability to manage medications

## 2024-08-01 NOTE — HOSPITAL COURSE
"Leonel Yu is a 54 y.o. male presenting with PMH of HFrEF (LVEF 15- 20% as of 7/2023) s/p ICD (3/2024), CKD3a, HTN, HLD, LUZ (crack cocaine), former tobacco use, COPD (on nocturnal 2L at baseline), DM2, remote left cerebellar hemorrhagic infarct, medication noncompliance, anxiety, and depression who presented to [facility] today 7/30 with progressive shortness of breath and palpitations over the last 3 days. He noticed his lower extremities becoming more edematous and his abdominal swelling increasing over the last week. Reported he can no longer climb a flight of stairs without severe dyspnea and that his heart seemed to be \"racing\" more lately. He denied chest pain but endorsed epigastric discomfort/tightness. He denied early satiety, nausea, vomiting, constipation, or diarrhea. He wears 2L O2 at nighttime as baseline but reported he waswearing it more frequently during the day.      Patient endorsed difficulty with taking medications according to regimen. He stated he takes \"some pills\" in the morning but struggles with medications that are TID or q8 hours. He had multiple pill bottles at the bedside, many are full. He stated he \"took everything\" medication- wise 2 days ago but endorsed it was \"probably too late\" for them to be effective. Mr. Yu has a long noted history of substance abuse with cocaine and endorsed he last ingested cocaine 2 days ago (7/28).     Relevant ED findings:  Cr 1.34/ BUN 23; BNP 3240; troponin 125 > 117; CXR with no evidence of infiltrate or effusion, emphysematous changes.     Of note, Mr. Yu was recently admitted to [facility] 6/20-25 for similar complaints of SOB, where he was diuresed with IV lasix bolus/ infusion. Transitioned to torsemide and started on digoxin. Hydralazine and isordil were started for afterload reduction with plan for Entresto initiation as an outpatient. A R/LHC was attempted but aborted due to patient experiencing acute anxiety episode. It did not appear " that he was discharged with a diuretic. Psychiatry followed patient during that admission and recommended sertraline 100 mg daily.      Floor course:  Diuresed with IV lasix boluses, transitioned to PO. GDMT optimized as indicated.     Device interrogation showed  7/26/24; VF detected at 217 BPM, successful Tx wit ATP X1 and 7/27/24 VF detected at 220 BPM successful Tx with ATP X1.     TTE 7/31 showed LV EF 10%, abnormal pattern of DF, LV severely dilated, severe LVH, severely enlarged RV, mild- mod TR.     cMRI 8/5 showed EF 5-10%, basal/mid inferoseptal, apical septal, apicolateral segments akinetic, all other segments hypokinetic, ecc LVH, no thrombus, CI 1.06, LVEDD 8.2, scar 75%. RV dilated, reduced systolic function. Mild MR. Suggestive of combined NICM (predominant)/ICM.     Patient declined the recommended R/LHC despite being aware of the I/R/B/A as discussed in detail with the team. He will be sent home with medication in hand. Compliance with follow up visits and medications were heavily encouraged as well as cocaine cessation.    Psychiatry consulted for medical optimization and substance abuse resources. They note that patient lacked the capacity to leave AMA. Patient continued on 100mg sertraline daily for mood and provide outpatient resource list for psychiatric follow up after discharge.      Discharge weight: 73.7kg    After all labs and VS were reviewed the decision was made that the patient was medically stable for discharge.  The patient was discharged in satisfactory condition.    More than 60 minutes were spent in coordinating patient discharge.    __________________________________________________________    Telemetry 8/6/2024 (personally reviewed): SR with NSTWI, occ PVCs, 3b NSVT, 5bt SVT, Vpacing during early AM hours     Physical exam:  General: NAD, sitting in bed  Head/ neck: no JVD  Cardiac: RRR, regular S1 S2 , no murmur, no rub, no gallop  Pulm: CTA bilaterally, no wheezes, rales or  rhonchi.    Vascular: Radial 2+ bilaterally  GI: Non distended  Extremities: no LE edema   Neuro: no focal neuro deficits   Psych: appropriate mood and behavior   Skin: warm and dry

## 2024-08-02 LAB
ALBUMIN SERPL BCP-MCNC: 3.8 G/DL (ref 3.4–5)
ANION GAP SERPL CALC-SCNC: 15 MMOL/L (ref 10–20)
BUN SERPL-MCNC: 30 MG/DL (ref 6–23)
CALCIUM SERPL-MCNC: 9.3 MG/DL (ref 8.6–10.6)
CHLORIDE SERPL-SCNC: 97 MMOL/L (ref 98–107)
CO2 SERPL-SCNC: 27 MMOL/L (ref 21–32)
CREAT SERPL-MCNC: 1.66 MG/DL (ref 0.5–1.3)
EGFRCR SERPLBLD CKD-EPI 2021: 48 ML/MIN/1.73M*2
ERYTHROCYTE [DISTWIDTH] IN BLOOD BY AUTOMATED COUNT: 14.4 % (ref 11.5–14.5)
GLUCOSE BLD MANUAL STRIP-MCNC: 244 MG/DL (ref 74–99)
GLUCOSE BLD MANUAL STRIP-MCNC: 270 MG/DL (ref 74–99)
GLUCOSE BLD MANUAL STRIP-MCNC: 90 MG/DL (ref 74–99)
GLUCOSE BLD MANUAL STRIP-MCNC: 95 MG/DL (ref 74–99)
GLUCOSE SERPL-MCNC: 159 MG/DL (ref 74–99)
HCT VFR BLD AUTO: 51.2 % (ref 41–52)
HGB BLD-MCNC: 17.5 G/DL (ref 13.5–17.5)
MAGNESIUM SERPL-MCNC: 2.27 MG/DL (ref 1.6–2.4)
MCH RBC QN AUTO: 31.4 PG (ref 26–34)
MCHC RBC AUTO-ENTMCNC: 34.2 G/DL (ref 32–36)
MCV RBC AUTO: 92 FL (ref 80–100)
NRBC BLD-RTO: 0 /100 WBCS (ref 0–0)
PHOSPHATE SERPL-MCNC: 5.4 MG/DL (ref 2.5–4.9)
PLATELET # BLD AUTO: 190 X10*3/UL (ref 150–450)
POTASSIUM SERPL-SCNC: 4 MMOL/L (ref 3.5–5.3)
RBC # BLD AUTO: 5.57 X10*6/UL (ref 4.5–5.9)
SODIUM SERPL-SCNC: 135 MMOL/L (ref 136–145)
WBC # BLD AUTO: 7.5 X10*3/UL (ref 4.4–11.3)

## 2024-08-02 PROCEDURE — 2500000004 HC RX 250 GENERAL PHARMACY W/ HCPCS (ALT 636 FOR OP/ED)

## 2024-08-02 PROCEDURE — 2500000001 HC RX 250 WO HCPCS SELF ADMINISTERED DRUGS (ALT 637 FOR MEDICARE OP)

## 2024-08-02 PROCEDURE — 97161 PT EVAL LOW COMPLEX 20 MIN: CPT | Mod: GP

## 2024-08-02 PROCEDURE — 82947 ASSAY GLUCOSE BLOOD QUANT: CPT

## 2024-08-02 PROCEDURE — 2500000001 HC RX 250 WO HCPCS SELF ADMINISTERED DRUGS (ALT 637 FOR MEDICARE OP): Performed by: STUDENT IN AN ORGANIZED HEALTH CARE EDUCATION/TRAINING PROGRAM

## 2024-08-02 PROCEDURE — 36415 COLL VENOUS BLD VENIPUNCTURE: CPT

## 2024-08-02 PROCEDURE — 99233 SBSQ HOSP IP/OBS HIGH 50: CPT | Performed by: STUDENT IN AN ORGANIZED HEALTH CARE EDUCATION/TRAINING PROGRAM

## 2024-08-02 PROCEDURE — 83735 ASSAY OF MAGNESIUM: CPT

## 2024-08-02 PROCEDURE — 1200000002 HC GENERAL ROOM WITH TELEMETRY DAILY

## 2024-08-02 PROCEDURE — 2500000002 HC RX 250 W HCPCS SELF ADMINISTERED DRUGS (ALT 637 FOR MEDICARE OP, ALT 636 FOR OP/ED)

## 2024-08-02 PROCEDURE — 80069 RENAL FUNCTION PANEL: CPT

## 2024-08-02 PROCEDURE — 85027 COMPLETE CBC AUTOMATED: CPT

## 2024-08-02 PROCEDURE — 94640 AIRWAY INHALATION TREATMENT: CPT

## 2024-08-02 RX ORDER — FUROSEMIDE 10 MG/ML
40 INJECTION INTRAMUSCULAR; INTRAVENOUS
Status: DISCONTINUED | OUTPATIENT
Start: 2024-08-02 | End: 2024-08-03

## 2024-08-02 RX ORDER — METOPROLOL TARTRATE 25 MG/1
12.5 TABLET, FILM COATED ORAL 2 TIMES DAILY
Status: DISCONTINUED | OUTPATIENT
Start: 2024-08-02 | End: 2024-08-03

## 2024-08-02 ASSESSMENT — COGNITIVE AND FUNCTIONAL STATUS - GENERAL
DAILY ACTIVITIY SCORE: 24
MOBILITY SCORE: 24
MOBILITY SCORE: 22
CLIMB 3 TO 5 STEPS WITH RAILING: A LITTLE
MOBILITY SCORE: 24
WALKING IN HOSPITAL ROOM: A LITTLE
DAILY ACTIVITIY SCORE: 24

## 2024-08-02 ASSESSMENT — PAIN SCALES - GENERAL
PAINLEVEL_OUTOF10: 0 - NO PAIN
PAINLEVEL_OUTOF10: 0 - NO PAIN
PAINLEVEL_OUTOF10: 4
PAINLEVEL_OUTOF10: 5 - MODERATE PAIN
PAINLEVEL_OUTOF10: 0 - NO PAIN

## 2024-08-02 ASSESSMENT — PAIN - FUNCTIONAL ASSESSMENT: PAIN_FUNCTIONAL_ASSESSMENT: 0-10

## 2024-08-02 ASSESSMENT — PAIN SCALES - WONG BAKER: WONGBAKER_NUMERICALRESPONSE: NO HURT

## 2024-08-02 ASSESSMENT — PAIN DESCRIPTION - LOCATION: LOCATION: LEG

## 2024-08-02 NOTE — PROGRESS NOTES
SW met with pt at bedside to offer support and consult pt's discharge plan. Pt is alert and fully oriented.   Pt is recommended for low intensity, and agreeable with [facility]. Pt reported he doesn't have any available phone right now, so homecare staff should contact pt's friend, Liseth, the house owner to discuss [facility] visitation. Pt is anticipated to be ready to dc tomorrow, and Liseth will be here to , per pt.   SW offered information of [facility], but pt declines. Pt stated he has been through several programs for his substance abuse, but it didn't work, just gave him so much stress. Pt mentioned he just wants to stay in peace, and likes to go back to the place peacefully. Pt denied any further issues or questions on social work at the moment, and SW will continue to follow and assist.     Bernard Torrez, PEDROA, LSW

## 2024-08-02 NOTE — CARE PLAN
Problem: Heart Failure  Goal: Improved gas exchange this shift  Outcome: Progressing  Goal: Improved urinary output this shift  Outcome: Progressing  Goal: Reduction in peripheral edema within 24 hours  Outcome: Progressing  Goal: Report improvement of dyspnea/breathlessness this shift  Outcome: Progressing  Goal: Weight from fluid excess reduced over 2-3 days, then stabilize  Outcome: Progressing  Goal: Increase self care and/or family involvement in 24 hours  Outcome: Progressing   The patient's goals for the shift include      The clinical goals for the shift include patient will be compliant with oxygen through my shift.

## 2024-08-02 NOTE — PROGRESS NOTES
Physical Therapy    Physical Therapy Evaluation    Patient Name: Leonel Yu  MRN: [id number]  Today's Date: 8/2/2024   Time Calculation  Start Time: 0910  Stop Time: 0931  Time Calculation (min): 21 min    Assessment/Plan   PT Assessment  PT Assessment Results: Decreased endurance, Decreased mobility, Impaired judgement  Rehab Prognosis: Good  Barriers to Discharge: Medical acuity  Evaluation/Treatment Tolerance: Patient limited by fatigue (Shortness of breath)  End of Session Communication: Bedside nurse  Assessment Comment: Previously independent 54 y.o. male presents with acute on chronic combined systolic and diastolic heart failure. Pt able to ambulate 150 this session, but having to stop multiple times for rest breaks and needing to sit down upon return to room. Pt is appropriate for Low Intensity Rehab after D/C to address above impairments.  End of Session Patient Position: Up in room (Sitting on bench, NC donned)  IP OR SWING BED PT PLAN  Inpatient or Swing Bed: Inpatient  PT Plan  Treatment/Interventions: Bed mobility, Transfer training, Gait training, Stair training, Strengthening, Endurance training, Therapeutic exercise, Therapeutic activity  PT Plan: Ongoing PT  PT Frequency: 3 times per week  PT Discharge Recommendations: Low intensity level of continued care  PT Recommended Transfer Status: Stand by assist  PT - OK to Discharge: Yes      Subjective   General Visit Information:  General  Reason for Referral: Acute on chronic combined systolic and diastolic heart failure  Past Medical History Relevant to Rehab: HFrEF, s/p ICD (3/2024), CKD3, HTN, HLD, LUZ (crack cocaine), tobacco use, COPD (on 2L at baseline), DM2 medication noncompliance, anxiety depression  Missed Visit: No  Missed Visit Reason:  (--)  Family/Caregiver Present: No  Prior to Session Communication: Bedside nurse  Patient Position Received: Up in room (Sitting on bench)  Preferred Learning Style: auditory, verbal, visual  General  Comment: Pt pleasant and agreeable to PT services  Home Living:  Home Living  Type of Home: House  Lives With: Other (Comment) (Roomate)  Home Adaptive Equipment: None  Home Layout: Two level, Able to live on main level with bedroom/bathroom, Laundry in basement  Home Access: Stairs to enter with rails  Entrance Stairs-Rails: Right  Entrance Stairs-Number of Steps: 5  Bathroom Shower/Tub: Tub/shower unit  Bathroom Toilet: Standard  Bathroom Equipment: None  Prior Level of Function:  Prior Function Per Pt/Caregiver Report  Level of [place]: Independent with ADLs and functional transfers  Ambulatory Assistance: Independent  Precautions:  Precautions  Medical Precautions: Oxygen therapy device and L/min (Pt not wearing O2 upon entry to room. On 3L O2 during session)    Objective   Pain:  Pain Assessment  Pain Assessment: 0-10  0-10 (Numeric) Pain Score: 0 - No pain  Cognition:  Cognition  Orientation Level: Oriented X4    General Assessments:    Activity Tolerance  Endurance: Tolerates 10 - 20 min exercise with multiple rests (Pt requiring breaks 2/2 SOB)  Early Mobility/Exercise Safety Screen: Proceed with mobilization - No exclusion criteria met    Sensation  Light Touch: No apparent deficits    Coordination  Movements are Fluid and Coordinated: Yes    Postural Control  Postural Control: Within Functional Limits    Static Sitting Balance  Static Sitting-Level of Assistance: Distant supervision    Static Standing Balance  Static Standing-Level of Assistance: Close supervision  Functional Assessments:    Transfers  Transfer: Yes  Transfer 1  Transfer From 1: Bed to  Transfer to 1: Stand  Technique 1: Sit to stand  Transfer Level of Assistance 1: Close supervision, Minimal verbal cues  Transfers 2  Transfer From 2: Stand to  Transfer to 2: Bed  Technique 2: Stand to sit  Transfer Level of Assistance 2: Close supervision, Minimal verbal cues    Ambulation/Gait Training  Ambulation/Gait Training Performed:  Yes  Ambulation/Gait Training 1  Surface 1: Level tile, Carpet  Device 1: No device  Assistance 1: Close supervision, Minimal verbal cues  Quality of Gait 1: Diminished heel strike, Inconsistent stride length, Decreased step length  Comments/Distance (ft) 1: 150ft total, pt requiring 2 standing rest breaks for ~30s during ambulation    Stairs  Stairs: No    Extremity/Trunk Assessments:    RLE   RLE : Within Functional Limits  LLE   LLE : Within Functional Limits  Outcome Measures:  [facility] Basic Mobility  Turning from your back to your side while in a flat bed without using bedrails: None  Moving from lying on your back to sitting on the side of a flat bed without using bedrails: None  Moving to and from bed to chair (including a wheelchair): None  Standing up from a chair using your arms (e.g. wheelchair or bedside chair): None  To walk in hospital room: A little  Climbing 3-5 steps with railing: A little  Basic Mobility - Total Score: 22    Encounter Problems       Encounter Problems (Active)       Mobility       LTG - Patient will ambulate community distance indep with LRD without stopping for rest breaks (Progressing)       Start:  08/02/24    Expected End:  08/16/24            LTG - Patient will navigate 4-6 steps indep with rails/device (Progressing)       Start:  08/02/24    Expected End:  08/16/24               PT Transfers       STG - Transfer from bed to chair indep with LRD (Progressing)       Start:  08/02/24    Expected End:  08/16/24                   Education Documentation  Body Mechanics, taught by Patience Feldman PT at 8/2/2024 10:21 AM.  Learner: Patient  Readiness: Acceptance  Method: Explanation  Response: Verbalizes Understanding    Precautions, taught by Patience Feldman PT at 8/2/2024 10:21 AM.  Learner: Patient  Readiness: Acceptance  Method: Explanation  Response: Verbalizes Understanding    Mobility Training, taught by Patience Feldman PT at 8/2/2024 10:21 AM.  Learner: Patient  Readiness:  Acceptance  Method: Explanation  Response: Verbalizes Understanding    Education Comments  No comments found.

## 2024-08-02 NOTE — PROGRESS NOTES
Transitional Care Coordination Progress Note    Team members: TCC, HHVI    Discharge disposition: Home with [facility] ([facility])    Status: IP    Payer: [facility] [place]    Potential Barriers: SW to see pt today and discuss safe dispo plan and if pt agreeable to [facility] ([facility])    7010  notified HHVI to place [facility] referral --> primary contact for scheduling appt's is:  Elizabet [place] - [phone]       ADOD: 8/3    This TCC will continue to follow for home going needs and safe DC plan.    Ana Kim RN

## 2024-08-03 ENCOUNTER — TELEPHONE (OUTPATIENT)
Dept: HOME HEALTH SERVICES | Facility: HOME HEALTH | Age: 54
End: 2024-08-03
Payer: COMMERCIAL

## 2024-08-03 ENCOUNTER — DOCUMENTATION (OUTPATIENT)
Dept: HOME HEALTH SERVICES | Facility: HOME HEALTH | Age: 54
End: 2024-08-03
Payer: COMMERCIAL

## 2024-08-03 LAB
ALBUMIN SERPL BCP-MCNC: 3.8 G/DL (ref 3.4–5)
ANION GAP SERPL CALC-SCNC: 14 MMOL/L (ref 10–20)
BUN SERPL-MCNC: 27 MG/DL (ref 6–23)
CALCIUM SERPL-MCNC: 9.2 MG/DL (ref 8.6–10.6)
CHLORIDE SERPL-SCNC: 96 MMOL/L (ref 98–107)
CO2 SERPL-SCNC: 27 MMOL/L (ref 21–32)
CREAT SERPL-MCNC: 1.55 MG/DL (ref 0.5–1.3)
EGFRCR SERPLBLD CKD-EPI 2021: 53 ML/MIN/1.73M*2
ERYTHROCYTE [DISTWIDTH] IN BLOOD BY AUTOMATED COUNT: 14.2 % (ref 11.5–14.5)
GLUCOSE BLD MANUAL STRIP-MCNC: 113 MG/DL (ref 74–99)
GLUCOSE BLD MANUAL STRIP-MCNC: 128 MG/DL (ref 74–99)
GLUCOSE BLD MANUAL STRIP-MCNC: 175 MG/DL (ref 74–99)
GLUCOSE BLD MANUAL STRIP-MCNC: 224 MG/DL (ref 74–99)
GLUCOSE SERPL-MCNC: 150 MG/DL (ref 74–99)
HCT VFR BLD AUTO: 49.7 % (ref 41–52)
HGB BLD-MCNC: 16.7 G/DL (ref 13.5–17.5)
MAGNESIUM SERPL-MCNC: 2.28 MG/DL (ref 1.6–2.4)
MCH RBC QN AUTO: 30.8 PG (ref 26–34)
MCHC RBC AUTO-ENTMCNC: 33.6 G/DL (ref 32–36)
MCV RBC AUTO: 92 FL (ref 80–100)
NRBC BLD-RTO: 0 /100 WBCS (ref 0–0)
PHOSPHATE SERPL-MCNC: 3.8 MG/DL (ref 2.5–4.9)
PLATELET # BLD AUTO: 185 X10*3/UL (ref 150–450)
POTASSIUM SERPL-SCNC: 4.4 MMOL/L (ref 3.5–5.3)
RBC # BLD AUTO: 5.42 X10*6/UL (ref 4.5–5.9)
SODIUM SERPL-SCNC: 133 MMOL/L (ref 136–145)
WBC # BLD AUTO: 6.4 X10*3/UL (ref 4.4–11.3)

## 2024-08-03 PROCEDURE — 36415 COLL VENOUS BLD VENIPUNCTURE: CPT

## 2024-08-03 PROCEDURE — 2500000001 HC RX 250 WO HCPCS SELF ADMINISTERED DRUGS (ALT 637 FOR MEDICARE OP)

## 2024-08-03 PROCEDURE — 2500000001 HC RX 250 WO HCPCS SELF ADMINISTERED DRUGS (ALT 637 FOR MEDICARE OP): Performed by: STUDENT IN AN ORGANIZED HEALTH CARE EDUCATION/TRAINING PROGRAM

## 2024-08-03 PROCEDURE — 1200000002 HC GENERAL ROOM WITH TELEMETRY DAILY

## 2024-08-03 PROCEDURE — 82947 ASSAY GLUCOSE BLOOD QUANT: CPT

## 2024-08-03 PROCEDURE — 2500000004 HC RX 250 GENERAL PHARMACY W/ HCPCS (ALT 636 FOR OP/ED)

## 2024-08-03 PROCEDURE — 83735 ASSAY OF MAGNESIUM: CPT

## 2024-08-03 PROCEDURE — 2500000002 HC RX 250 W HCPCS SELF ADMINISTERED DRUGS (ALT 637 FOR MEDICARE OP, ALT 636 FOR OP/ED)

## 2024-08-03 PROCEDURE — 99233 SBSQ HOSP IP/OBS HIGH 50: CPT | Performed by: STUDENT IN AN ORGANIZED HEALTH CARE EDUCATION/TRAINING PROGRAM

## 2024-08-03 PROCEDURE — 80069 RENAL FUNCTION PANEL: CPT

## 2024-08-03 PROCEDURE — 85027 COMPLETE CBC AUTOMATED: CPT

## 2024-08-03 PROCEDURE — 94640 AIRWAY INHALATION TREATMENT: CPT

## 2024-08-03 RX ORDER — POTASSIUM CHLORIDE 750 MG/1
20 TABLET, FILM COATED, EXTENDED RELEASE ORAL ONCE
Status: COMPLETED | OUTPATIENT
Start: 2024-08-03 | End: 2024-08-03

## 2024-08-03 RX ORDER — CARVEDILOL 3.12 MG/1
3.12 TABLET ORAL 2 TIMES DAILY
Status: DISCONTINUED | OUTPATIENT
Start: 2024-08-03 | End: 2024-08-04

## 2024-08-03 RX ORDER — FUROSEMIDE 20 MG/1
20 TABLET ORAL DAILY
Status: DISCONTINUED | OUTPATIENT
Start: 2024-08-04 | End: 2024-08-04

## 2024-08-03 ASSESSMENT — PAIN DESCRIPTION - LOCATION: LOCATION: LEG

## 2024-08-03 ASSESSMENT — COGNITIVE AND FUNCTIONAL STATUS - GENERAL
DAILY ACTIVITIY SCORE: 24
MOBILITY SCORE: 24
DAILY ACTIVITIY SCORE: 24
MOBILITY SCORE: 24

## 2024-08-03 ASSESSMENT — PAIN - FUNCTIONAL ASSESSMENT: PAIN_FUNCTIONAL_ASSESSMENT: 0-10

## 2024-08-03 ASSESSMENT — PAIN SCALES - GENERAL
PAINLEVEL_OUTOF10: 8
PAINLEVEL_OUTOF10: 0 - NO PAIN

## 2024-08-03 ASSESSMENT — PAIN DESCRIPTION - ORIENTATION: ORIENTATION: RIGHT;LEFT

## 2024-08-03 NOTE — PROGRESS NOTES
[facility] unable to accept pt due to staffing in pt's area. Will discuss sending referrals to other HC agencies or outpt therapy with pt. Medical team aware.   Per medical team pt will need MRI, ADOD 1-3 days. Will continue to follow.    1136-Per medical team new ADOD is 8/5.   1308-Pt does not have a current PCP so [facility] discussed outpt PT/OT with pt and pt agreeable. [facility]CC updated medical team.

## 2024-08-03 NOTE — CARE PLAN
The patient's goals for the shift include  wean off oxygen today    The clinical goals for the shift include pt will remain HDS and remain comfortale throughout shift    Over the shift, the patient has been up walking about the room. Remains on room air. Denies sob or cp.  Tolerating diet. Blood sugars stable. Pt using call light for assistance

## 2024-08-03 NOTE — SIGNIFICANT EVENT
08/02/24 1546   Onset Documentation   Rapid Response Initiated By Radar auto page   Pager Time 1546   Arrival Time 1615   Event End Time 1620   Primary Reason for Call Radar auto page     RADAR page auto generated from VS -see documented VS. Radar score 8. Message to nurse and pt moves around a lot and sometimes not accurate pulse ox- recheck and pt 96%. No concerns at this time.  Pt ok to remain on the floor for continued monitoring-bedside nurse will call with any concerns.

## 2024-08-03 NOTE — HH CARE COORDINATION
Home Care received a referral for Nursing, Physical Therapy, Occupational Therapy, and Medical Social Work. Unfortunately, we are unable to accept and process the referral at this time.    Patients, please reach out to the referring provider or your PCP to assist in obtaining an alternative home care agency and/or guidance to meet your needs.    Providers, please reach out to  Home Care with any questions regarding the declined referral.    Hpi Title: Evaluation of Skin Lesions Additional History: Est pt new to [facility] here for FBSE. Pt notes red lesion on her back she would like to be examined. Family hx of NMSC in mother and personal DN

## 2024-08-03 NOTE — TELEPHONE ENCOUNTER
Thank you for your referral to  Home Care. At this time, we are unable to accommodate your patient's needs in a safe and timely manner. In order to help your patient receive quality home care, we have included certified agencies that service this area:         [facility] - P: [phone]; F: [phone]            [facility] - P: [phone]; F:[phone]         You may contact one of these agencies, or an alternate of your choice, to see if they are able to accept your patient.    Thank you,  [facility] Intake

## 2024-08-03 NOTE — PROGRESS NOTES
Subjective Data:   Patient seen and assessed this morning, sitting up on bench, NAD. Denies any CP or SOB. Appears euvolemic, now on RA. After further thought and education, agreeable to to stay for cMRI.     - appears euvolemic, transition to PO Lasix 40 mg daily tomorrow, received 40 mg IV Lasix x1 this morning  - stop metop tartrate, start coreg 3.215 mg BID   - pending cMRI Monday  - per [facility], unable to provide HHC as pt has no PCP, outpatient PT/OT referral at discharge    Overnight Events:    No acute events overnight.     Objective Data:  Last Recorded Vitals:  Vitals:    08/03/24 0600 08/03/24 0753 08/03/24 1123 08/03/24 1125   BP:  111/74 93/61 98/64   Pulse:  66 (!) 46 78   Resp:  19 20 20   Temp:  36.3 °C (97.3 °F) 36.3 °C (97.3 °F) 36.5 °C (97.7 °F)   TempSrc:       SpO2:  93% 95% 95%   Weight: 72.5 kg (159 lb 13.3 oz)      Height:           Last Labs:  Results for orders placed or performed during the hospital encounter of 07/30/24 (from the past 24 hour(s))   POCT GLUCOSE   Result Value Ref Range    POCT Glucose 95 74 - 99 mg/dL   POCT GLUCOSE   Result Value Ref Range    POCT Glucose 244 (H) 74 - 99 mg/dL   CBC   Result Value Ref Range    WBC 7.5 4.4 - 11.3 x10*3/uL    nRBC 0.0 0.0 - 0.0 /100 WBCs    RBC 5.57 4.50 - 5.90 x10*6/uL    Hemoglobin 17.5 13.5 - 17.5 g/dL    Hematocrit 51.2 41.0 - 52.0 %    MCV 92 80 - 100 fL    MCH 31.4 26.0 - 34.0 pg    MCHC 34.2 32.0 - 36.0 g/dL    RDW 14.4 11.5 - 14.5 %    Platelets 190 150 - 450 x10*3/uL   Magnesium   Result Value Ref Range    Magnesium 2.27 1.60 - 2.40 mg/dL   Renal function panel   Result Value Ref Range    Glucose 159 (H) 74 - 99 mg/dL    Sodium 135 (L) 136 - 145 mmol/L    Potassium 4.0 3.5 - 5.3 mmol/L    Chloride 97 (L) 98 - 107 mmol/L    Bicarbonate 27 21 - 32 mmol/L    Anion Gap 15 10 - 20 mmol/L    Urea Nitrogen 30 (H) 6 - 23 mg/dL    Creatinine 1.66 (H) 0.50 - 1.30 mg/dL    eGFR 48 (L) >60 mL/min/1.73m*2    Calcium 9.3 8.6 - 10.6 mg/dL     Phosphorus 5.4 (H) 2.5 - 4.9 mg/dL    Albumin 3.8 3.4 - 5.0 g/dL   POCT GLUCOSE   Result Value Ref Range    POCT Glucose 128 (H) 74 - 99 mg/dL   POCT GLUCOSE   Result Value Ref Range    POCT Glucose 224 (H) 74 - 99 mg/dL        TROPHS   Date/Time Value Ref Range Status   07/30/2024 08:15  0 - 53 ng/L Final   07/30/2024 07:31  0 - 53 ng/L Final   02/12/2024 09:47  0 - 53 ng/L Final     Comment:     Previous result verified on 2/12/2024 1836 on specimen/case [id number] called with component TRPHS for procedure Troponin I, High Sensitivity with value 130 ng/L.   02/12/2024 06:11  0 - 53 ng/L Final     Comment:     Previous result verified on 2/12/2024 1836 on specimen/case [id number] called with component TRPHS for procedure Troponin I, High Sensitivity with value 130 ng/L.   02/12/2024 12:39  0 - 53 ng/L Final     BNP   Date/Time Value Ref Range Status   07/31/2024 06:51 PM 1,014 0 - 99 pg/mL Final   07/30/2024 07:31 AM 3,240 0 - 99 pg/mL Final     HGBA1C   Date/Time Value Ref Range Status   07/30/2024 07:31 AM 8.4 see below % Final   06/20/2024 07:49 AM 6.9 4.3 - 5.6 % Final     Comment:     American Diabetes Association guidelines indicate that patients with HgbA1c in the range 5.7-6.4% are at increased risk for development of diabetes, and intervention by lifestyle modification may be beneficial. HgbA1c greater or equal to 6.5% is considered diagnostic of diabetes.   01/09/2024 06:53 AM 10.1 4.3 - 5.6 % Final     Comment:     American Diabetes Association guidelines indicate that patients with HgbA1c in the range 5.7-6.4% are at increased risk for development of diabetes, and intervention by lifestyle modification may be beneficial. HgbA1c greater or equal to 6.5% is considered diagnostic of diabetes.   12/09/2023 08:57 PM 7.6 see below % Final     VLDL   Date/Time Value Ref Range Status   07/25/2023 08:20 AM 18 0 - 40 mg/dL Final   06/18/2023 06:27 AM 29 0 - 40 mg/dL Final       Last I/O:  I/O last 3 completed shifts:  In: 462 (6.4 mL/kg) [P.O.:462]  Out: 4050 (55.9 mL/kg) [Urine:4050 (1.6 mL/kg/hr)]  Weight: 72.5 kg     Ejection Fractions:  EF   Date/Time Value Ref Range Status   07/31/2024 03:07 PM 10 %          Inpatient Medications:  Scheduled medications   Medication Dose Route Frequency    aspirin  81 mg oral Daily    atorvastatin  80 mg oral Nightly    carvedilol  3.125 mg oral BID    empagliflozin  10 mg oral Daily    enoxaparin  40 mg subcutaneous q24h    [Held by provider] furosemide  40 mg intravenous BID AC    hydrALAZINE  50 mg oral q8h    insulin glargine  20 Units subcutaneous Nightly    insulin lispro  0-10 Units subcutaneous TID    isosorbide dinitrate  20 mg oral TID    melatonin  5 mg oral Nightly    sertraline  100 mg oral Daily    spironolactone  12.5 mg oral Daily    tiotropium  2 puff inhalation Daily     PRN medications   Medication    acetaminophen    dextrose    dextrose    glucagon    glucagon    oxygen    polyethylene glycol     Continuous Medications   Medication Dose Last Rate     Physical Exam  Constitutional:       General: He is not in acute distress.     Appearance: Normal appearance.   HENT:      Head:      Comments: Multiple missing teeth     Mouth/Throat:      Mouth: Mucous membranes are moist.   Neck:      Vascular: No JVD.      Comments: No JVD at 90 degrees  Cardiovascular:      Rate and Rhythm: Normal rate and regular rhythm.      Heart sounds: Normal heart sounds.   Pulmonary:      Effort: Pulmonary effort is normal. No respiratory distress.      Breath sounds: Normal breath sounds. No wheezing.   Abdominal:      Palpations: Abdomen is soft.      Tenderness: There is no abdominal tenderness.   Musculoskeletal:      Right lower leg: No edema.      Left lower leg: No edema.   Skin:     General: Skin is warm and dry.   Neurological:      General: No focal deficit present.      Mental Status: He is alert and oriented to person, place, and time.    Psychiatric:         Mood and Affect: Mood normal.         Behavior: Behavior normal.        Assessment/Plan   Leonel Yu is a 54 y.o. male presenting with PMH of HFrEF (LVEF 15- 20% as of 7/2023) s/p ICD (3/2024), CKD3a, HTN, HLD, LUZ (crack cocaine), former tobacco use, COPD (on nocturnal 2L at baseline), DM2, remote left cerebellar hemorrhagic infarct, medication noncompliance, anxiety, and depression who presented to [facility] today 7/30 with progressive shortness of breath over the last 3 days. Admitted to [facility] for acute on chronic heart failure management.      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 No ischemia or scar in the anterior, septal or anterolateral wall segments. Recommended further ischemic eval with cMRI or LHC  - TTE 7/2023: LV EF 15-20%, abnormal LV diastolic filling, sev eccentric LVH, LV sev dilated  - TTE 7/31/2024: LV EF 10%, abn DF, LV sev dilated, sev LVH, sev enlarged RV, mild- mod TR  - admit BNP: 3240 (prev. 2900 2/2024), repeat (7/31) 1014  - tropinemia on admit: 125> 117. No ACS symptoms, EKG without ST changes. Likely 2/2 ADHF  - loaded with  on admit per ED  - DC weight 2/2024: 77kg  - Admit weight: 76.2 kg  - daily weight: 72.5 (73.1, 76.7kg)  - admit CXR: no infiltrate/effusion, emphysematous changes noted  - episode of Vfib, on telemetry overnight into 8/2, also having NSVT  - appears euvolemic, transition to PO Lasix 40 mg daily tomorrow, received 40 mg IV Lasix x1 this morning  - pending cMRI Monday 8/5  - current GDMT:  spironolactone 12.5 mg daily, hydralazine 50 mg q8, empagliflozin 10 mg daily, isordil 20 mg TID, Coreg 3.125 BID  - 2L fluid restriction, daily standing weights, strict I&O's     Palpitations/VF  - ICD placed 3/2024 (unclear where ICD was placed, not in our system)  - EKG on admit: NSR with sinus arrhythmia, right axis deviation, biventricular hypertrophy  - Device interrogation on admit:   --7/26/24; VF detected at 217 BPM, successful Tx wit ATP X1  --7/27/24: VF detected at 220 BPM successful Tx with ATP X1  - stop metop tartrate, start coreg 3.215 mg BID   - keep Mag >2 and K >4     HTN  - SBP last 24 hour range:   - cont isordil 20 mg TID, hydralazine 50 mg q8h, coreg 3.125 mg BID     HLD  - lipid panel 6/2024: cholesterol 124, triglyceride 79, HDL 37, LDL 71  - continue atorvastatin 80 mg nightly      Medication noncompliance  Housing insecurity  - 2/2 complex drug regimen, social barriers (homeless, unable to work due to HF symptoms), + cocaine on admit  - unclear which meds patient was taking upon discharge from [facility] last admit; has multiple pill bottles in his room that do not match [facility] DC summary  - will need to determine simple homegoing regimen for patient upon discharge  - offered social work services for homegoing concerns-> initially declined but agreeable to SW involvement now (consult placed)     Active Cocaine use  - UDS pos for cocaine, patient admitted last use on 7/28   - Declined thrive consult this admit     Anxiety and Depression  HX SI/HI  - during 6/2023 admit, patient had admitted to suicidal and homicidal ideation towards ex- wife-> psychiatry was consulted and patient was admitted to inpatient psychiatry unit  - has had psychiatry involved in previous admissions for SI  - currently denies SI/HI on admit  - cont Sertraline 100mg daily (was recently increased at [facility] per their psych team)     CKD3a  - Baseline Cr ~1.3-1.5  - Cr at admission in 1.34  - daily Cr 1.66 (1.43, 1.58,  1.36)  - Avoid nephrotoxins and hypotension  - Daily RFP while admitted, medication as above     Suspected COPD, stable  - no PFTs on file  - CT Chest 10/2023: emphysematous changes are present; bronchial wall thickening is present; stable right middle lobe nodule  - typically wears 2L O2 at night only   - cont Spiriva Respimat while inpatient     DMII  - 6/2024 HgbA1c 6.9%   - per patient, has run out of BG test strips and has not tested recently  - per patient, insulin regimen: lantus 30 units daily, SSI with meals  - cont Glargine 20 units nightly, titrate as indicated, SSI #2  -  Accu-Cheks AC/HS, hypoglycemic protocol      DVT ppx: subcutaneous lovenox  Dispo: pending further workup, GDMT optimization  - per [facility], unable to provide HHC as pt has no PCP, outpatient PT/OT/SW referral placed     Code Status: DNR confirmed on admission with patient; Per patient, okay with intubation but if his heart were to stop, would not want CPR performed.     Patient seen and discussed with Dr. Roberto Blanca, APRN-CNP

## 2024-08-04 LAB
ALBUMIN SERPL BCP-MCNC: 3.8 G/DL (ref 3.4–5)
ANION GAP SERPL CALC-SCNC: 17 MMOL/L (ref 10–20)
BUN SERPL-MCNC: 28 MG/DL (ref 6–23)
CALCIUM SERPL-MCNC: 8.9 MG/DL (ref 8.6–10.6)
CHLORIDE SERPL-SCNC: 96 MMOL/L (ref 98–107)
CO2 SERPL-SCNC: 24 MMOL/L (ref 21–32)
CREAT SERPL-MCNC: 1.58 MG/DL (ref 0.5–1.3)
EGFRCR SERPLBLD CKD-EPI 2021: 51 ML/MIN/1.73M*2
ERYTHROCYTE [DISTWIDTH] IN BLOOD BY AUTOMATED COUNT: 14.3 % (ref 11.5–14.5)
GLUCOSE BLD MANUAL STRIP-MCNC: 149 MG/DL (ref 74–99)
GLUCOSE BLD MANUAL STRIP-MCNC: 154 MG/DL (ref 74–99)
GLUCOSE BLD MANUAL STRIP-MCNC: 188 MG/DL (ref 74–99)
GLUCOSE BLD MANUAL STRIP-MCNC: 81 MG/DL (ref 74–99)
GLUCOSE SERPL-MCNC: 205 MG/DL (ref 74–99)
HCT VFR BLD AUTO: 51.2 % (ref 41–52)
HGB BLD-MCNC: 16.9 G/DL (ref 13.5–17.5)
MAGNESIUM SERPL-MCNC: 2.42 MG/DL (ref 1.6–2.4)
MCH RBC QN AUTO: 30.6 PG (ref 26–34)
MCHC RBC AUTO-ENTMCNC: 33 G/DL (ref 32–36)
MCV RBC AUTO: 93 FL (ref 80–100)
NRBC BLD-RTO: 0 /100 WBCS (ref 0–0)
PHOSPHATE SERPL-MCNC: 4.6 MG/DL (ref 2.5–4.9)
PLATELET # BLD AUTO: 194 X10*3/UL (ref 150–450)
POTASSIUM SERPL-SCNC: 4.5 MMOL/L (ref 3.5–5.3)
RBC # BLD AUTO: 5.53 X10*6/UL (ref 4.5–5.9)
SODIUM SERPL-SCNC: 132 MMOL/L (ref 136–145)
WBC # BLD AUTO: 7.4 X10*3/UL (ref 4.4–11.3)

## 2024-08-04 PROCEDURE — 80069 RENAL FUNCTION PANEL: CPT

## 2024-08-04 PROCEDURE — 2500000001 HC RX 250 WO HCPCS SELF ADMINISTERED DRUGS (ALT 637 FOR MEDICARE OP): Performed by: STUDENT IN AN ORGANIZED HEALTH CARE EDUCATION/TRAINING PROGRAM

## 2024-08-04 PROCEDURE — 2500000001 HC RX 250 WO HCPCS SELF ADMINISTERED DRUGS (ALT 637 FOR MEDICARE OP)

## 2024-08-04 PROCEDURE — 94640 AIRWAY INHALATION TREATMENT: CPT

## 2024-08-04 PROCEDURE — 99233 SBSQ HOSP IP/OBS HIGH 50: CPT | Performed by: STUDENT IN AN ORGANIZED HEALTH CARE EDUCATION/TRAINING PROGRAM

## 2024-08-04 PROCEDURE — 2500000002 HC RX 250 W HCPCS SELF ADMINISTERED DRUGS (ALT 637 FOR MEDICARE OP, ALT 636 FOR OP/ED)

## 2024-08-04 PROCEDURE — 83735 ASSAY OF MAGNESIUM: CPT

## 2024-08-04 PROCEDURE — 82947 ASSAY GLUCOSE BLOOD QUANT: CPT

## 2024-08-04 PROCEDURE — 36415 COLL VENOUS BLD VENIPUNCTURE: CPT

## 2024-08-04 PROCEDURE — 1200000002 HC GENERAL ROOM WITH TELEMETRY DAILY

## 2024-08-04 PROCEDURE — 85027 COMPLETE CBC AUTOMATED: CPT

## 2024-08-04 RX ORDER — CARVEDILOL 6.25 MG/1
6.25 TABLET ORAL 2 TIMES DAILY
Status: DISCONTINUED | OUTPATIENT
Start: 2024-08-04 | End: 2024-08-06 | Stop reason: HOSPADM

## 2024-08-04 RX ORDER — FUROSEMIDE 20 MG/1
40 TABLET ORAL DAILY
Status: DISCONTINUED | OUTPATIENT
Start: 2024-08-04 | End: 2024-08-06 | Stop reason: HOSPADM

## 2024-08-04 RX ORDER — HYDRALAZINE HYDROCHLORIDE 10 MG/1
25 TABLET, FILM COATED ORAL EVERY 8 HOURS
Status: DISCONTINUED | OUTPATIENT
Start: 2024-08-04 | End: 2024-08-05

## 2024-08-04 ASSESSMENT — COGNITIVE AND FUNCTIONAL STATUS - GENERAL
MOBILITY SCORE: 24
MOBILITY SCORE: 24
DAILY ACTIVITIY SCORE: 24
DAILY ACTIVITIY SCORE: 24

## 2024-08-04 ASSESSMENT — PAIN SCALES - GENERAL
PAINLEVEL_OUTOF10: 0 - NO PAIN
PAINLEVEL_OUTOF10: 0 - NO PAIN

## 2024-08-04 ASSESSMENT — PAIN - FUNCTIONAL ASSESSMENT: PAIN_FUNCTIONAL_ASSESSMENT: 0-10

## 2024-08-04 NOTE — PROGRESS NOTES
Subjective Data:   Patient seen and assessed this morning, lying flat in bed, NAD. Sitting at edge of bed during rounds. Denies any CP or SOB. Remains euvolemic on RA. Agreeable to to stay for cMRI.     - start PO Lasix 40 mg daily today  - decrease Hydral to 25 q8h, increase to Coreg 6.25 mg BID   - pending cMRI tomorrow, no caffeine or chocolate after Mn, pt/nursing aware    Overnight Events:    No acute events overnight.     Objective Data:  Last Recorded Vitals:  Vitals:    08/04/24 0534 08/04/24 0807 08/04/24 1114 08/04/24 1300   BP: 110/68 100/66 98/67 111/74   Pulse: 68 68 83 51   Resp: 18 18 19    Temp: 36.6 °C (97.9 °F) 36.3 °C (97.3 °F) 35.9 °C (96.6 °F)    TempSrc: Temporal      SpO2: 93% 95% 96%    Weight: 73.4 kg (161 lb 11.3 oz)      Height:           Last Labs:  Results for orders placed or performed during the hospital encounter of 07/30/24 (from the past 24 hour(s))   POCT GLUCOSE   Result Value Ref Range    POCT Glucose 113 (H) 74 - 99 mg/dL   CBC   Result Value Ref Range    WBC 6.4 4.4 - 11.3 x10*3/uL    nRBC 0.0 0.0 - 0.0 /100 WBCs    RBC 5.42 4.50 - 5.90 x10*6/uL    Hemoglobin 16.7 13.5 - 17.5 g/dL    Hematocrit 49.7 41.0 - 52.0 %    MCV 92 80 - 100 fL    MCH 30.8 26.0 - 34.0 pg    MCHC 33.6 32.0 - 36.0 g/dL    RDW 14.2 11.5 - 14.5 %    Platelets 185 150 - 450 x10*3/uL   Magnesium   Result Value Ref Range    Magnesium 2.28 1.60 - 2.40 mg/dL   Renal function panel   Result Value Ref Range    Glucose 150 (H) 74 - 99 mg/dL    Sodium 133 (L) 136 - 145 mmol/L    Potassium 4.4 3.5 - 5.3 mmol/L    Chloride 96 (L) 98 - 107 mmol/L    Bicarbonate 27 21 - 32 mmol/L    Anion Gap 14 10 - 20 mmol/L    Urea Nitrogen 27 (H) 6 - 23 mg/dL    Creatinine 1.55 (H) 0.50 - 1.30 mg/dL    eGFR 53 (L) >60 mL/min/1.73m*2    Calcium 9.2 8.6 - 10.6 mg/dL    Phosphorus 3.8 2.5 - 4.9 mg/dL    Albumin 3.8 3.4 - 5.0 g/dL   POCT GLUCOSE   Result Value Ref Range    POCT Glucose 175 (H) 74 - 99 mg/dL   POCT GLUCOSE   Result Value  Ref Range    POCT Glucose 188 (H) 74 - 99 mg/dL   POCT GLUCOSE   Result Value Ref Range    POCT Glucose 154 (H) 74 - 99 mg/dL        TROPHS   Date/Time Value Ref Range Status   07/30/2024 08:15  0 - 53 ng/L Final   07/30/2024 07:31  0 - 53 ng/L Final   02/12/2024 09:47  0 - 53 ng/L Final     Comment:     Previous result verified on 2/12/2024 1836 on specimen/case [id number] called with component TRPHS for procedure Troponin I, High Sensitivity with value 130 ng/L.   02/12/2024 06:11  0 - 53 ng/L Final     Comment:     Previous result verified on 2/12/2024 1836 on specimen/case [id number] called with component TRPHS for procedure Troponin I, High Sensitivity with value 130 ng/L.   02/12/2024 12:39  0 - 53 ng/L Final     BNP   Date/Time Value Ref Range Status   07/31/2024 06:51 PM 1,014 0 - 99 pg/mL Final   07/30/2024 07:31 AM 3,240 0 - 99 pg/mL Final     HGBA1C   Date/Time Value Ref Range Status   07/30/2024 07:31 AM 8.4 see below % Final   06/20/2024 07:49 AM 6.9 4.3 - 5.6 % Final     Comment:     American Diabetes Association guidelines indicate that patients with HgbA1c in the range 5.7-6.4% are at increased risk for development of diabetes, and intervention by lifestyle modification may be beneficial. HgbA1c greater or equal to 6.5% is considered diagnostic of diabetes.   01/09/2024 06:53 AM 10.1 4.3 - 5.6 % Final     Comment:     American Diabetes Association guidelines indicate that patients with HgbA1c in the range 5.7-6.4% are at increased risk for development of diabetes, and intervention by lifestyle modification may be beneficial. HgbA1c greater or equal to 6.5% is considered diagnostic of diabetes.   12/09/2023 08:57 PM 7.6 see below % Final     VLDL   Date/Time Value Ref Range Status   07/25/2023 08:20 AM 18 0 - 40 mg/dL Final   06/18/2023 06:27 AM 29 0 - 40 mg/dL Final      Last I/O:  I/O last 3 completed shifts:  In: 1080 (14.7 mL/kg) [P.O.:1080]  Out: 4376 (59.7  mL/kg) [Urine:4375 (1.7 mL/kg/hr); Stool:1]  Weight: 73.3 kg     Ejection Fractions:  EF   Date/Time Value Ref Range Status   07/31/2024 03:07 PM 10 %          Inpatient Medications:  Scheduled medications   Medication Dose Route Frequency    aspirin  81 mg oral Daily    atorvastatin  80 mg oral Nightly    carvedilol  6.25 mg oral BID    empagliflozin  10 mg oral Daily    enoxaparin  40 mg subcutaneous q24h    furosemide  40 mg oral Daily    hydrALAZINE  25 mg oral q8h    insulin glargine  20 Units subcutaneous Nightly    insulin lispro  0-10 Units subcutaneous TID    isosorbide dinitrate  20 mg oral TID    melatonin  5 mg oral Nightly    sertraline  100 mg oral Daily    spironolactone  12.5 mg oral Daily    tiotropium  2 puff inhalation Daily     PRN medications   Medication    acetaminophen    dextrose    dextrose    glucagon    glucagon    oxygen    polyethylene glycol     Continuous Medications   Medication Dose Last Rate     Physical Exam  Constitutional:       General: He is not in acute distress.     Appearance: Normal appearance.   HENT:      Head:      Comments: Multiple missing teeth     Mouth/Throat:      Mouth: Mucous membranes are moist.   Neck:      Vascular: No JVD.      Comments: No JVD at 90 degrees  Cardiovascular:      Rate and Rhythm: Normal rate and regular rhythm.      Heart sounds: Normal heart sounds.   Pulmonary:      Effort: Pulmonary effort is normal. No respiratory distress.      Breath sounds: Normal breath sounds. No wheezing.   Abdominal:      Palpations: Abdomen is soft.      Tenderness: There is no abdominal tenderness.   Musculoskeletal:      Right lower leg: No edema.      Left lower leg: No edema.   Skin:     General: Skin is warm and dry.   Neurological:      General: No focal deficit present.      Mental Status: He is alert and oriented to person, place, and time.   Psychiatric:         Mood and Affect: Mood normal.         Behavior: Behavior normal.        Assessment/Plan    Leonel Yu is a 54 y.o. male presenting with PMH of HFrEF (LVEF 15- 20% as of 7/2023) s/p ICD (3/2024), CKD3a, HTN, HLD, LUZ (crack cocaine), former tobacco use, COPD (on nocturnal 2L at baseline), DM2, remote left cerebellar hemorrhagic infarct, medication noncompliance, anxiety, and depression who presented to [facility] today 7/30 with progressive shortness of breath over the last 3 days. Admitted to [facility] for acute on chronic heart failure management.      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 No ischemia or scar in the anterior, septal or anterolateral wall segments. Recommended further ischemic eval with cMRI or LHC  - TTE 7/2023: LV EF 15-20%, abnormal LV diastolic filling, sev eccentric LVH, LV sev dilated  - TTE 7/31/2024: LV EF 10%, abn DF, LV sev dilated, sev LVH, sev enlarged RV, mild- mod TR  - admit BNP: 3240 (prev. 2900 2/2024), repeat (7/31) 1014  - tropinemia on admit: 125> 117. No ACS symptoms, EKG without ST changes. Likely 2/2 ADHF  - loaded with  on admit per ED  - DC weight 2/2024: 77kg  - Admit weight: 76.2 kg  - daily weight: 73.4 (72.5, 73.1, 76.7kg)  - admit CXR: no infiltrate/effusion, emphysematous changes noted  - episode of Vfib, on telemetry overnight into 8/2, also having NSVT  - start PO Lasix 40 mg daily today  - decrease Hydral to 25 q8h, increase to Coreg 6.25 mg BID   - pending cMRI tomorrow, no caffeine or chocolate after Mn, pt/nursing aware  - current GDMT: spironolactone 12.5 mg daily, hydralazine 25 mg q8, empagliflozin 10 mg  daily, isordil 20 mg TID, Coreg 6.5 mg BID  - 2L fluid restriction, daily standing weights, strict I&O's     Palpitations/VF  - ICD placed 3/2024 (unclear where ICD was placed, not in our system)  - EKG on admit: NSR with sinus arrhythmia, right axis deviation, biventricular hypertrophy  - Device interrogation on admit:   --7/26/24; VF detected at 217 BPM, successful Tx wit ATP X1  --7/27/24: VF detected at 220 BPM successful Tx with ATP X1  - remains in NSR, HR 60-80's, PVC's and NSVT  - increase to coreg 6.25 mg BID   - keep Mag >2 and K >4     HTN  - SBP last 24 hour range:   - cont isordil 20 mg TID, hydralazine 25 mg q8h, coreg 6.5 mg BID     HLD  - lipid panel 6/2024: cholesterol 124, triglyceride 79, HDL 37, LDL 71  - continue atorvastatin 80 mg nightly      Medication noncompliance  Housing insecurity  - 2/2 complex drug regimen, social barriers (homeless, unable to work due to HF symptoms), + cocaine on admit  - unclear which meds patient was taking upon discharge from [facility] last admit; has multiple pill bottles in his room that do not match [facility] DC summary  - will need to determine simple homegoing regimen for patient upon discharge  - SW consulted services for homegoing concerns     Active Cocaine use  - UDS pos for cocaine, patient admitted last use on 7/28   - Declined thrive consult this admit     Anxiety and Depression  Hx SI/HI  - during 6/2023 admit, patient had admitted to suicidal and homicidal ideation towards ex- wife-> psychiatry was consulted and patient was admitted to inpatient psychiatry unit  - has had psychiatry involved in previous admissions for SI  - currently denies SI/HI  - cont Sertraline 100mg daily     CKD3a, stable  - Baseline Cr ~1.3-1.5  - Cr at admission in 1.34  - daily Cr 1.55 (1.66, 1.43, 1.58, 1.36)  - Avoid nephrotoxins and hypotension  - Daily RFP while admitted, medication as above     Suspected COPD, stable  - no PFTs on file  - CT Chest 10/2023: emphysematous  changes are present; bronchial wall thickening is present; stable right middle lobe nodule  - typically wears 2L O2 at night only   - cont Spiriva Respimat while inpatient     DMII  - 6/2024 HgbA1c 6.9%   - per patient, has run out of BG test strips and has not tested recently  - per patient, insulin regimen: lantus 30 units daily, SSI with meals  - cont Glargine 20 units nightly, titrate as indicated, SSI #2  -  Accu-Cheks AC/HS, hypoglycemic protocol      DVT ppx: subcutaneous lovenox    Dispo: pending further workup, GDMT optimization  - per [facility], unable to provide HHC as pt has no PCP, outpatient PT/OT/SW referral placed     Code Status: DNR confirmed on admission with patient; Per patient, okay with intubation but if his heart were to stop, would not want CPR performed.     Patient seen and discussed with Dr. Roberto Blanca, APRN-CNP

## 2024-08-04 NOTE — CARE PLAN
Problem: Heart Failure  Goal: Improved gas exchange this shift  Outcome: Progressing  Goal: Improved urinary output this shift  Outcome: Progressing  Goal: Reduction in peripheral edema within 24 hours  Outcome: Progressing  Goal: Report improvement of dyspnea/breathlessness this shift  Outcome: Progressing  Goal: Weight from fluid excess reduced over 2-3 days, then stabilize  Outcome: Progressing  Goal: Increase self care and/or family involvement in 24 hours  Outcome: Progressing     Problem: Pain - Adult  Goal: Verbalizes/displays adequate comfort level or baseline comfort level  Outcome: Progressing     Problem: Safety - Adult  Goal: Free from fall injury  Outcome: Progressing     Problem: Discharge Planning  Goal: Discharge to home or other facility with appropriate resources  Outcome: Progressing     Problem: Chronic Conditions and Co-morbidities  Goal: Patient's chronic conditions and co-morbidity symptoms are monitored and maintained or improved  Outcome: Progressing   The patient's goals for the shift include      The clinical goals for the shift include Pt. will not have any respiratory complaints for remainder of this nurse's shift.    Over the shift, the patient did make progress toward the following goals.

## 2024-08-05 ENCOUNTER — APPOINTMENT (OUTPATIENT)
Dept: RADIOLOGY | Facility: HOSPITAL | Age: 54
End: 2024-08-05
Payer: COMMERCIAL

## 2024-08-05 ENCOUNTER — PHARMACY VISIT (OUTPATIENT)
Dept: PHARMACY | Facility: CLINIC | Age: 54
End: 2024-08-05
Payer: MEDICAID

## 2024-08-05 ENCOUNTER — APPOINTMENT (OUTPATIENT)
Dept: CARDIOLOGY | Facility: HOSPITAL | Age: 54
End: 2024-08-05
Payer: COMMERCIAL

## 2024-08-05 VITALS
DIASTOLIC BLOOD PRESSURE: 67 MMHG | WEIGHT: 161.71 LBS | SYSTOLIC BLOOD PRESSURE: 115 MMHG | OXYGEN SATURATION: 91 % | RESPIRATION RATE: 17 BRPM | HEIGHT: 69 IN | TEMPERATURE: 97.3 F | HEART RATE: 67 BPM | BODY MASS INDEX: 23.95 KG/M2

## 2024-08-05 LAB
ALBUMIN SERPL BCP-MCNC: 4.3 G/DL (ref 3.4–5)
ANION GAP SERPL CALC-SCNC: 19 MMOL/L (ref 10–20)
BUN SERPL-MCNC: 26 MG/DL (ref 6–23)
CALCIUM SERPL-MCNC: 9.3 MG/DL (ref 8.6–10.6)
CHLORIDE SERPL-SCNC: 97 MMOL/L (ref 98–107)
CO2 SERPL-SCNC: 23 MMOL/L (ref 21–32)
CREAT SERPL-MCNC: 1.64 MG/DL (ref 0.5–1.3)
EGFRCR SERPLBLD CKD-EPI 2021: 49 ML/MIN/1.73M*2
ERYTHROCYTE [DISTWIDTH] IN BLOOD BY AUTOMATED COUNT: 14.4 % (ref 11.5–14.5)
GLUCOSE BLD MANUAL STRIP-MCNC: 160 MG/DL (ref 74–99)
GLUCOSE BLD MANUAL STRIP-MCNC: 164 MG/DL (ref 74–99)
GLUCOSE BLD MANUAL STRIP-MCNC: 168 MG/DL (ref 74–99)
GLUCOSE BLD MANUAL STRIP-MCNC: 96 MG/DL (ref 74–99)
GLUCOSE SERPL-MCNC: 110 MG/DL (ref 74–99)
HCT VFR BLD AUTO: 52.9 % (ref 41–52)
HGB BLD-MCNC: 18.1 G/DL (ref 13.5–17.5)
MAGNESIUM SERPL-MCNC: 2.48 MG/DL (ref 1.6–2.4)
MCH RBC QN AUTO: 30.7 PG (ref 26–34)
MCHC RBC AUTO-ENTMCNC: 34.2 G/DL (ref 32–36)
MCV RBC AUTO: 90 FL (ref 80–100)
NRBC BLD-RTO: 0 /100 WBCS (ref 0–0)
PHOSPHATE SERPL-MCNC: 3.8 MG/DL (ref 2.5–4.9)
PLATELET # BLD AUTO: 210 X10*3/UL (ref 150–450)
POTASSIUM SERPL-SCNC: 4.6 MMOL/L (ref 3.5–5.3)
RBC # BLD AUTO: 5.89 X10*6/UL (ref 4.5–5.9)
SODIUM SERPL-SCNC: 134 MMOL/L (ref 136–145)
WBC # BLD AUTO: 8 X10*3/UL (ref 4.4–11.3)

## 2024-08-05 PROCEDURE — 93287 PERI-PX DEVICE EVAL & PRGR: CPT | Performed by: INTERNAL MEDICINE

## 2024-08-05 PROCEDURE — 99254 IP/OBS CNSLTJ NEW/EST MOD 60: CPT

## 2024-08-05 PROCEDURE — RXMED WILLOW AMBULATORY MEDICATION CHARGE

## 2024-08-05 PROCEDURE — 85027 COMPLETE CBC AUTOMATED: CPT

## 2024-08-05 PROCEDURE — 94640 AIRWAY INHALATION TREATMENT: CPT

## 2024-08-05 PROCEDURE — A9575 INJ GADOTERATE MEGLUMI 0.1ML: HCPCS

## 2024-08-05 PROCEDURE — 83735 ASSAY OF MAGNESIUM: CPT

## 2024-08-05 PROCEDURE — 2500000001 HC RX 250 WO HCPCS SELF ADMINISTERED DRUGS (ALT 637 FOR MEDICARE OP)

## 2024-08-05 PROCEDURE — 75561 CARDIAC MRI FOR MORPH W/DYE: CPT

## 2024-08-05 PROCEDURE — 99233 SBSQ HOSP IP/OBS HIGH 50: CPT | Performed by: STUDENT IN AN ORGANIZED HEALTH CARE EDUCATION/TRAINING PROGRAM

## 2024-08-05 PROCEDURE — 82947 ASSAY GLUCOSE BLOOD QUANT: CPT

## 2024-08-05 PROCEDURE — 1200000002 HC GENERAL ROOM WITH TELEMETRY DAILY

## 2024-08-05 PROCEDURE — 80069 RENAL FUNCTION PANEL: CPT

## 2024-08-05 PROCEDURE — 93287 PERI-PX DEVICE EVAL & PRGR: CPT

## 2024-08-05 PROCEDURE — 2500000001 HC RX 250 WO HCPCS SELF ADMINISTERED DRUGS (ALT 637 FOR MEDICARE OP): Performed by: STUDENT IN AN ORGANIZED HEALTH CARE EDUCATION/TRAINING PROGRAM

## 2024-08-05 PROCEDURE — 2500000002 HC RX 250 W HCPCS SELF ADMINISTERED DRUGS (ALT 637 FOR MEDICARE OP, ALT 636 FOR OP/ED)

## 2024-08-05 PROCEDURE — 75561 CARDIAC MRI FOR MORPH W/DYE: CPT | Performed by: INTERNAL MEDICINE

## 2024-08-05 PROCEDURE — 2550000001 HC RX 255 CONTRASTS

## 2024-08-05 PROCEDURE — 36415 COLL VENOUS BLD VENIPUNCTURE: CPT

## 2024-08-05 PROCEDURE — 2500000004 HC RX 250 GENERAL PHARMACY W/ HCPCS (ALT 636 FOR OP/ED)

## 2024-08-05 RX ORDER — ATORVASTATIN CALCIUM 80 MG/1
80 TABLET, FILM COATED ORAL NIGHTLY
Qty: 30 TABLET | Refills: 5 | Status: SHIPPED | OUTPATIENT
Start: 2024-08-05 | End: 2025-02-01

## 2024-08-05 RX ORDER — LANCETS 33 GAUGE
1 EACH MISCELLANEOUS 4 TIMES DAILY
Qty: 100 EACH | Refills: 5 | Status: SHIPPED | OUTPATIENT
Start: 2024-08-05 | End: 2025-02-01

## 2024-08-05 RX ORDER — INSULIN LISPRO 100 [IU]/ML
0-10 INJECTION, SOLUTION INTRAVENOUS; SUBCUTANEOUS
Qty: 15 ML | Refills: 5 | Status: SHIPPED | OUTPATIENT
Start: 2024-08-05 | End: 2025-02-01

## 2024-08-05 RX ORDER — IBUPROFEN 200 MG
8 TABLET ORAL AS NEEDED
Qty: 50 TABLET | Refills: 12 | Status: SHIPPED | OUTPATIENT
Start: 2024-08-05 | End: 2025-02-01

## 2024-08-05 RX ORDER — PEN NEEDLE, DIABETIC 29 G X1/2"
NEEDLE, DISPOSABLE MISCELLANEOUS
Qty: 100 EACH | Refills: 5 | Status: SHIPPED | OUTPATIENT
Start: 2024-08-05 | End: 2025-08-05

## 2024-08-05 RX ORDER — DEXTROSE 4 G
1 TABLET,CHEWABLE ORAL
Qty: 1 EACH | Refills: 0 | Status: SHIPPED | OUTPATIENT
Start: 2024-08-05 | End: 2025-08-05

## 2024-08-05 RX ORDER — SPIRONOLACTONE 25 MG/1
12.5 TABLET ORAL ONCE
Status: COMPLETED | OUTPATIENT
Start: 2024-08-05 | End: 2024-08-05

## 2024-08-05 RX ORDER — NAPROXEN SODIUM 220 MG/1
81 TABLET, FILM COATED ORAL
Qty: 30 TABLET | Refills: 5 | Status: SHIPPED | OUTPATIENT
Start: 2024-08-05 | End: 2025-02-01

## 2024-08-05 RX ORDER — FUROSEMIDE 40 MG/1
40 TABLET ORAL DAILY
Qty: 30 TABLET | Refills: 5 | Status: SHIPPED | OUTPATIENT
Start: 2024-08-05 | End: 2025-02-01

## 2024-08-05 RX ORDER — SERTRALINE HYDROCHLORIDE 100 MG/1
100 TABLET, FILM COATED ORAL DAILY
Qty: 30 TABLET | Refills: 5 | Status: SHIPPED | OUTPATIENT
Start: 2024-08-06 | End: 2025-02-02

## 2024-08-05 RX ORDER — SPIRONOLACTONE 25 MG/1
25 TABLET ORAL DAILY
Status: DISCONTINUED | OUTPATIENT
Start: 2024-08-06 | End: 2024-08-06 | Stop reason: HOSPADM

## 2024-08-05 RX ORDER — GADOTERATE MEGLUMINE 376.9 MG/ML
29 INJECTION INTRAVENOUS
Status: COMPLETED | OUTPATIENT
Start: 2024-08-05 | End: 2024-08-05

## 2024-08-05 RX ORDER — CARVEDILOL 6.25 MG/1
6.25 TABLET ORAL 2 TIMES DAILY
Qty: 60 TABLET | Refills: 5 | Status: SHIPPED | OUTPATIENT
Start: 2024-08-05 | End: 2025-02-01

## 2024-08-05 RX ORDER — INSULIN GLARGINE 100 [IU]/ML
30 INJECTION, SOLUTION SUBCUTANEOUS NIGHTLY
Qty: 15 ML | Refills: 5 | Status: SHIPPED | OUTPATIENT
Start: 2024-08-05 | End: 2025-02-01

## 2024-08-05 RX ORDER — SPIRONOLACTONE 25 MG/1
12.5 TABLET ORAL DAILY
Qty: 15 TABLET | Refills: 5 | Status: SHIPPED | OUTPATIENT
Start: 2024-08-05 | End: 2025-02-01

## 2024-08-05 ASSESSMENT — COGNITIVE AND FUNCTIONAL STATUS - GENERAL
DAILY ACTIVITIY SCORE: 24
DAILY ACTIVITIY SCORE: 24
MOBILITY SCORE: 24
MOBILITY SCORE: 24

## 2024-08-05 ASSESSMENT — PAIN - FUNCTIONAL ASSESSMENT
PAIN_FUNCTIONAL_ASSESSMENT: 0-10
PAIN_FUNCTIONAL_ASSESSMENT: 0-10

## 2024-08-05 ASSESSMENT — ENCOUNTER SYMPTOMS
GASTROINTESTINAL NEGATIVE: 1
ENDOCRINE NEGATIVE: 1
EYES NEGATIVE: 1
CARDIOVASCULAR NEGATIVE: 1
CONSTITUTIONAL NEGATIVE: 1
RESPIRATORY NEGATIVE: 1

## 2024-08-05 ASSESSMENT — PAIN SCALES - GENERAL
PAINLEVEL_OUTOF10: 0 - NO PAIN
PAINLEVEL_OUTOF10: 0 - NO PAIN
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
## 2024-08-05 NOTE — PROGRESS NOTES
Subjective Data:   Patient seen and assessed this morning, lying flat in bed, NAD. Denies any CP or SOB. Reports feeling better since admission. Agreeable to to stay for [facility] tomorrow.     - stop Hydral/Isordil, start Entresto 24/26 tonight, increase Eldon to 25mg  - cMRI: EF 5-10%, basal/mid inferoseptal, apical septal, apicolateral segments akinetic, all other segments hypokinetic, ecc LVH, no thrombus, CI 1.06, LVEDD 8.2, scar 75%. RV dilated, reduced systolic function. Mild MR. Suggestive of combined NICM (predominant)/ICM  - NPO at [facility]  - Psychiatry consulted for medical optimization and substance abuse, appreciate recs, pt lacks capacity to leave AMA, cont sertraline, outpt resource for psychiatric f/up provided    Overnight Events:    No acute events overnight.     Objective Data:  Last Recorded Vitals:  Vitals:    08/05/24 0453 08/05/24 0743 08/05/24 1146 08/05/24 1650   BP: 118/80 115/81 106/73 121/84   BP Location:  Left arm Right arm    Patient Position:  Lying Sitting    Pulse: 64 68 82 83   Resp: 18 17 18 18   Temp: 36 °C (96.8 °F) 36.2 °C (97.2 °F) 36 °C (96.8 °F) 36.1 °C (97 °F)   TempSrc:  Temporal Temporal    SpO2: 94% 93% 95% 98%   Weight: 73.3 kg (161 lb 11.2 oz)      Height:           Last Labs:  Results for orders placed or performed during the hospital encounter of 07/30/24 (from the past 24 hour(s))   POCT GLUCOSE   Result Value Ref Range    POCT Glucose 81 74 - 99 mg/dL   CBC   Result Value Ref Range    WBC 7.4 4.4 - 11.3 x10*3/uL    nRBC 0.0 0.0 - 0.0 /100 WBCs    RBC 5.53 4.50 - 5.90 x10*6/uL    Hemoglobin 16.9 13.5 - 17.5 g/dL    Hematocrit 51.2 41.0 - 52.0 %    MCV 93 80 - 100 fL    MCH 30.6 26.0 - 34.0 pg    MCHC 33.0 32.0 - 36.0 g/dL    RDW 14.3 11.5 - 14.5 %    Platelets 194 150 - 450 x10*3/uL   Magnesium   Result Value Ref Range    Magnesium 2.42 (H) 1.60 - 2.40 mg/dL   Renal function panel   Result Value Ref Range    Glucose 205 (H) 74 - 99 mg/dL    Sodium 132 (L) 136 - 145  mmol/L    Potassium 4.5 3.5 - 5.3 mmol/L    Chloride 96 (L) 98 - 107 mmol/L    Bicarbonate 24 21 - 32 mmol/L    Anion Gap 17 10 - 20 mmol/L    Urea Nitrogen 28 (H) 6 - 23 mg/dL    Creatinine 1.58 (H) 0.50 - 1.30 mg/dL    eGFR 51 (L) >60 mL/min/1.73m*2    Calcium 8.9 8.6 - 10.6 mg/dL    Phosphorus 4.6 2.5 - 4.9 mg/dL    Albumin 3.8 3.4 - 5.0 g/dL   POCT GLUCOSE   Result Value Ref Range    POCT Glucose 149 (H) 74 - 99 mg/dL   POCT GLUCOSE   Result Value Ref Range    POCT Glucose 96 74 - 99 mg/dL   POCT GLUCOSE   Result Value Ref Range    POCT Glucose 164 (H) 74 - 99 mg/dL   POCT GLUCOSE   Result Value Ref Range    POCT Glucose 168 (H) 74 - 99 mg/dL        TROPHS   Date/Time Value Ref Range Status   07/30/2024 08:15  0 - 53 ng/L Final   07/30/2024 07:31  0 - 53 ng/L Final   02/12/2024 09:47  0 - 53 ng/L Final     Comment:     Previous result verified on 2/12/2024 1836 on specimen/case [id number] called with component [facility] for procedure Troponin I, High Sensitivity with value 130 ng/L.   02/12/2024 06:11  0 - 53 ng/L Final     Comment:     Previous result verified on 2/12/2024 1836 on specimen/case [id number] called with component [facility] for procedure Troponin I, High Sensitivity with value 130 ng/L.   02/12/2024 12:39  0 - 53 ng/L Final     BNP   Date/Time Value Ref Range Status   07/31/2024 06:51 PM 1,014 0 - 99 pg/mL Final   07/30/2024 07:31 AM 3,240 0 - 99 pg/mL Final     HGBA1C   Date/Time Value Ref Range Status   07/30/2024 07:31 AM 8.4 see below % Final   06/20/2024 07:49 AM 6.9 4.3 - 5.6 % Final     Comment:     American Diabetes Association guidelines indicate that patients with HgbA1c in the range 5.7-6.4% are at increased risk for development of diabetes, and intervention by lifestyle modification may be beneficial. HgbA1c greater or equal to 6.5% is considered diagnostic of diabetes.   01/09/2024 06:53 AM 10.1 4.3 - 5.6 % Final     Comment:     American Diabetes  Association guidelines indicate that patients with HgbA1c in the range 5.7-6.4% are at increased risk for development of diabetes, and intervention by lifestyle modification may be beneficial. HgbA1c greater or equal to 6.5% is considered diagnostic of diabetes.   12/09/2023 08:57 PM 7.6 see below % Final     VLDL   Date/Time Value Ref Range Status   07/25/2023 08:20 AM 18 0 - 40 mg/dL Final   06/18/2023 06:27 AM 29 0 - 40 mg/dL Final      Last I/O:  I/O last 3 completed shifts:  In: 1440 (19.6 mL/kg) [P.O.:1440]  Out: 3890 (53 mL/kg) [Urine:3890 (1.5 mL/kg/hr)]  Weight: 73.3 kg     Ejection Fractions:  EF   Date/Time Value Ref Range Status   07/31/2024 03:07 PM 10 %          Inpatient Medications:  Scheduled medications   Medication Dose Route Frequency    aspirin  81 mg oral Daily    atorvastatin  80 mg oral Nightly    carvedilol  6.25 mg oral BID    empagliflozin  10 mg oral Daily    enoxaparin  40 mg subcutaneous q24h    furosemide  40 mg oral Daily    insulin glargine  20 Units subcutaneous Nightly    insulin lispro  0-10 Units subcutaneous TID    melatonin  5 mg oral Nightly    sacubitriL-valsartan  1 tablet oral BID    sertraline  100 mg oral Daily    [START ON 8/6/2024] spironolactone  25 mg oral Daily    tiotropium  2 puff inhalation Daily     PRN medications   Medication    acetaminophen    dextrose    dextrose    glucagon    glucagon    oxygen    polyethylene glycol     Continuous Medications   Medication Dose Last Rate     Physical Exam  Constitutional:       General: He is not in acute distress.     Appearance: Normal appearance.   HENT:      Head:      Comments: Multiple missing teeth     Mouth/Throat:      Mouth: Mucous membranes are moist.   Neck:      Vascular: No JVD.      Comments: JVD to low neck at 60 degrees, +HJR  Cardiovascular:      Rate and Rhythm: Normal rate and regular rhythm.      Heart sounds: Normal heart sounds.   Pulmonary:      Effort: Pulmonary effort is normal. No respiratory  distress.      Breath sounds: Normal breath sounds. No wheezing.   Abdominal:      Palpations: Abdomen is soft.      Tenderness: There is no abdominal tenderness.   Musculoskeletal:      Cervical back: Normal range of motion.      Right lower leg: No edema.      Left lower leg: No edema.   Skin:     General: Skin is warm and dry.   Neurological:      General: No focal deficit present.      Mental Status: He is alert and oriented to person, place, and time.   Psychiatric:         Mood and Affect: Mood normal.         Behavior: Behavior normal.        Assessment/Plan   Leonel Yu is a 54 y.o. male presenting with PMH of HFrEF (LVEF 15- 20% as of 7/2023) s/p ICD (3/2024), CKD3a, HTN, HLD, LUZ (crack cocaine), former tobacco use, COPD (on nocturnal 2L at baseline), DM2, remote left cerebellar hemorrhagic infarct, medication noncompliance, anxiety, and depression who presented to [facility] today 7/30 with progressive shortness of breath over the last 3 days. Admitted to \[facility]\"" for acute on chronic heart failure management.      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 No ischemia or scar in the anterior, septal or anterolateral wall segments. Recommended further ischemic eval with cMRI or C  - TTE 7/2023: LV EF 15-20%, abnormal LV diastolic filling, sev eccentric LVH, LV sev dilated  - TTE 7/31/2024: LV EF 10%, abn DF, LV sev dilated, sev LVH, sev enlarged RV, mild- mod TR  - admit BNP: 3240 (prev. 2900 2/2024), repeat (7/31) 1014  - tropinemia on admit: 125> 117. No ACS symptoms, EKG without ST changes. Likely 2/2 ADHF  - loaded with ASA  324 on admit per ED  - DC weight 2/2024: 77kg  - Admit weight: 76.2 kg  - daily weight: 73.3 (73.4, 72.5, 73.1, 76.7kg)  - admit CXR: no infiltrate/effusion, emphysematous changes noted  - episode of Vfib, on telemetry overnight into 8/2, also having NSVT  - cMRI: EF 5-10%, basal/mid inferoseptal, apical septal, apicolateral segments akinetic, all other segments hypokinetic, ecc LVH, no thrombus, CI 1.06, LVEDD 8.2, scar 75%. RV dilated, reduced systolic function. Mild MR. Suggestive of combined NICM (predominant)/ICM  - NPO at [place] R/[facility], of note, aborted R/LHC at [facility] last month due to anxiety attack  - stop Hydral/Isordil, start Entresto 24/26 tonight, increase Eldon to 25mg  - current GDMT: spironolactone 25 mg daily, Entresto 24/26 BID, empagliflozin 10 mg daily, Coreg 6.5 mg BID, Lasix 40 mg daily  - 2L fluid restriction, daily standing weights, strict I&O's     Palpitations/VF  - ICD placed 3/2024 (unclear where ICD was placed, not in our system)  - EKG on admit: NSR with sinus arrhythmia, right axis deviation, biventricular hypertrophy  - Device interrogation on admit:   --7/26/24; VF detected at 217 BPM, successful Tx wit ATP X1  --7/27/24: VF detected at 220 BPM successful Tx with ATP X1  - remains in NSR, HR 60-80's, PVC's and short runs of NSVT  - cont coreg 6.25 mg BID   - keep Mag >2 and K >4     HTN  - SBP last 24 hour range:   - cont with meds as above      HLD  - lipid panel 6/2024: cholesterol 124, triglyceride 79, HDL 37, LDL 71  - cont atorvastatin 80 mg nightly      Medication noncompliance  Housing insecurity  - 2/2 complex drug regimen, social barriers (homeless, unable to work due to HF symptoms), + cocaine on admit  - unclear which meds patient was taking upon discharge from [facility] last admit; has multiple pill bottles in his room that do not match [facility] DC summary  - will need to determine simple homegoing regimen for patient upon discharge  - SW consulted services for homegoing concerns      Active Cocaine use  - UDS pos for cocaine, patient admitted last use on 7/28   - Declined thrive consult this admit     Anxiety and Depression  Hx SI/HI  - during 6/2023 admit, patient had admitted to suicidal and homicidal ideation towards ex- wife-> psychiatry was consulted and patient was admitted to inpatient psychiatry unit  - has had psychiatry involved in previous admissions for SI  - currently denies SI/HI  - Psychiatry consulted for medical optimization/substance abuse, appreciate recs, pt lacks capacity to leave AMA, cont sertraline, outpt resource for psychiatric f/up provided  - cont Sertraline 100mg daily     CKD3a, stable  - Baseline Cr ~1.3-1.5  - Cr at admission in 1.34  - daily Cr 1.58 (1.551.66, 1.43, 1.58, 1.36)  - Avoid nephrotoxins and hypotension  - Daily RFP while admitted, medication as above     Suspected COPD, stable  - no PFTs on file  - CT Chest 10/2023: emphysematous changes are present; bronchial wall thickening is present; stable right middle lobe nodule  - typically wears 2L O2 at night only   - cont Spiriva Respimat while inpatient     DMII  - 6/2024 HgbA1c 6.9%   - per patient, has run out of BG test strips and has not tested recently  - per patient, insulin regimen: lantus 30 units daily, SSI with meals  - cont Glargine 20 units nightly, titrate as indicated, SSI #2  -  Accu-Cheks AC/HS, hypoglycemic protocol      DVT ppx: subcutaneous lovenox    Dispo: pending further workup, GDMT optimization  - per [facility], unable to provide HHC as pt has no PCP, outpatient PT/OT/SW referral placed     Code Status: DNR confirmed on admission with patient; Per patient, okay with intubation but if his heart were to stop, would not want CPR performed.     Patient seen and discussed with Dr. Ever Blanca, APRN-CNP

## 2024-08-06 ENCOUNTER — DOCUMENTATION (OUTPATIENT)
Dept: CARDIOLOGY | Facility: HOSPITAL | Age: 54
End: 2024-08-06
Payer: COMMERCIAL

## 2024-08-06 ENCOUNTER — APPOINTMENT (OUTPATIENT)
Dept: CARDIOLOGY | Facility: HOSPITAL | Age: 54
End: 2024-08-06
Payer: COMMERCIAL

## 2024-08-06 VITALS
OXYGEN SATURATION: 99 % | WEIGHT: 162.48 LBS | HEIGHT: 69 IN | SYSTOLIC BLOOD PRESSURE: 82 MMHG | TEMPERATURE: 96.8 F | DIASTOLIC BLOOD PRESSURE: 68 MMHG | BODY MASS INDEX: 24.07 KG/M2 | RESPIRATION RATE: 19 BRPM | HEART RATE: 63 BPM

## 2024-08-06 PROBLEM — I10 HYPERTENSION: Status: ACTIVE | Noted: 2024-08-06

## 2024-08-06 PROBLEM — R06.02 SHORTNESS OF BREATH: Status: RESOLVED | Noted: 2023-12-09 | Resolved: 2024-08-06

## 2024-08-06 LAB
GLUCOSE BLD MANUAL STRIP-MCNC: 95 MG/DL (ref 74–99)
GLUCOSE BLD MANUAL STRIP-MCNC: 98 MG/DL (ref 74–99)

## 2024-08-06 PROCEDURE — 2500000002 HC RX 250 W HCPCS SELF ADMINISTERED DRUGS (ALT 637 FOR MEDICARE OP, ALT 636 FOR OP/ED)

## 2024-08-06 PROCEDURE — 2500000001 HC RX 250 WO HCPCS SELF ADMINISTERED DRUGS (ALT 637 FOR MEDICARE OP)

## 2024-08-06 PROCEDURE — 94640 AIRWAY INHALATION TREATMENT: CPT

## 2024-08-06 PROCEDURE — 82947 ASSAY GLUCOSE BLOOD QUANT: CPT

## 2024-08-06 PROCEDURE — 99239 HOSP IP/OBS DSCHRG MGMT >30: CPT | Performed by: STUDENT IN AN ORGANIZED HEALTH CARE EDUCATION/TRAINING PROGRAM

## 2024-08-06 PROCEDURE — 93005 ELECTROCARDIOGRAM TRACING: CPT

## 2024-08-06 ASSESSMENT — COGNITIVE AND FUNCTIONAL STATUS - GENERAL
DAILY ACTIVITIY SCORE: 24
MOBILITY SCORE: 24

## 2024-08-06 ASSESSMENT — PAIN - FUNCTIONAL ASSESSMENT: PAIN_FUNCTIONAL_ASSESSMENT: 0-10

## 2024-08-06 ASSESSMENT — PAIN SCALES - GENERAL: PAINLEVEL_OUTOF10: 0 - NO PAIN

## 2024-08-06 NOTE — DISCHARGE SUMMARY
"Discharge Diagnosis  Principal Problem:    Acute on chronic combined systolic and diastolic hrt fail (Multi)  Active Problems:    Anxiety disorder, unspecified    Cocaine use disorder, severe, dependence (Multi)    COPD exacerbation (Multi)    Hypertension      Issues Requiring Follow-Up  Principal Problem:    Acute on chronic combined systolic and diastolic hrt fail (Multi)  Active Problems:    Anxiety disorder, unspecified    Cocaine use disorder, severe, dependence (Multi)    COPD exacerbation (Multi)    Hypertension        Test Results Pending At Discharge  Pending Labs       No current pending labs.            Hospital Course  Leonel Yu is a 54 y.o. male presenting with PMH of HFrEF (LVEF 15- 20% as of 7/2023) s/p ICD (3/2024), CKD3a, HTN, HLD, LUZ (crack cocaine), former tobacco use, COPD (on nocturnal 2L at baseline), DM2, remote left cerebellar hemorrhagic infarct, medication noncompliance, anxiety, and depression who presented to [facility] today 7/30 with progressive shortness of breath and palpitations over the last 3 days. He noticed his lower extremities becoming more edematous and his abdominal swelling increasing over the last week. Reported he can no longer climb a flight of stairs without severe dyspnea and that his heart seemed to be \"racing\" more lately. He denied chest pain but endorsed epigastric discomfort/tightness. He denied early satiety, nausea, vomiting, constipation, or diarrhea. He wears 2L O2 at nighttime as baseline but reported he waswearing it more frequently during the day.      Patient endorsed difficulty with taking medications according to regimen. He stated he takes \"some pills\" in the morning but struggles with medications that are TID or q8 hours. He had multiple pill bottles at the bedside, many are full. He stated he \"took everything\" medication- wise 2 days ago but endorsed it was \"probably too late\" for them to be effective. Mr. Yu has a long noted history of " substance abuse with cocaine and endorsed he last ingested cocaine 2 days ago (7/28).     Relevant ED findings:  Cr 1.34/ BUN 23; BNP 3240; troponin 125 > 117; CXR with no evidence of infiltrate or effusion, emphysematous changes.     Of note, Mr. Yu was recently admitted to [facility] 6/20-25 for similar complaints of SOB, where he was diuresed with IV lasix bolus/ infusion. Transitioned to torsemide and started on digoxin. Hydralazine and isordil were started for afterload reduction with plan for Entresto initiation as an outpatient. A R/LHC was attempted but aborted due to patient experiencing acute anxiety episode. It did not appear that he was discharged with a diuretic. Psychiatry followed patient during that admission and recommended sertraline 100 mg daily.      Floor course:  Diuresed with IV lasix boluses, transitioned to PO. GDMT optimized as indicated.     Device interrogation showed  7/26/24; VF detected at 217 BPM, successful Tx wit ATP X1 and 7/27/24 VF detected at 220 BPM successful Tx with ATP X1.     TTE 7/31 showed LV EF 10%, abnormal pattern of DF, LV severely dilated, severe LVH, severely enlarged RV, mild- mod TR.     cMRI 8/5 showed EF 5-10%, basal/mid inferoseptal, apical septal, apicolateral segments akinetic, all other segments hypokinetic, ecc LVH, no thrombus, CI 1.06, LVEDD 8.2, scar 75%. RV dilated, reduced systolic function. Mild MR. Suggestive of combined NICM (predominant)/ICM.     Patient declined the recommended R/LHC despite being aware of the I/R/B/A as discussed in detail with the team. He will be sent home with medication in hand. Compliance with follow up visits and medications were heavily encouraged as well as cocaine cessation.    Psychiatry consulted for medical optimization and substance abuse resources. They note that patient lacked the capacity to leave AMA. Patient continued on 100mg sertraline daily for mood and provide outpatient resource list for psychiatric follow up  after discharge.      Discharge weight: 73.7kg    After all labs and VS were reviewed the decision was made that the patient was medically stable for discharge.  The patient was discharged in satisfactory condition.    More than 60 minutes were spent in coordinating patient discharge.    __________________________________________________________    Telemetry 8/6/2024 (personally reviewed): SR with NSTWI, occ PVCs, 3b NSVT, 5bt SVT, Vpacing during early AM hours     Physical exam:  General: NAD, sitting in bed  Head/ neck: no JVD  Cardiac: RRR, regular S1 S2 , no murmur, no rub, no gallop  Pulm: CTA bilaterally, no wheezes, rales or rhonchi.    Vascular: Radial 2+ bilaterally  GI: Non distended  Extremities: no LE edema   Neuro: no focal neuro deficits   Psych: appropriate mood and behavior   Skin: warm and dry                Home Medications     Medication List      START taking these medications     Entresto 24-26 mg tablet; Generic drug: sacubitriL-valsartan; Take 1   tablet by mouth 2 times a day.   glucose 4 gram chewable tablet; Chew 2 tablets (8 g) if needed for low   blood sugar - see comments.     CHANGE how you take these medications     atorvastatin 80 mg tablet; Commonly known as: Lipitor; Take 1 tablet (80   mg) by mouth once daily at bedtime.; What changed: medication strength,   when to take this   carvedilol 6.25 mg tablet; Commonly known as: Coreg; Take 1 tablet (6.25   mg) by mouth 2 times a day.; What changed: medication strength, how much   to take   insulin lispro 100 unit/mL injection; Commonly known as: HumaLOG; Inject   0-10 Units under the skin 3 times daily (morning, midday, late afternoon).   Hypoglycemia protocol if Blood Glucose is between 0 - 70 mg/dL, 0 unit(s)   if , 2 unit(s) if 151-200, 4 unit(s) if 201-250, 6 unit(s) if   251-300, 8 unit(s) if 301-350, 10 unit(s) if 351-400. Notify provider   unit(s) if Blood Glucose is greater than 400 mg/dL; What changed:   additional  "instructions   Lantus Solostar U-100 Insulin 100 unit/mL (3 mL) pen; Generic drug:   insulin glargine; Inject 30 Units under the skin once daily at bedtime.;   What changed: how much to take   sertraline 100 mg tablet; Commonly known as: Zoloft; Take 1 tablet (100   mg) by mouth once daily.; What changed: medication strength, how much to   take   True Metrix Glucose Meter misc; Generic drug: blood-glucose meter;   Inject 1 each under the skin 4 times a day with meals. Check blood glucose   4 times daily, before meals and at bedtime.; What changed: how much to   take, how to take this, when to take this, additional instructions   TRUEplus Lancets 33 gauge misc; Generic drug: lancets; Inject 1 each   under the skin 4 times a day.; What changed: how to take this     CONTINUE taking these medications     aspirin 81 mg chewable tablet; Chew 1 tablet (81 mg) once daily.   furosemide 40 mg tablet; Commonly known as: Lasix; Take 1 tablet (40 mg)   by mouth once daily.   Jardiance 10 mg; Generic drug: empagliflozin; TAKE 1 TABLET BY MOUTH   ONCE DAILY   Spiriva Respimat 2.5 mcg/actuation inhaler; Generic drug: tiotropium;   Inhale 2 puffs once daily.   spironolactone 25 mg tablet; Commonly known as: Aldactone; Take 0.5   tablets (12.5 mg) by mouth once daily.   True Metrix Glucose Test Strip strip; Generic drug: blood sugar   diagnostic; Use as directed to test blood glucose up to four times daily   and as needed.   TRUEplus Pen Needle 29 gauge x 1/2\" needle; Generic drug: pen needle   1/2\"; Use to inject 1-4 times daily as directed.     STOP taking these medications     digoxin 125 MCG tablet; Commonly known as: Lanoxin   famotidine 20 mg tablet; Commonly known as: Pepcid   hydrALAZINE 50 mg tablet; Commonly known as: Apresoline   hydrOXYzine HCL 25 mg tablet; Commonly known as: Atarax   isosorbide dinitrate 20 mg tablet; Commonly known as: Isordil   melatonin 5 mg tablet   metFORMIN  mg 24 hr tablet; Commonly known " as: Glucophage-XR   valsartan 40 mg tablet; Commonly known as: Diovan       Outpatient Follow-Up  Future Appointments   Date Time Provider Department [place]   8/16/2024  2:00 PM Parish Roth MD [id number] [facility]   8/28/2024  2:30 PM Zaid Feng MD [id number] [facility]       Vicky KHADIJAH DozierC

## 2024-08-06 NOTE — PROGRESS NOTES
HF navigator requested SW follow-up regarding transportation.  Introduced self to patient prior to discharge and provided him with SW contact information since patient doesn't always have access to a phone.  Encouraged to reach out and told him SW would follow-up with resources.

## 2024-08-06 NOTE — CARE PLAN
The patient's goals for the shift include      The clinical goals for the shift include Pt will remain hemodynamically stable

## 2024-08-06 NOTE — NURSING NOTE
Primary team reached out. Patient has transportation barrier and will need assistance to attend outpatient follow-up appointments.  I have reached out to the outpatient heart failure SW Rosas Haider to assist with transportation needs post discharge.  Patient is being discharged home today.   HF Follow-up appointment scheduled.     Ksenia MENA, RN  Clinical Nurse Navigator- CHF  [phone]

## 2024-08-06 NOTE — DISCHARGE INSTRUCTIONS
Your medications and/or doses may have changed. Please take the medications listed on these instructions as prescribed until you are seen at a follow up appointment. It was a pleasure to have met and care for you!    - Please show up to your follow up visits which we have scheduled for you.  - Your heart is VERY weak.  - Please take your medications every day and STOP using cocaine!  This is very important to help get your heart stronger so that you can live longer and stay out of the hospital.   - Please answer all calls you may received on your phone. You will receive a call from a Rosas Haider regarding help with transportation to your follow up visits.  - Please call the local dentist at [facility] at Phone # [phone]  to schedule an appointment

## 2024-08-06 NOTE — PROGRESS NOTES
"PSYCHIATRY CONSULT-LIAISON PROGRESS NOTE    SUBJECTIVE  In bed, patient expresses not wanting to proceed with his heart catheterization due to \"wanting to be left alone.\" Upon further questioning, the patient endorsed a passive suicidal ideation of wanting to be left alone to pass away, however, is agreeable to cath and treatment. He is disheartened by watching other patients' family and friends come to visit them in the hospital when he \"has nobody\" as he has not had contact with his family in more than 5 years. Throughout the conversation, he continues to be focused on \"not being able to get back what he once had.\" The patient states that he did not have the opportunity to review the mental health resources we provided to him yesterday afternoon. Patient was educated on some of the potential benefits of therapy. Patient is still ambivalent to outpatient psychiatric care or changes to his medication.    OBJECTIVE    VITALS      8/5/2024     7:43 AM 8/5/2024    11:46 AM 8/5/2024     4:50 PM 8/5/2024     8:13 PM 8/6/2024    12:07 AM 8/6/2024     4:17 AM 8/6/2024     7:30 AM   Vitals   Systolic 115 106 121 122 97 100 105   Diastolic 81 73 84 83 58 67 78   Heart Rate 68 82 83 86 60 55 66   Temp 36.2 °C (97.2 °F) 36 °C (96.8 °F) 36.1 °C (97 °F) 36.2 °C (97.2 °F) 36.2 °C (97.2 °F) 36.2 °C (97.2 °F) 36.1 °C (97 °F)   Resp 17 18 18 19 19 19 19   Weight (lb)      162.48    BMI      23.99 kg/m2    BSA (m2)      1.89 m2         MENTAL STATUS EXAM  Appearance: well groomed in hospital attire, multiple teeth missing  Attitude: solemn  Behavior: good eye contact  Motor Activity: No abnormal movements noted  Speech: normal rate, tone, and volume  Mood: \"Blue\"   Affect: Dysthymic, mood congruent  Thought Process: Linear, logical  Thought Content:  Endorses passive death wish. Denies active SI/HI. Focused on not being able to \"get back what he once had\"  Thought Perception: Not responding to internal stimuli, no AH/VH  Cognition: " "Alert, awake, appropriate to conversation  Insight: Limited as evidenced by patient's belief that \"he can never get back what he once had.\"  Judgement: Poor as evidenced by ongoing substance use    CURRENT MEDICATIONS  Scheduled medications  aspirin, 81 mg, oral, Daily  atorvastatin, 80 mg, oral, Nightly  carvedilol, 6.25 mg, oral, BID  empagliflozin, 10 mg, oral, Daily  enoxaparin, 40 mg, subcutaneous, q24h  furosemide, 40 mg, oral, Daily  insulin glargine, 20 Units, subcutaneous, Nightly  insulin lispro, 0-10 Units, subcutaneous, TID  melatonin, 5 mg, oral, Nightly  sacubitriL-valsartan, 1 tablet, oral, BID  sertraline, 100 mg, oral, Daily  spironolactone, 25 mg, oral, Daily  tiotropium, 2 puff, inhalation, Daily        Continuous medications       PRN medications  PRN medications: acetaminophen, dextrose, dextrose, glucagon, glucagon, oxygen, polyethylene glycol     LABS  Results for orders placed or performed during the hospital encounter of 07/30/24 (from the past 24 hour(s))   POCT GLUCOSE   Result Value Ref Range    POCT Glucose 164 (H) 74 - 99 mg/dL   POCT GLUCOSE   Result Value Ref Range    POCT Glucose 168 (H) 74 - 99 mg/dL   CBC   Result Value Ref Range    WBC 8.0 4.4 - 11.3 x10*3/uL    nRBC 0.0 0.0 - 0.0 /100 WBCs    RBC 5.89 4.50 - 5.90 x10*6/uL    Hemoglobin 18.1 (H) 13.5 - 17.5 g/dL    Hematocrit 52.9 (H) 41.0 - 52.0 %    MCV 90 80 - 100 fL    MCH 30.7 26.0 - 34.0 pg    MCHC 34.2 32.0 - 36.0 g/dL    RDW 14.4 11.5 - 14.5 %    Platelets 210 150 - 450 x10*3/uL   Magnesium   Result Value Ref Range    Magnesium 2.48 (H) 1.60 - 2.40 mg/dL   Renal function panel   Result Value Ref Range    Glucose 110 (H) 74 - 99 mg/dL    Sodium 134 (L) 136 - 145 mmol/L    Potassium 4.6 3.5 - 5.3 mmol/L    Chloride 97 (L) 98 - 107 mmol/L    Bicarbonate 23 21 - 32 mmol/L    Anion Gap 19 10 - 20 mmol/L    Urea Nitrogen 26 (H) 6 - 23 mg/dL    Creatinine 1.64 (H) 0.50 - 1.30 mg/dL    eGFR 49 (L) >60 mL/min/1.73m*2    Calcium 9.3 " 8.6 - 10.6 mg/dL    Phosphorus 3.8 2.5 - 4.9 mg/dL    Albumin 4.3 3.4 - 5.0 g/dL   POCT GLUCOSE   Result Value Ref Range    POCT Glucose 160 (H) 74 - 99 mg/dL   POCT GLUCOSE   Result Value Ref Range    POCT Glucose 95 74 - 99 mg/dL        IMAGING  MR cardiac morphology and function w and wo IV contrast    Result Date: 8/5/2024  Interpreted By:  Enoc Dorantes  and Roberto Mao STUDY: MR CARDIAC MORPHOLOGY AND FUNCTION W AND WO IV CONTRAST;  8/5/2024 10:35 am   INDICATION: Signs/Symptoms:HFrEF 10%, recent vfib.   COMPARISON: None.   ACCESSION NUMBER(S): [id number]   ORDERING CLINICIAN: ANAY FRY   TECHNIQUE: Siemens 1.5  Jovana MRI scanner. Turbo spin echo and balanced steady state free precession (bSSFP) imaging for anatomic definition. Dynamic cine bSSFP for cardiac chamber and wall-motion analysis, and valvular analysis. Flow quantification sequences for hemodynamics. Delayed gadolinium enhancement analysis after injection of gadolinium-chelate (mL of Dotarem, 0.2 mmol/kg).   HT-175 cm; WT-73 kg; BSA-1.88 m2   FINDINGS: CARDIAC CHAMBERS Normal atrioventricular and ventriculoarterial concordance   LEFT ATRIUM Severely dilated (DEANNE-51 ml/m2).   RIGHT ATRIUM Moderately dilated (RA max 4ch-31.1 cm2)   INTERATRIAL SEPTUM Intact.   LEFT VENTRICLE: 1. Severely dilated LV with severely reduced systolic function (LVEF 5-10%). 2. The basal inferoseptal, mid inferoseptal, apical septal, apicolateral segments are akinetic. All other segments are severely hypokinetic. 3. Eccentric left ventricular hypertrophy. 4. No evidence of LV thrombus. 5. Following administration of gadolinium, in the late phase, there is transmural enhancement of the mid/apical inferoseptal/inferior segments, subendocardial enhancement of the apicolateral segment (< 25% wall thickness) and mid wall septal enhancement.   Quantitative left ventricular functional values are as follows: EDV = 648 cc; EDVi = 344 cc/m2 ESV = 617 cc; ESVi = 328 cc/m2 Stroke  volume = 31 cc; SVi = 16 cc/m2 LVEF = 5 % Absolute Cardiac Output = 2 l/min.; COi = 1.06 l/min/m2 LV mass = 415 gm; LVMi = 220 gm/m2   LVEDD: 8.2 cm LVESD: 7.4 cm LV septal wall thickness (anterobasal): 1.1 cm LV postero-inferior wall thickness: 0.6 cm   FINDINGS ---------------------------------------------- LV SCAR SIZE (17 SEGMENT):  15 %   PERFUSION: There is reduced contrast wash-in in the mid inferoseptal wall.   RIGHT VENTRICLE The right ventricle appears dilated in size and has reduced qualitative systolic function by qualitative assessment. No abnormal delayed enhancement in the myocardium. RV lead visualized.   INTERVENTRICULAR SEPTUM Intact.   AORTIC VALVE The aortic valve is tricuspid with normal leaflet excursion. There is  no aortic regurgitation.     MITRAL VALVE There is  mild mitral regurgitation by qualitative assessment.   TRICUSPID VALVE There is qualitative mild tricuspid regurgitation.   PULMONARY VALVE: Not assessed.   PERICARDIUM: The pericardium is normal. There is no pericardial effusion.   THORACIC AORTA The thoracic aorta appears normal in course, caliber, and contour. There is no evidence for acute aortic pathology. The ascending thoracic aorta is 3.0 x 2.8 cm in diameter The arch vessel branching pattern is  normal.   All the arch branch vessels appear widely patent in their proximal portions.     PULMONARY ARTERIES The central pulmonary arteries appear  normal (MPA-2.8 cm, RPA-1.8 cm, LPA-2.1 cm).   SYSTEMIC AND PULMONARY VEINS Normal systemic venous and pulmonary venous return. The SVC is of normal caliber. IVC appears normal Normal pulmonary venous anatomy.   CHEST The chest wall is normal. Limited imaging through the lungs reveals no gross abnormalities. No pleural effusion.   UPPER ABDOMEN Limited imaging through the upper abdomen reveals no abnormalities of the visualized organs.       1. Severely dilated left ventricle (EDVi 344 ml/m2) with severely impaired systolic function  (LVEF 5-10%). 2. The basal-mid inferoseptal, apical septal, apicolateral segments are akinetic. All other segments are severely hypokinetic.   3. Eccentric left ventricular hypertrophy. 4. No evidence of LV thrombus. 5. LGE imaging demonstrates transmural infarction of the mid/apical inferoseptal/inferior segments and a separate small region of subendocardial infarction of the apicolateral wall (<25% wall thickness). Additionally, there is mid-wall septal fibrosis (nonischemic pattern). 6. Dilated right ventricle with impaired systolic function (qualitative assessment). CIED lead noted.   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   MACRO: None   Signed by: Enoc Dorantes 8/5/2024 4:19 PM Dictation workstation:   [id number]      PSYCHIATRIC RISK ASSESSMENT  Acute Risk of Harm to Others is Considered: Low  Acute Risk of Harm to Self is Considered: Low imminent risk but chronic elevated risk due to risk factors    ASSESSMENT AND PLAN  Leonel Yu is a 54 y.o. male with a past psychiatric history of MDD, RADHA, Stimulant (Crack Cocaine) Use Disorder, Tobacco Use Disorder, and Suicidal Ideation, Homicidal Ideation and a past medical history of HHTN, HFrEF (10-15% 3/2023), substance use disorder (tobacco, crack cocaine), CKD, suspected COPD w/Emphysema (no PFTs on file) and history of medication non-compliance who presented with orthopnea who was admitted to [facility] on 7/30/24 for acute on chronic systolic heart failure. Psychiatry was consulted on 8/5/24 for medication optimization.     On initial assessment, patient denies suicidal and homicidal ideation.  He states that he is doing well, despite recent crack cocaine use.  He endorses increased guilt, increased sadness, increased appetite, psychomotor depression, and persistent feelings of sadness for the past 2 weeks therefore meeting criteria for major depressive disorder.  At this time, patient is not deemed to be  a threat of harm to himself or to others.  He is therefore, not deemed to have a significant deviation of mood from baseline.  As a result, patient is deemed to be able to meet the ordinary demands of life, and does not meet involuntary psychiatric hospitalization criteria at this time.  Will continue to follow patient for development of any new mood changes, or desire for further intervention.  Patient is deemed to be safe at this time from threat of harm to self or to others.       Psychiatry was consulted for medication optimization.  patient reports some improvement with current sertraline 100 mg regimen, but ambivalence about titration Patient expresses some interest in receiving resources list for outpatient follow-up with provider.      8/6  On assessment, patient endorses passive death wish of wanting to be left alone. He is questioning the utility and benefit of the cardiac catheterization that is scheduled for later today. Patient denies active suicidal or homicidal ideation. At this time, patient is still not deemed to be a threat of harm to himself or others.     Patient remains ambivalent about outpatient therapy or psychiatry treatment, but appears to enjoy speaking to psychiatry team. Continued education on the benefits of outpatient treatment should be provided. Medication recommendations as described below.     Psychiatry will continue to follow patient for development of any new mood changes or desire for further intervention.    IMPRESSION  #Mood disorder unspecified r/o MDD vs SIMD  #RADHA, by hx  #r/o Adjustment disorder, chronic  #Stimulant use disorder (cocaine)  #Tobacco use disorder     RECOMMENDATIONS  Safety:  - Patient does not currently meet criteria for inpatient psychiatric admission   - If patient attempts to leave AMA, please use the Capacity Evaluation Tool to assess capacity at that time.   - To evaluate decision-making capacity, recommend use of the Capacity Evaluation Tool. Search  "“ IP Capacity Evaluation\" under SmartText   - Patient does not require a 1:1 sitter from a psychiatric perspective at this time.  - Defer to primary team decision for 1:1 sitter.  - As with all hospitalized patients, would recommend delirium precautions, as below.  -If patient actively declines medical procedures due to active suicidality, psychiatry should be contacted for further assessment      Medications:  -Continue sertraline 100mg PO daily for mood  -Provided outpatient resource list for psychiatric follow up after discharge      Work-up:  - EKG (8/2): QTc of 485    Ancillary Services:  - Recommend , pet/music/art therapy consult, per patient desire      Follow-up:  - Provided outpatient resources as above      ==========  - Discussed recommendations with primary team.  - Psychiatry will continue to follow.     Thank you for allowing us to participate in the care of this patient. Please page [phone] with any questions or concerns.     Patient seen with Dr. Jony Florez who agrees with assessment and recommendations.     Patient care, assessment, and recommendations discussed with Attending, Dr. Cai who agrees with stated above.     Medication Consent  Medication Consent: n/a; consult service  "

## 2024-08-08 LAB
ATRIAL RATE: 86 BPM
P AXIS: 80 DEGREES
P OFFSET: 180 MS
P ONSET: 129 MS
PR INTERVAL: 154 MS
Q ONSET: 206 MS
QRS COUNT: 15 BEATS
QRS DURATION: 118 MS
QT INTERVAL: 406 MS
QTC CALCULATION(BAZETT): 485 MS
QTC FREDERICIA: 458 MS
R AXIS: -43 DEGREES
T AXIS: 113 DEGREES
T OFFSET: 409 MS
VENTRICULAR RATE: 86 BPM

## 2024-08-12 ENCOUNTER — TELEPHONE (OUTPATIENT)
Dept: CARDIOLOGY | Facility: HOSPITAL | Age: 54
End: 2024-08-12
Payer: COMMERCIAL

## 2024-08-12 NOTE — TELEPHONE ENCOUNTER
Called Elizabet per Leonel's instructions-- Elizabet said he would be home in about 15 minutes.  Called back twice and phone went to [place].  Left message requesting call back.  Wants to provide this information from [employer] website:    Rides to and from places where you get covered services at no cost to you, including:     If you must travel more than 30 miles to see a network provider   Extra beneft of 30 one-way trips every calendar year to the doctor, dentist, WIC and Medicaid renewal appointments    Call [phone] 2 business days before your appointment to schedule a ride

## 2024-08-15 ENCOUNTER — TELEPHONE (OUTPATIENT)
Dept: CARDIOLOGY | Facility: HOSPITAL | Age: 54
End: 2024-08-15
Payer: COMMERCIAL

## 2024-08-15 NOTE — TELEPHONE ENCOUNTER
Left voicemail on both Leonel's and Elizabet's phone numbers requesting call back to discuss transportation resources.

## 2024-08-23 ENCOUNTER — TELEPHONE (OUTPATIENT)
Dept: CARDIOLOGY | Facility: HOSPITAL | Age: 54
End: 2024-08-23
Payer: COMMERCIAL

## 2024-08-28 ENCOUNTER — APPOINTMENT (OUTPATIENT)
Dept: PRIMARY CARE | Facility: CLINIC | Age: 54
End: 2024-08-28
Payer: COMMERCIAL

## 2024-09-03 ENCOUNTER — TELEPHONE (OUTPATIENT)
Dept: CARDIOLOGY | Facility: HOSPITAL | Age: 54
End: 2024-09-03
Payer: COMMERCIAL

## 2024-09-03 NOTE — TELEPHONE ENCOUNTER
Left  with transportation information.  Also left my contact number to call with questions or if I can be further assistance.

## 2024-09-06 ENCOUNTER — HOSPITAL ENCOUNTER (INPATIENT)
Facility: HOSPITAL | Age: 54
End: 2024-09-06
Attending: EMERGENCY MEDICINE | Admitting: SPECIALIST
Payer: COMMERCIAL

## 2024-09-06 DIAGNOSIS — I49.01 VENTRICULAR FIBRILLATION (MULTI): Primary | ICD-10-CM

## 2024-09-06 DIAGNOSIS — I50.9 ACUTE ON CHRONIC CONGESTIVE HEART FAILURE, UNSPECIFIED HEART FAILURE TYPE: ICD-10-CM

## 2024-09-06 DIAGNOSIS — Z76.89 ENCOUNTER TO ESTABLISH CARE: ICD-10-CM

## 2024-09-06 LAB — GLUCOSE BLD MANUAL STRIP-MCNC: 351 MG/DL (ref 74–99)

## 2024-09-06 PROCEDURE — 82947 ASSAY GLUCOSE BLOOD QUANT: CPT

## 2024-09-06 ASSESSMENT — PAIN - FUNCTIONAL ASSESSMENT: PAIN_FUNCTIONAL_ASSESSMENT: 0-10

## 2024-09-06 ASSESSMENT — PAIN SCALES - GENERAL: PAINLEVEL_OUTOF10: 5 - MODERATE PAIN

## 2024-09-06 ASSESSMENT — PAIN DESCRIPTION - LOCATION: LOCATION: ABDOMEN

## 2024-09-06 NOTE — ED TRIAGE NOTES
PT having 5/10 CP/pressure and abdominal pain causing SOB and states he is having episodes of blacking out x3 days History of pacemaker

## 2024-09-07 ENCOUNTER — APPOINTMENT (OUTPATIENT)
Dept: CARDIOLOGY | Facility: HOSPITAL | Age: 54
End: 2024-09-07
Payer: COMMERCIAL

## 2024-09-07 ENCOUNTER — CLINICAL SUPPORT (OUTPATIENT)
Dept: EMERGENCY MEDICINE | Facility: HOSPITAL | Age: 54
End: 2024-09-07
Payer: COMMERCIAL

## 2024-09-07 ENCOUNTER — APPOINTMENT (OUTPATIENT)
Dept: RADIOLOGY | Facility: HOSPITAL | Age: 54
End: 2024-09-07
Payer: COMMERCIAL

## 2024-09-07 PROBLEM — I49.01 VENTRICULAR FIBRILLATION (MULTI): Status: ACTIVE | Noted: 2024-09-07

## 2024-09-07 LAB
ABO GROUP (TYPE) IN BLOOD: NORMAL
ALBUMIN SERPL BCP-MCNC: 3.8 G/DL (ref 3.4–5)
ALBUMIN SERPL BCP-MCNC: 3.8 G/DL (ref 3.4–5)
ALP SERPL-CCNC: 74 U/L (ref 33–120)
ALP SERPL-CCNC: 88 U/L (ref 33–120)
ALT SERPL W P-5'-P-CCNC: 63 U/L (ref 10–52)
ALT SERPL W P-5'-P-CCNC: 70 U/L (ref 10–52)
AMPHETAMINES UR QL SCN: ABNORMAL
ANION GAP BLDV CALCULATED.4IONS-SCNC: 8 MMOL/L (ref 10–25)
ANION GAP BLDV CALCULATED.4IONS-SCNC: 9 MMOL/L (ref 10–25)
ANION GAP SERPL CALC-SCNC: 13 MMOL/L (ref 10–20)
ANION GAP SERPL CALC-SCNC: 14 MMOL/L (ref 10–20)
ANTIBODY SCREEN: NORMAL
APTT PPP: 33 SECONDS (ref 27–38)
AST SERPL W P-5'-P-CCNC: 40 U/L (ref 9–39)
AST SERPL W P-5'-P-CCNC: 43 U/L (ref 9–39)
ATRIAL RATE: 71 BPM
BARBITURATES UR QL SCN: ABNORMAL
BASE EXCESS BLDV CALC-SCNC: 0.8 MMOL/L (ref -2–3)
BASE EXCESS BLDV CALC-SCNC: 2.8 MMOL/L (ref -2–3)
BASOPHILS # BLD AUTO: 0.04 X10*3/UL (ref 0–0.1)
BASOPHILS # BLD AUTO: 0.04 X10*3/UL (ref 0–0.1)
BASOPHILS NFR BLD AUTO: 0.6 %
BASOPHILS NFR BLD AUTO: 0.6 %
BENZODIAZ UR QL SCN: ABNORMAL
BILIRUB DIRECT SERPL-MCNC: 0.2 MG/DL (ref 0–0.3)
BILIRUB SERPL-MCNC: 0.9 MG/DL (ref 0–1.2)
BILIRUB SERPL-MCNC: 1 MG/DL (ref 0–1.2)
BNP SERPL-MCNC: 2585 PG/ML (ref 0–99)
BODY TEMPERATURE: 37 DEGREES CELSIUS
BODY TEMPERATURE: 37 DEGREES CELSIUS
BUN SERPL-MCNC: 25 MG/DL (ref 6–23)
BUN SERPL-MCNC: 25 MG/DL (ref 6–23)
BZE UR QL SCN: ABNORMAL
CA-I BLDV-SCNC: 1.16 MMOL/L (ref 1.1–1.33)
CA-I BLDV-SCNC: 1.16 MMOL/L (ref 1.1–1.33)
CALCIUM SERPL-MCNC: 8.9 MG/DL (ref 8.6–10.6)
CALCIUM SERPL-MCNC: 8.9 MG/DL (ref 8.6–10.6)
CANNABINOIDS UR QL SCN: ABNORMAL
CARDIAC TROPONIN I PNL SERPL HS: 68 NG/L (ref 0–53)
CARDIAC TROPONIN I PNL SERPL HS: 71 NG/L (ref 0–53)
CARDIAC TROPONIN I PNL SERPL HS: 75 NG/L (ref 0–53)
CHLORIDE BLDV-SCNC: 103 MMOL/L (ref 98–107)
CHLORIDE BLDV-SCNC: 105 MMOL/L (ref 98–107)
CHLORIDE SERPL-SCNC: 106 MMOL/L (ref 98–107)
CHLORIDE SERPL-SCNC: 106 MMOL/L (ref 98–107)
CHOLEST SERPL-MCNC: 127 MG/DL (ref 0–199)
CHOLESTEROL/HDL RATIO: 3.6
CO2 SERPL-SCNC: 23 MMOL/L (ref 21–32)
CO2 SERPL-SCNC: 24 MMOL/L (ref 21–32)
CREAT SERPL-MCNC: 1.28 MG/DL (ref 0.5–1.3)
CREAT SERPL-MCNC: 1.34 MG/DL (ref 0.5–1.3)
EGFRCR SERPLBLD CKD-EPI 2021: 63 ML/MIN/1.73M*2
EGFRCR SERPLBLD CKD-EPI 2021: 66 ML/MIN/1.73M*2
EOSINOPHIL # BLD AUTO: 0.02 X10*3/UL (ref 0–0.7)
EOSINOPHIL # BLD AUTO: 0.05 X10*3/UL (ref 0–0.7)
EOSINOPHIL NFR BLD AUTO: 0.3 %
EOSINOPHIL NFR BLD AUTO: 0.8 %
ERYTHROCYTE [DISTWIDTH] IN BLOOD BY AUTOMATED COUNT: 15.2 % (ref 11.5–14.5)
ERYTHROCYTE [DISTWIDTH] IN BLOOD BY AUTOMATED COUNT: 15.8 % (ref 11.5–14.5)
FENTANYL+NORFENTANYL UR QL SCN: ABNORMAL
GLUCOSE BLD MANUAL STRIP-MCNC: 205 MG/DL (ref 74–99)
GLUCOSE BLD MANUAL STRIP-MCNC: 220 MG/DL (ref 74–99)
GLUCOSE BLD MANUAL STRIP-MCNC: 221 MG/DL (ref 74–99)
GLUCOSE BLD MANUAL STRIP-MCNC: 245 MG/DL (ref 74–99)
GLUCOSE BLD MANUAL STRIP-MCNC: 357 MG/DL (ref 74–99)
GLUCOSE BLD MANUAL STRIP-MCNC: 414 MG/DL (ref 74–99)
GLUCOSE BLDV-MCNC: 230 MG/DL (ref 74–99)
GLUCOSE BLDV-MCNC: 233 MG/DL (ref 74–99)
GLUCOSE SERPL-MCNC: 204 MG/DL (ref 74–99)
GLUCOSE SERPL-MCNC: 205 MG/DL (ref 74–99)
HCO3 BLDV-SCNC: 27.1 MMOL/L (ref 22–26)
HCO3 BLDV-SCNC: 27.2 MMOL/L (ref 22–26)
HCT VFR BLD AUTO: 45.4 % (ref 41–52)
HCT VFR BLD AUTO: 48 % (ref 41–52)
HCT VFR BLD EST: 48 % (ref 41–52)
HCT VFR BLD EST: 50 % (ref 41–52)
HDLC SERPL-MCNC: 35.4 MG/DL
HGB BLD-MCNC: 15.7 G/DL (ref 13.5–17.5)
HGB BLD-MCNC: 15.9 G/DL (ref 13.5–17.5)
HGB BLDV-MCNC: 16.1 G/DL (ref 13.5–17.5)
HGB BLDV-MCNC: 16.6 G/DL (ref 13.5–17.5)
HOLD SPECIMEN: NORMAL
IMM GRANULOCYTES # BLD AUTO: 0.02 X10*3/UL (ref 0–0.7)
IMM GRANULOCYTES # BLD AUTO: 0.06 X10*3/UL (ref 0–0.7)
IMM GRANULOCYTES NFR BLD AUTO: 0.3 % (ref 0–0.9)
IMM GRANULOCYTES NFR BLD AUTO: 0.9 % (ref 0–0.9)
INHALED O2 CONCENTRATION: 32 %
INR PPP: 1.2 (ref 0.9–1.1)
LACTATE BLDV-SCNC: 1.3 MMOL/L (ref 0.4–2)
LACTATE BLDV-SCNC: 1.6 MMOL/L (ref 0.4–2)
LACTATE SERPL-SCNC: 1.7 MMOL/L (ref 0.4–2)
LDLC SERPL CALC-MCNC: 72 MG/DL
LYMPHOCYTES # BLD AUTO: 0.67 X10*3/UL (ref 1.2–4.8)
LYMPHOCYTES # BLD AUTO: 0.81 X10*3/UL (ref 1.2–4.8)
LYMPHOCYTES NFR BLD AUTO: 12.3 %
LYMPHOCYTES NFR BLD AUTO: 9.3 %
MAGNESIUM SERPL-MCNC: 2.26 MG/DL (ref 1.6–2.4)
MAGNESIUM SERPL-MCNC: 2.49 MG/DL (ref 1.6–2.4)
MCH RBC QN AUTO: 30.5 PG (ref 26–34)
MCH RBC QN AUTO: 30.8 PG (ref 26–34)
MCHC RBC AUTO-ENTMCNC: 33.1 G/DL (ref 32–36)
MCHC RBC AUTO-ENTMCNC: 34.6 G/DL (ref 32–36)
MCV RBC AUTO: 89 FL (ref 80–100)
MCV RBC AUTO: 92 FL (ref 80–100)
METHADONE UR QL SCN: ABNORMAL
MONOCYTES # BLD AUTO: 0.34 X10*3/UL (ref 0.1–1)
MONOCYTES # BLD AUTO: 0.39 X10*3/UL (ref 0.1–1)
MONOCYTES NFR BLD AUTO: 4.7 %
MONOCYTES NFR BLD AUTO: 5.9 %
NEUTROPHILS # BLD AUTO: 5.21 X10*3/UL (ref 1.2–7.7)
NEUTROPHILS # BLD AUTO: 6.12 X10*3/UL (ref 1.2–7.7)
NEUTROPHILS NFR BLD AUTO: 79.5 %
NEUTROPHILS NFR BLD AUTO: 84.8 %
NON HDL CHOLESTEROL: 92 MG/DL (ref 0–149)
NRBC BLD-RTO: 0 /100 WBCS (ref 0–0)
NRBC BLD-RTO: 0 /100 WBCS (ref 0–0)
OPIATES UR QL SCN: ABNORMAL
OXYCODONE+OXYMORPHONE UR QL SCN: ABNORMAL
OXYHGB MFR BLDV: 42.3 % (ref 45–75)
OXYHGB MFR BLDV: 70.8 % (ref 45–75)
P AXIS: 78 DEGREES
P OFFSET: 174 MS
P ONSET: 125 MS
PCO2 BLDV: 40 MM HG (ref 41–51)
PCO2 BLDV: 48 MM HG (ref 41–51)
PCP UR QL SCN: ABNORMAL
PH BLDV: 7.36 PH (ref 7.33–7.43)
PH BLDV: 7.44 PH (ref 7.33–7.43)
PLATELET # BLD AUTO: 124 X10*3/UL (ref 150–450)
PLATELET # BLD AUTO: 158 X10*3/UL (ref 150–450)
PO2 BLDV: 31 MM HG (ref 35–45)
PO2 BLDV: 43 MM HG (ref 35–45)
POTASSIUM BLDV-SCNC: 4.4 MMOL/L (ref 3.5–5.3)
POTASSIUM BLDV-SCNC: 5.3 MMOL/L (ref 3.5–5.3)
POTASSIUM SERPL-SCNC: 4.5 MMOL/L (ref 3.5–5.3)
POTASSIUM SERPL-SCNC: 4.9 MMOL/L (ref 3.5–5.3)
PR INTERVAL: 166 MS
PROT SERPL-MCNC: 6.9 G/DL (ref 6.4–8.2)
PROT SERPL-MCNC: 7.1 G/DL (ref 6.4–8.2)
PROTHROMBIN TIME: 13.5 SECONDS (ref 9.8–12.8)
Q ONSET: 208 MS
QRS COUNT: 11 BEATS
QRS DURATION: 108 MS
QT INTERVAL: 410 MS
QTC CALCULATION(BAZETT): 445 MS
QTC FREDERICIA: 433 MS
R AXIS: -40 DEGREES
RBC # BLD AUTO: 5.1 X10*6/UL (ref 4.5–5.9)
RBC # BLD AUTO: 5.21 X10*6/UL (ref 4.5–5.9)
RH FACTOR (ANTIGEN D): NORMAL
SAO2 % BLDV: 43 % (ref 45–75)
SAO2 % BLDV: 73 % (ref 45–75)
SODIUM BLDV-SCNC: 134 MMOL/L (ref 136–145)
SODIUM BLDV-SCNC: 136 MMOL/L (ref 136–145)
SODIUM SERPL-SCNC: 137 MMOL/L (ref 136–145)
SODIUM SERPL-SCNC: 139 MMOL/L (ref 136–145)
T AXIS: 109 DEGREES
T OFFSET: 413 MS
TRIGL SERPL-MCNC: 98 MG/DL (ref 0–149)
VENTRICULAR RATE: 71 BPM
VLDL: 20 MG/DL (ref 0–40)
WBC # BLD AUTO: 6.6 X10*3/UL (ref 4.4–11.3)
WBC # BLD AUTO: 7.2 X10*3/UL (ref 4.4–11.3)

## 2024-09-07 PROCEDURE — 82947 ASSAY GLUCOSE BLOOD QUANT: CPT

## 2024-09-07 PROCEDURE — 99223 1ST HOSP IP/OBS HIGH 75: CPT | Performed by: STUDENT IN AN ORGANIZED HEALTH CARE EDUCATION/TRAINING PROGRAM

## 2024-09-07 PROCEDURE — 2020000001 HC ICU ROOM DAILY

## 2024-09-07 PROCEDURE — 84484 ASSAY OF TROPONIN QUANT: CPT

## 2024-09-07 PROCEDURE — 83735 ASSAY OF MAGNESIUM: CPT

## 2024-09-07 PROCEDURE — 2500000004 HC RX 250 GENERAL PHARMACY W/ HCPCS (ALT 636 FOR OP/ED): Mod: SE

## 2024-09-07 PROCEDURE — 71046 X-RAY EXAM CHEST 2 VIEWS: CPT | Performed by: RADIOLOGY

## 2024-09-07 PROCEDURE — 83605 ASSAY OF LACTIC ACID: CPT | Performed by: SPECIALIST

## 2024-09-07 PROCEDURE — 80053 COMPREHEN METABOLIC PANEL: CPT

## 2024-09-07 PROCEDURE — 83880 ASSAY OF NATRIURETIC PEPTIDE: CPT

## 2024-09-07 PROCEDURE — 85610 PROTHROMBIN TIME: CPT | Performed by: SPECIALIST

## 2024-09-07 PROCEDURE — 2500000002 HC RX 250 W HCPCS SELF ADMINISTERED DRUGS (ALT 637 FOR MEDICARE OP, ALT 636 FOR OP/ED)

## 2024-09-07 PROCEDURE — 84443 ASSAY THYROID STIM HORMONE: CPT | Performed by: STUDENT IN AN ORGANIZED HEALTH CARE EDUCATION/TRAINING PROGRAM

## 2024-09-07 PROCEDURE — 93010 ELECTROCARDIOGRAM REPORT: CPT | Performed by: INTERNAL MEDICINE

## 2024-09-07 PROCEDURE — 83735 ASSAY OF MAGNESIUM: CPT | Performed by: SPECIALIST

## 2024-09-07 PROCEDURE — 84132 ASSAY OF SERUM POTASSIUM: CPT

## 2024-09-07 PROCEDURE — 2500000004 HC RX 250 GENERAL PHARMACY W/ HCPCS (ALT 636 FOR OP/ED)

## 2024-09-07 PROCEDURE — 36415 COLL VENOUS BLD VENIPUNCTURE: CPT

## 2024-09-07 PROCEDURE — 85025 COMPLETE CBC W/AUTO DIFF WBC: CPT

## 2024-09-07 PROCEDURE — 85025 COMPLETE CBC W/AUTO DIFF WBC: CPT | Performed by: SPECIALIST

## 2024-09-07 PROCEDURE — 86901 BLOOD TYPING SEROLOGIC RH(D): CPT | Performed by: SPECIALIST

## 2024-09-07 PROCEDURE — 82435 ASSAY OF BLOOD CHLORIDE: CPT | Performed by: SPECIALIST

## 2024-09-07 PROCEDURE — 99291 CRITICAL CARE FIRST HOUR: CPT

## 2024-09-07 PROCEDURE — 71046 X-RAY EXAM CHEST 2 VIEWS: CPT

## 2024-09-07 PROCEDURE — 80307 DRUG TEST PRSMV CHEM ANLYZR: CPT

## 2024-09-07 PROCEDURE — 4B02XTZ MEASUREMENT OF CARDIAC DEFIBRILLATOR, EXTERNAL APPROACH: ICD-10-PCS | Performed by: STUDENT IN AN ORGANIZED HEALTH CARE EDUCATION/TRAINING PROGRAM

## 2024-09-07 PROCEDURE — 84132 ASSAY OF SERUM POTASSIUM: CPT | Performed by: SPECIALIST

## 2024-09-07 PROCEDURE — 2500000005 HC RX 250 GENERAL PHARMACY W/O HCPCS

## 2024-09-07 PROCEDURE — 2500000001 HC RX 250 WO HCPCS SELF ADMINISTERED DRUGS (ALT 637 FOR MEDICARE OP)

## 2024-09-07 PROCEDURE — 93005 ELECTROCARDIOGRAM TRACING: CPT

## 2024-09-07 PROCEDURE — 80061 LIPID PANEL: CPT | Performed by: SPECIALIST

## 2024-09-07 PROCEDURE — 82248 BILIRUBIN DIRECT: CPT | Performed by: SPECIALIST

## 2024-09-07 RX ORDER — INSULIN LISPRO 100 [IU]/ML
0-5 INJECTION, SOLUTION INTRAVENOUS; SUBCUTANEOUS
Status: DISPENSED | OUTPATIENT
Start: 2024-09-07

## 2024-09-07 RX ORDER — NAPROXEN SODIUM 220 MG/1
81 TABLET, FILM COATED ORAL
Status: DISPENSED | OUTPATIENT
Start: 2024-09-07

## 2024-09-07 RX ORDER — ENOXAPARIN SODIUM 100 MG/ML
40 INJECTION SUBCUTANEOUS EVERY 24 HOURS
Status: DISPENSED | OUTPATIENT
Start: 2024-09-07

## 2024-09-07 RX ORDER — DEXTROSE 50 % IN WATER (D50W) INTRAVENOUS SYRINGE
25
Status: ACTIVE | OUTPATIENT
Start: 2024-09-07

## 2024-09-07 RX ORDER — SERTRALINE HYDROCHLORIDE 100 MG/1
100 TABLET, FILM COATED ORAL DAILY
Status: DISPENSED | OUTPATIENT
Start: 2024-09-07

## 2024-09-07 RX ORDER — INSULIN GLARGINE 100 [IU]/ML
15 INJECTION, SOLUTION SUBCUTANEOUS NIGHTLY
Status: DISCONTINUED | OUTPATIENT
Start: 2024-09-07 | End: 2024-09-08

## 2024-09-07 RX ORDER — ATORVASTATIN CALCIUM 80 MG/1
80 TABLET, FILM COATED ORAL NIGHTLY
Status: DISPENSED | OUTPATIENT
Start: 2024-09-07

## 2024-09-07 RX ORDER — INSULIN LISPRO 100 [IU]/ML
10 INJECTION, SOLUTION INTRAVENOUS; SUBCUTANEOUS ONCE
Status: COMPLETED | OUTPATIENT
Start: 2024-09-07 | End: 2024-09-07

## 2024-09-07 RX ORDER — FUROSEMIDE 10 MG/ML
80 INJECTION INTRAMUSCULAR; INTRAVENOUS ONCE
Status: COMPLETED | OUTPATIENT
Start: 2024-09-07 | End: 2024-09-07

## 2024-09-07 RX ORDER — CARVEDILOL 6.25 MG/1
6.25 TABLET ORAL 2 TIMES DAILY
Status: DISPENSED | OUTPATIENT
Start: 2024-09-07

## 2024-09-07 RX ORDER — DEXTROSE 50 % IN WATER (D50W) INTRAVENOUS SYRINGE
12.5
Status: ACTIVE | OUTPATIENT
Start: 2024-09-07

## 2024-09-07 RX ORDER — SPIRONOLACTONE 25 MG/1
12.5 TABLET ORAL DAILY
Status: DISPENSED | OUTPATIENT
Start: 2024-09-07

## 2024-09-07 RX ORDER — ONDANSETRON HYDROCHLORIDE 2 MG/ML
4 INJECTION, SOLUTION INTRAVENOUS EVERY 6 HOURS PRN
Status: DISPENSED | OUTPATIENT
Start: 2024-09-07

## 2024-09-07 RX ADMIN — INSULIN GLARGINE 15 UNITS: 100 INJECTION, SOLUTION SUBCUTANEOUS at 20:07

## 2024-09-07 RX ADMIN — EMPAGLIFLOZIN 10 MG: 10 TABLET, FILM COATED ORAL at 15:10

## 2024-09-07 RX ADMIN — AMIODARONE HYDROCHLORIDE 1 MG/MIN: 1.8 INJECTION, SOLUTION INTRAVENOUS at 01:28

## 2024-09-07 RX ADMIN — ONDANSETRON 4 MG: 2 INJECTION INTRAMUSCULAR; INTRAVENOUS at 12:02

## 2024-09-07 RX ADMIN — ATORVASTATIN CALCIUM 80 MG: 80 TABLET, FILM COATED ORAL at 20:06

## 2024-09-07 RX ADMIN — FUROSEMIDE 80 MG: 10 INJECTION, SOLUTION INTRAMUSCULAR; INTRAVENOUS at 11:47

## 2024-09-07 RX ADMIN — AMIODARONE HYDROCHLORIDE 1 MG/MIN: 1.8 INJECTION, SOLUTION INTRAVENOUS at 23:33

## 2024-09-07 RX ADMIN — SPIRONOLACTONE 12.5 MG: 25 TABLET, FILM COATED ORAL at 11:14

## 2024-09-07 RX ADMIN — SACUBITRIL AND VALSARTAN 1 TABLET: 24; 26 TABLET, FILM COATED ORAL at 20:06

## 2024-09-07 RX ADMIN — Medication 3 L/MIN: at 20:07

## 2024-09-07 RX ADMIN — SERTRALINE 100 MG: 100 TABLET, FILM COATED ORAL at 11:14

## 2024-09-07 RX ADMIN — ENOXAPARIN SODIUM 40 MG: 40 INJECTION SUBCUTANEOUS at 11:14

## 2024-09-07 RX ADMIN — Medication 3 L/MIN: at 11:15

## 2024-09-07 RX ADMIN — AMIODARONE HYDROCHLORIDE 1 MG/MIN: 1.8 INJECTION, SOLUTION INTRAVENOUS at 12:04

## 2024-09-07 RX ADMIN — CARVEDILOL 6.25 MG: 6.25 TABLET, FILM COATED ORAL at 20:06

## 2024-09-07 RX ADMIN — INSULIN LISPRO 2 UNITS: 100 INJECTION, SOLUTION INTRAVENOUS; SUBCUTANEOUS at 12:58

## 2024-09-07 RX ADMIN — CARVEDILOL 6.25 MG: 6.25 TABLET, FILM COATED ORAL at 11:13

## 2024-09-07 RX ADMIN — AMIODARONE HYDROCHLORIDE 1 MG/MIN: 1.8 INJECTION, SOLUTION INTRAVENOUS at 06:36

## 2024-09-07 RX ADMIN — ASPIRIN 81 MG CHEWABLE TABLET 81 MG: 81 TABLET CHEWABLE at 06:38

## 2024-09-07 RX ADMIN — INSULIN LISPRO 10 UNITS: 100 INJECTION, SOLUTION INTRAVENOUS; SUBCUTANEOUS at 16:18

## 2024-09-07 RX ADMIN — SACUBITRIL AND VALSARTAN 1 TABLET: 24; 26 TABLET, FILM COATED ORAL at 11:14

## 2024-09-07 SDOH — SOCIAL STABILITY: SOCIAL INSECURITY: HAVE YOU HAD ANY THOUGHTS OF HARMING ANYONE ELSE?: NO

## 2024-09-07 SDOH — SOCIAL STABILITY: SOCIAL INSECURITY: HAS ANYONE EVER THREATENED TO HURT YOUR FAMILY OR YOUR PETS?: NO

## 2024-09-07 SDOH — SOCIAL STABILITY: SOCIAL INSECURITY: HAVE YOU HAD THOUGHTS OF HARMING ANYONE ELSE?: NO

## 2024-09-07 SDOH — SOCIAL STABILITY: SOCIAL INSECURITY: DOES ANYONE TRY TO KEEP YOU FROM HAVING/CONTACTING OTHER FRIENDS OR DOING THINGS OUTSIDE YOUR HOME?: NO

## 2024-09-07 SDOH — SOCIAL STABILITY: SOCIAL INSECURITY: WERE YOU ABLE TO COMPLETE ALL THE BEHAVIORAL HEALTH SCREENINGS?: YES

## 2024-09-07 SDOH — SOCIAL STABILITY: SOCIAL INSECURITY: ARE THERE ANY APPARENT SIGNS OF INJURIES/BEHAVIORS THAT COULD BE RELATED TO ABUSE/NEGLECT?: NO

## 2024-09-07 SDOH — SOCIAL STABILITY: SOCIAL INSECURITY: ABUSE: ADULT

## 2024-09-07 SDOH — SOCIAL STABILITY: SOCIAL INSECURITY: DO YOU FEEL UNSAFE GOING BACK TO THE PLACE WHERE YOU ARE LIVING?: NO

## 2024-09-07 SDOH — SOCIAL STABILITY: SOCIAL INSECURITY: DO YOU FEEL ANYONE HAS EXPLOITED OR TAKEN ADVANTAGE OF YOU FINANCIALLY OR OF YOUR PERSONAL PROPERTY?: NO

## 2024-09-07 SDOH — SOCIAL STABILITY: SOCIAL INSECURITY: ARE YOU OR HAVE YOU BEEN THREATENED OR ABUSED PHYSICALLY, EMOTIONALLY, OR SEXUALLY BY ANYONE?: NO

## 2024-09-07 ASSESSMENT — PATIENT HEALTH QUESTIONNAIRE - PHQ9
2. FEELING DOWN, DEPRESSED OR HOPELESS: SEVERAL DAYS
1. LITTLE INTEREST OR PLEASURE IN DOING THINGS: SEVERAL DAYS
SUM OF ALL RESPONSES TO PHQ9 QUESTIONS 1 & 2: 2

## 2024-09-07 ASSESSMENT — LIFESTYLE VARIABLES
EVER FELT BAD OR GUILTY ABOUT YOUR DRINKING: NO
SKIP TO QUESTIONS 9-10: 1
TOTAL SCORE: 0
HOW OFTEN DO YOU HAVE A DRINK CONTAINING ALCOHOL: NEVER
HOW OFTEN DO YOU HAVE 6 OR MORE DRINKS ON ONE OCCASION: NEVER
HAVE PEOPLE ANNOYED YOU BY CRITICIZING YOUR DRINKING: NO
HOW MANY STANDARD DRINKS CONTAINING ALCOHOL DO YOU HAVE ON A TYPICAL DAY: PATIENT DOES NOT DRINK
AUDIT-C TOTAL SCORE: 0
AUDIT-C TOTAL SCORE: 0
EVER HAD A DRINK FIRST THING IN THE MORNING TO STEADY YOUR NERVES TO GET RID OF A HANGOVER: NO
HAVE YOU EVER FELT YOU SHOULD CUT DOWN ON YOUR DRINKING: NO

## 2024-09-07 ASSESSMENT — PAIN SCALES - GENERAL
PAINLEVEL_OUTOF10: 3
PAINLEVEL_OUTOF10: 0 - NO PAIN

## 2024-09-07 ASSESSMENT — COGNITIVE AND FUNCTIONAL STATUS - GENERAL
WALKING IN HOSPITAL ROOM: A LITTLE
DAILY ACTIVITIY SCORE: 24
CLIMB 3 TO 5 STEPS WITH RAILING: A LITTLE
MOBILITY SCORE: 22
PATIENT BASELINE BEDBOUND: NO

## 2024-09-07 ASSESSMENT — ACTIVITIES OF DAILY LIVING (ADL)
FEEDING YOURSELF: INDEPENDENT
HEARING - LEFT EAR: FUNCTIONAL
BATHING: INDEPENDENT
TOILETING: INDEPENDENT
JUDGMENT_ADEQUATE_SAFELY_COMPLETE_DAILY_ACTIVITIES: YES
ADEQUATE_TO_COMPLETE_ADL: YES
GROOMING: INDEPENDENT
DRESSING YOURSELF: INDEPENDENT
PATIENT'S MEMORY ADEQUATE TO SAFELY COMPLETE DAILY ACTIVITIES?: YES
WALKS IN HOME: INDEPENDENT
HEARING - RIGHT EAR: FUNCTIONAL
LACK_OF_TRANSPORTATION: YES

## 2024-09-07 ASSESSMENT — PAIN - FUNCTIONAL ASSESSMENT
PAIN_FUNCTIONAL_ASSESSMENT: 0-10

## 2024-09-07 NOTE — CONSULTS
Inpatient consult to Electrophysiology  Consult performed by: Farshad Marcial MD  Consult ordered by: Tim Brar MD  Reason for consult: VF        History Of Present Illness:    Leonel Yu is a 54 y.o. 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 In the ED, his ICD interrogation showed reported VF episodes x6 and reported VT episodes x4 from 9/1- 9/5/24. Patient reported active cocaine use. Due to the recurrent VT/VF episodes, EP was consulted today.     Of note, patient was recently admitted to [facility] 6/20-25 for similar complaints of SOB, where he was diuresed with IV lasix bolus/ infusion. His ICD interrogation also showed 7/26/24; VF detected at 217 bpm, successful Tx wit ATP X1 and 7/27/24 VF detected at 220 bpm successful Tx with ATP X1.  Transitioned to torsemide and started on digoxin. Hydralazine and isordil were started for afterload reduction with plan for Entresto initiation as an outpatient. A R/LHC was attempted but aborted due to patient experiencing acute panic attack. I      12-lead ECG 9/7/2024: NSR 70 bpm, normal LA, significant LVH ([place] criteria) with LVH-related ST-T changes    Echo:  Transthoracic Echo (TTE) Complete 07/31/2024  CONCLUSIONS:   1. Left ventricular ejection fraction is severely decreased, calculated by Tim's biplane at 10%.   2. There is global hypokinesis of the left ventricle with minor regional variations.   3. Spectral Doppler shows an abnormal pattern of left ventricular diastolic filling.   4. Left ventricular cavity size is severely  dilated.   5. No left ventricular thrombus visualized.   6. There is spontaneous echocontrast noted within the LV cavity. Echocontrast was utilized and excluded LV apical thrombus.   7. There is severe eccentric left ventricular hypertrophy.   8. Severely enlarged right ventricle.   9. There is reduced right ventricular systolic function.  10. The left atrium is severely dilated.  11. The right atrium is moderately to severely dilated.  12. Mild to moderate tricuspid regurgitation visualized.  13. Mildly elevated RVSP.  14. Compared with the prior exam from 7/26/2023, the degree of TR has increased, however there was already a severley dilated LV with severe systolic dysfunciton and a dilated RV with reduced fx as well. Note that the prior exam included an agitated saline contrast study that was negative for shunt.    Cardiac Imaging:  MR cardiac morphology and function w and wo IV contrast 08/05/2024  IMPRESSION:  1. Severely dilated left ventricle (EDVi 344 ml/m2) with severely impaired systolic function (LVEF 5-10%).  2. The basal-mid inferoseptal, apical septal, apicolateral segments are akinetic. All other segments are severely hypokinetic.  3. Eccentric left ventricular hypertrophy.  4. No evidence of LV thrombus.  5. LGE imaging demonstrates transmural infarction of the mid/apical  inferoseptal/inferior segments and a separate small region of subendocardial infarction of the apicolateral wall (<25% wall  thickness). Additionally, there is mid-wall septal fibrosis  (nonischemic pattern).  6. Dilated right ventricle with impaired systolic function  (qualitative assessment). CIED lead noted.    <Baseline EGM prior to the reported VF episode: near and far-field EGM on RV lead>      <Spontaneously wobble ventricular CL during the reported VT on 9/5>      <Spontaneously wobble ventricular CL during the reported VT on 9/5>      <ATP changed the ventricular EGM CL during the reported VT on 9/5>      Past Medical  "History:  He has a past medical history of CHF (congestive heart failure) (Multi), Chronic kidney disease, COPD (chronic obstructive pulmonary disease) (Multi), Diabetes mellitus (Multi), Hyperlipidemia, Hypertension, Stroke (Multi), and Substance abuse (Multi).    Past Surgical History:  He has a past surgical history that includes Insert / replace / remove pacemaker.      Social History:  He reports that he has quit smoking. His smoking use included cigarettes. He has never used smokeless tobacco. He reports current drug use. Drug: \"Crack\" cocaine. He reports that he does not drink alcohol.    Family History:  Family History   Problem Relation Name Age of Onset    Heart disease Father          Allergies:  Patient has no known allergies.    Inpatient Medications:  Scheduled medications   Medication Dose Route Frequency    aspirin  81 mg oral Daily    atorvastatin  80 mg oral Nightly    carvedilol  6.25 mg oral BID    enoxaparin  40 mg subcutaneous q24h    insulin glargine  15 Units subcutaneous Nightly    insulin lispro  0-5 Units subcutaneous TID    oxygen   inhalation Continuous - Inhalation    sacubitriL-valsartan  1 tablet oral BID    sertraline  100 mg oral Daily    spironolactone  12.5 mg oral Daily    tiotropium  2 puff inhalation Daily     PRN medications   Medication    dextrose    dextrose    dextrose    dextrose    glucagon    glucagon    glucagon    glucagon     Continuous Medications   Medication Dose Last Rate    amiodarone  1 mg/min 1 mg/min (09/07/24 0803)     Outpatient Medications:  Current Outpatient Medications   Medication Instructions    aspirin 81 mg, oral, Daily RT    atorvastatin (LIPITOR) 80 mg, oral, Nightly    blood sugar diagnostic strip Use as directed to test blood glucose up to four times daily and as needed.    blood-glucose meter misc 1 each, subcutaneous, 4 times daily with meals and nightly, Check blood glucose 4 times daily, before meals and at bedtime.    carvedilol (COREG) " "6.25 mg, oral, 2 times daily    empagliflozin (Jardiance) 10 mg TAKE 1 TABLET BY MOUTH ONCE DAILY    furosemide (LASIX) 40 mg, oral, Daily    glucose 8 g, oral, As needed    insulin lispro (HUMALOG) 0-10 Units, subcutaneous, 3 times daily (morning, midday, late afternoon), Hypoglycemia protocol if Blood Glucose is between 0 - 70 mg/dL, 0 unit(s) if , 2 unit(s) if 151-200, 4 unit(s) if 201-250, 6 unit(s) if 251-300, 8 unit(s) if 301-350, 10 unit(s) if 351-400. Notify provider unit(s) if Blood Glucose is greater than 400 mg/dL    lancets 33 gauge misc 1 each, subcutaneous, 4 times daily    Lantus Solostar U-100 Insulin 30 Units, subcutaneous, Nightly    pen needle 1/2\" 29G X 12mm needle Use to inject 1-4 times daily as directed.    sacubitriL-valsartan (Entresto) 24-26 mg tablet 1 tablet, oral, 2 times daily    sertraline (ZOLOFT) 100 mg, oral, Daily    spironolactone (ALDACTONE) 12.5 mg, oral, Daily    tiotropium (Spiriva Respimat) 2.5 mcg/actuation inhaler 2 puffs, inhalation, Daily       Physical Exam:  GA: not in acute distress, AAO x3  CV: prominent JVD, normal S1/S2, no significant heart murmurs, bilateral crackles  Ext: NO significant pretibial pitting edema bilaterally     Assessment/Plan   Leonel Yu is a 54 y.o. 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 His ICD interrogation demonstrated several tachycardia episodes. Given the stable RV far-field EGM and spontaneously wobble RV CL during the tachycardia episodes, AF RVR secondary to his cocaine use was more suspected. " However, given the ATP terminated the tachycardia and some of the far-field EGM look wider during the reported VT/VF, VT/VF in patient with severe structural heart disease cannot be excluded fully.    Impression:  - Possible dual tachycardia with paroxysmal AF with rapid ventricular response and concurrent ventricular tachyarrhythmia (VT/VF) in the setting of active cocaine use    Recommendations:  - Check thyroid function; If euthroid, switch the current IV Amiodarone to the PO (10 g loading followed by 400 mg once daily)  - Because all of his 12-lead ECG before did not demonstrate clear atrial fibrillation, we will not recommend DOAC loading for his possible AF this time.   - Due to his ongoing cocaine use increasing the risk of device infection, we will not plan adding RA lead during this admission.    EP will sign off this case today but please feel free to ask us any questions.    Farshad Marcial MD  Clinical Cardiac Electrophysiology Fellow    Code Status:  DNR    Farshad Marcial MD    I saw and evaluated the patient. I personally obtained the key and critical portions of the history and physical exam or was physically present for key and critical portions performed by the resident/fellow. I reviewed the resident/fellow's documentation and discussed the patient with the resident/fellow. I agree with the resident/fellow's medical decision making as documented in the note, and my edits (bold and italic) have been made to the document as needed.     Cruzito Horn MD [facility]  Cardiac Electrophysiology    **Disclaimer: This note was dictated by speech recognition, and every effort has been made to prevent any error in transcription, however minor errors may be present**

## 2024-09-07 NOTE — ED PROVIDER NOTES
"History of Present Illness   Information Gathering: History collected from patient and chart review    HPI:  Leonel Yu is a 54 y.o. male with PMH of HFrEF (LVEF 15- 20% as of 7/2023) s/p ICD (3/2024), CKD3a, HTN, HLD, LUZ (crack cocaine), former tobacco use, COPD (on nocturnal 2L at baseline), DM2, remote left cerebellar hemorrhagic infarct, medication noncompliance, anxiety, and depression presenting for complaints of syncope. Last echo on July 31 of this year shows EF of 10%. Patient states that since discharge and early August, he has been feeling generally short of breath with malaise at home. Denies fever. States that over the past 3 days he has had multiple episodes of syncope. 2 days ago, he had 2 episodes of syncope and yesterday syncopized once. He states that he \"blacks out\" and can feel it coming on. Friend who observed an episode watched him go unresponsive and told friend that he thought it looked like his AICD had shocked him based on a single jolt throughout his body during this time. Patient denies chest pain. Denies alcohol or cocaine use. States that he has been taking his home antihypertensives and diuretics.    Physical Exam   Triage vitals:  T 36.4 °C (97.5 °F)  HR 92  BP (!) 125/94  RR 18  O2 96 % None (Room air)    Physical Exam  Constitutional:       Appearance: Normal appearance.   HENT:      Head: Normocephalic and atraumatic.   Eyes:      Extraocular Movements: Extraocular movements intact.      Pupils: Pupils are equal, round, and reactive to light.   Cardiovascular:      Rate and Rhythm: Normal rate and regular rhythm.   Pulmonary:      Breath sounds: Normal breath sounds.      Comments: No wheezes or rhonchi. ICD in place subcutaneously over left chest.  Abdominal:      General: Abdomen is flat.      Palpations: Abdomen is soft.   Musculoskeletal:         General: No swelling or signs of injury. Normal range of motion.      Comments: Bilateral 2+ pitting edema - patient states " is worse than usual   Neurological:      General: No focal deficit present.      Mental Status: He is alert and oriented to person, place, and time.         Medical Decision Making & ED Course   Medical Decision Makin y.o. male with PMH of HFrEF (LVEF 15- 20% as of 2023) s/p ICD (3/2024), CKD3a, HTN, HLD, LUZ (crack cocaine), former tobacco use, COPD (on nocturnal 2L at baseline), DM2, remote left cerebellar hemorrhagic infarct, medication noncompliance, anxiety, and depression presenting for complaints of syncope. On presentation, patient is hemodynamically stable saturating well on room air. Physical exam shows slightly increased fluid but diuresis held given patient states he took diuretics this morning and he does not feel short of breath, have chest pain or hypotensive. Given his significant cardiac history with reports of syncope, point-of-care ultrasound performed at bedside showing severely reduced ejection fraction approximately 10 to 15%. Mild pericardial effusion without sonographic signs of tamponade and dilated RV and LV. Given these concerning findings with syncope, device interrogation warranted. CICU fellow contacted for interrogation who kindly came down to interrogate patient's pacemaker which is a [employer]. Interrogation demonstrates that patient has had multiple runs of V-fib with shock. As result, patient was started on amiodarone infusion and admitted to the CICU due to highly unstable cardiac conduction. Labs reviewed and troponin improved from previous admission at 75 with delta of 4. BNP elevated at 2500. Mag elevated. CMP without severe electrolyte derangements. CBC without anemia or leukocytosis. Patient was monitored in the ED without significant events following admission to the CICU.    ED Course:  Diagnoses as of 24 0435   Ventricular fibrillation (Multi)       ----  EKG Independent Interpretation: EKG interpreted by myself. Please see ED Course for full  interpretation.    Independent Result Review and Interpretation: Relevant laboratory and radiographic results were reviewed and independently interpreted by myself.  As necessary, they are commented on in the ED Course.    Chronic conditions affecting the patient's care: As documented above in MDM    The patient was discussed with the following consultants/services: As described in MDM      Disposition   As a result of their workup, the patient will require admission to the hospital.  The patient was informed of his diagnosis.  The patient was given the opportunity to ask questions and I answered them. The patient agreed to be admitted to the hospital.    Procedures   Procedures    Patient seen and discussed with ED attending physician.    Dillon Sierra MD  Emergency Medicine, PGY-2      Fausto Sierra MD  Resident  09/07/24 0442    Emergency Medicine Attending Attestation:     Diagnoses as of 09/13/24 2115   Ventricular fibrillation (Multi)       The patient was seen by the resident/fellow.  I have personally performed a substantive portion of the encounter.  I have seen and examined the patient; agree with the workup, evaluation, MDM, management and diagnosis.  The care plan has been discussed with the resident; I have reviewed the resident’s note and agree with the documented findings.      Patient with syncope  Known decreased EF (10% on previous echo) and known AICD    EP came down to interrogate pacer and appropriate discharge recorded  Started amniodarone   Will admit to cardiology     No further episodes in the ED     I was present for the entirety of the procedure(s).     I independently interpreted patient's EKG and agree with the above mentioned interpretation.        MD Guadalupe Hemphill MD  09/13/24 2117

## 2024-09-07 NOTE — SIGNIFICANT EVENT
Called for device check for patient with syncope and possible ICD shock. Device interrogation complete for [employer] ICD showing patient has had 6x VF episodes, 4 episodes from 9/1- 9/5/24, most recent episode with ventricular rate of 207, for which ATP was attempted x1 and then patient got shocked with 41J. Patient states he actively uses cocaine, he has known HFrEF (EF 10%). EKG showing SR, Qtc 445 and LVH.     Recommendations:  - Discussed with ED team will plan for CICU admission once we have an open bed  - Recommend starting IV amiodarone gtt @ 1mg/min  - Please check lytes and replete to K>4, Mg>2

## 2024-09-07 NOTE — H&P
"History Of Present Illness  Leonel Yu is a 54 y.o. 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    Patient presented to the ED with chest and abdominal pain. He also reports episodes of \"blacking out\" for 3 days prior to presentation. He reports being able to feel the black outs before they occur. He also reports having used cocaine in the last couple of days as well. Upon ROS he notes SOB that is present but notes he has felt this way at home too. He currently denies chest pain, palpitations, NVD, fever, chills    In the ED, his ICD  was interrogated and showed reported VF episodes x6 and reported VT episodes x4 from 9/1- 9/5/24.  Most recent episode with ventricular rate of 207, for which ATP was attempted x1 and then patient got shocked with 41J. EKG showing SR, Qtc 445 and LVH.     Of note, patient was recently admitted to [facility]25 for similar complaints of SOB, where he was diuresed with IV lasix bolus/ infusion. His ICD interrogation also showed 7/26/24; VF detected at 217 BPM, successful Tx wit ATP X1 and 7/27/24 VF detected at 220 BPM successful Tx with ATP X1.  Transitioned to torsemide and started on digoxin. Hydralazine and isordil were started for afterload reduction with plan for Entresto initiation as an outpatient. A R/LHC was attempted but aborted due to patient experiencing acute panic attack.    ED Course:  Vitals:  T 36.4 °C (97.5 °F)  HR 92  BP (!) 125/94  RR 18  O2 96 % None (Room air)     Labs:  CBC: WBC: 6.6, Hgb: 15.7, Plt: 124  RFP: Na: 137, K: 4.9, Cl: " "106, bicarb: 23, BUN: 25, Cr: 1.28, Glu: 205, M.44  LFT: AST: 43, ALT: 63, Alk phos: 74, T. Bili: 0.9  BNP: 2585  Trop: 75->71->68  VBG: pH: 7.44, pCO2: 40, pO2: 43, bicarb: 27.2, Lactate: 1.3    Imaging:  Chest x-ray:  IMPRESSION:  Stable cardiomegaly. Mild diffuse interstitial prominence could be  chronic or relate to component developing interstitial edema.  Correlate clinically.    EKG showing SR, Qtc 445 and LVH.     ED Interventions:  Amiodarone gtt      Past Medical History  He has a past medical history of CHF (congestive heart failure) (Multi), Chronic kidney disease, COPD (chronic obstructive pulmonary disease) (Multi), Diabetes mellitus (Multi), Hyperlipidemia, Hypertension, Stroke (Multi), and Substance abuse (Multi).    Surgical History  He has a past surgical history that includes Insert / replace / remove pacemaker.     Social History  He reports that he has quit smoking. His smoking use included cigarettes. He has never used smokeless tobacco. He reports current drug use. Drug: \"Crack\" cocaine. He reports that he does not drink alcohol.    Family History  Family History   Problem Relation Name Age of Onset    Heart disease Father          Allergies  Patient has no known allergies.    Review of Systems   Per HPI    Physical Exam    GEN: Awake, alert, uncomfortable  HEENT: Head NCAT, MMM  CARDIO: Normal rate and rhythm. +JVD  RESP: Lungs CTAB, no wheezes or rhonchi  ABD: Soft, nt, nd  EXT: 1+ pitting edema of bilateral LE  SKIN: Warm, dry, intact  NEURO: NFD, AOx3      Last Recorded Vitals  BP (!) 131/116   Pulse 81   Temp 36.2 °C (97.2 °F) (Temporal)   Resp 12   Wt 76.2 kg (168 lb)   SpO2 97%     Relevant Results  Results for orders placed or performed during the hospital encounter of 24 (from the past 24 hour(s))   POCT GLUCOSE   Result Value Ref Range    POCT Glucose 351 (H) 74 - 99 mg/dL   CBC and Auto Differential   Result Value Ref Range    WBC 6.6 4.4 - 11.3 x10*3/uL    nRBC 0.0 " 0.0 - 0.0 /100 WBCs    RBC 5.10 4.50 - 5.90 x10*6/uL    Hemoglobin 15.7 13.5 - 17.5 g/dL    Hematocrit 45.4 41.0 - 52.0 %    MCV 89 80 - 100 fL    MCH 30.8 26.0 - 34.0 pg    MCHC 34.6 32.0 - 36.0 g/dL    RDW 15.2 (H) 11.5 - 14.5 %    Platelets 124 (L) 150 - 450 x10*3/uL    Neutrophils % 79.5 40.0 - 80.0 %    Immature Granulocytes %, Automated 0.9 0.0 - 0.9 %    Lymphocytes % 12.3 13.0 - 44.0 %    Monocytes % 5.9 2.0 - 10.0 %    Eosinophils % 0.8 0.0 - 6.0 %    Basophils % 0.6 0.0 - 2.0 %    Neutrophils Absolute 5.21 1.20 - 7.70 x10*3/uL    Immature Granulocytes Absolute, Automated 0.06 0.00 - 0.70 x10*3/uL    Lymphocytes Absolute 0.81 (L) 1.20 - 4.80 x10*3/uL    Monocytes Absolute 0.39 0.10 - 1.00 x10*3/uL    Eosinophils Absolute 0.05 0.00 - 0.70 x10*3/uL    Basophils Absolute 0.04 0.00 - 0.10 x10*3/uL   Comprehensive metabolic panel   Result Value Ref Range    Glucose 205 (H) 74 - 99 mg/dL    Sodium 137 136 - 145 mmol/L    Potassium 4.9 3.5 - 5.3 mmol/L    Chloride 106 98 - 107 mmol/L    Bicarbonate 23 21 - 32 mmol/L    Anion Gap 13 10 - 20 mmol/L    Urea Nitrogen 25 (H) 6 - 23 mg/dL    Creatinine 1.28 0.50 - 1.30 mg/dL    eGFR 66 >60 mL/min/1.73m*2    Calcium 8.9 8.6 - 10.6 mg/dL    Albumin 3.8 3.4 - 5.0 g/dL    Alkaline Phosphatase 74 33 - 120 U/L    Total Protein 7.1 6.4 - 8.2 g/dL    AST 43 (H) 9 - 39 U/L    Bilirubin, Total 0.9 0.0 - 1.2 mg/dL    ALT 63 (H) 10 - 52 U/L   Magnesium   Result Value Ref Range    Magnesium 2.49 (H) 1.60 - 2.40 mg/dL   B-Type Natriuretic Peptide   Result Value Ref Range    BNP 2,585 (H) 0 - 99 pg/mL   Troponin I, High Sensitivity, Initial   Result Value Ref Range    Troponin I, High Sensitivity (CMC) 75 (H) 0 - 53 ng/L   Blood Gas Venous Full Panel Unsolicited   Result Value Ref Range    POCT pH, Venous 7.44 (H) 7.33 - 7.43 pH    POCT pCO2, Venous 40 (L) 41 - 51 mm Hg    POCT pO2, Venous 43 35 - 45 mm Hg    POCT SO2, Venous 73 45 - 75 %    POCT Oxy Hemoglobin, Venous 70.8 45.0 -  75.0 %    POCT Hematocrit Calculated, Venous 48.0 41.0 - 52.0 %    POCT Sodium, Venous 136 136 - 145 mmol/L    POCT Potassium, Venous 5.3 3.5 - 5.3 mmol/L    POCT Chloride, Venous 105 98 - 107 mmol/L    POCT Ionized Calicum, Venous 1.16 1.10 - 1.33 mmol/L    POCT Glucose, Venous 230 (H) 74 - 99 mg/dL    POCT Lactate, Venous 1.3 0.4 - 2.0 mmol/L    POCT Base Excess, Venous 2.8 -2.0 - 3.0 mmol/L    POCT HCO3 Calculated, Venous 27.2 (H) 22.0 - 26.0 mmol/L    POCT Hemoglobin, Venous 16.1 13.5 - 17.5 g/dL    POCT Anion Gap, Venous 9.0 (L) 10.0 - 25.0 mmol/L    Patient Temperature 37.0 degrees Celsius   Light Blue Top   Result Value Ref Range    Extra Tube Hold for add-ons.    SST TOP   Result Value Ref Range    Extra Tube Hold for add-ons.    Gray Top   Result Value Ref Range    Extra Tube Hold for add-ons.    ECG 12 Lead   Result Value Ref Range    Ventricular Rate 71 BPM    Atrial Rate 71 BPM    OH Interval 166 ms    QRS Duration 108 ms    QT Interval 410 ms    QTC Calculation(Bazett) 445 ms    P Axis 78 degrees    R Axis -40 degrees    T Axis 109 degrees    QRS Count 11 beats    Q Onset 208 ms    P Onset 125 ms    P Offset 174 ms    T Offset 413 ms    QTC Fredericia 433 ms   Troponin, High Sensitivity, 1 Hour   Result Value Ref Range    Troponin I, High Sensitivity (CMC) 71 (H) 0 - 53 ng/L   POCT GLUCOSE   Result Value Ref Range    POCT Glucose 245 (H) 74 - 99 mg/dL     ECG 12 Lead    Result Date: 9/7/2024  Normal sinus rhythm Left axis deviation Left ventricular hypertrophy with repolarization abnormality ( Sokolow-Nieves , Brown product ) Abnormal ECG When compared with ECG of 06-SEP-2024 12:34, No significant change was found See ED provider note for full interpretation and clinical correlation Confirmed by June Perez ([id number]) on 9/7/2024 2:16:33 AM    XR chest 2 views    Result Date: 9/7/2024  Interpreted By:  Dimitri Rodriguez, STUDY: XR CHEST 2 VIEWS;  9/7/2024 12:30 am   INDICATION: Signs/Symptoms:Syncope.    COMPARISON: Chest x-ray 07/30/2024   ACCESSION NUMBER(S): [id number]   ORDERING CLINICIAN: LUKE ARBOLEDA   FINDINGS: Left-sided single lead AICD is stable in position.   CARDIOMEDIASTINAL SILHOUETTE: Cardiac silhouette is enlarged but stable.   LUNGS: No consolidation, pleural effusion or pneumothorax. Mild diffuse interstitial prominence. Redemonstration of bullous emphysematous changes in the lung apices, left greater than right.   ABDOMEN: No remarkable upper abdominal findings.   BONES: Multilevel degenerative changes of the spine.       Stable cardiomegaly. Mild diffuse interstitial prominence could be chronic or relate to component developing interstitial edema. Correlate clinically.   Findings suggestive of COPD with bullous emphysematous changes in the lung apices, left greater than right.   MACRO: None   Signed by: Dimitri Rodriguez 9/7/2024 12:38 AM Dictation workstation:   [id number]        Assessment/Plan   54M w PMH as above presenting to the ED with 3 days of syncopal episodes. In the ED, patient found to be in tachycardia with rates in the 200s. Device interrogation revealed patient to have had 6 episodes of ventricular fibrillation most recent episode with ventricular rate of 207, for which ATP was attempted x1 and then patient got shocked with 41J. Was started on amiodarone gtt in the ED.    NEURO:  #Depression  -Continue home sertraline 100 mg    CARDIOVASCULAR:  #Concurrent ventricular tachyarrhythmia (VT/VF) vs paroxysmal AF with rapid ventricular response iso of active cocaine use  :: [employer] scICD (3/2024)   :: Upon device interrogation, 6x episodes of V-Fib with shock demonstrated  :: May correlate with patient's syncopal episodes  -EP consulted; Recs below:  -Check thyroid function; If euthroid, switch the current IV Amiodarone to the PO (10 g loading followed by 400 mg once daily)   -Since all of his EKGs did not demonstrate clear a fib, no DOAC reccommended at this time.  -EP to  discuss the risk/benefit of adding RA lead during this admission with patient     #Cocaine use disorder  :: Has been using crack cocaine for last couple of days  - Monitor blood pressure while in withdrawal from cocaine  - Consider substance use counseling  - Monitor for signs of ACS    #HFrEF s/p ICD  #HTN  :: ECHO 7/31 EF 10%  :: GDMT: Empagliflozin, Entresto 24-26, Spironolactone, Carvedilol  :: Mild edema on presentation  :: Notable JVD on exam  :: On lasix 40mg PO at home for diuresis  Continue home GDMT: Empagliflozin 10 mg, Entresto 24-26, Spironolactone 12.5, Carvedilol 6.25 BID  -Lasix 80 IV x1; Continue diuresis PRN    RENAL:  #CKD3  :: B/l Cr 1.1-2  :: Cr on admission 1.28  -Avoid nephrotoxins  -Avoid hypotension  -Strict I/Os    ENDOCRINE:  #T2DM  :: A1c 8.4% 7/2024  :: Home regimen: Lantus 30u at bedtime, LDISS  -Lantus 15u at bedtime  -LDISS  -Accuchecks ACHS      F: Avoid with low EF  E: Strict electrolyte monitoring  N: Cardiac and Carb control   A: PIV x1  DVT: Lovenox    Code Status: DNR, confirmed on admisison  Surrogate decision maker: Ajith Santana, friend ([phone])    Mark Marvin MD

## 2024-09-08 VITALS
HEIGHT: 68 IN | SYSTOLIC BLOOD PRESSURE: 82 MMHG | RESPIRATION RATE: 18 BRPM | BODY MASS INDEX: 26.53 KG/M2 | HEART RATE: 60 BPM | WEIGHT: 175.04 LBS | OXYGEN SATURATION: 93 % | DIASTOLIC BLOOD PRESSURE: 46 MMHG | TEMPERATURE: 97.2 F

## 2024-09-08 LAB
ALBUMIN SERPL BCP-MCNC: 3.1 G/DL (ref 3.4–5)
ANION GAP SERPL CALC-SCNC: 13 MMOL/L (ref 10–20)
BASOPHILS # BLD AUTO: 0.04 X10*3/UL (ref 0–0.1)
BASOPHILS NFR BLD AUTO: 0.5 %
BUN SERPL-MCNC: 28 MG/DL (ref 6–23)
CALCIUM SERPL-MCNC: 8 MG/DL (ref 8.6–10.6)
CHLORIDE SERPL-SCNC: 104 MMOL/L (ref 98–107)
CO2 SERPL-SCNC: 24 MMOL/L (ref 21–32)
CREAT SERPL-MCNC: 1.34 MG/DL (ref 0.5–1.3)
EGFRCR SERPLBLD CKD-EPI 2021: 63 ML/MIN/1.73M*2
EOSINOPHIL # BLD AUTO: 0.05 X10*3/UL (ref 0–0.7)
EOSINOPHIL NFR BLD AUTO: 0.6 %
ERYTHROCYTE [DISTWIDTH] IN BLOOD BY AUTOMATED COUNT: 15.4 % (ref 11.5–14.5)
GLUCOSE BLD MANUAL STRIP-MCNC: 102 MG/DL (ref 74–99)
GLUCOSE BLD MANUAL STRIP-MCNC: 104 MG/DL (ref 74–99)
GLUCOSE BLD MANUAL STRIP-MCNC: 127 MG/DL (ref 74–99)
GLUCOSE BLD MANUAL STRIP-MCNC: 177 MG/DL (ref 74–99)
GLUCOSE BLD MANUAL STRIP-MCNC: 194 MG/DL (ref 74–99)
GLUCOSE BLD MANUAL STRIP-MCNC: 200 MG/DL (ref 74–99)
GLUCOSE BLD MANUAL STRIP-MCNC: 303 MG/DL (ref 74–99)
GLUCOSE BLD MANUAL STRIP-MCNC: 423 MG/DL (ref 74–99)
GLUCOSE SERPL-MCNC: 125 MG/DL (ref 74–99)
HCT VFR BLD AUTO: 45.2 % (ref 41–52)
HGB BLD-MCNC: 15.7 G/DL (ref 13.5–17.5)
IMM GRANULOCYTES # BLD AUTO: 0.02 X10*3/UL (ref 0–0.7)
IMM GRANULOCYTES NFR BLD AUTO: 0.2 % (ref 0–0.9)
LYMPHOCYTES # BLD AUTO: 0.94 X10*3/UL (ref 1.2–4.8)
LYMPHOCYTES NFR BLD AUTO: 11.1 %
MAGNESIUM SERPL-MCNC: 2.06 MG/DL (ref 1.6–2.4)
MCH RBC QN AUTO: 31.3 PG (ref 26–34)
MCHC RBC AUTO-ENTMCNC: 34.7 G/DL (ref 32–36)
MCV RBC AUTO: 90 FL (ref 80–100)
MONOCYTES # BLD AUTO: 0.45 X10*3/UL (ref 0.1–1)
MONOCYTES NFR BLD AUTO: 5.3 %
NEUTROPHILS # BLD AUTO: 6.95 X10*3/UL (ref 1.2–7.7)
NEUTROPHILS NFR BLD AUTO: 82.3 %
NRBC BLD-RTO: 0 /100 WBCS (ref 0–0)
PHOSPHATE SERPL-MCNC: 3.8 MG/DL (ref 2.5–4.9)
PLATELET # BLD AUTO: 150 X10*3/UL (ref 150–450)
POTASSIUM SERPL-SCNC: 4 MMOL/L (ref 3.5–5.3)
RBC # BLD AUTO: 5.02 X10*6/UL (ref 4.5–5.9)
SODIUM SERPL-SCNC: 137 MMOL/L (ref 136–145)
TSH SERPL-ACNC: 2.68 MIU/L (ref 0.44–3.98)
WBC # BLD AUTO: 8.5 X10*3/UL (ref 4.4–11.3)

## 2024-09-08 PROCEDURE — 2500000004 HC RX 250 GENERAL PHARMACY W/ HCPCS (ALT 636 FOR OP/ED)

## 2024-09-08 PROCEDURE — 2500000005 HC RX 250 GENERAL PHARMACY W/O HCPCS

## 2024-09-08 PROCEDURE — 82947 ASSAY GLUCOSE BLOOD QUANT: CPT

## 2024-09-08 PROCEDURE — 2500000001 HC RX 250 WO HCPCS SELF ADMINISTERED DRUGS (ALT 637 FOR MEDICARE OP)

## 2024-09-08 PROCEDURE — 80069 RENAL FUNCTION PANEL: CPT

## 2024-09-08 PROCEDURE — 2500000002 HC RX 250 W HCPCS SELF ADMINISTERED DRUGS (ALT 637 FOR MEDICARE OP, ALT 636 FOR OP/ED)

## 2024-09-08 PROCEDURE — 1100000001 HC PRIVATE ROOM DAILY

## 2024-09-08 PROCEDURE — 85025 COMPLETE CBC W/AUTO DIFF WBC: CPT

## 2024-09-08 PROCEDURE — 36415 COLL VENOUS BLD VENIPUNCTURE: CPT

## 2024-09-08 PROCEDURE — 2500000004 HC RX 250 GENERAL PHARMACY W/ HCPCS (ALT 636 FOR OP/ED): Performed by: STUDENT IN AN ORGANIZED HEALTH CARE EDUCATION/TRAINING PROGRAM

## 2024-09-08 PROCEDURE — 99233 SBSQ HOSP IP/OBS HIGH 50: CPT

## 2024-09-08 PROCEDURE — 83735 ASSAY OF MAGNESIUM: CPT

## 2024-09-08 RX ORDER — AMIODARONE HYDROCHLORIDE 200 MG/1
400 TABLET ORAL 3 TIMES DAILY
Status: DISPENSED | OUTPATIENT
Start: 2024-09-08

## 2024-09-08 RX ORDER — FUROSEMIDE 10 MG/ML
80 INJECTION INTRAMUSCULAR; INTRAVENOUS ONCE
Status: COMPLETED | OUTPATIENT
Start: 2024-09-08 | End: 2024-09-08

## 2024-09-08 RX ORDER — INSULIN LISPRO 100 [IU]/ML
10 INJECTION, SOLUTION INTRAVENOUS; SUBCUTANEOUS
Status: DISPENSED | OUTPATIENT
Start: 2024-09-08

## 2024-09-08 RX ORDER — INSULIN GLARGINE 100 [IU]/ML
25 INJECTION, SOLUTION SUBCUTANEOUS NIGHTLY
Status: ACTIVE | OUTPATIENT
Start: 2024-09-08

## 2024-09-08 RX ADMIN — AMIODARONE HYDROCHLORIDE 1 MG/MIN: 1.8 INJECTION, SOLUTION INTRAVENOUS at 10:48

## 2024-09-08 RX ADMIN — CARVEDILOL 6.25 MG: 6.25 TABLET, FILM COATED ORAL at 08:07

## 2024-09-08 RX ADMIN — ASPIRIN 81 MG CHEWABLE TABLET 81 MG: 81 TABLET CHEWABLE at 06:37

## 2024-09-08 RX ADMIN — Medication 3 L/MIN: at 20:56

## 2024-09-08 RX ADMIN — CARVEDILOL 6.25 MG: 6.25 TABLET, FILM COATED ORAL at 20:53

## 2024-09-08 RX ADMIN — ENOXAPARIN SODIUM 40 MG: 40 INJECTION SUBCUTANEOUS at 11:45

## 2024-09-08 RX ADMIN — INSULIN LISPRO 10 UNITS: 100 INJECTION, SOLUTION INTRAVENOUS; SUBCUTANEOUS at 11:45

## 2024-09-08 RX ADMIN — EMPAGLIFLOZIN 10 MG: 10 TABLET, FILM COATED ORAL at 08:07

## 2024-09-08 RX ADMIN — AMIODARONE HYDROCHLORIDE 400 MG: 200 TABLET ORAL at 15:33

## 2024-09-08 RX ADMIN — INSULIN LISPRO 1 UNITS: 100 INJECTION, SOLUTION INTRAVENOUS; SUBCUTANEOUS at 08:06

## 2024-09-08 RX ADMIN — INSULIN GLARGINE 25 UNITS: 100 INJECTION, SOLUTION SUBCUTANEOUS at 20:53

## 2024-09-08 RX ADMIN — SACUBITRIL AND VALSARTAN 1 TABLET: 24; 26 TABLET, FILM COATED ORAL at 08:07

## 2024-09-08 RX ADMIN — ATORVASTATIN CALCIUM 80 MG: 80 TABLET, FILM COATED ORAL at 20:53

## 2024-09-08 RX ADMIN — SACUBITRIL AND VALSARTAN 0.5 TABLET: 24; 26 TABLET, FILM COATED ORAL at 20:53

## 2024-09-08 RX ADMIN — AMIODARONE HYDROCHLORIDE 400 MG: 200 TABLET ORAL at 20:53

## 2024-09-08 RX ADMIN — AMIODARONE HYDROCHLORIDE 1 MG/MIN: 1.8 INJECTION, SOLUTION INTRAVENOUS at 04:39

## 2024-09-08 RX ADMIN — FUROSEMIDE 80 MG: 10 INJECTION, SOLUTION INTRAMUSCULAR; INTRAVENOUS at 06:36

## 2024-09-08 RX ADMIN — Medication 3 L/MIN: at 08:06

## 2024-09-08 RX ADMIN — SPIRONOLACTONE 12.5 MG: 25 TABLET, FILM COATED ORAL at 08:07

## 2024-09-08 RX ADMIN — SERTRALINE 100 MG: 100 TABLET, FILM COATED ORAL at 08:07

## 2024-09-08 ASSESSMENT — COGNITIVE AND FUNCTIONAL STATUS - GENERAL
DAILY ACTIVITIY SCORE: 24
MOBILITY SCORE: 22
CLIMB 3 TO 5 STEPS WITH RAILING: A LITTLE
WALKING IN HOSPITAL ROOM: A LITTLE

## 2024-09-08 ASSESSMENT — PAIN - FUNCTIONAL ASSESSMENT
PAIN_FUNCTIONAL_ASSESSMENT: 0-10

## 2024-09-08 ASSESSMENT — PAIN SCALES - GENERAL
PAINLEVEL_OUTOF10: 0 - NO PAIN

## 2024-09-08 NOTE — PROGRESS NOTES
"CARDIAC ICU PROGRESS NOTE    Leonel Yu/[id number]    Admit Date: 9/6/2024  Hospital Length of Stay: 1   ICU Length of Stay: 1d 7h     Subjective   OVN: No acute events. 4 beats of NS VT    This morning the patient is resting in bed. States he feels his SOB is improved but still present. Has not felt light headed since admission. Denies chest pain, palpitations.         Objective    VITALS:   Visit Vitals  BP 95/62   Pulse 53   Temp 35.5 °C (95.9 °F) (Temporal)   Resp 18   Ht 1.727 m (5' 8\")   Wt 79.4 kg (175 lb 0.7 oz)   SpO2 100%   BMI 26.62 kg/m²   Smoking Status Former   BSA 1.95 m²       Vent settings:    Most Recent Range Past 24hrs   Mode      FiO2  (3L) No data recorded   Rate   No data recorded   Vt    No data recorded   PEEP   No data recorded     Invasive Hemodynamics:    Most Recent Range Past 24hrs   BP (Art)   No data recorded   MAP(Art)   No data recorded   RA/CVP   No data recorded   PA   No data recorded   PA(mean)   No data recorded   PCWP   No data recorded   CO   No data recorded   CI   No data recorded   Mixed Venous   No data recorded   SVR    No data recorded   PVR   No data recorded     Infusions:       INTAKE/OUTPUT:  I/O last 3 completed shifts:  In: 1770.3 (22.3 mL/kg) [P.O.:920; I.V.:850.3 (10.7 mL/kg)]  Out: 3100 (39 mL/kg) [Urine:3100 (1.1 mL/kg/hr)]  Weight: 79.4 kg      Wt Readings from Last 5 Encounters:   09/08/24 79.4 kg (175 lb 0.7 oz)   08/06/24 73.7 kg (162 lb 7.7 oz)   02/16/24 77 kg (169 lb 12.1 oz)   12/28/23 73.2 kg (161 lb 6 oz)   12/14/23 72.5 kg (159 lb 13.3 oz)       LABS:  CBC:  Recent Labs     09/08/24  0431 09/07/24  0949 09/07/24  0006 08/05/24  1830 08/04/24  1911 08/03/24  2105 08/02/24  1951 08/01/24  1646   WBC 8.5 7.2 6.6 8.0 7.4 6.4 7.5 6.7   HGB 15.7 15.9 15.7 18.1* 16.9 16.7 17.5 17.2   HCT 45.2 48.0 45.4 52.9* 51.2 49.7 51.2 50.7    158 124* 210 194 185 190 178   MCV 90 92 89 90 93 92 92 92     CMP:  Recent Labs     09/08/24  0431 09/07/24  0949 " 09/07/24  0006 08/05/24  1830 08/04/24 1911 08/03/24 2105 08/02/24 1951 08/01/24  1645 07/31/24  1851 07/30/24  1738    139 137 134* 132* 133* 135* 132* 135* 138   K 4.0 4.5 4.9 4.6 4.5 4.4 4.0 4.0 4.9 4.4    106 106 97* 96* 96* 97* 99 101 106   CO2 24 24 23 23 24 27 27 23 23 22   ANIONGAP 13 14 13 19 17 14 15 14 16 14   BUN 28* 25* 25* 26* 28* 27* 30* 23 28* 23   CREATININE 1.34* 1.34* 1.28 1.64* 1.58* 1.55* 1.66* 1.43* 1.58* 1.36*   EGFR 63 63 66 49* 51* 53* 48* 58* 51* 61   MG 2.06 2.26 2.49* 2.48* 2.42* 2.28 2.27 2.16 2.05 2.10     Recent Labs     09/08/24  0431 09/07/24  0949 09/07/24  0006 08/05/24 1830 08/04/24 1911 08/03/24 2105 08/02/24 1951 08/01/24  1645 07/30/24  1738 07/30/24  0731 02/16/24  0533 02/15/24  1927 02/15/24  0656 02/14/24  0419 02/13/24  0711 02/12/24  1239 12/24/23 2004   ALBUMIN 3.1* 3.8 3.8 4.3 3.8 3.8 3.8 4.0   < > 3.9 3.2*   < > 3.2* 3.3*   < > 4.1 3.5   ALT  --  70* 63*  --   --   --   --   --   --  55* 23  --  31 40  --  68* 24   AST  --  40* 43*  --   --   --   --   --   --  28 13  --  17 19  --  40* 19   BILITOT  --  1.0 0.9  --   --   --   --   --   --  1.0 0.9  --  1.0 0.9  --  1.2 0.5    < > = values in this interval not displayed.     COAG:   Recent Labs     09/07/24  0949 12/09/23  2057 10/23/23  1851 07/24/23  1330 03/02/23  0225   INR 1.2* 1.2* 1.3* 1.2* 1.3*     ABO:   Recent Labs     09/07/24  0949   ABO A     HEME/ENDO:  Recent Labs     09/07/24  1016 07/30/24  0815 07/30/24  0731 06/20/24  0749   TSH 2.68 2.65  --   --    HGBA1C  --   --  8.4* 6.9*      CARDIAC:   Recent Labs     09/07/24  1016 09/07/24  0128 09/07/24  0006 07/31/24  1851 07/30/24  0815 07/30/24  0731 02/16/24  0533 02/15/24  0656 02/12/24  2147 02/12/24  1811 02/12/24  1239 12/27/23  0759 12/25/23  0052 12/24/23  2229 12/09/23  1330 12/09/23  1131   CKMB  --   --   --   --   --   --   --   --   --   --   --  1.6  --   --   --   --    TROPHS 68* 71* 75*  --  117* 125*  --   --  132*  "138* 130*  --    < > 109*   < > 163*   BNP  --   --  2,585* 1,014*  --  3,240* 2,900* 3,752*  --   --  >5,000*  --   --  1,425*  --  3,439*    < > = values in this interval not displayed.     Recent Labs     04/30/21  1630   SO2MV 84     No results for input(s): \"TACROLIMUS\", \"SIROLIMUS\", \"CYCLOSPORINE\" in the last 96588 hours.    Results from last 7 days   Lab Units 09/08/24  0431 09/07/24  0949 09/07/24  0006   WBC AUTO x10*3/uL 8.5 7.2 6.6   HEMOGLOBIN g/dL 15.7 15.9 15.7   HEMATOCRIT % 45.2 48.0 45.4   PLATELETS AUTO x10*3/uL 150 158 124*     Results from last 7 days   Lab Units 09/08/24  0431 09/07/24  0949 09/07/24  0006   SODIUM mmol/L 137 139 137   POTASSIUM mmol/L 4.0 4.5 4.9   CO2 mmol/L 24 24 23   ANION GAP mmol/L 13 14 13   BUN mg/dL 28* 25* 25*   CREATININE mg/dL 1.34* 1.34* 1.28   GLUCOSE mg/dL 125* 204* 205*   EGFR mL/min/1.73m*2 63 63 66   MAGNESIUM mg/dL 2.06 2.26 2.49*   PHOSPHORUS mg/dL 3.8  --   --       Results from last 7 days   Lab Units 09/07/24  0949 09/07/24  0006   ALT U/L 70* 63*   AST U/L 40* 43*   ALK PHOS U/L 88 74      Results from last 7 days   Lab Units 09/07/24  0949   INR  1.2*     Results from last 7 days   Lab Units 09/07/24  0949   FIO2 % 32     Results from last 7 days   Lab Units 09/07/24  0949 09/07/24  0007   POCT PH, VENOUS pH 7.36 7.44*   POCT PCO2, VENOUS mm Hg 48 40*     Results from last 7 days   Lab Units 09/07/24  0949   LACTATE mmol/L 1.7           Results from last 7 days   Lab Units 09/07/24  1016 09/07/24  0128 09/07/24  0006   TROPHSCMC ng/L 68* 71* 75*       Lab Results   Component Value Date    CKTOTAL 52 12/27/2023    CKMB 1.6 12/27/2023    CKMBINDEX 3 12/27/2023    TROPONINI 0.03 04/30/2021      Lab Results   Component Value Date    HGBA1C 8.4 (H) 07/30/2024      Lab Results   Component Value Date    CHOL 127 09/07/2024    CHOL 156 07/25/2023    CHOL 168 06/18/2023     Lab Results   Component Value Date    HDL 35.4 09/07/2024    HDL 40.5 07/25/2023    HDL " "35.6 (A) 06/18/2023     Lab Results   Component Value Date    LDLCALC 72 09/07/2024     Lab Results   Component Value Date    TRIG 98 09/07/2024    TRIG 92 07/25/2023    TRIG 146 06/18/2023     No components found for: \"CHOLHDL\"     IMAGING:   ECG 12 Lead    Result Date: 9/7/2024  Normal sinus rhythm Left axis deviation Left ventricular hypertrophy with repolarization abnormality ( Sokolow-Nieves , [place] product ) Abnormal ECG When compared with ECG of 06-SEP-2024 12:34, No significant change was found See ED provider note for full interpretation and clinical correlation Confirmed by June Perez ([id number]) on 9/7/2024 2:16:33 AM    XR chest 2 views    Result Date: 9/7/2024  Interpreted By:  Dimitri Rodriguez, STUDY: XR CHEST 2 VIEWS;  9/7/2024 12:30 am   INDICATION: Signs/Symptoms:Syncope.   COMPARISON: Chest x-ray 07/30/2024   ACCESSION NUMBER(S): [id number]   ORDERING CLINICIAN: LUKE ARBOLEDA   FINDINGS: Left-sided single lead AICD is stable in position.   CARDIOMEDIASTINAL SILHOUETTE: Cardiac silhouette is enlarged but stable.   LUNGS: No consolidation, pleural effusion or pneumothorax. Mild diffuse interstitial prominence. Redemonstration of bullous emphysematous changes in the lung apices, left greater than right.   ABDOMEN: No remarkable upper abdominal findings.   BONES: Multilevel degenerative changes of the spine.       Stable cardiomegaly. Mild diffuse interstitial prominence could be chronic or relate to component developing interstitial edema. Correlate clinically.   Findings suggestive of COPD with bullous emphysematous changes in the lung apices, left greater than right.   MACRO: None   Signed by: Dimitri Rodriguez 9/7/2024 12:38 AM Dictation workstation:   [id number]      PHYSICAL EXAM:  GEN: Awake, alert, NAD  HEENT: Head NCAT, MMM  CARDIO: Normal rate and rhythm. +JVD  RESP: Lungs CTAB, no wheezes or rhonchi, on 3L NC  ABD: Soft, nt, nd  EXT: No edema of bilateral LE  SKIN: Warm, dry, intact  NEURO: NFD, " AOx3      MEDICATIONS:   Scheduled medications  amiodarone, 400 mg, oral, TID  aspirin, 81 mg, oral, Daily  atorvastatin, 80 mg, oral, Nightly  carvedilol, 6.25 mg, oral, BID  empagliflozin, 10 mg, oral, Daily  enoxaparin, 40 mg, subcutaneous, q24h  insulin glargine, 25 Units, subcutaneous, Nightly  insulin lispro, 0-5 Units, subcutaneous, TID  insulin lispro, 10 Units, subcutaneous, TID  oxygen, , inhalation, Continuous - Inhalation  sacubitriL-valsartan, 0.5 tablet, oral, BID  sertraline, 100 mg, oral, Daily  spironolactone, 12.5 mg, oral, Daily  tiotropium, 2 puff, inhalation, Daily        Continuous medications       PRN medications  PRN medications: dextrose, dextrose, dextrose, dextrose, glucagon, glucagon, glucagon, glucagon, ondansetron          Assessment/Plan   54M w PMH as above presenting to the ED with 3 days of syncopal episodes. In the ED, patient found to be in tachycardia with rates in the 200s. Device interrogation revealed patient to have had 6 episodes of ventricular fibrillation most recent episode with ventricular rate of 207, for which ATP was attempted x1 and then patient got shocked with 41J. Was started on amiodarone gtt in the ED and admitted to the CICU.    HOSPITAL COURSE:  Leonel Yu is a 54 y.o. 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     Patient presented to the ED with 3 episodes of syncope iso of active cocaine use. his ICD  was interrogated and showed reported VF episodes x6 and reported VT episodes x4 from 9/1- 9/5/24.  Most recent episode with  ventricular rate of 207, for which ATP was attempted x1 and then patient got shocked with 41J. EKG showing SR, Qtc 445 and LVH. He was started on amio drip and sent to CICU. EP was consulted and interpreted his readings as concurrent ventricular tachyarrhythmia (VT/VF) vs paroxysmal AF with rapid ventricular response. They are discussing possibility of adding a RA lead to ICD during this admission. EP gave ok to convert IV amio to oral. For his HF he was continued on his home GDMT. Entresto dose was cut in half and he was diuresed with Lasix 80 IV x2. His BP remained soft 90/60 but IVC was checked and measured at 2.15. He was deemed stable for transfer to the floor and EP will continue to follow.    To Do's:  [ ] FU with EP if they are going to place RA lead during this admission  [ ] Continue Amio 400 TID until reached a total of 10 g amio (IV+oral). Then switch to 400 daily  [ ] Titrate GDMT as tolerated for BP       NEURO:  #Depression  -Continue home sertraline 100 mg     CARDIOVASCULAR:  #Concurrent ventricular tachyarrhythmia (VT/VF) vs paroxysmal AF with rapid ventricular response iso of active cocaine use  :: [employer] scICD (3/2024)   :: Upon device interrogation, 6x episodes of V-Fib with shock demonstrated  :: May correlate with patient's syncopal episodes  -EP following  -Switched IV Amiodarone to  PO. 400 mg TID until 10 g loading (IV+oral) followed by 400 mg once daily  -Since all of his EKGs did not demonstrate clear a fib, no DOAC reccommended at this time.  -EP discussing possibility of RA lead during this admission with patient      #Cocaine use disorder  :: Has been using crack cocaine for last couple of days  - Monitor blood pressure while in withdrawal from cocaine  - Consider substance use counseling  - Monitor for signs of ACS     #HFrEF s/p ICD  #HTN  :: ECHO 7/31 EF 10%  :: GDMT: Empagliflozin, Entresto 24-26, Spironolactone, Carvedilol  :: Mild edema on presentation  :: Notable JVD  on exam  :: On lasix 40mg PO at home for diuresis  Continue home GDMT: Empagliflozin 10 mg,  Spironolactone 12.5, Carvedilol 6.25 BID  -Home entresto dose cut in half to 12-13 BID  - S/P Lasix 80 IV x2. No more edema of LE  -BP remain soft 90s/60s. IVC checked and measured at 2.15  -Adjust GDMT accordingly to BP     RENAL:  #CKD3  :: B/l Cr 1.1-2  :: Cr on admission 1.28  -Avoid nephrotoxins  -Avoid hypotension  -Strict I/Os     ENDOCRINE:  #T2DM  :: A1c 8.4% 7/2024  :: Home regimen: Lantus 30u at bedtime, LDISS  -Originally started on Lantus 15u while in the hospital  -Had BG of 400 yesterday and today  -Lantus increased to 25 units nightly. Titrate further if needed.  -LDISS  -Accuchecks ACHS        F: Avoid with low EF  E: Strict electrolyte monitoring  N: Cardiac and Carb control   A: PIV x1  DVT: Lovenox     Code Status: DNR, confirmed on admisison  Surrogate decision maker: Ajith Santana, friend ([phone])     Mark Marvin MD  Internal Medicine, PGY-2

## 2024-09-08 NOTE — PROGRESS NOTES
Leonel Yu is a 54 y.o. male on day 1 of admission presenting with Ventricular fibrillation (Multi).      Subjective   Patient states that he had chest pain during admission but it is improved today. Patient reports SOB but says that it improves with oxygen usage. Denies fevers, chills, abdominal pain and SOB.        Objective     Last Recorded Vitals  BP 97/68   Pulse 62   Temp 36.3 °C (97.3 °F)   Resp 17   Wt 79.4 kg (175 lb 0.7 oz)   SpO2 100%   Intake/Output last 3 Shifts:    Intake/Output Summary (Last 24 hours) at 9/8/2024 1824  Last data filed at 9/8/2024 1530  Gross per 24 hour   Intake 1151.01 ml   Output 2250 ml   Net -1098.99 ml       Admission Weight  Weight: 76.2 kg (168 lb) (09/06/24 1234)    Daily Weight  09/08/24 : 79.4 kg (175 lb 0.7 oz)    Image Results  ECG 12 Lead  Normal sinus rhythm  Left axis deviation  Left ventricular hypertrophy with repolarization abnormality ( Sokolow-Nieves , [place] product )  Abnormal ECG  When compared with ECG of 06-SEP-2024 12:34,  No significant change was found    See ED provider note for full interpretation and clinical correlation  Confirmed by June Perez ([id number]) on 9/7/2024 2:16:33 AM  XR chest 2 views  Narrative: Interpreted By:  Dimitri Rodriguez,   STUDY:  XR CHEST 2 VIEWS;  9/7/2024 12:30 am      INDICATION:  Signs/Symptoms:Syncope.      COMPARISON:  Chest x-ray 07/30/2024      ACCESSION NUMBER(S):  [id number]      ORDERING CLINICIAN:  LUKE ARBOLEDA      FINDINGS:  Left-sided single lead AICD is stable in position.      CARDIOMEDIASTINAL SILHOUETTE:  Cardiac silhouette is enlarged but stable.      LUNGS:  No consolidation, pleural effusion or pneumothorax. Mild diffuse  interstitial prominence. Redemonstration of bullous emphysematous  changes in the lung apices, left greater than right.      ABDOMEN:  No remarkable upper abdominal findings.      BONES:  Multilevel degenerative changes of the spine.      Impression: Stable cardiomegaly. Mild diffuse  interstitial prominence could be  chronic or relate to component developing interstitial edema.  Correlate clinically.      Findings suggestive of COPD with bullous emphysematous changes in the  lung apices, left greater than right.      MACRO:  None      Signed by: Dimitri Rodriguez 9/7/2024 12:38 AM  Dictation workstation:   [id number]      Physical Exam  Constitutional:       General: He is not in acute distress.  Cardiovascular:      Rate and Rhythm: Normal rate and regular rhythm.   Pulmonary:      Effort: No respiratory distress.      Breath sounds: Normal breath sounds. No wheezing.   Abdominal:      General: There is no distension.      Palpations: Abdomen is soft.      Tenderness: There is no abdominal tenderness.   Musculoskeletal:      Right lower leg: No edema.      Left lower leg: No edema.         Relevant Results               Assessment/Plan      54M w PMH as above presenting to the ED with 3 days of syncopal episodes. In the ED, patient found to be in tachycardia with rates in the 200s. Device interrogation revealed patient to have had 6 episodes of ventricular fibrillation most recent episode with ventricular rate of 207, for which ATP was attempted x1 and then patient got shocked with 41J. Currently on Amiodarone and HDS.     Updates  - Transitioned to oral amio 400 TID, will transition to once daily when patient reaches total of 10G amio IV+ Oral.   - EP following considering 2nd ICD lead placement   - Patient not a candidate for transplant.          #Concurrent ventricular tachyarrhythmia (VT/VF) vs paroxysmal AF with rapid ventricular response iso of active cocaine use  :: [employer] scICD (3/2024)   :: Upon device interrogation, 6x episodes of V-Fib with shock demonstrated  :: May correlate with patient's syncopal episodes  -EP following  -Switched IV Amiodarone to  PO. 400 mg TID until 10 g loading (IV+oral) followed by 400 mg once daily  -Since all of his EKGs did not demonstrate clear  a fib, no DOAC reccommended at this time.  -EP discussing possibility of RA lead during this admission with patient      #Cocaine use disorder  :: Has been using crack cocaine for last couple of days  - Monitor blood pressure while in withdrawal from cocaine  - Consider substance use counseling  - Monitor for signs of ACS     #HFrEF s/p ICD  #HTN  :: ECHO 7/31 EF 10%  :: GDMT: Empagliflozin, Entresto 24-26, Spironolactone, Carvedilol  :: Mild edema on presentation  :: Notable JVD on exam  :: On lasix 40mg PO at home for diuresis  Continue home GDMT: Empagliflozin 10 mg,  Spironolactone 12.5, Carvedilol 6.25 BID  -Home entresto dose cut in half to 12-13 BID  - S/P Lasix 80 IV x2. No more edema of LE  -BP remain soft 90s/60s. IVC checked and measured at 2.15  -Adjust GDMT accordingly to BP     L:  #CKD3  :: B/l Cr 1.1-2  :: Cr on admission 1.28  -Avoid nephrotoxins  -Avoid hypotension  -Strict I/Os       #T2DM  :: A1c 8.4% 7/2024  :: Home regimen: Lantus 30u at bedtime, LDISS  -Originally started on Lantus 15u while in the hospital  -Had BG of 400 yesterday and today  -Lantus increased to 25 units nightly. Titrate further if needed.  -LDISS  -Accuchecks ACHS    #Depression  -Continue home sertraline 100 mg     F: Avoid with low EF  E: Strict electrolyte monitoring  N: Cardiac and Carb control   A: PIV x1  DVT: Lovenox     Code Status: DNR, confirmed on admisison  Surrogate decision maker: Ajith Spauldingchcock, friend ([phone])       Yang Alamo MD  PGY-1 Internal Medicine

## 2024-09-09 ENCOUNTER — APPOINTMENT (OUTPATIENT)
Dept: CARDIOLOGY | Facility: HOSPITAL | Age: 54
End: 2024-09-09
Payer: COMMERCIAL

## 2024-09-09 PROBLEM — Z76.89 ENCOUNTER TO ESTABLISH CARE: Status: ACTIVE | Noted: 2024-09-09

## 2024-09-09 LAB
ALBUMIN SERPL BCP-MCNC: 3.1 G/DL (ref 3.4–5)
ALBUMIN SERPL BCP-MCNC: 3.4 G/DL (ref 3.4–5)
ANION GAP SERPL CALC-SCNC: 11 MMOL/L (ref 10–20)
ANION GAP SERPL CALC-SCNC: 11 MMOL/L (ref 10–20)
ATRIAL RATE: 71 BPM
BASOPHILS # BLD AUTO: 0.03 X10*3/UL (ref 0–0.1)
BASOPHILS NFR BLD AUTO: 0.5 %
BUN SERPL-MCNC: 28 MG/DL (ref 6–23)
BUN SERPL-MCNC: 28 MG/DL (ref 6–23)
CALCIUM SERPL-MCNC: 8.3 MG/DL (ref 8.6–10.6)
CALCIUM SERPL-MCNC: 9 MG/DL (ref 8.6–10.6)
CHLORIDE SERPL-SCNC: 101 MMOL/L (ref 98–107)
CHLORIDE SERPL-SCNC: 97 MMOL/L (ref 98–107)
CO2 SERPL-SCNC: 26 MMOL/L (ref 21–32)
CO2 SERPL-SCNC: 31 MMOL/L (ref 21–32)
CREAT SERPL-MCNC: 1.53 MG/DL (ref 0.5–1.3)
CREAT SERPL-MCNC: 1.74 MG/DL (ref 0.5–1.3)
EGFRCR SERPLBLD CKD-EPI 2021: 46 ML/MIN/1.73M*2
EGFRCR SERPLBLD CKD-EPI 2021: 53 ML/MIN/1.73M*2
EOSINOPHIL # BLD AUTO: 0.05 X10*3/UL (ref 0–0.7)
EOSINOPHIL NFR BLD AUTO: 0.8 %
ERYTHROCYTE [DISTWIDTH] IN BLOOD BY AUTOMATED COUNT: 15.8 % (ref 11.5–14.5)
GLUCOSE BLD MANUAL STRIP-MCNC: 140 MG/DL (ref 74–99)
GLUCOSE BLD MANUAL STRIP-MCNC: 179 MG/DL (ref 74–99)
GLUCOSE BLD MANUAL STRIP-MCNC: 71 MG/DL (ref 74–99)
GLUCOSE BLD MANUAL STRIP-MCNC: 87 MG/DL (ref 74–99)
GLUCOSE SERPL-MCNC: 111 MG/DL (ref 74–99)
GLUCOSE SERPL-MCNC: 61 MG/DL (ref 74–99)
HCT VFR BLD AUTO: 46.3 % (ref 41–52)
HGB BLD-MCNC: 15.4 G/DL (ref 13.5–17.5)
IMM GRANULOCYTES # BLD AUTO: 0.01 X10*3/UL (ref 0–0.7)
IMM GRANULOCYTES NFR BLD AUTO: 0.2 % (ref 0–0.9)
LYMPHOCYTES # BLD AUTO: 0.61 X10*3/UL (ref 1.2–4.8)
LYMPHOCYTES NFR BLD AUTO: 9.5 %
MAGNESIUM SERPL-MCNC: 2.12 MG/DL (ref 1.6–2.4)
MCH RBC QN AUTO: 31.2 PG (ref 26–34)
MCHC RBC AUTO-ENTMCNC: 33.3 G/DL (ref 32–36)
MCV RBC AUTO: 94 FL (ref 80–100)
MONOCYTES # BLD AUTO: 0.47 X10*3/UL (ref 0.1–1)
MONOCYTES NFR BLD AUTO: 7.3 %
NEUTROPHILS # BLD AUTO: 5.26 X10*3/UL (ref 1.2–7.7)
NEUTROPHILS NFR BLD AUTO: 81.7 %
NRBC BLD-RTO: 0 /100 WBCS (ref 0–0)
P AXIS: 59 DEGREES
P OFFSET: 176 MS
P ONSET: 122 MS
PHOSPHATE SERPL-MCNC: 3.2 MG/DL (ref 2.5–4.9)
PHOSPHATE SERPL-MCNC: 3.8 MG/DL (ref 2.5–4.9)
PLATELET # BLD AUTO: 128 X10*3/UL (ref 150–450)
POTASSIUM SERPL-SCNC: 4.1 MMOL/L (ref 3.5–5.3)
POTASSIUM SERPL-SCNC: 4.2 MMOL/L (ref 3.5–5.3)
PR INTERVAL: 180 MS
Q ONSET: 212 MS
QRS COUNT: 12 BEATS
QRS DURATION: 110 MS
QT INTERVAL: 436 MS
QTC CALCULATION(BAZETT): 473 MS
QTC FREDERICIA: 461 MS
R AXIS: -32 DEGREES
RBC # BLD AUTO: 4.94 X10*6/UL (ref 4.5–5.9)
SODIUM SERPL-SCNC: 134 MMOL/L (ref 136–145)
SODIUM SERPL-SCNC: 135 MMOL/L (ref 136–145)
T AXIS: 101 DEGREES
T OFFSET: 430 MS
VENTRICULAR RATE: 71 BPM
WBC # BLD AUTO: 6.4 X10*3/UL (ref 4.4–11.3)

## 2024-09-09 PROCEDURE — 80069 RENAL FUNCTION PANEL: CPT

## 2024-09-09 PROCEDURE — 82947 ASSAY GLUCOSE BLOOD QUANT: CPT

## 2024-09-09 PROCEDURE — 36415 COLL VENOUS BLD VENIPUNCTURE: CPT

## 2024-09-09 PROCEDURE — 2500000001 HC RX 250 WO HCPCS SELF ADMINISTERED DRUGS (ALT 637 FOR MEDICARE OP)

## 2024-09-09 PROCEDURE — 83735 ASSAY OF MAGNESIUM: CPT

## 2024-09-09 PROCEDURE — 85025 COMPLETE CBC W/AUTO DIFF WBC: CPT

## 2024-09-09 PROCEDURE — 99222 1ST HOSP IP/OBS MODERATE 55: CPT

## 2024-09-09 PROCEDURE — 93010 ELECTROCARDIOGRAM REPORT: CPT | Performed by: INTERNAL MEDICINE

## 2024-09-09 PROCEDURE — 1100000001 HC PRIVATE ROOM DAILY

## 2024-09-09 PROCEDURE — 2500000002 HC RX 250 W HCPCS SELF ADMINISTERED DRUGS (ALT 637 FOR MEDICARE OP, ALT 636 FOR OP/ED)

## 2024-09-09 PROCEDURE — 93005 ELECTROCARDIOGRAM TRACING: CPT

## 2024-09-09 PROCEDURE — 2500000005 HC RX 250 GENERAL PHARMACY W/O HCPCS

## 2024-09-09 PROCEDURE — 2500000004 HC RX 250 GENERAL PHARMACY W/ HCPCS (ALT 636 FOR OP/ED)

## 2024-09-09 PROCEDURE — 84100 ASSAY OF PHOSPHORUS: CPT

## 2024-09-09 RX ORDER — FUROSEMIDE 10 MG/ML
80 INJECTION INTRAMUSCULAR; INTRAVENOUS ONCE
Status: COMPLETED | OUTPATIENT
Start: 2024-09-09 | End: 2024-09-09

## 2024-09-09 RX ORDER — INSULIN GLARGINE 100 [IU]/ML
20 INJECTION, SOLUTION SUBCUTANEOUS NIGHTLY
Status: DISCONTINUED | OUTPATIENT
Start: 2024-09-09 | End: 2024-09-12 | Stop reason: HOSPADM

## 2024-09-09 RX ADMIN — INSULIN LISPRO 1 UNITS: 100 INJECTION, SOLUTION INTRAVENOUS; SUBCUTANEOUS at 12:42

## 2024-09-09 RX ADMIN — SERTRALINE 100 MG: 100 TABLET, FILM COATED ORAL at 08:28

## 2024-09-09 RX ADMIN — INSULIN GLARGINE 20 UNITS: 100 INJECTION, SOLUTION SUBCUTANEOUS at 20:41

## 2024-09-09 RX ADMIN — SPIRONOLACTONE 12.5 MG: 25 TABLET, FILM COATED ORAL at 08:28

## 2024-09-09 RX ADMIN — AMIODARONE HYDROCHLORIDE 400 MG: 200 TABLET ORAL at 15:40

## 2024-09-09 RX ADMIN — INSULIN LISPRO 10 UNITS: 100 INJECTION, SOLUTION INTRAVENOUS; SUBCUTANEOUS at 12:42

## 2024-09-09 RX ADMIN — EMPAGLIFLOZIN 10 MG: 10 TABLET, FILM COATED ORAL at 08:28

## 2024-09-09 RX ADMIN — SACUBITRIL AND VALSARTAN 0.5 TABLET: 24; 26 TABLET, FILM COATED ORAL at 08:28

## 2024-09-09 RX ADMIN — FUROSEMIDE 80 MG: 10 INJECTION, SOLUTION INTRAMUSCULAR; INTRAVENOUS at 10:25

## 2024-09-09 RX ADMIN — Medication 3 L/MIN: at 07:22

## 2024-09-09 RX ADMIN — ASPIRIN 81 MG CHEWABLE TABLET 81 MG: 81 TABLET CHEWABLE at 06:15

## 2024-09-09 RX ADMIN — SACUBITRIL AND VALSARTAN 0.5 TABLET: 24; 26 TABLET, FILM COATED ORAL at 20:41

## 2024-09-09 RX ADMIN — Medication 3 L/MIN: at 20:44

## 2024-09-09 RX ADMIN — AMIODARONE HYDROCHLORIDE 400 MG: 200 TABLET ORAL at 08:28

## 2024-09-09 RX ADMIN — AMIODARONE HYDROCHLORIDE 400 MG: 200 TABLET ORAL at 20:41

## 2024-09-09 RX ADMIN — ATORVASTATIN CALCIUM 80 MG: 80 TABLET, FILM COATED ORAL at 20:41

## 2024-09-09 ASSESSMENT — PAIN SCALES - GENERAL
PAINLEVEL_OUTOF10: 0 - NO PAIN
PAINLEVEL_OUTOF10: 0 - NO PAIN

## 2024-09-09 ASSESSMENT — COGNITIVE AND FUNCTIONAL STATUS - GENERAL
MOBILITY SCORE: 22
WALKING IN HOSPITAL ROOM: A LITTLE
DAILY ACTIVITIY SCORE: 24
CLIMB 3 TO 5 STEPS WITH RAILING: A LITTLE

## 2024-09-09 NOTE — CONSULTS
"Nutrition Initial Assessment:   Nutrition Assessment    Reason for Assessment: Admission nursing screening    Patient is a 54 y.o. 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  States that he tries to avoid high salt foods. He states that he often forgets to check his blood sugars and doesn't remember what insulin to take. Reports that his insulin  after his power/refrig was out last month.  States that he has never been educated on a diabetic diet but he is intestered in learning.  Food Allergies/Intolerances:  None  GI Symptoms: None  Oral Problems: None       Anthropometrics:  Height: 172.7 cm (5' 8\")   Weight: 79.4 kg (175 lb 0.7 oz)   BMI (Calculated): 26.62  IBW/kg (Dietitian Calculated): 70 kg  Percent of IBW: 113 %       Weight History:     Admission weights:  24 0500 79.4 kg (175 lb 0.7 oz)   24 0400 77.9 kg (171 lb 11.8 oz)   24 0930 77 kg (169 lb 12.1 oz)   24 1234 76.2 kg (168 lb)     Wt Readings from Last 10 Encounters:   24 79.4 kg (175 lb 0.7 oz)   24 73.7 kg (162 lb 7.7 oz)   24 77 kg (169 lb 12.1 oz)   23 73.2 kg (161 lb 6 oz)   23 72.5 kg (159 lb 13.3 oz)   10/26/23 73.6 kg (162 lb 4.1 oz)      Weight Change %:  Usual weight ~72-77kg. Weight gain of 3.2kg in 2 days appears unlikely with negative I/O. Pt states that usual 76 or 77kg is the correct weight.  No weight loss seen.     Nutrition Focused Physical Exam Findings:  defer: no concern for " malnutrition    Nutrition Significant Labs:  CBC Trend:   Results from last 7 days   Lab Units 09/09/24  0757 09/08/24  0431 09/07/24  0949 09/07/24  0006   WBC AUTO x10*3/uL 6.4 8.5 7.2 6.6   RBC AUTO x10*6/uL 4.94 5.02 5.21 5.10   HEMOGLOBIN g/dL 15.4 15.7 15.9 15.7   HEMATOCRIT % 46.3 45.2 48.0 45.4   MCV fL 94 90 92 89   PLATELETS AUTO x10*3/uL 128* 150 158 124*    , BMP Trend:   Results from last 7 days   Lab Units 09/09/24  0757 09/08/24  0431 09/07/24  0949 09/07/24  0006   GLUCOSE mg/dL 61* 125* 204* 205*   CALCIUM mg/dL 8.3* 8.0* 8.9 8.9   SODIUM mmol/L 134* 137 139 137   POTASSIUM mmol/L 4.1 4.0 4.5 4.9   CO2 mmol/L 26 24 24 23   CHLORIDE mmol/L 101 104 106 106   BUN mg/dL 28* 28* 25* 25*   CREATININE mg/dL 1.53* 1.34* 1.34* 1.28    , A1C:  Lab Results   Component Value Date    HGBA1C 8.4 (H) 07/30/2024   , BG POCT trend:   Results from last 7 days   Lab Units 09/09/24  1541 09/09/24  1124 09/09/24  0809 09/08/24  2043 09/08/24  1834   POCT GLUCOSE mg/dL 71* 179* 87 194* 104*        Nutrition Specific Medications:  Lantus 25u, SSI, Humalog 10u with meals,     I/O:   Last BM Date: 09/08/24;      Dietary Orders (From admission, onward)       Start     Ordered    09/07/24 1158  Adult diet Cardiac; 70 gm fat; 2 - 3 grams Sodium  Diet effective now        Question Answer Comment   Diet type Cardiac    Fat restriction: 70 gm fat    Sodium restriction: 2 - 3 grams Sodium        09/07/24 1157                     Estimated Needs:   Total Energy Estimated Needs (kCal): 2000 kCal  Method for Estimating Needs: 25kcal/kg ABW  Total Protein Estimated Needs (g): 100 g  Method for Estimating Needs: 1.2gm/kg IBW          Nutrition Diagnosis   Malnutrition Diagnosis  Patient has Malnutrition Diagnosis: No    Nutrition Diagnosis  Patient has Nutrition Diagnosis: Yes  Diagnosis Status (1): New  Nutrition Diagnosis 1: Food and nutrition related knowledge deficit  Related to (1): diabetic diet  As Evidenced by (1): reported no  previous education on diabetic diet       Nutrition Interventions/Recommendations         Nutrition Prescription:  Individualized Nutrition Prescription Provided for : Consider changing diet to 75gm carb controlled, 2 gm Na diet. RDN to follow up with diet ed if time allows. Consider referral to diabetes education.         Nutrition Interventions:   Interventions: Meals and snacks  Goal: Consume >75% of meals, glycemic control.      Nutrition Education:   Attempt to follow up with diet ed if time allows.        Nutrition Monitoring and Evaluation   Food/Nutrient Related History Monitoring  Monitoring and Evaluation Plan: Energy intake, Amount of food  Criteria: PO intake will be adequate to meet needs    Body Composition/Growth/Weight History  Monitoring and Evaluation Plan: Weight  Criteria: Maintain stable weight    Biochemical Data, Medical Tests and Procedures  Monitoring and Evaluation Plan: Electrolyte/renal panel, Glucose/endocrine profile  Criteria: Labs WNL      Time Spent (min): 60 minutes

## 2024-09-09 NOTE — HOSPITAL COURSE
Leonel Yu is a 54 y.o. 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 Reported syncope before admission,  ICD  was interrogated and showed reported VF episodes x6 and reported VT episodes x4 from 9/1- 9/5/24.  Most recent episode with ventricular rate of 207, for which ATP was attempted x1 and then patient got shocked with 41J. EKG showing SR, Qtc 445 and LVH. C/f recent cocaine use. Started on oral amio 400 TID, will transition to once daily when patient reaches total of 10G amio IV+ Oral, final loading day 9/ 14/2024. EP will not place 2nd ICD lead inpatient and patient not a candidate for transplant due to substance use. GDMT: Coreg lowered to 3.125 and Entresto lowered to 24-26 due to persistent bradycardia (50s) and hypotension. Patient underwent dieresis for HF exacerbation with symptomatic improvement. At time of discharge Mr. Yu was optimized for his a breathing (2/2 HFrEF) and possible arhythmia while on amio.

## 2024-09-09 NOTE — CARE PLAN
Problem: Respiratory  Goal: Clear secretions with interventions this shift  Outcome: Progressing  Goal: Minimize anxiety/maximize coping throughout shift  Outcome: Progressing  Goal: Minimal/no exertional discomfort or dyspnea this shift  Outcome: Progressing  Goal: No signs of respiratory distress (eg. Use of accessory muscles. Peds grunting)  Outcome: Progressing  Goal: Patent airway maintained this shift  Outcome: Progressing  Goal: Tolerate mechanical ventilation evidenced by VS/agitation level this shift  Outcome: Progressing  Goal: Tolerate pulmonary toileting this shift  Outcome: Progressing  Goal: Verbalize decreased shortness of breath this shift  Outcome: Progressing  Goal: Wean oxygen to maintain O2 saturation per order/standard this shift  Outcome: Progressing  Goal: Increase self care and/or family involvement in next 24 hours  Outcome: Progressing     Problem: Skin  Goal: Decreased wound size/increased tissue granulation at next dressing change  Outcome: Progressing  Goal: Participates in plan/prevention/treatment measures  Outcome: Progressing  Goal: Prevent/manage excess moisture  Outcome: Progressing  Goal: Prevent/minimize sheer/friction injuries  Outcome: Progressing  Goal: Promote/optimize nutrition  Outcome: Progressing  Goal: Promote skin healing  Outcome: Progressing     Problem: Pain - Adult  Goal: Verbalizes/displays adequate comfort level or baseline comfort level  Outcome: Progressing     Problem: Safety - Adult  Goal: Free from fall injury  Outcome: Progressing     Problem: Discharge Planning  Goal: Discharge to home or other facility with appropriate resources  Outcome: Progressing     Problem: Chronic Conditions and Co-morbidities  Goal: Patient's chronic conditions and co-morbidity symptoms are monitored and maintained or improved  Outcome: Progressing     Problem: Diabetes  Goal: Achieve decreasing blood glucose levels by end of shift  Outcome: Progressing  Goal: Increase stability  of blood glucose readings by end of shift  Outcome: Progressing  Goal: Decrease in ketones present in urine by end of shift  Outcome: Progressing  Goal: Maintain electrolyte levels within acceptable range throughout shift  Outcome: Progressing  Goal: Maintain glucose levels >70mg/dl to <250mg/dl throughout shift  Outcome: Progressing  Goal: No changes in neurological exam by end of shift  Outcome: Progressing  Goal: Learn about and adhere to nutrition recommendations by end of shift  Outcome: Progressing  Goal: Vital signs within normal range for age by end of shift  Outcome: Progressing  Goal: Increase self care and/or family involovement by end of shift  Outcome: Progressing  Goal: Receive DSME education by end of shift  Outcome: Progressing     Problem: Fall/Injury  Goal: Not fall by end of shift  Outcome: Progressing  Goal: Be free from injury by end of the shift  Outcome: Progressing  Goal: Verbalize understanding of personal risk factors for fall in the hospital  Outcome: Progressing  Goal: Verbalize understanding of risk factor reduction measures to prevent injury from fall in the home  Outcome: Progressing  Goal: Use assistive devices by end of the shift  Outcome: Progressing  Goal: Pace activities to prevent fatigue by end of the shift  Outcome: Progressing   The patient's goals for the shift include      The clinical goals for the shift include patient will remain HDS throughout the shift

## 2024-09-09 NOTE — PROGRESS NOTES
Leonel Yu is a 54 y.o. male on day 2 of admission presenting with Ventricular fibrillation (Multi).      Subjective   Reports vague chest and ab discomfort. NO Sob while on 3 L NC. Patient denies Fevers, chills, palpitations, cough, wheeze, n/v/d, hematochezia, hematuria, or dsyuria.        Objective     Last Recorded Vitals  /69   Pulse 65   Temp 35.9 °C (96.6 °F)   Resp 16   Wt 79.4 kg (175 lb 0.7 oz)   SpO2 97%   Intake/Output last 3 Shifts:    Intake/Output Summary (Last 24 hours) at 9/9/2024 1748  Last data filed at 9/9/2024 1230  Gross per 24 hour   Intake --   Output 1000 ml   Net -1000 ml       Admission Weight  Weight: 76.2 kg (168 lb) (09/06/24 1234)    Daily Weight  09/08/24 : 79.4 kg (175 lb 0.7 oz)    Image Results  Electrocardiogram, 12-lead PRN ACS symptoms  Normal sinus rhythm  Possible Left atrial enlargement  Left axis deviation  Left ventricular hypertrophy with QRS widening and repolarization abnormality  Inferior infarct (cited on or before 07-SEP-2024)  Prolonged QT  Abnormal ECG  When compared with ECG of 07-SEP-2024 09:33,  Premature ventricular complexes are no longer Present  Electrocardiogram, 12-lead PRN ACS symptoms  Sinus rhythm with occasional Premature ventricular complexes  Possible Left atrial enlargement  Left axis deviation  Left ventricular hypertrophy with repolarization abnormality  Inferior infarct , age undetermined  Abnormal ECG  When compared with ECG of 07-SEP-2024 00:07,  Premature ventricular complexes are now Present  Inferior infarct is now Present      Physical Exam  Constitutional:       General: He is not in acute distress.  Cardiovascular:      Rate and Rhythm: Normal rate and regular rhythm.   Pulmonary:      Effort: No respiratory distress.      Breath sounds: Normal breath sounds. No wheezing.   Abdominal:      General: There is no distension.      Palpations: Abdomen is soft.      Tenderness: There is no abdominal tenderness.   Musculoskeletal:       Right lower leg: No edema.      Left lower leg: No edema.         Relevant Results               Assessment/Plan      54M w PMH as above presenting to the ED with 3 days of syncopal episodes. In the ED, patient found to be in tachycardia with rates in the 200s. Device interrogation revealed patient to have had 6 episodes of ventricular fibrillation most recent episode with ventricular rate of 207, for which ATP was attempted x1 and then patient got shocked with 41J. Currently on Amiodarone and HDS.     Updates  - Transitioned to oral amio 400 TID, will transition to once daily when patient reaches total of 10G amio IV+ Oral.   - EP following considering 2nd ICD lead placement   - Patient not a candidate for transplant.   - Coreg held 9/9 AM for low BP  - Short acting insulin held given low BG reading          #Concurrent ventricular tachyarrhythmia (VT/VF) vs paroxysmal AF with rapid ventricular response iso of active cocaine use  :: Cree[facility] (3/2024)   :: Upon device interrogation, 6x episodes of V-Fib with shock demonstrated  :: May correlate with patient's syncopal episodes  -EP following  -Switched IV Amiodarone to  PO. 400 mg TID until 10 g loading (IV+oral) followed by 400 mg once daily  -Since all of his EKGs did not demonstrate clear a fib, no DOAC reccommended at this time.  -EP discussing possibility of RA lead during this admission with patient      #Cocaine use disorder  :: Has been using crack cocaine for last couple of days  - Monitor blood pressure while in withdrawal from cocaine  - Consider substance use counseling  - Monitor for signs of ACS     #HFrEF s/p ICD  #HTN  :: ECHO 7/31 EF 10%  :: GDMT: Empagliflozin, Entresto 24-26, Spironolactone, Carvedilol  :: Mild edema on presentation  :: Notable JVD on exam  :: On lasix 40mg PO at home for diuresis  Continue home GDMT: Empagliflozin 10 mg,  Spironolactone 12.5, Carvedilol 6.25 BID  -Home entresto dose cut in half to 24-26 BID  -  S/P Lasix 80 IV x2. No more edema of LE  -BP remain soft 90s/60s. IVC checked and measured at 2.15  Plan:   - Hold Coreg 9/9  - IV lasix 80 mg 9/9  -Adjust GDMT accordingly to BP       #GUICHO on CKD3  :: B/l Cr 1.1-2  :: Cr on admission to   5 1.53  -Avoid nephrotoxins  -Avoid hypotension  -Strict I/Os       #T2DM  :: A1c 8.4% 7/2024  :: Home regimen: Lantus 30u at bedtime, LDISS  -Originally started on Lantus 15u while in the hospital  -Had BG of 400 9/7 and today  Plan:  - Held Lispo  - Latus 20 currently   -Accuchecks ACHS    #Depression  -Continue home sertraline 100 mg     F: Avoid with low EF  E: Strict electrolyte monitoring  N: Cardiac and Carb control   A: PIV x1  DVT: Lovenox     Code Status: DNR, confirmed on admisison  Surrogate decision maker: Ajith Santana, friend ([phone])       Donnie Napier, DO  PGY-1 Internal Medicine

## 2024-09-10 LAB
ALBUMIN SERPL BCP-MCNC: 3.2 G/DL (ref 3.4–5)
ALBUMIN SERPL BCP-MCNC: 3.6 G/DL (ref 3.4–5)
ANION GAP SERPL CALC-SCNC: 11 MMOL/L (ref 10–20)
ANION GAP SERPL CALC-SCNC: 15 MMOL/L (ref 10–20)
ATRIAL RATE: 65 BPM
BASOPHILS # BLD AUTO: 0.04 X10*3/UL (ref 0–0.1)
BASOPHILS NFR BLD AUTO: 0.6 %
BUN SERPL-MCNC: 25 MG/DL (ref 6–23)
BUN SERPL-MCNC: 26 MG/DL (ref 6–23)
CALCIUM SERPL-MCNC: 8.6 MG/DL (ref 8.6–10.6)
CALCIUM SERPL-MCNC: 9.1 MG/DL (ref 8.6–10.6)
CHLORIDE SERPL-SCNC: 101 MMOL/L (ref 98–107)
CHLORIDE SERPL-SCNC: 95 MMOL/L (ref 98–107)
CO2 SERPL-SCNC: 29 MMOL/L (ref 21–32)
CO2 SERPL-SCNC: 29 MMOL/L (ref 21–32)
CREAT SERPL-MCNC: 1.42 MG/DL (ref 0.5–1.3)
CREAT SERPL-MCNC: 1.71 MG/DL (ref 0.5–1.3)
EGFRCR SERPLBLD CKD-EPI 2021: 47 ML/MIN/1.73M*2
EGFRCR SERPLBLD CKD-EPI 2021: 58 ML/MIN/1.73M*2
EOSINOPHIL # BLD AUTO: 0.06 X10*3/UL (ref 0–0.7)
EOSINOPHIL NFR BLD AUTO: 1 %
ERYTHROCYTE [DISTWIDTH] IN BLOOD BY AUTOMATED COUNT: 15.5 % (ref 11.5–14.5)
GLUCOSE BLD MANUAL STRIP-MCNC: 111 MG/DL (ref 74–99)
GLUCOSE BLD MANUAL STRIP-MCNC: 176 MG/DL (ref 74–99)
GLUCOSE BLD MANUAL STRIP-MCNC: 178 MG/DL (ref 74–99)
GLUCOSE BLD MANUAL STRIP-MCNC: 378 MG/DL (ref 74–99)
GLUCOSE SERPL-MCNC: 133 MG/DL (ref 74–99)
GLUCOSE SERPL-MCNC: 90 MG/DL (ref 74–99)
HCT VFR BLD AUTO: 44.4 % (ref 41–52)
HGB BLD-MCNC: 15.1 G/DL (ref 13.5–17.5)
IMM GRANULOCYTES # BLD AUTO: 0.01 X10*3/UL (ref 0–0.7)
IMM GRANULOCYTES NFR BLD AUTO: 0.2 % (ref 0–0.9)
LYMPHOCYTES # BLD AUTO: 0.88 X10*3/UL (ref 1.2–4.8)
LYMPHOCYTES NFR BLD AUTO: 14.1 %
MAGNESIUM SERPL-MCNC: 2.33 MG/DL (ref 1.6–2.4)
MCH RBC QN AUTO: 30.6 PG (ref 26–34)
MCHC RBC AUTO-ENTMCNC: 34 G/DL (ref 32–36)
MCV RBC AUTO: 90 FL (ref 80–100)
MONOCYTES # BLD AUTO: 0.58 X10*3/UL (ref 0.1–1)
MONOCYTES NFR BLD AUTO: 9.3 %
NEUTROPHILS # BLD AUTO: 4.66 X10*3/UL (ref 1.2–7.7)
NEUTROPHILS NFR BLD AUTO: 74.8 %
NRBC BLD-RTO: 0 /100 WBCS (ref 0–0)
P AXIS: 46 DEGREES
P OFFSET: 165 MS
P ONSET: 112 MS
PHOSPHATE SERPL-MCNC: 3 MG/DL (ref 2.5–4.9)
PHOSPHATE SERPL-MCNC: 3.5 MG/DL (ref 2.5–4.9)
PLATELET # BLD AUTO: 137 X10*3/UL (ref 150–450)
POTASSIUM SERPL-SCNC: 3.8 MMOL/L (ref 3.5–5.3)
POTASSIUM SERPL-SCNC: 4.6 MMOL/L (ref 3.5–5.3)
PR INTERVAL: 188 MS
Q ONSET: 206 MS
QRS COUNT: 11 BEATS
QRS DURATION: 118 MS
QT INTERVAL: 486 MS
QTC CALCULATION(BAZETT): 505 MS
QTC FREDERICIA: 498 MS
R AXIS: -47 DEGREES
RBC # BLD AUTO: 4.93 X10*6/UL (ref 4.5–5.9)
SODIUM SERPL-SCNC: 134 MMOL/L (ref 136–145)
SODIUM SERPL-SCNC: 137 MMOL/L (ref 136–145)
T AXIS: 91 DEGREES
T OFFSET: 449 MS
VENTRICULAR RATE: 65 BPM
WBC # BLD AUTO: 6.2 X10*3/UL (ref 4.4–11.3)

## 2024-09-10 PROCEDURE — 1100000001 HC PRIVATE ROOM DAILY

## 2024-09-10 PROCEDURE — 2500000005 HC RX 250 GENERAL PHARMACY W/O HCPCS

## 2024-09-10 PROCEDURE — 36415 COLL VENOUS BLD VENIPUNCTURE: CPT

## 2024-09-10 PROCEDURE — 2500000001 HC RX 250 WO HCPCS SELF ADMINISTERED DRUGS (ALT 637 FOR MEDICARE OP)

## 2024-09-10 PROCEDURE — 85025 COMPLETE CBC W/AUTO DIFF WBC: CPT

## 2024-09-10 PROCEDURE — 82947 ASSAY GLUCOSE BLOOD QUANT: CPT

## 2024-09-10 PROCEDURE — 80069 RENAL FUNCTION PANEL: CPT

## 2024-09-10 PROCEDURE — 97161 PT EVAL LOW COMPLEX 20 MIN: CPT | Mod: GP

## 2024-09-10 PROCEDURE — 84100 ASSAY OF PHOSPHORUS: CPT

## 2024-09-10 PROCEDURE — 99232 SBSQ HOSP IP/OBS MODERATE 35: CPT

## 2024-09-10 PROCEDURE — 97165 OT EVAL LOW COMPLEX 30 MIN: CPT | Mod: GO

## 2024-09-10 PROCEDURE — 2500000002 HC RX 250 W HCPCS SELF ADMINISTERED DRUGS (ALT 637 FOR MEDICARE OP, ALT 636 FOR OP/ED)

## 2024-09-10 PROCEDURE — 83735 ASSAY OF MAGNESIUM: CPT

## 2024-09-10 PROCEDURE — 2500000004 HC RX 250 GENERAL PHARMACY W/ HCPCS (ALT 636 FOR OP/ED)

## 2024-09-10 RX ORDER — POTASSIUM CHLORIDE 20 MEQ/1
20 TABLET, EXTENDED RELEASE ORAL ONCE
Status: COMPLETED | OUTPATIENT
Start: 2024-09-10 | End: 2024-09-10

## 2024-09-10 RX ORDER — CARVEDILOL 3.12 MG/1
3.12 TABLET ORAL 2 TIMES DAILY
Status: DISCONTINUED | OUTPATIENT
Start: 2024-09-10 | End: 2024-09-12 | Stop reason: HOSPADM

## 2024-09-10 RX ORDER — FUROSEMIDE 10 MG/ML
40 INJECTION INTRAMUSCULAR; INTRAVENOUS ONCE
Status: COMPLETED | OUTPATIENT
Start: 2024-09-10 | End: 2024-09-10

## 2024-09-10 RX ADMIN — CARVEDILOL 3.12 MG: 3.12 TABLET, FILM COATED ORAL at 20:45

## 2024-09-10 RX ADMIN — Medication 3 L/MIN: at 07:40

## 2024-09-10 RX ADMIN — SACUBITRIL AND VALSARTAN 0.5 TABLET: 24; 26 TABLET, FILM COATED ORAL at 21:00

## 2024-09-10 RX ADMIN — AMIODARONE HYDROCHLORIDE 400 MG: 200 TABLET ORAL at 15:11

## 2024-09-10 RX ADMIN — INSULIN GLARGINE 20 UNITS: 100 INJECTION, SOLUTION SUBCUTANEOUS at 20:45

## 2024-09-10 RX ADMIN — FUROSEMIDE 40 MG: 10 INJECTION, SOLUTION INTRAVENOUS at 11:31

## 2024-09-10 RX ADMIN — SERTRALINE 100 MG: 100 TABLET, FILM COATED ORAL at 09:37

## 2024-09-10 RX ADMIN — EMPAGLIFLOZIN 10 MG: 10 TABLET, FILM COATED ORAL at 09:40

## 2024-09-10 RX ADMIN — ASPIRIN 81 MG CHEWABLE TABLET 81 MG: 81 TABLET CHEWABLE at 06:03

## 2024-09-10 RX ADMIN — SACUBITRIL AND VALSARTAN 0.5 TABLET: 24; 26 TABLET, FILM COATED ORAL at 09:37

## 2024-09-10 RX ADMIN — CARVEDILOL 3.12 MG: 3.12 TABLET, FILM COATED ORAL at 11:31

## 2024-09-10 RX ADMIN — AMIODARONE HYDROCHLORIDE 400 MG: 200 TABLET ORAL at 20:45

## 2024-09-10 RX ADMIN — SPIRONOLACTONE 12.5 MG: 25 TABLET, FILM COATED ORAL at 09:37

## 2024-09-10 RX ADMIN — ATORVASTATIN CALCIUM 80 MG: 80 TABLET, FILM COATED ORAL at 20:45

## 2024-09-10 RX ADMIN — AMIODARONE HYDROCHLORIDE 400 MG: 200 TABLET ORAL at 09:37

## 2024-09-10 RX ADMIN — POTASSIUM CHLORIDE 20 MEQ: 1500 TABLET, EXTENDED RELEASE ORAL at 10:50

## 2024-09-10 ASSESSMENT — COGNITIVE AND FUNCTIONAL STATUS - GENERAL
DAILY ACTIVITIY SCORE: 24
MOBILITY SCORE: 24
MOBILITY SCORE: 24
DAILY ACTIVITIY SCORE: 24
MOBILITY SCORE: 24
DAILY ACTIVITIY SCORE: 24

## 2024-09-10 ASSESSMENT — ACTIVITIES OF DAILY LIVING (ADL)
BATHING_ASSISTANCE: INDEPENDENT
ADL_ASSISTANCE: INDEPENDENT

## 2024-09-10 ASSESSMENT — PAIN SCALES - GENERAL
PAINLEVEL_OUTOF10: 0 - NO PAIN

## 2024-09-10 ASSESSMENT — PAIN - FUNCTIONAL ASSESSMENT
PAIN_FUNCTIONAL_ASSESSMENT: 0-10
PAIN_FUNCTIONAL_ASSESSMENT: 0-10

## 2024-09-10 NOTE — CARE PLAN
The patient's goals for the shift include      The clinical goals for the shift include pt will remain free from injury this shift      Problem: Respiratory  Goal: Clear secretions with interventions this shift  Outcome: Progressing  Goal: Minimize anxiety/maximize coping throughout shift  Outcome: Progressing  Goal: Minimal/no exertional discomfort or dyspnea this shift  Outcome: Progressing  Goal: No signs of respiratory distress (eg. Use of accessory muscles. Peds grunting)  Outcome: Progressing  Goal: Patent airway maintained this shift  Outcome: Progressing  Goal: Tolerate mechanical ventilation evidenced by VS/agitation level this shift  Outcome: Progressing  Goal: Tolerate pulmonary toileting this shift  Outcome: Progressing  Goal: Verbalize decreased shortness of breath this shift  Outcome: Progressing  Goal: Wean oxygen to maintain O2 saturation per order/standard this shift  Outcome: Progressing  Goal: Increase self care and/or family involvement in next 24 hours  Outcome: Progressing     Problem: Skin  Goal: Decreased wound size/increased tissue granulation at next dressing change  Outcome: Progressing  Goal: Participates in plan/prevention/treatment measures  Outcome: Progressing  Goal: Prevent/manage excess moisture  Outcome: Progressing  Goal: Prevent/minimize sheer/friction injuries  Outcome: Progressing  Goal: Promote/optimize nutrition  Outcome: Progressing  Goal: Promote skin healing  Outcome: Progressing     Problem: Pain - Adult  Goal: Verbalizes/displays adequate comfort level or baseline comfort level  Outcome: Progressing     Problem: Safety - Adult  Goal: Free from fall injury  Outcome: Progressing

## 2024-09-10 NOTE — PROGRESS NOTES
TCC checked with team on plan of care. Patient is still being monitored on Amiodarone loading before transitioning to oral.     ADOD : 9/13/24 TCC will continue to monitor. PT/OT no needs upon discharge.        Emi Hernandez RN

## 2024-09-10 NOTE — PROGRESS NOTES
Leonel Yu is a 54 y.o. male on day 3 of admission presenting with Ventricular fibrillation (Multi).      Subjective   Reports improved Chest and AB discomfort. NO Sob while on 3 L NC. Patient denies Fevers, chills, palpitations, cough, wheeze, n/v/d, hematochezia, hematuria, or dsyuria.        Objective     Last Recorded Vitals  /79   Pulse 58   Temp 36.2 °C (97.2 °F)   Resp 18   Wt 79.4 kg (175 lb 0.7 oz)   SpO2 94%   Intake/Output last 3 Shifts:  No intake or output data in the 24 hours ending 09/10/24 1414      Admission Weight  Weight: 76.2 kg (168 lb) (09/06/24 1234)    Daily Weight  09/08/24 : 79.4 kg (175 lb 0.7 oz)    Image Results  Electrocardiogram, 12-lead PRN ACS symptoms  Normal sinus rhythm  Possible Left atrial enlargement  Left axis deviation  Left ventricular hypertrophy with QRS widening and repolarization abnormality  Inferior infarct (cited on or before 07-SEP-2024)  Prolonged QT  Abnormal ECG  When compared with ECG of 07-SEP-2024 09:33,  Premature ventricular complexes are no longer Present  Electrocardiogram, 12-lead PRN ACS symptoms  Sinus rhythm with occasional Premature ventricular complexes  Possible Left atrial enlargement  Left axis deviation  Left ventricular hypertrophy with repolarization abnormality  Inferior infarct , age undetermined  Abnormal ECG  When compared with ECG of 07-SEP-2024 00:07,  Premature ventricular complexes are now Present  Inferior infarct is now Present      Physical Exam  Constitutional:       General: He is not in acute distress.  Cardiovascular:      Rate and Rhythm: Normal rate and regular rhythm.   Pulmonary:      Effort: No respiratory distress.      Breath sounds: Normal breath sounds. No wheezing.   Abdominal:      General: There is no distension.      Palpations: Abdomen is soft.      Tenderness: There is no abdominal tenderness.   Musculoskeletal:      Right lower leg: No edema.      Left lower leg: No edema.         Relevant  Results               Assessment/Plan      54M w PMH as above presenting to the ED with 3 days of syncopal episodes. In the ED, patient found to be in tachycardia with rates in the 200s. Device interrogation revealed patient to have had 6 episodes of ventricular fibrillation most recent episode with ventricular rate of 207, for which ATP was attempted x1 and then patient got shocked with 41J. Currently on Amiodarone and HDS.     Updates  - Transitioned to oral amio 400 TID, will transition to once daily when patient reaches total of 10G amio IV+ Oral. :: will complete 5.36 G end of 9/10  - EP will not do Second ICD lead inpatient    - Patient not a candidate for transplant.   - Restart Coreg 3.125 mg  - Short acting insulin held given low BG reading   - IV lasix 80 mg 9/9, IV lasix 40 mg 9/10          #Concurrent ventricular tachyarrhythmia (VT/VF) vs paroxysmal AF with rapid ventricular response iso of active cocaine use  :: [employer] (3/2024)   :: Upon device interrogation, 6x episodes of V-Fib with shock demonstrated  :: May correlate with patient's syncopal episodes  -EP following  -Switched IV Amiodarone to  PO. 400 mg TID until 10 g loading (IV+oral) followed by 400 mg once daily  -Since all of his EKGs did not demonstrate clear a fib, no DOAC reccommended at this time.  -EP will not do Second ICD lead inpatient, follow up outpatient      #Cocaine use disorder  :: Has been using crack cocaine for last couple of days  - Monitor blood pressure while in withdrawal from cocaine  - Consider substance use counseling  - Monitor for signs of ACS     #HFrEF s/p ICD  #HTN  :: ECHO 7/31 EF 10%  :: GDMT: Empagliflozin, Entresto 24-26, Spironolactone, Carvedilol  :: Mild edema on presentation  :: Notable JVD on exam  :: On lasix 40mg PO at home for diuresis  Continue home GDMT: Empagliflozin 10 mg,  Spironolactone 12.5, Carvedilol 6.25 BID  -Home entresto dose cut in half to 24-26 BID  - S/P Lasix 80 IV x2.  No more edema of LE  -BP remain soft 90s/60s. IVC checked and measured at 2.15  Plan:   - Coreg 3.125 9/19  - IV lasix 80 mg 9/9, 40 mg 9/10  -Adjust GDMT accordingly to BP       #GUICHO on CKD3  :: B/l Cr 1.1-2  :: Cr on admission to  LT 5 1.53  -Avoid nephrotoxins  -Avoid hypotension  -Strict I/Os       #T2DM  :: A1c 8.4% 7/2024  :: Home regimen: Lantus 30u at bedtime, LDISS  -Originally started on Lantus 15u while in the hospital  -Had BG of 400 9/7 and today  Plan:  - Held Lispo  - Latus 20 currently   -Accuchecks ACHS    #Depression  -Continue home sertraline 100 mg     F: Avoid with low EF  E: Strict electrolyte monitoring  N: Cardiac and Carb control   A: PIV x1  DVT: Lovenox     Code Status: DNR, confirmed on admisison  Surrogate decision maker: Ajith Santana, friend ([phone])       Donnie Napier, DO  PGY-1 Internal Medicine

## 2024-09-10 NOTE — DOCUMENTATION CLARIFICATION NOTE
"    PATIENT:               VINOD SHAFER  ACCT #:                  [id number]  MRN:                       [id number]  :                       1970  ADMIT DATE:       2024 10:55 PM  DISCH DATE:  RESPONDING PROVIDER #:        [id number]          PROVIDER RESPONSE TEXT:    Acute on Chronic Systolic Congestive Heart Failure    CDI QUERY TEXT:    Clarification    Instruction:    Based on your assessment of the patient and the clinical information, please provide the requested documentation by clicking on the appropriate radio button and enter any additional information if prompted.    Question: Please further clarify the type and acuity of congestive heart failure    When answering this query, please exercise your independent professional judgment. The fact that a question is being asked, does not imply that any particular answer is desired or expected.    The patient's clinical indicators include:  Clinical Information:  24 HP Shavonne Brar MD \"54M w PMH as above presenting to the ED with 3 days of syncopal episodes.\"    Clinical Indicators:  24 1234 T 36.4 HR 92  BP  125/94 RR 18  O2 96  percent room air    24 HP Shavonne Brar MD  \"ECHO  EF 10%  RESP: Lungs CTAB, no wheezes or rhonchi  Mild edema on presentation  Notable JVD on exam  On lasix 40mg PO at home for diuresis  HFrEF s/p ICD\"    \"Chest x-ray:  IMPRESSION:  Stable cardiomegaly. Mild diffuse interstitial prominence could be  chronic or relate to component developing interstitial edema.  Correlate clinically.\"    Treatment:  CXR  IV Lasix 80 mg 24 1147, 24 0636  ATP  labs  EKG    Risk Factors: HFrEF, HTN, cocaine use disorder with positive cocaine drug screen,  ventricular fibrillation,  ventricular tachycardia  Options provided:  -- Acute on Chronic Systolic Congestive Heart Failure  -- Other - I will add my own diagnosis  -- Refer to Clinical Documentation Reviewer    Query created by: Priyanka Norman on 2024 9:27 " AM      Electronically signed by:  DEREJE PHILIP MD 9/10/2024 9:41 AM

## 2024-09-10 NOTE — PROGRESS NOTES
Physical Therapy    Physical Therapy Evaluation    Patient Name: Leonel Yu  MRN: [id number]  Today's Date: 9/10/2024   Time Calculation  Start Time: 1152  Stop Time: 1205  Time Calculation (min): 13 min    Assessment/Plan   PT Assessment  PT Assessment Results: Decreased endurance  Rehab Prognosis:  (No current rehab needs.)  Barriers to Discharge: none  Evaluation/Treatment Tolerance: Patient tolerated treatment well  Barriers to Participation:  (none)  End of Session Communication: Bedside nurse  Assessment Comment: 54 y.o. M admitted for v fib/icd firing otherwise now demonstrating steady gait and independent function with short distance. Currently without acute PT needs and no PT needs after discharge. Will DC PT at this time.  End of Session Patient Position: Bed, 3 rail up, Alarm off, not on at start of session  IP OR SWING BED PT PLAN  Inpatient or Swing Bed: Inpatient  PT Plan  Treatment/Interventions:  (No planned interventions.)  PT Plan: PT Eval only  PT Eval Only Reason: No acute PT needs identified  PT Frequency: PT eval only  PT Discharge Recommendations: No PT needed after discharge, No further acute PT  Equipment Recommended upon Discharge:  (none)  PT Recommended Transfer Status: Independent  PT - OK to Discharge: Yes      Subjective   General Visit Information:  General  Reason for Referral: Vfib  Past Medical History Relevant to Rehab: 54 y.o. 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  Family/Caregiver Present: No  Prior to Session Communication: Bedside nurse  Patient Position Received: Bed, 3 rail up, Alarm off, not on at start of session  Preferred Learning Style: verbal, auditory  General  Comment: Pt seated on bench in room upon entry. Very pleasant and cooperative. Willing to work with PT and attempt mobility.  Home Living:  Home Living  Type of Home: House  Lives With:  (Pt reports staying with a roommate.)  Home Adaptive Equipment: None  Home Layout: One level  Home Access: Stairs to enter with rails (3-4)  Prior Level of Function:  Prior Function Per Pt/Caregiver Report  Level of [place]: Independent with ADLs and functional transfers  Receives Help From:  (Pt and roommate help each other out per patient.)  ADL Assistance: Independent  Homemaking Assistance: Independent  Ambulatory Assistance: Independent  Prior Function Comments: Pt reports taking rest breaks as needed and typically with decreased function in mornings.  Precautions:  Precautions  Medical Precautions: Fall precautions, Cardiac precautions    Objective   Pain:  Pain Assessment  Pain Assessment: 0-10  0-10 (Numeric) Pain Score: 0 - No pain  Cognition:  Cognition  Overall Cognitive Status: Within Functional Limits  Orientation Level: Oriented X4  Attention: Within Functional Limits  Memory: Within Funtional Limits  Insight: Within function limits  Impulsive: Within functional limits  Processing Speed: Within funtional limits    General Assessments:     Activity Tolerance  Endurance: Decreased tolerance for upright activites    Sensation  Light Touch: No apparent deficits    Strength  Strength Comments: Grossly intact.  Strength  Strength Comments: Grossly intact.    Perception  Inattention/Neglect: Appears intact      Coordination  Movements are Fluid and Coordinated: Yes    Postural Control  Postural Control: Within Functional Limits    Static Sitting Balance  Static Sitting-Balance Support: No upper extremity supported  Static Sitting-Level of Assistance: Independent    Static Standing Balance  Static Standing-Balance Support: No upper extremity supported  Static Standing-Level of Assistance: Distant supervision  Functional  Assessments:       Bed Mobility  Bed Mobility: Yes  Bed Mobility 1  Bed Mobility 1: Sitting to supine  Level of Assistance 1: Independent  Bed Mobility Comments 1: Pt reports no difficulty transferring out of bed    Transfers  Transfer: Yes  Transfer 1  Transfer From 1: Sit to, Stand to  Transfer to 1: Stand, Sit  Transfer Level of Assistance 1: Distant supervision    Ambulation/Gait Training  Ambulation/Gait Training Performed: Yes  Ambulation/Gait Training 1  Surface 1: Level tile  Device 1: No device  Assistance 1: Close supervision  Quality of Gait 1:  (Steady gait. No acute LOB. Pt notes increased SOB with longer distances and takes rest breaks as needed.)  Comments/Distance (ft) 1: 150ft x 1, seated rest period, 75ft x 1    Outcome Measures:  [facility] Basic Mobility  Turning from your back to your side while in a flat bed without using bedrails: None  Moving from lying on your back to sitting on the side of a flat bed without using bedrails: None  Moving to and from bed to chair (including a wheelchair): None  Standing up from a chair using your arms (e.g. wheelchair or bedside chair): None  To walk in hospital room: None  Climbing 3-5 steps with railing: None  Basic Mobility - Total Score: 24        Education Documentation  Precautions, taught by Brody Daniels, PT at 9/10/2024 12:32 PM.  Learner: Patient  Readiness: Acceptance  Method: Explanation  Response: Verbalizes Understanding  Comment: No acute PT needs. Up ad alonso    Mobility Training, taught by Brody Daniels, PT at 9/10/2024 12:32 PM.  Learner: Patient  Readiness: Acceptance  Method: Explanation  Response: Verbalizes Understanding  Comment: No acute PT needs. Up ad alonso    Education Comments  No comments found.

## 2024-09-10 NOTE — PROGRESS NOTES
"Occupational Therapy    Evaluation    Patient Name: Leonel Yu  MRN: [id number]  Today's Date: 9/10/2024  Time Calculation  Start Time: 0925  Stop Time: 0940  Time Calculation (min): 15 min    Assessment  IP OT Assessment  OT Assessment: NN  Prognosis: Good  Barriers to Discharge:  (appears needs A with O2 management)  Evaluation/Treatment Tolerance: Patient tolerated treatment well  Medical Staff Made Aware: Yes  End of Session Communication: Bedside nurse  End of Session Patient Position: Bed, 3 rail up, Alarm off, not on at start of session  Plan:  No Skilled OT: At baseline function  OT Frequency: OT eval only  OT Discharge Recommendations: No OT needed after discharge, No further acute OT  OT Recommended Transfer Status: Independent  OT - OK to Discharge: Yes    Subjective   Current Problem:  1. Ventricular fibrillation (Multi)  Referral to Cardiac Electrophysiology    Referral to Primary Care      2. Encounter to establish care  Referral to Primary Care        General:  General  Reason for Referral: Ventricular fibrillation  Past Medical History Relevant to Rehab: 3 days of syncopal episodes. In the ED, patient found to be in tachycardia with rates in the 200s.Concurrent ventricular tachyarrhythmia (VT/VF) vs paroxysmal AF with rapid ventricular response iso of active cocaine use, CKD, DM, depression, HFrEF s/p ICD  HTN  ECHO 7/31 EF 10%  Family/Caregiver Present: No  Prior to Session Communication: Bedside nurse  Patient Position Received: Bed, 3 rail up, Alarm off, not on at start of session  General Comment: education to benji O2 upon arrival, pt reported \"ran out of O2 at home and didn't know how to get more\"  Precautions:  Medical Precautions: Fall precautions, Cardiac precautions, Oxygen therapy device and L/min    Vital Sign (Past 2hrs)        Date/Time Vitals Session Patient Position Pulse Resp SpO2 BP MAP (mmHg)    09/10/24 0925 During OT  --  65  --  96 %  --  --     09/10/24 11:12:34 --  --  58  " 18  94 %  125/79  94                        Pain:  Pain Assessment  Pain Assessment: 0-10  0-10 (Numeric) Pain Score: 0 - No pain    Objective   Cognition:  Overall Cognitive Status: Within Functional Limits  Orientation Level: Oriented X4  Problem Solving: Exceptions to WFL  Complex Functional Tasks: Impaired  Insight: Mild           Home Living:  Type of Home: House  Lives With:  (roommate)  Home Adaptive Equipment: None  Home Layout:  (5 BUDDY 1st floor setup, tub shower)  Bathroom Shower/Tub: Tub/shower unit  Bathroom Toilet: Standard   Prior Function:  Level of [place]: Independent with ADLs and functional transfers, Independent with homemaking with ambulation  Vocational:  ( not currently working)  Hand Dominance: Right  IADL History:  IADL Comments: I I/ADLs, reported was SOB with minimal activity at home, A driving, +dogs  ADL:  Eating Assistance: Independent  Grooming Assistance: Independent  Bathing Assistance: Independent  UE Dressing Assistance: Independent  LE Dressing Assistance: Independent  Toileting Assistance with Device: Stand by (anticipated)  Activity Tolerance:  Endurance:  (fair)  Bed Mobility/Transfers: Bed Mobility  Bed Mobility:  (sup/sit I)    Transfers  Transfer:  (sit/stand I)      Functional Mobility: fxnl mob in room I           Standing Balance:  Dynamic Standing Balance  Dynamic Standing-Level of Assistance: Independent       IADL's:   IADL Comments: I I/ADLs, reported was SOB with minimal activity at home, A driving, +dogs  Vision: Vision - Basic Assessment  Current Vision: No visual deficits  Sensation:  Light Touch: No apparent deficits  Strength:  Strength Comments: BUE WFL    Coordination:  Movements are Fluid and Coordinated: Yes   Hand Function:  Hand Function  Gross Grasp: Functional  Extremities: RUE   RUE : Within Functional Limits and LUE   LUE: Within Functional Limits      Outcome Measures: [facility] Daily Activity  Putting on and taking off regular lower body  clothing: None  Bathing (including washing, rinsing, drying): None  Putting on and taking off regular upper body clothing: None  Toileting, which includes using toilet, bedpan or urinal: None  Taking care of personal grooming such as brushing teeth: None  Eating Meals: None  Daily Activity - Total Score: 24         and OT Adult Other Outcome Measures  4AT: -  Education Documentation  Body Mechanics, taught by Gay Cadet OT at 9/10/2024 11:15 AM.  Learner: Patient  Readiness: Acceptance  Method: Explanation  Response: Verbalizes Understanding    Precautions, taught by Gay Cadet OT at 9/10/2024 11:15 AM.  Learner: Patient  Readiness: Acceptance  Method: Explanation  Response: Verbalizes Understanding    ADL Training, taught by Gay Cadet OT at 9/10/2024 11:15 AM.  Learner: Patient  Readiness: Acceptance  Method: Explanation  Response: Verbalizes Understanding    Education Comments  No comments found.

## 2024-09-10 NOTE — CARE PLAN
Problem: Respiratory  Goal: Clear secretions with interventions this shift  Outcome: Progressing  Goal: Minimize anxiety/maximize coping throughout shift  Outcome: Progressing  Goal: Minimal/no exertional discomfort or dyspnea this shift  Outcome: Progressing  Goal: No signs of respiratory distress (eg. Use of accessory muscles. Peds grunting)  Outcome: Progressing  Goal: Patent airway maintained this shift  Outcome: Progressing  Goal: Tolerate mechanical ventilation evidenced by VS/agitation level this shift  Outcome: Progressing  Goal: Tolerate pulmonary toileting this shift  Outcome: Progressing  Goal: Verbalize decreased shortness of breath this shift  Outcome: Progressing  Goal: Wean oxygen to maintain O2 saturation per order/standard this shift  Outcome: Progressing  Goal: Increase self care and/or family involvement in next 24 hours  Outcome: Progressing     Problem: Skin  Goal: Decreased wound size/increased tissue granulation at next dressing change  Outcome: Progressing  Goal: Participates in plan/prevention/treatment measures  Outcome: Progressing  Goal: Prevent/manage excess moisture  Outcome: Progressing  Goal: Prevent/minimize sheer/friction injuries  Outcome: Progressing  Goal: Promote/optimize nutrition  Outcome: Progressing  Goal: Promote skin healing  Outcome: Progressing     Problem: Pain - Adult  Goal: Verbalizes/displays adequate comfort level or baseline comfort level  Outcome: Progressing     Problem: Safety - Adult  Goal: Free from fall injury  Outcome: Progressing     Problem: Discharge Planning  Goal: Discharge to home or other facility with appropriate resources  Outcome: Progressing     Problem: Chronic Conditions and Co-morbidities  Goal: Patient's chronic conditions and co-morbidity symptoms are monitored and maintained or improved  Outcome: Progressing     Problem: Diabetes  Goal: Achieve decreasing blood glucose levels by end of shift  Outcome: Progressing  Goal: Increase stability  of blood glucose readings by end of shift  Outcome: Progressing  Goal: Decrease in ketones present in urine by end of shift  Outcome: Progressing  Goal: Maintain electrolyte levels within acceptable range throughout shift  Outcome: Progressing  Goal: Maintain glucose levels >70mg/dl to <250mg/dl throughout shift  Outcome: Progressing  Goal: No changes in neurological exam by end of shift  Outcome: Progressing  Goal: Learn about and adhere to nutrition recommendations by end of shift  Outcome: Progressing  Goal: Vital signs within normal range for age by end of shift  Outcome: Progressing  Goal: Increase self care and/or family involovement by end of shift  Outcome: Progressing  Goal: Receive DSME education by end of shift  Outcome: Progressing     Problem: Fall/Injury  Goal: Not fall by end of shift  Outcome: Progressing  Goal: Be free from injury by end of the shift  Outcome: Progressing  Goal: Verbalize understanding of personal risk factors for fall in the hospital  Outcome: Progressing  Goal: Verbalize understanding of risk factor reduction measures to prevent injury from fall in the home  Outcome: Progressing  Goal: Use assistive devices by end of the shift  Outcome: Progressing  Goal: Pace activities to prevent fatigue by end of the shift  Outcome: Progressing   The patient's goals for the shift include      The clinical goals for the shift include pt will remain HDS throughout shift

## 2024-09-11 LAB
ALBUMIN SERPL BCP-MCNC: 3.4 G/DL (ref 3.4–5)
ANION GAP SERPL CALC-SCNC: 11 MMOL/L (ref 10–20)
BASOPHILS # BLD AUTO: 0.04 X10*3/UL (ref 0–0.1)
BASOPHILS NFR BLD AUTO: 0.7 %
BUN SERPL-MCNC: 25 MG/DL (ref 6–23)
CALCIUM SERPL-MCNC: 9 MG/DL (ref 8.6–10.6)
CHLORIDE SERPL-SCNC: 100 MMOL/L (ref 98–107)
CO2 SERPL-SCNC: 29 MMOL/L (ref 21–32)
CREAT SERPL-MCNC: 1.56 MG/DL (ref 0.5–1.3)
EGFRCR SERPLBLD CKD-EPI 2021: 52 ML/MIN/1.73M*2
EOSINOPHIL # BLD AUTO: 0.04 X10*3/UL (ref 0–0.7)
EOSINOPHIL NFR BLD AUTO: 0.7 %
ERYTHROCYTE [DISTWIDTH] IN BLOOD BY AUTOMATED COUNT: 15.9 % (ref 11.5–14.5)
GLUCOSE BLD MANUAL STRIP-MCNC: 102 MG/DL (ref 74–99)
GLUCOSE BLD MANUAL STRIP-MCNC: 134 MG/DL (ref 74–99)
GLUCOSE BLD MANUAL STRIP-MCNC: 176 MG/DL (ref 74–99)
GLUCOSE BLD MANUAL STRIP-MCNC: 185 MG/DL (ref 74–99)
GLUCOSE SERPL-MCNC: 76 MG/DL (ref 74–99)
HCT VFR BLD AUTO: 51.7 % (ref 41–52)
HGB BLD-MCNC: 16.4 G/DL (ref 13.5–17.5)
IMM GRANULOCYTES # BLD AUTO: 0.01 X10*3/UL (ref 0–0.7)
IMM GRANULOCYTES NFR BLD AUTO: 0.2 % (ref 0–0.9)
LYMPHOCYTES # BLD AUTO: 0.78 X10*3/UL (ref 1.2–4.8)
LYMPHOCYTES NFR BLD AUTO: 14.2 %
MCH RBC QN AUTO: 30.8 PG (ref 26–34)
MCHC RBC AUTO-ENTMCNC: 31.7 G/DL (ref 32–36)
MCV RBC AUTO: 97 FL (ref 80–100)
MONOCYTES # BLD AUTO: 0.48 X10*3/UL (ref 0.1–1)
MONOCYTES NFR BLD AUTO: 8.7 %
NEUTROPHILS # BLD AUTO: 4.16 X10*3/UL (ref 1.2–7.7)
NEUTROPHILS NFR BLD AUTO: 75.5 %
NRBC BLD-RTO: 0 /100 WBCS (ref 0–0)
PHOSPHATE SERPL-MCNC: 3 MG/DL (ref 2.5–4.9)
PLATELET # BLD AUTO: 155 X10*3/UL (ref 150–450)
POTASSIUM SERPL-SCNC: 4.6 MMOL/L (ref 3.5–5.3)
RBC # BLD AUTO: 5.32 X10*6/UL (ref 4.5–5.9)
SODIUM SERPL-SCNC: 135 MMOL/L (ref 136–145)
WBC # BLD AUTO: 5.5 X10*3/UL (ref 4.4–11.3)

## 2024-09-11 PROCEDURE — 85025 COMPLETE CBC W/AUTO DIFF WBC: CPT | Mod: PARLAB

## 2024-09-11 PROCEDURE — 2500000001 HC RX 250 WO HCPCS SELF ADMINISTERED DRUGS (ALT 637 FOR MEDICARE OP)

## 2024-09-11 PROCEDURE — 36415 COLL VENOUS BLD VENIPUNCTURE: CPT | Mod: PARLAB

## 2024-09-11 PROCEDURE — 2500000002 HC RX 250 W HCPCS SELF ADMINISTERED DRUGS (ALT 637 FOR MEDICARE OP, ALT 636 FOR OP/ED)

## 2024-09-11 PROCEDURE — 99232 SBSQ HOSP IP/OBS MODERATE 35: CPT

## 2024-09-11 PROCEDURE — 1100000001 HC PRIVATE ROOM DAILY

## 2024-09-11 PROCEDURE — RXMED WILLOW AMBULATORY MEDICATION CHARGE

## 2024-09-11 PROCEDURE — 80069 RENAL FUNCTION PANEL: CPT | Mod: PARLAB

## 2024-09-11 PROCEDURE — 82947 ASSAY GLUCOSE BLOOD QUANT: CPT

## 2024-09-11 PROCEDURE — 2500000005 HC RX 250 GENERAL PHARMACY W/O HCPCS

## 2024-09-11 RX ORDER — AMIODARONE HYDROCHLORIDE 200 MG/1
TABLET ORAL
Qty: 204 TABLET | Refills: 0 | Status: SHIPPED | OUTPATIENT
Start: 2024-09-11 | End: 2024-12-15

## 2024-09-11 RX ORDER — AMIODARONE HYDROCHLORIDE 200 MG/1
400 TABLET ORAL DAILY
Qty: 60 TABLET | Refills: 0 | Status: SHIPPED | OUTPATIENT
Start: 2024-09-15 | End: 2024-09-11 | Stop reason: HOSPADM

## 2024-09-11 RX ORDER — FUROSEMIDE 20 MG/1
40 TABLET ORAL DAILY
Status: DISCONTINUED | OUTPATIENT
Start: 2024-09-11 | End: 2024-09-12 | Stop reason: HOSPADM

## 2024-09-11 RX ORDER — AMIODARONE HYDROCHLORIDE 400 MG/1
400 TABLET ORAL 3 TIMES DAILY
Qty: 8 TABLET | Refills: 0 | Status: SHIPPED | OUTPATIENT
Start: 2024-09-11 | End: 2024-09-11 | Stop reason: HOSPADM

## 2024-09-11 RX ORDER — AMIODARONE HYDROCHLORIDE 400 MG/1
TABLET ORAL
Qty: 102 TABLET | Refills: 0 | Status: SHIPPED | OUTPATIENT
Start: 2024-09-11 | End: 2024-09-11

## 2024-09-11 RX ORDER — CARVEDILOL 3.12 MG/1
3.12 TABLET ORAL 2 TIMES DAILY
Qty: 60 TABLET | Refills: 0 | Status: SHIPPED | OUTPATIENT
Start: 2024-09-11 | End: 2024-10-12

## 2024-09-11 RX ADMIN — INSULIN LISPRO 1 UNITS: 100 INJECTION, SOLUTION INTRAVENOUS; SUBCUTANEOUS at 17:31

## 2024-09-11 RX ADMIN — ASPIRIN 81 MG CHEWABLE TABLET 81 MG: 81 TABLET CHEWABLE at 09:22

## 2024-09-11 RX ADMIN — SACUBITRIL AND VALSARTAN 0.5 TABLET: 24; 26 TABLET, FILM COATED ORAL at 09:22

## 2024-09-11 RX ADMIN — INSULIN LISPRO 3 UNITS: 100 INJECTION, SUSPENSION SUBCUTANEOUS at 00:46

## 2024-09-11 RX ADMIN — AMIODARONE HYDROCHLORIDE 400 MG: 200 TABLET ORAL at 19:48

## 2024-09-11 RX ADMIN — ATORVASTATIN CALCIUM 80 MG: 80 TABLET, FILM COATED ORAL at 19:48

## 2024-09-11 RX ADMIN — Medication 2 L/MIN: at 08:28

## 2024-09-11 RX ADMIN — SACUBITRIL AND VALSARTAN 0.5 TABLET: 24; 26 TABLET, FILM COATED ORAL at 19:48

## 2024-09-11 RX ADMIN — SPIRONOLACTONE 12.5 MG: 25 TABLET, FILM COATED ORAL at 09:22

## 2024-09-11 RX ADMIN — EMPAGLIFLOZIN 10 MG: 10 TABLET, FILM COATED ORAL at 09:23

## 2024-09-11 RX ADMIN — AMIODARONE HYDROCHLORIDE 400 MG: 200 TABLET ORAL at 09:22

## 2024-09-11 RX ADMIN — CARVEDILOL 3.12 MG: 3.12 TABLET, FILM COATED ORAL at 19:48

## 2024-09-11 RX ADMIN — AMIODARONE HYDROCHLORIDE 400 MG: 200 TABLET ORAL at 14:29

## 2024-09-11 RX ADMIN — SERTRALINE 100 MG: 100 TABLET, FILM COATED ORAL at 09:22

## 2024-09-11 RX ADMIN — FUROSEMIDE 40 MG: 20 TABLET ORAL at 17:31

## 2024-09-11 RX ADMIN — CARVEDILOL 3.12 MG: 3.12 TABLET, FILM COATED ORAL at 09:23

## 2024-09-11 ASSESSMENT — COGNITIVE AND FUNCTIONAL STATUS - GENERAL
DAILY ACTIVITIY SCORE: 24
MOBILITY SCORE: 24
DAILY ACTIVITIY SCORE: 24
MOBILITY SCORE: 24

## 2024-09-11 ASSESSMENT — PAIN SCALES - GENERAL
PAINLEVEL_OUTOF10: 0 - NO PAIN
PAINLEVEL_OUTOF10: 0 - NO PAIN

## 2024-09-11 NOTE — DISCHARGE INSTRUCTIONS
"Dear Leonel Yu,    You were admitted to [facility] for chest/abdominal pain and because you passed out.  You are kept in the hospital because your ICD, or the device that can shock your heart in case of emergency, recorded that you had runs of an arrhythmia called ventricular tachycardia OR ventricular fibrillation.  This is a serious arrhythmia(s) that can be life-threatening, so you were started on a IV medication called amiodarone and then you were continued on the oral version of that medication as well. The plan was to give you that medication 3 times a day until you reach a total of 10g.  You will finish that loading phase of the amiodarone medication on Saturday 9/14 (\"loading\" as in given 3 times daily).  Starting Hugo 9/15 you will just take that amiodarone medication once daily.     In addition, we talked to the electrophysiologist, or cardiologist who specialize in arrhythmias, and they thought that in the future it may useful for you to have a second lead attached to your ICD so that there would be 1-lead in the atrium on the right side of the heart and 1-lead in the ventricle of the right heart.  This could help differentiate whether you are experiencing a life-threatening arrhythmia or not.  We will schedule a follow-up appointment for you to see the electrophysiologist outpatient (in addition to a primary care physician and a general cardiologist referral)     In addition, you have a history of heart failure. You were fluid overload when he first came to the hospital and we gave you IV medications to help you pee out that extra fluid.  But time you are ready for discharge you no longer needed IV medication and we returned you to your home oral water pill.     Medication Started:  amiodarone 400 mg 3-times-a-day from 9/12-9/14 (Thursday- Saturday) (*each dose is 2x 200 mg pills)  amiodarone 400 mg once daily starting 9/15 onward (Sunday)  (*each dose is 2x 200 mg pills)    Medication Changes:  Coreg " was decreased to 3.125 mg , two times a day   Entreso was lowered to 24-26 mg (lowest dose), two times a day     Appointments/follow-up:  Heart Failure Cardiology 9/25/2024 ([address] [facility] [address]) with Dr. Faustino Cox MD ([facility])   General Cardiology appointment ( doctor), date TBD  Electrophysiologist appointment ( doctor), date TBD   Primary Care Physician ( doctor), date TBD     *If you like, you can either have all your heart doctors at  or [facility]. Either way, please try to make it to your appointment at [facility] on 9/25. They may be able to help with home O2 then.     It was a pleasure taking care of you,    Your  Care Team

## 2024-09-11 NOTE — CARE PLAN
Problem: Respiratory  Goal: Clear secretions with interventions this shift  9/11/2024 1036 by Hannah Donald LPN  Outcome: Progressing  9/11/2024 1035 by Hannah Donald LPN  Outcome: Progressing  Goal: Minimize anxiety/maximize coping throughout shift  9/11/2024 1036 by Hannah Donald LPN  Outcome: Progressing  9/11/2024 1035 by Hannah Donald LPN  Outcome: Progressing  Goal: Minimal/no exertional discomfort or dyspnea this shift  9/11/2024 1036 by Hannah Donald LPN  Outcome: Progressing  9/11/2024 1035 by Hannah Donald LPN  Outcome: Progressing  Goal: No signs of respiratory distress (eg. Use of accessory muscles. Peds grunting)  9/11/2024 1036 by Hannah Donald LPN  Outcome: Progressing  9/11/2024 1035 by Hannah Donald LPN  Outcome: Progressing  Goal: Patent airway maintained this shift  9/11/2024 1036 by Hannah Donald LPN  Outcome: Progressing  9/11/2024 1035 by Hannah Donald LPN  Outcome: Progressing  Goal: Tolerate mechanical ventilation evidenced by VS/agitation level this shift  9/11/2024 1036 by Hannah Donald LPN  Outcome: Progressing  9/11/2024 1035 by Hannah Donald LPN  Outcome: Progressing  Goal: Tolerate pulmonary toileting this shift  9/11/2024 1036 by Hannah Donald LPN  Outcome: Progressing  9/11/2024 1035 by Hannah Donald LPN  Outcome: Progressing  Goal: Verbalize decreased shortness of breath this shift  9/11/2024 1036 by Hannah Donald LPN  Outcome: Progressing  9/11/2024 1035 by Hannah Donald LPN  Outcome: Progressing  Goal: Wean oxygen to maintain O2 saturation per order/standard this shift  9/11/2024 1036 by Hannah Donald LPN  Outcome: Progressing  9/11/2024 1035 by Hannah Donald LPN  Outcome: Progressing  Goal: Increase self care and/or family involvement in next 24 hours  9/11/2024 1036 by Hannah Donald LPN  Outcome: Progressing  9/11/2024 1035 by Hannah Donald,  LPN  Outcome: Progressing     Problem: Skin  Goal: Decreased wound size/increased tissue granulation at next dressing change  9/11/2024 1036 by Hannah Donald LPN  Outcome: Progressing  9/11/2024 1035 by Hannah Donald LPN  Outcome: Progressing  Goal: Participates in plan/prevention/treatment measures  9/11/2024 1036 by Hannah Donald LPN  Outcome: Progressing  9/11/2024 1035 by Hannah Donald LPN  Outcome: Progressing  Goal: Prevent/manage excess moisture  9/11/2024 1036 by Hannah Donald LPN  Outcome: Progressing  9/11/2024 1035 by Hannah Donald LPN  Outcome: Progressing  Goal: Prevent/minimize sheer/friction injuries  9/11/2024 1036 by Hannah Donald LPN  Outcome: Progressing  9/11/2024 1035 by Hannah Donald LPN  Outcome: Progressing  Goal: Promote/optimize nutrition  9/11/2024 1036 by Hannah Donald LPN  Outcome: Progressing  9/11/2024 1035 by Hannah Donald LPN  Outcome: Progressing  Goal: Promote skin healing  9/11/2024 1036 by Hannah Donald LPN  Outcome: Progressing  9/11/2024 1035 by Hannah Donald LPN  Outcome: Progressing     Problem: Pain - Adult  Goal: Verbalizes/displays adequate comfort level or baseline comfort level  9/11/2024 1036 by Hannah Donald LPN  Outcome: Progressing  9/11/2024 1035 by Hannah Donald LPN  Outcome: Progressing     Problem: Safety - Adult  Goal: Free from fall injury  9/11/2024 1036 by Hannah Donald LPN  Outcome: Progressing  9/11/2024 1035 by Hannah Donald LPN  Outcome: Progressing     Problem: Discharge Planning  Goal: Discharge to home or other facility with appropriate resources  9/11/2024 1036 by Hannah Donald LPN  Outcome: Progressing  9/11/2024 1035 by Hannah Donald LPN  Outcome: Progressing     Problem: Chronic Conditions and Co-morbidities  Goal: Patient's chronic conditions and co-morbidity symptoms are monitored and maintained or improved  9/11/2024 1036  by Hannah Donald LPN  Outcome: Progressing  9/11/2024 1035 by Hannah Donald LPN  Outcome: Progressing     Problem: Diabetes  Goal: Achieve decreasing blood glucose levels by end of shift  9/11/2024 1036 by Hannah Donald LPN  Outcome: Progressing  9/11/2024 1035 by Hannah Donald LPN  Outcome: Progressing  Goal: Increase stability of blood glucose readings by end of shift  9/11/2024 1036 by Hannah Donald LPN  Outcome: Progressing  9/11/2024 1035 by Hannah Donald LPN  Outcome: Progressing  Goal: Decrease in ketones present in urine by end of shift  9/11/2024 1036 by Hannah Donald LPN  Outcome: Progressing  9/11/2024 1035 by Hannah Donald LPN  Outcome: Progressing  Goal: Maintain electrolyte levels within acceptable range throughout shift  9/11/2024 1036 by Hannah Donald LPN  Outcome: Progressing  9/11/2024 1035 by Hannah Donald LPN  Outcome: Progressing  Goal: Maintain glucose levels >70mg/dl to <250mg/dl throughout shift  9/11/2024 1036 by Hannah Donald LPN  Outcome: Progressing  9/11/2024 1035 by Hannah Donald LPN  Outcome: Progressing  Goal: No changes in neurological exam by end of shift  9/11/2024 1036 by Hannah Donald LPN  Outcome: Progressing  9/11/2024 1035 by Hannah Donald LPN  Outcome: Progressing  Goal: Learn about and adhere to nutrition recommendations by end of shift  9/11/2024 1036 by Hannah Donald LPN  Outcome: Progressing  9/11/2024 1035 by Hannah Donald LPN  Outcome: Progressing  Goal: Vital signs within normal range for age by end of shift  9/11/2024 1036 by Hannah Donald LPN  Outcome: Progressing  9/11/2024 1035 by Hannah Donald LPN  Outcome: Progressing  Goal: Increase self care and/or family involovement by end of shift  9/11/2024 1036 by Hannah Donald LPN  Outcome: Progressing  9/11/2024 1035 by Hannah Donald LPN  Outcome: Progressing  Goal: Receive DSME  education by end of shift  9/11/2024 1036 by Hannah Donald LPN  Outcome: Progressing  9/11/2024 1035 by Hannah Donald LPN  Outcome: Progressing     Problem: Fall/Injury  Goal: Not fall by end of shift  9/11/2024 1036 by Hannah Donald LPN  Outcome: Progressing  9/11/2024 1035 by Hannah Donald LPN  Outcome: Progressing  Goal: Be free from injury by end of the shift  9/11/2024 1036 by Hannah Donald LPN  Outcome: Progressing  9/11/2024 1035 by Hannah Donald LPN  Outcome: Progressing  Goal: Verbalize understanding of personal risk factors for fall in the hospital  9/11/2024 1036 by Hannah Donald LPN  Outcome: Progressing  9/11/2024 1035 by Hannah Donald LPN  Outcome: Progressing  Goal: Verbalize understanding of risk factor reduction measures to prevent injury from fall in the home  9/11/2024 1036 by Hannah Donald LPN  Outcome: Progressing  9/11/2024 1035 by Hannah Donald LPN  Outcome: Progressing  Goal: Use assistive devices by end of the shift  9/11/2024 1036 by Hannah Donald LPN  Outcome: Progressing  9/11/2024 1035 by Hannah Donald LPN  Outcome: Progressing  Goal: Pace activities to prevent fatigue by end of the shift  9/11/2024 1036 by Hannah Donald LPN  Outcome: Progressing  9/11/2024 1035 by Hannah Donald LPN  Outcome: Progressing   The patient's goals for the shift include      The clinical goals for the shift include pt will remain free from injury this shift

## 2024-09-11 NOTE — PROGRESS NOTES
Leonel Yu is a 54 y.o. male on day 4 of admission presenting with Ventricular fibrillation (Multi).      Subjective   Reports no Chest and AB discomfort. NO Sob while on RA. Patient denies Fevers, chills, palpitations, cough, wheeze, n/v/d, hematochezia, hematuria, or dsyuria.        Objective     Last Recorded Vitals  /65   Pulse 51   Temp 36 °C (96.8 °F)   Resp 18   Wt 79.4 kg (175 lb 0.7 oz)   SpO2 96%   Intake/Output last 3 Shifts:    Intake/Output Summary (Last 24 hours) at 9/11/2024 1304  Last data filed at 9/10/2024 1826  Gross per 24 hour   Intake 240 ml   Output 1750 ml   Net -1510 ml         Admission Weight  Weight: 76.2 kg (168 lb) (09/06/24 1234)    Daily Weight  09/08/24 : 79.4 kg (175 lb 0.7 oz)    Image Results  Electrocardiogram, 12-lead PRN ACS symptoms  Normal sinus rhythm  Possible Left atrial enlargement  Left axis deviation  Left ventricular hypertrophy with QRS widening and repolarization abnormality  Inferior infarct (cited on or before 07-SEP-2024)  Prolonged QT  Abnormal ECG  When compared with ECG of 07-SEP-2024 09:33,  Premature ventricular complexes are no longer Present  Confirmed by Leoncio Dias ([id number]) on 9/10/2024 4:02:49 PM      Physical Exam  Constitutional:       General: He is not in acute distress.  Cardiovascular:      Rate and Rhythm: Normal rate and regular rhythm.   Pulmonary:      Effort: No respiratory distress.      Breath sounds: Normal breath sounds. No wheezing.   Abdominal:      General: There is no distension.      Palpations: Abdomen is soft.      Tenderness: There is no abdominal tenderness.   Musculoskeletal:      Right lower leg: No edema.      Left lower leg: No edema.         Relevant Results               Assessment/Plan      54M w PMH as above presenting to the ED with 3 days of syncopal episodes. In the ED, patient found to be in tachycardia with rates in the 200s. Device interrogation revealed patient to have had 6 episodes of ventricular  fibrillation most recent episode with ventricular rate of 207, for which ATP was attempted x1 and then patient got shocked with 41J. Currently on Amiodarone and HDS.     Updates  - Transitioned to oral amio 400 TID, will transition to once daily when patient reaches total of 10G amio IV+ Oral. :: will complete 6.56 G end of 9/11  - EP will not do Second ICD lead inpatient    - Patient not a candidate for transplant.   - SSI added given high BG reading   - IV lasix 80 mg 9/9, IV lasix 40 mg 9/10  - Transitioned to home PO lasix 40 mg 9/11  - Anticipated discharge: 9/12          #Concurrent ventricular tachyarrhythmia (VT/VF) vs paroxysmal AF with rapid ventricular response iso of active cocaine use  :: [employer] (3/2024)   :: Upon device interrogation, 6x episodes of V-Fib with shock demonstrated  :: May correlate with patient's syncopal episodes  -EP following  -Switched IV Amiodarone to  PO. 400 mg TID until 10 g loading (IV+oral) followed by 400 mg once daily, Loading will be done 9/14  -Since all of his EKGs did not demonstrate clear a fib, no DOAC reccommended at this time.  -EP will not do Second ICD lead inpatient, follow up outpatient      #Cocaine use disorder  :: Has been using crack cocaine for last couple of days  - Monitor blood pressure while in withdrawal from cocaine  - Consider substance use counseling  - Monitor for signs of ACS     #HFrEF s/p ICD  #HTN  :: ECHO 7/31 EF 10%  :: GDMT: Empagliflozin, Entresto 24-26, Spironolactone, Carvedilol  :: Mild edema on presentation  :: Notable JVD on exam  :: On lasix 40mg PO at home for diuresis  Continue home GDMT: Empagliflozin 10 mg,  Spironolactone 12.5,   -Home entresto dose cut in half to 24-26 BID  - S/P Lasix 80 IV x2. No more edema of LE  -BP remain soft 90s/60s. IVC checked and measured at 2.15  - s/p  IV lasix 80 mg 9/9, 40 mg 9/10  Plan:   - Coreg 3.125 9/10  - Restarted on home lasix 40 mg PO 9/11  -Adjust GDMT accordingly to BP        #GUICHO on CKD3  :: B/l Cr 1.1-2  :: Cr on admission to  LT 5 1.53  -Avoid nephrotoxins  -Avoid hypotension  -Strict I/Os       #T2DM  :: A1c 8.4% 7/2024  :: Home regimen: Lantus 30u at bedtime, LDISS  -Originally started on Lantus 15u while in the hospital  -Had BG of 400 9/7   Plan:  - Latus 20 + SSI currently   -Accuchecks ACHS    #Depression  -Continue home sertraline 100 mg     F: Avoid with low EF  E: Strict electrolyte monitoring  N: Cardiac and Carb control   A: PIV x1  DVT: Lovenox     Code Status: DNR, confirmed on admisison  Surrogate decision maker: Ajith Santana, friend ([phone])       Donnie Napier, DO  PGY-1 Internal Medicine

## 2024-09-11 NOTE — CARE PLAN
Problem: Respiratory  Goal: Clear secretions with interventions this shift  Outcome: Progressing  Goal: Minimize anxiety/maximize coping throughout shift  Outcome: Progressing  Goal: Minimal/no exertional discomfort or dyspnea this shift  Outcome: Progressing  Goal: No signs of respiratory distress (eg. Use of accessory muscles. Peds grunting)  Outcome: Progressing  Goal: Patent airway maintained this shift  Outcome: Progressing  Goal: Tolerate mechanical ventilation evidenced by VS/agitation level this shift  Outcome: Progressing  Goal: Tolerate pulmonary toileting this shift  Outcome: Progressing  Goal: Verbalize decreased shortness of breath this shift  Outcome: Progressing  Goal: Wean oxygen to maintain O2 saturation per order/standard this shift  Outcome: Progressing  Goal: Increase self care and/or family involvement in next 24 hours  Outcome: Progressing     Problem: Skin  Goal: Decreased wound size/increased tissue granulation at next dressing change  Outcome: Progressing  Goal: Participates in plan/prevention/treatment measures  Outcome: Progressing  Goal: Prevent/manage excess moisture  Outcome: Progressing  Goal: Prevent/minimize sheer/friction injuries  Outcome: Progressing  Goal: Promote/optimize nutrition  Outcome: Progressing  Goal: Promote skin healing  Outcome: Progressing     Problem: Pain - Adult  Goal: Verbalizes/displays adequate comfort level or baseline comfort level  Outcome: Progressing     Problem: Safety - Adult  Goal: Free from fall injury  Outcome: Progressing     Problem: Discharge Planning  Goal: Discharge to home or other facility with appropriate resources  Outcome: Progressing     Problem: Chronic Conditions and Co-morbidities  Goal: Patient's chronic conditions and co-morbidity symptoms are monitored and maintained or improved  Outcome: Progressing     Problem: Diabetes  Goal: Achieve decreasing blood glucose levels by end of shift  Outcome: Progressing  Goal: Increase stability  of blood glucose readings by end of shift  Outcome: Progressing  Goal: Decrease in ketones present in urine by end of shift  Outcome: Progressing  Goal: Maintain electrolyte levels within acceptable range throughout shift  Outcome: Progressing  Goal: Maintain glucose levels >70mg/dl to <250mg/dl throughout shift  Outcome: Progressing  Goal: No changes in neurological exam by end of shift  Outcome: Progressing  Goal: Learn about and adhere to nutrition recommendations by end of shift  Outcome: Progressing  Goal: Vital signs within normal range for age by end of shift  Outcome: Progressing  Goal: Increase self care and/or family involovement by end of shift  Outcome: Progressing  Goal: Receive DSME education by end of shift  Outcome: Progressing     Problem: Fall/Injury  Goal: Not fall by end of shift  Outcome: Progressing  Goal: Be free from injury by end of the shift  Outcome: Progressing  Goal: Verbalize understanding of personal risk factors for fall in the hospital  Outcome: Progressing  Goal: Verbalize understanding of risk factor reduction measures to prevent injury from fall in the home  Outcome: Progressing  Goal: Use assistive devices by end of the shift  Outcome: Progressing  Goal: Pace activities to prevent fatigue by end of the shift  Outcome: Progressing   The patient's goals for the shift include      The clinical goals for the shift include pt will remain free from injury this shift

## 2024-09-12 ENCOUNTER — PHARMACY VISIT (OUTPATIENT)
Dept: PHARMACY | Facility: CLINIC | Age: 54
End: 2024-09-12
Payer: MEDICAID

## 2024-09-12 VITALS
TEMPERATURE: 97 F | SYSTOLIC BLOOD PRESSURE: 140 MMHG | RESPIRATION RATE: 18 BRPM | HEART RATE: 58 BPM | BODY MASS INDEX: 26.53 KG/M2 | DIASTOLIC BLOOD PRESSURE: 75 MMHG | HEIGHT: 68 IN | WEIGHT: 175.04 LBS | OXYGEN SATURATION: 96 %

## 2024-09-12 LAB
ALBUMIN SERPL BCP-MCNC: 3.6 G/DL (ref 3.4–5)
ANION GAP SERPL CALC-SCNC: 10 MMOL/L (ref 10–20)
BASOPHILS # BLD AUTO: 0.04 X10*3/UL (ref 0–0.1)
BASOPHILS NFR BLD AUTO: 0.6 %
BUN SERPL-MCNC: 25 MG/DL (ref 6–23)
CALCIUM SERPL-MCNC: 8.8 MG/DL (ref 8.6–10.6)
CHLORIDE SERPL-SCNC: 102 MMOL/L (ref 98–107)
CO2 SERPL-SCNC: 29 MMOL/L (ref 21–32)
CREAT SERPL-MCNC: 1.52 MG/DL (ref 0.5–1.3)
EGFRCR SERPLBLD CKD-EPI 2021: 54 ML/MIN/1.73M*2
EOSINOPHIL # BLD AUTO: 0.1 X10*3/UL (ref 0–0.7)
EOSINOPHIL NFR BLD AUTO: 1.4 %
ERYTHROCYTE [DISTWIDTH] IN BLOOD BY AUTOMATED COUNT: 15.4 % (ref 11.5–14.5)
GLUCOSE BLD MANUAL STRIP-MCNC: 84 MG/DL (ref 74–99)
GLUCOSE SERPL-MCNC: 92 MG/DL (ref 74–99)
HCT VFR BLD AUTO: 52.5 % (ref 41–52)
HGB BLD-MCNC: 17.4 G/DL (ref 13.5–17.5)
IMM GRANULOCYTES # BLD AUTO: 0.02 X10*3/UL (ref 0–0.7)
IMM GRANULOCYTES NFR BLD AUTO: 0.3 % (ref 0–0.9)
LYMPHOCYTES # BLD AUTO: 0.94 X10*3/UL (ref 1.2–4.8)
LYMPHOCYTES NFR BLD AUTO: 13.5 %
MAGNESIUM SERPL-MCNC: 2.44 MG/DL (ref 1.6–2.4)
MCH RBC QN AUTO: 31 PG (ref 26–34)
MCHC RBC AUTO-ENTMCNC: 33.1 G/DL (ref 32–36)
MCV RBC AUTO: 93 FL (ref 80–100)
MONOCYTES # BLD AUTO: 0.48 X10*3/UL (ref 0.1–1)
MONOCYTES NFR BLD AUTO: 6.9 %
NEUTROPHILS # BLD AUTO: 5.37 X10*3/UL (ref 1.2–7.7)
NEUTROPHILS NFR BLD AUTO: 77.3 %
NRBC BLD-RTO: 0 /100 WBCS (ref 0–0)
PHOSPHATE SERPL-MCNC: 3.1 MG/DL (ref 2.5–4.9)
PLATELET # BLD AUTO: 168 X10*3/UL (ref 150–450)
POTASSIUM SERPL-SCNC: 4.7 MMOL/L (ref 3.5–5.3)
RBC # BLD AUTO: 5.62 X10*6/UL (ref 4.5–5.9)
SODIUM SERPL-SCNC: 136 MMOL/L (ref 136–145)
WBC # BLD AUTO: 7 X10*3/UL (ref 4.4–11.3)

## 2024-09-12 PROCEDURE — 83735 ASSAY OF MAGNESIUM: CPT

## 2024-09-12 PROCEDURE — 82947 ASSAY GLUCOSE BLOOD QUANT: CPT

## 2024-09-12 PROCEDURE — 2500000002 HC RX 250 W HCPCS SELF ADMINISTERED DRUGS (ALT 637 FOR MEDICARE OP, ALT 636 FOR OP/ED)

## 2024-09-12 PROCEDURE — 2500000001 HC RX 250 WO HCPCS SELF ADMINISTERED DRUGS (ALT 637 FOR MEDICARE OP)

## 2024-09-12 PROCEDURE — 36415 COLL VENOUS BLD VENIPUNCTURE: CPT

## 2024-09-12 PROCEDURE — 99239 HOSP IP/OBS DSCHRG MGMT >30: CPT

## 2024-09-12 PROCEDURE — 80069 RENAL FUNCTION PANEL: CPT

## 2024-09-12 PROCEDURE — 2500000005 HC RX 250 GENERAL PHARMACY W/O HCPCS

## 2024-09-12 PROCEDURE — 85025 COMPLETE CBC W/AUTO DIFF WBC: CPT

## 2024-09-12 RX ADMIN — CARVEDILOL 3.12 MG: 3.12 TABLET, FILM COATED ORAL at 10:36

## 2024-09-12 RX ADMIN — ASPIRIN 81 MG CHEWABLE TABLET 81 MG: 81 TABLET CHEWABLE at 10:36

## 2024-09-12 RX ADMIN — Medication 3 L/MIN: at 09:15

## 2024-09-12 RX ADMIN — SERTRALINE 100 MG: 100 TABLET, FILM COATED ORAL at 10:36

## 2024-09-12 RX ADMIN — AMIODARONE HYDROCHLORIDE 400 MG: 200 TABLET ORAL at 10:36

## 2024-09-12 RX ADMIN — SACUBITRIL AND VALSARTAN 0.5 TABLET: 24; 26 TABLET, FILM COATED ORAL at 10:36

## 2024-09-12 RX ADMIN — EMPAGLIFLOZIN 10 MG: 10 TABLET, FILM COATED ORAL at 10:36

## 2024-09-12 RX ADMIN — FUROSEMIDE 40 MG: 20 TABLET ORAL at 10:36

## 2024-09-12 RX ADMIN — INSULIN GLARGINE 20 UNITS: 100 INJECTION, SOLUTION SUBCUTANEOUS at 01:34

## 2024-09-12 RX ADMIN — SPIRONOLACTONE 12.5 MG: 25 TABLET, FILM COATED ORAL at 10:36

## 2024-09-12 ASSESSMENT — COGNITIVE AND FUNCTIONAL STATUS - GENERAL
MOBILITY SCORE: 24
DAILY ACTIVITIY SCORE: 24

## 2024-09-12 ASSESSMENT — PAIN SCALES - GENERAL: PAINLEVEL_OUTOF10: 0 - NO PAIN

## 2024-09-12 NOTE — CARE PLAN
Problem: Respiratory  Goal: Clear secretions with interventions this shift  9/12/2024 1110 by Hannah Donald LPN  Outcome: Progressing  9/12/2024 1108 by Hannah Donald LPN  Outcome: Progressing  Goal: Minimize anxiety/maximize coping throughout shift  9/12/2024 1110 by Hannah Donald LPN  Outcome: Progressing  9/12/2024 1108 by Hannah Donald LPN  Outcome: Progressing  Goal: Minimal/no exertional discomfort or dyspnea this shift  9/12/2024 1110 by Hannah Donald LPN  Outcome: Progressing  9/12/2024 1108 by Hannah Donald LPN  Outcome: Progressing  Goal: No signs of respiratory distress (eg. Use of accessory muscles. Peds grunting)  9/12/2024 1110 by Hannah Donald LPN  Outcome: Progressing  9/12/2024 1108 by Hannah Donald LPN  Outcome: Progressing  Goal: Patent airway maintained this shift  9/12/2024 1110 by Hannah Donald LPN  Outcome: Progressing  9/12/2024 1108 by Hannah Donald LPN  Outcome: Progressing  Goal: Tolerate mechanical ventilation evidenced by VS/agitation level this shift  9/12/2024 1110 by Hannah Donald LPN  Outcome: Progressing  9/12/2024 1108 by Hannah Donald LPN  Outcome: Progressing  Goal: Tolerate pulmonary toileting this shift  9/12/2024 1110 by Hannah Donald LPN  Outcome: Progressing  9/12/2024 1108 by Hannah Donald LPN  Outcome: Progressing  Goal: Verbalize decreased shortness of breath this shift  9/12/2024 1110 by Hannah Donald LPN  Outcome: Progressing  9/12/2024 1108 by Hannah Donald LPN  Outcome: Progressing  Goal: Wean oxygen to maintain O2 saturation per order/standard this shift  9/12/2024 1110 by Hannah Donald LPN  Outcome: Progressing  9/12/2024 1108 by Hannah Donald LPN  Outcome: Progressing  Goal: Increase self care and/or family involvement in next 24 hours  9/12/2024 1110 by Hannah Donald LPN  Outcome: Progressing  9/12/2024 1108 by Hannah Donald,  LPN  Outcome: Progressing     Problem: Skin  Goal: Decreased wound size/increased tissue granulation at next dressing change  9/12/2024 1110 by Hannah Donald LPN  Outcome: Progressing  9/12/2024 1108 by Hannah Donald LPN  Outcome: Progressing  Goal: Participates in plan/prevention/treatment measures  9/12/2024 1110 by Hannah Donald LPN  Outcome: Progressing  9/12/2024 1108 by Hannah Donald LPN  Outcome: Progressing  Goal: Prevent/manage excess moisture  9/12/2024 1110 by Hannah Donald LPN  Outcome: Progressing  9/12/2024 1108 by Hannah Donald LPN  Outcome: Progressing  Goal: Prevent/minimize sheer/friction injuries  9/12/2024 1110 by Hannah Donald LPN  Outcome: Progressing  9/12/2024 1108 by Hannah Donald LPN  Outcome: Progressing  Goal: Promote/optimize nutrition  9/12/2024 1110 by Hannah Donald LPN  Outcome: Progressing  9/12/2024 1108 by Hannah Donald LPN  Outcome: Progressing  Goal: Promote skin healing  9/12/2024 1110 by Hannah Donald LPN  Outcome: Progressing  9/12/2024 1108 by Hannah Donald LPN  Outcome: Progressing     Problem: Pain - Adult  Goal: Verbalizes/displays adequate comfort level or baseline comfort level  9/12/2024 1110 by Hannah Donald LPN  Outcome: Progressing  9/12/2024 1108 by Hannah Donald LPN  Outcome: Progressing     Problem: Safety - Adult  Goal: Free from fall injury  9/12/2024 1110 by Hannah Donald LPN  Outcome: Progressing  9/12/2024 1108 by Hannah Donald LPN  Outcome: Progressing     Problem: Discharge Planning  Goal: Discharge to home or other facility with appropriate resources  9/12/2024 1110 by Hannha Donald LPN  Outcome: Progressing  9/12/2024 1108 by Hannah Donald LPN  Outcome: Progressing     Problem: Chronic Conditions and Co-morbidities  Goal: Patient's chronic conditions and co-morbidity symptoms are monitored and maintained or improved  9/12/2024 1110  by Hannah Donald LPN  Outcome: Progressing  9/12/2024 1108 by Hannah Donald LPN  Outcome: Progressing     Problem: Diabetes  Goal: Achieve decreasing blood glucose levels by end of shift  9/12/2024 1110 by Hannah Donald LPN  Outcome: Progressing  9/12/2024 1108 by Hannah Donald LPN  Outcome: Progressing  Goal: Increase stability of blood glucose readings by end of shift  9/12/2024 1110 by Hannah Donald LPN  Outcome: Progressing  9/12/2024 1108 by Hannah Donald LPN  Outcome: Progressing  Goal: Decrease in ketones present in urine by end of shift  9/12/2024 1110 by Hannah Donald LPN  Outcome: Progressing  9/12/2024 1108 by Hannah Donald LPN  Outcome: Progressing  Goal: Maintain electrolyte levels within acceptable range throughout shift  9/12/2024 1110 by Hannah Donald LPN  Outcome: Progressing  9/12/2024 1108 by Hannah Donald LPN  Outcome: Progressing  Goal: Maintain glucose levels >70mg/dl to <250mg/dl throughout shift  9/12/2024 1110 by Hannah Donald LPN  Outcome: Progressing  9/12/2024 1108 by Hannah Donald LPN  Outcome: Progressing  Goal: No changes in neurological exam by end of shift  9/12/2024 1110 by Hannah Donald LPN  Outcome: Progressing  9/12/2024 1108 by Hannah Donald LPN  Outcome: Progressing  Goal: Learn about and adhere to nutrition recommendations by end of shift  9/12/2024 1110 by Hannah Donald LPN  Outcome: Progressing  9/12/2024 1108 by Hannah Donald LPN  Outcome: Progressing  Goal: Vital signs within normal range for age by end of shift  9/12/2024 1110 by Hannah Donald LPN  Outcome: Progressing  9/12/2024 1108 by Hannah Donald LPN  Outcome: Progressing  Goal: Increase self care and/or family involovement by end of shift  9/12/2024 1110 by Hannah Donald LPN  Outcome: Progressing  9/12/2024 1108 by Hannah Donald LPN  Outcome: Progressing  Goal: Receive DSME  education by end of shift  9/12/2024 1110 by Hannah Donald LPN  Outcome: Progressing  9/12/2024 1108 by Hannah Donald LPN  Outcome: Progressing     Problem: Fall/Injury  Goal: Not fall by end of shift  9/12/2024 1110 by Hannah Donald LPN  Outcome: Progressing  9/12/2024 1108 by Hannah Donald LPN  Outcome: Progressing  Goal: Be free from injury by end of the shift  9/12/2024 1110 by Hannah Donald LPN  Outcome: Progressing  9/12/2024 1108 by Hannah Donald LPN  Outcome: Progressing  Goal: Verbalize understanding of personal risk factors for fall in the hospital  9/12/2024 1110 by Hannah Donald LPN  Outcome: Progressing  9/12/2024 1108 by Hannah Donald LPN  Outcome: Progressing  Goal: Verbalize understanding of risk factor reduction measures to prevent injury from fall in the home  9/12/2024 1110 by Hannah Donald LPN  Outcome: Progressing  9/12/2024 1108 by Hannah Donald LPN  Outcome: Progressing  Goal: Use assistive devices by end of the shift  9/12/2024 1110 by Hannah Donald LPN  Outcome: Progressing  9/12/2024 1108 by Hannah Donald LPN  Outcome: Progressing  Goal: Pace activities to prevent fatigue by end of the shift  9/12/2024 1110 by Hannah Donald LPN  Outcome: Progressing  9/12/2024 1108 by Hannah Donald LPN  Outcome: Progressing   The patient's goals for the shift include      The clinical goals for the shift include Pt will remain free from injury this shift

## 2024-09-12 NOTE — NURSING NOTE
Patient was giving discharge instructions. Meds to beds was picked up from bowel and patient received. Patients IV removed and tele boxed returned.

## 2024-09-12 NOTE — DISCHARGE SUMMARY
Discharge Diagnosis  Ventricular fibrillation (Multi)    Issues Requiring Follow-Up  1. Patient reports he uses home O2 at night (prescribed when he lived in [facility], per pt)  - Please Order Sleep study (COPD/SOHAIL) to initiate process to be prescribed home O2  2. Ensure GDMT optimized, watch for excessive bradycardia and hypotension  3. Ensure follow up with EP for potential second ICD lead    4. Encourage abstinence from substance abuse; can consider rehab or if a candidate for Naltrexone       Discharge Meds     Medication List      START taking these medications     amiodarone 200 mg tablet; Commonly known as: Pacerone; Take 2 tablets   (400 mg) by mouth 3 times a day for 4 days, THEN 2 tablets (400 mg) once   daily.; Start taking on: September 11, 2024     CHANGE how you take these medications     carvedilol 3.125 mg tablet; Commonly known as: Coreg; Take 1 tablet   (3.125 mg) by mouth 2 times a day.; What changed: medication strength, how   much to take   sacubitriL-valsartan 24-26 mg tablet; Commonly known as: Entresto; Take   0.5 tablets by mouth 2 times a day.; What changed: how much to take     CONTINUE taking these medications     aspirin 81 mg chewable tablet; Chew 1 tablet (81 mg) once daily.   atorvastatin 80 mg tablet; Commonly known as: Lipitor; Take 1 tablet (80   mg) by mouth once daily at bedtime.   furosemide 40 mg tablet; Commonly known as: Lasix; Take 1 tablet (40 mg)   by mouth once daily.   glucose 4 gram chewable tablet; Chew 2 tablets (8 g) if needed for low   blood sugar - see comments.   insulin lispro 100 unit/mL injection; Commonly known as: HumaLOG; Inject   0-10 Units under the skin 3 times daily (morning, midday, late afternoon).   Hypoglycemia protocol if Blood Glucose is between 0 - 70 mg/dL, 0 unit(s)   if , 2 unit(s) if 151-200, 4 unit(s) if 201-250, 6 unit(s) if   251-300, 8 unit(s) if 301-350, 10 unit(s) if 351-400. Notify provider   unit(s) if Blood Glucose is  "greater than 400 mg/dL   Jardiance 10 mg; Generic drug: empagliflozin; TAKE 1 TABLET BY MOUTH   ONCE DAILY   Lantus Solostar U-100 Insulin 100 unit/mL (3 mL) pen; Generic drug:   insulin glargine; Inject 30 Units under the skin once daily at bedtime.   sertraline 100 mg tablet; Commonly known as: Zoloft; Take 1 tablet (100   mg) by mouth once daily.   Spiriva Respimat 2.5 mcg/actuation inhaler; Generic drug: tiotropium;   Inhale 2 puffs once daily.   spironolactone 25 mg tablet; Commonly known as: Aldactone; Take 0.5   tablets (12.5 mg) by mouth once daily.   True Metrix Glucose Meter misc; Generic drug: blood-glucose meter;   Inject 1 each under the skin 4 times a day with meals. Check blood glucose   4 times daily, before meals and at bedtime.   True Metrix Glucose Test Strip strip; Generic drug: blood sugar   diagnostic; Use as directed to test blood glucose up to four times daily   and as needed.   TRUEplus Lancets 33 gauge misc; Generic drug: lancets; Inject 1 each   under the skin 4 times a day.   TRUEplus Pen Needle 29 gauge x 1/2\" needle; Generic drug: pen needle   1/2\"; Use to inject 1-4 times daily as directed.       Test Results Pending At Discharge  Pending Labs       No current pending labs.            Hospital Course  Leonel Yu is a 54 y.o. 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 Reported syncope before admission,  ICD  was interrogated and showed reported VF episodes x6 and reported VT episodes x4 from 9/1- 9/5/24.  Most recent episode with ventricular rate of 207, " for which ATP was attempted x1 and then patient got shocked with 41J. EKG showing SR, Qtc 445 and LVH. C/f recent cocaine use. Started on oral amio 400 TID, will transition to once daily when patient reaches total of 10G amio IV+ Oral, final loading day 9/ 14/2024. EP will not place 2nd ICD lead inpatient and patient not a candidate for transplant due to substance use. GDMT: Coreg lowered to 3.125 and Entresto lowered to 24-26 due to persistent bradycardia (50s) and hypotension. Patient underwent dieresis for HF exacerbation with symptomatic improvement. At time of discharge Mr. Yu was optimized for his a breathing (2/2 HFrEF) and possible arhythmia while on amio.     Pertinent Physical Exam At Time of Discharge  Physical Exam  Constitutional:       Appearance: Normal appearance.   Cardiovascular:      Rate and Rhythm: Normal rate and regular rhythm.      Heart sounds: Normal heart sounds.   Pulmonary:      Effort: Pulmonary effort is normal.      Breath sounds: Normal breath sounds.   Abdominal:      General: Abdomen is flat. Bowel sounds are normal.      Palpations: Abdomen is soft.   Skin:     General: Skin is warm.   Neurological:      Mental Status: He is alert and oriented to person, place, and time. Mental status is at baseline.   Psychiatric:         Mood and Affect: Mood normal.         Behavior: Behavior normal.         Thought Content: Thought content normal.         Judgment: Judgment normal.         Outpatient Follow-Up  No future appointments.      Donnie Napier DO

## 2024-09-18 LAB
ATRIAL RATE: 71 BPM
P AXIS: 59 DEGREES
P OFFSET: 176 MS
P ONSET: 122 MS
PR INTERVAL: 180 MS
Q ONSET: 212 MS
QRS COUNT: 12 BEATS
QRS DURATION: 110 MS
QT INTERVAL: 436 MS
QTC CALCULATION(BAZETT): 473 MS
QTC FREDERICIA: 461 MS
R AXIS: -32 DEGREES
T AXIS: 101 DEGREES
T OFFSET: 430 MS
VENTRICULAR RATE: 71 BPM

## 2024-09-24 ENCOUNTER — PHARMACY VISIT (OUTPATIENT)
Dept: PHARMACY | Facility: CLINIC | Age: 54
End: 2024-09-24
Payer: MEDICAID

## 2024-09-24 PROCEDURE — RXMED WILLOW AMBULATORY MEDICATION CHARGE

## 2024-09-24 NOTE — DOCUMENTATION CLARIFICATION NOTE
"    PATIENT:               VINOD SHAFER  ACCT #:                  [id number]  MRN:                       [id number]  :                       1970  ADMIT DATE:       2024 10:55 PM  DISCH DATE:        2024 3:28 PM  RESPONDING PROVIDER #:        [id number]          PROVIDER RESPONSE TEXT:    Ventricular Fibrillation related to chronic cocaine use    CDI QUERY TEXT:    Clarification    Instruction:    Based on your assessment of the patient and the clinical information, please provide the requested documentation by clicking on the appropriate radio button and enter any additional information if prompted.    Question: Please clarify if a relationship exists between    When answering this query, please exercise your independent professional judgment. The fact that a question is being asked, does not imply that any particular answer is desired or expected.    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  He has had similar admissions for VT as well as heart failure multiple times in the recent past.\"    Clinical Indicators:  24 1234 T 36.4 ?C HR 92 /94  RR 18  O2 96  percent room air    24 NADIRA Brar MD  \"Ventricular tachycardia/fibrillation will start IV amiodarone.  His cocaine use is not helping.\"  \"In the ED, his ICD  was interrogated and showed reported VF episodes x6 and reported VT " "episodes x4 from 9/1- 9/5/24.  Most recent episode with ventricular rate of 207, for which ATP was attempted x1 and then patient got shocked with 41J. EKG showing SR, Qtc 445 and LVH.\"  \"Cocaine use disorder Has been using crack cocaine for last couple of days\"    9/7/24 UDS positive for cocaine    Treatment:  Critical Care management  EKG  labs  IV amiodarone  ICD interrogation    Risk Factors: Ventricular tachycardia, ongoing crack cocaine use, mixed ischemic non-ischemic cocaine associated cardiomyopathy, medication noncompliance  Options provided:  -- Ventricular Fibrillation related to chronic cocaine use  -- Ventricular Fibrillation related to acute cocaine use  -- Ventricular Fibrillation not related to cocaine use  -- Other - I will add my own diagnosis  -- Refer to Clinical Documentation Reviewer    Query created by: Priyanka Norman on 9/24/2024 9:37 AM      Electronically signed by:  DAISY SIMS DO 9/24/2024 11:50 AM          "

## 2024-09-30 ENCOUNTER — APPOINTMENT (OUTPATIENT)
Dept: CARDIOLOGY | Facility: HOSPITAL | Age: 54
End: 2024-09-30
Payer: COMMERCIAL

## 2024-09-30 ENCOUNTER — HOSPITAL ENCOUNTER (INPATIENT)
Facility: HOSPITAL | Age: 54
LOS: 5 days | Discharge: HOME | End: 2024-10-05
Attending: STUDENT IN AN ORGANIZED HEALTH CARE EDUCATION/TRAINING PROGRAM | Admitting: STUDENT IN AN ORGANIZED HEALTH CARE EDUCATION/TRAINING PROGRAM
Payer: COMMERCIAL

## 2024-09-30 ENCOUNTER — APPOINTMENT (OUTPATIENT)
Dept: RADIOLOGY | Facility: HOSPITAL | Age: 54
End: 2024-09-30
Payer: COMMERCIAL

## 2024-09-30 DIAGNOSIS — I50.9 ACUTE ON CHRONIC CONGESTIVE HEART FAILURE, UNSPECIFIED HEART FAILURE TYPE: Primary | ICD-10-CM

## 2024-09-30 DIAGNOSIS — I49.01 VENTRICULAR FIBRILLATION (MULTI): ICD-10-CM

## 2024-09-30 LAB
ABO GROUP (TYPE) IN BLOOD: NORMAL
ALBUMIN SERPL BCP-MCNC: 3.8 G/DL (ref 3.4–5)
ALP SERPL-CCNC: 82 U/L (ref 33–120)
ALT SERPL W P-5'-P-CCNC: 33 U/L (ref 10–52)
ANION GAP BLDV CALCULATED.4IONS-SCNC: 16 MMOL/L (ref 10–25)
ANION GAP SERPL CALC-SCNC: 14 MMOL/L (ref 10–20)
ANTIBODY SCREEN: NORMAL
APPEARANCE UR: CLEAR
APTT PPP: 33 SECONDS (ref 27–38)
AST SERPL W P-5'-P-CCNC: 25 U/L (ref 9–39)
B-OH-BUTYR SERPL-SCNC: 0.16 MMOL/L (ref 0.02–0.27)
BASE EXCESS BLDV CALC-SCNC: -3.3 MMOL/L (ref -2–3)
BASOPHILS # BLD AUTO: 0.03 X10*3/UL (ref 0–0.1)
BASOPHILS NFR BLD AUTO: 0.4 %
BILIRUB SERPL-MCNC: 1 MG/DL (ref 0–1.2)
BILIRUB UR STRIP.AUTO-MCNC: NEGATIVE MG/DL
BNP SERPL-MCNC: >5000 PG/ML (ref 0–99)
BODY TEMPERATURE: 37 DEGREES CELSIUS
BUN SERPL-MCNC: 30 MG/DL (ref 6–23)
CA-I BLDV-SCNC: 0.96 MMOL/L (ref 1.1–1.33)
CALCIUM SERPL-MCNC: 8.8 MG/DL (ref 8.6–10.6)
CARDIAC TROPONIN I PNL SERPL HS: 44 NG/L (ref 0–53)
CARDIAC TROPONIN I PNL SERPL HS: 67 NG/L (ref 0–53)
CHLORIDE BLDV-SCNC: 100 MMOL/L (ref 98–107)
CHLORIDE SERPL-SCNC: 103 MMOL/L (ref 98–107)
CO2 SERPL-SCNC: 23 MMOL/L (ref 21–32)
COLOR UR: ABNORMAL
CREAT SERPL-MCNC: 2.11 MG/DL (ref 0.5–1.3)
EGFRCR SERPLBLD CKD-EPI 2021: 36 ML/MIN/1.73M*2
EOSINOPHIL # BLD AUTO: 0.03 X10*3/UL (ref 0–0.7)
EOSINOPHIL NFR BLD AUTO: 0.4 %
ERYTHROCYTE [DISTWIDTH] IN BLOOD BY AUTOMATED COUNT: 16 % (ref 11.5–14.5)
GLUCOSE BLD MANUAL STRIP-MCNC: 342 MG/DL (ref 74–99)
GLUCOSE BLDV-MCNC: 473 MG/DL (ref 74–99)
GLUCOSE SERPL-MCNC: 407 MG/DL (ref 74–99)
GLUCOSE UR STRIP.AUTO-MCNC: ABNORMAL MG/DL
HCO3 BLDV-SCNC: 21.4 MMOL/L (ref 22–26)
HCT VFR BLD AUTO: 48.8 % (ref 41–52)
HCT VFR BLD EST: 50 % (ref 41–52)
HGB BLD-MCNC: 16.6 G/DL (ref 13.5–17.5)
HGB BLDV-MCNC: 16.6 G/DL (ref 13.5–17.5)
HOLD SPECIMEN: NORMAL
HOLD SPECIMEN: NORMAL
IMM GRANULOCYTES # BLD AUTO: 0.02 X10*3/UL (ref 0–0.7)
IMM GRANULOCYTES NFR BLD AUTO: 0.3 % (ref 0–0.9)
INR PPP: 1.2 (ref 0.9–1.1)
KETONES UR STRIP.AUTO-MCNC: NEGATIVE MG/DL
LACTATE BLDV-SCNC: 1.8 MMOL/L (ref 0.4–2)
LEUKOCYTE ESTERASE UR QL STRIP.AUTO: NEGATIVE
LYMPHOCYTES # BLD AUTO: 0.65 X10*3/UL (ref 1.2–4.8)
LYMPHOCYTES NFR BLD AUTO: 9.5 %
MAGNESIUM SERPL-MCNC: 2.16 MG/DL (ref 1.6–2.4)
MCH RBC QN AUTO: 31 PG (ref 26–34)
MCHC RBC AUTO-ENTMCNC: 34 G/DL (ref 32–36)
MCV RBC AUTO: 91 FL (ref 80–100)
MONOCYTES # BLD AUTO: 0.35 X10*3/UL (ref 0.1–1)
MONOCYTES NFR BLD AUTO: 5.1 %
NEUTROPHILS # BLD AUTO: 5.77 X10*3/UL (ref 1.2–7.7)
NEUTROPHILS NFR BLD AUTO: 84.3 %
NITRITE UR QL STRIP.AUTO: NEGATIVE
NRBC BLD-RTO: 0 /100 WBCS (ref 0–0)
OXYHGB MFR BLDV: 75.6 % (ref 45–75)
PCO2 BLDV: 37 MM HG (ref 41–51)
PH BLDV: 7.37 PH (ref 7.33–7.43)
PH UR STRIP.AUTO: 5.5 [PH]
PLATELET # BLD AUTO: 155 X10*3/UL (ref 150–450)
PO2 BLDV: 52 MM HG (ref 35–45)
POTASSIUM BLDV-SCNC: 10 MMOL/L (ref 3.5–5.3)
POTASSIUM SERPL-SCNC: 4.2 MMOL/L (ref 3.5–5.3)
PROT SERPL-MCNC: 7.3 G/DL (ref 6.4–8.2)
PROT UR STRIP.AUTO-MCNC: ABNORMAL MG/DL
PROTHROMBIN TIME: 13.4 SECONDS (ref 9.8–12.8)
RBC # BLD AUTO: 5.35 X10*6/UL (ref 4.5–5.9)
RBC # UR STRIP.AUTO: NEGATIVE /UL
RBC #/AREA URNS AUTO: NORMAL /HPF
RH FACTOR (ANTIGEN D): NORMAL
SAO2 % BLDV: 78 % (ref 45–75)
SODIUM BLDV-SCNC: 127 MMOL/L (ref 136–145)
SODIUM SERPL-SCNC: 136 MMOL/L (ref 136–145)
SP GR UR STRIP.AUTO: 1.02
UROBILINOGEN UR STRIP.AUTO-MCNC: NORMAL MG/DL
WBC # BLD AUTO: 6.9 X10*3/UL (ref 4.4–11.3)
WBC #/AREA URNS AUTO: NORMAL /HPF

## 2024-09-30 PROCEDURE — 71275 CT ANGIOGRAPHY CHEST: CPT

## 2024-09-30 PROCEDURE — 82435 ASSAY OF BLOOD CHLORIDE: CPT

## 2024-09-30 PROCEDURE — 74177 CT ABD & PELVIS W/CONTRAST: CPT | Mod: FOREIGN READ | Performed by: RADIOLOGY

## 2024-09-30 PROCEDURE — 71045 X-RAY EXAM CHEST 1 VIEW: CPT | Mod: FOREIGN READ | Performed by: RADIOLOGY

## 2024-09-30 PROCEDURE — 71275 CT ANGIOGRAPHY CHEST: CPT | Mod: FOREIGN READ | Performed by: RADIOLOGY

## 2024-09-30 PROCEDURE — 36415 COLL VENOUS BLD VENIPUNCTURE: CPT

## 2024-09-30 PROCEDURE — 96374 THER/PROPH/DIAG INJ IV PUSH: CPT

## 2024-09-30 PROCEDURE — 82010 KETONE BODYS QUAN: CPT

## 2024-09-30 PROCEDURE — 84132 ASSAY OF SERUM POTASSIUM: CPT

## 2024-09-30 PROCEDURE — 74177 CT ABD & PELVIS W/CONTRAST: CPT

## 2024-09-30 PROCEDURE — 80307 DRUG TEST PRSMV CHEM ANLYZR: CPT | Performed by: PHYSICIAN ASSISTANT

## 2024-09-30 PROCEDURE — 2500000005 HC RX 250 GENERAL PHARMACY W/O HCPCS: Mod: SE | Performed by: STUDENT IN AN ORGANIZED HEALTH CARE EDUCATION/TRAINING PROGRAM

## 2024-09-30 PROCEDURE — 85610 PROTHROMBIN TIME: CPT

## 2024-09-30 PROCEDURE — 93282 PRGRMG EVAL IMPLANTABLE DFB: CPT

## 2024-09-30 PROCEDURE — 2500000004 HC RX 250 GENERAL PHARMACY W/ HCPCS (ALT 636 FOR OP/ED): Mod: JZ,SE | Performed by: STUDENT IN AN ORGANIZED HEALTH CARE EDUCATION/TRAINING PROGRAM

## 2024-09-30 PROCEDURE — 83735 ASSAY OF MAGNESIUM: CPT

## 2024-09-30 PROCEDURE — 96375 TX/PRO/DX INJ NEW DRUG ADDON: CPT

## 2024-09-30 PROCEDURE — 99285 EMERGENCY DEPT VISIT HI MDM: CPT | Performed by: STUDENT IN AN ORGANIZED HEALTH CARE EDUCATION/TRAINING PROGRAM

## 2024-09-30 PROCEDURE — 1210000001 HC SEMI-PRIVATE ROOM DAILY

## 2024-09-30 PROCEDURE — 82330 ASSAY OF CALCIUM: CPT

## 2024-09-30 PROCEDURE — 71045 X-RAY EXAM CHEST 1 VIEW: CPT

## 2024-09-30 PROCEDURE — 82947 ASSAY GLUCOSE BLOOD QUANT: CPT

## 2024-09-30 PROCEDURE — 81001 URINALYSIS AUTO W/SCOPE: CPT

## 2024-09-30 PROCEDURE — 85025 COMPLETE CBC W/AUTO DIFF WBC: CPT

## 2024-09-30 PROCEDURE — 84484 ASSAY OF TROPONIN QUANT: CPT

## 2024-09-30 PROCEDURE — 2550000001 HC RX 255 CONTRASTS: Mod: SE | Performed by: STUDENT IN AN ORGANIZED HEALTH CARE EDUCATION/TRAINING PROGRAM

## 2024-09-30 PROCEDURE — 83880 ASSAY OF NATRIURETIC PEPTIDE: CPT

## 2024-09-30 PROCEDURE — 4B02XTZ MEASUREMENT OF CARDIAC DEFIBRILLATOR, EXTERNAL APPROACH: ICD-10-PCS | Performed by: PHYSICIAN ASSISTANT

## 2024-09-30 PROCEDURE — 99285 EMERGENCY DEPT VISIT HI MDM: CPT | Mod: 25

## 2024-09-30 PROCEDURE — 2500000004 HC RX 250 GENERAL PHARMACY W/ HCPCS (ALT 636 FOR OP/ED): Mod: SE

## 2024-09-30 PROCEDURE — 86901 BLOOD TYPING SEROLOGIC RH(D): CPT

## 2024-09-30 RX ORDER — CALCIUM GLUCONATE 20 MG/ML
2 INJECTION, SOLUTION INTRAVENOUS ONCE
Status: COMPLETED | OUTPATIENT
Start: 2024-09-30 | End: 2024-09-30

## 2024-09-30 RX ORDER — CALCIUM GLUCONATE 20 MG/ML
INJECTION, SOLUTION INTRAVENOUS
Status: COMPLETED
Start: 2024-09-30 | End: 2024-09-30

## 2024-09-30 RX ORDER — FUROSEMIDE 10 MG/ML
40 INJECTION INTRAMUSCULAR; INTRAVENOUS ONCE
Status: COMPLETED | OUTPATIENT
Start: 2024-09-30 | End: 2024-09-30

## 2024-09-30 SDOH — HEALTH STABILITY: MENTAL HEALTH: SUICIDE ASSESSMENT: ADULT (C-SSRS)

## 2024-09-30 SDOH — HEALTH STABILITY: MENTAL HEALTH: HAVE YOU ACTUALLY HAD ANY THOUGHTS OF KILLING YOURSELF?: NO

## 2024-09-30 SDOH — HEALTH STABILITY: MENTAL HEALTH: HAVE YOU EVER DONE ANYTHING, STARTED TO DO ANYTHING, OR PREPARED TO DO ANYTHING TO END YOUR LIFE?: NO

## 2024-09-30 SDOH — HEALTH STABILITY: MENTAL HEALTH: HAVE YOU WISHED YOU WERE DEAD OR WISHED YOU COULD GO TO SLEEP AND NOT WAKE UP?: NO

## 2024-09-30 SDOH — HEALTH STABILITY: MENTAL HEALTH: BEHAVIORAL HEALTH(WDL): WITHIN DEFINED LIMITS

## 2024-09-30 ASSESSMENT — LIFESTYLE VARIABLES
HAVE PEOPLE ANNOYED YOU BY CRITICIZING YOUR DRINKING: NO
TOTAL SCORE: 0
HAVE YOU EVER FELT YOU SHOULD CUT DOWN ON YOUR DRINKING: NO
EVER HAD A DRINK FIRST THING IN THE MORNING TO STEADY YOUR NERVES TO GET RID OF A HANGOVER: NO
EVER FELT BAD OR GUILTY ABOUT YOUR DRINKING: NO

## 2024-09-30 ASSESSMENT — PAIN DESCRIPTION - PROGRESSION: CLINICAL_PROGRESSION: GRADUALLY IMPROVING

## 2024-09-30 ASSESSMENT — COLUMBIA-SUICIDE SEVERITY RATING SCALE - C-SSRS
1. IN THE PAST MONTH, HAVE YOU WISHED YOU WERE DEAD OR WISHED YOU COULD GO TO SLEEP AND NOT WAKE UP?: NO
2. HAVE YOU ACTUALLY HAD ANY THOUGHTS OF KILLING YOURSELF?: NO
6. HAVE YOU EVER DONE ANYTHING, STARTED TO DO ANYTHING, OR PREPARED TO DO ANYTHING TO END YOUR LIFE?: NO

## 2024-09-30 ASSESSMENT — PAIN SCALES - GENERAL: PAINLEVEL_OUTOF10: 3

## 2024-09-30 ASSESSMENT — PAIN - FUNCTIONAL ASSESSMENT: PAIN_FUNCTIONAL_ASSESSMENT: 0-10

## 2024-09-30 NOTE — SIGNIFICANT EVENT
Patient has been identified as having an emergent need for administration of iodinated contrast for CT scan prior to result of laboratory studies OR despite known poor GFR due to possibility of life and/or limb threatening pathology.    I acknowledge the risks and benefits of emergently proceeding with contrast administration including that, at present, it is the position of the [employer] that contrast induced nephropathy (JENIFER) is a rare but possible consequence. At this time the benefits of proceeding with contrast administration outweigh the risks.    Attempts will be made to mitigate possible JENIFER risk with IV fluid hydration if able.    Alee Marcelino, DO  Emergency Medicine

## 2024-09-30 NOTE — ED TRIAGE NOTES
Pt presents to ED with c/o defibrillator occasionally going off. Pt denies chest pain but endorses SOB. Pt is on thinners, though states he forgets to take meds labeled to be taken BID and only takes morning dose. Pt brought back to room 3 with providers at bedside. Initially tachypnic upon arrival to room 3 though was put on NC. 2 IV sites obtained. EKG 1645.

## 2024-09-30 NOTE — ED PROVIDER NOTES
History of Present Illness     History provided by: Patient  Limitations to History: None  External Records Reviewed with Brief Summary: I reviewed the patient's discharge summary from 9/12/2024.  Patient was admitted with repeated episodes of V-fib.    HPI:  Leonel Yu is a 54 y.o. 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  Patient states that he recalls approximately 3 episodes of his ICD shocking him last night in addition to 1 episode this morning.  Denies any chest pain.  Denies any shortness of breath.  States that he is on a blood thinner however cannot recall the name.  Is on 2 L to 3 L nasal cannula at home.  Is not compliant with the rest of his medications.  States his main concern is the abdominal pain that he is experiencing.  No other associate signs or symptoms report at this time.    Physical Exam   Triage vitals:  T 36.2 °C (97.1 °F)  HR 73  BP (!) 102/91  RR (!) 30  O2 98 % None (Room air)    Physical Exam  Constitutional:       Appearance: He is ill-appearing and diaphoretic.   HENT:      Head: Normocephalic.      Mouth/Throat:      Comments: Poor dentition  Cardiovascular:      Rate and Rhythm: Normal rate and regular rhythm.   Pulmonary:      Effort: Pulmonary effort is normal. No respiratory distress.      Breath sounds: Normal breath sounds.   Abdominal:      General: Abdomen is flat. There is no distension.      Palpations: Abdomen is soft.      Tenderness: There is no abdominal tenderness. There is no guarding or rebound.   Musculoskeletal:      Right lower leg: Edema present.      Left lower leg: Edema present.   Skin:     General: Skin is warm.    Neurological:      General: No focal deficit present.      Mental Status: He is alert and oriented to person, place, and time.          Medical Decision Making & ED Course   Medical Decision Makin y.o. male with the above past medical history presenting to the emergency department as a critical level 2 with concern for his ICD going off multiple times over the last 24 to 48 hours.  Upon my initial examination the patient was diaphoretic, we did draw labs his initial VBG showed concern for his potassium being greater than 10 in addition to his calcium being slightly low.  He was given calcium gluconate while in the critical room while we are pending the rest of his labs.  The patient remained hemodynamically stable otherwise.  His initial concern that brought him into the hospital was the fact that he was having intermittent abdominal pain that he described as a sensation where someone was sitting on his abdomen was not currently having that pain during our examination however he states when he was walking into the emergency department he was experiencing the pain.  Further history was gathered from the patient during reevaluation when the patient was brought back to the room and was feeling a little bit better.  Patient CMP showed a potassium of 4.2, glucose of 407 that was still elevated, his creatinine was 2.11 and BUN of 30.  His creatinine is elevated compared to his creatinine when he was in the hospital approximately 2 weeks ago.  It was difficult to Doppler pulses on the patient's bilateral lower extremity due to significant edema that he had in addition to the patient's abdominal pain we did do a CT scan to evaluate for PE in addition to getting a CT with IV contrast of his abdomen pelvis which not show any concerning findings.  Additionally electrophysiology came down to interrogate the patient's device, the patient has a Kaai device.  Per the device report there was no abnormal  findings or shocks that were delivered since September 5.  The patient was signed out to the oncoming provider pending patient's troponin, UA and final disposition.  Patient's BNP was greater than 5000 will likely be admitted for diuresis.  Patient signed out to oncoming provider in stable condition.  ----    Differential diagnoses considered include but are not limited to: Fluid overload, electrolyte abnormalities, ICD failure     Social Determinants of Health which Significantly Impact Care: None identified     EKG Independent Interpretation: See ED Course    Independent Result Review and Interpretation: See ED course    Chronic conditions affecting the patient's care: See HPI    The patient was discussed with the following consultants/services:     Care Considerations: See MDM    ED Course:  Diagnoses as of 10/01/24 0719   Acute on chronic congestive heart failure, unspecified heart failure type (Multi)     Disposition   Patient signed out to Dr. Nielson pending further work up and final disposition. Patient was signed out at 1900 in stable condition.    Seen and discussed with ED Attending  Roya Zamora DO, PGY-2  Emergency Medicine     Roya Zamora DO  Resident  10/01/24 0751       Roya Zamora DO  Resident  10/01/24 [id number]

## 2024-10-01 LAB
ALBUMIN SERPL BCP-MCNC: 3.6 G/DL (ref 3.4–5)
AMPHETAMINES UR QL SCN: ABNORMAL
ANION GAP SERPL CALC-SCNC: 15 MMOL/L (ref 10–20)
BARBITURATES UR QL SCN: ABNORMAL
BENZODIAZ UR QL SCN: ABNORMAL
BUN SERPL-MCNC: 29 MG/DL (ref 6–23)
BZE UR QL SCN: ABNORMAL
CALCIUM SERPL-MCNC: 9.1 MG/DL (ref 8.6–10.6)
CANNABINOIDS UR QL SCN: ABNORMAL
CARDIAC TROPONIN I PNL SERPL HS: 54 NG/L (ref 0–53)
CHLORIDE SERPL-SCNC: 101 MMOL/L (ref 98–107)
CO2 SERPL-SCNC: 26 MMOL/L (ref 21–32)
CREAT SERPL-MCNC: 1.72 MG/DL (ref 0.5–1.3)
EGFRCR SERPLBLD CKD-EPI 2021: 46 ML/MIN/1.73M*2
ERYTHROCYTE [DISTWIDTH] IN BLOOD BY AUTOMATED COUNT: 15.9 % (ref 11.5–14.5)
FENTANYL+NORFENTANYL UR QL SCN: ABNORMAL
GLUCOSE BLD MANUAL STRIP-MCNC: 112 MG/DL (ref 74–99)
GLUCOSE BLD MANUAL STRIP-MCNC: 124 MG/DL (ref 74–99)
GLUCOSE BLD MANUAL STRIP-MCNC: 202 MG/DL (ref 74–99)
GLUCOSE BLD MANUAL STRIP-MCNC: 282 MG/DL (ref 74–99)
GLUCOSE SERPL-MCNC: 119 MG/DL (ref 74–99)
HCT VFR BLD AUTO: 51.9 % (ref 41–52)
HGB BLD-MCNC: 17.5 G/DL (ref 13.5–17.5)
HOLD SPECIMEN: NORMAL
MAGNESIUM SERPL-MCNC: 2.29 MG/DL (ref 1.6–2.4)
MCH RBC QN AUTO: 30.7 PG (ref 26–34)
MCHC RBC AUTO-ENTMCNC: 33.7 G/DL (ref 32–36)
MCV RBC AUTO: 91 FL (ref 80–100)
METHADONE UR QL SCN: ABNORMAL
NRBC BLD-RTO: 0 /100 WBCS (ref 0–0)
OPIATES UR QL SCN: ABNORMAL
OXYCODONE+OXYMORPHONE UR QL SCN: ABNORMAL
PCP UR QL SCN: ABNORMAL
PHOSPHATE SERPL-MCNC: 4 MG/DL (ref 2.5–4.9)
PLATELET # BLD AUTO: 162 X10*3/UL (ref 150–450)
POTASSIUM SERPL-SCNC: 3.9 MMOL/L (ref 3.5–5.3)
RBC # BLD AUTO: 5.7 X10*6/UL (ref 4.5–5.9)
SODIUM SERPL-SCNC: 138 MMOL/L (ref 136–145)
WBC # BLD AUTO: 7.1 X10*3/UL (ref 4.4–11.3)

## 2024-10-01 PROCEDURE — 85027 COMPLETE CBC AUTOMATED: CPT | Performed by: PHYSICIAN ASSISTANT

## 2024-10-01 PROCEDURE — 2500000001 HC RX 250 WO HCPCS SELF ADMINISTERED DRUGS (ALT 637 FOR MEDICARE OP): Performed by: PHYSICIAN ASSISTANT

## 2024-10-01 PROCEDURE — 82947 ASSAY GLUCOSE BLOOD QUANT: CPT

## 2024-10-01 PROCEDURE — 94640 AIRWAY INHALATION TREATMENT: CPT

## 2024-10-01 PROCEDURE — 99254 IP/OBS CNSLTJ NEW/EST MOD 60: CPT | Performed by: INTERNAL MEDICINE

## 2024-10-01 PROCEDURE — 1200000002 HC GENERAL ROOM WITH TELEMETRY DAILY

## 2024-10-01 PROCEDURE — 2500000004 HC RX 250 GENERAL PHARMACY W/ HCPCS (ALT 636 FOR OP/ED): Performed by: PHYSICIAN ASSISTANT

## 2024-10-01 PROCEDURE — 80069 RENAL FUNCTION PANEL: CPT | Performed by: PHYSICIAN ASSISTANT

## 2024-10-01 PROCEDURE — 99223 1ST HOSP IP/OBS HIGH 75: CPT | Performed by: STUDENT IN AN ORGANIZED HEALTH CARE EDUCATION/TRAINING PROGRAM

## 2024-10-01 PROCEDURE — 84100 ASSAY OF PHOSPHORUS: CPT | Performed by: PHYSICIAN ASSISTANT

## 2024-10-01 PROCEDURE — 36415 COLL VENOUS BLD VENIPUNCTURE: CPT | Performed by: PHYSICIAN ASSISTANT

## 2024-10-01 PROCEDURE — 99497 ADVNCD CARE PLAN 30 MIN: CPT | Performed by: INTERNAL MEDICINE

## 2024-10-01 PROCEDURE — 2500000002 HC RX 250 W HCPCS SELF ADMINISTERED DRUGS (ALT 637 FOR MEDICARE OP, ALT 636 FOR OP/ED): Performed by: PHYSICIAN ASSISTANT

## 2024-10-01 PROCEDURE — 84484 ASSAY OF TROPONIN QUANT: CPT | Performed by: PHYSICIAN ASSISTANT

## 2024-10-01 PROCEDURE — 83735 ASSAY OF MAGNESIUM: CPT | Performed by: PHYSICIAN ASSISTANT

## 2024-10-01 RX ORDER — ENOXAPARIN SODIUM 100 MG/ML
40 INJECTION SUBCUTANEOUS EVERY 24 HOURS
Status: DISCONTINUED | OUTPATIENT
Start: 2024-10-01 | End: 2024-10-05 | Stop reason: HOSPADM

## 2024-10-01 RX ORDER — NAPROXEN SODIUM 220 MG/1
81 TABLET, FILM COATED ORAL
Status: DISCONTINUED | OUTPATIENT
Start: 2024-10-01 | End: 2024-10-05 | Stop reason: HOSPADM

## 2024-10-01 RX ORDER — ACETAMINOPHEN 160 MG/5ML
650 SOLUTION ORAL EVERY 4 HOURS PRN
Status: DISCONTINUED | OUTPATIENT
Start: 2024-10-01 | End: 2024-10-05 | Stop reason: HOSPADM

## 2024-10-01 RX ORDER — CARVEDILOL 3.12 MG/1
3.12 TABLET ORAL 2 TIMES DAILY
Status: DISCONTINUED | OUTPATIENT
Start: 2024-10-01 | End: 2024-10-05 | Stop reason: HOSPADM

## 2024-10-01 RX ORDER — TALC
3 POWDER (GRAM) TOPICAL NIGHTLY PRN
Status: DISCONTINUED | OUTPATIENT
Start: 2024-10-01 | End: 2024-10-05 | Stop reason: HOSPADM

## 2024-10-01 RX ORDER — FUROSEMIDE 10 MG/ML
80 INJECTION INTRAMUSCULAR; INTRAVENOUS ONCE
Status: COMPLETED | OUTPATIENT
Start: 2024-10-01 | End: 2024-10-01

## 2024-10-01 RX ORDER — SENNOSIDES 8.6 MG/1
2 TABLET ORAL 2 TIMES DAILY
Status: DISCONTINUED | OUTPATIENT
Start: 2024-10-01 | End: 2024-10-05 | Stop reason: HOSPADM

## 2024-10-01 RX ORDER — PROPOFOL 10 MG/ML
INJECTION, EMULSION INTRAVENOUS CODE/TRAUMA/SEDATION MEDICATION
Status: COMPLETED | OUTPATIENT
Start: 2024-10-01 | End: 2024-10-04

## 2024-10-01 RX ORDER — FUROSEMIDE 20 MG/1
40 TABLET ORAL DAILY
Status: DISCONTINUED | OUTPATIENT
Start: 2024-10-01 | End: 2024-10-05 | Stop reason: HOSPADM

## 2024-10-01 RX ORDER — ACETAMINOPHEN 650 MG/1
650 SUPPOSITORY RECTAL EVERY 4 HOURS PRN
Status: DISCONTINUED | OUTPATIENT
Start: 2024-10-01 | End: 2024-10-05 | Stop reason: HOSPADM

## 2024-10-01 RX ORDER — INSULIN LISPRO 100 [IU]/ML
0-10 INJECTION, SOLUTION INTRAVENOUS; SUBCUTANEOUS
Status: DISCONTINUED | OUTPATIENT
Start: 2024-10-01 | End: 2024-10-05 | Stop reason: HOSPADM

## 2024-10-01 RX ORDER — SERTRALINE HYDROCHLORIDE 100 MG/1
100 TABLET, FILM COATED ORAL DAILY
Status: DISCONTINUED | OUTPATIENT
Start: 2024-10-01 | End: 2024-10-05 | Stop reason: HOSPADM

## 2024-10-01 RX ORDER — FUROSEMIDE 10 MG/ML
40 INJECTION INTRAMUSCULAR; INTRAVENOUS ONCE
Status: DISCONTINUED | OUTPATIENT
Start: 2024-10-01 | End: 2024-10-01

## 2024-10-01 RX ORDER — SPIRONOLACTONE 25 MG/1
12.5 TABLET ORAL DAILY
Status: DISCONTINUED | OUTPATIENT
Start: 2024-10-01 | End: 2024-10-05 | Stop reason: HOSPADM

## 2024-10-01 RX ORDER — AMIODARONE HYDROCHLORIDE 200 MG/1
400 TABLET ORAL DAILY
Status: DISCONTINUED | OUTPATIENT
Start: 2024-10-01 | End: 2024-10-04

## 2024-10-01 RX ORDER — INSULIN GLARGINE 100 [IU]/ML
30 INJECTION, SOLUTION SUBCUTANEOUS NIGHTLY
Status: DISCONTINUED | OUTPATIENT
Start: 2024-10-01 | End: 2024-10-05 | Stop reason: HOSPADM

## 2024-10-01 RX ORDER — ATORVASTATIN CALCIUM 80 MG/1
80 TABLET, FILM COATED ORAL NIGHTLY
Status: DISCONTINUED | OUTPATIENT
Start: 2024-10-01 | End: 2024-10-05 | Stop reason: HOSPADM

## 2024-10-01 RX ORDER — ACETAMINOPHEN 325 MG/1
650 TABLET ORAL EVERY 4 HOURS PRN
Status: DISCONTINUED | OUTPATIENT
Start: 2024-10-01 | End: 2024-10-05 | Stop reason: HOSPADM

## 2024-10-01 SDOH — ECONOMIC STABILITY: FOOD INSECURITY: WITHIN THE PAST 12 MONTHS, THE FOOD YOU BOUGHT JUST DIDN'T LAST AND YOU DIDN'T HAVE MONEY TO GET MORE.: NEVER TRUE

## 2024-10-01 SDOH — SOCIAL STABILITY: SOCIAL INSECURITY: WITHIN THE LAST YEAR, HAVE YOU BEEN AFRAID OF YOUR PARTNER OR EX-PARTNER?: NO

## 2024-10-01 SDOH — ECONOMIC STABILITY: HOUSING INSECURITY: IN THE PAST 12 MONTHS, HOW MANY TIMES HAVE YOU MOVED WHERE YOU WERE LIVING?: 1

## 2024-10-01 SDOH — ECONOMIC STABILITY: HOUSING INSECURITY: AT ANY TIME IN THE PAST 12 MONTHS, WERE YOU HOMELESS OR LIVING IN A SHELTER (INCLUDING NOW)?: NO

## 2024-10-01 SDOH — ECONOMIC STABILITY: INCOME INSECURITY: HOW HARD IS IT FOR YOU TO PAY FOR THE VERY BASICS LIKE FOOD, HOUSING, MEDICAL CARE, AND HEATING?: NOT HARD AT ALL

## 2024-10-01 SDOH — SOCIAL STABILITY: SOCIAL INSECURITY: HAVE YOU HAD THOUGHTS OF HARMING ANYONE ELSE?: NO

## 2024-10-01 SDOH — SOCIAL STABILITY: SOCIAL INSECURITY: HAVE YOU HAD ANY THOUGHTS OF HARMING ANYONE ELSE?: NO

## 2024-10-01 SDOH — ECONOMIC STABILITY: INCOME INSECURITY: IN THE LAST 12 MONTHS, WAS THERE A TIME WHEN YOU WERE NOT ABLE TO PAY THE MORTGAGE OR RENT ON TIME?: NO

## 2024-10-01 SDOH — SOCIAL STABILITY: SOCIAL INSECURITY: ARE YOU OR HAVE YOU BEEN THREATENED OR ABUSED PHYSICALLY, EMOTIONALLY, OR SEXUALLY BY ANYONE?: NO

## 2024-10-01 SDOH — ECONOMIC STABILITY: INCOME INSECURITY: IN THE PAST 12 MONTHS HAS THE ELECTRIC, GAS, OIL, OR WATER COMPANY THREATENED TO SHUT OFF SERVICES IN YOUR HOME?: NO

## 2024-10-01 SDOH — SOCIAL STABILITY: SOCIAL INSECURITY: ABUSE: ADULT

## 2024-10-01 SDOH — SOCIAL STABILITY: SOCIAL INSECURITY: DO YOU FEEL ANYONE HAS EXPLOITED OR TAKEN ADVANTAGE OF YOU FINANCIALLY OR OF YOUR PERSONAL PROPERTY?: NO

## 2024-10-01 SDOH — ECONOMIC STABILITY: HOUSING INSECURITY: IN THE LAST 12 MONTHS, WAS THERE A TIME WHEN YOU WERE NOT ABLE TO PAY THE MORTGAGE OR RENT ON TIME?: NO

## 2024-10-01 SDOH — SOCIAL STABILITY: SOCIAL INSECURITY
WITHIN THE LAST YEAR, HAVE YOU BEEN RAPED OR FORCED TO HAVE ANY KIND OF SEXUAL ACTIVITY BY YOUR PARTNER OR EX-PARTNER?: NO

## 2024-10-01 SDOH — ECONOMIC STABILITY: TRANSPORTATION INSECURITY: IN THE PAST 12 MONTHS, HAS LACK OF TRANSPORTATION KEPT YOU FROM MEDICAL APPOINTMENTS OR FROM GETTING MEDICATIONS?: NO

## 2024-10-01 SDOH — SOCIAL STABILITY: SOCIAL INSECURITY: WITHIN THE LAST YEAR, HAVE YOU BEEN HUMILIATED OR EMOTIONALLY ABUSED IN OTHER WAYS BY YOUR PARTNER OR EX-PARTNER?: NO

## 2024-10-01 SDOH — ECONOMIC STABILITY: FOOD INSECURITY: WITHIN THE PAST 12 MONTHS, YOU WORRIED THAT YOUR FOOD WOULD RUN OUT BEFORE YOU GOT MONEY TO BUY MORE.: NEVER TRUE

## 2024-10-01 SDOH — ECONOMIC STABILITY: FOOD INSECURITY: HOW HARD IS IT FOR YOU TO PAY FOR THE VERY BASICS LIKE FOOD, HOUSING, MEDICAL CARE, AND HEATING?: NOT HARD AT ALL

## 2024-10-01 SDOH — ECONOMIC STABILITY: TRANSPORTATION INSECURITY
IN THE PAST 12 MONTHS, HAS THE LACK OF TRANSPORTATION KEPT YOU FROM MEDICAL APPOINTMENTS OR FROM GETTING MEDICATIONS?: NO

## 2024-10-01 SDOH — ECONOMIC STABILITY: INCOME INSECURITY: IN THE PAST 12 MONTHS, HAS THE ELECTRIC, GAS, OIL, OR WATER COMPANY THREATENED TO SHUT OFF SERVICE IN YOUR HOME?: NO

## 2024-10-01 SDOH — SOCIAL STABILITY: SOCIAL INSECURITY: ARE THERE ANY APPARENT SIGNS OF INJURIES/BEHAVIORS THAT COULD BE RELATED TO ABUSE/NEGLECT?: NO

## 2024-10-01 SDOH — ECONOMIC STABILITY: TRANSPORTATION INSECURITY
IN THE PAST 12 MONTHS, HAS LACK OF TRANSPORTATION KEPT YOU FROM MEETINGS, WORK, OR FROM GETTING THINGS NEEDED FOR DAILY LIVING?: NO

## 2024-10-01 SDOH — ECONOMIC STABILITY: FOOD INSECURITY: WITHIN THE PAST 12 MONTHS, YOU WORRIED THAT YOUR FOOD WOULD RUN OUT BEFORE YOU GOT THE MONEY TO BUY MORE.: NEVER TRUE

## 2024-10-01 SDOH — SOCIAL STABILITY: SOCIAL INSECURITY: DOES ANYONE TRY TO KEEP YOU FROM HAVING/CONTACTING OTHER FRIENDS OR DOING THINGS OUTSIDE YOUR HOME?: NO

## 2024-10-01 SDOH — SOCIAL STABILITY: SOCIAL INSECURITY: HAS ANYONE EVER THREATENED TO HURT YOUR FAMILY OR YOUR PETS?: NO

## 2024-10-01 ASSESSMENT — COGNITIVE AND FUNCTIONAL STATUS - GENERAL
DAILY ACTIVITIY SCORE: 24
PATIENT BASELINE BEDBOUND: NO
MOBILITY SCORE: 24

## 2024-10-01 ASSESSMENT — ACTIVITIES OF DAILY LIVING (ADL)
WALKS IN HOME: INDEPENDENT
ADEQUATE_TO_COMPLETE_ADL: YES
BATHING: INDEPENDENT
PATIENT'S MEMORY ADEQUATE TO SAFELY COMPLETE DAILY ACTIVITIES?: YES
TOILETING: INDEPENDENT
GROOMING: INDEPENDENT
HEARING - LEFT EAR: FUNCTIONAL
JUDGMENT_ADEQUATE_SAFELY_COMPLETE_DAILY_ACTIVITIES: YES
DRESSING YOURSELF: INDEPENDENT
LACK_OF_TRANSPORTATION: NO
LACK_OF_TRANSPORTATION: NO
FEEDING YOURSELF: INDEPENDENT
HEARING - RIGHT EAR: FUNCTIONAL

## 2024-10-01 ASSESSMENT — ENCOUNTER SYMPTOMS
CHILLS: 0
RHINORRHEA: 0
FATIGUE: 1
ABDOMINAL PAIN: 1
SHORTNESS OF BREATH: 1
DIZZINESS: 0
MYALGIAS: 0
DIFFICULTY URINATING: 0

## 2024-10-01 ASSESSMENT — PAIN SCALES - GENERAL
PAINLEVEL_OUTOF10: 0 - NO PAIN
PAINLEVEL_OUTOF10: 0 - NO PAIN

## 2024-10-01 ASSESSMENT — LIFESTYLE VARIABLES
SKIP TO QUESTIONS 9-10: 1
HOW OFTEN DO YOU HAVE 6 OR MORE DRINKS ON ONE OCCASION: NEVER
AUDIT-C TOTAL SCORE: 0
HOW MANY STANDARD DRINKS CONTAINING ALCOHOL DO YOU HAVE ON A TYPICAL DAY: PATIENT DOES NOT DRINK
HOW OFTEN DO YOU HAVE A DRINK CONTAINING ALCOHOL: NEVER
AUDIT-C TOTAL SCORE: 0

## 2024-10-01 ASSESSMENT — PAIN - FUNCTIONAL ASSESSMENT: PAIN_FUNCTIONAL_ASSESSMENT: 0-10

## 2024-10-01 NOTE — PROGRESS NOTES
Pharmacy Medication History Review    Leonel Yu is a 54 y.o. male admitted for Acute on chronic congestive heart failure, unspecified heart failure type. Pharmacy reviewed the patient's [id number] medications and allergies for accuracy.    Medications ADDED:  None   Medications CHANGED:  None   Medications REMOVED:   None      The list below reflects the updated PTA list.   Prior to Admission Medications   Prescriptions Last Dose Informant   amiodarone (Pacerone) 200 mg tablet Unknown Self   Sig: Take 2 tablets (400 mg) by mouth 3 times a day for 4 days, THEN 2 tablets (400 mg) once daily.   aspirin 81 mg chewable tablet Unknown Self   Sig: Chew 1 tablet (81 mg) once daily.   atorvastatin (Lipitor) 80 mg tablet Unknown Self   Sig: Take 1 tablet (80 mg) by mouth once daily at bedtime.   blood sugar diagnostic strip Unknown Self   Sig: Use as directed to test blood glucose up to four times daily and as needed.   blood-glucose meter misc Unknown Self   Sig: Inject 1 each under the skin 4 times a day with meals. Check blood glucose 4 times daily, before meals and at bedtime.   carvedilol (Coreg) 3.125 mg tablet Unknown Self   Sig: Take 1 tablet (3.125 mg) by mouth 2 times a day.   empagliflozin (Jardiance) 10 mg Unknown Self   Sig: TAKE 1 TABLET BY MOUTH ONCE DAILY   furosemide (Lasix) 40 mg tablet Unknown Self   Sig: Take 1 tablet (40 mg) by mouth once daily.   glucose 4 gram chewable tablet Unknown Self   Sig: Chew 2 tablets (8 g) if needed for low blood sugar - see comments.   insulin glargine (Lantus) 100 unit/mL (3 mL) pen Unknown at patient denies this medication Self   Sig: Inject 30 Units under the skin once daily at bedtime.   insulin lispro (HumaLOG) 100 unit/mL injection Unknown Self   Sig: Inject 0-10 Units under the skin 3 times daily (morning, midday, late afternoon). Hypoglycemia protocol if Blood Glucose is between 0 - 70 mg/dL, 0 unit(s) if , 2 unit(s) if 151-200, 4 unit(s) if  "201-250, 6 unit(s) if 251-300, 8 unit(s) if 301-350, 10 unit(s) if 351-400. Notify provider unit(s) if Blood Glucose is greater than 400 mg/dL   lancets 33 gauge misc Unknown Self   Sig: Inject 1 each under the skin 4 times a day.   pen needle 1/2\" 29G X 12mm needle Unknown Self   Sig: Use to inject 1-4 times daily as directed.   sacubitriL-valsartan (Entresto) 24-26 mg tablet Unknown Self   Sig: Take 0.5 tablets by mouth 2 times a day.   sertraline (Zoloft) 100 mg tablet Unknown Self   Sig: Take 1 tablet (100 mg) by mouth once daily.   spironolactone (Aldactone) 25 mg tablet Unknown Self   Sig: Take 0.5 tablets (12.5 mg) by mouth once daily.   tiotropium (Spiriva Respimat) 2.5 mcg/actuation inhaler Unknown Self   Sig: Inhale 2 puffs once daily.      Facility-Administered Medications: None        The list below reflects the updated allergy list. Please review each documented allergy for additional clarification and justification.  Allergies  Reviewed by Evan Zuluaga on 9/30/2024   No Known Allergies         Patient accepts M2B at discharge.     Sources:   Epic dispense history   Epic allergy list   OARRS ( NONE RECENT )  Patient interview     Additional Comments:  Patient states that he is unsure of the last dose of home medication therapies - patient knows medication name dose and frequency   Patient denies that medication insulin glargine ( lantus 100 unit/ml 3ml pen ) however this medication has a recent fill on 9/24/2024 ds: 50 qty 15 ml - however there are no notes supporting this statement     OARRS   1/3/2024 GABAPENTIN 300 MG DS: 7 QTY: 14     Evan Zuluaga  Pharmacy Technician  09/30/24     Secure Chat preferred   If no response call [phone] or Gather \"Med Rec\"   "

## 2024-10-01 NOTE — H&P
History Of Present Illness:    Leonel Yu is a 54 y.o. male with PMH of  mixed ischemic and non-ischemic HFrEF 5-10% s/p [employer] single chamber ICD (3/2024), CKD3a, HTN, HLD, LUZ (crack cocaine), former tobacco use, COPD (on nocturnal 2L at baseline), DM2, remote left cerebellar hemorrhagic infarct, medication noncompliance, anxiety, and depression. Presented to ED  with leg swelling, abdominal pain and concern for ICD shocks. Admitted for decompensated HFrEF.     Patient admitted 3 wks ago for  VT/VF with appropriate ICD firing. Was started on amiodarone and underwent diuresis for CHF exacerbation.     Today, he reported being shocked by his ICD multiple times. Starting to have lower leg edema as of 3 days ago. He takes his morning medications regularly but denies taking evening doses on a regular basis. Used cocaine 2 days ago; reports he plans to quit.     Last Recorded Vitals:  Vitals:    10/01/24 1200 10/01/24 1215 10/01/24 1230 10/01/24 1245   BP: 108/85      BP Location:       Patient Position:       Pulse: 66 58  57   Resp: 19   20   Temp:       SpO2: 99% 97% (!) 91% 95%       Last Labs:  CBC - 9/30/2024:  5:01 PM  6.9 16.6 155    48.8      CMP - 9/30/2024:  5:01 PM  8.8 7.3 25 --- 1.0   3.1 3.8 33 82      PTT - 9/30/2024:  5:04 PM  1.2   13.4 33     Troponin I, High Sensitivity   Date/Time Value Ref Range Status   02/12/2024 09:47  () 0 - 53 ng/L Final     Comment:     Previous result verified on 2/12/2024 1836 on specimen/case [id number] called with component TRPHS for procedure Troponin I, High Sensitivity with value 130 ng/L.   02/12/2024 06:11  (HH) 0 - 53 ng/L Final     Comment:     Previous result verified on 2/12/2024 1836 on specimen/case [id number] called with component TRPHS for procedure Troponin I, High Sensitivity with value 130 ng/L.   02/12/2024 12:39  (HH) 0 - 53 ng/L Final     Troponin I, High Sensitivity ([facility])   Date/Time Value Ref Range Status    10/01/2024 10:33 AM 54 (H) 0 - 53 ng/L Final   09/30/2024 08:25 PM 67 (H) 0 - 53 ng/L Final   09/30/2024 06:44 PM 44 0 - 53 ng/L Final     BNP   Date/Time Value Ref Range Status   09/30/2024 05:01 PM >5,000 (H) 0 - 99 pg/mL Final   09/07/2024 12:06 AM 2,585 (H) 0 - 99 pg/mL Final     Hemoglobin A1C   Date/Time Value Ref Range Status   07/30/2024 07:31 AM 8.4 (H) see below % Final   06/20/2024 07:49 AM 6.9 (H) 4.3 - 5.6 % Final     Comment:     American Diabetes Association guidelines indicate that patients with HgbA1c in the range 5.7-6.4% are at increased risk for development of diabetes, and intervention by lifestyle modification may be beneficial. HgbA1c greater or equal to 6.5% is considered diagnostic of diabetes.   01/09/2024 06:53 AM 10.1 (H) 4.3 - 5.6 % Final     Comment:     American Diabetes Association guidelines indicate that patients with HgbA1c in the range 5.7-6.4% are at increased risk for development of diabetes, and intervention by lifestyle modification may be beneficial. HgbA1c greater or equal to 6.5% is considered diagnostic of diabetes.   12/09/2023 08:57 PM 7.6 (H) see below % Final     LDL Calculated   Date/Time Value Ref Range Status   09/07/2024 09:49 AM 72 <=99 mg/dL Final     Comment:                                 Near   Borderline      AGE      Desirable  Optimal    High     High     Very High     0-19 Y     0 - 109     ---    110-129   >/= 130     ----    20-24 Y     0 - 119     ---    120-159   >/= 160     ----      >24 Y     0 -  99   100-129  130-159   160-189     >/=190       VLDL   Date/Time Value Ref Range Status   09/07/2024 09:49 AM 20 0 - 40 mg/dL Final   07/25/2023 08:20 AM 18 0 - 40 mg/dL Final   06/18/2023 06:27 AM 29 0 - 40 mg/dL Final      Past Medical History:  He has a past medical history of CHF (congestive heart failure) (Multi), Chronic kidney disease, COPD (chronic obstructive pulmonary disease) (Multi), Diabetes mellitus (Multi), Hyperlipidemia, Hypertension,  "Stroke (Multi), and Substance abuse (Multi).    Past Surgical History:  He has a past surgical history that includes Insert / replace / remove pacemaker.      Social History:  He reports that he has quit smoking. His smoking use included cigarettes. He has never used smokeless tobacco. He reports current drug use. Drug: \"Crack\" cocaine. He reports that he does not drink alcohol.    Family History:  Family History   Problem Relation Name Age of Onset    Heart disease Father          Allergies:  Patient has no known allergies.    Inpatient Medications:  Scheduled medications   Medication Dose Route Frequency    amiodarone  400 mg oral Daily    aspirin  81 mg oral Daily    atorvastatin  80 mg oral Nightly    carvedilol  3.125 mg oral BID    empagliflozin  10 mg oral Daily    enoxaparin  40 mg subcutaneous q24h    [Held by provider] furosemide  40 mg oral Daily    insulin glargine  30 Units subcutaneous Nightly    insulin lispro  0-10 Units subcutaneous TID    sacubitriL-valsartan  0.5 tablet oral BID    sennosides  2 tablet oral BID    sertraline  100 mg oral Daily    spironolactone  12.5 mg oral Daily    tiotropium  2 puff inhalation Daily     PRN medications   Medication    acetaminophen    Or    acetaminophen    Or    acetaminophen    melatonin    oxygen    propofol     Continuous Medications   Medication Dose Last Rate     Outpatient Medications:  Current Outpatient Medications   Medication Instructions    amiodarone (Pacerone) 200 mg tablet Take 2 tablets (400 mg) by mouth 3 times a day for 4 days, THEN 2 tablets (400 mg) once daily.    aspirin 81 mg, oral, Daily RT    atorvastatin (LIPITOR) 80 mg, oral, Nightly    blood sugar diagnostic strip Use as directed to test blood glucose up to four times daily and as needed.    blood-glucose meter misc 1 each, subcutaneous, 4 times daily with meals and nightly, Check blood glucose 4 times daily, before meals and at bedtime.    carvedilol (COREG) 3.125 mg, oral, 2 times " "daily    empagliflozin (Jardiance) 10 mg TAKE 1 TABLET BY MOUTH ONCE DAILY    furosemide (LASIX) 40 mg, oral, Daily    glucose 8 g, oral, As needed    insulin lispro (HUMALOG) 0-10 Units, subcutaneous, 3 times daily (morning, midday, late afternoon), Hypoglycemia protocol if Blood Glucose is between 0 - 70 mg/dL, 0 unit(s) if , 2 unit(s) if 151-200, 4 unit(s) if 201-250, 6 unit(s) if 251-300, 8 unit(s) if 301-350, 10 unit(s) if 351-400. Notify provider unit(s) if Blood Glucose is greater than 400 mg/dL    lancets 33 gauge misc 1 each, subcutaneous, 4 times daily    Lantus Solostar U-100 Insulin 30 Units, subcutaneous, Nightly    pen needle 1/2\" 29G X 12mm needle Use to inject 1-4 times daily as directed.    sacubitriL-valsartan (Entresto) 24-26 mg tablet 0.5 tablets, oral, 2 times daily    sertraline (ZOLOFT) 100 mg, oral, Daily    spironolactone (ALDACTONE) 12.5 mg, oral, Daily    tiotropium (Spiriva Respimat) 2.5 mcg/actuation inhaler 2 puffs, inhalation, Daily     Review of Systems   Constitutional:  Positive for fatigue. Negative for chills.   HENT:  Negative for rhinorrhea.    Respiratory:  Positive for shortness of breath.    Cardiovascular:  Positive for leg swelling. Negative for chest pain.   Gastrointestinal:  Positive for abdominal pain.   Genitourinary:  Negative for difficulty urinating.   Musculoskeletal:  Negative for myalgias.   Neurological:  Negative for dizziness.       Physical Exam:  General: NAD, tired  Head/ neck: + JVD  Cardiac: RRR, regular S1 S2 , no murmur, no rub, no gallop  Pulm: Quiet lung bases. NC O2 in place.  Vascular: Radial 2+ bilaterally  GI: non distended  Extremities: 1+ bilateral pitting LE edema   Neuro: no focal neuro deficits   Psych: appropriate mood and behavior   Skin: warm and dry        Assessment/Plan   54 y.o. male with a hx mixed ischemic/toxic mediated HFrEF (EF5-10%, fibrosis and transmural infarcts seen on cMRI 8/2024) s/p [employer] single chamber ICD " (3/2024), CKD3a, HTN, HLD, LUZ (crack cocaine), former tobacco use, COPD (nocturnal 2L O2 at baseline), DM2, remote left cerebellar hemorrhagic infarct, medication noncompliance, anxiety, and depression. Admitted for acute decompensated heart failure in setting of medication non compliance.     Acute decompensated mixed ischemic/nonischemic HFrEF 5-10%  s/p single chamber ICD 3/2024  - ECHO 7/31:  EF 10%, gHK of LV, LV severely dilated, severe eccentric LVH, severely enlarged RV with reduced systolic fxn, severe LAE, mod to sev VIVI, mild to mod TR, RVSP 39 mildly elevated  - Cardiac MRI 8/5: ischemic/nonischemic.  Severely dilated LV EF 5-10%. Basal inferoseptal, mid inferoseptal, apical septal, apicolateral segments akinetic. Eccentric LVH. Transmural infarction of mid/apical inferoseptal/inferior segments and apicolateral wall (<25% wall thickeness). NICM pattern of mid wall septal fibrosis.  - Low suspicion for true ACS.   - Troponin flat/mildly elevated    - EKG 10/1: SR 77, left axis dev, LVH, IVCD/LBBB. No acute ischemic changes as compared to prior 9/7/24   - Transmural infarcts on cardiac MRI. Coronary Calcifications on CT chest   - Secondary vascular prevention with aspirin and statin.  - Hypervolemic, needs diuresis   - CXR 9/30 with vascular congestion and cardiomegaly   - BNP >5K   - Admit Wt 79.4kg   - s/p IV lasix 40 in ED   - IV lasix 80 today  - Resume GDMT: as of last DC summary recommendations  - Carvedilol 3.125.  - Entresto 24-26 BIG  - Empagliflozin 10m ronald  - Spironolactone 12.5mg daily  - If pt unwilling to take evening medications, then consolidate to once daily beta blocker (bisoprolol) and ARB (losartan)    - Palliative care consult requested given advanced HF with numerous re-admits and overall poor prognosis since ongoing substance use precludes him from advanced HF therapy candidacy    Ventriculary Tachycardia/ VF   Hx of possible AF RVR   - Device interrogation 10/1:   - Last shock  "9/5 appropriate for VT ( last admission).   -12yr battery life.   -Lead fxn WNL.   -  <1%.   - Patient without any shocks or tachyarrhythmias since amiodarone loading   - For the epsiode  9/5 prior EP consult noted:\" Possible dual tachycardia with PAF  RVR and concurrent VT/VF in the setting of active cocaine use.\" EP did not feel right atrial lead placement  deemed appropriate in light of ongoing cocaine use and increased risk of infection.NO OAC recommended.  - Continue amiodarone 400mg daily and coreg 3.125mg BID    COPD  Former tobacco user  - 2L at night as baseline     Substance use disorder, cocaine  - Cessation advised  - Urine tox cocaine positive    Type II Diabetes  - Insulin glargine 30unit nightly  - SSI #2 while here    Mood disorder, unspecified  - zoloft 100mg daily    D\VT ppx: subcutaneous lovenox  DISPO: pending diuresis  Code Status: DNR    I spent 60 minutes in the professional and overall care of this patient.  Patient seen and discussed with Dr. Mayco Skinner.    Vicky Harkins PA-C  "

## 2024-10-01 NOTE — H&P
"History Of Present Illness  Leonel Yu is a 54 y.o. 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     Patient recently admitted 3 weeks ago for repeated episodes of VT/VF with shocks from ICD, patient was started on amiodarone and underwent diuresis for CHF exacerbation.    Currently, patient reports being shocked by his ICD multiple times. However, ICD was interrogated in ED and no shocks or tachyarrhythmias were reported (last arrhythmia from 9/5).  Patient underwent CTA lung that ruled out PE and CT of abdomen without any pathological findings aside from emphysematous lung and stable nodule in RML.   Pt is on thinners, though states he forgets to take meds labeled to be taken BID and only takes morning dose.      Past Medical History  Past Medical History:   Diagnosis Date    CHF (congestive heart failure) (Multi)     Chronic kidney disease     COPD (chronic obstructive pulmonary disease) (Multi)     Diabetes mellitus (Multi)     Hyperlipidemia     Hypertension     Stroke (Multi)     Substance abuse (Multi)        Surgical History  Past Surgical History:   Procedure Laterality Date    INSERT / REPLACE / REMOVE PACEMAKER          Social History  He reports that he has quit smoking. His smoking use included cigarettes. He has never used smokeless tobacco. He reports current drug use. Drug: \"Crack\" cocaine. He reports that he does not drink alcohol.    Family History  Family History   Problem Relation Name Age of Onset    Heart disease Father          Allergies  Patient has no known " allergies.    Review of Systems     Physical Exam     Last Recorded Vitals  Blood pressure (S) (!) 138/99, pulse 73, temperature 36.2 °C (97.1 °F), resp. rate 18, SpO2 97%.    Relevant Results  {If you would like to pull in Medications, type .meds     If you would like to pull in Lab results for the last 24 hours, type .[id number]    If you would like to pull in Imaging results, type .imgrslt :99}      Last Labs:  CBC - 9/30/2024:  5:01 PM  6.9 16.6 155    48.8      BMP  136  103  30                  ----------------<407     4.2  23  2.11     CMP - 9/30/2024:  5:01 PM  8.8 7.3 25 --- 1.0   3.1 3.8 33 82      PTT - 9/30/2024:  5:04 PM  1.2   13.4 33     Troponin I, High Sensitivity   Date/Time Value Ref Range Status   02/12/2024 09:47  (HH) 0 - 53 ng/L Final     Comment:     Previous result verified on 2/12/2024 1836 on specimen/case [id number] called with component [facility] for procedure Troponin I, High Sensitivity with value 130 ng/L.     Troponin I, High Sensitivity ([facility])   Date/Time Value Ref Range Status   09/30/2024 08:25 PM 67 (H) 0 - 53 ng/L Final     BNP   Date/Time Value Ref Range Status   09/30/2024 05:01 PM >5,000 (H) 0 - 99 pg/mL Final      Current Outpatient Medications   Medication Instructions    amiodarone (Pacerone) 200 mg tablet Take 2 tablets (400 mg) by mouth 3 times a day for 4 days, THEN 2 tablets (400 mg) once daily.    aspirin 81 mg, oral, Daily RT    atorvastatin (LIPITOR) 80 mg, oral, Nightly    blood sugar diagnostic strip Use as directed to test blood glucose up to four times daily and as needed.    blood-glucose meter misc 1 each, subcutaneous, 4 times daily with meals and nightly, Check blood glucose 4 times daily, before meals and at bedtime.    carvedilol (COREG) 3.125 mg, oral, 2 times daily    empagliflozin (Jardiance) 10 mg TAKE 1 TABLET BY MOUTH ONCE DAILY    furosemide (LASIX) 40 mg, oral, Daily    glucose 8 g, oral, As needed    insulin lispro (HUMALOG) 0-10 Units,  "subcutaneous, 3 times daily (morning, midday, late afternoon), Hypoglycemia protocol if Blood Glucose is between 0 - 70 mg/dL, 0 unit(s) if , 2 unit(s) if 151-200, 4 unit(s) if 201-250, 6 unit(s) if 251-300, 8 unit(s) if 301-350, 10 unit(s) if 351-400. Notify provider unit(s) if Blood Glucose is greater than 400 mg/dL    lancets 33 gauge misc 1 each, subcutaneous, 4 times daily    Lantus Solostar U-100 Insulin 30 Units, subcutaneous, Nightly    pen needle 1/2\" 29G X 12mm needle Use to inject 1-4 times daily as directed.    sacubitriL-valsartan (Entresto) 24-26 mg tablet 0.5 tablets, oral, 2 times daily    sertraline (ZOLOFT) 100 mg, oral, Daily    spironolactone (ALDACTONE) 12.5 mg, oral, Daily    tiotropium (Spiriva Respimat) 2.5 mcg/actuation inhaler 2 puffs, inhalation, Daily       CT abdomen pelvis w IV contrast    Result Date: 9/30/2024  STUDY: CT Angiogram of the Chest, CT Abdomen and Pelvis with IV Contrast; 09/30/2024, 5:38 PM. INDICATION: Generalized abdominal pain. Flank pain. Concern for pulmonary embolism. COMPARISON: CXR: 09/30/24, 09/07/24, 07/30/24, 02/12/24; CTA chest: 10/23/23; CT AP: 10/23/23. ACCESSION NUMBER(S): [id number], [id number] ORDERING CLINICIAN: NIKOLAY MORGAN TECHNIQUE:  CTA of the chest was performed following rapid injection of intravenous contrast.  Images are reviewed and processed at a workstation according to the CT angiogram protocol with 3-D and/or MIP post processing imaging generated.  CT of the abdomen and pelvis was performed with intravenous contrast.  Omnipaque 350 80 mL was administered intravenously; positive oral contrast was given. Automated mA/kV exposure control was utilized and patient examination was performed in strict accordance with principles of ALARA. FINDINGS:  CTA CHEST: Pulmonary arteries are adequately opacified without acute or chronic filling defects.  The thoracic aorta is normal in course and caliber without dissection or aneurysm. The heart is " enlarged.  Thoracic lymph nodes are not enlarged. The lungs are emphysematous. There are interstitial infiltrates in both lung bases most likely representing atelectasis.  There is a nodule laterally in the right middle lobe unchanged from 2023. ABDOMEN:  LIVER: No hepatomegaly.  Smooth surface contour.  Normal attenuation.  BILE DUCTS: No intrahepatic or extrahepatic biliary ductal dilatation.  GALLBLADDER: The gallbladder is present.  STOMACH: No abnormalities identified.  PANCREAS: No masses or ductal dilatation.  SPLEEN: No splenomegaly or focal splenic lesion.  ADRENAL GLANDS: No thickening or nodules.  KIDNEYS AND URETERS: Kidneys are normal in size and location.  No renal or ureteral calculi.  PELVIS:  BLADDER: No abnormalities identified.  REPRODUCTIVE ORGANS: No abnormalities identified.  BOWEL: No abnormalities identified.  The appendix is identified and is normal.  VESSELS: No abnormalities identified.  Abdominal aorta is normal in caliber.  PERITONEUM/RETROPERITONEUM/LYMPH NODES: No free fluid.  No pneumoperitoneum. No lymphadenopathy.  ABDOMINAL WALL: No abnormalities identified. SOFT TISSUES: No abnormalities identified.  BONES: There are degenerative changes of lumbosacral spine with vacuum discs.  No acute fracture or aggressive osseous lesion.    No evidence of pulmonary embolism. Cardiomegaly with emphysematous changes in the lungs. Stable nodule in the right middle lobe. No acute process. Signed by Stanislaw Elizondo MD    CT angio chest for pulmonary embolism    Result Date: 9/30/2024  STUDY: CT Angiogram of the Chest, CT Abdomen and Pelvis with IV Contrast; 09/30/2024, 5:38 PM. INDICATION: Generalized abdominal pain. Flank pain. Concern for pulmonary embolism. COMPARISON: CXR: 09/30/24, 09/07/24, 07/30/24, 02/12/24; CTA chest: 10/23/23; CT AP: 10/23/23. ACCESSION NUMBER(S): [id number], [id number] ORDERING CLINICIAN: NIKOLAY MORGAN TECHNIQUE:  CTA of the chest was performed following rapid injection  of intravenous contrast.  Images are reviewed and processed at a workstation according to the CT angiogram protocol with 3-D and/or MIP post processing imaging generated.  CT of the abdomen and pelvis was performed with intravenous contrast.  Omnipaque 350 80 mL was administered intravenously; positive oral contrast was given. Automated mA/kV exposure control was utilized and patient examination was performed in strict accordance with principles of ALARA. FINDINGS:  CTA CHEST: Pulmonary arteries are adequately opacified without acute or chronic filling defects.  The thoracic aorta is normal in course and caliber without dissection or aneurysm. The heart is enlarged.  Thoracic lymph nodes are not enlarged. The lungs are emphysematous. There are interstitial infiltrates in both lung bases most likely representing atelectasis.  There is a nodule laterally in the right middle lobe unchanged from 2023. ABDOMEN:  LIVER: No hepatomegaly.  Smooth surface contour.  Normal attenuation.  BILE DUCTS: No intrahepatic or extrahepatic biliary ductal dilatation.  GALLBLADDER: The gallbladder is present.  STOMACH: No abnormalities identified.  PANCREAS: No masses or ductal dilatation.  SPLEEN: No splenomegaly or focal splenic lesion.  ADRENAL GLANDS: No thickening or nodules.  KIDNEYS AND URETERS: Kidneys are normal in size and location.  No renal or ureteral calculi.  PELVIS:  BLADDER: No abnormalities identified.  REPRODUCTIVE ORGANS: No abnormalities identified.  BOWEL: No abnormalities identified.  The appendix is identified and is normal.  VESSELS: No abnormalities identified.  Abdominal aorta is normal in caliber.  PERITONEUM/RETROPERITONEUM/LYMPH NODES: No free fluid.  No pneumoperitoneum. No lymphadenopathy.  ABDOMINAL WALL: No abnormalities identified. SOFT TISSUES: No abnormalities identified.  BONES: There are degenerative changes of lumbosacral spine with vacuum discs.  No acute fracture or aggressive osseous  lesion.    No evidence of pulmonary embolism. Cardiomegaly with emphysematous changes in the lungs. Stable nodule in the right middle lobe. No acute process. Signed by Stanislaw Elizondo MD    XR chest 1 view    Result Date: 9/30/2024  STUDY: Chest Radiograph;  9/30/2024 5:14PM INDICATION: Chest pain. COMPARISON: 7/30/2024 XR Chest 2 Views, 10/23/2023 CT CTA Chest, 10/23/2023 XR Chest 1 View, 7/24/2023 CT CTA Chest ACCESSION NUMBER(S): [id number] ORDERING CLINICIAN: NIKOLAY MORGAN TECHNIQUE:  Frontal chest was obtained at 17:14 hours. FINDINGS: CARDIOMEDIASTINAL SILHOUETTE: Heart is markedly enlarged but stable. Left-sided AICD noted. No pleural effusion or pneumothorax. No acute bony abnormality. Mild pulmonary vascular congestion.  LUNGS: Unchanged bullous changes in the left upper lobe.  ABDOMEN: No remarkable upper abdominal findings.  BONES: No acute osseous changes.    Unchanged cardiomegaly and mild pulmonary vascular congestion. Unchanged bullous changes in the left upper lobe. Signed by Tashi Cortés MD    Electrocardiogram, 12-lead PRN ACS symptoms    Result Date: 9/18/2024  Sinus rhythm with occasional Premature ventricular complexes Possible Left atrial enlargement Left axis deviation Left ventricular hypertrophy with repolarization abnormality Inferior infarct , age undetermined Abnormal ECG When compared with ECG of 07-SEP-2024 00:07, Premature ventricular complexes are now Present Inferior infarct is now Present Confirmed by James Shah ([id number]) on 9/18/2024 8:19:31 AM    Electrocardiogram, 12-lead PRN ACS symptoms    Result Date: 9/10/2024  Normal sinus rhythm Possible Left atrial enlargement Left axis deviation Left ventricular hypertrophy with QRS widening and repolarization abnormality Inferior infarct (cited on or before 07-SEP-2024) Prolonged QT Abnormal ECG When compared with ECG of 07-SEP-2024 09:33, Premature ventricular complexes are no longer Present Confirmed by Leoncio Dias ([id number]) on 9/10/2024  4:02:49 PM    ECG 12 Lead    Result Date: 9/7/2024  Normal sinus rhythm Left axis deviation Left ventricular hypertrophy with repolarization abnormality ( Sokolow-Nieves , Brown product ) Abnormal ECG When compared with ECG of 06-SEP-2024 12:34, No significant change was found See ED provider note for full interpretation and clinical correlation Confirmed by June Perez ([id number]) on 9/7/2024 2:16:33 AM    XR chest 2 views    Result Date: 9/7/2024  Interpreted By:  Dimitri Rodriguez, STUDY: XR CHEST 2 VIEWS;  9/7/2024 12:30 am   INDICATION: Signs/Symptoms:Syncope.   COMPARISON: Chest x-ray 07/30/2024   ACCESSION NUMBER(S): [id number]   ORDERING CLINICIAN: LUKE ARBOLEDA   FINDINGS: Left-sided single lead AICD is stable in position.   CARDIOMEDIASTINAL SILHOUETTE: Cardiac silhouette is enlarged but stable.   LUNGS: No consolidation, pleural effusion or pneumothorax. Mild diffuse interstitial prominence. Redemonstration of bullous emphysematous changes in the lung apices, left greater than right.   ABDOMEN: No remarkable upper abdominal findings.   BONES: Multilevel degenerative changes of the spine.       Stable cardiomegaly. Mild diffuse interstitial prominence could be chronic or relate to component developing interstitial edema. Correlate clinically.   Findings suggestive of COPD with bullous emphysematous changes in the lung apices, left greater than right.   MACRO: None   Signed by: Dimitri Rodriguez 9/7/2024 12:38 AM Dictation workstation:   [id number]      Assessment/Plan   Assessment & Plan      Leonel Yu is a 54 y.o. 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     #ADHF  #HFrEF s/p ICD  :: ECHO 7/31 EF 10%  :: GDMT: Empagliflozin, Entresto 24-26, Spironolactone, Carvedilol  :: Mild edema on presentation  :: Notable JVD on exam  :: On lasix 40mg PO at home for diuresis  Continue home GDMT: Empagliflozin 10 mg, Entresto 24-26, Spironolactone 12.5, Carvedilol 3.125 BID  -Lasix 80 IV x1; Continue diuresis PRN     # Recent tachyarrythmias with ICD shock  # AF RVR secondary to cocaine use vs VT/VF   :: Patient without any shocks or tachyarrhythmias since amiodarone loading (despite patient reporting ICD shocks on admission).  :: Continue amiodarone 400mg  ::Per EP consult from previous hospitalization, because all of his 12-lead ECG before did not demonstrate clear atrial fibrillation, do not recommend initiating DOAC for now.   - Due to his ongoing cocaine use increasing the risk of device infection, no plan to add RA lead for now.    # Abdominal pain  :: CT abdomen without any pathology responsible for the abdominal pain.    I spent 65 minutes in the professional and overall care of this patient.      Ryan Blue MD

## 2024-10-01 NOTE — PROGRESS NOTES
[facility] Attending Shared Visit Note    This is a shared visit. Please see Advanced Practice Provider's encounter note for additional details.      Briefly, 54M admitted with phantom ICD shock and medication non-adherence. History notable for mixed ischemic and non CM (cocaine)/HFrEF (last EF 15-20%) s/p scICD (3/2024), CKD3a, prior remote left cerebellar hemorrhagic infarct. Multiple HF admissions this year with prior ICD shocks, but medication non-adherence.  Weigh + 12lbs. Code status DNR? No prior LHC.    He reports he is interested in stopping cocaine (last use 2 days prior to admission)    Exam: Breathing unlabored but on O2. Regular. Warm. 2+ edema      Problems:   Principal Problem:    Acute on chronic congestive heart failure, unspecified heart failure type    Plan:   - continue amio  - continue aspirin / statin  - GDMT with BB, SGLT2i, ARNI, MRA  - aggressive diruesis  - pall care  - PT/OT  - Thrive consult  - consider LHC prior to discharge    Dispo: pending optimization    Mayco Skinner MD    Objective   Admit Date: 9/30/2024  Hospital Length of Stay: 1   Home: [place] [address]    MEDICATIONS  Infusions:     Scheduled:  amiodarone, 400 mg, Daily  aspirin, 81 mg, Daily  atorvastatin, 80 mg, Nightly  carvedilol, 3.125 mg, BID  empagliflozin, 10 mg, Daily  enoxaparin, 40 mg, q24h  furosemide, 80 mg, Once  [Held by provider] furosemide, 40 mg, Daily  insulin glargine, 30 Units, Nightly  insulin lispro, 0-10 Units, TID  sacubitriL-valsartan, 0.5 tablet, BID  sennosides, 2 tablet, BID  sertraline, 100 mg, Daily  spironolactone, 12.5 mg, Daily  tiotropium, 2 puff, Daily      PRN:  acetaminophen, 650 mg, q4h PRN   Or  acetaminophen, 650 mg, q4h PRN   Or  acetaminophen, 650 mg, q4h PRN  melatonin, 3 mg, Nightly PRN  oxygen, , Continuous PRN - O2/gases  propofol, , Code/trauma/sedation med      Prior to Admission Meds:  (Not in a hospital admission)      Vitals:      10/1/2024     7:00 AM 10/1/2024     5:00 AM  10/1/2024     4:00 AM 10/1/2024     2:45 AM 10/1/2024     1:37 AM 9/30/2024    11:30 PM 9/30/2024    11:15 PM   Vitals   Systolic 113 113 134 136 121 134 134   Diastolic 74 84 99 104 104 91 95   Heart Rate 66 66 58 65 57 70 68   Resp 15 15 14 17 24 22 18     Wt Readings from Last 5 Encounters:   09/08/24 79.4 kg (175 lb 0.7 oz)   08/06/24 73.7 kg (162 lb 7.7 oz)   02/16/24 77 kg (169 lb 12.1 oz)   12/28/23 73.2 kg (161 lb 6 oz)   12/14/23 72.5 kg (159 lb 13.3 oz)       Intake/Output Summary (Last 24 hours) at 10/1/2024 1004  Last data filed at 9/30/2024 2152  Gross per 24 hour   Intake --   Output 1400 ml   Net -1400 ml       CHEST:  ,    CV:  Normal sinus rhythm  NEURO:   RASS: Light sedation  CAM:    LOC:    Cognition:    GCS: 15    DATA:  CMP:  Recent Labs     09/30/24  1701 09/12/24  0902 09/11/24  0647 09/10/24  1647 09/10/24  0641 09/09/24  2021 09/09/24  0757 09/08/24  0431 09/07/24  0949 09/07/24  0006 08/05/24  1830    136 135* 134* 137 135* 134* 137 139 137 134*   K 4.2 4.7 4.6 4.6 3.8 4.2 4.1 4.0 4.5 4.9 4.6    102 100 95* 101 97* 101 104 106 106 97*   CO2 23 29 29 29 29 31 26 24 24 23 23   ANIONGAP 14 10 11 15 11 11 11 13 14 13 19   BUN 30* 25* 25* 26* 25* 28* 28* 28* 25* 25* 26*   CREATININE 2.11* 1.52* 1.56* 1.71* 1.42* 1.74* 1.53* 1.34* 1.34* 1.28 1.64*   EGFR 36* 54* 52* 47* 58* 46* 53* 63 63 66 49*   MG 2.16 2.44*  --  2.33  --  2.12  --  2.06 2.26 2.49* 2.48*     Recent Labs     09/30/24  1701 09/12/24  0902 09/11/24  0647 09/10/24  1647 09/10/24  0641 09/09/24  2021 09/09/24  0757 09/08/24  0431 09/07/24  0949 09/07/24  0006 07/30/24  1738 07/30/24  0731 02/16/24  0533 02/15/24  1927 02/15/24  0656 02/14/24  0419 02/13/24  0711 02/12/24  1239   ALBUMIN 3.8 3.6 3.4 3.6 3.2* 3.4 3.1* 3.1* 3.8 3.8   < > 3.9 3.2*   < > 3.2* 3.3*   < > 4.1   ALT 33  --   --   --   --   --   --   --  70* 63*  --  55* 23  --  31 40  --  68*   AST 25  --   --   --   --   --   --   --  40* 43*  --  28 13  --   17 19  --  40*   BILITOT 1.0  --   --   --   --   --   --   --  1.0 0.9  --  1.0 0.9  --  1.0 0.9  --  1.2    < > = values in this interval not displayed.     CBC:  Recent Labs     09/30/24  1701 09/12/24  0902 09/11/24  0646 09/10/24  0641 09/09/24  0757 09/08/24  0431 09/07/24  0949 09/07/24  0006   WBC 6.9 7.0 5.5 6.2 6.4 8.5 7.2 6.6   HGB 16.6 17.4 16.4 15.1 15.4 15.7 15.9 15.7   HCT 48.8 52.5* 51.7 44.4 46.3 45.2 48.0 45.4    168 155 137* 128* 150 158 124*   MCV 91 93 97 90 94 90 92 89     COAG:   Recent Labs     09/30/24 1704 09/07/24  0949 12/10/23  0255 12/09/23  2057 10/23/23  1851 07/24/23  1330 03/02/23  0225   INR 1.2* 1.2*  --  1.2* 1.3* 1.2* 1.3*   HAUF  --   --  0.1  --   --   --   --      ABO:   Recent Labs     09/30/24 1704   ABO A     HEME/ENDO:   Recent Labs     09/07/24  1016 07/30/24  0815 07/30/24  0731 06/20/24  0749 01/09/24  0653 12/09/23 2057 06/13/23  1746 04/30/21  1640   TSH 2.68 2.65  --   --   --  1.46  --  1.60   HGBA1C  --   --  8.4* 6.9* 10.1* 7.6*   < >  --     < > = values in this interval not displayed.     CARDIAC:   Recent Labs     09/30/24 2025 09/30/24  1844 09/30/24  1701 09/07/24  1016 09/07/24  0128 09/07/24  0006 07/31/24  1851 07/30/24  0815 07/30/24  0731 02/16/24  0533 02/15/24  0656 02/12/24  2147 02/12/24  1811 02/12/24  1239 12/27/23  0759 12/25/23  0052 12/24/23  2229   CKMB  --   --   --   --   --   --   --   --   --   --   --   --   --   --  1.6  --   --    TROPHS 67* 44  --  68* 71* 75*  --  117* 125*  --   --  132*   < > 130*  --    < > 109*   BNP  --   --  >5,000*  --   --  2,585* 1,014*  --  3,240* 2,900* 3,752*  --   --  >5,000*  --   --  1,425*    < > = values in this interval not displayed.     Recent Labs     09/07/24  0949 07/25/23  0820 06/18/23  0627   CHOL 127 156 168   LDLF  --  97 103*   LDLCALC 72  --   --    HDL 35.4 40.5 35.6*   TRIG 98 92 146     TOX:  Recent Labs     09/07/24  1257 07/30/24  1046 02/13/24  0049   AMPHETAMINE  Presumptive Negative Presumptive Negative Presumptive Negative   BENZO Presumptive Negative Presumptive Negative Presumptive Negative   CANNABINOID Presumptive Negative Presumptive Negative Presumptive Negative   COCAI Presumptive Positive* Presumptive Positive* Presumptive Positive*   FENTANYL Presumptive Negative Presumptive Negative Presumptive Negative   OPIATE Presumptive Negative Presumptive Negative Presumptive Negative   OXYCODONE Presumptive Negative Presumptive Negative Presumptive Negative   PCP Presumptive Negative Presumptive Negative Presumptive Negative     MICRO:   Recent Labs     03/02/23  1112 03/02/23  0225   CRP  --  1.54*   PROCAL 1.55* 1.15*     No results found for the last 90 days.      EKG:   Recent Labs     09/09/24  1455 09/07/24  0935 09/07/24  0024   ATRRATE 65 71 71   VENTRATE 65 71 71   PRINT 188 180 166   QRSDUR 118 110 108   QTCFRED 498 461 433   QTCCALCB 505 473 445     Encounter Date: 09/06/24   Electrocardiogram, 12-lead PRN ACS symptoms   Result Value    Ventricular Rate 65    Atrial Rate 65    OR Interval 188    QRS Duration 118    QT Interval 486    QTC Calculation(Bazett) 505    P Axis 46    R Axis -47    T Axis 91    QRS Count 11    Q Onset 206    P Onset 112    P Offset 165    T Offset 449    QTC Fredericia 498    Narrative    Normal sinus rhythm  Possible Left atrial enlargement  Left axis deviation  Left ventricular hypertrophy with QRS widening and repolarization abnormality  Inferior infarct (cited on or before 07-SEP-2024)  Prolonged QT  Abnormal ECG  When compared with ECG of 07-SEP-2024 09:33,  Premature ventricular complexes are no longer Present  Confirmed by Leoncio Dias ([id number]) on 9/10/2024 4:02:49 PM     Echocardiogram:   Recent Labs     07/31/24  1507   EF 10   LVIDD 8.90   RV 39.4   RVFRWALLPKSP 7.00   TAPSE 1.6   Transthoracic Echo (TTE) Complete With Contrast 07/31/2024    [facility], [address] [place] [address]  Tel  [phone] and Fax [phone]    TRANSTHORACIC ECHOCARDIOGRAM REPORT      Patient Name:      VINOD SHAFER      Reading Physician:    [id number] Jeanette Ellington MD  Study Date:        7/31/2024            Ordering Provider:    [id number] SHAREE BAEZ  JAVY  MRN/PID:           [id number]             Fellow:  Accession#:        [id number]         Nurse:                Mimi Roman RN  Date of Birth/Age: 1970 / 54 years  Sonographer:          JORGE LUIS Giraldo RDCS  Gender:            M                    Additional Staff:  Height:            175.26 cm            Admit Date:           7/30/2024  Weight:            76.66 kg             Admission Status:     Inpatient -  Routine  BSA / BMI:         1.92 m2 / 24.96      Encounter#:           [id number]  kg/m2  Blood Pressure:    133/91 mmHg          Department Location:  [facility] Non  In[facility]    Study Type:    TRANSTHORACIC ECHO (TTE) COMPLETE  Diagnosis/ICD: Acute on chronic combined systolic (congestive) and diastolic  (congestive) heart failure (CHF)-I50.43  Indication:    Congestive Heart Failure  CPT Code:      Echo Complete w Full Doppler-81136    Patient History:  Smoker:            Former.  Diabetes:          Yes  Pertinent History: CHF, Dyspnea, COPD, CVA, HTN, Hyperlipidemia and  Palpitations. CKD, s/p ICD,.    Study Detail: The following Echo studies were performed: 2D, M-Mode, Doppler and  color flow. Optison used as a contrast agent for endocardial  border definition. Total contrast used for this procedure was 2 mL  via IV push.      PHYSICIAN INTERPRETATION:  Left Ventricle: Left ventricular ejection fraction is severely decreased, calculated by Tim's biplane at 10%. There is global hypokinesis of the left ventricle with minor regional variations. The left ventricular cavity size is severely dilated. There is severe eccentric left ventricular hypertrophy. Spectral Doppler shows an abnormal pattern of left ventricular diastolic filling. There is no  definite left ventricular thrombus visualized. There is spontaneous echocontrast noted within the LV cavity.Echocontrast was utilized and excluded LV apical thrombus.  Left Atrium: The left atrium is severely dilated.  Right Ventricle: The right ventricle is severely enlarged. There is reduced right ventricular systolic function. A device is visualized in the right ventricle.  Right Atrium: The right atrium is moderately to severely dilated. There is a device visualized in the right atrium.  Aortic Valve: The aortic valve is trileaflet. There is minimal aortic valve cusp calcification. There is no evidence of aortic valve regurgitation. The peak instantaneous gradient of the aortic valve is 5.1 mmHg.  Mitral Valve: The mitral valve is normal in structure. There is mild mitral valve regurgitation.  Tricuspid Valve: The tricuspid valve is structurally normal. There is mild to moderate tricuspid regurgitation. The Doppler estimated RVSP is mildly elevated at 39.4 mmHg.  Pulmonic Valve: The pulmonic valve is structurally normal. There is physiologic pulmonic valve regurgitation.  Pericardium: There is a trivial pericardial effusion.  Aorta: The aortic root is normal.  Systemic Veins: The inferior vena cava appears dilated.  In comparison to the previous echocardiogram(s): Compared with the prior exam from 7/26/2023, the degree of TR has increased, however there was already a severley dilated LV with severe systolic dysfunciton and a dilated RV with reduced fx as well. Note that the prior exam included an agitated saline contrast study that was negative for shunt.      CONCLUSIONS:  1. Left ventricular ejection fraction is severely decreased, calculated by Tim's biplane at 10%.  2. There is global hypokinesis of the left ventricle with minor regional variations.  3. Spectral Doppler shows an abnormal pattern of left ventricular diastolic filling.  4. Left ventricular cavity size is severely dilated.  5. No left  ventricular thrombus visualized.  6. There is spontaneous echocontrast noted within the LV cavity.Echocontrast was utilized and excluded LV apical thrombus.  7. There is severe eccentric left ventricular hypertrophy.  8. Severely enlarged right ventricle.  9. There is reduced right ventricular systolic function.  10. The left atrium is severely dilated.  11. The right atrium is moderately to severely dilated.  12. Mild to moderate tricuspid regurgitation visualized.  13. Mildly elevated RVSP.  14. Compared with the prior exam from 7/26/2023, the degree of TR has increased, however there was already a severley dilated LV with severe systolic dysfunciton and a dilated RV with reduced fx as well. Note that the prior exam included an agitated saline contrast study that was negative for shunt.    QUANTITATIVE DATA SUMMARY:  2D MEASUREMENTS:  Normal Ranges:  Ao Root d:     3.00 cm    (2.0-3.7cm)  LAs:           3.30 cm    (2.7-4.0cm)  IVSd:          0.90 cm    (0.6-1.1cm)  LVPWd:         1.30 cm    (0.6-1.1cm)  LVIDd:         8.90 cm    (3.9-5.9cm)  LVIDs:         6.00 cm  LV Mass Index: 287.0 g/m2  LV % FS        32.6 %    LA VOLUME:  Normal Ranges:  LA Vol A4C:        68.8 ml    (22+/-6mL/m2)  LA Vol A2C:        137.1 ml  LA Vol BP:         97.1 ml  LA Vol Index A4C:  35.8ml/m2  LA Vol Index A2C:  71.3 ml/m2  LA Vol Index BP:   50.5 ml/m2  LA Area A4C:       23.1 cm2  LA Area A2C:       32.6 cm2  LA Major Axis A4C: 6.6 cm  LA Major Axis A2C: 6.6 cm  LA Volume Index:   50.5 ml/m2    RA VOLUME BY A/L METHOD:  Normal Ranges:  RA Area A4C: 30.2 cm2    AORTA MEASUREMENTS:  Normal Ranges:  Ao Sinus, d: 2.90 cm (2.1-3.5cm)  Asc Ao, d:   3.10 cm (2.1-3.4cm)    LV SYSTOLIC FUNCTION BY 2D PLANIMETRY (MOD):  Normal Ranges:  EF-A4C View:    10 % (>=55%)  EF-A2C View:    9 %  EF-Biplane:     10 %  LV EF Reported: 10 %    LV DIASTOLIC FUNCTION:  Normal Ranges:  MV e'         0.045 m/s (>8.0)  MV lateral e' 0.04 m/s  MV medial e'   0.05 m/s    AORTIC VALVE:  Normal Ranges:  AoV Vmax:      1.13 m/s (<=1.7m/s)  AoV Peak P.1 mmHg (<20mmHg)  LVOT Max Jori:  0.73 m/s (<=1.1m/s)  LVOT VTI:      8.65 cm  LVOT Diameter: 2.20 cm  (1.8-2.4cm)  AoV Area,Vmax: 2.46 cm2 (2.5-4.5cm2)      RIGHT VENTRICLE:  RV Basal 5.20 cm  RV Mid   2.80 cm  RV Major 11.2 cm  TAPSE:   15.8 mm  RV s'    0.07 m/s    TRICUSPID VALVE/RVSP:  Normal Ranges:  Peak TR Velocity: 2.80 m/s  RV Syst Pressure: 39.4 mmHg (< 30mmHg)  IVC Diam:         2.60 cm    PULMONIC VALVE:  Normal Ranges:  PV Max Jori: 0.7 m/s  (0.6-0.9m/s)  PV Max P.0 mmHg      [id number] Jeanette Ellington MD  Electronically signed on 2024 at 5:20:48 PM        ** Final **    Coronary Angiography: No results found for this or any previous visit from the past 1800 days.    Right Heart Cath: No results found for this or any previous visit from the past 1800 days.    Cardiac Scoring: No results found for this or any previous visit from the past 1800 days.    Cardiac MRI:   MR cardiac morphology and function w and wo IV contrast 2024    Narrative  Interpreted By:  Enoc Dorantes  and Roberto Mao  STUDY:  MR CARDIAC MORPHOLOGY AND FUNCTION W AND WO IV CONTRAST;  2024  10:35 am    INDICATION:  Signs/Symptoms:HFrEF 10%, recent vfib.    COMPARISON:  None.    ACCESSION NUMBER(S):  [id number]    ORDERING CLINICIAN:  ANAY FRY    TECHNIQUE:  Siemens 1.5  Jovana MRI scanner.  Turbo spin echo and balanced steady state free precession (bSSFP)  imaging for anatomic definition. Dynamic cine bSSFP for cardiac  chamber and wall-motion analysis, and valvular analysis. Flow  quantification sequences for hemodynamics. Delayed gadolinium  enhancement analysis after injection of gadolinium-chelate (mL of  Dotarem, 0.2 mmol/kg).    HT-175 cm; WT-73 kg; BSA-1.88 m2    FINDINGS:  CARDIAC CHAMBERS  Normal atrioventricular and ventriculoarterial concordance    LEFT ATRIUM  Severely dilated (DEANNE-51 ml/m2).    RIGHT  ATRIUM  Moderately dilated (RA max 4ch-31.1 cm2)    INTERATRIAL SEPTUM  Intact.    LEFT VENTRICLE:  1. Severely dilated LV with severely reduced systolic function (LVEF  5-10%).  2. The basal inferoseptal, mid inferoseptal, apical septal,  apicolateral segments are akinetic. All other segments are severely  hypokinetic.  3. Eccentric left ventricular hypertrophy.  4. No evidence of LV thrombus.  5. Following administration of gadolinium, in the late phase, there  is transmural enhancement of the mid/apical inferoseptal/inferior  segments, subendocardial enhancement of the apicolateral segment (<  25% wall thickness) and mid wall septal enhancement.    Quantitative left ventricular functional values are as follows:  EDV = 648 cc; EDVi = 344 cc/m2  ESV = 617 cc; ESVi = 328 cc/m2  Stroke volume = 31 cc; SVi = 16 cc/m2  LVEF = 5 %  Absolute Cardiac Output = 2 l/min.; COi = 1.06 l/min/m2  LV mass = 415 gm; LVMi = 220 gm/m2    LVEDD: 8.2 cm  LVESD: 7.4 cm  LV septal wall thickness (anterobasal): 1.1 cm  LV postero-inferior wall thickness: 0.6 cm    FINDINGS  ----------------------------------------------  LV SCAR SIZE (17 SEGMENT):  15 %    PERFUSION:  There is reduced contrast wash-in in the mid inferoseptal wall.    RIGHT VENTRICLE  The right ventricle appears dilated in size and has reduced  qualitative systolic function by qualitative assessment. No abnormal  delayed enhancement in the myocardium. RV lead visualized.    INTERVENTRICULAR SEPTUM  Intact.    AORTIC VALVE  The aortic valve is tricuspid with normal leaflet excursion.  There is  no aortic regurgitation.      MITRAL VALVE  There is  mild mitral regurgitation by qualitative assessment.    TRICUSPID VALVE  There is qualitative mild tricuspid regurgitation.    PULMONARY VALVE:  Not assessed.    PERICARDIUM:  The pericardium is normal. There is no pericardial effusion.    THORACIC AORTA  The thoracic aorta appears normal in course, caliber, and contour.  There  is no evidence for acute aortic pathology. The ascending  thoracic aorta is 3.0 x 2.8 cm in diameter The arch vessel branching  pattern is  normal.   All the arch branch vessels appear widely  patent in their proximal portions.      PULMONARY ARTERIES  The central pulmonary arteries appear  normal (MPA-2.8 cm, RPA-1.8  cm, LPA-2.1 cm).    SYSTEMIC AND PULMONARY VEINS  Normal systemic venous and pulmonary venous return.  The SVC is of normal caliber. IVC appears normal  Normal pulmonary venous anatomy.    CHEST  The chest wall is normal.  Limited imaging through the lungs reveals no gross abnormalities. No  pleural effusion.    UPPER ABDOMEN  Limited imaging through the upper abdomen reveals no abnormalities of  the visualized organs.    Impression  1. Severely dilated left ventricle (EDVi 344 ml/m2) with severely  impaired systolic function (LVEF 5-10%).  2. The basal-mid inferoseptal, apical septal, apicolateral segments  are akinetic. All other segments are severely hypokinetic.    3. Eccentric left ventricular hypertrophy.  4. No evidence of LV thrombus.  5. LGE imaging demonstrates transmural infarction of the mid/apical  inferoseptal/inferior segments and a separate small region of  subendocardial infarction of the apicolateral wall (<25% wall  thickness). Additionally, there is mid-wall septal fibrosis  (nonischemic pattern).  6. Dilated right ventricle with impaired systolic function  (qualitative assessment). CIED lead noted.    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    MACRO:  None    Signed by: Enoc Dorantes 8/5/2024 4:19 PM  Dictation workstation:   [id number]    Nuclear:No results found for this or any previous visit from the past 1800 days.    Metabolic Stress: No results found for this or any previous visit from the past 1800 days.      CT abdomen pelvis w IV contrast    Result Date: 9/30/2024  STUDY: CT Angiogram of the Chest, CT Abdomen and  Pelvis with IV Contrast; 09/30/2024, 5:38 PM. INDICATION: Generalized abdominal pain. Flank pain. Concern for pulmonary embolism. COMPARISON: CXR: 09/30/24, 09/07/24, 07/30/24, 02/12/24; CTA chest: 10/23/23; CT AP: 10/23/23. ACCESSION NUMBER(S): [id number], [id number] ORDERING CLINICIAN: NIKOLAY MORGAN TECHNIQUE:  CTA of the chest was performed following rapid injection of intravenous contrast.  Images are reviewed and processed at a workstation according to the CT angiogram protocol with 3-D and/or MIP post processing imaging generated.  CT of the abdomen and pelvis was performed with intravenous contrast.  Omnipaque 350 80 mL was administered intravenously; positive oral contrast was given. Automated mA/kV exposure control was utilized and patient examination was performed in strict accordance with principles of ALARA. FINDINGS:  CTA CHEST: Pulmonary arteries are adequately opacified without acute or chronic filling defects.  The thoracic aorta is normal in course and caliber without dissection or aneurysm. The heart is enlarged.  Thoracic lymph nodes are not enlarged. The lungs are emphysematous. There are interstitial infiltrates in both lung bases most likely representing atelectasis.  There is a nodule laterally in the right middle lobe unchanged from 2023. ABDOMEN:  LIVER: No hepatomegaly.  Smooth surface contour.  Normal attenuation.  BILE DUCTS: No intrahepatic or extrahepatic biliary ductal dilatation.  GALLBLADDER: The gallbladder is present.  STOMACH: No abnormalities identified.  PANCREAS: No masses or ductal dilatation.  SPLEEN: No splenomegaly or focal splenic lesion.  ADRENAL GLANDS: No thickening or nodules.  KIDNEYS AND URETERS: Kidneys are normal in size and location.  No renal or ureteral calculi.  PELVIS:  BLADDER: No abnormalities identified.  REPRODUCTIVE ORGANS: No abnormalities identified.  BOWEL: No abnormalities identified.  The appendix is identified and is normal.  VESSELS: No  abnormalities identified.  Abdominal aorta is normal in caliber.  PERITONEUM/RETROPERITONEUM/LYMPH NODES: No free fluid.  No pneumoperitoneum. No lymphadenopathy.  ABDOMINAL WALL: No abnormalities identified. SOFT TISSUES: No abnormalities identified.  BONES: There are degenerative changes of lumbosacral spine with vacuum discs.  No acute fracture or aggressive osseous lesion.    No evidence of pulmonary embolism. Cardiomegaly with emphysematous changes in the lungs. Stable nodule in the right middle lobe. No acute process. Signed by Stanislaw Elizondo MD    CT angio chest for pulmonary embolism    Result Date: 9/30/2024  STUDY: CT Angiogram of the Chest, CT Abdomen and Pelvis with IV Contrast; 09/30/2024, 5:38 PM. INDICATION: Generalized abdominal pain. Flank pain. Concern for pulmonary embolism. COMPARISON: CXR: 09/30/24, 09/07/24, 07/30/24, 02/12/24; CTA chest: 10/23/23; CT AP: 10/23/23. ACCESSION NUMBER(S): [id number], [id number] ORDERING CLINICIAN: NIKOLAY MORGAN TECHNIQUE:  CTA of the chest was performed following rapid injection of intravenous contrast.  Images are reviewed and processed at a workstation according to the CT angiogram protocol with 3-D and/or MIP post processing imaging generated.  CT of the abdomen and pelvis was performed with intravenous contrast.  Omnipaque 350 80 mL was administered intravenously; positive oral contrast was given. Automated mA/kV exposure control was utilized and patient examination was performed in strict accordance with principles of ALARA. FINDINGS:  CTA CHEST: Pulmonary arteries are adequately opacified without acute or chronic filling defects.  The thoracic aorta is normal in course and caliber without dissection or aneurysm. The heart is enlarged.  Thoracic lymph nodes are not enlarged. The lungs are emphysematous. There are interstitial infiltrates in both lung bases most likely representing atelectasis.  There is a nodule laterally in the right middle lobe unchanged  from 2023. ABDOMEN:  LIVER: No hepatomegaly.  Smooth surface contour.  Normal attenuation.  BILE DUCTS: No intrahepatic or extrahepatic biliary ductal dilatation.  GALLBLADDER: The gallbladder is present.  STOMACH: No abnormalities identified.  PANCREAS: No masses or ductal dilatation.  SPLEEN: No splenomegaly or focal splenic lesion.  ADRENAL GLANDS: No thickening or nodules.  KIDNEYS AND URETERS: Kidneys are normal in size and location.  No renal or ureteral calculi.  PELVIS:  BLADDER: No abnormalities identified.  REPRODUCTIVE ORGANS: No abnormalities identified.  BOWEL: No abnormalities identified.  The appendix is identified and is normal.  VESSELS: No abnormalities identified.  Abdominal aorta is normal in caliber.  PERITONEUM/RETROPERITONEUM/LYMPH NODES: No free fluid.  No pneumoperitoneum. No lymphadenopathy.  ABDOMINAL WALL: No abnormalities identified. SOFT TISSUES: No abnormalities identified.  BONES: There are degenerative changes of lumbosacral spine with vacuum discs.  No acute fracture or aggressive osseous lesion.    No evidence of pulmonary embolism. Cardiomegaly with emphysematous changes in the lungs. Stable nodule in the right middle lobe. No acute process. Signed by Stanislaw Elizondo MD    XR chest 1 view    Result Date: 9/30/2024  STUDY: Chest Radiograph;  9/30/2024 5:14PM INDICATION: Chest pain. COMPARISON: 7/30/2024 XR Chest 2 Views, 10/23/2023 CT CTA Chest, 10/23/2023 XR Chest 1 View, 7/24/2023 CT CTA Chest ACCESSION NUMBER(S): [id number] ORDERING CLINICIAN: NIKOLAY MORGAN TECHNIQUE:  Frontal chest was obtained at 17:14 hours. FINDINGS: CARDIOMEDIASTINAL SILHOUETTE: Heart is markedly enlarged but stable. Left-sided AICD noted. No pleural effusion or pneumothorax. No acute bony abnormality. Mild pulmonary vascular congestion.  LUNGS: Unchanged bullous changes in the left upper lobe.  ABDOMEN: No remarkable upper abdominal findings.  BONES: No acute osseous changes.    Unchanged cardiomegaly and  mild pulmonary vascular congestion. Unchanged bullous changes in the left upper lobe. Signed by Tashi Cortés MD    Electrocardiogram, 12-lead PRN ACS symptoms    Result Date: 9/18/2024  Sinus rhythm with occasional Premature ventricular complexes Possible Left atrial enlargement Left axis deviation Left ventricular hypertrophy with repolarization abnormality Inferior infarct , age undetermined Abnormal ECG When compared with ECG of 07-SEP-2024 00:07, Premature ventricular complexes are now Present Inferior infarct is now Present Confirmed by James Shah ([id number]) on 9/18/2024 8:19:31 AM    Electrocardiogram, 12-lead PRN ACS symptoms    Result Date: 9/10/2024  Normal sinus rhythm Possible Left atrial enlargement Left axis deviation Left ventricular hypertrophy with QRS widening and repolarization abnormality Inferior infarct (cited on or before 07-SEP-2024) Prolonged QT Abnormal ECG When compared with ECG of 07-SEP-2024 09:33, Premature ventricular complexes are no longer Present Confirmed by Leoncio Dias ([id number]) on 9/10/2024 4:02:49 PM    ECG 12 Lead    Result Date: 9/7/2024  Normal sinus rhythm Left axis deviation Left ventricular hypertrophy with repolarization abnormality ( Sokolow-Nieves , Brown product ) Abnormal ECG When compared with ECG of 06-SEP-2024 12:34, No significant change was found See ED provider note for full interpretation and clinical correlation Confirmed by June Perez ([id number]) on 9/7/2024 2:16:33 AM    XR chest 2 views    Result Date: 9/7/2024  Interpreted By:  Dimitri Rodriguez, STUDY: XR CHEST 2 VIEWS;  9/7/2024 12:30 am   INDICATION: Signs/Symptoms:Syncope.   COMPARISON: Chest x-ray 07/30/2024   ACCESSION NUMBER(S): [id number]   ORDERING CLINICIAN: LUKE ARBOLEDA   FINDINGS: Left-sided single lead AICD is stable in position.   CARDIOMEDIASTINAL SILHOUETTE: Cardiac silhouette is enlarged but stable.   LUNGS: No consolidation, pleural effusion or pneumothorax. Mild diffuse interstitial  prominence. Redemonstration of bullous emphysematous changes in the lung apices, left greater than right.   ABDOMEN: No remarkable upper abdominal findings.   BONES: Multilevel degenerative changes of the spine.       Stable cardiomegaly. Mild diffuse interstitial prominence could be chronic or relate to component developing interstitial edema. Correlate clinically.   Findings suggestive of COPD with bullous emphysematous changes in the lung apices, left greater than right.   MACRO: None   Signed by: Dimitri Rodriguez 9/7/2024 12:38 AM Dictation workstation:   [id number]       LDA:   NUTRITION: Adult diet Regular  EMERGENCY CONTACT: Extended Emergency Contact Information  Primary Emergency Contact: Elizabet Santana  Mobile Phone: [phone]  Relation: Friend  CODE STATUS: DNR  DISPO:    AMPAC:      FOLLOWUP: No future appointments.

## 2024-10-02 LAB
ALBUMIN SERPL BCP-MCNC: 3.5 G/DL (ref 3.4–5)
ANION GAP SERPL CALC-SCNC: 16 MMOL/L (ref 10–20)
BUN SERPL-MCNC: 30 MG/DL (ref 6–23)
CALCIUM SERPL-MCNC: 8.9 MG/DL (ref 8.6–10.6)
CHLORIDE SERPL-SCNC: 99 MMOL/L (ref 98–107)
CO2 SERPL-SCNC: 27 MMOL/L (ref 21–32)
CREAT SERPL-MCNC: 1.75 MG/DL (ref 0.5–1.3)
EGFRCR SERPLBLD CKD-EPI 2021: 45 ML/MIN/1.73M*2
ERYTHROCYTE [DISTWIDTH] IN BLOOD BY AUTOMATED COUNT: 16 % (ref 11.5–14.5)
GLUCOSE BLD MANUAL STRIP-MCNC: 110 MG/DL (ref 74–99)
GLUCOSE BLD MANUAL STRIP-MCNC: 225 MG/DL (ref 74–99)
GLUCOSE BLD MANUAL STRIP-MCNC: 245 MG/DL (ref 74–99)
GLUCOSE BLD MANUAL STRIP-MCNC: 292 MG/DL (ref 74–99)
GLUCOSE SERPL-MCNC: 196 MG/DL (ref 74–99)
HCT VFR BLD AUTO: 53.8 % (ref 41–52)
HGB BLD-MCNC: 17.6 G/DL (ref 13.5–17.5)
MCH RBC QN AUTO: 31.2 PG (ref 26–34)
MCHC RBC AUTO-ENTMCNC: 32.7 G/DL (ref 32–36)
MCV RBC AUTO: 95 FL (ref 80–100)
NRBC BLD-RTO: 0 /100 WBCS (ref 0–0)
PHOSPHATE SERPL-MCNC: 4 MG/DL (ref 2.5–4.9)
PLATELET # BLD AUTO: 169 X10*3/UL (ref 150–450)
POTASSIUM SERPL-SCNC: 4.4 MMOL/L (ref 3.5–5.3)
RBC # BLD AUTO: 5.65 X10*6/UL (ref 4.5–5.9)
SODIUM SERPL-SCNC: 138 MMOL/L (ref 136–145)
WBC # BLD AUTO: 8.2 X10*3/UL (ref 4.4–11.3)

## 2024-10-02 PROCEDURE — 82947 ASSAY GLUCOSE BLOOD QUANT: CPT

## 2024-10-02 PROCEDURE — 80069 RENAL FUNCTION PANEL: CPT | Performed by: PHYSICIAN ASSISTANT

## 2024-10-02 PROCEDURE — 99232 SBSQ HOSP IP/OBS MODERATE 35: CPT | Performed by: STUDENT IN AN ORGANIZED HEALTH CARE EDUCATION/TRAINING PROGRAM

## 2024-10-02 PROCEDURE — 85027 COMPLETE CBC AUTOMATED: CPT | Performed by: PHYSICIAN ASSISTANT

## 2024-10-02 PROCEDURE — 1200000002 HC GENERAL ROOM WITH TELEMETRY DAILY

## 2024-10-02 PROCEDURE — 2500000004 HC RX 250 GENERAL PHARMACY W/ HCPCS (ALT 636 FOR OP/ED): Performed by: NURSE PRACTITIONER

## 2024-10-02 PROCEDURE — 2500000002 HC RX 250 W HCPCS SELF ADMINISTERED DRUGS (ALT 637 FOR MEDICARE OP, ALT 636 FOR OP/ED): Performed by: PHYSICIAN ASSISTANT

## 2024-10-02 PROCEDURE — 2500000001 HC RX 250 WO HCPCS SELF ADMINISTERED DRUGS (ALT 637 FOR MEDICARE OP): Performed by: PHYSICIAN ASSISTANT

## 2024-10-02 PROCEDURE — 36415 COLL VENOUS BLD VENIPUNCTURE: CPT | Performed by: PHYSICIAN ASSISTANT

## 2024-10-02 PROCEDURE — 94640 AIRWAY INHALATION TREATMENT: CPT

## 2024-10-02 RX ORDER — FUROSEMIDE 10 MG/ML
80 INJECTION INTRAMUSCULAR; INTRAVENOUS ONCE
Status: COMPLETED | OUTPATIENT
Start: 2024-10-02 | End: 2024-10-02

## 2024-10-02 ASSESSMENT — COGNITIVE AND FUNCTIONAL STATUS - GENERAL
MOBILITY SCORE: 24
DAILY ACTIVITIY SCORE: 24

## 2024-10-02 ASSESSMENT — PAIN SCALES - GENERAL
PAINLEVEL_OUTOF10: 0 - NO PAIN
PAINLEVEL_OUTOF10: 0 - NO PAIN

## 2024-10-02 ASSESSMENT — ACTIVITIES OF DAILY LIVING (ADL): LACK_OF_TRANSPORTATION: NO

## 2024-10-02 NOTE — PROGRESS NOTES
10/02/24 0900   Discharge Planning   Living Arrangements Friends   Support Systems Friends/neighbors   Assistance Needed none   Type of Residence Private residence   Number of Stairs to Enter Residence 5   Number of Stairs Within Residence 12   Do you have animals or pets at home? No   Who is requesting discharge planning? Provider   Home or Post Acute Services None   Expected Discharge Disposition Home   Does the patient need discharge transport arranged? Yes   RoundTrip coordination needed? Yes   Financial Resource Strain   How hard is it for you to pay for the very basics like food, housing, medical care, and heating? Not hard   Housing Stability   In the last 12 months, was there a time when you were not able to pay the mortgage or rent on time? N   In the past 12 months, how many times have you moved where you were living? 1   At any time in the past 12 months, were you homeless or living in a shelter (including now)? N   Transportation Needs   In the past 12 months, has lack of transportation kept you from medical appointments or from getting medications? no   In the past 12 months, has lack of transportation kept you from meetings, work, or from getting things needed for daily living? No     Assessment Note:  Met with patient and Introduced myself as care coordinator and member of the Care Transitions team for discharge planning. Pt feels safe at home.   Transportation: Patient will need a ride home. We will provide stretcher with oxygen transport back home.  Pharmacy: [facility].  DME: No  Previous home care: No  Falls: No  PCP: No  Dialysis: No    Confirmed patients address, phone number and emergency contact. Answered all questions and concerns.  Will continue to follow for discharge needs.    Transitional Care Coordination Progress Note:  Patient discussed during interdisciplinary rounds.   Team members present: MD and TCC  Plan per Medical/Surgical team: Patient getting diuresed  and getting psych consult.  Payor: Kamar  Discharge disposition: Home no needs  Potential Barriers: None  ADOD: 10/04/24

## 2024-10-02 NOTE — PROGRESS NOTES
[facility] Attending Shared Visit Note    This is a shared visit. Please see Advanced Practice Provider's encounter note for additional details.      Briefly, 54M admitted with phantom ICD shock and medication non-adherence. History notable for mixed ischemic and non CM (cocaine)/HFrEF (last EF 15-20%) s/p scICD (3/2024), CKD3a, prior remote left cerebellar hemorrhagic infarct. Multiple HF admissions this year with prior ICD shocks, but medication non-adherence.  Weigh + 12lbs. Code status DNR/DNI No prior LHC.    He reports he is interested in stopping cocaine (last use 2 days prior to admission)    Exam: Breathing unlabored but on O2. Regular. Warm. 2+ edema    Problems:   Principal Problem:    Acute on chronic congestive heart failure, unspecified heart failure type    Plan:   - continue amio  - continue aspirin / statin  - GDMT with BB, SGLT2i, ARNI, MRA  - aggressive diruesis  - pall care  - PT/OT  - [facility] consult  - consider LHC prior to discharge given no ischemic eval history but would want clear adherence.    Dispo: pending optimization    Mayco Skinner MD    Objective   Admit Date: 9/30/2024  Hospital Length of Stay: 2   Home: [place] [address]    MEDICATIONS  Infusions:     Scheduled:  amiodarone, 400 mg, Daily  aspirin, 81 mg, Daily  atorvastatin, 80 mg, Nightly  carvedilol, 3.125 mg, BID  empagliflozin, 10 mg, Daily  enoxaparin, 40 mg, q24h  [Held by provider] furosemide, 40 mg, Daily  insulin glargine, 30 Units, Nightly  insulin lispro, 0-10 Units, TID  sacubitriL-valsartan, 0.5 tablet, BID  sennosides, 2 tablet, BID  sertraline, 100 mg, Daily  spironolactone, 12.5 mg, Daily  tiotropium, 2 puff, Daily      PRN:  acetaminophen, 650 mg, q4h PRN   Or  acetaminophen, 650 mg, q4h PRN   Or  acetaminophen, 650 mg, q4h PRN  melatonin, 3 mg, Nightly PRN  oxygen, , Continuous PRN - O2/gases  propofol, , Code/trauma/sedation med      Prior to Admission Meds:  Medications Prior to Admission   Medication Sig  "Dispense Refill Last Dose    amiodarone (Pacerone) 200 mg tablet Take 2 tablets (400 mg) by mouth 3 times a day for 4 days, THEN 2 tablets (400 mg) once daily. 204 tablet 0 Unknown    aspirin 81 mg chewable tablet Chew 1 tablet (81 mg) once daily. 30 tablet 5 Unknown    atorvastatin (Lipitor) 80 mg tablet Take 1 tablet (80 mg) by mouth once daily at bedtime. 30 tablet 5 Unknown    blood sugar diagnostic strip Use as directed to test blood glucose up to four times daily and as needed. 200 each 5 Unknown    blood-glucose meter misc Inject 1 each under the skin 4 times a day with meals. Check blood glucose 4 times daily, before meals and at bedtime. 1 each 0 Unknown    carvedilol (Coreg) 3.125 mg tablet Take 1 tablet (3.125 mg) by mouth 2 times a day. 60 tablet 0 Unknown    empagliflozin (Jardiance) 10 mg TAKE 1 TABLET BY MOUTH ONCE DAILY 30 tablet 5 Unknown    furosemide (Lasix) 40 mg tablet Take 1 tablet (40 mg) by mouth once daily. 30 tablet 5 Unknown    glucose 4 gram chewable tablet Chew 2 tablets (8 g) if needed for low blood sugar - see comments. 50 tablet 12 Unknown    insulin glargine (Lantus) 100 unit/mL (3 mL) pen Inject 30 Units under the skin once daily at bedtime. 15 mL 5 Unknown at patient denies this medication    insulin lispro (HumaLOG) 100 unit/mL injection Inject 0-10 Units under the skin 3 times daily (morning, midday, late afternoon). Hypoglycemia protocol if Blood Glucose is between 0 - 70 mg/dL, 0 unit(s) if , 2 unit(s) if 151-200, 4 unit(s) if 201-250, 6 unit(s) if 251-300, 8 unit(s) if 301-350, 10 unit(s) if 351-400. Notify provider unit(s) if Blood Glucose is greater than 400 mg/dL 15 mL 5 Unknown    lancets 33 gauge misc Inject 1 each under the skin 4 times a day. 100 each 5 Unknown    pen needle 1/2\" 29G X 12mm needle Use to inject 1-4 times daily as directed. 100 each 5 Unknown    sacubitriL-valsartan (Entresto) 24-26 mg tablet Take 0.5 tablets by mouth 2 times a day. 30 tablet 0 " "Unknown    sertraline (Zoloft) 100 mg tablet Take 1 tablet (100 mg) by mouth once daily. 30 tablet 5 Unknown    spironolactone (Aldactone) 25 mg tablet Take 0.5 tablets (12.5 mg) by mouth once daily. 15 tablet 5 Unknown    tiotropium (Spiriva Respimat) 2.5 mcg/actuation inhaler Inhale 2 puffs once daily. 4 g 2 Unknown       Vitals:      10/2/2024     7:30 AM 10/2/2024     4:15 AM 10/1/2024    10:28 PM 10/1/2024    10:22 PM 10/1/2024     8:07 PM 10/1/2024     6:00 PM 10/1/2024     5:15 PM   Vitals   Systolic 129 100 116 116 103 103    Diastolic 87 56 87 87 80 82    Heart Rate 72 55 67 67 64 55 63   Temp  36.1 °C (97 °F) 36.4 °C (97.5 °F) 36.4 °C (97.5 °F)      Resp   18 18 18 19 18   Height (in)   1.727 m (5' 7.99\")       Weight (lb)   175.05       BMI   26.62 kg/m2       BSA (m2)   1.95 m2         Wt Readings from Last 5 Encounters:   10/01/24 79.4 kg (175 lb 0.7 oz)   09/08/24 79.4 kg (175 lb 0.7 oz)   08/06/24 73.7 kg (162 lb 7.7 oz)   02/16/24 77 kg (169 lb 12.1 oz)   12/28/23 73.2 kg (161 lb 6 oz)     No intake or output data in the 24 hours ending 10/02/24 0924      CHEST:  ,    CV:  Normal sinus rhythm  NEURO:   RASS: Light sedation  CAM:    LOC:    Cognition: Follows commands  GCS: 14    DATA:  CMP:  Recent Labs     10/01/24  1539 09/30/24  1701 09/12/24  0902 09/11/24  0647 09/10/24  1647 09/10/24  0641 09/09/24  2021 09/09/24  0757 09/08/24  0431 09/07/24  0949 09/07/24  0006    136 136 135* 134* 137 135* 134* 137 139 137   K 3.9 4.2 4.7 4.6 4.6 3.8 4.2 4.1 4.0 4.5 4.9    103 102 100 95* 101 97* 101 104 106 106   CO2 26 23 29 29 29 29 31 26 24 24 23   ANIONGAP 15 14 10 11 15 11 11 11 13 14 13   BUN 29* 30* 25* 25* 26* 25* 28* 28* 28* 25* 25*   CREATININE 1.72* 2.11* 1.52* 1.56* 1.71* 1.42* 1.74* 1.53* 1.34* 1.34* 1.28   EGFR 46* 36* 54* 52* 47* 58* 46* 53* 63 63 66   MG 2.29 2.16 2.44*  --  2.33  --  2.12  --  2.06 2.26 2.49*     Recent Labs     10/01/24  1539 09/30/24  1701 09/12/24  0902 " 09/11/24  0647 09/10/24  1647 09/10/24  0641 09/09/24  2021 09/09/24  0757 09/08/24  0431 09/07/24  0949 09/07/24  0006 07/30/24  1738 07/30/24  0731 02/16/24  0533 02/15/24  1927 02/15/24  0656 02/14/24  0419 02/13/24  0711 02/12/24  1239   ALBUMIN 3.6 3.8 3.6 3.4 3.6 3.2* 3.4 3.1*   < > 3.8 3.8   < > 3.9 3.2*   < > 3.2* 3.3*   < > 4.1   ALT  --  33  --   --   --   --   --   --   --  70* 63*  --  55* 23  --  31 40  --  68*   AST  --  25  --   --   --   --   --   --   --  40* 43*  --  28 13  --  17 19  --  40*   BILITOT  --  1.0  --   --   --   --   --   --   --  1.0 0.9  --  1.0 0.9  --  1.0 0.9  --  1.2    < > = values in this interval not displayed.     CBC:  Recent Labs     10/01/24  1539 09/30/24  1701 09/12/24  0902 09/11/24  0646 09/10/24  0641 09/09/24  0757 09/08/24  0431 09/07/24  0949   WBC 7.1 6.9 7.0 5.5 6.2 6.4 8.5 7.2   HGB 17.5 16.6 17.4 16.4 15.1 15.4 15.7 15.9   HCT 51.9 48.8 52.5* 51.7 44.4 46.3 45.2 48.0    155 168 155 137* 128* 150 158   MCV 91 91 93 97 90 94 90 92     COAG:   Recent Labs     09/30/24  1704 09/07/24  0949 12/10/23  0255 12/09/23 2057 10/23/23  1851 07/24/23  1330 03/02/23  0225   INR 1.2* 1.2*  --  1.2* 1.3* 1.2* 1.3*   HAUF  --   --  0.1  --   --   --   --      ABO:   Recent Labs     09/30/24  1704   ABO A     HEME/ENDO:   Recent Labs     09/07/24  1016 07/30/24  0815 07/30/24  0731 06/20/24  0749 01/09/24  0653 12/09/23  2057 06/13/23  1746 04/30/21  1640   TSH 2.68 2.65  --   --   --  1.46  --  1.60   HGBA1C  --   --  8.4* 6.9* 10.1* 7.6*   < >  --     < > = values in this interval not displayed.     CARDIAC:   Recent Labs     10/01/24  1033 09/30/24  2025 09/30/24  1844 09/30/24  1701 09/07/24  1016 09/07/24  0128 09/07/24  0006 07/31/24  1851 07/30/24  0815 07/30/24  0731 02/16/24  0533 02/15/24  0656 02/12/24  1811 02/12/24  1239 12/27/23  0759 12/25/23  0052 12/24/23 2229   CKMB  --   --   --   --   --   --   --   --   --   --   --   --   --   --  1.6  --   --     TROPHS 54* 67* 44  --  68* 71* 75*  --  117* 125*  --   --    < > 130*  --    < > 109*   BNP  --   --   --  >5,000*  --   --  2,585* 1,014*  --  3,240* 2,900* 3,752*  --  >5,000*  --   --  1,425*    < > = values in this interval not displayed.     Recent Labs     09/07/24  0949 07/25/23  0820 06/18/23  0627   CHOL 127 156 168   LDLF  --  97 103*   LDLCALC 72  --   --    HDL 35.4 40.5 35.6*   TRIG 98 92 146     TOX:  Recent Labs     09/30/24  1843 09/07/24  1257 07/30/24  1046   AMPHETAMINE Presumptive Negative Presumptive Negative Presumptive Negative   BENZO Presumptive Negative Presumptive Negative Presumptive Negative   CANNABINOID Presumptive Negative Presumptive Negative Presumptive Negative   COCAI Presumptive Positive* Presumptive Positive* Presumptive Positive*   FENTANYL Presumptive Negative Presumptive Negative Presumptive Negative   OPIATE Presumptive Negative Presumptive Negative Presumptive Negative   OXYCODONE Presumptive Negative Presumptive Negative Presumptive Negative   PCP Presumptive Negative Presumptive Negative Presumptive Negative     MICRO:   Recent Labs     03/02/23  1112 03/02/23  0225   CRP  --  1.54*   PROCAL 1.55* 1.15*     No results found for the last 90 days.      EKG:   Recent Labs     09/09/24  1455 09/07/24  0935 09/07/24  0024   ATRRATE 65 71 71   VENTRATE 65 71 71   PRINT 188 180 166   QRSDUR 118 110 108   QTCFRED 498 461 433   QTCCALCB 505 473 445     Encounter Date: 09/06/24   Electrocardiogram, 12-lead PRN ACS symptoms   Result Value    Ventricular Rate 65    Atrial Rate 65    GA Interval 188    QRS Duration 118    QT Interval 486    QTC Calculation(Bazett) 505    P Axis 46    R Axis -47    T Axis 91    QRS Count 11    Q Onset 206    P Onset 112    P Offset 165    T Offset 449    QTC Fredericia 498    Narrative    Normal sinus rhythm  Possible Left atrial enlargement  Left axis deviation  Left ventricular hypertrophy with QRS widening and repolarization abnormality  Inferior  infarct (cited on or before 07-SEP-2024)  Prolonged QT  Abnormal ECG  When compared with ECG of 07-SEP-2024 09:33,  Premature ventricular complexes are no longer Present  Confirmed by Leoncio Dias ([id number]) on 9/10/2024 4:02:49 PM     Echocardiogram:   Recent Labs     07/31/24  1507   EF 10   LVIDD 8.90   RV 39.4   RVFRWALLPKSP 7.00   TAPSE 1.6   Transthoracic Echo (TTE) Complete With Contrast 07/31/2024    [facility], [address] [place] [address]  Tel [phone] and Fax [phone]    TRANSTHORACIC ECHOCARDIOGRAM REPORT      Patient Name:      VINOD Campos Physician:    [id number] Jeanette Ellington MD  Study Date:        7/31/2024            Ordering Provider:    [id number] PALAFOX  MRN/PID:           [id number]             Fellow:  Accession#:        [id number]         Nurse:                Mimi Roman RN  Date of Birth/Age: 1970 / 54 years  Sonographer:          JORGE LUIS Giraldo RDCS  Gender:            M                    Additional Staff:  Height:            175.26 cm            Admit Date:           7/30/2024  Weight:            76.66 kg             Admission Status:     Inpatient -  Routine  BSA / BMI:         1.92 m2 / 24.96      Encounter#:           [id number]  kg/m2  Blood Pressure:    133/91 mmHg          Department Location:  [facility]    Study Type:    TRANSTHORACIC ECHO (TTE) COMPLETE  Diagnosis/ICD: Acute on chronic combined systolic (congestive) and diastolic  (congestive) heart failure (CHF)-I50.43  Indication:    Congestive Heart Failure  CPT Code:      Echo Complete w Full Doppler-[id number]    Patient History:  Smoker:            Former.  Diabetes:          Yes  Pertinent History: CHF, Dyspnea, COPD, CVA, HTN, Hyperlipidemia and  Palpitations. CKD, s/p ICD,.    Study Detail: The following Echo studies were performed: 2D, M-Mode, Doppler and  color flow. Optison used as a contrast agent for endocardial  border definition.  Total contrast used for this procedure was 2 mL  via IV push.      PHYSICIAN INTERPRETATION:  Left Ventricle: Left ventricular ejection fraction is severely decreased, calculated by Tim's biplane at 10%. There is global hypokinesis of the left ventricle with minor regional variations. The left ventricular cavity size is severely dilated. There is severe eccentric left ventricular hypertrophy. Spectral Doppler shows an abnormal pattern of left ventricular diastolic filling. There is no definite left ventricular thrombus visualized. There is spontaneous echocontrast noted within the LV cavity.Echocontrast was utilized and excluded LV apical thrombus.  Left Atrium: The left atrium is severely dilated.  Right Ventricle: The right ventricle is severely enlarged. There is reduced right ventricular systolic function. A device is visualized in the right ventricle.  Right Atrium: The right atrium is moderately to severely dilated. There is a device visualized in the right atrium.  Aortic Valve: The aortic valve is trileaflet. There is minimal aortic valve cusp calcification. There is no evidence of aortic valve regurgitation. The peak instantaneous gradient of the aortic valve is 5.1 mmHg.  Mitral Valve: The mitral valve is normal in structure. There is mild mitral valve regurgitation.  Tricuspid Valve: The tricuspid valve is structurally normal. There is mild to moderate tricuspid regurgitation. The Doppler estimated RVSP is mildly elevated at 39.4 mmHg.  Pulmonic Valve: The pulmonic valve is structurally normal. There is physiologic pulmonic valve regurgitation.  Pericardium: There is a trivial pericardial effusion.  Aorta: The aortic root is normal.  Systemic Veins: The inferior vena cava appears dilated.  In comparison to the previous echocardiogram(s): Compared with the prior exam from 7/26/2023, the degree of TR has increased, however there was already a severley dilated LV with severe systolic dysfunciton and a  dilated RV with reduced fx as well. Note that the prior exam included an agitated saline contrast study that was negative for shunt.      CONCLUSIONS:  1. Left ventricular ejection fraction is severely decreased, calculated by Tim's biplane at 10%.  2. There is global hypokinesis of the left ventricle with minor regional variations.  3. Spectral Doppler shows an abnormal pattern of left ventricular diastolic filling.  4. Left ventricular cavity size is severely dilated.  5. No left ventricular thrombus visualized.  6. There is spontaneous echocontrast noted within the LV cavity.Echocontrast was utilized and excluded LV apical thrombus.  7. There is severe eccentric left ventricular hypertrophy.  8. Severely enlarged right ventricle.  9. There is reduced right ventricular systolic function.  10. The left atrium is severely dilated.  11. The right atrium is moderately to severely dilated.  12. Mild to moderate tricuspid regurgitation visualized.  13. Mildly elevated RVSP.  14. Compared with the prior exam from 7/26/2023, the degree of TR has increased, however there was already a severley dilated LV with severe systolic dysfunciton and a dilated RV with reduced fx as well. Note that the prior exam included an agitated saline contrast study that was negative for shunt.    QUANTITATIVE DATA SUMMARY:  2D MEASUREMENTS:  Normal Ranges:  Ao Root d:     3.00 cm    (2.0-3.7cm)  LAs:           3.30 cm    (2.7-4.0cm)  IVSd:          0.90 cm    (0.6-1.1cm)  LVPWd:         1.30 cm    (0.6-1.1cm)  LVIDd:         8.90 cm    (3.9-5.9cm)  LVIDs:         6.00 cm  LV Mass Index: 287.0 g/m2  LV % FS        32.6 %    LA VOLUME:  Normal Ranges:  LA Vol A4C:        68.8 ml    (22+/-6mL/m2)  LA Vol A2C:        137.1 ml  LA Vol BP:         97.1 ml  LA Vol Index A4C:  35.8ml/m2  LA Vol Index A2C:  71.3 ml/m2  LA Vol Index BP:   50.5 ml/m2  LA Area A4C:       23.1 cm2  LA Area A2C:       32.6 cm2  LA Major Axis A4C: 6.6 cm  LA Major Axis  A2C: 6.6 cm  LA Volume Index:   50.5 ml/m2    RA VOLUME BY A/L METHOD:  Normal Ranges:  RA Area A4C: 30.2 cm2    AORTA MEASUREMENTS:  Normal Ranges:  Ao Sinus, d: 2.90 cm (2.1-3.5cm)  Asc Ao, d:   3.10 cm (2.1-3.4cm)    LV SYSTOLIC FUNCTION BY 2D PLANIMETRY (MOD):  Normal Ranges:  EF-A4C View:    10 % (>=55%)  EF-A2C View:    9 %  EF-Biplane:     10 %  LV EF Reported: 10 %    LV DIASTOLIC FUNCTION:  Normal Ranges:  MV e'         0.045 m/s (>8.0)  MV lateral e' 0.04 m/s  MV medial e'  0.05 m/s    AORTIC VALVE:  Normal Ranges:  AoV Vmax:      1.13 m/s (<=1.7m/s)  AoV Peak P.1 mmHg (<20mmHg)  LVOT Max Jori:  0.73 m/s (<=1.1m/s)  LVOT VTI:      8.65 cm  LVOT Diameter: 2.20 cm  (1.8-2.4cm)  AoV Area,Vmax: 2.46 cm2 (2.5-4.5cm2)      RIGHT VENTRICLE:  RV Basal 5.20 cm  RV Mid   2.80 cm  RV Major 11.2 cm  TAPSE:   15.8 mm  RV s'    0.07 m/s    TRICUSPID VALVE/RVSP:  Normal Ranges:  Peak TR Velocity: 2.80 m/s  RV Syst Pressure: 39.4 mmHg (< 30mmHg)  IVC Diam:         2.60 cm    PULMONIC VALVE:  Normal Ranges:  PV Max Jori: 0.7 m/s  (0.6-0.9m/s)  PV Max P.0 mmHg      [id number] Jeanette Ellington MD  Electronically signed on 2024 at 5:20:48 PM        ** Final **    Coronary Angiography: No results found for this or any previous visit from the past 1800 days.    Right Heart Cath: No results found for this or any previous visit from the past 1800 days.    Cardiac Scoring: No results found for this or any previous visit from the past 1800 days.    Cardiac MRI:   MR cardiac morphology and function w and wo IV contrast 2024    Narrative  Interpreted By:  Enoc Dorantes and Okyere Robert  STUDY:  MR CARDIAC MORPHOLOGY AND FUNCTION W AND WO IV CONTRAST;  2024  10:35 am    INDICATION:  Signs/Symptoms:HFrEF 10%, recent vfib.    COMPARISON:  None.    ACCESSION NUMBER(S):  [id number]    ORDERING CLINICIAN:  ANAY FRY    TECHNIQUE:  Siemens 1.5  Jovana MRI scanner.  Turbo spin echo and balanced steady state free precession  (bSSFP)  imaging for anatomic definition. Dynamic cine bSSFP for cardiac  chamber and wall-motion analysis, and valvular analysis. Flow  quantification sequences for hemodynamics. Delayed gadolinium  enhancement analysis after injection of gadolinium-chelate (mL of  Dotarem, 0.2 mmol/kg).    HT-175 cm; WT-73 kg; BSA-1.88 m2    FINDINGS:  CARDIAC CHAMBERS  Normal atrioventricular and ventriculoarterial concordance    LEFT ATRIUM  Severely dilated (DEANNE-51 ml/m2).    RIGHT ATRIUM  Moderately dilated (RA max 4ch-31.1 cm2)    INTERATRIAL SEPTUM  Intact.    LEFT VENTRICLE:  1. Severely dilated LV with severely reduced systolic function (LVEF  5-10%).  2. The basal inferoseptal, mid inferoseptal, apical septal,  apicolateral segments are akinetic. All other segments are severely  hypokinetic.  3. Eccentric left ventricular hypertrophy.  4. No evidence of LV thrombus.  5. Following administration of gadolinium, in the late phase, there  is transmural enhancement of the mid/apical inferoseptal/inferior  segments, subendocardial enhancement of the apicolateral segment (<  25% wall thickness) and mid wall septal enhancement.    Quantitative left ventricular functional values are as follows:  EDV = 648 cc; EDVi = 344 cc/m2  ESV = 617 cc; ESVi = 328 cc/m2  Stroke volume = 31 cc; SVi = 16 cc/m2  LVEF = 5 %  Absolute Cardiac Output = 2 l/min.; COi = 1.06 l/min/m2  LV mass = 415 gm; LVMi = 220 gm/m2    LVEDD: 8.2 cm  LVESD: 7.4 cm  LV septal wall thickness (anterobasal): 1.1 cm  LV postero-inferior wall thickness: 0.6 cm    FINDINGS  ----------------------------------------------  LV SCAR SIZE (17 SEGMENT):  15 %    PERFUSION:  There is reduced contrast wash-in in the mid inferoseptal wall.    RIGHT VENTRICLE  The right ventricle appears dilated in size and has reduced  qualitative systolic function by qualitative assessment. No abnormal  delayed enhancement in the myocardium. RV lead visualized.    INTERVENTRICULAR  SEPTUM  Intact.    AORTIC VALVE  The aortic valve is tricuspid with normal leaflet excursion.  There is  no aortic regurgitation.      MITRAL VALVE  There is  mild mitral regurgitation by qualitative assessment.    TRICUSPID VALVE  There is qualitative mild tricuspid regurgitation.    PULMONARY VALVE:  Not assessed.    PERICARDIUM:  The pericardium is normal. There is no pericardial effusion.    THORACIC AORTA  The thoracic aorta appears normal in course, caliber, and contour.  There is no evidence for acute aortic pathology. The ascending  thoracic aorta is 3.0 x 2.8 cm in diameter The arch vessel branching  pattern is  normal.   All the arch branch vessels appear widely  patent in their proximal portions.      PULMONARY ARTERIES  The central pulmonary arteries appear  normal (MPA-2.8 cm, RPA-1.8  cm, LPA-2.1 cm).    SYSTEMIC AND PULMONARY VEINS  Normal systemic venous and pulmonary venous return.  The SVC is of normal caliber. IVC appears normal  Normal pulmonary venous anatomy.    CHEST  The chest wall is normal.  Limited imaging through the lungs reveals no gross abnormalities. No  pleural effusion.    UPPER ABDOMEN  Limited imaging through the upper abdomen reveals no abnormalities of  the visualized organs.    Impression  1. Severely dilated left ventricle (EDVi 344 ml/m2) with severely  impaired systolic function (LVEF 5-10%).  2. The basal-mid inferoseptal, apical septal, apicolateral segments  are akinetic. All other segments are severely hypokinetic.    3. Eccentric left ventricular hypertrophy.  4. No evidence of LV thrombus.  5. LGE imaging demonstrates transmural infarction of the mid/apical  inferoseptal/inferior segments and a separate small region of  subendocardial infarction of the apicolateral wall (<25% wall  thickness). Additionally, there is mid-wall septal fibrosis  (nonischemic pattern).  6. Dilated right ventricle with impaired systolic function  (qualitative assessment). CIED lead  noted.    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    MACRO:  None    Signed by: Enoc Dorantes 8/5/2024 4:19 PM  Dictation workstation:   [id number]    Nuclear:No results found for this or any previous visit from the past 1800 days.    Metabolic Stress: No results found for this or any previous visit from the past 1800 days.      CT abdomen pelvis w IV contrast    Result Date: 9/30/2024  STUDY: CT Angiogram of the Chest, CT Abdomen and Pelvis with IV Contrast; 09/30/2024, 5:38 PM. INDICATION: Generalized abdominal pain. Flank pain. Concern for pulmonary embolism. COMPARISON: CXR: 09/30/24, 09/07/24, 07/30/24, 02/12/24; CTA chest: 10/23/23; CT AP: 10/23/23. ACCESSION NUMBER(S): [id number], [id number] ORDERING CLINICIAN: NIKOLAY MORGAN TECHNIQUE:  CTA of the chest was performed following rapid injection of intravenous contrast.  Images are reviewed and processed at a workstation according to the CT angiogram protocol with 3-D and/or MIP post processing imaging generated.  CT of the abdomen and pelvis was performed with intravenous contrast.  Omnipaque 350 80 mL was administered intravenously; positive oral contrast was given. Automated mA/kV exposure control was utilized and patient examination was performed in strict accordance with principles of ALARA. FINDINGS:  CTA CHEST: Pulmonary arteries are adequately opacified without acute or chronic filling defects.  The thoracic aorta is normal in course and caliber without dissection or aneurysm. The heart is enlarged.  Thoracic lymph nodes are not enlarged. The lungs are emphysematous. There are interstitial infiltrates in both lung bases most likely representing atelectasis.  There is a nodule laterally in the right middle lobe unchanged from 2023. ABDOMEN:  LIVER: No hepatomegaly.  Smooth surface contour.  Normal attenuation.  BILE DUCTS: No intrahepatic or extrahepatic biliary ductal dilatation.  GALLBLADDER:  The gallbladder is present.  STOMACH: No abnormalities identified.  PANCREAS: No masses or ductal dilatation.  SPLEEN: No splenomegaly or focal splenic lesion.  ADRENAL GLANDS: No thickening or nodules.  KIDNEYS AND URETERS: Kidneys are normal in size and location.  No renal or ureteral calculi.  PELVIS:  BLADDER: No abnormalities identified.  REPRODUCTIVE ORGANS: No abnormalities identified.  BOWEL: No abnormalities identified.  The appendix is identified and is normal.  VESSELS: No abnormalities identified.  Abdominal aorta is normal in caliber.  PERITONEUM/RETROPERITONEUM/LYMPH NODES: No free fluid.  No pneumoperitoneum. No lymphadenopathy.  ABDOMINAL WALL: No abnormalities identified. SOFT TISSUES: No abnormalities identified.  BONES: There are degenerative changes of lumbosacral spine with vacuum discs.  No acute fracture or aggressive osseous lesion.    No evidence of pulmonary embolism. Cardiomegaly with emphysematous changes in the lungs. Stable nodule in the right middle lobe. No acute process. Signed by Stanislaw Elizondo MD    CT angio chest for pulmonary embolism    Result Date: 9/30/2024  STUDY: CT Angiogram of the Chest, CT Abdomen and Pelvis with IV Contrast; 09/30/2024, 5:38 PM. INDICATION: Generalized abdominal pain. Flank pain. Concern for pulmonary embolism. COMPARISON: CXR: 09/30/24, 09/07/24, 07/30/24, 02/12/24; CTA chest: 10/23/23; CT AP: 10/23/23. ACCESSION NUMBER(S): [id number], [id number] ORDERING CLINICIAN: NIKOLAY MORGAN TECHNIQUE:  CTA of the chest was performed following rapid injection of intravenous contrast.  Images are reviewed and processed at a workstation according to the CT angiogram protocol with 3-D and/or MIP post processing imaging generated.  CT of the abdomen and pelvis was performed with intravenous contrast.  Omnipaque 350 80 mL was administered intravenously; positive oral contrast was given. Automated mA/kV exposure control was utilized and patient examination was performed  in strict accordance with principles of ALARA. FINDINGS:  CTA CHEST: Pulmonary arteries are adequately opacified without acute or chronic filling defects.  The thoracic aorta is normal in course and caliber without dissection or aneurysm. The heart is enlarged.  Thoracic lymph nodes are not enlarged. The lungs are emphysematous. There are interstitial infiltrates in both lung bases most likely representing atelectasis.  There is a nodule laterally in the right middle lobe unchanged from 2023. ABDOMEN:  LIVER: No hepatomegaly.  Smooth surface contour.  Normal attenuation.  BILE DUCTS: No intrahepatic or extrahepatic biliary ductal dilatation.  GALLBLADDER: The gallbladder is present.  STOMACH: No abnormalities identified.  PANCREAS: No masses or ductal dilatation.  SPLEEN: No splenomegaly or focal splenic lesion.  ADRENAL GLANDS: No thickening or nodules.  KIDNEYS AND URETERS: Kidneys are normal in size and location.  No renal or ureteral calculi.  PELVIS:  BLADDER: No abnormalities identified.  REPRODUCTIVE ORGANS: No abnormalities identified.  BOWEL: No abnormalities identified.  The appendix is identified and is normal.  VESSELS: No abnormalities identified.  Abdominal aorta is normal in caliber.  PERITONEUM/RETROPERITONEUM/LYMPH NODES: No free fluid.  No pneumoperitoneum. No lymphadenopathy.  ABDOMINAL WALL: No abnormalities identified. SOFT TISSUES: No abnormalities identified.  BONES: There are degenerative changes of lumbosacral spine with vacuum discs.  No acute fracture or aggressive osseous lesion.    No evidence of pulmonary embolism. Cardiomegaly with emphysematous changes in the lungs. Stable nodule in the right middle lobe. No acute process. Signed by Stanislaw Elizondo MD    XR chest 1 view    Result Date: 9/30/2024  STUDY: Chest Radiograph;  9/30/2024 5:14PM INDICATION: Chest pain. COMPARISON: 7/30/2024 XR Chest 2 Views, 10/23/2023 CT CTA Chest, 10/23/2023 XR Chest 1 View, 7/24/2023 CT CTA Chest ACCESSION  NUMBER(S): [id number] ORDERING CLINICIAN: NIKOLAY MORGAN TECHNIQUE:  Frontal chest was obtained at 17:14 hours. FINDINGS: CARDIOMEDIASTINAL SILHOUETTE: Heart is markedly enlarged but stable. Left-sided AICD noted. No pleural effusion or pneumothorax. No acute bony abnormality. Mild pulmonary vascular congestion.  LUNGS: Unchanged bullous changes in the left upper lobe.  ABDOMEN: No remarkable upper abdominal findings.  BONES: No acute osseous changes.    Unchanged cardiomegaly and mild pulmonary vascular congestion. Unchanged bullous changes in the left upper lobe. Signed by Tashi Cortés MD    Electrocardiogram, 12-lead PRN ACS symptoms    Result Date: 9/18/2024  Sinus rhythm with occasional Premature ventricular complexes Possible Left atrial enlargement Left axis deviation Left ventricular hypertrophy with repolarization abnormality Inferior infarct , age undetermined Abnormal ECG When compared with ECG of 07-SEP-2024 00:07, Premature ventricular complexes are now Present Inferior infarct is now Present Confirmed by James Shah ([id number]) on 9/18/2024 8:19:31 AM    Electrocardiogram, 12-lead PRN ACS symptoms    Result Date: 9/10/2024  Normal sinus rhythm Possible Left atrial enlargement Left axis deviation Left ventricular hypertrophy with QRS widening and repolarization abnormality Inferior infarct (cited on or before 07-SEP-2024) Prolonged QT Abnormal ECG When compared with ECG of 07-SEP-2024 09:33, Premature ventricular complexes are no longer Present Confirmed by Leoncio Dias ([id number]) on 9/10/2024 4:02:49 PM    ECG 12 Lead    Result Date: 9/7/2024  Normal sinus rhythm Left axis deviation Left ventricular hypertrophy with repolarization abnormality ( Sokolow-Nieves , Brown product ) Abnormal ECG When compared with ECG of 06-SEP-2024 12:34, No significant change was found See ED provider note for full interpretation and clinical correlation Confirmed by June Perez ([id number]) on 9/7/2024 2:16:33 AM    XR chest 2  views    Result Date: 9/7/2024  Interpreted By:  Dimitri Rodriguez, STUDY: XR CHEST 2 VIEWS;  9/7/2024 12:30 am   INDICATION: Signs/Symptoms:Syncope.   COMPARISON: Chest x-ray 07/30/2024   ACCESSION NUMBER(S): [id number]   ORDERING CLINICIAN: LUKE ARBOLEDA   FINDINGS: Left-sided single lead AICD is stable in position.   CARDIOMEDIASTINAL SILHOUETTE: Cardiac silhouette is enlarged but stable.   LUNGS: No consolidation, pleural effusion or pneumothorax. Mild diffuse interstitial prominence. Redemonstration of bullous emphysematous changes in the lung apices, left greater than right.   ABDOMEN: No remarkable upper abdominal findings.   BONES: Multilevel degenerative changes of the spine.       Stable cardiomegaly. Mild diffuse interstitial prominence could be chronic or relate to component developing interstitial edema. Correlate clinically.   Findings suggestive of COPD with bullous emphysematous changes in the lung apices, left greater than right.   MACRO: None   Signed by: Dimitri Rodriguez 9/7/2024 12:38 AM Dictation workstation:   [id number]       LDA:   NUTRITION: Adult diet Regular  EMERGENCY CONTACT: Extended Emergency Contact Information  Primary Emergency Contact: Elizabet Santana  Mobile Phone: [phone]  Relation: Friend  CODE STATUS: DNR  DISPO: Discharge Planning  Living Arrangements: Friends  Support Systems: Friends/neighbors  Assistance Needed: none  Type of Residence: Private residence  Home or Post Acute Services: None  Expected Discharge Disposition: Home  AMPAC: Daily Activity - Total Score: 24    FOLLOWUP: No future appointments.

## 2024-10-02 NOTE — CARE PLAN
The patient's goals for the shift include      The clinical goals for the shift include pt nwill remain free from injury this shift      Problem: Pain - Adult  Goal: Verbalizes/displays adequate comfort level or baseline comfort level  Outcome: Progressing     Problem: Safety - Adult  Goal: Free from fall injury  Outcome: Progressing     Problem: Fall/Injury  Goal: Not fall by end of shift  Outcome: Progressing  Goal: Be free from injury by end of the shift  Outcome: Progressing  Goal: Verbalize understanding of personal risk factors for fall in the hospital  Outcome: Progressing  Goal: Verbalize understanding of risk factor reduction measures to prevent injury from fall in the home  Outcome: Progressing  Goal: Use assistive devices by end of the shift  Outcome: Progressing  Goal: Pace activities to prevent fatigue by end of the shift  Outcome: Progressing     Problem: Respiratory  Goal: Minimize anxiety/maximize coping throughout shift  Outcome: Progressing  Goal: Minimal/no exertional discomfort or dyspnea this shift  Outcome: Progressing  Goal: No signs of respiratory distress (eg. Use of accessory muscles. Peds grunting)  Outcome: Progressing  Goal: Patent airway maintained this shift  Outcome: Progressing  Goal: Verbalize decreased shortness of breath this shift  Outcome: Progressing  Goal: Wean oxygen to maintain O2 saturation per order/standard this shift  Outcome: Progressing  Goal: Increase self care and/or family involvement in next 24 hours  Outcome: Progressing

## 2024-10-02 NOTE — CARE PLAN
The patient's goals for the shift include      The clinical goals for the shift include Pt will remain free from injury this shift      Problem: Pain - Adult  Goal: Verbalizes/displays adequate comfort level or baseline comfort level  Outcome: Progressing     Problem: Safety - Adult  Goal: Free from fall injury  Outcome: Progressing     Problem: Fall/Injury  Goal: Not fall by end of shift  Outcome: Progressing  Goal: Be free from injury by end of the shift  Outcome: Progressing  Goal: Verbalize understanding of personal risk factors for fall in the hospital  Outcome: Progressing  Goal: Verbalize understanding of risk factor reduction measures to prevent injury from fall in the home  Outcome: Progressing  Goal: Use assistive devices by end of the shift  Outcome: Progressing  Goal: Pace activities to prevent fatigue by end of the shift  Outcome: Progressing

## 2024-10-02 NOTE — CONSULTS
Inpatient consult to Palliative Care  Consult performed by: Rizwana Cook MD  Consult ordered by: Vicky Harkins PA-C  Reason for consult: Goals of Care        Palliative Medicine Consult  Complex medical decision making, symptom management, patient/family support    History obtained from chart review including ED note, H&P, patient's daily progress notes, review of lab/test results, and discussion with primary team and bedside RN.    Subjective    History of Present Illness  Mr. Leonel Yu is a 53 yo  male with HFrEF 5-10% s/p [employer] single chamber ICD (3/2024), CKD3a, HTN, HLD, LUZ (crack cocaine), former tobacco use, COPD (on nocturnal 2L at baseline), DM2, remote left cerebellar hemorrhagic infarct, medication non-adherence (reason- was unable to explore yet), anxiety, and major depression disorder. He routinely smokes cocaine but has cut down from daily to every 3 days for many months now. He was admitted for decompensated HFrEF.     Introduction to Palliative Medicine  Met with patient at bedside.   Patient alert and oriented, has capacity to make his own medical decisions at this time. When asked about a surrogate decision maker - states he doesn't have one and would need to think things through as he didn't have anyone close to him he could name.   Staff present: me and IM resident.   Palliative Medicine was introduced as a specialty service for patients with serious illness to help with symptom management, improve quality of life, assist with goals of care conversations, navigate complex decision making, and provide support to patients and families. Support and empathy was provided throughout the encounter. Provided reflective listening and presence.     Symptoms  Pain: denies  Dyspnea: occurs with activity.   Fatigue: has been quite fatigued- moderate.  Insomnia: denies  Drowsiness: minimal  Constipation: none. Regular BM.  Nausea: denies  Appetite: good appetite.    Anxiety: states has a bit of anxiety  Depression: initially denies and on further evaluation, he states he smokes cocaine to help with emotional pain. Indicated some degree of anhedonia but then on further conversations, gets armen from fixing cars. Denies any suicidal ideation.     Palliative Medicine Social History:  The patient is  and has adult children who are not in contact with him. Leonel lives with a friend in a house. Works as a .   Enjoys fixing cars. Enjoys country music- it's one of the only things that calms him.     Objective    Last Recorded Vitals  /80   Pulse 64   Temp 36.2 °C (97.1 °F)   Resp 18   Wt 79.4 kg (175 lb 0.7 oz)   SpO2 94%   BMI 26.62 kg/m²      Physical Exam  Vitals reviewed.   Constitutional:       Appearance: He is ill-appearing.   HENT:      Head: Normocephalic and atraumatic.   Eyes:      Pupils: Pupils are equal, round, and reactive to light.      Comments: Brea sclera   Cardiovascular:      Rate and Rhythm: Normal rate and regular rhythm.   Pulmonary:      Effort: Pulmonary effort is normal.      Breath sounds: Rales present.   Abdominal:      General: Bowel sounds are normal.      Palpations: Abdomen is soft.   Musculoskeletal:      Right lower leg: Edema present.      Left lower leg: Edema present.   Skin:     General: Skin is dry.   Neurological:      General: No focal deficit present.      Mental Status: He is alert and oriented to person, place, and time.   Psychiatric:         Behavior: Behavior normal.         Thought Content: Thought content normal.         Judgment: Judgment normal.      Comments: Flat mood.           Relevant Results  Results for orders placed or performed during the hospital encounter of 09/30/24 (from the past 24 hour(s))   POCT GLUCOSE   Result Value Ref Range    POCT Glucose 202 (H) 74 - 99 mg/dL   Troponin I, High Sensitivity   Result Value Ref Range    Troponin I, High Sensitivity (CMC) 54 (H) 0 - 53 ng/L   POCT  GLUCOSE   Result Value Ref Range    POCT Glucose 282 (H) 74 - 99 mg/dL   Renal function panel   Result Value Ref Range    Glucose 119 (H) 74 - 99 mg/dL    Sodium 138 136 - 145 mmol/L    Potassium 3.9 3.5 - 5.3 mmol/L    Chloride 101 98 - 107 mmol/L    Bicarbonate 26 21 - 32 mmol/L    Anion Gap 15 10 - 20 mmol/L    Urea Nitrogen 29 (H) 6 - 23 mg/dL    Creatinine 1.72 (H) 0.50 - 1.30 mg/dL    eGFR 46 (L) >60 mL/min/1.73m*2    Calcium 9.1 8.6 - 10.6 mg/dL    Phosphorus 4.0 2.5 - 4.9 mg/dL    Albumin 3.6 3.4 - 5.0 g/dL   CBC   Result Value Ref Range    WBC 7.1 4.4 - 11.3 x10*3/uL    nRBC 0.0 0.0 - 0.0 /100 WBCs    RBC 5.70 4.50 - 5.90 x10*6/uL    Hemoglobin 17.5 13.5 - 17.5 g/dL    Hematocrit 51.9 41.0 - 52.0 %    MCV 91 80 - 100 fL    MCH 30.7 26.0 - 34.0 pg    MCHC 33.7 32.0 - 36.0 g/dL    RDW 15.9 (H) 11.5 - 14.5 %    Platelets 162 150 - 450 x10*3/uL   Magnesium   Result Value Ref Range    Magnesium 2.29 1.60 - 2.40 mg/dL   POCT GLUCOSE   Result Value Ref Range    POCT Glucose 124 (H) 74 - 99 mg/dL      CT angio chest for pulmonary embolism  Narrative: STUDY:  CT Angiogram of the Chest, CT Abdomen and Pelvis with IV Contrast;  09/30/2024, 5:38 PM.  INDICATION:  Generalized abdominal pain. Flank pain. Concern for pulmonary  embolism.  COMPARISON:  CXR: 09/30/24, 09/07/24, 07/30/24, 02/12/24; CTA chest: 10/23/23; CT  AP: 10/23/23.  ACCESSION NUMBER(S):  [id number], [id number]  ORDERING CLINICIAN:  NIKOLAY MORGAN  TECHNIQUE:  CTA of the chest was performed following rapid injection  of intravenous contrast.  Images are reviewed and processed at a  workstation according to the CT angiogram protocol with 3-D and/or MIP  post processing imaging generated.  CT of the abdomen and pelvis was  performed with intravenous contrast.  Omnipaque 350 80 mL was  administered intravenously; positive oral contrast was given.  Automated mA/kV exposure control was utilized and patient examination  was performed in strict accordance  with principles of ALARA.  FINDINGS:    CTA CHEST:  Pulmonary arteries are adequately opacified without acute or chronic  filling defects.  The thoracic aorta is normal in course and caliber  without dissection or aneurysm.  The heart is enlarged.  Thoracic lymph nodes are not enlarged.  The lungs are emphysematous.  There are interstitial infiltrates in both lung bases most likely  representing atelectasis.  There is a nodule laterally in the right  middle lobe unchanged from 2023.  ABDOMEN:     LIVER:  No hepatomegaly.  Smooth surface contour.  Normal attenuation.     BILE DUCTS:  No intrahepatic or extrahepatic biliary ductal dilatation.     GALLBLADDER:  The gallbladder is present.     STOMACH:  No abnormalities identified.     PANCREAS:  No masses or ductal dilatation.     SPLEEN:  No splenomegaly or focal splenic lesion.     ADRENAL GLANDS:  No thickening or nodules.     KIDNEYS AND URETERS:  Kidneys are normal in size and location.  No renal or ureteral  calculi.     PELVIS:     BLADDER:  No abnormalities identified.     REPRODUCTIVE ORGANS:  No abnormalities identified.     BOWEL:  No abnormalities identified.  The appendix is identified and is  normal.     VESSELS:  No abnormalities identified.  Abdominal aorta is normal in caliber.      PERITONEUM/RETROPERITONEUM/LYMPH NODES:  No free fluid.  No pneumoperitoneum.  No lymphadenopathy.     ABDOMINAL WALL:  No abnormalities identified.  SOFT TISSUES:   No abnormalities identified.     BONES:  There are degenerative changes of lumbosacral spine with vacuum discs.   No acute fracture or aggressive osseous lesion.  Impression: No evidence of pulmonary embolism.  Cardiomegaly with emphysematous changes in the lungs.  Stable nodule in the right middle lobe.  No acute process.  Signed by Stanislaw Elizondo MD  CT abdomen pelvis w IV contrast  Narrative: STUDY:  CT Angiogram of the Chest, CT Abdomen and Pelvis with IV Contrast;  09/30/2024, 5:38  PM.  INDICATION:  Generalized abdominal pain. Flank pain. Concern for pulmonary  embolism.  COMPARISON:  CXR: 09/30/24, 09/07/24, 07/30/24, 02/12/24; CTA chest: 10/23/23; CT  AP: 10/23/23.  ACCESSION NUMBER(S):  [id number], [id number]  ORDERING CLINICIAN:  NIKOLAY MORGAN  TECHNIQUE:  CTA of the chest was performed following rapid injection  of intravenous contrast.  Images are reviewed and processed at a  workstation according to the CT angiogram protocol with 3-D and/or MIP  post processing imaging generated.  CT of the abdomen and pelvis was  performed with intravenous contrast.  Omnipaque 350 80 mL was  administered intravenously; positive oral contrast was given.  Automated mA/kV exposure control was utilized and patient examination  was performed in strict accordance with principles of ALARA.  FINDINGS:    CTA CHEST:  Pulmonary arteries are adequately opacified without acute or chronic  filling defects.  The thoracic aorta is normal in course and caliber  without dissection or aneurysm.  The heart is enlarged.  Thoracic lymph nodes are not enlarged.  The lungs are emphysematous.  There are interstitial infiltrates in both lung bases most likely  representing atelectasis.  There is a nodule laterally in the right  middle lobe unchanged from 2023.  ABDOMEN:     LIVER:  No hepatomegaly.  Smooth surface contour.  Normal attenuation.     BILE DUCTS:  No intrahepatic or extrahepatic biliary ductal dilatation.     GALLBLADDER:  The gallbladder is present.     STOMACH:  No abnormalities identified.     PANCREAS:  No masses or ductal dilatation.     SPLEEN:  No splenomegaly or focal splenic lesion.     ADRENAL GLANDS:  No thickening or nodules.     KIDNEYS AND URETERS:  Kidneys are normal in size and location.  No renal or ureteral  calculi.     PELVIS:     BLADDER:  No abnormalities identified.     REPRODUCTIVE ORGANS:  No abnormalities identified.     BOWEL:  No abnormalities identified.  The appendix is identified  and is  normal.     VESSELS:  No abnormalities identified.  Abdominal aorta is normal in caliber.      PERITONEUM/RETROPERITONEUM/LYMPH NODES:  No free fluid.  No pneumoperitoneum.  No lymphadenopathy.     ABDOMINAL WALL:  No abnormalities identified.  SOFT TISSUES:   No abnormalities identified.     BONES:  There are degenerative changes of lumbosacral spine with vacuum discs.   No acute fracture or aggressive osseous lesion.  Impression: No evidence of pulmonary embolism.  Cardiomegaly with emphysematous changes in the lungs.  Stable nodule in the right middle lobe.  No acute process.  Signed by Stanislaw Elizondo MD  XR chest 1 view  Narrative: STUDY:  Chest Radiograph;  9/30/2024 5:14PM  INDICATION:  Chest pain.  COMPARISON:  7/30/2024 XR Chest 2 Views, 10/23/2023 CT CTA Chest, 10/23/2023 XR  Chest 1 View, 7/24/2023 CT CTA Chest  ACCESSION NUMBER(S):  [id number]  ORDERING CLINICIAN:  NIKOLAY MORGAN  TECHNIQUE:  Frontal chest was obtained at 17:14 hours.  FINDINGS:  CARDIOMEDIASTINAL SILHOUETTE:  Heart is markedly enlarged but stable. Left-sided AICD noted. No  pleural effusion or pneumothorax. No acute bony abnormality. Mild  pulmonary vascular congestion.     LUNGS:  Unchanged bullous changes in the left upper lobe.     ABDOMEN:  No remarkable upper abdominal findings.     BONES:  No acute osseous changes.  Impression: Unchanged cardiomegaly and mild pulmonary vascular congestion.  Unchanged bullous changes in the left upper lobe.  Signed by Tashi Cortés MD     Encounter Date: 09/06/24   Electrocardiogram, 12-lead PRN ACS symptoms   Result Value    Ventricular Rate 65    Atrial Rate 65    NM Interval 188    QRS Duration 118    QT Interval 486    QTC Calculation(Bazett) 505    P Axis 46    R Axis -47    T Axis 91    QRS Count 11    Q Onset 206    P Onset 112    P Offset 165    T Offset 449    QTC Fredericia 498    Narrative    Normal sinus rhythm  Possible Left atrial enlargement  Left axis deviation  Left ventricular  hypertrophy with QRS widening and repolarization abnormality  Inferior infarct (cited on or before 07-SEP-2024)  Prolonged QT  Abnormal ECG  When compared with ECG of 07-SEP-2024 09:33,  Premature ventricular complexes are no longer Present  Confirmed by Leoncio Dias ([id number]) on 9/10/2024 4:02:49 PM        Allergies  Patient has no known allergies.    Scheduled medications  amiodarone, 400 mg, oral, Daily  aspirin, 81 mg, oral, Daily  atorvastatin, 80 mg, oral, Nightly  carvedilol, 3.125 mg, oral, BID  empagliflozin, 10 mg, oral, Daily  enoxaparin, 40 mg, subcutaneous, q24h  [Held by provider] furosemide, 40 mg, oral, Daily  insulin glargine, 30 Units, subcutaneous, Nightly  insulin lispro, 0-10 Units, subcutaneous, TID  sacubitriL-valsartan, 0.5 tablet, oral, BID  sennosides, 2 tablet, oral, BID  sertraline, 100 mg, oral, Daily  spironolactone, 12.5 mg, oral, Daily  tiotropium, 2 puff, inhalation, Daily      Continuous medications     PRN medications  PRN medications: acetaminophen **OR** acetaminophen **OR** acetaminophen, melatonin, oxygen, propofol     Assessment/Plan    Mr. Leonel Yu is a 55 yo  male with HFrEF 5-10% s/p [employer] single chamber ICD (3/2024), CKD3a, HTN, HLD, LUZ (crack cocaine), former tobacco use, COPD (on nocturnal 2L at baseline), DM2, remote left cerebellar hemorrhagic infarct, medication non-adherence (reason- was unable to explore yet), anxiety, and major depression disorder. He routinely smokes cocaine but has cut down from daily to every 3 days for many months now. He was admitted for decompensated HFrEF.     Palliative Performance Scale (PPS): 70%    Advanced Care Planning  Patient consented to a voluntary Advanced Care Planning meeting.   Serious Illness Assessment and Counseling:  Life Limiting Disease: Severe exacerbation of heart failure posing threat to life or function.     Disease Specific Information Provided/Prognosis Discussed: Patient's current  clinical condition, including diagnosis, prognosis, and management plan were discussed.   Counseling provided on guarded prognosis and what to expect with disease progression if he continues to abuse his body this way. He understands exactly the impact cocaine has on his heart, body and mind.     Understanding/Overall Impression: Patient expressing clear understanding of overall health status and severity of illness.     Goals/Hopes: Discussion ensued about patient's goals for their medical care going forward. Allowed patient time to talk about his/her current quality of life, disease course/progression, and symptom and treatment burden. Discussed care plan to continue with aggressive hospital care despite symptom and treatment burden versus choosing to transition to comfort based plan of care that focuses on symptom management and quality of life. He wants to continue to get better and see if he can change his habits but also acknowledges that he is accepting of whatever happens to him.     Minimal Acceptable Quality of Life/Maximal [place] Tolerable for the Possibility of More Time: Counseling provided on the concept of MAO. Patient expressing that they would never want to be in a health state where his cognition were not intact. He also acknowledges that if parts of his body were not functioning he was accept that fate and bear with it.       Resuscitation Assessment: Counseling provided on the benefit versus burden of CPR in the setting of patient's overall health status and he was agreeable with DNR/DNI.        Surrogate Health Care Decision Maker: needs to think it over. He can't name anyone off the top of his head at the moment.   HPOA: Yes, on file or copies requested.     Code Status: Decision to change code status to DNR/DNI per his wishes.     I spent 20 minutes in providing separately identifiable ACP services with the patient and/or surrogate decision maker in a voluntary conversation discussing the  patient's wishes and goals as detailed in the above note.   ----------------------------------------------------------------------------------------------------------------------------------------------------------------------------------------------------------------------------------------------------------------------------------------------------------------------------------------------------------------------    #Complex Medical Decision Making  #Goals of Care  #Advanced Care Planning  - Code status:  - Surrogate decision maker: needs to think this over.   - Goals are mix of survival and time and improved quality of life  - Will need to complete State DNR form   - Plan for patient to complete Advanced Directives with social work prior to discharge     #Major depression without suicidal ideation  - tried to talk things through. He recognized key triggers and concerns about some of these triggers.   - would be valuable to have psychiatry evaluate and treat.   - attempted mindfulness exercise which he felt could be a trigger for worsening symptom so did not start.     #Psychosocial Support  - Music Therapy - country music  - Spiritual Care Support - not spiritual or Buddhism but will have our  stop by to support him and monitor.   - Art Therapy - could be helpful.     Plan of Care discussed with: Updated MD on goals of care decision, medication adjustments, and code status   Will have our IDT see him tomorrow and then signoff.     Thank you for allowing us to participate in the care of this patient. Palliative will continue to follow as needed. Palliative medicine is available Monday-Friday, 8a-6p. Please contact team with any questions or concerns.  Team pager [phone]       Laura Cook MD [facility]  Palliative Medicine Physician  [email or web address]

## 2024-10-03 LAB
ALBUMIN SERPL BCP-MCNC: 3.4 G/DL (ref 3.4–5)
ANION GAP SERPL CALC-SCNC: 13 MMOL/L (ref 10–20)
BUN SERPL-MCNC: 32 MG/DL (ref 6–23)
CALCIUM SERPL-MCNC: 9 MG/DL (ref 8.6–10.6)
CHLORIDE SERPL-SCNC: 98 MMOL/L (ref 98–107)
CO2 SERPL-SCNC: 28 MMOL/L (ref 21–32)
CREAT SERPL-MCNC: 1.72 MG/DL (ref 0.5–1.3)
EGFRCR SERPLBLD CKD-EPI 2021: 46 ML/MIN/1.73M*2
ERYTHROCYTE [DISTWIDTH] IN BLOOD BY AUTOMATED COUNT: 15.6 % (ref 11.5–14.5)
GLUCOSE BLD MANUAL STRIP-MCNC: 141 MG/DL (ref 74–99)
GLUCOSE BLD MANUAL STRIP-MCNC: 215 MG/DL (ref 74–99)
GLUCOSE BLD MANUAL STRIP-MCNC: 272 MG/DL (ref 74–99)
GLUCOSE BLD MANUAL STRIP-MCNC: 320 MG/DL (ref 74–99)
GLUCOSE SERPL-MCNC: 190 MG/DL (ref 74–99)
HCT VFR BLD AUTO: 54.6 % (ref 41–52)
HGB BLD-MCNC: 17.9 G/DL (ref 13.5–17.5)
MCH RBC QN AUTO: 30.9 PG (ref 26–34)
MCHC RBC AUTO-ENTMCNC: 32.8 G/DL (ref 32–36)
MCV RBC AUTO: 94 FL (ref 80–100)
NRBC BLD-RTO: 0 /100 WBCS (ref 0–0)
PHOSPHATE SERPL-MCNC: 4 MG/DL (ref 2.5–4.9)
PLATELET # BLD AUTO: 175 X10*3/UL (ref 150–450)
POTASSIUM SERPL-SCNC: 4.1 MMOL/L (ref 3.5–5.3)
RBC # BLD AUTO: 5.8 X10*6/UL (ref 4.5–5.9)
SODIUM SERPL-SCNC: 135 MMOL/L (ref 136–145)
WBC # BLD AUTO: 9.3 X10*3/UL (ref 4.4–11.3)

## 2024-10-03 PROCEDURE — 99232 SBSQ HOSP IP/OBS MODERATE 35: CPT | Performed by: STUDENT IN AN ORGANIZED HEALTH CARE EDUCATION/TRAINING PROGRAM

## 2024-10-03 PROCEDURE — 36415 COLL VENOUS BLD VENIPUNCTURE: CPT | Performed by: PHYSICIAN ASSISTANT

## 2024-10-03 PROCEDURE — 80069 RENAL FUNCTION PANEL: CPT | Performed by: PHYSICIAN ASSISTANT

## 2024-10-03 PROCEDURE — 2500000004 HC RX 250 GENERAL PHARMACY W/ HCPCS (ALT 636 FOR OP/ED): Performed by: NURSE PRACTITIONER

## 2024-10-03 PROCEDURE — 85027 COMPLETE CBC AUTOMATED: CPT | Performed by: PHYSICIAN ASSISTANT

## 2024-10-03 PROCEDURE — 2500000001 HC RX 250 WO HCPCS SELF ADMINISTERED DRUGS (ALT 637 FOR MEDICARE OP): Performed by: PHYSICIAN ASSISTANT

## 2024-10-03 PROCEDURE — 82947 ASSAY GLUCOSE BLOOD QUANT: CPT

## 2024-10-03 PROCEDURE — 2500000002 HC RX 250 W HCPCS SELF ADMINISTERED DRUGS (ALT 637 FOR MEDICARE OP, ALT 636 FOR OP/ED): Performed by: PHYSICIAN ASSISTANT

## 2024-10-03 PROCEDURE — 94640 AIRWAY INHALATION TREATMENT: CPT

## 2024-10-03 PROCEDURE — 2500000001 HC RX 250 WO HCPCS SELF ADMINISTERED DRUGS (ALT 637 FOR MEDICARE OP): Performed by: NURSE PRACTITIONER

## 2024-10-03 PROCEDURE — 1200000002 HC GENERAL ROOM WITH TELEMETRY DAILY

## 2024-10-03 RX ORDER — FUROSEMIDE 10 MG/ML
80 INJECTION INTRAMUSCULAR; INTRAVENOUS ONCE
Status: COMPLETED | OUTPATIENT
Start: 2024-10-03 | End: 2024-10-03

## 2024-10-03 ASSESSMENT — COGNITIVE AND FUNCTIONAL STATUS - GENERAL
DAILY ACTIVITIY SCORE: 24
MOBILITY SCORE: 24
DAILY ACTIVITIY SCORE: 24
MOBILITY SCORE: 24

## 2024-10-03 ASSESSMENT — PAIN SCALES - GENERAL
PAINLEVEL_OUTOF10: 0 - NO PAIN
PAINLEVEL_OUTOF10: 0 - NO PAIN

## 2024-10-03 ASSESSMENT — PAIN - FUNCTIONAL ASSESSMENT: PAIN_FUNCTIONAL_ASSESSMENT: 0-10

## 2024-10-03 ASSESSMENT — PAIN SCALES - WONG BAKER: WONGBAKER_NUMERICALRESPONSE: NO HURT

## 2024-10-03 NOTE — PROGRESS NOTES
Subjective Data:  - diuresing well, Cr stable  - c/w lasix 80mg daily today  - patient still with dyspnea on exertion, improving  - increase entresto to 1 tab 24/26mg BID     Objective Data:  Last Recorded Vitals:  Vitals:    10/02/24 2038 10/03/24 0037 10/03/24 0436 10/03/24 0741   BP: 106/73 102/71 114/77 119/86   Pulse: 63 60 60 65   Resp: 18  18 18   Temp: 36.2 °C (97.2 °F) 36.4 °C (97.5 °F) 36.6 °C (97.9 °F) 36.3 °C (97.3 °F)   TempSrc: Temporal      SpO2: 97% 95% 97% 99%   Weight:       Height:           Last Labs:  CBC - 10/2/2024:  7:53 PM  8.2 17.6 169    53.8      CMP - 10/2/2024:  7:53 PM  8.9 7.3 25 --- 1.0   4.0 3.5 33 82      PTT - 9/30/2024:  5:04 PM  1.2   13.4 33     TROPHS   Date/Time Value Ref Range Status   10/01/2024 10:33 AM 54 0 - 53 ng/L Final   09/30/2024 08:25 PM 67 0 - 53 ng/L Final   09/30/2024 06:44 PM 44 0 - 53 ng/L Final   02/12/2024 09:47  0 - 53 ng/L Final     Comment:     Previous result verified on 2/12/2024 1836 on specimen/case [id number] called with component TRPHS for procedure Troponin I, High Sensitivity with value 130 ng/L.   02/12/2024 06:11  0 - 53 ng/L Final     Comment:     Previous result verified on 2/12/2024 1836 on specimen/case [id number] called with component TRPHS for procedure Troponin I, High Sensitivity with value 130 ng/L.   02/12/2024 12:39  0 - 53 ng/L Final     BNP   Date/Time Value Ref Range Status   09/30/2024 05:01 PM >5,000 0 - 99 pg/mL Final   09/07/2024 12:06 AM 2,585 0 - 99 pg/mL Final     HGBA1C   Date/Time Value Ref Range Status   07/30/2024 07:31 AM 8.4 see below % Final   06/20/2024 07:49 AM 6.9 4.3 - 5.6 % Final     Comment:     American Diabetes Association guidelines indicate that patients with HgbA1c in the range 5.7-6.4% are at increased risk for development of diabetes, and intervention by lifestyle modification may be beneficial. HgbA1c greater or equal to 6.5% is considered diagnostic of diabetes.   01/09/2024  06:53 AM 10.1 4.3 - 5.6 % Final     Comment:     American Diabetes Association guidelines indicate that patients with HgbA1c in the range 5.7-6.4% are at increased risk for development of diabetes, and intervention by lifestyle modification may be beneficial. HgbA1c greater or equal to 6.5% is considered diagnostic of diabetes.   12/09/2023 08:57 PM 7.6 see below % Final     LDLCALC   Date/Time Value Ref Range Status   09/07/2024 09:49 AM 72 <=99 mg/dL Final     Comment:                                 Near   Borderline      AGE      Desirable  Optimal    High     High     Very High     0-19 Y     0 - 109     ---    110-129   >/= 130     ----    20-24 Y     0 - 119     ---    120-159   >/= 160     ----      >24 Y     0 -  99   100-129  130-159   160-189     >/=190       VLDL   Date/Time Value Ref Range Status   09/07/2024 09:49 AM 20 0 - 40 mg/dL Final   07/25/2023 08:20 AM 18 0 - 40 mg/dL Final   06/18/2023 06:27 AM 29 0 - 40 mg/dL Final      Last I/O:  I/O last 3 completed shifts:  In: 920 (11.6 mL/kg) [P.O.:920]  Out: 500 (6.3 mL/kg) [Urine:500 (0.2 mL/kg/hr)]  Weight: 79.4 kg     Ejection Fractions:  EF   Date/Time Value Ref Range Status   07/31/2024 03:07 PM 10 %      Inpatient Medications:  Scheduled medications   Medication Dose Route Frequency    amiodarone  400 mg oral Daily    aspirin  81 mg oral Daily    atorvastatin  80 mg oral Nightly    carvedilol  3.125 mg oral BID    empagliflozin  10 mg oral Daily    enoxaparin  40 mg subcutaneous q24h    furosemide  80 mg intravenous Once    [Held by provider] furosemide  40 mg oral Daily    insulin glargine  30 Units subcutaneous Nightly    insulin lispro  0-10 Units subcutaneous TID    sacubitriL-valsartan  1 tablet oral BID    sennosides  2 tablet oral BID    sertraline  100 mg oral Daily    spironolactone  12.5 mg oral Daily    tiotropium  2 puff inhalation Daily     PRN medications   Medication    acetaminophen    Or    acetaminophen    Or    acetaminophen     melatonin    oxygen    propofol     Continuous Medications   Medication Dose Last Rate       Physical Exam:  General: NAD, tired  Head/ neck: + JVD  Cardiac: RRR, regular S1 S2 , no murmur, no rub, no gallop  Pulm: Quiet lung bases. NC O2 in place.  Vascular: Radial 2+ bilaterally  GI: non distended  Extremities: 1+ bilateral pitting LE edema   Neuro: no focal neuro deficits   Psych: appropriate mood and behavior   Skin: warm and dry         Assessment/Plan   54 y.o. male with a hx mixed ischemic/toxic mediated HFrEF (EF5-10%, fibrosis and transmural infarcts seen on cMRI 8/2024) s/p [employer] single chamber ICD (3/2024), CKD3a, HTN, HLD, LUZ (crack cocaine), former tobacco use, COPD (nocturnal 2L O2 at baseline), DM2, remote left cerebellar hemorrhagic infarct, medication noncompliance, anxiety, and depression. Admitted for acute decompensated heart failure in setting of medication non compliance.      Acute decompensated mixed ischemic/nonischemic HFrEF 5-10%  s/p single chamber ICD 3/2024  - ECHO 7/31:  EF 10%, gHK of LV, LV severely dilated, severe eccentric LVH, severely enlarged RV with reduced systolic fxn, severe LAE, mod to sev VIVI, mild to mod TR, RVSP 39 mildly elevated  - Cardiac MRI 8/5: ischemic/nonischemic.  Severely dilated LV EF 5-10%. Basal inferoseptal, mid inferoseptal, apical septal, apicolateral segments akinetic. Eccentric LVH. Transmural infarction of mid/apical inferoseptal/inferior segments and apicolateral wall (<25% wall thickeness). NICM pattern of mid wall septal fibrosis.  - Low suspicion for true ACS.   - Troponin flat/mildly elevated   - EKG 10/1: SR 77, left axis dev, LVH, IVCD/LBBB. No acute ischemic changes as compared to prior 9/7/24  - Transmural infarcts on cardiac MRI. Coronary Calcifications on CT chest  - Secondary vascular prevention with aspirin and statin.  - Hypervolemic, needs diuresis  - CXR 9/30 with vascular congestion and cardiomegaly  - BNP >5K  - Admit Wt  "79.4kg  - Daily weight: 79.4kg  - remains hypervolemic, dyspnea on exertion  - diuresing well, c/w  IV lasix 80mg today  - c/w  GDMT: as of last DC summary recommendations- Carvedilol 3.125.,Entresto 24-26 BID, Empagliflozin 10m daily, Spironolactone 12.5mg daily,  - If pt unwilling to take evening medications, then consolidate to once daily beta blocker (bisoprolol) and ARB (losartan)  - Palliative care consult requested given advanced HF with numerous re-admits and overall poor prognosis since ongoing substance use precludes him from advanced HF therapy candidacy  - now DNAR  - reccommended psych consult for depression/suicidal ideations, but patient is not interested at this time   - Goals are mix of survival and time and improved quality of life  - Will need to complete State DNR form     Ventriculary Tachycardia/ VF   Hx of possible AF RVR   - Device interrogation 10/1:   - Last shock 9/5 appropriate for VT ( last admission).   - 12yr battery life.   - Lead fxn WNL.   -  <1%.   - Patient without any shocks or tachyarrhythmias since amiodarone loading   - For the epsiode  9/5 prior EP consult noted:\" Possible dual tachycardia with PAF  RVR and concurrent VT/VF in the setting of active cocaine use.\" EP did not feel right atrial lead placement  deemed appropriate in light of ongoing cocaine use and increased risk of infection.NO OAC recommended.  - Continue amiodarone 400mg daily and coreg 3.125mg BID     COPD  Former tobacco user  - 2L at night as baseline      Substance use disorder, cocaine  - Cessation advised  - Urine tox cocaine positive     Type II Diabetes  - Insulin glargine 30unit nightly  - SSI #2 while here     Mood disorder, unspecified  - zoloft 100mg daily     D\VT ppx: subcutaneous lovenox  DISPO: pending diuresis  Code Status: DNR    Patient seen and discussed with Dr. Skinner.    Code Status:  DNR      Bart Casas, APRN-CNP, DNP  "

## 2024-10-03 NOTE — PROGRESS NOTES
[facility] Attending Shared Visit Note    This is a shared visit. Please see Advanced Practice Provider's encounter note for additional details.      Briefly, 54M admitted with phantom ICD shock and medication non-adherence. History notable for mixed ischemic and non CM (cocaine)/HFrEF (last EF 15-20%) s/p scICD (3/2024), CKD3a, prior remote left cerebellar hemorrhagic infarct. Multiple HF admissions this year with prior ICD shocks, but medication non-adherence.  Weigh + 12lbs. Code status DNR/DNI No prior LHC.    He reports he is interested in stopping cocaine (last use 2 days prior to admission)    Exam: Breathing unlabored but on O2. Regular. Warm. 2+ edema    Problems:   Principal Problem:    Acute on chronic congestive heart failure, unspecified heart failure type    Notable Therapies   Class  Agent SAFETY    *ARNI* / ACE / ARB  sacubitriL-valsartan Last BP: 119/86, Last BUN/Cr (GFR): 10/2/2024: 30/1.75 (45)    *Beta-Blocker*  carvedilol Last HR: 65    *MRA*  spironolactone Last K: 10/2/2024: 4.4     *SGLT2*  empagliflozin Last A1c 7/30/2024: 8.4     Diuretic  Furosemide  Last BNP: 9/30/2024: >5,000    Hydralazine/ISDN       Digoxin  Last Digoxin level: 7/30/2024: <0.30    Anticoagulation  amiodarone Last Hgb: 10/2/2024: 17.6    Anti-arrhythmic   Last QTc: 9/9/2024: 505    Antiplatelet  aspirin Last Platelet: 10/2/2024: 169    Lipid-Lowering  atorvastatin Last Tchol 9/7/2024: 127 / LDL 7/25/2023: 97 or 9/7/2024: 72 / HDL 9/7/2024: 35.4 / TRIG 9/7/2024: 98    Other        Device(s)  Sq ICD EF: 7/31/2024: 10%, QRS: 9/9/2024: 118ms    Cardiac Rehab,   if EF <35/MI/OHS        Plan:   - continue amio  - continue aspirin / statin  - GDMT with BB, SGLT2i, ARNI, MRA  - aggressive diruesis  - pall care  - PT/OT  - [facility] consult  - consider LHC prior to discharge given no ischemic eval history but would want clear adherence.    Dispo: pending optimization    Mayco Skinner MD    Objective   Admit Date:  9/30/2024  Hospital Length of Stay: 3   Home: [place] [id number]    MEDICATIONS  Infusions:     Scheduled:  amiodarone, 400 mg, Daily  aspirin, 81 mg, Daily  atorvastatin, 80 mg, Nightly  carvedilol, 3.125 mg, BID  empagliflozin, 10 mg, Daily  enoxaparin, 40 mg, q24h  [Held by provider] furosemide, 40 mg, Daily  insulin glargine, 30 Units, Nightly  insulin lispro, 0-10 Units, TID  sacubitriL-valsartan, 0.5 tablet, BID  sennosides, 2 tablet, BID  sertraline, 100 mg, Daily  spironolactone, 12.5 mg, Daily  tiotropium, 2 puff, Daily      PRN:  acetaminophen, 650 mg, q4h PRN   Or  acetaminophen, 650 mg, q4h PRN   Or  acetaminophen, 650 mg, q4h PRN  melatonin, 3 mg, Nightly PRN  oxygen, , Continuous PRN - O2/gases  propofol, , Code/trauma/sedation med      Prior to Admission Meds:  Medications Prior to Admission   Medication Sig Dispense Refill Last Dose    amiodarone (Pacerone) 200 mg tablet Take 2 tablets (400 mg) by mouth 3 times a day for 4 days, THEN 2 tablets (400 mg) once daily. 204 tablet 0 Unknown    aspirin 81 mg chewable tablet Chew 1 tablet (81 mg) once daily. 30 tablet 5 Unknown    atorvastatin (Lipitor) 80 mg tablet Take 1 tablet (80 mg) by mouth once daily at bedtime. 30 tablet 5 Unknown    blood sugar diagnostic strip Use as directed to test blood glucose up to four times daily and as needed. 200 each 5 Unknown    blood-glucose meter misc Inject 1 each under the skin 4 times a day with meals. Check blood glucose 4 times daily, before meals and at bedtime. 1 each 0 Unknown    carvedilol (Coreg) 3.125 mg tablet Take 1 tablet (3.125 mg) by mouth 2 times a day. 60 tablet 0 Unknown    empagliflozin (Jardiance) 10 mg TAKE 1 TABLET BY MOUTH ONCE DAILY 30 tablet 5 Unknown    furosemide (Lasix) 40 mg tablet Take 1 tablet (40 mg) by mouth once daily. 30 tablet 5 Unknown    glucose 4 gram chewable tablet Chew 2 tablets (8 g) if needed for low blood sugar - see comments. 50 tablet 12 Unknown    insulin glargine  "(Lantus) 100 unit/mL (3 mL) pen Inject 30 Units under the skin once daily at bedtime. 15 mL 5 Unknown at patient denies this medication    insulin lispro (HumaLOG) 100 unit/mL injection Inject 0-10 Units under the skin 3 times daily (morning, midday, late afternoon). Hypoglycemia protocol if Blood Glucose is between 0 - 70 mg/dL, 0 unit(s) if , 2 unit(s) if 151-200, 4 unit(s) if 201-250, 6 unit(s) if 251-300, 8 unit(s) if 301-350, 10 unit(s) if 351-400. Notify provider unit(s) if Blood Glucose is greater than 400 mg/dL 15 mL 5 Unknown    lancets 33 gauge misc Inject 1 each under the skin 4 times a day. 100 each 5 Unknown    pen needle 1/2\" 29G X 12mm needle Use to inject 1-4 times daily as directed. 100 each 5 Unknown    sacubitriL-valsartan (Entresto) 24-26 mg tablet Take 0.5 tablets by mouth 2 times a day. 30 tablet 0 Unknown    sertraline (Zoloft) 100 mg tablet Take 1 tablet (100 mg) by mouth once daily. 30 tablet 5 Unknown    spironolactone (Aldactone) 25 mg tablet Take 0.5 tablets (12.5 mg) by mouth once daily. 15 tablet 5 Unknown    tiotropium (Spiriva Respimat) 2.5 mcg/actuation inhaler Inhale 2 puffs once daily. 4 g 2 Unknown       Vitals:      10/3/2024     7:41 AM 10/3/2024     4:36 AM 10/3/2024    12:37 AM 10/2/2024     8:38 PM 10/2/2024     3:39 PM 10/2/2024    11:55 AM 10/2/2024     7:30 AM   Vitals   Systolic 119 114 102 106 97 109 129   Diastolic 86 77 71 73 62 70 87   Heart Rate 65 60 60 63 56 60 72   Temp 36.3 °C (97.3 °F) 36.6 °C (97.9 °F) 36.4 °C (97.5 °F) 36.2 °C (97.2 °F) 35.9 °C (96.6 °F)     Resp 18 18  18 18       Wt Readings from Last 5 Encounters:   10/01/24 79.4 kg (175 lb 0.7 oz)   09/08/24 79.4 kg (175 lb 0.7 oz)   08/06/24 73.7 kg (162 lb 7.7 oz)   02/16/24 77 kg (169 lb 12.1 oz)   12/28/23 73.2 kg (161 lb 6 oz)       Intake/Output Summary (Last 24 hours) at 10/3/2024 0916  Last data filed at 10/2/2024 1800  Gross per 24 hour   Intake 520 ml   Output 500 ml   Net 20 ml "         CHEST:  ,    CV:  Normal sinus rhythm  NEURO:   RASS: Light sedation  CAM:    LOC:    Cognition: Appropriate attention/concentration, Follows commands  GCS: 14    DATA:  CMP:  Recent Labs     10/02/24  1953 10/01/24  1539 09/30/24  1701 09/12/24  0902 09/11/24  0647 09/10/24  1647 09/10/24  0641 09/09/24 2021 09/09/24  0757 09/08/24  0431 09/07/24  0949 09/07/24  0006    138 136 136 135* 134* 137 135*   < > 137 139 137   K 4.4 3.9 4.2 4.7 4.6 4.6 3.8 4.2   < > 4.0 4.5 4.9   CL 99 101 103 102 100 95* 101 97*   < > 104 106 106   CO2 27 26 23 29 29 29 29 31   < > 24 24 23   ANIONGAP 16 15 14 10 11 15 11 11   < > 13 14 13   BUN 30* 29* 30* 25* 25* 26* 25* 28*   < > 28* 25* 25*   CREATININE 1.75* 1.72* 2.11* 1.52* 1.56* 1.71* 1.42* 1.74*   < > 1.34* 1.34* 1.28   EGFR 45* 46* 36* 54* 52* 47* 58* 46*   < > 63 63 66   MG  --  2.29 2.16 2.44*  --  2.33  --  2.12  --  2.06 2.26 2.49*    < > = values in this interval not displayed.     Recent Labs     10/02/24  1953 10/01/24  1539 09/30/24  1701 09/12/24  0902 09/11/24  0647 09/10/24  1647 09/10/24  0641 09/09/24 2021 09/08/24  0431 09/07/24  0949 09/07/24  0006 07/30/24  1738 07/30/24  0731 02/16/24  0533 02/15/24  1927 02/15/24  0656 02/14/24  0419 02/13/24  0711 02/12/24  1239   ALBUMIN 3.5 3.6 3.8 3.6 3.4 3.6 3.2* 3.4   < > 3.8 3.8   < > 3.9 3.2*   < > 3.2* 3.3*   < > 4.1   ALT  --   --  33  --   --   --   --   --   --  70* 63*  --  55* 23  --  31 40  --  68*   AST  --   --  25  --   --   --   --   --   --  40* 43*  --  28 13  --  17 19  --  40*   BILITOT  --   --  1.0  --   --   --   --   --   --  1.0 0.9  --  1.0 0.9  --  1.0 0.9  --  1.2    < > = values in this interval not displayed.     CBC:  Recent Labs     10/02/24  1953 10/01/24  1539 09/30/24  1701 09/12/24  0902 09/11/24  0646 09/10/24  0641 09/09/24  0757 09/08/24  0431   WBC 8.2 7.1 6.9 7.0 5.5 6.2 6.4 8.5   HGB 17.6* 17.5 16.6 17.4 16.4 15.1 15.4 15.7   HCT 53.8* 51.9 48.8 52.5* 51.7 44.4 46.3  45.2    162 155 168 155 137* 128* 150   MCV 95 91 91 93 97 90 94 90     COAG:   Recent Labs     09/30/24  1704 09/07/24  0949 12/10/23  0255 12/09/23  2057 10/23/23  1851 07/24/23  1330 03/02/23  0225   INR 1.2* 1.2*  --  1.2* 1.3* 1.2* 1.3*   HAUF  --   --  0.1  --   --   --   --      ABO:   Recent Labs     09/30/24  1704   ABO A     HEME/ENDO:   Recent Labs     09/07/24  1016 07/30/24  0815 07/30/24  0731 06/20/24  0749 01/09/24  0653 12/09/23  2057 06/13/23  1746 04/30/21  1640   TSH 2.68 2.65  --   --   --  1.46  --  1.60   HGBA1C  --   --  8.4* 6.9* 10.1* 7.6*   < >  --     < > = values in this interval not displayed.     CARDIAC:   Recent Labs     10/01/24  1033 09/30/24  2025 09/30/24  1844 09/30/24  1701 09/07/24  1016 09/07/24  0128 09/07/24  0006 07/31/24  1851 07/30/24  0815 07/30/24  0731 02/16/24  0533 02/15/24  0656 02/12/24  1811 02/12/24  1239 12/27/23  0759 12/25/23  0052 12/24/23  2229   CKMB  --   --   --   --   --   --   --   --   --   --   --   --   --   --  1.6  --   --    TROPHS 54* 67* 44  --  68* 71* 75*  --  117* 125*  --   --    < > 130*  --    < > 109*   BNP  --   --   --  >5,000*  --   --  2,585* 1,014*  --  3,240* 2,900* 3,752*  --  >5,000*  --   --  1,425*    < > = values in this interval not displayed.     Recent Labs     09/07/24  0949 07/25/23  0820 06/18/23  0627   CHOL 127 156 168   LDLF  --  97 103*   LDLCALC 72  --   --    HDL 35.4 40.5 35.6*   TRIG 98 92 146     TOX:  Recent Labs     09/30/24  1843 09/07/24  1257 07/30/24  1046   AMPHETAMINE Presumptive Negative Presumptive Negative Presumptive Negative   BENZO Presumptive Negative Presumptive Negative Presumptive Negative   CANNABINOID Presumptive Negative Presumptive Negative Presumptive Negative   COCAI Presumptive Positive* Presumptive Positive* Presumptive Positive*   FENTANYL Presumptive Negative Presumptive Negative Presumptive Negative   OPIATE Presumptive Negative Presumptive Negative Presumptive Negative    OXYCODONE Presumptive Negative Presumptive Negative Presumptive Negative   PCP Presumptive Negative Presumptive Negative Presumptive Negative     MICRO:   Recent Labs     03/02/23  1112 03/02/23  0225   CRP  --  1.54*   PROCAL 1.55* 1.15*     No results found for the last 90 days.      EKG:   Recent Labs     09/09/24  1455 09/07/24  0935 09/07/24  0024   ATRRATE 65 71 71   VENTRATE 65 71 71   PRINT 188 180 166   QRSDUR 118 110 108   QTCFRED 498 461 433   QTCCALCB 505 473 445     Encounter Date: 09/06/24   Electrocardiogram, 12-lead PRN ACS symptoms   Result Value    Ventricular Rate 65    Atrial Rate 65    AZ Interval 188    QRS Duration 118    QT Interval 486    QTC Calculation(Bazett) 505    P Axis 46    R Axis -47    T Axis 91    QRS Count 11    Q Onset 206    P Onset 112    P Offset 165    T Offset 449    QTC Fredericia 498    Narrative    Normal sinus rhythm  Possible Left atrial enlargement  Left axis deviation  Left ventricular hypertrophy with QRS widening and repolarization abnormality  Inferior infarct (cited on or before 07-SEP-2024)  Prolonged QT  Abnormal ECG  When compared with ECG of 07-SEP-2024 09:33,  Premature ventricular complexes are no longer Present  Confirmed by Leoncio Dias ([id number]) on 9/10/2024 4:02:49 PM     Echocardiogram:   Recent Labs     07/31/24  1507   EF 10   LVIDD 8.90   RV 39.4   RVFRWALLPKSP 7.00   TAPSE 1.6   Transthoracic Echo (TTE) Complete With Contrast 07/31/2024    [facility], [address] [place] [address]  Tel [phone] and Fax [phone]    TRANSTHORACIC ECHOCARDIOGRAM REPORT      Patient Name:      VINOD Campos Physician:    [id number] Jeanette Ellington MD  Study Date:        7/31/2024            Ordering Provider:    [id number] SHAREE PALAFOX  MRN/PID:           [id number]             Fellow:  Accession#:        [id number]         Nurse:                Mimi Roman RN  Date of Birth/Age: 1970 / 54 years  Sonographer:           China De La Cruz  [facility], FASE  Gender:            M                    Additional Staff:  Height:            175.26 cm            Admit Date:           7/30/2024  Weight:            76.66 kg             Admission Status:     Inpatient -  Routine  BSA / BMI:         1.92 m2 / 24.96      Encounter#:           [id number]  kg/m2  Blood Pressure:    133/91 mmHg          Department Location:  [facility] Non  Invasive    Study Type:    TRANSTHORACIC ECHO (TTE) COMPLETE  Diagnosis/ICD: Acute on chronic combined systolic (congestive) and diastolic  (congestive) heart failure (CHF)-I50.43  Indication:    Congestive Heart Failure  CPT Code:      Echo Complete w Full Doppler-22386    Patient History:  Smoker:            Former.  Diabetes:          Yes  Pertinent History: CHF, Dyspnea, COPD, CVA, HTN, Hyperlipidemia and  Palpitations. CKD, s/p ICD,.    Study Detail: The following Echo studies were performed: 2D, M-Mode, Doppler and  color flow. Optison used as a contrast agent for endocardial  border definition. Total contrast used for this procedure was 2 mL  via IV push.      PHYSICIAN INTERPRETATION:  Left Ventricle: Left ventricular ejection fraction is severely decreased, calculated by Tim's biplane at 10%. There is global hypokinesis of the left ventricle with minor regional variations. The left ventricular cavity size is severely dilated. There is severe eccentric left ventricular hypertrophy. Spectral Doppler shows an abnormal pattern of left ventricular diastolic filling. There is no definite left ventricular thrombus visualized. There is spontaneous echocontrast noted within the LV cavity.Echocontrast was utilized and excluded LV apical thrombus.  Left Atrium: The left atrium is severely dilated.  Right Ventricle: The right ventricle is severely enlarged. There is reduced right ventricular systolic function. A device is visualized in the right ventricle.  Right Atrium: The right atrium is moderately to severely  dilated. There is a device visualized in the right atrium.  Aortic Valve: The aortic valve is trileaflet. There is minimal aortic valve cusp calcification. There is no evidence of aortic valve regurgitation. The peak instantaneous gradient of the aortic valve is 5.1 mmHg.  Mitral Valve: The mitral valve is normal in structure. There is mild mitral valve regurgitation.  Tricuspid Valve: The tricuspid valve is structurally normal. There is mild to moderate tricuspid regurgitation. The Doppler estimated RVSP is mildly elevated at 39.4 mmHg.  Pulmonic Valve: The pulmonic valve is structurally normal. There is physiologic pulmonic valve regurgitation.  Pericardium: There is a trivial pericardial effusion.  Aorta: The aortic root is normal.  Systemic Veins: The inferior vena cava appears dilated.  In comparison to the previous echocardiogram(s): Compared with the prior exam from 7/26/2023, the degree of TR has increased, however there was already a severley dilated LV with severe systolic dysfunciton and a dilated RV with reduced fx as well. Note that the prior exam included an agitated saline contrast study that was negative for shunt.      CONCLUSIONS:  1. Left ventricular ejection fraction is severely decreased, calculated by Tim's biplane at 10%.  2. There is global hypokinesis of the left ventricle with minor regional variations.  3. Spectral Doppler shows an abnormal pattern of left ventricular diastolic filling.  4. Left ventricular cavity size is severely dilated.  5. No left ventricular thrombus visualized.  6. There is spontaneous echocontrast noted within the LV cavity.Echocontrast was utilized and excluded LV apical thrombus.  7. There is severe eccentric left ventricular hypertrophy.  8. Severely enlarged right ventricle.  9. There is reduced right ventricular systolic function.  10. The left atrium is severely dilated.  11. The right atrium is moderately to severely dilated.  12. Mild to moderate  tricuspid regurgitation visualized.  13. Mildly elevated RVSP.  14. Compared with the prior exam from 2023, the degree of TR has increased, however there was already a severley dilated LV with severe systolic dysfunciton and a dilated RV with reduced fx as well. Note that the prior exam included an agitated saline contrast study that was negative for shunt.    QUANTITATIVE DATA SUMMARY:  2D MEASUREMENTS:  Normal Ranges:  Ao Root d:     3.00 cm    (2.0-3.7cm)  LAs:           3.30 cm    (2.7-4.0cm)  IVSd:          0.90 cm    (0.6-1.1cm)  LVPWd:         1.30 cm    (0.6-1.1cm)  LVIDd:         8.90 cm    (3.9-5.9cm)  LVIDs:         6.00 cm  LV Mass Index: 287.0 g/m2  LV % FS        32.6 %    LA VOLUME:  Normal Ranges:  LA Vol A4C:        68.8 ml    (22+/-6mL/m2)  LA Vol A2C:        137.1 ml  LA Vol BP:         97.1 ml  LA Vol Index A4C:  35.8ml/m2  LA Vol Index A2C:  71.3 ml/m2  LA Vol Index BP:   50.5 ml/m2  LA Area A4C:       23.1 cm2  LA Area A2C:       32.6 cm2  LA Major Axis A4C: 6.6 cm  LA Major Axis A2C: 6.6 cm  LA Volume Index:   50.5 ml/m2    RA VOLUME BY A/L METHOD:  Normal Ranges:  RA Area A4C: 30.2 cm2    AORTA MEASUREMENTS:  Normal Ranges:  Ao Sinus, d: 2.90 cm (2.1-3.5cm)  Asc Ao, d:   3.10 cm (2.1-3.4cm)    LV SYSTOLIC FUNCTION BY 2D PLANIMETRY (MOD):  Normal Ranges:  EF-A4C View:    10 % (>=55%)  EF-A2C View:    9 %  EF-Biplane:     10 %  LV EF Reported: 10 %    LV DIASTOLIC FUNCTION:  Normal Ranges:  MV e'         0.045 m/s (>8.0)  MV lateral e' 0.04 m/s  MV medial e'  0.05 m/s    AORTIC VALVE:  Normal Ranges:  AoV Vmax:      1.13 m/s (<=1.7m/s)  AoV Peak P.1 mmHg (<20mmHg)  LVOT Max Jori:  0.73 m/s (<=1.1m/s)  LVOT VTI:      8.65 cm  LVOT Diameter: 2.20 cm  (1.8-2.4cm)  AoV Area,Vmax: 2.46 cm2 (2.5-4.5cm2)      RIGHT VENTRICLE:  RV Basal 5.20 cm  RV Mid   2.80 cm  RV Major 11.2 cm  TAPSE:   15.8 mm  RV s'    0.07 m/s    TRICUSPID VALVE/RVSP:  Normal Ranges:  Peak TR Velocity: 2.80 m/s  RV Syst  Pressure: 39.4 mmHg (< 30mmHg)  IVC Diam:         2.60 cm    PULMONIC VALVE:  Normal Ranges:  PV Max Jori: 0.7 m/s  (0.6-0.9m/s)  PV Max P.0 mmHg      [id number] Jeanette Ellington MD  Electronically signed on 2024 at 5:20:48 PM        ** Final **    Coronary Angiography: No results found for this or any previous visit from the past 1800 days.    Right Heart Cath: No results found for this or any previous visit from the past 1800 days.    Cardiac Scoring: No results found for this or any previous visit from the past 1800 days.    Cardiac MRI:   MR cardiac morphology and function w and wo IV contrast 2024    Narrative  Interpreted By:  Enoc Dorantes  and Roberto Mao  STUDY:  MR CARDIAC MORPHOLOGY AND FUNCTION W AND WO IV CONTRAST;  2024  10:35 am    INDICATION:  Signs/Symptoms:HFrEF 10%, recent vfib.    COMPARISON:  None.    ACCESSION NUMBER(S):  [id number]    ORDERING CLINICIAN:  ANAY FRY    TECHNIQUE:  Siemens 1.5  Jovana MRI scanner.  Turbo spin echo and balanced steady state free precession (bSSFP)  imaging for anatomic definition. Dynamic cine bSSFP for cardiac  chamber and wall-motion analysis, and valvular analysis. Flow  quantification sequences for hemodynamics. Delayed gadolinium  enhancement analysis after injection of gadolinium-chelate (mL of  Dotarem, 0.2 mmol/kg).    HT-175 cm; WT-73 kg; BSA-1.88 m2    FINDINGS:  CARDIAC CHAMBERS  Normal atrioventricular and ventriculoarterial concordance    LEFT ATRIUM  Severely dilated (DEANNE-51 ml/m2).    RIGHT ATRIUM  Moderately dilated (RA max 4ch-31.1 cm2)    INTERATRIAL SEPTUM  Intact.    LEFT VENTRICLE:  1. Severely dilated LV with severely reduced systolic function (LVEF  5-10%).  2. The basal inferoseptal, mid inferoseptal, apical septal,  apicolateral segments are akinetic. All other segments are severely  hypokinetic.  3. Eccentric left ventricular hypertrophy.  4. No evidence of LV thrombus.  5. Following administration of gadolinium, in the  late phase, there  is transmural enhancement of the mid/apical inferoseptal/inferior  segments, subendocardial enhancement of the apicolateral segment (<  25% wall thickness) and mid wall septal enhancement.    Quantitative left ventricular functional values are as follows:  EDV = 648 cc; EDVi = 344 cc/m2  ESV = 617 cc; ESVi = 328 cc/m2  Stroke volume = 31 cc; SVi = 16 cc/m2  LVEF = 5 %  Absolute Cardiac Output = 2 l/min.; COi = 1.06 l/min/m2  LV mass = 415 gm; LVMi = 220 gm/m2    LVEDD: 8.2 cm  LVESD: 7.4 cm  LV septal wall thickness (anterobasal): 1.1 cm  LV postero-inferior wall thickness: 0.6 cm    FINDINGS  ----------------------------------------------  LV SCAR SIZE (17 SEGMENT):  15 %    PERFUSION:  There is reduced contrast wash-in in the mid inferoseptal wall.    RIGHT VENTRICLE  The right ventricle appears dilated in size and has reduced  qualitative systolic function by qualitative assessment. No abnormal  delayed enhancement in the myocardium. RV lead visualized.    INTERVENTRICULAR SEPTUM  Intact.    AORTIC VALVE  The aortic valve is tricuspid with normal leaflet excursion.  There is  no aortic regurgitation.      MITRAL VALVE  There is  mild mitral regurgitation by qualitative assessment.    TRICUSPID VALVE  There is qualitative mild tricuspid regurgitation.    PULMONARY VALVE:  Not assessed.    PERICARDIUM:  The pericardium is normal. There is no pericardial effusion.    THORACIC AORTA  The thoracic aorta appears normal in course, caliber, and contour.  There is no evidence for acute aortic pathology. The ascending  thoracic aorta is 3.0 x 2.8 cm in diameter The arch vessel branching  pattern is  normal.   All the arch branch vessels appear widely  patent in their proximal portions.      PULMONARY ARTERIES  The central pulmonary arteries appear  normal (MPA-2.8 cm, RPA-1.8  cm, LPA-2.1 cm).    SYSTEMIC AND PULMONARY VEINS  Normal systemic venous and pulmonary venous return.  The SVC is of normal  caliber. IVC appears normal  Normal pulmonary venous anatomy.    CHEST  The chest wall is normal.  Limited imaging through the lungs reveals no gross abnormalities. No  pleural effusion.    UPPER ABDOMEN  Limited imaging through the upper abdomen reveals no abnormalities of  the visualized organs.    Impression  1. Severely dilated left ventricle (EDVi 344 ml/m2) with severely  impaired systolic function (LVEF 5-10%).  2. The basal-mid inferoseptal, apical septal, apicolateral segments  are akinetic. All other segments are severely hypokinetic.    3. Eccentric left ventricular hypertrophy.  4. No evidence of LV thrombus.  5. LGE imaging demonstrates transmural infarction of the mid/apical  inferoseptal/inferior segments and a separate small region of  subendocardial infarction of the apicolateral wall (<25% wall  thickness). Additionally, there is mid-wall septal fibrosis  (nonischemic pattern).  6. Dilated right ventricle with impaired systolic function  (qualitative assessment). CIED lead noted.    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    MACRO:  None    Signed by: Enoc Dorantes 8/5/2024 4:19 PM  Dictation workstation:   [id number]    Nuclear:No results found for this or any previous visit from the past 1800 days.    Metabolic Stress: No results found for this or any previous visit from the past 1800 days.      CT abdomen pelvis w IV contrast    Result Date: 9/30/2024  STUDY: CT Angiogram of the Chest, CT Abdomen and Pelvis with IV Contrast; 09/30/2024, 5:38 PM. INDICATION: Generalized abdominal pain. Flank pain. Concern for pulmonary embolism. COMPARISON: CXR: 09/30/24, 09/07/24, 07/30/24, 02/12/24; CTA chest: 10/23/23; CT AP: 10/23/23. ACCESSION NUMBER(S): [id number], [id number] ORDERING CLINICIAN: NIKOLAY MORGAN TECHNIQUE:  CTA of the chest was performed following rapid injection of intravenous contrast.  Images are reviewed and processed at a  workstation according to the CT angiogram protocol with 3-D and/or MIP post processing imaging generated.  CT of the abdomen and pelvis was performed with intravenous contrast.  Omnipaque 350 80 mL was administered intravenously; positive oral contrast was given. Automated mA/kV exposure control was utilized and patient examination was performed in strict accordance with principles of ALARA. FINDINGS:  CTA CHEST: Pulmonary arteries are adequately opacified without acute or chronic filling defects.  The thoracic aorta is normal in course and caliber without dissection or aneurysm. The heart is enlarged.  Thoracic lymph nodes are not enlarged. The lungs are emphysematous. There are interstitial infiltrates in both lung bases most likely representing atelectasis.  There is a nodule laterally in the right middle lobe unchanged from 2023. ABDOMEN:  LIVER: No hepatomegaly.  Smooth surface contour.  Normal attenuation.  BILE DUCTS: No intrahepatic or extrahepatic biliary ductal dilatation.  GALLBLADDER: The gallbladder is present.  STOMACH: No abnormalities identified.  PANCREAS: No masses or ductal dilatation.  SPLEEN: No splenomegaly or focal splenic lesion.  ADRENAL GLANDS: No thickening or nodules.  KIDNEYS AND URETERS: Kidneys are normal in size and location.  No renal or ureteral calculi.  PELVIS:  BLADDER: No abnormalities identified.  REPRODUCTIVE ORGANS: No abnormalities identified.  BOWEL: No abnormalities identified.  The appendix is identified and is normal.  VESSELS: No abnormalities identified.  Abdominal aorta is normal in caliber.  PERITONEUM/RETROPERITONEUM/LYMPH NODES: No free fluid.  No pneumoperitoneum. No lymphadenopathy.  ABDOMINAL WALL: No abnormalities identified. SOFT TISSUES: No abnormalities identified.  BONES: There are degenerative changes of lumbosacral spine with vacuum discs.  No acute fracture or aggressive osseous lesion.    No evidence of pulmonary embolism. Cardiomegaly with  emphysematous changes in the lungs. Stable nodule in the right middle lobe. No acute process. Signed by Stanislaw Elizondo MD    CT angio chest for pulmonary embolism    Result Date: 9/30/2024  STUDY: CT Angiogram of the Chest, CT Abdomen and Pelvis with IV Contrast; 09/30/2024, 5:38 PM. INDICATION: Generalized abdominal pain. Flank pain. Concern for pulmonary embolism. COMPARISON: CXR: 09/30/24, 09/07/24, 07/30/24, 02/12/24; CTA chest: 10/23/23; CT AP: 10/23/23. ACCESSION NUMBER(S): [id number], [id number] ORDERING CLINICIAN: NIKOLAY MORGAN TECHNIQUE:  CTA of the chest was performed following rapid injection of intravenous contrast.  Images are reviewed and processed at a workstation according to the CT angiogram protocol with 3-D and/or MIP post processing imaging generated.  CT of the abdomen and pelvis was performed with intravenous contrast.  Omnipaque 350 80 mL was administered intravenously; positive oral contrast was given. Automated mA/kV exposure control was utilized and patient examination was performed in strict accordance with principles of ALARA. FINDINGS:  CTA CHEST: Pulmonary arteries are adequately opacified without acute or chronic filling defects.  The thoracic aorta is normal in course and caliber without dissection or aneurysm. The heart is enlarged.  Thoracic lymph nodes are not enlarged. The lungs are emphysematous. There are interstitial infiltrates in both lung bases most likely representing atelectasis.  There is a nodule laterally in the right middle lobe unchanged from 2023. ABDOMEN:  LIVER: No hepatomegaly.  Smooth surface contour.  Normal attenuation.  BILE DUCTS: No intrahepatic or extrahepatic biliary ductal dilatation.  GALLBLADDER: The gallbladder is present.  STOMACH: No abnormalities identified.  PANCREAS: No masses or ductal dilatation.  SPLEEN: No splenomegaly or focal splenic lesion.  ADRENAL GLANDS: No thickening or nodules.  KIDNEYS AND URETERS: Kidneys are normal in size and  location.  No renal or ureteral calculi.  PELVIS:  BLADDER: No abnormalities identified.  REPRODUCTIVE ORGANS: No abnormalities identified.  BOWEL: No abnormalities identified.  The appendix is identified and is normal.  VESSELS: No abnormalities identified.  Abdominal aorta is normal in caliber.  PERITONEUM/RETROPERITONEUM/LYMPH NODES: No free fluid.  No pneumoperitoneum. No lymphadenopathy.  ABDOMINAL WALL: No abnormalities identified. SOFT TISSUES: No abnormalities identified.  BONES: There are degenerative changes of lumbosacral spine with vacuum discs.  No acute fracture or aggressive osseous lesion.    No evidence of pulmonary embolism. Cardiomegaly with emphysematous changes in the lungs. Stable nodule in the right middle lobe. No acute process. Signed by Stanislaw Elizondo MD    XR chest 1 view    Result Date: 9/30/2024  STUDY: Chest Radiograph;  9/30/2024 5:14PM INDICATION: Chest pain. COMPARISON: 7/30/2024 XR Chest 2 Views, 10/23/2023 CT CTA Chest, 10/23/2023 XR Chest 1 View, 7/24/2023 CT CTA Chest ACCESSION NUMBER(S): [id number] ORDERING CLINICIAN: NIKOLAY MORGAN TECHNIQUE:  Frontal chest was obtained at 17:14 hours. FINDINGS: CARDIOMEDIASTINAL SILHOUETTE: Heart is markedly enlarged but stable. Left-sided AICD noted. No pleural effusion or pneumothorax. No acute bony abnormality. Mild pulmonary vascular congestion.  LUNGS: Unchanged bullous changes in the left upper lobe.  ABDOMEN: No remarkable upper abdominal findings.  BONES: No acute osseous changes.    Unchanged cardiomegaly and mild pulmonary vascular congestion. Unchanged bullous changes in the left upper lobe. Signed by Tashi Cortés MD    Electrocardiogram, 12-lead PRN ACS symptoms    Result Date: 9/18/2024  Sinus rhythm with occasional Premature ventricular complexes Possible Left atrial enlargement Left axis deviation Left ventricular hypertrophy with repolarization abnormality Inferior infarct , age undetermined Abnormal ECG When compared with ECG of  07-SEP-2024 00:07, Premature ventricular complexes are now Present Inferior infarct is now Present Confirmed by James Shah ([id number]) on 9/18/2024 8:19:31 AM    Electrocardiogram, 12-lead PRN ACS symptoms    Result Date: 9/10/2024  Normal sinus rhythm Possible Left atrial enlargement Left axis deviation Left ventricular hypertrophy with QRS widening and repolarization abnormality Inferior infarct (cited on or before 07-SEP-2024) Prolonged QT Abnormal ECG When compared with ECG of 07-SEP-2024 09:33, Premature ventricular complexes are no longer Present Confirmed by Leoncio Dias ([id number]) on 9/10/2024 4:02:49 PM    ECG 12 Lead    Result Date: 9/7/2024  Normal sinus rhythm Left axis deviation Left ventricular hypertrophy with repolarization abnormality ( Sokolow-Nieves , Brown product ) Abnormal ECG When compared with ECG of 06-SEP-2024 12:34, No significant change was found See ED provider note for full interpretation and clinical correlation Confirmed by June Perez ([id number]) on 9/7/2024 2:16:33 AM    XR chest 2 views    Result Date: 9/7/2024  Interpreted By:  Dimitri Rodriguez, STUDY: XR CHEST 2 VIEWS;  9/7/2024 12:30 am   INDICATION: Signs/Symptoms:Syncope.   COMPARISON: Chest x-ray 07/30/2024   ACCESSION NUMBER(S): [id number]   ORDERING CLINICIAN: LUKE ARBOLEDA   FINDINGS: Left-sided single lead AICD is stable in position.   CARDIOMEDIASTINAL SILHOUETTE: Cardiac silhouette is enlarged but stable.   LUNGS: No consolidation, pleural effusion or pneumothorax. Mild diffuse interstitial prominence. Redemonstration of bullous emphysematous changes in the lung apices, left greater than right.   ABDOMEN: No remarkable upper abdominal findings.   BONES: Multilevel degenerative changes of the spine.       Stable cardiomegaly. Mild diffuse interstitial prominence could be chronic or relate to component developing interstitial edema. Correlate clinically.   Findings suggestive of COPD with bullous emphysematous changes in the lung  apices, left greater than right.   MACRO: None   Signed by: Dimitri Rodriguez 9/7/2024 12:38 AM Dictation workstation:   [id number]       LDA:   NUTRITION: Adult diet Regular  EMERGENCY CONTACT: Extended Emergency Contact Information  Primary Emergency Contact: EstherElizabet  Mobile Phone: [phone]  Relation: Friend  CODE STATUS: DNR  DISPO: Discharge Planning  Living Arrangements: Friends  Support Systems: Friends/neighbors  Assistance Needed: none  Type of Residence: Private residence  Number of Stairs to Enter Residence: 5  Number of Stairs Within Residence: 12  Do you have animals or pets at home?: No  Who is requesting discharge planning?: Provider  Home or Post Acute Services: None  Expected Discharge Disposition: Home  Does the patient need discharge transport arranged?: Yes  RoundTrip coordination needed?: Yes  AMPAC: Daily Activity - Total Score: 24    FOLLOWUP: No future appointments.

## 2024-10-03 NOTE — SIGNIFICANT EVENT
Goals are clear. Patient will continue to have ADHF and be admitted, kerline with MDD and LUZ.   DNR/DNI. If continues to worsen, then hospice would be appropriate.   Will sign off.     Please call with any questions or concerns.       Laura Cook MD [facility]  Palliative Medicine Physician  [email or web address]

## 2024-10-04 ENCOUNTER — APPOINTMENT (OUTPATIENT)
Dept: CARDIOLOGY | Facility: HOSPITAL | Age: 54
End: 2024-10-04
Payer: COMMERCIAL

## 2024-10-04 LAB
ALBUMIN SERPL BCP-MCNC: 3.7 G/DL (ref 3.4–5)
ANION GAP SERPL CALC-SCNC: 15 MMOL/L (ref 10–20)
BUN SERPL-MCNC: 32 MG/DL (ref 6–23)
CALCIUM SERPL-MCNC: 9 MG/DL (ref 8.6–10.6)
CHLORIDE SERPL-SCNC: 99 MMOL/L (ref 98–107)
CO2 SERPL-SCNC: 28 MMOL/L (ref 21–32)
CREAT SERPL-MCNC: 1.66 MG/DL (ref 0.5–1.3)
EGFRCR SERPLBLD CKD-EPI 2021: 48 ML/MIN/1.73M*2
ERYTHROCYTE [DISTWIDTH] IN BLOOD BY AUTOMATED COUNT: 15.9 % (ref 11.5–14.5)
GLUCOSE BLD MANUAL STRIP-MCNC: 134 MG/DL (ref 74–99)
GLUCOSE BLD MANUAL STRIP-MCNC: 166 MG/DL (ref 74–99)
GLUCOSE BLD MANUAL STRIP-MCNC: 282 MG/DL (ref 74–99)
GLUCOSE BLD MANUAL STRIP-MCNC: 349 MG/DL (ref 74–99)
GLUCOSE SERPL-MCNC: 110 MG/DL (ref 74–99)
HCT VFR BLD AUTO: 56.8 % (ref 41–52)
HGB BLD-MCNC: 18.8 G/DL (ref 13.5–17.5)
MCH RBC QN AUTO: 30.5 PG (ref 26–34)
MCHC RBC AUTO-ENTMCNC: 33.1 G/DL (ref 32–36)
MCV RBC AUTO: 92 FL (ref 80–100)
NRBC BLD-RTO: 0 /100 WBCS (ref 0–0)
PHOSPHATE SERPL-MCNC: 3.7 MG/DL (ref 2.5–4.9)
PLATELET # BLD AUTO: 183 X10*3/UL (ref 150–450)
POTASSIUM SERPL-SCNC: 4.6 MMOL/L (ref 3.5–5.3)
RBC # BLD AUTO: 6.16 X10*6/UL (ref 4.5–5.9)
SODIUM SERPL-SCNC: 137 MMOL/L (ref 136–145)
WBC # BLD AUTO: 9.8 X10*3/UL (ref 4.4–11.3)

## 2024-10-04 PROCEDURE — 80069 RENAL FUNCTION PANEL: CPT | Performed by: PHYSICIAN ASSISTANT

## 2024-10-04 PROCEDURE — 99232 SBSQ HOSP IP/OBS MODERATE 35: CPT | Performed by: STUDENT IN AN ORGANIZED HEALTH CARE EDUCATION/TRAINING PROGRAM

## 2024-10-04 PROCEDURE — 93005 ELECTROCARDIOGRAM TRACING: CPT

## 2024-10-04 PROCEDURE — 2500000001 HC RX 250 WO HCPCS SELF ADMINISTERED DRUGS (ALT 637 FOR MEDICARE OP): Performed by: NURSE PRACTITIONER

## 2024-10-04 PROCEDURE — 1200000002 HC GENERAL ROOM WITH TELEMETRY DAILY

## 2024-10-04 PROCEDURE — 2500000001 HC RX 250 WO HCPCS SELF ADMINISTERED DRUGS (ALT 637 FOR MEDICARE OP): Performed by: PHYSICIAN ASSISTANT

## 2024-10-04 PROCEDURE — 85027 COMPLETE CBC AUTOMATED: CPT | Performed by: PHYSICIAN ASSISTANT

## 2024-10-04 PROCEDURE — 2500000004 HC RX 250 GENERAL PHARMACY W/ HCPCS (ALT 636 FOR OP/ED): Performed by: NURSE PRACTITIONER

## 2024-10-04 PROCEDURE — 82947 ASSAY GLUCOSE BLOOD QUANT: CPT

## 2024-10-04 PROCEDURE — 94640 AIRWAY INHALATION TREATMENT: CPT

## 2024-10-04 PROCEDURE — 36415 COLL VENOUS BLD VENIPUNCTURE: CPT | Performed by: PHYSICIAN ASSISTANT

## 2024-10-04 PROCEDURE — 93010 ELECTROCARDIOGRAM REPORT: CPT | Performed by: INTERNAL MEDICINE

## 2024-10-04 PROCEDURE — 2500000002 HC RX 250 W HCPCS SELF ADMINISTERED DRUGS (ALT 637 FOR MEDICARE OP, ALT 636 FOR OP/ED): Performed by: PHYSICIAN ASSISTANT

## 2024-10-04 RX ORDER — FUROSEMIDE 10 MG/ML
80 INJECTION INTRAMUSCULAR; INTRAVENOUS ONCE
Status: COMPLETED | OUTPATIENT
Start: 2024-10-04 | End: 2024-10-04

## 2024-10-04 RX ORDER — AMIODARONE HYDROCHLORIDE 200 MG/1
200 TABLET ORAL DAILY
Status: DISCONTINUED | OUTPATIENT
Start: 2024-10-05 | End: 2024-10-05 | Stop reason: HOSPADM

## 2024-10-04 ASSESSMENT — COGNITIVE AND FUNCTIONAL STATUS - GENERAL
DAILY ACTIVITIY SCORE: 24
MOBILITY SCORE: 24

## 2024-10-04 ASSESSMENT — PAIN SCALES - GENERAL
PAINLEVEL_OUTOF10: 0 - NO PAIN

## 2024-10-04 ASSESSMENT — PAIN - FUNCTIONAL ASSESSMENT
PAIN_FUNCTIONAL_ASSESSMENT: 0-10
PAIN_FUNCTIONAL_ASSESSMENT: 0-10

## 2024-10-04 NOTE — PROGRESS NOTES
Subjective Data:  - remains hypervolemic  - will continue with diuresis lasix 80mg IVP today   - goal Net negative 1-2 Liters  - decreased home maintenance amiodarone from 400 to 200 mg daily  - EKG today Qt/Qtc 490/481 msec, SB with 1st degree AV Block     Objective Data:  Last Recorded Vitals:  Vitals:    10/04/24 0800 10/04/24 1105 10/04/24 1432 10/04/24 1438   BP: 104/73 94/57 97/59 97/59   Patient Position: Sitting      Pulse: 74 57 72    Resp:       Temp: 36.1 °C (97 °F) 36.4 °C (97.5 °F) 36.4 °C (97.5 °F) 36.4 °C (97.5 °F)   TempSrc: Tympanic   Tympanic   SpO2: 92% 97% (!) 88% 95%   Weight:       Height:           Last Labs:  CBC - 10/3/2024:  7:03 PM  9.3 17.9 175    54.6      CMP - 10/3/2024:  7:03 PM  9.0 7.3 25 --- 1.0   4.0 3.4 33 82      PTT - 9/30/2024:  5:04 PM  1.2   13.4 33     TROPHS   Date/Time Value Ref Range Status   10/01/2024 10:33 AM 54 0 - 53 ng/L Final   09/30/2024 08:25 PM 67 0 - 53 ng/L Final   09/30/2024 06:44 PM 44 0 - 53 ng/L Final   02/12/2024 09:47  0 - 53 ng/L Final     Comment:     Previous result verified on 2/12/2024 1836 on specimen/case [id number] called with component TRPHS for procedure Troponin I, High Sensitivity with value 130 ng/L.   02/12/2024 06:11  0 - 53 ng/L Final     Comment:     Previous result verified on 2/12/2024 1836 on specimen/case [id number] called with component TRPHS for procedure Troponin I, High Sensitivity with value 130 ng/L.   02/12/2024 12:39  0 - 53 ng/L Final     BNP   Date/Time Value Ref Range Status   09/30/2024 05:01 PM >5,000 0 - 99 pg/mL Final   09/07/2024 12:06 AM 2,585 0 - 99 pg/mL Final     HGBA1C   Date/Time Value Ref Range Status   07/30/2024 07:31 AM 8.4 see below % Final   06/20/2024 07:49 AM 6.9 4.3 - 5.6 % Final     Comment:     American Diabetes Association guidelines indicate that patients with HgbA1c in the range 5.7-6.4% are at increased risk for development of diabetes, and intervention by lifestyle  modification may be beneficial. HgbA1c greater or equal to 6.5% is considered diagnostic of diabetes.   01/09/2024 06:53 AM 10.1 4.3 - 5.6 % Final     Comment:     American Diabetes Association guidelines indicate that patients with HgbA1c in the range 5.7-6.4% are at increased risk for development of diabetes, and intervention by lifestyle modification may be beneficial. HgbA1c greater or equal to 6.5% is considered diagnostic of diabetes.   12/09/2023 08:57 PM 7.6 see below % Final     LDLCALC   Date/Time Value Ref Range Status   09/07/2024 09:49 AM 72 <=99 mg/dL Final     Comment:                                 Near   Borderline      AGE      Desirable  Optimal    High     High     Very High     0-19 Y     0 - 109     ---    110-129   >/= 130     ----    20-24 Y     0 - 119     ---    120-159   >/= 160     ----      >24 Y     0 -  99   100-129  130-159   160-189     >/=190       VLDL   Date/Time Value Ref Range Status   09/07/2024 09:49 AM 20 0 - 40 mg/dL Final   07/25/2023 08:20 AM 18 0 - 40 mg/dL Final   06/18/2023 06:27 AM 29 0 - 40 mg/dL Final      Last I/O:  I/O last 3 completed shifts:  In: - (0 mL/kg)   Out: 2525 (34.1 mL/kg) [Urine:2525 (0.9 mL/kg/hr)]  Weight: 74.1 kg     Ejection Fractions:  EF   Date/Time Value Ref Range Status   07/31/2024 03:07 PM 10 %      Inpatient Medications:  Scheduled medications   Medication Dose Route Frequency    [START ON 10/5/2024] amiodarone  200 mg oral Daily    aspirin  81 mg oral Daily    atorvastatin  80 mg oral Nightly    carvedilol  3.125 mg oral BID    empagliflozin  10 mg oral Daily    enoxaparin  40 mg subcutaneous q24h    [Held by provider] furosemide  40 mg oral Daily    insulin glargine  30 Units subcutaneous Nightly    insulin lispro  0-10 Units subcutaneous TID    sacubitriL-valsartan  1 tablet oral BID    sennosides  2 tablet oral BID    sertraline  100 mg oral Daily    spironolactone  12.5 mg oral Daily    tiotropium  2 puff inhalation Daily     PRN  medications   Medication    acetaminophen    Or    acetaminophen    Or    acetaminophen    melatonin    oxygen     Continuous Medications   Medication Dose Last Rate       Physical Exam:  General: NAD, tired  Head/ neck: + JVD  Cardiac: RRR, regular S1 S2 , no murmur, no rub, no gallop  Pulm: Quiet lung bases. NC O2 in place.  Vascular: Radial 2+ bilaterally  GI: non distended  Extremities: 1+ bilateral pitting LE edema   Neuro: no focal neuro deficits   Psych: appropriate mood and behavior   Skin: warm and dry         Assessment/Plan   54 y.o. 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 Admitted for acute decompensated heart failure in setting of medication non compliance.      Acute decompensated mixed ischemic/nonischemic HFrEF 5-10%  s/p single chamber ICD 3/2024  - ECHO 7/31:  EF 10%, gHK of LV, LV severely dilated, severe eccentric LVH, severely enlarged RV with reduced systolic fxn, severe LAE, mod to sev VIVI, mild to mod TR, RVSP 39 mildly elevated  - Cardiac MRI 8/5: ischemic/nonischemic.  Severely dilated LV EF 5-10%. Basal inferoseptal, mid inferoseptal, apical septal, apicolateral segments akinetic. Eccentric LVH. Transmural infarction of mid/apical inferoseptal/inferior segments and apicolateral wall (<25% wall thickeness). NICM pattern of mid wall septal fibrosis.  - Low suspicion for true ACS.   - Troponin flat/mildly elevated   - EKG 10/1: SR 77, left axis dev, LVH, IVCD/LBBB. No acute ischemic changes as compared to prior 9/7/24  - Transmural infarcts on cardiac MRI. Coronary Calcifications on CT chest  - Secondary vascular prevention with aspirin and statin.  - Hypervolemic, continues to need  "diuresis  - CXR 9/30 with vascular congestion and cardiomegaly  - BNP >5K  - Admit Wt 79.4kg  - Daily weight: 74.1 kg ( previous wt 10/1  79.4 kg)  - remains hypervolemic, dyspnea on exertion  - diuresing well, c/w  IV lasix 80mg today  - c/w  GDMT: as of last DC summary recommendations- Carvedilol 3.125.,Entresto 24-26 BID, Empagliflozin 10m daily, Spironolactone 12.5mg daily,  - If pt unwilling to take evening medications, then consolidate to once daily beta blocker (bisoprolol) and ARB (losartan)  - Palliative care consult requested given advanced HF with numerous re-admits and overall poor prognosis since ongoing substance use precludes him from advanced HF therapy candidacy  - now DNAR  - reccommended psych consult for depression/suicidal ideations, but patient is not interested at this time   - Goals are mix of survival and time and improved quality of life  - Will need to complete State DNR form     Ventriculary Tachycardia/ VF   Hx of possible AF RVR   - Device interrogation 10/1:   - Last shock 9/5 appropriate for VT ( last admission).   - 12yr battery life.   - Lead fxn WNL.   -  <1%.   - Patient without any shocks or tachyarrhythmias since amiodarone loading   - For the epsiode  9/5 prior EP consult noted:\" Possible dual tachycardia with PAF  RVR and concurrent VT/VF in the setting of active cocaine use.\" EP did not feel right atrial lead placement  deemed appropriate in light of ongoing cocaine use and increased risk of infection.NO OAC recommended.  - Continue coreg 3.125mg BID  - decreased amiodarone home maintence dosing from 400 to 200 mg daily on 10/4  - 10/4 EKG SB with 1st degree AVBlock, Qt/Qtc 490/481 msec     COPD  Former tobacco user  - 2L at night as baseline      Substance use disorder, cocaine  - Cessation advised  - Urine tox cocaine positive     Type II Diabetes  - Insulin glargine 30unit nightly  - SSI #2 while here     Mood disorder, unspecified  - zoloft 100mg daily     D\VT ppx: " subcutaneous lovenox  DISPO: pending diuresis  Code Status: DNR    Patient seen and discussed with Dr. Skinner.    Code Status:  DNR      Saranya Yadav, APRN-CNP

## 2024-10-04 NOTE — SIGNIFICANT EVENT
.Rapid Response Nurse Note:  [x] SKYLA alert: Score 7    Pager time:   Arrival time:   Event end time:   Location: T7  [x] Phone triage     Rapid response initiated by:  [] Rapid Response RN [] Family [] Nursing Supervisor [] Physician   [x] RADAR auto-page [] Sepsis auto-page [] RN [] RT   [] NP/PA [] Other:     Primary reason for call:   [] BAT [] New CPAP/BiPAP [] Bleeding [] Change in mental status   [] Chest pain [] Code blue [] FiO2 >/= 50% [] HR </= 40 bpm   [] HR >/= 130 bpm [] Hyperglycemia [] Hypoglycemia [x] RADAR    [] RR </= 8 bpm [] RR >/= 30 bpm [] SBP </= 90 mmHg [] SpO2 < 90%   [] Seizure [] Sepsis [] Staff concern:     Interventions:  [x] None [] ABG [] Assist w/ICU transfer [] BAT paged    [] Bag mask [] Blood [] Cardioversion [] Code Blue   [] Code blue for intubation [] Code status changed [] Chest x-ray [] EKG   [] IV fluid/bolus [] KUB x-ray [] Labs/cultures [] Medication   [] Nebulizer treatment [] NIPPV (CPAP/BiPAP) [] Oxygen [] Oral airway   [] Peripheral IV [] Palliative care consult [] CT/MRI [] Sepsis protocol    [] Suctioned [] Other:     Outcome:  [] Coded and  [] Code blue for intubation [] Coded and transferred to ICU []  on division   [x] Remained on division (no change) [] Remained on division + additional monitoring [] Remained in ED [] Transferred to ED   [] Transferred to ICU [] Transferred to inpatient status [] Transferred for interventions (procedure) [] Transferred to ICU stepdown    [] Transferred to surgery [] Transferred to telemetry [] Sepsis protocol [] STEMI protocol   [] Stroke protocol       Additional Comments: Notified nurse of SKYLA page: 36.4 72 19 97/59 88%; patient had his 02 off and replaced.  No concerns per nurse at this time; encouraged to call with changes.

## 2024-10-04 NOTE — PROGRESS NOTES
Leonel Yu is a 54 y.o. male on day 4 of admission presenting with Acute on chronic congestive heart failure, unspecified heart failure type.    Transitional Care Coordination Progress Note:  Patient discussed during interdisciplinary rounds.   Team members present: MD and TCC  Plan per Medical/Surgical team: Patient getting EKG to check QT today and getting diuresed.  Payor: [place] [employer]  Discharge disposition: Home no needs, will need stretcher transport with 3L 02 for ride home.  Potential Barriers: None  ADOD: 10/06 or 10/07/24    MARIIA ARAIZA RN

## 2024-10-04 NOTE — PROGRESS NOTES
[facility] Attending Shared Visit Note    This is a shared visit. Please see Advanced Practice Provider's encounter note for additional details.      Briefly, 54M admitted with phantom ICD shock and medication non-adherence found to have HF exacerbation in setting of ongoing cocaine use. History notable for mixed ischemic and non CM (cocaine)/HFrEF (last EF 15-20%) s/p scICD (3/2024), CKD3a, prior remote left cerebellar hemorrhagic infarct. Multiple HF admissions this year with prior ICD shocks, but medication non-adherence.  Weigh + 12lb on admissions. Code status DNR/DNI. No prior LHC.    He reports he is interested in stopping cocaine (last use 2 days prior to admission)    Exam: Breathing mildly labored. Regular. Warm. trace edema    Problems:   Principal Problem:    Acute on chronic congestive heart failure, unspecified heart failure type    Notable Therapies   Class  Agent SAFETY    *ARNI* / ACE / ARB  sacubitriL-valsartan 24-26mg BID Last BP: 104/73, Last BUN/Cr (GFR): 10/3/2024: 32/1.72 (46)    *Beta-Blocker*  Carvedilol 3.125mg BID Last HR: 74    *MRA*  Spironolactone 12.5 Last K: 10/3/2024: 4.1     *SGLT2*  Empagliflozin 10 Last A1c 7/30/2024: 8.4     Diuretic  Furosemide 80mg daily Last BNP: 9/30/2024: >5,000    Hydralazine/ISDN       Digoxin  Last Digoxin level: 7/30/2024: <0.30    Anticoagulation  Amiodarone 400mg daily -> 200mg daily Last Hgb: 10/3/2024: 17.9    Anti-arrhythmic   Last QTc: 9/9/2024: 505    Antiplatelet  Aspirin 81 Last Platelet: 10/3/2024: 175    Lipid-Lowering  Atorvastatin 80 Last Tchol 9/7/2024: 127 / LDL 7/25/2023: 97 or 9/7/2024: 72 / HDL 9/7/2024: 35.4 / TRIG 9/7/2024: 98    Other        Device(s)  Sq ICD EF: 7/31/2024: 10%, QRS: 9/9/2024: 118ms    Cardiac Rehab,   if EF <35/MI/OHS        Plan:   - continue amio  - continue aspirin / statin  - GDMT as above  - aggressive diruesis  - pall care  - PT/OT  - [facility] consult  - consider LHC as outpatient given no ischemic eval history but  would want clear adherence.    Dispo: pending optimization, suspect saturday/Sunday    Mayco Skinner MD    Objective   Admit Date: 9/30/2024  Hospital Length of Stay: 4   Home: [place] [id number]    MEDICATIONS  Infusions:     Scheduled:  amiodarone, 400 mg, Daily  aspirin, 81 mg, Daily  atorvastatin, 80 mg, Nightly  carvedilol, 3.125 mg, BID  empagliflozin, 10 mg, Daily  enoxaparin, 40 mg, q24h  [Held by provider] furosemide, 40 mg, Daily  insulin glargine, 30 Units, Nightly  insulin lispro, 0-10 Units, TID  sacubitriL-valsartan, 1 tablet, BID  sennosides, 2 tablet, BID  sertraline, 100 mg, Daily  spironolactone, 12.5 mg, Daily  tiotropium, 2 puff, Daily      PRN:  acetaminophen, 650 mg, q4h PRN   Or  acetaminophen, 650 mg, q4h PRN   Or  acetaminophen, 650 mg, q4h PRN  melatonin, 3 mg, Nightly PRN  oxygen, , Continuous PRN - O2/gases  propofol, , Code/trauma/sedation med      Prior to Admission Meds:  Medications Prior to Admission   Medication Sig Dispense Refill Last Dose    amiodarone (Pacerone) 200 mg tablet Take 2 tablets (400 mg) by mouth 3 times a day for 4 days, THEN 2 tablets (400 mg) once daily. 204 tablet 0 Unknown    aspirin 81 mg chewable tablet Chew 1 tablet (81 mg) once daily. 30 tablet 5 Unknown    atorvastatin (Lipitor) 80 mg tablet Take 1 tablet (80 mg) by mouth once daily at bedtime. 30 tablet 5 Unknown    blood sugar diagnostic strip Use as directed to test blood glucose up to four times daily and as needed. 200 each 5 Unknown    blood-glucose meter misc Inject 1 each under the skin 4 times a day with meals. Check blood glucose 4 times daily, before meals and at bedtime. 1 each 0 Unknown    carvedilol (Coreg) 3.125 mg tablet Take 1 tablet (3.125 mg) by mouth 2 times a day. 60 tablet 0 Unknown    empagliflozin (Jardiance) 10 mg TAKE 1 TABLET BY MOUTH ONCE DAILY 30 tablet 5 Unknown    furosemide (Lasix) 40 mg tablet Take 1 tablet (40 mg) by mouth once daily. 30 tablet 5 Unknown    glucose 4  "gram chewable tablet Chew 2 tablets (8 g) if needed for low blood sugar - see comments. 50 tablet 12 Unknown    insulin glargine (Lantus) 100 unit/mL (3 mL) pen Inject 30 Units under the skin once daily at bedtime. 15 mL 5 Unknown at patient denies this medication    insulin lispro (HumaLOG) 100 unit/mL injection Inject 0-10 Units under the skin 3 times daily (morning, midday, late afternoon). Hypoglycemia protocol if Blood Glucose is between 0 - 70 mg/dL, 0 unit(s) if , 2 unit(s) if 151-200, 4 unit(s) if 201-250, 6 unit(s) if 251-300, 8 unit(s) if 301-350, 10 unit(s) if 351-400. Notify provider unit(s) if Blood Glucose is greater than 400 mg/dL 15 mL 5 Unknown    lancets 33 gauge misc Inject 1 each under the skin 4 times a day. 100 each 5 Unknown    pen needle 1/2\" 29G X 12mm needle Use to inject 1-4 times daily as directed. 100 each 5 Unknown    sacubitriL-valsartan (Entresto) 24-26 mg tablet Take 0.5 tablets by mouth 2 times a day. 30 tablet 0 Unknown    sertraline (Zoloft) 100 mg tablet Take 1 tablet (100 mg) by mouth once daily. 30 tablet 5 Unknown    spironolactone (Aldactone) 25 mg tablet Take 0.5 tablets (12.5 mg) by mouth once daily. 15 tablet 5 Unknown    tiotropium (Spiriva Respimat) 2.5 mcg/actuation inhaler Inhale 2 puffs once daily. 4 g 2 Unknown       Vitals:      10/4/2024     8:00 AM 10/4/2024     5:01 AM 10/4/2024    12:39 AM 10/3/2024     7:42 PM 10/3/2024     3:37 PM 10/3/2024    11:27 AM 10/3/2024     7:41 AM   Vitals   Systolic 104 96 99 107 99 114 119   Diastolic 73 63 67 74 69 78 86   Heart Rate 74 69 64 68 71 71 65   Temp 36.1 °C (97 °F) 36.6 °C (97.9 °F) 36.2 °C (97.2 °F) 35.9 °C (96.6 °F) 35.9 °C (96.6 °F) 36.4 °C (97.5 °F) 36.3 °C (97.3 °F)   Resp  19 17 20 18 18 18   Weight (lb)  163.36        BMI  24.84 kg/m2        BSA (m2)  1.89 m2          Wt Readings from Last 5 Encounters:   10/04/24 74.1 kg (163 lb 5.8 oz)   09/08/24 79.4 kg (175 lb 0.7 oz)   08/06/24 73.7 kg (162 lb 7.7 oz) "   02/16/24 77 kg (169 lb 12.1 oz)   12/28/23 73.2 kg (161 lb 6 oz)       Intake/Output Summary (Last 24 hours) at 10/4/2024 0910  Last data filed at 10/3/2024 1942  Gross per 24 hour   Intake --   Output 2525 ml   Net -2525 ml         CHEST: Unlabored,    CV:  Normal sinus rhythm  NEURO:   RASS: Light sedation  CAM:    LOC:    Cognition: Appropriate attention/concentration, Follows commands  GCS: 14    DATA:  CMP:  Recent Labs     10/03/24  1903 10/02/24  1953 10/01/24  1539 09/30/24  1701 09/12/24  0902 09/11/24  0647 09/10/24  1647 09/10/24  0641 09/09/24 2021 09/09/24  0757 09/08/24  0431 09/07/24  0949 09/07/24  0006   * 138 138 136 136 135* 134* 137 135*   < > 137 139 137   K 4.1 4.4 3.9 4.2 4.7 4.6 4.6 3.8 4.2   < > 4.0 4.5 4.9   CL 98 99 101 103 102 100 95* 101 97*   < > 104 106 106   CO2 28 27 26 23 29 29 29 29 31   < > 24 24 23   ANIONGAP 13 16 15 14 10 11 15 11 11   < > 13 14 13   BUN 32* 30* 29* 30* 25* 25* 26* 25* 28*   < > 28* 25* 25*   CREATININE 1.72* 1.75* 1.72* 2.11* 1.52* 1.56* 1.71* 1.42* 1.74*   < > 1.34* 1.34* 1.28   EGFR 46* 45* 46* 36* 54* 52* 47* 58* 46*   < > 63 63 66   MG  --   --  2.29 2.16 2.44*  --  2.33  --  2.12  --  2.06 2.26 2.49*    < > = values in this interval not displayed.     Recent Labs     10/03/24  1903 10/02/24  1953 10/01/24  1539 09/30/24  1701 09/12/24  0902 09/11/24  0647 09/10/24  1647 09/10/24  0641 09/08/24  0431 09/07/24  0949 09/07/24  0006 07/30/24  1738 07/30/24  0731 02/16/24  0533 02/15/24  1927 02/15/24  0656 02/14/24  0419 02/13/24  0711 02/12/24  1239   ALBUMIN 3.4 3.5 3.6 3.8 3.6 3.4 3.6 3.2*   < > 3.8 3.8   < > 3.9 3.2*   < > 3.2* 3.3*   < > 4.1   ALT  --   --   --  33  --   --   --   --   --  70* 63*  --  55* 23  --  31 40  --  68*   AST  --   --   --  25  --   --   --   --   --  40* 43*  --  28 13  --  17 19  --  40*   BILITOT  --   --   --  1.0  --   --   --   --   --  1.0 0.9  --  1.0 0.9  --  1.0 0.9  --  1.2    < > = values in this interval  not displayed.     CBC:  Recent Labs     10/03/24  1903 10/02/24  1953 10/01/24  1539 09/30/24  1701 09/12/24  0902 09/11/24  0646 09/10/24  0641 09/09/24  0757   WBC 9.3 8.2 7.1 6.9 7.0 5.5 6.2 6.4   HGB 17.9* 17.6* 17.5 16.6 17.4 16.4 15.1 15.4   HCT 54.6* 53.8* 51.9 48.8 52.5* 51.7 44.4 46.3    169 162 155 168 155 137* 128*   MCV 94 95 91 91 93 97 90 94     COAG:   Recent Labs     09/30/24  1704 09/07/24  0949 12/10/23  0255 12/09/23  2057 10/23/23  1851 07/24/23  1330 03/02/23  0225   INR 1.2* 1.2*  --  1.2* 1.3* 1.2* 1.3*   HAUF  --   --  0.1  --   --   --   --      ABO:   Recent Labs     09/30/24  1704   ABO A     HEME/ENDO:   Recent Labs     09/07/24  1016 07/30/24  0815 07/30/24  0731 06/20/24  0749 01/09/24  0653 12/09/23 2057 06/13/23  1746 04/30/21  1640   TSH 2.68 2.65  --   --   --  1.46  --  1.60   HGBA1C  --   --  8.4* 6.9* 10.1* 7.6*   < >  --     < > = values in this interval not displayed.     CARDIAC:   Recent Labs     10/01/24  1033 09/30/24  2025 09/30/24  1844 09/30/24  1701 09/07/24  1016 09/07/24  0128 09/07/24  0006 07/31/24  1851 07/30/24  0815 07/30/24  0731 02/16/24  0533 02/15/24  0656 02/12/24  1811 02/12/24  1239 12/27/23  0759 12/25/23  0052 12/24/23  2229   CKMB  --   --   --   --   --   --   --   --   --   --   --   --   --   --  1.6  --   --    TROPHS 54* 67* 44  --  68* 71* 75*  --  117* 125*  --   --    < > 130*  --    < > 109*   BNP  --   --   --  >5,000*  --   --  2,585* 1,014*  --  3,240* 2,900* 3,752*  --  >5,000*  --   --  1,425*    < > = values in this interval not displayed.     Recent Labs     09/07/24  0949 07/25/23  0820 06/18/23  0627   CHOL 127 156 168   LDLF  --  97 103*   LDLCALC 72  --   --    HDL 35.4 40.5 35.6*   TRIG 98 92 146     TOX:  Recent Labs     09/30/24  1843 09/07/24  1257 07/30/24  1046   AMPHETAMINE Presumptive Negative Presumptive Negative Presumptive Negative   BENZO Presumptive Negative Presumptive Negative Presumptive Negative    CANNABINOID Presumptive Negative Presumptive Negative Presumptive Negative   COCAI Presumptive Positive* Presumptive Positive* Presumptive Positive*   FENTANYL Presumptive Negative Presumptive Negative Presumptive Negative   OPIATE Presumptive Negative Presumptive Negative Presumptive Negative   OXYCODONE Presumptive Negative Presumptive Negative Presumptive Negative   PCP Presumptive Negative Presumptive Negative Presumptive Negative     MICRO:   Recent Labs     03/02/23  1112 03/02/23  0225   CRP  --  1.54*   PROCAL 1.55* 1.15*     No results found for the last 90 days.      EKG:   Recent Labs     09/09/24  1455 09/07/24  0935 09/07/24  0024   ATRRATE 65 71 71   VENTRATE 65 71 71   PRINT 188 180 166   QRSDUR 118 110 108   QTCFRED 498 461 433   QTCCALCB 505 473 445     Encounter Date: 09/06/24   Electrocardiogram, 12-lead PRN ACS symptoms   Result Value    Ventricular Rate 65    Atrial Rate 65    VA Interval 188    QRS Duration 118    QT Interval 486    QTC Calculation(Bazett) 505    P Axis 46    R Axis -47    T Axis 91    QRS Count 11    Q Onset 206    P Onset 112    P Offset 165    T Offset 449    QTC Fredericia 498    Narrative    Normal sinus rhythm  Possible Left atrial enlargement  Left axis deviation  Left ventricular hypertrophy with QRS widening and repolarization abnormality  Inferior infarct (cited on or before 07-SEP-2024)  Prolonged QT  Abnormal ECG  When compared with ECG of 07-SEP-2024 09:33,  Premature ventricular complexes are no longer Present  Confirmed by Leoncio Dias ([id number]) on 9/10/2024 4:02:49 PM     Echocardiogram:   Recent Labs     07/31/24  1507   EF 10   LVIDD 8.90   RV 39.4   RVFRWALLPKSP 7.00   TAPSE 1.6   Transthoracic Echo (TTE) Complete With Contrast 07/31/2024    Narrative  [facility] (formerly [facility]3], [address] [place] [address]  Tel [phone] and Fax [phone]    TRANSTHORACIC ECHOCARDIOGRAM REPORT      Patient Name:      VINOD Campos Physician:     [id number] Jeanette Ellington MD  Study Date:        7/31/2024            Ordering Provider:    [id number] SHAREE PALAFOX  MRN/PID:           [id number]             Fellow:  Accession#:        [id number]         Nurse:                Mimi Roman RN  Date of Birth/Age: 1970 / 54 years  Sonographer:          JORGE LUIS Giraldo RDCS  Gender:            M                    Additional Staff:  Height:            175.26 cm            Admit Date:           7/30/2024  Weight:            76.66 kg             Admission Status:     Inpatient -  Routine  BSA / BMI:         1.92 m2 / 24.96      Encounter#:           [id number]  kg/m2  Blood Pressure:    133/91 mmHg          Department Location:  [facility] Non  Invasive    Study Type:    TRANSTHORACIC ECHO (TTE) COMPLETE  Diagnosis/ICD: Acute on chronic combined systolic (congestive) and diastolic  (congestive) heart failure (CHF)-I50.43  Indication:    Congestive Heart Failure  CPT Code:      Echo Complete w Full Doppler-52531    Patient History:  Smoker:            Former.  Diabetes:          Yes  Pertinent History: CHF, Dyspnea, COPD, CVA, HTN, Hyperlipidemia and  Palpitations. CKD, s/p ICD,.    Study Detail: The following Echo studies were performed: 2D, M-Mode, Doppler and  color flow. Optison used as a contrast agent for endocardial  border definition. Total contrast used for this procedure was 2 mL  via IV push.      PHYSICIAN INTERPRETATION:  Left Ventricle: Left ventricular ejection fraction is severely decreased, calculated by Tim's biplane at 10%. There is global hypokinesis of the left ventricle with minor regional variations. The left ventricular cavity size is severely dilated. There is severe eccentric left ventricular hypertrophy. Spectral Doppler shows an abnormal pattern of left ventricular diastolic filling. There is no definite left ventricular thrombus visualized. There is spontaneous echocontrast noted within the LV cavity.Echocontrast was utilized and  excluded LV apical thrombus.  Left Atrium: The left atrium is severely dilated.  Right Ventricle: The right ventricle is severely enlarged. There is reduced right ventricular systolic function. A device is visualized in the right ventricle.  Right Atrium: The right atrium is moderately to severely dilated. There is a device visualized in the right atrium.  Aortic Valve: The aortic valve is trileaflet. There is minimal aortic valve cusp calcification. There is no evidence of aortic valve regurgitation. The peak instantaneous gradient of the aortic valve is 5.1 mmHg.  Mitral Valve: The mitral valve is normal in structure. There is mild mitral valve regurgitation.  Tricuspid Valve: The tricuspid valve is structurally normal. There is mild to moderate tricuspid regurgitation. The Doppler estimated RVSP is mildly elevated at 39.4 mmHg.  Pulmonic Valve: The pulmonic valve is structurally normal. There is physiologic pulmonic valve regurgitation.  Pericardium: There is a trivial pericardial effusion.  Aorta: The aortic root is normal.  Systemic Veins: The inferior vena cava appears dilated.  In comparison to the previous echocardiogram(s): Compared with the prior exam from 7/26/2023, the degree of TR has increased, however there was already a severley dilated LV with severe systolic dysfunciton and a dilated RV with reduced fx as well. Note that the prior exam included an agitated saline contrast study that was negative for shunt.      CONCLUSIONS:  1. Left ventricular ejection fraction is severely decreased, calculated by Tim's biplane at 10%.  2. There is global hypokinesis of the left ventricle with minor regional variations.  3. Spectral Doppler shows an abnormal pattern of left ventricular diastolic filling.  4. Left ventricular cavity size is severely dilated.  5. No left ventricular thrombus visualized.  6. There is spontaneous echocontrast noted within the LV cavity.Echocontrast was utilized and excluded LV  apical thrombus.  7. There is severe eccentric left ventricular hypertrophy.  8. Severely enlarged right ventricle.  9. There is reduced right ventricular systolic function.  10. The left atrium is severely dilated.  11. The right atrium is moderately to severely dilated.  12. Mild to moderate tricuspid regurgitation visualized.  13. Mildly elevated RVSP.  14. Compared with the prior exam from 2023, the degree of TR has increased, however there was already a severley dilated LV with severe systolic dysfunciton and a dilated RV with reduced fx as well. Note that the prior exam included an agitated saline contrast study that was negative for shunt.    QUANTITATIVE DATA SUMMARY:  2D MEASUREMENTS:  Normal Ranges:  Ao Root d:     3.00 cm    (2.0-3.7cm)  LAs:           3.30 cm    (2.7-4.0cm)  IVSd:          0.90 cm    (0.6-1.1cm)  LVPWd:         1.30 cm    (0.6-1.1cm)  LVIDd:         8.90 cm    (3.9-5.9cm)  LVIDs:         6.00 cm  LV Mass Index: 287.0 g/m2  LV % FS        32.6 %    LA VOLUME:  Normal Ranges:  LA Vol A4C:        68.8 ml    (22+/-6mL/m2)  LA Vol A2C:        137.1 ml  LA Vol BP:         97.1 ml  LA Vol Index A4C:  35.8ml/m2  LA Vol Index A2C:  71.3 ml/m2  LA Vol Index BP:   50.5 ml/m2  LA Area A4C:       23.1 cm2  LA Area A2C:       32.6 cm2  LA Major Axis A4C: 6.6 cm  LA Major Axis A2C: 6.6 cm  LA Volume Index:   50.5 ml/m2    RA VOLUME BY A/L METHOD:  Normal Ranges:  RA Area A4C: 30.2 cm2    AORTA MEASUREMENTS:  Normal Ranges:  Ao Sinus, d: 2.90 cm (2.1-3.5cm)  Asc Ao, d:   3.10 cm (2.1-3.4cm)    LV SYSTOLIC FUNCTION BY 2D PLANIMETRY (MOD):  Normal Ranges:  EF-A4C View:    10 % (>=55%)  EF-A2C View:    9 %  EF-Biplane:     10 %  LV EF Reported: 10 %    LV DIASTOLIC FUNCTION:  Normal Ranges:  MV e'         0.045 m/s (>8.0)  MV lateral e' 0.04 m/s  MV medial e'  0.05 m/s    AORTIC VALVE:  Normal Ranges:  AoV Vmax:      1.13 m/s (<=1.7m/s)  AoV Peak P.1 mmHg (<20mmHg)  LVOT Max Jori:  0.73 m/s  (<=1.1m/s)  LVOT VTI:      8.65 cm  LVOT Diameter: 2.20 cm  (1.8-2.4cm)  AoV Area,Vmax: 2.46 cm2 (2.5-4.5cm2)      RIGHT VENTRICLE:  RV Basal 5.20 cm  RV Mid   2.80 cm  RV Major 11.2 cm  TAPSE:   15.8 mm  RV s'    0.07 m/s    TRICUSPID VALVE/RVSP:  Normal Ranges:  Peak TR Velocity: 2.80 m/s  RV Syst Pressure: 39.4 mmHg (< 30mmHg)  IVC Diam:         2.60 cm    PULMONIC VALVE:  Normal Ranges:  PV Max Jori: 0.7 m/s  (0.6-0.9m/s)  PV Max P.0 mmHg      [id number] Jeanette Ellington MD  Electronically signed on 2024 at 5:20:48 PM        ** Final **    Coronary Angiography: No results found for this or any previous visit from the past 1800 days.    Right Heart Cath: No results found for this or any previous visit from the past 1800 days.    Cardiac Scoring: No results found for this or any previous visit from the past 1800 days.    Cardiac MRI:   MR cardiac morphology and function w and wo IV contrast 2024    Narrative  Interpreted By:  Enoc Dorantes  and Roberto Mao  STUDY:  MR CARDIAC MORPHOLOGY AND FUNCTION W AND WO IV CONTRAST;  2024  10:35 am    INDICATION:  Signs/Symptoms:HFrEF 10%, recent vfib.    COMPARISON:  None.    ACCESSION NUMBER(S):  [id number]    ORDERING CLINICIAN:  ANAY FRY    TECHNIQUE:  Siemens 1.5  Jovana MRI scanner.  Turbo spin echo and balanced steady state free precession (bSSFP)  imaging for anatomic definition. Dynamic cine bSSFP for cardiac  chamber and wall-motion analysis, and valvular analysis. Flow  quantification sequences for hemodynamics. Delayed gadolinium  enhancement analysis after injection of gadolinium-chelate (mL of  Dotarem, 0.2 mmol/kg).    HT-175 cm; WT-73 kg; BSA-1.88 m2    FINDINGS:  CARDIAC CHAMBERS  Normal atrioventricular and ventriculoarterial concordance    LEFT ATRIUM  Severely dilated (DEANNE-51 ml/m2).    RIGHT ATRIUM  Moderately dilated (RA max 4ch-31.1 cm2)    INTERATRIAL SEPTUM  Intact.    LEFT VENTRICLE:  1. Severely dilated LV with severely reduced  systolic function (LVEF  5-10%).  2. The basal inferoseptal, mid inferoseptal, apical septal,  apicolateral segments are akinetic. All other segments are severely  hypokinetic.  3. Eccentric left ventricular hypertrophy.  4. No evidence of LV thrombus.  5. Following administration of gadolinium, in the late phase, there  is transmural enhancement of the mid/apical inferoseptal/inferior  segments, subendocardial enhancement of the apicolateral segment (<  25% wall thickness) and mid wall septal enhancement.    Quantitative left ventricular functional values are as follows:  EDV = 648 cc; EDVi = 344 cc/m2  ESV = 617 cc; ESVi = 328 cc/m2  Stroke volume = 31 cc; SVi = 16 cc/m2  LVEF = 5 %  Absolute Cardiac Output = 2 l/min.; COi = 1.06 l/min/m2  LV mass = 415 gm; LVMi = 220 gm/m2    LVEDD: 8.2 cm  LVESD: 7.4 cm  LV septal wall thickness (anterobasal): 1.1 cm  LV postero-inferior wall thickness: 0.6 cm    FINDINGS  ----------------------------------------------  LV SCAR SIZE (17 SEGMENT):  15 %    PERFUSION:  There is reduced contrast wash-in in the mid inferoseptal wall.    RIGHT VENTRICLE  The right ventricle appears dilated in size and has reduced  qualitative systolic function by qualitative assessment. No abnormal  delayed enhancement in the myocardium. RV lead visualized.    INTERVENTRICULAR SEPTUM  Intact.    AORTIC VALVE  The aortic valve is tricuspid with normal leaflet excursion.  There is  no aortic regurgitation.      MITRAL VALVE  There is  mild mitral regurgitation by qualitative assessment.    TRICUSPID VALVE  There is qualitative mild tricuspid regurgitation.    PULMONARY VALVE:  Not assessed.    PERICARDIUM:  The pericardium is normal. There is no pericardial effusion.    THORACIC AORTA  The thoracic aorta appears normal in course, caliber, and contour.  There is no evidence for acute aortic pathology. The ascending  thoracic aorta is 3.0 x 2.8 cm in diameter The arch vessel branching  pattern is   normal.   All the arch branch vessels appear widely  patent in their proximal portions.      PULMONARY ARTERIES  The central pulmonary arteries appear  normal (MPA-2.8 cm, RPA-1.8  cm, LPA-2.1 cm).    SYSTEMIC AND PULMONARY VEINS  Normal systemic venous and pulmonary venous return.  The SVC is of normal caliber. IVC appears normal  Normal pulmonary venous anatomy.    CHEST  The chest wall is normal.  Limited imaging through the lungs reveals no gross abnormalities. No  pleural effusion.    UPPER ABDOMEN  Limited imaging through the upper abdomen reveals no abnormalities of  the visualized organs.    Impression  1. Severely dilated left ventricle (EDVi 344 ml/m2) with severely  impaired systolic function (LVEF 5-10%).  2. The basal-mid inferoseptal, apical septal, apicolateral segments  are akinetic. All other segments are severely hypokinetic.    3. Eccentric left ventricular hypertrophy.  4. No evidence of LV thrombus.  5. LGE imaging demonstrates transmural infarction of the mid/apical  inferoseptal/inferior segments and a separate small region of  subendocardial infarction of the apicolateral wall (<25% wall  thickness). Additionally, there is mid-wall septal fibrosis  (nonischemic pattern).  6. Dilated right ventricle with impaired systolic function  (qualitative assessment). CIED lead noted.    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    MACRO:  None    Signed by: Enoc Dorantes 8/5/2024 4:19 PM  Dictation workstation:   [id number]    Nuclear:No results found for this or any previous visit from the past 1800 days.    Metabolic Stress: No results found for this or any previous visit from the past 1800 days.      CT abdomen pelvis w IV contrast    Result Date: 9/30/2024  STUDY: CT Angiogram of the Chest, CT Abdomen and Pelvis with IV Contrast; 09/30/2024, 5:38 PM. INDICATION: Generalized abdominal pain. Flank pain. Concern for pulmonary embolism. COMPARISON:  CXR: 09/30/24, 09/07/24, 07/30/24, 02/12/24; CTA chest: 10/23/23; CT AP: 10/23/23. ACCESSION NUMBER(S): [id number], [id number] ORDERING CLINICIAN: NIKOLAY MORGAN TECHNIQUE:  CTA of the chest was performed following rapid injection of intravenous contrast.  Images are reviewed and processed at a workstation according to the CT angiogram protocol with 3-D and/or MIP post processing imaging generated.  CT of the abdomen and pelvis was performed with intravenous contrast.  Omnipaque 350 80 mL was administered intravenously; positive oral contrast was given. Automated mA/kV exposure control was utilized and patient examination was performed in strict accordance with principles of ALARA. FINDINGS:  CTA CHEST: Pulmonary arteries are adequately opacified without acute or chronic filling defects.  The thoracic aorta is normal in course and caliber without dissection or aneurysm. The heart is enlarged.  Thoracic lymph nodes are not enlarged. The lungs are emphysematous. There are interstitial infiltrates in both lung bases most likely representing atelectasis.  There is a nodule laterally in the right middle lobe unchanged from 2023. ABDOMEN:  LIVER: No hepatomegaly.  Smooth surface contour.  Normal attenuation.  BILE DUCTS: No intrahepatic or extrahepatic biliary ductal dilatation.  GALLBLADDER: The gallbladder is present.  STOMACH: No abnormalities identified.  PANCREAS: No masses or ductal dilatation.  SPLEEN: No splenomegaly or focal splenic lesion.  ADRENAL GLANDS: No thickening or nodules.  KIDNEYS AND URETERS: Kidneys are normal in size and location.  No renal or ureteral calculi.  PELVIS:  BLADDER: No abnormalities identified.  REPRODUCTIVE ORGANS: No abnormalities identified.  BOWEL: No abnormalities identified.  The appendix is identified and is normal.  VESSELS: No abnormalities identified.  Abdominal aorta is normal in caliber.  PERITONEUM/RETROPERITONEUM/LYMPH NODES: No free fluid.  No pneumoperitoneum. No  lymphadenopathy.  ABDOMINAL WALL: No abnormalities identified. SOFT TISSUES: No abnormalities identified.  BONES: There are degenerative changes of lumbosacral spine with vacuum discs.  No acute fracture or aggressive osseous lesion.    No evidence of pulmonary embolism. Cardiomegaly with emphysematous changes in the lungs. Stable nodule in the right middle lobe. No acute process. Signed by Stanislaw Elizondo MD    CT angio chest for pulmonary embolism    Result Date: 9/30/2024  STUDY: CT Angiogram of the Chest, CT Abdomen and Pelvis with IV Contrast; 09/30/2024, 5:38 PM. INDICATION: Generalized abdominal pain. Flank pain. Concern for pulmonary embolism. COMPARISON: CXR: 09/30/24, 09/07/24, 07/30/24, 02/12/24; CTA chest: 10/23/23; CT AP: 10/23/23. ACCESSION NUMBER(S): [id number], [id number] ORDERING CLINICIAN: NIKOLAY MORGAN TECHNIQUE:  CTA of the chest was performed following rapid injection of intravenous contrast.  Images are reviewed and processed at a workstation according to the CT angiogram protocol with 3-D and/or MIP post processing imaging generated.  CT of the abdomen and pelvis was performed with intravenous contrast.  Omnipaque 350 80 mL was administered intravenously; positive oral contrast was given. Automated mA/kV exposure control was utilized and patient examination was performed in strict accordance with principles of ALARA. FINDINGS:  CTA CHEST: Pulmonary arteries are adequately opacified without acute or chronic filling defects.  The thoracic aorta is normal in course and caliber without dissection or aneurysm. The heart is enlarged.  Thoracic lymph nodes are not enlarged. The lungs are emphysematous. There are interstitial infiltrates in both lung bases most likely representing atelectasis.  There is a nodule laterally in the right middle lobe unchanged from 2023. ABDOMEN:  LIVER: No hepatomegaly.  Smooth surface contour.  Normal attenuation.  BILE DUCTS: No intrahepatic or extrahepatic biliary  ductal dilatation.  GALLBLADDER: The gallbladder is present.  STOMACH: No abnormalities identified.  PANCREAS: No masses or ductal dilatation.  SPLEEN: No splenomegaly or focal splenic lesion.  ADRENAL GLANDS: No thickening or nodules.  KIDNEYS AND URETERS: Kidneys are normal in size and location.  No renal or ureteral calculi.  PELVIS:  BLADDER: No abnormalities identified.  REPRODUCTIVE ORGANS: No abnormalities identified.  BOWEL: No abnormalities identified.  The appendix is identified and is normal.  VESSELS: No abnormalities identified.  Abdominal aorta is normal in caliber.  PERITONEUM/RETROPERITONEUM/LYMPH NODES: No free fluid.  No pneumoperitoneum. No lymphadenopathy.  ABDOMINAL WALL: No abnormalities identified. SOFT TISSUES: No abnormalities identified.  BONES: There are degenerative changes of lumbosacral spine with vacuum discs.  No acute fracture or aggressive osseous lesion.    No evidence of pulmonary embolism. Cardiomegaly with emphysematous changes in the lungs. Stable nodule in the right middle lobe. No acute process. Signed by Stanislaw Elizondo MD    XR chest 1 view    Result Date: 9/30/2024  STUDY: Chest Radiograph;  9/30/2024 5:14PM INDICATION: Chest pain. COMPARISON: 7/30/2024 XR Chest 2 Views, 10/23/2023 CT CTA Chest, 10/23/2023 XR Chest 1 View, 7/24/2023 CT CTA Chest ACCESSION NUMBER(S): [id number] ORDERING CLINICIAN: NIKOLAY MORGAN TECHNIQUE:  Frontal chest was obtained at 17:14 hours. FINDINGS: CARDIOMEDIASTINAL SILHOUETTE: Heart is markedly enlarged but stable. Left-sided AICD noted. No pleural effusion or pneumothorax. No acute bony abnormality. Mild pulmonary vascular congestion.  LUNGS: Unchanged bullous changes in the left upper lobe.  ABDOMEN: No remarkable upper abdominal findings.  BONES: No acute osseous changes.    Unchanged cardiomegaly and mild pulmonary vascular congestion. Unchanged bullous changes in the left upper lobe. Signed by Tashi Cortés MD    Electrocardiogram, 12-lead PRN  ACS symptoms    Result Date: 9/18/2024  Sinus rhythm with occasional Premature ventricular complexes Possible Left atrial enlargement Left axis deviation Left ventricular hypertrophy with repolarization abnormality Inferior infarct , age undetermined Abnormal ECG When compared with ECG of 07-SEP-2024 00:07, Premature ventricular complexes are now Present Inferior infarct is now Present Confirmed by James Shah ([id number]) on 9/18/2024 8:19:31 AM    Electrocardiogram, 12-lead PRN ACS symptoms    Result Date: 9/10/2024  Normal sinus rhythm Possible Left atrial enlargement Left axis deviation Left ventricular hypertrophy with QRS widening and repolarization abnormality Inferior infarct (cited on or before 07-SEP-2024) Prolonged QT Abnormal ECG When compared with ECG of 07-SEP-2024 09:33, Premature ventricular complexes are no longer Present Confirmed by Leoncio Dias ([id number]) on 9/10/2024 4:02:49 PM    ECG 12 Lead    Result Date: 9/7/2024  Normal sinus rhythm Left axis deviation Left ventricular hypertrophy with repolarization abnormality ( Sokolow-Nieves , [place] product ) Abnormal ECG When compared with ECG of 06-SEP-2024 12:34, No significant change was found See ED provider note for full interpretation and clinical correlation Confirmed by June Perez ([id number]) on 9/7/2024 2:16:33 AM    XR chest 2 views    Result Date: 9/7/2024  Interpreted By:  Dimitri Rodriguez, STUDY: XR CHEST 2 VIEWS;  9/7/2024 12:30 am   INDICATION: Signs/Symptoms:Syncope.   COMPARISON: Chest x-ray 07/30/2024   ACCESSION NUMBER(S): [id number]   ORDERING CLINICIAN: LUKE ARBOLEDA   FINDINGS: Left-sided single lead AICD is stable in position.   CARDIOMEDIASTINAL SILHOUETTE: Cardiac silhouette is enlarged but stable.   LUNGS: No consolidation, pleural effusion or pneumothorax. Mild diffuse interstitial prominence. Redemonstration of bullous emphysematous changes in the lung apices, left greater than right.   ABDOMEN: No remarkable upper abdominal findings.    BONES: Multilevel degenerative changes of the spine.       Stable cardiomegaly. Mild diffuse interstitial prominence could be chronic or relate to component developing interstitial edema. Correlate clinically.   Findings suggestive of COPD with bullous emphysematous changes in the lung apices, left greater than right.   MACRO: None   Signed by: Dimitri Rodriguez 9/7/2024 12:38 AM Dictation workstation:   [id number]       LDA:   NUTRITION: Adult diet Regular  EMERGENCY CONTACT: Extended Emergency Contact Information  Primary Emergency Contact: Elizabet Santana  Mobile Phone: [phone]  Relation: Friend  CODE STATUS: DNR  DISPO: Discharge Planning  Living Arrangements: Friends  Support Systems: Friends/neighbors  Assistance Needed: none  Type of Residence: Private residence  Number of Stairs to Enter Residence: 5  Number of Stairs Within Residence: 12  Do you have animals or pets at home?: No  Who is requesting discharge planning?: Provider  Home or Post Acute Services: None  Expected Discharge Disposition: Home  Does the patient need discharge transport arranged?: Yes  RoundTrip coordination needed?: Yes  AMPAC: Daily Activity - Total Score: 24    FOLLOWUP: No future appointments.

## 2024-10-05 ENCOUNTER — PHARMACY VISIT (OUTPATIENT)
Dept: PHARMACY | Facility: CLINIC | Age: 54
End: 2024-10-05
Payer: MEDICAID

## 2024-10-05 VITALS
TEMPERATURE: 97 F | OXYGEN SATURATION: 96 % | WEIGHT: 164.02 LBS | HEART RATE: 65 BPM | SYSTOLIC BLOOD PRESSURE: 113 MMHG | DIASTOLIC BLOOD PRESSURE: 67 MMHG | HEIGHT: 68 IN | RESPIRATION RATE: 19 BRPM | BODY MASS INDEX: 24.86 KG/M2

## 2024-10-05 LAB — GLUCOSE BLD MANUAL STRIP-MCNC: 155 MG/DL (ref 74–99)

## 2024-10-05 PROCEDURE — 2500000004 HC RX 250 GENERAL PHARMACY W/ HCPCS (ALT 636 FOR OP/ED): Performed by: NURSE PRACTITIONER

## 2024-10-05 PROCEDURE — 2500000002 HC RX 250 W HCPCS SELF ADMINISTERED DRUGS (ALT 637 FOR MEDICARE OP, ALT 636 FOR OP/ED): Performed by: NURSE PRACTITIONER

## 2024-10-05 PROCEDURE — 82947 ASSAY GLUCOSE BLOOD QUANT: CPT

## 2024-10-05 PROCEDURE — 99239 HOSP IP/OBS DSCHRG MGMT >30: CPT | Performed by: STUDENT IN AN ORGANIZED HEALTH CARE EDUCATION/TRAINING PROGRAM

## 2024-10-05 PROCEDURE — 2500000001 HC RX 250 WO HCPCS SELF ADMINISTERED DRUGS (ALT 637 FOR MEDICARE OP): Performed by: PHYSICIAN ASSISTANT

## 2024-10-05 PROCEDURE — 2500000002 HC RX 250 W HCPCS SELF ADMINISTERED DRUGS (ALT 637 FOR MEDICARE OP, ALT 636 FOR OP/ED): Performed by: PHYSICIAN ASSISTANT

## 2024-10-05 PROCEDURE — RXMED WILLOW AMBULATORY MEDICATION CHARGE

## 2024-10-05 PROCEDURE — 2500000001 HC RX 250 WO HCPCS SELF ADMINISTERED DRUGS (ALT 637 FOR MEDICARE OP): Performed by: NURSE PRACTITIONER

## 2024-10-05 RX ORDER — TORSEMIDE 20 MG/1
40 TABLET ORAL DAILY
Qty: 60 TABLET | Refills: 3 | Status: SHIPPED | OUTPATIENT
Start: 2024-10-05 | End: 2024-11-04

## 2024-10-05 RX ORDER — FUROSEMIDE 10 MG/ML
80 INJECTION INTRAMUSCULAR; INTRAVENOUS ONCE
Status: COMPLETED | OUTPATIENT
Start: 2024-10-05 | End: 2024-10-05

## 2024-10-05 RX ORDER — AMIODARONE HYDROCHLORIDE 200 MG/1
200 TABLET ORAL DAILY
Qty: 30 TABLET | Refills: 0 | Status: SHIPPED | OUTPATIENT
Start: 2024-10-06 | End: 2024-11-05

## 2024-10-05 ASSESSMENT — COGNITIVE AND FUNCTIONAL STATUS - GENERAL
MOBILITY SCORE: 24
DAILY ACTIVITIY SCORE: 24
MOBILITY SCORE: 24
DAILY ACTIVITIY SCORE: 24

## 2024-10-05 ASSESSMENT — PAIN SCALES - GENERAL: PAINLEVEL_OUTOF10: 0 - NO PAIN

## 2024-10-05 NOTE — CARE PLAN
The patient's goals for the shift include  rest    The clinical goals for the shift include pt will remain hds throughout the shift      Problem: Safety - Adult  Goal: Free from fall injury  Outcome: Progressing     Problem: Pain - Adult  Goal: Verbalizes/displays adequate comfort level or baseline comfort level  Outcome: Progressing     Problem: Fall/Injury  Goal: Not fall by end of shift  Outcome: Progressing

## 2024-10-05 NOTE — NURSING NOTE
Pt. Discharged to home via ride from ly. Ivs and tele removed, pt belongings with pt. Reviewed discharge instructions with pt.

## 2024-10-05 NOTE — HOSPITAL COURSE
Leonel Yu is a 54 y.o. 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 Presented to ED  with leg swelling, abdominal pain and concern for ICD shocks. Admitted for decompensated HFrEF.     Patient admitted 3 wks ago for  VT/VF with appropriate ICD firing. Was started on amiodarone and underwent diuresis for CHF exacerbation.     Today, he reported being shocked by his ICD multiple times. Starting to have lower leg edema as of 3 days ago. He takes his morning medications regularly but denies taking evening doses on a regular basis. Used cocaine 2 days ago; reports he plans to quit.    Floor course:  Patient was diuresed with IV lasix until satisfactory response. Changed his home lasix to PO torsemide 40mg daily. Increase Entresto to 24/26mg BID, decreased amio to 200mg daily. Cr improving at discharge. Device interrogation 10/1: Last shock 9/5 appropriate for VT ( last admission). 12yr battery life. Plan to follow up with Dr. Skinner in November.    Discharge weight: 74.4kg    After all labs and VS were reviewed the decision was made that the patient was medically stable for discharge.  The patient was discharged in satisfactory condition.    More than 60 minutes were spent in coordinating patient discharge.

## 2024-10-05 NOTE — DISCHARGE SUMMARY
Discharge Diagnosis  Acute on chronic congestive heart failure, unspecified heart failure type      Test Results Pending At Discharge  Pending Labs       No current pending labs.            Hospital Course  Leonel Yu is a 54 y.o. 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 Presented to ED  with leg swelling, abdominal pain and concern for ICD shocks. Admitted for decompensated HFrEF.     Patient admitted 3 wks ago for  VT/VF with appropriate ICD firing. Was started on amiodarone and underwent diuresis for CHF exacerbation.     Today, he reported being shocked by his ICD multiple times. Starting to have lower leg edema as of 3 days ago. He takes his morning medications regularly but denies taking evening doses on a regular basis. Used cocaine 2 days ago; reports he plans to quit.    Floor course:  Patient was diuresed with IV lasix until satisfactory response. Changed his home lasix to PO torsemide 40mg daily. Increase Entresto to 24/26mg BID, decreased amio to 200mg daily. Cr improving at discharge. Device interrogation 10/1: Last shock 9/5 appropriate for VT ( last admission). 12yr battery life. Plan to follow up with Dr. Skinner in November.    Discharge weight: 74.4kg    After all labs and VS were reviewed the decision was made that the patient was medically stable for discharge.  The patient was discharged in satisfactory condition.    More than 60 minutes were spent in coordinating patient discharge.     Pertinent Physical Exam At Time of Discharge  Physical Exam    Home Medications     Medication List      START taking these medications     torsemide 20 mg tablet; Commonly known as: Demadex; Take 2 tablets (40   mg) by mouth once daily.      CHANGE how you take these medications     amiodarone 200 mg tablet; Commonly known as: Pacerone; Take 1 tablet   (200 mg) by mouth once daily.; Start taking on: October 6, 2024; What   changed: See the new instructions.   sacubitriL-valsartan 24-26 mg tablet; Commonly known as: Entresto; Take   1 tablet by mouth 2 times a day.; What changed: how much to take     CONTINUE taking these medications     aspirin 81 mg chewable tablet; Chew 1 tablet (81 mg) once daily.   atorvastatin 80 mg tablet; Commonly known as: Lipitor; Take 1 tablet (80   mg) by mouth once daily at bedtime.   carvedilol 3.125 mg tablet; Commonly known as: Coreg; Take 1 tablet   (3.125 mg) by mouth 2 times a day.   glucose 4 gram chewable tablet; Chew 2 tablets (8 g) if needed for low   blood sugar - see comments.   insulin lispro 100 unit/mL injection; Commonly known as: HumaLOG; Inject   0-10 Units under the skin 3 times daily (morning, midday, late afternoon).   Hypoglycemia protocol if Blood Glucose is between 0 - 70 mg/dL, 0 unit(s)   if , 2 unit(s) if 151-200, 4 unit(s) if 201-250, 6 unit(s) if   251-300, 8 unit(s) if 301-350, 10 unit(s) if 351-400. Notify provider   unit(s) if Blood Glucose is greater than 400 mg/dL   Jardiance 10 mg; Generic drug: empagliflozin; TAKE 1 TABLET BY MOUTH   ONCE DAILY   Lantus Solostar U-100 Insulin 100 unit/mL (3 mL) pen; Generic drug:   insulin glargine; Inject 30 Units under the skin once daily at bedtime.   sertraline 100 mg tablet; Commonly known as: Zoloft; Take 1 tablet (100   mg) by mouth once daily.   Spiriva Respimat 2.5 mcg/actuation inhaler; Generic drug: tiotropium;   Inhale 2 puffs once daily.   spironolactone 25 mg tablet; Commonly known as: Aldactone; Take 0.5   tablets (12.5 mg) by mouth once daily.   True Metrix Glucose Meter misc; Generic drug: blood-glucose meter;   Inject 1 each under the skin 4 times a day with meals. Check blood glucose   4 times daily, before meals and at  "bedtime.   True Metrix Glucose Test Strip strip; Generic drug: blood sugar   diagnostic; Use as directed to test blood glucose up to four times daily   and as needed.   TRUEplus Lancets 33 gauge misc; Generic drug: lancets; Inject 1 each   under the skin 4 times a day.   TRUEplus Pen Needle 29 gauge x 1/2\" needle; Generic drug: pen needle   1/2\"; Use to inject 1-4 times daily as directed.     STOP taking these medications     furosemide 40 mg tablet; Commonly known as: Lasix       Outpatient Follow-Up  Future Appointments   Date Time Provider Department Center   11/5/2024  9:00 AM Mayco Skinner MD [id number]       Bart Casas, APRN-CNP, DNP  "

## 2024-10-05 NOTE — PROGRESS NOTES
[facility] Attending Shared Visit Note    This is a shared visit. Please see Advanced Practice Provider's encounter note for additional details.      Briefly, 54M admitted with phantom ICD shock and medication non-adherence found to have HF exacerbation in setting of ongoing cocaine use. History notable for mixed ischemic and non CM (cocaine)/HFrEF (last EF 15-20%) s/p scICD (3/2024), CKD3a, prior remote left cerebellar hemorrhagic infarct. Multiple HF admissions this year with prior ICD shocks, but medication non-adherence.  Weigh + 12lb on admissions. Code status DNR/DNI. No prior LHC.    He reports he is interested in stopping cocaine (last use 2 days prior to admission)    Exam: Breathing mildly labored. Regular. Warm. trace edema    Problems:   Principal Problem:    Acute on chronic congestive heart failure, unspecified heart failure type    Notable Therapies   Class  Agent SAFETY    *ARNI* / ACE / ARB  sacubitriL-valsartan 24-26mg BID Last BP: 111/74, Last BUN/Cr (GFR): 10/4/2024: 32/1.66 (48)    *Beta-Blocker*  Carvedilol 3.125mg BID Last HR: 74    *MRA*  Spironolactone 12.5 Last K: 10/4/2024: 4.6     *SGLT2*  Empagliflozin 10 Last A1c 7/30/2024: 8.4     Diuretic  Torsemide Last BNP: 9/30/2024: >5,000    Hydralazine/ISDN       Digoxin  Last Digoxin level: 7/30/2024: <0.30    Anticoagulation  Amiodarone 400mg daily -> 200mg daily Last Hgb: 10/4/2024: 18.8    Anti-arrhythmic   Last QTc: 9/9/2024: 505    Antiplatelet  Aspirin 81 Last Platelet: 10/4/2024: 183    Lipid-Lowering  Atorvastatin 80 Last Tchol 9/7/2024: 127 / LDL 7/25/2023: 97 or 9/7/2024: 72 / HDL 9/7/2024: 35.4 / TRIG 9/7/2024: 98    Other        Device(s)  Sq ICD EF: 7/31/2024: 10%, QRS: 9/9/2024: 118ms    Cardiac Rehab,   if EF <35/MI/OHS        Plan:   - continue amio  - continue aspirin / statin  - GDMT as above  - oral diuretic  - pall care recs appreciated  - [facility] consult  - consider LHC as outpatient given no ischemic eval history but would want  clear adherence.    Dispo: discahrge today    Mayco Skinner MD    Objective   Admit Date: 9/30/2024  Hospital Length of Stay: 5   Home: [place] [address]    MEDICATIONS  Infusions:     Scheduled:  amiodarone, 200 mg, Daily  aspirin, 81 mg, Daily  atorvastatin, 80 mg, Nightly  carvedilol, 3.125 mg, BID  empagliflozin, 10 mg, Daily  enoxaparin, 40 mg, q24h  [Held by provider] furosemide, 40 mg, Daily  insulin glargine, 30 Units, Nightly  insulin lispro, 0-10 Units, TID  sacubitriL-valsartan, 1 tablet, BID  sennosides, 2 tablet, BID  sertraline, 100 mg, Daily  spironolactone, 12.5 mg, Daily  tiotropium, 2 puff, Daily      PRN:  acetaminophen, 650 mg, q4h PRN   Or  acetaminophen, 650 mg, q4h PRN   Or  acetaminophen, 650 mg, q4h PRN  melatonin, 3 mg, Nightly PRN  oxygen, , Continuous PRN - O2/gases      Prior to Admission Meds:  Medications Prior to Admission   Medication Sig Dispense Refill Last Dose    amiodarone (Pacerone) 200 mg tablet Take 2 tablets (400 mg) by mouth 3 times a day for 4 days, THEN 2 tablets (400 mg) once daily. 204 tablet 0 Unknown    aspirin 81 mg chewable tablet Chew 1 tablet (81 mg) once daily. 30 tablet 5 Unknown    atorvastatin (Lipitor) 80 mg tablet Take 1 tablet (80 mg) by mouth once daily at bedtime. 30 tablet 5 Unknown    blood sugar diagnostic strip Use as directed to test blood glucose up to four times daily and as needed. 200 each 5 Unknown    blood-glucose meter misc Inject 1 each under the skin 4 times a day with meals. Check blood glucose 4 times daily, before meals and at bedtime. 1 each 0 Unknown    carvedilol (Coreg) 3.125 mg tablet Take 1 tablet (3.125 mg) by mouth 2 times a day. 60 tablet 0 Unknown    empagliflozin (Jardiance) 10 mg TAKE 1 TABLET BY MOUTH ONCE DAILY 30 tablet 5 Unknown    furosemide (Lasix) 40 mg tablet Take 1 tablet (40 mg) by mouth once daily. 30 tablet 5 Unknown    glucose 4 gram chewable tablet Chew 2 tablets (8 g) if needed for low blood sugar - see  "comments. 50 tablet 12 Unknown    insulin glargine (Lantus) 100 unit/mL (3 mL) pen Inject 30 Units under the skin once daily at bedtime. 15 mL 5 Unknown at patient denies this medication    insulin lispro (HumaLOG) 100 unit/mL injection Inject 0-10 Units under the skin 3 times daily (morning, midday, late afternoon). Hypoglycemia protocol if Blood Glucose is between 0 - 70 mg/dL, 0 unit(s) if , 2 unit(s) if 151-200, 4 unit(s) if 201-250, 6 unit(s) if 251-300, 8 unit(s) if 301-350, 10 unit(s) if 351-400. Notify provider unit(s) if Blood Glucose is greater than 400 mg/dL 15 mL 5 Unknown    lancets 33 gauge misc Inject 1 each under the skin 4 times a day. 100 each 5 Unknown    pen needle 1/2\" 29G X 12mm needle Use to inject 1-4 times daily as directed. 100 each 5 Unknown    sacubitriL-valsartan (Entresto) 24-26 mg tablet Take 0.5 tablets by mouth 2 times a day. 30 tablet 0 Unknown    sertraline (Zoloft) 100 mg tablet Take 1 tablet (100 mg) by mouth once daily. 30 tablet 5 Unknown    spironolactone (Aldactone) 25 mg tablet Take 0.5 tablets (12.5 mg) by mouth once daily. 15 tablet 5 Unknown    tiotropium (Spiriva Respimat) 2.5 mcg/actuation inhaler Inhale 2 puffs once daily. 4 g 2 Unknown       Vitals:      10/5/2024     7:32 AM 10/5/2024     4:59 AM 10/4/2024    11:39 PM 10/4/2024     8:34 PM 10/4/2024     2:38 PM 10/4/2024     2:32 PM 10/4/2024    11:05 AM   Vitals   Systolic 111 105 99 109 97 97 94   Diastolic 74 75 61 65 59 59 57   Heart Rate 74 64 63 73  72 57   Temp  36.4 °C (97.5 °F) 35.9 °C (96.6 °F)  36.4 °C (97.5 °F) 36.4 °C (97.5 °F) 36.4 °C (97.5 °F)   Weight (lb)  164.02        BMI  24.95 kg/m2        BSA (m2)  1.89 m2          Wt Readings from Last 5 Encounters:   10/05/24 74.4 kg (164 lb 0.4 oz)   09/08/24 79.4 kg (175 lb 0.7 oz)   08/06/24 73.7 kg (162 lb 7.7 oz)   02/16/24 77 kg (169 lb 12.1 oz)   12/28/23 73.2 kg (161 lb 6 oz)     Weight         10/1/2024  1600 10/1/2024  2228 10/4/2024  0501 " 10/5/2024  0459    Weight: 79.4 kg (175 lb 0.7 oz) 79.4 kg (175 lb 0.7 oz) 74.1 kg (163 lb 5.8 oz) 74.4 kg (164 lb 0.4 oz)          Intake/Output Summary (Last 24 hours) at 10/5/2024 0805  Last data filed at 10/5/2024 0600  Gross per 24 hour   Intake --   Output 2700 ml   Net -2700 ml     CHEST: Unlabored,    CV:  Normal sinus rhythm  NEURO:   RASS: Light sedation  CAM:    LOC:    Cognition: Appropriate attention/concentration, Follows commands  GCS: 14    DATA:  CMP:  Recent Labs     10/04/24  1902 10/03/24  1903 10/02/24  1953 10/01/24  1539 09/30/24  1701 09/12/24  0902 09/11/24  0647 09/10/24  1647 09/10/24  0641 09/09/24  2021 09/09/24  0757 09/08/24  0431 09/07/24  0949 09/07/24  0006    135* 138 138 136 136 135* 134*   < > 135*   < > 137 139 137   K 4.6 4.1 4.4 3.9 4.2 4.7 4.6 4.6   < > 4.2   < > 4.0 4.5 4.9   CL 99 98 99 101 103 102 100 95*   < > 97*   < > 104 106 106   CO2 28 28 27 26 23 29 29 29   < > 31   < > 24 24 23   ANIONGAP 15 13 16 15 14 10 11 15   < > 11   < > 13 14 13   BUN 32* 32* 30* 29* 30* 25* 25* 26*   < > 28*   < > 28* 25* 25*   CREATININE 1.66* 1.72* 1.75* 1.72* 2.11* 1.52* 1.56* 1.71*   < > 1.74*   < > 1.34* 1.34* 1.28   EGFR 48* 46* 45* 46* 36* 54* 52* 47*   < > 46*   < > 63 63 66   MG  --   --   --  2.29 2.16 2.44*  --  2.33  --  2.12  --  2.06 2.26 2.49*    < > = values in this interval not displayed.     Recent Labs     10/04/24  1902 10/03/24  1903 10/02/24  1953 10/01/24  1539 09/30/24  1701 09/12/24  0902 09/11/24  0647 09/10/24  1647 09/08/24  0431 09/07/24  0949 09/07/24  0006 07/30/24  1738 07/30/24  0731 02/16/24  0533 02/15/24  1927 02/15/24  0656 02/14/24  0419 02/13/24  0711 02/12/24  1239   ALBUMIN 3.7 3.4 3.5 3.6 3.8 3.6 3.4 3.6   < > 3.8 3.8   < > 3.9 3.2*   < > 3.2* 3.3*   < > 4.1   ALT  --   --   --   --  33  --   --   --   --  70* 63*  --  55* 23  --  31 40  --  68*   AST  --   --   --   --  25  --   --   --   --  40* 43*  --  28 13  --  17 19  --  40*   BILITOT   --   --   --   --  1.0  --   --   --   --  1.0 0.9  --  1.0 0.9  --  1.0 0.9  --  1.2    < > = values in this interval not displayed.     CBC:  Recent Labs     10/04/24  1902 10/03/24  1903 10/02/24  1953 10/01/24  1539 09/30/24  1701 09/12/24  0902 09/11/24  0646 09/10/24  0641   WBC 9.8 9.3 8.2 7.1 6.9 7.0 5.5 6.2   HGB 18.8* 17.9* 17.6* 17.5 16.6 17.4 16.4 15.1   HCT 56.8* 54.6* 53.8* 51.9 48.8 52.5* 51.7 44.4    175 169 162 155 168 155 137*   MCV 92 94 95 91 91 93 97 90     COAG:   Recent Labs     09/30/24  1704 09/07/24  0949 12/10/23  0255 12/09/23  2057 10/23/23  1851 07/24/23  1330 03/02/23  0225   INR 1.2* 1.2*  --  1.2* 1.3* 1.2* 1.3*   HAUF  --   --  0.1  --   --   --   --      ABO:   Recent Labs     09/30/24  1704   ABO A     HEME/ENDO:   Recent Labs     09/07/24  1016 07/30/24  0815 07/30/24  0731 06/20/24  0749 01/09/24  0653 12/09/23  2057 06/13/23  1746 04/30/21  1640   TSH 2.68 2.65  --   --   --  1.46  --  1.60   HGBA1C  --   --  8.4* 6.9* 10.1* 7.6*   < >  --     < > = values in this interval not displayed.     CARDIAC:   Recent Labs     10/01/24  1033 09/30/24  2025 09/30/24  1844 09/30/24  1701 09/07/24  1016 09/07/24  0128 09/07/24  0006 07/31/24  1851 07/30/24  0815 07/30/24  0731 02/16/24  0533 02/15/24  0656 02/12/24  1811 02/12/24  1239 12/27/23  0759 12/25/23  0052 12/24/23  2229   CKMB  --   --   --   --   --   --   --   --   --   --   --   --   --   --  1.6  --   --    TROPHS 54* 67* 44  --  68* 71* 75*  --  117* 125*  --   --    < > 130*  --    < > 109*   BNP  --   --   --  >5,000*  --   --  2,585* 1,014*  --  3,240* 2,900* 3,752*  --  >5,000*  --   --  1,425*    < > = values in this interval not displayed.     Recent Labs     09/07/24  0949 07/25/23  0820 06/18/23  0627   CHOL 127 156 168   LDLF  --  97 103*   LDLCALC 72  --   --    HDL 35.4 40.5 35.6*   TRIG 98 92 146     TOX:  Recent Labs     09/30/24  1843 09/07/24  1257 07/30/24  1046   AMPHETAMINE Presumptive Negative  Presumptive Negative Presumptive Negative   BENZO Presumptive Negative Presumptive Negative Presumptive Negative   CANNABINOID Presumptive Negative Presumptive Negative Presumptive Negative   COCAI Presumptive Positive* Presumptive Positive* Presumptive Positive*   FENTANYL Presumptive Negative Presumptive Negative Presumptive Negative   OPIATE Presumptive Negative Presumptive Negative Presumptive Negative   OXYCODONE Presumptive Negative Presumptive Negative Presumptive Negative   PCP Presumptive Negative Presumptive Negative Presumptive Negative     MICRO:   Recent Labs     03/02/23  1112 03/02/23  0225   CRP  --  1.54*   PROCAL 1.55* 1.15*     No results found for the last 90 days.      EKG:   Recent Labs     09/09/24  1455 09/07/24  0935 09/07/24  0024   ATRRATE 65 71 71   VENTRATE 65 71 71   PRINT 188 180 166   QRSDUR 118 110 108   QTCFRED 498 461 433   QTCCALCB 505 473 445     Encounter Date: 09/06/24   Electrocardiogram, 12-lead PRN ACS symptoms   Result Value    Ventricular Rate 65    Atrial Rate 65    AR Interval 188    QRS Duration 118    QT Interval 486    QTC Calculation(Bazett) 505    P Axis 46    R Axis -47    T Axis 91    QRS Count 11    Q Onset 206    P Onset 112    P Offset 165    T Offset 449    QTC Fredericia 498    Narrative    Normal sinus rhythm  Possible Left atrial enlargement  Left axis deviation  Left ventricular hypertrophy with QRS widening and repolarization abnormality  Inferior infarct (cited on or before 07-SEP-2024)  Prolonged QT  Abnormal ECG  When compared with ECG of 07-SEP-2024 09:33,  Premature ventricular complexes are no longer Present  Confirmed by Leoncio Dias ([id number]) on 9/10/2024 4:02:49 PM     Echocardiogram:   Recent Labs     07/31/24  1507   EF 10   LVIDD 8.90   RV 39.4   RVFRWALLPKSP 7.00   TAPSE 1.6   Transthoracic Echo (TTE) Complete With Contrast 07/31/2024    [facility], [address] [place] [address]  Tel [phone] and Fax  [phone]    TRANSTHORACIC ECHOCARDIOGRAM REPORT      Patient Name:      VINOD SHAFER      Andres Physician:    [id number] Jeanette Ellington MD  Study Date:        7/31/2024            Ordering Provider:    [id number] SHAREEMALOU PALAFOX  MRN/PID:           [id number]             Fellow:  Accession#:        [id number]         Nurse:                Mimi Roman RN  Date of Birth/Age: 1970 / 54 years  Sonographer:          JORGE LUIS Giraldo RDCS  Gender:            M                    Additional Staff:  Height:            175.26 cm            Admit Date:           7/30/2024  Weight:            76.66 kg             Admission Status:     Inpatient -  Routine  BSA / BMI:         1.92 m2 / 24.96      Encounter#:           [id number]  kg/m2  Blood Pressure:    133/91 mmHg          Department Location:  [facility] Non  Invasive    Study Type:    TRANSTHORACIC ECHO (TTE) COMPLETE  Diagnosis/ICD: Acute on chronic combined systolic (congestive) and diastolic  (congestive) heart failure (CHF)-I50.43  Indication:    Congestive Heart Failure  CPT Code:      Echo Complete w Full Doppler-[id number]    Patient History:  Smoker:            Former.  Diabetes:          Yes  Pertinent History: CHF, Dyspnea, COPD, CVA, HTN, Hyperlipidemia and  Palpitations. CKD, s/p ICD,.    Study Detail: The following Echo studies were performed: 2D, M-Mode, Doppler and  color flow. Optison used as a contrast agent for endocardial  border definition. Total contrast used for this procedure was 2 mL  via IV push.      PHYSICIAN INTERPRETATION:  Left Ventricle: Left ventricular ejection fraction is severely decreased, calculated by Tim's biplane at 10%. There is global hypokinesis of the left ventricle with minor regional variations. The left ventricular cavity size is severely dilated. There is severe eccentric left ventricular hypertrophy. Spectral Doppler shows an abnormal pattern of left ventricular diastolic filling. There is no definite left  ventricular thrombus visualized. There is spontaneous echocontrast noted within the LV cavity.Echocontrast was utilized and excluded LV apical thrombus.  Left Atrium: The left atrium is severely dilated.  Right Ventricle: The right ventricle is severely enlarged. There is reduced right ventricular systolic function. A device is visualized in the right ventricle.  Right Atrium: The right atrium is moderately to severely dilated. There is a device visualized in the right atrium.  Aortic Valve: The aortic valve is trileaflet. There is minimal aortic valve cusp calcification. There is no evidence of aortic valve regurgitation. The peak instantaneous gradient of the aortic valve is 5.1 mmHg.  Mitral Valve: The mitral valve is normal in structure. There is mild mitral valve regurgitation.  Tricuspid Valve: The tricuspid valve is structurally normal. There is mild to moderate tricuspid regurgitation. The Doppler estimated RVSP is mildly elevated at 39.4 mmHg.  Pulmonic Valve: The pulmonic valve is structurally normal. There is physiologic pulmonic valve regurgitation.  Pericardium: There is a trivial pericardial effusion.  Aorta: The aortic root is normal.  Systemic Veins: The inferior vena cava appears dilated.  In comparison to the previous echocardiogram(s): Compared with the prior exam from 7/26/2023, the degree of TR has increased, however there was already a severley dilated LV with severe systolic dysfunciton and a dilated RV with reduced fx as well. Note that the prior exam included an agitated saline contrast study that was negative for shunt.      CONCLUSIONS:  1. Left ventricular ejection fraction is severely decreased, calculated by Tim's biplane at 10%.  2. There is global hypokinesis of the left ventricle with minor regional variations.  3. Spectral Doppler shows an abnormal pattern of left ventricular diastolic filling.  4. Left ventricular cavity size is severely dilated.  5. No left ventricular  thrombus visualized.  6. There is spontaneous echocontrast noted within the LV cavity.Echocontrast was utilized and excluded LV apical thrombus.  7. There is severe eccentric left ventricular hypertrophy.  8. Severely enlarged right ventricle.  9. There is reduced right ventricular systolic function.  10. The left atrium is severely dilated.  11. The right atrium is moderately to severely dilated.  12. Mild to moderate tricuspid regurgitation visualized.  13. Mildly elevated RVSP.  14. Compared with the prior exam from 7/26/2023, the degree of TR has increased, however there was already a severley dilated LV with severe systolic dysfunciton and a dilated RV with reduced fx as well. Note that the prior exam included an agitated saline contrast study that was negative for shunt.    QUANTITATIVE DATA SUMMARY:  2D MEASUREMENTS:  Normal Ranges:  Ao Root d:     3.00 cm    (2.0-3.7cm)  LAs:           3.30 cm    (2.7-4.0cm)  IVSd:          0.90 cm    (0.6-1.1cm)  LVPWd:         1.30 cm    (0.6-1.1cm)  LVIDd:         8.90 cm    (3.9-5.9cm)  LVIDs:         6.00 cm  LV Mass Index: 287.0 g/m2  LV % FS        32.6 %    LA VOLUME:  Normal Ranges:  LA Vol A4C:        68.8 ml    (22+/-6mL/m2)  LA Vol A2C:        137.1 ml  LA Vol BP:         97.1 ml  LA Vol Index A4C:  35.8ml/m2  LA Vol Index A2C:  71.3 ml/m2  LA Vol Index BP:   50.5 ml/m2  LA Area A4C:       23.1 cm2  LA Area A2C:       32.6 cm2  LA Major Axis A4C: 6.6 cm  LA Major Axis A2C: 6.6 cm  LA Volume Index:   50.5 ml/m2    RA VOLUME BY A/L METHOD:  Normal Ranges:  RA Area A4C: 30.2 cm2    AORTA MEASUREMENTS:  Normal Ranges:  Ao Sinus, d: 2.90 cm (2.1-3.5cm)  Asc Ao, d:   3.10 cm (2.1-3.4cm)    LV SYSTOLIC FUNCTION BY 2D PLANIMETRY (MOD):  Normal Ranges:  EF-A4C View:    10 % (>=55%)  EF-A2C View:    9 %  EF-Biplane:     10 %  LV EF Reported: 10 %    LV DIASTOLIC FUNCTION:  Normal Ranges:  MV e'         0.045 m/s (>8.0)  MV lateral e' 0.04 m/s  MV medial e'  0.05  m/s    AORTIC VALVE:  Normal Ranges:  AoV Vmax:      1.13 m/s (<=1.7m/s)  AoV Peak P.1 mmHg (<20mmHg)  LVOT Max Jori:  0.73 m/s (<=1.1m/s)  LVOT VTI:      8.65 cm  LVOT Diameter: 2.20 cm  (1.8-2.4cm)  AoV Area,Vmax: 2.46 cm2 (2.5-4.5cm2)      RIGHT VENTRICLE:  RV Basal 5.20 cm  RV Mid   2.80 cm  RV Major 11.2 cm  TAPSE:   15.8 mm  RV s'    0.07 m/s    TRICUSPID VALVE/RVSP:  Normal Ranges:  Peak TR Velocity: 2.80 m/s  RV Syst Pressure: 39.4 mmHg (< 30mmHg)  IVC Diam:         2.60 cm    PULMONIC VALVE:  Normal Ranges:  PV Max Jori: 0.7 m/s  (0.6-0.9m/s)  PV Max P.0 mmHg      [id number] Jeanette Ellington MD  Electronically signed on 2024 at 5:20:48 PM        ** Final **    Coronary Angiography: No results found for this or any previous visit from the past 1800 days.    Right Heart Cath: No results found for this or any previous visit from the past 1800 days.    Cardiac Scoring: No results found for this or any previous visit from the past 1800 days.    Cardiac MRI:   MR cardiac morphology and function w and wo IV contrast 2024    Narrative  Interpreted By:  Enoc Dorantes and Okyere Robert  STUDY:  MR CARDIAC MORPHOLOGY AND FUNCTION W AND WO IV CONTRAST;  2024  10:35 am    INDICATION:  Signs/Symptoms:HFrEF 10%, recent vfib.    COMPARISON:  None.    ACCESSION NUMBER(S):  [id number]    ORDERING CLINICIAN:  ANAY FRY    TECHNIQUE:  Siemens 1.5  Jovana MRI scanner.  Turbo spin echo and balanced steady state free precession (bSSFP)  imaging for anatomic definition. Dynamic cine bSSFP for cardiac  chamber and wall-motion analysis, and valvular analysis. Flow  quantification sequences for hemodynamics. Delayed gadolinium  enhancement analysis after injection of gadolinium-chelate (mL of  Dotarem, 0.2 mmol/kg).    HT-175 cm; WT-73 kg; BSA-1.88 m2    FINDINGS:  CARDIAC CHAMBERS  Normal atrioventricular and ventriculoarterial concordance    LEFT ATRIUM  Severely dilated (DEANNE-51 ml/m2).    RIGHT  ATRIUM  Moderately dilated (RA max 4ch-31.1 cm2)    INTERATRIAL SEPTUM  Intact.    LEFT VENTRICLE:  1. Severely dilated LV with severely reduced systolic function (LVEF  5-10%).  2. The basal inferoseptal, mid inferoseptal, apical septal,  apicolateral segments are akinetic. All other segments are severely  hypokinetic.  3. Eccentric left ventricular hypertrophy.  4. No evidence of LV thrombus.  5. Following administration of gadolinium, in the late phase, there  is transmural enhancement of the mid/apical inferoseptal/inferior  segments, subendocardial enhancement of the apicolateral segment (<  25% wall thickness) and mid wall septal enhancement.    Quantitative left ventricular functional values are as follows:  EDV = 648 cc; EDVi = 344 cc/m2  ESV = 617 cc; ESVi = 328 cc/m2  Stroke volume = 31 cc; SVi = 16 cc/m2  LVEF = 5 %  Absolute Cardiac Output = 2 l/min.; COi = 1.06 l/min/m2  LV mass = 415 gm; LVMi = 220 gm/m2    LVEDD: 8.2 cm  LVESD: 7.4 cm  LV septal wall thickness (anterobasal): 1.1 cm  LV postero-inferior wall thickness: 0.6 cm    FINDINGS  ----------------------------------------------  LV SCAR SIZE (17 SEGMENT):  15 %    PERFUSION:  There is reduced contrast wash-in in the mid inferoseptal wall.    RIGHT VENTRICLE  The right ventricle appears dilated in size and has reduced  qualitative systolic function by qualitative assessment. No abnormal  delayed enhancement in the myocardium. RV lead visualized.    INTERVENTRICULAR SEPTUM  Intact.    AORTIC VALVE  The aortic valve is tricuspid with normal leaflet excursion.  There is  no aortic regurgitation.      MITRAL VALVE  There is  mild mitral regurgitation by qualitative assessment.    TRICUSPID VALVE  There is qualitative mild tricuspid regurgitation.    PULMONARY VALVE:  Not assessed.    PERICARDIUM:  The pericardium is normal. There is no pericardial effusion.    THORACIC AORTA  The thoracic aorta appears normal in course, caliber, and contour.  There  is no evidence for acute aortic pathology. The ascending  thoracic aorta is 3.0 x 2.8 cm in diameter The arch vessel branching  pattern is  normal.   All the arch branch vessels appear widely  patent in their proximal portions.      PULMONARY ARTERIES  The central pulmonary arteries appear  normal (MPA-2.8 cm, RPA-1.8  cm, LPA-2.1 cm).    SYSTEMIC AND PULMONARY VEINS  Normal systemic venous and pulmonary venous return.  The SVC is of normal caliber. IVC appears normal  Normal pulmonary venous anatomy.    CHEST  The chest wall is normal.  Limited imaging through the lungs reveals no gross abnormalities. No  pleural effusion.    UPPER ABDOMEN  Limited imaging through the upper abdomen reveals no abnormalities of  the visualized organs.    Impression  1. Severely dilated left ventricle (EDVi 344 ml/m2) with severely  impaired systolic function (LVEF 5-10%).  2. The basal-mid inferoseptal, apical septal, apicolateral segments  are akinetic. All other segments are severely hypokinetic.    3. Eccentric left ventricular hypertrophy.  4. No evidence of LV thrombus.  5. LGE imaging demonstrates transmural infarction of the mid/apical  inferoseptal/inferior segments and a separate small region of  subendocardial infarction of the apicolateral wall (<25% wall  thickness). Additionally, there is mid-wall septal fibrosis  (nonischemic pattern).  6. Dilated right ventricle with impaired systolic function  (qualitative assessment). CIED lead noted.    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    MACRO:  None    Signed by: Enoc Dorantes 8/5/2024 4:19 PM  Dictation workstation:   [id number]    Nuclear:No results found for this or any previous visit from the past 1800 days.    Metabolic Stress: No results found for this or any previous visit from the past 1800 days.      CT abdomen pelvis w IV contrast    Result Date: 9/30/2024  STUDY: CT Angiogram of the Chest, CT Abdomen and  Pelvis with IV Contrast; 09/30/2024, 5:38 PM. INDICATION: Generalized abdominal pain. Flank pain. Concern for pulmonary embolism. COMPARISON: CXR: 09/30/24, 09/07/24, 07/30/24, 02/12/24; CTA chest: 10/23/23; CT AP: 10/23/23. ACCESSION NUMBER(S): [id number], [id number] ORDERING CLINICIAN: NIKOLAY MORGAN TECHNIQUE:  CTA of the chest was performed following rapid injection of intravenous contrast.  Images are reviewed and processed at a workstation according to the CT angiogram protocol with 3-D and/or MIP post processing imaging generated.  CT of the abdomen and pelvis was performed with intravenous contrast.  Omnipaque 350 80 mL was administered intravenously; positive oral contrast was given. Automated mA/kV exposure control was utilized and patient examination was performed in strict accordance with principles of ALARA. FINDINGS:  CTA CHEST: Pulmonary arteries are adequately opacified without acute or chronic filling defects.  The thoracic aorta is normal in course and caliber without dissection or aneurysm. The heart is enlarged.  Thoracic lymph nodes are not enlarged. The lungs are emphysematous. There are interstitial infiltrates in both lung bases most likely representing atelectasis.  There is a nodule laterally in the right middle lobe unchanged from 2023. ABDOMEN:  LIVER: No hepatomegaly.  Smooth surface contour.  Normal attenuation.  BILE DUCTS: No intrahepatic or extrahepatic biliary ductal dilatation.  GALLBLADDER: The gallbladder is present.  STOMACH: No abnormalities identified.  PANCREAS: No masses or ductal dilatation.  SPLEEN: No splenomegaly or focal splenic lesion.  ADRENAL GLANDS: No thickening or nodules.  KIDNEYS AND URETERS: Kidneys are normal in size and location.  No renal or ureteral calculi.  PELVIS:  BLADDER: No abnormalities identified.  REPRODUCTIVE ORGANS: No abnormalities identified.  BOWEL: No abnormalities identified.  The appendix is identified and is normal.  VESSELS: No  abnormalities identified.  Abdominal aorta is normal in caliber.  PERITONEUM/RETROPERITONEUM/LYMPH NODES: No free fluid.  No pneumoperitoneum. No lymphadenopathy.  ABDOMINAL WALL: No abnormalities identified. SOFT TISSUES: No abnormalities identified.  BONES: There are degenerative changes of lumbosacral spine with vacuum discs.  No acute fracture or aggressive osseous lesion.    No evidence of pulmonary embolism. Cardiomegaly with emphysematous changes in the lungs. Stable nodule in the right middle lobe. No acute process. Signed by Stanislaw Elizondo MD    CT angio chest for pulmonary embolism    Result Date: 9/30/2024  STUDY: CT Angiogram of the Chest, CT Abdomen and Pelvis with IV Contrast; 09/30/2024, 5:38 PM. INDICATION: Generalized abdominal pain. Flank pain. Concern for pulmonary embolism. COMPARISON: CXR: 09/30/24, 09/07/24, 07/30/24, 02/12/24; CTA chest: 10/23/23; CT AP: 10/23/23. ACCESSION NUMBER(S): [id number], [id number] ORDERING CLINICIAN: NIKOLAY MORGAN TECHNIQUE:  CTA of the chest was performed following rapid injection of intravenous contrast.  Images are reviewed and processed at a workstation according to the CT angiogram protocol with 3-D and/or MIP post processing imaging generated.  CT of the abdomen and pelvis was performed with intravenous contrast.  Omnipaque 350 80 mL was administered intravenously; positive oral contrast was given. Automated mA/kV exposure control was utilized and patient examination was performed in strict accordance with principles of ALARA. FINDINGS:  CTA CHEST: Pulmonary arteries are adequately opacified without acute or chronic filling defects.  The thoracic aorta is normal in course and caliber without dissection or aneurysm. The heart is enlarged.  Thoracic lymph nodes are not enlarged. The lungs are emphysematous. There are interstitial infiltrates in both lung bases most likely representing atelectasis.  There is a nodule laterally in the right middle lobe unchanged  from 2023. ABDOMEN:  LIVER: No hepatomegaly.  Smooth surface contour.  Normal attenuation.  BILE DUCTS: No intrahepatic or extrahepatic biliary ductal dilatation.  GALLBLADDER: The gallbladder is present.  STOMACH: No abnormalities identified.  PANCREAS: No masses or ductal dilatation.  SPLEEN: No splenomegaly or focal splenic lesion.  ADRENAL GLANDS: No thickening or nodules.  KIDNEYS AND URETERS: Kidneys are normal in size and location.  No renal or ureteral calculi.  PELVIS:  BLADDER: No abnormalities identified.  REPRODUCTIVE ORGANS: No abnormalities identified.  BOWEL: No abnormalities identified.  The appendix is identified and is normal.  VESSELS: No abnormalities identified.  Abdominal aorta is normal in caliber.  PERITONEUM/RETROPERITONEUM/LYMPH NODES: No free fluid.  No pneumoperitoneum. No lymphadenopathy.  ABDOMINAL WALL: No abnormalities identified. SOFT TISSUES: No abnormalities identified.  BONES: There are degenerative changes of lumbosacral spine with vacuum discs.  No acute fracture or aggressive osseous lesion.    No evidence of pulmonary embolism. Cardiomegaly with emphysematous changes in the lungs. Stable nodule in the right middle lobe. No acute process. Signed by Stanislaw Elizondo MD    XR chest 1 view    Result Date: 9/30/2024  STUDY: Chest Radiograph;  9/30/2024 5:14PM INDICATION: Chest pain. COMPARISON: 7/30/2024 XR Chest 2 Views, 10/23/2023 CT CTA Chest, 10/23/2023 XR Chest 1 View, 7/24/2023 CT CTA Chest ACCESSION NUMBER(S): [id number] ORDERING CLINICIAN: NIKOLAY MORGAN TECHNIQUE:  Frontal chest was obtained at 17:14 hours. FINDINGS: CARDIOMEDIASTINAL SILHOUETTE: Heart is markedly enlarged but stable. Left-sided AICD noted. No pleural effusion or pneumothorax. No acute bony abnormality. Mild pulmonary vascular congestion.  LUNGS: Unchanged bullous changes in the left upper lobe.  ABDOMEN: No remarkable upper abdominal findings.  BONES: No acute osseous changes.    Unchanged cardiomegaly and  mild pulmonary vascular congestion. Unchanged bullous changes in the left upper lobe. Signed by Tashi Cortés MD    Electrocardiogram, 12-lead PRN ACS symptoms    Result Date: 9/18/2024  Sinus rhythm with occasional Premature ventricular complexes Possible Left atrial enlargement Left axis deviation Left ventricular hypertrophy with repolarization abnormality Inferior infarct , age undetermined Abnormal ECG When compared with ECG of 07-SEP-2024 00:07, Premature ventricular complexes are now Present Inferior infarct is now Present Confirmed by James Shah ([id number]) on 9/18/2024 8:19:31 AM    Electrocardiogram, 12-lead PRN ACS symptoms    Result Date: 9/10/2024  Normal sinus rhythm Possible Left atrial enlargement Left axis deviation Left ventricular hypertrophy with QRS widening and repolarization abnormality Inferior infarct (cited on or before 07-SEP-2024) Prolonged QT Abnormal ECG When compared with ECG of 07-SEP-2024 09:33, Premature ventricular complexes are no longer Present Confirmed by Leoncio Dias ([id number]) on 9/10/2024 4:02:49 PM    ECG 12 Lead    Result Date: 9/7/2024  Normal sinus rhythm Left axis deviation Left ventricular hypertrophy with repolarization abnormality ( Sokolow-Nieves , Brown product ) Abnormal ECG When compared with ECG of 06-SEP-2024 12:34, No significant change was found See ED provider note for full interpretation and clinical correlation Confirmed by June Perez ([id number]) on 9/7/2024 2:16:33 AM    XR chest 2 views    Result Date: 9/7/2024  Interpreted By:  Dimitri Rodriguez, STUDY: XR CHEST 2 VIEWS;  9/7/2024 12:30 am   INDICATION: Signs/Symptoms:Syncope.   COMPARISON: Chest x-ray 07/30/2024   ACCESSION NUMBER(S): [id number]   ORDERING CLINICIAN: LUKE ARBOLEDA   FINDINGS: Left-sided single lead AICD is stable in position.   CARDIOMEDIASTINAL SILHOUETTE: Cardiac silhouette is enlarged but stable.   LUNGS: No consolidation, pleural effusion or pneumothorax. Mild diffuse interstitial  prominence. Redemonstration of bullous emphysematous changes in the lung apices, left greater than right.   ABDOMEN: No remarkable upper abdominal findings.   BONES: Multilevel degenerative changes of the spine.       Stable cardiomegaly. Mild diffuse interstitial prominence could be chronic or relate to component developing interstitial edema. Correlate clinically.   Findings suggestive of COPD with bullous emphysematous changes in the lung apices, left greater than right.   MACRO: None   Signed by: Dimitri Rodriguez 9/7/2024 12:38 AM Dictation workstation:   [id number]       LDA:   NUTRITION: Adult diet Regular  EMERGENCY CONTACT: Extended Emergency Contact Information  Primary Emergency Contact: Elizabet Santana  Mobile Phone: [phone]  Relation: Friend  CODE STATUS: DNR  DISPO: Discharge Planning  Living Arrangements: Friends  Support Systems: Friends/neighbors  Assistance Needed: none  Type of Residence: Private residence  Number of Stairs to Enter Residence: 5  Number of Stairs Within Residence: 12  Do you have animals or pets at home?: No  Who is requesting discharge planning?: Provider  Home or Post Acute Services: None  Expected Discharge Disposition: Home  Does the patient need discharge transport arranged?: Yes  RoundTrip coordination needed?: Yes  AMPAC: Daily Activity - Total Score: 24    FOLLOWUP:   Future Appointments   Date Time Provider Department Center   11/5/2024  9:00 AM Mayco Skinner MD [id number]

## 2024-10-08 LAB
ATRIAL RATE: 58 BPM
P AXIS: 32 DEGREES
P OFFSET: 159 MS
P ONSET: 92 MS
PR INTERVAL: 220 MS
Q ONSET: 202 MS
QRS COUNT: 9 BEATS
QRS DURATION: 122 MS
QT INTERVAL: 490 MS
QTC CALCULATION(BAZETT): 481 MS
QTC FREDERICIA: 484 MS
R AXIS: -50 DEGREES
T AXIS: 97 DEGREES
T OFFSET: 447 MS
VENTRICULAR RATE: 58 BPM

## 2024-10-09 ENCOUNTER — APPOINTMENT (OUTPATIENT)
Dept: CARDIOLOGY | Facility: HOSPITAL | Age: 54
End: 2024-10-09
Payer: COMMERCIAL

## 2024-10-09 ENCOUNTER — TELEPHONE (OUTPATIENT)
Dept: CARDIOLOGY | Facility: HOSPITAL | Age: 54
End: 2024-10-09
Payer: COMMERCIAL

## 2024-10-15 ENCOUNTER — HOSPITAL ENCOUNTER (INPATIENT)
Facility: HOSPITAL | Age: 54
LOS: 1 days | Discharge: LEFT BEFORE DISCHARGE | End: 2024-10-16
Attending: STUDENT IN AN ORGANIZED HEALTH CARE EDUCATION/TRAINING PROGRAM | Admitting: PHYSICIAN ASSISTANT
Payer: COMMERCIAL

## 2024-10-15 ENCOUNTER — CLINICAL SUPPORT (OUTPATIENT)
Dept: EMERGENCY MEDICINE | Facility: HOSPITAL | Age: 54
End: 2024-10-15
Payer: COMMERCIAL

## 2024-10-15 ENCOUNTER — APPOINTMENT (OUTPATIENT)
Dept: RADIOLOGY | Facility: HOSPITAL | Age: 54
End: 2024-10-15
Payer: COMMERCIAL

## 2024-10-15 DIAGNOSIS — I50.9 ACUTE ON CHRONIC CONGESTIVE HEART FAILURE, UNSPECIFIED HEART FAILURE TYPE: ICD-10-CM

## 2024-10-15 DIAGNOSIS — J44.1 COPD EXACERBATION (MULTI): Primary | ICD-10-CM

## 2024-10-15 LAB
ALBUMIN SERPL BCP-MCNC: 4 G/DL (ref 3.4–5)
ALBUMIN SERPL BCP-MCNC: 4.5 G/DL (ref 3.4–5)
ALP SERPL-CCNC: 93 U/L (ref 33–120)
ALT SERPL W P-5'-P-CCNC: 50 U/L (ref 10–52)
AMPHETAMINES UR QL SCN: ABNORMAL
ANION GAP BLDV CALCULATED.4IONS-SCNC: 9 MMOL/L (ref 10–25)
ANION GAP SERPL CALC-SCNC: 15 MMOL/L (ref 10–20)
ANION GAP SERPL CALC-SCNC: 16 MMOL/L (ref 10–20)
AST SERPL W P-5'-P-CCNC: 42 U/L (ref 9–39)
ATRIAL RATE: 85 BPM
BARBITURATES UR QL SCN: ABNORMAL
BASE EXCESS BLDV CALC-SCNC: -2.6 MMOL/L (ref -2–3)
BASOPHILS # BLD AUTO: 0.06 X10*3/UL (ref 0–0.1)
BASOPHILS NFR BLD AUTO: 0.6 %
BENZODIAZ UR QL SCN: ABNORMAL
BILIRUB SERPL-MCNC: 1.2 MG/DL (ref 0–1.2)
BNP SERPL-MCNC: 2833 PG/ML (ref 0–99)
BODY TEMPERATURE: 37 DEGREES CELSIUS
BUN SERPL-MCNC: 26 MG/DL (ref 6–23)
BUN SERPL-MCNC: 32 MG/DL (ref 6–23)
BZE UR QL SCN: ABNORMAL
CA-I BLDV-SCNC: 1.12 MMOL/L (ref 1.1–1.33)
CALCIUM SERPL-MCNC: 8.9 MG/DL (ref 8.6–10.6)
CALCIUM SERPL-MCNC: 9.5 MG/DL (ref 8.6–10.6)
CANNABINOIDS UR QL SCN: ABNORMAL
CARDIAC TROPONIN I PNL SERPL HS: 50 NG/L (ref 0–53)
CARDIAC TROPONIN I PNL SERPL HS: 70 NG/L (ref 0–53)
CHLORIDE BLDV-SCNC: 104 MMOL/L (ref 98–107)
CHLORIDE SERPL-SCNC: 98 MMOL/L (ref 98–107)
CHLORIDE SERPL-SCNC: 98 MMOL/L (ref 98–107)
CO2 SERPL-SCNC: 24 MMOL/L (ref 21–32)
CO2 SERPL-SCNC: 25 MMOL/L (ref 21–32)
CREAT SERPL-MCNC: 1.65 MG/DL (ref 0.5–1.3)
CREAT SERPL-MCNC: 1.78 MG/DL (ref 0.5–1.3)
EGFRCR SERPLBLD CKD-EPI 2021: 44 ML/MIN/1.73M*2
EGFRCR SERPLBLD CKD-EPI 2021: 49 ML/MIN/1.73M*2
EOSINOPHIL # BLD AUTO: 0.04 X10*3/UL (ref 0–0.7)
EOSINOPHIL NFR BLD AUTO: 0.4 %
ERYTHROCYTE [DISTWIDTH] IN BLOOD BY AUTOMATED COUNT: 16 % (ref 11.5–14.5)
ERYTHROCYTE [DISTWIDTH] IN BLOOD BY AUTOMATED COUNT: 16 % (ref 11.5–14.5)
FENTANYL+NORFENTANYL UR QL SCN: ABNORMAL
FLUAV RNA RESP QL NAA+PROBE: NOT DETECTED
FLUBV RNA RESP QL NAA+PROBE: NOT DETECTED
GLUCOSE BLD MANUAL STRIP-MCNC: 332 MG/DL (ref 74–99)
GLUCOSE BLD MANUAL STRIP-MCNC: 436 MG/DL (ref 74–99)
GLUCOSE BLD MANUAL STRIP-MCNC: 478 MG/DL (ref 74–99)
GLUCOSE BLD MANUAL STRIP-MCNC: 492 MG/DL (ref 74–99)
GLUCOSE BLD MANUAL STRIP-MCNC: 527 MG/DL (ref 74–99)
GLUCOSE BLDV-MCNC: 424 MG/DL (ref 74–99)
GLUCOSE SERPL-MCNC: 344 MG/DL (ref 74–99)
GLUCOSE SERPL-MCNC: 419 MG/DL (ref 74–99)
HCO3 BLDV-SCNC: 21.9 MMOL/L (ref 22–26)
HCT VFR BLD AUTO: 60.1 % (ref 41–52)
HCT VFR BLD AUTO: 61.6 % (ref 41–52)
HCT VFR BLD EST: 58 % (ref 41–52)
HGB BLD-MCNC: 20.5 G/DL (ref 13.5–17.5)
HGB BLD-MCNC: 20.8 G/DL (ref 13.5–17.5)
HGB BLDV-MCNC: 19.4 G/DL (ref 13.5–17.5)
IMM GRANULOCYTES # BLD AUTO: 0.05 X10*3/UL (ref 0–0.7)
IMM GRANULOCYTES NFR BLD AUTO: 0.5 % (ref 0–0.9)
LACTATE BLDV-SCNC: 1.4 MMOL/L (ref 0.4–2)
LYMPHOCYTES # BLD AUTO: 0.75 X10*3/UL (ref 1.2–4.8)
LYMPHOCYTES NFR BLD AUTO: 7.5 %
MAGNESIUM SERPL-MCNC: 2.29 MG/DL (ref 1.6–2.4)
MAGNESIUM SERPL-MCNC: 2.36 MG/DL (ref 1.6–2.4)
MCH RBC QN AUTO: 30.7 PG (ref 26–34)
MCH RBC QN AUTO: 31.1 PG (ref 26–34)
MCHC RBC AUTO-ENTMCNC: 33.8 G/DL (ref 32–36)
MCHC RBC AUTO-ENTMCNC: 34.1 G/DL (ref 32–36)
MCV RBC AUTO: 91 FL (ref 80–100)
MCV RBC AUTO: 91 FL (ref 80–100)
METHADONE UR QL SCN: ABNORMAL
MONOCYTES # BLD AUTO: 0.29 X10*3/UL (ref 0.1–1)
MONOCYTES NFR BLD AUTO: 2.9 %
NEUTROPHILS # BLD AUTO: 8.85 X10*3/UL (ref 1.2–7.7)
NEUTROPHILS NFR BLD AUTO: 88.1 %
NRBC BLD-RTO: 0 /100 WBCS (ref 0–0)
NRBC BLD-RTO: 0 /100 WBCS (ref 0–0)
OPIATES UR QL SCN: ABNORMAL
OXYCODONE+OXYMORPHONE UR QL SCN: ABNORMAL
OXYHGB MFR BLDV: 81.9 % (ref 45–75)
P AXIS: 75 DEGREES
P OFFSET: 168 MS
P ONSET: 115 MS
PCO2 BLDV: 37 MM HG (ref 41–51)
PCP UR QL SCN: ABNORMAL
PH BLDV: 7.38 PH (ref 7.33–7.43)
PHOSPHATE SERPL-MCNC: 4.5 MG/DL (ref 2.5–4.9)
PLATELET # BLD AUTO: 183 X10*3/UL (ref 150–450)
PLATELET # BLD AUTO: 188 X10*3/UL (ref 150–450)
PO2 BLDV: 55 MM HG (ref 35–45)
POTASSIUM BLDV-SCNC: 5.4 MMOL/L (ref 3.5–5.3)
POTASSIUM SERPL-SCNC: 5 MMOL/L (ref 3.5–5.3)
POTASSIUM SERPL-SCNC: 5.3 MMOL/L (ref 3.5–5.3)
POTASSIUM SERPL-SCNC: 6.2 MMOL/L (ref 3.5–5.3)
PR INTERVAL: 190 MS
PROT SERPL-MCNC: 8.7 G/DL (ref 6.4–8.2)
Q ONSET: 210 MS
QRS COUNT: 15 BEATS
QRS DURATION: 124 MS
QT INTERVAL: 456 MS
QTC CALCULATION(BAZETT): 542 MS
QTC FREDERICIA: 512 MS
R AXIS: -82 DEGREES
RBC # BLD AUTO: 6.59 X10*6/UL (ref 4.5–5.9)
RBC # BLD AUTO: 6.77 X10*6/UL (ref 4.5–5.9)
SAO2 % BLDV: 86 % (ref 45–75)
SARS-COV-2 RNA RESP QL NAA+PROBE: NOT DETECTED
SODIUM BLDV-SCNC: 129 MMOL/L (ref 136–145)
SODIUM SERPL-SCNC: 132 MMOL/L (ref 136–145)
SODIUM SERPL-SCNC: 133 MMOL/L (ref 136–145)
T AXIS: 84 DEGREES
T OFFSET: 438 MS
VENTRICULAR RATE: 85 BPM
WBC # BLD AUTO: 10 X10*3/UL (ref 4.4–11.3)
WBC # BLD AUTO: 8.4 X10*3/UL (ref 4.4–11.3)

## 2024-10-15 PROCEDURE — 82947 ASSAY GLUCOSE BLOOD QUANT: CPT

## 2024-10-15 PROCEDURE — 93010 ELECTROCARDIOGRAM REPORT: CPT | Performed by: INTERNAL MEDICINE

## 2024-10-15 PROCEDURE — 1210000001 HC SEMI-PRIVATE ROOM DAILY

## 2024-10-15 PROCEDURE — 87636 SARSCOV2 & INF A&B AMP PRB: CPT

## 2024-10-15 PROCEDURE — 93005 ELECTROCARDIOGRAM TRACING: CPT

## 2024-10-15 PROCEDURE — 94640 AIRWAY INHALATION TREATMENT: CPT | Mod: 59

## 2024-10-15 PROCEDURE — 85025 COMPLETE CBC W/AUTO DIFF WBC: CPT

## 2024-10-15 PROCEDURE — 82435 ASSAY OF BLOOD CHLORIDE: CPT | Performed by: PHYSICIAN ASSISTANT

## 2024-10-15 PROCEDURE — 2500000004 HC RX 250 GENERAL PHARMACY W/ HCPCS (ALT 636 FOR OP/ED): Mod: SE

## 2024-10-15 PROCEDURE — 84484 ASSAY OF TROPONIN QUANT: CPT

## 2024-10-15 PROCEDURE — 84132 ASSAY OF SERUM POTASSIUM: CPT

## 2024-10-15 PROCEDURE — 2500000004 HC RX 250 GENERAL PHARMACY W/ HCPCS (ALT 636 FOR OP/ED): Performed by: NURSE PRACTITIONER

## 2024-10-15 PROCEDURE — 84295 ASSAY OF SERUM SODIUM: CPT | Performed by: PHYSICIAN ASSISTANT

## 2024-10-15 PROCEDURE — 82435 ASSAY OF BLOOD CHLORIDE: CPT

## 2024-10-15 PROCEDURE — 83735 ASSAY OF MAGNESIUM: CPT

## 2024-10-15 PROCEDURE — 2500000002 HC RX 250 W HCPCS SELF ADMINISTERED DRUGS (ALT 637 FOR MEDICARE OP, ALT 636 FOR OP/ED): Mod: SE

## 2024-10-15 PROCEDURE — 96374 THER/PROPH/DIAG INJ IV PUSH: CPT

## 2024-10-15 PROCEDURE — 80053 COMPREHEN METABOLIC PANEL: CPT

## 2024-10-15 PROCEDURE — 2500000005 HC RX 250 GENERAL PHARMACY W/O HCPCS: Mod: SE

## 2024-10-15 PROCEDURE — 71046 X-RAY EXAM CHEST 2 VIEWS: CPT | Performed by: RADIOLOGY

## 2024-10-15 PROCEDURE — 2500000004 HC RX 250 GENERAL PHARMACY W/ HCPCS (ALT 636 FOR OP/ED): Performed by: PHYSICIAN ASSISTANT

## 2024-10-15 PROCEDURE — 83036 HEMOGLOBIN GLYCOSYLATED A1C: CPT

## 2024-10-15 PROCEDURE — 83880 ASSAY OF NATRIURETIC PEPTIDE: CPT

## 2024-10-15 PROCEDURE — 36415 COLL VENOUS BLD VENIPUNCTURE: CPT

## 2024-10-15 PROCEDURE — 93010 ELECTROCARDIOGRAM REPORT: CPT | Performed by: STUDENT IN AN ORGANIZED HEALTH CARE EDUCATION/TRAINING PROGRAM

## 2024-10-15 PROCEDURE — 2500000001 HC RX 250 WO HCPCS SELF ADMINISTERED DRUGS (ALT 637 FOR MEDICARE OP): Performed by: NURSE PRACTITIONER

## 2024-10-15 PROCEDURE — 99285 EMERGENCY DEPT VISIT HI MDM: CPT | Performed by: STUDENT IN AN ORGANIZED HEALTH CARE EDUCATION/TRAINING PROGRAM

## 2024-10-15 PROCEDURE — 99285 EMERGENCY DEPT VISIT HI MDM: CPT | Mod: 25

## 2024-10-15 PROCEDURE — 71046 X-RAY EXAM CHEST 2 VIEWS: CPT

## 2024-10-15 PROCEDURE — 2500000001 HC RX 250 WO HCPCS SELF ADMINISTERED DRUGS (ALT 637 FOR MEDICARE OP): Mod: SE

## 2024-10-15 PROCEDURE — 82330 ASSAY OF CALCIUM: CPT

## 2024-10-15 PROCEDURE — 2500000002 HC RX 250 W HCPCS SELF ADMINISTERED DRUGS (ALT 637 FOR MEDICARE OP, ALT 636 FOR OP/ED): Performed by: NURSE PRACTITIONER

## 2024-10-15 PROCEDURE — 85027 COMPLETE CBC AUTOMATED: CPT | Performed by: PHYSICIAN ASSISTANT

## 2024-10-15 PROCEDURE — 83735 ASSAY OF MAGNESIUM: CPT | Performed by: PHYSICIAN ASSISTANT

## 2024-10-15 PROCEDURE — 80307 DRUG TEST PRSMV CHEM ANLYZR: CPT | Performed by: NURSE PRACTITIONER

## 2024-10-15 RX ORDER — POLYETHYLENE GLYCOL 3350 17 G/17G
17 POWDER, FOR SOLUTION ORAL 2 TIMES DAILY PRN
Status: DISCONTINUED | OUTPATIENT
Start: 2024-10-15 | End: 2024-10-16 | Stop reason: HOSPADM

## 2024-10-15 RX ORDER — ONDANSETRON 4 MG/1
4 TABLET, ORALLY DISINTEGRATING ORAL ONCE
Status: COMPLETED | OUTPATIENT
Start: 2024-10-15 | End: 2024-10-15

## 2024-10-15 RX ORDER — ENOXAPARIN SODIUM 100 MG/ML
40 INJECTION SUBCUTANEOUS EVERY 24 HOURS
Status: DISCONTINUED | OUTPATIENT
Start: 2024-10-15 | End: 2024-10-15

## 2024-10-15 RX ORDER — SERTRALINE HYDROCHLORIDE 50 MG/1
100 TABLET, FILM COATED ORAL DAILY
Status: DISCONTINUED | OUTPATIENT
Start: 2024-10-15 | End: 2024-10-16 | Stop reason: HOSPADM

## 2024-10-15 RX ORDER — AMOXICILLIN 250 MG
2 CAPSULE ORAL 2 TIMES DAILY
Status: DISCONTINUED | OUTPATIENT
Start: 2024-10-15 | End: 2024-10-15

## 2024-10-15 RX ORDER — DEXTROSE 50 % IN WATER (D50W) INTRAVENOUS SYRINGE
25
Status: DISCONTINUED | OUTPATIENT
Start: 2024-10-15 | End: 2024-10-16 | Stop reason: HOSPADM

## 2024-10-15 RX ORDER — INSULIN LISPRO 100 [IU]/ML
0-5 INJECTION, SOLUTION INTRAVENOUS; SUBCUTANEOUS
Status: DISCONTINUED | OUTPATIENT
Start: 2024-10-15 | End: 2024-10-15

## 2024-10-15 RX ORDER — PREDNISONE 20 MG/1
60 TABLET ORAL ONCE
Status: COMPLETED | OUTPATIENT
Start: 2024-10-15 | End: 2024-10-15

## 2024-10-15 RX ORDER — ACETAMINOPHEN 325 MG/1
650 TABLET ORAL EVERY 4 HOURS PRN
Status: DISCONTINUED | OUTPATIENT
Start: 2024-10-15 | End: 2024-10-16 | Stop reason: HOSPADM

## 2024-10-15 RX ORDER — INSULIN GLARGINE 100 [IU]/ML
30 INJECTION, SOLUTION SUBCUTANEOUS NIGHTLY
Status: DISCONTINUED | OUTPATIENT
Start: 2024-10-15 | End: 2024-10-16 | Stop reason: HOSPADM

## 2024-10-15 RX ORDER — AMIODARONE HYDROCHLORIDE 200 MG/1
200 TABLET ORAL DAILY
Status: DISCONTINUED | OUTPATIENT
Start: 2024-10-15 | End: 2024-10-16 | Stop reason: HOSPADM

## 2024-10-15 RX ORDER — INSULIN LISPRO 100 [IU]/ML
10 INJECTION, SOLUTION INTRAVENOUS; SUBCUTANEOUS ONCE
Status: COMPLETED | OUTPATIENT
Start: 2024-10-15 | End: 2024-10-15

## 2024-10-15 RX ORDER — ACETAMINOPHEN 160 MG/5ML
650 SOLUTION ORAL EVERY 4 HOURS PRN
Status: DISCONTINUED | OUTPATIENT
Start: 2024-10-15 | End: 2024-10-16

## 2024-10-15 RX ORDER — IPRATROPIUM BROMIDE AND ALBUTEROL SULFATE 2.5; .5 MG/3ML; MG/3ML
3 SOLUTION RESPIRATORY (INHALATION) EVERY 20 MIN
Status: COMPLETED | OUTPATIENT
Start: 2024-10-15 | End: 2024-10-15

## 2024-10-15 RX ORDER — FUROSEMIDE 10 MG/ML
40 INJECTION INTRAMUSCULAR; INTRAVENOUS ONCE
Status: COMPLETED | OUTPATIENT
Start: 2024-10-15 | End: 2024-10-15

## 2024-10-15 RX ORDER — TALC
3 POWDER (GRAM) TOPICAL NIGHTLY PRN
Status: DISCONTINUED | OUTPATIENT
Start: 2024-10-15 | End: 2024-10-16 | Stop reason: HOSPADM

## 2024-10-15 RX ORDER — ACETAMINOPHEN 650 MG/1
650 SUPPOSITORY RECTAL EVERY 4 HOURS PRN
Status: DISCONTINUED | OUTPATIENT
Start: 2024-10-15 | End: 2024-10-16

## 2024-10-15 RX ORDER — TORSEMIDE 20 MG/1
40 TABLET ORAL ONCE
Status: COMPLETED | OUTPATIENT
Start: 2024-10-15 | End: 2024-10-15

## 2024-10-15 RX ORDER — DEXTROSE 50 % IN WATER (D50W) INTRAVENOUS SYRINGE
12.5
Status: DISCONTINUED | OUTPATIENT
Start: 2024-10-15 | End: 2024-10-16 | Stop reason: HOSPADM

## 2024-10-15 RX ORDER — CARVEDILOL 3.12 MG/1
3.12 TABLET ORAL 2 TIMES DAILY
Status: DISCONTINUED | OUTPATIENT
Start: 2024-10-15 | End: 2024-10-16 | Stop reason: HOSPADM

## 2024-10-15 RX ORDER — ATORVASTATIN CALCIUM 20 MG/1
80 TABLET, FILM COATED ORAL NIGHTLY
Status: DISCONTINUED | OUTPATIENT
Start: 2024-10-15 | End: 2024-10-16 | Stop reason: HOSPADM

## 2024-10-15 RX ORDER — NAPROXEN SODIUM 220 MG/1
81 TABLET, FILM COATED ORAL
Status: DISCONTINUED | OUTPATIENT
Start: 2024-10-15 | End: 2024-10-16 | Stop reason: HOSPADM

## 2024-10-15 RX ADMIN — TIOTROPIUM BROMIDE INHALATION SPRAY 2 PUFF: 3.12 SPRAY, METERED RESPIRATORY (INHALATION) at 18:22

## 2024-10-15 RX ADMIN — TORSEMIDE 40 MG: 20 TABLET ORAL at 11:32

## 2024-10-15 RX ADMIN — FUROSEMIDE 40 MG: 10 INJECTION, SOLUTION INTRAVENOUS at 18:21

## 2024-10-15 RX ADMIN — PREDNISONE 60 MG: 20 TABLET ORAL at 10:59

## 2024-10-15 RX ADMIN — ONDANSETRON 4 MG: 4 TABLET, ORALLY DISINTEGRATING ORAL at 11:00

## 2024-10-15 RX ADMIN — IPRATROPIUM BROMIDE AND ALBUTEROL SULFATE 3 ML: .5; 3 SOLUTION RESPIRATORY (INHALATION) at 10:59

## 2024-10-15 RX ADMIN — INSULIN LISPRO 10 UNITS: 100 INJECTION, SOLUTION INTRAVENOUS; SUBCUTANEOUS at 11:37

## 2024-10-15 RX ADMIN — INSULIN GLARGINE 30 UNITS: 100 INJECTION, SOLUTION SUBCUTANEOUS at 21:09

## 2024-10-15 RX ADMIN — IPRATROPIUM BROMIDE AND ALBUTEROL SULFATE 3 ML: .5; 3 SOLUTION RESPIRATORY (INHALATION) at 11:01

## 2024-10-15 RX ADMIN — INSULIN HUMAN 10 UNITS: 100 INJECTION, SOLUTION PARENTERAL at 17:48

## 2024-10-15 RX ADMIN — ATORVASTATIN CALCIUM 80 MG: 20 TABLET, FILM COATED ORAL at 21:13

## 2024-10-15 RX ADMIN — CARVEDILOL 3.12 MG: 3.12 TABLET, FILM COATED ORAL at 21:13

## 2024-10-15 RX ADMIN — AMIODARONE HYDROCHLORIDE 200 MG: 200 TABLET ORAL at 18:21

## 2024-10-15 RX ADMIN — EMPAGLIFLOZIN 10 MG: 10 TABLET, FILM COATED ORAL at 11:32

## 2024-10-15 RX ADMIN — SERTRALINE 100 MG: 50 TABLET, FILM COATED ORAL at 18:22

## 2024-10-15 RX ADMIN — ENOXAPARIN SODIUM 40 MG: 100 INJECTION SUBCUTANEOUS at 16:18

## 2024-10-15 RX ADMIN — ASPIRIN 81 MG 81 MG: 81 TABLET ORAL at 18:21

## 2024-10-15 RX ADMIN — IPRATROPIUM BROMIDE AND ALBUTEROL SULFATE 3 ML: .5; 3 SOLUTION RESPIRATORY (INHALATION) at 11:00

## 2024-10-15 RX ADMIN — FUROSEMIDE 40 MG: 10 INJECTION, SOLUTION INTRAVENOUS at 12:36

## 2024-10-15 ASSESSMENT — ENCOUNTER SYMPTOMS
ALLERGIC/IMMUNOLOGIC NEGATIVE: 1
HEMATOLOGIC/LYMPHATIC NEGATIVE: 1
DIAPHORESIS: 1
FATIGUE: 1
CARDIOVASCULAR NEGATIVE: 1
PSYCHIATRIC NEGATIVE: 1
EYES NEGATIVE: 1
ACTIVITY CHANGE: 1
COUGH: 1
NEUROLOGICAL NEGATIVE: 1
GASTROINTESTINAL NEGATIVE: 1
MUSCULOSKELETAL NEGATIVE: 1

## 2024-10-15 ASSESSMENT — LIFESTYLE VARIABLES
HAVE PEOPLE ANNOYED YOU BY CRITICIZING YOUR DRINKING: NO
HAVE YOU EVER FELT YOU SHOULD CUT DOWN ON YOUR DRINKING: NO
EVER HAD A DRINK FIRST THING IN THE MORNING TO STEADY YOUR NERVES TO GET RID OF A HANGOVER: NO
EVER FELT BAD OR GUILTY ABOUT YOUR DRINKING: NO
TOTAL SCORE: 0

## 2024-10-15 ASSESSMENT — PAIN SCALES - GENERAL: PAINLEVEL_OUTOF10: 2

## 2024-10-15 ASSESSMENT — PAIN - FUNCTIONAL ASSESSMENT: PAIN_FUNCTIONAL_ASSESSMENT: 0-10

## 2024-10-15 NOTE — ED TRIAGE NOTES
Presents with c/o dizziness and weakness/lethargy x2 days. H/o COPD, DM2,ICD, CKD, hemorrhagic infarct

## 2024-10-15 NOTE — ED PROVIDER NOTES
"Emergency Department Provider Note        History of Present Illness     History provided by: Patient  Limitations to History: None  External Records Reviewed with Brief Summary: None    HPI:  Leonel Yu is a 54 y.o. male who presented to the ED with blurry vision, lethargy, and dizziness. His symptoms started two days ago and he believes his vision changes are due to diabetes. He describes his vision as \"blurry or not clear\". He denies eye pain, double vision, tunnel vision, and difficulty seeing colors. He has been feeling more tired lately. He has been feeling nauseous for the past two days and last vomitted yesterday. It was the consistency of the food he ate. He describes the dizziness as feeling like he is \"off balance\" or \"about to pass out\". He passed out three weeks ago, but has not passed out recently. He also endorses loose stools and increased urinary frequency. He also has difficulty breathing when lying flat, so he prefers to sleep in a chair. He denies headache, abdominal pain, chest pain, chills, fever, muscles aches, or changes in mental status.     PMHx/PSH:  Past Medical History:   Diagnosis Date    CHF (congestive heart failure) (Multi)     Chronic kidney disease     COPD (chronic obstructive pulmonary disease) (Multi)     Diabetes mellitus (Multi)     Hyperlipidemia     Hypertension     Stroke (Multi)     Substance abuse (Multi)       Past Surgical History:   Procedure Laterality Date    INSERT / REPLACE / REMOVE PACEMAKER        Social history:  Smoking: 3 cigarettes/day, but does not smoke every day  Cocaine: 1 smoke/1-2 weeks, last use was last night  Denies alcohol use    Physical Exam   Triage vitals:  T 36.7 °C (98.1 °F)  HR 88  /80  RR 16  O2 97 % None (Room air)     Physical Exam  Constitutional:       Appearance: Normal appearance.   HENT:      Head: Normocephalic and atraumatic.   Eyes:      Extraocular Movements: Extraocular movements intact.      Pupils: Pupils are " equal, round, and reactive to light.      Comments: Yellow/red eyes  Blurry vision is worse in right > left. 20/50 on right, 20/30 on left with pocket eye chart   Cardiovascular:      Rate and Rhythm: Normal rate and regular rhythm.      Pulses: Normal pulses.   Pulmonary:      Effort: Pulmonary effort is normal.      Breath sounds: Wheezing present.   Abdominal:      General: There is distension.      Tenderness: There is no abdominal tenderness.   Musculoskeletal:         General: Normal range of motion.      Cervical back: Normal range of motion.      Comments: Able to ambulate well   Skin:     General: Skin is dry.      Comments: Cold hands/feet   Neurological:      General: No focal deficit present.      Mental Status: He is alert and oriented to person, place, and time.      Cranial Nerves: No cranial nerve deficit.      Sensory: No sensory deficit.      Motor: No weakness.      Coordination: Coordination normal.      Gait: Gait normal.       Medical Decision Making & ED Course   Medical Decision Makin y.o. male with PMH significant for COPD, DM2, hemorrhagic infarct, and ICD who presented to the ED for blurry vision, lethargy, and dizziness that started two days ago. The patient also endorses nausea, vomiting, urinary frequency, and dyspnea when lying down. The physical exam was notable for abdominal distention, decreased visual acuity, and wheezing. Considering the patient's symptoms and exam findings, CBC, CMP, and chest X-Ray were ordered. Elevated glucose, BUN, creatinine, sodium, potassium, and AST notable. Chest X-Ray showed enlargement of the cardiac silhouette with vascular congestion. Patient given Prednisone 60mg, ipratroprium-albuterol 3mL, ondansetron 4mg, toresemide 40mg, empagliflozin 10mg, and insulin lispro 10 units.   ----  Scoring Tools Utilized:      Differential diagnoses considered include but are not limited to: Diabetic retinopathy, DKA, HHS, COPD exacerbation, heart failure  exacerbation, PE, aortic dissection, and pneumothorax.  Patient notes that his vision will chronically be blurry after using PCP.  Low clinical concern for acute diabetic retinopathy.  No acidosis noted on venous full panel or anion gap metabolic panel.  Low clinical concern for DKA.  Patient mentating appropriately significantly elevated glucose.  Low clinical concern for HHS.  Low clinical concern for PE without significant tachycardia with COPD exacerbation versus CHF exacerbation is more likely etiology of the patient's hypoxia.  No pneumothorax noted on CXR.  Lower clinical concern for aortic dissection without widened mediastinum, blood pressure, no acute neurofindings, and equal pulses.     Social Determinants of Health which Significantly Impact Care: Food insecurity and Transportation difficulties     EKG Independent Interpretation: EKG interpreted by myself. Please see ED Course for full interpretation.    Independent Result Review and Interpretation: Chest X-Ray showed Marked enlargement of the cardiac silhouette with vascular congestion     Chronic conditions affecting the patient's care: As documented above in [facility]    The patient was discussed with the following consultants/services: None    Care Considerations: As documented above in [facility]    ED Course:  ED Course as of 10/16/24 2353   e Oct 15, 2024   1034 EKG: Rate is 85, sinus rhythm, left axis deviation, no interval prolongation, no st elevation or depression.  When compared to EKG on 10/4/24 review of EKG does not show any signs of STEMI, complete heart block, asystole, V-fib. []   1151 Attending summary:  55 y/o M with PMH HTN, HLD, DM2, COPD on 2L at night, HFrEF with EF 5-10% s/p ICD c/b VT on amiodarone, remote cerebellar hemorrhagic stroke, crack cocaine use presenting for 2 days of gen weakness and lightheadedness. Reports dyspnea on exertion and orthopnea. No known weight gain. Hasn't taken diuretics for past 2 days. No fevers, chills,  cough, chest pain, n/v/d, abd pain. No HA. Reports blurry vision bilaterally described as difficulty reading. No eye trauma or pain. No vertigo, unilateral numbness/weakness.     Vitals unremarkable on arrival other than sat 90-92% on RA. Exam shows nontoxic appearing male who is AO x4, no nystagmus, bilateral conjunctival injection with EOMI and PERRL, normal neuro exam including no dysmetria. Lower crackles bilaterally, no resp distress. Soft nontender abdomen, 1+ bilateral symmetric pitting edema.     Hx more concerning for cardiac causes with description of lightheadedness and no neuro deficits on exam. Concern for CHF exacerbation. Will also do metabolic and infectious work-up. Plan for labs, EKG, UA, CXR. Likely admission.  [SS]   1402 Desaturated to 70s while sleeping, placed on 3L. Wears 2L at night at home, was still hypoxic on this 2L.  [SS]      ED Course User Index  [MH] Leonel Varma MD  [SS] Lily Lyles MD         Diagnoses as of 10/16/24 2353   COPD exacerbation (Multi)   Acute on chronic congestive heart failure, unspecified heart failure type     Disposition   Admission to medicine    Procedures   Procedures    Patient seen and discussed with ED attending physician.    JAREN CASTANEDA MS3  Emergency Medicine     Jaren Castaneda  10/15/24 1231       Leonel Varma MD  EM PGY 3  I have reviewed and edited above as indicated.  Note written with Dragon Dictation software.  Please excuse errors from dictation.  Patient presentation discussed with the ED attending who is in agreement with plan.         Leonel Varma MD  Resident  10/17/24 0005

## 2024-10-15 NOTE — H&P
"History Of Present Illness:    Leonel Yu is a 54 y.o. male presenting with Leonel Yu is a 54 y.o. 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 Patient presents with blurry vision, lethargy, and dizziness. His symptoms started two days ago and he believes his vision changes are due to diabetes. He describes his vision as \"blurry or not clear\". He denies eye pain, double vision, tunnel vision, and difficulty seeing colors. He has been feeling more tired lately. He has been feeling nauseous for the past two days and last vomitted yesterday. It was the consistency of the food he ate. He describes the dizziness as feeling like he is \"off balance\" or \"about to pass out\". He passed out three weeks ago, but has not passed out recently. He also endorses loose stools and increased urinary frequency. He also has difficulty breathing when lying flat, so he prefers to sleep in a chair. Further he reports that he is always hot, endorses being diaphoretic and having night sweats. He denies headache, abdominal pain, chest pain, chills, fever, muscles aches, or changes in mental status. He reports that he has been taking his meds, except for the diuretic the last few days as he has been urinating so much during the day and night that it has been waking him every 2 hours. He reports that he smokes only a few cigarettes a day now, and that his cocaine use has gone from daily to once or twice a week. Of note he reports that his roommate has also been reporting this increased fatigue but that he does not do cocaine. Troponin 50, BNP 2388, no chest pain, CXR: Marked enlargement of the cardiac silhouette with vascular congestion. Elevated -500s, ph 7.38, K 6.2 " which was hemolyzed, repeat eventually was 5. Resumed home meds other than Entresto and christiano d/t elevated K. Did received IV lasix 40mg and torsemide, will eval overnight for response and consider additional diuresis. Will admit to [facility] for GDMT optimization.      Last Recorded Vitals:  Vitals:    10/15/24 1200 10/15/24 1300 10/15/24 1400 10/15/24 1500   BP: 82/61 115/90 103/82 130/85   Patient Position: Lying Lying Lying Lying   Pulse: 64 73 75 61   Resp: 16 (!) 28 18 19   Temp:       SpO2: (!) 93% (!) 92% 96% 96%   Weight:       Height:           Last Labs:  CBC - 10/15/2024: 10:32 AM  10.0 20.8 188    61.6      CMP - 10/15/2024: 10:32 AM  9.5 8.7 42 --- 1.2   3.7 4.5 50 93      PTT - 9/30/2024:  5:04 PM  1.2   13.4 33     Troponin I, High Sensitivity   Date/Time Value Ref Range Status   02/12/2024 09:47  (HH) 0 - 53 ng/L Final     Comment:     Previous result verified on 2/12/2024 1836 on specimen/case [id number] called with component TRPHS for procedure Troponin I, High Sensitivity with value 130 ng/L.   02/12/2024 06:11  (HH) 0 - 53 ng/L Final     Comment:     Previous result verified on 2/12/2024 1836 on specimen/case [id number] called with component TRPHS for procedure Troponin I, High Sensitivity with value 130 ng/L.   02/12/2024 12:39  (HH) 0 - 53 ng/L Final     Troponin I, High Sensitivity ([facility])   Date/Time Value Ref Range Status   10/15/2024 11:47 AM 50 0 - 53 ng/L Final   10/15/2024 10:32 AM 70 (H) 0 - 53 ng/L Final   10/01/2024 10:33 AM 54 (H) 0 - 53 ng/L Final     BNP   Date/Time Value Ref Range Status   10/15/2024 10:32 AM 2,833 (H) 0 - 99 pg/mL Final   09/30/2024 05:01 PM >5,000 (H) 0 - 99 pg/mL Final     Hemoglobin A1C   Date/Time Value Ref Range Status   07/30/2024 07:31 AM 8.4 (H) see below % Final   06/20/2024 07:49 AM 6.9 (H) 4.3 - 5.6 % Final     Comment:     American Diabetes Association guidelines indicate that patients with HgbA1c in the range 5.7-6.4% are at  "increased risk for development of diabetes, and intervention by lifestyle modification may be beneficial. HgbA1c greater or equal to 6.5% is considered diagnostic of diabetes.   01/09/2024 06:53 AM 10.1 (H) 4.3 - 5.6 % Final     Comment:     American Diabetes Association guidelines indicate that patients with HgbA1c in the range 5.7-6.4% are at increased risk for development of diabetes, and intervention by lifestyle modification may be beneficial. HgbA1c greater or equal to 6.5% is considered diagnostic of diabetes.   12/09/2023 08:57 PM 7.6 (H) see below % Final     LDL Calculated   Date/Time Value Ref Range Status   09/07/2024 09:49 AM 72 <=99 mg/dL Final     Comment:                                 Near   Borderline      AGE      Desirable  Optimal    High     High     Very High     0-19 Y     0 - 109     ---    110-129   >/= 130     ----    20-24 Y     0 - 119     ---    120-159   >/= 160     ----      >24 Y     0 -  99   100-129  130-159   160-189     >/=190       VLDL   Date/Time Value Ref Range Status   09/07/2024 09:49 AM 20 0 - 40 mg/dL Final   07/25/2023 08:20 AM 18 0 - 40 mg/dL Final   06/18/2023 06:27 AM 29 0 - 40 mg/dL Final      Last I/O:  No intake/output data recorded.      Ejection Fractions:  EF   Date/Time Value Ref Range Status   07/31/2024 03:07 PM 10 %      Past Medical History:  He has a past medical history of CHF (congestive heart failure) (Multi), Chronic kidney disease, COPD (chronic obstructive pulmonary disease) (Multi), Diabetes mellitus (Multi), Hyperlipidemia, Hypertension, Stroke (Multi), and Substance abuse (Multi).    Past Surgical History:  He has a past surgical history that includes Insert / replace / remove pacemaker.      Social History:  He reports that he has quit smoking. His smoking use included cigarettes. He has never used smokeless tobacco. He reports current drug use. Drug: \"Crack\" cocaine. He reports that he does not drink alcohol.    Family History:  Family History " "  Problem Relation Name Age of Onset    Heart disease Father          Allergies:  Patient has no known allergies.    Inpatient Medications:  Scheduled medications   Medication Dose Route Frequency    amiodarone  200 mg oral Daily    aspirin  81 mg oral Daily    atorvastatin  80 mg oral Nightly    carvedilol  3.125 mg oral BID    [START ON 10/16/2024] empagliflozin  10 mg oral Daily    insulin glargine  30 Units subcutaneous Nightly    insulin lispro  0-5 Units subcutaneous TID    sertraline  100 mg oral Daily    tiotropium  2 puff inhalation Daily     PRN medications   Medication    acetaminophen    Or    acetaminophen    Or    acetaminophen    dextrose    dextrose    glucagon    glucagon    melatonin    polyethylene glycol     Continuous Medications   Medication Dose Last Rate     Outpatient Medications:  Current Outpatient Medications   Medication Instructions    amiodarone (PACERONE) 200 mg, oral, Daily    aspirin 81 mg, oral, Daily RT    atorvastatin (LIPITOR) 80 mg, oral, Nightly    blood sugar diagnostic strip Use as directed to test blood glucose up to four times daily and as needed.    blood-glucose meter misc 1 each, subcutaneous, 4 times daily with meals and nightly, Check blood glucose 4 times daily, before meals and at bedtime.    carvedilol (COREG) 3.125 mg, oral, 2 times daily    empagliflozin (Jardiance) 10 mg TAKE 1 TABLET BY MOUTH ONCE DAILY    glucose 8 g, oral, As needed    insulin lispro (HUMALOG) 0-10 Units, subcutaneous, 3 times daily (morning, midday, late afternoon), Hypoglycemia protocol if Blood Glucose is between 0 - 70 mg/dL, 0 unit(s) if , 2 unit(s) if 151-200, 4 unit(s) if 201-250, 6 unit(s) if 251-300, 8 unit(s) if 301-350, 10 unit(s) if 351-400. Notify provider unit(s) if Blood Glucose is greater than 400 mg/dL    lancets 33 gauge misc 1 each, subcutaneous, 4 times daily    Lantus Solostar U-100 Insulin 30 Units, subcutaneous, Nightly    pen needle 1/2\" 29G X 12mm needle Use to " inject 1-4 times daily as directed.    sacubitriL-valsartan (Entresto) 24-26 mg tablet 1 tablet, oral, 2 times daily    sertraline (ZOLOFT) 100 mg, oral, Daily    spironolactone (ALDACTONE) 12.5 mg, oral, Daily    tiotropium (Spiriva Respimat) 2.5 mcg/actuation inhaler 2 puffs, inhalation, Daily    torsemide (DEMADEX) 40 mg, oral, Daily     Review of Systems   Constitutional:  Positive for activity change, diaphoresis and fatigue.   HENT: Negative.     Eyes: Negative.    Respiratory:  Positive for cough.    Cardiovascular: Negative.    Gastrointestinal: Negative.    Endocrine: Positive for polyuria.   Genitourinary: Negative.    Musculoskeletal: Negative.    Skin: Negative.    Allergic/Immunologic: Negative.    Neurological: Negative.    Hematological: Negative.    Psychiatric/Behavioral: Negative.          Physical Exam:  Physical Exam  Constitutional:       Appearance: Normal appearance.   HENT:      Head: Normocephalic.      Nose: Nose normal.   Eyes:      Pupils: Pupils are equal, round, and reactive to light.   Cardiovascular:      Rate and Rhythm: Normal rate and regular rhythm.      Pulses: Normal pulses.      Comments: Cool extremities  Pulmonary:      Effort: Pulmonary effort is normal.      Breath sounds: Normal breath sounds.   Abdominal:      General: Bowel sounds are normal.      Palpations: Abdomen is soft.   Musculoskeletal:         General: Normal range of motion.   Skin:     General: Skin is warm.      Capillary Refill: Capillary refill takes less than 2 seconds.   Neurological:      General: No focal deficit present.      Mental Status: He is alert and oriented to person, place, and time. Mental status is at baseline.      Motor: Weakness present.   Psychiatric:         Mood and Affect: Mood normal.         Behavior: Behavior normal.           Assessment/Plan   54 y.o. male with a hx mixed ischemic/toxic mediated HFrEF (EF5-10%, fibrosis and transmural infarcts seen on cMRI 8/2024) s/p [facility]  single chamber ICD (3/2024), CKD3a, HTN, HLD, LUZ (crack cocaine), former tobacco use, COPD (nocturnal 2L O2 at baseline), DM2, remote left cerebellar hemorrhagic infarct, medication noncompliance, anxiety, and depression. Admitted for acute decompensated heart failure in setting of medication non compliance drowsiness.      Acute decompensated mixed ischemic/nonischemic HFrEF 5-10%  s/p single chamber ICD 3/2024  - ECHO 7/31:  EF 10%, gHK of LV, LV severely dilated, severe eccentric LVH, severely enlarged RV with reduced systolic fxn, severe LAE, mod to sev VIVI, mild to mod TR, RVSP 39 mildly elevated  - Cardiac MRI 8/5: ischemic/nonischemic.  Severely dilated LV EF 5-10%. Basal inferoseptal, mid inferoseptal, apical septal, apicolateral segments akinetic. Eccentric LVH. Transmural infarction of mid/apical inferoseptal/inferior segments and apicolateral wall (<25% wall thickeness). NICM pattern of mid wall septal fibrosis.  - Troponin 50  - EKG 10/15: SR 85, left axis dev, LVH, IVCD/LBBB. No acute ischemic changes as compared to prior 10/5  - Transmural infarcts on cardiac MRI. Coronary Calcifications on CT chest  - Secondary vascular prevention with aspirin and statin.  - CXR 9/30 with vascular congestion and cardiomegaly  - BNP 2833 (>5K)  - Admit Wt pending   - last discharge weight 74.4kg 10/5  - c/w GDMT: Carvedilol 3.125.,Entresto 24-26 BID, Empagliflozin 10m daily,   - hold entresto and Spironolactone d/t elevated K   - Got torsemide 40 and lasix 40mg IV in the ED, will give additional 40mg IV once , assess in am for further   - last admit diuresed with 80mg IV daily and transitioned to 40mg PO torsemide daily   - last admit: Palliative care consult requested given advanced HF with numerous re-admits and overall poor prognosis since ongoing substance use precludes him from advanced HF therapy candidacy  - now DNAR  - reccommended psych consult for depression/suicidal ideations, but patient is not interested  "at this time   - Goals are mix of survival and time and improved quality of life     Ventriculary Tachycardia/ VF   Hx of possible AF RVR   - Device interrogation 10/1:   - Last shock 9/5 appropriate for VT ( last admission).   - 12yr battery life.   - Lead fxn WNL.   -  <1%.   - Patient without any shocks or tachyarrhythmias since amiodarone loading   - For the epsiode  9/5 prior EP consult noted:\" Possible dual tachycardia with PAF  RVR and concurrent VT/VF in the setting of active cocaine use.\" EP did not feel right atrial lead placement  deemed appropriate in light of ongoing cocaine use and increased risk of infection.NO OAC recommended.  - Continue coreg 3.125mg BID     COPD  Former tobacco user  - 2L at night as baseline  - sometimes forgets to wear     Substance use disorder, cocaine  - Cessation advised  - Urine tox pending, pos for cocaine last admit      Type II Diabetes  - BS on admit: 300-400s  - Insulin glargine 30unit nightly  - SSI #2 while here     Mood disorder, unspecified  - zoloft 100mg daily     D\VT ppx: subcutaneous lovenox  DISPO: pending diuresis  Code Status: DNR     To be staffed in the AM by attending     Code Status:  DNR    Bart aCsas, APRN-CNP, DNP  "

## 2024-10-16 ENCOUNTER — APPOINTMENT (OUTPATIENT)
Dept: RADIOLOGY | Facility: HOSPITAL | Age: 54
End: 2024-10-16
Payer: COMMERCIAL

## 2024-10-16 ENCOUNTER — CLINICAL SUPPORT (OUTPATIENT)
Dept: EMERGENCY MEDICINE | Facility: HOSPITAL | Age: 54
End: 2024-10-16
Payer: COMMERCIAL

## 2024-10-16 ENCOUNTER — HOSPITAL ENCOUNTER (INPATIENT)
Facility: HOSPITAL | Age: 54
End: 2024-10-16
Attending: STUDENT IN AN ORGANIZED HEALTH CARE EDUCATION/TRAINING PROGRAM
Payer: COMMERCIAL

## 2024-10-16 VITALS
HEIGHT: 69 IN | OXYGEN SATURATION: 94 % | RESPIRATION RATE: 18 BRPM | BODY MASS INDEX: 25.18 KG/M2 | TEMPERATURE: 98.6 F | WEIGHT: 170 LBS | SYSTOLIC BLOOD PRESSURE: 94 MMHG | DIASTOLIC BLOOD PRESSURE: 65 MMHG | HEART RATE: 81 BPM

## 2024-10-16 DIAGNOSIS — N18.31 STAGE 3A CHRONIC KIDNEY DISEASE (CKD) (MULTI): ICD-10-CM

## 2024-10-16 DIAGNOSIS — I49.01 VENTRICULAR FIBRILLATION (MULTI): ICD-10-CM

## 2024-10-16 DIAGNOSIS — E11.65 TYPE 2 DIABETES MELLITUS WITH HYPERGLYCEMIA, WITH LONG-TERM CURRENT USE OF INSULIN: ICD-10-CM

## 2024-10-16 DIAGNOSIS — A49.01 MSSA (METHICILLIN SUSCEPTIBLE STAPHYLOCOCCUS AUREUS): ICD-10-CM

## 2024-10-16 DIAGNOSIS — R06.02 SHORTNESS OF BREATH AT REST: Primary | ICD-10-CM

## 2024-10-16 DIAGNOSIS — Z79.4 TYPE 2 DIABETES MELLITUS WITH HYPERGLYCEMIA, WITH LONG-TERM CURRENT USE OF INSULIN: ICD-10-CM

## 2024-10-16 DIAGNOSIS — I50.9 CONGESTIVE HEART FAILURE, UNSPECIFIED HF CHRONICITY, UNSPECIFIED HEART FAILURE TYPE: ICD-10-CM

## 2024-10-16 DIAGNOSIS — R06.02 SHORTNESS OF BREATH: ICD-10-CM

## 2024-10-16 DIAGNOSIS — F41.9 ANXIETY DISORDER, UNSPECIFIED TYPE: ICD-10-CM

## 2024-10-16 DIAGNOSIS — J43.9 PULMONARY EMPHYSEMA, UNSPECIFIED EMPHYSEMA TYPE (MULTI): ICD-10-CM

## 2024-10-16 DIAGNOSIS — Z59.41 FOOD INSECURITY: ICD-10-CM

## 2024-10-16 DIAGNOSIS — I50.43 CHF (CONGESTIVE HEART FAILURE), NYHA CLASS I, ACUTE ON CHRONIC, COMBINED: ICD-10-CM

## 2024-10-16 DIAGNOSIS — I50.9 ACUTE ON CHRONIC CONGESTIVE HEART FAILURE, UNSPECIFIED HEART FAILURE TYPE: ICD-10-CM

## 2024-10-16 DIAGNOSIS — I50.43 ACUTE ON CHRONIC COMBINED SYSTOLIC AND DIASTOLIC HEART FAILURE: ICD-10-CM

## 2024-10-16 LAB
ALBUMIN SERPL BCP-MCNC: 3.5 G/DL (ref 3.4–5)
ANION GAP BLDV CALCULATED.4IONS-SCNC: 12 MMOL/L (ref 10–25)
ANION GAP BLDV CALCULATED.4IONS-SCNC: 12 MMOL/L (ref 10–25)
ANION GAP SERPL CALC-SCNC: 19 MMOL/L (ref 10–20)
BASE EXCESS BLDV CALC-SCNC: -1.4 MMOL/L (ref -2–3)
BASE EXCESS BLDV CALC-SCNC: -1.9 MMOL/L (ref -2–3)
BODY TEMPERATURE: 37 DEGREES CELSIUS
BODY TEMPERATURE: 37 DEGREES CELSIUS
BUN SERPL-MCNC: 44 MG/DL (ref 6–23)
CA-I BLDV-SCNC: 1.03 MMOL/L (ref 1.1–1.33)
CA-I BLDV-SCNC: 1.05 MMOL/L (ref 1.1–1.33)
CALCIUM SERPL-MCNC: 9 MG/DL (ref 8.6–10.6)
CHLORIDE BLDV-SCNC: 101 MMOL/L (ref 98–107)
CHLORIDE BLDV-SCNC: 96 MMOL/L (ref 98–107)
CHLORIDE SERPL-SCNC: 97 MMOL/L (ref 98–107)
CO2 SERPL-SCNC: 20 MMOL/L (ref 21–32)
CREAT SERPL-MCNC: 2.41 MG/DL (ref 0.5–1.3)
EGFRCR SERPLBLD CKD-EPI 2021: 31 ML/MIN/1.73M*2
ERYTHROCYTE [DISTWIDTH] IN BLOOD BY AUTOMATED COUNT: 15.9 % (ref 11.5–14.5)
EST. AVERAGE GLUCOSE BLD GHB EST-MCNC: 237 MG/DL
GLUCOSE BLD MANUAL STRIP-MCNC: 186 MG/DL (ref 74–99)
GLUCOSE BLD MANUAL STRIP-MCNC: 285 MG/DL (ref 74–99)
GLUCOSE BLD MANUAL STRIP-MCNC: 446 MG/DL (ref 74–99)
GLUCOSE BLD MANUAL STRIP-MCNC: 534 MG/DL (ref 74–99)
GLUCOSE BLD MANUAL STRIP-MCNC: 561 MG/DL (ref 74–99)
GLUCOSE BLD MANUAL STRIP-MCNC: 59 MG/DL (ref 74–99)
GLUCOSE BLDV-MCNC: 471 MG/DL (ref 74–99)
GLUCOSE BLDV-MCNC: 98 MG/DL (ref 74–99)
GLUCOSE SERPL-MCNC: 478 MG/DL (ref 74–99)
HBA1C MFR BLD: 9.9 %
HCO3 BLDV-SCNC: 24.3 MMOL/L (ref 22–26)
HCO3 BLDV-SCNC: 25.8 MMOL/L (ref 22–26)
HCT VFR BLD AUTO: 57.4 % (ref 41–52)
HCT VFR BLD EST: 57 % (ref 41–52)
HCT VFR BLD EST: 61 % (ref 41–52)
HGB BLD-MCNC: 19.6 G/DL (ref 13.5–17.5)
HGB BLDV-MCNC: 19 G/DL (ref 13.5–17.5)
HGB BLDV-MCNC: 20.4 G/DL (ref 13.5–17.5)
INHALED O2 CONCENTRATION: 21 %
INHALED O2 CONCENTRATION: 36 %
LACTATE BLDV-SCNC: 2.1 MMOL/L (ref 0.4–2)
LACTATE BLDV-SCNC: 2.3 MMOL/L (ref 0.4–2)
LACTATE SERPL-SCNC: 2.2 MMOL/L (ref 0.4–2)
MAGNESIUM SERPL-MCNC: 2.73 MG/DL (ref 1.6–2.4)
MCH RBC QN AUTO: 31.1 PG (ref 26–34)
MCHC RBC AUTO-ENTMCNC: 34.1 G/DL (ref 32–36)
MCV RBC AUTO: 91 FL (ref 80–100)
NRBC BLD-RTO: 0 /100 WBCS (ref 0–0)
OXYHGB MFR BLDV: 74.5 % (ref 45–75)
OXYHGB MFR BLDV: 83.4 % (ref 45–75)
PCO2 BLDV: 45 MM HG (ref 41–51)
PCO2 BLDV: 50 MM HG (ref 41–51)
PH BLDV: 7.32 PH (ref 7.33–7.43)
PH BLDV: 7.34 PH (ref 7.33–7.43)
PHOSPHATE SERPL-MCNC: 6.2 MG/DL (ref 2.5–4.9)
PLATELET # BLD AUTO: 181 X10*3/UL (ref 150–450)
PO2 BLDV: 48 MM HG (ref 35–45)
PO2 BLDV: 57 MM HG (ref 35–45)
POTASSIUM BLDV-SCNC: 3.4 MMOL/L (ref 3.5–5.3)
POTASSIUM BLDV-SCNC: 8.4 MMOL/L (ref 3.5–5.3)
POTASSIUM SERPL-SCNC: 4.5 MMOL/L (ref 3.5–5.3)
RBC # BLD AUTO: 6.3 X10*6/UL (ref 4.5–5.9)
SAO2 % BLDV: 76 % (ref 45–75)
SAO2 % BLDV: 86 % (ref 45–75)
SODIUM BLDV-SCNC: 125 MMOL/L (ref 136–145)
SODIUM BLDV-SCNC: 134 MMOL/L (ref 136–145)
SODIUM SERPL-SCNC: 131 MMOL/L (ref 136–145)
WBC # BLD AUTO: 14.3 X10*3/UL (ref 4.4–11.3)

## 2024-10-16 PROCEDURE — 36415 COLL VENOUS BLD VENIPUNCTURE: CPT

## 2024-10-16 PROCEDURE — 85027 COMPLETE CBC AUTOMATED: CPT | Performed by: PHYSICIAN ASSISTANT

## 2024-10-16 PROCEDURE — 82947 ASSAY GLUCOSE BLOOD QUANT: CPT

## 2024-10-16 PROCEDURE — 80069 RENAL FUNCTION PANEL: CPT

## 2024-10-16 PROCEDURE — 83735 ASSAY OF MAGNESIUM: CPT | Performed by: PHYSICIAN ASSISTANT

## 2024-10-16 PROCEDURE — 80053 COMPREHEN METABOLIC PANEL: CPT

## 2024-10-16 PROCEDURE — 99255 IP/OBS CONSLTJ NEW/EST HI 80: CPT | Performed by: NURSE PRACTITIONER

## 2024-10-16 PROCEDURE — 85018 HEMOGLOBIN: CPT | Performed by: STUDENT IN AN ORGANIZED HEALTH CARE EDUCATION/TRAINING PROGRAM

## 2024-10-16 PROCEDURE — 2500000002 HC RX 250 W HCPCS SELF ADMINISTERED DRUGS (ALT 637 FOR MEDICARE OP, ALT 636 FOR OP/ED): Performed by: NURSE PRACTITIONER

## 2024-10-16 PROCEDURE — 85025 COMPLETE CBC W/AUTO DIFF WBC: CPT

## 2024-10-16 PROCEDURE — 99285 EMERGENCY DEPT VISIT HI MDM: CPT | Performed by: STUDENT IN AN ORGANIZED HEALTH CARE EDUCATION/TRAINING PROGRAM

## 2024-10-16 PROCEDURE — 93005 ELECTROCARDIOGRAM TRACING: CPT

## 2024-10-16 PROCEDURE — 2500000001 HC RX 250 WO HCPCS SELF ADMINISTERED DRUGS (ALT 637 FOR MEDICARE OP): Performed by: NURSE PRACTITIONER

## 2024-10-16 PROCEDURE — 87040 BLOOD CULTURE FOR BACTERIA: CPT | Performed by: STUDENT IN AN ORGANIZED HEALTH CARE EDUCATION/TRAINING PROGRAM

## 2024-10-16 PROCEDURE — 93308 TTE F-UP OR LMTD: CPT

## 2024-10-16 PROCEDURE — 99223 1ST HOSP IP/OBS HIGH 75: CPT | Performed by: INTERNAL MEDICINE

## 2024-10-16 PROCEDURE — 84484 ASSAY OF TROPONIN QUANT: CPT

## 2024-10-16 PROCEDURE — 2500000002 HC RX 250 W HCPCS SELF ADMINISTERED DRUGS (ALT 637 FOR MEDICARE OP, ALT 636 FOR OP/ED)

## 2024-10-16 PROCEDURE — 1800000001 HC LEAVE OF ABSENSE - GENERAL CLASSIFICATION

## 2024-10-16 PROCEDURE — 84132 ASSAY OF SERUM POTASSIUM: CPT | Performed by: PHYSICIAN ASSISTANT

## 2024-10-16 PROCEDURE — 80320 DRUG SCREEN QUANTALCOHOLS: CPT | Performed by: STUDENT IN AN ORGANIZED HEALTH CARE EDUCATION/TRAINING PROGRAM

## 2024-10-16 PROCEDURE — 80179 DRUG ASSAY SALICYLATE: CPT | Performed by: STUDENT IN AN ORGANIZED HEALTH CARE EDUCATION/TRAINING PROGRAM

## 2024-10-16 PROCEDURE — 84295 ASSAY OF SERUM SODIUM: CPT

## 2024-10-16 PROCEDURE — 93308 TTE F-UP OR LMTD: CPT | Performed by: STUDENT IN AN ORGANIZED HEALTH CARE EDUCATION/TRAINING PROGRAM

## 2024-10-16 PROCEDURE — 83880 ASSAY OF NATRIURETIC PEPTIDE: CPT

## 2024-10-16 PROCEDURE — 87075 CULTR BACTERIA EXCEPT BLOOD: CPT | Performed by: STUDENT IN AN ORGANIZED HEALTH CARE EDUCATION/TRAINING PROGRAM

## 2024-10-16 PROCEDURE — 84132 ASSAY OF SERUM POTASSIUM: CPT | Performed by: STUDENT IN AN ORGANIZED HEALTH CARE EDUCATION/TRAINING PROGRAM

## 2024-10-16 PROCEDURE — 80069 RENAL FUNCTION PANEL: CPT | Mod: CCI | Performed by: PHYSICIAN ASSISTANT

## 2024-10-16 PROCEDURE — 84295 ASSAY OF SERUM SODIUM: CPT | Performed by: PHYSICIAN ASSISTANT

## 2024-10-16 PROCEDURE — 99285 EMERGENCY DEPT VISIT HI MDM: CPT | Mod: 25

## 2024-10-16 PROCEDURE — 83605 ASSAY OF LACTIC ACID: CPT

## 2024-10-16 RX ORDER — ENOXAPARIN SODIUM 100 MG/ML
40 INJECTION SUBCUTANEOUS EVERY 24 HOURS
Status: DISCONTINUED | OUTPATIENT
Start: 2024-10-16 | End: 2024-10-16 | Stop reason: HOSPADM

## 2024-10-16 RX ORDER — DEXTROSE 50 % IN WATER (D50W) INTRAVENOUS SYRINGE
12.5 ONCE
Status: DISCONTINUED | OUTPATIENT
Start: 2024-10-16 | End: 2024-10-16

## 2024-10-16 RX ORDER — DEXTROSE 50 % IN WATER (D50W) INTRAVENOUS SYRINGE
12.5
Status: DISCONTINUED | OUTPATIENT
Start: 2024-10-16 | End: 2024-10-16

## 2024-10-16 RX ORDER — INSULIN LISPRO 100 [IU]/ML
10 INJECTION, SOLUTION INTRAVENOUS; SUBCUTANEOUS
Status: DISCONTINUED | OUTPATIENT
Start: 2024-10-16 | End: 2024-10-16 | Stop reason: HOSPADM

## 2024-10-16 RX ORDER — INSULIN LISPRO 100 [IU]/ML
0-10 INJECTION, SOLUTION INTRAVENOUS; SUBCUTANEOUS EVERY 4 HOURS
Status: DISCONTINUED | OUTPATIENT
Start: 2024-10-16 | End: 2024-10-16 | Stop reason: HOSPADM

## 2024-10-16 RX ORDER — DEXTROSE 50 % IN WATER (D50W) INTRAVENOUS SYRINGE
25
Status: DISCONTINUED | OUTPATIENT
Start: 2024-10-16 | End: 2024-10-16

## 2024-10-16 RX ORDER — INSULIN LISPRO 100 [IU]/ML
0-10 INJECTION, SOLUTION INTRAVENOUS; SUBCUTANEOUS
Status: DISCONTINUED | OUTPATIENT
Start: 2024-10-16 | End: 2024-10-16

## 2024-10-16 RX ADMIN — EMPAGLIFLOZIN 10 MG: 10 TABLET, FILM COATED ORAL at 08:25

## 2024-10-16 RX ADMIN — ASPIRIN 81 MG 81 MG: 81 TABLET ORAL at 08:26

## 2024-10-16 RX ADMIN — SERTRALINE 100 MG: 50 TABLET, FILM COATED ORAL at 08:25

## 2024-10-16 RX ADMIN — INSULIN LISPRO 10 UNITS: 100 INJECTION, SOLUTION INTRAVENOUS; SUBCUTANEOUS at 16:50

## 2024-10-16 RX ADMIN — INSULIN LISPRO 10 UNITS: 100 INJECTION, SOLUTION INTRAVENOUS; SUBCUTANEOUS at 12:30

## 2024-10-16 RX ADMIN — INSULIN LISPRO 10 UNITS: 100 INJECTION, SOLUTION INTRAVENOUS; SUBCUTANEOUS at 12:33

## 2024-10-16 RX ADMIN — CARVEDILOL 3.12 MG: 3.12 TABLET, FILM COATED ORAL at 08:26

## 2024-10-16 RX ADMIN — INSULIN HUMAN 10 UNITS: 100 INJECTION, SOLUTION PARENTERAL at 08:41

## 2024-10-16 RX ADMIN — TIOTROPIUM BROMIDE INHALATION SPRAY 2 PUFF: 3.12 SPRAY, METERED RESPIRATORY (INHALATION) at 08:28

## 2024-10-16 RX ADMIN — AMIODARONE HYDROCHLORIDE 200 MG: 200 TABLET ORAL at 09:18

## 2024-10-16 SDOH — ECONOMIC STABILITY - FOOD INSECURITY: FOOD INSECURITY: Z59.41

## 2024-10-16 ASSESSMENT — ENCOUNTER SYMPTOMS
CONFUSION: 0
SORE THROAT: 0
NAUSEA: 0
POLYPHAGIA: 0
SHORTNESS OF BREATH: 0
DIARRHEA: 0
POLYDIPSIA: 0
JOINT SWELLING: 0
COUGH: 0
APPETITE CHANGE: 0
FREQUENCY: 0
AGITATION: 0
EYES NEGATIVE: 1
SPEECH DIFFICULTY: 0
CHEST TIGHTNESS: 0
DIAPHORESIS: 0
ACTIVITY CHANGE: 0
HEADACHES: 0
ABDOMINAL PAIN: 0
FATIGUE: 1
DYSURIA: 0
NUMBNESS: 0
UNEXPECTED WEIGHT CHANGE: 0

## 2024-10-16 ASSESSMENT — LIFESTYLE VARIABLES
HAVE YOU EVER FELT YOU SHOULD CUT DOWN ON YOUR DRINKING: NO
HAVE PEOPLE ANNOYED YOU BY CRITICIZING YOUR DRINKING: NO
EVER FELT BAD OR GUILTY ABOUT YOUR DRINKING: NO
EVER HAD A DRINK FIRST THING IN THE MORNING TO STEADY YOUR NERVES TO GET RID OF A HANGOVER: NO
TOTAL SCORE: 0

## 2024-10-16 ASSESSMENT — PAIN - FUNCTIONAL ASSESSMENT: PAIN_FUNCTIONAL_ASSESSMENT: 0-10

## 2024-10-16 ASSESSMENT — PAIN SCALES - GENERAL: PAINLEVEL_OUTOF10: 0 - NO PAIN

## 2024-10-16 NOTE — CONSULTS
Inpatient consult to Endocrinology  Consult performed by: JACKIE Agrawal-CNP  Consult ordered by: JACKIE Ramos-CNP      Reason For Consult  DM2, hyperglycemia, insulin regimen     History Of Present Illness  Leonel Yu is a 54 y.o. 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 Patient presents with blurry vision, lethargy, and dizziness.      Glucose in the 300's on arrival to the ED, given 60 mg of prednisone x 1 with glucoses climbing to the 400's. Total daily dose 10/15/24 was 40 units, with 30 units as basal insulin.     Patient interviewed at the bedside in the ED.  He is hungry and eating well. Patient first reports he has only been taking lispro at home, but does not understand the sliding scale.  When pressed, it sounds like he has not been taking much of the lispro at all.     Diabetes History  Type of diabetes: Type 2 diabetes  Year diagnosed or age: 2023  Hospitalizations for DKA or HHS: no  Complications: hyperglycemia  Seen by PCP or Endocrinology: PCP  Frequency of glucose checks: once a day  Glucose review: reports 200-400  Frequency of Hypoglycemia: denies    Home Medications  Basal: 30 units of glargine listed in the chart, but patient has not been taking it  Prandial: none  Correction: lispro scale, but patient is not taking it  Orals: jardiance 10 mg    Results from Most Recent A1C  Hemoglobin A1C   Date/Time Value Ref Range Status   10/15/2024 05:42 PM 9.9 (H) See comment % Final      Diabetes Problem List Entries with Dates  Problem List:  2024-01: Type 2 diabetes mellitus with hyperglycemia (Multi)    Past Medical History  He has a past medical history of CHF (congestive heart failure) (Multi), Chronic kidney disease,  "COPD (chronic obstructive pulmonary disease) (Multi), Diabetes mellitus (Multi), Hyperlipidemia, Hypertension, Stroke (Multi), and Substance abuse (Multi).    Surgical History  He has a past surgical history that includes Insert / replace / remove pacemaker.     Social History  He reports that he has quit smoking. His smoking use included cigarettes. He has never used smokeless tobacco. He reports current drug use. Drug: \"Crack\" cocaine. He reports that he does not drink alcohol.    Family History  Family History   Problem Relation Name Age of Onset    Heart disease Father        Allergies  Patient has no known allergies.    Review of Systems   Constitutional:  Positive for fatigue. Negative for activity change, appetite change, diaphoresis and unexpected weight change.   HENT: Negative.  Negative for sore throat.    Eyes: Negative.    Respiratory:  Negative for cough, chest tightness and shortness of breath.    Cardiovascular:  Negative for chest pain.   Gastrointestinal:  Negative for abdominal pain, diarrhea and nausea.   Endocrine: Negative for cold intolerance, heat intolerance, polydipsia, polyphagia and polyuria.   Genitourinary:  Negative for dysuria and frequency.   Musculoskeletal:  Negative for gait problem and joint swelling.   Neurological:  Negative for speech difficulty, numbness and headaches.   Psychiatric/Behavioral:  Negative for agitation, behavioral problems and confusion.      Physical Exam  Constitutional:       General: He is not in acute distress.     Appearance: Normal appearance.   HENT:      Nose: Nose normal.      Mouth/Throat:      Mouth: Mucous membranes are moist.      Pharynx: Oropharynx is clear.   Cardiovascular:      Rate and Rhythm: Normal rate and regular rhythm.   Pulmonary:      Effort: Pulmonary effort is normal. No respiratory distress.   Abdominal:      General: There is no distension.      Palpations: Abdomen is soft.   Musculoskeletal:         General: Normal range of " "motion.   Skin:     General: Skin is warm and dry.   Neurological:      Mental Status: He is alert and oriented to person, place, and time.   Psychiatric:         Mood and Affect: Mood normal.         Behavior: Behavior normal.        ROS, PMH, FH/SH, surgical history and allergies have been reviewed.    Last Recorded Vitals  Blood pressure 97/82, pulse 67, temperature 37 °C (98.6 °F), temperature source Temporal, resp. rate 20, height 1.753 m (5' 9\"), weight 77.1 kg (170 lb), SpO2 96%.    Relevant Results  Results from last 7 days   Lab Units 10/16/24  0825 10/15/24  2246 10/15/24  1742 10/15/24  1739 10/15/24  1255 10/15/24  1136 10/15/24  1032   POCT GLUCOSE mg/dL 446* 492*  --  436* 478* 527*  --    GLUCOSE mg/dL  --   --  419*  --   --   --  344*   Lab Review  Lab Results   Component Value Date    BILITOT 1.2 10/15/2024    CALCIUM 8.9 10/15/2024    CO2 24 10/15/2024    CL 98 10/15/2024    CREATININE 1.78 (H) 10/15/2024    GLUCOSE 419 (H) 10/15/2024    ALKPHOS 93 10/15/2024    K 5.3 10/15/2024    PROT 8.7 (H) 10/15/2024     (L) 10/15/2024    AST 42 (H) 10/15/2024    ALT 50 10/15/2024    BUN 32 (H) 10/15/2024    ANIONGAP 15 10/15/2024    MG 2.36 10/15/2024    PHOS 4.5 10/15/2024    ALBUMIN 4.0 10/15/2024    GFRF CANCELED 07/27/2023    GFRMALE CANCELED 07/27/2023     Lab Results   Component Value Date    TRIG 98 09/07/2024    CHOL 127 09/07/2024    LDLCALC 72 09/07/2024    HDL 35.4 09/07/2024     Lab Results   Component Value Date    HGBA1C 9.9 (H) 10/15/2024    HGBA1C 8.4 (H) 07/30/2024    HGBA1C 6.9 (H) 06/20/2024     The ASCVD Risk score (Paty WRIGHT, et al., 2019) failed to calculate for the following reasons:    Risk score cannot be calculated because patient has a medical history suggesting prior/existing ASCVD  Scheduled medications  amiodarone, 200 mg, oral, Daily  aspirin, 81 mg, oral, Daily  atorvastatin, 80 mg, oral, Nightly  carvedilol, 3.125 mg, oral, BID  empagliflozin, 10 mg, oral, " Daily  insulin glargine, 30 Units, subcutaneous, Nightly  insulin regular, 0-10 Units, subcutaneous, TID  sertraline, 100 mg, oral, Daily  tiotropium, 2 puff, inhalation, Daily    Continuous medications     PRN medications  PRN medications: acetaminophen **OR** acetaminophen **OR** acetaminophen, dextrose, dextrose, glucagon, glucagon, melatonin, polyethylene glycol    Assessment/Plan   Assessment & Plan  COPD exacerbation (Multi)    Type 2 diabetes mellitus with hyperglycemia (Multi)      PLAN  Steroids: 60 mg of prednisone x1 10/15/24  Nutrition: 90 gram carb consistent    - continue glargine 30 units at bedtime       If NPO: reduce to 20  - start lispro 10 units with meals plus scale       If NPO: hold  - discontinue regular insulin scale and start lispro corrective scale #2 with meals, adjust to q4h if NPO or if persistently hyperglycemic   = 0u  151-200 = 2u  201-250 = 4u  251-300 = 6u  301-350 = 8u  351-400 = 10u    -Accuchecks (not BMP) TIDAC and QHS- kindly ensure QHS Accucheck is drawn; it is often missed   - Goal -180  -Hypoglycemia protocol  -Will continue to follow and titrate insulin accordingly     I spent 80 minutes in the professional and overall care of this patient.      JACKIE Agrawal-CNP

## 2024-10-16 NOTE — ED TRIAGE NOTES
Patient presents to the ED for weakness, dizziness and lethargy for 2-3 days. Patient states he was in CDU bed 5 waiting for a bed upstairs but he decided to leave because they told him he couldn't eat/drink due to his BG being high.

## 2024-10-16 NOTE — PROGRESS NOTES
Subjective Data:  Patient seen and assessed this morning, lying in bed, NAD. Denies any CP or SOB, now on RA. Reports he still feels unwell, fatigued, diaphoretic, polydipsia and polyuria.     Overnight Events:    No acute events overnight.    Today's Plan/Updates:   - Endocrinology consulted for elevated BG iso steroid given in ED, appreciate recs  - stop regular SSI, start Lispro SSI scale #2 q4h until BG better controlled + prandial Lispro 10 units TID-AC, further restrict carbs  - appears hypovolemic, encourage PO intake  - cool extremities, soft BP, Lactate/VBG pending, hold Coreg     Objective Data:  Last Recorded Vitals:  Vitals:    10/15/24 2001 10/16/24 0807 10/16/24 1132 10/16/24 1300   BP: 102/86 97/82 89/64 92/71   BP Location: Right arm      Patient Position: Lying Sitting  Sitting   Pulse: 71 67 58 70   Resp: 20 20 16 16   Temp: 37 °C (98.6 °F)      TempSrc: Temporal      SpO2: 94% 96% 96% 96%   Weight:       Height:           Last Labs:  Results for orders placed or performed during the hospital encounter of 10/15/24 (from the past 24 hours)   POCT GLUCOSE   Result Value Ref Range    POCT Glucose 436 (H) 74 - 99 mg/dL   CBC   Result Value Ref Range    WBC 8.4 4.4 - 11.3 x10*3/uL    nRBC 0.0 0.0 - 0.0 /100 WBCs    RBC 6.59 (H) 4.50 - 5.90 x10*6/uL    Hemoglobin 20.5 (H) 13.5 - 17.5 g/dL    Hematocrit 60.1 (H) 41.0 - 52.0 %    MCV 91 80 - 100 fL    MCH 31.1 26.0 - 34.0 pg    MCHC 34.1 32.0 - 36.0 g/dL    RDW 16.0 (H) 11.5 - 14.5 %    Platelets 183 150 - 450 x10*3/uL   Magnesium   Result Value Ref Range    Magnesium 2.36 1.60 - 2.40 mg/dL   Renal Function Panel   Result Value Ref Range    Glucose 419 (H) 74 - 99 mg/dL    Sodium 132 (L) 136 - 145 mmol/L    Potassium 5.3 3.5 - 5.3 mmol/L    Chloride 98 98 - 107 mmol/L    Bicarbonate 24 21 - 32 mmol/L    Anion Gap 15 10 - 20 mmol/L    Urea Nitrogen 32 (H) 6 - 23 mg/dL    Creatinine 1.78 (H) 0.50 - 1.30 mg/dL    eGFR 44 (L) >60 mL/min/1.73m*2    Calcium 8.9  8.6 - 10.6 mg/dL    Phosphorus 4.5 2.5 - 4.9 mg/dL    Albumin 4.0 3.4 - 5.0 g/dL   Hemoglobin A1c   Result Value Ref Range    Hemoglobin A1C 9.9 (H) See comment %    Estimated Average Glucose 237 Not Established mg/dL   Drug Screen, Urine   Result Value Ref Range    Amphetamine Screen, Urine Presumptive Negative Presumptive Negative    Barbiturate Screen, Urine Presumptive Negative Presumptive Negative    Benzodiazepines Screen, Urine Presumptive Negative Presumptive Negative    Cannabinoid Screen, Urine Presumptive Negative Presumptive Negative    Cocaine Metabolite Screen, Urine Presumptive Positive (A) Presumptive Negative    Fentanyl Screen, Urine Presumptive Negative Presumptive Negative    Opiate Screen, Urine Presumptive Negative Presumptive Negative    Oxycodone Screen, Urine Presumptive Negative Presumptive Negative    PCP Screen, Urine Presumptive Negative Presumptive Negative    Methadone Screen, Urine Presumptive Negative Presumptive Negative   POCT GLUCOSE   Result Value Ref Range    POCT Glucose 492 (H) 74 - 99 mg/dL   POCT GLUCOSE   Result Value Ref Range    POCT Glucose 446 (H) 74 - 99 mg/dL   POCT GLUCOSE   Result Value Ref Range    POCT Glucose 561 (H) 74 - 99 mg/dL   POCT GLUCOSE   Result Value Ref Range    POCT Glucose 534 (H) 74 - 99 mg/dL        TROPHS   Date/Time Value Ref Range Status   10/15/2024 11:47 AM 50 0 - 53 ng/L Final   10/15/2024 10:32 AM 70 0 - 53 ng/L Final   10/01/2024 10:33 AM 54 0 - 53 ng/L Final   02/12/2024 09:47  0 - 53 ng/L Final     Comment:     Previous result verified on 2/12/2024 1836 on specimen/case [id number] called with component [facility] for procedure Troponin I, High Sensitivity with value 130 ng/L.   02/12/2024 06:11  0 - 53 ng/L Final     Comment:     Previous result verified on 2/12/2024 1836 on specimen/case [id number] called with component [facility] for procedure Troponin I, High Sensitivity with value 130 ng/L.   02/12/2024 12:39  0 - 53  ng/L Final     BNP   Date/Time Value Ref Range Status   10/15/2024 10:32 AM 2,833 0 - 99 pg/mL Final   09/30/2024 05:01 PM >5,000 0 - 99 pg/mL Final     HGBA1C   Date/Time Value Ref Range Status   10/15/2024 05:42 PM 9.9 See comment % Final   07/30/2024 07:31 AM 8.4 see below % Final     LDLCALC   Date/Time Value Ref Range Status   09/07/2024 09:49 AM 72 <=99 mg/dL Final     Comment:                                 Near   Borderline      AGE      Desirable  Optimal    High     High     Very High     0-19 Y     0 - 109     ---    110-129   >/= 130     ----    20-24 Y     0 - 119     ---    120-159   >/= 160     ----      >24 Y     0 -  99   100-129  130-159   160-189     >/=190       VLDL   Date/Time Value Ref Range Status   09/07/2024 09:49 AM 20 0 - 40 mg/dL Final   07/25/2023 08:20 AM 18 0 - 40 mg/dL Final   06/18/2023 06:27 AM 29 0 - 40 mg/dL Final      Last I/O:  I/O last 3 completed shifts:  In: 1080 (14 mL/kg) [P.O.:1080]  Out: 1100 (14.3 mL/kg) [Urine:1100 (0.4 mL/kg/hr)]  Weight: 77.1 kg     Past Cardiology Tests (Last 3 Years):  EKG:  ECG 12 Lead 10/04/2024  Sinus bradycardia with 1st degree AV block  Left axis deviation  Left ventricular hypertrophy with QRS widening and repolarization abnormality  Inferior infarct (cited on or before 07-SEP-2024)  Abnormal ECG  When compared with ECG of 04-OCT-2024 13:11,  Serial changes of Inferior infarct Present  Confirmed by Neo Ferguson ([id number]) on 10/8/2024 1:09:41 PM    Echo:  Transthoracic Echo (TTE) Complete 07/31/2024  CONCLUSIONS:   1. Left ventricular ejection fraction is severely decreased, calculated by Tim's biplane at 10%.   2. There is global hypokinesis of the left ventricle with minor regional variations.   3. Spectral Doppler shows an abnormal pattern of left ventricular diastolic filling.   4. Left ventricular cavity size is severely dilated.   5. No left ventricular thrombus visualized.   6. There is spontaneous echocontrast noted within the LV  cavity.Echocontrast was utilized and excluded LV apical thrombus.   7. There is severe eccentric left ventricular hypertrophy.   8. Severely enlarged right ventricle.   9. There is reduced right ventricular systolic function.  10. The left atrium is severely dilated.  11. The right atrium is moderately to severely dilated.  12. Mild to moderate tricuspid regurgitation visualized.  13. Mildly elevated RVSP.  14. Compared with the prior exam from 7/26/2023, the degree of TR has increased, however there was already a severley dilated LV with severe systolic dysfunciton and a dilated RV with reduced fx as well. Note that the prior exam included an agitated saline contrast study that was negative for shunt.    Ejection Fractions:  EF   Date/Time Value Ref Range Status   07/31/2024 03:07 PM 10 %      Cath:  No results found for this or any previous visit from the past 1095 days.    Stress Test:  NUCLEAR STRESS TEST 01/10/2024  CONCLUSIONS:    1. SPECT Perfusion Study: Non-diagnostic due to poor image quality.    2. Left ventricle is severely dilated. The left ventricle systolic   function is severely decreased.    3. Right ventricle is small. The right ventricle systolic function is   normal.    4. This is a high risk scan due to calculated LVEF.    5. There is significant GI uptake of the radiotracer that only mildly   improve with AC. This makes the inferior wall uninterpretable. There is   no ischemia or scar in the anterior, septal or anterolateral wall   segments.    6. Given the LV is severely dilated Will recommend to assess for   ischemic heart disease using diferent imaging modalities such as cardiac   MRI or coronary angiography.       Cardiac Imaging:  MR cardiac morphology and function w and wo IV contrast 08/05/2024   IMPRESSION:  1. Severely dilated left ventricle (EDVi 344 ml/m2) with severely  impaired systolic function (LVEF 5-10%).  2. The basal-mid inferoseptal, apical septal, apicolateral segments  are  akinetic. All other segments are severely hypokinetic.  3. Eccentric left ventricular hypertrophy.  4. No evidence of LV thrombus.  5. LGE imaging demonstrates transmural infarction of the mid/apical  inferoseptal/inferior segments and a separate small region of  subendocardial infarction of the apicolateral wall (<25% wall  thickness). Additionally, there is mid-wall septal fibrosis  (nonischemic pattern).  6. Dilated right ventricle with impaired systolic function  (qualitative assessment). CIED lead noted.   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    Inpatient Medications:  Scheduled medications   Medication Dose Route Frequency    amiodarone  200 mg oral Daily    aspirin  81 mg oral Daily    atorvastatin  80 mg oral Nightly    carvedilol  3.125 mg oral BID    empagliflozin  10 mg oral Daily    insulin glargine  30 Units subcutaneous Nightly    insulin lispro  0-10 Units subcutaneous TID    insulin lispro  10 Units subcutaneous TID    sertraline  100 mg oral Daily    tiotropium  2 puff inhalation Daily     PRN medications   Medication    acetaminophen    dextrose    dextrose    glucagon    glucagon    melatonin    polyethylene glycol     Continuous Medications   Medication Dose Last Rate     Physical Exam:  Physical Exam  Vitals reviewed.   Constitutional:       General: He is not in acute distress.     Appearance: He is ill-appearing and diaphoretic.   HENT:      Head: Atraumatic.      Mouth/Throat:      Mouth: Mucous membranes are dry.   Eyes:      Extraocular Movements: Extraocular movements intact.      Conjunctiva/sclera: Conjunctivae normal.   Neck:      Comments: JVD above collarbone  Cardiovascular:      Rate and Rhythm: Normal rate and regular rhythm.      Pulses: Normal pulses.      Heart sounds: Normal heart sounds.      Comments: NSR on telemetry, HR 60's  Pulmonary:      Effort: Pulmonary effort is normal. No respiratory distress.      Breath  sounds: Normal breath sounds.      Comments: On RA  Abdominal:      General: Bowel sounds are normal.      Palpations: Abdomen is soft.   Musculoskeletal:      Cervical back: Normal range of motion.      Right lower leg: No edema.      Left lower leg: No edema.   Skin:     Comments: Cool extremities    Neurological:      General: No focal deficit present.      Mental Status: He is alert and oriented to person, place, and time.   Psychiatric:         Mood and Affect: Mood normal.         Behavior: Behavior normal.           Assessment/Plan   54 y.o. 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 Admitted for acute decompensated heart failure in setting of medication non compliance and drowsiness.      Acute decompensated mixed ischemic/nonischemic HFrEF 5-10%  s/p single chamber ICD 3/2024  - TTE 7/31:  EF 10%, gHK of LV, LV severely dilated, severe eccentric LVH, severely enlarged RV with reduced systolic fxn, severe LAE, mod to sev VIVI, mild to mod TR, RVSP 39 mildly elevated  - cMRI 8/5: ischemic/nonischemic.  Sev dilated LV EF 5-10%. Basal inferoseptal, mid inferoseptal, apical septal, apicolateral segments akinetic. Eccentric LVH. Transmural infarction of mid/apical inferoseptal/inferior segments and apicolateral wall (<25% wall thickeness). NICM pattern of mid wall septal fibrosis.  - Troponin 70>50, denies CP  - EKG 10/15: SR 85, left axis dev, LVH, IVCD/LBBB. No acute ischemic changes as compared to prior 10/5  - Transmural infarcts on cardiac MRI. Coronary Calcifications on CT chest  - admit CXR: Marked enlargement of cardiac silhouette with vascular  congestion  - admit BNP 2833 (>5K)  - Admit Wt: pending  "  - last discharge weight 74.4kg 10/5  - home GDMT: Carvedilol 3.125, Entresto 24-26 BID, Empagliflozin 10 daily, Spironolactone 25 daily, Diuretic: Torsemide 40 mg dily  - s/p torsemide 40 and lasix 40mg IV x2 doses in the ED  - last admit diuresed with 80mg IV daily and transitioned to 40mg PO torsemide daily   - last admit: Palliative care consulted given advanced HF with numerous re-admits and overall poor prognosis,ongoing substance use precludes him from advanced HF therapy candidacy, now DNAR  - reccommended psych consult for depression/suicidal ideations, but patient is not interested at this time   - Goals are mix of survival and time and improved quality of life  - holding Entresto and Spironolactone d/t elevated K and soft BP  - cool extremities, soft BP, Lactate/VBG pending, hold Coreg, encourage PO intake  - cont Secondary vascular prevention with aspirin and statin     Ventriculary Tachycardia/ VF   Hx of possible AF RVR   - Device interrogation 9/30: Last shock 9/5 appropriate for VT ( last admission). 12yr battery life. Lead fxn WNL.  <1%.   - Patient without any shocks or tachyarrhythmias since amiodarone loading   - For the epsiode  9/5 prior EP consult noted:\" Possible dual tachycardia with PAF  RVR and concurrent VT/VF in the setting of active cocaine use.\" EP did not feel right atrial lead placement  deemed appropriate in light of ongoing cocaine use and increased risk of infection.NO OAC recommended.  - BB on hold, see above  - cont Amio 200 mg daily  - cont telemetry, keep K+ >4, Mag >2     COPD  Former tobacco user  - 2L at night as baseline  - sometimes forgets to wear  - given 60 mg of prednisone x 1 in ED  - Currently on RA  - cont Spiriva  - keep O2 sat> 92%      Substance use disorder, cocaine  - Cessation advised  - admit Urine tox + cocaine      Type II Diabetes  - BS on admit: 300-400s  - A1C 9.9%  - cont Insulin glargine 30unit nightly  - Endocrinology consulted for elevated BG iso " steroid given in ED, appreciate recs  - stop regular SSI, start Lispro SSI scale #2 q4h until BG better controlled + prandial Lispro 10 units TID-AC, further restrict carb's  - diabetic diet, Accu-Cheks AC/HS, hypoglycemic protocol      Mood disorder, unspecified  - Zoloft 100mg daily     DVT ppx: subcutaneous Lovenox    DISPO: pending further medical optimization and work up     Code Status: DNR     Seen and discussed with Dr. Jose E Blanca, APRN-CNP   1 person assist

## 2024-10-16 NOTE — PROGRESS NOTES
Pharmacy Medication History Review    Leonel Yu is a 54 y.o. male admitted for COPD exacerbation (Multi). Pharmacy reviewed the patient's [id number] medications and allergies for accuracy.    Medications ADDED:  None   Medications CHANGED:  None   Medications REMOVED:   Insulin Lantus     The list below reflects the updated PTA list.   Prior to Admission Medications   Prescriptions Last Dose Informant   amiodarone (Pacerone) 200 mg tablet 10/15/2024    Sig: Take 1 tablet (200 mg) by mouth once daily.   aspirin 81 mg chewable tablet 10/15/2024 Self   Sig: Chew 1 tablet (81 mg) once daily.   atorvastatin (Lipitor) 80 mg tablet 10/15/2024 Self   Sig: Take 1 tablet (80 mg) by mouth once daily at bedtime.   blood sugar diagnostic strip  Self   Sig: Use as directed to test blood glucose up to four times daily and as needed.   blood-glucose meter misc  Self   Sig: Inject 1 each under the skin 4 times a day with meals. Check blood glucose 4 times daily, before meals and at bedtime.   carvedilol (Coreg) 3.125 mg tablet  Self   Sig: Take 1 tablet (3.125 mg) by mouth 2 times a day.   empagliflozin (Jardiance) 10 mg 10/15/2024 Self   Sig: TAKE 1 TABLET BY MOUTH ONCE DAILY   glucose 4 gram chewable tablet 10/15/2024 Self   Sig: Chew 2 tablets (8 g) if needed for low blood sugar - see comments.   insulin glargine (Lantus) 100 unit/mL (3 mL) pen More than a month Self   Sig: Inject 30 Units under the skin once daily at bedtime.   Patient not taking: Reported on 10/16/2024   insulin lispro (HumaLOG) 100 unit/mL injection 10/15/2024 Self   Sig: Inject 0-10 Units under the skin 3 times daily (morning, midday, late afternoon). Hypoglycemia protocol if Blood Glucose is between 0 - 70 mg/dL, 0 unit(s) if , 2 unit(s) if 151-200, 4 unit(s) if 201-250, 6 unit(s) if 251-300, 8 unit(s) if 301-350, 10 unit(s) if 351-400. Notify provider unit(s) if Blood Glucose is greater than 400 mg/dL   lancets 33 gauge misc  Self   Sig:  "Inject 1 each under the skin 4 times a day.   pen needle 1/2\" 29G X 12mm needle  Self   Sig: Use to inject 1-4 times daily as directed.   sacubitriL-valsartan (Entresto) 24-26 mg tablet 10/15/2024    Sig: Take 1 tablet by mouth 2 times a day.   sertraline (Zoloft) 100 mg tablet 10/15/2024 Self   Sig: Take 1 tablet (100 mg) by mouth once daily.   spironolactone (Aldactone) 25 mg tablet 10/15/2024 Self   Sig: Take 0.5 tablets (12.5 mg) by mouth once daily.   tiotropium (Spiriva Respimat) 2.5 mcg/actuation inhaler 10/15/2024 Self   Sig: Inhale 2 puffs once daily.   torsemide (Demadex) 20 mg tablet 10/15/2024    Sig: Take 2 tablets (40 mg) by mouth once daily.      Facility-Administered Medications: None        The list below reflects the updated allergy list. Please review each documented allergy for additional clarification and justification.  Allergies  Reviewed by Brandi Victoria on 10/16/2024   No Known Allergies         Patient accepts M2B at discharge.     Sources:   Patient Interview (reliable historian)  Medication dispense history    Additional Comments:  Patient expressed that he often misses his second dose of the Entresto and Carvedilol.      BRANDI VICTORIA  Pharmacy Technician  10/16/24     Secure Chat preferred   If no response call [id number] or [facility] \"Med Rec\"   "

## 2024-10-17 ENCOUNTER — APPOINTMENT (OUTPATIENT)
Dept: CARDIOLOGY | Facility: HOSPITAL | Age: 54
End: 2024-10-17
Payer: COMMERCIAL

## 2024-10-17 ENCOUNTER — APPOINTMENT (OUTPATIENT)
Dept: RADIOLOGY | Facility: HOSPITAL | Age: 54
End: 2024-10-17
Payer: COMMERCIAL

## 2024-10-17 PROBLEM — R06.02 SHORTNESS OF BREATH AT REST: Status: ACTIVE | Noted: 2024-10-17

## 2024-10-17 LAB
ALBUMIN SERPL BCP-MCNC: 3.5 G/DL (ref 3.4–5)
ALBUMIN SERPL BCP-MCNC: 3.9 G/DL (ref 3.4–5)
ALBUMIN SERPL BCP-MCNC: 4.2 G/DL (ref 3.4–5)
ALP SERPL-CCNC: 77 U/L (ref 33–120)
ALP SERPL-CCNC: 91 U/L (ref 33–120)
ALT SERPL W P-5'-P-CCNC: 29 U/L (ref 10–52)
ALT SERPL W P-5'-P-CCNC: 36 U/L (ref 10–52)
AMMONIA PLAS-SCNC: 33 UMOL/L (ref 16–53)
AMPHETAMINES UR QL SCN: ABNORMAL
ANION GAP BLDV CALCULATED.4IONS-SCNC: 10 MMOL/L (ref 10–25)
ANION GAP BLDV CALCULATED.4IONS-SCNC: 7 MMOL/L (ref 10–25)
ANION GAP SERPL CALC-SCNC: 16 MMOL/L (ref 10–20)
ANION GAP SERPL CALC-SCNC: 17 MMOL/L (ref 10–20)
ANION GAP SERPL CALC-SCNC: 17 MMOL/L (ref 10–20)
AORTIC VALVE PEAK VELOCITY: 1 M/S
APAP SERPL-MCNC: <10 UG/ML
APPEARANCE UR: CLEAR
AST SERPL W P-5'-P-CCNC: 15 U/L (ref 9–39)
AST SERPL W P-5'-P-CCNC: 19 U/L (ref 9–39)
ATRIAL RATE: 54 BPM
AV PEAK GRADIENT: 4 MMHG
AVA (PEAK VEL): 3.31 CM2
B-OH-BUTYR SERPL-SCNC: 0.12 MMOL/L (ref 0.02–0.27)
BARBITURATES UR QL SCN: ABNORMAL
BASE EXCESS BLDV CALC-SCNC: 1.1 MMOL/L (ref -2–3)
BASE EXCESS BLDV CALC-SCNC: 1.3 MMOL/L (ref -2–3)
BASOPHILS # BLD AUTO: 0.02 X10*3/UL (ref 0–0.1)
BASOPHILS # BLD AUTO: 0.03 X10*3/UL (ref 0–0.1)
BASOPHILS # BLD AUTO: 0.04 X10*3/UL (ref 0–0.1)
BASOPHILS NFR BLD AUTO: 0.2 %
BASOPHILS NFR BLD AUTO: 0.3 %
BASOPHILS NFR BLD AUTO: 0.3 %
BENZODIAZ UR QL SCN: ABNORMAL
BILIRUB DIRECT SERPL-MCNC: 0.2 MG/DL (ref 0–0.3)
BILIRUB SERPL-MCNC: 0.5 MG/DL (ref 0–1.2)
BILIRUB SERPL-MCNC: 0.5 MG/DL (ref 0–1.2)
BILIRUB UR STRIP.AUTO-MCNC: NEGATIVE MG/DL
BNP SERPL-MCNC: 479 PG/ML (ref 0–99)
BODY TEMPERATURE: 37 DEGREES CELSIUS
BODY TEMPERATURE: 37 DEGREES CELSIUS
BUN SERPL-MCNC: 42 MG/DL (ref 6–23)
BUN SERPL-MCNC: 45 MG/DL (ref 6–23)
BUN SERPL-MCNC: 46 MG/DL (ref 6–23)
BZE UR QL SCN: ABNORMAL
CA-I BLDV-SCNC: 1.1 MMOL/L (ref 1.1–1.33)
CA-I BLDV-SCNC: 1.13 MMOL/L (ref 1.1–1.33)
CALCIUM SERPL-MCNC: 8.8 MG/DL (ref 8.6–10.6)
CALCIUM SERPL-MCNC: 9.6 MG/DL (ref 8.6–10.6)
CALCIUM SERPL-MCNC: 9.9 MG/DL (ref 8.6–10.6)
CANNABINOIDS UR QL SCN: ABNORMAL
CARDIAC TROPONIN I PNL SERPL HS: 52 NG/L (ref 0–53)
CARDIAC TROPONIN I PNL SERPL HS: 69 NG/L (ref 0–53)
CHLORIDE BLDV-SCNC: 96 MMOL/L (ref 98–107)
CHLORIDE BLDV-SCNC: 99 MMOL/L (ref 98–107)
CHLORIDE SERPL-SCNC: 94 MMOL/L (ref 98–107)
CHLORIDE SERPL-SCNC: 97 MMOL/L (ref 98–107)
CHLORIDE SERPL-SCNC: 98 MMOL/L (ref 98–107)
CHLORIDE UR-SCNC: <15 MMOL/L
CHLORIDE/CREATININE (MMOL/G) IN URINE: NORMAL
CO2 SERPL-SCNC: 25 MMOL/L (ref 21–32)
CO2 SERPL-SCNC: 25 MMOL/L (ref 21–32)
CO2 SERPL-SCNC: 26 MMOL/L (ref 21–32)
COLOR UR: ABNORMAL
CORTIS AM PEAK SERPL-MSCNC: 23.7 UG/DL (ref 5–20)
CREAT SERPL-MCNC: 1.99 MG/DL (ref 0.5–1.3)
CREAT SERPL-MCNC: 2.1 MG/DL (ref 0.5–1.3)
CREAT SERPL-MCNC: 2.39 MG/DL (ref 0.5–1.3)
CREAT UR-MCNC: 100.1 MG/DL (ref 20–370)
CREAT UR-MCNC: 100.1 MG/DL (ref 20–370)
CRP SERPL-MCNC: 0.21 MG/DL
EGFRCR SERPLBLD CKD-EPI 2021: 31 ML/MIN/1.73M*2
EGFRCR SERPLBLD CKD-EPI 2021: 36 ML/MIN/1.73M*2
EGFRCR SERPLBLD CKD-EPI 2021: 39 ML/MIN/1.73M*2
EJECTION FRACTION APICAL 4 CHAMBER: 14.7
EJECTION FRACTION: 13 %
EOSINOPHIL # BLD AUTO: 0.01 X10*3/UL (ref 0–0.7)
EOSINOPHIL # BLD AUTO: 0.01 X10*3/UL (ref 0–0.7)
EOSINOPHIL # BLD AUTO: 0.03 X10*3/UL (ref 0–0.7)
EOSINOPHIL NFR BLD AUTO: 0.1 %
EOSINOPHIL NFR BLD AUTO: 0.1 %
EOSINOPHIL NFR BLD AUTO: 0.3 %
ERYTHROCYTE [DISTWIDTH] IN BLOOD BY AUTOMATED COUNT: 15.9 % (ref 11.5–14.5)
ERYTHROCYTE [DISTWIDTH] IN BLOOD BY AUTOMATED COUNT: 16.1 % (ref 11.5–14.5)
ERYTHROCYTE [DISTWIDTH] IN BLOOD BY AUTOMATED COUNT: 17.4 % (ref 11.5–14.5)
ERYTHROCYTE [SEDIMENTATION RATE] IN BLOOD BY WESTERGREN METHOD: 20 MM/H (ref 0–20)
ETHANOL SERPL-MCNC: <10 MG/DL
FENTANYL+NORFENTANYL UR QL SCN: ABNORMAL
GLUCOSE BLD MANUAL STRIP-MCNC: 121 MG/DL (ref 74–99)
GLUCOSE BLD MANUAL STRIP-MCNC: 150 MG/DL (ref 74–99)
GLUCOSE BLD MANUAL STRIP-MCNC: 220 MG/DL (ref 74–99)
GLUCOSE BLD MANUAL STRIP-MCNC: 232 MG/DL (ref 74–99)
GLUCOSE BLD MANUAL STRIP-MCNC: 312 MG/DL (ref 74–99)
GLUCOSE BLD MANUAL STRIP-MCNC: 39 MG/DL (ref 74–99)
GLUCOSE BLD MANUAL STRIP-MCNC: 476 MG/DL (ref 74–99)
GLUCOSE BLD MANUAL STRIP-MCNC: >600 MG/DL (ref 74–99)
GLUCOSE BLD MANUAL STRIP-MCNC: >600 MG/DL (ref 74–99)
GLUCOSE BLDV-MCNC: 130 MG/DL (ref 74–99)
GLUCOSE BLDV-MCNC: 272 MG/DL (ref 74–99)
GLUCOSE SERPL-MCNC: 245 MG/DL (ref 74–99)
GLUCOSE SERPL-MCNC: 323 MG/DL (ref 74–99)
GLUCOSE SERPL-MCNC: 67 MG/DL (ref 74–99)
GLUCOSE UR STRIP.AUTO-MCNC: ABNORMAL MG/DL
HCO3 BLDV-SCNC: 26 MMOL/L (ref 22–26)
HCO3 BLDV-SCNC: 29 MMOL/L (ref 22–26)
HCT VFR BLD AUTO: 56 % (ref 41–52)
HCT VFR BLD AUTO: 56.6 % (ref 41–52)
HCT VFR BLD AUTO: 63.8 % (ref 41–52)
HCT VFR BLD EST: 58 % (ref 41–52)
HCT VFR BLD EST: 58 % (ref 41–52)
HGB BLD-MCNC: 18.5 G/DL (ref 13.5–17.5)
HGB BLD-MCNC: 18.8 G/DL (ref 13.5–17.5)
HGB BLD-MCNC: 21.2 G/DL (ref 13.5–17.5)
HGB BLDV-MCNC: 19.2 G/DL (ref 13.5–17.5)
HGB BLDV-MCNC: 19.2 G/DL (ref 13.5–17.5)
HIV 1+2 AB+HIV1 P24 AG SERPL QL IA: NONREACTIVE
HOLD SPECIMEN: NORMAL
HOLD SPECIMEN: NORMAL
HYALINE CASTS #/AREA URNS AUTO: ABNORMAL /LPF
IMM GRANULOCYTES # BLD AUTO: 0.04 X10*3/UL (ref 0–0.7)
IMM GRANULOCYTES # BLD AUTO: 0.05 X10*3/UL (ref 0–0.7)
IMM GRANULOCYTES # BLD AUTO: 0.08 X10*3/UL (ref 0–0.7)
IMM GRANULOCYTES NFR BLD AUTO: 0.4 % (ref 0–0.9)
IMM GRANULOCYTES NFR BLD AUTO: 0.4 % (ref 0–0.9)
IMM GRANULOCYTES NFR BLD AUTO: 0.6 % (ref 0–0.9)
INHALED O2 CONCENTRATION: 20 %
INHALED O2 CONCENTRATION: 28 %
KETONES UR STRIP.AUTO-MCNC: NEGATIVE MG/DL
LACTATE BLDV-SCNC: 1.2 MMOL/L (ref 0.4–2)
LACTATE BLDV-SCNC: 2.2 MMOL/L (ref 0.4–2)
LACTATE SERPL-SCNC: 1.2 MMOL/L (ref 0.4–2)
LACTATE SERPL-SCNC: 2.3 MMOL/L (ref 0.4–2)
LEFT ATRIUM VOLUME AREA LENGTH INDEX BSA: 34.8 ML/M2
LEFT VENTRICLE INTERNAL DIMENSION DIASTOLE: 7.64 CM (ref 3.5–6)
LEFT VENTRICULAR OUTFLOW TRACT DIAMETER: 2.2 CM
LEGIONELLA AG UR QL: NEGATIVE
LEUKOCYTE ESTERASE UR QL STRIP.AUTO: NEGATIVE
LYMPHOCYTES # BLD AUTO: 0.52 X10*3/UL (ref 1.2–4.8)
LYMPHOCYTES # BLD AUTO: 0.65 X10*3/UL (ref 1.2–4.8)
LYMPHOCYTES # BLD AUTO: 0.83 X10*3/UL (ref 1.2–4.8)
LYMPHOCYTES NFR BLD AUTO: 4.1 %
LYMPHOCYTES NFR BLD AUTO: 5.1 %
LYMPHOCYTES NFR BLD AUTO: 7.7 %
MAGNESIUM SERPL-MCNC: 2.31 MG/DL (ref 1.6–2.4)
MCH RBC QN AUTO: 30.1 PG (ref 26–34)
MCH RBC QN AUTO: 30.8 PG (ref 26–34)
MCH RBC QN AUTO: 30.9 PG (ref 26–34)
MCHC RBC AUTO-ENTMCNC: 33 G/DL (ref 32–36)
MCHC RBC AUTO-ENTMCNC: 33.2 G/DL (ref 32–36)
MCHC RBC AUTO-ENTMCNC: 33.2 G/DL (ref 32–36)
MCV RBC AUTO: 91 FL (ref 80–100)
MCV RBC AUTO: 93 FL (ref 80–100)
MCV RBC AUTO: 93 FL (ref 80–100)
METHADONE UR QL SCN: ABNORMAL
MITRAL VALVE E/A RATIO: 0.38
MONOCYTES # BLD AUTO: 0.35 X10*3/UL (ref 0.1–1)
MONOCYTES # BLD AUTO: 0.46 X10*3/UL (ref 0.1–1)
MONOCYTES # BLD AUTO: 0.46 X10*3/UL (ref 0.1–1)
MONOCYTES NFR BLD AUTO: 2.7 %
MONOCYTES NFR BLD AUTO: 3.6 %
MONOCYTES NFR BLD AUTO: 4.3 %
NEUTROPHILS # BLD AUTO: 11.48 X10*3/UL (ref 1.2–7.7)
NEUTROPHILS # BLD AUTO: 11.77 X10*3/UL (ref 1.2–7.7)
NEUTROPHILS # BLD AUTO: 9.35 X10*3/UL (ref 1.2–7.7)
NEUTROPHILS NFR BLD AUTO: 87 %
NEUTROPHILS NFR BLD AUTO: 90.5 %
NEUTROPHILS NFR BLD AUTO: 92.3 %
NITRITE UR QL STRIP.AUTO: NEGATIVE
NRBC BLD-RTO: 0 /100 WBCS (ref 0–0)
OPIATES UR QL SCN: ABNORMAL
OXYCODONE+OXYMORPHONE UR QL SCN: ABNORMAL
OXYHGB MFR BLDV: 69.1 % (ref 45–75)
OXYHGB MFR BLDV: 83.6 % (ref 45–75)
P AXIS: -5 DEGREES
P OFFSET: 165 MS
P ONSET: 107 MS
PCO2 BLDV: 41 MM HG (ref 41–51)
PCO2 BLDV: 55 MM HG (ref 41–51)
PCP UR QL SCN: ABNORMAL
PH BLDV: 7.33 PH (ref 7.33–7.43)
PH BLDV: 7.41 PH (ref 7.33–7.43)
PH UR STRIP.AUTO: 5.5 [PH]
PHOSPHATE SERPL-MCNC: 3.7 MG/DL (ref 2.5–4.9)
PHOSPHATE SERPL-MCNC: 5.4 MG/DL (ref 2.5–4.9)
PLATELET # BLD AUTO: 154 X10*3/UL (ref 150–450)
PLATELET # BLD AUTO: 167 X10*3/UL (ref 150–450)
PLATELET # BLD AUTO: 169 X10*3/UL (ref 150–450)
PO2 BLDV: 45 MM HG (ref 35–45)
PO2 BLDV: 58 MM HG (ref 35–45)
POTASSIUM BLDV-SCNC: 3.6 MMOL/L (ref 3.5–5.3)
POTASSIUM BLDV-SCNC: 3.9 MMOL/L (ref 3.5–5.3)
POTASSIUM SERPL-SCNC: 3.4 MMOL/L (ref 3.5–5.3)
POTASSIUM SERPL-SCNC: 3.5 MMOL/L (ref 3.5–5.3)
POTASSIUM SERPL-SCNC: 4 MMOL/L (ref 3.5–5.3)
POTASSIUM UR-SCNC: 46 MMOL/L
POTASSIUM/CREAT UR-RTO: 46 MMOL/G CREAT
PR INTERVAL: 188 MS
PROCALCITONIN SERPL-MCNC: 0.27 NG/ML
PROCALCITONIN SERPL-MCNC: 0.31 NG/ML
PROT SERPL-MCNC: 6.9 G/DL (ref 6.4–8.2)
PROT SERPL-MCNC: 8.3 G/DL (ref 6.4–8.2)
PROT UR STRIP.AUTO-MCNC: ABNORMAL MG/DL
Q ONSET: 201 MS
QRS COUNT: 9 BEATS
QRS DURATION: 118 MS
QT INTERVAL: 514 MS
QTC CALCULATION(BAZETT): 487 MS
QTC FREDERICIA: 496 MS
R AXIS: -39 DEGREES
RBC # BLD AUTO: 6.08 X10*6/UL (ref 4.5–5.9)
RBC # BLD AUTO: 6.14 X10*6/UL (ref 4.5–5.9)
RBC # BLD AUTO: 6.89 X10*6/UL (ref 4.5–5.9)
RBC # UR STRIP.AUTO: NEGATIVE /UL
RBC #/AREA URNS AUTO: ABNORMAL /HPF
RIGHT VENTRICLE FREE WALL PEAK S': 7 CM/S
RIGHT VENTRICLE PEAK SYSTOLIC PRESSURE: 25.8 MMHG
S PNEUM AG UR QL: NEGATIVE
SALICYLATES SERPL-MCNC: <3 MG/DL
SAO2 % BLDV: 71 % (ref 45–75)
SAO2 % BLDV: 86 % (ref 45–75)
SODIUM BLDV-SCNC: 128 MMOL/L (ref 136–145)
SODIUM BLDV-SCNC: 131 MMOL/L (ref 136–145)
SODIUM SERPL-SCNC: 132 MMOL/L (ref 136–145)
SODIUM SERPL-SCNC: 134 MMOL/L (ref 136–145)
SODIUM SERPL-SCNC: 138 MMOL/L (ref 136–145)
SODIUM UR-SCNC: 25 MMOL/L
SODIUM/CREAT UR-RTO: 25 MMOL/G CREAT
SP GR UR STRIP.AUTO: 1.03
SQUAMOUS #/AREA URNS AUTO: ABNORMAL /HPF
T AXIS: 101 DEGREES
T OFFSET: 458 MS
T4 FREE SERPL-MCNC: 1.18 NG/DL (ref 0.78–1.48)
TREPONEMA PALLIDUM IGG+IGM AB [PRESENCE] IN SERUM OR PLASMA BY IMMUNOASSAY: NONREACTIVE
TRICUSPID ANNULAR PLANE SYSTOLIC EXCURSION: 1.8 CM
TSH SERPL-ACNC: 5.71 MIU/L (ref 0.44–3.98)
UREA/CREAT UR-SRTO: 8 G/G CREAT
UROBILINOGEN UR STRIP.AUTO-MCNC: NORMAL MG/DL
UUN UR-MCNC: 804 MG/DL
VENTRICULAR RATE: 54 BPM
VIT B12 SERPL-MCNC: 584 PG/ML (ref 211–911)
WBC # BLD AUTO: 10.7 X10*3/UL (ref 4.4–11.3)
WBC # BLD AUTO: 12.7 X10*3/UL (ref 4.4–11.3)
WBC # BLD AUTO: 12.8 X10*3/UL (ref 4.4–11.3)
WBC #/AREA URNS AUTO: ABNORMAL /HPF

## 2024-10-17 PROCEDURE — 87449 NOS EACH ORGANISM AG IA: CPT

## 2024-10-17 PROCEDURE — 85652 RBC SED RATE AUTOMATED: CPT

## 2024-10-17 PROCEDURE — 81001 URINALYSIS AUTO W/SCOPE: CPT | Performed by: STUDENT IN AN ORGANIZED HEALTH CARE EDUCATION/TRAINING PROGRAM

## 2024-10-17 PROCEDURE — 96374 THER/PROPH/DIAG INJ IV PUSH: CPT

## 2024-10-17 PROCEDURE — G0378 HOSPITAL OBSERVATION PER HR: HCPCS

## 2024-10-17 PROCEDURE — 85025 COMPLETE CBC W/AUTO DIFF WBC: CPT

## 2024-10-17 PROCEDURE — 83605 ASSAY OF LACTIC ACID: CPT

## 2024-10-17 PROCEDURE — 84300 ASSAY OF URINE SODIUM: CPT

## 2024-10-17 PROCEDURE — 83735 ASSAY OF MAGNESIUM: CPT

## 2024-10-17 PROCEDURE — 93306 TTE W/DOPPLER COMPLETE: CPT

## 2024-10-17 PROCEDURE — 93306 TTE W/DOPPLER COMPLETE: CPT | Performed by: STUDENT IN AN ORGANIZED HEALTH CARE EDUCATION/TRAINING PROGRAM

## 2024-10-17 PROCEDURE — 82607 VITAMIN B-12: CPT

## 2024-10-17 PROCEDURE — 84540 ASSAY OF URINE/UREA-N: CPT

## 2024-10-17 PROCEDURE — 82947 ASSAY GLUCOSE BLOOD QUANT: CPT

## 2024-10-17 PROCEDURE — 80053 COMPREHEN METABOLIC PANEL: CPT

## 2024-10-17 PROCEDURE — 84145 PROCALCITONIN (PCT): CPT

## 2024-10-17 PROCEDURE — 76770 US EXAM ABDO BACK WALL COMP: CPT

## 2024-10-17 PROCEDURE — 2500000005 HC RX 250 GENERAL PHARMACY W/O HCPCS: Mod: SE

## 2024-10-17 PROCEDURE — 82435 ASSAY OF BLOOD CHLORIDE: CPT

## 2024-10-17 PROCEDURE — 36415 COLL VENOUS BLD VENIPUNCTURE: CPT

## 2024-10-17 PROCEDURE — 87899 AGENT NOS ASSAY W/OPTIC: CPT

## 2024-10-17 PROCEDURE — 82010 KETONE BODYS QUAN: CPT

## 2024-10-17 PROCEDURE — 1200000002 HC GENERAL ROOM WITH TELEMETRY DAILY

## 2024-10-17 PROCEDURE — 82436 ASSAY OF URINE CHLORIDE: CPT

## 2024-10-17 PROCEDURE — 86140 C-REACTIVE PROTEIN: CPT

## 2024-10-17 PROCEDURE — 84484 ASSAY OF TROPONIN QUANT: CPT

## 2024-10-17 PROCEDURE — 70450 CT HEAD/BRAIN W/O DYE: CPT | Performed by: RADIOLOGY

## 2024-10-17 PROCEDURE — 87389 HIV-1 AG W/HIV-1&-2 AB AG IA: CPT

## 2024-10-17 PROCEDURE — 2500000004 HC RX 250 GENERAL PHARMACY W/ HCPCS (ALT 636 FOR OP/ED)

## 2024-10-17 PROCEDURE — 99223 1ST HOSP IP/OBS HIGH 75: CPT

## 2024-10-17 PROCEDURE — 84132 ASSAY OF SERUM POTASSIUM: CPT

## 2024-10-17 PROCEDURE — 2500000001 HC RX 250 WO HCPCS SELF ADMINISTERED DRUGS (ALT 637 FOR MEDICARE OP): Mod: SE

## 2024-10-17 PROCEDURE — 71045 X-RAY EXAM CHEST 1 VIEW: CPT

## 2024-10-17 PROCEDURE — 84439 ASSAY OF FREE THYROXINE: CPT

## 2024-10-17 PROCEDURE — 2500000002 HC RX 250 W HCPCS SELF ADMINISTERED DRUGS (ALT 637 FOR MEDICARE OP, ALT 636 FOR OP/ED): Mod: SE

## 2024-10-17 PROCEDURE — 2500000004 HC RX 250 GENERAL PHARMACY W/ HCPCS (ALT 636 FOR OP/ED): Mod: SE

## 2024-10-17 PROCEDURE — 84133 ASSAY OF URINE POTASSIUM: CPT

## 2024-10-17 PROCEDURE — 82248 BILIRUBIN DIRECT: CPT

## 2024-10-17 PROCEDURE — 82306 VITAMIN D 25 HYDROXY: CPT | Performed by: NURSE PRACTITIONER

## 2024-10-17 PROCEDURE — 76770 US EXAM ABDO BACK WALL COMP: CPT | Performed by: RADIOLOGY

## 2024-10-17 PROCEDURE — 84443 ASSAY THYROID STIM HORMONE: CPT

## 2024-10-17 PROCEDURE — 82533 TOTAL CORTISOL: CPT

## 2024-10-17 PROCEDURE — 84443 ASSAY THYROID STIM HORMONE: CPT | Performed by: NURSE PRACTITIONER

## 2024-10-17 PROCEDURE — XXE2X19 MEASUREMENT OF CARDIAC OUTPUT, COMPUTER-AIDED ASSESSMENT, NEW TECHNOLOGY GROUP 9: ICD-10-PCS | Performed by: INTERNAL MEDICINE

## 2024-10-17 PROCEDURE — 70450 CT HEAD/BRAIN W/O DYE: CPT

## 2024-10-17 PROCEDURE — 84100 ASSAY OF PHOSPHORUS: CPT

## 2024-10-17 PROCEDURE — 80307 DRUG TEST PRSMV CHEM ANLYZR: CPT

## 2024-10-17 PROCEDURE — 85018 HEMOGLOBIN: CPT

## 2024-10-17 PROCEDURE — 2500000002 HC RX 250 W HCPCS SELF ADMINISTERED DRUGS (ALT 637 FOR MEDICARE OP, ALT 636 FOR OP/ED): Mod: SE | Performed by: NURSE PRACTITIONER

## 2024-10-17 PROCEDURE — 96361 HYDRATE IV INFUSION ADD-ON: CPT

## 2024-10-17 PROCEDURE — 86780 TREPONEMA PALLIDUM: CPT

## 2024-10-17 PROCEDURE — 71045 X-RAY EXAM CHEST 1 VIEW: CPT | Performed by: SURGERY

## 2024-10-17 PROCEDURE — 82140 ASSAY OF AMMONIA: CPT

## 2024-10-17 PROCEDURE — 99233 SBSQ HOSP IP/OBS HIGH 50: CPT | Performed by: NURSE PRACTITIONER

## 2024-10-17 RX ORDER — NAPROXEN SODIUM 220 MG/1
81 TABLET, FILM COATED ORAL DAILY
Status: DISCONTINUED | OUTPATIENT
Start: 2024-10-17 | End: 2024-10-25 | Stop reason: HOSPADM

## 2024-10-17 RX ORDER — INSULIN LISPRO 100 [IU]/ML
5 INJECTION, SOLUTION INTRAVENOUS; SUBCUTANEOUS
Status: DISCONTINUED | OUTPATIENT
Start: 2024-10-17 | End: 2024-10-18

## 2024-10-17 RX ORDER — SERTRALINE HYDROCHLORIDE 50 MG/1
100 TABLET, FILM COATED ORAL DAILY
Status: DISCONTINUED | OUTPATIENT
Start: 2024-10-18 | End: 2024-10-25 | Stop reason: HOSPADM

## 2024-10-17 RX ORDER — SPIRONOLACTONE 25 MG/1
12.5 TABLET ORAL DAILY
Status: DISCONTINUED | OUTPATIENT
Start: 2024-10-17 | End: 2024-10-19

## 2024-10-17 RX ORDER — INSULIN LISPRO 100 [IU]/ML
0-10 INJECTION, SOLUTION INTRAVENOUS; SUBCUTANEOUS
Status: DISCONTINUED | OUTPATIENT
Start: 2024-10-17 | End: 2024-10-17

## 2024-10-17 RX ORDER — HYDRALAZINE HYDROCHLORIDE 10 MG/1
10 TABLET, FILM COATED ORAL 3 TIMES DAILY
Status: DISCONTINUED | OUTPATIENT
Start: 2024-10-17 | End: 2024-10-17

## 2024-10-17 RX ORDER — DEXTROSE 50 % IN WATER (D50W) INTRAVENOUS SYRINGE
25
Status: DISCONTINUED | OUTPATIENT
Start: 2024-10-17 | End: 2024-10-25 | Stop reason: HOSPADM

## 2024-10-17 RX ORDER — TORSEMIDE 20 MG/1
40 TABLET ORAL DAILY
Status: DISCONTINUED | OUTPATIENT
Start: 2024-10-17 | End: 2024-10-19

## 2024-10-17 RX ORDER — POLYETHYLENE GLYCOL 3350 17 G/17G
17 POWDER, FOR SOLUTION ORAL DAILY
Status: DISCONTINUED | OUTPATIENT
Start: 2024-10-17 | End: 2024-10-17

## 2024-10-17 RX ORDER — INSULIN LISPRO 100 [IU]/ML
0-10 INJECTION, SOLUTION INTRAVENOUS; SUBCUTANEOUS EVERY 4 HOURS
Status: DISCONTINUED | OUTPATIENT
Start: 2024-10-17 | End: 2024-10-17

## 2024-10-17 RX ORDER — DEXTROSE 50 % IN WATER (D50W) INTRAVENOUS SYRINGE
Status: COMPLETED
Start: 2024-10-17 | End: 2024-10-17

## 2024-10-17 RX ORDER — SERTRALINE HYDROCHLORIDE 50 MG/1
100 TABLET, FILM COATED ORAL DAILY
Status: DISCONTINUED | OUTPATIENT
Start: 2024-10-17 | End: 2024-10-17

## 2024-10-17 RX ORDER — ATORVASTATIN CALCIUM 40 MG/1
40 TABLET, FILM COATED ORAL NIGHTLY
Status: DISCONTINUED | OUTPATIENT
Start: 2024-10-17 | End: 2024-10-25 | Stop reason: HOSPADM

## 2024-10-17 RX ORDER — CARVEDILOL 3.12 MG/1
3.12 TABLET ORAL 2 TIMES DAILY
Status: DISCONTINUED | OUTPATIENT
Start: 2024-10-17 | End: 2024-10-19

## 2024-10-17 RX ORDER — INSULIN LISPRO 100 [IU]/ML
0-5 INJECTION, SOLUTION INTRAVENOUS; SUBCUTANEOUS
Status: DISCONTINUED | OUTPATIENT
Start: 2024-10-17 | End: 2024-10-17

## 2024-10-17 RX ORDER — INSULIN LISPRO 100 [IU]/ML
0-5 INJECTION, SOLUTION INTRAVENOUS; SUBCUTANEOUS EVERY 4 HOURS
Status: DISCONTINUED | OUTPATIENT
Start: 2024-10-17 | End: 2024-10-18

## 2024-10-17 RX ORDER — AMIODARONE HYDROCHLORIDE 200 MG/1
200 TABLET ORAL DAILY
Status: DISCONTINUED | OUTPATIENT
Start: 2024-10-17 | End: 2024-10-25 | Stop reason: HOSPADM

## 2024-10-17 RX ORDER — POTASSIUM CHLORIDE 20 MEQ/1
40 TABLET, EXTENDED RELEASE ORAL ONCE
Status: COMPLETED | OUTPATIENT
Start: 2024-10-17 | End: 2024-10-17

## 2024-10-17 RX ORDER — INSULIN GLARGINE 100 [IU]/ML
15 INJECTION, SOLUTION SUBCUTANEOUS EVERY 24 HOURS
Status: DISCONTINUED | OUTPATIENT
Start: 2024-10-17 | End: 2024-10-19

## 2024-10-17 RX ORDER — DEXTROSE 50 % IN WATER (D50W) INTRAVENOUS SYRINGE
25 ONCE
Status: COMPLETED | OUTPATIENT
Start: 2024-10-17 | End: 2024-10-17

## 2024-10-17 RX ORDER — FUROSEMIDE 10 MG/ML
40 INJECTION INTRAMUSCULAR; INTRAVENOUS ONCE
Status: DISCONTINUED | OUTPATIENT
Start: 2024-10-17 | End: 2024-10-17

## 2024-10-17 RX ORDER — DEXTROSE 50 % IN WATER (D50W) INTRAVENOUS SYRINGE
12.5
Status: DISCONTINUED | OUTPATIENT
Start: 2024-10-17 | End: 2024-10-25 | Stop reason: HOSPADM

## 2024-10-17 RX ADMIN — SERTRALINE 100 MG: 50 TABLET, FILM COATED ORAL at 09:47

## 2024-10-17 RX ADMIN — INSULIN LISPRO 5 UNITS: 100 INJECTION, SOLUTION INTRAVENOUS; SUBCUTANEOUS at 16:14

## 2024-10-17 RX ADMIN — DEXTROSE MONOHYDRATE 25 G: 25 INJECTION, SOLUTION INTRAVENOUS at 00:38

## 2024-10-17 RX ADMIN — SODIUM CHLORIDE, POTASSIUM CHLORIDE, SODIUM LACTATE AND CALCIUM CHLORIDE 500 ML: 600; 310; 30; 20 INJECTION, SOLUTION INTRAVENOUS at 04:15

## 2024-10-17 RX ADMIN — POTASSIUM CHLORIDE 40 MEQ: 1500 TABLET, EXTENDED RELEASE ORAL at 05:37

## 2024-10-17 RX ADMIN — PERFLUTREN 3 ML OF DILUTION: 6.52 INJECTION, SUSPENSION INTRAVENOUS at 11:31

## 2024-10-17 RX ADMIN — SPIRONOLACTONE 12.5 MG: 25 TABLET, FILM COATED ORAL at 14:54

## 2024-10-17 RX ADMIN — EMPAGLIFLOZIN 10 MG: 10 TABLET, FILM COATED ORAL at 14:54

## 2024-10-17 RX ADMIN — INSULIN LISPRO 4 UNITS: 100 INJECTION, SOLUTION INTRAVENOUS; SUBCUTANEOUS at 20:03

## 2024-10-17 RX ADMIN — DEXTROSE 50 % IN WATER (D50W) INTRAVENOUS SYRINGE 25 G: at 00:38

## 2024-10-17 RX ADMIN — ASPIRIN 81 MG 81 MG: 81 TABLET ORAL at 09:47

## 2024-10-17 RX ADMIN — INSULIN GLARGINE 15 UNITS: 100 INJECTION, SOLUTION SUBCUTANEOUS at 16:14

## 2024-10-17 RX ADMIN — TORSEMIDE 40 MG: 20 TABLET ORAL at 14:54

## 2024-10-17 RX ADMIN — CARVEDILOL 3.12 MG: 3.12 TABLET, FILM COATED ORAL at 20:02

## 2024-10-17 RX ADMIN — SACUBITRIL AND VALSARTAN 1 TABLET: 24; 26 TABLET, FILM COATED ORAL at 20:02

## 2024-10-17 RX ADMIN — ATORVASTATIN CALCIUM 40 MG: 40 TABLET, FILM COATED ORAL at 20:02

## 2024-10-17 RX ADMIN — INSULIN LISPRO 5 UNITS: 100 INJECTION, SOLUTION INTRAVENOUS; SUBCUTANEOUS at 16:15

## 2024-10-17 SDOH — SOCIAL STABILITY: SOCIAL INSECURITY: ARE THERE ANY APPARENT SIGNS OF INJURIES/BEHAVIORS THAT COULD BE RELATED TO ABUSE/NEGLECT?: NO

## 2024-10-17 SDOH — SOCIAL STABILITY: SOCIAL INSECURITY: HAVE YOU HAD THOUGHTS OF HARMING ANYONE ELSE?: NO

## 2024-10-17 SDOH — SOCIAL STABILITY: SOCIAL INSECURITY: DO YOU FEEL UNSAFE GOING BACK TO THE PLACE WHERE YOU ARE LIVING?: NO

## 2024-10-17 SDOH — SOCIAL STABILITY: SOCIAL INSECURITY: HAS ANYONE EVER THREATENED TO HURT YOUR FAMILY OR YOUR PETS?: NO

## 2024-10-17 SDOH — HEALTH STABILITY: MENTAL HEALTH: EXPERIENCED ANY OF THE FOLLOWING LIFE EVENTS: DEATH OF FAMILY/FRIEND

## 2024-10-17 SDOH — SOCIAL STABILITY: SOCIAL INSECURITY: POSSIBLE ABUSE REPORTED TO:: OTHER (COMMENT)

## 2024-10-17 SDOH — SOCIAL STABILITY: SOCIAL INSECURITY: DOES ANYONE TRY TO KEEP YOU FROM HAVING/CONTACTING OTHER FRIENDS OR DOING THINGS OUTSIDE YOUR HOME?: NO

## 2024-10-17 SDOH — SOCIAL STABILITY: SOCIAL INSECURITY: HAVE YOU HAD ANY THOUGHTS OF HARMING ANYONE ELSE?: NO

## 2024-10-17 SDOH — SOCIAL STABILITY: SOCIAL INSECURITY: DO YOU FEEL ANYONE HAS EXPLOITED OR TAKEN ADVANTAGE OF YOU FINANCIALLY OR OF YOUR PERSONAL PROPERTY?: NO

## 2024-10-17 SDOH — SOCIAL STABILITY: SOCIAL INSECURITY: ARE YOU OR HAVE YOU BEEN THREATENED OR ABUSED PHYSICALLY, EMOTIONALLY, OR SEXUALLY BY ANYONE?: NO

## 2024-10-17 SDOH — SOCIAL STABILITY: SOCIAL INSECURITY: ABUSE: ADULT

## 2024-10-17 SDOH — SOCIAL STABILITY: SOCIAL INSECURITY: WERE YOU ABLE TO COMPLETE ALL THE BEHAVIORAL HEALTH SCREENINGS?: YES

## 2024-10-17 ASSESSMENT — ACTIVITIES OF DAILY LIVING (ADL)
DRESSING YOURSELF: INDEPENDENT
HEARING - RIGHT EAR: FUNCTIONAL
WALKS IN HOME: INDEPENDENT
ADEQUATE_TO_COMPLETE_ADL: UNABLE TO ASSESS
JUDGMENT_ADEQUATE_SAFELY_COMPLETE_DAILY_ACTIVITIES: YES
GROOMING: INDEPENDENT
FEEDING YOURSELF: INDEPENDENT
TOILETING: INDEPENDENT
BATHING: INDEPENDENT
PATIENT'S MEMORY ADEQUATE TO SAFELY COMPLETE DAILY ACTIVITIES?: YES
HEARING - LEFT EAR: FUNCTIONAL

## 2024-10-17 ASSESSMENT — ENCOUNTER SYMPTOMS
SPEECH DIFFICULTY: 0
NAUSEA: 0
POLYPHAGIA: 0
DYSURIA: 0
SORE THROAT: 0
SHORTNESS OF BREATH: 1
FREQUENCY: 0
CHILLS: 0
ABDOMINAL PAIN: 0
APPETITE CHANGE: 0
DIAPHORESIS: 0
ACTIVITY CHANGE: 1
FEVER: 0
EYES NEGATIVE: 1
DIARRHEA: 0
NUMBNESS: 0
UNEXPECTED WEIGHT CHANGE: 0
LIGHT-HEADEDNESS: 1
FATIGUE: 1
DIZZINESS: 1
SHORTNESS OF BREATH: 0
AGITATION: 0
DIAPHORESIS: 1
CHEST TIGHTNESS: 0
JOINT SWELLING: 0
PALPITATIONS: 0
HEADACHES: 0
POLYDIPSIA: 0
COUGH: 0
CONFUSION: 0
FATIGUE: 0
ABDOMINAL DISTENTION: 0

## 2024-10-17 ASSESSMENT — COGNITIVE AND FUNCTIONAL STATUS - GENERAL
PATIENT BASELINE BEDBOUND: NO
DAILY ACTIVITIY SCORE: 24
MOBILITY SCORE: 24
MOBILITY SCORE: 24
DAILY ACTIVITIY SCORE: 24

## 2024-10-17 ASSESSMENT — LIFESTYLE VARIABLES
SKIP TO QUESTIONS 9-10: 1
PRESCIPTION_ABUSE_PAST_12_MONTHS: NO
SUBSTANCE_ABUSE_PAST_12_MONTHS: YES
AUDIT-C TOTAL SCORE: 0
HOW OFTEN DO YOU HAVE 6 OR MORE DRINKS ON ONE OCCASION: NEVER
HOW MANY STANDARD DRINKS CONTAINING ALCOHOL DO YOU HAVE ON A TYPICAL DAY: PATIENT DOES NOT DRINK
AUDIT-C TOTAL SCORE: 0
HOW OFTEN DO YOU HAVE A DRINK CONTAINING ALCOHOL: NEVER

## 2024-10-17 ASSESSMENT — PAIN SCALES - GENERAL
PAINLEVEL_OUTOF10: 0 - NO PAIN
PAINLEVEL_OUTOF10: 0 - NO PAIN

## 2024-10-17 ASSESSMENT — PATIENT HEALTH QUESTIONNAIRE - PHQ9
1. LITTLE INTEREST OR PLEASURE IN DOING THINGS: NOT AT ALL
SUM OF ALL RESPONSES TO PHQ9 QUESTIONS 1 & 2: 0
2. FEELING DOWN, DEPRESSED OR HOPELESS: NOT AT ALL

## 2024-10-17 NOTE — PROGRESS NOTES
Emergency Department Transition of Care Note       Signout   I received Leonel Yu in signout from Dr. Rey Galo.  Please see the ED Provider Note for all HPI, PE and MDM up to the time of signout at 21:00.  This is in addition to the primary record.    In brief Leonel Yu is an 54 y.o. male presenting for lethargy in the setting of CHF with EF of 10%.  He continues to endorse weakness and blurry vision with SOB and dyspnea.    At the time of signout we were awaiting:  Chest XR  Lactate    ED Course & Medical Decision Making   Medical Decision Making:  Under my care, Mr. Yu voiced feeling his blood sugar was low - immediately did a glucose check which was 36. He was given dextrose and gatorade with follow up glucose checks reassuring.  Due to the instability of his vital signs where he becomes bradycardic and hypotensive and his inability to participate in any exertion or lay flat, he voiced willingness and desire to be admitted to the hospital. At his recent other ED visit, he left AMA due to not being able to eat before admission was complete. He is weak and light headed, but able to reason through decision making processes, describing the risks and benefits of medical decisions. POCUS showed that he was more fluid down. His CXR showed that he has pulmonary vascular congestion without je interstitual edema.  Due to these findings, gave another 500ml LR bolus slowly to address being volume down with minimizing likelihood of rebound fluid overload.  On physical exam, he has unchanged 1+ LE edema b/l and no clear lung sounds b/l, suggesting he is not fluid overloaded at time of additional LR 500ml administration.     ED Course:  ED Course as of 10/17/24 0356   Wed Oct 16, 2024   1882 Attending summary:  55 y/o M with significant cardiovascular hx including HFrEF 5-10% with ICD presenting after leaving AMA earlier this afternoon with continued generalized weakness. Left bc he couldn't eat. Friend  picked him up and convinced him to come back. No other new symptoms, no ingestions. Vitals show hypotension to 80s/60s (MAP > 65), otherwise unremarkable. Exam shows drowsy with facial diaphoresis, in no resp distress with clear lungs, soft nontender abd, no LE edema. Oriented x4, no focal neuro deficits.    While I was evaluating pt, repeated BP and it was 90s/60s. This is what it was this afternoon. However, concern for cardiogenic v septic shock. Will not give fluids as EF 5-10%, MAPs > 65. Will get broad labs including cultures and UA, CXR, EKG. Will admit again.      [SS]   6332 Patient will be signed out to the night resident [DS]      ED Course User Index  [DS] Rey Galo MD  [SS] Lily Lyles MD         Diagnoses as of 10/17/24 0356   Shortness of breath at rest   Acute on chronic combined systolic and diastolic heart failure       Disposition   Patient was admitted to Cardiology for further management of chronic issues.    Procedures   Procedures - POCUS of vena cava for fluid status & cardiopulmonary assessment    Patient seen and discussed with ED attending physician. Dr. Ximena Gómez MD PGY-1  Emergency Medicine

## 2024-10-17 NOTE — ED PROCEDURE NOTE
Procedure    Performed by: Donal Joy DO  Authorized by: Lily Lyles MD  Cardiac Indications: hypotension                Procedure: Cardiac Ultrasound    Findings:   Views: parasternal long, parasternal short and apical four  The pericardial space was visualized and was NEGATIVE for a significant pericardial effusion.  Activity: Ventricular contractions were visualized.  LV: LV systolic function was DECREASED.  RV: RV size was NORMAL.    Impression:  Cardiac: The focused cardiac ultrasound exam had ABNORMAL findings as specified.    Comments: Significantly decreased EF with global hypokinesis that is slightly worse in the anterior aspect and is consistent with the previous TTE from 7/31/2024.  IVC is collapsible.               Donal Joy DO  Resident  10/17/24 0007

## 2024-10-17 NOTE — H&P
History Of Present Illness  Leonel Yu is a 54 y.o. male withPMHx of HFrEF (5-10%) s/p ICD, CKD, substance use, medication nonadherence presenting with weakness/lethargy/visionchanges x2 days, vision changes. no chest pain. Difficulty breathing when laying flat, has been sleeping in chair. Passed out 3 weeks ago   Adherent to medications, except for diuretics. Last diuretic dose 3 days ago. Last used crack cocaine 3 days ago.     He was admitted 10/15 for the above complaints.  He was noted to have BNP of 2388.  He received his home gdmt. Coreg 3.125, empagliflozin 10 mg, torsemide 40.  He was also given Lasix 40 IV twice.  He reported improved symptoms with these medications. He was also given prednisone 60 mg once.  He was given lispro 10 units for hyperglycemia in the 400s.    Represented 10/16 for similar symptoms - generalized weakness. His friend had convinced him to come back to hospital. He was noted to be drowsy and have facial diaphoresis. BP 80/60s, hypoglycemia to 31. Ultimately infectious workup began. POCUS showed collapsible IVC, and 500 ml fluids were given.     Troponin 69 -->52  BNP 2388 10/15 --> 479 on 10/16  POC :glucose 31 on admit (10/15 Elevated -500s)  Na 138/ K 3.4 / Cl 98/ Bicarb  26 / BUN 46/ Cr 2.39 (bl 1.3 to 1.5) / glucose 67  CBC: WBC 12.7 (decreasing from 14.3),  hgb 21.2 (bl 16-17), Plt 169   VBG pH 7.34/ pCO2 45 / Lactate 2.2 --> 2.3  Acute tox panel: negative  10/15 UDS: cocaine +   Blood cultures collected  UA collected  EKG: sinus jessy, Left axis deviation, LVH  CXR:  Cardiomegaly and pulmonary vascular congestion suggesting volume overload without je interstitial edema. Similar appearance of bullous changes in the left upper lung. Appears improved from 10/15 CXR  POCUS: severely reduced EF, IVC was collapsible   CT head: ordered         Past Medical History  He has a past medical history of CHF (congestive heart failure) (Multi), Chronic kidney disease, COPD  "(chronic obstructive pulmonary disease) (Multi), Diabetes mellitus (Multi), Hyperlipidemia, Hypertension, Stroke (Multi), and Substance abuse (Multi).  HFrEF EF 5-10% (7/2024) s/p [employer] ICD 3/2024 c/b VT on amiodarone  COPD on 2L at night   HTN  HLD  DM2  CKD 3a  Remote cerebellar Hemorrhagic infarct  Crack cocaine use  Tobacco use  Medication nonadherence  Anxiety/depression     Surgical History  He has a past surgical history that includes Insert / replace / remove pacemaker.     Social History  He reports that he has quit smoking. His smoking use included cigarettes. He has never used smokeless tobacco. He reports current drug use. Drug: \"Crack\" cocaine. He reports that he does not drink alcohol.    Family History  Family History   Problem Relation Name Age of Onset    Heart disease Father          Allergies  Patient has no known allergies.    Outpatient Meds  - reports that he can't remember to take the second dose of BID meds   Current Outpatient Medications   Medication Instructions    amiodarone (PACERONE) 200 mg, oral, Daily    aspirin 81 mg, oral, Daily RT    atorvastatin (LIPITOR) 80 mg, oral, Nightly    blood sugar diagnostic strip Use as directed to test blood glucose up to four times daily and as needed.    blood-glucose meter misc 1 each, subcutaneous, 4 times daily with meals and nightly, Check blood glucose 4 times daily, before meals and at bedtime.    carvedilol (COREG) 3.125 mg, oral, 2 times daily    empagliflozin (Jardiance) 10 mg TAKE 1 TABLET BY MOUTH ONCE DAILY    glucose 8 g, oral, As needed    insulin lispro (HUMALOG) reports that he checks blood sugar once in AM, often only requires a 2-3 units 0-10 Units, subcutaneous, 3 times daily (morning, midday, late afternoon), Hypoglycemia protocol if Blood Glucose is between 0 - 70 mg/dL, 0 unit(s) if , 2 unit(s) if 151-200, 4 unit(s) if 201-250, 6 unit(s) if 251-300, 8 unit(s) if 301-350, 10 unit(s) if 351-400. Notify provider unit(s) if " "Blood Glucose is greater than 400 mg/dL    lancets 33 gauge misc 1 each, subcutaneous, 4 times daily    Lantus Solostar U-100 Insulin (not using at home) 30 Units, subcutaneous, Nightly    pen needle 1/2\" 29G X 12mm needle Use to inject 1-4 times daily as directed.    sacubitriL-valsartan (Entresto) 24-26 mg tablet 1 tablet, oral, 2 times daily    sertraline (ZOLOFT) 100 mg, oral, Daily    spironolactone (ALDACTONE) 12.5 mg, oral, Daily    tiotropium (Spiriva Respimat) 2.5 mcg/actuation inhaler 2 puffs, inhalation, Daily    torsemide (DEMADEX) - reports hasn't taken for last 3 days due to feeling unwell and not wnting to have to urinate 40 mg, oral, Daily        Review of Systems   Constitutional:  Positive for activity change and diaphoresis. Negative for chills, fatigue and fever.   Eyes:  Positive for visual disturbance (blurry, need to squint).   Respiratory:  Positive for shortness of breath (when lying flat).    Cardiovascular:  Negative for chest pain, palpitations and leg swelling.   Gastrointestinal:  Negative for abdominal distention and abdominal pain.   Neurological:  Positive for dizziness and light-headedness. Negative for headaches.        Physical Exam  Constitutional:       General: He is not in acute distress.     Appearance: He is not toxic-appearing or diaphoretic.      Comments: Tired, falling asleep multiple times during conversation    HENT:      Nose: No congestion or rhinorrhea.      Mouth/Throat:      Pharynx: No oropharyngeal exudate or posterior oropharyngeal erythema.   Eyes:      General: No scleral icterus.     Comments: Able to count fingers at about 1-2 feet.   Peripheral vision appears to be intact to exam.    Cardiovascular:      Rate and Rhythm: Regular rhythm. Bradycardia present.   Pulmonary:      Effort: Pulmonary effort is normal. No respiratory distress.      Breath sounds: No wheezing or rales.      Comments: NC  Chest:      Chest wall: No tenderness.   Abdominal:      " "General: Abdomen is flat. There is no distension.      Palpations: Abdomen is soft.      Tenderness: There is no abdominal tenderness.   Musculoskeletal:      Right lower leg: No edema.      Left lower leg: No edema.   Skin:     General: Skin is warm.      Capillary Refill: Capillary refill takes less than 2 seconds.      Comments: Not cold in upper or extremiteis   Neurological:      General: No focal deficit present.      Cranial Nerves: No cranial nerve deficit.      Comments: AAOx3  CN intact  Strength intact in all upper and lower extremities            Last Recorded Vitals  /80 (BP Location: Left arm, Patient Position: Lying)   Pulse 55   Temp 36.4 °C (97.6 °F) (Temporal)   Resp 16   Wt 77.1 kg (170 lb)   SpO2 98%     Relevant Results  Results from last 7 days   Lab Units 10/16/24  2238 10/16/24  1650 10/15/24  1742   WBC AUTO x10*3/uL 12.7* 14.3* 8.4   HEMOGLOBIN g/dL 21.2* 19.6* 20.5*   HEMATOCRIT % 63.8* 57.4* 60.1*   PLATELETS AUTO x10*3/uL 169 181 183     Results from last 7 days   Lab Units 10/16/24  2238 10/16/24  1650 10/15/24  1742 10/15/24  1147 10/15/24  1032   SODIUM mmol/L 138 131* 132*  --  133*   POTASSIUM mmol/L 3.4* 4.5 5.3   < > 6.2*   CO2 mmol/L 26 20* 24  --  25   ANION GAP mmol/L 17 19 15  --  16   BUN mg/dL 46* 44* 32*  --  26*   CREATININE mg/dL 2.39* 2.41* 1.78*  --  1.65*   GLUCOSE mg/dL 67* 478* 419*  --  344*   EGFR mL/min/1.73m*2 31* 31* 44*  --  49*   MAGNESIUM mg/dL  --  2.73* 2.36  --  2.29   PHOSPHORUS mg/dL  --  6.2* 4.5  --   --     < > = values in this interval not displayed.      Results from last 7 days   Lab Units 10/16/24  2238 10/15/24  1032   ALT U/L 36 50   AST U/L 19 42*   ALK PHOS U/L 91 93            No lab exists for component: \"PT\"  Results from last 7 days   Lab Units 10/16/24  2243 10/16/24  1650   FIO2 % 21 36     Results from last 7 days   Lab Units 10/16/24  2243 10/16/24  1650 10/15/24  1042   POCT PH, VENOUS pH 7.34 7.32* 7.38   POCT PCO2, VENOUS " mm Hg 45 50 37*     Results from last 7 days   Lab Units 10/17/24  0110 10/16/24  1650   LACTATE mmol/L 2.3* 2.2*      Latest Reference Range & Units 10/16/24 22:38 10/17/24 00:27   Troponin I, High Sensitivity (CMC) 0 - 53 ng/L 69 (H) 52   (H): Data is abnormally high             Assessment/Plan   Assessment & Plan  Shortness of breath at rest      54 y.o. male with a hx mixed ischemic/toxic mediated HFrEF (EF5-10%, fibrosis and transmural infarcts seen on cMRI 8/2024) s/p [employer] single chamber ICD (3/2024), CKD3a, HTN, HLD, LUZ (crack cocaine), former tobacco use, COPD (nocturnal 2L O2 at baseline), DM2, remote left cerebellar hemorrhagic infarct, medication noncompliance, anxiety, and depression. Admitted for acute decompensated heart failure in setting of medication non compliance and drowsiness on 10/15.  He was diuresed effectively with Lasix 40 IV twice and torsemide 40 mg oral once.  He also received prednisone 60 mg initially.  His blood sugars were very high afterwards, for which she received insulin.  Potentially, patient may have had GUICHO following diuresis with insulin in the system.  this may have led him to feel hypoglycemic when he returned to the ED after leaving AMA.  Nonetheless we will rule out infectious causes with blood cultures and urine cultures.  Will also rule out structural causes with CT head.  His collapsible IVC would suggest that he was dehydrated and he he received 500 mL.  Plan to resume diet, cautiously restart GDMT.  Will pursue GUICHO workup.  Will defer diuresis until etiology of GUICHO is clarified.     #lethargy   #vision disturbance  -Reports that vision disturbance commonly happens when he is hypoglycemic, however vision disturbance for persistent when his glucose was low.  No acidosis, no anion gap  -Reports lethargy over the last 3 days  Plan:   -CT head  -Follow blood cultures, urine cultures, Pro-Dax    #GUICHO on CKD 3a  - ddx: overdiuresis, dehydration, cardiorenal  -  appeared to have worsened after diuresis  - s/p 500 mL bolus in ED 10/16  - follow FE urea, urine lytes  - follow renal US  - strict Is and Os      #Acute decompensated mixed ischemic/nonischemic HFrEF 5-10%  s/p single chamber ICD 3/2024  #elevated Lactate - 2.3  - ECHO 7/31:  EF 10%, gHK of LV, LV severely dilated, severe eccentric LVH, severely enlarged RV with reduced systolic fxn, severe LAE, mod to sev VIVI, mild to mod TR, RVSP 39 mildly elevated  - Cardiac MRI 8/5: ischemic/nonischemic.  Severely dilated LV EF 5-10%. Basal inferoseptal, mid inferoseptal, apical septal, apicolateral segments akinetic. Eccentric LVH. Transmural infarction of mid/apical inferoseptal/inferior segments and apicolateral wall (<25% wall thickeness). NICM pattern of mid wall septal fibrosis.  - EKG 10/15: SR 85, left axis dev, LVH, IVCD/LBBB. No acute ischemic changes as compared to prior 10/5  - Transmural infarcts on cardiac MRI. Coronary Calcifications on CT chest  - CXR 9/30 with vascular congestion and cardiomegaly  - BNP 2833 (>5K)  - Admit Wt 77.1 kg   - last discharge weight 74.4kg 10/5  - at home, reports he has difficulty taking second dose of BID GDMT meds  - GDMT: Carvedilol 3.125.,Entresto 24-26 BID, Empagliflozin 10m daily, christiano 12.5 daily, torsemide 40   - last admit diuresed with 80mg IV daily and transitioned to 40mg PO torsemide daily   - initially got torsemide 40 and lasix 40 IV x2 during first 2 days of admission, upon representation to ED got 500 ml bolus for collapsible   Plan:   - hold coreg, entresto and Spironolactone and empa due to lower BP   - Lower Bps likley related to hypovolemia  - would resume home GDMT based on trends of BP, appears to have improved with 500 mL bolus  - trend lactate to normalization   - Secondary vascular prevention with aspirin and statin.       Ventriculary Tachycardia/ VF   Hx of possible AF RVR   - Device interrogation 10/1:   - Last shock 9/5 appropriate for VT ( last  "admission).   - 12yr battery life.   - Lead fxn WNL.   -  <1%.   - Patient without any shocks or tachyarrhythmias since amiodarone loading   - For the epsiode  9/5 prior EP consult noted:\" Possible dual tachycardia with PAF  RVR and concurrent VT/VF in the setting of active cocaine use.\" EP did not feel right atrial lead placement  deemed appropriate in light of ongoing cocaine use and increased risk of infection.NO OAC recommended.  - HOLD coreg 3.125mg BID, as above  - CONTINUE amiodorane 200 daily      COPD  Former tobacco user  - 2L at night as baseline  - sometimes forgets to wear      Substance use disorder, cocaine  - Cessation advised  - last cocaine use 3 days ago      Type II Diabetes  - BS on admit: 300-400s  - s/p prednisone 60 mg 10/15  - HOLDING Insulin glargine 30unit nightly  - SSI #2 while here  - carb controlled 90 diet      Mood disorder, unspecified  - zoloft 100mg daily    #code status  - last admit: Palliative care consult requested given advanced HF with numerous re-admits and overall poor prognosis since ongoing substance use precludes him from advanced HF therapy candidacy  - now DNAR  - reccommended psych consult for depression/suicidal ideations, but patient is not interested at this time   - Goals are mix of survival and time and improved quality of life    F: suspect hypovolemic, but caution due to low EF  E: K>4, Mag> 2  N: cardiac carb controlled 90   A: pIV       D\VT ppx: subcutaneous heparin  GI ppx: not indicated    Code Status: DNR       Arias Weaver MD    "

## 2024-10-17 NOTE — NURSING NOTE
Mr. Yu is off the unit for echo procedure per unit clerk.  Will return later today for diabetes ed visit.

## 2024-10-17 NOTE — PROGRESS NOTES
Leonel Yu is a 54 y.o. male on day 1 of admission presenting with Shortness of breath at rest.    Subjective   Patient seen and evaluated at the bedside.   Glucoses persistently in the 400-500 range yesterday, dropped to the 30's overnight after receiving glargine the day prior and lispro correction through the day. His glargine was held last night due to hypoglycemia. Of note, received 60 mg of prednisone x 1 dose 10/15/24.     Today, He has been eating well. Glucose in the early morning was in the 200's, down to 121 at 1039. Requested a glucose after his lunch and it was found to be > 600.     I have reviewed histories, allergies and medications have been reviewed and there are no changes     Objective   Review of Systems   Constitutional:  Positive for activity change and fatigue. Negative for appetite change, diaphoresis and unexpected weight change.   HENT: Negative.  Negative for sore throat.    Eyes: Negative.    Respiratory:  Negative for cough, chest tightness and shortness of breath.    Cardiovascular:  Negative for chest pain.   Gastrointestinal:  Negative for abdominal pain, diarrhea and nausea.   Endocrine: Negative for cold intolerance, heat intolerance, polydipsia, polyphagia and polyuria.   Genitourinary:  Negative for dysuria and frequency.   Musculoskeletal:  Negative for gait problem and joint swelling.   Neurological:  Negative for speech difficulty, numbness and headaches.   Psychiatric/Behavioral:  Negative for agitation, behavioral problems and confusion.      Physical Exam  Constitutional:       General: He is not in acute distress.     Appearance: Normal appearance.   HENT:      Nose: Nose normal.      Mouth/Throat:      Mouth: Mucous membranes are moist.      Pharynx: Oropharynx is clear.   Cardiovascular:      Rate and Rhythm: Normal rate and regular rhythm.   Pulmonary:      Effort: Pulmonary effort is normal. No respiratory distress.   Abdominal:      General: There is no  "distension.      Palpations: Abdomen is soft.   Musculoskeletal:         General: Normal range of motion.   Skin:     General: Skin is warm and dry.   Neurological:      Mental Status: He is alert and oriented to person, place, and time.   Psychiatric:         Mood and Affect: Mood normal.         Behavior: Behavior normal.       Last Recorded Vitals  Blood pressure 98/70, pulse 57, temperature 36.4 °C (97.6 °F), temperature source Temporal, resp. rate 13, height 1.753 m (5' 9\"), weight 77.1 kg (170 lb), SpO2 94%.  Intake/Output last 3 Shifts:  No intake/output data recorded.    Relevant Results  Results from last 7 days   Lab Units 10/17/24  0411 10/17/24  0226 10/17/24  0113 10/17/24  0032 10/16/24  2238 10/16/24  2153 10/16/24  2103 10/16/24  1752 10/16/24  1650 10/15/24  2246 10/15/24  1742 10/15/24  1136 10/15/24  1032   POCT GLUCOSE mg/dL  --  232* 220* 39*  --  186* 59*   < >  --    < >  --    < >  --    GLUCOSE mg/dL 245*  --   --   --  67*  --   --   --  478*  --  419*  --  344*    < > = values in this interval not displayed.   Assessment/Plan   Assessment & Plan  Shortness of breath at rest    Leonel Yu is a 54 y.o. male PMH of mixed ischemic and non-ischemic HFrEF 5-10% s/p [employer] single chamber ICD (3/2024), CKD3a, HTN, HLD, LUZ (crack cocaine), former tobacco use, COPD (on nocturnal 2L at baseline), DM2, remote left cerebellar hemorrhagic infarct, medication noncompliance, anxiety, and depression. Patient presents with blurry vision, lethargy, and dizziness.       Glucose in the 300's on arrival to the ED, given 60 mg of prednisone x 1 with glucoses climbing to the 400's. Total daily dose 10/15/24 was 40 units, with 30 units as basal insulin.      Patient interviewed at the bedside in the ED.  He is hungry and eating well. Patient first reports he has only been taking lispro at home, but does not understand the sliding scale.  When pressed, it sounds like he has not been taking much of " the lispro at all.      Diabetes History  Type of diabetes: Type 2 diabetes  Year diagnosed or age: [birth year]21  Hospitalizations for DKA or HHS: no  Complications: hyperglycemia  Seen by PCP or Endocrinology: PCP  Frequency of glucose checks: once a day  Glucose review: reports 200-400  Frequency of Hypoglycemia: denies     Home Medications  Basal: 30 units of glargine listed in the chart, but patient has not been taking it  Prandial: none  Correction: lispro scale, but patient is not taking it  Orals: jardiance 10 mg  PLAN  Steroids: 60 mg of prednisone x1 10/15/24  Nutrition: 90 gram carb consistent  Severe hypoglycemia likely as steroid effect waned   Glucose 121 before lunch, so insulin doses held. Post lunch glucose > 600 mg/dl  - Recommend resuming insulin doses at 50% of what patient received yesterday  - Consider checking a chemistry  - Consider NPO until glucose is at least less than 400 mg/dl  - Start glargine to 15 units Q24       If NPO: reduce to 10  - Start lispro to 5 units with meals plus scale       If NPO: hold  - Give lispro 10 units now and then corrective scale to #1 Q4, can be adjusted back to three times daily with meals once glucose is reasonably controlled   = 0u  151-200 = 1u  201-250 = 2u  251-300 = 3u  301-350 = 4u  351-400 = 5u     -Accuchecks (not BMP) TIDAC and QHS- kindly ensure QHS Accucheck is drawn; it is often missed   - Goal -180  -Hypoglycemia protocol  -Will continue to follow and titrate insulin accordingly     SGLT2i tx may not be utilized in inpt setting unless the following conditions are met. Only HF Cardiology may initiate inpatient SGLT2i use.   1) pt is eating and drinking by mouth with a total daily carb intake exceeding 60g of CHO  2) pt is urinating independently of urinary catheters  3) pt can ambulate freely to the restroom in order to maintain personal hygiene  4) no current concern for UTI/genital or inguinal candidiasis  5) no plans for NPO within the next  48-72hr    Discharge planning:   [] patient may expect to discharge home on glargine and lispro, final doses TBD by titration  Jardiance per heart failure team  []will provide CGM sample prior to discharge, patient is interested in a Joseph 3  []will enroll pt in  pharmacy platinum plan program  []follow up in the diabetes hospital discharge clinic    I spent 50 minutes in the professional and overall care of this patient.    Ximena Feliciano, APRN-CNP

## 2024-10-17 NOTE — CONSULTS
"Inpatient Diabetes Education Consult    Reason for Visit:  Leonel Yu is a 54 y.o. male who presents for hyperglycemia; HF    Consulting Service/Provider: JESSICA Charles NP    Visit Type: Initial visit    Visit Modality: In-person    Discharge Equipment/Supply Needs:       Patient has supplies at home: Blood glucose meter:  , Testing strips:  , Lancets, and Pen needles    Patient History and Assessment:  New diagnosis:  hyperglycemia 2' non-adherence/lack of knowledge re insulin management  Previous diagnosis: Type 2  Patient known to Diabetes Education department: No  Treatment prior to hospital admission: Insulin: Rapid acting, Long acting, via pen  Blood glucose testing: Does not routinely test and Before Meals  Complications: cardiovascular disease, cerebrovascular disease, and chronic kidney disease  PTA Medications:    Current Outpatient Medications   Medication Instructions    amiodarone (PACERONE) 200 mg, oral, Daily    aspirin 81 mg, oral, Daily RT    atorvastatin (LIPITOR) 80 mg, oral, Nightly    blood sugar diagnostic strip Use as directed to test blood glucose up to four times daily and as needed.    blood-glucose meter misc 1 each, subcutaneous, 4 times daily with meals and nightly, Check blood glucose 4 times daily, before meals and at bedtime.    carvedilol (COREG) 3.125 mg, oral, 2 times daily    empagliflozin (Jardiance) 10 mg TAKE 1 TABLET BY MOUTH ONCE DAILY    glucose 8 g, oral, As needed    insulin lispro (HUMALOG) 0-10 Units, subcutaneous, 3 times daily (morning, midday, late afternoon), Hypoglycemia protocol if Blood Glucose is between 0 - 70 mg/dL, 0 unit(s) if , 2 unit(s) if 151-200, 4 unit(s) if 201-250, 6 unit(s) if 251-300, 8 unit(s) if 301-350, 10 unit(s) if 351-400. Notify provider unit(s) if Blood Glucose is greater than 400 mg/dL    lancets 33 gauge misc 1 each, subcutaneous, 4 times daily    Lantus Solostar U-100 Insulin 30 Units, subcutaneous, Nightly    pen needle 1/2\" " "29G X 12mm needle Use to inject 1-4 times daily as directed.    sacubitriL-valsartan (Entresto) 24-26 mg tablet 1 tablet, oral, 2 times daily    sertraline (ZOLOFT) 100 mg, oral, Daily    spironolactone (ALDACTONE) 12.5 mg, oral, Daily    tiotropium (Spiriva Respimat) 2.5 mcg/actuation inhaler 2 puffs, inhalation, Daily    torsemide (DEMADEX) 40 mg, oral, Daily       Glucose   Date/Time Value Ref Range Status   10/17/2024 04:11  (H) 74 - 99 mg/dL Final   10/16/2024 10:38 PM 67 (L) 74 - 99 mg/dL Final   10/16/2024 04:50  (HH) 74 - 99 mg/dL Final   10/15/2024 05:42  (H) 74 - 99 mg/dL Final   10/15/2024 10:32  (H) 74 - 99 mg/dL Final   10/04/2024 07:02  (H) 74 - 99 mg/dL Final   10/03/2024 07:03  (H) 74 - 99 mg/dL Final   10/02/2024 07:53  (H) 74 - 99 mg/dL Final   10/01/2024 03:39  (H) 74 - 99 mg/dL Final   09/30/2024 05:01  (H) 74 - 99 mg/dL Final     No results found for: \"CPEPTIDE\"  Hemoglobin A1C   Date Value Ref Range Status   10/15/2024 9.9 (H) See comment % Final   07/30/2024 8.4 (H) see below % Final   06/20/2024 6.9 (H) 4.3 - 5.6 % Final     Comment:     American Diabetes Association guidelines indicate that patients with HgbA1c in the range 5.7-6.4% are at increased risk for development of diabetes, and intervention by lifestyle modification may be beneficial. HgbA1c greater or equal to 6.5% is considered diagnostic of diabetes.   01/09/2024 10.1 (H) 4.3 - 5.6 % Final     Comment:     American Diabetes Association guidelines indicate that patients with HgbA1c in the range 5.7-6.4% are at increased risk for development of diabetes, and intervention by lifestyle modification may be beneficial. HgbA1c greater or equal to 6.5% is considered diagnostic of diabetes.   12/09/2023 7.6 (H) see below % Final       Patient Learning/Readiness Assessment:  Mr. Yu is agreeable to diabetes ed visit    Interventions/Topics Covered:  See After Visit Summary for " handouts/information sheets provided to patient.  Education Documentation  Sick Day Guidelines, taught by Lacy Bethea RN at 10/17/2024  1:10 PM.  Learner: Patient  Readiness: Acceptance  Method: Explanation, Handout, Teach-back  Response: Needs Reinforcement    Diabetes Risks, taught by Lacy Bethea RN at 10/17/2024  1:10 PM.  Learner: Patient  Readiness: Acceptance  Method: Explanation, Handout, Teach-back  Response: Needs Reinforcement    Healthy Food Options, taught by Lacy Bethea RN at 10/17/2024  1:09 PM.  Learner: Patient  Readiness: Acceptance  Method: Explanation, Handout  Response: Verbalizes Understanding  Comment: has food insecurity --  made aware and will notify SW    How Foods Affect Blood Sugar, taught by Lacy Bethea RN at 10/17/2024  1:09 PM.  Learner: Patient  Readiness: Acceptance  Method: Explanation, Handout  Response: Verbalizes Understanding  Comment: has food insecurity --  made aware and will notify SW    Insulin Administration, taught by Lacy Bethea RN at 10/17/2024  1:09 PM.  Learner: Patient  Readiness: Acceptance  Method: Explanation, Demonstration, Teach-back, Handout  Response: Verbalizes Understanding  Comment: reviewed insulin pen use.  he was not priming before injecting and therefore not taking a full dose of insulin. insulin pen education    Insulin Types, taught by Lacy Bethea RN at 10/17/2024  1:09 PM.  Learner: Patient  Readiness: Acceptance  Method: Explanation, Demonstration, Teach-back, Handout  Response: Verbalizes Understanding  Comment: reviewed insulin pen use.  he was not priming before injecting and therefore not taking a full dose of insulin. insulin pen education    Hypoglycemia, taught by Lacy Bethea RN at 10/17/2024  1:08 PM.  Learner: Patient  Readiness: Acceptance  Method: Explanation  Response: Verbalizes Understanding, Needs Reinforcement    Hyperglycemia, taught by Lacy Bethea RN at 10/17/2024  1:08 PM.  Learner: Patient  Readiness:  "Acceptance  Method: Explanation  Response: Verbalizes Understanding, Needs Reinforcement    What is Continuous Glucose Monitoring (CGM), taught by Lacy Bethea RN at 10/17/2024  1:08 PM.  Learner: Patient  Readiness: Acceptance  Method: Teach-back, Handout, Explanation  Response: Verbalizes Understanding  Comment: interested in CGM.  Insurance will cover Joseph sensors and reader- will make team aware.    Self-Care Behaviors, taught by Lacy Bethea RN at 10/17/2024  1:08 PM.  Learner: Patient  Readiness: Acceptance  Method: Explanation, Handout  Response: Verbalizes Understanding, Needs Reinforcement          Additional topics covered: Review of recent events including hyperglycemia leading to ED visit.  He is feeling fatigued and SOB at times with oxygen on.    We reviewed his A1c and impact on BG.  States he was \"just taking the short acting insulin\" with meals.  He didn't know the value of the long acting/basal insulin.  We reviewed the insulin pen and he was not priming the device before setting the dose.  We discussed that by not priming he was not receiving a whole dose of insulin.  Verbalizes understanding of this now.    He has a BG meter but would like to have a CGM so he can have fewer \"pokes... my fingers get sore\"  Will work with provider re what device would be covered by his insurance.  We reviewed the action of the Jardiance.  Mr. Yu states he is \"always hungry... can't get groceries... don't have a ride to appts and to get food\"  Will have SW follow for food insecurity and transportation.  Finally, discussed that he smokes 2 cigs/week and uses crack cocaine.  States friends bring the crack in return for his help with car repairs.  We discussed possibly having them bring groceries or a meal as payment instead of crack.   He smiled and said \"that's not a bad idea\"    Additional materials provided: Diabetes handbook    CGM:  if covered by his insurance will obtain sensor and reader for him.  "     Education Outcome/Recommendations:        Recommendations for bedside nursing: Allow patient to self-inject insulin (supervised)    Recommendations for Providers: Follow-up w/ PCP and/or Endocrinology    Additional Comments: See note above.  Team made aware that he is requesting CGM.  Will follow up with him in am.  Mr. Yu is agreeable to this plan.  Time spent:  60 mins.

## 2024-10-17 NOTE — ED PROVIDER NOTES
EMERGENCY DEPARTMENT ENCOUNTER      Pt Name: Leonel Yu  MRN: [id number]  Birthdate 1970  Date of evaluation: 10/16/2024  Provider: Rey Galo MD    CHIEF COMPLAINT       Chief Complaint   Patient presents with   • Lethargy     HISTORY OF PRESENT ILLNESS    HPI  54-year-old male presents emergency department with chief complaint of lethargy.  Patient was recently here but left AMA.  He claims that he left due to the inability to eat.  He returns back as he is indicating that he feels very weak and has blurry vision.  Patient is also endorsing shortness of breath and dyspnea.  Nursing Notes were reviewed.    PAST MEDICAL HISTORY     Past Medical History:   Diagnosis Date   • CHF (congestive heart failure) (Multi)    • Chronic kidney disease    • COPD (chronic obstructive pulmonary disease) (Multi)    • Diabetes mellitus (Multi)    • Hyperlipidemia    • Hypertension    • Stroke (Multi)    • Substance abuse (Multi)          SURGICAL HISTORY       Past Surgical History:   Procedure Laterality Date   • INSERT / REPLACE / REMOVE PACEMAKER           CURRENT MEDICATIONS       Previous Medications    AMIODARONE (PACERONE) 200 MG TABLET    Take 1 tablet (200 mg) by mouth once daily.    ASPIRIN 81 MG CHEWABLE TABLET    Chew 1 tablet (81 mg) once daily.    ATORVASTATIN (LIPITOR) 80 MG TABLET    Take 1 tablet (80 mg) by mouth once daily at bedtime.    BLOOD SUGAR DIAGNOSTIC STRIP    Use as directed to test blood glucose up to four times daily and as needed.    BLOOD-GLUCOSE METER MISC    Inject 1 each under the skin 4 times a day with meals. Check blood glucose 4 times daily, before meals and at bedtime.    CARVEDILOL (COREG) 3.125 MG TABLET    Take 1 tablet (3.125 mg) by mouth 2 times a day.    EMPAGLIFLOZIN (JARDIANCE) 10 MG    TAKE 1 TABLET BY MOUTH ONCE DAILY    GLUCOSE 4 GRAM CHEWABLE TABLET    Chew 2 tablets (8 g) if needed for low blood sugar - see comments.    INSULIN GLARGINE (LANTUS) 100 UNIT/ML (3 ML) PEN    " Inject 30 Units under the skin once daily at bedtime.    INSULIN LISPRO (HUMALOG) 100 UNIT/ML INJECTION    Inject 0-10 Units under the skin 3 times daily (morning, midday, late afternoon). Hypoglycemia protocol if Blood Glucose is between 0 - 70 mg/dL, 0 unit(s) if , 2 unit(s) if 151-200, 4 unit(s) if 201-250, 6 unit(s) if 251-300, 8 unit(s) if 301-350, 10 unit(s) if 351-400. Notify provider unit(s) if Blood Glucose is greater than 400 mg/dL    LANCETS 33 GAUGE MISC    Inject 1 each under the skin 4 times a day.    PEN NEEDLE 1/2\" 29G X 12MM NEEDLE    Use to inject 1-4 times daily as directed.    SACUBITRIL-VALSARTAN (ENTRESTO) 24-26 MG TABLET    Take 1 tablet by mouth 2 times a day.    SERTRALINE (ZOLOFT) 100 MG TABLET    Take 1 tablet (100 mg) by mouth once daily.    SPIRONOLACTONE (ALDACTONE) 25 MG TABLET    Take 0.5 tablets (12.5 mg) by mouth once daily.    TIOTROPIUM (SPIRIVA RESPIMAT) 2.5 MCG/ACTUATION INHALER    Inhale 2 puffs once daily.    TORSEMIDE (DEMADEX) 20 MG TABLET    Take 2 tablets (40 mg) by mouth once daily.       ALLERGIES     Patient has no known allergies.    FAMILY HISTORY       Family History   Problem Relation Name Age of Onset   • Heart disease Father            SOCIAL HISTORY       Social History     Socioeconomic History   • Marital status: Single   Tobacco Use   • Smoking status: Former     Types: Cigarettes   • Smokeless tobacco: Never   Vaping Use   • Vaping status: Never Used   Substance and Sexual Activity   • Alcohol use: Never   • Drug use: Yes     Types: \"Crack\" cocaine     Comment: last used 7/28   • Sexual activity: Defer     Social Drivers of Health     Financial Resource Strain: Low Risk  (10/2/2024)    Overall Financial Resource Strain (CARDIA)    • Difficulty of Paying Living Expenses: Not hard at all   Recent Concern: Financial Resource Strain - High Risk (9/7/2024)    Overall Financial Resource Strain (CARDIA)    • Difficulty of Paying Living Expenses: Very hard "   Food Insecurity: No Food Insecurity (10/1/2024)    Hunger Vital Sign    • Worried About Running Out of Food in the Last Year: Never true    • Ran Out of Food in the Last Year: Never true   Transportation Needs: No Transportation Needs (10/2/2024)    PRAPARE - Transportation    • Lack of Transportation (Medical): No    • Lack of Transportation (Non-Medical): No   Recent Concern: Transportation Needs - Unmet Transportation Needs (9/7/2024)    PRAPARE - Transportation    • Lack of Transportation (Medical): Yes    • Lack of Transportation (Non-Medical): Yes   Physical Activity: Sufficiently Active (12/25/2023)    Exercise Vital Sign    • Days of Exercise per Week: 5 days    • Minutes of Exercise per Session: 60 min   Stress: Stress Concern Present (12/25/2023)    [employer] - Occupational Stress Questionnaire    • Feeling of Stress : Very much   Social Connections: Not on File (9/23/2024)    Received from [employer]    • Connectedness: 0   Intimate Partner Violence: Not At Risk (10/1/2024)    Humiliation, Afraid, Rape, and Kick questionnaire    • Fear of Current or Ex-Partner: No    • Emotionally Abused: No    • Physically Abused: No    • Sexually Abused: No   Housing Stability: Low Risk  (10/2/2024)    Housing Stability Vital Sign    • Unable to Pay for Housing in the Last Year: No    • Number of Times Moved in the Last Year: 1    • Homeless in the Last Year: No   Recent Concern: Housing Stability - High Risk (9/7/2024)    Housing Stability Vital Sign    • Unable to Pay for Housing in the Last Year: Yes    • Number of Times Moved in the Last Year: 1    • Homeless in the Last Year: Yes       SCREENINGS                        PHYSICAL EXAM    (up to 7 for level 4, 8 or more for level 5)     ED Triage Vitals [10/16/24 1929]   Temperature Heart Rate Respirations BP   36.4 °C (97.6 °F) 70 16 82/56      Pulse Ox Temp Source Heart Rate Source Patient Position   96 % Temporal  Monitor Sitting      BP Location FiO2 (%)     Left arm --       Physical Exam  Vitals and nursing note reviewed.   Constitutional:       General: He is not in acute distress.     Appearance: He is well-developed.   HENT:      Head: Normocephalic and atraumatic.   Eyes:      Conjunctiva/sclera: Conjunctivae normal.   Cardiovascular:      Rate and Rhythm: Regular rhythm. Bradycardia present.      Heart sounds: No murmur heard.     Comments: Patient is hypotensive here in the emergency department systolics in the 90s.  He indicates currently he is hypertensive did not take his medications today.  He does not appear fluid overloaded he has no peripheral edema lung sounds are clear diffusely bilaterally.  Pulmonary:      Effort: Pulmonary effort is normal. No respiratory distress.      Breath sounds: Normal breath sounds.   Abdominal:      Palpations: Abdomen is soft.      Tenderness: There is no abdominal tenderness.   Musculoskeletal:         General: No swelling.      Cervical back: Neck supple.   Skin:     General: Skin is warm and dry.      Capillary Refill: Capillary refill takes less than 2 seconds.   Neurological:      Mental Status: He is alert.   Psychiatric:         Mood and Affect: Mood normal.          DIAGNOSTIC RESULTS     LABS:  Labs Reviewed   POCT GLUCOSE - Abnormal       Result Value    POCT Glucose 59 (*)    POCT GLUCOSE - Abnormal    POCT Glucose 186 (*)    CBC WITH AUTO DIFFERENTIAL   COMPREHENSIVE METABOLIC PANEL   TROPONIN SERIES- (INITIAL, 1 HR)    Narrative:     The following orders were created for panel order Troponin I Series, High Sensitivity (0, 1 HR).  Procedure                               Abnormality         Status                     ---------                               -----------         ------                     Troponin I, High Sensiti...[[id number]]                                                   Please view results for these tests on the individual orders.   B-TYPE  NATRIURETIC PEPTIDE   SERIAL TROPONIN-INITIAL       All other labs were within normal range or not returned as of this dictation.    Imaging  XR chest 1 view    (Results Pending)        Procedures  Procedures     EMERGENCY DEPARTMENT COURSE/MDM:   Medical Decision Making  54-year-old male presents emergency department with chief complaint of lethargy.  Medical management treatment emergency department will be to rule out any CHF causes of this patient's lethargy.  Delta troponin as well as BMP will be evaluated as well as EKG and chest x-ray.    ED Course as of 10/22/24 0620   Wed Oct 16, 2024   2251 Attending summary:  53 y/o M with significant cardiovascular hx including HFrEF 5-10% with ICD presenting after leaving AMA earlier this afternoon with continued generalized weakness. Left bc he couldn't eat. Friend picked him up and convinced him to come back. No other new symptoms, no ingestions. Vitals show hypotension to 80s/60s (MAP > 65), otherwise unremarkable. Exam shows drowsy with facial diaphoresis, in no resp distress with clear lungs, soft nontender abd, no LE edema. Oriented x4, no focal neuro deficits.    While I was evaluating pt, repeated BP and it was 90s/60s. This is what it was this afternoon. However, concern for cardiogenic v septic shock. Will not give fluids as EF 5-10%, MAPs > 65. Will get broad labs including cultures and UA, CXR, EKG. Will admit again.      [SS]   2252 Patient will be signed out to the night resident [DS]      ED Course User Index  [DS] Rey Galo MD  [SS] Lily Lyles MD         Diagnoses as of 10/22/24 0620   Shortness of breath at rest   Acute on chronic combined systolic and diastolic heart failure        Patient and or family in agreement and understanding of treatment plan.  All questions answered.      I reviewed the case with the attending ED physician. The attending ED physician agrees with the plan. Patient and/or patient´s representative was counseled  regarding labs, imaging, likely diagnosis, and plan. All questions were answered.    ED Medications administered this visit:  Medications - No data to display    New Prescriptions from this visit:    New Prescriptions    No medications on file       Follow-up:  No follow-up provider specified.      Final Impression: No diagnosis found.      (Please note that portions of this note were completed with a voice recognition program.  Efforts were made to edit the dictations but occasionally words are mis-transcribed.)

## 2024-10-17 NOTE — PROGRESS NOTES
DAILY PROGRESS NOTE        Name: Leonel Yu  :  1970(54 y.o.)  MRN:  [id number]    Date: 10/17/24     SUBJECTIVE     Chief complaints: Blurry vision, lethargy and sleepiness, shortness of breath    HPI:  Leonel Yu is a 54 y.o. male with PMHx of HFrEF (5-10%) s/p ICD, CKD, substance use, medication nonadherence presenting with weakness/lethargy/vision changes x2 days, no chest pain. Difficulty breathing when laying flat, has been sleeping in chair. Passed out 3 weeks ago. Adherent to medications, except for diuretics. Last diuretic dose 3 days ago. Last used crack cocaine 3 days ago.      He was admitted 10/15 for the above complaints.  He was noted to have BNP of 2388.  He received his home gdmt. Coreg 3.125, empagliflozin 10 mg, torsemide 40.  He was also given Lasix 40 IV twice.  He reported improved symptoms with these medications. He was also given prednisone 60 mg once (for COPDe).  He was given lispro 10 units for hyperglycemia in the 400s.     Represented few hours later (10/16) for similar symptoms - generalized weakness. His friend had convinced him to come back to hospital. He was noted to be drowsy and have facial diaphoresis. BP 80/60s, hypoglycemia to 31. Ultimately infectious workup began. POCUS showed collapsible IVC, and 500 ml fluids were given.     Subjective this AM  Patient seen and evaluated at bedside in the ED. Patient endorses feeling tired and sleepy, blurry vision, and trouble breathing worsened when laying flat. Patient is in and out of sleep while talking. Denies chest pain.    Current medications:  Scheduled Meds:amiodarone, 200 mg, oral, Daily  aspirin, 81 mg, oral, Daily  atorvastatin, 40 mg, oral, Nightly  insulin lispro, 0-10 Units, subcutaneous, TID  polyethylene glycol, 17 g, oral, Daily  sertraline, 100 mg, oral, Daily  tiotropium, 2 puff, inhalation, Daily      Continuous Infusions:   PRN Meds:      OBJECTIVE                                                                                                                                                                                                                                                                                                                                                            Temperature:  [36.4 °C (97.6 °F)] 36.4 °C (97.6 °F)  Heart Rate:  [55-81] 57  Respirations:  [13-18] 13  BP: ()/(56-80) 98/70     Vitals:    10/16/24 1929   Weight: 77.1 kg (170 lb)   Last discharge weight 74.4kg 10/5      PHYSICAL EXAM  GEN: Lethargic, falling asleep during conversation, drowzy  Eyes: Bilateral redness. Blurry vision. PERRL. Peripheral vision unable to assess 2/2 falling asleep.  Mouth: Poor oral hygiene. MMM.  CVS: JVD, +ve HJR, RRR, weak pulses t/o.  Resp: CTA b/l. Wearing NC but no O2 actually delivering.  Abd: Flat. ND. NT. NL BS. Soft.  MSK: No edema.  Skin: WWP. NL capillary refill.  Neuro: NFD. A&Ox4. 5+ Power t/o. CN intact.      Labs:   Lab Results   Component Value Date     (L) 10/17/2024    K 3.5 10/17/2024    CL 97 (L) 10/17/2024    CO2 25 10/17/2024    BUN 45 (H) 10/17/2024    CREATININE 2.10 (H) 10/17/2024    GLUCOSE 245 (H) 10/17/2024    CALCIUM 8.8 10/17/2024    PROT 6.9 10/17/2024    BILITOT 0.5 10/17/2024    ALKPHOS 77 10/17/2024    AST 15 10/17/2024    ALT 29 10/17/2024    GLOB 3.5 04/01/2020       Lab Results   Component Value Date    WBC 12.8 (H) 10/17/2024    HGB 18.8 (H) 10/17/2024    HCT 56.6 (H) 10/17/2024    MCV 93 10/17/2024     10/17/2024       Imaging:   ECG 12 lead    Result Date: 10/17/2024  Sinus bradycardia Left axis deviation Left ventricular hypertrophy with QRS widening and repolarization abnormality ( R in aVL , Sokolow-Nieves , Brown product , Romhilt-Dominguez ) Inferior infarct (cited on or before 16-OCT-2024) Abnormal ECG When compared with ECG of 15-OCT-2024 09:55, Vent. rate has decreased BY  31 BPM Nonspecific T wave abnormality now evident in Inferior  leads QT has shortened See ED provider note for full interpretation and clinical correlation Confirmed by Samantha Ace ([id number]) on 10/17/2024 12:50:32 AM    XR chest 1 view    Result Date: 10/17/2024  Interpreted By:  Matteo Rojo and Sheng Max STUDY: XR CHEST 1 VIEW;  10/17/2024 12:06 am   INDICATION: Signs/Symptoms:sob.     COMPARISON: Chest radiograph dated 10/15/2024, CT chest 09/30/2024   ACCESSION NUMBER(S): [id number]   ORDERING CLINICIAN: NIK LYLES   FINDINGS: AP radiograph of the chest:   LINES AND DEVICES: AICD pacemaker device overlies the left lateral hemithorax with its single lead projecting over the right ventricle.   CARDIOMEDIASTINAL SILHOUETTE: Stable enlargement of the cardiomediastinal silhouette.   LUNGS: Left-sided apical lucencies correlates to severe bullous emphysema better evaluated prior CT chest from 09/30/2024. Pulmonary vascular congestion suggesting volume overload without je interstitial edema. No focal consolidation, pleural effusion, or pneumothorax.   ABDOMEN: No remarkable upper abdominal findings.   BONES: No acute osseous abnormality.       Cardiomegaly and pulmonary vascular congestion suggesting volume overload without je interstitial edema. Similar appearance of bullous changes in the left upper lung.   I personally reviewed the images/study and I agree with the findings as stated by Dr. Roberto Acosta. This study was interpreted at [place], [place].   MACRO: None   Signed by: Matteo Rojo 10/17/2024 12:38 AM Dictation workstation:   [id number]    Point of Care Ultrasound    Result Date: 10/16/2024  Donal Joy DO     10/17/2024 12:07 AM Performed by: Donal Joy DO Authorized by: Nik Lyles MD  Cardiac Indications: hypotension Procedure: Cardiac Ultrasound Findings:  Views: parasternal long, parasternal short and apical four The pericardial space was visualized and was NEGATIVE for a significant  pericardial effusion. Activity: Ventricular contractions were visualized. LV: LV systolic function was DECREASED. RV: RV size was NORMAL. Impression: Cardiac: The focused cardiac ultrasound exam had ABNORMAL findings as specified. Comments: Significantly decreased EF with global hypokinesis that is slightly worse in the anterior aspect and is consistent with the previous TTE from 7/31/2024.  IVC is collapsible.    XR chest 2 views    Result Date: 10/15/2024  Interpreted By:  Nicolás Raymundo, STUDY: XR CHEST 2 VIEWS;  10/15/2024 10:55 am   INDICATION: Signs/Symptoms:Dyspnea.     COMPARISON: 09/30/2024   ACCESSION NUMBER(S): [id number]   ORDERING CLINICIAN: VINOD NOE   FINDINGS: Left-sided pacemaker in place       CARDIOMEDIASTINAL SILHOUETTE: There is marked enlargement of the cardiac silhouette. There is pulmonary vascular congestion. There is no je edema.   LUNGS: There is no consolidation or effusion   ABDOMEN: No remarkable upper abdominal findings.   BONES: No acute osseous changes.       1.  Marked enlargement of the cardiac silhouette with vascular congestion       MACRO: None   Signed by: Nicolás Raymundo 10/15/2024 10:58 AM Dictation workstation:   [id number]       ASSESSMENT & PLAN     Patient is 53yo male with PMHx of HFrEF (EF 10% 7/24) likrly 2/2 chronic cocaine abuse c/b non-ishchemic cardiomyopathy (cMRI 8/24 shows fibrosis and transmural infarcts), s/p single chamber ICD (3/24), CKD3a, COPD (2L O2 at night is baseline), DM2, and medication nonadherence. He is presenting with blurry vision, lethargy/drowsiness, and shortness of breath. Differentials include HF e 2/2 non complicance and/or infection. GUICHO work up FeUrea 37.5% indicating intrinsic. Encephalopathy work up ordered.    PLAN TODAY:  - hydralazine 10mg  - follow repeat echo (last one July)  - follow encephalopathy work up (b12, ammonia,   - follow infectious work up (HIV, syphilis, blood cultures)  - trend lactate and Cr    RESULTS  TODAY:  - CT head showing scattered patchy and confluent hypodensities    #Acute decompensated HFrEF 5-10%  #mixed ischemic/nonischemic CM  # s/p single chamber ICD 3/2024  :: TTE 7/31:  EF 10%, enlargement of all chambers, mild to mod TR, RVSP 39 mildly elevated  :: cMRI 8/24: transmural infarcts and fibrosis  :: CT chest: coronary calcifcation  :: EKG: sinus jessy w/ LVH  :: CXR 10/16 with vascular congestion and cardiomegaly  :: BNP 2833 (>5K)  :: non-compliant with second dose of GDMT   PLAN  - Hyrdalazine 10mg for vasodilation  - Repeat TTE  - GDMT on HOLD jhonny iso GUICHO  - Afterload Reduction: Entresto 24-26 HOLD  - Beta blockade: carvedilol 3.125 HOLD  - SGLT2i: Empa 10mg HOLD  - MRA: Sprio 12.5 HOLD  - Diuresis: Torsemide 40mg HOLD    #lethargy   #vision disturbance  :: infectious vs metabolic vs cardiac (hypoperfusion) vs neruo vs mixed  :: CT head 10/17: scattered patchy and confluent hypodensities t/o periventricular and subcortical white matter  PLAN:  - follow CRP and ESR  - follow cultures, syphilis, HIV  - follow ammonia, B12  - consult ophtho?    #Hx of VT/VF  #Hx of AF RVR:   :: no shock since ami loading, ICD  PLAN:  - PO amiodarone 200mg/day    #C/f Infection  :: elevated procal 0.31  :: leukocytosis 12.8, Neutrophils Absolute 11.77  :: UA clean  :: Acute encephalopathy   PLAN:  - follow blood cultures  - follow CRP and ESR    #elevated Lactate - 2.3  :: Low BP 80/60 possibly 2/2 to hypovolemia from diuresis on previous 10/15 admission  PLAN:  - Trend lactate  - Hold off on diuresis    #GUICHO on CKD 3a  :: baseline Cr 1.2, recent Cr 2.1  :: intrinsic (FeUrea 37.5%)   :: appeared to have worsened after diuresis  :: s/p 500 mL bolus in ED 10/16  :: MALCOLM: b/l echogenic kidney likely 2/2 medical renal disease  PLAN  - trend Cr  - strict Is and Os  - caution w/ fluids and diuresis      #COPD: 2L at night at baselines  #Crack Cocaine Use: last use 3 days ago  #DM2: Glargine 15U  #Mood Disorder: Torres  100mg/day      F: Cautious  E: K4, Mg2  Diet: Adult diet Cardiac, Consistent Carb; CCD 90 gm/meal; 70 gm fat; 2 - 3 grams Sodium   A: PIV  Lines: No  O2: 2L at night  DVT ppx: subcutaneous heparin  GI ppx: no  Code Status: DNR and No Intubation   Contact: Extended Emergency Contact Information  Primary Emergency Contact: Elizabet Santana  Mobile Phone: [phone]  Relation: Friend         Electronically signed by Jhony Bojorquez MD on 10/17/24 at 8:09 AM

## 2024-10-18 ENCOUNTER — PHARMACY VISIT (OUTPATIENT)
Dept: PHARMACY | Facility: CLINIC | Age: 54
End: 2024-10-18
Payer: MEDICAID

## 2024-10-18 PROBLEM — Z79.4 TYPE 2 DIABETES MELLITUS WITH HYPERGLYCEMIA, WITH LONG-TERM CURRENT USE OF INSULIN: Status: ACTIVE | Noted: 2024-01-10

## 2024-10-18 LAB
ALBUMIN SERPL BCP-MCNC: 3.7 G/DL (ref 3.4–5)
ALP SERPL-CCNC: 76 U/L (ref 33–120)
ALT SERPL W P-5'-P-CCNC: 21 U/L (ref 10–52)
ANION GAP SERPL CALC-SCNC: 16 MMOL/L (ref 10–20)
AST SERPL W P-5'-P-CCNC: 15 U/L (ref 9–39)
BASOPHILS # BLD AUTO: 0.03 X10*3/UL (ref 0–0.1)
BASOPHILS NFR BLD AUTO: 0.3 %
BILIRUB DIRECT SERPL-MCNC: 0.2 MG/DL (ref 0–0.3)
BILIRUB SERPL-MCNC: 0.8 MG/DL (ref 0–1.2)
BUN SERPL-MCNC: 41 MG/DL (ref 6–23)
CALCIUM SERPL-MCNC: 9 MG/DL (ref 8.6–10.6)
CHLORIDE SERPL-SCNC: 97 MMOL/L (ref 98–107)
CO2 SERPL-SCNC: 25 MMOL/L (ref 21–32)
CREAT SERPL-MCNC: 1.64 MG/DL (ref 0.5–1.3)
EGFRCR SERPLBLD CKD-EPI 2021: 49 ML/MIN/1.73M*2
EOSINOPHIL # BLD AUTO: 0.02 X10*3/UL (ref 0–0.7)
EOSINOPHIL NFR BLD AUTO: 0.2 %
ERYTHROCYTE [DISTWIDTH] IN BLOOD BY AUTOMATED COUNT: 15.9 % (ref 11.5–14.5)
GLUCOSE BLD MANUAL STRIP-MCNC: 187 MG/DL (ref 74–99)
GLUCOSE BLD MANUAL STRIP-MCNC: 203 MG/DL (ref 74–99)
GLUCOSE BLD MANUAL STRIP-MCNC: 257 MG/DL (ref 74–99)
GLUCOSE BLD MANUAL STRIP-MCNC: 266 MG/DL (ref 74–99)
GLUCOSE BLD MANUAL STRIP-MCNC: 405 MG/DL (ref 74–99)
GLUCOSE BLD MANUAL STRIP-MCNC: 471 MG/DL (ref 74–99)
GLUCOSE SERPL-MCNC: 176 MG/DL (ref 74–99)
HCT VFR BLD AUTO: 55.1 % (ref 41–52)
HGB BLD-MCNC: 19 G/DL (ref 13.5–17.5)
IMM GRANULOCYTES # BLD AUTO: 0.02 X10*3/UL (ref 0–0.7)
IMM GRANULOCYTES NFR BLD AUTO: 0.2 % (ref 0–0.9)
LYMPHOCYTES # BLD AUTO: 0.78 X10*3/UL (ref 1.2–4.8)
LYMPHOCYTES NFR BLD AUTO: 8.3 %
MAGNESIUM SERPL-MCNC: 2 MG/DL (ref 1.6–2.4)
MCH RBC QN AUTO: 30.5 PG (ref 26–34)
MCHC RBC AUTO-ENTMCNC: 34.5 G/DL (ref 32–36)
MCV RBC AUTO: 88 FL (ref 80–100)
MONOCYTES # BLD AUTO: 0.41 X10*3/UL (ref 0.1–1)
MONOCYTES NFR BLD AUTO: 4.4 %
NEUTROPHILS # BLD AUTO: 8.14 X10*3/UL (ref 1.2–7.7)
NEUTROPHILS NFR BLD AUTO: 86.6 %
NRBC BLD-RTO: 0 /100 WBCS (ref 0–0)
PHOSPHATE SERPL-MCNC: 3 MG/DL (ref 2.5–4.9)
PLATELET # BLD AUTO: 152 X10*3/UL (ref 150–450)
POTASSIUM SERPL-SCNC: 3.6 MMOL/L (ref 3.5–5.3)
PROT SERPL-MCNC: 6.9 G/DL (ref 6.4–8.2)
RBC # BLD AUTO: 6.23 X10*6/UL (ref 4.5–5.9)
SODIUM SERPL-SCNC: 134 MMOL/L (ref 136–145)
WBC # BLD AUTO: 9.4 X10*3/UL (ref 4.4–11.3)

## 2024-10-18 PROCEDURE — 80048 BASIC METABOLIC PNL TOTAL CA: CPT

## 2024-10-18 PROCEDURE — 82947 ASSAY GLUCOSE BLOOD QUANT: CPT

## 2024-10-18 PROCEDURE — 85025 COMPLETE CBC W/AUTO DIFF WBC: CPT

## 2024-10-18 PROCEDURE — 2500000002 HC RX 250 W HCPCS SELF ADMINISTERED DRUGS (ALT 637 FOR MEDICARE OP, ALT 636 FOR OP/ED): Mod: SE

## 2024-10-18 PROCEDURE — RXMED WILLOW AMBULATORY MEDICATION CHARGE

## 2024-10-18 PROCEDURE — 82248 BILIRUBIN DIRECT: CPT

## 2024-10-18 PROCEDURE — 2500000002 HC RX 250 W HCPCS SELF ADMINISTERED DRUGS (ALT 637 FOR MEDICARE OP, ALT 636 FOR OP/ED): Mod: SE | Performed by: NURSE PRACTITIONER

## 2024-10-18 PROCEDURE — 99232 SBSQ HOSP IP/OBS MODERATE 35: CPT | Performed by: PHYSICIAN ASSISTANT

## 2024-10-18 PROCEDURE — 2500000001 HC RX 250 WO HCPCS SELF ADMINISTERED DRUGS (ALT 637 FOR MEDICARE OP): Mod: SE

## 2024-10-18 PROCEDURE — 2500000002 HC RX 250 W HCPCS SELF ADMINISTERED DRUGS (ALT 637 FOR MEDICARE OP, ALT 636 FOR OP/ED): Mod: SE | Performed by: PHYSICIAN ASSISTANT

## 2024-10-18 PROCEDURE — 80053 COMPREHEN METABOLIC PANEL: CPT

## 2024-10-18 PROCEDURE — G0378 HOSPITAL OBSERVATION PER HR: HCPCS

## 2024-10-18 PROCEDURE — 84100 ASSAY OF PHOSPHORUS: CPT

## 2024-10-18 PROCEDURE — 36415 COLL VENOUS BLD VENIPUNCTURE: CPT

## 2024-10-18 PROCEDURE — 99233 SBSQ HOSP IP/OBS HIGH 50: CPT

## 2024-10-18 PROCEDURE — 99497 ADVNCD CARE PLAN 30 MIN: CPT

## 2024-10-18 PROCEDURE — 83735 ASSAY OF MAGNESIUM: CPT

## 2024-10-18 PROCEDURE — 1200000002 HC GENERAL ROOM WITH TELEMETRY DAILY

## 2024-10-18 PROCEDURE — 99255 IP/OBS CONSLTJ NEW/EST HI 80: CPT

## 2024-10-18 RX ORDER — INSULIN LISPRO 100 [IU]/ML
0-5 INJECTION, SOLUTION INTRAVENOUS; SUBCUTANEOUS
Status: DISCONTINUED | OUTPATIENT
Start: 2024-10-18 | End: 2024-10-24

## 2024-10-18 RX ORDER — HYDROCHLOROTHIAZIDE 12.5 MG/1
CAPSULE ORAL
Qty: 2 EACH | Refills: 11 | Status: SHIPPED | OUTPATIENT
Start: 2024-10-18

## 2024-10-18 RX ORDER — LANCETS 28 GAUGE
EACH MISCELLANEOUS
Qty: 400 EACH | Refills: 1 | Status: SHIPPED | OUTPATIENT
Start: 2024-10-18

## 2024-10-18 RX ORDER — INSULIN LISPRO 100 [IU]/ML
0-5 INJECTION, SOLUTION INTRAVENOUS; SUBCUTANEOUS NIGHTLY
Status: DISCONTINUED | OUTPATIENT
Start: 2024-10-18 | End: 2024-10-24

## 2024-10-18 RX ORDER — ISOPROPYL ALCOHOL 70 ML/100ML
SWAB TOPICAL
Qty: 400 EACH | Refills: 1 | Status: SHIPPED | OUTPATIENT
Start: 2024-10-18

## 2024-10-18 RX ORDER — INSULIN LISPRO 100 [IU]/ML
0-5 INJECTION, SOLUTION INTRAVENOUS; SUBCUTANEOUS
Status: DISCONTINUED | OUTPATIENT
Start: 2024-10-18 | End: 2024-10-18

## 2024-10-18 RX ORDER — BLOOD-GLUCOSE,RECEIVER,CONT
EACH MISCELLANEOUS
Qty: 1 EACH | Refills: 0 | Status: SHIPPED | OUTPATIENT
Start: 2024-10-18

## 2024-10-18 RX ORDER — INSULIN LISPRO 100 [IU]/ML
5 INJECTION, SOLUTION INTRAVENOUS; SUBCUTANEOUS
Status: DISCONTINUED | OUTPATIENT
Start: 2024-10-18 | End: 2024-10-24

## 2024-10-18 RX ADMIN — EMPAGLIFLOZIN 10 MG: 10 TABLET, FILM COATED ORAL at 10:17

## 2024-10-18 RX ADMIN — INSULIN LISPRO 3 UNITS: 100 INJECTION, SOLUTION INTRAVENOUS; SUBCUTANEOUS at 21:09

## 2024-10-18 RX ADMIN — AMIODARONE HYDROCHLORIDE 200 MG: 200 TABLET ORAL at 10:17

## 2024-10-18 RX ADMIN — INSULIN LISPRO 1 UNITS: 100 INJECTION, SOLUTION INTRAVENOUS; SUBCUTANEOUS at 10:07

## 2024-10-18 RX ADMIN — CARVEDILOL 3.12 MG: 3.12 TABLET, FILM COATED ORAL at 21:07

## 2024-10-18 RX ADMIN — INSULIN LISPRO 5 UNITS: 100 INJECTION, SOLUTION INTRAVENOUS; SUBCUTANEOUS at 10:07

## 2024-10-18 RX ADMIN — TORSEMIDE 40 MG: 20 TABLET ORAL at 14:02

## 2024-10-18 RX ADMIN — INSULIN GLARGINE 15 UNITS: 100 INJECTION, SOLUTION SUBCUTANEOUS at 16:43

## 2024-10-18 RX ADMIN — INSULIN LISPRO 5 UNITS: 100 INJECTION, SOLUTION INTRAVENOUS; SUBCUTANEOUS at 14:25

## 2024-10-18 RX ADMIN — ATORVASTATIN CALCIUM 40 MG: 40 TABLET, FILM COATED ORAL at 21:07

## 2024-10-18 RX ADMIN — SACUBITRIL AND VALSARTAN 1 TABLET: 24; 26 TABLET, FILM COATED ORAL at 21:07

## 2024-10-18 RX ADMIN — SPIRONOLACTONE 12.5 MG: 25 TABLET, FILM COATED ORAL at 14:02

## 2024-10-18 RX ADMIN — SERTRALINE 100 MG: 100 TABLET, FILM COATED ORAL at 10:16

## 2024-10-18 RX ADMIN — ASPIRIN 81 MG 81 MG: 81 TABLET ORAL at 10:16

## 2024-10-18 RX ADMIN — INSULIN LISPRO 5 UNITS: 100 INJECTION, SOLUTION INTRAVENOUS; SUBCUTANEOUS at 16:44

## 2024-10-18 ASSESSMENT — PAIN - FUNCTIONAL ASSESSMENT: PAIN_FUNCTIONAL_ASSESSMENT: 0-10

## 2024-10-18 ASSESSMENT — COGNITIVE AND FUNCTIONAL STATUS - GENERAL
MOBILITY SCORE: 24
DAILY ACTIVITIY SCORE: 24

## 2024-10-18 ASSESSMENT — ACTIVITIES OF DAILY LIVING (ADL): LACK_OF_TRANSPORTATION: NO

## 2024-10-18 ASSESSMENT — PAIN SCALES - GENERAL
PAINLEVEL_OUTOF10: 0 - NO PAIN
PAINLEVEL_OUTOF10: 0 - NO PAIN

## 2024-10-18 NOTE — CONSULTS
Inpatient consult to Palliative Care  Consult performed by: JACKIE Wills-CNP  Consult ordered by: Jennifer Campbell MD        Palliative Medicine Consult  Complex medical decision making, symptom management, patient/family support    History obtained from chart review including ED note, H&P, patient's daily progress notes, review of lab/test results, and discussion with primary team and bedside RN.    Subjective    History of Present Illness  Leonel Yu is a 54 y.o. male with PMHx of HFrEF (5-10%) s/p ICD, CKD, substance use (crack cocaine), medication nonadherence presenting with weakness/lethargy/vision changes x2 days, no chest pain. Difficulty breathing when laying flat, has been sleeping in chair. Passed out 3 weeks ago. Adherent to medications, except for diuretics. Last diuretic dose 3 days ago. Last used crack cocaine 3 days ago.      He was admitted 10/15 for the above complaints.  He was noted to have BNP of 2388.  He received his home gdmt. Coreg 3.125, empagliflozin 10 mg, torsemide 40.  He was also given Lasix 40 IV twice.  He reported improved symptoms with these medications. He was also given prednisone 60 mg once (for COPD).  He was given lispro 10 units for hyperglycemia in the 400s.     Represented few hours later (10/16) for similar symptoms - generalized weakness. His friend had convinced him to come back to hospital. He was noted to be drowsy and have facial diaphoresis. BP 80/60s, hypoglycemia to 31. Ultimately infectious workup began. POCUS showed collapsible IVC, and 500 ml fluids were given.       Introduction to Palliative Medicine  Met with pt at bedside.   Patient alert and oriented, has capacity to make their own medical decisions at this time.   Staff present: Kristine Adkins CNP, Anna LIU, Flori Ling.  Palliative Medicine was introduced as a specialty service for patients with serious illness to help with symptom management, improve quality of life, assist with  "goals of care conversations, navigate complex decision making, and provide support to patients and families. Support and empathy was provided throughout the encounter. Provided reflective listening and presence.     Symptoms  Pain: denies  Dyspnea: denies  Fatigue: denies  Insomnia: yes d/t interruptions  Drowsiness: denies  Constipation: denies  Nausea: denies  Appetite: good  Anxiety: yes  Depression: yes    Palliative Medicine Social History:  The patient is not  currently but was for 30 years. They have 3 children. The patient lives with a roommate now. The patient has been disabled for about 5 years due to depression and SI r/t the grief of losing his family. Pt states he uses crack cocaine to cope but it is only temporary. Later in the conversation, admits he uses it in hopes to \"blow his heart out\" Pt mentions that next time, he will \"blow his heart out\". The pt's mobility does not require an assistive device. The patient spends most of the day in the home. Enjoyments used to be his family/children but his children have changed numbers and refuse to talk to pt.     Objective    Last Recorded Vitals  /72 (BP Location: Left arm, Patient Position: Lying)   Pulse 67   Temp 36.4 °C (97.5 °F) (Temporal)   Resp 18   Ht 1.753 m (5' 9\")   Wt 77.1 kg (170 lb)   SpO2 96%   BMI 25.10 kg/m²      Physical Exam  Constitutional:       General: He is awake.      Appearance: He is underweight.   HENT:      Mouth/Throat:      Pharynx: Oropharynx is clear.   Cardiovascular:      Rate and Rhythm: Normal rate and regular rhythm.   Pulmonary:      Effort: Pulmonary effort is normal.      Breath sounds: Normal breath sounds.   Abdominal:      General: Bowel sounds are normal.      Palpations: Abdomen is soft.   Musculoskeletal:         General: Normal range of motion.   Skin:     General: Skin is moist.      Comments: Moist and diaphoretic   Neurological:      General: No focal deficit present.      Mental " Status: He is alert.   Psychiatric:         Attention and Perception: Attention normal.         Mood and Affect: Mood is depressed.         Speech: Speech normal.         Behavior: Behavior is withdrawn.         Thought Content: Thought content includes suicidal ideation. Thought content includes suicidal plan.          Relevant Results  Results for orders placed or performed during the hospital encounter of 10/16/24 (from the past 24 hours)   Syphilis Screen with Reflex   Result Value Ref Range    Syphilis Total Ab Nonreactive Nonreactive   POCT GLUCOSE   Result Value Ref Range    POCT Glucose >600 (H) 74 - 99 mg/dL   POCT GLUCOSE   Result Value Ref Range    POCT Glucose 471 (H) 74 - 99 mg/dL   POCT GLUCOSE   Result Value Ref Range    POCT Glucose 476 (H) 74 - 99 mg/dL   Renal function panel   Result Value Ref Range    Glucose 323 (H) 74 - 99 mg/dL    Sodium 132 (L) 136 - 145 mmol/L    Potassium 4.0 3.5 - 5.3 mmol/L    Chloride 94 (L) 98 - 107 mmol/L    Bicarbonate 25 21 - 32 mmol/L    Anion Gap 17 10 - 20 mmol/L    Urea Nitrogen 42 (H) 6 - 23 mg/dL    Creatinine 1.99 (H) 0.50 - 1.30 mg/dL    eGFR 39 (L) >60 mL/min/1.73m*2    Calcium 9.6 8.6 - 10.6 mg/dL    Phosphorus 3.7 2.5 - 4.9 mg/dL    Albumin 3.9 3.4 - 5.0 g/dL   CBC and Auto Differential   Result Value Ref Range    WBC 10.7 4.4 - 11.3 x10*3/uL    nRBC 0.0 0.0 - 0.0 /100 WBCs    RBC 6.14 (H) 4.50 - 5.90 x10*6/uL    Hemoglobin 18.5 (H) 13.5 - 17.5 g/dL    Hematocrit 56.0 (H) 41.0 - 52.0 %    MCV 91 80 - 100 fL    MCH 30.1 26.0 - 34.0 pg    MCHC 33.0 32.0 - 36.0 g/dL    RDW 16.1 (H) 11.5 - 14.5 %    Platelets 167 150 - 450 x10*3/uL    Neutrophils % 87.0 40.0 - 80.0 %    Immature Granulocytes %, Automated 0.4 0.0 - 0.9 %    Lymphocytes % 7.7 13.0 - 44.0 %    Monocytes % 4.3 2.0 - 10.0 %    Eosinophils % 0.3 0.0 - 6.0 %    Basophils % 0.3 0.0 - 2.0 %    Neutrophils Absolute 9.35 (H) 1.20 - 7.70 x10*3/uL    Immature Granulocytes Absolute, Automated 0.04 0.00 -  0.70 x10*3/uL    Lymphocytes Absolute 0.83 (L) 1.20 - 4.80 x10*3/uL    Monocytes Absolute 0.46 0.10 - 1.00 x10*3/uL    Eosinophils Absolute 0.03 0.00 - 0.70 x10*3/uL    Basophils Absolute 0.03 0.00 - 0.10 x10*3/uL   POCT GLUCOSE   Result Value Ref Range    POCT Glucose 312 (H) 74 - 99 mg/dL   POCT GLUCOSE   Result Value Ref Range    POCT Glucose 150 (H) 74 - 99 mg/dL   POCT GLUCOSE   Result Value Ref Range    POCT Glucose 203 (H) 74 - 99 mg/dL   CBC and Auto Differential   Result Value Ref Range    WBC 9.4 4.4 - 11.3 x10*3/uL    nRBC 0.0 0.0 - 0.0 /100 WBCs    RBC 6.23 (H) 4.50 - 5.90 x10*6/uL    Hemoglobin 19.0 (H) 13.5 - 17.5 g/dL    Hematocrit 55.1 (H) 41.0 - 52.0 %    MCV 88 80 - 100 fL    MCH 30.5 26.0 - 34.0 pg    MCHC 34.5 32.0 - 36.0 g/dL    RDW 15.9 (H) 11.5 - 14.5 %    Platelets 152 150 - 450 x10*3/uL    Neutrophils % 86.6 40.0 - 80.0 %    Immature Granulocytes %, Automated 0.2 0.0 - 0.9 %    Lymphocytes % 8.3 13.0 - 44.0 %    Monocytes % 4.4 2.0 - 10.0 %    Eosinophils % 0.2 0.0 - 6.0 %    Basophils % 0.3 0.0 - 2.0 %    Neutrophils Absolute 8.14 (H) 1.20 - 7.70 x10*3/uL    Immature Granulocytes Absolute, Automated 0.02 0.00 - 0.70 x10*3/uL    Lymphocytes Absolute 0.78 (L) 1.20 - 4.80 x10*3/uL    Monocytes Absolute 0.41 0.10 - 1.00 x10*3/uL    Eosinophils Absolute 0.02 0.00 - 0.70 x10*3/uL    Basophils Absolute 0.03 0.00 - 0.10 x10*3/uL   Hepatic Function Panel   Result Value Ref Range    Albumin 3.7 3.4 - 5.0 g/dL    Bilirubin, Total 0.8 0.0 - 1.2 mg/dL    Bilirubin, Direct 0.2 0.0 - 0.3 mg/dL    Alkaline Phosphatase 76 33 - 120 U/L    ALT 21 10 - 52 U/L    AST 15 9 - 39 U/L    Total Protein 6.9 6.4 - 8.2 g/dL   Magnesium   Result Value Ref Range    Magnesium 2.00 1.60 - 2.40 mg/dL   Phosphorus   Result Value Ref Range    Phosphorus 3.0 2.5 - 4.9 mg/dL   Basic Metabolic Panel   Result Value Ref Range    Glucose 176 (H) 74 - 99 mg/dL    Sodium 134 (L) 136 - 145 mmol/L    Potassium 3.6 3.5 - 5.3 mmol/L     Chloride 97 (L) 98 - 107 mmol/L    Bicarbonate 25 21 - 32 mmol/L    Anion Gap 16 10 - 20 mmol/L    Urea Nitrogen 41 (H) 6 - 23 mg/dL    Creatinine 1.64 (H) 0.50 - 1.30 mg/dL    eGFR 49 (L) >60 mL/min/1.73m*2    Calcium 9.0 8.6 - 10.6 mg/dL   POCT GLUCOSE   Result Value Ref Range    POCT Glucose 187 (H) 74 - 99 mg/dL   POCT GLUCOSE   Result Value Ref Range    POCT Glucose 405 (H) 74 - 99 mg/dL      Transthoracic Echo (TTE) Complete     [facility], [address] [place] [address]                 Tel [phone] and Fax [phone]    TRANSTHORACIC ECHOCARDIOGRAM REPORT       Patient Name:      VINOD Campos Physician:    [id number] Otto Merino MD  Study Date:        10/17/2024           Ordering Provider:    [id number] ANGEL CRABTREE  MRN/PID:           [id number]             Fellow:  Accession#:        [id number]         Nurse:                Willa Muniz RN  Date of Birth/Age: 1970 / 54 years  Sonographer:          JORGE LUIS Giraldo RDCS  Gender:            M                    Additional Staff:  Height:            175.26 cm            Admit Date:           10/16/2024  Weight:            77.11 kg             Admission Status:     Inpatient -                                                                Routine  BSA / BMI:         1.93 m2 / 25.10      Encounter#:           [id number]                     kg/m2  Blood Pressure:    98/73 mmHg           Department Location:  [facility] Non                                                                Invasive    Study Type:    TRANSTHORACIC ECHO (TTE) COMPLETE  Diagnosis/ICD: Acute on chronic combined systolic (congestive) and diastolic                 (congestive) heart failure (CHF)-I50.43  Indication:    Congestive Heart  Failure  CPT Code:      Echo Complete w Full Doppler-[id number]    Patient History:  Diabetes:          Yes  Pertinent History: Dyspnea, CHF, COPD, CVA and HTN. V-fib, cocaine abuse.    Study Detail: The following Echo studies were performed: 2D, M-Mode, Doppler and                color flow. Definity used as a contrast agent for endocardial                border definition. Total contrast used for this procedure was 3 mL                via IV push.       PHYSICIAN INTERPRETATION:  Left Ventricle: Left ventricular ejection fraction is severely decreased, by visual estimate at 10-15%. There is global hypokinesis of the left ventricle with minor regional variations. The left ventricular cavity size is severely dilated. Spectral Doppler shows an impaired relaxation pattern of left ventricular diastolic filling. There is no definite left ventricular thrombus visualized.  Left Atrium: The left atrium is mildly dilated.  Right Ventricle: The right ventricle is mildly enlarged. There is mildly reduced right ventricular systolic function.  Right Atrium: The right atrium is mild to moderately dilated.  Aortic Valve: The aortic valve is trileaflet. There is no evidence of aortic valve regurgitation. The peak instantaneous gradient of the aortic valve is 4.0 mmHg.  Mitral Valve: The mitral valve is normal in structure. There is mild mitral valve regurgitation.  Tricuspid Valve: The tricuspid valve is structurally normal. There is mild tricuspid regurgitation. The Doppler estimated RVSP is within normal limits at 25.8 mmHg.  Pulmonic Valve: The pulmonic valve is structurally normal. There is trace pulmonic valve regurgitation.  Pericardium: There is no pericardial effusion noted.  Aorta: The aortic root is normal.  Systemic Veins: The inferior vena cava appears normal in size, with IVC inspiratory collapse greater than 50%.  In comparison to the previous echocardiogram(s): Compared with study dated 7/31/2024, the diastolic function  is now grade I and the RVSP decreased from 39 mmHg to 26 mmHg. No significant differences in LV size and function.       CONCLUSIONS:   1. Left ventricular ejection fraction is severely decreased, by visual estimate at 10-15%.   2. There is global hypokinesis of the left ventricle with minor regional variations.   3. Spectral Doppler shows an impaired relaxation pattern of left ventricular diastolic filling.   4. Left ventricular cavity size is severely dilated.   5. No left ventricular thrombus visualized.   6. There is mildly reduced right ventricular systolic function.   7. Mildly enlarged right ventricle.   8. The left atrium is mildly dilated.   9. The right atrium is mild to moderately dilated.  10. Right ventricular systolic pressure is within normal limits.  11. Compared with study dated 7/31/2024, the diastolic function is now grade I and the RVSP decreased from 39 mmHg to 26 mmHg. No significant differences in LV size and function.    QUANTITATIVE DATA SUMMARY:     2D MEASUREMENTS:           Normal Ranges:  LAs:             3.60 cm   (2.7-4.0cm)  IVSd:            0.75 cm   (0.6-1.1cm)  LVPWd:           1.15 cm   (0.6-1.1cm)  LVIDd:           7.64 cm   (3.9-5.9cm)  LVIDs:           7.23 cm  LV Mass Index:   182 g/m2  LVEDV Index:     205 ml/m2  LV % FS          5.3 %       LA VOLUME:                    Normal Ranges:  LA Vol A4C:        60.3 ml    (22+/-6mL/m2)  LA Vol A2C:        73.5 ml  LA Vol BP:         67.0 ml  LA Vol Index A4C:  31.3ml/m2  LA Vol Index A2C:  38.1 ml/m2  LA Vol Index BP:   34.8 ml/m2  LA Area A4C:       21.3 cm2  LA Area A2C:       23.7 cm2  LA Major Axis A4C: 6.4 cm  LA Major Axis A2C: 6.5 cm  LA Volume Index:   34.8 ml/m2       RA VOLUME BY A/L METHOD:            Normal Ranges:  RA Vol A4C:              94.0 ml    (8.3-19.5ml)  RA Vol Index A4C:        48.7 ml/m2  RA Area A4C:             26.6 cm2  RA Major Axis A4C:       6.4 cm       LV SYSTOLIC FUNCTION BY 2D PLANIMETRY (MOD):                        Normal Ranges:  EF-A4C View:    15 % (>=55%)  EF-A2C View:    23 %  EF-Biplane:     16 %  EF-Visual:      13 %  LV EF Reported: 13 %       LV DIASTOLIC FUNCTION:             Normal Ranges:  MV Peak E:             0.32 m/s    (0.7-1.2 m/s)  MV Peak A:             0.85 m/s    (0.42-0.7 m/s)  E/A Ratio:             0.38        (1.0-2.2)  MV e'                  0.025 m/s   (>8.0)  MV lateral e'          0.03 m/s  MV medial e'           0.02 m/s  MV A Dur:              145.33 msec  E/e' Ratio:            12.86       (<8.0)       AORTIC VALVE:           Normal Ranges:  AoV Vmax:      1.00 m/s (<=1.7m/s)  AoV Peak P.0 mmHg (<20mmHg)  LVOT Max Jori:  0.87 m/s (<=1.1m/s)  LVOT VTI:      12.41 cm  LVOT Diameter: 2.20 cm  (1.8-2.4cm)  AoV Area,Vmax: 3.31 cm2 (2.5-4.5cm2)       RIGHT VENTRICLE:  RV Basal 4.40 cm  RV Mid   3.00 cm  RV Major 10.2 cm  TAPSE:   18.0 mm  RV s'    0.07 m/s       TRICUSPID VALVE/RVSP:          Normal Ranges:  Peak TR Velocity:     2.39 m/s  Est. RA Pressure:     3 mmHg  RV Syst Pressure:     26 mmHg  (< 30mmHg)  IVC Diam:             1.70 cm       PULMONIC VALVE:          Normal Ranges:  PV Max Jori:     0.9 m/s  (0.6-0.9m/s)  PV Max PG:      3.5 mmHg       [id number] Otto Merino MD  Electronically signed on 10/17/2024 at 6:07:22 PM       ** Final **  CT head wo IV contrast  Narrative: Interpreted By:  Betty Rushing and Calo Sean-Matthew   STUDY:  CT HEAD WO IV CONTRAST;  10/17/2024 9:16 am      INDICATION:  Signs/Symptoms:lethargy last 3 days, tired all day. 54-year-old male  with history of heart failure reduced ejection fraction, CKD,  substance abuse who presents with weakness/lethargy/vision changes.          COMPARISON:  MR brain 2023, CT head 2023, 2023.      ACCESSION NUMBER(S):  [id number]      ORDERING CLINICIAN:  CELESTINO BARBER      TECHNIQUE:  Noncontrast axial CT scan of head was performed. Angled reformats in  brain and bone windows were  generated. The images were reviewed in  bone, brain, blood and soft tissue windows.      FINDINGS:  CSF Spaces: Diffuse prominence of the ventricles and sulci is noted.  The basal cisterns are clear. There is no abnormal extraaxial fluid  collection.      Parenchyma: Remote left cerebellar hemisphere infarct. Scattered  patchy and confluent hypodensities are noted throughout the  periventricular and subcortical white matter, nonspecific, but likely  represent chronic small vessel ischemic changes. The grey-white  differentiation is intact. There is no mass effect or midline shift.  There is no acute intracranial hemorrhage.      Calvarium: The calvarium is unremarkable.      Paranasal sinuses and mastoids: Visualized paranasal sinuses and  mastoids are clear.      Impression: 1. No acute intracranial abnormality or mass effect.  2. Remote left cerebellar infarct.      I personally reviewed the images/study and I agree with the findings  as stated by Ovi Juan MD. This study was interpreted at  [place], [place].      MACRO:  None      Signed by: Betty Rushing 10/17/2024 10:42 AM  Dictation workstation:   [id number]  US renal complete  Narrative: Interpreted By:  Gamaliel Brandon,  and Yan Salcido   STUDY:  US RENAL COMPLETE;  10/17/2024 9:18 am      INDICATION:  Signs/Symptoms:GUICHO.          COMPARISON:  CT abdomen pelvis 09/30/2024      ACCESSION NUMBER(S):  [id number]      ORDERING CLINICIAN:  CELESTINO BARBER      TECHNIQUE:  Multiple images of the kidneys were obtained  .      FINDINGS:  RIGHT KIDNEY:  The right kidney measures 10.1 cm in length. The renal cortical  echogenicity is increased. No hydronephrosis is present; no evidence  of nephrolithiasis.      LEFT KIDNEY:  The left kidney measures 10.6 cm in length. The renal cortical  echogenicity is increased. No hydronephrosis is present; no evidence  of nephrolithiasis.      BLADDER:  The  urinary bladder is unremarkable in appearance.      Prostate measures 3.2 x 2.7 x 3 cm.      Impression: Bilateral echogenic kidney is, likely related to medical renal  disease. No nephrolithiasis or hydronephrosis.      I personally reviewed the images/study and I agree with the findings  as stated by resident physician Dr. Omari Patel . This study  was interpreted at [facility],  [place], [place].      MACRO:  None          Signed by: Gamaliel Brandon 10/17/2024 9:33 AM  Dictation workstation:   [id number]  ECG 12 lead  Sinus bradycardia  Left axis deviation  Left ventricular hypertrophy with QRS widening and repolarization abnormality ( R in aVL , Sokolow-Nieves , Brown product , Romhilt-Dominguez )  Inferior infarct (cited on or before 16-OCT-2024)  Abnormal ECG  When compared with ECG of 15-OCT-2024 09:55,  Vent. rate has decreased BY  31 BPM  Nonspecific T wave abnormality now evident in Inferior leads  QT has shortened  See ED provider note for full interpretation and clinical correlation  Confirmed by Samantha Ace ([id number]) on 10/17/2024 12:50:32 AM  XR chest 1 view  Narrative: Interpreted By:  Matteo Rojo and Sheng Max   STUDY:  XR CHEST 1 VIEW;  10/17/2024 12:06 am      INDICATION:  Signs/Symptoms:sob.          COMPARISON:  Chest radiograph dated 10/15/2024, CT chest 09/30/2024      ACCESSION NUMBER(S):  [id number]      ORDERING CLINICIAN:  NIK CARREON      FINDINGS:  AP radiograph of the chest:      LINES AND DEVICES:  AICD pacemaker device overlies the left lateral hemithorax with its  single lead projecting over the right ventricle.      CARDIOMEDIASTINAL SILHOUETTE:  Stable enlargement of the cardiomediastinal silhouette.      LUNGS:  Left-sided apical lucencies correlates to severe bullous emphysema  better evaluated prior CT chest from 09/30/2024. Pulmonary vascular  congestion suggesting volume overload without je interstitial  edema. No  focal consolidation, pleural effusion, or pneumothorax.      ABDOMEN:  No remarkable upper abdominal findings.      BONES:  No acute osseous abnormality.      Impression: Cardiomegaly and pulmonary vascular congestion suggesting volume  overload without je interstitial edema. Similar appearance of  bullous changes in the left upper lung.      I personally reviewed the images/study and I agree with the findings  as stated by Dr. Roberto Acosta. This study was interpreted at [facility], [place], [place].      MACRO:  None      Signed by: Matteo Rojo 10/17/2024 12:38 AM  Dictation workstation:   [id number]  Point of Care Ultrasound  Donal Joy DO     10/17/2024 12:07 AM    Performed by: Donal Joy DO  Authorized by: Lily Lyles MD  Cardiac Indications: hypotension    Procedure: Cardiac Ultrasound    Findings:   Views: parasternal long, parasternal short and apical four  The pericardial space was visualized and was NEGATIVE for a significant   pericardial effusion.  Activity: Ventricular contractions were visualized.  LV: LV systolic function was DECREASED.  RV: RV size was NORMAL.    Impression:  Cardiac: The focused cardiac ultrasound exam had ABNORMAL findings as   specified.    Comments: Significantly decreased EF with global hypokinesis that is   slightly worse in the anterior aspect and is consistent with the previous   TTE from 7/31/2024.  IVC is collapsible.     Encounter Date: 10/16/24   ECG 12 lead   Result Value    Ventricular Rate 54    Atrial Rate 54    OK Interval 188    QRS Duration 118    QT Interval 514    QTC Calculation(Bazett) 487    P Axis -5    R Axis -39    T Axis 101    QRS Count 9    Q Onset 201    P Onset 107    P Offset 165    T Offset 458    QTC Fredericia 496    Narrative    Sinus bradycardia  Left axis deviation  Left ventricular hypertrophy with QRS widening and repolarization abnormality ( R in aVL , Sokolow-Nieves , Trufant product ,  Suha )  Inferior infarct (cited on or before 16-OCT-2024)  Abnormal ECG  When compared with ECG of 15-OCT-2024 09:55,  Vent. rate has decreased BY  31 BPM  Nonspecific T wave abnormality now evident in Inferior leads  QT has shortened  See ED provider note for full interpretation and clinical correlation  Confirmed by Samantha Ace ([id number]) on 10/17/2024 12:50:32 AM        Allergies  Patient has no known allergies.    Scheduled medications  amiodarone, 200 mg, oral, Daily  aspirin, 81 mg, oral, Daily  atorvastatin, 40 mg, oral, Nightly  carvedilol, 3.125 mg, oral, BID  empagliflozin, 10 mg, oral, Daily  insulin glargine, 15 Units, subcutaneous, q24h  insulin lispro, 0-5 Units, subcutaneous, Nightly  insulin lispro, 0-5 Units, subcutaneous, TID AC  insulin lispro, 5 Units, subcutaneous, TID AC  sacubitriL-valsartan, 1 tablet, oral, BID  sertraline, 100 mg, oral, Daily  spironolactone, 12.5 mg, oral, Daily  tiotropium, 2 puff, inhalation, Daily  torsemide, 40 mg, oral, Daily      Continuous medications     PRN medications  PRN medications: dextrose, dextrose, glucagon, glucagon     Assessment/Plan    Leonel Yu is a 54 y.o. male with PMHx of HFrEF (5-10%) s/p ICD, CKD, substance use (crack cocaine), medication nonadherence presenting with weakness/lethargy/vision changes x2 days, no chest pain. Difficulty breathing when laying flat, has been sleeping in chair. Passed out 3 weeks ago. Adherent to medications, except for diuretics. Last diuretic dose 3 days ago. Last used crack cocaine 3 days ago.      He was admitted 10/15 for the above complaints.  He was noted to have BNP of 2388.  He received his home gdmt. Coreg 3.125, empagliflozin 10 mg, torsemide 40.  He was also given Lasix 40 IV twice.  He reported improved symptoms with these medications. He was also given prednisone 60 mg once (for COPD).  He was given lispro 10 units for hyperglycemia in the 400s.     Represented few hours later (10/16) for  "similar symptoms - generalized weakness. His friend had convinced him to come back to hospital. He was noted to be drowsy and have facial diaphoresis. BP 80/60s, hypoglycemia to 31. Ultimately infectious workup began. POCUS showed collapsible IVC, and 500 ml fluids were given.     10/18: Consulted for [facility] r/t pt's mood/statements and health. Pt refusing to discuss hospice, has is own plan. See ACP below.    Palliative Performance Scale (PPS): 60-70    ----------------------------------------------------------------------------------------------------------------------------------------------------------------------------------------------------------------------------------------------------------------------------------------------------------------------------------------------------------------------  Advanced Care Planning  Patient and/or family consented to a voluntary Advanced Care Planning meeting.   Serious Illness Assessment and Counseling:  Life Limiting Disease: HFrEF posing threat to life and function.    Understanding/Overall Impression: Pt not wishing to discuss his heart and overall health, as he says it is poor, that \"his heart is failing\".    Goals/Hopes: States he does not have one. Later discussed a plan to \"blow his heart out\" with crack cocaine.    Fears/Worries/Concerns: -    Patient's Perception of Functional Status: He is independent    Minimal Acceptable Quality of Life/Maximal [place] Tolerable for the Possibility of More Time: Pt expressing that living a life without his family is nothing; that there is nothing to live for. Pt expresses he is too old to \"start over\" with some one new. He wants his family.    Advanced Directives:  Surrogate Health Care Decision Maker: Not discussed  HPOA: not on file  Living will: not on file      Code Status:   Decision to keep code status DNR/DNI at this time.     Hospice Discussion/Eligibility: Attempted counseling on the benefit of Hospice Services but " "pt refused.      I spent 20 minutes in providing separately identifiable ACP services with the patient and/or surrogate decision maker in a voluntary conversation discussing the patient's wishes and goals as detailed in the above note.   ----------------------------------------------------------------------------------------------------------------------------------------------------------------------------------------------------------------------------------------------------------------------------------------------------------------------------------------------------------------------    #Complex Medical Decision Making  #Goals of Care  #Advanced Care Planning  - Code status: DNR/DNI  - Surrogate decision maker: none discussed. Roommate Elizabet only person on file. With no NOK, consider Elizabet.  - Pt unable to state goal.   - 10/18: Met with pt along with pall care team to discuss pt's health, health preferences, goals, and values. Pt stating he has no wishes or goals. Pt expressed he has lost his family and the will to live. He has tried many out-patient treatments and meetings, Psych, and meds; nothing works. Pt feels he has exhausted all options because \"nothing replaces family\". Patient did provide Pall team of his hx and what led him to this mental state. Pt states he is not , but was for 30 years. They have 3 children. About 5-6 years ago, pt's wife called the police, stating domestic abuse and pt was arrested. Next day, there was a restraining order to keep him away from his family. Pt wants to be in contact with his children, but their numbers have changed and refuse to speak to him. Pt states he uses crack cocaine to cope but it is only temporary. Nothing helps his mental pain of losing his family. Later in the conversation, pt admits he uses crack cocaine in hopes to \"blow his heart out\". Pt mentions that next time, he will \"blow his heart out\". Pall team wished to discuss his feelings and " "discuss the option of hospice to honor pt. Pt refused to discuss hospice/comfort care x 2 attempts, stating he just wants to be left alone and does not want \"any support\". Pt expressed the wish to die alone. Pt did not wish to carry on any further dialogue at this time, stating \"you are wasting your time\". Pall team expressed our availability to help pt however it is most meaningful, to navigate a path that honors him. Again, pt refused to discuss a plan of action any further, stating he wants to be left alone and he has his own plan.    Primary team made aware of pt's plan.      #Grief  #SI  - Expresses r/t losing his family and inability to speak to his children. Has lost the want to live.   - Has seen Psych in the past with \"meetings, medications, tried everything\" and nothing worked. Music and crack cocaine only provide temporary help.  - Expressed he uses \"in hopes to blow his heart out\". Stated a plan that next time he \"will blow his heart out\". Primary team and Psych aware.  - Refuses to discuss hospice and navigating a plan that honors pt x2 attempts.      #Psychosocial Support  - Ongoing palliative and SW support and navigation  - Music Therapy  - Spiritual Care Support  - Psychiatry Support      Plan of Care discussed with: Updated primary and bedside RN on goals of care decision, medication adjustments, and code status     Medical Decision Making was high level due to high complexity of problems, extensive data review, and high risk of management/treatment.     - End stage HFrEF, suicidal plan posing threat to life and function.  - Reviewed external notes from previous admissions  - Reviews results from  which were used in decision making for   - Recommended the following tests:   - Assessment required independent historian: primary team  - Independent interpretation of test: labs and imaging  - Discussion of management with: primary team  - Drug therapy requiring intensive monitoring for toxicity: " insulin  - Decision regarding elective major surgery with identified patient or procedure risk factors:   - Decision regarding emergency major surgery:   - Decision regarding hospitalization or escalation of hospital-level care:   - Decision not to resuscitate or to de-escalate care because of poor prognosis:   - Parenteral controlled substances: insulin    Thank you for allowing us to participate in the care of this patient. Palliative will continue to follow as needed. Palliative medicine is available Monday-Friday, 8a-6p. Please contact team with any questions or concerns.  Team pager [phone] (weekdays)  Vishal Adkins CNP (on [employer] secure chat)

## 2024-10-18 NOTE — CARE PLAN
The patient's goals for the shift include      The clinical goals for the shift include pt remains HDS throughout shift  shift    Over the shift, the patient did not make progress toward the following goals. Barriers to progression include . Recommendations to address these barriers include .

## 2024-10-18 NOTE — PROGRESS NOTES
Leonel Yu is a 54 y.o. male on day 1 of admission presenting with Shortness of breath at rest.  Met with pt today along with Rev Flori Petty and Kristine Adkins CNP from Palliative Care. Pt had a difficult time sharing his story because he voiced the reminiscing was distressful to relive in his thoughts. He describes himself as feeling helpless beyond our remedy.  He did not want to take or (waste) our time.  With much patience and encouragement from the team, he shared how he lost his family.  He shared how he uses crack cocaine to alleviate his painful memories. He expressed how nothing he does or tries eliminates the pain for good. He reported that he is hoping  that when he gets home,  crack cocaine takes him and blows up his heart for good. Support and empathy was provided throughout the visit. Provided reflective listening and presence.      MICHAEL JIM

## 2024-10-18 NOTE — PROGRESS NOTES
SW met with pt at bedside to offer support and information. Pt is alert and fully oriented.   Pt reported he lives with his roommate, Elizabet, and he would be pt's main caregiver after discharge. SW provided information of [facility] which can support pt with resources for his LUZ, but pt declined. SW made pt aware that SW is available for issues or questions on social work.  Pt denied any further issues or questions on social work at the moment and SW will continue to follow and assist.     Bernard Torrez, PEDROA, LSW

## 2024-10-18 NOTE — PROGRESS NOTES
"Spiritual Care Visit    Clinical Encounter Type  Visited With: Patient  Routine Visit: Introduction  Continue Visiting: Yes    Met with patient along with Palliative CNP and SW to discuss patient's condition. Patient describes himself as \"hopeless\"  due to \"lost family\" so is \"hoping crack cocaine takes me, blows up my heart.\" Patient says he has been inpatient several times trying to \"get better\" and \"nothing works.\" Provided non-anxious, supportive presence, reflective listening, provided opportunities for agency. Will continue to follow.                                               "

## 2024-10-18 NOTE — NURSING NOTE
"Mr. Yu is resting at side of bed.  Overall he is feeling better today.  We reviewed his insulin regimen -- chart written out with the current regimen.  We practiced BG scenarios and he is able to calculate the meal time dose of insulin.  We reviewed insulin pen use -- he is able to state \"I won't forget to prime it\"  Joseph 3 placed to rt arm and education re how this works.  He has been provided a reader and was able to set this up with some assist.  His house mate has this CGM and will be able to assist him at home.  Will follow up on 10/21.  He is agreeable to this plan.  Time spent:  35 mins.    "

## 2024-10-18 NOTE — CONSULTS
HISTORY OF PRESENT ILLNESS:  Leonel Yu is a 54 y.o. male with a past psychiatric history of MDD with anxiety, stimulant use disorder (crack cocaine), TUD in remission and a past medical history of HRrEF (5-10% EF) s/p ICD 3/24, CKD3a, COPD (nocturnal @L at baseline), DM2, L cerebellar hemorrhagic infarct who was admitted to [facility] on 10/15/2024 for acute decompensated heart failure in the setting of medication non-adherence. Psychiatry was consulted on 10/18/24 for suicidal ideation iso crack cocaine use to cope with grief.    On chart review, patient  initial labs showed BNP at 2388, Palliative care consulted and patient appeared stricken with grief related to traumatic loss of family, including 3 children, who refuse to communicate with patient. Endorsing helplessness and utilizing crack cocaine as a coping mechanism despite insight into the seriousness of his heart condition. Endorsed thoughts of death.     On interview, patient reports depression began 6 years ago after patient and     Spoke with [place] [employer] on 10/18/2024:  Duty to Peer[employer] - Dispatcher [facility] and spoke with Officer Cathy     PSYCHIATRIC REVIEW OF SYSTEMS  Depression: {Depression:56670}  Anxiety: {Anxiety:13001}  Sonia: {Sonia:73861}  Psychosis: {Psychosis:88480}  Delirium: {Delirium:28772}   Trauma: {Trauma:91799}    PSYCHIATRIC HISTORY  Prior diagnoses: ***  Prior hospitalizations: ***  History of suicide attempts: ***  History of self-harm: ***  History of trauma/abuse/loss: ***  History of violence: ***    Current psychiatrist: ***  Current mental health agency: ***  Current : ***  Current outpatient treatment: ***  Guardian or payee: ***    Current psychiatric medications: ***  Past psychiatric medications: ***  Past psychiatric treatments: ***    Family psychiatric history: ***     - Psychiatric disorders:     - Suicide:     - Substance use:     - Medication history:     - Neurologic diseases:    SUBSTANCE USE HISTORY  "  He reports that he has quit smoking. His smoking use included cigarettes. He has never used smokeless tobacco. He reports current drug use. Drug: \"Crack\" cocaine. He reports that he does not drink alcohol.    Tobacco: ***  Alcohol: ***     - History of severe withdrawal: ***     - Last use: ***  Cannabis: ***  Other substances: ***     - Last use: ***     - History of overdose: ***     - Longest period of sobriety: ***  Prior substance use disorder treatment: ***    SOCIAL HISTORY  Social History     Socioeconomic History   • Marital status: Single   Tobacco Use   • Smoking status: Former     Types: Cigarettes   • Smokeless tobacco: Never   Vaping Use   • Vaping status: Never Used   Substance and Sexual Activity   • Alcohol use: Never   • Drug use: Yes     Types: \"Crack\" cocaine     Comment: last used 7/28   • Sexual activity: Defer     Social Drivers of Health     Financial Resource Strain: Medium Risk (10/18/2024)    Overall Financial Resource Strain (CARDIA)    • Difficulty of Paying Living Expenses: Somewhat hard   Food Insecurity: No Food Insecurity (10/1/2024)    Hunger Vital Sign    • Worried About Running Out of Food in the Last Year: Never true    • Ran Out of Food in the Last Year: Never true   Transportation Needs: No Transportation Needs (10/18/2024)    PRAPARE - Transportation    • Lack of Transportation (Medical): No    • Lack of Transportation (Non-Medical): No   Recent Concern: Transportation Needs - Unmet Transportation Needs (9/7/2024)    PRAPARE - Transportation    • Lack of Transportation (Medical): Yes    • Lack of Transportation (Non-Medical): Yes   Physical Activity: Sufficiently Active (12/25/2023)    Exercise Vital Sign    • Days of Exercise per Week: 5 days    • Minutes of Exercise per Session: 60 min   Stress: Stress Concern Present (12/25/2023)    Citizen of the [place] of Occupational Health - Occupational Stress Questionnaire    • Feeling of Stress : Very much   Social Connections: Not on " File (9/23/2024)    Received from [employer]    • Connectedness: 0   Intimate Partner Violence: Not At Risk (10/1/2024)    Humiliation, Afraid, Rape, and Kick questionnaire    • Fear of Current or Ex-Partner: No    • Emotionally Abused: No    • Physically Abused: No    • Sexually Abused: No   Housing Stability: Low Risk  (10/18/2024)    Housing Stability Vital Sign    • Unable to Pay for Housing in the Last Year: No    • Number of Times Moved in the Last Year: 1    • Homeless in the Last Year: No   Recent Concern: Housing Stability - High Risk (9/7/2024)    Housing Stability Vital Sign    • Unable to Pay for Housing in the Last Year: Yes    • Number of Times Moved in the Last Year: 1    • Homeless in the Last Year: Yes      Current living situation: with a roommate, spends most of his day at home  Current employment/source of income: disabled for 5 years - depression and SI related to loss of family  Current stressors: No contact with family, his children have changed their phone numbers and refuse to talk to him.     Born and raised: ***  Family: was  for 30 years, 3 children  Childhood: ***  Education: ***  History of learning difficulty: ***  Employment: ***  Marital status: ***   Children: ***  Social support: ***  Catholic/Spirituality: ***  Legal history: ***   history: ***  Access to weapons: ***    PAST MEDICAL HISTORY  Past Medical History:   Diagnosis Date   • CHF (congestive heart failure) (Multi)    • Chronic kidney disease    • COPD (chronic obstructive pulmonary disease) (Multi)    • Diabetes mellitus (Multi)    • Hyperlipidemia    • Hypertension    • Stroke (Multi)    • Substance abuse (Multi)         Additional Past Medical History: ***  Prior Head trauma/TBI/LOC/seizure history: ***  Ob/Gyn history: ***  LMP/contraception: ***    PAST SURGICAL HISTORY  Past Surgical History:   Procedure Laterality Date   • INSERT / REPLACE / REMOVE PACEMAKER          Additional Past  Surgical History: ***    FAMILY HISTORY  Family History   Problem Relation Name Age of Onset   • Heart disease Father          ALLERGIES  Patient has no known allergies.    Some components of the patient's history were obtained through personal review of the patient's available medical records.    OARRS REVIEW  OARRS checked: ***  OARRS comments: ***    OBJECTIVE    VITALS      10/17/2024    11:42 AM 10/17/2024     2:46 PM 10/17/2024     7:28 PM 10/18/2024     1:17 AM 10/18/2024     7:54 AM 10/18/2024    10:20 AM 10/18/2024    11:52 AM   Vitals   Systolic 92 101 107 99 95 96 107   Diastolic 66 71 61 65 70 68 72   Heart Rate 72 68 85 67 66  67   Temp 36.2 °C (97.2 °F) 36.1 °C (97 °F) 36.4 °C (97.5 °F) 36.5 °C (97.7 °F) 35.9 °C (96.6 °F)  36.4 °C (97.5 °F)   Resp 17 18 19 20 20  18        MENTAL STATUS EXAM  Appearance: ***  Attitude: ***  Behavior: ***  Motor Activity: ***  Speech: ***  Mood: ***  Affect: ***  Thought Process: ***  Thought Content:  ***  Thought Perception: ***  Cognition: ***  Insight: ***  Judgement: ***    PHYSICAL EXAM  ***    MEDICAL REVIEW OF SYSTEMS  Review of Systems     HOME MEDICATIONS  Medication Documentation Review Audit       Reviewed by Rafaela Victoria (Technician) on 10/16/24 at 1331      Medication Order Taking? Sig Documenting Provider Last Dose Status   amiodarone (Pacerone) 200 mg tablet [id number] Yes Take 1 tablet (200 mg) by mouth once daily. Bart Casas, JACKIE-CNP, DNP 10/15/2024 Active   aspirin 81 mg chewable tablet [id number] Yes Chew 1 tablet (81 mg) once daily. JACKIE Ramos-CNP 10/15/2024 Active   atorvastatin (Lipitor) 80 mg tablet [id number] Yes Take 1 tablet (80 mg) by mouth once daily at bedtime. JACKIE Ramos-CNP 10/15/2024 Active   blood sugar diagnostic strip [id number]  Use as directed to test blood glucose up to four times daily and as needed. Chase Blanca, APRN-CNP  Active   blood-glucose meter misc [id number]  Inject 1 each under the skin 4 times  "a day with meals. Check blood glucose 4 times daily, before meals and at bedtime. DEYVI Ramos  Active   carvedilol (Coreg) 3.125 mg tablet [id number]  Take 1 tablet (3.125 mg) by mouth 2 times a day. Kristin Lundberg MD   10/12/24 2359   empagliflozin (Jardiance) 10 mg [id number] Yes TAKE 1 TABLET BY MOUTH ONCE DAILY DEYVI Ramos 10/15/2024 Active   glucose 4 gram chewable tablet [id number] Yes Chew 2 tablets (8 g) if needed for low blood sugar - see comments. DEYVI Ramos 10/15/2024 Active   insulin glargine (Lantus) 100 unit/mL (3 mL) pen [id number] No Inject 30 Units under the skin once daily at bedtime.   Patient not taking: Reported on 10/16/2024    DEYVI Ramos More than a month Active   insulin lispro (HumaLOG) 100 unit/mL injection [id number] Yes Inject 0-10 Units under the skin 3 times daily (morning, midday, late afternoon). Hypoglycemia protocol if Blood Glucose is between 0 - 70 mg/dL, 0 unit(s) if , 2 unit(s) if 151-200, 4 unit(s) if 201-250, 6 unit(s) if 251-300, 8 unit(s) if 301-350, 10 unit(s) if 351-400. Notify provider unit(s) if Blood Glucose is greater than 400 mg/dL DEYVI Ramos 10/15/2024 Active   lancets 33 gauge misc [id number]  Inject 1 each under the skin 4 times a day. DEYVI Ramos  Active   pen needle 1/2\" 29G X 12mm needle [id number]  Use to inject 1-4 times daily as directed. DEYVI Ramos  Active   sacubitriL-valsartan (Entresto) 24-26 mg tablet [id number] Yes Take 1 tablet by mouth 2 times a day. Bart Casas, APRN-CNP, DNP 10/15/2024 Active   sertraline (Zoloft) 100 mg tablet [id number] Yes Take 1 tablet (100 mg) by mouth once daily. JACKIE Ramos-CNP 10/15/2024 Active   spironolactone (Aldactone) 25 mg tablet [id number] Yes Take 0.5 tablets (12.5 mg) by mouth once daily. Chase Blanca, JACKIE-CNP 10/15/2024 Active   tiotropium (Spiriva Respimat) 2.5 mcg/actuation inhaler [id number] Yes " Inhale 2 puffs once daily. Blancaesmer Blanca, APRN-CNP 10/15/2024 Active   torsemide (Demadex) 20 mg tablet [id number] Yes Take 2 tablets (40 mg) by mouth once daily. Bart Casas, APRN-CNP, [facility] 10/15/2024 Active                     CURRENT MEDICATIONS  Scheduled medications  amiodarone, 200 mg, oral, Daily  aspirin, 81 mg, oral, Daily  atorvastatin, 40 mg, oral, Nightly  carvedilol, 3.125 mg, oral, BID  empagliflozin, 10 mg, oral, Daily  insulin glargine, 15 Units, subcutaneous, q24h  insulin lispro, 0-5 Units, subcutaneous, Nightly  insulin lispro, 0-5 Units, subcutaneous, TID AC  insulin lispro, 5 Units, subcutaneous, TID AC  sacubitriL-valsartan, 1 tablet, oral, BID  sertraline, 100 mg, oral, Daily  spironolactone, 12.5 mg, oral, Daily  tiotropium, 2 puff, inhalation, Daily  torsemide, 40 mg, oral, Daily        Continuous medications       PRN medications  PRN medications: dextrose, dextrose, glucagon, glucagon     LABS  Results for orders placed or performed during the hospital encounter of 10/16/24 (from the past 24 hours)   POCT GLUCOSE   Result Value Ref Range    POCT Glucose 471 (H) 74 - 99 mg/dL   POCT GLUCOSE   Result Value Ref Range    POCT Glucose 476 (H) 74 - 99 mg/dL   Renal function panel   Result Value Ref Range    Glucose 323 (H) 74 - 99 mg/dL    Sodium 132 (L) 136 - 145 mmol/L    Potassium 4.0 3.5 - 5.3 mmol/L    Chloride 94 (L) 98 - 107 mmol/L    Bicarbonate 25 21 - 32 mmol/L    Anion Gap 17 10 - 20 mmol/L    Urea Nitrogen 42 (H) 6 - 23 mg/dL    Creatinine 1.99 (H) 0.50 - 1.30 mg/dL    eGFR 39 (L) >60 mL/min/1.73m*2    Calcium 9.6 8.6 - 10.6 mg/dL    Phosphorus 3.7 2.5 - 4.9 mg/dL    Albumin 3.9 3.4 - 5.0 g/dL   CBC and Auto Differential   Result Value Ref Range    WBC 10.7 4.4 - 11.3 x10*3/uL    nRBC 0.0 0.0 - 0.0 /100 WBCs    RBC 6.14 (H) 4.50 - 5.90 x10*6/uL    Hemoglobin 18.5 (H) 13.5 - 17.5 g/dL    Hematocrit 56.0 (H) 41.0 - 52.0 %    MCV 91 80 - 100 fL    MCH 30.1 26.0 - 34.0 pg    MCHC  33.0 32.0 - 36.0 g/dL    RDW 16.1 (H) 11.5 - 14.5 %    Platelets 167 150 - 450 x10*3/uL    Neutrophils % 87.0 40.0 - 80.0 %    Immature Granulocytes %, Automated 0.4 0.0 - 0.9 %    Lymphocytes % 7.7 13.0 - 44.0 %    Monocytes % 4.3 2.0 - 10.0 %    Eosinophils % 0.3 0.0 - 6.0 %    Basophils % 0.3 0.0 - 2.0 %    Neutrophils Absolute 9.35 (H) 1.20 - 7.70 x10*3/uL    Immature Granulocytes Absolute, Automated 0.04 0.00 - 0.70 x10*3/uL    Lymphocytes Absolute 0.83 (L) 1.20 - 4.80 x10*3/uL    Monocytes Absolute 0.46 0.10 - 1.00 x10*3/uL    Eosinophils Absolute 0.03 0.00 - 0.70 x10*3/uL    Basophils Absolute 0.03 0.00 - 0.10 x10*3/uL   POCT GLUCOSE   Result Value Ref Range    POCT Glucose 312 (H) 74 - 99 mg/dL   POCT GLUCOSE   Result Value Ref Range    POCT Glucose 150 (H) 74 - 99 mg/dL   POCT GLUCOSE   Result Value Ref Range    POCT Glucose 203 (H) 74 - 99 mg/dL   CBC and Auto Differential   Result Value Ref Range    WBC 9.4 4.4 - 11.3 x10*3/uL    nRBC 0.0 0.0 - 0.0 /100 WBCs    RBC 6.23 (H) 4.50 - 5.90 x10*6/uL    Hemoglobin 19.0 (H) 13.5 - 17.5 g/dL    Hematocrit 55.1 (H) 41.0 - 52.0 %    MCV 88 80 - 100 fL    MCH 30.5 26.0 - 34.0 pg    MCHC 34.5 32.0 - 36.0 g/dL    RDW 15.9 (H) 11.5 - 14.5 %    Platelets 152 150 - 450 x10*3/uL    Neutrophils % 86.6 40.0 - 80.0 %    Immature Granulocytes %, Automated 0.2 0.0 - 0.9 %    Lymphocytes % 8.3 13.0 - 44.0 %    Monocytes % 4.4 2.0 - 10.0 %    Eosinophils % 0.2 0.0 - 6.0 %    Basophils % 0.3 0.0 - 2.0 %    Neutrophils Absolute 8.14 (H) 1.20 - 7.70 x10*3/uL    Immature Granulocytes Absolute, Automated 0.02 0.00 - 0.70 x10*3/uL    Lymphocytes Absolute 0.78 (L) 1.20 - 4.80 x10*3/uL    Monocytes Absolute 0.41 0.10 - 1.00 x10*3/uL    Eosinophils Absolute 0.02 0.00 - 0.70 x10*3/uL    Basophils Absolute 0.03 0.00 - 0.10 x10*3/uL   Hepatic Function Panel   Result Value Ref Range    Albumin 3.7 3.4 - 5.0 g/dL    Bilirubin, Total 0.8 0.0 - 1.2 mg/dL    Bilirubin, Direct 0.2 0.0 - 0.3 mg/dL     Alkaline Phosphatase 76 33 - 120 U/L    ALT 21 10 - 52 U/L    AST 15 9 - 39 U/L    Total Protein 6.9 6.4 - 8.2 g/dL   Magnesium   Result Value Ref Range    Magnesium 2.00 1.60 - 2.40 mg/dL   Phosphorus   Result Value Ref Range    Phosphorus 3.0 2.5 - 4.9 mg/dL   Basic Metabolic Panel   Result Value Ref Range    Glucose 176 (H) 74 - 99 mg/dL    Sodium 134 (L) 136 - 145 mmol/L    Potassium 3.6 3.5 - 5.3 mmol/L    Chloride 97 (L) 98 - 107 mmol/L    Bicarbonate 25 21 - 32 mmol/L    Anion Gap 16 10 - 20 mmol/L    Urea Nitrogen 41 (H) 6 - 23 mg/dL    Creatinine 1.64 (H) 0.50 - 1.30 mg/dL    eGFR 49 (L) >60 mL/min/1.73m*2    Calcium 9.0 8.6 - 10.6 mg/dL   POCT GLUCOSE   Result Value Ref Range    POCT Glucose 187 (H) 74 - 99 mg/dL   POCT GLUCOSE   Result Value Ref Range    POCT Glucose 405 (H) 74 - 99 mg/dL   POCT GLUCOSE   Result Value Ref Range    POCT Glucose 266 (H) 74 - 99 mg/dL        IMAGING  Transthoracic Echo (TTE) Complete    Result Date: 10/17/2024   [facility], [address] [place] [address]                Tel [phone] and Fax [phone] TRANSTHORACIC ECHOCARDIOGRAM REPORT  Patient Name:      VINOD Campos Physician:    [id number] Otto Merino MD Study Date:        10/17/2024           Ordering Provider:    [id number] ANGEL CRABTREE MRN/PID:           [id number]             Fellow: Accession#:        [id number]         Nurse:                Willa Muniz RN Date of Birth/Age: 1970 / 54 years  Sonographer:          JORGE LUIS Giraldo RDCS Gender:            M                    Additional Staff: Height:            175.26 cm            Admit Date:           10/16/2024 Weight:            77.11 kg             Admission Status:     Inpatient -                                                                Routine BSA / BMI:         1.93 m2 / 25.10      Encounter#:           [id number]                    kg/m2 Blood Pressure:    98/73 mmHg           Department Location:  [facility] Non                                                               Invasive Study Type:    TRANSTHORACIC ECHO (TTE) COMPLETE Diagnosis/ICD: Acute on chronic combined systolic (congestive) and diastolic                (congestive) heart failure (CHF)-I50.43 Indication:    Congestive Heart Failure CPT Code:      Echo Complete w Full Doppler-49578 Patient History: Diabetes:          Yes Pertinent History: Dyspnea, CHF, COPD, CVA and HTN. V-fib, cocaine abuse. Study Detail: The following Echo studies were performed: 2D, M-Mode, Doppler and               color flow. Definity used as a contrast agent for endocardial               border definition. Total contrast used for this procedure was 3 mL               via IV push.  PHYSICIAN INTERPRETATION: Left Ventricle: Left ventricular ejection fraction is severely decreased, by visual estimate at 10-15%. There is global hypokinesis of the left ventricle with minor regional variations. The left ventricular cavity size is severely dilated. Spectral Doppler shows an impaired relaxation pattern of left ventricular diastolic filling. There is no definite left ventricular thrombus visualized. Left Atrium: The left atrium is mildly dilated. Right Ventricle: The right ventricle is mildly enlarged. There is mildly reduced right ventricular systolic function. Right Atrium: The right atrium is mild to moderately dilated. Aortic Valve: The aortic valve is trileaflet. There is no evidence of aortic valve regurgitation. The peak instantaneous gradient of the aortic valve is 4.0 mmHg. Mitral Valve: The mitral valve is normal in structure. There is mild mitral valve regurgitation. Tricuspid Valve: The tricuspid valve is structurally normal. There is mild tricuspid regurgitation. The  Doppler estimated RVSP is within normal limits at 25.8 mmHg. Pulmonic Valve: The pulmonic valve is structurally normal. There is trace pulmonic valve regurgitation. Pericardium: There is no pericardial effusion noted. Aorta: The aortic root is normal. Systemic Veins: The inferior vena cava appears normal in size, with IVC inspiratory collapse greater than 50%. In comparison to the previous echocardiogram(s): Compared with study dated 7/31/2024, the diastolic function is now grade I and the RVSP decreased from 39 mmHg to 26 mmHg. No significant differences in LV size and function.  CONCLUSIONS:  1. Left ventricular ejection fraction is severely decreased, by visual estimate at 10-15%.  2. There is global hypokinesis of the left ventricle with minor regional variations.  3. Spectral Doppler shows an impaired relaxation pattern of left ventricular diastolic filling.  4. Left ventricular cavity size is severely dilated.  5. No left ventricular thrombus visualized.  6. There is mildly reduced right ventricular systolic function.  7. Mildly enlarged right ventricle.  8. The left atrium is mildly dilated.  9. The right atrium is mild to moderately dilated. 10. Right ventricular systolic pressure is within normal limits. 11. Compared with study dated 7/31/2024, the diastolic function is now grade I and the RVSP decreased from 39 mmHg to 26 mmHg. No significant differences in LV size and function. QUANTITATIVE DATA SUMMARY:  2D MEASUREMENTS:           Normal Ranges: LAs:             3.60 cm   (2.7-4.0cm) IVSd:            0.75 cm   (0.6-1.1cm) LVPWd:           1.15 cm   (0.6-1.1cm) LVIDd:           7.64 cm   (3.9-5.9cm) LVIDs:           7.23 cm LV Mass Index:   182 g/m2 LVEDV Index:     205 ml/m2 LV % FS          5.3 %  LA VOLUME:                    Normal Ranges: LA Vol A4C:        60.3 ml    (22+/-6mL/m2) LA Vol A2C:        73.5 ml LA Vol BP:         67.0 ml LA Vol Index A4C:  31.3ml/m2 LA Vol Index A2C:  38.1 ml/m2 LA Vol  Index BP:   34.8 ml/m2 LA Area A4C:       21.3 cm2 LA Area A2C:       23.7 cm2 LA Major Axis A4C: 6.4 cm LA Major Axis A2C: 6.5 cm LA Volume Index:   34.8 ml/m2  RA VOLUME BY A/L METHOD:            Normal Ranges: RA Vol A4C:              94.0 ml    (8.3-19.5ml) RA Vol Index A4C:        48.7 ml/m2 RA Area A4C:             26.6 cm2 RA Major Axis A4C:       6.4 cm  LV SYSTOLIC FUNCTION BY 2D PLANIMETRY (MOD):                      Normal Ranges: EF-A4C View:    15 % (>=55%) EF-A2C View:    23 % EF-Biplane:     16 % EF-Visual:      13 % LV EF Reported: 13 %  LV DIASTOLIC FUNCTION:             Normal Ranges: MV Peak E:             0.32 m/s    (0.7-1.2 m/s) MV Peak A:             0.85 m/s    (0.42-0.7 m/s) E/A Ratio:             0.38        (1.0-2.2) MV e'                  0.025 m/s   (>8.0) MV lateral e'          0.03 m/s MV medial e'           0.02 m/s MV A Dur:              145.33 msec E/e' Ratio:            12.86       (<8.0)  AORTIC VALVE:           Normal Ranges: AoV Vmax:      1.00 m/s (<=1.7m/s) AoV Peak P.0 mmHg (<20mmHg) LVOT Max Jori:  0.87 m/s (<=1.1m/s) LVOT VTI:      12.41 cm LVOT Diameter: 2.20 cm  (1.8-2.4cm) AoV Area,Vmax: 3.31 cm2 (2.5-4.5cm2)  RIGHT VENTRICLE: RV Basal 4.40 cm RV Mid   3.00 cm RV Major 10.2 cm TAPSE:   18.0 mm RV s'    0.07 m/s  TRICUSPID VALVE/RVSP:          Normal Ranges: Peak TR Velocity:     2.39 m/s Est. RA Pressure:     3 mmHg RV Syst Pressure:     26 mmHg  (< 30mmHg) IVC Diam:             1.70 cm  PULMONIC VALVE:          Normal Ranges: PV Max Jori:     0.9 m/s  (0.6-0.9m/s) PV Max PG:      3.5 mmHg  [id number] Otto Merino MD Electronically signed on 10/17/2024 at 6:07:22 PM  ** Final **     CT head wo IV contrast    Result Date: 10/17/2024  Interpreted By:  Betty Rushing and Calo Sean-Matthew STUDY: CT HEAD WO IV CONTRAST;  10/17/2024 9:16 am   INDICATION: Signs/Symptoms:lethargy last 3 days, tired all day. 54-year-old male with history of heart failure reduced ejection  fraction, CKD, substance abuse who presents with weakness/lethargy/vision changes.     COMPARISON: MR brain 07/25/2023, CT head 12/09/2023, 07/24/2023.   ACCESSION NUMBER(S): [id number]   ORDERING CLINICIAN: CELESTINO BARBER   TECHNIQUE: Noncontrast axial CT scan of head was performed. Angled reformats in brain and bone windows were generated. The images were reviewed in bone, brain, blood and soft tissue windows.   FINDINGS: CSF Spaces: Diffuse prominence of the ventricles and sulci is noted. The basal cisterns are clear. There is no abnormal extraaxial fluid collection.   Parenchyma: Remote left cerebellar hemisphere infarct. Scattered patchy and confluent hypodensities are noted throughout the periventricular and subcortical white matter, nonspecific, but likely represent chronic small vessel ischemic changes. The grey-white differentiation is intact. There is no mass effect or midline shift. There is no acute intracranial hemorrhage.   Calvarium: The calvarium is unremarkable.   Paranasal sinuses and mastoids: Visualized paranasal sinuses and mastoids are clear.       1. No acute intracranial abnormality or mass effect. 2. Remote left cerebellar infarct.   I personally reviewed the images/study and I agree with the findings as stated by Ovi Juan MD. This study was interpreted at [place], [place].   MACRO: None   Signed by: Betty Rushing 10/17/2024 10:42 AM Dictation workstation:   [id number]    US renal complete    Result Date: 10/17/2024  Interpreted By:  Gamaliel Brandon and Afshari Mirak Sohrab STUDY: US RENAL COMPLETE;  10/17/2024 9:18 am   INDICATION: Signs/Symptoms:GUICHO.     COMPARISON: CT abdomen pelvis 09/30/2024   ACCESSION NUMBER(S): [id number]   ORDERING CLINICIAN: CELESTINO BARBER   TECHNIQUE: Multiple images of the kidneys were obtained  .   FINDINGS: RIGHT KIDNEY: The right kidney measures 10.1 cm in length. The renal cortical  echogenicity is increased. No hydronephrosis is present; no evidence of nephrolithiasis.   LEFT KIDNEY: The left kidney measures 10.6 cm in length. The renal cortical echogenicity is increased. No hydronephrosis is present; no evidence of nephrolithiasis.   BLADDER: The urinary bladder is unremarkable in appearance.   Prostate measures 3.2 x 2.7 x 3 cm.       Bilateral echogenic kidney is, likely related to medical renal disease. No nephrolithiasis or hydronephrosis.   I personally reviewed the images/study and I agree with the findings as stated by resident physician Dr. Omari Patel . This study was interpreted at [place], [place].   MACRO: None     Signed by: Gamaliel Brandon 10/17/2024 9:33 AM Dictation workstation:   [id number]    ECG 12 lead    Result Date: 10/17/2024  Sinus bradycardia Left axis deviation Left ventricular hypertrophy with QRS widening and repolarization abnormality ( R in aVL , Sokolow-Nieves , Brown product , Romhilt[place]Dominguez ) Inferior infarct (cited on or before 16-OCT-2024) Abnormal ECG When compared with ECG of 15-OCT-2024 09:55, Vent. rate has decreased BY  31 BPM Nonspecific T wave abnormality now evident in Inferior leads QT has shortened See ED provider note for full interpretation and clinical correlation Confirmed by Samantha Ace ([id number]) on 10/17/2024 12:50:32 AM    XR chest 1 view    Result Date: 10/17/2024  Interpreted By:  Matteo Rojo and Sheng Max STUDY: XR CHEST 1 VIEW;  10/17/2024 12:06 am   INDICATION: Signs/Symptoms:sob.     COMPARISON: Chest radiograph dated 10/15/2024, CT chest 09/30/2024   ACCESSION NUMBER(S): [id number]   ORDERING CLINICIAN: NIK CARREON   FINDINGS: AP radiograph of the chest:   LINES AND DEVICES: AICD pacemaker device overlies the left lateral hemithorax with its single lead projecting over the right ventricle.   CARDIOMEDIASTINAL SILHOUETTE: Stable enlargement of the  cardiomediastinal silhouette.   LUNGS: Left-sided apical lucencies correlates to severe bullous emphysema better evaluated prior CT chest from 09/30/2024. Pulmonary vascular congestion suggesting volume overload without je interstitial edema. No focal consolidation, pleural effusion, or pneumothorax.   ABDOMEN: No remarkable upper abdominal findings.   BONES: No acute osseous abnormality.       Cardiomegaly and pulmonary vascular congestion suggesting volume overload without je interstitial edema. Similar appearance of bullous changes in the left upper lung.   I personally reviewed the images/study and I agree with the findings as stated by Dr. Roberto Acosta. This study was interpreted at [place], [place].   MACRO: None   Signed by: Matteo Rojo 10/17/2024 12:38 AM Dictation workstation:   [id number]    Point of Care Ultrasound    Result Date: 10/16/2024  Donal Joy DO     10/17/2024 12:07 AM Performed by: Donal Joy DO Authorized by: Lily Lyles MD  Cardiac Indications: hypotension Procedure: Cardiac Ultrasound Findings:  Views: parasternal long, parasternal short and apical four The pericardial space was visualized and was NEGATIVE for a significant pericardial effusion. Activity: Ventricular contractions were visualized. LV: LV systolic function was DECREASED. RV: RV size was NORMAL. Impression: Cardiac: The focused cardiac ultrasound exam had ABNORMAL findings as specified. Comments: Significantly decreased EF with global hypokinesis that is slightly worse in the anterior aspect and is consistent with the previous TTE from 7/31/2024.  IVC is collapsible.      PSYCHIATRIC RISK ASSESSMENT  Violence Risk Factors:  {Violence Risk Factors:44661}  Acute Risk of Harm to Others is Considered: {Low/Moderate/High:90975}  Suicide Risk Factors: {Suicide Risk Factors:39712}  Protective Factors: {Protective Factors:00133}  Acute Risk of Harm to Self is  "Considered: {Low/Moderate/High:77304}    ASSESSMENT AND PLAN  ***One-liner    Psychiatry was consulted on *** for ***    On initial assessment, ***    IMPRESSION  ***    RECOMMENDATIONS  Safety:  - Patient {DOES/DOES NOT:29703} currently meet criteria for inpatient psychiatric admission. {Click only if they DO meet criteria:91853}  - ***Patient lacks the capacity to leave AMA at this time and thus cannot leave AMA. Call [facility] if patient attempts to leave AMA.  - ***To evaluate decision-making capacity, recommend use of the Capacity Evaluation Tool. Search “[facility] Capacity Evaluation\" under SmartText unless the patient has a legal guardian, in which case all decisions per the legal guardian.  - Patient {DOES/DOES NOT:04534} require a 1:1 sitter from a psychiatric perspective at this time.  - ***Defer to primary team decision for 1:1 sitter.  - ***As with all hospitalized patients, would recommend delirium precautions, as below.    Medications:  ***    Work-up:  - EKG (***): QTc of ***    Ancillary Services:  - Recommend ***[, pet/music/art therapy consult]    Follow-up:  - ***Patient follow-up details [ex. date of next appt with OP psychiatrist]  - ***Patient was given list of outpatient [mental health/chemical dependency] resources on ***.    ==========  - Discussed recommendations with primary team.  - Psychiatry will {continue to follow/but not daily/sign off:65160}    Thank you for allowing us to participate in the care of this patient. Please page [phone] with any questions or concerns.    Patient seen and {staffed/discussed:66791} with  ***, who agrees with above plan.    Martina Lal MD    Medication Consent  Medication Consent: {Medication Consent:[id number]}     "

## 2024-10-18 NOTE — PROGRESS NOTES
10/18/24 0834   Discharge Planning   Living Arrangements Friends   Support Systems Friends/neighbors   Assistance Needed none   Type of Residence Private residence   Number of Stairs to Enter Residence 5   Number of Stairs Within Residence 6   Do you have animals or pets at home? Yes   Type of Animals or Pets 2 dogs   Home or Post Acute Services None   Expected Discharge Disposition Home   Financial Resource Strain   How hard is it for you to pay for the very basics like food, housing, medical care, and heating? Somewhat   Housing Stability   In the last 12 months, was there a time when you were not able to pay the mortgage or rent on time? N   In the past 12 months, how many times have you moved where you were living? 1   At any time in the past 12 months, were you homeless or living in a shelter (including now)? N   Transportation Needs   In the past 12 months, has lack of transportation kept you from medical appointments or from getting medications? no   In the past 12 months, has lack of transportation kept you from meetings, work, or from getting things needed for daily living? No   Patient Choice   Provider Choice list and CMS website (https://medicare.gov/care-compare#search) for post-acute Quality and Resource Measure Data were provided and reviewed with: Patient   Patient / Family choosing to utilize agency / facility established prior to hospitalization No     Transitional Care Coordination Progress Note:  Patient discussed during interdisciplinary rounds.   Team members present: RN ZACKERY LIU MD   Plan per Medical/Surgical team: Monitoring creatine caution w/ fluids and diuresis   Payor: [facility]   Discharge disposition: Home   Potential Barriers: None   ADOD: 10-      Previous Home Care: None   DME: None   Pharmacy: [facility]  Falls: None   Dialysis: None

## 2024-10-18 NOTE — PROGRESS NOTES
DAILY PROGRESS NOTE        Name: Leonel Yu  :  1970(54 y.o.)  MRN:  [id number]    Date: 10/18/24     SUBJECTIVE     Patient was seen this morning. Overall pleasant, calm and open to exam. Stated he was hungry and made food requests. He continues to complain of blurriness seeing things far away but thing up close look okay. He denies any black spots in his vision, floaters. He denied any other symptoms. AxO to person, place, and year. Did not know the month but was able to state that  was coming up.    Current medications:  Scheduled Meds:amiodarone, 200 mg, oral, Daily  aspirin, 81 mg, oral, Daily  atorvastatin, 40 mg, oral, Nightly  carvedilol, 3.125 mg, oral, BID  empagliflozin, 10 mg, oral, Daily  insulin glargine, 15 Units, subcutaneous, q24h  insulin lispro, 0-5 Units, subcutaneous, Nightly  insulin lispro, 0-5 Units, subcutaneous, TID AC  insulin lispro, 5 Units, subcutaneous, TID AC  sacubitriL-valsartan, 1 tablet, oral, BID  sertraline, 100 mg, oral, Daily  spironolactone, 12.5 mg, oral, Daily  tiotropium, 2 puff, inhalation, Daily  torsemide, 40 mg, oral, Daily      Continuous Infusions:   PRN Meds:PRN medications: dextrose, dextrose, glucagon, glucagon      OBJECTIVE                                                                                                                                                                                                                                                                                                                                                           Temp:  [35.9 °C (96.6 °F)-36.5 °C (97.7 °F)] 36.4 °C (97.5 °F)  Heart Rate:  [66-85] 67  Resp:  [18-20] 18  BP: ()/(61-72) 107/72     Vitals:    10/16/24 1929   Weight: 77.1 kg (170 lb)   Last discharge weight 74.4kg 10/5      PHYSICAL EXAM  GEN: Alert and engaged with exam. Calm, in no distress. Laying in bed. Able to rise easily for exam.  Eyes: Endorses blurry vision, cannot  see the poster on the board well, but things on table near him are clear. PERRL spontaneous to conversation.  CVS: RRR  Resp: CTA b/l. On RA.  Abd: Flat. ND. NT. NL BS. Soft.  MSK: No edema.  Skin: WWP. NL capillary refill.  Neuro: NFD. A&O as above. 5+ Power t/o. CN intact spontaneous to conversation.      Labs:   Lab Results   Component Value Date     (L) 10/18/2024    K 3.6 10/18/2024    CL 97 (L) 10/18/2024    CO2 25 10/18/2024    BUN 41 (H) 10/18/2024    CREATININE 1.64 (H) 10/18/2024    GLUCOSE 176 (H) 10/18/2024    CALCIUM 9.0 10/18/2024    PROT 6.9 10/18/2024    BILITOT 0.8 10/18/2024    ALKPHOS 76 10/18/2024    AST 15 10/18/2024    ALT 21 10/18/2024    GLOB 3.5 04/01/2020       Lab Results   Component Value Date    WBC 9.4 10/18/2024    HGB 19.0 (H) 10/18/2024    HCT 55.1 (H) 10/18/2024    MCV 88 10/18/2024     10/18/2024       Imaging:   Transthoracic Echo (TTE) Complete    Result Date: 10/17/2024   [facility], [address] [place] [address]                Tel [phone] and Fax [phone] TRANSTHORACIC ECHOCARDIOGRAM REPORT  Patient Name:      VINOD Campos Physician:    [id number] Otto Merino MD Study Date:        10/17/2024           Ordering Provider:    [id number] ANGEL CRABTREE MRN/PID:           [id number]             Fellow: Accession#:        [id number]         Nurse:                Willa Muniz RN Date of Birth/Age: 1970 / 54 years  Sonographer:          JORGE LUIS Giraldo RDCS Gender:            M                    Additional Staff: Height:            175.26 cm            Admit Date:           10/16/2024 Weight:            77.11 kg             Admission Status:     Inpatient -                                                               Routine  BSA / BMI:         1.93 m2 / 25.10      Encounter#:           [id number]                    kg/m2 Blood Pressure:    98/73 mmHg           Department Location:  [facility] Non                                                               Invasive Study Type:    TRANSTHORACIC ECHO (TTE) COMPLETE Diagnosis/ICD: Acute on chronic combined systolic (congestive) and diastolic                (congestive) heart failure (CHF)-I50.43 Indication:    Congestive Heart Failure CPT Code:      Echo Complete w Full Doppler-21282 Patient History: Diabetes:          Yes Pertinent History: Dyspnea, CHF, COPD, CVA and HTN. V-fib, cocaine abuse. Study Detail: The following Echo studies were performed: 2D, M-Mode, Doppler and               color flow. Definity used as a contrast agent for endocardial               border definition. Total contrast used for this procedure was 3 mL               via IV push.  PHYSICIAN INTERPRETATION: Left Ventricle: Left ventricular ejection fraction is severely decreased, by visual estimate at 10-15%. There is global hypokinesis of the left ventricle with minor regional variations. The left ventricular cavity size is severely dilated. Spectral Doppler shows an impaired relaxation pattern of left ventricular diastolic filling. There is no definite left ventricular thrombus visualized. Left Atrium: The left atrium is mildly dilated. Right Ventricle: The right ventricle is mildly enlarged. There is mildly reduced right ventricular systolic function. Right Atrium: The right atrium is mild to moderately dilated. Aortic Valve: The aortic valve is trileaflet. There is no evidence of aortic valve regurgitation. The peak instantaneous gradient of the aortic valve is 4.0 mmHg. Mitral Valve: The mitral valve is normal in structure. There is mild mitral valve regurgitation. Tricuspid Valve: The tricuspid valve is structurally normal. There is mild tricuspid regurgitation. The Doppler estimated RVSP is within  normal limits at 25.8 mmHg. Pulmonic Valve: The pulmonic valve is structurally normal. There is trace pulmonic valve regurgitation. Pericardium: There is no pericardial effusion noted. Aorta: The aortic root is normal. Systemic Veins: The inferior vena cava appears normal in size, with IVC inspiratory collapse greater than 50%. In comparison to the previous echocardiogram(s): Compared with study dated 7/31/2024, the diastolic function is now grade I and the RVSP decreased from 39 mmHg to 26 mmHg. No significant differences in LV size and function.  CONCLUSIONS:  1. Left ventricular ejection fraction is severely decreased, by visual estimate at 10-15%.  2. There is global hypokinesis of the left ventricle with minor regional variations.  3. Spectral Doppler shows an impaired relaxation pattern of left ventricular diastolic filling.  4. Left ventricular cavity size is severely dilated.  5. No left ventricular thrombus visualized.  6. There is mildly reduced right ventricular systolic function.  7. Mildly enlarged right ventricle.  8. The left atrium is mildly dilated.  9. The right atrium is mild to moderately dilated. 10. Right ventricular systolic pressure is within normal limits. 11. Compared with study dated 7/31/2024, the diastolic function is now grade I and the RVSP decreased from 39 mmHg to 26 mmHg. No significant differences in LV size and function. QUANTITATIVE DATA SUMMARY:  2D MEASUREMENTS:           Normal Ranges: LAs:             3.60 cm   (2.7-4.0cm) IVSd:            0.75 cm   (0.6-1.1cm) LVPWd:           1.15 cm   (0.6-1.1cm) LVIDd:           7.64 cm   (3.9-5.9cm) LVIDs:           7.23 cm LV Mass Index:   182 g/m2 LVEDV Index:     205 ml/m2 LV % FS          5.3 %  LA VOLUME:                    Normal Ranges: LA Vol A4C:        60.3 ml    (22+/-6mL/m2) LA Vol A2C:        73.5 ml LA Vol BP:         67.0 ml LA Vol Index A4C:  31.3ml/m2 LA Vol Index A2C:  38.1 ml/m2 LA Vol Index BP:   34.8 ml/m2 LA Area  A4C:       21.3 cm2 LA Area A2C:       23.7 cm2 LA Major Axis A4C: 6.4 cm LA Major Axis A2C: 6.5 cm LA Volume Index:   34.8 ml/m2  RA VOLUME BY A/L METHOD:            Normal Ranges: RA Vol A4C:              94.0 ml    (8.3-19.5ml) RA Vol Index A4C:        48.7 ml/m2 RA Area A4C:             26.6 cm2 RA Major Axis A4C:       6.4 cm  LV SYSTOLIC FUNCTION BY 2D PLANIMETRY (MOD):                      Normal Ranges: EF-A4C View:    15 % (>=55%) EF-A2C View:    23 % EF-Biplane:     16 % EF-Visual:      13 % LV EF Reported: 13 %  LV DIASTOLIC FUNCTION:             Normal Ranges: MV Peak E:             0.32 m/s    (0.7-1.2 m/s) MV Peak A:             0.85 m/s    (0.42-0.7 m/s) E/A Ratio:             0.38        (1.0-2.2) MV e'                  0.025 m/s   (>8.0) MV lateral e'          0.03 m/s MV medial e'           0.02 m/s MV A Dur:              145.33 msec E/e' Ratio:            12.86       (<8.0)  AORTIC VALVE:           Normal Ranges: AoV Vmax:      1.00 m/s (<=1.7m/s) AoV Peak P.0 mmHg (<20mmHg) LVOT Max Jori:  0.87 m/s (<=1.1m/s) LVOT VTI:      12.41 cm LVOT Diameter: 2.20 cm  (1.8-2.4cm) AoV Area,Vmax: 3.31 cm2 (2.5-4.5cm2)  RIGHT VENTRICLE: RV Basal 4.40 cm RV Mid   3.00 cm RV Major 10.2 cm TAPSE:   18.0 mm RV s'    0.07 m/s  TRICUSPID VALVE/RVSP:          Normal Ranges: Peak TR Velocity:     2.39 m/s Est. RA Pressure:     3 mmHg RV Syst Pressure:     26 mmHg  (< 30mmHg) IVC Diam:             1.70 cm  PULMONIC VALVE:          Normal Ranges: PV Max Jori:     0.9 m/s  (0.6-0.9m/s) PV Max PG:      3.5 mmHg  [id number] Otto Merino MD Electronically signed on 10/17/2024 at 6:07:22 PM  ** Final **     CT head wo IV contrast    Result Date: 10/17/2024  Interpreted By:  Betty Rushing and Calo Sean-Matthew STUDY: CT HEAD WO IV CONTRAST;  10/17/2024 9:16 am   INDICATION: Signs/Symptoms:lethargy last 3 days, tired all day. 54-year-old male with history of heart failure reduced ejection fraction, CKD, substance abuse who  presents with weakness/lethargy/vision changes.     COMPARISON: MR brain 07/25/2023, CT head 12/09/2023, 07/24/2023.   ACCESSION NUMBER(S): [id number]   ORDERING CLINICIAN: CELESTINO BARBER   TECHNIQUE: Noncontrast axial CT scan of head was performed. Angled reformats in brain and bone windows were generated. The images were reviewed in bone, brain, blood and soft tissue windows.   FINDINGS: CSF Spaces: Diffuse prominence of the ventricles and sulci is noted. The basal cisterns are clear. There is no abnormal extraaxial fluid collection.   Parenchyma: Remote left cerebellar hemisphere infarct. Scattered patchy and confluent hypodensities are noted throughout the periventricular and subcortical white matter, nonspecific, but likely represent chronic small vessel ischemic changes. The grey-white differentiation is intact. There is no mass effect or midline shift. There is no acute intracranial hemorrhage.   Calvarium: The calvarium is unremarkable.   Paranasal sinuses and mastoids: Visualized paranasal sinuses and mastoids are clear.       1. No acute intracranial abnormality or mass effect. 2. Remote left cerebellar infarct.   I personally reviewed the images/study and I agree with the findings as stated by Ovi Juan MD. This study was interpreted at [place], [place].   MACRO: None   Signed by: Betty Rushing 10/17/2024 10:42 AM Dictation workstation:   [id number]    US renal complete    Result Date: 10/17/2024  Interpreted By:  Gamaliel Brandon and Afshari Mirak Sohrab STUDY: US RENAL COMPLETE;  10/17/2024 9:18 am   INDICATION: Signs/Symptoms:GUICHO.     COMPARISON: CT abdomen pelvis 09/30/2024   ACCESSION NUMBER(S): [id number]   ORDERING CLINICIAN: CELESTINO BARBER   TECHNIQUE: Multiple images of the kidneys were obtained  .   FINDINGS: RIGHT KIDNEY: The right kidney measures 10.1 cm in length. The renal cortical echogenicity is increased. No hydronephrosis  is present; no evidence of nephrolithiasis.   LEFT KIDNEY: The left kidney measures 10.6 cm in length. The renal cortical echogenicity is increased. No hydronephrosis is present; no evidence of nephrolithiasis.   BLADDER: The urinary bladder is unremarkable in appearance.   Prostate measures 3.2 x 2.7 x 3 cm.       Bilateral echogenic kidney is, likely related to medical renal disease. No nephrolithiasis or hydronephrosis.   I personally reviewed the images/study and I agree with the findings as stated by resident physician Dr. Omari Patel . This study was interpreted at [place], [place].   MACRO: None     Signed by: Gamaliel Brandon 10/17/2024 9:33 AM Dictation workstation:   [id number]    ECG 12 lead    Result Date: 10/17/2024  Sinus bradycardia Left axis deviation Left ventricular hypertrophy with QRS widening and repolarization abnormality ( R in aVL , Sokolow-Nieves , [place] product , Romhilt-Dominguez ) Inferior infarct (cited on or before 16-OCT-2024) Abnormal ECG When compared with ECG of 15-OCT-2024 09:55, Vent. rate has decreased BY  31 BPM Nonspecific T wave abnormality now evident in Inferior leads QT has shortened See ED provider note for full interpretation and clinical correlation Confirmed by Samantha Ace ([id number]) on 10/17/2024 12:50:32 AM    XR chest 1 view    Result Date: 10/17/2024  Interpreted By:  Matteo Rojo and Sheng Max STUDY: XR CHEST 1 VIEW;  10/17/2024 12:06 am   INDICATION: Signs/Symptoms:sob.     COMPARISON: Chest radiograph dated 10/15/2024, CT chest 09/30/2024   ACCESSION NUMBER(S): [id number]   ORDERING CLINICIAN: NIK CARREON   FINDINGS: AP radiograph of the chest:   LINES AND DEVICES: AICD pacemaker device overlies the left lateral hemithorax with its single lead projecting over the right ventricle.   CARDIOMEDIASTINAL SILHOUETTE: Stable enlargement of the cardiomediastinal silhouette.   LUNGS: Left-sided apical  lucencies correlates to severe bullous emphysema better evaluated prior CT chest from 09/30/2024. Pulmonary vascular congestion suggesting volume overload without je interstitial edema. No focal consolidation, pleural effusion, or pneumothorax.   ABDOMEN: No remarkable upper abdominal findings.   BONES: No acute osseous abnormality.       Cardiomegaly and pulmonary vascular congestion suggesting volume overload without je interstitial edema. Similar appearance of bullous changes in the left upper lung.   I personally reviewed the images/study and I agree with the findings as stated by Dr. Roberto Acosta. This study was interpreted at [place], [place].   MACRO: None   Signed by: Matteo Rojo 10/17/2024 12:38 AM Dictation workstation:   [id number]    Point of Care Ultrasound    Result Date: 10/16/2024  Donal Joy DO     10/17/2024 12:07 AM Performed by: Donal Joy DO Authorized by: Lily Lyles MD  Cardiac Indications: hypotension Procedure: Cardiac Ultrasound Findings:  Views: parasternal long, parasternal short and apical four The pericardial space was visualized and was NEGATIVE for a significant pericardial effusion. Activity: Ventricular contractions were visualized. LV: LV systolic function was DECREASED. RV: RV size was NORMAL. Impression: Cardiac: The focused cardiac ultrasound exam had ABNORMAL findings as specified. Comments: Significantly decreased EF with global hypokinesis that is slightly worse in the anterior aspect and is consistent with the previous TTE from 7/31/2024.  IVC is collapsible.       ASSESSMENT & PLAN     Patient is 55yo male with PMHx of HFrEF (EF 10% 7/24) likrly 2/2 chronic cocaine abuse c/b non-ishchemic cardiomyopathy (cMRI 8/24 shows fibrosis and transmural infarcts), s/p single chamber ICD (3/24), CKD3a, COPD (2L O2 at night is baseline), DM2, and medication nonadherence. He is presenting with blurry vision,  lethargy/drowsiness, and shortness of breath. Differentials include HF e 2/2 non complicance and/or infection. GUICHO work up FeUrea 37.5% indicating intrinsic. Encephalopathy work up negative, lactate normalized. Patient mental status improved today.    Updates 10/18  - Palliative consulted - during this conversation patient expressed ACTIVE SI  - Psych consulted  - s/p TTE EF 10-15%, some interval improvement in diastolic function    #Acute decompensated failure, improving  #Hx HFrEF last TTE 10/17 EF 10-15% etiology due to mixed ischemic/non-ischemic CM  # s/p single chamber ICD 3/2024  :: TTE 7/31:  EF 10%, enlargement of all chambers, mild to mod TR, RVSP 39 mildly elevated    :: cMRI 8/24: transmural infarcts and fibrosis  :: CT chest: coronary calcifcation  :: EKG: sinus jessy w/ LVH  :: CXR 10/16 with vascular congestion and cardiomegaly  :: BNP 2833 (>5K)  :: C/f medication non-compliance  :: TTE 10/17: 10-15%, some interval improvement in diastolic function reduction of RSVP to 26%  PLAN  - dc Hyrdalazine 10mg for vasodilation  - Continue GDMT (restarted yesterday)  - Afterload Reduction: Entresto 24-26  - Beta blockade: carvedilol 3.125  - SGLT2i: Empa 10mg  - MRA: Sprio 12.5  - Diuresis: Torsemide 40mg    #lethargy, improving  :: infectious vs metabolic vs cardiac (hypoperfusion) vs neruo vs mixed - likely cardiac in nature improvement with some medications here, work up for infectious neg as below  :: CT head 10/17: scattered patchy and confluent hypodensities t/o periventricular and subcortical white matter  - CRP and ESR neg  - syphilis, HIV neg  - ammonia, B12 neg  - Lactate normalized  PLAN:    [ ] follow up 10/18 blood cultures NGTD day 1    #vision disturbance vs chronic near sightedness  Does no wax and wane. Clear vision within feet but wall is blurry. Within last couple of days acute onset.  - consulted optho, will follow recs    #Hx of VT/VF  #Hx of AF RVR:   :: no shock since ami loading,  ICD  PLAN:  - PO amiodarone 200mg/day    #C/f Infection  :: elevated procal 0.31  :: leukocytosis 12.8, Neutrophils Absolute 11.77  :: UA clean  :: Acute encephalopathy, resolving  PLAN:  - follow blood cultures  - follow CRP and ESR    #elevated Lactate - 2.3 - normalized  :: Low BP 80/60 possibly 2/2 to hypovolemia from diuresis on previous 10/15 admission  PLAN:  - Trend lactate  - Hold off on diuresis    #GUICHO on CKD 3a  :: baseline Cr 1.2, recent Cr 2.1  :: intrinsic (FeUrea 37.5%)   :: appeared to have worsened after diuresis  :: s/p 500 mL bolus in ED 10/16  :: MALCOLM: b/l echogenic kidney likely 2/2 medical renal disease  PLAN  - trend Cr  - strict Is and Os  - caution w/ fluids and diuresis    #Mood Disorder: Zloft 100mg/day  #Crack Cocaine Use: last use 3 days ago  #ACTIVE SI   #s/o end-stage disease  - Psych consulted  - Palliative consulted    #COPD: 2L at night at baselines    #DM2: Glargine 15U      F: Cautious  E: K4, Mg2  Diet: Adult diet Cardiac, Consistent Carb; CCD 90 gm/meal; 70 gm fat; 2 - 3 grams Sodium   A: PIV  Lines: No  O2: 2L at night  DVT ppx: subcutaneous heparin  GI ppx: no  Code Status: DNR and No Intubation   Contact: Extended Emergency Contact Information  Primary Emergency Contact: Elizabet Santana  Mobile Phone: [phone]  Relation: Friend         Electronically signed by Sherrill Tim MD on 10/18/24 at 4:20 PM

## 2024-10-18 NOTE — NURSING NOTE
"0120 Patient refusing his insulin at this time. Pt educated on why he needs the insulin. Pt continues to refuse.     0500 Patient refusing vitals, weight and accuchecks. States \" I just want to be left alone.\"  "

## 2024-10-18 NOTE — PROGRESS NOTES
"Leonel Yu is a 54 y.o. male on day 1 of admission presenting with Shortness of breath at rest.    Subjective   Pt seen and examined.  Pt's questions regarding his new freestyle duran CGM were answered.     I have reviewed histories, allergies and medications have been reviewed and there are no changes       Objective   Review of Systems   All other systems reviewed and are negative.    Physical Exam  Constitutional:       Appearance: Normal appearance. He is normal weight.   HENT:      Head: Normocephalic and atraumatic.      Nose: Nose normal.      Mouth/Throat:      Mouth: Mucous membranes are dry.      Pharynx: Oropharynx is clear.   Eyes:      Extraocular Movements: Extraocular movements intact.      Conjunctiva/sclera: Conjunctivae normal.   Pulmonary:      Effort: Pulmonary effort is normal.   Abdominal:      General: Abdomen is flat. Bowel sounds are normal.      Palpations: Abdomen is soft.   Skin:     General: Skin is warm and dry.   Neurological:      General: No focal deficit present.      Mental Status: He is alert and oriented to person, place, and time. Mental status is at baseline.   Psychiatric:         Mood and Affect: Mood normal.         Behavior: Behavior normal.         Thought Content: Thought content normal.         Judgment: Judgment normal.         Last Recorded Vitals  Blood pressure 95/70, pulse 66, temperature 35.9 °C (96.6 °F), temperature source Temporal, resp. rate 20, height 1.753 m (5' 9\"), weight 77.1 kg (170 lb), SpO2 95%.  Intake/Output last 3 Shifts:  I/O last 3 completed shifts:  In: - (0 mL/kg)   Out: 1350 (17.5 mL/kg) [Urine:1350 (0.5 mL/kg/hr)]  Weight: 77.1 kg     Relevant Results  Results from last 7 days   Lab Units 10/18/24  0917 10/18/24  0744 10/18/24  0115 10/17/24  2207 10/17/24  1956 10/17/24  1934 10/17/24  1732 10/17/24  1039 10/17/24  0411 10/17/24  0032 10/16/24  2238 10/16/24  1752 10/16/24  1650   POCT GLUCOSE mg/dL 187*  --  203* 150* 312*  --  476*   < " >  --    < >  --    < >  --    GLUCOSE mg/dL  --  176*  --   --   --  323*  --   --  245*  --  67*  --  478*    < > = values in this interval not displayed.     Scheduled medications  amiodarone, 200 mg, oral, Daily  aspirin, 81 mg, oral, Daily  atorvastatin, 40 mg, oral, Nightly  carvedilol, 3.125 mg, oral, BID  empagliflozin, 10 mg, oral, Daily  insulin glargine, 15 Units, subcutaneous, q24h  insulin lispro, 0-5 Units, subcutaneous, TID AC  insulin lispro, 0-5 Units, subcutaneous, Nightly  insulin lispro, 5 Units, subcutaneous, TID AC  sacubitriL-valsartan, 1 tablet, oral, BID  sertraline, 100 mg, oral, Daily  spironolactone, 12.5 mg, oral, Daily  tiotropium, 2 puff, inhalation, Daily  torsemide, 40 mg, oral, Daily      Continuous medications     PRN medications  PRN medications: dextrose, dextrose, glucagon, glucagon      Assessment/Plan   Assessment & Plan  Shortness of breath at rest    Type 2 diabetes mellitus with hyperglycemia, with long-term current use of insulin    Leonel Yu is a 54 y.o. 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 Patient presents with blurry vision, lethargy, and dizziness.       Glucose in the 300's on arrival to the ED, given 60 mg of prednisone x 1 with glucoses climbing to the 400's. Total daily dose 10/15/24 was 40 units, with 30 units as basal insulin.      Patient interviewed at the bedside in the ED.  He is hungry and eating well. Patient first reports he has only been taking lispro at home, but does not understand the sliding scale.  When pressed, it sounds like he has not been taking much of the lispro at all.      Diabetes History  Type of diabetes: Type 2 diabetes  Year diagnosed or age: [birth year]21  Hospitalizations for DKA  or HHS: no  Complications: hyperglycemia  Seen by PCP or Endocrinology: PCP  Frequency of glucose checks: once a day  Glucose review: reports 200-400  Frequency of Hypoglycemia: denies     Home Medications  Basal: 30 units of glargine listed in the chart, but patient has not been taking it  Prandial: none  Correction: lispro scale, but patient is not taking it  Orals: jardiance 10 mg    10/18 - Severe hypoglycemia likely as steroid effect waned   Glucose 121 before lunch, so insulin doses held. Post lunch glucose > 600 mg/dl    PLAN  Steroids: 60 mg of prednisone x1 10/15/24  Nutrition: 90 gram carb consistent    - Recommend resuming insulin doses at 50% of what patient received yesterday  - Consider checking a chemistry  - Consider NPO until glucose is at least less than 400 mg/dl  - continue glargine to 15 units Q24       If NPO: reduce to 10  - continue lispro to 5 units with meals plus scale       If NPO: hold  - retime lispro ssi #1 to ACHS   = 0u  151-200 = 1u  201-250 = 2u  251-300 = 3u  301-350 = 4u  351-400 = 5u     -Accuchecks (not BMP) TIDAC and QHS- kindly ensure QHS Accucheck is drawn; it is often missed   - Goal -180  -Hypoglycemia protocol  -Will continue to follow and titrate insulin accordingly     SGLT2i tx may not be utilized in inpt setting unless the following conditions are met. Only HF Cardiology may initiate inpatient SGLT2i use.   1) pt is eating and drinking by mouth with a total daily carb intake exceeding 60g of CHO  2) pt is urinating independently of urinary catheters  3) pt can ambulate freely to the restroom in order to maintain personal hygiene  4) no current concern for UTI/genital or inguinal candidiasis  5) no plans for NPO within the next 48-72hr     Discharge planning:   [] patient may expect to discharge home on glargine and lispro, final doses TBD by titration  Jardiance per heart failure team  [x]will provide CGM sample prior to discharge, patient is interested in  a [facility]  [X]will enroll pt in  pharmacy platinum plan program  [x]follow up healthy at home virtual clinic    I spent 50 minutes in the professional and overall care of this patient.      KHADIJAH BrowneC

## 2024-10-19 ENCOUNTER — APPOINTMENT (OUTPATIENT)
Dept: RADIOLOGY | Facility: HOSPITAL | Age: 54
End: 2024-10-19
Payer: COMMERCIAL

## 2024-10-19 ENCOUNTER — APPOINTMENT (OUTPATIENT)
Dept: CARDIOLOGY | Facility: HOSPITAL | Age: 54
End: 2024-10-19
Payer: COMMERCIAL

## 2024-10-19 PROBLEM — H53.8 BLURRY VISION, BILATERAL: Status: ACTIVE | Noted: 2024-10-19

## 2024-10-19 PROBLEM — R53.83 LETHARGY: Status: ACTIVE | Noted: 2024-10-19

## 2024-10-19 LAB
ALBUMIN SERPL BCP-MCNC: 3.8 G/DL (ref 3.4–5)
ALBUMIN SERPL BCP-MCNC: 4.1 G/DL (ref 3.4–5)
ALP SERPL-CCNC: 77 U/L (ref 33–120)
ALT SERPL W P-5'-P-CCNC: 24 U/L (ref 10–52)
ANION GAP BLDV CALCULATED.4IONS-SCNC: 6 MMOL/L (ref 10–25)
ANION GAP SERPL CALC-SCNC: 17 MMOL/L (ref 10–20)
ANION GAP SERPL CALC-SCNC: 23 MMOL/L (ref 10–20)
APAP SERPL-MCNC: <10 UG/ML
APTT PPP: 23 SECONDS (ref 27–38)
AST SERPL W P-5'-P-CCNC: 21 U/L (ref 9–39)
BASE EXCESS BLDV CALC-SCNC: 3.7 MMOL/L (ref -2–3)
BASOPHILS # BLD AUTO: 0.02 X10*3/UL (ref 0–0.1)
BASOPHILS # BLD AUTO: 0.02 X10*3/UL (ref 0–0.1)
BASOPHILS NFR BLD AUTO: 0.2 %
BASOPHILS NFR BLD AUTO: 0.2 %
BILIRUB DIRECT SERPL-MCNC: 0.2 MG/DL (ref 0–0.3)
BILIRUB SERPL-MCNC: 0.9 MG/DL (ref 0–1.2)
BODY TEMPERATURE: 37 DEGREES CELSIUS
BUN SERPL-MCNC: 58 MG/DL (ref 6–23)
BUN SERPL-MCNC: 62 MG/DL (ref 6–23)
CA-I BLDV-SCNC: 1.11 MMOL/L (ref 1.1–1.33)
CALCIUM SERPL-MCNC: 9.4 MG/DL (ref 8.6–10.6)
CALCIUM SERPL-MCNC: 9.5 MG/DL (ref 8.6–10.6)
CHLORIDE BLDV-SCNC: 94 MMOL/L (ref 98–107)
CHLORIDE SERPL-SCNC: 93 MMOL/L (ref 98–107)
CHLORIDE SERPL-SCNC: 93 MMOL/L (ref 98–107)
CO2 SERPL-SCNC: 21 MMOL/L (ref 21–32)
CO2 SERPL-SCNC: 23 MMOL/L (ref 21–32)
CREAT SERPL-MCNC: 2.1 MG/DL (ref 0.5–1.3)
CREAT SERPL-MCNC: 2.58 MG/DL (ref 0.5–1.3)
EGFRCR SERPLBLD CKD-EPI 2021: 29 ML/MIN/1.73M*2
EGFRCR SERPLBLD CKD-EPI 2021: 36 ML/MIN/1.73M*2
EOSINOPHIL # BLD AUTO: 0 X10*3/UL (ref 0–0.7)
EOSINOPHIL # BLD AUTO: 0.02 X10*3/UL (ref 0–0.7)
EOSINOPHIL NFR BLD AUTO: 0 %
EOSINOPHIL NFR BLD AUTO: 0.2 %
ERYTHROCYTE [DISTWIDTH] IN BLOOD BY AUTOMATED COUNT: 16.8 % (ref 11.5–14.5)
ERYTHROCYTE [DISTWIDTH] IN BLOOD BY AUTOMATED COUNT: 17 % (ref 11.5–14.5)
ETHANOL SERPL-MCNC: <10 MG/DL
GLUCOSE BLD MANUAL STRIP-MCNC: 114 MG/DL (ref 74–99)
GLUCOSE BLD MANUAL STRIP-MCNC: 154 MG/DL (ref 74–99)
GLUCOSE BLD MANUAL STRIP-MCNC: 266 MG/DL (ref 74–99)
GLUCOSE BLD MANUAL STRIP-MCNC: 279 MG/DL (ref 74–99)
GLUCOSE BLD MANUAL STRIP-MCNC: 298 MG/DL (ref 74–99)
GLUCOSE BLD MANUAL STRIP-MCNC: 353 MG/DL (ref 74–99)
GLUCOSE BLD MANUAL STRIP-MCNC: 87 MG/DL (ref 74–99)
GLUCOSE BLDV-MCNC: 117 MG/DL (ref 74–99)
GLUCOSE SERPL-MCNC: 108 MG/DL (ref 74–99)
GLUCOSE SERPL-MCNC: 235 MG/DL (ref 74–99)
HCO3 BLDV-SCNC: 30.8 MMOL/L (ref 22–26)
HCT VFR BLD AUTO: 58.3 % (ref 41–52)
HCT VFR BLD AUTO: 63.3 % (ref 41–52)
HCT VFR BLD EST: 64 % (ref 41–52)
HGB BLD-MCNC: 19.6 G/DL (ref 13.5–17.5)
HGB BLD-MCNC: 21.2 G/DL (ref 13.5–17.5)
HGB BLDV-MCNC: 21.4 G/DL (ref 13.5–17.5)
IMM GRANULOCYTES # BLD AUTO: 0.04 X10*3/UL (ref 0–0.7)
IMM GRANULOCYTES # BLD AUTO: 0.04 X10*3/UL (ref 0–0.7)
IMM GRANULOCYTES NFR BLD AUTO: 0.4 % (ref 0–0.9)
IMM GRANULOCYTES NFR BLD AUTO: 0.4 % (ref 0–0.9)
INHALED O2 CONCENTRATION: 100 %
INR PPP: 1.2 (ref 0.9–1.1)
LACTATE BLDV-SCNC: 2 MMOL/L (ref 0.4–2)
LACTATE SERPL-SCNC: 1.8 MMOL/L (ref 0.4–2)
LYMPHOCYTES # BLD AUTO: 0.17 X10*3/UL (ref 1.2–4.8)
LYMPHOCYTES # BLD AUTO: 0.25 X10*3/UL (ref 1.2–4.8)
LYMPHOCYTES NFR BLD AUTO: 1.5 %
LYMPHOCYTES NFR BLD AUTO: 2.3 %
MAGNESIUM SERPL-MCNC: 2.26 MG/DL (ref 1.6–2.4)
MAGNESIUM SERPL-MCNC: 2.35 MG/DL (ref 1.6–2.4)
MCH RBC QN AUTO: 30.5 PG (ref 26–34)
MCH RBC QN AUTO: 30.6 PG (ref 26–34)
MCHC RBC AUTO-ENTMCNC: 33.5 G/DL (ref 32–36)
MCHC RBC AUTO-ENTMCNC: 33.6 G/DL (ref 32–36)
MCV RBC AUTO: 91 FL (ref 80–100)
MCV RBC AUTO: 91 FL (ref 80–100)
MONOCYTES # BLD AUTO: 0.27 X10*3/UL (ref 0.1–1)
MONOCYTES # BLD AUTO: 0.36 X10*3/UL (ref 0.1–1)
MONOCYTES NFR BLD AUTO: 2.5 %
MONOCYTES NFR BLD AUTO: 3.3 %
NEUTROPHILS # BLD AUTO: 10.37 X10*3/UL (ref 1.2–7.7)
NEUTROPHILS # BLD AUTO: 10.47 X10*3/UL (ref 1.2–7.7)
NEUTROPHILS NFR BLD AUTO: 94.4 %
NEUTROPHILS NFR BLD AUTO: 94.6 %
NRBC BLD-RTO: 0 /100 WBCS (ref 0–0)
NRBC BLD-RTO: 0 /100 WBCS (ref 0–0)
OXYHGB MFR BLDV: 75.3 % (ref 45–75)
PCO2 BLDV: 52 MM HG (ref 41–51)
PH BLDV: 7.38 PH (ref 7.33–7.43)
PHOSPHATE SERPL-MCNC: 4.2 MG/DL (ref 2.5–4.9)
PHOSPHATE SERPL-MCNC: 4.6 MG/DL (ref 2.5–4.9)
PLATELET # BLD AUTO: 132 X10*3/UL (ref 150–450)
PLATELET # BLD AUTO: 154 X10*3/UL (ref 150–450)
PO2 BLDV: 49 MM HG (ref 35–45)
POTASSIUM BLDV-SCNC: 4.3 MMOL/L (ref 3.5–5.3)
POTASSIUM SERPL-SCNC: 4.1 MMOL/L (ref 3.5–5.3)
POTASSIUM SERPL-SCNC: 4.6 MMOL/L (ref 3.5–5.3)
PROT SERPL-MCNC: 7.8 G/DL (ref 6.4–8.2)
PROTHROMBIN TIME: 13 SECONDS (ref 9.8–12.8)
RBC # BLD AUTO: 6.4 X10*6/UL (ref 4.5–5.9)
RBC # BLD AUTO: 6.96 X10*6/UL (ref 4.5–5.9)
SALICYLATES SERPL-MCNC: <3 MG/DL
SAO2 % BLDV: 77 % (ref 45–75)
SODIUM BLDV-SCNC: 126 MMOL/L (ref 136–145)
SODIUM SERPL-SCNC: 127 MMOL/L (ref 136–145)
SODIUM SERPL-SCNC: 134 MMOL/L (ref 136–145)
WBC # BLD AUTO: 11 X10*3/UL (ref 4.4–11.3)
WBC # BLD AUTO: 11.1 X10*3/UL (ref 4.4–11.3)

## 2024-10-19 PROCEDURE — 2500000004 HC RX 250 GENERAL PHARMACY W/ HCPCS (ALT 636 FOR OP/ED)

## 2024-10-19 PROCEDURE — 84132 ASSAY OF SERUM POTASSIUM: CPT

## 2024-10-19 PROCEDURE — 94640 AIRWAY INHALATION TREATMENT: CPT

## 2024-10-19 PROCEDURE — 84295 ASSAY OF SERUM SODIUM: CPT

## 2024-10-19 PROCEDURE — 2500000001 HC RX 250 WO HCPCS SELF ADMINISTERED DRUGS (ALT 637 FOR MEDICARE OP): Mod: SE

## 2024-10-19 PROCEDURE — 70551 MRI BRAIN STEM W/O DYE: CPT

## 2024-10-19 PROCEDURE — 83735 ASSAY OF MAGNESIUM: CPT

## 2024-10-19 PROCEDURE — 82746 ASSAY OF FOLIC ACID SERUM: CPT | Performed by: NURSE PRACTITIONER

## 2024-10-19 PROCEDURE — 2500000004 HC RX 250 GENERAL PHARMACY W/ HCPCS (ALT 636 FOR OP/ED): Mod: SE

## 2024-10-19 PROCEDURE — 70551 MRI BRAIN STEM W/O DYE: CPT | Performed by: RADIOLOGY

## 2024-10-19 PROCEDURE — 85610 PROTHROMBIN TIME: CPT

## 2024-10-19 PROCEDURE — 74177 CT ABD & PELVIS W/CONTRAST: CPT

## 2024-10-19 PROCEDURE — 82947 ASSAY GLUCOSE BLOOD QUANT: CPT

## 2024-10-19 PROCEDURE — 36415 COLL VENOUS BLD VENIPUNCTURE: CPT

## 2024-10-19 PROCEDURE — 80143 DRUG ASSAY ACETAMINOPHEN: CPT

## 2024-10-19 PROCEDURE — 82248 BILIRUBIN DIRECT: CPT

## 2024-10-19 PROCEDURE — 82805 BLOOD GASES W/O2 SATURATION: CPT

## 2024-10-19 PROCEDURE — 84100 ASSAY OF PHOSPHORUS: CPT

## 2024-10-19 PROCEDURE — 87075 CULTR BACTERIA EXCEPT BLOOD: CPT | Performed by: NURSE PRACTITIONER

## 2024-10-19 PROCEDURE — 87040 BLOOD CULTURE FOR BACTERIA: CPT | Performed by: NURSE PRACTITIONER

## 2024-10-19 PROCEDURE — 2500000004 HC RX 250 GENERAL PHARMACY W/ HCPCS (ALT 636 FOR OP/ED): Performed by: NURSE PRACTITIONER

## 2024-10-19 PROCEDURE — 2550000001 HC RX 255 CONTRASTS: Mod: SE | Performed by: STUDENT IN AN ORGANIZED HEALTH CARE EDUCATION/TRAINING PROGRAM

## 2024-10-19 PROCEDURE — 2500000001 HC RX 250 WO HCPCS SELF ADMINISTERED DRUGS (ALT 637 FOR MEDICARE OP)

## 2024-10-19 PROCEDURE — 85025 COMPLETE CBC W/AUTO DIFF WBC: CPT

## 2024-10-19 PROCEDURE — 2500000002 HC RX 250 W HCPCS SELF ADMINISTERED DRUGS (ALT 637 FOR MEDICARE OP, ALT 636 FOR OP/ED): Mod: SE

## 2024-10-19 PROCEDURE — 99291 CRITICAL CARE FIRST HOUR: CPT | Performed by: NURSE PRACTITIONER

## 2024-10-19 PROCEDURE — 99253 IP/OBS CNSLTJ NEW/EST LOW 45: CPT | Performed by: PSYCHIATRY & NEUROLOGY

## 2024-10-19 PROCEDURE — 99233 SBSQ HOSP IP/OBS HIGH 50: CPT

## 2024-10-19 PROCEDURE — 74177 CT ABD & PELVIS W/CONTRAST: CPT | Performed by: RADIOLOGY

## 2024-10-19 PROCEDURE — 2020000001 HC ICU ROOM DAILY

## 2024-10-19 PROCEDURE — 85730 THROMBOPLASTIN TIME PARTIAL: CPT

## 2024-10-19 PROCEDURE — 80048 BASIC METABOLIC PNL TOTAL CA: CPT

## 2024-10-19 PROCEDURE — 2500000002 HC RX 250 W HCPCS SELF ADMINISTERED DRUGS (ALT 637 FOR MEDICARE OP, ALT 636 FOR OP/ED): Mod: SE | Performed by: PHYSICIAN ASSISTANT

## 2024-10-19 PROCEDURE — 83605 ASSAY OF LACTIC ACID: CPT

## 2024-10-19 PROCEDURE — 93010 ELECTROCARDIOGRAM REPORT: CPT | Performed by: INTERNAL MEDICINE

## 2024-10-19 PROCEDURE — 80320 DRUG SCREEN QUANTALCOHOLS: CPT

## 2024-10-19 PROCEDURE — 93005 ELECTROCARDIOGRAM TRACING: CPT

## 2024-10-19 PROCEDURE — 99233 SBSQ HOSP IP/OBS HIGH 50: CPT | Performed by: PHYSICIAN ASSISTANT

## 2024-10-19 RX ORDER — GADOTERATE MEGLUMINE 376.9 MG/ML
15 INJECTION INTRAVENOUS
Status: DISCONTINUED | OUTPATIENT
Start: 2024-10-19 | End: 2024-10-19

## 2024-10-19 RX ORDER — MORPHINE SULFATE 4 MG/ML
1 INJECTION INTRAVENOUS ONCE
Status: COMPLETED | OUTPATIENT
Start: 2024-10-19 | End: 2024-10-19

## 2024-10-19 RX ORDER — NOREPINEPHRINE BITARTRATE/D5W 8 MG/250ML
.01-1 PLASTIC BAG, INJECTION (ML) INTRAVENOUS CONTINUOUS
Status: DISCONTINUED | OUTPATIENT
Start: 2024-10-19 | End: 2024-10-21

## 2024-10-19 RX ORDER — VANCOMYCIN HYDROCHLORIDE 1 G/20ML
INJECTION, POWDER, LYOPHILIZED, FOR SOLUTION INTRAVENOUS DAILY PRN
Status: DISCONTINUED | OUTPATIENT
Start: 2024-10-19 | End: 2024-10-21

## 2024-10-19 RX ORDER — CEFTRIAXONE 2 G/50ML
2 INJECTION, SOLUTION INTRAVENOUS EVERY 12 HOURS
Status: DISCONTINUED | OUTPATIENT
Start: 2024-10-19 | End: 2024-10-21

## 2024-10-19 RX ORDER — INSULIN GLARGINE 100 [IU]/ML
18 INJECTION, SOLUTION SUBCUTANEOUS EVERY 24 HOURS
Status: DISCONTINUED | OUTPATIENT
Start: 2024-10-19 | End: 2024-10-24

## 2024-10-19 RX ORDER — AMOXICILLIN 250 MG
2 CAPSULE ORAL 2 TIMES DAILY
Status: DISCONTINUED | OUTPATIENT
Start: 2024-10-20 | End: 2024-10-25 | Stop reason: HOSPADM

## 2024-10-19 RX ORDER — DOBUTAMINE HYDROCHLORIDE 400 MG/100ML
5 INJECTION INTRAVENOUS CONTINUOUS
Status: DISCONTINUED | OUTPATIENT
Start: 2024-10-19 | End: 2024-10-20

## 2024-10-19 RX ORDER — ONDANSETRON HYDROCHLORIDE 2 MG/ML
8 INJECTION, SOLUTION INTRAVENOUS ONCE
Status: COMPLETED | OUTPATIENT
Start: 2024-10-19 | End: 2024-10-19

## 2024-10-19 RX ORDER — CEFTRIAXONE 1 G/50ML
1 INJECTION, SOLUTION INTRAVENOUS EVERY 24 HOURS
Status: DISCONTINUED | OUTPATIENT
Start: 2024-10-19 | End: 2024-10-19

## 2024-10-19 RX ORDER — POLYETHYLENE GLYCOL 3350 17 G/17G
17 POWDER, FOR SOLUTION ORAL DAILY
Status: DISCONTINUED | OUTPATIENT
Start: 2024-10-20 | End: 2024-10-25 | Stop reason: HOSPADM

## 2024-10-19 RX ORDER — ONDANSETRON HYDROCHLORIDE 2 MG/ML
4 INJECTION, SOLUTION INTRAVENOUS ONCE
Status: COMPLETED | OUTPATIENT
Start: 2024-10-19 | End: 2024-10-19

## 2024-10-19 RX ORDER — PANTOPRAZOLE SODIUM 40 MG/1
40 TABLET, DELAYED RELEASE ORAL
Status: DISCONTINUED | OUTPATIENT
Start: 2024-10-20 | End: 2024-10-25 | Stop reason: HOSPADM

## 2024-10-19 RX ADMIN — ACYCLOVIR SODIUM 770 MG: 50 INJECTION, SOLUTION INTRAVENOUS at 18:53

## 2024-10-19 RX ADMIN — CEFTRIAXONE SODIUM 2 G: 2 INJECTION, SOLUTION INTRAVENOUS at 14:26

## 2024-10-19 RX ADMIN — TORSEMIDE 40 MG: 20 TABLET ORAL at 09:28

## 2024-10-19 RX ADMIN — SPIRONOLACTONE 12.5 MG: 25 TABLET, FILM COATED ORAL at 09:29

## 2024-10-19 RX ADMIN — EMPAGLIFLOZIN 10 MG: 10 TABLET, FILM COATED ORAL at 09:29

## 2024-10-19 RX ADMIN — ASPIRIN 81 MG 81 MG: 81 TABLET ORAL at 09:29

## 2024-10-19 RX ADMIN — SERTRALINE 100 MG: 100 TABLET, FILM COATED ORAL at 09:29

## 2024-10-19 RX ADMIN — TIOTROPIUM BROMIDE INHALATION SPRAY 2 PUFF: 3.12 SPRAY, METERED RESPIRATORY (INHALATION) at 08:51

## 2024-10-19 RX ADMIN — ONDANSETRON 4 MG: 2 INJECTION INTRAMUSCULAR; INTRAVENOUS at 11:41

## 2024-10-19 RX ADMIN — IOHEXOL 80 ML: 350 INJECTION, SOLUTION INTRAVENOUS at 12:30

## 2024-10-19 RX ADMIN — DOBUTAMINE HYDROCHLORIDE 2.5 MCG/KG/MIN: 400 INJECTION INTRAVENOUS at 23:43

## 2024-10-19 RX ADMIN — ONDANSETRON 8 MG: 2 INJECTION INTRAMUSCULAR; INTRAVENOUS at 14:58

## 2024-10-19 RX ADMIN — INSULIN LISPRO 3 UNITS: 100 INJECTION, SOLUTION INTRAVENOUS; SUBCUTANEOUS at 09:37

## 2024-10-19 RX ADMIN — MORPHINE SULFATE 1 MG: 4 INJECTION INTRAVENOUS at 23:00

## 2024-10-19 RX ADMIN — THIAMINE HYDROCHLORIDE 500 MG: 100 INJECTION, SOLUTION INTRAMUSCULAR; INTRAVENOUS at 20:38

## 2024-10-19 RX ADMIN — CARVEDILOL 3.12 MG: 3.12 TABLET, FILM COATED ORAL at 09:28

## 2024-10-19 RX ADMIN — VANCOMYCIN HYDROCHLORIDE 1500 MG: 1.5 INJECTION, POWDER, LYOPHILIZED, FOR SOLUTION INTRAVENOUS at 17:31

## 2024-10-19 RX ADMIN — AMIODARONE HYDROCHLORIDE 200 MG: 200 TABLET ORAL at 09:28

## 2024-10-19 RX ADMIN — SACUBITRIL AND VALSARTAN 1 TABLET: 24; 26 TABLET, FILM COATED ORAL at 09:30

## 2024-10-19 RX ADMIN — INSULIN LISPRO 5 UNITS: 100 INJECTION, SOLUTION INTRAVENOUS; SUBCUTANEOUS at 09:39

## 2024-10-19 ASSESSMENT — COGNITIVE AND FUNCTIONAL STATUS - GENERAL
HELP NEEDED FOR BATHING: A LITTLE
DRESSING REGULAR LOWER BODY CLOTHING: A LITTLE
STANDING UP FROM CHAIR USING ARMS: A LITTLE
STANDING UP FROM CHAIR USING ARMS: A LITTLE
DAILY ACTIVITIY SCORE: 18
DRESSING REGULAR LOWER BODY CLOTHING: A LITTLE
PERSONAL GROOMING: A LITTLE
CLIMB 3 TO 5 STEPS WITH RAILING: A LITTLE
MOVING FROM LYING ON BACK TO SITTING ON SIDE OF FLAT BED WITH BEDRAILS: A LITTLE
DRESSING REGULAR UPPER BODY CLOTHING: A LITTLE
MOVING FROM LYING ON BACK TO SITTING ON SIDE OF FLAT BED WITH BEDRAILS: A LITTLE
EATING MEALS: A LITTLE
MOVING TO AND FROM BED TO CHAIR: A LITTLE
WALKING IN HOSPITAL ROOM: A LITTLE
DRESSING REGULAR UPPER BODY CLOTHING: A LITTLE
TOILETING: A LITTLE
DAILY ACTIVITIY SCORE: 18
MOVING TO AND FROM BED TO CHAIR: A LITTLE
PERSONAL GROOMING: A LITTLE
MOBILITY SCORE: 18
TOILETING: A LITTLE
EATING MEALS: A LITTLE
CLIMB 3 TO 5 STEPS WITH RAILING: A LITTLE
MOBILITY SCORE: 18
HELP NEEDED FOR BATHING: A LITTLE
TURNING FROM BACK TO SIDE WHILE IN FLAT BAD: A LITTLE
WALKING IN HOSPITAL ROOM: A LITTLE
TURNING FROM BACK TO SIDE WHILE IN FLAT BAD: A LITTLE

## 2024-10-19 ASSESSMENT — PAIN SCALES - GENERAL
PAINLEVEL_OUTOF10: 0 - NO PAIN
PAINLEVEL_OUTOF10: 0 - NO PAIN
PAINLEVEL_OUTOF10: 7

## 2024-10-19 ASSESSMENT — PAIN DESCRIPTION - DESCRIPTORS: DESCRIPTORS: ACHING

## 2024-10-19 ASSESSMENT — PAIN - FUNCTIONAL ASSESSMENT
PAIN_FUNCTIONAL_ASSESSMENT: 0-10

## 2024-10-19 NOTE — SIGNIFICANT EVENT
DACR RR note     53yo male with PMHx of HFrEF (EF 10% 7/24) likrly 2/2 chronic cocaine abuse c/b non-ishchemic cardiomyopathy (cMRI 8/24 shows fibrosis and transmural infarcts), s/p single chamber ICD (3/24), CKD3a, COPD (2L O2 at night is baseline), DM2, and medication nonadherence presented with blurry vision, lethargy/drowsiness, and shortness of breath who was obtaining MRI brain for further encephalopathy workup. Code blue called because of unobtainable BP. Per bedside nursing, no pulse ever lost. On arrival, pt with cold skin. Unable to obtain BG as no Accucheck downstais. BP systolics 90s, diastolics 60s, strong radial pulses, HR in the 70s. Decision made to hold off on further MRI and pt wheeled back to [place] [facility] where he returned to baseline mental status. Primary team confirmed pt DNR-DNI, ok to escalation for ICU if needed. On arrival to floor, obtained BG (114) and rainbow labs. Given cold extremities and EF 10%, will obtain lactate to r/o shock, pt to go to HFICU pending lab results. Vitals currently stable. Primary team attending to reach out to HFICU attending Dr. Brar.

## 2024-10-19 NOTE — NURSING NOTE
Rapid Response Nurse Note: [x] Rapid Response   Pager time: [phone]  Arrival time: 1630  Event end time: 1710  Location: [facility]      Rapid response initiated by:  [x] Rapid Response RN [] Family [] Nursing Supervisor [] Physician   [] RADAR auto-page [] Sepsis auto-page [] RN [] RT   [] NP/PA [] Other:     Primary reason for call:   [] BAT [] New CPAP/BiPAP [] Bleeding [] Change in mental status   [] Chest pain [x] Code blue [] FiO2 >/= 50% [] HR </= 40 bpm   [] HR >/= 130 bpm [] Hyperglycemia [] Hypoglycemia [] RADAR    [] RR </= 8 bpm [] RR >/= 30 bpm [] SBP </= 90 mmHg [] SpO2 < 90%   [] Seizure [] Sepsis [] Staff concern:       Vitals:    10/19/24 1209 10/19/24 1414 10/19/24 1655 10/19/24 1704   BP: 95/62 101/63 99/64    BP Location: Left arm  Left arm    Patient Position: Lying  Lying    Pulse: 89 94 87    Resp: 18 20  24   Temp: 35.5 °C (95.9 °F) 36.6 °C (97.9 °F) 35.3 °C (95.5 °F) 36 °C (96.8 °F)   TempSrc: Temporal  Temporal    SpO2: 95% 95% 95%    Weight:       Height:            Providers present at bedside (if applicable):  Dr Hernadez ([facility]) and Dr Peters ([facility]) were present along with code blue team.     Objective/Subjective data relevant to event (if applicable): Responded to code blue in MRI.  Pt diaphoretic with skin cool to touch.  BP obtained with Sbp in the 90's.  Pt awaken and alert and oriented x 4.  Per team, pt was in MRI when his blood pressure started to drop and  eventually a bp was not obtainable while on MRI table.  Pt was taken out and vitals were retaken.  His bp was at baseline.  Code blue was cancelled.   Pt was placed on cart and taken back to his room.  His vitals remained stable.  Also worth mentioning that pt was been nauseated with frequent emesis.    Interventions:  [] None [] ABG [] Assist w/ICU transfer [] BAT paged    [] Bag mask [] Blood [] Cardioversion [] Code Blue   [] Code blue for intubation [] Code status changed [] Chest x-ray [] EKG   [] IV fluid/bolus [] KUB x-ray  [x] Labs/cultures [] Medication   [] Nebulizer treatment [] NIPPV (CPAP/BiPAP) [] Oxygen [] Oral airway   [x] Peripheral IV [] Palliative care consult [] CT/MRI [] Sepsis protocol    [] Suctioned [x] Other: VBG full panel   Name of ICU Provider contacted (if applicable): ICU team was contacted by Dr Hernadez  Outcome:  [] Coded and  [] Code blue for intubation [] Coded and transferred to ICU []  on division   [x] Remained on division (no change) [] Remained on division + additional monitoring [] Remained in ED [] Transferred to ED   [] Transferred to ICU [] Transferred to inpatient status [] Transferred for interventions (procedure) [] Transferred to ICU stepdown    [] Transferred to surgery [] Transferred to telemetry [] Sepsis protocol [] STEMI protocol   [] Stroke protocol [x] Bedside nurse instructed to page rapid response for any concerns or acute change in condition/VS     Additional Comments: Pt back to baseline.  Back on tele.  RN updated regarding events.  Dr Peters spoke to his attending to decide whether to give a fluid bolus or not.  No additional orders at this time.  HFICU aware of pt and events.

## 2024-10-19 NOTE — CONSULTS
Consults    History Of Present Illness  Leonel Yu is a 54 y.o. male  PMH HFrEF (5-10%) s/p ICD, COPD, CKD, DM2,  substance use (crack cocaine), medication non adherence and complex psychiatric history (MDD with anxiety , SI, HI) presenting with lethargy, weakness, and b/l blurry vision. Neurology consulted for concern of CNS infection.     Per chart review:  Patient presented to [facility] on 10/17 with about two days of vision changes, lethargy, SOB,and weakness. Patient endorsed he had been adherent will all medications except for diuretics. Last diuretic use was 10/14. Last crack cocaine use was 10/14.    Per primary team, patient has been lethargic since admission and has been endorsing blurry vision. Infectious and metabolic workup has been unrevealing. 10/16: Blood cx x2 neg, procal 0.31, lactate 2.3-> 1.2, bnp 479, Patient EF is severely decreased at 1015% (Stable from 7/31/24). U tox was positive for cocaine. Patients lethargy has been improvingthrough his hospital stay but acutely worsened this AM with episode of emesis.     Patient interview and exam limited by distress 2/2 headache. Per patient he started having his weakness and SOB 2 days ago. Says his last cocaine use was a week ago. Patient endorses photophobia and  blurry vision (pupils dilated iso recent Ophthalm logy exam), denies neck stiffness but says he has a 7/10 midfrontal headache since this AM. CT head 10/17: Negative  Patient denies any limb weakness, change in sensation, incontinence or abnormal movements.     Past Medical History  Past Medical History:   Diagnosis Date    CHF (congestive heart failure) (Multi)     Chronic kidney disease     COPD (chronic obstructive pulmonary disease) (Multi)     Diabetes mellitus (Multi)     Hyperlipidemia     Hypertension     Stroke (Multi)     Substance abuse (Multi)      Surgical History  Past Surgical History:   Procedure Laterality Date    INSERT / REPLACE / REMOVE PACEMAKER       Social  "History  Social History     Tobacco Use    Smoking status: Former     Types: Cigarettes    Smokeless tobacco: Never   Vaping Use    Vaping status: Never Used   Substance Use Topics    Alcohol use: Never    Drug use: Yes     Types: \"Crack\" cocaine     Comment: last used 7/28     Allergies  Patient has no known allergies.  Medications Prior to Admission   Medication Sig Dispense Refill Last Dose/Taking    amiodarone (Pacerone) 200 mg tablet Take 1 tablet (200 mg) by mouth once daily. 30 tablet 0     aspirin 81 mg chewable tablet Chew 1 tablet (81 mg) once daily. 30 tablet 5     atorvastatin (Lipitor) 80 mg tablet Take 1 tablet (80 mg) by mouth once daily at bedtime. 30 tablet 5     blood sugar diagnostic strip Use as directed to test blood glucose up to four times daily and as needed. 200 each 5     blood-glucose meter misc Inject 1 each under the skin 4 times a day with meals. Check blood glucose 4 times daily, before meals and at bedtime. 1 each 0     carvedilol (Coreg) 3.125 mg tablet Take 1 tablet (3.125 mg) by mouth 2 times a day. 60 tablet 0     empagliflozin (Jardiance) 10 mg TAKE 1 TABLET BY MOUTH ONCE DAILY 30 tablet 5     glucose 4 gram chewable tablet Chew 2 tablets (8 g) if needed for low blood sugar - see comments. 50 tablet 12     insulin glargine (Lantus) 100 unit/mL (3 mL) pen Inject 30 Units under the skin once daily at bedtime. (Patient not taking: Reported on 10/16/2024) 15 mL 5     insulin lispro (HumaLOG) 100 unit/mL injection Inject 0-10 Units under the skin 3 times daily (morning, midday, late afternoon). Hypoglycemia protocol if Blood Glucose is between 0 - 70 mg/dL, 0 unit(s) if , 2 unit(s) if 151-200, 4 unit(s) if 201-250, 6 unit(s) if 251-300, 8 unit(s) if 301-350, 10 unit(s) if 351-400. Notify provider unit(s) if Blood Glucose is greater than 400 mg/dL 15 mL 5     lancets 33 gauge misc Inject 1 each under the skin 4 times a day. 100 each 5     pen needle 1/2\" 29G X 12mm needle Use to " "inject 1-4 times daily as directed. 100 each 5     sacubitriL-valsartan (Entresto) 24-26 mg tablet Take 1 tablet by mouth 2 times a day. 60 tablet 3     sertraline (Zoloft) 100 mg tablet Take 1 tablet (100 mg) by mouth once daily. 30 tablet 5     spironolactone (Aldactone) 25 mg tablet Take 0.5 tablets (12.5 mg) by mouth once daily. 15 tablet 5     tiotropium (Spiriva Respimat) 2.5 mcg/actuation inhaler Inhale 2 puffs once daily. 4 g 2     torsemide (Demadex) 20 mg tablet Take 2 tablets (40 mg) by mouth once daily. 60 tablet 3        Review of Systems  Neurological Exam  Physical Exam  Last Recorded Vitals  Blood pressure 111/74, pulse 78, temperature 36.4 °C (97.5 °F), temperature source Temporal, resp. rate 18, height 1.753 m (5' 9\"), weight 77.3 kg (170 lb 6.7 oz), SpO2 96%.    Neurological Exam:  MENTAL STATUS:  General appearance: Patient lying on bed to his side, with eye closed, irritable, says he has a a headache   Orientation:x3  Language: Expression, and comprehension intact  Follows complex commands across midline    CRANIAL NERVES:  - II:  Unable to perform VF exam, as patient had hard timekeeping eyes open, pupils were   - III, IV, VI: PERRL, EOMI to pursuit without nystagmus  - V: V1-V3 sensation intact bilaterally  - VII: Face muscles symmetric with smile and eye closure  - VIII: Intact to cnversation  - IX, X: Was not able to visualize palette due to limited patient cooperation  - XI: 5/5 strength on shoulder shrugging bilaterally  - XII: Tongue midline     MOTOR: Tone and bulk normal in all extremities  Kernig's and Brudzinski's sign negative.  No neck stiffness.  No fasciculations, tremor or other abnormal movements were present.     STRENGTH: R L  Deltoid  5 5  Biceps  5 5  Triceps  5 5    5 5    Hip flexion 5 5  Quadriceps 5 5  Hamstrings 5 5  DorsiFlex 5          5  PlantarFlex 5 5    REFLEXES:  R  L  Biceps   2  2  Triceps   2  2    Patellar   2  2  Achilles 2  2    No clonus    SENSORY: " Intact to light touch in bl UE and LE    GAIT: Deferred    Relevant Results  Results from last 72 hours   Lab Units 10/19/24  0711 10/18/24  0744   WBC AUTO x10*3/uL 11.0 9.4   NRBC AUTO /100 WBCs 0.0 0.0   RBC AUTO x10*6/uL 6.40* 6.23*   HEMOGLOBIN g/dL 19.6* 19.0*   HEMATOCRIT % 58.3* 55.1*   MCV fL 91 88   MCH pg 30.6 30.5   MCHC g/dL 33.6 34.5   RDW % 16.8* 15.9*   PLATELETS AUTO x10*3/uL 154 152   NEUTROS PCT AUTO % 94.4 86.6   IG PCT AUTO % 0.4 0.2   LYMPHS PCT AUTO % 2.3 8.3   MONOS PCT AUTO % 2.5 4.4   EOS PCT AUTO % 0.2 0.2   BASOS PCT AUTO % 0.2 0.3   NEUTROS ABS x10*3/uL 10.37* 8.14*   IG AUTO x10*3/uL 0.04 0.02   LYMPHS ABS AUTO x10*3/uL 0.25* 0.78*   MONOS ABS AUTO x10*3/uL 0.27 0.41   EOS ABS AUTO x10*3/uL 0.02 0.02   BASOS ABS AUTO x10*3/uL 0.02 0.03      Results from last 72 hours   Lab Units 10/19/24  0711 10/18/24  0744   GLUCOSE mg/dL 235* 176*   SODIUM mmol/L 127* 134*   POTASSIUM mmol/L 4.1 3.6   CHLORIDE mmol/L 93* 97*   CO2 mmol/L 21 25   ANION GAP mmol/L 17 16   BUN mg/dL 58* 41*   CREATININE mg/dL 2.10* 1.64*   EGFR mL/min/1.73m*2 36* 49*   CALCIUM mg/dL 9.4 9.0   PHOSPHORUS mg/dL 4.2 3.0   ALBUMIN g/dL 3.8 3.7   MAGNESIUM mg/dL 2.26 2.00      Results from last 72 hours   Lab Units 10/19/24  0711 10/18/24  0744   ALK PHOS U/L 77 76   BILIRUBIN TOTAL mg/dL 0.9 0.8   BILIRUBIN DIRECT mg/dL 0.2 0.2   PROTEIN TOTAL g/dL 7.8 6.9   ALT U/L 24 21   AST U/L 21 15   ALBUMIN g/dL 3.8 3.7      Lab Results   Component Value Date    BLOODCULT No growth at 2 days 10/16/2024    BLOODCULT No growth at 2 days 10/16/2024       US Renal 10/17  Bilateral echogenic kidney is, likely related to medical renal  disease. No nephrolithiasis or hydronephrosis.    CT Head wo IV contrast 10/17  1. No acute intracranial abnormality or mass effect.  2. Remote left cerebellar infarct.    CXR 10/17  Cardiomegaly and pulmonary vascular congestion suggesting volume  overload without je interstitial edema. Similar  appearance of  bullous changes in the left upper lung.       Assessment/Plan   Assessment & Plan  Shortness of breath at rest    Type 2 diabetes mellitus with hyperglycemia, with long-term current use of insulin    Lethargy    Blurry vision, bilateral  Leonel Yu is a 54 y.o. male  PMH HFrEF (5-10%) s/p ICD, COPD, CKD, DM2,  substance use (crack cocaine), medication non adherence and complex psychiatric history (MDD with anxiety , SI, HI) presenting with lethargy, weakness, and b/l blurry vision. Neurology consulted for concern of CNS infection. Neurological exam concerning for lethargy and photophobia but no focal deficits, Kernigs and Brudzinskis;s sign negative. 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     Recommendations:   - Please start stat CNS coverage antibiotics: IV Ceftriaxone and IV Vancomycin and renally doses IV acyclovir  - Please obtain MRI Brain w/wo contrast  -Will plan to perform LP.   -Please start IV thiamine 500mg TID for 3 days followed by 500mg Qdaily  - Please reach out to Neurology if any questions or concerns.     Patient discussed with attending Dr. Presley.    Jami Bautista MD  Neurology PGY-3           Senior resident addendum post staffing:  Patient had code called due to hypotension mid MRI - reviewed available images (FLAIR, GRE, T2) not concerning for focal lesion or leukoencephalopathy. More consistent with toxic/metabolic encephalopathy however CNS infection remains on differential.    - Please perform LP, acquire opening pressures and send cell count, diff (tubes1 and 4), protein, glucose, PCR infectious panel, culture  - continue thimaine  - continue CNS coverage ceftriaxone, vanco, acyclovir (renal dose)  Gianfranco Chahal MD  PGY4 neurology

## 2024-10-19 NOTE — CONSULTS
Reason for consult: Blurred Vision    55 y/o M no POH admitted for ADHF iso crack cocaine use. Ophthalmology consulted for subjective blurred vision.    He reports blurred vision OU at distance. He does not wear glasses but reports that he experiences similar symptoms at times when his blood sugar is high (BG 300s-400s on admission). States blurriness has been present for the last few days. Has never seen an eye doctor. He denies eye pain, FBS, photophobia, flashes/floaters, curtaining, diplopia, pulsatile tinnitus.     Past Medical History: as above  Family History: reviewed and not pertinent to chief complaint  Medications: please refer to medication reconciliation  Allergies: please refer to patient allergy list    Base Eye Exam       Visual Acuity (Snellen - Linear)         Right Left    Near sc 20/20 20/20              Tonometry (Tonopen, 7:51 AM)         Right Left    Pressure 19 20              Pupils         Dark Light Shape React APD    Right 5 3 Round Brisk None    Left 5 3 Round Brisk None              Visual Fields         Left Right      Full              Extraocular Movement         Right Left     Full Full                  Slit Lamp and Fundus Exam       External Exam         Right Left    External Normal Normal              Slit Lamp Exam         Right Left    Lids/Lashes Normal Normal    Conjunctiva/Sclera White and quiet White and quiet    Cornea 1+ SPK 1+ SPK    Anterior Chamber Deep and quiet Deep and quiet    Iris Round and reactive Round and reactive    Lens 1-2+ NS, central PSC 1-2+ NS, central PSC    Anterior Vitreous Normal Normal              Fundus Exam         Right Left    Disc Sharp margins Sharp margins    C/D Ratio 0.25 0.25    Macula Flat Flat    Vessels Normal course and caliber Normal course and caliber    Periphery Attached 360 Attached 360                    A&P:    #Blurred Vision OU  - 55 y/o M no POH admitted for ADHF iso crack cocaine use, also noted to be hyperglycemic to  400s on admission, ophthalmology consulted for blurred vision  - Exam with excellent vision without correction OU at near, SLE revealing SPK and cataract  - DFE benign without NPDR or blunted foveal reflex  - Blurred vision likely combination of uncorrected refractive error, corneal dryness, and potentially diabetic lenticular change due to elevated blood glucose (possible myopic shift)  - Subclinical DME possible but lower concern given good reflex on exam and no background NPDR  - START Artificial tears QID OU  - Ophthalmology will arrange for outpatient refraction on discharge  - Ophthalmology will sign off    HTL  PGY3    Note not final until signed by attending physician    Ophthalmology Adult Pager: [phone]  Ophthalmology Peds Pager: [phone]    For adult follow up appts, call [phone]  For pediatric follow up appts, call [phone]
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
## 2024-10-19 NOTE — PROGRESS NOTES
"Leonel Yu is a 54 y.o. male on day 1 of admission presenting with Shortness of breath at rest.    Subjective   Pt seen and examined.  Pt was informed of plan to increase glargine slightly, to increase jardiance, and role of ssi was reviewed.     Pt's questions regarding his new freestyle duran CGM were answered.     I have reviewed histories, allergies and medications have been reviewed and there are no changes       Objective   Review of Systems   All other systems reviewed and are negative.    Physical Exam  Constitutional:       Appearance: Normal appearance. He is normal weight.   HENT:      Head: Normocephalic and atraumatic.      Nose: Nose normal.      Mouth/Throat:      Mouth: Mucous membranes are dry.      Pharynx: Oropharynx is clear.   Eyes:      Extraocular Movements: Extraocular movements intact.      Conjunctiva/sclera: Conjunctivae normal.   Pulmonary:      Effort: Pulmonary effort is normal.   Abdominal:      General: Abdomen is flat. Bowel sounds are normal.      Palpations: Abdomen is soft.   Skin:     General: Skin is warm and dry.   Neurological:      General: No focal deficit present.      Mental Status: He is alert and oriented to person, place, and time. Mental status is at baseline.   Psychiatric:         Mood and Affect: Mood normal.         Behavior: Behavior normal.         Thought Content: Thought content normal.         Judgment: Judgment normal.         Last Recorded Vitals  Blood pressure 111/74, pulse 78, temperature 36.4 °C (97.5 °F), temperature source Temporal, resp. rate 18, height 1.753 m (5' 9\"), weight 77.3 kg (170 lb 6.7 oz), SpO2 96%.  Intake/Output last 3 Shifts:  I/O last 3 completed shifts:  In: - (0 mL/kg)   Out: 2900 (37.5 mL/kg) [Urine:2900 (1 mL/kg/hr)]  Weight: 77.3 kg     Relevant Results  Results from last 7 days   Lab Units 10/19/24  0711 10/19/24  0630 10/19/24  0516 10/18/24  2108 10/18/24  1628 10/18/24  1300 10/18/24  0917 10/18/24  0744 10/17/24  1956 " 10/17/24  1934 10/17/24  1039 10/17/24  0411 10/17/24  0032 10/16/24  2238   POCT GLUCOSE mg/dL  --  279* 266* 257* 266* 405*   < >  --    < >  --    < >  --    < >  --    GLUCOSE mg/dL 235*  --   --   --   --   --   --  176*  --  323*  --  245*  --  67*    < > = values in this interval not displayed.     Scheduled medications  amiodarone, 200 mg, oral, Daily  aspirin, 81 mg, oral, Daily  atorvastatin, 40 mg, oral, Nightly  carvedilol, 3.125 mg, oral, BID  empagliflozin, 10 mg, oral, Daily  insulin glargine, 18 Units, subcutaneous, q24h  insulin lispro, 0-5 Units, subcutaneous, Nightly  insulin lispro, 0-5 Units, subcutaneous, TID AC  insulin lispro, 5 Units, subcutaneous, TID AC  sacubitriL-valsartan, 1 tablet, oral, BID  sertraline, 100 mg, oral, Daily  spironolactone, 12.5 mg, oral, Daily  tiotropium, 2 puff, inhalation, Daily  [Held by provider] torsemide, 40 mg, oral, Daily      Continuous medications     PRN medications  PRN medications: dextrose, dextrose, glucagon, glucagon      Assessment/Plan   Assessment & Plan  Shortness of breath at rest    Type 2 diabetes mellitus with hyperglycemia, with long-term current use of insulin    Leonel Yu is a 54 y.o. 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 Patient presents with blurry vision, lethargy, and dizziness.       Glucose in the 300's on arrival to the ED, given 60 mg of prednisone x 1 with glucoses climbing to the 400's. Total daily dose 10/15/24 was 40 units, with 30 units as basal insulin.      Patient interviewed at the bedside in the ED.  He is hungry and eating well. Patient first reports he has only been taking lispro at home, but does not understand the sliding scale.  When pressed, it sounds  like he has not been taking much of the lispro at all.      Diabetes History  Type of diabetes: Type 2 diabetes  Year diagnosed or age: [birth year]21  Hospitalizations for DKA or HHS: no  Complications: hyperglycemia  Seen by PCP or Endocrinology: PCP  Frequency of glucose checks: once a day  Glucose review: reports 200-400  Frequency of Hypoglycemia: denies     Home Medications  Basal: 30 units of glargine listed in the chart, but patient has not been taking it  Prandial: none  Correction: lispro scale, but patient is not taking it  Orals: jardiance 10 mg    10/17 - Severe hypoglycemia likely as steroid effect waned   Glucose 121 before lunch, so insulin doses held. Post lunch glucose > 600 mg/dl  10/18 - hyperglycemia most likely due to ssi order not administered before meals     PLAN  Steroids: 60 mg of prednisone x1 10/15/24  Nutrition: 90 gram carb consistent    - consider increasing empagliflozin to 25mg for glucose-lowering benefit   As pt is already on SGLT2i for HF protection, the safety profile of the medication is no different at 10mg vs 25mg, but pt can gain glucose lowering effect at 25mg dose.  - increase glargine to 18 units Q24       If NPO: reduce to 10  - continue lispro to 5 units with meals plus scale       If NPO: hold  - continue lispro ssi #1 to ACHS   = 0u  151-200 = 1u  201-250 = 2u  251-300 = 3u  301-350 = 4u  351-400 = 5u     -Accuchecks (not BMP) TIDAC and QHS- kindly ensure QHS Accucheck is drawn; it is often missed   - Goal -180  -Hypoglycemia protocol  -Will continue to follow and titrate insulin accordingly     SGLT2i tx may not be utilized in inpt setting unless the following conditions are met. Only HF Cardiology may initiate inpatient SGLT2i use.   1) pt is eating and drinking by mouth with a total daily carb intake exceeding 60g of CHO  2) pt is urinating independently of urinary catheters  3) pt can ambulate freely to the restroom in order to maintain personal hygiene  4) no  current concern for UTI/genital or inguinal candidiasis  5) no plans for NPO within the next 48-72hr     Discharge planning:   [] patient may expect to discharge home on glargine and lispro, final doses TBD by titration  Jardiance per heart failure team  [x]will provide CGM sample prior to discharge, patient is interested in a Joseph 3  [X]will enroll pt in  pharmacy platinum plan program  [x]follow up healthy at home virtual clinic    I spent 50 minutes in the professional and overall care of this patient.      Katie Garza PA-C

## 2024-10-19 NOTE — CARE PLAN
The patient's goals for the shift include      The clinical goals for the shift include Pt bp >85 sbp    Over the shift, the patient did not make progress toward the following goals. Barriers to progression include . Recommendations to address these barriers include .

## 2024-10-19 NOTE — PROGRESS NOTES
DAILY PROGRESS NOTE        Name: Leonel Yu  :  1970(54 y.o.)  MRN:  [id number]    Date: 10/19/24     SUBJECTIVE     Patient seen and evaluated at bedside. Patient endorses feeling less lethargic today, and improvement with breathing. He still endorses blurry vision. Denies chest pain, SOB, n/v. Is not endorsing any suicidal thoughts.    Current medications:  Scheduled Meds:amiodarone, 200 mg, oral, Daily  aspirin, 81 mg, oral, Daily  atorvastatin, 40 mg, oral, Nightly  carvedilol, 3.125 mg, oral, BID  empagliflozin, 10 mg, oral, Daily  insulin glargine, 15 Units, subcutaneous, q24h  insulin lispro, 0-5 Units, subcutaneous, Nightly  insulin lispro, 0-5 Units, subcutaneous, TID AC  insulin lispro, 5 Units, subcutaneous, TID AC  sacubitriL-valsartan, 1 tablet, oral, BID  sertraline, 100 mg, oral, Daily  spironolactone, 12.5 mg, oral, Daily  tiotropium, 2 puff, inhalation, Daily  torsemide, 40 mg, oral, Daily      Continuous Infusions:   PRN Meds:PRN medications: dextrose, dextrose, glucagon, glucagon      OBJECTIVE                                                                                                                                                                                                                                                                                                                                                           Temp:  [36.1 °C (97 °F)-36.4 °C (97.5 °F)] 36.4 °C (97.5 °F)  Heart Rate:  [67-80] 78  Resp:  [18] 18  BP: ()/(58-78) 111/74     Vitals:    10/19/24 0316   Weight: 77.3 kg (170 lb 6.7 oz)   Last discharge weight 74.4kg 10/5      PHYSICAL EXAM  GEN: NAD. Conversant and awake..  Eyes: Endorses blurry vision. PERRL.  CVS: RRR. Pulses palpable t/o.  Resp: CTA b/l. On RA.  Abd: Flat. ND. NT. NL BS. Soft.  MSK: No edema.  Skin: WWP. NL capillary refill.  Neuro: NFD. A&O as above. 5+ Power t/o. CN intact spontaneous to conversation.      Labs:   Lab Results    Component Value Date     (L) 10/19/2024    K 4.1 10/19/2024    CL 93 (L) 10/19/2024    CO2 21 10/19/2024    BUN 58 (H) 10/19/2024    CREATININE 2.10 (H) 10/19/2024    GLUCOSE 235 (H) 10/19/2024    CALCIUM 9.4 10/19/2024    PROT 7.8 10/19/2024    BILITOT 0.9 10/19/2024    ALKPHOS 77 10/19/2024    AST 21 10/19/2024    ALT 24 10/19/2024    GLOB 3.5 04/01/2020       Lab Results   Component Value Date    WBC 11.0 10/19/2024    HGB 19.6 (H) 10/19/2024    HCT 58.3 (H) 10/19/2024    MCV 91 10/19/2024     10/19/2024       Imaging:   Transthoracic Echo (TTE) Complete    Result Date: 10/17/2024   [facility], [address] [place] [address]                Tel [phone] and Fax [phone] TRANSTHORACIC ECHOCARDIOGRAM REPORT  Patient Name:      VINOD Campos Physician:    [id number] Otto Merino MD Study Date:        10/17/2024           Ordering Provider:    [id number] ANGEL CRABTREE MRN/PID:           [id number]             Fellow: Accession#:        [id number]         Nurse:                Willa Muniz RN Date of Birth/Age: 1970 / 54 years  Sonographer:          JORGE LUIS Giraldo RDCS Gender:            M                    Additional Staff: Height:            175.26 cm            Admit Date:           10/16/2024 Weight:            77.11 kg             Admission Status:     Inpatient -                                                               Routine BSA / BMI:         1.93 m2 / 25.10      Encounter#:           [id number]                    kg/m2 Blood Pressure:    98/73 mmHg           Department Location:  [facility] Non                                                               Invasive Study Type:    TRANSTHORACIC ECHO (TTE) COMPLETE Diagnosis/ICD: Acute on  chronic combined systolic (congestive) and diastolic                (congestive) heart failure (CHF)-I50.43 Indication:    Congestive Heart Failure CPT Code:      Echo Complete w Full Doppler-[id number] Patient History: Diabetes:          Yes Pertinent History: Dyspnea, CHF, COPD, CVA and HTN. V-fib, cocaine abuse. Study Detail: The following Echo studies were performed: 2D, M-Mode, Doppler and               color flow. Definity used as a contrast agent for endocardial               border definition. Total contrast used for this procedure was 3 mL               via IV push.  PHYSICIAN INTERPRETATION: Left Ventricle: Left ventricular ejection fraction is severely decreased, by visual estimate at 10-15%. There is global hypokinesis of the left ventricle with minor regional variations. The left ventricular cavity size is severely dilated. Spectral Doppler shows an impaired relaxation pattern of left ventricular diastolic filling. There is no definite left ventricular thrombus visualized. Left Atrium: The left atrium is mildly dilated. Right Ventricle: The right ventricle is mildly enlarged. There is mildly reduced right ventricular systolic function. Right Atrium: The right atrium is mild to moderately dilated. Aortic Valve: The aortic valve is trileaflet. There is no evidence of aortic valve regurgitation. The peak instantaneous gradient of the aortic valve is 4.0 mmHg. Mitral Valve: The mitral valve is normal in structure. There is mild mitral valve regurgitation. Tricuspid Valve: The tricuspid valve is structurally normal. There is mild tricuspid regurgitation. The Doppler estimated RVSP is within normal limits at 25.8 mmHg. Pulmonic Valve: The pulmonic valve is structurally normal. There is trace pulmonic valve regurgitation. Pericardium: There is no pericardial effusion noted. Aorta: The aortic root is normal. Systemic Veins: The inferior vena cava appears normal in size, with IVC inspiratory collapse greater than  50%. In comparison to the previous echocardiogram(s): Compared with study dated 7/31/2024, the diastolic function is now grade I and the RVSP decreased from 39 mmHg to 26 mmHg. No significant differences in LV size and function.  CONCLUSIONS:  1. Left ventricular ejection fraction is severely decreased, by visual estimate at 10-15%.  2. There is global hypokinesis of the left ventricle with minor regional variations.  3. Spectral Doppler shows an impaired relaxation pattern of left ventricular diastolic filling.  4. Left ventricular cavity size is severely dilated.  5. No left ventricular thrombus visualized.  6. There is mildly reduced right ventricular systolic function.  7. Mildly enlarged right ventricle.  8. The left atrium is mildly dilated.  9. The right atrium is mild to moderately dilated. 10. Right ventricular systolic pressure is within normal limits. 11. Compared with study dated 7/31/2024, the diastolic function is now grade I and the RVSP decreased from 39 mmHg to 26 mmHg. No significant differences in LV size and function. QUANTITATIVE DATA SUMMARY:  2D MEASUREMENTS:           Normal Ranges: LAs:             3.60 cm   (2.7-4.0cm) IVSd:            0.75 cm   (0.6-1.1cm) LVPWd:           1.15 cm   (0.6-1.1cm) LVIDd:           7.64 cm   (3.9-5.9cm) LVIDs:           7.23 cm LV Mass Index:   182 g/m2 LVEDV Index:     205 ml/m2 LV % FS          5.3 %  LA VOLUME:                    Normal Ranges: LA Vol A4C:        60.3 ml    (22+/-6mL/m2) LA Vol A2C:        73.5 ml LA Vol BP:         67.0 ml LA Vol Index A4C:  31.3ml/m2 LA Vol Index A2C:  38.1 ml/m2 LA Vol Index BP:   34.8 ml/m2 LA Area A4C:       21.3 cm2 LA Area A2C:       23.7 cm2 LA Major Axis A4C: 6.4 cm LA Major Axis A2C: 6.5 cm LA Volume Index:   34.8 ml/m2  RA VOLUME BY A/L METHOD:            Normal Ranges: RA Vol A4C:              94.0 ml    (8.3-19.5ml) RA Vol Index A4C:        48.7 ml/m2 RA Area A4C:             26.6 cm2 RA Major Axis A4C:        6.4 cm  LV SYSTOLIC FUNCTION BY 2D PLANIMETRY (MOD):                      Normal Ranges: EF-A4C View:    15 % (>=55%) EF-A2C View:    23 % EF-Biplane:     16 % EF-Visual:      13 % LV EF Reported: 13 %  LV DIASTOLIC FUNCTION:             Normal Ranges: MV Peak E:             0.32 m/s    (0.7-1.2 m/s) MV Peak A:             0.85 m/s    (0.42-0.7 m/s) E/A Ratio:             0.38        (1.0-2.2) MV e'                  0.025 m/s   (>8.0) MV lateral e'          0.03 m/s MV medial e'           0.02 m/s MV A Dur:              145.33 msec E/e' Ratio:            12.86       (<8.0)  AORTIC VALVE:           Normal Ranges: AoV Vmax:      1.00 m/s (<=1.7m/s) AoV Peak P.0 mmHg (<20mmHg) LVOT Max Jori:  0.87 m/s (<=1.1m/s) LVOT VTI:      12.41 cm LVOT Diameter: 2.20 cm  (1.8-2.4cm) AoV Area,Vmax: 3.31 cm2 (2.5-4.5cm2)  RIGHT VENTRICLE: RV Basal 4.40 cm RV Mid   3.00 cm RV Major 10.2 cm TAPSE:   18.0 mm RV s'    0.07 m/s  TRICUSPID VALVE/RVSP:          Normal Ranges: Peak TR Velocity:     2.39 m/s Est. RA Pressure:     3 mmHg RV Syst Pressure:     26 mmHg  (< 30mmHg) IVC Diam:             1.70 cm  PULMONIC VALVE:          Normal Ranges: PV Max Jori:     0.9 m/s  (0.6-0.9m/s) PV Max PG:      3.5 mmHg  [id number] Otto Merino MD Electronically signed on 10/17/2024 at 6:07:22 PM  ** Final **        ASSESSMENT & PLAN     Patient is 55yo male with PMHx of HFrEF (EF 10% ) likrly 2/2 chronic cocaine abuse c/b non-ishchemic cardiomyopathy (cMRI  shows fibrosis and transmural infarcts), s/p single chamber ICD (3/24), CKD3a, COPD (2L O2 at night is baseline), DM2, and medication nonadherence. He is presenting with blurry vision, lethargy/drowsiness, and shortness of breath. Differentials include HF e 2/2 non complicance and/or infection. GUICHO work up FeUrea 37.5% indicating intrinsic. Encephalopathy work up negative, lactate normalized. Patient mental status improved today however, hyperemesis started.    Updates 10/19/24  -  consulted neuro   - ordered MRI head   - started vanc, ceftriaxone, and acyclovir   - called cardiology fellow to prep ICD for MRI   - ordered coag screen for LP   - ordered CT abdomen pelvis to identify source of infection   - held GDMT medications except carvedilol iso low BP and vomiting  - ordered 2 dose of zofran (QtcB 506)  - started artifical tears QID as per ophtho  - ordered repeat tox screen, CBC, RFP, lactate, coag  - started thiamine 500MG TID    #Hyperemesis  :: refractory to zofran   - Follow MRI head and neuro recs  - follow repeat CBC, RFP, tox, lactate    #Acute decompensated failure, improving  #Hx HFrEF last TTE 10/17 EF 10-15% etiology due to mixed ischemic/non-ischemic CM  # s/p single chamber ICD 3/2024  :: TTE 7/31:  EF 10%, enlargement of all chambers, mild to mod TR, RVSP 39 mildly elevated  :: cMRI 8/24: transmural infarcts and fibrosis  :: CT chest: coronary calcifcation  :: EKG: sinus jessy w/ LVH  :: CXR 10/16 with vascular congestion and cardiomegaly  :: BNP 2833 (>5K)  :: C/f medication non-compliance  :: TTE 10/17: 10-15%, some interval improvement in diastolic function reduction of RSVP to 26%  PLAN  - Continue GDMT (restarted yesterday)  - Afterload Reduction: Entresto 24-26 (HELD TODAY)  - Beta blockade: carvedilol 3.125  - SGLT2i: Empa 10mg (HELD TODAY)  - MRA: Sprio 12.5 (HELD TODAY)  - Diuresis: Torsemide 40mg (HELD TODAY)    #lethargy, improving  :: infectious vs metabolic vs cardiac (hypoperfusion) vs neruo vs mixed - likely cardiac in nature improvement with some medications here, work up for infectious neg as below  :: CT head 10/17: scattered patchy and confluent hypodensities t/o periventricular and subcortical white matter  :: CRP and ESR neg  :: syphilis, HIV neg  :: ammonia, B12 neg  :: Lactate normalized  PLAN:  - follow neuro recs  - follow MRI head  - follow blood cultures (NGTD 2days)    #vision disturbance vs chronic near sightedness  Does no wax and wane. Clear  vision within feet but wall is blurry. Within last couple of days acute onset.  :: ophtho was consulted  Plan:  - artifical tears QID    #Hx of VT/VF  #Hx of AF RVR:   :: no shock since ami loading, ICD  PLAN:  - PO amiodarone 200mg/day    #C/f Infection  :: elevated procal 0.31  :: leukocytosis 12.8, Neutrophils Absolute 11.77  :: UA clean  :: Acute encephalopathy, resolving  PLAN:  - follow blood cultures    #GUICHO on CKD 3a  :: baseline Cr 1.2, recent Cr 2.1  :: intrinsic (FeUrea 37.5%)   :: appeared to have worsened after diuresis  :: s/p 500 mL bolus in ED 10/16  :: MALCOLM: b/l echogenic kidney likely 2/2 medical renal disease  PLAN  - trend Cr  - strict Is and Os  - caution w/ fluids and diuresis    #Mood Disorder: Zloft 100mg/day  #Crack Cocaine Use: last use 3 days ago  #ACTIVE SI   #s/o end-stage disease  - Psych consulted  - Palliative consulted    #COPD: 2L at night at baselines    #DM2: Glargine 15U    F: Cautious  E: K4, Mg2  Diet: Adult diet Cardiac, Consistent Carb; CCD 90 gm/meal; 70 gm fat; 2 - 3 grams Sodium   A: PIV  Lines: No  O2: 2L at night  DVT ppx: subcutaneous heparin  GI ppx: no  Code Status: DNR and No Intubation   Contact: Extended Emergency Contact Information  Primary Emergency Contact: Elizabet Santana  Mobile Phone: [phone]  Relation: Friend         Electronically signed by Jhony Bojorquez MD on 10/19/24 at 9:24 AM

## 2024-10-19 NOTE — SIGNIFICANT EVENT
Device interrogation  Patient is 55yo male with PMHx of HFrEF (EF 10% 7/24) likrly 2/2 chronic cocaine abuse c/b non-ishchemic cardiomyopathy (cMRI 8/24 shows fibrosis and transmural infarcts), s/p single chamber ICD (3/24), CKD3a, COPD (2L O2 at night is baseline), DM2, and medication nonadherence presented with blurry vision, lethargy/drowsiness, and shortness of breath.      MRI brain was ordered to further evaluate ongoing encephalopathy.    Cardiology was contacted to change patient's single chamber ICD to MRI mode prior to MRI.    [employer] ICD was changed to MRI mode with tachy therapy disabled and asynchronous mode at VOO, 60 bpm.     While undergoing MRI, code blue was called after patient was noted to have no BP readings. He did not loose a pulse and remained alert/oriented and responding to questions.     Telemetry showed NSR in the 80s with PVCs. BP obtained 80-90s systolic. Currently being evaluated for ICU level of care.    Device was changed back to the original settings.       Miguelangel Alanis MD  Cardiology Fellow, PGY-5

## 2024-10-19 NOTE — CONSULTS
Vancomycin Dosing by Pharmacy- INITIAL    Leonel Yu is a 54 y.o. year old male who Pharmacy has been consulted for vancomycin dosing for CNS/meningoencephalitis. Based on the patient's indication and renal status this patient will be dosed based on a goal trough/random level of 15-20.     Renal function is currently GUICHO, CKD 3, baseline scr 1.2, scr 2.10 today 10/19.    Visit Vitals  /74 (BP Location: Left arm, Patient Position: Lying)   Pulse 78   Temp 36.4 °C (97.5 °F) (Temporal)   Resp 18        Lab Results   Component Value Date    CREATININE 2.10 (H) 10/19/2024    CREATININE 1.64 (H) 10/18/2024    CREATININE 1.99 (H) 10/17/2024    CREATININE 2.10 (H) 10/17/2024        Patient weight is as follows:   Vitals:    10/19/24 0316   Weight: 77.3 kg (170 lb 6.7 oz)       Cultures:  No results found for the encounter in last 14 days.        I/O last 3 completed shifts:  In: - (0 mL/kg)   Out: 2900 (37.5 mL/kg) [Urine:2900 (1 mL/kg/hr)]  Weight: 77.3 kg   I/O during current shift:  I/O this shift:  In: -   Out: 200 [Emesis/NG output:200]    Temp (24hrs), Av.3 °C (97.3 °F), Min:36.1 °C (97 °F), Max:36.4 °C (97.5 °F)         Assessment/Plan     Patient will not be given a loading dose.  Will initiate vancomycin maintenance, a one time dose of 1500 mg.    Follow-up level will NEED to be ordered on 10/20 for a 24h level once the 9578uum7 is charted, dose by level, unless clinically indicated sooner.  Will continue to monitor renal function daily while on vancomycin and order serum creatinine at least every 48 hours if not already ordered.  Follow for continued vancomycin needs, clinical response, and signs/symptoms of toxicity.       Dominique Chong, PharmD

## 2024-10-19 NOTE — ASSESSMENT & PLAN NOTE
Leonel uY is a 54 y.o. male  PMH HFrEF (5-10%) s/p ICD, COPD, CKD, DM2,  substance use (crack cocaine), medication non adherence and complex psychiatric history (MDD with anxiety , SI, HI) presenting with lethargy, weakness, and b/l blurry vision. Neurology consulted for concern of CNS infection. Neurological exam concerning for lethargy and photophobia but no focal deficits, Kernigs and Brudzinskis;s sign negative. 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     Recommendations:   - Please start stat CNS coverage antibiotics: IV Ceftriaxone and IV Vancomycin and renally doses IV acyclovir  - Please obtain MRI Brain w/wo contrast  -Will plan to perform LP.   -Please start IV thiamine 500mg TID for 3 days followed by 500mg Qdaily  - Please reach out to Neurology if any questions or concerns.     Patient discussed with attending Dr. Presley.    Jami Bautista MD  Neurology PGY-3

## 2024-10-20 ENCOUNTER — APPOINTMENT (OUTPATIENT)
Dept: RADIOLOGY | Facility: HOSPITAL | Age: 54
End: 2024-10-20
Payer: COMMERCIAL

## 2024-10-20 LAB
25(OH)D3 SERPL-MCNC: 16 NG/ML (ref 30–100)
ALBUMIN SERPL BCP-MCNC: 3.1 G/DL (ref 3.4–5)
ALBUMIN SERPL BCP-MCNC: 3.8 G/DL (ref 3.4–5)
ALP SERPL-CCNC: 68 U/L (ref 33–120)
ALT SERPL W P-5'-P-CCNC: 22 U/L (ref 10–52)
AMPHETAMINES UR QL SCN: ABNORMAL
ANION GAP BLDV CALCULATED.4IONS-SCNC: 7 MMOL/L (ref 10–25)
ANION GAP SERPL CALC-SCNC: 15 MMOL/L (ref 10–20)
ANION GAP SERPL CALC-SCNC: 18 MMOL/L (ref 10–20)
APPEARANCE UR: CLEAR
AST SERPL W P-5'-P-CCNC: 16 U/L (ref 9–39)
B-OH-BUTYR SERPL-SCNC: 0.09 MMOL/L (ref 0.02–0.27)
BACTERIA BLD CULT: NORMAL
BARBITURATES UR QL SCN: ABNORMAL
BASE EXCESS BLDV CALC-SCNC: 3.4 MMOL/L (ref -2–3)
BASOPHILS # BLD AUTO: 0.01 X10*3/UL (ref 0–0.1)
BASOPHILS NFR BLD AUTO: 0.1 %
BENZODIAZ UR QL SCN: ABNORMAL
BILIRUB SERPL-MCNC: 0.6 MG/DL (ref 0–1.2)
BILIRUB UR STRIP.AUTO-MCNC: NEGATIVE MG/DL
BODY TEMPERATURE: 37 DEGREES CELSIUS
BUN SERPL-MCNC: 66 MG/DL (ref 6–23)
BUN SERPL-MCNC: 67 MG/DL (ref 6–23)
BZE UR QL SCN: ABNORMAL
CA-I BLDV-SCNC: 1.13 MMOL/L (ref 1.1–1.33)
CALCIUM SERPL-MCNC: 8.4 MG/DL (ref 8.6–10.6)
CALCIUM SERPL-MCNC: 9 MG/DL (ref 8.6–10.6)
CANNABINOIDS UR QL SCN: ABNORMAL
CARDIAC TROPONIN I PNL SERPL HS: 100 NG/L (ref 0–53)
CHLORIDE BLDV-SCNC: 96 MMOL/L (ref 98–107)
CHLORIDE SERPL-SCNC: 91 MMOL/L (ref 98–107)
CHLORIDE SERPL-SCNC: 93 MMOL/L (ref 98–107)
CK SERPL-CCNC: 32 U/L (ref 0–325)
CO2 SERPL-SCNC: 27 MMOL/L (ref 21–32)
CO2 SERPL-SCNC: 27 MMOL/L (ref 21–32)
COLOR UR: ABNORMAL
CREAT SERPL-MCNC: 2.62 MG/DL (ref 0.5–1.3)
CREAT SERPL-MCNC: 3.08 MG/DL (ref 0.5–1.3)
EGFRCR SERPLBLD CKD-EPI 2021: 23 ML/MIN/1.73M*2
EGFRCR SERPLBLD CKD-EPI 2021: 28 ML/MIN/1.73M*2
EOSINOPHIL # BLD AUTO: 0 X10*3/UL (ref 0–0.7)
EOSINOPHIL NFR BLD AUTO: 0 %
ERYTHROCYTE [DISTWIDTH] IN BLOOD BY AUTOMATED COUNT: 16.2 % (ref 11.5–14.5)
FENTANYL+NORFENTANYL UR QL SCN: ABNORMAL
FERRITIN SERPL-MCNC: 242 NG/ML (ref 20–300)
FOLATE SERPL-MCNC: >24 NG/ML
GLUCOSE BLD MANUAL STRIP-MCNC: 143 MG/DL (ref 74–99)
GLUCOSE BLD MANUAL STRIP-MCNC: 230 MG/DL (ref 74–99)
GLUCOSE BLDV-MCNC: 116 MG/DL (ref 74–99)
GLUCOSE SERPL-MCNC: 219 MG/DL (ref 74–99)
GLUCOSE SERPL-MCNC: 250 MG/DL (ref 74–99)
GLUCOSE UR STRIP.AUTO-MCNC: ABNORMAL MG/DL
HCO3 BLDV-SCNC: 31.3 MMOL/L (ref 22–26)
HCT VFR BLD AUTO: 57.5 % (ref 41–52)
HCT VFR BLD EST: 53 % (ref 41–52)
HGB BLD-MCNC: 19.9 G/DL (ref 13.5–17.5)
HGB BLDV-MCNC: 17.7 G/DL (ref 13.5–17.5)
HOLD SPECIMEN: NORMAL
IMM GRANULOCYTES # BLD AUTO: 0.03 X10*3/UL (ref 0–0.7)
IMM GRANULOCYTES NFR BLD AUTO: 0.4 % (ref 0–0.9)
INHALED O2 CONCENTRATION: 35 %
IRON SATN MFR SERPL: 14 % (ref 25–45)
IRON SERPL-MCNC: 39 UG/DL (ref 35–150)
KETONES UR STRIP.AUTO-MCNC: NEGATIVE MG/DL
LACTATE BLDV-SCNC: 1.5 MMOL/L (ref 0.4–2)
LACTATE SERPL-SCNC: 2 MMOL/L (ref 0.4–2)
LEUKOCYTE ESTERASE UR QL STRIP.AUTO: NEGATIVE
LYMPHOCYTES # BLD AUTO: 0.19 X10*3/UL (ref 1.2–4.8)
LYMPHOCYTES NFR BLD AUTO: 2.6 %
MAGNESIUM SERPL-MCNC: 2.25 MG/DL (ref 1.6–2.4)
MCH RBC QN AUTO: 30.8 PG (ref 26–34)
MCHC RBC AUTO-ENTMCNC: 34.6 G/DL (ref 32–36)
MCV RBC AUTO: 89 FL (ref 80–100)
METHADONE UR QL SCN: ABNORMAL
MONOCYTES # BLD AUTO: 0.35 X10*3/UL (ref 0.1–1)
MONOCYTES NFR BLD AUTO: 4.7 %
MUCOUS THREADS #/AREA URNS AUTO: NORMAL /LPF
NEUTROPHILS # BLD AUTO: 6.84 X10*3/UL (ref 1.2–7.7)
NEUTROPHILS NFR BLD AUTO: 92.2 %
NITRITE UR QL STRIP.AUTO: NEGATIVE
NRBC BLD-RTO: 0 /100 WBCS (ref 0–0)
OPIATES UR QL SCN: ABNORMAL
OXYCODONE+OXYMORPHONE UR QL SCN: ABNORMAL
OXYHGB MFR BLDV: 65.6 % (ref 45–75)
PCO2 BLDV: 58 MM HG (ref 41–51)
PCP UR QL SCN: ABNORMAL
PH BLDV: 7.34 PH (ref 7.33–7.43)
PH UR STRIP.AUTO: 5.5 [PH]
PHOSPHATE SERPL-MCNC: 5.7 MG/DL (ref 2.5–4.9)
PHOSPHATE SERPL-MCNC: 6 MG/DL (ref 2.5–4.9)
PLATELET # BLD AUTO: 112 X10*3/UL (ref 150–450)
PO2 BLDV: 42 MM HG (ref 35–45)
POTASSIUM BLDV-SCNC: 3.9 MMOL/L (ref 3.5–5.3)
POTASSIUM SERPL-SCNC: 4.1 MMOL/L (ref 3.5–5.3)
POTASSIUM SERPL-SCNC: 4.5 MMOL/L (ref 3.5–5.3)
PROT SERPL-MCNC: 7.4 G/DL (ref 6.4–8.2)
PROT UR STRIP.AUTO-MCNC: ABNORMAL MG/DL
RBC # BLD AUTO: 6.46 X10*6/UL (ref 4.5–5.9)
RBC # UR STRIP.AUTO: NEGATIVE /UL
RBC #/AREA URNS AUTO: NORMAL /HPF
SAO2 % BLDV: 67 % (ref 45–75)
SODIUM BLDV-SCNC: 130 MMOL/L (ref 136–145)
SODIUM SERPL-SCNC: 131 MMOL/L (ref 136–145)
SODIUM SERPL-SCNC: 131 MMOL/L (ref 136–145)
SP GR UR STRIP.AUTO: 1.03
TIBC SERPL-MCNC: 283 UG/DL (ref 240–445)
TSH SERPL-ACNC: 1.66 MIU/L (ref 0.44–3.98)
UIBC SERPL-MCNC: 244 UG/DL (ref 110–370)
UROBILINOGEN UR STRIP.AUTO-MCNC: NORMAL MG/DL
VANCOMYCIN SERPL-MCNC: 10.6 UG/ML (ref 5–20)
WBC # BLD AUTO: 7.4 X10*3/UL (ref 4.4–11.3)
WBC #/AREA URNS AUTO: NORMAL /HPF

## 2024-10-20 PROCEDURE — 2020000001 HC ICU ROOM DAILY

## 2024-10-20 PROCEDURE — 84100 ASSAY OF PHOSPHORUS: CPT | Performed by: NURSE PRACTITIONER

## 2024-10-20 PROCEDURE — 82810 BLOOD GASES O2 SAT ONLY: CPT | Performed by: NURSE PRACTITIONER

## 2024-10-20 PROCEDURE — 2500000004 HC RX 250 GENERAL PHARMACY W/ HCPCS (ALT 636 FOR OP/ED): Performed by: SPECIALIST

## 2024-10-20 PROCEDURE — 2500000004 HC RX 250 GENERAL PHARMACY W/ HCPCS (ALT 636 FOR OP/ED): Performed by: NURSE PRACTITIONER

## 2024-10-20 PROCEDURE — 99252 IP/OBS CONSLTJ NEW/EST SF 35: CPT | Performed by: PSYCHIATRY & NEUROLOGY

## 2024-10-20 PROCEDURE — 2500000002 HC RX 250 W HCPCS SELF ADMINISTERED DRUGS (ALT 637 FOR MEDICARE OP, ALT 636 FOR OP/ED): Performed by: NURSE PRACTITIONER

## 2024-10-20 PROCEDURE — 71045 X-RAY EXAM CHEST 1 VIEW: CPT

## 2024-10-20 PROCEDURE — 03HY32Z INSERTION OF MONITORING DEVICE INTO UPPER ARTERY, PERCUTANEOUS APPROACH: ICD-10-PCS | Performed by: NURSE PRACTITIONER

## 2024-10-20 PROCEDURE — 85025 COMPLETE CBC W/AUTO DIFF WBC: CPT | Performed by: NURSE PRACTITIONER

## 2024-10-20 PROCEDURE — 82947 ASSAY GLUCOSE BLOOD QUANT: CPT

## 2024-10-20 PROCEDURE — 80069 RENAL FUNCTION PANEL: CPT | Mod: CCI | Performed by: NURSE PRACTITIONER

## 2024-10-20 PROCEDURE — 83735 ASSAY OF MAGNESIUM: CPT | Performed by: NURSE PRACTITIONER

## 2024-10-20 PROCEDURE — 80053 COMPREHEN METABOLIC PANEL: CPT | Performed by: NURSE PRACTITIONER

## 2024-10-20 PROCEDURE — 71045 X-RAY EXAM CHEST 1 VIEW: CPT | Performed by: RADIOLOGY

## 2024-10-20 PROCEDURE — 80353 DRUG SCREENING COCAINE: CPT | Performed by: NURSE PRACTITIONER

## 2024-10-20 PROCEDURE — 83605 ASSAY OF LACTIC ACID: CPT

## 2024-10-20 PROCEDURE — 82805 BLOOD GASES W/O2 SATURATION: CPT | Performed by: NURSE PRACTITIONER

## 2024-10-20 PROCEDURE — 36415 COLL VENOUS BLD VENIPUNCTURE: CPT | Performed by: NURSE PRACTITIONER

## 2024-10-20 PROCEDURE — 84484 ASSAY OF TROPONIN QUANT: CPT | Performed by: NURSE PRACTITIONER

## 2024-10-20 PROCEDURE — 2500000001 HC RX 250 WO HCPCS SELF ADMINISTERED DRUGS (ALT 637 FOR MEDICARE OP): Performed by: NURSE PRACTITIONER

## 2024-10-20 PROCEDURE — 4A133J1 MONITORING OF ARTERIAL PULSE, PERIPHERAL, PERCUTANEOUS APPROACH: ICD-10-PCS | Performed by: NURSE PRACTITIONER

## 2024-10-20 PROCEDURE — 81001 URINALYSIS AUTO W/SCOPE: CPT | Performed by: NURSE PRACTITIONER

## 2024-10-20 PROCEDURE — 82550 ASSAY OF CK (CPK): CPT | Performed by: NURSE PRACTITIONER

## 2024-10-20 PROCEDURE — 99291 CRITICAL CARE FIRST HOUR: CPT | Performed by: NURSE PRACTITIONER

## 2024-10-20 PROCEDURE — 80202 ASSAY OF VANCOMYCIN: CPT

## 2024-10-20 PROCEDURE — 4A133B1 MONITORING OF ARTERIAL PRESSURE, PERIPHERAL, PERCUTANEOUS APPROACH: ICD-10-PCS | Performed by: NURSE PRACTITIONER

## 2024-10-20 PROCEDURE — 80307 DRUG TEST PRSMV CHEM ANLYZR: CPT | Performed by: NURSE PRACTITIONER

## 2024-10-20 PROCEDURE — 37799 UNLISTED PX VASCULAR SURGERY: CPT | Performed by: NURSE PRACTITIONER

## 2024-10-20 PROCEDURE — 36620 INSERTION CATHETER ARTERY: CPT | Performed by: NURSE PRACTITIONER

## 2024-10-20 PROCEDURE — 82010 KETONE BODYS QUAN: CPT | Performed by: NURSE PRACTITIONER

## 2024-10-20 PROCEDURE — 02HV33Z INSERTION OF INFUSION DEVICE INTO SUPERIOR VENA CAVA, PERCUTANEOUS APPROACH: ICD-10-PCS | Performed by: INTERNAL MEDICINE

## 2024-10-20 PROCEDURE — 82435 ASSAY OF BLOOD CHLORIDE: CPT | Performed by: NURSE PRACTITIONER

## 2024-10-20 PROCEDURE — 99291 CRITICAL CARE FIRST HOUR: CPT | Performed by: SPECIALIST

## 2024-10-20 PROCEDURE — 87081 CULTURE SCREEN ONLY: CPT | Performed by: NURSE PRACTITIONER

## 2024-10-20 PROCEDURE — 80365 DRUG SCREENING OXYCODONE: CPT | Performed by: NURSE PRACTITIONER

## 2024-10-20 PROCEDURE — 2500000004 HC RX 250 GENERAL PHARMACY W/ HCPCS (ALT 636 FOR OP/ED): Mod: JZ | Performed by: NURSE PRACTITIONER

## 2024-10-20 PROCEDURE — 2500000005 HC RX 250 GENERAL PHARMACY W/O HCPCS: Performed by: NURSE PRACTITIONER

## 2024-10-20 PROCEDURE — 82728 ASSAY OF FERRITIN: CPT | Performed by: NURSE PRACTITIONER

## 2024-10-20 PROCEDURE — 83540 ASSAY OF IRON: CPT | Performed by: NURSE PRACTITIONER

## 2024-10-20 PROCEDURE — 83550 IRON BINDING TEST: CPT | Performed by: NURSE PRACTITIONER

## 2024-10-20 PROCEDURE — 99255 IP/OBS CONSLTJ NEW/EST HI 80: CPT | Performed by: STUDENT IN AN ORGANIZED HEALTH CARE EDUCATION/TRAINING PROGRAM

## 2024-10-20 PROCEDURE — 36556 INSERT NON-TUNNEL CV CATH: CPT | Performed by: NURSE PRACTITIONER

## 2024-10-20 RX ORDER — ACETAMINOPHEN 325 MG/1
650 TABLET ORAL EVERY 6 HOURS
Status: DISCONTINUED | OUTPATIENT
Start: 2024-10-20 | End: 2024-10-25 | Stop reason: HOSPADM

## 2024-10-20 RX ORDER — VANCOMYCIN HYDROCHLORIDE 750 MG/150ML
1500 INJECTION, SOLUTION INTRAVENOUS ONCE
Status: COMPLETED | OUTPATIENT
Start: 2024-10-20 | End: 2024-10-20

## 2024-10-20 RX ORDER — DOBUTAMINE HYDROCHLORIDE 400 MG/100ML
5 INJECTION INTRAVENOUS CONTINUOUS
Status: CANCELLED | OUTPATIENT
Start: 2024-10-20

## 2024-10-20 RX ORDER — ACETAMINOPHEN 325 MG/1
650 TABLET ORAL EVERY 6 HOURS PRN
Status: DISCONTINUED | OUTPATIENT
Start: 2024-10-20 | End: 2024-10-20

## 2024-10-20 RX ADMIN — CEFTRIAXONE SODIUM 2 G: 2 INJECTION, SOLUTION INTRAVENOUS at 13:05

## 2024-10-20 RX ADMIN — INSULIN GLARGINE 18 UNITS: 100 INJECTION, SOLUTION SUBCUTANEOUS at 17:48

## 2024-10-20 RX ADMIN — POLYETHYLENE GLYCOL 3350 17 G: 17 POWDER, FOR SOLUTION ORAL at 08:46

## 2024-10-20 RX ADMIN — CEFTRIAXONE SODIUM 2 G: 2 INJECTION, SOLUTION INTRAVENOUS at 23:32

## 2024-10-20 RX ADMIN — ACETAMINOPHEN 650 MG: 325 TABLET ORAL at 16:38

## 2024-10-20 RX ADMIN — SODIUM CHLORIDE, POTASSIUM CHLORIDE, SODIUM LACTATE AND CALCIUM CHLORIDE 250 ML: 600; 310; 30; 20 INJECTION, SOLUTION INTRAVENOUS at 10:56

## 2024-10-20 RX ADMIN — SERTRALINE 100 MG: 100 TABLET, FILM COATED ORAL at 08:46

## 2024-10-20 RX ADMIN — ACETAMINOPHEN 650 MG: 325 TABLET ORAL at 07:37

## 2024-10-20 RX ADMIN — INSULIN LISPRO 5 UNITS: 100 INJECTION, SOLUTION INTRAVENOUS; SUBCUTANEOUS at 08:47

## 2024-10-20 RX ADMIN — SODIUM CHLORIDE 250 ML: 9 INJECTION, SOLUTION INTRAVENOUS at 05:47

## 2024-10-20 RX ADMIN — INSULIN LISPRO 2 UNITS: 100 INJECTION, SOLUTION INTRAVENOUS; SUBCUTANEOUS at 08:46

## 2024-10-20 RX ADMIN — ATORVASTATIN CALCIUM 40 MG: 40 TABLET, FILM COATED ORAL at 20:12

## 2024-10-20 RX ADMIN — PANTOPRAZOLE SODIUM 40 MG: 40 TABLET, DELAYED RELEASE ORAL at 08:46

## 2024-10-20 RX ADMIN — NOREPINEPHRINE BITARTRATE 0.1 MCG/KG/MIN: 8 INJECTION, SOLUTION INTRAVENOUS at 13:00

## 2024-10-20 RX ADMIN — THIAMINE HYDROCHLORIDE 500 MG: 100 INJECTION, SOLUTION INTRAMUSCULAR; INTRAVENOUS at 15:35

## 2024-10-20 RX ADMIN — SODIUM CHLORIDE, POTASSIUM CHLORIDE, SODIUM LACTATE AND CALCIUM CHLORIDE 1000 ML: 600; 310; 30; 20 INJECTION, SOLUTION INTRAVENOUS at 16:38

## 2024-10-20 RX ADMIN — SODIUM CHLORIDE 250 ML: 9 INJECTION, SOLUTION INTRAVENOUS at 01:06

## 2024-10-20 RX ADMIN — FERUMOXYTOL 510 MG: 510 INJECTION INTRAVENOUS at 16:37

## 2024-10-20 RX ADMIN — AMIODARONE HYDROCHLORIDE 200 MG: 200 TABLET ORAL at 08:46

## 2024-10-20 RX ADMIN — INSULIN LISPRO 5 UNITS: 100 INJECTION, SOLUTION INTRAVENOUS; SUBCUTANEOUS at 13:08

## 2024-10-20 RX ADMIN — INSULIN LISPRO 5 UNITS: 100 INJECTION, SOLUTION INTRAVENOUS; SUBCUTANEOUS at 17:45

## 2024-10-20 RX ADMIN — SENNOSIDES AND DOCUSATE SODIUM 2 TABLET: 50; 8.6 TABLET ORAL at 08:46

## 2024-10-20 RX ADMIN — THIAMINE HYDROCHLORIDE 500 MG: 100 INJECTION, SOLUTION INTRAMUSCULAR; INTRAVENOUS at 10:55

## 2024-10-20 RX ADMIN — SODIUM CHLORIDE, POTASSIUM CHLORIDE, SODIUM LACTATE AND CALCIUM CHLORIDE 250 ML: 600; 310; 30; 20 INJECTION, SOLUTION INTRAVENOUS at 13:06

## 2024-10-20 RX ADMIN — SODIUM CHLORIDE, SODIUM LACTATE, POTASSIUM CHLORIDE, AND CALCIUM CHLORIDE 250 ML: 600; 310; 30; 20 INJECTION, SOLUTION INTRAVENOUS at 14:13

## 2024-10-20 RX ADMIN — THIAMINE HYDROCHLORIDE 500 MG: 100 INJECTION, SOLUTION INTRAMUSCULAR; INTRAVENOUS at 21:55

## 2024-10-20 RX ADMIN — VANCOMYCIN HYDROCHLORIDE 1500 MG: 750 INJECTION, SOLUTION INTRAVENOUS at 19:42

## 2024-10-20 RX ADMIN — ASPIRIN 81 MG 81 MG: 81 TABLET ORAL at 08:46

## 2024-10-20 RX ADMIN — ACYCLOVIR SODIUM 770 MG: 50 INJECTION, SOLUTION INTRAVENOUS at 16:37

## 2024-10-20 RX ADMIN — ACETAMINOPHEN 650 MG: 325 TABLET ORAL at 01:44

## 2024-10-20 RX ADMIN — INSULIN LISPRO 2 UNITS: 100 INJECTION, SOLUTION INTRAVENOUS; SUBCUTANEOUS at 13:06

## 2024-10-20 RX ADMIN — TIOTROPIUM BROMIDE INHALATION SPRAY 2 PUFF: 3.12 SPRAY, METERED RESPIRATORY (INHALATION) at 09:44

## 2024-10-20 RX ADMIN — CEFTRIAXONE SODIUM 2 G: 2 INJECTION, SOLUTION INTRAVENOUS at 00:31

## 2024-10-20 RX ADMIN — ACYCLOVIR SODIUM 770 MG: 50 INJECTION, SOLUTION INTRAVENOUS at 03:08

## 2024-10-20 SDOH — ECONOMIC STABILITY: HOUSING INSECURITY: IN THE LAST 12 MONTHS, WAS THERE A TIME WHEN YOU WERE NOT ABLE TO PAY THE MORTGAGE OR RENT ON TIME?: YES

## 2024-10-20 SDOH — SOCIAL STABILITY: SOCIAL NETWORK: HOW OFTEN DO YOU GET TOGETHER WITH FRIENDS OR RELATIVES?: PATIENT DECLINED

## 2024-10-20 SDOH — SOCIAL STABILITY: SOCIAL INSECURITY: WITHIN THE LAST YEAR, HAVE YOU BEEN HUMILIATED OR EMOTIONALLY ABUSED IN OTHER WAYS BY YOUR PARTNER OR EX-PARTNER?: NO

## 2024-10-20 SDOH — ECONOMIC STABILITY: FOOD INSECURITY: WITHIN THE PAST 12 MONTHS, YOU WORRIED THAT YOUR FOOD WOULD RUN OUT BEFORE YOU GOT THE MONEY TO BUY MORE.: OFTEN TRUE

## 2024-10-20 SDOH — ECONOMIC STABILITY: FOOD INSECURITY: HOW HARD IS IT FOR YOU TO PAY FOR THE VERY BASICS LIKE FOOD, HOUSING, MEDICAL CARE, AND HEATING?: HARD

## 2024-10-20 SDOH — HEALTH STABILITY: MENTAL HEALTH
DO YOU FEEL STRESS - TENSE, RESTLESS, NERVOUS, OR ANXIOUS, OR UNABLE TO SLEEP AT NIGHT BECAUSE YOUR MIND IS TROUBLED ALL THE TIME - THESE DAYS?: VERY MUCH

## 2024-10-20 SDOH — SOCIAL STABILITY: SOCIAL NETWORK
DO YOU BELONG TO ANY CLUBS OR ORGANIZATIONS SUCH AS CHURCH GROUPS, UNIONS, FRATERNAL OR ATHLETIC GROUPS, OR SCHOOL GROUPS?: PATIENT DECLINED

## 2024-10-20 SDOH — SOCIAL STABILITY: SOCIAL NETWORK: IN A TYPICAL WEEK, HOW MANY TIMES DO YOU TALK ON THE PHONE WITH FAMILY, FRIENDS, OR NEIGHBORS?: PATIENT DECLINED

## 2024-10-20 SDOH — ECONOMIC STABILITY: INCOME INSECURITY: IN THE PAST 12 MONTHS HAS THE ELECTRIC, GAS, OIL, OR WATER COMPANY THREATENED TO SHUT OFF SERVICES IN YOUR HOME?: YES

## 2024-10-20 SDOH — ECONOMIC STABILITY: FOOD INSECURITY: WITHIN THE PAST 12 MONTHS, THE FOOD YOU BOUGHT JUST DIDN'T LAST AND YOU DIDN'T HAVE MONEY TO GET MORE.: OFTEN TRUE

## 2024-10-20 SDOH — HEALTH STABILITY: PHYSICAL HEALTH
HOW OFTEN DO YOU NEED TO HAVE SOMEONE HELP YOU WHEN YOU READ INSTRUCTIONS, PAMPHLETS, OR OTHER WRITTEN MATERIAL FROM YOUR DOCTOR OR PHARMACY?: SOMETIMES

## 2024-10-20 SDOH — ECONOMIC STABILITY: HOUSING INSECURITY: AT ANY TIME IN THE PAST 12 MONTHS, WERE YOU HOMELESS OR LIVING IN A SHELTER (INCLUDING NOW)?: PATIENT UNABLE TO ANSWER

## 2024-10-20 SDOH — SOCIAL STABILITY: SOCIAL INSECURITY: WITHIN THE LAST YEAR, HAVE YOU BEEN AFRAID OF YOUR PARTNER OR EX-PARTNER?: NO

## 2024-10-20 SDOH — SOCIAL STABILITY: SOCIAL NETWORK: HOW OFTEN DO YOU ATTEND MEETINGS OF THE CLUBS OR ORGANIZATIONS YOU BELONG TO?: PATIENT DECLINED

## 2024-10-20 SDOH — ECONOMIC STABILITY: TRANSPORTATION INSECURITY: IN THE PAST 12 MONTHS, HAS LACK OF TRANSPORTATION KEPT YOU FROM MEDICAL APPOINTMENTS OR FROM GETTING MEDICATIONS?: YES

## 2024-10-20 SDOH — SOCIAL STABILITY: SOCIAL NETWORK: HOW OFTEN DO YOU ATTEND CHURCH OR RELIGIOUS SERVICES?: PATIENT DECLINED

## 2024-10-20 SDOH — SOCIAL STABILITY: SOCIAL INSECURITY: ARE YOU MARRIED, WIDOWED, DIVORCED, SEPARATED, NEVER MARRIED, OR LIVING WITH A PARTNER?: PATIENT DECLINED

## 2024-10-20 SDOH — HEALTH STABILITY: PHYSICAL HEALTH
ON AVERAGE, HOW MANY DAYS PER WEEK DO YOU ENGAGE IN MODERATE TO STRENUOUS EXERCISE (LIKE A BRISK WALK)?: PATIENT UNABLE TO ANSWER

## 2024-10-20 SDOH — HEALTH STABILITY: PHYSICAL HEALTH: ON AVERAGE, HOW MANY MINUTES DO YOU ENGAGE IN EXERCISE AT THIS LEVEL?: PATIENT UNABLE TO ANSWER

## 2024-10-20 ASSESSMENT — ENCOUNTER SYMPTOMS
NERVOUS/ANXIOUS: 1
VOMITING: 1
MUSCULOSKELETAL NEGATIVE: 1
LIGHT-HEADEDNESS: 1
NAUSEA: 1
CONSTITUTIONAL NEGATIVE: 1
ALLERGIC/IMMUNOLOGIC NEGATIVE: 1
ENDOCRINE NEGATIVE: 1
SHORTNESS OF BREATH: 1
CHEST TIGHTNESS: 1

## 2024-10-20 ASSESSMENT — PAIN - FUNCTIONAL ASSESSMENT
PAIN_FUNCTIONAL_ASSESSMENT: 0-10
PAIN_FUNCTIONAL_ASSESSMENT: CPOT (CRITICAL CARE PAIN OBSERVATION TOOL)

## 2024-10-20 ASSESSMENT — ACTIVITIES OF DAILY LIVING (ADL): LACK_OF_TRANSPORTATION: YES

## 2024-10-20 ASSESSMENT — PAIN DESCRIPTION - DESCRIPTORS
DESCRIPTORS: ACHING
DESCRIPTORS: ACHING

## 2024-10-20 ASSESSMENT — PAIN SCALES - GENERAL
PAINLEVEL_OUTOF10: 7
PAINLEVEL_OUTOF10: 0 - NO PAIN
PAINLEVEL_OUTOF10: 0 - NO PAIN
PAINLEVEL_OUTOF10: 5 - MODERATE PAIN

## 2024-10-20 NOTE — SIGNIFICANT EVENT
Palliative care received a consultation for for chronic decompensated heart failure with reduced EF and substance use disorder. We will complete an evaluation on October 21, Monday, and assist the team in caring for the patient. In the mean time, you can reach me by pager or secure chat for additional questions and concerns, as discussed with Zoya CARNES.    Manolo Ruiz MD  Palliative Care Physician  Message: [employer] Secure chat  Team pager [phone]

## 2024-10-20 NOTE — PROGRESS NOTES
"PSYCHIATRY CONSULT SERVICE NOTE    SUBJECTIVE    Leonel Yu is a 54 y.o. male on hospital day 2 of admission presenting with Shortness of breath at rest.    Transferred to HFICU yesterday, requiring pressors.  MRI brain partially done yesterday as part of work-up for encephalopathy.  Saw patient in his room today.  He appeared uncomfortable and ill.  Reports having pain \"all over\" and nausea.  He had told the ICU team that he wanted to change his code status back to full code, and tells me the same today, saying \"I just changed my mind, I guess.\"  Per nursing, he has been actively participating in all recommended treatments.  He has not made any statements about leaving the hospital, or attempting to leave.  When asked about suicidal ideation he laughs and says \"I don't know why you guys keep asking me that.\"  Denies any thoughts/plans about harming others (ie, his wife).  Says he believes someone from the team was supposed to reach out to them to update them about his status.    MR brain wo IV contrast    Result Date: 10/19/2024  1. Limited T2 weighted images of the brain demonstrate no evidence of hemorrhage or mass effect. Repeat examination should be considered if clinically indicated given incomplete and limited current examination. 2. Remote left cerebellar hemisphere infarct. 3. Similar scattered white matter T2 hyperintensities, nonspecific, but may reflect sequelae of chronic small vessel ischemic changes.   I personally reviewed the images/study and I agree with the findings as stated by Ovi Juan MD. This study was interpreted at [facility], [place], [place].   MACRO: None   Signed by: Michael Scott 10/19/2024 5:22 PM Dictation workstation:   [id number]    MR brain wo IV contrast    Result Date: 1/12/2024  IMPRESSION: No evidence of an acute intracranial process. Remote hemorrhagic infarct in the left cerebellum. Transcribed Using Voice Recognition Transcribe " Date/Time: Jan 12 2024  1:56P Dictated by: ARTIE ASH MD This examination was interpreted and the report reviewed and electronically signed by: ARTIE ASH MD on Jan 12 2024  2:02PM  EST        Current Facility-Administered Medications:     acetaminophen (Tylenol) tablet 650 mg, 650 mg, oral, q6h, Zoya Gabriel APRN-CNP    acyclovir (Zovirax) 770 mg in dextrose 5% 150 mL IV, 10 mg/kg, intravenous, q12h, Yazmin Cowart APRN-CNP, Stopped at 10/20/24 0425    amiodarone (Pacerone) tablet 200 mg, 200 mg, oral, Daily, JACKIE Puga-CNP, 200 mg at 10/20/24 0846    aspirin chewable tablet 81 mg, 81 mg, oral, Daily, Yazmin Cowart APRN-CNP, 81 mg at 10/20/24 0846    atorvastatin (Lipitor) tablet 40 mg, 40 mg, oral, Nightly, Yazmin Cowart APRN-CNP, 40 mg at 10/18/24 2107    cefTRIAXone (Rocephin) 2 g in dextrose (iso) IV 50 mL, 2 g, intravenous, q12h, Yazmin Cowart APRN-CNP, Stopped at 10/20/24 0142    dextrose 50 % injection 12.5 g, 12.5 g, intravenous, q15 min PRN, Yazmin Cowart APRN-CNP    dextrose 50 % injection 25 g, 25 g, intravenous, q15 min PRN, Yazmin Cowart APRN-CNP    DOBUTamine (Dobutrex) 1,000 mg in dextrose 5% 250 mL (4 mg/mL) infusion (premix), 5 mcg/kg/min, intravenous, Continuous, JACKIE Puga-CNP, Last Rate: 5.8 mL/hr at 10/20/24 0800, 5 mcg/kg/min at 10/20/24 0800    [Held by provider] empagliflozin (Jardiance) tablet 10 mg, 10 mg, oral, Daily, Chino Meehan MD, 10 mg at 10/19/24 0929    glucagon (Glucagen) injection 1 mg, 1 mg, intramuscular, q15 min PRN, Yazmin L Supa, APRN-CNP    glucagon (Glucagen) injection 1 mg, 1 mg, intramuscular, q15 min PRN, DEYVI Puga    insulin glargine (Lantus) injection 18 Units, 18 Units, subcutaneous, q24h, DEYVI Puga    insulin lispro (HumaLOG) injection 0-5 Units, 0-5 Units, subcutaneous, Nightly, DEYVI Puga, 3 Units at 10/18/24 2109    insulin lispro (HumaLOG) injection 0-5 Units, 0-5 Units, subcutaneous, TID AC, Yazmin L  Supa, APRN-CNP, 2 Units at 10/20/24 0846    insulin lispro (HumaLOG) injection 5 Units, 5 Units, subcutaneous, TID AC, DEYVI Puga, 5 Units at 10/20/24 0847    lactated Ringer's bolus 250 mL, 250 mL, intravenous, Once, DEYVI Puga, Last Rate: 125 mL/hr at 10/20/24 1056, 250 mL at 10/20/24 1056    lactated Ringer's bolus 250 mL, 250 mL, intravenous, Once, DEYVI Duque    lubricating eye drops ophthalmic solution 2 drop, 2 drop, Both Eyes, 4x daily PRN, DEYVI Puga    norepinephrine (Levophed) 8 mg in dextrose 5% 250 mL (0.032 mg/mL) infusion (premix), 0.01-1 mcg/kg/min, intravenous, Continuous, DEYVI Puga    oxygen (O2) therapy, , inhalation, Continuous PRN - O2/gases, DEYVI Duque    pantoprazole (ProtoNix) EC tablet 40 mg, 40 mg, oral, Daily before breakfast, DEYVI Puga, 40 mg at 10/20/24 0846    polyethylene glycol (Glycolax, Miralax) packet 17 g, 17 g, oral, Daily, DEYVI Puga, 17 g at 10/20/24 0846    sennosides-docusate sodium (Suzanne-Colace) 8.6-50 mg per tablet 2 tablet, 2 tablet, oral, BID, DEYVI Puga, 2 tablet at 10/20/24 0846    sertraline (Zoloft) tablet 100 mg, 100 mg, oral, Daily, DEYVI Puga, 100 mg at 10/20/24 0846    thiamine 500 mg in dextrose 5% 100 mL IV, 500 mg, intravenous, TID, DEYVI Puga, Last Rate: 210 mL/hr at 10/20/24 1055, 500 mg at 10/20/24 1055    tiotropium (Spiriva Respimat) 2.5 mcg/actuation inhaler 2 puff, 2 puff, inhalation, Daily, DEYVI Puga, 2 puff at 10/20/24 0944    trimethobenzamide (Tigan) injection 200 mg, 200 mg, intramuscular, q6h PRN, DEYVI Puga    vancomycin (Vancocin) pharmacy to dose - pharmacy monitoring, , miscellaneous, Daily PRN, DEYVI Puga     OBJECTIVE    Vitals:    10/20/24 1158   BP:    Pulse: 77   Resp: 16   Temp:    SpO2: 95%     Results for orders placed or performed during the hospital encounter of 10/16/24 (from  the past 24 hours)   POCT GLUCOSE   Result Value Ref Range    POCT Glucose 87 74 - 99 mg/dL   POCT GLUCOSE   Result Value Ref Range    POCT Glucose 114 (H) 74 - 99 mg/dL   Blood Gas Venous Full Panel   Result Value Ref Range    POCT pH, Venous 7.38 7.33 - 7.43 pH    POCT pCO2, Venous 52 (H) 41 - 51 mm Hg    POCT pO2, Venous 49 (H) 35 - 45 mm Hg    POCT SO2, Venous 77 (H) 45 - 75 %    POCT Oxy Hemoglobin, Venous 75.3 (H) 45.0 - 75.0 %    POCT Hematocrit Calculated, Venous 64.0 (H) 41.0 - 52.0 %    POCT Sodium, Venous 126 (L) 136 - 145 mmol/L    POCT Potassium, Venous 4.3 3.5 - 5.3 mmol/L    POCT Chloride, Venous 94 (L) 98 - 107 mmol/L    POCT Ionized Calicum, Venous 1.11 1.10 - 1.33 mmol/L    POCT Glucose, Venous 117 (H) 74 - 99 mg/dL    POCT Lactate, Venous 2.0 0.4 - 2.0 mmol/L    POCT Base Excess, Venous 3.7 (H) -2.0 - 3.0 mmol/L    POCT HCO3 Calculated, Venous 30.8 (H) 22.0 - 26.0 mmol/L    POCT Hemoglobin, Venous 21.4 (H) 13.5 - 17.5 g/dL    POCT Anion Gap, Venous 6.0 (L) 10.0 - 25.0 mmol/L    Patient Temperature 37.0 degrees Celsius    FiO2 100 %   Acute Toxicology Panel, Blood   Result Value Ref Range    Acetaminophen <10.0 10.0 - 30.0 ug/mL    Salicylate  <3 4 - 20 mg/dL    Alcohol <10 <=10 mg/dL   CBC and Auto Differential   Result Value Ref Range    WBC 11.1 4.4 - 11.3 x10*3/uL    nRBC 0.0 0.0 - 0.0 /100 WBCs    RBC 6.96 (H) 4.50 - 5.90 x10*6/uL    Hemoglobin 21.2 (H) 13.5 - 17.5 g/dL    Hematocrit 63.3 (H) 41.0 - 52.0 %    MCV 91 80 - 100 fL    MCH 30.5 26.0 - 34.0 pg    MCHC 33.5 32.0 - 36.0 g/dL    RDW 17.0 (H) 11.5 - 14.5 %    Platelets 132 (L) 150 - 450 x10*3/uL    Neutrophils % 94.6 40.0 - 80.0 %    Immature Granulocytes %, Automated 0.4 0.0 - 0.9 %    Lymphocytes % 1.5 13.0 - 44.0 %    Monocytes % 3.3 2.0 - 10.0 %    Eosinophils % 0.0 0.0 - 6.0 %    Basophils % 0.2 0.0 - 2.0 %    Neutrophils Absolute 10.47 (H) 1.20 - 7.70 x10*3/uL    Immature Granulocytes Absolute, Automated 0.04 0.00 - 0.70 x10*3/uL     Lymphocytes Absolute 0.17 (L) 1.20 - 4.80 x10*3/uL    Monocytes Absolute 0.36 0.10 - 1.00 x10*3/uL    Eosinophils Absolute 0.00 0.00 - 0.70 x10*3/uL    Basophils Absolute 0.02 0.00 - 0.10 x10*3/uL   Renal function panel   Result Value Ref Range    Glucose 108 (H) 74 - 99 mg/dL    Sodium 134 (L) 136 - 145 mmol/L    Potassium 4.6 3.5 - 5.3 mmol/L    Chloride 93 (L) 98 - 107 mmol/L    Bicarbonate 23 21 - 32 mmol/L    Anion Gap 23 (H) 10 - 20 mmol/L    Urea Nitrogen 62 (H) 6 - 23 mg/dL    Creatinine 2.58 (H) 0.50 - 1.30 mg/dL    eGFR 29 (L) >60 mL/min/1.73m*2    Calcium 9.5 8.6 - 10.6 mg/dL    Phosphorus 4.6 2.5 - 4.9 mg/dL    Albumin 4.1 3.4 - 5.0 g/dL   Magnesium   Result Value Ref Range    Magnesium 2.35 1.60 - 2.40 mg/dL   Folate   Result Value Ref Range    Folate, Serum >24.0 >5.0 ng/mL   Lactate   Result Value Ref Range    Lactate 1.8 0.4 - 2.0 mmol/L   Coagulation Screen   Result Value Ref Range    Protime 13.0 (H) 9.8 - 12.8 seconds    INR 1.2 (H) 0.9 - 1.1    aPTT 23 (L) 27 - 38 seconds   POCT GLUCOSE   Result Value Ref Range    POCT Glucose 298 (H) 74 - 99 mg/dL   POCT GLUCOSE   Result Value Ref Range    POCT Glucose 353 (H) 74 - 99 mg/dL   Blood Culture    Specimen: Peripheral Venipuncture; Blood culture   Result Value Ref Range    Blood Culture Loaded on Instrument - Culture in progress    Blood Culture    Specimen: Peripheral Venipuncture; Blood culture   Result Value Ref Range    Blood Culture Loaded on Instrument - Culture in progress    Lactate   Result Value Ref Range    Lactate 2.0 0.4 - 2.0 mmol/L   Comprehensive Metabolic Panel   Result Value Ref Range    Glucose 219 (H) 74 - 99 mg/dL    Sodium 131 (L) 136 - 145 mmol/L    Potassium 4.5 3.5 - 5.3 mmol/L    Chloride 91 (L) 98 - 107 mmol/L    Bicarbonate 27 21 - 32 mmol/L    Anion Gap 18 10 - 20 mmol/L    Urea Nitrogen 67 (H) 6 - 23 mg/dL    Creatinine 3.08 (H) 0.50 - 1.30 mg/dL    eGFR 23 (L) >60 mL/min/1.73m*2    Calcium 9.0 8.6 - 10.6 mg/dL     Albumin 3.8 3.4 - 5.0 g/dL    Alkaline Phosphatase 68 33 - 120 U/L    Total Protein 7.4 6.4 - 8.2 g/dL    AST 16 9 - 39 U/L    Bilirubin, Total 0.6 0.0 - 1.2 mg/dL    ALT 22 10 - 52 U/L   CBC and Auto Differential   Result Value Ref Range    WBC 7.4 4.4 - 11.3 x10*3/uL    nRBC 0.0 0.0 - 0.0 /100 WBCs    RBC 6.46 (H) 4.50 - 5.90 x10*6/uL    Hemoglobin 19.9 (H) 13.5 - 17.5 g/dL    Hematocrit 57.5 (H) 41.0 - 52.0 %    MCV 89 80 - 100 fL    MCH 30.8 26.0 - 34.0 pg    MCHC 34.6 32.0 - 36.0 g/dL    RDW 16.2 (H) 11.5 - 14.5 %    Platelets 112 (L) 150 - 450 x10*3/uL    Neutrophils % 92.2 40.0 - 80.0 %    Immature Granulocytes %, Automated 0.4 0.0 - 0.9 %    Lymphocytes % 2.6 13.0 - 44.0 %    Monocytes % 4.7 2.0 - 10.0 %    Eosinophils % 0.0 0.0 - 6.0 %    Basophils % 0.1 0.0 - 2.0 %    Neutrophils Absolute 6.84 1.20 - 7.70 x10*3/uL    Immature Granulocytes Absolute, Automated 0.03 0.00 - 0.70 x10*3/uL    Lymphocytes Absolute 0.19 (L) 1.20 - 4.80 x10*3/uL    Monocytes Absolute 0.35 0.10 - 1.00 x10*3/uL    Eosinophils Absolute 0.00 0.00 - 0.70 x10*3/uL    Basophils Absolute 0.01 0.00 - 0.10 x10*3/uL   Magnesium   Result Value Ref Range    Magnesium 2.25 1.60 - 2.40 mg/dL   Troponin I, High Sensitivity   Result Value Ref Range    Troponin I, High Sensitivity (CMC) 100 (H) 0 - 53 ng/L   Creatine Kinase   Result Value Ref Range    Creatine Kinase 32 0 - 325 U/L   Iron and TIBC   Result Value Ref Range    Iron 39 35 - 150 ug/dL    UIBC 244 110 - 370 ug/dL    TIBC 283 240 - 445 ug/dL    % Saturation 14 (L) 25 - 45 %   Ferritin   Result Value Ref Range    Ferritin 242 20 - 300 ng/mL   Phosphorus   Result Value Ref Range    Phosphorus 5.7 (H) 2.5 - 4.9 mg/dL   POCT GLUCOSE   Result Value Ref Range    POCT Glucose 230 (H) 74 - 99 mg/dL   Drug Screen, Urine With Reflex to Confirmation   Result Value Ref Range    Amphetamine Screen, Urine Presumptive Negative Presumptive Negative    Barbiturate Screen, Urine Presumptive Negative  "Presumptive Negative    Benzodiazepines Screen, Urine Presumptive Negative Presumptive Negative    Cannabinoid Screen, Urine Presumptive Negative Presumptive Negative    Cocaine Metabolite Screen, Urine Presumptive Positive (A) Presumptive Negative    Fentanyl Screen, Urine Presumptive Negative Presumptive Negative    Opiate Screen, Urine Presumptive Positive (A) Presumptive Negative    Oxycodone Screen, Urine Presumptive Negative Presumptive Negative    PCP Screen, Urine Presumptive Negative Presumptive Negative    Methadone Screen, Urine Presumptive Negative Presumptive Negative   Urinalysis with Reflex Culture and Microscopic   Result Value Ref Range    Color, Urine Light-Yellow Light-Yellow, Yellow, Dark-Yellow    Appearance, Urine Clear Clear    Specific Gravity, Urine 1.027 1.005 - 1.035    pH, Urine 5.5 5.0, 5.5, 6.0, 6.5, 7.0, 7.5, 8.0    Protein, Urine 10 (TRACE) NEGATIVE, 10 (TRACE), 20 (TRACE) mg/dL    Glucose, Urine OVER (4+) (A) Normal mg/dL    Blood, Urine NEGATIVE NEGATIVE    Ketones, Urine NEGATIVE NEGATIVE mg/dL    Bilirubin, Urine NEGATIVE NEGATIVE    Urobilinogen, Urine Normal Normal mg/dL    Nitrite, Urine NEGATIVE NEGATIVE    Leukocyte Esterase, Urine NEGATIVE NEGATIVE   Urinalysis Microscopic   Result Value Ref Range    WBC, Urine NONE 1-5, NONE /HPF    RBC, Urine 1-2 NONE, 1-2, 3-5 /HPF    Mucus, Urine FEW Reference range not established. /LPF   Beta Hydroxybutyrate   Result Value Ref Range    Beta-Hydroxybutyrate 0.09 0.02 - 0.27 mmol/L      MENTAL STATUS EXAM  General Appearance: Wearing hospital gown. Sitter at bedside.  Attitude/Behavior: Minimally cooperative. Sitting up in bed, hunched over, in discomfort.  Speech: Normal volume/rate/tone.  Motor: No abnormal or involuntary movements.  Gait/Station: Not assessed.  Mood: \"It hurts.\"  Affect: Restricted range/intensity, but generally congruent to mood/context.  Thought Process: Organized. Coherent.  Thought Content: Denies current SI or " "HI. Future-oriented plans/goals.  Perception: Not experiencing A/V hallucinations.  Level of Consciousness: Awake. Alert.  Orientation: Oriented x3.  Attention and Concentration: Able to sustain focus appropriately.  Memory: Intact.  Insight: Fair.  Judgment: Fair.    PSYCHIATRIC RISK ASSESSMENT  Risk of suicide or self-harm: Low Risk -- Risk factors include: Alcohol or other substance abuse, Barriers to accessing treatment, Depression, History of not adhering to treatment or medication regimen , Lack of social supports , and Medical illness comorbidity  Protective factors include:Denies current suicidal ideation and Future-oriented talk   Risk of homicide or violence to others: Medium Risk - Risk factors include: Antisocial personality traits, lack of empathy, Current/history of alcohol or other substance abuse, especially PCP or stimulants , Gender, and Verbal or physical threats . Protective factors include: Lack of known access to firearms      ASSESSMENT AND RECOMMENDATIONS  Leonel Yu is a 54 y.o. male with a past psychiatric history of MDD with anxiety, stimulant use disorder (crack cocaine), TUD in remission and a past medical history of HRrEF (5-10% EF) s/p ICD 3/24, CKD3a, COPD (nocturnal 2L at baseline), DM2, L cerebellar hemorrhagic infarct who was admitted to [facility] on 10/15/2024 for acute decompensated heart failure in the setting of medication non-adherence. Psychiatry was consulted on 10/18/24 for suicidal ideation in the setting of crack cocaine use to cope with grief.     10/20/24: Exacerbation of acute on chronic heart failure over the past 24 hours.  Pt clearly wishes to change back to full code status and is able to make that decision.  There may still be some level of risk involving harm towards others, but this has decreased dramatically since admission, and should be re-evaluated from a \"duty to warn\" perspective again before discharge.  I am not seeing the same degree of elevated risk " of suicide at this time as he has been cooperative with treatments, and taking increased steps to avoid morbidity/mortality.  At this time I do not think he would meet criteria for involuntary psychiatric hospitalization.     Impression  Other specified depressive disorder  Stimulant use disorder, severe, in a controlled environment     Recommendations  - Reassess risk of harm towards others prior to discharge  - At this time he does NOT meet criteria for inpatient psychiatric admission  - 1:1 sitter not needed from psychiatric perspective, defer to primary team  - Given acute illness and confusion, if patient wants to leave AMA he will need to have a capacity assessment done at that time  - Continue sertraline 100 mg PO daily for mood  - Psychiatry will continue to follow, page [phone] with additional questions     Abhijeet Hernandez MD

## 2024-10-20 NOTE — PROGRESS NOTES
CICU Attending Note    Principal Problem:    Shortness of breath at rest  Active Problems:    Type 2 diabetes mellitus with hyperglycemia, with long-term current use of insulin    Lethargy    Blurry vision, bilateral    Brief history and recent events  54-year-old male with end-stage heart failure both ischemic and nonischemic in the setting of chronic persistent substance use (crack cocaine), hemorrhagic cerebellar infarct in the past, noncompliance, multiple admissions for heart failure, again admitted after recent AGAINST MEDICAL ADVICE discharge.  This admission was treated for recurrent hypotension, vaccine may mental status of unclear origin.  Plan was to treat him for heart failure, but also possible CNS infection.  However, became more hypotensive and was transferred to heart failure ICU.  Plan  Unclear cause of shock, but has at least a partial cardiogenic component.  A-line and central line placed.  Will fluid resuscitate, and treat with inotropes and pressors.  Await further input from neurology regarding possible CNS infection.  Unfortunately, prognosis is very poor.  He is not a candidate for any advanced therapies related to ongoing substance use, noncompliance, psychiatric concerns as well as possible infection.  Will continue supportive care as above.      This critically ill patient continues to be at-risk for clinically significant deterioration / failure due to the above mentioned dysfunctional, unstable organ systems.  I have personally identified and managed all complex critical care issues to prevent aforementioned clinical deterioration.  Critical care time is spent at bedside and/or the immediate area and has included, but is not limited to, the review of diagnostic tests, labs, radiographs, serial assessments of hemodynamics, respiratory status, ventilatory management, and family updates.  Time spent in procedures and teaching are reported separately.    Critical care time: 45 minutes

## 2024-10-20 NOTE — PROCEDURES
Arterial Line Insertion    Date/Time: 10/20/2024 11:30 AM    Performed by: DEYVI Duque  Authorized by: DEYVI Duque    Consent:     Consent obtained:  Verbal    Consent given by:  Patient    Risks, benefits, and alternatives were discussed: yes      Risks discussed:  Bleeding, pain and ischemia  Universal protocol:     Procedure explained and questions answered to patient or proxy's satisfaction: yes      Relevant documents present and verified: yes      Test results available: yes      Imaging studies available: yes      Required blood products, implants, devices, and special equipment available: yes      Site/side marked: yes      Immediately prior to procedure, a time out was called: yes      Patient identity confirmed:  Hospital-assigned identification number, arm band and provided demographic data  Indications:     Indications: hemodynamic monitoring    Pre-procedure details:     Skin preparation:  Chlorhexidine    Preparation: Patient was prepped and draped in sterile fashion    Sedation:     Sedation type:  None  Anesthesia:     Anesthesia method:  Local infiltration    Local anesthetic:  Lidocaine 1% WITH epi  Procedure details:     Location:  L radial    Pablo's test performed: yes      Pablo's test abnormal: no      Needle gauge:  18 G    Placement technique:  Seldinger and ultrasound guided    Number of attempts:  1    Transducer: waveform confirmed    Post-procedure details:     Post-procedure:  Biopatch applied and sutured    CMS:  Normal    Procedure completion:  Tolerated well, no immediate complications

## 2024-10-20 NOTE — SIGNIFICANT EVENT
Rapid Response Nurse Note: [x] Rapid Response   Pager time:   Arrival time:   Event end time:   Location: LT [id number]      Rapid response initiated by:  [x] Rapid Response RN [] Family [] Nursing Supervisor [] Physician   [] RADAR auto-page [] Sepsis auto-page [] RN [] RT   [] NP/PA [] Other:     Primary reason for call:   [] BAT [] New CPAP/BiPAP [] Bleeding [] Change in mental status   [] Chest pain [] Code blue [] FiO2 >/= 50% [] HR </= 40 bpm   [] HR >/= 130 bpm [] Hyperglycemia [] Hypoglycemia [] RADAR    [] RR </= 8 bpm [] RR >/= 30 bpm [x] SBP </= 90 mmHg [] SpO2 < 90%   [] Seizure [] Sepsis [] Staff concern:     Vitals:    10/19/24 2115 10/19/24 2138 10/19/24 2140 10/19/24 2145   BP: (!) 63/44 (!) 62/50 72/50 89/60   BP Location:       Patient Position:       Pulse:  70  70   Resp:  18     Temp:       TempSrc:       SpO2:  95%  96%   Weight:       Height:             Providers present at bedside (if applicable): Dr Limon ([facility]) and Dr Napier ([facility])  Objective/Subjective data relevant to event (if applicable): During follow up, pt found to be hypotensive with sbp in the 60's.  Pt remains nauseated with frequent emesis.  Rapid response initiated for support.   Interventions:  [] None [] ABG [] Assist w/ICU transfer [] BAT paged    [] Bag mask [] Blood [] Cardioversion [] Code Blue   [] Code blue for intubation [] Code status changed [] Chest x-ray [] EKG   [] IV fluid/bolus [] KUB x-ray [x] Labs/cultures [x] Medication   [] Nebulizer treatment [] NIPPV (CPAP/BiPAP) [] Oxygen [] Oral airway   [] Peripheral IV [] Palliative care consult [] CT/MRI [] Sepsis protocol    [] Suctioned [] Other:     Name of ICU Provider contacted (if applicable): Dr Brar contacted by Dr Limon regarding need for higher acuity care.   Outcome:  [] Coded and  [] Code blue for intubation [] Coded and transferred to ICU []  on division   [] Remained on division (no change) [] Remained on division +  additional monitoring [] Remained in ED [] Transferred to ED   [x] Transferred to ICU [] Transferred to inpatient status [] Transferred for interventions (procedure) [] Transferred to ICU stepdown    [] Transferred to surgery [] Transferred to telemetry [] Sepsis protocol [] STEMI protocol   [] Stroke protocol [] Bedside nurse instructed to page rapid response for any concerns or acute change in condition/VS     Additional Comments: Pt given 250 ml fluid bolus.  BP improved slightly to sbp 70's.  Pt was accepted to HFICU.  Levophed gtt started at 0.05 mcg/kg/min with improvement in sbp to the 80's.  Bedside RN called report.  Transfer uneventful.  Updated report given at bedside.

## 2024-10-20 NOTE — PROGRESS NOTES
Vancomycin Dosing by Pharmacy- FOLLOW UP    Leonel Yu is a 54 y.o. year old male who Pharmacy has been consulted for vancomycin dosing for CNS/meningoencephalitis. Based on the patient's indication and renal status this patient is being dosed based on a goal trough/random level of 15-20.     Renal function is currently GUICHO on CKD 3, baseline Cr=1.2.    Most recent random level: 10.6 mcg/mL    Visit Vitals  BP 94/69   Pulse 66   Temp 36.2 °C (97.2 °F) (Temporal)   Resp 17        Lab Results   Component Value Date    CREATININE 2.62 (H) 10/20/2024    CREATININE 3.08 (H) 10/20/2024    CREATININE 2.58 (H) 10/19/2024    CREATININE 2.10 (H) 10/19/2024        Patient weight is as follows:   Vitals:    10/20/24 0000   Weight: 69 kg (152 lb 1.9 oz)       Cultures:  No results found for the encounter in last 14 days.       I/O last 3 completed shifts:  In: 725.6 (10.5 mL/kg) [P.O.:240; I.V.:285.6 (4.1 mL/kg); IV Piggyback:200]  Out: 2650 (38.4 mL/kg) [Urine:1450 (0.6 mL/kg/hr); Emesis/NG output:1200]  Weight: 69 kg   I/O during current shift:  I/O this shift:  In: 3694.9 [P.O.:478; I.V.:89.9; IV Piggyback:3127]  Out: 1050 [Urine:1050]    Temp (24hrs), Av.2 °C (97.1 °F), Min:35.8 °C (96.4 °F), Max:36.8 °C (98.2 °F)      Assessment/Plan    Random vancomycin level taken 23 hours post dose = 10.6, will re-dose with 1500 mg this evening.  The next level will be obtained on 10/21/24 at 1800. May be obtained sooner if clinically indicated.   Will continue to monitor renal function daily while on vancomycin and order serum creatinine at least every 48 hours if not already ordered.  Follow for continued vancomycin needs, clinical response, and signs/symptoms of toxicity.       Jordon Mendoza RPh

## 2024-10-20 NOTE — PROGRESS NOTES
[facility]  HFICU PROGRESS NOTE    Leonel Yu/[id number]    Admit Date: 10/16/2024  Hospital Length of Stay: 2   ICU Length of Stay: 15h   Primary Service: HFICU      INTERVAL EVENTS / PERTINENT ROS:   Transferred overnight for hypotension / cardiogenic shock. Initially started on levophed, then transitioned to dobutamine 5mcg/kg/min. Earlier this am somnolent, but aroused easily with verbal and tactile stimulation. Now more obtunded and hypotensive with MAP 50's. Resumed levophed gtt .1mcg/kg/min, remains on dobutamine. Lt radial art line / RIJ line inserted. VBG pH 7.3 / pCO2 60 -- currently on BiPAP 12/5 and tolerating.     Plan:  - PRN fluid bolus  - Wean levophed MAP > 65  - C/w dobutamine 5mcg/kg/min  - Continuous BiPAP, repeat VBG    MEDICATIONS  Infusions:  DOBUTamine, Last Rate: 5 mcg/kg/min (10/20/24 1200)  norepinephrine, Last Rate: 0.1 mcg/kg/min (10/20/24 1300)      Scheduled:  acetaminophen, 650 mg, q6h  acyclovir, 10 mg/kg, q12h  amiodarone, 200 mg, Daily  aspirin, 81 mg, Daily  atorvastatin, 40 mg, Nightly  cefTRIAXone, 2 g, q12h  [Held by provider] empagliflozin, 10 mg, Daily  insulin glargine, 18 Units, q24h  insulin lispro, 0-5 Units, Nightly  insulin lispro, 0-5 Units, TID AC  insulin lispro, 5 Units, TID AC  lactated Ringer's, 250 mL, Once  pantoprazole, 40 mg, Daily before breakfast  polyethylene glycol, 17 g, Daily  sennosides-docusate sodium, 2 tablet, BID  sertraline, 100 mg, Daily  thiamine, 500 mg, TID  tiotropium, 2 puff, Daily      PRN:  alteplase, 2 mg, PRN  dextrose, 12.5 g, q15 min PRN  dextrose, 25 g, q15 min PRN  glucagon, 1 mg, q15 min PRN  glucagon, 1 mg, q15 min PRN  artificial tears, 2 drop, 4x daily PRN  oxygen, , Continuous PRN - O2/gases  trimethobenzamide, 200 mg, q6h PRN  vancomycin, , Daily PRN      Invasive Hemodynamics:    Most Recent Range Past 24hrs   BP (Art)   No data recorded   MAP(Art)   No data recorded   RA/CVP   No data recorded  "  PA   No data recorded   PA(mean)   No data recorded   PCWP   No data recorded   CO   No data recorded   CI   No data recorded   Mixed Venous   No data recorded   SVR    No data recorded   PVR   No data recorded     PHYSICAL EXAM:   Visit Vitals  BP (!) 67/48 (BP Location: Left arm, Patient Position: Lying)   Pulse 72   Temp 35.9 °C (96.6 °F) (Temporal)   Resp 14   Ht 1.753 m (5' 9\")   Wt 69 kg (152 lb 1.9 oz)   SpO2 95%   BMI 22.46 kg/m²   Smoking Status Former   BSA 1.83 m²       Wt Readings from Last 5 Encounters:   10/20/24 69 kg (152 lb 1.9 oz)   10/15/24 77.1 kg (170 lb)   10/05/24 74.4 kg (164 lb 0.4 oz)   09/08/24 79.4 kg (175 lb 0.7 oz)   08/06/24 73.7 kg (162 lb 7.7 oz)       INTAKE/OUTPUT:  I/O last 3 completed shifts:  In: 725.6 (10.5 mL/kg) [P.O.:240; I.V.:285.6 (4.1 mL/kg); IV Piggyback:200]  Out: 2650 (38.4 mL/kg) [Urine:1450 (0.6 mL/kg/hr); Emesis/NG output:1200]  Weight: 69 kg      Physical Exam  Constitutional:       Appearance: Normal appearance.      Comments: BiPAP   HENT:      Head: Normocephalic.      Nose: Nose normal.   Eyes:      Extraocular Movements: Extraocular movements intact.      Pupils: Pupils are equal, round, and reactive to light.   Neck:      Comments: RIJ  Cardiovascular:      Heart sounds: Murmur (holosystolic) heard.   Pulmonary:      Breath sounds: Normal breath sounds.   Abdominal:      General: Bowel sounds are normal.      Palpations: Abdomen is soft.   Musculoskeletal:         General: Normal range of motion.      Cervical back: Normal range of motion.      Comments: LT radial art line   Skin:     General: Skin is cool and dry.      Capillary Refill: Capillary refill takes less than 2 seconds.   Neurological:      Mental Status: He is easily aroused. He is lethargic.   Psychiatric:         Attention and Perception: Attention and perception normal.         Mood and Affect: Affect is flat.         Behavior: Behavior is cooperative.         DATA:  CMP:  Results from last 7 " "days   Lab Units 10/20/24  0333 10/19/24  1706 10/19/24  0711 10/18/24  0744 10/17/24  1934 10/17/24  0411 10/16/24  2238 10/16/24  1650 10/15/24  1742 10/15/24  1508 10/15/24  1147 10/15/24  1032   SODIUM mmol/L 131* 134* 127* 134* 132* 134* 138 131* 132*  --   --  133*   POTASSIUM mmol/L 4.5 4.6 4.1 3.6 4.0 3.5 3.4* 4.5 5.3 5.0   < > 6.2*   CHLORIDE mmol/L 91* 93* 93* 97* 94* 97* 98 97* 98  --   --  98   CO2 mmol/L 27 23 21 25 25 25 26 20* 24  --   --  25   ANION GAP mmol/L 18 23* 17 16 17 16 17 19 15  --   --  16   BUN mg/dL 67* 62* 58* 41* 42* 45* 46* 44* 32*  --   --  26*   CREATININE mg/dL 3.08* 2.58* 2.10* 1.64* 1.99* 2.10* 2.39* 2.41* 1.78*  --   --  1.65*   EGFR mL/min/1.73m*2 23* 29* 36* 49* 39* 36* 31* 31* 44*  --   --  49*   MAGNESIUM mg/dL 2.25 2.35 2.26 2.00  --  2.31  --  2.73* 2.36  --   --  2.29   ALBUMIN g/dL 3.8 4.1 3.8 3.7 3.9 3.5 4.2 3.5 4.0  --   --  4.5   ALT U/L 22  --  24 21  --  29 36  --   --   --   --  50   AST U/L 16  --  21 15  --  15 19  --   --   --   --  42*   BILIRUBIN TOTAL mg/dL 0.6  --  0.9 0.8  --  0.5 0.5  --   --   --   --  1.2    < > = values in this interval not displayed.     CBC:  Results from last 7 days   Lab Units 10/20/24  0333 10/19/24  1706 10/19/24  0711 10/18/24  0744 10/17/24  1934 10/17/24  0411 10/16/24  2238 10/16/24  1650   WBC AUTO x10*3/uL 7.4 11.1 11.0 9.4 10.7 12.8* 12.7* 14.3*   HEMOGLOBIN g/dL 19.9* 21.2* 19.6* 19.0* 18.5* 18.8* 21.2* 19.6*   HEMATOCRIT % 57.5* 63.3* 58.3* 55.1* 56.0* 56.6* 63.8* 57.4*   PLATELETS AUTO x10*3/uL 112* 132* 154 152 167 154 169 181   MCV fL 89 91 91 88 91 93 93 91     COAG:   Results from last 7 days   Lab Units 10/19/24  1930   INR  1.2*     ABO: No results found for: \"ABO\"  HEME/ENDO:  Results from last 7 days   Lab Units 10/20/24  0333 10/17/24  1542 10/17/24  0027 10/15/24  1742   FERRITIN ng/mL 242  --   --   --    IRON SATURATION % 14*  --   --   --    TSH mIU/L  --  1.66 5.71*  --    HEMOGLOBIN A1C %  --   --   --  " 9.9*      CARDIAC:   Results from last 7 days   Lab Units 10/20/24  0333 10/17/24  0027 10/16/24  2238 10/15/24  1147 10/15/24  1032   [facility] ng/L 100* 52 69* 50 70*   BNP pg/mL  --   --  479*  --  2,833*       ASSESSMENT AND PLAN:     Leonel Yu is a 54 y.o. male with PMH of mixed ischemic and non-ischemic HFrEF 5-10% s/p [employer] single chamber ICD (3/2024), CKD3a, HTN, HLD, LUZ (crack cocaine), former tobacco use, COPD (on nocturnal 2L at baseline), DM2, remote left cerebellar hemorrhagic infarct, medication noncompliance, anxiety, and depression. Patient presents with blurry vision, lethargy, and dizziness, admitted to HVI service on 10/15 for GDMT optimization.     He received his home gdmt. Coreg 3.125, empagliflozin 10 mg, torsemide 40.  He was also given Lasix 40 IV twice.  He reported improved symptoms with these medications. He was also given prednisone 60 mg once.  He was given lispro 10 units for hyperglycemia in the 400s. He left hospital for AMA.      Represented 10/16 for similar symptoms - generalized weakness. His friend had convinced him to come back to hospital. He was noted to be drowsy and have facial diaphoresis. BP 80/60s, hypoglycemia to 31. Ultimately infectious workup began. POCUS showed collapsible IVC, and 500 ml fluids were given. Neurology consulted for concern of CNS infection for encephalopathy.     Patient was obtaining MRI brain for further encephalopathy workup today 10/19. Code blue called because of unobtainable BP. Per bedside nursing, no pulse ever lost. Patient with cold skin. BP 90/60mmhg, HR70s. Hold off on further MRI and pt returned to baseline mental status. DNR-DNI, ok to escalation for ICU care per discussion. Overnight, his SBPs in the 60s, still having frequent emesis.  cc was given, levo drip initiated.  Patient transferred to HF ICU for further care.       Neuro:   # Remote cerebellar Hemorrhagic infarct   # Anxiety. Depression, Acute pain    # Mood disorder  - Psych consulted, appreciate recommendations. Per psych, no longer requires 1:1 sitter  - Per psych (10/20) CURRENT DOES NOT MEET CRITERIA FOR INPATIENT PSYCHIATRIC ADMISSION  - Per psych (10/20) GIVEN ACUTE ILLNESS, IFF PATIENT ATTEMPTS TO LEAVE AMA, WILL NEED TO HAVE CAPACITY ASSESSMENT DONE AT THAT TIME  - c/w sertraline 100mg po daily  - Palliative consulted     - CT head 10/17: Negative   - Neurology consulted 10/19 for concern of CNS infection.   - Per neurology:  IV thiamine 500mg TID for 3 days followed by 500mg Q daily   - Plan to perform LP.   - MRI brain (10/19): no evidence of hemorrhage or mass effect. Remote left cerebellar infarct.   -  Per Psych (10/19): Patient lacks the capacity to leave AMA at this time and thus cannot leave AMA. Call CODE VIOLET if patient attempts to leave AMA. Patient will need involuntary issued at that time.   - Serial neuro and pain assessments   - PO Tylenol juanita q 6hrs for pain   - PT/OT Consult, OOB to chair  - CAM ICU score every shift  - Sleep/wake cycle normalization     # Physical Status  -Normal weight, BMI: 25  - Reduced Mobility: due to ICU stay      #Substance abuse  -Alcohol abuse/Alcohol dependence: NO   -Tobacco use: 3 cigarettes per day, smoke crack  - Cocaine abuse: last taken: 10/12  - 10/17 UDS: cocaine +      #vision disturbance vs chronic near sightedness   - ophtho was consulted  - C/w artifical tears QID     Cardiovascular:   # Cardiogenic shock  # Acute on chronic decompensate heart failure, HFrEF, LV EF 10-15%, mixed ischemic/non-ischemic CM   - TTE (10/17/24): LVEF 10-15%, LVIDd 7.23 cm, mildly reduced right ventricular systolic function. RVSP decreased from 39 mmHg to 26 mmHg. Right ventricular systolic pressure is within normal limits  - cMRI 8/24: transmural infarcts and fibrosis   - CT chest: coronary calcifcation   - admit weight (10/19): 77.3 Kg -->69 Kg (10/20)  - admit BNP (10/16): 479  - ASA/Statin  - Hold Home Entresto    - Hold home dose: BB, MRA  - Consider empagliflozin to 25mg for glucose-lowering benefit once out of shock stage   - Hold Diuretics   - IVF bolus 250 ml x1 given in ICU for possible dehydration due to emesis and high H/h   - Initiate Dobutamine drip at 5 mcg/kg/min upon arriving in HF ICU (10/19). Dobutamine discontinued 10/20 d/t hypotension 2/2 hypovolemia (+straight leg test).  - Wean OFF levo drip for Maps 65 and above (10/19 -- off overnight, resumed 10/20 am)  - Daily standing weights, 2gm sodium diet, 2L fluid restriction, strict I&Os     # CAD   - C/w ASA, lipitor  - No LHC recorded     # Chest pain, possible due to demand ischemia at low cardiac output stage   - Upon arriving in ICU, chest pain 7-8/10, morphine 1 mg IVP given  - Troponin (10/19): 52       # Hx of VT/VF  # Hx of AF RVR  -Device: [email or web address] single chamber ICD (3/2024),   - No shock since last years per pateint  - ICD interrogated (9/30/24): There are 4 sustained VT episodes since 9/1/24 requiring ATP and ATP/shock for arrhythmia termination and restoration of SR, last shock were recorded from 9/5/2024 which were appropriate for VT. : <1%.    -C/w Amio 200 mg daily      #Electrolyte Disturbances  - Keep K>4, Mag >2     Pulmonary:   # Acute Hypercapnic Respiratory Failure - BiPAP 12/5 BUR 14   # Hx of COPD , on nocturnal 2L at baseline   # Former tobacco use  -Monitor and maintain SpO2 > 92%     GI:  # Hyperemesis, possible due to low cardiac output   - refractory to zofran  - Bowel regimen  - PPI     :  #GUICHO/CKD, stage 3a in the setting of cardiogenic shock, low output stage  -Baseline BUN/Cr: 1.2-1.5   -Admit BUN/Cr (10/19): 62/2.58 --> 67 / 3.08 (10/20)  -MALCOLM: b/l echogenic kidney likely 2/2 medical renal disease   -I/Os  -avoid hypotension and nephrotoxic agents     Heme:  #Anemia in the setting of chronic disease  - Labs: (10/19) TIBC, ferritin, serum Fe: 283, 242, % sat 14  - B12 (10/17): 584    - Folate (10/19): >24    - IV  feraheme 510mg x 1.      Endo:  #DM  - hgbA1c (10/15): 9.9    - Endo follows, thanks for recs   - SSI, Lantus insulin     #Thyroid  -TSH (10/17): 5.71      ID:  - Per neurology : started CNS coverage antibiotics (10/19): IV Ceftriaxone and IV Vancomycin and renally doses IV acyclovir   - Blood c/s (10/1): NGTD x 3 days  - Blood C/s x2 (10/19): Pending   - Lactate (10/19): 1.8   - Urine antigens negative (10/17)  -afebrile  -trend temps q4h     CODE STATUS: FULL CODE     Patient expressed to change back to full code, Discussed with psychiatry / patient.      PHYSICAL AND OCCUPATIONAL THERAPY:  PT/OT    LINES:  PIV  Lt Radial Art Line (10/20)  RIJ (10/20) double lumen     DVT: SCD  VAP BUNDLE: NA  ULCER PPX: Protonix   GLYCEMIC CONTROL: Insulin  BOWEL CARE: Miralax / Colace  INDWELLING CATHETER: NA  NUTRITION: Adult diet Cardiac, Consistent Carb; CCD 90 gm/meal; 70 gm fat; 2 - 3 grams Sodium      EMERGENCY CONTACT: Extended Emergency Contact Information  Primary Emergency Contact: Elizabet Santana  Mobile Phone: [phone]  Relation: Friend  FAMILY UPDATE:  CODE STATUS: Full Code  DISPO:     Patient seen and assessed with Dr. Zonia MOSLEY personally spent 74 minutes of critical care time directly and personally managing the patient exclusive of separately billable procedures     _________________________________________________  Zoya Herrera, APRMARGARETH-CNP

## 2024-10-20 NOTE — PROCEDURES
Central Line    Date/Time: 10/20/2024 12:15 PM    Performed by: DEYVI Duque  Authorized by: DEYVI Duque    Consent:     Consent obtained:  Verbal    Consent given by:  Patient    Risks, benefits, and alternatives were discussed: yes      Risks discussed:  Incorrect placement, pneumothorax, infection and arterial puncture    Alternatives discussed:  No treatment  Universal protocol:     Procedure explained and questions answered to patient or proxy's satisfaction: yes      Relevant documents present and verified: yes      Test results available: yes      Imaging studies available: yes      Required blood products, implants, devices, and special equipment available: yes      Immediately prior to procedure, a time out was called: yes      Patient identity confirmed:  Arm band, hospital-assigned identification number and provided demographic data  Pre-procedure details:     Indication(s): central venous access, hemodynamic monitoring and insufficient peripheral access      Hand hygiene: Hand hygiene performed prior to insertion      Sterile barrier technique: All elements of maximal sterile technique followed      Skin preparation:  Chlorhexidine    Skin preparation agent: Skin preparation agent completely dried prior to procedure    Sedation:     Sedation type:  None  Anesthesia:     Anesthesia method:  Local infiltration    Local anesthetic:  Lidocaine 1% w/o epi  Procedure details:     Location:  R internal jugular    Patient position:  Supine    Procedural supplies:  Double lumen    Catheter size:  8.5 Fr    Landmarks identified: yes      Ultrasound guidance: yes      Ultrasound guidance timing: prior to insertion and real time      Sterile ultrasound techniques: Sterile gel and sterile probe covers were used      Number of attempts:  1    Successful placement: yes    Post-procedure details:     Post-procedure:  Dressing applied and line sutured    Assessment:  Blood return  through all ports, free fluid flow, no pneumothorax on x-ray and placement verified by x-ray    Procedure completion:  Tolerated well, no immediate complications

## 2024-10-20 NOTE — H&P
[facility]  HFICU HISTORY AND PHYSICAL     Leonel Yu/[id number]    Admit Date: 10/16/2024  Hospital Length of Stay: 1   ICU Length of Stay: 1h   Primary Service:   Primary HF Cardiologist:   Referring:    HPI:     Leonel Yu is a 54 y.o. 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 Patient presents with blurry vision, lethargy, and dizziness, admitted to HVI service on 10/15 for GDMT optimization.  He received his home gdmt. Coreg 3.125, empagliflozin 10 mg, torsemide 40.  He was also given Lasix 40 IV twice.  He reported improved symptoms with these medications. He was also given prednisone 60 mg once.  He was given lispro 10 units for hyperglycemia in the 400s. He left hospital for AMA.   Represented 10/16 for similar symptoms - generalized weakness. His friend had convinced him to come back to hospital. He was noted to be drowsy and have facial diaphoresis. BP 80/60s, hypoglycemia to 31. Ultimately infectious workup began. POCUS showed collapsible IVC, and 500 ml fluids were given. Neurology consulted for concern of CNS infection for encephalopathy.    Patient was obtaining MRI brain for further encephalopathy workup today 10/19. Code blue called because of unobtainable BP. Per bedside nursing, no pulse ever lost. Patient with cold skin. BP 90/60mmhg, HR70s. Hold off on further MRI and pt returned to baseline mental status. DNR-DNI, ok to escalation for ICU care per discussion. Overnight, his SBPs in the 60s, still having frequent emesis.  cc was given, levo drip initiated.  Patient transferred to HF ICU for further care.    Upon arriving in the HF ICU, patient on levo drip at 0.05 mcg/kg/min,  ml bolus  "ongoing, HR 80, BP 89/60. Shortly after arriving, patient complaints of chest pain 7-8/10, pressure on his left chest, not radiate to anywhere, profuse sweating on his forehead, BLE very cold to touch, no JVD appreciate. Checked BS: ~300's.  Stat sepsis work up, initiate dobutamine drip at 5 mcg/kg/min, weaned OFF level drip, 12 leads EKG without ischemia, troponin pending. Morphine 1 mg given.      Cardiac Tests:    TTE (10/17/24): LVEF 10-15%, LVIDd 7.23 cm, mildly reduced right ventricular systolic function. RVSP decreased from 39 mmHg to 26 mmHg. Right ventricular systolic pressure is within normal limits.  ICD interrogated (9/30/24): There are 4 sustained VT episodes since 9/1/24 requiring ATP and ATP/shock for arrhythmia termination and restoration of SR, last shock were recorded from 9/5/2024 which were appropriate for VT. : <1%.     Past Medical History:  Past Medical History:   Diagnosis Date    CHF (congestive heart failure)     Chronic kidney disease     COPD (chronic obstructive pulmonary disease) (Multi)     Diabetes mellitus (Multi)     Hyperlipidemia     Hypertension     Lethargy 10/19/2024    Stroke (Multi)     Substance abuse (Multi)        Past Surgical History:  Past Surgical History:   Procedure Laterality Date    INSERT / REPLACE / REMOVE PACEMAKER         Family History:  Family History   Problem Relation Name Age of Onset    Heart disease Father         Social History:  Social History     Socioeconomic History    Marital status: Single     Spouse name: Not on file    Number of children: Not on file    Years of education: Not on file    Highest education level: Not on file   Occupational History    Not on file   Tobacco Use    Smoking status: Former     Types: Cigarettes    Smokeless tobacco: Never   Vaping Use    Vaping status: Never Used   Substance and Sexual Activity    Alcohol use: Never    Drug use: Yes     Types: \"Crack\" cocaine     Comment: last used 7/28    Sexual activity: Defer "   Other Topics Concern    Not on file   Social History Narrative    Not on file     Social Drivers of Health     Financial Resource Strain: Medium Risk (10/18/2024)    Overall Financial Resource Strain (CARDIA)     Difficulty of Paying Living Expenses: Somewhat hard   Food Insecurity: No Food Insecurity (10/1/2024)    Hunger Vital Sign     Worried About Running Out of Food in the Last Year: Never true     Ran Out of Food in the Last Year: Never true   Transportation Needs: No Transportation Needs (10/18/2024)    PRAPARE - Transportation     Lack of Transportation (Medical): No     Lack of Transportation (Non-Medical): No   Recent Concern: Transportation Needs - Unmet Transportation Needs (9/7/2024)    PRAPARE - Transportation     Lack of Transportation (Medical): Yes     Lack of Transportation (Non-Medical): Yes   Physical Activity: Sufficiently Active (12/25/2023)    Exercise Vital Sign     Days of Exercise per Week: 5 days     Minutes of Exercise per Session: 60 min   Stress: Stress Concern Present (12/25/2023)    [employer] - Occupational Stress Questionnaire     Feeling of Stress : Very much   Social Connections: Not on File (9/23/2024)    Received from [employer]    Social Connections     Connectedness: 0   Intimate Partner Violence: Not At Risk (10/1/2024)    Humiliation, Afraid, Rape, and Kick questionnaire     Fear of Current or Ex-Partner: No     Emotionally Abused: No     Physically Abused: No     Sexually Abused: No   Housing Stability: Low Risk  (10/18/2024)    Housing Stability Vital Sign     Unable to Pay for Housing in the Last Year: No     Number of Times Moved in the Last Year: 1     Homeless in the Last Year: No   Recent Concern: Housing Stability - High Risk (9/7/2024)    Housing Stability Vital Sign     Unable to Pay for Housing in the Last Year: Yes     Number of Times Moved in the Last Year: 1     Homeless in the Last Year: Yes       Allergies:  No Known  "Allergies    Prior to Admission Meds:  Medications Prior to Admission   Medication Sig Dispense Refill Last Dose/Taking    amiodarone (Pacerone) 200 mg tablet Take 1 tablet (200 mg) by mouth once daily. 30 tablet 0     aspirin 81 mg chewable tablet Chew 1 tablet (81 mg) once daily. 30 tablet 5     atorvastatin (Lipitor) 80 mg tablet Take 1 tablet (80 mg) by mouth once daily at bedtime. 30 tablet 5     blood sugar diagnostic strip Use as directed to test blood glucose up to four times daily and as needed. 200 each 5     blood-glucose meter misc Inject 1 each under the skin 4 times a day with meals. Check blood glucose 4 times daily, before meals and at bedtime. 1 each 0     carvedilol (Coreg) 3.125 mg tablet Take 1 tablet (3.125 mg) by mouth 2 times a day. 60 tablet 0     empagliflozin (Jardiance) 10 mg TAKE 1 TABLET BY MOUTH ONCE DAILY 30 tablet 5     glucose 4 gram chewable tablet Chew 2 tablets (8 g) if needed for low blood sugar - see comments. 50 tablet 12     insulin glargine (Lantus) 100 unit/mL (3 mL) pen Inject 30 Units under the skin once daily at bedtime. (Patient not taking: Reported on 10/16/2024) 15 mL 5     insulin lispro (HumaLOG) 100 unit/mL injection Inject 0-10 Units under the skin 3 times daily (morning, midday, late afternoon). Hypoglycemia protocol if Blood Glucose is between 0 - 70 mg/dL, 0 unit(s) if , 2 unit(s) if 151-200, 4 unit(s) if 201-250, 6 unit(s) if 251-300, 8 unit(s) if 301-350, 10 unit(s) if 351-400. Notify provider unit(s) if Blood Glucose is greater than 400 mg/dL 15 mL 5     lancets 33 gauge misc Inject 1 each under the skin 4 times a day. 100 each 5     pen needle 1/2\" 29G X 12mm needle Use to inject 1-4 times daily as directed. 100 each 5     sacubitriL-valsartan (Entresto) 24-26 mg tablet Take 1 tablet by mouth 2 times a day. 60 tablet 3     sertraline (Zoloft) 100 mg tablet Take 1 tablet (100 mg) by mouth once daily. 30 tablet 5     spironolactone (Aldactone) 25 mg " "tablet Take 0.5 tablets (12.5 mg) by mouth once daily. 15 tablet 5     tiotropium (Spiriva Respimat) 2.5 mcg/actuation inhaler Inhale 2 puffs once daily. 4 g 2     torsemide (Demadex) 20 mg tablet Take 2 tablets (40 mg) by mouth once daily. 60 tablet 3        Current Medications:  Infusions:  DOBUTamine  norepinephrine      Scheduled:  acetaminophen-caffeine, 2 tablet, Once  acyclovir, 10 mg/kg, q12h  amiodarone, 200 mg, Daily  aspirin, 81 mg, Daily  atorvastatin, 40 mg, Nightly  cefTRIAXone, 2 g, q12h  [Held by provider] empagliflozin, 10 mg, Daily  insulin glargine, 18 Units, q24h  insulin lispro, 0-5 Units, Nightly  insulin lispro, 0-5 Units, TID AC  insulin lispro, 5 Units, TID AC  lactated Ringer's, 250 mL, Once  morphine, 1 mg, Once  sertraline, 100 mg, Daily  thiamine, 500 mg, TID  tiotropium, 2 puff, Daily      PRN:  dextrose, 12.5 g, q15 min PRN  dextrose, 25 g, q15 min PRN  glucagon, 1 mg, q15 min PRN  glucagon, 1 mg, q15 min PRN  artificial tears, 2 drop, 4x daily PRN  vancomycin, , Daily PRN        PHYSICAL EXAM:   Visit Vitals  BP 89/60   Pulse 70   Temp 36.1 °C (97 °F) (Temporal)   Resp 18   Ht 1.753 m (5' 9\")   Wt 77.3 kg (170 lb 6.7 oz)   SpO2 96%   BMI 25.17 kg/m²   Smoking Status Former   BSA 1.94 m²       Wt Readings from Last 5 Encounters:   10/19/24 77.3 kg (170 lb 6.7 oz)   10/15/24 77.1 kg (170 lb)   10/05/24 74.4 kg (164 lb 0.4 oz)   09/08/24 79.4 kg (175 lb 0.7 oz)   08/06/24 73.7 kg (162 lb 7.7 oz)       INTAKE/OUTPUT:  I/O last 3 completed shifts:  In: 120 (1.6 mL/kg) [P.O.:120]  Out: 2250 (29.1 mL/kg) [Urine:1550 (0.6 mL/kg/hr); Emesis/NG output:700]  Weight: 77.3 kg      Physical Exam  Constitutional:       Appearance: He is normal weight. He is ill-appearing and diaphoretic.   HENT:      Head: Normocephalic and atraumatic.   Eyes:      Extraocular Movements: Extraocular movements intact.      Pupils: Pupils are equal, round, and reactive to light.   Cardiovascular:      Rate and Rhythm: " Normal rate and regular rhythm.      Pulses: Normal pulses.      Heart sounds: Normal heart sounds.   Abdominal:      General: Abdomen is flat.      Palpations: Abdomen is soft.   Genitourinary:     Comments: Nausea, vomiting    Musculoskeletal:         General: Normal range of motion.      Cervical back: Normal range of motion and neck supple.   Skin:     Capillary Refill: Capillary refill takes 2 to 3 seconds.      Comments: Very cold to touch, diaphoretic   Neurological:      General: No focal deficit present.      Mental Status: He is alert and oriented to person, place, and time.         Review of Systems   Constitutional: Negative.    HENT: Negative.     Respiratory:  Positive for chest tightness and shortness of breath.    Cardiovascular:  Positive for chest pain.   Gastrointestinal:  Positive for nausea and vomiting.   Endocrine: Negative.    Genitourinary: Negative.    Musculoskeletal: Negative.    Skin: Negative.    Allergic/Immunologic: Negative.    Neurological:  Positive for light-headedness.   Psychiatric/Behavioral:  The patient is nervous/anxious.         Denies of suicidal ideation, states he won't kill himself.        DATA:  CMP:  Recent Labs     10/19/24  1706 10/19/24  0711 10/18/24  0744 10/17/24  1934 10/17/24  0411 10/16/24  2238 10/16/24  1650 10/15/24  1742 10/15/24  1508 10/15/24  1314 10/15/24  1147 10/15/24  1032 10/04/24  1902 10/02/24  1953 10/01/24  1539 09/30/24  1701 09/12/24  0902   * 127* 134* 132* 134* 138 131* 132*  --   --   --  133* 137   < > 138 136 136   K 4.6 4.1 3.6 4.0 3.5 3.4* 4.5 5.3 5.0 6.2*   < > 6.2* 4.6   < > 3.9 4.2 4.7   CL 93* 93* 97* 94* 97* 98 97* 98  --   --   --  98 99   < > 101 103 102   CO2 23 21 25 25 25 26 20* 24  --   --   --  25 28   < > 26 23 29   ANIONGAP 23* 17 16 17 16 17 19 15  --   --   --  16 15   < > 15 14 10   BUN 62* 58* 41* 42* 45* 46* 44* 32*  --   --   --  26* 32*   < > 29* 30* 25*   CREATININE 2.58* 2.10* 1.64* 1.99* 2.10* 2.39* 2.41*  1.78*  --   --   --  1.65* 1.66*   < > 1.72* 2.11* 1.52*   EGFR 29* 36* 49* 39* 36* 31* 31* 44*  --   --   --  49* 48*   < > 46* 36* 54*   MG 2.35 2.26 2.00  --  2.31  --  2.73* 2.36  --   --   --  2.29  --   --  2.29 2.16 2.44*    < > = values in this interval not displayed.     Recent Labs     10/19/24  1706 10/19/24  0711 10/18/24  0744 10/17/24  1934 10/17/24  0411 10/16/24  2238 10/16/24  1650 10/15/24  1742 10/15/24  1032 10/01/24  1539 09/30/24  1701 09/08/24  0431 09/07/24  0949 09/07/24  0006   ALBUMIN 4.1 3.8 3.7 3.9 3.5 4.2 3.5 4.0 4.5   < > 3.8   < > 3.8 3.8   ALT  --  24 21  --  29 36  --   --  50  --  33  --  70* 63*   AST  --  21 15  --  15 19  --   --  42*  --  25  --  40* 43*   BILITOT  --  0.9 0.8  --  0.5 0.5  --   --  1.2  --  1.0  --  1.0 0.9    < > = values in this interval not displayed.     CBC:  Recent Labs     10/19/24  1706 10/19/24  0711 10/18/24  0744 10/17/24  1934 10/17/24  0411 10/16/24  2238 10/16/24  1650 10/15/24  1742   WBC 11.1 11.0 9.4 10.7 12.8* 12.7* 14.3* 8.4   HGB 21.2* 19.6* 19.0* 18.5* 18.8* 21.2* 19.6* 20.5*   HCT 63.3* 58.3* 55.1* 56.0* 56.6* 63.8* 57.4* 60.1*   * 154 152 167 154 169 181 183   MCV 91 91 88 91 93 93 91 91     COAG:   Recent Labs     10/19/24  1930 09/30/24  1704 09/07/24  0949 12/09/23  2057 10/23/23  1851 07/24/23  1330 03/02/23  0225   INR 1.2* 1.2* 1.2* 1.2* 1.3* 1.2* 1.3*     ABO:   Recent Labs     09/30/24  1704   ABO A     HEME/ENDO:  Recent Labs     10/17/24  0027 10/15/24  1742 09/07/24  1016 07/30/24  0815 07/30/24  0731   TSH 5.71*  --  2.68   < >  --    HGBA1C  --  9.9*  --   --  8.4*    < > = values in this interval not displayed.      CARDIAC:   Recent Labs     10/17/24  0027 10/16/24  2238 10/15/24  1147 10/15/24  1032 10/01/24  1033 09/30/24  2025 09/30/24  1844 09/30/24  1701 09/07/24  1016 09/07/24  0128 09/07/24  0006 07/31/24  1851 07/30/24  0815 07/30/24  0731 02/16/24  0533 02/15/24  0656 02/12/24  1239 12/27/23  0759   CKMB   "--   --   --   --   --   --   --   --   --   --   --   --   --   --   --   --   --  1.6   TROPHS 52 69* 50 70* 54* 67* 44  --  68*   < > 75*  --    < > 125*  --   --    < >  --    BNP  --  479*  --  2,833*  --   --   --  >5,000*  --   --  2,585* 1,014*  --  3,240* 2,900* 3,752*   < >  --     < > = values in this interval not displayed.     Recent Labs     04/30/21  1630   SO2MV 84     No results for input(s): \"TACROLIMUS\", \"SIROLIMUS\", \"CYCLOSPORINE\" in the last [id number] hours.      ASSESSMENT AND PLAN:     Leonel Yu is a 54 y.o. male with PMH of mixed ischemic and non-ischemic HFrEF 5-10% s/p [employer] single chamber ICD (3/2024), CKD3a, HTN, HLD, LUZ (crack cocaine), former tobacco use, COPD (on nocturnal 2L at baseline), DM2, remote left cerebellar hemorrhagic infarct, medication noncompliance, anxiety, and depression. Patient presents with blurry vision, lethargy, and dizziness, admitted to HVI service on 10/15 for GDMT optimization.    He received his home gdmt. Coreg 3.125, empagliflozin 10 mg, torsemide 40.  He was also given Lasix 40 IV twice.  He reported improved symptoms with these medications. He was also given prednisone 60 mg once.  He was given lispro 10 units for hyperglycemia in the 400s. He left hospital for AMA.     Represented 10/16 for similar symptoms - generalized weakness. His friend had convinced him to come back to hospital. He was noted to be drowsy and have facial diaphoresis. BP 80/60s, hypoglycemia to 31. Ultimately infectious workup began. POCUS showed collapsible IVC, and 500 ml fluids were given. Neurology consulted for concern of CNS infection for encephalopathy.    Patient was obtaining MRI brain for further encephalopathy workup today 10/19. Code blue called because of unobtainable BP. Per bedside nursing, no pulse ever lost. Patient with cold skin. BP 90/60mmhg, HR70s. Hold off on further MRI and pt returned to baseline mental status. DNR-DNI, ok to escalation for ICU " care per discussion. Overnight, his SBPs in the 60s, still having frequent emesis.  cc was given, levo drip initiated.  Patient transferred to HF ICU for further care.      Neuro:    # Remote cerebellar Hemorrhagic infarct   # Anxiety. Depression, Acute pain   # Mood disorder  - Active SI  - Psych consulted  - Palliative consulted    - CT head 10/17: Negative   - Neurology consulted 10/19 for concern of CNS infection.   - Per neurology:  IV thiamine 500mg TID for 3 days followed by 500mg Q daily   - Plan to perform LP.   - obtain MRI Brain w/wo contrast, not complete due to hypotension and CODE blue called on 10/19  -  Per Psych (10/19): Patient lacks the capacity to leave AMA at this time and thus cannot leave AMA. Call CODE VIOLET if patient attempts to leave AMA. Patient will need involuntary issued at that time.   - Serial neuro and pain assessments   - PO Tylenol PRN for pain  - C/w Zoloft 100 mg daily    - PT/OT Consult, OOB to chair  - CAM ICU score every shift  - Sleep/wake cycle normalization    # Physical Status  -Normal weight, BMI: 25  - Reduced Mobility: due to ICU stay      #Substance abuse  -Alcohol abuse/Alcohol dependence: NO   -Tobacco use: smoke crack  - Cocaine abuse: last taken: 10/12  - 10/15 UDS: cocaine +     #vision disturbance vs chronic near sightedness   - ophtho was consulted  - C/w artifical tears QID    Cardiovascular:    # Cardiogenic shock,  acute on chronic decompensate heart failure, HFrEF, LV EF 10-15%, mixed ischemic/non-ischemic CM   - TTE (10/17/24): LVEF 10-15%, LVIDd 7.23 cm, mildly reduced right ventricular systolic function. RVSP decreased from 39 mmHg to 26 mmHg. Right ventricular systolic pressure is within normal limits  - cMRI 8/24: transmural infarcts and fibrosis   - CT chest: coronary calcifcation   - admit weight (10/19): 77.3 Kg   - admit BNP (10/19): Pending   - ASA/Statin  - Hold Home Entresto   - Hold home dose: BB, MRA  - Consider empagliflozin to 25mg  for glucose-lowering benefit once out of shock stage   - Hold Diuretics   - IVF bolus 250 ml x1 given in ICU for possible dehydration due to emesis and high H/h   - Initiate Dobutamine drip at 5 mcg/kg/min upon arriving in HF ICU  - Wean OFF levo drip for Maps 60 and above   - Daily standing weights, 2gm sodium diet, 2L fluid restriction, strict I&Os    # CAD   - C/w ASA, lipitor  - No LHC recorded    # Chest pain, possible due to demand ischemia at low cardiac output stage   - Upon arriving in ICU, chest pain 7-8/10, morphine 1 mg IVP given  - Weaned OFF levo drip  - Troponin (10/19): 52      # Hx of VT/VF  # Hx of AF RVR  -Device: [employer] single chamber ICD (3/2024),   - No shock since last years per pateint  - ICD interrogated (9/30/24): There are 4 sustained VT episodes since 9/1/24 requiring ATP and ATP/shock for arrhythmia termination and restoration of SR, last shock were recorded from 9/5/2024 which were appropriate for VT. : <1%.    -C/w Amio 200 mg daily     #Electrolyte Disturbances  - Keep K>4, Mag >2    Pulmonary:    # Hx of COPD , on nocturnal 2L at baseline   # Former tobacco use  -Monitor and maintain SpO2 > 92%    GI:  # Hyperemesis, possible due to low cardiac output   - refractory to zofran  - Bowel regimen  - PPI     :  #GUICHO/CKD, stage 3a in the setting of cardiogenic shock, low output stage  -Baseline BUN/Cr: 1.2-1.5   -Admit BUN/Cr (10/19): 62/2.58   -MALCOLM: b/l echogenic kidney likely 2/2 medical renal disease   -I/Os  -avoid hypotension and nephrotoxic agents    Heme:  #Anemia in the setting of chronic disease  - Labs: CBC, TIBC, ferritin, serum Fe  - B12 (10/17): 584    - Folate (10/19): >24       Endo:  #DM  - hgbA1c (10/15): 9.9    - Endo follows, thanks for recs   - SSI, Lantus insulin    #Thyroid  -TSH (10/17): 5.71     ID:  - Per neurology : started CNS coverage antibiotics (10/19): IV Ceftriaxone and IV Vancomycin and renally doses IV acyclovir   - Blood c/s (10/17): NTD  -  Blood C/s x2 (10/19): Pending   - Lactate (10/19): 1.8   -afebrile  -trend temps q4h    CODE STATUS: Currently DNI/DNI    Patient expressed to change back to full code, need to discuss further during day time     Per Psych (10/19): If code status recently changed, evaluating capacity to do such iso of mental illness and life crises     Patient does require a 1:1 sitter from a psychiatric perspective at this time.     PHYSICAL AND OCCUPATIONAL THERAPY:    LINES:  PIVs       DVT:  VAP BUNDLE: NA  CENTRAL LINE BUNDLE: NA  ULCER PPX: PPI  GLYCEMIC CONTROL: SSI, lantus   BOWEL CARE: Senna, Miralax   INDWELLING CATHETER:  NUTRITION: Adult diet Cardiac, Consistent Carb; CCD 90 gm/meal; 70 gm fat; 2 - 3 grams Sodium      EMERGENCY CONTACT: Extended Emergency Contact Information  Primary Emergency Contact: Elizabet Santana  Mobile Phone: [phone]  Relation: Friend  FAMILY UPDATE:  CODE STATUS: DNR and No Intubation  DISPO:     Dr. Brar aware of patient's admission     I personally spent 74 minutes of critical care time directly and personally managing the patient exclusive of separately billable procedures   _________________________________________________  DEYVI Pgua

## 2024-10-20 NOTE — CARE PLAN
The patient's goals for the shift include free from fall injury, lab values return to normal range.    The clinical goals for the shift include Improved gas exchange this shift.

## 2024-10-20 NOTE — SIGNIFICANT EVENT
Rapid response was called around 9 PM for systolic blood pressure 70s-60s.  Patient has had multiple episodes of emesis refractory to Zofran this evening with initial concern of low p.o. intake/ loss of fluid 2/2 emesis driving a of the patient's acute on chronic hypotension.  Due to the extent of patient's cardiomyopathy, patient can only tolerate very cautious amounts of fluids.  Patient was also cold in all extremities on physical exam.    Patient was started on 250 mg bolus, a new venous lactate was drawn. after several blood pressure readings with systolics in the high 50s, norepinephrine was started as well.    Patient was discussed with the heart failure ICU attending and it was agreed that the patient to be escalated to heart failure ICU.

## 2024-10-21 ENCOUNTER — APPOINTMENT (OUTPATIENT)
Dept: CARDIOLOGY | Facility: HOSPITAL | Age: 54
End: 2024-10-21
Payer: COMMERCIAL

## 2024-10-21 VITALS
HEIGHT: 69 IN | HEART RATE: 58 BPM | BODY MASS INDEX: 22.53 KG/M2 | TEMPERATURE: 97 F | DIASTOLIC BLOOD PRESSURE: 65 MMHG | OXYGEN SATURATION: 94 % | WEIGHT: 152.12 LBS | SYSTOLIC BLOOD PRESSURE: 107 MMHG | RESPIRATION RATE: 15 BRPM

## 2024-10-21 PROBLEM — H53.8 BLURRY VISION, BILATERAL: Status: RESOLVED | Noted: 2024-10-19 | Resolved: 2024-10-21

## 2024-10-21 LAB
ALBUMIN SERPL BCP-MCNC: 3 G/DL (ref 3.4–5)
ANION GAP SERPL CALC-SCNC: 10 MMOL/L (ref 10–20)
ATRIAL RATE: 58 BPM
BACTERIA BLD CULT: NORMAL
BACTERIA BLD CULT: NORMAL
BASE EXCESS BLDV CALC-SCNC: 2.1 MMOL/L (ref -2–3)
BASE EXCESS BLDV CALC-SCNC: 2.4 MMOL/L (ref -2–3)
BASOPHILS # BLD AUTO: 0.01 X10*3/UL (ref 0–0.1)
BASOPHILS NFR BLD AUTO: 0.1 %
BODY TEMPERATURE: 37 DEGREES CELSIUS
BODY TEMPERATURE: 37 DEGREES CELSIUS
BUN SERPL-MCNC: 41 MG/DL (ref 6–23)
CALCIUM SERPL-MCNC: 8.2 MG/DL (ref 8.6–10.6)
CARDIAC TROPONIN I PNL SERPL HS: 88 NG/L (ref 0–53)
CHLORIDE SERPL-SCNC: 100 MMOL/L (ref 98–107)
CO2 SERPL-SCNC: 28 MMOL/L (ref 21–32)
CREAT SERPL-MCNC: 1.64 MG/DL (ref 0.5–1.3)
EGFRCR SERPLBLD CKD-EPI 2021: 49 ML/MIN/1.73M*2
EOSINOPHIL # BLD AUTO: 0 X10*3/UL (ref 0–0.7)
EOSINOPHIL NFR BLD AUTO: 0 %
ERYTHROCYTE [DISTWIDTH] IN BLOOD BY AUTOMATED COUNT: 15.4 % (ref 11.5–14.5)
GLUCOSE BLD MANUAL STRIP-MCNC: 141 MG/DL (ref 74–99)
GLUCOSE BLD MANUAL STRIP-MCNC: 155 MG/DL (ref 74–99)
GLUCOSE BLD MANUAL STRIP-MCNC: 217 MG/DL (ref 74–99)
GLUCOSE BLD MANUAL STRIP-MCNC: 231 MG/DL (ref 74–99)
GLUCOSE SERPL-MCNC: 144 MG/DL (ref 74–99)
HCO3 BLDV-SCNC: 30.1 MMOL/L (ref 22–26)
HCO3 BLDV-SCNC: 30.9 MMOL/L (ref 22–26)
HCT VFR BLD AUTO: 51.2 % (ref 41–52)
HGB BLD-MCNC: 17.1 G/DL (ref 13.5–17.5)
IMM GRANULOCYTES # BLD AUTO: 0.01 X10*3/UL (ref 0–0.7)
IMM GRANULOCYTES NFR BLD AUTO: 0.1 % (ref 0–0.9)
INHALED O2 CONCENTRATION: 21 %
INHALED O2 CONCENTRATION: 35 %
LYMPHOCYTES # BLD AUTO: 0.41 X10*3/UL (ref 1.2–4.8)
LYMPHOCYTES NFR BLD AUTO: 5.3 %
MAGNESIUM SERPL-MCNC: 2.08 MG/DL (ref 1.6–2.4)
MCH RBC QN AUTO: 30.5 PG (ref 26–34)
MCHC RBC AUTO-ENTMCNC: 33.4 G/DL (ref 32–36)
MCV RBC AUTO: 91 FL (ref 80–100)
MONOCYTES # BLD AUTO: 0.54 X10*3/UL (ref 0.1–1)
MONOCYTES NFR BLD AUTO: 7 %
NEUTROPHILS # BLD AUTO: 6.72 X10*3/UL (ref 1.2–7.7)
NEUTROPHILS NFR BLD AUTO: 87.5 %
NRBC BLD-RTO: 0 /100 WBCS (ref 0–0)
OXYHGB MFR BLDV: 59.8 % (ref 45–75)
OXYHGB MFR BLDV: 69 % (ref 45–75)
P AXIS: 28 DEGREES
P OFFSET: 151 MS
P ONSET: 111 MS
PCO2 BLDV: 57 MM HG (ref 41–51)
PCO2 BLDV: 60 MM HG (ref 41–51)
PH BLDV: 7.32 PH (ref 7.33–7.43)
PH BLDV: 7.33 PH (ref 7.33–7.43)
PHOSPHATE SERPL-MCNC: 3 MG/DL (ref 2.5–4.9)
PLATELET # BLD AUTO: 97 X10*3/UL (ref 150–450)
PO2 BLDV: 39 MM HG (ref 35–45)
PO2 BLDV: 45 MM HG (ref 35–45)
POTASSIUM SERPL-SCNC: 3.8 MMOL/L (ref 3.5–5.3)
PR INTERVAL: 180 MS
Q ONSET: 201 MS
QRS COUNT: 10 BEATS
QRS DURATION: 116 MS
QT INTERVAL: 504 MS
QTC CALCULATION(BAZETT): 494 MS
QTC FREDERICIA: 498 MS
R AXIS: -46 DEGREES
RBC # BLD AUTO: 5.61 X10*6/UL (ref 4.5–5.9)
SAO2 % BLDV: 61 % (ref 45–75)
SAO2 % BLDV: 71 % (ref 45–75)
SODIUM SERPL-SCNC: 134 MMOL/L (ref 136–145)
T AXIS: 117 DEGREES
T OFFSET: 453 MS
VANCOMYCIN SERPL-MCNC: 10.7 UG/ML (ref 5–20)
VENTRICULAR RATE: 58 BPM
WBC # BLD AUTO: 7.7 X10*3/UL (ref 4.4–11.3)

## 2024-10-21 PROCEDURE — 80069 RENAL FUNCTION PANEL: CPT | Performed by: NURSE PRACTITIONER

## 2024-10-21 PROCEDURE — 2500000002 HC RX 250 W HCPCS SELF ADMINISTERED DRUGS (ALT 637 FOR MEDICARE OP, ALT 636 FOR OP/ED): Performed by: NURSE PRACTITIONER

## 2024-10-21 PROCEDURE — 83735 ASSAY OF MAGNESIUM: CPT | Performed by: NURSE PRACTITIONER

## 2024-10-21 PROCEDURE — 80202 ASSAY OF VANCOMYCIN: CPT | Performed by: SPECIALIST

## 2024-10-21 PROCEDURE — 2500000001 HC RX 250 WO HCPCS SELF ADMINISTERED DRUGS (ALT 637 FOR MEDICARE OP): Performed by: NURSE PRACTITIONER

## 2024-10-21 PROCEDURE — 99291 CRITICAL CARE FIRST HOUR: CPT | Performed by: SPECIALIST

## 2024-10-21 PROCEDURE — 2500000004 HC RX 250 GENERAL PHARMACY W/ HCPCS (ALT 636 FOR OP/ED): Performed by: NURSE PRACTITIONER

## 2024-10-21 PROCEDURE — 99291 CRITICAL CARE FIRST HOUR: CPT | Performed by: NURSE PRACTITIONER

## 2024-10-21 PROCEDURE — 85025 COMPLETE CBC W/AUTO DIFF WBC: CPT | Performed by: NURSE PRACTITIONER

## 2024-10-21 PROCEDURE — 99233 SBSQ HOSP IP/OBS HIGH 50: CPT | Performed by: NURSE PRACTITIONER

## 2024-10-21 PROCEDURE — 37799 UNLISTED PX VASCULAR SURGERY: CPT | Performed by: SPECIALIST

## 2024-10-21 PROCEDURE — 1100000001 HC PRIVATE ROOM DAILY

## 2024-10-21 PROCEDURE — 94640 AIRWAY INHALATION TREATMENT: CPT

## 2024-10-21 PROCEDURE — 37799 UNLISTED PX VASCULAR SURGERY: CPT | Performed by: NURSE PRACTITIONER

## 2024-10-21 PROCEDURE — 99233 SBSQ HOSP IP/OBS HIGH 50: CPT

## 2024-10-21 PROCEDURE — 99233 SBSQ HOSP IP/OBS HIGH 50: CPT | Performed by: INTERNAL MEDICINE

## 2024-10-21 PROCEDURE — 93005 ELECTROCARDIOGRAM TRACING: CPT

## 2024-10-21 PROCEDURE — 82947 ASSAY GLUCOSE BLOOD QUANT: CPT

## 2024-10-21 PROCEDURE — 84484 ASSAY OF TROPONIN QUANT: CPT | Performed by: NURSE PRACTITIONER

## 2024-10-21 PROCEDURE — 93010 ELECTROCARDIOGRAM REPORT: CPT | Performed by: INTERNAL MEDICINE

## 2024-10-21 RX ORDER — NICOTINE 7MG/24HR
1 PATCH, TRANSDERMAL 24 HOURS TRANSDERMAL DAILY
Status: DISCONTINUED | OUTPATIENT
Start: 2024-12-16 | End: 2024-10-25 | Stop reason: HOSPADM

## 2024-10-21 RX ORDER — IBUPROFEN 200 MG
1 TABLET ORAL DAILY
Status: DISCONTINUED | OUTPATIENT
Start: 2024-12-02 | End: 2024-10-25 | Stop reason: HOSPADM

## 2024-10-21 RX ORDER — POTASSIUM CHLORIDE 20 MEQ/1
20 TABLET, EXTENDED RELEASE ORAL ONCE
Status: COMPLETED | OUTPATIENT
Start: 2024-10-21 | End: 2024-10-21

## 2024-10-21 RX ORDER — HEPARIN SODIUM 5000 [USP'U]/ML
5000 INJECTION, SOLUTION INTRAVENOUS; SUBCUTANEOUS EVERY 8 HOURS SCHEDULED
Status: DISCONTINUED | OUTPATIENT
Start: 2024-10-21 | End: 2024-10-25 | Stop reason: HOSPADM

## 2024-10-21 RX ORDER — IBUPROFEN 200 MG
1 TABLET ORAL DAILY
Status: DISCONTINUED | OUTPATIENT
Start: 2024-10-21 | End: 2024-10-25 | Stop reason: HOSPADM

## 2024-10-21 RX ADMIN — ACYCLOVIR SODIUM 770 MG: 50 INJECTION, SOLUTION INTRAVENOUS at 02:20

## 2024-10-21 RX ADMIN — INSULIN LISPRO 5 UNITS: 100 INJECTION, SOLUTION INTRAVENOUS; SUBCUTANEOUS at 17:05

## 2024-10-21 RX ADMIN — THIAMINE HYDROCHLORIDE 500 MG: 100 INJECTION, SOLUTION INTRAMUSCULAR; INTRAVENOUS at 08:51

## 2024-10-21 RX ADMIN — THIAMINE HYDROCHLORIDE 500 MG: 100 INJECTION, SOLUTION INTRAMUSCULAR; INTRAVENOUS at 20:16

## 2024-10-21 RX ADMIN — TIOTROPIUM BROMIDE INHALATION SPRAY 2 PUFF: 3.12 SPRAY, METERED RESPIRATORY (INHALATION) at 08:33

## 2024-10-21 RX ADMIN — ATORVASTATIN CALCIUM 40 MG: 40 TABLET, FILM COATED ORAL at 20:16

## 2024-10-21 RX ADMIN — AMIODARONE HYDROCHLORIDE 200 MG: 200 TABLET ORAL at 08:50

## 2024-10-21 RX ADMIN — SERTRALINE 100 MG: 100 TABLET, FILM COATED ORAL at 08:51

## 2024-10-21 RX ADMIN — CEFTRIAXONE SODIUM 2 G: 2 INJECTION, SOLUTION INTRAVENOUS at 11:33

## 2024-10-21 RX ADMIN — INSULIN LISPRO 2 UNITS: 100 INJECTION, SOLUTION INTRAVENOUS; SUBCUTANEOUS at 17:06

## 2024-10-21 RX ADMIN — INSULIN LISPRO 5 UNITS: 100 INJECTION, SOLUTION INTRAVENOUS; SUBCUTANEOUS at 12:12

## 2024-10-21 RX ADMIN — POTASSIUM CHLORIDE 20 MEQ: 1500 TABLET, EXTENDED RELEASE ORAL at 20:16

## 2024-10-21 RX ADMIN — ASPIRIN 81 MG 81 MG: 81 TABLET ORAL at 08:50

## 2024-10-21 RX ADMIN — POLYETHYLENE GLYCOL 3350 17 G: 17 POWDER, FOR SOLUTION ORAL at 08:50

## 2024-10-21 RX ADMIN — INSULIN LISPRO 1 UNITS: 100 INJECTION, SOLUTION INTRAVENOUS; SUBCUTANEOUS at 08:34

## 2024-10-21 RX ADMIN — THIAMINE HYDROCHLORIDE 500 MG: 100 INJECTION, SOLUTION INTRAMUSCULAR; INTRAVENOUS at 14:19

## 2024-10-21 RX ADMIN — PANTOPRAZOLE SODIUM 40 MG: 40 TABLET, DELAYED RELEASE ORAL at 08:50

## 2024-10-21 RX ADMIN — INSULIN LISPRO 2 UNITS: 100 INJECTION, SOLUTION INTRAVENOUS; SUBCUTANEOUS at 12:11

## 2024-10-21 RX ADMIN — ACYCLOVIR SODIUM 770 MG: 50 INJECTION, SOLUTION INTRAVENOUS at 14:58

## 2024-10-21 RX ADMIN — INSULIN LISPRO 5 UNITS: 100 INJECTION, SOLUTION INTRAVENOUS; SUBCUTANEOUS at 08:34

## 2024-10-21 RX ADMIN — INSULIN GLARGINE 18 UNITS: 100 INJECTION, SOLUTION SUBCUTANEOUS at 15:05

## 2024-10-21 SDOH — ECONOMIC STABILITY: HOUSING INSECURITY: IN THE LAST 12 MONTHS, WAS THERE A TIME WHEN YOU WERE NOT ABLE TO PAY THE MORTGAGE OR RENT ON TIME?: YES

## 2024-10-21 SDOH — ECONOMIC STABILITY: HOUSING INSECURITY: IN THE PAST 12 MONTHS, HOW MANY TIMES HAVE YOU MOVED WHERE YOU WERE LIVING?: 0

## 2024-10-21 SDOH — SOCIAL STABILITY: SOCIAL INSECURITY: WITHIN THE LAST YEAR, HAVE YOU BEEN AFRAID OF YOUR PARTNER OR EX-PARTNER?: NO

## 2024-10-21 SDOH — ECONOMIC STABILITY: TRANSPORTATION INSECURITY: IN THE PAST 12 MONTHS, HAS LACK OF TRANSPORTATION KEPT YOU FROM MEDICAL APPOINTMENTS OR FROM GETTING MEDICATIONS?: YES

## 2024-10-21 SDOH — ECONOMIC STABILITY: FOOD INSECURITY: WITHIN THE PAST 12 MONTHS, THE FOOD YOU BOUGHT JUST DIDN'T LAST AND YOU DIDN'T HAVE MONEY TO GET MORE.: OFTEN TRUE

## 2024-10-21 SDOH — SOCIAL STABILITY: SOCIAL INSECURITY: WITHIN THE LAST YEAR, HAVE YOU BEEN HUMILIATED OR EMOTIONALLY ABUSED IN OTHER WAYS BY YOUR PARTNER OR EX-PARTNER?: NO

## 2024-10-21 SDOH — ECONOMIC STABILITY: INCOME INSECURITY: IN THE PAST 12 MONTHS HAS THE ELECTRIC, GAS, OIL, OR WATER COMPANY THREATENED TO SHUT OFF SERVICES IN YOUR HOME?: YES

## 2024-10-21 SDOH — ECONOMIC STABILITY: FOOD INSECURITY: HOW HARD IS IT FOR YOU TO PAY FOR THE VERY BASICS LIKE FOOD, HOUSING, MEDICAL CARE, AND HEATING?: HARD

## 2024-10-21 SDOH — ECONOMIC STABILITY: HOUSING INSECURITY: AT ANY TIME IN THE PAST 12 MONTHS, WERE YOU HOMELESS OR LIVING IN A SHELTER (INCLUDING NOW)?: NO

## 2024-10-21 SDOH — ECONOMIC STABILITY: FOOD INSECURITY: WITHIN THE PAST 12 MONTHS, YOU WORRIED THAT YOUR FOOD WOULD RUN OUT BEFORE YOU GOT THE MONEY TO BUY MORE.: OFTEN TRUE

## 2024-10-21 ASSESSMENT — ENCOUNTER SYMPTOMS
AGITATION: 0
SHORTNESS OF BREATH: 0
ABDOMINAL PAIN: 0
APPETITE CHANGE: 1
FATIGUE: 1
ACTIVITY CHANGE: 0
SPEECH DIFFICULTY: 0
COUGH: 0
DYSURIA: 0
NUMBNESS: 0
POLYPHAGIA: 0
POLYDIPSIA: 0
SORE THROAT: 0
EYES NEGATIVE: 1
UNEXPECTED WEIGHT CHANGE: 0
FREQUENCY: 0
JOINT SWELLING: 0
DIARRHEA: 0
DIAPHORESIS: 0
NAUSEA: 0
HEADACHES: 0
CONFUSION: 0
CHEST TIGHTNESS: 0

## 2024-10-21 ASSESSMENT — PAIN - FUNCTIONAL ASSESSMENT
PAIN_FUNCTIONAL_ASSESSMENT: 0-10

## 2024-10-21 ASSESSMENT — ACTIVITIES OF DAILY LIVING (ADL): LACK_OF_TRANSPORTATION: YES

## 2024-10-21 ASSESSMENT — PAIN SCALES - GENERAL
PAINLEVEL_OUTOF10: 0 - NO PAIN

## 2024-10-21 NOTE — PROGRESS NOTES
10/21/24 1414   Discharge Planning   Living Arrangements Other (Comment)  (roommate)   Support Systems Friends/neighbors   Assistance Needed n/a   Type of Residence Private residence   Do you have animals or pets at home? Yes   Type of Animals or Pets 2 dogs   Who is requesting discharge planning? Provider   Home or Post Acute Services   (TBD)   Does the patient need discharge transport arranged? Yes     - ICU TREATMENT PLAN: Patient was admitted to [facility] on 10/17 for generalized weakness. He was noted to have BP 80/60s, hypoglycemia. Ultimately infectious workup began. Neurology was consulted for concern of CNS infection for encephalopathy. On 10/19 code blue was called because of unobtainable BP. Overnight, his SBPs in the 60s, frequent emesis. Patient was transferred to HFICU for further care.   - Payer: [facility].  -Support System: Roommate.   - Planned Disposition: Pending medical outcome and rehab recommendations.  - Additional Information: SDOH and Social Work Discharge Planning assessments were completed with the patient. Patient reports financial hardships. He is unemployed and does not receive SS benefits. He does not go to medical appointments due to lack of transportation, has problems with obtaining food, paying for rent and utilities. SW referred patient to CHW program for resources.   - Barriers to discharge: None at this time. NOE will continue to follow.   No. MY screening performed.  STOP BANG Legend: 0-2 = LOW Risk; 3-4 = INTERMEDIATE Risk; 5-8 = HIGH Risk

## 2024-10-21 NOTE — PROGRESS NOTES
Leonel Yu is a 54 y.o. male on day 3 of admission presenting with Shortness of breath at rest.    Subjective   Pt seen and examined at the bedside. Transferred to the HFICU yesterday, required levo but stopped this morning.  Patient reports a poor appetite.      I have reviewed histories, allergies and medications have been reviewed and there are no changes     Objective   Review of Systems   Constitutional:  Positive for appetite change and fatigue. Negative for activity change, diaphoresis and unexpected weight change.   HENT: Negative.  Negative for sore throat.    Eyes: Negative.    Respiratory:  Negative for cough, chest tightness and shortness of breath.    Cardiovascular:  Negative for chest pain.   Gastrointestinal:  Negative for abdominal pain, diarrhea and nausea.   Endocrine: Negative for cold intolerance, heat intolerance, polydipsia, polyphagia and polyuria.   Genitourinary:  Negative for dysuria and frequency.   Musculoskeletal:  Negative for gait problem and joint swelling.   Neurological:  Negative for speech difficulty, numbness and headaches.   Psychiatric/Behavioral:  Negative for agitation, behavioral problems and confusion.    All other systems reviewed and are negative.    Physical Exam  Constitutional:       Appearance: Normal appearance. He is normal weight.   HENT:      Head: Normocephalic and atraumatic.      Nose: Nose normal.      Mouth/Throat:      Mouth: Mucous membranes are dry.      Pharynx: Oropharynx is clear.   Eyes:      Extraocular Movements: Extraocular movements intact.      Conjunctiva/sclera: Conjunctivae normal.   Pulmonary:      Effort: Pulmonary effort is normal.   Abdominal:      General: Abdomen is flat. Bowel sounds are normal.      Palpations: Abdomen is soft.   Skin:     General: Skin is warm and dry.   Neurological:      General: No focal deficit present.      Mental Status: He is alert and oriented to person, place, and time. Mental status is at baseline.  "  Psychiatric:         Mood and Affect: Mood normal.         Behavior: Behavior normal.         Thought Content: Thought content normal.         Judgment: Judgment normal.       Last Recorded Vitals  Blood pressure 90/70, pulse 55, temperature 36 °C (96.8 °F), temperature source Temporal, resp. rate 14, height 1.753 m (5' 9\"), weight 69 kg (152 lb 1.9 oz), SpO2 95%.  Intake/Output last 3 Shifts:  I/O last 3 completed shifts:  In: 5008.7 (72.6 mL/kg) [P.O.:598; I.V.:433.7 (6.3 mL/kg); IV Piggyback:3977]  Out: 3300 (47.8 mL/kg) [Urine:2800 (1.1 mL/kg/hr); Emesis/NG output:500]  Weight: 69 kg     Relevant Results  Results from last 7 days   Lab Units 10/21/24  1126 10/21/24  0826 10/21/24  0530 10/20/24  2011 10/20/24  1359 10/20/24  0817 10/20/24  0333 10/19/24  2230 10/19/24  2107 10/19/24  1706 10/19/24  1207 10/19/24  0711   POCT GLUCOSE mg/dL 231* 155*  --  143*  --  230*  --  353*   < >  --    < >  --    GLUCOSE mg/dL  --   --  144*  --  250*  --  219*  --   --  108*  --  235*    < > = values in this interval not displayed.     Scheduled medications  acetaminophen, 650 mg, oral, q6h  acyclovir, 10 mg/kg, intravenous, q12h  amiodarone, 200 mg, oral, Daily  aspirin, 81 mg, oral, Daily  atorvastatin, 40 mg, oral, Nightly  cefTRIAXone, 2 g, intravenous, q12h  [Held by provider] empagliflozin, 10 mg, oral, Daily  insulin glargine, 18 Units, subcutaneous, q24h  insulin lispro, 0-5 Units, subcutaneous, Nightly  insulin lispro, 0-5 Units, subcutaneous, TID AC  insulin lispro, 5 Units, subcutaneous, TID AC  nicotine, 1 patch, transdermal, Daily   Followed by  [START ON 12/2/2024] nicotine, 1 patch, transdermal, Daily   Followed by  [START ON 12/16/2024] nicotine, 1 patch, transdermal, Daily  pantoprazole, 40 mg, oral, Daily before breakfast  polyethylene glycol, 17 g, oral, Daily  sennosides-docusate sodium, 2 tablet, oral, BID  sertraline, 100 mg, oral, Daily  thiamine, 500 mg, intravenous, TID  tiotropium, 2 puff, " inhalation, Daily      Continuous medications  norepinephrine, 0.01-1 mcg/kg/min, Last Rate: Stopped (10/21/24 0950)      PRN medications  PRN medications: alteplase, dextrose, dextrose, glucagon, glucagon, artificial tears, oxygen, trimethobenzamide, vancomycin      Assessment/Plan   Assessment & Plan  Shortness of breath at rest    Type 2 diabetes mellitus with hyperglycemia, with long-term current use of insulin    Lethargy    Blurry vision, bilateral    Leonel Yu is a 54 y.o. 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 Patient presents with blurry vision, lethargy, and dizziness.       Glucose in the 300's on arrival to the ED, given 60 mg of prednisone x 1 with glucoses climbing to the 400's. Total daily dose 10/15/24 was 40 units, with 30 units as basal insulin.      Patient interviewed at the bedside in the ED.  He is hungry and eating well. Patient first reports he has only been taking lispro at home, but does not understand the sliding scale.  When pressed, it sounds like he has not been taking much of the lispro at all.      Diabetes History  Type of diabetes: Type 2 diabetes  Year diagnosed or age: 2023  Hospitalizations for DKA or HHS: no  Complications: hyperglycemia  Seen by PCP or Endocrinology: PCP  Frequency of glucose checks: once a day  Glucose review: reports 200-400  Frequency of Hypoglycemia: denies     Home Medications  Basal: 30 units of glargine listed in the chart, but patient has not been taking it  Prandial: none  Correction: lispro scale, but patient is not taking it  Orals: jardiance 10 mg    10/17 - Severe hypoglycemia likely as steroid effect waned   Glucose 121 before lunch, so insulin doses held. Post lunch glucose > 600  mg/dl  10/18 - hyperglycemia most likely due to ssi order not administered before meals     PLAN  Steroids: 60 mg of prednisone x1 10/15/24  Nutrition: 90 gram carb consistent    - continue glargine  18 units Q24       If NPO: reduce to 10  - continue lispro 5 units with meals plus scale - likely will need more as appetite improves       If NPO: hold  - continue lispro ssi #1 to ACHS   = 0u  151-200 = 1u  201-250 = 2u  251-300 = 3u  301-350 = 4u  351-400 = 5u     -Accuchecks (not BMP) TIDAC and QHS- kindly ensure QHS Accucheck is drawn; it is often missed   - Goal -180  -Hypoglycemia protocol  -Will continue to follow and titrate insulin accordingly     SGLT2i tx may not be utilized in inpt setting unless the following conditions are met. Only HF Cardiology may initiate inpatient SGLT2i use.   1) pt is eating and drinking by mouth with a total daily carb intake exceeding 60g of CHO  2) pt is urinating independently of urinary catheters  3) pt can ambulate freely to the restroom in order to maintain personal hygiene  4) no current concern for UTI/genital or inguinal candidiasis  5) no plans for NPO within the next 48-72hr     Discharge planning:   [] patient may expect to discharge home on glargine and lispro, final doses TBD by titration  Jardiance per heart failure team  [x]will provide CGM sample prior to discharge, patient is interested in a [facility]  [X]will enroll pt in  pharmacy platinum plan program  [x]follow up healthy at home virtual clinic    I spent 50 minutes in the professional and overall care of this patient.      Ximena Feliciano, APRN-CNP

## 2024-10-21 NOTE — PROGRESS NOTES
CICU Attending Note    Active Problems:  There are no active Hospital Problems.    Brief history and recent events  54-year-old male with end-stage heart failure both ischemic and nonischemic in the setting of chronic persistent substance use (crack cocaine), hemorrhagic cerebellar infarct in the past, noncompliance, multiple admissions for heart failure, again admitted after recent AGAINST MEDICAL ADVICE discharge.  This admission was treated for recurrent hypotension, vaccine may mental status of unclear origin.  Plan was to treat him for heart failure, but also possible CNS infection.  However, became more hypotensive and was transferred to heart failure ICU.  Plan  Unclear cause of shock, but has at least a partial cardiogenic component.  Improved with hydration, but did require intermittent pressors.  Refused lumbar puncture.  Neurology input appreciated.  Unfortunately, prognosis is very poor.  He is not a candidate for any advanced therapies related to ongoing substance use, noncompliance, psychiatric concerns as well as possible infection.  Case discussed with palliative care.  He has reversed his CODE STATUS to full code.  However, I feel that with his multiple medical problems, not in the least his profoundly decreased cardiac function, resuscitation is likely to be futile, and will cause more harm than good.  As such, he will be DNR DNI.    This critically ill patient continues to be at-risk for clinically significant deterioration / failure due to the above mentioned dysfunctional, unstable organ systems.  I have personally identified and managed all complex critical care issues to prevent aforementioned clinical deterioration.  Critical care time is spent at bedside and/or the immediate area and has included, but is not limited to, the review of diagnostic tests, labs, radiographs, serial assessments of hemodynamics, respiratory status, ventilatory management, and family updates.  Time spent in  procedures and teaching are reported separately.    Critical care time: 45 minutes

## 2024-10-21 NOTE — PROGRESS NOTES
Vancomycin Dosing by Pharmacy- Cessation of Therapy    Consult to pharmacy for vancomycin dosing has been discontinued by the prescriber, pharmacy will sign off at this time.    Please call pharmacy if there are further questions or re-enter a consult if vancomycin is resumed.     Sharmila Taylor, PharmD

## 2024-10-21 NOTE — PROGRESS NOTES
Occupational Therapy    Communication Note    Patient Name: Leonel Yu  MRN: [id number]  Today's Date: 10/21/2024   Room: 11/11-A    Discipline: Occupational Therapy      Missed Visit: Yes  Missed Visit Reason:  (Medical team in with patient; will attempt OT next visit as appropriate.)      10/21/24 at 12:18 PM   Sabi Merino, OT   Rehab Office: [phone]

## 2024-10-21 NOTE — PROGRESS NOTES
"Palliative Medicine following for:  Complex medical decision making, symptom management, patient/family support    History obtained from chart review including ED note, H&P, patient's daily progress notes, review of lab/test results, and discussion with primary team and bedside RN.    Subjective    History of Present Illness  Denies any pain, sob, insomnia.   Reports constipation.   Has lightheadedness when sits up and so lies down after.       Objective    Last Recorded Vitals  BP 90/70   Pulse 57   Temp 36 °C (96.8 °F) (Temporal)   Resp 10   Ht 1.753 m (5' 9\")   Wt 69 kg (152 lb 1.9 oz)   SpO2 94%   BMI 22.46 kg/m²      Physical Exam  HENT:      Head: Normocephalic and atraumatic.   Cardiovascular:      Rate and Rhythm: Regular rhythm.   Pulmonary:      Effort: Pulmonary effort is normal.      Breath sounds: Normal breath sounds.   Skin:     General: Skin is warm and dry.   Neurological:      Mental Status: He is alert and oriented to person, place, and time.          Relevant Results   Results for orders placed or performed during the hospital encounter of 10/16/24 (from the past 24 hours)   Blood Gas Venous   Result Value Ref Range    POCT pH, Venous 7.33 7.33 - 7.43 pH    POCT pCO2, Venous 57 (H) 41 - 51 mm Hg    POCT pO2, Venous 45 35 - 45 mm Hg    POCT SO2, Venous 71 45 - 75 %    POCT Oxy Hemoglobin, Venous 69.0 45.0 - 75.0 %    POCT Base Excess, Venous 2.1 -2.0 - 3.0 mmol/L    POCT HCO3 Calculated, Venous 30.1 (H) 22.0 - 26.0 mmol/L    Patient Temperature 37.0 degrees Celsius    FiO2 35 %   Renal function panel   Result Value Ref Range    Glucose 250 (H) 74 - 99 mg/dL    Sodium 131 (L) 136 - 145 mmol/L    Potassium 4.1 3.5 - 5.3 mmol/L    Chloride 93 (L) 98 - 107 mmol/L    Bicarbonate 27 21 - 32 mmol/L    Anion Gap 15 10 - 20 mmol/L    Urea Nitrogen 66 (H) 6 - 23 mg/dL    Creatinine 2.62 (H) 0.50 - 1.30 mg/dL    eGFR 28 (L) >60 mL/min/1.73m*2    Calcium 8.4 (L) 8.6 - 10.6 mg/dL    Phosphorus 6.0 (H) " 2.5 - 4.9 mg/dL    Albumin 3.1 (L) 3.4 - 5.0 g/dL   Vancomycin   Result Value Ref Range    Vancomycin 10.6 5.0 - 20.0 ug/mL   Blood Gas Venous Full Panel   Result Value Ref Range    POCT pH, Venous 7.34 7.33 - 7.43 pH    POCT pCO2, Venous 58 (H) 41 - 51 mm Hg    POCT pO2, Venous 42 35 - 45 mm Hg    POCT SO2, Venous 67 45 - 75 %    POCT Oxy Hemoglobin, Venous 65.6 45.0 - 75.0 %    POCT Hematocrit Calculated, Venous 53.0 (H) 41.0 - 52.0 %    POCT Sodium, Venous 130 (L) 136 - 145 mmol/L    POCT Potassium, Venous 3.9 3.5 - 5.3 mmol/L    POCT Chloride, Venous 96 (L) 98 - 107 mmol/L    POCT Ionized Calicum, Venous 1.13 1.10 - 1.33 mmol/L    POCT Glucose, Venous 116 (H) 74 - 99 mg/dL    POCT Lactate, Venous 1.5 0.4 - 2.0 mmol/L    POCT Base Excess, Venous 3.4 (H) -2.0 - 3.0 mmol/L    POCT HCO3 Calculated, Venous 31.3 (H) 22.0 - 26.0 mmol/L    POCT Hemoglobin, Venous 17.7 (H) 13.5 - 17.5 g/dL    POCT Anion Gap, Venous 7.0 (L) 10.0 - 25.0 mmol/L    Patient Temperature 37.0 degrees Celsius    FiO2 35 %   POCT GLUCOSE   Result Value Ref Range    POCT Glucose 143 (H) 74 - 99 mg/dL   CBC and Auto Differential   Result Value Ref Range    WBC 7.7 4.4 - 11.3 x10*3/uL    nRBC 0.0 0.0 - 0.0 /100 WBCs    RBC 5.61 4.50 - 5.90 x10*6/uL    Hemoglobin 17.1 13.5 - 17.5 g/dL    Hematocrit 51.2 41.0 - 52.0 %    MCV 91 80 - 100 fL    MCH 30.5 26.0 - 34.0 pg    MCHC 33.4 32.0 - 36.0 g/dL    RDW 15.4 (H) 11.5 - 14.5 %    Platelets 97 (L) 150 - 450 x10*3/uL    Neutrophils % 87.5 40.0 - 80.0 %    Immature Granulocytes %, Automated 0.1 0.0 - 0.9 %    Lymphocytes % 5.3 13.0 - 44.0 %    Monocytes % 7.0 2.0 - 10.0 %    Eosinophils % 0.0 0.0 - 6.0 %    Basophils % 0.1 0.0 - 2.0 %    Neutrophils Absolute 6.72 1.20 - 7.70 x10*3/uL    Immature Granulocytes Absolute, Automated 0.01 0.00 - 0.70 x10*3/uL    Lymphocytes Absolute 0.41 (L) 1.20 - 4.80 x10*3/uL    Monocytes Absolute 0.54 0.10 - 1.00 x10*3/uL    Eosinophils Absolute 0.00 0.00 - 0.70 x10*3/uL     Basophils Absolute 0.01 0.00 - 0.10 x10*3/uL   Magnesium   Result Value Ref Range    Magnesium 2.08 1.60 - 2.40 mg/dL   Renal Function Panel   Result Value Ref Range    Glucose 144 (H) 74 - 99 mg/dL    Sodium 134 (L) 136 - 145 mmol/L    Potassium 3.8 3.5 - 5.3 mmol/L    Chloride 100 98 - 107 mmol/L    Bicarbonate 28 21 - 32 mmol/L    Anion Gap 10 10 - 20 mmol/L    Urea Nitrogen 41 (H) 6 - 23 mg/dL    Creatinine 1.64 (H) 0.50 - 1.30 mg/dL    eGFR 49 (L) >60 mL/min/1.73m*2    Calcium 8.2 (L) 8.6 - 10.6 mg/dL    Phosphorus 3.0 2.5 - 4.9 mg/dL    Albumin 3.0 (L) 3.4 - 5.0 g/dL   Troponin I, High Sensitivity   Result Value Ref Range    Troponin I, High Sensitivity (CMC) 88 (H) 0 - 53 ng/L   Electrocardiogram, 12-lead PRN ACS symptoms   Result Value Ref Range    Ventricular Rate 58 BPM    Atrial Rate 58 BPM    LA Interval 180 ms    QRS Duration 116 ms    QT Interval 504 ms    QTC Calculation(Bazett) 494 ms    P Axis 28 degrees    R Axis -46 degrees    T Axis 117 degrees    QRS Count 10 beats    Q Onset 201 ms    P Onset 111 ms    P Offset 151 ms    T Offset 453 ms    QTC Fredericia 498 ms   POCT GLUCOSE   Result Value Ref Range    POCT Glucose 155 (H) 74 - 99 mg/dL   POCT GLUCOSE   Result Value Ref Range    POCT Glucose 231 (H) 74 - 99 mg/dL        Allergies  Patient has no known allergies.  Medications  Scheduled medications  acetaminophen, 650 mg, oral, q6h  acyclovir, 10 mg/kg, intravenous, q12h  amiodarone, 200 mg, oral, Daily  aspirin, 81 mg, oral, Daily  atorvastatin, 40 mg, oral, Nightly  cefTRIAXone, 2 g, intravenous, q12h  [Held by provider] empagliflozin, 10 mg, oral, Daily  insulin glargine, 18 Units, subcutaneous, q24h  insulin lispro, 0-5 Units, subcutaneous, Nightly  insulin lispro, 0-5 Units, subcutaneous, TID AC  insulin lispro, 5 Units, subcutaneous, TID AC  nicotine, 1 patch, transdermal, Daily   Followed by  [START ON 12/2/2024] nicotine, 1 patch, transdermal, Daily   Followed by  [START ON 12/16/2024]  nicotine, 1 patch, transdermal, Daily  pantoprazole, 40 mg, oral, Daily before breakfast  polyethylene glycol, 17 g, oral, Daily  sennosides-docusate sodium, 2 tablet, oral, BID  sertraline, 100 mg, oral, Daily  thiamine, 500 mg, intravenous, TID  tiotropium, 2 puff, inhalation, Daily      Continuous medications  norepinephrine, 0.01-1 mcg/kg/min, Last Rate: Stopped (10/21/24 0950)      PRN medications  PRN medications: alteplase, dextrose, dextrose, glucagon, glucagon, artificial tears, oxygen, trimethobenzamide, vancomycin     Assessment/Plan    Mr. Leonel Yu is a 53 yo  male with HFrEF 5-10% s/p [employer] single chamber ICD (3/2024), CKD3a, HTN, HLD, LUZ (crack cocaine), former tobacco use, COPD (on nocturnal 2L at baseline), DM2, remote left cerebellar hemorrhagic infarct, medication non-adherence (reason- was unable to explore yet), anxiety, and major depression disorder who presented with ADHF; and is not a candidate for advanced therapies.     #Complex Medical Decision Making   #Goals of Care  #Advance Care Planning   - Code status: DNR/DNI per our conversation today with attending, Dr. Brar: CPR and intubation would not be beneficial. I shared with him the news that we do not think CPR/intubation would be medically effective and he was agreeable with DNR/DNI.   - Goals are mix of survival and time and improved quality of life    Lightheadedness- due to hypotension from cardiac status. He is likely at the end of life. Probably weeks to months? If not able to get off pressors, then time will be shorter.     Constipation:   Already on pericolace and miralax- but unsure how much miralax he can take. Would recommend lactulose 30 gm bid prn- first dose now.     #Psychosocial Support -refused previously.       Plan of Care discussed with: Updated MD and bedside RN on goals of care decision, medication adjustments, and code status       Thank you for allowing us to care for this  patient. Palliative Team will continue to follow as needed. Please contact team with any questions or concerns.   Team pager [phone]     Laura Cook MD [facility]  Palliative Medicine Physician  [email or web address]

## 2024-10-21 NOTE — NURSING NOTE
Mr. Yu is feeling fatigued today.  Brief visit.  States he has no appetite and finding it difficult to finish more than 50% of meals.  We briefly reviewed Bg results and insulin regimen.  Will follow up with him when out of ICU -- he is agreeable to this plan.  Time spent:  15 mins.

## 2024-10-21 NOTE — PROGRESS NOTES
[facility]  HFICU PROGRESS NOTE    Leonel Yu/[id number]    Admit Date: 10/16/2024  Hospital Length of Stay: 3   ICU Length of Stay: 1d 9h   Primary Service: HFICU    Hospital Course:   54-year-old male with end-stage heart failure both ischemic and nonischemic in the setting of chronic persistent substance use (crack cocaine), hemorrhagic cerebellar infarct in the past, noncompliance, multiple admissions for heart failure, again admitted after recent AGAINST MEDICAL ADVICE discharge.  This admission was treated for recurrent hypotension, headache, blurred vision, and altered metal status with unclear origin.  Pt was treated for heart failure, but also possible CNS infection.  However, became more hypotensive and was transferred to heart failure ICU after rapid call.     In the ICU, pt was treated with IV hydration and required intermittent pressors with levo gtt and dobutamine gtt. The above symptoms has Improved and pt is off levo and dobutamine gtt now. Unfortunately, prognosis is very poor. He is not a candidate for any advanced therapies related to ongoing substance use, noncompliance, psychiatric concerns as well as possible infection. Case discussed with palliative care and pt is ok with DNR DNI for now.     Pt was also started on broad spectrum Abx and acyclovir, CTX, and van for CNS coverage. LP was initially recommended however discussed with pt today and he completely refused. In addition, pt's clinical symptoms have improved tramatically.  NO need for empiric antibiotics per neurologist. Psy continue to follow up and they feel that pt does not meet criteria for inpatient psychiatric admission for now.     On assessment,  Pt is very pleasant and denied suicide ideation this morning. Pt states that he still feel tired and getting better each day. Pt Remains hemodynamically supported with levo gtt during night, but weaned off since AM. Patient is alert, oriented x3, moves all  extremities, yawning while speaking Patient denies headache and still neck. Pt also denied chest pain or chest discomfort. Patient denies palpation or dizziness. Denies SOB. Denies abdominal pain or abdominal discomfort. Denies N/V/D.     Items Pending:    - Off from levophed MAP > 65 since this morning and closely monitoring BP  - Following up with neurology and NO LP now   - D/C Empiric antibiotics per Neurology  - Nicotine patch   - Closely monitoring cocaine withdraw symptoms  - Psy is going to follow up   - Please D/C A line and RIJ CVP before transferring to [facility]    - Social work follow up for discharge plan.      MEDICATIONS  Infusions:  norepinephrine, Last Rate: Stopped (10/21/24 0950)      Scheduled:  acetaminophen, 650 mg, q6h  amiodarone, 200 mg, Daily  aspirin, 81 mg, Daily  atorvastatin, 40 mg, Nightly  [Held by provider] empagliflozin, 10 mg, Daily  heparin (porcine), 5,000 Units, q8h JANEY  insulin glargine, 18 Units, q24h  insulin lispro, 0-5 Units, Nightly  insulin lispro, 0-5 Units, TID AC  insulin lispro, 5 Units, TID AC  nicotine, 1 patch, Daily   Followed by  [START ON 12/2/2024] nicotine, 1 patch, Daily   Followed by  [START ON 12/16/2024] nicotine, 1 patch, Daily  pantoprazole, 40 mg, Daily before breakfast  polyethylene glycol, 17 g, Daily  sennosides-docusate sodium, 2 tablet, BID  sertraline, 100 mg, Daily  thiamine, 500 mg, TID  tiotropium, 2 puff, Daily      PRN:  alteplase, 2 mg, PRN  dextrose, 12.5 g, q15 min PRN  dextrose, 25 g, q15 min PRN  glucagon, 1 mg, q15 min PRN  glucagon, 1 mg, q15 min PRN  artificial tears, 2 drop, 4x daily PRN  oxygen, , Continuous PRN - O2/gases  trimethobenzamide, 200 mg, q6h PRN  vancomycin, , Daily PRN      Invasive Hemodynamics:    Most Recent Range Past 24hrs   BP (Art) 115/66 Arterial Line BP 1  Min: 73/60  Max: 135/80   MAP(Art) 79 mmHg Arterial Line MAP 1 (mmHg)   Min: 56 mmHg  Max: 95 mmHg   RA/CVP   No data recorded   PA   No data recorded   PA(mean)  "  No data recorded   PCWP   No data recorded   CO   No data recorded   CI   No data recorded   Mixed Venous   No data recorded   SVR    No data recorded   PVR   No data recorded     PHYSICAL EXAM:   Visit Vitals  BP 90/70   Pulse 59   Temp 36.2 °C (97.2 °F) (Temporal)   Resp 15   Ht 1.753 m (5' 9\")   Wt 69 kg (152 lb 1.9 oz)   SpO2 96%   BMI 22.46 kg/m²   Smoking Status Former   BSA 1.83 m²       Wt Readings from Last 5 Encounters:   10/20/24 69 kg (152 lb 1.9 oz)   10/15/24 77.1 kg (170 lb)   10/05/24 74.4 kg (164 lb 0.4 oz)   09/08/24 79.4 kg (175 lb 0.7 oz)   08/06/24 73.7 kg (162 lb 7.7 oz)       INTAKE/OUTPUT:  I/O last 3 completed shifts:  In: 5008.7 (72.6 mL/kg) [P.O.:598; I.V.:433.7 (6.3 mL/kg); IV Piggyback:3977]  Out: 3300 (47.8 mL/kg) [Urine:2800 (1.1 mL/kg/hr); Emesis/NG output:500]  Weight: 69 kg      Physical Exam  Constitutional:       Appearance: Normal appearance.      Comments: BiPAP   HENT:      Head: Normocephalic.      Nose: Nose normal.   Eyes:      Extraocular Movements: Extraocular movements intact.      Pupils: Pupils are equal, round, and reactive to light.   Neck:      Comments: RIJ  Cardiovascular:      Heart sounds: Murmur (holosystolic) heard.   Pulmonary:      Breath sounds: Normal breath sounds.   Abdominal:      General: Bowel sounds are normal.      Palpations: Abdomen is soft.   Musculoskeletal:         General: Normal range of motion.      Cervical back: Normal range of motion.      Comments: LT radial art line   Skin:     General: Skin is cool and dry.      Capillary Refill: Capillary refill takes less than 2 seconds.   Neurological:      Mental Status: He is easily aroused. He is lethargic.   Psychiatric:         Attention and Perception: Attention and perception normal.         Mood and Affect: Affect is flat.         Behavior: Behavior is cooperative.         DATA:  CMP:  Results from last 7 days   Lab Units 10/21/24  0530 10/20/24  1359 10/20/24  0333 10/19/24  1706 " "10/19/24  0711 10/18/24  0744 10/17/24  1934 10/17/24  0411 10/16/24  2238 10/16/24  1650 10/15/24  1742 10/15/24  1147 10/15/24  1032   SODIUM mmol/L 134* 131* 131* 134* 127* 134* 132* 134* 138 131* 132*  --  133*   POTASSIUM mmol/L 3.8 4.1 4.5 4.6 4.1 3.6 4.0 3.5 3.4* 4.5 5.3   < > 6.2*   CHLORIDE mmol/L 100 93* 91* 93* 93* 97* 94* 97* 98 97* 98  --  98   CO2 mmol/L 28 27 27 23 21 25 25 25 26 20* 24  --  25   ANION GAP mmol/L 10 15 18 23* 17 16 17 16 17 19 15  --  16   BUN mg/dL 41* 66* 67* 62* 58* 41* 42* 45* 46* 44* 32*  --  26*   CREATININE mg/dL 1.64* 2.62* 3.08* 2.58* 2.10* 1.64* 1.99* 2.10* 2.39* 2.41* 1.78*  --  1.65*   EGFR mL/min/1.73m*2 49* 28* 23* 29* 36* 49* 39* 36* 31* 31* 44*  --  49*   MAGNESIUM mg/dL 2.08  --  2.25 2.35 2.26 2.00  --  2.31  --  2.73* 2.36  --  2.29   ALBUMIN g/dL 3.0* 3.1* 3.8 4.1 3.8 3.7 3.9 3.5 4.2 3.5 4.0  --  4.5   ALT U/L  --   --  22  --  24 21  --  29 36  --   --   --  50   AST U/L  --   --  16  --  21 15  --  15 19  --   --   --  42*   BILIRUBIN TOTAL mg/dL  --   --  0.6  --  0.9 0.8  --  0.5 0.5  --   --   --  1.2    < > = values in this interval not displayed.     CBC:  Results from last 7 days   Lab Units 10/21/24  0530 10/20/24  0333 10/19/24  1706 10/19/24  0711 10/18/24  0744 10/17/24  1934 10/17/24  0411 10/16/24  2238   WBC AUTO x10*3/uL 7.7 7.4 11.1 11.0 9.4 10.7 12.8* 12.7*   HEMOGLOBIN g/dL 17.1 19.9* 21.2* 19.6* 19.0* 18.5* 18.8* 21.2*   HEMATOCRIT % 51.2 57.5* 63.3* 58.3* 55.1* 56.0* 56.6* 63.8*   PLATELETS AUTO x10*3/uL 97* 112* 132* 154 152 167 154 169   MCV fL 91 89 91 91 88 91 93 93     COAG:   Results from last 7 days   Lab Units 10/19/24  1930   INR  1.2*     ABO: No results found for: \"ABO\"  HEME/ENDO:  Results from last 7 days   Lab Units 10/20/24  0333 10/17/24  1542 10/17/24  0027 10/15/24  1742   FERRITIN ng/mL 242  --   --   --    IRON SATURATION % 14*  --   --   --    TSH mIU/L  --  1.66 5.71*  --    HEMOGLOBIN A1C %  --   --   --  9.9*    "   CARDIAC:   Results from last 7 days   Lab Units 10/20/24  0333 10/17/24  0027 10/16/24  2238 10/15/24  1147 10/15/24  1032   [facility] ng/L 100* 52 69* 50 70*   BNP pg/mL  --   --  479*  --  2,833*       ASSESSMENT AND PLAN:     Leonel Yu is a 54 y.o. male with PMH of mixed ischemic and non-ischemic HFrEF 5-10% s/p [employer] single chamber ICD (3/2024), CKD3a, HTN, HLD, LUZ (crack cocaine), former tobacco use, COPD (on nocturnal 2L at baseline), DM2, remote left cerebellar hemorrhagic infarct, medication noncompliance, anxiety, and depression. Patient presents with blurry vision, lethargy, and dizziness, admitted to HVI service on 10/15 for GDMT optimization.     He received his home gdmt. Coreg 3.125, empagliflozin 10 mg, torsemide 40.  He was also given Lasix 40 IV twice.  He reported improved symptoms with these medications. He was also given prednisone 60 mg once.  He was given lispro 10 units for hyperglycemia in the 400s. He left hospital for AMA.      Represented 10/16 for similar symptoms - generalized weakness. His friend had convinced him to come back to hospital. He was noted to be drowsy and have facial diaphoresis. BP 80/60s, hypoglycemia to 31. Ultimately infectious workup began. POCUS showed collapsible IVC, and 500 ml fluids were given. Neurology consulted for concern of CNS infection for encephalopathy.     Patient was obtaining MRI brain for further encephalopathy workup today 10/19. Code blue called because of unobtainable BP. Per bedside nursing, no pulse ever lost. Patient with cold skin. BP 90/60mmhg, HR70s. Hold off on further MRI and pt returned to baseline mental status. DNR-DNI, ok to escalation for ICU care per discussion. Overnight, his SBPs in the 60s, still having frequent emesis.  cc was given, levo drip initiated.  Patient transferred to HF ICU for further care.       Neuro:   # Remote cerebellar Hemorrhagic infarct   # Anxiety. Depression, Acute pain   # Mood  disorder   -  Per Psych (10/19): Patient lacks the capacity to leave AMA at this time and thus cannot leave AMA. Call CODE VIOLET if patient attempts to leave AMA. Patient will need involuntary issued at that time.   - Psych consulted, appreciate recommendations. Per psych, no longer requires 1:1 sitter  - Per psych (10/20) CURRENT DOES NOT MEET CRITERIA FOR INPATIENT PSYCHIATRIC ADMISSION  - Per psych (10/20) GIVEN ACUTE ILLNESS, IFF PATIENT ATTEMPTS TO LEAVE AMA, WILL NEED TO HAVE CAPACITY ASSESSMENT DONE AT THAT TIME  - c/w sertraline 100mg po daily  - Palliative consulted  - Serial neuro and pain assessments   - PO Tylenol juanita q 6hrs for pain   - PT/OT Consult, OOB to chair  - CAM ICU score every shift  - Sleep/wake cycle normalization    # Acute on set of Headache and blurred vision on admission-->improving   - Hx of weekly cocaine use  - CT head 10/17: Negative   - Neurology consulted 10/19 for concern of CNS infection.   - Per neurology: CNS coverage antibiotics (10/19): IV Ceftriaxone and IV Vancomycin and renally doses IV acyclovir   - Per neurology:  IV thiamine 500mg TID for 3 days followed by 500mg Q daily   - Follow up with neurologist for potential LP (10/21)  - MRI brain (10/19): no evidence of hemorrhage or mass effect. Remote left cerebellar infarct.     #Vision Disturbance vs chronic near sightedness   - ophtho was consulted and appreciated  - C/w artifical tears QID  - Closely monitoring    # Physical Status  -Normal weight, BMI: 25  - Reduced Mobility: due to ICU stay      #Substance abuse  # Hx of daily cocaine use----> now weekly cocaine use   -Alcohol abuse/Alcohol dependence: NO   -Tobacco use: 3 cigarettes per day, smoke crack---> Nicotine patch  (10/21)  - Cocaine abuse: last taken: 10/12  - 10/17 UDS: cocaine +   - Closely monitoring withdrawal symptoms       Cardiovascular:   # Cardiogenic shock  # Acute on chronic decompensate heart failure, HFrEF, LV EF 10-15%, mixed  ischemic/non-ischemic CM   - TTE (10/17/24): LVEF 10-15%, LVIDd 7.23 cm, mildly reduced right ventricular systolic function. RVSP decreased from 39 mmHg to 26 mmHg. Right ventricular systolic pressure is within normal limits  - cMRI 8/24: transmural infarcts and fibrosis   - CT chest: coronary calcifcation   - admit weight (10/19): 77.3 Kg -->69 Kg (10/20)  - admit BNP (10/16): 479  - ASA/Statin  - Hold Home Entresto   - Hold home dose: BB, MRA  - Potential Empagliflozin to 25mg for glucose-lowering benefit once out of shock stage   - Hold Diuretics   - IVF bolus 250 ml x1 given in ICU for possible dehydration due to emesis and high H/h   - Initiate Dobutamine drip at 5 mcg/kg/min upon arriving in HF ICU (10/19). Dobutamine discontinued 10/20 d/t hypotension 2/2 hypovolemia (+straight leg test).  - Wean OFF levo drip for Maps 65 and above (10/19 -- off overnight, resumed 10/20 am)  - Wean off levo gtt as tolerated to keep MVP > 65 and above   - Daily standing weights, 2gm sodium diet, 2L fluid restriction, strict I&Os     # CAD   - C/w ASA, lipitor  - No C recorded     # Chest pain, possible due to demand ischemia at low cardiac output stage   - Upon arriving in ICU, chest pain 7-8/10, morphine 1 mg IVP given  - Troponin (10/19): 52---> Repeat pending (10/21)     # Hx of VT/VF  # Hx of AF RVR  -Device: [employer] single chamber ICD (3/2024),   - No shock since last years per pateint  - ICD interrogated (9/30/24): There are 4 sustained VT episodes since 9/1/24 requiring ATP and ATP/shock for arrhythmia termination and restoration of SR, last shock were recorded from 9/5/2024 which were appropriate for VT. : <1%.    -C/w Amio 200 mg daily      #Electrolyte Disturbances  - Keep K>4, Mag >2     Pulmonary:   # Acute Hypercapnic Respiratory Failure - BiPAP 12/5 BUR 14  # Hx of COPD , on nocturnal 2L at baseline  # Former tobacco use  # Polycythemia most likely due to tobacco use  -Monitor and maintain SpO2 >  92%  - Nicotine patch (10/21)     GI:  # Hyperemesis, possible due to low cardiac output   - refractory to zofran  - Bowel regimen  - PPI     :  #GUICHO/CKD, stage 3a in the setting of cardiogenic shock, low output stage  -Baseline BUN/Cr: 1.2-1.5   -Admit BUN/Cr (10/19): 62/2.58 --> 67 / 3.08 (10/20)  -MALCOLM: b/l echogenic kidney likely 2/2 medical renal disease   -I/Os  -avoid hypotension and nephrotoxic agents     Heme:  #Anemia in the setting of chronic disease  - Labs: (10/19) TIBC, ferritin, serum Fe: 283, 242, % sat 14  - B12 (10/17): 584    - Folate (10/19): >24    - IV feraheme 510mg x 1.      Endo:  #DM  - hgbA1c (10/15): 9.9    - Endo follows, thanks for recs   - SSI, Lantus insulin     #Thyroid  -TSH (10/17): 5.71      ID:  # Acute on set of Headache and blurred vision on admission  - Hx of weekly cocaine use  - CT head 10/17: Negative   - Neurology consulted 10/19 for concern of CNS infection.   - Per neurology: CNS coverage antibiotics (10/19): IV Ceftriaxone and IV Vancomycin and renally doses IV acyclovir   - Continue to following up with Neurology for potential LP today  - Blood c/s (10/1): NGTD x 3 days  - Blood C/s x2 (10/19):  NG so far  - Lactate (10/19): 1.8   - Urine antigens negative (10/17)  -afebrile  -trend temps q4h     PHYSICAL AND OCCUPATIONAL THERAPY:  PT/OT    LINES:  PIV  Lt Radial Art Line (10/20)  RIJ (10/20) double lumen     DVT: SCD  VAP BUNDLE: NA  ULCER PPX: Protonix   GLYCEMIC CONTROL: Insulin  BOWEL CARE: Miralax / Colace  INDWELLING CATHETER: NA  NUTRITION: Adult diet Cardiac, Consistent Carb; CCD 90 gm/meal; 70 gm fat; 2 - 3 grams Sodium      EMERGENCY CONTACT: Extended Emergency Contact Information  Primary Emergency Contact: LindenJacobElizabet  Mobile Phone: [phone]  Relation: Friend  FAMILY UPDATE:  CODE STATUS: Full Code  DISPO: ICU--> RNF       Patient expressed to change back to full code, Discussed with psychiatry / patient.   Patient seen and assessed with   Timi MOSLEY personally spent 60 minutes of critical care time directly and personally managing the patient exclusive of separately billable procedures     _________________________________________________  JACKIE Chaparro-CNP

## 2024-10-21 NOTE — PROGRESS NOTES
"Leonel Yu is a 54 y.o. male on day 3 of admission presenting with Shortness of breath at rest.      Subjective   NAEON, seen this AM was fully oriented, feels ok, denied history of fevers, headache prior to presentation. Denies current symptoms as well. Endorses that his vision is improving.        Objective     Last Recorded Vitals  Blood pressure 90/70, pulse 67, temperature 36 °C (96.8 °F), temperature source Temporal, resp. rate 13, height 1.753 m (5' 9\"), weight 69 kg (152 lb 1.9 oz), SpO2 91%.    Physical Exam  Neurological Exam  GENERAL APPEARANCE:  No distress, alert, interactive and cooperative.     CARDIOVASCULAR: Regular rate and rhythm. Radial pulses +2 and equal. No swelling, varicosities, edema, or tenderness to palpation.      MENTAL STATE:   Orientation was normal to time, place and person. Recent and remote memory was intact.  Attention span and concentration were normal. Language testing was normal for comprehension, repetition, expression, and naming. The patient could correctly interpret a picture. General fund of knowledge was intact.     OPHTHALMOSCOPIC:   The ophthalmoscopic exam was grossly normal. The fundi were well visualized with normal disc margins, clear vessels and vascular pulsations. No disc edema. The cup/disk ratio was not enlarged. No hemorrhages or exudates were present in the posterior segments that were visualized.     CRANIAL NERVES:   CN 2   Visual fields full to confrontation. Able to read small writing on board 3 meters apart.   CN 3, 4, 6   Pupils round, 4 mm in diameter, equally reactive to light. Lids symmetric; no ptosis. EOMs normal alignment, full range with normal saccades, pursuit and convergence.   No nystagmus.   CN 5   Facial sensation intact bilaterally.   CN 7   Normal and symmetric facial strength. Nasolabial folds symmetric.   CN 8   Hearing intact to conversation.  CN 9/10  Palate elevates symmetrically.   CN 11   Normal strength of shoulder shrug and " neck turning.   CN 12   Tongue midline, with normal bulk and strength; no fasciculations.     MOTOR:   Muscle bulk and tone were normal in both upper and lower extremities.   No fasciculations, tremor or other abnormal movements were present.   Moving all extremities anti gravity symmetricall and briskly, no drift    SENSORY:   In both upper and lower extremities, sensation was intact to light touch    COORDINATION:    In both upper extremities, finger-nose-finger was intact without dysmetria or overshoot.       GAIT: Deferred  Relevant Results                    [place] Coma Scale  Best Eye Response: Spontaneous  Best Verbal Response: Oriented  Best Motor Response: Follows commands  [place] Coma Scale Score: 15                    Assessment/Plan      Leonel Yu is a 54 y.o. male  PMH HFrEF (5-10%) s/p ICD, COPD, CKD, DM2,  substance use (crack cocaine), medication non adherence and complex psychiatric history (MDD with anxiety , SI, HI) presenting with lethargy, weakness, and b/l blurry vision. Neurology initially consulted for concern of CNS infection. Initial exam was non focal with negative Kernigs and Brudzinskis;s sign negative.     Given history of worsening lethargy, photophobia and emesis in setting of recent cocaine use (Postive UDS for cocaine and opiates), and worsening heart failure with shock, presentation is concerning for toxic/metabolic cephalopathy. Limited MRI done which was negative for evidence of toxic leukoencephalopathy and otherwise un explanatory.     He was also started on broad spectrum Abx and acyclovir for CNS coverage. LP was initially recommended however discussed with pt today and he completely refused to do the LP, we discussed that given his significant and quick improvement in his clinical status with other contributers(substance use, worsening heart failure symptoms with shock) with intact exam, the liklihood of a CNS infection is low but we can not surely rule that out  without LP. However, given his refusal to do LP and benign exam with significant quick improvement. It is reasonable to stop the medications and monitor him given low concern for CNS infection. Discussed with primary team as well who agree that concern for CNS infection is low.     Recommendations:   - Agree with stopping Acyclovir, CTX, Vanc, unless patient needs Abx for other infectious processes.  - Continue Thiamine and MVI  - Continue to monitor the pt for any worsening exam  - No further recommendations from neurology team.      Milton Soto  PGY3 Neurology  Gen Neuro Pager: [phone]      Milton Soto MD

## 2024-10-21 NOTE — PROGRESS NOTES
"Physical Therapy                 Therapy Communication Note    Patient Name: Leonel Yu  MRN: [id number]  Department: [facility] HFICU  Room: 11/11-A  Today's Date: 10/21/2024     Discipline: Physical Therapy    Missed Visit Reason:  Patient refused (PT attempted to see pt for an evaluation, and informed him of the purpose of PT while IP. Pt declined \"not now, I don't feel like getting up\". Will re attempt at a later date/time.)  Attempt time: 10:15AM    Missed Time: Attempt    Comment:  "

## 2024-10-22 PROBLEM — R41.82 ALTERED MENTAL STATUS: Status: ACTIVE | Noted: 2024-10-22

## 2024-10-22 LAB
ALBUMIN SERPL BCP-MCNC: 3.2 G/DL (ref 3.4–5)
ALBUMIN SERPL BCP-MCNC: 3.3 G/DL (ref 3.4–5)
ANION GAP SERPL CALC-SCNC: 10 MMOL/L (ref 10–20)
ANION GAP SERPL CALC-SCNC: 12 MMOL/L (ref 10–20)
ATRIAL RATE: 78 BPM
BASOPHILS # BLD AUTO: 0.01 X10*3/UL (ref 0–0.1)
BASOPHILS # BLD AUTO: 0.02 X10*3/UL (ref 0–0.1)
BASOPHILS NFR BLD AUTO: 0.2 %
BASOPHILS NFR BLD AUTO: 0.2 %
BUN SERPL-MCNC: 18 MG/DL (ref 6–23)
BUN SERPL-MCNC: 26 MG/DL (ref 6–23)
CALCIUM SERPL-MCNC: 8.3 MG/DL (ref 8.6–10.6)
CALCIUM SERPL-MCNC: 8.5 MG/DL (ref 8.6–10.6)
CHLORIDE SERPL-SCNC: 100 MMOL/L (ref 98–107)
CHLORIDE SERPL-SCNC: 98 MMOL/L (ref 98–107)
CO2 SERPL-SCNC: 25 MMOL/L (ref 21–32)
CO2 SERPL-SCNC: 29 MMOL/L (ref 21–32)
CREAT SERPL-MCNC: 1.17 MG/DL (ref 0.5–1.3)
CREAT SERPL-MCNC: 1.43 MG/DL (ref 0.5–1.3)
EGFRCR SERPLBLD CKD-EPI 2021: 58 ML/MIN/1.73M*2
EGFRCR SERPLBLD CKD-EPI 2021: 74 ML/MIN/1.73M*2
EOSINOPHIL # BLD AUTO: 0.01 X10*3/UL (ref 0–0.7)
EOSINOPHIL # BLD AUTO: 0.02 X10*3/UL (ref 0–0.7)
EOSINOPHIL NFR BLD AUTO: 0.1 %
EOSINOPHIL NFR BLD AUTO: 0.4 %
ERYTHROCYTE [DISTWIDTH] IN BLOOD BY AUTOMATED COUNT: 15.3 % (ref 11.5–14.5)
ERYTHROCYTE [DISTWIDTH] IN BLOOD BY AUTOMATED COUNT: 15.3 % (ref 11.5–14.5)
GLUCOSE BLD MANUAL STRIP-MCNC: 139 MG/DL (ref 74–99)
GLUCOSE BLD MANUAL STRIP-MCNC: 143 MG/DL (ref 74–99)
GLUCOSE BLD MANUAL STRIP-MCNC: 190 MG/DL (ref 74–99)
GLUCOSE SERPL-MCNC: 134 MG/DL (ref 74–99)
GLUCOSE SERPL-MCNC: 138 MG/DL (ref 74–99)
HCT VFR BLD AUTO: 47.6 % (ref 41–52)
HCT VFR BLD AUTO: 47.9 % (ref 41–52)
HGB BLD-MCNC: 15.9 G/DL (ref 13.5–17.5)
HGB BLD-MCNC: 16.1 G/DL (ref 13.5–17.5)
IMM GRANULOCYTES # BLD AUTO: 0.02 X10*3/UL (ref 0–0.7)
IMM GRANULOCYTES # BLD AUTO: 0.03 X10*3/UL (ref 0–0.7)
IMM GRANULOCYTES NFR BLD AUTO: 0.3 % (ref 0–0.9)
IMM GRANULOCYTES NFR BLD AUTO: 0.4 % (ref 0–0.9)
LYMPHOCYTES # BLD AUTO: 0.51 X10*3/UL (ref 1.2–4.8)
LYMPHOCYTES # BLD AUTO: 0.56 X10*3/UL (ref 1.2–4.8)
LYMPHOCYTES NFR BLD AUTO: 5.4 %
LYMPHOCYTES NFR BLD AUTO: 9.8 %
MAGNESIUM SERPL-MCNC: 2.01 MG/DL (ref 1.6–2.4)
MAGNESIUM SERPL-MCNC: 2.14 MG/DL (ref 1.6–2.4)
MCH RBC QN AUTO: 31.1 PG (ref 26–34)
MCH RBC QN AUTO: 31.2 PG (ref 26–34)
MCHC RBC AUTO-ENTMCNC: 33.4 G/DL (ref 32–36)
MCHC RBC AUTO-ENTMCNC: 33.6 G/DL (ref 32–36)
MCV RBC AUTO: 93 FL (ref 80–100)
MCV RBC AUTO: 93 FL (ref 80–100)
MONOCYTES # BLD AUTO: 0.38 X10*3/UL (ref 0.1–1)
MONOCYTES # BLD AUTO: 0.43 X10*3/UL (ref 0.1–1)
MONOCYTES NFR BLD AUTO: 4 %
MONOCYTES NFR BLD AUTO: 7.5 %
NEUTROPHILS # BLD AUTO: 4.67 X10*3/UL (ref 1.2–7.7)
NEUTROPHILS # BLD AUTO: 8.53 X10*3/UL (ref 1.2–7.7)
NEUTROPHILS NFR BLD AUTO: 81.7 %
NEUTROPHILS NFR BLD AUTO: 90 %
NRBC BLD-RTO: 0 /100 WBCS (ref 0–0)
NRBC BLD-RTO: 0 /100 WBCS (ref 0–0)
P AXIS: 72 DEGREES
P OFFSET: 159 MS
P ONSET: 99 MS
PHOSPHATE SERPL-MCNC: 1.9 MG/DL (ref 2.5–4.9)
PHOSPHATE SERPL-MCNC: 2 MG/DL (ref 2.5–4.9)
PLATELET # BLD AUTO: 101 X10*3/UL (ref 150–450)
PLATELET # BLD AUTO: 90 X10*3/UL (ref 150–450)
POTASSIUM SERPL-SCNC: 4.4 MMOL/L (ref 3.5–5.3)
POTASSIUM SERPL-SCNC: 4.6 MMOL/L (ref 3.5–5.3)
PR INTERVAL: 202 MS
Q ONSET: 200 MS
QRS COUNT: 13 BEATS
QRS DURATION: 130 MS
QT INTERVAL: 444 MS
QTC CALCULATION(BAZETT): 506 MS
QTC FREDERICIA: 484 MS
R AXIS: -58 DEGREES
RBC # BLD AUTO: 5.1 X10*6/UL (ref 4.5–5.9)
RBC # BLD AUTO: 5.18 X10*6/UL (ref 4.5–5.9)
SODIUM SERPL-SCNC: 131 MMOL/L (ref 136–145)
SODIUM SERPL-SCNC: 134 MMOL/L (ref 136–145)
T AXIS: 98 DEGREES
T OFFSET: 422 MS
VENTRICULAR RATE: 78 BPM
WBC # BLD AUTO: 5.7 X10*3/UL (ref 4.4–11.3)
WBC # BLD AUTO: 9.5 X10*3/UL (ref 4.4–11.3)

## 2024-10-22 PROCEDURE — 99233 SBSQ HOSP IP/OBS HIGH 50: CPT | Performed by: PHYSICIAN ASSISTANT

## 2024-10-22 PROCEDURE — 99233 SBSQ HOSP IP/OBS HIGH 50: CPT | Performed by: INTERNAL MEDICINE

## 2024-10-22 PROCEDURE — 97161 PT EVAL LOW COMPLEX 20 MIN: CPT | Mod: GP

## 2024-10-22 PROCEDURE — 2500000002 HC RX 250 W HCPCS SELF ADMINISTERED DRUGS (ALT 637 FOR MEDICARE OP, ALT 636 FOR OP/ED)

## 2024-10-22 PROCEDURE — 2500000004 HC RX 250 GENERAL PHARMACY W/ HCPCS (ALT 636 FOR OP/ED)

## 2024-10-22 PROCEDURE — 2500000001 HC RX 250 WO HCPCS SELF ADMINISTERED DRUGS (ALT 637 FOR MEDICARE OP)

## 2024-10-22 PROCEDURE — 83735 ASSAY OF MAGNESIUM: CPT

## 2024-10-22 PROCEDURE — 36415 COLL VENOUS BLD VENIPUNCTURE: CPT

## 2024-10-22 PROCEDURE — 1200000002 HC GENERAL ROOM WITH TELEMETRY DAILY

## 2024-10-22 PROCEDURE — 80069 RENAL FUNCTION PANEL: CPT

## 2024-10-22 PROCEDURE — 82947 ASSAY GLUCOSE BLOOD QUANT: CPT

## 2024-10-22 PROCEDURE — 85025 COMPLETE CBC W/AUTO DIFF WBC: CPT

## 2024-10-22 RX ORDER — MULTIVIT-MIN/IRON FUM/FOLIC AC 7.5 MG-4
1 TABLET ORAL DAILY
Status: DISCONTINUED | OUTPATIENT
Start: 2024-10-22 | End: 2024-10-25 | Stop reason: HOSPADM

## 2024-10-22 RX ORDER — LACTULOSE 10 G/15ML
30 SOLUTION ORAL 2 TIMES DAILY PRN
Status: DISCONTINUED | OUTPATIENT
Start: 2024-10-22 | End: 2024-10-25 | Stop reason: HOSPADM

## 2024-10-22 RX ORDER — SPIRONOLACTONE 25 MG/1
12.5 TABLET ORAL DAILY
Status: DISCONTINUED | OUTPATIENT
Start: 2024-10-22 | End: 2024-10-25

## 2024-10-22 RX ADMIN — PANTOPRAZOLE SODIUM 40 MG: 40 TABLET, DELAYED RELEASE ORAL at 05:46

## 2024-10-22 RX ADMIN — SERTRALINE 100 MG: 100 TABLET, FILM COATED ORAL at 09:16

## 2024-10-22 RX ADMIN — EMPAGLIFLOZIN 10 MG: 10 TABLET, FILM COATED ORAL at 11:36

## 2024-10-22 RX ADMIN — AMIODARONE HYDROCHLORIDE 200 MG: 200 TABLET ORAL at 09:16

## 2024-10-22 RX ADMIN — SPIRONOLACTONE 12.5 MG: 25 TABLET, FILM COATED ORAL at 11:35

## 2024-10-22 RX ADMIN — ACETAMINOPHEN 650 MG: 325 TABLET ORAL at 18:09

## 2024-10-22 RX ADMIN — THIAMINE HYDROCHLORIDE 500 MG: 100 INJECTION, SOLUTION INTRAMUSCULAR; INTRAVENOUS at 20:56

## 2024-10-22 RX ADMIN — ATORVASTATIN CALCIUM 40 MG: 40 TABLET, FILM COATED ORAL at 20:56

## 2024-10-22 RX ADMIN — Medication 1 TABLET: at 09:16

## 2024-10-22 RX ADMIN — ACETAMINOPHEN 650 MG: 325 TABLET ORAL at 11:35

## 2024-10-22 RX ADMIN — ASPIRIN 81 MG 81 MG: 81 TABLET ORAL at 09:16

## 2024-10-22 RX ADMIN — INSULIN GLARGINE 18 UNITS: 100 INJECTION, SOLUTION SUBCUTANEOUS at 18:10

## 2024-10-22 RX ADMIN — THIAMINE HYDROCHLORIDE 500 MG: 100 INJECTION, SOLUTION INTRAMUSCULAR; INTRAVENOUS at 16:00

## 2024-10-22 RX ADMIN — SENNOSIDES AND DOCUSATE SODIUM 2 TABLET: 50; 8.6 TABLET ORAL at 09:17

## 2024-10-22 RX ADMIN — INSULIN LISPRO 5 UNITS: 100 INJECTION, SOLUTION INTRAVENOUS; SUBCUTANEOUS at 09:17

## 2024-10-22 RX ADMIN — INSULIN LISPRO 1 UNITS: 100 INJECTION, SOLUTION INTRAVENOUS; SUBCUTANEOUS at 18:12

## 2024-10-22 RX ADMIN — ACETAMINOPHEN 650 MG: 325 TABLET ORAL at 05:46

## 2024-10-22 ASSESSMENT — COGNITIVE AND FUNCTIONAL STATUS - GENERAL
WALKING IN HOSPITAL ROOM: A LITTLE
DAILY ACTIVITIY SCORE: 24
MOBILITY SCORE: 22
CLIMB 3 TO 5 STEPS WITH RAILING: A LITTLE
MOBILITY SCORE: 24
MOBILITY SCORE: 24
DAILY ACTIVITIY SCORE: 24

## 2024-10-22 ASSESSMENT — PAIN - FUNCTIONAL ASSESSMENT
PAIN_FUNCTIONAL_ASSESSMENT: 0-10
PAIN_FUNCTIONAL_ASSESSMENT: 0-10

## 2024-10-22 ASSESSMENT — ENCOUNTER SYMPTOMS
FREQUENCY: 0
DYSURIA: 0
AGITATION: 0
POLYPHAGIA: 0
CHEST TIGHTNESS: 0
JOINT SWELLING: 0
CONFUSION: 0
COUGH: 0
HEADACHES: 0
SPEECH DIFFICULTY: 0
EYES NEGATIVE: 1
APPETITE CHANGE: 1
FATIGUE: 1
UNEXPECTED WEIGHT CHANGE: 0
NAUSEA: 0
ABDOMINAL PAIN: 0
POLYDIPSIA: 0
SORE THROAT: 0
NUMBNESS: 0
DIAPHORESIS: 0
ACTIVITY CHANGE: 0
SHORTNESS OF BREATH: 0
DIARRHEA: 0

## 2024-10-22 ASSESSMENT — PAIN SCALES - GENERAL
PAINLEVEL_OUTOF10: 0 - NO PAIN
PAINLEVEL_OUTOF10: 5 - MODERATE PAIN

## 2024-10-22 ASSESSMENT — ACTIVITIES OF DAILY LIVING (ADL)
ADL_ASSISTANCE: INDEPENDENT
LACK_OF_TRANSPORTATION: NO

## 2024-10-22 NOTE — PROGRESS NOTES
Physical Therapy    Physical Therapy Evaluation    Patient Name: Leonel Yu  MRN: [id number]  Today's Date: 10/22/2024   Time Calculation  Start Time: 0853  Stop Time: 0902  Time Calculation (min): 9 min    Assessment/Plan   PT Assessment  Barriers to Discharge: none  End of Session Communication: Bedside nurse  Assessment Comment: Pt mobilizing near functional baseline. Pt states he has been up ad alonso and denies need for PT services. Will d/c PT at this time. If status changes, please reconsult PT services.  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% s/p [employer] single chamber ICD (3/2024), CKD3a, HTN, HLD, LUZ (crack cocaine), former tobacco use, COPD (on nocturnal 2L at baseline), DM2, remote left cerebellar hemorrhagic infarct, medication noncompliance, anxiety, and depression  Prior to Session Communication: Bedside nurse  Patient Position Received: Bed, 3 rail up, Alarm off, not on at start of session   Home Living:  Home Living  Type of Home: House  Lives With:  (roommate)  Home Adaptive Equipment: None  Home Layout: One level  Home Access: Stairs to enter with rails  Entrance Stairs-Number of Steps: 5  Prior Level of Function:  Prior Function Per Pt/Caregiver Report  ADL Assistance: Independent  Homemaking Assistance: Independent  Ambulatory Assistance: Independent  Prior Function Comments: denies falls  Precautions:  Precautions  Medical  Precautions: Fall precautions, Cardiac precautions       Objective     Pain:  Pain Assessment  Pain Assessment: 0-10  0-10 (Numeric) Pain Score: 0 - No pain  Cognition:  Cognition  Overall Cognitive Status: Within Functional Limits      Extremity/Trunk Assessments:  Strength:           RLE   RLE : Within Functional Limits  LLE   LLE : Within Functional Limits    General Assessments:            Sensation  Light Touch: No apparent deficits     Static Sitting Balance  Static Sitting-Level of Assistance: Independent  Dynamic Sitting Balance  Dynamic Sitting-Level of Assistance: Independent  Static Standing Balance  Static Standing-Level of Assistance: Independent  Dynamic Standing Balance  Dynamic Standing-Level of Assistance: Distant supervision    Functional Assessments:  Bed Mobility  Bed Mobility: Yes  Bed Mobility 1  Bed Mobility 1: Supine to sitting  Level of Assistance 1: Independent  Transfers  Transfer: Yes  Transfer 1  Technique 1: Sit to stand, Stand to sit  Transfer Level of Assistance 1: Distant supervision  Ambulation/Gait Training  Ambulation/Gait Training Performed: Yes  Ambulation/Gait Training 1  Assistance 1: Close supervision  Quality of Gait 1:  (steady, no LOB)  Comments/Distance (ft) 1: 20 feet in room (pt declined to ambulate in the hallway)          Outcome Measures:  [facility] Basic Mobility  Turning from your back to your side while in a flat bed without using bedrails: None  Moving from lying on your back to sitting on the side of a flat bed without using bedrails: None  Moving to and from bed to chair (including a wheelchair): None  Standing up from a chair using your arms (e.g. wheelchair or bedside chair): None  To walk in hospital room: A little  Climbing 3-5 steps with railing: A little  Basic Mobility - Total Score: 22                                 Education Documentation  Precautions, taught by Radha Frost, PT at 10/22/2024 10:41 AM.  Learner: Patient  Readiness:  Acceptance  Method: Explanation  Response: Verbalizes Understanding  Comment: falls          10/22/24 at 10:41 AM   Radha Frost, PT

## 2024-10-22 NOTE — CONSULTS
Inpatient consult to Palliative Care  Consult performed by: Manolo Ruiz MD  Consult ordered by: Tim Brar MD        Please see full palliative care consultation completed by Vishal MEJIA CNP last 10/18/2024.

## 2024-10-22 NOTE — PROGRESS NOTES
Leonel Yu is a 54 y.o. male on day 4 of admission presenting with No Principal Problem: There is no principal problem currently on the Problem List. Please update the Problem List and refresh..    Subjective   Pt seen and examined at the bedside. Pt is expected to discharge in 2-3 days, pt was informed of plan to observe increased jardiance dose starting tomorrow to evaluate his need for premeal lispro.    Pt endorses eating much more food in the hospital than he has access to at home. We discussed [employer] food pantry program at [facility] - pt agreeable to referral, this may need to be completed by [facility] or new PCP outpt.       I have reviewed histories, allergies and medications have been reviewed and there are no changes     Objective   Review of Systems   Constitutional:  Positive for appetite change and fatigue. Negative for activity change, diaphoresis and unexpected weight change.   HENT: Negative.  Negative for sore throat.    Eyes: Negative.    Respiratory:  Negative for cough, chest tightness and shortness of breath.    Cardiovascular:  Negative for chest pain.   Gastrointestinal:  Negative for abdominal pain, diarrhea and nausea.   Endocrine: Negative for cold intolerance, heat intolerance, polydipsia, polyphagia and polyuria.   Genitourinary:  Negative for dysuria and frequency.   Musculoskeletal:  Negative for gait problem and joint swelling.   Neurological:  Negative for speech difficulty, numbness and headaches.   Psychiatric/Behavioral:  Negative for agitation, behavioral problems and confusion.    All other systems reviewed and are negative.    Physical Exam  Constitutional:       Appearance: Normal appearance. He is normal weight.   HENT:      Head: Normocephalic and atraumatic.      Nose: Nose normal.      Mouth/Throat:      Mouth: Mucous membranes are dry.      Pharynx: Oropharynx is clear.   Eyes:      Extraocular Movements: Extraocular movements intact.      Conjunctiva/sclera:  "Conjunctivae normal.   Pulmonary:      Effort: Pulmonary effort is normal.   Abdominal:      General: Abdomen is flat. Bowel sounds are normal.      Palpations: Abdomen is soft.   Skin:     General: Skin is warm and dry.   Neurological:      General: No focal deficit present.      Mental Status: He is alert and oriented to person, place, and time. Mental status is at baseline.   Psychiatric:         Mood and Affect: Mood normal.         Behavior: Behavior normal.         Thought Content: Thought content normal.         Judgment: Judgment normal.       Last Recorded Vitals  Blood pressure 113/68, pulse 67, temperature 36.2 °C (97.2 °F), temperature source Temporal, resp. rate 20, height 1.753 m (5' 9\"), weight 73.1 kg (161 lb 2.5 oz), SpO2 92%.  Intake/Output last 3 Shifts:  I/O last 3 completed shifts:  In: 1017.4 (13.9 mL/kg) [I.V.:52.4 (0.7 mL/kg); IV Piggyback:965]  Out: 4000 (54.7 mL/kg) [Urine:4000 (1.5 mL/kg/hr)]  Weight: 73.1 kg     Relevant Results  Results from last 7 days   Lab Units 10/22/24  0659 10/22/24  0646 10/21/24  2015 10/21/24  1653 10/21/24  1126 10/21/24  0826 10/21/24  0530 10/20/24  2011 10/20/24  1359 10/20/24  0817 10/20/24  0333 10/19/24  2107 10/19/24  1706   POCT GLUCOSE mg/dL  --  139* 141* 217* 231* 155*  --    < >  --    < >  --    < >  --    GLUCOSE mg/dL 134*  --   --   --   --   --  144*  --  250*  --  219*  --  108*    < > = values in this interval not displayed.     Scheduled medications  acetaminophen, 650 mg, oral, q6h  amiodarone, 200 mg, oral, Daily  aspirin, 81 mg, oral, Daily  atorvastatin, 40 mg, oral, Nightly  empagliflozin, 10 mg, oral, Daily  heparin (porcine), 5,000 Units, subcutaneous, q8h JANEY  insulin glargine, 18 Units, subcutaneous, q24h  insulin lispro, 0-5 Units, subcutaneous, Nightly  insulin lispro, 0-5 Units, subcutaneous, TID AC  insulin lispro, 5 Units, subcutaneous, TID AC  multivitamin with minerals, 1 tablet, oral, Daily  nicotine, 1 patch, transdermal, " Daily   Followed by  [START ON 12/2/2024] nicotine, 1 patch, transdermal, Daily   Followed by  [START ON 12/16/2024] nicotine, 1 patch, transdermal, Daily  pantoprazole, 40 mg, oral, Daily before breakfast  polyethylene glycol, 17 g, oral, Daily  sennosides-docusate sodium, 2 tablet, oral, BID  sertraline, 100 mg, oral, Daily  spironolactone, 12.5 mg, oral, Daily  thiamine, 500 mg, intravenous, TID  tiotropium, 2 puff, inhalation, Daily      Continuous medications       PRN medications  PRN medications: alteplase, dextrose, dextrose, glucagon, glucagon, artificial tears, oxygen, trimethobenzamide      Assessment/Plan   Assessment & Plan    Leonel Yu is a 54 y.o. 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 Patient presents with blurry vision, lethargy, and dizziness.       Glucose in the 300's on arrival to the ED, given 60 mg of prednisone x 1 with glucoses climbing to the 400's. Total daily dose 10/15/24 was 40 units, with 30 units as basal insulin.      Patient interviewed at the bedside in the ED.  He is hungry and eating well. Patient first reports he has only been taking lispro at home, but does not understand the sliding scale.  When pressed, it sounds like he has not been taking much of the lispro at all.      Diabetes History  Type of diabetes: Type 2 diabetes  Year diagnosed or age: [birth year]21  Hospitalizations for DKA or HHS: no  Complications: hyperglycemia  Seen by PCP or Endocrinology: PCP  Frequency of glucose checks: once a day  Glucose review: reports 200-400  Frequency of Hypoglycemia: denies     Home Medications  Basal: 30 units of glargine listed in the chart, but patient has not been taking it  Prandial: none  Correction: lispro scale, but  patient is not taking it  Orals: jardiance 10 mg    10/17 - Severe hypoglycemia likely as steroid effect waned   Glucose 121 before lunch, so insulin doses held. Post lunch glucose > 600 mg/dl  10/18 - hyperglycemia most likely due to ssi order not administered before meals     PLAN  Steroids: 60 mg of prednisone x1 10/15/24  Nutrition: 90 gram carb consistent    - in anticipation of discharge on thurs/Friday, pt's jardiance will be increased to 25mg on wed  - continue glargine  18 units Q24       If NPO: reduce to 10  - continue lispro 5 units with meals plus scale, hold  premeal lispro after dinner today in order to observe lispro requirements while on increased jardiance dose       If NPO: hold  - continue lispro ssi #1 to ACHS   = 0u  151-200 = 1u  201-250 = 2u  251-300 = 3u  301-350 = 4u  351-400 = 5u     -Accuchecks (not BMP) TIDAC and QHS- kindly ensure QHS Accucheck is drawn; it is often missed   - Goal -180  -Hypoglycemia protocol  -Will continue to follow and titrate insulin accordingly     SGLT2i tx may not be utilized in inpt setting unless the following conditions are met. Only HF Cardiology may initiate inpatient SGLT2i use.   1) pt is eating and drinking by mouth with a total daily carb intake exceeding 60g of CHO  2) pt is urinating independently of urinary catheters  3) pt can ambulate freely to the restroom in order to maintain personal hygiene  4) no current concern for UTI/genital or inguinal candidiasis  5) no plans for NPO within the next 48-72hr     Discharge planning:   [] patient may expect to discharge home on glargine and lispro, final doses TBD by titration  Jardiance per heart failure team. It is possible for pt to discharge on jardiance and basal insulin only, depending on his response to jardiance 25mg.   [x]will provide CGM sample prior to discharge, patient is interested in a Joseph 3  [X]will enroll pt in  pharmacy platinum plan program  [x]follow up healthy at home  virtual clinic  [X][employer] food pantry referral - social determinants of health survey may need to be completed by [facility] team    I spent 50 minutes in the professional and overall care of this patient.      KHADIJAH BrowneC

## 2024-10-22 NOTE — PROGRESS NOTES
Occupational Therapy                   Therapy Communication Note    Patient Name: Leonel Yu  MRN: [id number]  Department: [facility]  Room: 5068/5068-A  Today's Date: 10/22/2024     Discipline: Occupational Therapy    Missed Visit Reason: Missed Visit Reason: Other (Comment) (Screen- pt reporting baseline function and able to demonstrate IND with functional mobility, transfers, LB dressing. Pt reports no concerns with home setup/DME/AE. No skilled OT needs at this time.)    Missed Time: Attempt    Comment: SCREEN

## 2024-10-22 NOTE — NURSING NOTE
Mr. Yu is starting to feel better today.  We reviewed the changes to med plan for diabetes.  He is aware that Jardiance will be increased and insulin will be adjusted accordingly.  We reviewed the insulin pen -- he is clear on all steps of using the pen.  Will replace Joseph in am -- he had to have it removed last week for MRI.  He is agreeable to replacing in am.  Time spent:  20 mins.

## 2024-10-22 NOTE — PROGRESS NOTES
10/22/24 1106   Discharge Planning   Living Arrangements Other (Comment)   Support Systems Family members   Assistance Needed none   Type of Residence Private residence   Number of Stairs to Enter Residence 0   Number of Stairs Within Residence 0   Do you have animals or pets at home? No   Type of Animals or Pets 2 dogs   Who is requesting discharge planning? Provider   Home or Post Acute Services None   Expected Discharge Disposition Home   Does the patient need discharge transport arranged? No   RoundTrip coordination needed? No   Has discharge transport been arranged? No   Financial Resource Strain   How hard is it for you to pay for the very basics like food, housing, medical care, and heating? Not very   Housing Stability   In the last 12 months, was there a time when you were not able to pay the mortgage or rent on time? N   In the past 12 months, how many times have you moved where you were living? 1   At any time in the past 12 months, were you homeless or living in a shelter (including now)? N   Transportation Needs   In the past 12 months, has lack of transportation kept you from medical appointments or from getting medications? no   In the past 12 months, has lack of transportation kept you from meetings, work, or from getting things needed for daily living? No   Patient Choice   Provider Choice list and CMS website (https://medicare.gov/care-compare#search) for post-acute Quality and Resource Measure Data were provided and reviewed with: Patient   Patient / Family choosing to utilize agency / facility established prior to hospitalization No     Transitional Care Coordination Progress Note:  Patient discussed during interdisciplinary rounds.   Team members present: SYD LIU MD   Plan per Medical/Surgical team: Monitoring BG  Payor: [facility]   Discharge disposition: Home   Potential Barriers: None   ADOD: 10-      Previous Home Care: None   DME: None   Pharmacy:   [facility]  Falls: None   Dialysis: None

## 2024-10-22 NOTE — PROGRESS NOTES
"Palliative Medicine following for:  Complex medical decision making, symptom management, patient/family support    History obtained from chart review including ED note, H&P, patient's daily progress notes, review of lab/test results, and discussion with primary team and bedside RN.    Subjective    History of Present Illness  Denies any further lightheadedness.   Appetite improving.   Denies any pain, sob, nausea, constipation, anxiety or depression.       Objective    Last Recorded Vitals  /64   Pulse 78   Temp 36.4 °C (97.5 °F)   Resp 20   Ht 1.753 m (5' 9\")   Wt 73.1 kg (161 lb 2.5 oz)   SpO2 97%   BMI 23.80 kg/m²      Physical Exam  Constitutional:       Appearance: Normal appearance.   HENT:      Head: Normocephalic and atraumatic.   Eyes:      Conjunctiva/sclera: Conjunctivae normal.   Cardiovascular:      Rate and Rhythm: Normal rate and regular rhythm.   Pulmonary:      Effort: Pulmonary effort is normal.      Breath sounds: Normal breath sounds.   Musculoskeletal:         General: Normal range of motion.   Skin:     General: Skin is warm and dry.   Neurological:      Mental Status: He is alert and oriented to person, place, and time.          Relevant Results   Results for orders placed or performed during the hospital encounter of 10/16/24 (from the past 24 hours)   POCT GLUCOSE   Result Value Ref Range    POCT Glucose 217 (H) 74 - 99 mg/dL   Vancomycin   Result Value Ref Range    Vancomycin 10.7 5.0 - 20.0 ug/mL   POCT GLUCOSE   Result Value Ref Range    POCT Glucose 141 (H) 74 - 99 mg/dL   POCT GLUCOSE   Result Value Ref Range    POCT Glucose 139 (H) 74 - 99 mg/dL   CBC and Auto Differential   Result Value Ref Range    WBC 5.7 4.4 - 11.3 x10*3/uL    nRBC 0.0 0.0 - 0.0 /100 WBCs    RBC 5.10 4.50 - 5.90 x10*6/uL    Hemoglobin 15.9 13.5 - 17.5 g/dL    Hematocrit 47.6 41.0 - 52.0 %    MCV 93 80 - 100 fL    MCH 31.2 26.0 - 34.0 pg    MCHC 33.4 32.0 - 36.0 g/dL    RDW 15.3 (H) 11.5 - 14.5 %    " Platelets 90 (L) 150 - 450 x10*3/uL    Neutrophils % 81.7 40.0 - 80.0 %    Immature Granulocytes %, Automated 0.4 0.0 - 0.9 %    Lymphocytes % 9.8 13.0 - 44.0 %    Monocytes % 7.5 2.0 - 10.0 %    Eosinophils % 0.4 0.0 - 6.0 %    Basophils % 0.2 0.0 - 2.0 %    Neutrophils Absolute 4.67 1.20 - 7.70 x10*3/uL    Immature Granulocytes Absolute, Automated 0.02 0.00 - 0.70 x10*3/uL    Lymphocytes Absolute 0.56 (L) 1.20 - 4.80 x10*3/uL    Monocytes Absolute 0.43 0.10 - 1.00 x10*3/uL    Eosinophils Absolute 0.02 0.00 - 0.70 x10*3/uL    Basophils Absolute 0.01 0.00 - 0.10 x10*3/uL   Magnesium   Result Value Ref Range    Magnesium 2.14 1.60 - 2.40 mg/dL   Renal Function Panel   Result Value Ref Range    Glucose 134 (H) 74 - 99 mg/dL    Sodium 134 (L) 136 - 145 mmol/L    Potassium 4.6 3.5 - 5.3 mmol/L    Chloride 100 98 - 107 mmol/L    Bicarbonate 29 21 - 32 mmol/L    Anion Gap 10 10 - 20 mmol/L    Urea Nitrogen 26 (H) 6 - 23 mg/dL    Creatinine 1.43 (H) 0.50 - 1.30 mg/dL    eGFR 58 (L) >60 mL/min/1.73m*2    Calcium 8.5 (L) 8.6 - 10.6 mg/dL    Phosphorus 2.0 (L) 2.5 - 4.9 mg/dL    Albumin 3.2 (L) 3.4 - 5.0 g/dL   POCT GLUCOSE   Result Value Ref Range    POCT Glucose 143 (H) 74 - 99 mg/dL   POCT GLUCOSE   Result Value Ref Range    POCT Glucose 190 (H) 74 - 99 mg/dL        Allergies  Patient has no known allergies.  Medications  Scheduled medications  acetaminophen, 650 mg, oral, q6h  amiodarone, 200 mg, oral, Daily  [Held by provider] aspirin, 81 mg, oral, Daily  atorvastatin, 40 mg, oral, Nightly  [START ON 10/23/2024] empagliflozin, 25 mg, oral, Daily  heparin (porcine), 5,000 Units, subcutaneous, q8h JANEY  insulin glargine, 18 Units, subcutaneous, q24h  insulin lispro, 0-5 Units, subcutaneous, Nightly  insulin lispro, 0-5 Units, subcutaneous, TID AC  insulin lispro, 5 Units, subcutaneous, TID AC  multivitamin with minerals, 1 tablet, oral, Daily  nicotine, 1 patch, transdermal, Daily   Followed by  [START ON 12/2/2024]  nicotine, 1 patch, transdermal, Daily   Followed by  [START ON 12/16/2024] nicotine, 1 patch, transdermal, Daily  pantoprazole, 40 mg, oral, Daily before breakfast  polyethylene glycol, 17 g, oral, Daily  sennosides-docusate sodium, 2 tablet, oral, BID  sertraline, 100 mg, oral, Daily  spironolactone, 12.5 mg, oral, Daily  thiamine, 500 mg, intravenous, TID  tiotropium, 2 puff, inhalation, Daily      Continuous medications     PRN medications  PRN medications: alteplase, dextrose, dextrose, glucagon, glucagon, lactulose, artificial tears, oxygen, trimethobenzamide     Assessment/Plan      Mr. Leonel Yu is a 55 yo  male with HFrEF 5-10% s/p [employer] single chamber ICD (3/2024), CKD3a, HTN, HLD, LUZ (crack cocaine), former tobacco use, COPD (on nocturnal 2L at baseline), DM2, remote left cerebellar hemorrhagic infarct, medication non-adherence (reason- was unable to explore yet), anxiety, and major depression disorder who presented with ADHF; and is not a candidate for advanced therapies.      #Complex Medical Decision Making   #Goals of Care  #Advance Care Planning   - Code status: DNR/DNI per our conversation today with attending, Dr. Brar: CPR and intubation would not be beneficial. I shared with him the news that we do not think CPR/intubation would be medically effective and he was agreeable with DNR/DNI.   - Goals are mix of survival and time and improved quality of life     Lightheadedness- improved. Attempting to try low dose meds for GDMT.       Constipation - improved  Already on pericolace and miralax     #Psychosocial Support -refused previously. Will order for music and art therapy to see if he is open to it when they come in.     DNR/DNI.   Described hospice without naming it. [facility] ([facility]) stopped by later but wasn't interested.       Thank you for allowing us to care for this patient. Palliative Team will continue to follow as needed. Please contact team with any  questions or concerns.   Team pager [phone]     Laura Cook MD [facility]  Palliative Medicine Physician  [email or web address]

## 2024-10-22 NOTE — PROGRESS NOTES
Leonel Yu is a 54 y.o. male on day 3 of admission presenting with No Principal Problem: There is no principal problem currently on the Problem List. Please update the Problem List and refresh..      Subjective   Patient is a 55 yo male w/ HFrEF (EF 10% 7/24) 2/2 chronic cocaine use c/b nischemic cardiomyopathy s/p single chamber ICD (3/24), CKD, COPD (2L O2 at night), TII DM, medication non adherence, initially admitted for altered mental status and hypotension, transferred to the HFICU after Code blue in MRI on 10/18 for undetectable blood pressure and treated for shock w/ partial cardiogenic component, now transferred back to the floor.     Hospital course per HFICU note 10/21:  54-year-old male with end-stage heart failure both ischemic and nonischemic in the setting of chronic persistent substance use (crack cocaine), hemorrhagic cerebellar infarct in the past, noncompliance, multiple admissions for heart failure, again admitted after recent AGAINST MEDICAL ADVICE discharge.  This admission was treated for recurrent hypotension, headache, blurred vision, and altered metal status with unclear origin.  Pt was treated for heart failure, but also possible CNS infection.  However, became more hypotensive and was transferred to heart failure ICU after rapid call.      In the ICU, pt was treated with IV hydration and required intermittent pressors with levo gtt and dobutamine gtt. The above symptoms has Improved and pt is off levo and dobutamine gtt now. Unfortunately, prognosis is very poor. He is not a candidate for any advanced therapies related to ongoing substance use, noncompliance, psychiatric concerns as well as possible infection. Case discussed with palliative care and pt made unilateral DNR per discussion with HFICU attending and palliative attending.      Pt was also started on broad spectrum Abx and acyclovir, CTX, and van for CNS coverage. LP was initially recommended however discussed with pt today  and he completely refused. In addition, pt's clinical symptoms have improved tramatically.  No need for empiric antibiotics per neurologist. Psy continue to follow up and they feel that pt does not meet criteria for inpatient psychiatric admission for now.        Patient examined in HFICU and states that he is doing well. Denies chest pain or shortness of breath. He states that he no longer has blurry vision.        Objective     Last Recorded Vitals  /62   Pulse 78   Temp 36 °C (96.8 °F) (Temporal)   Resp 16   Wt 69 kg (152 lb 1.9 oz)   SpO2 93%   Intake/Output last 3 Shifts:    Intake/Output Summary (Last 24 hours) at 10/21/2024 2054  Last data filed at 10/21/2024 1830  Gross per 24 hour   Intake 391.04 ml   Output 2750 ml   Net -2358.96 ml       Admission Weight  Weight: 77.1 kg (170 lb) (10/16/24 1929)    Daily Weight  10/20/24 : 69 kg (152 lb 1.9 oz)    Image Results  Electrocardiogram, 12-lead PRN ACS symptoms  Sinus bradycardia  Left axis deviation  Left ventricular hypertrophy with QRS widening and repolarization abnormality  Inferior infarct (cited on or before 16-OCT-2024)  Abnormal ECG  When compared with ECG of 19-OCT-2024 22:33,  Serial changes of evolving Inferior infarct Present  Confirmed by James Shah ([id number]) on 10/21/2024 11:00:12 AM      Physical Exam    Constitutional:       Appearance: Normal appearance.   HENT:      Head: Normocephalic.      Nose: Nose normal.   Eyes:      Extraocular Movements: Extraocular movements intact.      Pupils: Pupils are equal, round, and reactive to light.     Cardiovascular:      Heart sounds: Murmur (holosystolic) heard. ICD able ot be palpated through chest, c/d/I.   Pulmonary:      Breath sounds: Normal breath sounds.   Abdominal:      General: Bowel sounds are normal.      Palpations: Abdomen is soft.   Musculoskeletal:         General: Normal range of motion.      Cervical back: Normal range of motion.   Skin:     General: Skin is cool and dry.       Capillary Refill: Capillary refill takes less than 2 seconds.   Neurological:      Mental Status: He is easily aroused.   Psychiatric:         Attention and Perception: Attention and perception normal.         Behavior: Behavior is cooperative.     Relevant Results               Assessment/Plan        Leonel Yu is a 54 y.o. male with PMH of mixed ischemic and non-ischemic HFrEF 5-10% s/p [employer] single chamber ICD (3/2024), CKD3a, HTN, HLD, LUZ (crack cocaine), former tobacco use, COPD (on nocturnal 2L at baseline), DM2, remote left cerebellar hemorrhagic infarct, medication noncompliance, anxiety, and depression. Patient presents with blurry vision, lethargy, and dizziness, admitted to HVI service on 10/15 for GDMT optimization with neurology following for AMS. Transferred to HFICU on 10/19 for shock of mixed component, now off inotropes and pressors. Currently HDS and stbale on room air.       # Remote cerebellar Hemorrhagic infarct   # Anxiety. Depression, Acute pain   # Mood disorder   -  Per Psych (10/19): Patient lacks the capacity to leave AMA at this time and thus cannot leave AMA. Call CODE VIOLET if patient attempts to leave AMA. Patient will need involuntary issued at that time.   - Psych consulted, appreciate recommendations. Per psych, no longer requires 1:1 sitter  - Per psych (10/20) CURRENT DOES NOT MEET CRITERIA FOR INPATIENT PSYCHIATRIC ADMISSION  - Per psych (10/20) GIVEN ACUTE ILLNESS, IF PATIENT ATTEMPTS TO LEAVE AMA, WILL NEED TO HAVE CAPACITY ASSESSMENT DONE AT THAT TIME  - c/w sertraline 100mg po daily  - Palliative following  - Serial neuro and pain assessments   - PO Tylenol juanita q 6hrs for pain        # Acute on set of Headache and blurred vision on admission-->RESOLVING  - Hx of weekly cocaine use  : : CT head 10/17: Negative   : :  MRI brain (10/19): no evidence of hemorrhage or mass effect. Remote left cerebellar infarct.   - Neurology consulted 10/19 for concern of CNS  infection.   - Per neurology: No need CNS coverage antibiotics (10/19 -10/21): IV Ceftriaxone and IV Vancomycin and renally doses IV acyclovir stopped  - Per neurology:  IV thiamine 500mg TID for 3 days followed by 500mg Q daily   - Pt declined LP  - Blood Cx 10/16, 15, 19 no growth till date     #Vision Disturbance vs chronic near sightedness   - ophtho was consulted, recs appreciated, suspect vision likely 2/2 uncorrected refractive errors  - C/w artifical tears QID  [ ] outpatient optho follow up on discharge        #Substance  use  # Hx of daily cocaine use----> now weekly cocaine use   -Alcohol abuse/Alcohol dependence: NO   -Tobacco use: 3 cigarettes per day, smoke crack---> Nicotine patch  (10/21)  - Cocaine abuse: last taken: 10/12  - 10/17 UDS: cocaine +   - Closely monitoring withdrawal symptoms        # Acute on chronic decompensate heart failure, HFrEF, LV EF 10-15%, mixed ischemic/non-ischemic CM   - TTE (10/17/24): LVEF 10-15%, LVIDd 7.23 cm, mildly reduced right ventricular systolic function. RVSP decreased from 39 mmHg to 26 mmHg. Right ventricular systolic pressure is within normal limits  - cMRI 8/24: transmural infarcts and fibrosis   - CT chest: coronary calcifcation   - admit BNP (10/16): 479  - ASA/Statin  - Hold Home Entresto, restart based on hemodynamic tolerance  - Hold home dose: BB, MRA, restart based on hemodynamic tolerance  - Potential Empagliflozin to 25mg for glucose-lowering benefit once out of shock stage  - continuing to hold now as pt was just on pressors  - Hold Diuretics   - Daily standing weights, 2gm sodium diet, 2L fluid restriction, strict I&Os     # CAD   - C/w ASA, lipitor  - No C recorded        # Hx of VT/VF  # Hx of AF RVR  -Device: [employer] single chamber ICD (3/2024),   - No shock since last years per pateint  - ICD interrogated (9/30/24): There are 4 sustained VT episodes since 9/1/24 requiring ATP and ATP/shock for arrhythmia termination and restoration  of SR, last shock were recorded from 9/5/2024 which were appropriate for VT. : <1%.    -C/w Amio 200 mg daily   - ICD currently w/ tachy therapy, discuss in am regarding tachy therapy given unilateral DNR     #Electrolyte Disturbances  - Keep K>4, Mag >2     # Acute Hypercapnic Respiratory Failure   # Hx of COPD , on nocturnal 2L at baseline  - Continue with 2 L at night    #Hx of tobacco use  - Continue with nicotine patch     # Hyperemesis, possible due to low cardiac output   - refractory to zofran  - Bowel regimen  - PPI     #GUICHO/CKD, stage 3a in the setting of cardiogenic shock, low output stage  -Baseline BUN/Cr: 1.2-1.5   -Admit BUN/Cr (10/19): 62/2.58 --> 67 / 3.08 (10/20)  -MALCOLM: b/l echogenic kidney likely 2/2 medical renal disease   -I/Os  -avoid hypotension and nephrotoxic agents     #Anemia in the setting of chronic disease  - Labs: (10/19) TIBC, ferritin, serum Fe: 283, 242, % sat 14wnl  - B12 (10/17): 584    - Folate (10/19): >24    - IV feraheme 510mg x 1.   - Currently hemoconcentrated on cbcs     #DM  - hgbA1c (10/15): 9.9    - SSI, Lantus 18     #Thyroid  -TSH (10/17): 5.71     F: cautious w/ EF 10%  E: K > 4, Mg > 2  N: CCD, cardiac, 2-3 g sodium  DNR     Palma Hernadez  Med-Peds PGY4         Palma Hernadez MD

## 2024-10-22 NOTE — PROGRESS NOTES
HVI Attending Shared Visit Note    This is a shared visit. Please see Advanced Practice Provider's encounter note for additional details.        Overnight events/Subjective: somewhat drowsy, feeling ok, not short of breath    Exam:   Physical exam: drowsy, no distress. Normal rate, regular rhythm, non labored breathing, clear to auscultation, abdomen non distended, minimal LE edema, no focal neuro deficits.     A/P: 54 M with non ischemic HFrEF s/p primary prevention [employer] ICD (3/2024), CKD, polysubstance use, COPD, DM and challenges with medication adherence who presented with blurry vision, lethargy and dizziness. Course notable for acute decompensated HF and cardiogenic shock.   #Acute decompensated HF: required pressors/inotropes in the HFICU. Is now improved but BP is low. Will restart empagliflozin and spironolactone. Other meds on hold due to hypotension  #AMS: possibly from low flow state and drug use. Refused neuro request for lumbar puncture to assess for possible CNS infection.   #PSUD: ongoing crack cocaine use. Should offer [facility]rive consult.     Dispo: pending tolerance of GDMT, has outpatient HF follow up set up.     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    Altered mental status      Objective   Admit Date: 10/16/2024  Hospital Length of Stay: 4   Home: [facility] [id number]    Vitals:      10/22/2024    12:02 PM 10/22/2024     9:33 AM 10/22/2024     7:26 AM 10/22/2024     4:43 AM 10/21/2024    10:11 PM 10/21/2024     9:00 PM 10/21/2024     8:31 PM   Vitals   Systolic 116  113 97 100 99 100   Diastolic 74  68 62 69 66 62   Heart Rate 77  67 71 70 69 78   Temp 36.4 °C (97.5 °F) --  36.2 °C (97.2 °F) 36.7 °C (98.1 °F) 36.5 °C (97.7 °F)     Resp  20  20 19 20 19 16   Weight (lb)    161.16 161.82     BMI    23.8 kg/m2 23.9 kg/m2     BSA (m2)    1.89 m2 1.89 m2         Significant value     Wt Readings from Last 5 Encounters:   10/22/24 73.1 kg (161 lb 2.5 oz)   10/15/24 77.1 kg (170 lb)   10/05/24 74.4 kg (164 lb 0.4 oz)   09/08/24 79.4 kg (175 lb 0.7 oz)   08/06/24 73.7 kg (162 lb 7.7 oz)       Intake/Output Summary (Last 24 hours) at 10/22/2024 1356  Last data filed at 10/22/2024 1202  Gross per 24 hour   Intake 915 ml   Output 2050 ml   Net -1135 ml         MEDICATIONS  Infusions:     Scheduled:  acetaminophen, 650 mg, q6h  amiodarone, 200 mg, Daily  [Held by provider] aspirin, 81 mg, Daily  atorvastatin, 40 mg, Nightly  [START ON 10/23/2024] empagliflozin, 25 mg, Daily  heparin (porcine), 5,000 Units, q8h JANEY  insulin glargine, 18 Units, q24h  insulin lispro, 0-5 Units, Nightly  insulin lispro, 0-5 Units, TID AC  insulin lispro, 5 Units, TID AC  multivitamin with minerals, 1 tablet, Daily  nicotine, 1 patch, Daily   Followed by  [START ON 12/2/2024] nicotine, 1 patch, Daily   Followed by  [START ON 12/16/2024] nicotine, 1 patch, Daily  pantoprazole, 40 mg, Daily before breakfast  polyethylene glycol, 17 g, Daily  sennosides-docusate sodium, 2 tablet, BID  sertraline, 100 mg, Daily  spironolactone, 12.5 mg, Daily  thiamine, 500 mg, TID  tiotropium, 2 puff, Daily      PRN:  alteplase, 2 mg, PRN  dextrose, 12.5 g, q15 min PRN  dextrose, 25 g, q15 min PRN  glucagon, 1 mg, q15 min PRN  glucagon, 1 mg, q15 min PRN  lactulose, 30 g, BID PRN  artificial tears, 2 drop, 4x daily PRN  oxygen, , Continuous PRN - O2/gases  trimethobenzamide, 200 mg, q6h PRN      Prior to Admission Meds:  Medications Prior to Admission   Medication Sig Dispense Refill Last Dose/Taking    amiodarone (Pacerone) 200 mg tablet Take 1 tablet (200 mg) by mouth once daily. 30 tablet 0 Unknown    aspirin 81 mg chewable tablet Chew 1 tablet (81 mg) once daily. 30 tablet 5 Unknown     "atorvastatin (Lipitor) 80 mg tablet Take 1 tablet (80 mg) by mouth once daily at bedtime. 30 tablet 5 Unknown    blood sugar diagnostic strip Use as directed to test blood glucose up to four times daily and as needed. 200 each 5 Unknown    blood-glucose meter misc Inject 1 each under the skin 4 times a day with meals. Check blood glucose 4 times daily, before meals and at bedtime. 1 each 0 Unknown    empagliflozin (Jardiance) 10 mg TAKE 1 TABLET BY MOUTH ONCE DAILY 30 tablet 5 Unknown    glucose 4 gram chewable tablet Chew 2 tablets (8 g) if needed for low blood sugar - see comments. 50 tablet 12 Unknown    insulin glargine (Lantus) 100 unit/mL (3 mL) pen Inject 30 Units under the skin once daily at bedtime. 15 mL 5 Unknown    insulin lispro (HumaLOG) 100 unit/mL injection Inject 0-10 Units under the skin 3 times daily (morning, midday, late afternoon). Hypoglycemia protocol if Blood Glucose is between 0 - 70 mg/dL, 0 unit(s) if , 2 unit(s) if 151-200, 4 unit(s) if 201-250, 6 unit(s) if 251-300, 8 unit(s) if 301-350, 10 unit(s) if 351-400. Notify provider unit(s) if Blood Glucose is greater than 400 mg/dL 15 mL 5 Unknown    sertraline (Zoloft) 100 mg tablet Take 1 tablet (100 mg) by mouth once daily. 30 tablet 5 Unknown    carvedilol (Coreg) 3.125 mg tablet Take 1 tablet (3.125 mg) by mouth 2 times a day. 60 tablet 0 Unknown    lancets 33 gauge misc Inject 1 each under the skin 4 times a day. 100 each 5 Unknown    pen needle 1/2\" 29G X 12mm needle Use to inject 1-4 times daily as directed. 100 each 5 Unknown    sacubitriL-valsartan (Entresto) 24-26 mg tablet Take 1 tablet by mouth 2 times a day. 60 tablet 3 Unknown    spironolactone (Aldactone) 25 mg tablet Take 0.5 tablets (12.5 mg) by mouth once daily. 15 tablet 5 Unknown    tiotropium (Spiriva Respimat) 2.5 mcg/actuation inhaler Inhale 2 puffs once daily. 4 g 2 Unknown    torsemide (Demadex) 20 mg tablet Take 2 tablets (40 mg) by mouth once daily. 60 tablet " 3 Unknown       DATA:  CMP:  Recent Labs     10/22/24  0659 10/21/24  0530 10/20/24  1359 10/20/24  0333 10/19/24  1706 10/19/24  0711 10/18/24  0744 10/17/24  1934 10/17/24  0411 10/16/24  2238 10/16/24  1650   * 134* 131* 131* 134* 127* 134* 132* 134*   < > 131*   K 4.6 3.8 4.1 4.5 4.6 4.1 3.6 4.0 3.5   < > 4.5    100 93* 91* 93* 93* 97* 94* 97*   < > 97*   CO2 29 28 27 27 23 21 25 25 25   < > 20*   ANIONGAP 10 10 15 18 23* 17 16 17 16   < > 19   BUN 26* 41* 66* 67* 62* 58* 41* 42* 45*   < > 44*   CREATININE 1.43* 1.64* 2.62* 3.08* 2.58* 2.10* 1.64* 1.99* 2.10*   < > 2.41*   EGFR 58* 49* 28* 23* 29* 36* 49* 39* 36*   < > 31*   MG 2.14 2.08  --  2.25 2.35 2.26 2.00  --  2.31  --  2.73*    < > = values in this interval not displayed.     Recent Labs     10/22/24  0659 10/21/24  0530 10/20/24  1359 10/20/24  0333 10/19/24  1706 10/19/24  0711 10/18/24  0744 10/17/24  1934 10/17/24  0411 10/16/24  2238 10/15/24  1742 10/15/24  1032 10/01/24  1539 09/30/24 1701 09/08/24  0431 09/07/24  0949   ALBUMIN 3.2* 3.0* 3.1* 3.8 4.1 3.8 3.7 3.9 3.5 4.2   < > 4.5   < > 3.8   < > 3.8   ALT  --   --   --  22  --  24 21  --  29 36  --  50  --  33  --  70*   AST  --   --   --  16  --  21 15  --  15 19  --  42*  --  25  --  40*   BILITOT  --   --   --  0.6  --  0.9 0.8  --  0.5 0.5  --  1.2  --  1.0  --  1.0    < > = values in this interval not displayed.     CBC:  Recent Labs     10/22/24  0659 10/21/24  0530 10/20/24  0333 10/19/24  1706 10/19/24  0711 10/18/24  0744 10/17/24  1934 10/17/24  0411   WBC 5.7 7.7 7.4 11.1 11.0 9.4 10.7 12.8*   HGB 15.9 17.1 19.9* 21.2* 19.6* 19.0* 18.5* 18.8*   HCT 47.6 51.2 57.5* 63.3* 58.3* 55.1* 56.0* 56.6*   PLT 90* 97* 112* 132* 154 152 167 154   MCV 93 91 89 91 91 88 91 93     COAG:   Recent Labs     10/19/24  1930 09/30/24  1704 09/07/24  0949 12/10/23  0255 12/09/23  2057 10/23/23  1851 07/24/23  1330 03/02/23  0225   INR 1.2* 1.2* 1.2*  --  1.2* 1.3* 1.2* 1.3*   HAUF  --   --   --   0.1  --   --   --   --      ABO:   Recent Labs     09/30/24  1704   ABO A     HEME/ENDO:   Recent Labs     10/20/24  0333 10/17/24  1542 10/17/24  0027 10/15/24  1742 09/07/24  1016 07/30/24  0815 07/30/24  0731 06/20/24  0749 01/09/24  0653   FERRITIN 242  --   --   --   --   --   --   --   --    IRONSAT 14*  --   --   --   --   --   --   --   --    TSH  --  1.66 5.71*  --  2.68 2.65  --   --   --    FREET4  --   --  1.18  --   --   --   --   --   --    HGBA1C  --   --   --  9.9*  --   --  8.4* 6.9* 10.1*     CARDIAC:   Recent Labs     10/21/24  0530 10/20/24  0333 10/17/24  0027 10/16/24  2238 10/15/24  1147 10/15/24  1032 10/01/24  1033 09/30/24  2025 09/30/24  1844 09/30/24  1701 09/07/24  0128 09/07/24  0006 07/31/24  1851 07/30/24  0815 07/30/24  0731 02/16/24  0533 02/15/24  0656 02/12/24  1239 12/27/23  0759   CKMB  --   --   --   --   --   --   --   --   --   --   --   --   --   --   --   --   --   --  1.6   TROPHS 88* 100* 52 69* 50 70* 54* 67*   < >  --    < > 75*  --    < > 125*  --   --    < >  --    BNP  --   --   --  479*  --  2,833*  --   --   --  >5,000*  --  2,585* 1,014*  --  3,240* 2,900* 3,752*   < >  --     < > = values in this interval not displayed.     Recent Labs     09/07/24  0949 07/25/23  0820 06/18/23  0627   CHOL 127 156 168   LDLF  --  97 103*   LDLCALC 72  --   --    HDL 35.4 40.5 35.6*   TRIG 98 92 146     TOX:  Recent Labs     10/20/24  1001 10/17/24  0511 10/15/24  2238   AMPHETAMINE Presumptive Negative Presumptive Negative Presumptive Negative   BENZO Presumptive Negative Presumptive Negative Presumptive Negative   CANNABINOID Presumptive Negative Presumptive Negative Presumptive Negative   COCAI Presumptive Positive* Presumptive Positive* Presumptive Positive*   FENTANYL Presumptive Negative Presumptive Negative Presumptive Negative   OPIATE Presumptive Positive* Presumptive Negative Presumptive Negative   OXYCODONE Presumptive Negative Presumptive Negative Presumptive  Negative   PCP Presumptive Negative Presumptive Negative Presumptive Negative     MICRO:   Recent Labs     10/17/24  1000 10/17/24  0542 03/02/23  1112 03/02/23  0225   CRP 0.21  --   --  1.54*   PROCAL 0.27* 0.31* 1.55* 1.15*     No results found for the last 90 days.      FOLLOWUP:   Future Appointments   Date Time Provider Department Center   11/5/2024  9:00 AM Mayco Skinner MD [id number] [facility]

## 2024-10-22 NOTE — PROGRESS NOTES
Subjective Data:  Patient seen and assessed this morning, lying in bed, NAD. Denies any CP or SOB, dizziness or lightheadedness. Updated on plan of care, agreeable to plan.     Overnight Events:    Transferred to LT5, otherwise No acute events overnight.    Today's Plan/Updates:   - resume Empagliflozin at 10 mg daily and Eldon at 12.5 mg daily  - per Endo, increase Jardiance to 25 mg tomorrow, hold prandial insulin after dinner tonight     Objective Data:  Last Recorded Vitals:  Vitals:    10/21/24 2100 10/21/24 2211 10/22/24 0443 10/22/24 0726   BP: 99/66 100/69 97/62 113/68   BP Location:  Right arm Right arm Left arm   Patient Position:  Lying Lying Lying   Pulse: 69 70 71 67   Resp: 19 20 19 20   Temp:  36.5 °C (97.7 °F) 36.7 °C (98.1 °F) 36.2 °C (97.2 °F)   TempSrc:  Temporal Temporal Temporal   SpO2: 93% 92% 95% 92%   Weight:  73.4 kg (161 lb 13.1 oz) 73.1 kg (161 lb 2.5 oz)    Height:           Last Labs:  Results for orders placed or performed during the hospital encounter of 10/16/24 (from the past 24 hours)   POCT GLUCOSE   Result Value Ref Range    POCT Glucose 217 (H) 74 - 99 mg/dL   Vancomycin   Result Value Ref Range    Vancomycin 10.7 5.0 - 20.0 ug/mL   POCT GLUCOSE   Result Value Ref Range    POCT Glucose 141 (H) 74 - 99 mg/dL   POCT GLUCOSE   Result Value Ref Range    POCT Glucose 139 (H) 74 - 99 mg/dL   CBC and Auto Differential   Result Value Ref Range    WBC 5.7 4.4 - 11.3 x10*3/uL    nRBC 0.0 0.0 - 0.0 /100 WBCs    RBC 5.10 4.50 - 5.90 x10*6/uL    Hemoglobin 15.9 13.5 - 17.5 g/dL    Hematocrit 47.6 41.0 - 52.0 %    MCV 93 80 - 100 fL    MCH 31.2 26.0 - 34.0 pg    MCHC 33.4 32.0 - 36.0 g/dL    RDW 15.3 (H) 11.5 - 14.5 %    Platelets 90 (L) 150 - 450 x10*3/uL    Neutrophils % 81.7 40.0 - 80.0 %    Immature Granulocytes %, Automated 0.4 0.0 - 0.9 %    Lymphocytes % 9.8 13.0 - 44.0 %    Monocytes % 7.5 2.0 - 10.0 %    Eosinophils % 0.4 0.0 - 6.0 %    Basophils % 0.2 0.0 - 2.0 %    Neutrophils  Absolute 4.67 1.20 - 7.70 x10*3/uL    Immature Granulocytes Absolute, Automated 0.02 0.00 - 0.70 x10*3/uL    Lymphocytes Absolute 0.56 (L) 1.20 - 4.80 x10*3/uL    Monocytes Absolute 0.43 0.10 - 1.00 x10*3/uL    Eosinophils Absolute 0.02 0.00 - 0.70 x10*3/uL    Basophils Absolute 0.01 0.00 - 0.10 x10*3/uL   Magnesium   Result Value Ref Range    Magnesium 2.14 1.60 - 2.40 mg/dL   Renal Function Panel   Result Value Ref Range    Glucose 134 (H) 74 - 99 mg/dL    Sodium 134 (L) 136 - 145 mmol/L    Potassium 4.6 3.5 - 5.3 mmol/L    Chloride 100 98 - 107 mmol/L    Bicarbonate 29 21 - 32 mmol/L    Anion Gap 10 10 - 20 mmol/L    Urea Nitrogen 26 (H) 6 - 23 mg/dL    Creatinine 1.43 (H) 0.50 - 1.30 mg/dL    eGFR 58 (L) >60 mL/min/1.73m*2    Calcium 8.5 (L) 8.6 - 10.6 mg/dL    Phosphorus 2.0 (L) 2.5 - 4.9 mg/dL    Albumin 3.2 (L) 3.4 - 5.0 g/dL        TROPHS   Date/Time Value Ref Range Status   10/21/2024 05:30 AM 88 0 - 53 ng/L Final   10/20/2024 03:33  0 - 53 ng/L Final   10/17/2024 12:27 AM 52 0 - 53 ng/L Final   02/12/2024 09:47  0 - 53 ng/L Final     Comment:     Previous result verified on 2/12/2024 1836 on specimen/case [id number] called with component TRPHS for procedure Troponin I, High Sensitivity with value 130 ng/L.   02/12/2024 06:11  0 - 53 ng/L Final     Comment:     Previous result verified on 2/12/2024 1836 on specimen/case [id number] called with component TRPHS for procedure Troponin I, High Sensitivity with value 130 ng/L.   02/12/2024 12:39  0 - 53 ng/L Final     BNP   Date/Time Value Ref Range Status   10/16/2024 10:38  0 - 99 pg/mL Final   10/15/2024 10:32 AM 2,833 0 - 99 pg/mL Final     HGBA1C   Date/Time Value Ref Range Status   10/15/2024 05:42 PM 9.9 See comment % Final   07/30/2024 07:31 AM 8.4 see below % Final     LDLCALC   Date/Time Value Ref Range Status   09/07/2024 09:49 AM 72 <=99 mg/dL Final     Comment:                                 Near    Borderline      AGE      Desirable  Optimal    High     High     Very High     0-19 Y     0 - 109     ---    110-129   >/= 130     ----    20-24 Y     0 - 119     ---    120-159   >/= 160     ----      >24 Y     0 -  99   100-129  130-159   160-189     >/=190       VLDL   Date/Time Value Ref Range Status   09/07/2024 09:49 AM 20 0 - 40 mg/dL Final   07/25/2023 08:20 AM 18 0 - 40 mg/dL Final   06/18/2023 06:27 AM 29 0 - 40 mg/dL Final      Last I/O:  I/O last 3 completed shifts:  In: 1017.4 (13.9 mL/kg) [I.V.:52.4 (0.7 mL/kg); IV Piggyback:965]  Out: 4000 (54.7 mL/kg) [Urine:4000 (1.5 mL/kg/hr)]  Weight: 73.1 kg     Past Cardiology Tests (Last 3 Years):  EKG:  Electrocardiogram, 12-lead PRN ACS symptoms 10/21/2024  Sinus bradycardia  Left axis deviation  Left ventricular hypertrophy with QRS widening and repolarization abnormality  Inferior infarct (cited on or before 16-OCT-2024)  Abnormal ECG  When compared with ECG of 19-OCT-2024 22:33,  Serial changes of evolving Inferior infarct Present  Confirmed by James Shah ([id number]) on 10/21/2024 11:00:12 AM      Electrocardiogram, 12-lead PRN ACS symptoms 10/19/2024 (Preliminary)  Normal sinus rhythm  Biatrial enlargement  Left axis deviation  Left ventricular hypertrophy with QRS widening and repolarization abnormality  Inferior infarct (cited on or before 16-OCT-2024)  Abnormal ECG  When compared with ECG of 16-OCT-2024 22:51,  Nonspecific T wave abnormality no longer evident in Inferior leads    Echo:  Transthoracic Echo (TTE) Complete 10/17/2024  CONCLUSIONS:   1. Left ventricular ejection fraction is severely decreased, by visual estimate at 10-15%.   2. There is global hypokinesis of the left ventricle with minor regional variations.   3. Spectral Doppler shows an impaired relaxation pattern of left ventricular diastolic filling.   4. Left ventricular cavity size is severely dilated.   5. No left ventricular thrombus visualized.   6. There is mildly reduced right  ventricular systolic function.   7. Mildly enlarged right ventricle.   8. The left atrium is mildly dilated.   9. The right atrium is mild to moderately dilated.  10. Right ventricular systolic pressure is within normal limits.  11. Compared with study dated 7/31/2024, the diastolic function is now grade I and the RVSP decreased from 39 mmHg to 26 mmHg. No significant differences in LV size and function.    Transthoracic Echo (TTE) Complete 07/31/2024    CONCLUSIONS:   1. Left ventricular ejection fraction is severely decreased, calculated by Tim's biplane at 10%.   2. There is global hypokinesis of the left ventricle with minor regional variations.   3. Spectral Doppler shows an abnormal pattern of left ventricular diastolic filling.   4. Left ventricular cavity size is severely dilated.   5. No left ventricular thrombus visualized.   6. There is spontaneous echocontrast noted within the LV cavity.Echocontrast was utilized and excluded LV apical thrombus.   7. There is severe eccentric left ventricular hypertrophy.   8. Severely enlarged right ventricle.   9. There is reduced right ventricular systolic function.  10. The left atrium is severely dilated.  11. The right atrium is moderately to severely dilated.  12. Mild to moderate tricuspid regurgitation visualized.  13. Mildly elevated RVSP.  14. Compared with the prior exam from 7/26/2023, the degree of TR has increased, however there was already a severley dilated LV with severe systolic dysfunciton and a dilated RV with reduced fx as well. Note that the prior exam included an agitated saline contrast study that was negative for shunt.    Ejection Fractions:  EF   Date/Time Value Ref Range Status   10/17/2024 11:49 AM 13 %    07/31/2024 03:07 PM 10 %          Stress Test:  NUCLEAR STRESS TEST 01/10/2024    Cardiac Imaging:  MR cardiac morphology and function w and wo IV contrast 08/05/2024   CONCLUSIONS:    1. SPECT Perfusion Study: Non-diagnostic due to  poor image quality.    2. Left ventricle is severely dilated. The left ventricle systolic   function is severely decreased.    3. Right ventricle is small. The right ventricle systolic function is   normal.    4. This is a high risk scan due to calculated LVEF.    5. There is significant GI uptake of the radiotracer that only mildly   improve with AC. This makes the inferior wall uninterpretable. There is   no ischemia or scar in the anterior, septal or anterolateral wall   segments.    6. Given the LV is severely dilated Will recommend to assess for   ischemic heart disease using diferent imaging modalities such as cardiac   MRI or coronary angiography.     Inpatient Medications:  Scheduled medications   Medication Dose Route Frequency    acetaminophen  650 mg oral q6h    amiodarone  200 mg oral Daily    [Held by provider] aspirin  81 mg oral Daily    atorvastatin  40 mg oral Nightly    [START ON 10/23/2024] empagliflozin  25 mg oral Daily    heparin (porcine)  5,000 Units subcutaneous q8h [facility]    insulin glargine  18 Units subcutaneous q24h    insulin lispro  0-5 Units subcutaneous Nightly    insulin lispro  0-5 Units subcutaneous TID AC    insulin lispro  5 Units subcutaneous TID AC    multivitamin with minerals  1 tablet oral Daily    nicotine  1 patch transdermal Daily    Followed by    [START ON 12/2/2024] nicotine  1 patch transdermal Daily    Followed by    [START ON 12/16/2024] nicotine  1 patch transdermal Daily    pantoprazole  40 mg oral Daily before breakfast    polyethylene glycol  17 g oral Daily    sennosides-docusate sodium  2 tablet oral BID    sertraline  100 mg oral Daily    spironolactone  12.5 mg oral Daily    thiamine  500 mg intravenous TID    tiotropium  2 puff inhalation Daily     PRN medications   Medication    alteplase    dextrose    dextrose    glucagon    glucagon    artificial tears    oxygen    trimethobenzamide     Continuous Medications   Medication Dose Last Rate     Physical  Exam:  Physical Exam  Vitals and nursing note reviewed.   Constitutional:       General: He is not in acute distress.     Appearance: He is ill-appearing.   HENT:      Head: Atraumatic.      Mouth/Throat:      Mouth: Mucous membranes are moist.      Comments: Poor dentition  Eyes:      Extraocular Movements: Extraocular movements intact.      Conjunctiva/sclera: Conjunctivae normal.   Neck:      Comments: No JVD    Cardiovascular:      Rate and Rhythm: Normal rate and regular rhythm.      Pulses: Normal pulses.      Heart sounds: Normal heart sounds.   Pulmonary:      Effort: Pulmonary effort is normal. No respiratory distress.      Breath sounds: Normal breath sounds.   Abdominal:      General: Bowel sounds are normal.      Palpations: Abdomen is soft.   Musculoskeletal:         General: Normal range of motion.      Cervical back: Normal range of motion.      Right lower leg: No edema.      Left lower leg: No edema.   Skin:     General: Skin is warm and dry.   Neurological:      General: No focal deficit present.      Mental Status: He is alert and oriented to person, place, and time.   Psychiatric:         Mood and Affect: Mood normal.         Behavior: Behavior normal.           Assessment/Plan   54 y.o. 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      Acute on Chronic combined Systolic and Diastolic Heart Failure decompensated mixed ischemic/nonischemic HFrEF 5-10%  s/p  single chamber ICD 3/2024  - TTE 10/17: EF 10-15%, gHK of LV, LV severely dilated, severe eccentric LVH, impaired DF, L mildly dilated, RV mildly enlarged with mildly reduced systolic fxn, RA mild to mod dilated, mild MR/TR, RVSP 25.8 wnl  - cMRI 8/5: mixed ICM/NICM, sev dilated LV EF 5-10%. Basal inferoseptal, mid inferoseptal, apical septal, apicolateral segments akinetic. Eccentric LVH. Transmural infarction of mid/apical inferoseptal/inferior segments and apicolateral wall (<25% wall thickeness). NICM pattern of mid wall septal fibrosis.  - Troponin 69>52>100>88, denies CP  - EKG: No acute ischemic changes  - Transmural infarcts on cardiac MRI. Coronary Calcifications on CT chest  - CXR 10/20: Marked enlargement of cardiac silhouette, no acute cardiopulmonary process   - admit BNP 2833 (>5K)  - Admit Wt: 77.3 kg, last dc weight 74.4kg on 10/5  - home GDMT: Carvedilol 3.125, Entresto 24-26 BID, Empagliflozin 10 daily, Spironolactone 25 daily, Diuretic: Torsemide 40 mg daily  - appears warm and dry, reintroduce/titrate GDMT as BP/renal function allows  - resume Empagliflozin at 10 mg daily and Eldon at 12.5 mg daily  - hold ASA given plt 90  - cont Secondary vascular prevention with Atorvastatin 40 nightly     GOC  End of Life  Anxiety/Depression  Acute pain  - is not a candidate for advanced therapies related to ongoing substance use, noncompliance, psychiatric concerns   - per discussion with HF, CPR and intubation would not be beneficial, discussed with pt, pt agreeable with DNR/DNI  - Goals are mix of survival and time and improved quality of life  - Palliative care consulted/following, appreciate recs  - Psych consulted/following for depression/suicidal ideations, appreciate recs  - if patient wants to leave AMA will need to have a capacity assessment done at that time   - cont sertraline 100 mg PO daily   - PO Tylenol juanita q 6hrs for pain     VT / VF   Hx of AF RVR   - Device: [employer] single chamber  "ICD (3/2024)   - Device interrogation 9/30: 4 sustained VT episodes since 9/1/24 requiring ATP and ATP/shock for arrhythmia termination and restoration of SR, last shock were recorded from 9/5/2024 which were appropriate for VT. : <1%. 12yr battery life. Lead fxn WNL.   - Patient without any shocks or tachyarrhythmias since amiodarone loading   - for episiode 9/5 prior EP consult noted:\" Possible dual tachycardia with PAF RVR and concurrent VT/VF in the setting of active cocaine use.\" Did not feel RA lead placement appropriate in light of ongoing cocaine use and increased risk of infection. NO OAC recommended.  - BB on hold, see above  - cont Amio 200 mg daily  - cont telemetry, keep K+ >4, Mag >2     GUICHO on CKD, stage 3a  - in the setting of cardiogenic shock, low output stage  - b/l Cr: 1.3-1.7   - Admit BUN/Cr (10/16): 44/2.41, Peak (10/20): 67/3.08  - MALCOLM 10/17: b/l echogenic kidney likely 2/2 medical renal disease   - Cr today: 1.43 (1.64, 2.62)   - Avoid nephrotoxins and hypotension, trend daily RFP while admitted     COPD  Former tobacco user  - 2L at night at baseline  - s/p 60 mg prednisone x 1 in ED 10/16  - Currently on RA  - cont Spiriva  - cont nicotine patch   - keep O2 sat> 92%      Substance use disorder, cocaine  - Cessation advised  - admit Urine tox + cocaine      Type II Diabetes  - A1C 9.9% on 10/15  - Endocrinology consulted/following, appreciate recs  - cont Glargine 18 units daily, prandial Lispro 5 units TIDAC, SSI scale #1  - resume Jardiance at 10 mg daily today  - per Endo, increase Jardiance to 25 mg tomorrow, hold prandial insulin after dinner tonight  - stop regular SSI, start Lispro SSI scale #2 q4h until BG better controlled + prandial Lispro 10 units TID-AC, further restrict carb's  - diabetic diet, Accu-Cheks AC/HS, hypoglycemic protocol      Remote cerebellar Hemorrhagic infarct   Concern for CNS infection   Acute Headache, resolved  Vision Disturbance vs chronic near " sightedness   - ophtho consulted, recs appreciated, suspect vision likely 2/2 uncorrected refractive errors, outpatient follow up  - CT head 10/17: Negative   - MRI brain (10/19): no evidence of hemorrhage or mass effect. Remote left cerebellar infarct.   - Neurology consulted 10/19 for concern of CNS infection, appreciate recs  - LP was initially recommended however discussed with pt and he refused   - given significant & quick improvement, likelihood of CNS infection is low   - per Neurology, no further need for CNS coverage atb's, Ceftriaxone, Vancomycin and Acyclovir stopped (10/19 -10/21), neuro now s/o  - cont  IV thiamine 500mg TID for 3 days followed by 500mg daily   - 10/16 Blood Cx x2, negative and final, Strep/Legionella negative, MRSA swab pending results  - 10/19 Blood Cx x2 , negative to date, afebrile, WBC wnl  - cont artifical tears QID    Substance Abuse  - 10/17 UDS: cocaine +   - hx of daily cocaine use, now weekly cocaine use per pt  - Tobacco use: 3 cigarettes per day  - cont Nicotine patch     Dispo:  Pending further medical optimization  - referral for Food Pantry placed 10/22, Clinical Pharmacy referral placed 10/18 by Endo, and [facility] referral placed 10/18  - PT rec no needs at discharge    DVT ppx: subq Heparin    Code Status: DNR and No Intubation   NOK: Elizabet Santana, friend: [phone]    Seen and discussed with Dr. Brown    Peripheral IV 10/19/24 20 G Right;Dorsal Hand (Active)   Site Assessment Clean;Dry;Intact 10/22/24 0710   Dressing Status Clean;Dry 10/22/24 0710   Number of days: 3       Peripheral IV 10/20/24 20 G Left Antecubital (Active)   Site Assessment Clean;Dry;Intact 10/22/24 0710   Number of days: 2     Chase Blanca, APRN-CNP

## 2024-10-22 NOTE — PROGRESS NOTES
Asked by Dr. Laura Cook to meet with pt today to discuss hospice care.  Pt feels that he can return home with his friend/roommate Elizabet, and he is not interested in hospice care.  He does not feel that he needs any assistance.  SW spoke to primary team who is very familiar with pt from frequent admissions.  They have attempted in the past to connect him with hospital at home, food pantry support, etc.  SW was unable to get pt to open up about his worries or concerns stating he is tired from talking about it so often.  Will follow and support as pt will allow.      ROBBIE Blake

## 2024-10-22 NOTE — CARE PLAN
The patient's goals for the shift include      The clinical goals for the shift include Pt bp >85 sbp    Over the shift, the patient not make progress toward the following goals.

## 2024-10-22 NOTE — SIGNIFICANT EVENT
Neurology sign off note    Leonel Yu is a 54 y.o. male  PMH HFrEF (5-10%) s/p ICD, COPD, CKD, DM2,  substance use (crack cocaine), medication non adherence and complex psychiatric history (MDD with anxiety , SI, HI) presenting with lethargy, weakness, and b/l blurry vision. Neurology initially consulted for concern of CNS infection. Initial exam was non focal with negative Kernigs and Brudzinskis;s sign negative.      Given history of worsening lethargy, photophobia and emesis in setting of recent cocaine use (Postive UDS for cocaine and opiates), and worsening heart failure with shock, presentation is concerning for toxic/metabolic cephalopathy. Limited MRI done which was negative for evidence of toxic leukoencephalopathy and otherwise un explanatory.      He was also started on broad spectrum Abx and acyclovir for CNS coverage. LP was initially recommended however discussed with pt today and he completely refused to do the LP, we discussed that given his significant and quick improvement in his clinical status with other contributers(substance use, worsening heart failure symptoms with shock) with intact exam, the liklihood of a CNS infection is low but we can not surely rule that out without LP. However, given his refusal to do LP and benign exam with significant quick improvement. It is reasonable to stop the medications and monitor him given low concern for CNS infection. Discussed with primary team as well who agree that concern for CNS infection is low.      Recommendations:   - Agree with stopping Acyclovir, CTX, Vanc, unless patient needs Abx for other infectious processes.  - Continue Thiamine and MVI  - Continue to monitor the pt for any worsening exam  - No further recommendations from neurology team.      No further neurologic work-up or interventions. Neurology will sign off    Please call with questions, General Neurology [phone].    Milton Soto  PGY3 Neurology

## 2024-10-23 LAB
6MAM UR CFM-MCNC: <25 NG/ML
ALBUMIN SERPL BCP-MCNC: 3.7 G/DL (ref 3.4–5)
ANION GAP SERPL CALC-SCNC: 14 MMOL/L (ref 10–20)
BASOPHILS # BLD AUTO: 0.02 X10*3/UL (ref 0–0.1)
BASOPHILS NFR BLD AUTO: 0.2 %
BUN SERPL-MCNC: 16 MG/DL (ref 6–23)
CALCIUM SERPL-MCNC: 9.3 MG/DL (ref 8.6–10.6)
CHLORIDE SERPL-SCNC: 97 MMOL/L (ref 98–107)
CO2 SERPL-SCNC: 29 MMOL/L (ref 21–32)
CODEINE UR CFM-MCNC: <50 NG/ML
CREAT SERPL-MCNC: 1.29 MG/DL (ref 0.5–1.3)
EGFRCR SERPLBLD CKD-EPI 2021: 65 ML/MIN/1.73M*2
EOSINOPHIL # BLD AUTO: 0.01 X10*3/UL (ref 0–0.7)
EOSINOPHIL NFR BLD AUTO: 0.1 %
ERYTHROCYTE [DISTWIDTH] IN BLOOD BY AUTOMATED COUNT: 15.7 % (ref 11.5–14.5)
GLUCOSE BLD MANUAL STRIP-MCNC: 153 MG/DL (ref 74–99)
GLUCOSE BLD MANUAL STRIP-MCNC: 170 MG/DL (ref 74–99)
GLUCOSE BLD MANUAL STRIP-MCNC: 203 MG/DL (ref 74–99)
GLUCOSE BLD MANUAL STRIP-MCNC: 245 MG/DL (ref 74–99)
GLUCOSE SERPL-MCNC: 184 MG/DL (ref 74–99)
HCT VFR BLD AUTO: 56.1 % (ref 41–52)
HGB BLD-MCNC: 18.6 G/DL (ref 13.5–17.5)
HYDROCODONE CTO UR CFM-MCNC: <25 NG/ML
HYDROMORPHONE UR CFM-MCNC: <25 NG/ML
IMM GRANULOCYTES # BLD AUTO: 0.03 X10*3/UL (ref 0–0.7)
IMM GRANULOCYTES NFR BLD AUTO: 0.3 % (ref 0–0.9)
LYMPHOCYTES # BLD AUTO: 0.52 X10*3/UL (ref 1.2–4.8)
LYMPHOCYTES NFR BLD AUTO: 5.3 %
MAGNESIUM SERPL-MCNC: 2.29 MG/DL (ref 1.6–2.4)
MCH RBC QN AUTO: 30.7 PG (ref 26–34)
MCHC RBC AUTO-ENTMCNC: 33.2 G/DL (ref 32–36)
MCV RBC AUTO: 93 FL (ref 80–100)
MONOCYTES # BLD AUTO: 0.43 X10*3/UL (ref 0.1–1)
MONOCYTES NFR BLD AUTO: 4.4 %
MORPHINE UR CFM-MCNC: NORMAL NG/ML
NEUTROPHILS # BLD AUTO: 8.76 X10*3/UL (ref 1.2–7.7)
NEUTROPHILS NFR BLD AUTO: 89.7 %
NORHYDROCODONE UR CFM-MCNC: <25 NG/ML
NOROXYCODONE UR CFM-MCNC: <25 NG/ML
NRBC BLD-RTO: 0 /100 WBCS (ref 0–0)
OXYCODONE UR CFM-MCNC: <25 NG/ML
OXYMORPHONE UR CFM-MCNC: <25 NG/ML
PHOSPHATE SERPL-MCNC: 2.3 MG/DL (ref 2.5–4.9)
PLATELET # BLD AUTO: 119 X10*3/UL (ref 150–450)
POTASSIUM SERPL-SCNC: 5.7 MMOL/L (ref 3.5–5.3)
RBC # BLD AUTO: 6.06 X10*6/UL (ref 4.5–5.9)
SODIUM SERPL-SCNC: 134 MMOL/L (ref 136–145)
STAPHYLOCOCCUS SPEC CULT: ABNORMAL
WBC # BLD AUTO: 9.8 X10*3/UL (ref 4.4–11.3)

## 2024-10-23 PROCEDURE — 2500000001 HC RX 250 WO HCPCS SELF ADMINISTERED DRUGS (ALT 637 FOR MEDICARE OP)

## 2024-10-23 PROCEDURE — 99233 SBSQ HOSP IP/OBS HIGH 50: CPT | Performed by: INTERNAL MEDICINE

## 2024-10-23 PROCEDURE — 85025 COMPLETE CBC W/AUTO DIFF WBC: CPT

## 2024-10-23 PROCEDURE — 82947 ASSAY GLUCOSE BLOOD QUANT: CPT

## 2024-10-23 PROCEDURE — 2500000002 HC RX 250 W HCPCS SELF ADMINISTERED DRUGS (ALT 637 FOR MEDICARE OP, ALT 636 FOR OP/ED)

## 2024-10-23 PROCEDURE — 2500000004 HC RX 250 GENERAL PHARMACY W/ HCPCS (ALT 636 FOR OP/ED)

## 2024-10-23 PROCEDURE — 83735 ASSAY OF MAGNESIUM: CPT

## 2024-10-23 PROCEDURE — 36415 COLL VENOUS BLD VENIPUNCTURE: CPT

## 2024-10-23 PROCEDURE — 80069 RENAL FUNCTION PANEL: CPT

## 2024-10-23 PROCEDURE — 1200000002 HC GENERAL ROOM WITH TELEMETRY DAILY

## 2024-10-23 PROCEDURE — 2500000001 HC RX 250 WO HCPCS SELF ADMINISTERED DRUGS (ALT 637 FOR MEDICARE OP): Performed by: PHYSICIAN ASSISTANT

## 2024-10-23 RX ORDER — MUPIROCIN 20 MG/G
OINTMENT TOPICAL 3 TIMES DAILY
Status: DISPENSED | OUTPATIENT
Start: 2024-10-23 | End: 2024-10-25

## 2024-10-23 RX ADMIN — INSULIN LISPRO 2 UNITS: 100 INJECTION, SOLUTION INTRAVENOUS; SUBCUTANEOUS at 22:32

## 2024-10-23 RX ADMIN — PANTOPRAZOLE SODIUM 40 MG: 40 TABLET, DELAYED RELEASE ORAL at 05:57

## 2024-10-23 RX ADMIN — THIAMINE HYDROCHLORIDE 500 MG: 100 INJECTION, SOLUTION INTRAMUSCULAR; INTRAVENOUS at 16:54

## 2024-10-23 RX ADMIN — SACUBITRIL AND VALSARTAN 0.5 TABLET: 24; 26 TABLET, FILM COATED ORAL at 09:59

## 2024-10-23 RX ADMIN — ACETAMINOPHEN 650 MG: 325 TABLET ORAL at 22:35

## 2024-10-23 RX ADMIN — SERTRALINE 100 MG: 100 TABLET, FILM COATED ORAL at 09:44

## 2024-10-23 RX ADMIN — Medication 1 TABLET: at 09:45

## 2024-10-23 RX ADMIN — THIAMINE HYDROCHLORIDE 500 MG: 100 INJECTION, SOLUTION INTRAMUSCULAR; INTRAVENOUS at 22:45

## 2024-10-23 RX ADMIN — ACETAMINOPHEN 650 MG: 325 TABLET ORAL at 05:57

## 2024-10-23 RX ADMIN — AMIODARONE HYDROCHLORIDE 200 MG: 200 TABLET ORAL at 09:45

## 2024-10-23 RX ADMIN — THIAMINE HYDROCHLORIDE 500 MG: 100 INJECTION, SOLUTION INTRAMUSCULAR; INTRAVENOUS at 09:59

## 2024-10-23 RX ADMIN — INSULIN GLARGINE 18 UNITS: 100 INJECTION, SOLUTION SUBCUTANEOUS at 16:53

## 2024-10-23 RX ADMIN — SACUBITRIL AND VALSARTAN 0.5 TABLET: 24; 26 TABLET, FILM COATED ORAL at 22:35

## 2024-10-23 RX ADMIN — INSULIN LISPRO 1 UNITS: 100 INJECTION, SOLUTION INTRAVENOUS; SUBCUTANEOUS at 09:59

## 2024-10-23 RX ADMIN — INSULIN LISPRO 1 UNITS: 100 INJECTION, SOLUTION INTRAVENOUS; SUBCUTANEOUS at 10:30

## 2024-10-23 RX ADMIN — SPIRONOLACTONE 12.5 MG: 25 TABLET, FILM COATED ORAL at 09:45

## 2024-10-23 RX ADMIN — MUPIROCIN: 20 OINTMENT TOPICAL at 16:53

## 2024-10-23 RX ADMIN — EMPAGLIFLOZIN 25 MG: 10 TABLET, FILM COATED ORAL at 09:45

## 2024-10-23 RX ADMIN — SENNOSIDES AND DOCUSATE SODIUM 2 TABLET: 50; 8.6 TABLET ORAL at 09:44

## 2024-10-23 RX ADMIN — ATORVASTATIN CALCIUM 40 MG: 40 TABLET, FILM COATED ORAL at 22:35

## 2024-10-23 RX ADMIN — ACETAMINOPHEN 650 MG: 325 TABLET ORAL at 16:52

## 2024-10-23 ASSESSMENT — COGNITIVE AND FUNCTIONAL STATUS - GENERAL
DAILY ACTIVITIY SCORE: 24
MOBILITY SCORE: 24

## 2024-10-23 ASSESSMENT — PAIN SCALES - GENERAL
PAINLEVEL_OUTOF10: 0 - NO PAIN
PAINLEVEL_OUTOF10: 5 - MODERATE PAIN

## 2024-10-23 ASSESSMENT — PAIN - FUNCTIONAL ASSESSMENT: PAIN_FUNCTIONAL_ASSESSMENT: 0-10

## 2024-10-23 NOTE — CARE PLAN
Patient has food insecurity and transportation needs. I provided the patient with pantry information and I will provide him with information so he can at least get rides to his doctor appointment. He had missing appointments due to lack of transportation.    Community Resource Name:   Phone Number:   Staff Member:      Discussed the following topics on behalf of the patient:  []  Behavioral Health Assistance     []  Case Management  []   Assistance  []  Digital Equity Assistance  []  Dental Health Assistance  []  Education Assistance  []  Employment Assistance  []  Financial Strain Relief Assistance  [x]  Food Insecurity Assistance  []  Healthcare Coverage Assistance  []  Housing Stability Assistance  []  IP Violence Relief Assistance  []  Legal Assistance  []  Physical Activity Assistance  []  Social Connection Assistance  []  Stress Relief Assistance   []  Substance Abuse Assistance  [x]  Transportation Assistance  []  Utility Assistance  []  Other: [insert comment here]    Next Steps:         Gauri Nielson LCSW

## 2024-10-23 NOTE — PROGRESS NOTES
Asked to meet with pt today to discuss hospice care.  Pt was receptive to talking about hospice and going to a facility with hospice support. HE agreed to meeting with [facility] as they will provide him with information on how they can support him.  He explained that his cell phone was not working because he let the service lapse while he was in the hospital.  SW will have [facility] contact his room or nursing station to arrange  a meeting.   SW made the referral to [facility] and will await an update on meeting time.  SW to follow as assist as needed.        MICHAEL JIM

## 2024-10-23 NOTE — PROGRESS NOTES
[facility] Attending Shared Visit Note    This is a shared visit. Please see Advanced Practice Provider's encounter note for additional details.        Overnight events/Subjective: feeling ok, wants to stop using drugs    Exam:   Physical exam: alert, no distress. Normal rate, regular rhythm, non labored breathing, clear to auscultation, abdomen non distended, no LE edema, no focal neuro deficits.     A/P: 54 M with non ischemic HFrEF s/p primary prevention [employer] ICD (3/2024), CKD, polysubstance use, COPD, DM and challenges with medication adherence who presented with blurry vision, lethargy and dizziness. Course notable for acute decompensated HF and cardiogenic shock.   #Acute decompensated HF: required pressors/inotropes in the HFICU. Is now improved Continue empagliflozin and spironolactone. Restart Entresto at 12/13 mg BID on 10/23, may consider restarting carvedilol 3.125 mg BID on 10/24.   #AMS: Improved possibly from low flow state and drug use.   #PSUD: ongoing crack cocaine use. Declines thrive consult.   #Prognosis: poor, declines hospice.     Dispo: pending tolerance of GDMT, has outpatient HF follow up set up.     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    Altered mental status      Objective   Admit Date: 10/16/2024  Hospital Length of Stay: 5   Home: [facility] [id number]    Vitals:      10/23/2024    12:00 PM 10/23/2024     7:11 AM 10/23/2024     5:10 AM 10/23/2024    12:16 AM 10/22/2024     8:45 PM 10/22/2024     3:51 PM 10/22/2024    12:02 PM   Vitals   Systolic 100 118 111 120 121 116 116   Diastolic 67 80 76 76 76 64 74   Heart Rate 71 72 75 76 75 78 77   Temp 36.2 °C (97.2 °F) 36.2 °C (97.2 °F) 37.3 °C (99.1 °F) 36.9 °C (98.4 °F) 37.2 °C (99  °F) 36.4 °C (97.5 °F) 36.4 °C (97.5 °F)   Resp 18 18 18 18 20 20 20   Weight (lb)   160.27       BMI   23.67 kg/m2       BSA (m2)   1.88 m2         Wt Readings from Last 5 Encounters:   10/23/24 72.7 kg (160 lb 4.4 oz)   10/15/24 77.1 kg (170 lb)   10/05/24 74.4 kg (164 lb 0.4 oz)   09/08/24 79.4 kg (175 lb 0.7 oz)   08/06/24 73.7 kg (162 lb 7.7 oz)       Intake/Output Summary (Last 24 hours) at 10/23/2024 1253  Last data filed at 10/23/2024 1200  Gross per 24 hour   Intake 240 ml   Output 2275 ml   Net -2035 ml         MEDICATIONS  Infusions:     Scheduled:  acetaminophen, 650 mg, q6h  amiodarone, 200 mg, Daily  aspirin, 81 mg, Daily  atorvastatin, 40 mg, Nightly  empagliflozin, 25 mg, Daily  heparin (porcine), 5,000 Units, q8h JANEY  insulin glargine, 18 Units, q24h  insulin lispro, 0-5 Units, Nightly  [Held by provider] insulin lispro, 0-5 Units, TID AC  [Held by provider] insulin lispro, 5 Units, TID AC  multivitamin with minerals, 1 tablet, Daily  nicotine, 1 patch, Daily   Followed by  [START ON 12/2/2024] nicotine, 1 patch, Daily   Followed by  [START ON 12/16/2024] nicotine, 1 patch, Daily  pantoprazole, 40 mg, Daily before breakfast  polyethylene glycol, 17 g, Daily  sacubitriL-valsartan, 0.5 tablet, BID  sennosides-docusate sodium, 2 tablet, BID  sertraline, 100 mg, Daily  spironolactone, 12.5 mg, Daily  thiamine, 500 mg, TID  tiotropium, 2 puff, Daily      PRN:  alteplase, 2 mg, PRN  dextrose, 12.5 g, q15 min PRN  dextrose, 25 g, q15 min PRN  glucagon, 1 mg, q15 min PRN  glucagon, 1 mg, q15 min PRN  lactulose, 30 g, BID PRN  artificial tears, 2 drop, 4x daily PRN  oxygen, , Continuous PRN - O2/gases  trimethobenzamide, 200 mg, q6h PRN      Prior to Admission Meds:  Medications Prior to Admission   Medication Sig Dispense Refill Last Dose/Taking    amiodarone (Pacerone) 200 mg tablet Take 1 tablet (200 mg) by mouth once daily. 30 tablet 0 Unknown    aspirin 81 mg chewable tablet Chew 1 tablet (81 mg) once  "daily. 30 tablet 5 Unknown    atorvastatin (Lipitor) 80 mg tablet Take 1 tablet (80 mg) by mouth once daily at bedtime. 30 tablet 5 Unknown    blood sugar diagnostic strip Use as directed to test blood glucose up to four times daily and as needed. 200 each 5 Unknown    blood-glucose meter misc Inject 1 each under the skin 4 times a day with meals. Check blood glucose 4 times daily, before meals and at bedtime. 1 each 0 Unknown    empagliflozin (Jardiance) 10 mg TAKE 1 TABLET BY MOUTH ONCE DAILY 30 tablet 5 Unknown    glucose 4 gram chewable tablet Chew 2 tablets (8 g) if needed for low blood sugar - see comments. 50 tablet 12 Unknown    insulin glargine (Lantus) 100 unit/mL (3 mL) pen Inject 30 Units under the skin once daily at bedtime. 15 mL 5 Unknown    insulin lispro (HumaLOG) 100 unit/mL injection Inject 0-10 Units under the skin 3 times daily (morning, midday, late afternoon). Hypoglycemia protocol if Blood Glucose is between 0 - 70 mg/dL, 0 unit(s) if , 2 unit(s) if 151-200, 4 unit(s) if 201-250, 6 unit(s) if 251-300, 8 unit(s) if 301-350, 10 unit(s) if 351-400. Notify provider unit(s) if Blood Glucose is greater than 400 mg/dL 15 mL 5 Unknown    sertraline (Zoloft) 100 mg tablet Take 1 tablet (100 mg) by mouth once daily. 30 tablet 5 Unknown    carvedilol (Coreg) 3.125 mg tablet Take 1 tablet (3.125 mg) by mouth 2 times a day. 60 tablet 0 Unknown    lancets 33 gauge misc Inject 1 each under the skin 4 times a day. 100 each 5 Unknown    pen needle 1/2\" 29G X 12mm needle Use to inject 1-4 times daily as directed. 100 each 5 Unknown    sacubitriL-valsartan (Entresto) 24-26 mg tablet Take 1 tablet by mouth 2 times a day. 60 tablet 3 Unknown    spironolactone (Aldactone) 25 mg tablet Take 0.5 tablets (12.5 mg) by mouth once daily. 15 tablet 5 Unknown    tiotropium (Spiriva Respimat) 2.5 mcg/actuation inhaler Inhale 2 puffs once daily. 4 g 2 Unknown    torsemide (Demadex) 20 mg tablet Take 2 tablets (40 mg) " by mouth once daily. 60 tablet 3 Unknown       DATA:  CMP:  Recent Labs     10/22/24  1902 10/22/24  0659 10/21/24  0530 10/20/24  1359 10/20/24  0333 10/19/24  1706 10/19/24  0711 10/18/24  0744 10/17/24  1934 10/17/24  0411   * 134* 134* 131* 131* 134* 127* 134*   < > 134*   K 4.4 4.6 3.8 4.1 4.5 4.6 4.1 3.6   < > 3.5   CL 98 100 100 93* 91* 93* 93* 97*   < > 97*   CO2 25 29 28 27 27 23 21 25   < > 25   ANIONGAP 12 10 10 15 18 23* 17 16   < > 16   BUN 18 26* 41* 66* 67* 62* 58* 41*   < > 45*   CREATININE 1.17 1.43* 1.64* 2.62* 3.08* 2.58* 2.10* 1.64*   < > 2.10*   EGFR 74 58* 49* 28* 23* 29* 36* 49*   < > 36*   MG 2.01 2.14 2.08  --  2.25 2.35 2.26 2.00  --  2.31    < > = values in this interval not displayed.     Recent Labs     10/22/24  1902 10/22/24  0659 10/21/24  0530 10/20/24  1359 10/20/24  0333 10/19/24  1706 10/19/24  0711 10/18/24  0744 10/17/24  1934 10/17/24  0411 10/16/24  2238 10/15/24  1742 10/15/24  1032 10/01/24  1539 09/30/24  1701 09/08/24  0431 09/07/24  0949   ALBUMIN 3.3* 3.2* 3.0* 3.1* 3.8 4.1 3.8 3.7   < > 3.5 4.2   < > 4.5   < > 3.8   < > 3.8   ALT  --   --   --   --  22  --  24 21  --  29 36  --  50  --  33  --  70*   AST  --   --   --   --  16  --  21 15  --  15 19  --  42*  --  25  --  40*   BILITOT  --   --   --   --  0.6  --  0.9 0.8  --  0.5 0.5  --  1.2  --  1.0  --  1.0    < > = values in this interval not displayed.     CBC:  Recent Labs     10/22/24  1902 10/22/24  0659 10/21/24  0530 10/20/24  0333 10/19/24  1706 10/19/24  0711 10/18/24  0744 10/17/24  1934   WBC 9.5 5.7 7.7 7.4 11.1 11.0 9.4 10.7   HGB 16.1 15.9 17.1 19.9* 21.2* 19.6* 19.0* 18.5*   HCT 47.9 47.6 51.2 57.5* 63.3* 58.3* 55.1* 56.0*   * 90* 97* 112* 132* 154 152 167   MCV 93 93 91 89 91 91 88 91     COAG:   Recent Labs     10/19/24  1930 09/30/24  1704 09/07/24  0949 12/10/23  0255 12/09/23  2057 10/23/23  1851 07/24/23  1330 03/02/23  0225   INR 1.2* 1.2* 1.2*  --  1.2* 1.3* 1.2* 1.3*   HAUF  --    --   --  0.1  --   --   --   --      ABO:   Recent Labs     09/30/24  1704   ABO A     HEME/ENDO:   Recent Labs     10/20/24  0333 10/17/24  1542 10/17/24  0027 10/15/24  1742 09/07/24  1016 07/30/24  0815 07/30/24  0731 06/20/24  0749 01/09/24  0653   FERRITIN 242  --   --   --   --   --   --   --   --    IRONSAT 14*  --   --   --   --   --   --   --   --    TSH  --  1.66 5.71*  --  2.68 2.65  --   --   --    FREET4  --   --  1.18  --   --   --   --   --   --    HGBA1C  --   --   --  9.9*  --   --  8.4* 6.9* 10.1*     CARDIAC:   Recent Labs     10/21/24  0530 10/20/24  0333 10/17/24  0027 10/16/24  2238 10/15/24  1147 10/15/24  1032 10/01/24  1033 09/30/24  2025 09/30/24  1844 09/30/24  1701 09/07/24  0128 09/07/24  0006 07/31/24  1851 07/30/24  0815 07/30/24  0731 02/16/24  0533 02/15/24  0656 02/12/24  1239 12/27/23  0759   CKMB  --   --   --   --   --   --   --   --   --   --   --   --   --   --   --   --   --   --  1.6   TROPHS 88* 100* 52 69* 50 70* 54* 67*   < >  --    < > 75*  --    < > 125*  --   --    < >  --    BNP  --   --   --  479*  --  2,833*  --   --   --  >5,000*  --  2,585* 1,014*  --  3,240* 2,900* 3,752*   < >  --     < > = values in this interval not displayed.     Recent Labs     09/07/24  0949 07/25/23  0820 06/18/23  0627   CHOL 127 156 168   LDLF  --  97 103*   LDLCALC 72  --   --    HDL 35.4 40.5 35.6*   TRIG 98 92 146     TOX:  Recent Labs     10/20/24  1001 10/17/24  0511 10/15/24  2238   AMPHETAMINE Presumptive Negative Presumptive Negative Presumptive Negative   BENZO Presumptive Negative Presumptive Negative Presumptive Negative   CANNABINOID Presumptive Negative Presumptive Negative Presumptive Negative   COCAI Presumptive Positive* Presumptive Positive* Presumptive Positive*   FENTANYL Presumptive Negative Presumptive Negative Presumptive Negative   OPIATE Presumptive Positive* Presumptive Negative Presumptive Negative   OXYCODONE Presumptive Negative Presumptive Negative Presumptive  Negative   PCP Presumptive Negative Presumptive Negative Presumptive Negative     MICRO:   Recent Labs     10/17/24  1000 10/17/24  0542 03/02/23  1112 03/02/23  0225   CRP 0.21  --   --  1.54*   PROCAL 0.27* 0.31* 1.55* 1.15*     No results found for the last 90 days.      FOLLOWUP:   Future Appointments   Date Time Provider Department Center   11/5/2024  9:00 AM Mayco Skinner MD [id number]

## 2024-10-23 NOTE — HOSPITAL COURSE
"54 y.o. male with a hx mixed ischemic/toxic mediated HFrEF (EF 5-10%, fibrosis and transmural infarcts seen on cMRI 8/2024) s/p [employer] [employer] single chamber ICD (3/2024), CKD3a, HTN, HLD, LUZ (crack cocaine), former tobacco use, COPD (nocturnal 2L O2 at baseline), DM2, remote left cerebellar hemorrhagic infarct, medication noncompliance, anxiety and depression, initially admitted for altered mental status and hypotension, transferred to the HFICU after Code blue in MRI on 10/18 for undetectable blood pressure and treated for shock w/ partial cardiogenic component, now transferred back to the floor.     TTE 10/17: EF 10-15%, gHK of LV, LV severely dilated, severe eccentric LVH, impaired DF, L mildly dilated, RV mildly enlarged with mildly reduced systolic fxn, RA mild to mod dilated, mild MR/TR, RVSP 25.8 wnl  - cMRI 8/5: mixed ICM/NICM, sev dilated LV EF 5-10%. Basal inferoseptal, mid inferoseptal, apical septal, apicolateral segments akinetic. Eccentric LVH. Transmural infarction of mid/apical inferoseptal/inferior segments and apicolateral wall (<25% wall thickeness). NICM pattern of mid wall septal fibrosis.    Not a candidate for advanced therapies related to ongoing substance use, noncompliance, psychiatric concerns. Per discussion with HF, CPR and intubation would not be beneficial, discussed with pt, pt agreeable with DNR/DNI.    Floor course    GDMT re-initiated and titrated as BP and HR allowed. K elevated 10/23 to 5.9, spironolactone and entresto on board. Lokelma x1 given with improvement to 4.0.    He remained overall euvolemic, not requiring any further diuresis. Unable to determine home diuretic need prior to discharge.    Ongoing GOC and disposition discussion held with palliative care and SW. Patient agreed to and accepted to ICF with outpatient palliative Navigator from [facility] to follow.    On 10/25 ~2:45 pm, notified by nursing that patient suddenly saying he is ready to leave, stating \"he wants to leave " "now.... his ride is on his way....\" Patient had removed his telemetry monitor, peripheral IV, and was fully dressed upon my arrival to his room.     Capacity evaluation completed by myself and attending Dr. Stiven Brown. Patient able to consistently communicate his choice, demonstrate ability to understand relevant information, demonstrates ability to appreciate the situation and its consequences, and demonstrates ability to reason about treatment options as per the  Capacity Assessment Tool.     Patient agreeable to stay until discharge paperwork able to be completed and to have prescriptions sent to [facility] for him to .     Patient denied having any other home going needs. Follow up appointment scheduled.     Pt was discharged AGAINST MEDICAL ADVICE     Discharge weight: 70.8 kg     More than 60 minutes were spent in coordinating patient discharge.        " 09-06-21

## 2024-10-23 NOTE — NURSING NOTE
Mr. Yu is feeling fatigued today.  We reviewed recent BG values -- he sounds satisfied with these.  Joseph 3 CGM placed to rt upper arm (RN made aware).  Discharge date is to be determined.  He was seen by palliative care yesterday.  States that he will re-consider hospice and would like to speak to someone further about this option.    Will follow as needed while inpatient.  Time spent:  20 mins.

## 2024-10-23 NOTE — SIGNIFICANT EVENT
.Rapid Response Nurse Note: RADAR alert: 6    Pager time: 1230  Arrival time: 1232  Event end time: 1300  Location: T5  [x] Triage by phone or secure messaging    Rapid response initiated by:  [] Rapid response RN [] Family [] Nursing Supervisor [] Physician   [x] RADAR auto page [] Sepsis auto-page [] RN [] RT   [] NP/PA [] Other:     Primary reason for call:   [] BAT [] New CPAP/BiPAP [] Bleeding [] Change in mental status   [] Chest pain [] Code blue [] FiO2 >/= 50% [] HR </= 40 bpm   [] HR >/= 130 bpm [] Hyperglycemia [] Hypoglycemia [x] RADAR    [] RR </= 8 bpm [] RR >/= 30 bpm [] SBP </= 90 mmHg [] SpO2 < 90%   [] Seizure [] Sepsis [] [place]ness of breath  [] Staff concern: see comments       Interventions:  [x] None [] ABG/VBG [] Assist w/ICU transfer [] BAT paged    [] Bag mask [] Blood [] Cardioversion [] Code Blue   [] Code blue for intubation [] Code status changed [] Chest x-ray [] EKG   [] IV fluid/bolus [] KUB x-ray [] Labs/cultures [] Medication   [] Nebulizer treatment [] NIPPV (CPAP/BiPAP) [] Oxygen [] Oral airway   [] Peripheral IV [] Palliative care consult [] CT/MRI [] Sepsis protocol    [] Suctioned [] Other:       Outcome:  [] Coded and  [] Code blue for intubation [] Coded and transferred to ICU []  on division   [x] Remained on division (no change) [] Remained on division + additional monitoring [] Remained in ED [] Transferred to ED   [] Transferred to ICU [] Transferred to inpatient status [] Transferred for interventions (procedure) [] Transferred to ICU stepdown    [] Transferred to surgery [] Transferred to telemetry [] Sepsis protocol [] STEMI protocol   [] Stroke protocol       Additional Comments: Bedside nurse notified of SKYLA page: [phone] [phone] 18 100/67 92%.  No concerns at this time; encouraged to call with changes.

## 2024-10-23 NOTE — PROGRESS NOTES
Subjective:  SOB with walking.     Today in brief:  - Re-introduce entresto but at very low dose  - Add low dose coreg 3.125mg BID tomororw if BP remains stable.    Objective:  Vitals:    10/23/24 0711   BP: 118/80   Pulse: 72   Resp: 18   Temp: 36.2 °C (97.2 °F)   SpO2: 95%     Weight         10/19/2024  0316 10/20/2024  0000 10/21/2024  2211 10/22/2024  0443 10/23/2024  0510    Weight: 77.3 kg (170 lb 6.7 oz) 69 kg (152 lb 1.9 oz) 73.4 kg (161 lb 13.1 oz) 73.1 kg (161 lb 2.5 oz) 72.7 kg (160 lb 4.4 oz)            Intake/Output Summary (Last 24 hours) at 10/23/2024 0804  Last data filed at 10/23/2024 0510  Gross per 24 hour   Intake 600 ml   Output 1725 ml   Net -1125 ml     Recent Results (from the past 24 hours)   POCT GLUCOSE    Collection Time: 10/22/24 11:59 AM   Result Value Ref Range    POCT Glucose 143 (H) 74 - 99 mg/dL   POCT GLUCOSE    Collection Time: 10/22/24  4:07 PM   Result Value Ref Range    POCT Glucose 190 (H) 74 - 99 mg/dL   CBC and Auto Differential    Collection Time: 10/22/24  7:02 PM   Result Value Ref Range    WBC 9.5 4.4 - 11.3 x10*3/uL    nRBC 0.0 0.0 - 0.0 /100 WBCs    RBC 5.18 4.50 - 5.90 x10*6/uL    Hemoglobin 16.1 13.5 - 17.5 g/dL    Hematocrit 47.9 41.0 - 52.0 %    MCV 93 80 - 100 fL    MCH 31.1 26.0 - 34.0 pg    MCHC 33.6 32.0 - 36.0 g/dL    RDW 15.3 (H) 11.5 - 14.5 %    Platelets 101 (L) 150 - 450 x10*3/uL    Neutrophils % 90.0 40.0 - 80.0 %    Immature Granulocytes %, Automated 0.3 0.0 - 0.9 %    Lymphocytes % 5.4 13.0 - 44.0 %    Monocytes % 4.0 2.0 - 10.0 %    Eosinophils % 0.1 0.0 - 6.0 %    Basophils % 0.2 0.0 - 2.0 %    Neutrophils Absolute 8.53 (H) 1.20 - 7.70 x10*3/uL    Immature Granulocytes Absolute, Automated 0.03 0.00 - 0.70 x10*3/uL    Lymphocytes Absolute 0.51 (L) 1.20 - 4.80 x10*3/uL    Monocytes Absolute 0.38 0.10 - 1.00 x10*3/uL    Eosinophils Absolute 0.01 0.00 - 0.70 x10*3/uL    Basophils Absolute 0.02 0.00 - 0.10 x10*3/uL   Magnesium    Collection Time: 10/22/24   7:02 PM   Result Value Ref Range    Magnesium 2.01 1.60 - 2.40 mg/dL   Renal Function Panel    Collection Time: 10/22/24  7:02 PM   Result Value Ref Range    Glucose 138 (H) 74 - 99 mg/dL    Sodium 131 (L) 136 - 145 mmol/L    Potassium 4.4 3.5 - 5.3 mmol/L    Chloride 98 98 - 107 mmol/L    Bicarbonate 25 21 - 32 mmol/L    Anion Gap 12 10 - 20 mmol/L    Urea Nitrogen 18 6 - 23 mg/dL    Creatinine 1.17 0.50 - 1.30 mg/dL    eGFR 74 >60 mL/min/1.73m*2    Calcium 8.3 (L) 8.6 - 10.6 mg/dL    Phosphorus 1.9 (L) 2.5 - 4.9 mg/dL    Albumin 3.3 (L) 3.4 - 5.0 g/dL   POCT GLUCOSE    Collection Time: 10/23/24  7:16 AM   Result Value Ref Range    POCT Glucose 153 (H) 74 - 99 mg/dL     Inpatient Medications:  Scheduled medications   Medication Dose Route Frequency    acetaminophen  650 mg oral q6h    amiodarone  200 mg oral Daily    [Held by provider] aspirin  81 mg oral Daily    atorvastatin  40 mg oral Nightly    empagliflozin  25 mg oral Daily    heparin (porcine)  5,000 Units subcutaneous q8h JANEY    insulin glargine  18 Units subcutaneous q24h    insulin lispro  0-5 Units subcutaneous Nightly    insulin lispro  0-5 Units subcutaneous TID AC    [Held by provider] insulin lispro  5 Units subcutaneous TID AC    multivitamin with minerals  1 tablet oral Daily    nicotine  1 patch transdermal Daily    Followed by    [START ON 12/2/2024] nicotine  1 patch transdermal Daily    Followed by    [START ON 12/16/2024] nicotine  1 patch transdermal Daily    pantoprazole  40 mg oral Daily before breakfast    polyethylene glycol  17 g oral Daily    sennosides-docusate sodium  2 tablet oral BID    sertraline  100 mg oral Daily    spironolactone  12.5 mg oral Daily    thiamine  500 mg intravenous TID    tiotropium  2 puff inhalation Daily     PRN medications   Medication    alteplase    dextrose    dextrose    glucagon    glucagon    lactulose    artificial tears    oxygen    trimethobenzamide     Continuous Medications   Medication Dose Last  Rate       Telemetry 10/23/2024 (personally reviewed): SR 70s, rare     Physical exam:  General: NAD  Head/ neck: spacing in teeth. atraumatic  Cardiac: RRR, regular S1 S2 , no murmur, no rub, no gallop  Pulm: CTA bilaterally, no wheezes, rales or rhonchi.    Vascular: Radial 2+ bilaterally  GI: Non distended  Extremities: no LE edema   Neuro: no focal neuro deficits   Psych: appropriate mood and behavior   Skin: warm and dry     Assessment/Plan   54 y.o. male with a hx mixed ischemic/toxic mediated HFrEF (EF 5-10%, fibrosis and transmural infarcts seen on cMRI 8/2024) s/p [employer] [employer] single chamber ICD (3/2024), CKD3a, HTN, HLD, LUZ (crack cocaine), former tobacco use, COPD (nocturnal 2L O2 at baseline), DM2, remote left cerebellar hemorrhagic infarct, medication noncompliance, anxiety and depression, initially admitted for altered mental status and hypotension, transferred to the HFICU after Code blue in MRI on 10/18 for undetectable blood pressure and treated for shock w/ partial cardiogenic component, now transferred back to the floor.      Acute on chronic decompensated mixed ischemic/nonischemic HFrEF 5-10%  s/p single chamber ICD 3/2024  - TTE 10/17: EF 10-15%, gHK of LV, LV severely dilated, severe eccentric LVH, impaired DF, L mildly dilated, RV mildly enlarged with mildly reduced systolic fxn, RA mild to mod dilated, mild MR/TR, RVSP 25.8 wnl  - cMRI 8/5: mixed ICM/NICM, sev dilated LV EF 5-10%. Basal inferoseptal, mid inferoseptal, apical septal, apicolateral segments akinetic. Eccentric LVH. Transmural infarction of mid/apical inferoseptal/inferior segments and apicolateral wall (<25% wall thickeness). NICM pattern of mid wall septal fibrosis.  - Troponin mildly elevated 69>52>100>88  - Admit EKG: No acute ischemic changes  - Transmural infarcts on cardiac MRI. Coronary Calcifications on CT chest  - CXR 10/20: Marked enlargement of cardiac silhouette, no acute cardiopulmonary process   - Admit BNP 2833  "(>5K)  - Admit Wt: 77.3 kg, last dc weight 74.4kg on 10/5  - home GDMT: Carvedilol 3.125, Entresto 24-26 BID, Empagliflozin 10 daily, Spironolactone 25 daily, Diuretic: Torsemide 40 mg daily  - Appears warm and dry, reintroduce/titrate GDMT as BP/renal function allows  - resumed Empagliflozin and low dose spironolactone.   - Empagliflozin increased to 25mg daily per endocrine.  - Re-introduce entresto but at very low dose  - Add low dose coreg 3.125mg BID tomororw if BP remains stable.  - resume ASA given plt > 50k  - cont Secondary vascular prevention with Atorvastatin 40 nightly     GOC  End of Life  Anxiety/Depression  Acute pain  - not a candidate for advanced therapies related to ongoing substance use, noncompliance, psychiatric concerns   - per discussion with HF, CPR and intubation would not be beneficial, discussed with pt, pt agreeable with DNR/DNI  - Goals are mix of survival and time and improved quality of life  - Palliative care consulted/following, appreciate recs. Yesterday, patient was not receptive.  - Psych consulted/following for depression/suicidal ideations, appreciate recs  - if patient wants to leave AMA will need to have a capacity assessment done at that time   - cont sertraline 100 mg PO daily   - PO Tylenol juanita q 6hrs for pain      VT / VF   Hx of AF RVR   [employer] single chamber ICD (3/2024)   - Device interrogation 9/30: 4 sustained VT episodes since 9/1/24 requiring ATP and ATP/shock for arrhythmia termination and restoration of SR, last shock were recorded from 9/5/2024 which were appropriate for VT. : <1%. 12yr battery life. Lead fxn WNL.   - Patient without any shocks or tachyarrhythmias since amiodarone loading   - for episiode 9/5 prior EP consult noted:\" Possible dual tachycardia with PAF RVR and concurrent VT/VF in the setting of active cocaine use.\" Did not feel RA lead placement appropriate in light of ongoing cocaine use and increased risk of infection. NO OAC " recommended.  - BB on hold given cardiogenic shock. Re-add tomorrow if he remains hemodynamically stable.  - cont Amio 200 mg daily  - cont telemetry, keep K+ >4, Mag >2     GUICHO on CKD, stage 3a  - in the setting of cardiogenic shock, low output stage  - b/l Cr: 1.3-1.7   - Admit BUN/Cr (10/16): 44/2.41, Peak (10/20): 67/3.08  - MALCOLM 10/17: b/l echogenic kidney likely 2/2 medical renal disease   - Cr today: 1.17 improved/normalized (1.43, 1.64, 2.62)   - Avoid nephrotoxins and hypotension, trend daily RFP while admitted      COPD  Former tobacco user  - 2L at night at baseline  - s/p 60 mg prednisone x 1 in ED 10/16  - cont Spiriva  - cont nicotine patch   - keep O2 sat> 92%      Substance use disorder, cocaine  - Cessation advised  - admit Urine tox + cocaine      Type II Diabetes  - A1C 9.9% on 10/15  - Endocrinology consulted/following, appreciate recs  - cont Glargine 18 units daily, prandial Lispro 5 units TIDAC, SSI scale #1  -Jardiance 25 mg daily   - per verbal endocrine recommendations: watch glucose today with higher dose jardiance. Hold meal time insulin. Continue glargine and SSI.  - diabetic diet, Accu-Cheks AC/HS, hypoglycemic protocol      Remote cerebellar Hemorrhagic infarct   Concern for CNS infection   Acute Headache, resolved  Vision Disturbance vs chronic near sightedness   - ophtho consulted, recs appreciated, suspect vision likely 2/2 uncorrected refractive errors, outpatient follow up  - CT head 10/17: Negative   - MRI brain (10/19): no evidence of hemorrhage or mass effect. Remote left cerebellar infarct.   - Neurology consulted 10/19 for concern of CNS infection, appreciate recs  - LP was initially recommended however discussed with pt and he refused   - given significant & quick improvement, likelihood of CNS infection is low   - per Neurology, no further need for CNS coverage atb's, Ceftriaxone, Vancomycin and Acyclovir stopped (10/19 -10/21), neuro now s/o  - cont  IV thiamine 500mg TID  for 3 days followed by 500mg daily   - 10/16 Blood Cx x2, negative and final, Strep/Legionella negative, MRSA swab pending  - 10/19 Blood Cx x2 , negative to date, afebrile, WBC wnl  - cont artifical tears QID     Substance Use Disorder  - 10/17 UDS: cocaine +   - hx of daily cocaine use, now weekly cocaine use per pt  - Tobacco use: 3 cigarettes per day  - cont Nicotine patch   - Not interested in THRIVE. Feels he can quit on his own.       DVT ppx: subq Heparin  DISPO:   -Pending further medical optimization  - referral for Food [facility] placed 10/22, Clinical Pharmacy referral placed 10/18 by Endo, and [facility] referral placed 10/18  - PT rec no needs at discharge  Code status: DNR and No Intubation    Patient seen and discussed with Dr. Stiven Brown.  Vicky Harkins PA-C

## 2024-10-23 NOTE — CARE PLAN
The patient's goals for the shift include      The clinical goals for the shift include Pt will be turned and repositioned Q2H    Over the shift, the patient did  make progress toward the following goals

## 2024-10-24 PROBLEM — N18.9 ACUTE ON CHRONIC KIDNEY FAILURE (CMS-HCC): Status: ACTIVE | Noted: 2024-10-24

## 2024-10-24 PROBLEM — N17.9 ACUTE ON CHRONIC KIDNEY FAILURE (CMS-HCC): Status: ACTIVE | Noted: 2024-10-24

## 2024-10-24 LAB
ALBUMIN SERPL BCP-MCNC: 3 G/DL (ref 3.4–5)
ALBUMIN SERPL BCP-MCNC: 3.5 G/DL (ref 3.4–5)
ANION GAP SERPL CALC-SCNC: 11 MMOL/L (ref 10–20)
ANION GAP SERPL CALC-SCNC: 15 MMOL/L (ref 10–20)
ATRIAL RATE: 81 BPM
BACTERIA BLD CULT: NORMAL
BACTERIA BLD CULT: NORMAL
BASOPHILS # BLD AUTO: 0.01 X10*3/UL (ref 0–0.1)
BASOPHILS NFR BLD AUTO: 0.1 %
BUN SERPL-MCNC: 14 MG/DL (ref 6–23)
BUN SERPL-MCNC: 15 MG/DL (ref 6–23)
BZE UR-MCNC: 1085 NG/ML
CALCIUM SERPL-MCNC: 8.1 MG/DL (ref 8.6–10.6)
CALCIUM SERPL-MCNC: 8.9 MG/DL (ref 8.6–10.6)
CHLORIDE SERPL-SCNC: 95 MMOL/L (ref 98–107)
CHLORIDE SERPL-SCNC: 99 MMOL/L (ref 98–107)
CO2 SERPL-SCNC: 21 MMOL/L (ref 21–32)
CO2 SERPL-SCNC: 30 MMOL/L (ref 21–32)
CREAT SERPL-MCNC: 1.12 MG/DL (ref 0.5–1.3)
CREAT SERPL-MCNC: 1.47 MG/DL (ref 0.5–1.3)
EGFRCR SERPLBLD CKD-EPI 2021: 56 ML/MIN/1.73M*2
EGFRCR SERPLBLD CKD-EPI 2021: 78 ML/MIN/1.73M*2
EOSINOPHIL # BLD AUTO: 0.04 X10*3/UL (ref 0–0.7)
EOSINOPHIL NFR BLD AUTO: 0.5 %
ERYTHROCYTE [DISTWIDTH] IN BLOOD BY AUTOMATED COUNT: 14.7 % (ref 11.5–14.5)
GLUCOSE BLD MANUAL STRIP-MCNC: 144 MG/DL (ref 74–99)
GLUCOSE BLD MANUAL STRIP-MCNC: 282 MG/DL (ref 74–99)
GLUCOSE BLD MANUAL STRIP-MCNC: 295 MG/DL (ref 74–99)
GLUCOSE BLD MANUAL STRIP-MCNC: 87 MG/DL (ref 74–99)
GLUCOSE SERPL-MCNC: 222 MG/DL (ref 74–99)
GLUCOSE SERPL-MCNC: 282 MG/DL (ref 74–99)
HCT VFR BLD AUTO: 51.2 % (ref 41–52)
HGB BLD-MCNC: 18 G/DL (ref 13.5–17.5)
IMM GRANULOCYTES # BLD AUTO: 0.02 X10*3/UL (ref 0–0.7)
IMM GRANULOCYTES NFR BLD AUTO: 0.2 % (ref 0–0.9)
LYMPHOCYTES # BLD AUTO: 0.6 X10*3/UL (ref 1.2–4.8)
LYMPHOCYTES NFR BLD AUTO: 6.9 %
MAGNESIUM SERPL-MCNC: 2.31 MG/DL (ref 1.6–2.4)
MCH RBC QN AUTO: 31.5 PG (ref 26–34)
MCHC RBC AUTO-ENTMCNC: 35.2 G/DL (ref 32–36)
MCV RBC AUTO: 90 FL (ref 80–100)
MONOCYTES # BLD AUTO: 0.39 X10*3/UL (ref 0.1–1)
MONOCYTES NFR BLD AUTO: 4.5 %
NEUTROPHILS # BLD AUTO: 7.63 X10*3/UL (ref 1.2–7.7)
NEUTROPHILS NFR BLD AUTO: 87.8 %
NRBC BLD-RTO: 0 /100 WBCS (ref 0–0)
P AXIS: 80 DEGREES
P OFFSET: 167 MS
P ONSET: 109 MS
PHOSPHATE SERPL-MCNC: 2.6 MG/DL (ref 2.5–4.9)
PHOSPHATE SERPL-MCNC: 3.3 MG/DL (ref 2.5–4.9)
PLATELET # BLD AUTO: 108 X10*3/UL (ref 150–450)
POTASSIUM SERPL-SCNC: 4.1 MMOL/L (ref 3.5–5.3)
POTASSIUM SERPL-SCNC: 5.9 MMOL/L (ref 3.5–5.3)
PR INTERVAL: 200 MS
Q ONSET: 209 MS
QRS COUNT: 13 BEATS
QRS DURATION: 118 MS
QT INTERVAL: 458 MS
QTC CALCULATION(BAZETT): 532 MS
QTC FREDERICIA: 506 MS
R AXIS: -62 DEGREES
RBC # BLD AUTO: 5.71 X10*6/UL (ref 4.5–5.9)
SODIUM SERPL-SCNC: 130 MMOL/L (ref 136–145)
SODIUM SERPL-SCNC: 131 MMOL/L (ref 136–145)
T AXIS: 96 DEGREES
T OFFSET: 438 MS
VENTRICULAR RATE: 81 BPM
WBC # BLD AUTO: 8.7 X10*3/UL (ref 4.4–11.3)

## 2024-10-24 PROCEDURE — 2500000004 HC RX 250 GENERAL PHARMACY W/ HCPCS (ALT 636 FOR OP/ED)

## 2024-10-24 PROCEDURE — 2500000001 HC RX 250 WO HCPCS SELF ADMINISTERED DRUGS (ALT 637 FOR MEDICARE OP)

## 2024-10-24 PROCEDURE — 83735 ASSAY OF MAGNESIUM: CPT

## 2024-10-24 PROCEDURE — 99497 ADVNCD CARE PLAN 30 MIN: CPT

## 2024-10-24 PROCEDURE — 99233 SBSQ HOSP IP/OBS HIGH 50: CPT

## 2024-10-24 PROCEDURE — 2500000001 HC RX 250 WO HCPCS SELF ADMINISTERED DRUGS (ALT 637 FOR MEDICARE OP): Performed by: PHYSICIAN ASSISTANT

## 2024-10-24 PROCEDURE — 82947 ASSAY GLUCOSE BLOOD QUANT: CPT

## 2024-10-24 PROCEDURE — 85025 COMPLETE CBC W/AUTO DIFF WBC: CPT

## 2024-10-24 PROCEDURE — 1200000002 HC GENERAL ROOM WITH TELEMETRY DAILY

## 2024-10-24 PROCEDURE — S4991 NICOTINE PATCH NONLEGEND: HCPCS

## 2024-10-24 PROCEDURE — 2500000002 HC RX 250 W HCPCS SELF ADMINISTERED DRUGS (ALT 637 FOR MEDICARE OP, ALT 636 FOR OP/ED): Performed by: PHYSICIAN ASSISTANT

## 2024-10-24 PROCEDURE — 80069 RENAL FUNCTION PANEL: CPT

## 2024-10-24 PROCEDURE — 2500000002 HC RX 250 W HCPCS SELF ADMINISTERED DRUGS (ALT 637 FOR MEDICARE OP, ALT 636 FOR OP/ED)

## 2024-10-24 PROCEDURE — 36415 COLL VENOUS BLD VENIPUNCTURE: CPT

## 2024-10-24 PROCEDURE — 36415 COLL VENOUS BLD VENIPUNCTURE: CPT | Performed by: PHYSICIAN ASSISTANT

## 2024-10-24 PROCEDURE — 80069 RENAL FUNCTION PANEL: CPT | Performed by: PHYSICIAN ASSISTANT

## 2024-10-24 PROCEDURE — 99233 SBSQ HOSP IP/OBS HIGH 50: CPT | Performed by: INTERNAL MEDICINE

## 2024-10-24 PROCEDURE — 99498 ADVNCD CARE PLAN ADDL 30 MIN: CPT

## 2024-10-24 RX ORDER — LANOLIN ALCOHOL/MO/W.PET/CERES
100 CREAM (GRAM) TOPICAL DAILY
Status: DISCONTINUED | OUTPATIENT
Start: 2024-10-25 | End: 2024-10-25 | Stop reason: HOSPADM

## 2024-10-24 RX ORDER — CARVEDILOL 3.12 MG/1
3.12 TABLET ORAL 2 TIMES DAILY
Status: DISCONTINUED | OUTPATIENT
Start: 2024-10-24 | End: 2024-10-25 | Stop reason: HOSPADM

## 2024-10-24 RX ORDER — INSULIN GLARGINE 100 [IU]/ML
15 INJECTION, SOLUTION SUBCUTANEOUS EVERY 24 HOURS
Status: DISCONTINUED | OUTPATIENT
Start: 2024-10-24 | End: 2024-10-25 | Stop reason: HOSPADM

## 2024-10-24 RX ORDER — INSULIN LISPRO 100 [IU]/ML
0-5 INJECTION, SOLUTION INTRAVENOUS; SUBCUTANEOUS
Status: DISCONTINUED | OUTPATIENT
Start: 2024-10-24 | End: 2024-10-25 | Stop reason: HOSPADM

## 2024-10-24 RX ADMIN — ACETAMINOPHEN 650 MG: 325 TABLET ORAL at 05:38

## 2024-10-24 RX ADMIN — INSULIN LISPRO 3 UNITS: 100 INJECTION, SOLUTION INTRAVENOUS; SUBCUTANEOUS at 17:19

## 2024-10-24 RX ADMIN — SPIRONOLACTONE 12.5 MG: 25 TABLET, FILM COATED ORAL at 08:41

## 2024-10-24 RX ADMIN — SENNOSIDES AND DOCUSATE SODIUM 2 TABLET: 50; 8.6 TABLET ORAL at 20:15

## 2024-10-24 RX ADMIN — INSULIN GLARGINE 15 UNITS: 100 INJECTION, SOLUTION SUBCUTANEOUS at 17:19

## 2024-10-24 RX ADMIN — THIAMINE HYDROCHLORIDE 500 MG: 100 INJECTION, SOLUTION INTRAMUSCULAR; INTRAVENOUS at 14:24

## 2024-10-24 RX ADMIN — ACETAMINOPHEN 650 MG: 325 TABLET ORAL at 17:21

## 2024-10-24 RX ADMIN — SERTRALINE 100 MG: 100 TABLET, FILM COATED ORAL at 08:41

## 2024-10-24 RX ADMIN — THIAMINE HYDROCHLORIDE 500 MG: 100 INJECTION, SOLUTION INTRAMUSCULAR; INTRAVENOUS at 21:54

## 2024-10-24 RX ADMIN — ATORVASTATIN CALCIUM 40 MG: 40 TABLET, FILM COATED ORAL at 20:15

## 2024-10-24 RX ADMIN — EMPAGLIFLOZIN 25 MG: 10 TABLET, FILM COATED ORAL at 08:41

## 2024-10-24 RX ADMIN — ASPIRIN 81 MG 81 MG: 81 TABLET ORAL at 08:41

## 2024-10-24 RX ADMIN — MUPIROCIN: 20 OINTMENT TOPICAL at 14:23

## 2024-10-24 RX ADMIN — POLYETHYLENE GLYCOL 3350 17 G: 17 POWDER, FOR SOLUTION ORAL at 08:41

## 2024-10-24 RX ADMIN — PANTOPRAZOLE SODIUM 40 MG: 40 TABLET, DELAYED RELEASE ORAL at 06:05

## 2024-10-24 RX ADMIN — SODIUM ZIRCONIUM CYCLOSILICATE 10 G: 10 POWDER, FOR SUSPENSION ORAL at 14:24

## 2024-10-24 RX ADMIN — THIAMINE HYDROCHLORIDE 500 MG: 100 INJECTION, SOLUTION INTRAMUSCULAR; INTRAVENOUS at 08:41

## 2024-10-24 RX ADMIN — Medication 1 TABLET: at 08:41

## 2024-10-24 RX ADMIN — SACUBITRIL AND VALSARTAN 0.5 TABLET: 24; 26 TABLET, FILM COATED ORAL at 08:41

## 2024-10-24 RX ADMIN — SENNOSIDES AND DOCUSATE SODIUM 2 TABLET: 50; 8.6 TABLET ORAL at 08:41

## 2024-10-24 RX ADMIN — HEPARIN SODIUM 5000 UNITS: 5000 INJECTION, SOLUTION INTRAVENOUS; SUBCUTANEOUS at 14:23

## 2024-10-24 RX ADMIN — CARVEDILOL 3.12 MG: 3.12 TABLET, FILM COATED ORAL at 12:10

## 2024-10-24 RX ADMIN — ACETAMINOPHEN 650 MG: 325 TABLET ORAL at 12:10

## 2024-10-24 RX ADMIN — AMIODARONE HYDROCHLORIDE 200 MG: 200 TABLET ORAL at 08:41

## 2024-10-24 ASSESSMENT — COGNITIVE AND FUNCTIONAL STATUS - GENERAL
DAILY ACTIVITIY SCORE: 24
MOBILITY SCORE: 24

## 2024-10-24 ASSESSMENT — ENCOUNTER SYMPTOMS
ACTIVITY CHANGE: 0
POLYDIPSIA: 0
AGITATION: 0
APPETITE CHANGE: 1
FATIGUE: 1
DIARRHEA: 0
SHORTNESS OF BREATH: 0
DYSURIA: 0
ABDOMINAL PAIN: 0
SORE THROAT: 0
CONFUSION: 0
SPEECH DIFFICULTY: 0
HEADACHES: 0
FREQUENCY: 0
NUMBNESS: 0
EYES NEGATIVE: 1
POLYPHAGIA: 0
DIAPHORESIS: 0
UNEXPECTED WEIGHT CHANGE: 0
COUGH: 0
NAUSEA: 0
JOINT SWELLING: 0
CHEST TIGHTNESS: 0

## 2024-10-24 ASSESSMENT — PAIN SCALES - WONG BAKER
WONGBAKER_NUMERICALRESPONSE: HURTS LITTLE BIT
WONGBAKER_NUMERICALRESPONSE: HURTS LITTLE BIT

## 2024-10-24 ASSESSMENT — PAIN SCALES - GENERAL
PAINLEVEL_OUTOF10: 1
PAINLEVEL_OUTOF10: 1

## 2024-10-24 NOTE — PROGRESS NOTES
Subjective:  Feeling well / has energy to sit up in bed. Went for short walk yesterday. Made it [place] way [place] before becoming SOB.    Today in brief:  - Further hospice discussions. Appreciate palliative care and social work. Meeting this afternoon.  - Hold spironolactone and entresto in setting of elevated potassium. Give lokelma.   - Start low dose coreg 3.125mg BID  - Decrease insulin glargine from 18 to 15u daily. SSI #1 while here. Can discharge on glargine and jardiance without SSI.    Objective:  Vitals:    10/24/24 0535   BP: 102/71   Pulse: 80   Resp: 18   Temp: 36.7 °C (98.1 °F)   SpO2: 93%     Weight         10/20/2024  0000 10/21/2024  2211 10/22/2024  0443 10/23/2024  0510 10/24/2024  0535    Weight: 69 kg (152 lb 1.9 oz) 73.4 kg (161 lb 13.1 oz) 73.1 kg (161 lb 2.5 oz) 72.7 kg (160 lb 4.4 oz) 71.1 kg (156 lb 12 oz)            Intake/Output Summary (Last 24 hours) at 10/24/2024 0728  Last data filed at 10/24/2024 0535  Gross per 24 hour   Intake 240 ml   Output 2050 ml   Net -1810 ml     Recent Results (from the past 24 hours)   POCT GLUCOSE    Collection Time: 10/23/24 12:00 PM   Result Value Ref Range    POCT Glucose 170 (H) 74 - 99 mg/dL   POCT GLUCOSE    Collection Time: 10/23/24  4:46 PM   Result Value Ref Range    POCT Glucose 203 (H) 74 - 99 mg/dL   CBC and Auto Differential    Collection Time: 10/23/24  7:35 PM   Result Value Ref Range    WBC 9.8 4.4 - 11.3 x10*3/uL    nRBC 0.0 0.0 - 0.0 /100 WBCs    RBC 6.06 (H) 4.50 - 5.90 x10*6/uL    Hemoglobin 18.6 (H) 13.5 - 17.5 g/dL    Hematocrit 56.1 (H) 41.0 - 52.0 %    MCV 93 80 - 100 fL    MCH 30.7 26.0 - 34.0 pg    MCHC 33.2 32.0 - 36.0 g/dL    RDW 15.7 (H) 11.5 - 14.5 %    Platelets 119 (L) 150 - 450 x10*3/uL    Neutrophils % 89.7 40.0 - 80.0 %    Immature Granulocytes %, Automated 0.3 0.0 - 0.9 %    Lymphocytes % 5.3 13.0 - 44.0 %    Monocytes % 4.4 2.0 - 10.0 %    Eosinophils % 0.1 0.0 - 6.0 %    Basophils % 0.2 0.0 - 2.0 %     Neutrophils Absolute 8.76 (H) 1.20 - 7.70 x10*3/uL    Immature Granulocytes Absolute, Automated 0.03 0.00 - 0.70 x10*3/uL    Lymphocytes Absolute 0.52 (L) 1.20 - 4.80 x10*3/uL    Monocytes Absolute 0.43 0.10 - 1.00 x10*3/uL    Eosinophils Absolute 0.01 0.00 - 0.70 x10*3/uL    Basophils Absolute 0.02 0.00 - 0.10 x10*3/uL   Magnesium    Collection Time: 10/23/24  7:35 PM   Result Value Ref Range    Magnesium 2.29 1.60 - 2.40 mg/dL   Renal Function Panel    Collection Time: 10/23/24  7:35 PM   Result Value Ref Range    Glucose 184 (H) 74 - 99 mg/dL    Sodium 134 (L) 136 - 145 mmol/L    Potassium 5.7 (H) 3.5 - 5.3 mmol/L    Chloride 97 (L) 98 - 107 mmol/L    Bicarbonate 29 21 - 32 mmol/L    Anion Gap 14 10 - 20 mmol/L    Urea Nitrogen 16 6 - 23 mg/dL    Creatinine 1.29 0.50 - 1.30 mg/dL    eGFR 65 >60 mL/min/1.73m*2    Calcium 9.3 8.6 - 10.6 mg/dL    Phosphorus 2.3 (L) 2.5 - 4.9 mg/dL    Albumin 3.7 3.4 - 5.0 g/dL   POCT GLUCOSE    Collection Time: 10/23/24 10:17 PM   Result Value Ref Range    POCT Glucose 245 (H) 74 - 99 mg/dL   POCT GLUCOSE    Collection Time: 10/24/24  6:48 AM   Result Value Ref Range    POCT Glucose 87 74 - 99 mg/dL     Inpatient Medications:  Scheduled medications   Medication Dose Route Frequency    acetaminophen  650 mg oral q6h    amiodarone  200 mg oral Daily    aspirin  81 mg oral Daily    atorvastatin  40 mg oral Nightly    empagliflozin  25 mg oral Daily    heparin (porcine)  5,000 Units subcutaneous q8h JANEY    insulin glargine  18 Units subcutaneous q24h    insulin lispro  0-5 Units subcutaneous Nightly    [Held by provider] insulin lispro  0-5 Units subcutaneous TID AC    [Held by provider] insulin lispro  5 Units subcutaneous TID AC    multivitamin with minerals  1 tablet oral Daily    mupirocin   Topical TID    nicotine  1 patch transdermal Daily    Followed by    [START ON 12/2/2024] nicotine  1 patch transdermal Daily    Followed by    [START ON 12/16/2024] nicotine  1 patch  transdermal Daily    pantoprazole  40 mg oral Daily before breakfast    polyethylene glycol  17 g oral Daily    sacubitriL-valsartan  0.5 tablet oral BID    sennosides-docusate sodium  2 tablet oral BID    sertraline  100 mg oral Daily    spironolactone  12.5 mg oral Daily    thiamine  500 mg intravenous TID    tiotropium  2 puff inhalation Daily     PRN medications   Medication    alteplase    dextrose    dextrose    glucagon    glucagon    lactulose    artificial tears    oxygen    trimethobenzamide     Continuous Medications   Medication Dose Last Rate       Telemetry 10/24/2024 (personally reviewed): SR 70s, rare     Physical exam:  General: NAD, sitting on edge of bed  Head/ neck: spacing in teeth. atraumatic  Cardiac: RRR, regular S1 S2 , no murmur, no rub, no gallop  Pulm: CTA bilaterally, no wheezes, rales or rhonchi.    Vascular: Radial 2+ bilaterally  GI: Non distended  Extremities: no LE edema   Neuro: no focal neuro deficits   Psych: appropriate mood and behavior   Skin: warm and dry     Assessment/Plan   54 y.o. male with a hx mixed ischemic/toxic mediated HFrEF (EF 5-10%, fibrosis and transmural infarcts seen on cMRI 8/2024) s/p [employer] single chamber ICD (3/2024), CKD3a, HTN, HLD, LUZ (crack cocaine), former tobacco use, COPD (nocturnal 2L O2 at baseline), DM2, remote left cerebellar hemorrhagic infarct, medication noncompliance, anxiety and depression, initially admitted for altered mental status and hypotension, transferred to the HFICU after Code blue in MRI on 10/18 for undetectable blood pressure and treated for shock w/ partial cardiogenic component, now transferred back to the floor.      Acute on chronic decompensated mixed ischemic/nonischemic HFrEF 5-10%  s/p single chamber ICD 3/2024  - TTE 10/17: EF 10-15%, gHK of LV, LV severely dilated, severe eccentric LVH, impaired DF, L mildly dilated, RV mildly enlarged with mildly reduced systolic fxn, RA mild to mod dilated, mild MR/TR, RVSP  25.8 wnl  - cMRI 8/5: mixed ICM/NICM, sev dilated LV EF 5-10%. Basal inferoseptal, mid inferoseptal, apical septal, apicolateral segments akinetic. Eccentric LVH. Transmural infarction of mid/apical inferoseptal/inferior segments and apicolateral wall (<25% wall thickeness). NICM pattern of mid wall septal fibrosis.  - Troponin mildly elevated 69>52>100>88  - Admit EKG: No acute ischemic changes  - Transmural infarcts on cardiac MRI. Coronary Calcifications on CT chest  - CXR 10/20: Marked enlargement of cardiac silhouette, no acute cardiopulmonary process   - Admit BNP 2833 (>5K)  - Admit Wt: 77.3 kg, last dc weight 74.4kg on 10/5  - home GDMT: Carvedilol 3.125, Entresto 24-26 BID, Empagliflozin 10 daily, Spironolactone 25 daily, Diuretic: Torsemide 40 mg daily  - Appears warm and dry, reintroduce/titrate GDMT as BP/renal function allows  - resumed Empagliflozin (25 daiy per endocrine) and low dose spironolactone.   - Re-introduced entresto but at very low dose 10/23   - Hyperkalemic today, thus will hold spironolactone and entresto. Give Lokelma 10mg TID. Repeat RFP in evening.  - Add low dose coreg 3.125mg BID today  - continue ASA given plt > 50k  - cont Secondary vascular prevention with Atorvastatin 40 nightly     GOC  End of Life  Anxiety/Depression  Acute pain  - Not a candidate for advanced therapies due to ongoing substance use, noncompliance, & psychiatric concerns   - per discussion with HF, CPR and intubation would not be beneficial, discussed with pt, pt agreeable with DNR/DNI  - Goals are mix of survival and time and improved quality of life  - Palliative care consulted/following, appreciate recs. Patient intermittently receptive. Palliative care discussion today.  - Psych consulted/following for depression/suicidal ideations, signed off.   - if patient wants to leave AMA will need to have a capacity assessment done at that time   - cont sertraline 100 mg PO daily   - PO Tylenol juanita q 6hrs for pain     "  VT / VF   Hx of AF RVR   [employer] single chamber ICD (3/2024)   - Device interrogation 9/30: 4 sustained VT episodes since 9/1/24 requiring ATP and ATP/shock for arrhythmia termination and restoration of SR, last shock were recorded from 9/5/2024 which were appropriate for VT. : <1%. 12yr battery life. Lead fxn WNL.   - Patient without any shocks or tachyarrhythmias since amiodarone loading   - For episiode 9/5 prior EP consult noted:\" Possible dual tachycardia with PAF RVR and concurrent VT/VF in the setting of active cocaine use.\" Did not feel RA lead placement appropriate in light of ongoing cocaine use and increased risk of infection. NO OAC recommended.  - resuming coreg today  - cont Amio 200 mg daily  - cont telemetry, keep K+ >4, Mag >2     GUICHO on CKD, stage 3a  - in the setting of cardiogenic shock, low output stage  - b/l Cr: 1.3-1.7   - Admit BUN/Cr (10/16): 44/2.41, Peak (10/20): 67/3.08  - MALCOLM 10/17: b/l echogenic kidney likely 2/2 medical renal disease   - Cr  improved/normalized (1.29, 1.17, 1.43, 1.64, 2.62)   - Avoid nephrotoxins and hypotension, trend daily RFP while admitted      COPD  Former tobacco user  - 2L at night at baseline  - s/p 60 mg prednisone x 1 in ED 10/16  - cont Spiriva  - cont nicotine patch   - keep O2 sat> 92%      Substance use disorder, cocaine  - cessation advised  - Admit Urine tox + cocaine      Type II Diabetes  - A1C 9.9% on 10/15  - Endocrinology consulted/following, appreciate recs  - reduce Glargine 18 to 5 units daily, SSI scale #1, discontinue lispro with meals  -Jardiance 25 mg daily   - for discharge, continue glargine and jardiance without SSI  - diabetic diet, Accu-Cheks AC/HS, hypoglycemic protocol      Remote cerebellar Hemorrhagic infarct   Concern for CNS infection   Acute Headache, resolved  Vision Disturbance vs chronic near sightedness   - Ophtho consulted, recs appreciated, suspect vision likely 2/2 uncorrected refractive errors, outpatient " follow up  - CT head 10/17: Negative   - MRI brain (10/19): no evidence of hemorrhage or mass effect. Remote left cerebellar infarct.   - Neurology consulted 10/19 for concern of CNS infection, appreciate recs  - LP was initially recommended however discussed with pt and he refused   - Given significant & quick improvement, likelihood of CNS infection is low   - Per Neurology, no further need for CNS coverage atb's, Ceftriaxone, Vancomycin and Acyclovir stopped (10/19 -10/21), Neuro now s/o  - Cont  IV thiamine 500mg TID for 3 days followed by 500mg daily > transition to PO 100mg daily  - 10/16 Blood Cx x2, negative and final, Strep/Legionella negative  -  MRSA nare swab +, treat with mupirocin  - 10/19 Blood Cx x2 , negative to date, afebrile, WBC wnl  - cont artifical tears QID     Substance Use Disorder  - 10/17 UDS: cocaine +   - hx of daily cocaine use, now weekly cocaine use per pt  - Tobacco use: 3 cigarettes per day  - cont Nicotine patch   - Not interested in THRIVE. Feels he can quit on his own.     DVT ppx: subq Heparin  DISPO:   - Pending further medical optimization  - referral for Food [facility]  10/22, Clinical Pharmacy referral  10/18 by Endo, and [facility] referral 10/18  - PT rec no needs at discharge  Code status: DNR and No Intubation    Patient seen and discussed with Dr. Stiven Brown.  Vicky Harkins PA-C

## 2024-10-24 NOTE — PROGRESS NOTES
[facility] Attending Shared Visit Note    This is a shared visit. Please see Advanced Practice Provider's encounter note for additional details.        Overnight events/Subjective: reported being open to hospice initially and then declined it.     Exam:   Physical exam: alert, no distress. Normal rate, regular rhythm, non labored breathing, clear to auscultation, abdomen non distended, no LE edema, no focal neuro deficits.     A/P: 54 M with non ischemic HFrEF s/p primary prevention [employer] ICD (3/2024), CKD, polysubstance use, COPD, DM and challenges with medication adherence who presented with blurry vision, lethargy and dizziness. Course notable for acute decompensated HF and cardiogenic shock.   #GUICHO and hyperkalemia: will hold further Entresto and spironolactone now. Add lokelma. Rise in creatinine may be in the setting of Entresto but will keep close eye because worsening cardiac output may also be contributing now that he is off of dobutamine.   #Acute decompensated HF: required pressors/inotropes in the HFICU. Is now improved GDMT as above. Trial restarting carvedilol 3.125 mg BID on 10/24.   #PSUD: ongoing crack cocaine use. Declines thrive consult.   #Prognosis: poor, declines hospice.     Dispo: pending tolerance of GDMT and renal function, has outpatient HF follow up set up.     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    Altered mental status    Acute on chronic kidney failure (CMS-HCC)      Objective   Admit Date: 10/16/2024  Hospital Length of Stay: 6   Home: [facility] [id number]    Vitals:      10/24/2024    12:08 PM 10/24/2024     7:16 AM 10/24/2024     5:35 AM 10/23/2024     8:18 PM 10/23/2024     3:30 PM 10/23/2024    12:00 PM 10/23/2024     7:11  AM   Vitals   Systolic 106 107 102 97 110 100 118   Diastolic 74 73 71 64 72 67 80   Heart Rate 72 69 80 62 83 71 72   Temp 36.3 °C (97.3 °F) 36.4 °C (97.5 °F) 36.7 °C (98.1 °F) 36.3 °C (97.3 °F) 36 °C (96.8 °F) 36.2 °C (97.2 °F) 36.2 °C (97.2 °F)   Resp 20 18 18 18 18 18 18   Weight (lb)   156.75       BMI   23.15 kg/m2       BSA (m2)   1.86 m2         Wt Readings from Last 5 Encounters:   10/24/24 71.1 kg (156 lb 12 oz)   10/15/24 77.1 kg (170 lb)   10/05/24 74.4 kg (164 lb 0.4 oz)   09/08/24 79.4 kg (175 lb 0.7 oz)   08/06/24 73.7 kg (162 lb 7.7 oz)       Intake/Output Summary (Last 24 hours) at 10/24/2024 1244  Last data filed at 10/24/2024 0535  Gross per 24 hour   Intake --   Output 1300 ml   Net -1300 ml         MEDICATIONS  Infusions:     Scheduled:  acetaminophen, 650 mg, q6h  amiodarone, 200 mg, Daily  aspirin, 81 mg, Daily  atorvastatin, 40 mg, Nightly  carvedilol, 3.125 mg, BID  empagliflozin, 25 mg, Daily  heparin (porcine), 5,000 Units, q8h JANEY  insulin glargine, 15 Units, q24h  insulin lispro, 0-5 Units, TID  multivitamin with minerals, 1 tablet, Daily  mupirocin, , TID  nicotine, 1 patch, Daily   Followed by  [START ON 12/2/2024] nicotine, 1 patch, Daily   Followed by  [START ON 12/16/2024] nicotine, 1 patch, Daily  pantoprazole, 40 mg, Daily before breakfast  polyethylene glycol, 17 g, Daily  [Held by provider] sacubitriL-valsartan, 0.5 tablet, BID  sennosides-docusate sodium, 2 tablet, BID  sertraline, 100 mg, Daily  sodium zirconium cyclosilicate, 10 g, TID  [Held by provider] spironolactone, 12.5 mg, Daily  thiamine, 500 mg, TID  tiotropium, 2 puff, Daily      PRN:  alteplase, 2 mg, PRN  dextrose, 12.5 g, q15 min PRN  dextrose, 25 g, q15 min PRN  glucagon, 1 mg, q15 min PRN  glucagon, 1 mg, q15 min PRN  lactulose, 30 g, BID PRN  artificial tears, 2 drop, 4x daily PRN  oxygen, , Continuous PRN - O2/gases  trimethobenzamide, 200 mg, q6h PRN      Prior to Admission Meds:  Medications Prior to  "Admission   Medication Sig Dispense Refill Last Dose/Taking    amiodarone (Pacerone) 200 mg tablet Take 1 tablet (200 mg) by mouth once daily. 30 tablet 0 Unknown    aspirin 81 mg chewable tablet Chew 1 tablet (81 mg) once daily. 30 tablet 5 Unknown    atorvastatin (Lipitor) 80 mg tablet Take 1 tablet (80 mg) by mouth once daily at bedtime. 30 tablet 5 Unknown    blood sugar diagnostic strip Use as directed to test blood glucose up to four times daily and as needed. 200 each 5 Unknown    blood-glucose meter misc Inject 1 each under the skin 4 times a day with meals. Check blood glucose 4 times daily, before meals and at bedtime. 1 each 0 Unknown    empagliflozin (Jardiance) 10 mg TAKE 1 TABLET BY MOUTH ONCE DAILY 30 tablet 5 Unknown    glucose 4 gram chewable tablet Chew 2 tablets (8 g) if needed for low blood sugar - see comments. 50 tablet 12 Unknown    insulin glargine (Lantus) 100 unit/mL (3 mL) pen Inject 30 Units under the skin once daily at bedtime. 15 mL 5 Unknown    insulin lispro (HumaLOG) 100 unit/mL injection Inject 0-10 Units under the skin 3 times daily (morning, midday, late afternoon). Hypoglycemia protocol if Blood Glucose is between 0 - 70 mg/dL, 0 unit(s) if , 2 unit(s) if 151-200, 4 unit(s) if 201-250, 6 unit(s) if 251-300, 8 unit(s) if 301-350, 10 unit(s) if 351-400. Notify provider unit(s) if Blood Glucose is greater than 400 mg/dL 15 mL 5 Unknown    sertraline (Zoloft) 100 mg tablet Take 1 tablet (100 mg) by mouth once daily. 30 tablet 5 Unknown    carvedilol (Coreg) 3.125 mg tablet Take 1 tablet (3.125 mg) by mouth 2 times a day. 60 tablet 0 Unknown    lancets 33 gauge misc Inject 1 each under the skin 4 times a day. 100 each 5 Unknown    pen needle 1/2\" 29G X 12mm needle Use to inject 1-4 times daily as directed. 100 each 5 Unknown    sacubitriL-valsartan (Entresto) 24-26 mg tablet Take 1 tablet by mouth 2 times a day. 60 tablet 3 Unknown    spironolactone (Aldactone) 25 mg tablet Take " 0.5 tablets (12.5 mg) by mouth once daily. 15 tablet 5 Unknown    tiotropium (Spiriva Respimat) 2.5 mcg/actuation inhaler Inhale 2 puffs once daily. 4 g 2 Unknown    torsemide (Demadex) 20 mg tablet Take 2 tablets (40 mg) by mouth once daily. 60 tablet 3 Unknown       DATA:  CMP:  Recent Labs     10/24/24  1033 10/23/24  1935 10/22/24  1902 10/22/24  0659 10/21/24  0530 10/20/24  1359 10/20/24  0333 10/19/24  1706 10/19/24  0711 10/18/24  0744   * 134* 131* 134* 134* 131* 131* 134* 127* 134*   K 5.9* 5.7* 4.4 4.6 3.8 4.1 4.5 4.6 4.1 3.6   CL 95* 97* 98 100 100 93* 91* 93* 93* 97*   CO2 30 29 25 29 28 27 27 23 21 25   ANIONGAP 11 14 12 10 10 15 18 23* 17 16   BUN 15 16 18 26* 41* 66* 67* 62* 58* 41*   CREATININE 1.47* 1.29 1.17 1.43* 1.64* 2.62* 3.08* 2.58* 2.10* 1.64*   EGFR 56* 65 74 58* 49* 28* 23* 29* 36* 49*   MG  --  2.29 2.01 2.14 2.08  --  2.25 2.35 2.26 2.00     Recent Labs     10/24/24  1033 10/23/24  1935 10/22/24  1902 10/22/24  0659 10/21/24  0530 10/20/24  1359 10/20/24  0333 10/19/24  1706 10/19/24  0711 10/18/24  0744 10/17/24  1934 10/17/24  0411 10/16/24  2238 10/15/24  1742 10/15/24  1032 10/01/24  1539 09/30/24  1701 09/08/24  0431 09/07/24  0949   ALBUMIN 3.5 3.7 3.3* 3.2* 3.0* 3.1* 3.8 4.1 3.8 3.7   < > 3.5 4.2   < > 4.5   < > 3.8   < > 3.8   ALT  --   --   --   --   --   --  22  --  24 21  --  29 36  --  50  --  33  --  70*   AST  --   --   --   --   --   --  16  --  21 15  --  15 19  --  42*  --  25  --  40*   BILITOT  --   --   --   --   --   --  0.6  --  0.9 0.8  --  0.5 0.5  --  1.2  --  1.0  --  1.0    < > = values in this interval not displayed.     CBC:  Recent Labs     10/23/24  1935 10/22/24  1902 10/22/24  0659 10/21/24  0530 10/20/24  0333 10/19/24  1706 10/19/24  0711 10/18/24  0744   WBC 9.8 9.5 5.7 7.7 7.4 11.1 11.0 9.4   HGB 18.6* 16.1 15.9 17.1 19.9* 21.2* 19.6* 19.0*   HCT 56.1* 47.9 47.6 51.2 57.5* 63.3* 58.3* 55.1*   * 101* 90* 97* 112* 132* 154 152   MCV 93 93  93 91 89 91 91 88     COAG:   Recent Labs     10/19/24  1930 09/30/24  1704 09/07/24  0949 12/10/23  0255 12/09/23  2057 10/23/23  1851 07/24/23  1330 03/02/23  0225   INR 1.2* 1.2* 1.2*  --  1.2* 1.3* 1.2* 1.3*   HAUF  --   --   --  0.1  --   --   --   --      ABO:   Recent Labs     09/30/24  1704   ABO A     HEME/ENDO:   Recent Labs     10/20/24  0333 10/17/24  1542 10/17/24  0027 10/15/24  1742 09/07/24  1016 07/30/24  0815 07/30/24  0731 06/20/24  0749 01/09/24  0653   FERRITIN 242  --   --   --   --   --   --   --   --    IRONSAT 14*  --   --   --   --   --   --   --   --    TSH  --  1.66 5.71*  --  2.68 2.65  --   --   --    FREET4  --   --  1.18  --   --   --   --   --   --    HGBA1C  --   --   --  9.9*  --   --  8.4* 6.9* 10.1*     CARDIAC:   Recent Labs     10/21/24  0530 10/20/24  0333 10/17/24  0027 10/16/24  2238 10/15/24  1147 10/15/24  1032 10/01/24  1033 09/30/24 2025 09/30/24  1844 09/30/24  1701 09/07/24  0128 09/07/24  0006 07/31/24  1851 07/30/24  0815 07/30/24  0731 02/16/24  0533 02/15/24  0656 02/12/24  1239 12/27/23  0759   CKMB  --   --   --   --   --   --   --   --   --   --   --   --   --   --   --   --   --   --  1.6   TROPHS 88* 100* 52 69* 50 70* 54* 67*   < >  --    < > 75*  --    < > 125*  --   --    < >  --    BNP  --   --   --  479*  --  2,833*  --   --   --  >5,000*  --  2,585* 1,014*  --  3,240* 2,900* 3,752*   < >  --     < > = values in this interval not displayed.     Recent Labs     09/07/24  0949 07/25/23  0820 06/18/23  0627   CHOL 127 156 168   LDLF  --  97 103*   LDLCALC 72  --   --    HDL 35.4 40.5 35.6*   TRIG 98 92 146     TOX:  Recent Labs     10/20/24  1001 10/17/24  0511 10/15/24  2238   AMPHETAMINE Presumptive Negative Presumptive Negative Presumptive Negative   BENZO Presumptive Negative Presumptive Negative Presumptive Negative   CANNABINOID Presumptive Negative Presumptive Negative Presumptive Negative   COCAI Presumptive Positive* Presumptive Positive*  Presumptive Positive*   FENTANYL Presumptive Negative Presumptive Negative Presumptive Negative   OPIATE Presumptive Positive* Presumptive Negative Presumptive Negative   OXYCODONE Presumptive Negative Presumptive Negative Presumptive Negative   PCP Presumptive Negative Presumptive Negative Presumptive Negative     MICRO:   Recent Labs     10/17/24  1000 10/17/24  0542 03/02/23  1112 03/02/23  0225   CRP 0.21  --   --  1.54*   PROCAL 0.27* 0.31* 1.55* 1.15*     Susceptibility data from last 90 days.  Collected Specimen Info Organism   10/20/24 Swab from Anterior Nares Methicillin Susceptible Staphylococcus aureus (MSSA)       FOLLOWUP:   Future Appointments   Date Time Provider Department Center   11/5/2024  9:00 AM Mayco Skinner MD [id number]

## 2024-10-24 NOTE — PROGRESS NOTES
"Leonel Yu is a 54 y.o. male on day 6 of admission presenting with No Principal Problem: There is no principal problem currently on the Problem List. Please update the Problem List and refresh..  Met with pt along with CORAZON Frank PA-C, to discuss with him about comprehension of his end stage heart failure.  Pt unable to provide his knowledge of his medical status.  He was asked  his understanding of what DNR meant.  He understood  that it meant chest compressions and possibly mechanical ventilation.  Pt stated he was \"willing to take the chance\" and \"maybe they will find someone with a new heart\".  He didn't understand that he was not a candidate for heart transplant.  That was explored with him further. Reviewed that CPR would likely be ineffective and non-beneficial; and therefore is not being offered. He was informed that his ICD will shock his heart as long as his heart was functioning.  He was told that once his heart muscle was no longer strong enough to work that the ICD would not longer work to revive him.  He stated \"well maybe a different hospital would do it\"  Pt expressed his fear of returning home with his roommate who is awake at all hours of  the night, expects him to walk the dogs and fix the cars.  Pt stating that his current home with his roommate does not allow for rest. He  demands pt to work, care for the home, and have many responsibilities. His roommate also has his children that live with them. SW looking into nursing facility with hospice to follow to better support pt. Pt wishes to go straight to the facility vs having to go home first.        MICHAEL JIM      "

## 2024-10-24 NOTE — PROGRESS NOTES
"Leonel Yu is a 54 y.o. male on day 6 of admission presenting with No Principal Problem: There is no principal problem currently on the Problem List. Please update the Problem List and refresh..    Palliative Medicine following for:  Complex medical decision making, symptom management, patient/family support    History obtained from chart review including ED note, H&P, patient's daily progress notes, review of lab/test results, and discussion with primary team and bedside RN.    Subjective    History of Present Illness  Mr. Leonel Yu is a 53 yo male with HFrEF 5-10% s/p [employer] single chamber ICD (3/2024), CKD3a, HTN, HLD, LUZ (crack cocaine), former tobacco use, COPD (on nocturnal 2L at baseline), DM2, remote left cerebellar hemorrhagic infarct, medication non-adherence, SI, anxiety, and major depression disorder who presented with ADHF; and is not a candidate for advanced therapies.       Symptoms  Pain: denies  Dyspnea: denies  Fatigue: denies  Insomnia: several interruptions  Drowsiness: denies  Constipation: denies  Nausea: denies  Appetite: improved  Anxiety: denies  Depression: denies    Objective    Last Recorded Vitals  /73 (BP Location: Left arm, Patient Position: Lying)   Pulse 69   Temp 36.4 °C (97.5 °F) (Temporal)   Resp 18   Ht 1.753 m (5' 9\")   Wt 71.1 kg (156 lb 12 oz)   SpO2 93%   BMI 23.15 kg/m²      Physical Exam   Constitutional:       Appearance: Normal appearance.   HENT:      Head: Normocephalic and atraumatic.   Cardiovascular:      Rate and Rhythm: Normal rate and regular rhythm.   Pulmonary:      Effort: Pulmonary effort is normal.      Breath sounds: Normal breath sounds.   Musculoskeletal:         General: Normal range of motion.   Skin:     General: Skin is warm and dry.   Neurological:      Mental Status: Conversant, in good spirits, pleasant.     Relevant Results   Results for orders placed or performed during the hospital encounter of 10/16/24 (from " the past 24 hours)   POCT GLUCOSE   Result Value Ref Range    POCT Glucose 170 (H) 74 - 99 mg/dL   POCT GLUCOSE   Result Value Ref Range    POCT Glucose 203 (H) 74 - 99 mg/dL   CBC and Auto Differential   Result Value Ref Range    WBC 9.8 4.4 - 11.3 x10*3/uL    nRBC 0.0 0.0 - 0.0 /100 WBCs    RBC 6.06 (H) 4.50 - 5.90 x10*6/uL    Hemoglobin 18.6 (H) 13.5 - 17.5 g/dL    Hematocrit 56.1 (H) 41.0 - 52.0 %    MCV 93 80 - 100 fL    MCH 30.7 26.0 - 34.0 pg    MCHC 33.2 32.0 - 36.0 g/dL    RDW 15.7 (H) 11.5 - 14.5 %    Platelets 119 (L) 150 - 450 x10*3/uL    Neutrophils % 89.7 40.0 - 80.0 %    Immature Granulocytes %, Automated 0.3 0.0 - 0.9 %    Lymphocytes % 5.3 13.0 - 44.0 %    Monocytes % 4.4 2.0 - 10.0 %    Eosinophils % 0.1 0.0 - 6.0 %    Basophils % 0.2 0.0 - 2.0 %    Neutrophils Absolute 8.76 (H) 1.20 - 7.70 x10*3/uL    Immature Granulocytes Absolute, Automated 0.03 0.00 - 0.70 x10*3/uL    Lymphocytes Absolute 0.52 (L) 1.20 - 4.80 x10*3/uL    Monocytes Absolute 0.43 0.10 - 1.00 x10*3/uL    Eosinophils Absolute 0.01 0.00 - 0.70 x10*3/uL    Basophils Absolute 0.02 0.00 - 0.10 x10*3/uL   Magnesium   Result Value Ref Range    Magnesium 2.29 1.60 - 2.40 mg/dL   Renal Function Panel   Result Value Ref Range    Glucose 184 (H) 74 - 99 mg/dL    Sodium 134 (L) 136 - 145 mmol/L    Potassium 5.7 (H) 3.5 - 5.3 mmol/L    Chloride 97 (L) 98 - 107 mmol/L    Bicarbonate 29 21 - 32 mmol/L    Anion Gap 14 10 - 20 mmol/L    Urea Nitrogen 16 6 - 23 mg/dL    Creatinine 1.29 0.50 - 1.30 mg/dL    eGFR 65 >60 mL/min/1.73m*2    Calcium 9.3 8.6 - 10.6 mg/dL    Phosphorus 2.3 (L) 2.5 - 4.9 mg/dL    Albumin 3.7 3.4 - 5.0 g/dL   POCT GLUCOSE   Result Value Ref Range    POCT Glucose 245 (H) 74 - 99 mg/dL   POCT GLUCOSE   Result Value Ref Range    POCT Glucose 87 74 - 99 mg/dL        Allergies  Patient has no known allergies.  Medications  Scheduled medications  acetaminophen, 650 mg, oral, q6h  amiodarone, 200 mg, oral, Daily  aspirin, 81 mg,  oral, Daily  atorvastatin, 40 mg, oral, Nightly  carvedilol, 3.125 mg, oral, BID  empagliflozin, 25 mg, oral, Daily  heparin (porcine), 5,000 Units, subcutaneous, q8h JANEY  insulin glargine, 15 Units, subcutaneous, q24h  insulin lispro, 0-5 Units, subcutaneous, TID  multivitamin with minerals, 1 tablet, oral, Daily  mupirocin, , Topical, TID  nicotine, 1 patch, transdermal, Daily   Followed by  [START ON 12/2/2024] nicotine, 1 patch, transdermal, Daily   Followed by  [START ON 12/16/2024] nicotine, 1 patch, transdermal, Daily  pantoprazole, 40 mg, oral, Daily before breakfast  polyethylene glycol, 17 g, oral, Daily  [Held by provider] sacubitriL-valsartan, 0.5 tablet, oral, BID  sennosides-docusate sodium, 2 tablet, oral, BID  sertraline, 100 mg, oral, Daily  [Held by provider] spironolactone, 12.5 mg, oral, Daily  thiamine, 500 mg, intravenous, TID  tiotropium, 2 puff, inhalation, Daily      Continuous medications     PRN medications  PRN medications: alteplase, dextrose, dextrose, glucagon, glucagon, lactulose, artificial tears, oxygen, trimethobenzamide     Assessment/Plan    Mr. Leonel Yu is a 55 yo  male with HFrEF 5-10% s/p [employer] single chamber ICD (3/2024), CKD3a, HTN, HLD, LUZ (crack cocaine), former tobacco use, COPD (on nocturnal 2L at baseline), DM2, remote left cerebellar hemorrhagic infarct, medication non-adherence, SI, anxiety, and major depression disorder who presented with ADHF; and is not a candidate for advanced therapies.     10/24: Pt with inconsistent wishes and health preferences. Poor recollection of conversations. Wishes full code (was unilaterally made DNR/DNI 10/22) and now declines hospice r/t to his new goal. Pall Espinoza order placed.    Palliative Performance Scale (PPS):  "70    ----------------------------------------------------------------------------------------------------------------------------------------------------------------------------------------------------------------------------------------------------------------------------------------------------------------------------------------------------------------------      I spent 52 minutes in providing separately identifiable ACP services with the patient and/or surrogate decision maker in a voluntary conversation discussing the patient's wishes and goals as detailed in the above note.   ----------------------------------------------------------------------------------------------------------------------------------------------------------------------------------------------------------------------------------------------------------------------------------------------------------------------------------------------------------------------    #Complex Medical Decision Making   #Goals of Care  #Advance Care Planning   - Code status: DNR/DNI (unilateral)  - Surrogate decision maker: Elizabet Santana (Friend)  [phone]   - Goals are mix of survival and time and improved quality of life  - 10/24: Bedside discussion with pt, Pall NP, and SW today regarding conversation about hospice done 10/23. Pt states he does not recall the conversation and what was explained. Pt states there are so many providers, he forgets what was said as soon at they leave. As a result, SW and Pall NP reviewed what hospice means. Pt states hospice does not align with his wishes and goals as he is overall, feeling much better, both mentally and physically. As a result, pt wishes to be made full code and all resuscitative measures despite prior conversations with palliative regarding his end stage HF and poor likelihood for survival or regaining current QOL. In response, pt states \"I promise I will come back to this\" (to his current QOL). Np " "educated pt to HF as a progressive and life limiting disease; that medications work for only so long as they do not fix the damaged and weak heart. Explained that if the heart stops, it is because it is too weak to sustain life. NP expressed concern that doing things like CPR on a heart that has stopped d/t failure is medically non-beneficial. Discussed that if CPR was done and pt did survive, expressed concern about possibly living/surviving in a vegetative state without his cognition with tubes and ventilator. Pt states at that time, he would be ok with DNR/DNI. NP further discussed DNR as a \"safety net\", that care, medications, treatments all stay the same. It is only in the state where his heart stops d/t it's advanced failure, that we would not offer CRP. Pt states that his goal is to live and to stay away from the drugs, and that's what he needs to do.     To align best with pt's health preferences, educated to Palliative Navigator to assist with monitoring for signs of decompensation and offer more streamline care. (Navigator can also help with hospice/comfort care transition as necessary). Pt was agreeable.    As a result of conversation, hospice meeting/referral canceled. Pall Espinoza referral placed. Primary notified of statements above.    10/24: ADDENDUM  Chart notable for unilateral DNR/DNI made 10/21, needing to assure pt understands based on his earlier wishes/statements. Tyrell NP, Vicky COLLAZO, and Anna LIU, revisited pt and ascertained pt's level of understanding of his HF. Pt stating it is mild HF although he thinks it might be severe; \"maybe working half\". Educated again to above statements r/t a progressive, life limiting disease. Pt made aware of his end stage HF which is functioning at about 5%. Reviewed with pt that he has an ICD, which will shock his heart back as long as his heart is still able to produce the electrical conductivity. Aware that only when heart is \"dead\" and without any electrical " "conductivity that CPR will not be offered. Reviewed again that CPR would likely be ineffective and non-beneficial; and therefore is not being offered. Pt stating he has a much better idea and understanding of what is going on.    Spoke again of hospice to have more support and monitoring than what Tyrell Doran can offer. Educated to 24/7 availability, more tailored care per need, and more \"aggressive\" symptom management as symptoms appear, along with endo of life support and plan for when that time comes. Pt is again agreeable.     Pt stating that his current home with dilip does not allow for rest. Dilip demands pt to work, care for the home, have responsibilities, attend the dogs, and work on the cars. Furthermore, there is often a house full of children. SW involved, pt approves of looking into nursing facility with hospice component to better support pt. Pt wishes to go straight to the facility vs having to go home first. See Pall NOE note.    Answered questions. Pt appreciative of the added information.      #Psychosocial Support  - Ongoing pt support  - Music Therapy  - Spiritual Care Support  - Art Therapy  - Palliative SW Support- engaging hospice. Looking into nursing facility as home life is not supportive of his poor health.       Plan of Care discussed with: Updated primary and bedside RN on goals of care decision, medication adjustments, and code status     Medical Decision Making was high level due to high complexity of problems, extensive data review, and high risk of management/treatment.     - End stage HF posing threat to life and function.  - Reviewed external notes from   - Reviews results from  which were used in decision making for   - Recommended the following tests:   - Assessment required independent historian: primary and sw  - Independent interpretation of test: labs  - Discussion of management with: SW and primary  - Drug therapy requiring intensive monitoring for toxicity: insulin  - " Decision regarding elective major surgery with identified patient or procedure risk factors:   - Decision regarding emergency major surgery:   - Decision regarding hospitalization or escalation of hospital-level care:   - Decision not to resuscitate or to de-escalate care because of poor prognosis:   - Parenteral controlled substances: insulin, dex    Thank you for allowing us to care for this patient. Palliative Team will continue to follow as needed. Please contact team with any questions or concerns.   Team pager [phone] (weekdays)    JACKIE Wills-CNP

## 2024-10-24 NOTE — NURSING NOTE
Brief visit today.  Mr. Yu is resting in bed.  We reviewed BG values -- states he snacks in the evening with food left on his tray from dinner.  Overall BG values are in a comfortable place for him.  He is comfortable with the insulin regimen.   He verbalizes that he would like to speak to palliative medicine to learn more about hospice and what services they could provide for him.  Emotional support offered.  Will follow as needed while inpatient.

## 2024-10-24 NOTE — SIGNIFICANT EVENT
Psychiatry Clinical Event Note    Reviewed patients chart, appears he has elected to pursue hospice care. I spoke with primary provider and at this time there is no need for psychiatry to continue to follow the case. If any issues or situations arise that require assistance from psychiatry, please re-consult.    Psychiatry will sign off.    Ramirez Bella, DO  PGY2 Psychiatry Resident

## 2024-10-24 NOTE — PROGRESS NOTES
"Leonel Yu is a 54 y.o. male on day 6 of admission   Met with pt along with Kristine Adkins CNP from [facility].  Discussed Hospice further with pt.  He has expressed that he now wants full life saving measures.  He does not like the sound of DNR.  He is not ready to give up.  It was explained to him the consequences of being resuscitated and he stated \"you can pull  the plug once that happens\" . His QOL goals are to go home and to continue the course of care and medications he is currently following.   He was agreeable  to outpatient palliative care following at home.  SW will make a referral to [facility] Navigator program. SW to following.     MICHAEL JIM      "

## 2024-10-24 NOTE — PROGRESS NOTES
Leonel Yu is a 54 y.o. male on day 6 of admission presenting with No Principal Problem: There is no principal problem currently on the Problem List. Please update the Problem List and refresh..    Subjective   Pt seen and examined at the bedside, discussed diabetes regimen at discharge, patient agreeable to plan.    Pt endorses eating much more food in the hospital than he has access to at home. We discussed [employer] food pantry program at [facility] - pt agreeable to referral, this may need to be completed by [facility] or new PCP outpt.       I have reviewed histories, allergies and medications have been reviewed and there are no changes     Objective   Review of Systems   Constitutional:  Positive for appetite change and fatigue. Negative for activity change, diaphoresis and unexpected weight change.   HENT: Negative.  Negative for sore throat.    Eyes: Negative.    Respiratory:  Negative for cough, chest tightness and shortness of breath.    Cardiovascular:  Negative for chest pain.   Gastrointestinal:  Negative for abdominal pain, diarrhea and nausea.   Endocrine: Negative for cold intolerance, heat intolerance, polydipsia, polyphagia and polyuria.   Genitourinary:  Negative for dysuria and frequency.   Musculoskeletal:  Negative for gait problem and joint swelling.   Neurological:  Negative for speech difficulty, numbness and headaches.   Psychiatric/Behavioral:  Negative for agitation, behavioral problems and confusion.    All other systems reviewed and are negative.    Physical Exam  Constitutional:       Appearance: Normal appearance. He is normal weight.   HENT:      Head: Normocephalic and atraumatic.      Nose: Nose normal.      Mouth/Throat:      Mouth: Mucous membranes are dry.      Pharynx: Oropharynx is clear.   Eyes:      Extraocular Movements: Extraocular movements intact.      Conjunctiva/sclera: Conjunctivae normal.   Pulmonary:      Effort: Pulmonary effort is normal.   Abdominal:      General:  "Abdomen is flat. Bowel sounds are normal.      Palpations: Abdomen is soft.   Skin:     General: Skin is warm and dry.   Neurological:      General: No focal deficit present.      Mental Status: He is alert and oriented to person, place, and time. Mental status is at baseline.   Psychiatric:         Mood and Affect: Mood normal.         Behavior: Behavior normal.         Thought Content: Thought content normal.         Judgment: Judgment normal.       Last Recorded Vitals  Blood pressure 107/73, pulse 69, temperature 36.4 °C (97.5 °F), temperature source Temporal, resp. rate 18, height 1.753 m (5' 9\"), weight 71.1 kg (156 lb 12 oz), SpO2 93%.  Intake/Output last 3 Shifts:  I/O last 3 completed shifts:  In: 240 (3.4 mL/kg) [P.O.:240]  Out: 3375 (47.5 mL/kg) [Urine:3375 (1.3 mL/kg/hr)]  Weight: 71.1 kg     Relevant Results  Results from last 7 days   Lab Units 10/24/24  0648 10/23/24  2217 10/23/24  1935 10/23/24  1646 10/23/24  1200 10/23/24  0716 10/22/24  1902 10/22/24  1159 10/22/24  0659 10/21/24  0826 10/21/24  0530 10/20/24  2011 10/20/24  1359   POCT GLUCOSE mg/dL 87 245*  --  203* 170* 153*  --    < >  --    < >  --    < >  --    GLUCOSE mg/dL  --   --  184*  --   --   --  138*  --  134*  --  144*  --  250*    < > = values in this interval not displayed.     Scheduled medications  acetaminophen, 650 mg, oral, q6h  amiodarone, 200 mg, oral, Daily  aspirin, 81 mg, oral, Daily  atorvastatin, 40 mg, oral, Nightly  empagliflozin, 25 mg, oral, Daily  heparin (porcine), 5,000 Units, subcutaneous, q8h JANEY  insulin glargine, 18 Units, subcutaneous, q24h  insulin lispro, 0-5 Units, subcutaneous, Nightly  [Held by provider] insulin lispro, 0-5 Units, subcutaneous, TID AC  [Held by provider] insulin lispro, 5 Units, subcutaneous, TID AC  multivitamin with minerals, 1 tablet, oral, Daily  mupirocin, , Topical, TID  nicotine, 1 patch, transdermal, Daily   Followed by  [START ON 12/2/2024] nicotine, 1 patch, transdermal, " Daily   Followed by  [START ON 12/16/2024] nicotine, 1 patch, transdermal, Daily  pantoprazole, 40 mg, oral, Daily before breakfast  polyethylene glycol, 17 g, oral, Daily  [Held by provider] sacubitriL-valsartan, 0.5 tablet, oral, BID  sennosides-docusate sodium, 2 tablet, oral, BID  sertraline, 100 mg, oral, Daily  [Held by provider] spironolactone, 12.5 mg, oral, Daily  thiamine, 500 mg, intravenous, TID  tiotropium, 2 puff, inhalation, Daily      Continuous medications       PRN medications  PRN medications: alteplase, dextrose, dextrose, glucagon, glucagon, lactulose, artificial tears, oxygen, trimethobenzamide      Assessment/Plan   Assessment & Plan  Stage 3a chronic kidney disease (CKD) (Multi)    Cocaine use disorder, severe, dependence (Multi)    Acute on chronic combined systolic and diastolic hrt fail    Altered mental status    Leonel Yu is a 54 y.o. male PMH of mixed ischemic and non-ischemic HFrEF 5-10% s/p [employer] single chamber ICD (3/2024), CKD3a, HTN, HLD, LUZ (crack cocaine), former tobacco use, COPD (on nocturnal 2L at baseline), DM2, remote left cerebellar hemorrhagic infarct, medication noncompliance, anxiety, and depression. Patient presents with blurry vision, lethargy, and dizziness.       Glucose in the 300's on arrival to the ED, given 60 mg of prednisone x 1 with glucoses climbing to the 400's. Total daily dose 10/15/24 was 40 units, with 30 units as basal insulin.      Patient interviewed at the bedside in the ED.  He is hungry and eating well. Patient first reports he has only been taking lispro at home, but does not understand the sliding scale.  When pressed, it sounds like he has not been taking much of the lispro at all.      Diabetes History  Type of diabetes: Type 2 diabetes  Year diagnosed or age: 2023  Hospitalizations for DKA or HHS: no  Complications: hyperglycemia  Seen by PCP or Endocrinology: PCP  Frequency of glucose checks: once a day  Glucose review:  reports 200-400  Frequency of Hypoglycemia: denies     Home Medications  Basal: 30 units of glargine listed in the chart, but patient has not been taking it  Prandial: none  Correction: lispro scale, but patient is not taking it  Orals: jardiance 10 mg    10/17 - Severe hypoglycemia likely as steroid effect waned   Glucose 121 before lunch, so insulin doses held. Post lunch glucose > 600 mg/dl  10/18 - hyperglycemia most likely due to ssi order not administered before meals     PLAN  Steroids: 60 mg of prednisone x1 10/15/24  Nutrition: 90 gram carb consistent    -continue jardiance 25mg daily  - continue glargine 15 units Q24h       If NPO: reduce to 10  - discontinue lispro 5u with meals  - continue lispro ssi #1 to ACHS while inpatient   = 0u  151-200 = 1u  201-250 = 2u  251-300 = 3u  301-350 = 4u  351-400 = 5u     -Accuchecks (not BMP) TIDAC and QHS- kindly ensure QHS Accucheck is drawn; it is often missed   - Goal -180  -Hypoglycemia protocol  -Will continue to follow and titrate insulin accordingly     SGLT2i tx may not be utilized in inpt setting unless the following conditions are met. Only HF Cardiology may initiate inpatient SGLT2i use.   1) pt is eating and drinking by mouth with a total daily carb intake exceeding 60g of CHO  2) pt is urinating independently of urinary catheters  3) pt can ambulate freely to the restroom in order to maintain personal hygiene  4) no current concern for UTI/genital or inguinal candidiasis  5) no plans for NPO within the next 48-72hr     Discharge planning:   [x] patient may expect to discharge home on glargine 15u once daily and jardiance 25mg qAM.  [x]will provide CGM sample prior to discharge, patient is interested in a [facility]  [X]will enroll pt in  pharmacy platinum plan program  [x]follow up healthy at home virtual clinic  [X][employer] food pantry referral - social determinants of health survey may need to be completed by [facility] team    Final discharge  recs given, endo team to sign off, please contact us via secure chat or at pager [phone] with any questions.       I spent 50 minutes in the professional and overall care of this patient.      Moon Medina PA-C

## 2024-10-25 ENCOUNTER — PHARMACY VISIT (OUTPATIENT)
Dept: PHARMACY | Facility: CLINIC | Age: 54
End: 2024-10-25
Payer: MEDICAID

## 2024-10-25 VITALS
OXYGEN SATURATION: 95 % | RESPIRATION RATE: 18 BRPM | DIASTOLIC BLOOD PRESSURE: 69 MMHG | HEART RATE: 63 BPM | HEIGHT: 69 IN | SYSTOLIC BLOOD PRESSURE: 104 MMHG | TEMPERATURE: 96.8 F | WEIGHT: 156.09 LBS | BODY MASS INDEX: 23.12 KG/M2

## 2024-10-25 PROBLEM — N18.9 ACUTE ON CHRONIC KIDNEY FAILURE (CMS-HCC): Status: RESOLVED | Noted: 2024-10-24 | Resolved: 2024-10-25

## 2024-10-25 PROBLEM — R41.82 ALTERED MENTAL STATUS: Status: RESOLVED | Noted: 2024-10-22 | Resolved: 2024-10-25

## 2024-10-25 PROBLEM — N17.9 ACUTE ON CHRONIC KIDNEY FAILURE (CMS-HCC): Status: RESOLVED | Noted: 2024-10-24 | Resolved: 2024-10-25

## 2024-10-25 LAB
GLUCOSE BLD MANUAL STRIP-MCNC: 166 MG/DL (ref 74–99)
GLUCOSE BLD MANUAL STRIP-MCNC: 321 MG/DL (ref 74–99)

## 2024-10-25 PROCEDURE — 2500000001 HC RX 250 WO HCPCS SELF ADMINISTERED DRUGS (ALT 637 FOR MEDICARE OP)

## 2024-10-25 PROCEDURE — 99233 SBSQ HOSP IP/OBS HIGH 50: CPT | Performed by: INTERNAL MEDICINE

## 2024-10-25 PROCEDURE — 2500000001 HC RX 250 WO HCPCS SELF ADMINISTERED DRUGS (ALT 637 FOR MEDICARE OP): Performed by: PHYSICIAN ASSISTANT

## 2024-10-25 PROCEDURE — 2500000002 HC RX 250 W HCPCS SELF ADMINISTERED DRUGS (ALT 637 FOR MEDICARE OP, ALT 636 FOR OP/ED)

## 2024-10-25 PROCEDURE — 2500000002 HC RX 250 W HCPCS SELF ADMINISTERED DRUGS (ALT 637 FOR MEDICARE OP, ALT 636 FOR OP/ED): Performed by: PHYSICIAN ASSISTANT

## 2024-10-25 PROCEDURE — 94640 AIRWAY INHALATION TREATMENT: CPT

## 2024-10-25 PROCEDURE — 82947 ASSAY GLUCOSE BLOOD QUANT: CPT

## 2024-10-25 PROCEDURE — RXMED WILLOW AMBULATORY MEDICATION CHARGE

## 2024-10-25 RX ORDER — SPIRONOLACTONE 25 MG/1
12.5 TABLET ORAL DAILY
Status: DISCONTINUED | OUTPATIENT
Start: 2024-10-25 | End: 2024-10-25 | Stop reason: HOSPADM

## 2024-10-25 RX ORDER — IPRATROPIUM BROMIDE AND ALBUTEROL SULFATE 2.5; .5 MG/3ML; MG/3ML
3 SOLUTION RESPIRATORY (INHALATION) EVERY 6 HOURS PRN
Status: DISCONTINUED | OUTPATIENT
Start: 2024-10-25 | End: 2024-10-25 | Stop reason: HOSPADM

## 2024-10-25 RX ORDER — SPIRONOLACTONE 25 MG/1
12.5 TABLET ORAL DAILY
Qty: 45 TABLET | Refills: 1 | Status: SHIPPED | OUTPATIENT
Start: 2024-10-25 | End: 2025-04-23

## 2024-10-25 RX ORDER — CARVEDILOL 3.12 MG/1
3.12 TABLET ORAL 2 TIMES DAILY
Qty: 180 TABLET | Refills: 1 | Status: SHIPPED | OUTPATIENT
Start: 2024-10-25 | End: 2025-04-23

## 2024-10-25 RX ORDER — NAPROXEN SODIUM 220 MG/1
81 TABLET, FILM COATED ORAL DAILY
Qty: 90 TABLET | Refills: 1 | Status: SHIPPED | OUTPATIENT
Start: 2024-10-25 | End: 2025-04-23

## 2024-10-25 RX ORDER — AMIODARONE HYDROCHLORIDE 200 MG/1
200 TABLET ORAL DAILY
Qty: 90 TABLET | Refills: 1 | Status: SHIPPED | OUTPATIENT
Start: 2024-10-25 | End: 2025-04-23

## 2024-10-25 RX ORDER — INSULIN GLARGINE 100 [IU]/ML
15 INJECTION, SOLUTION SUBCUTANEOUS NIGHTLY
Qty: 4.5 ML | Refills: 5 | Status: SHIPPED | OUTPATIENT
Start: 2024-10-25 | End: 2025-10-20

## 2024-10-25 RX ORDER — MUPIROCIN 20 MG/G
OINTMENT TOPICAL 2 TIMES DAILY
Status: DISCONTINUED | OUTPATIENT
Start: 2024-10-25 | End: 2024-10-25 | Stop reason: HOSPADM

## 2024-10-25 RX ORDER — ATORVASTATIN CALCIUM 80 MG/1
80 TABLET, FILM COATED ORAL NIGHTLY
Qty: 90 TABLET | Refills: 1 | Status: SHIPPED | OUTPATIENT
Start: 2024-10-25 | End: 2025-04-23

## 2024-10-25 RX ORDER — SERTRALINE HYDROCHLORIDE 100 MG/1
100 TABLET, FILM COATED ORAL DAILY
Qty: 90 TABLET | Refills: 1 | Status: SHIPPED | OUTPATIENT
Start: 2024-10-25 | End: 2025-04-23

## 2024-10-25 RX ORDER — MUPIROCIN 20 MG/G
OINTMENT TOPICAL 2 TIMES DAILY
Qty: 22 G | Refills: 0 | Status: SHIPPED | OUTPATIENT
Start: 2024-10-25 | End: 2024-10-29

## 2024-10-25 RX ADMIN — TIOTROPIUM BROMIDE INHALATION SPRAY 2 PUFF: 3.12 SPRAY, METERED RESPIRATORY (INHALATION) at 09:02

## 2024-10-25 RX ADMIN — CARVEDILOL 3.12 MG: 3.12 TABLET, FILM COATED ORAL at 08:50

## 2024-10-25 RX ADMIN — SACUBITRIL AND VALSARTAN 0.5 TABLET: 24; 26 TABLET, FILM COATED ORAL at 10:07

## 2024-10-25 RX ADMIN — PANTOPRAZOLE SODIUM 40 MG: 40 TABLET, DELAYED RELEASE ORAL at 06:51

## 2024-10-25 RX ADMIN — SPIRONOLACTONE 12.5 MG: 25 TABLET, FILM COATED ORAL at 10:07

## 2024-10-25 RX ADMIN — THIAMINE HCL TAB 100 MG 100 MG: 100 TAB at 08:51

## 2024-10-25 RX ADMIN — SERTRALINE 100 MG: 100 TABLET, FILM COATED ORAL at 08:51

## 2024-10-25 RX ADMIN — MUPIROCIN 1 APPLICATION: 20 OINTMENT TOPICAL at 08:53

## 2024-10-25 RX ADMIN — ACETAMINOPHEN 650 MG: 325 TABLET ORAL at 11:46

## 2024-10-25 RX ADMIN — ACETAMINOPHEN 650 MG: 325 TABLET ORAL at 06:51

## 2024-10-25 RX ADMIN — Medication 1 TABLET: at 08:50

## 2024-10-25 RX ADMIN — AMIODARONE HYDROCHLORIDE 200 MG: 200 TABLET ORAL at 08:50

## 2024-10-25 RX ADMIN — EMPAGLIFLOZIN 25 MG: 10 TABLET, FILM COATED ORAL at 08:50

## 2024-10-25 RX ADMIN — INSULIN LISPRO 4 UNITS: 100 INJECTION, SOLUTION INTRAVENOUS; SUBCUTANEOUS at 14:22

## 2024-10-25 RX ADMIN — ASPIRIN 81 MG 81 MG: 81 TABLET ORAL at 08:50

## 2024-10-25 ASSESSMENT — PAIN SCALES - GENERAL
PAINLEVEL_OUTOF10: 0 - NO PAIN
PAINLEVEL_OUTOF10: 0 - NO PAIN

## 2024-10-25 ASSESSMENT — COGNITIVE AND FUNCTIONAL STATUS - GENERAL
MOBILITY SCORE: 23
DAILY ACTIVITIY SCORE: 24
CLIMB 3 TO 5 STEPS WITH RAILING: A LITTLE

## 2024-10-25 ASSESSMENT — PAIN - FUNCTIONAL ASSESSMENT: PAIN_FUNCTIONAL_ASSESSMENT: 0-10

## 2024-10-25 NOTE — SIGNIFICANT EVENT
"Capacity Assessment Tool    \"Capacity\" is the \"ability\" to make a decision.  The decision in question must be specific (one decision), relevant to a patient's current condition (appropriate), and timely (neither prospective nor retrospective).    Capacity varies based on knowledge base (explanation/understanding of clinical information), cognitive processing, acute psychiatric illness, and other clinical conditions.    In order to be deemed \"capacitated\" to make a single decision at one point in time, a patient must demonstrate all 4 of the following elements:    *Ability to consistently communicate a choice (consistent over time with adequate information)  *Ability to understand the relevant information (accurate knowledge of condition)  *Ability to appreciate the situation and its consequences (risks/benefits, pros/cons)  *Ability to reason about treatment options (without undue influence of a person or condition, eg. suicidality or acute psychosis)      Current Decision    Clinical issue:   Patient stating he wants to be discharged now or leave AMA.     Did the appropriate team address relevant information with the patient:  Yes    Date: 10/25/24    If \"NO\" is selected for appropriate team, then please discuss with the appropriate team.  The appropriate team should be encouraged to address relevant information with the patient AND reevaluate capacity when appropriate.    Capacity Evaluation    Patient demonstrates ability to consistently communicate choice:  Yes, patient consistently and repeatedly communicated that he wants to leave today.    Patient demonstrates ability to understand the relevant information:  Yes, patient verbalized that he understand that medications changes were made, just this morning, and that we would like to keep him inpatient to monitor his response and labs  and ability to tolerate the medications.    Patient demonstrates ability to appreciate the situation and its consequences:  Yes, " "patient able to state that the risk of him leaving the hospital includes rehospitalization or even death.    Patient demonstrates ability to reason about treatment options:  Yes, patient agreeable to stay until 4 pm for discharge paperwork and for prescriptions to be sent to [facility] pharmacy. He states he will  prescriptions.    If ANY of the above items are answered \"NO,\" the patient LACKS CAPACITY for that specific decision at hand, at that specific time.  Further capacity evaluations can be done as needed.    Above evaluation completed with attending, Dr. Stiven Brown.    Chase Blanca, APRN-CNP          "

## 2024-10-25 NOTE — SIGNIFICANT EVENT
7000 Hospital Exemption Form    Name: Leonel Yu  MRN: [id number] : 1970    Admitting Dxc: Acute on chronic combined systolic and diastolic heart failure [I50.43]  Shortness of breath at rest [R06.02]  Congestive heart failure, unspecified HF chronicity, unspecified heart failure type [I50.9]    As the individual's attending physician (MD or ), I certify that the above-named patient:  Is being discharged to a nursing facility directly from a hospital after receiving acute patient care at the hospital, and  Requires nursing facilty services for the condition for which he/she received care in the hospital, and  Requires fewer than 30 days of nursing facility services, no later than the date of discharge.     I certify that inpatient care is required at the level recommended above. To the best of my knowledge, all information provided about the individual is a true and accurate reflection of the individuals condition.    Electronically signed by:

## 2024-10-25 NOTE — PROGRESS NOTES
[facility] Attending Shared Visit Note    This is a shared visit. Please see Advanced Practice Provider's encounter note for additional details.        Overnight events/Subjective: would like to go to hospice SNF    Exam:   Physical exam: alert, no distress. Normal rate, regular rhythm, non labored breathing, clear to auscultation, abdomen non distended, no LE edema, no focal neuro deficits.     A/P: 54 M with non ischemic HFrEF s/p primary prevention [employer] ICD (3/2024), CKD, polysubstance use, COPD, DM and challenges with medication adherence who presented with blurry vision, lethargy and dizziness. Course notable for acute decompensated HF and cardiogenic shock.   #GUICHO and hyperkalemia: Improved. Restart low dose Entresto and spironolactone.   #Acute decompensated HF: required pressors/inotropes in the HFICU. Is now improved GDMT as above. Continue carvedilol. Rest of GDMT as above  #PSUD: ongoing crack cocaine use. Declines thrive consult.   #Prognosis: poor. He is agreeable to hospice at SNF (intermittently changes his mind).     Dispo: pending tolerance of GDMT and renal function, likely to SNF in next 1-2 days, has outpatient HF follow up set up.     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    Altered mental status    Acute on chronic kidney failure (CMS-HCC)      Objective   Admit Date: 10/16/2024  Hospital Length of Stay: 7   Home: [facility] [id number]    Vitals:      10/25/2024    11:43 AM 10/25/2024     8:41 AM 10/25/2024     6:00 AM 10/25/2024     4:06 AM 10/24/2024    11:46 PM 10/24/2024     8:16 PM 10/24/2024     3:27 PM   Vitals   Systolic 104 113  105 113 91 98   Diastolic 69 78  72 82 62 67   Heart Rate 63 65  76 77 79 73   Temp 36 °C (96.8 °F)  36.1 °C (97 °F)  36.8 °C (98.2 °F) 36.9 °C (98.4 °F) 36.3 °C (97.3 °F) 35.8 °C (96.4 °F)   Resp 18 20  20 20 20 20   Weight (lb)   156.09 156.09      BMI   23.05 kg/m2 23.05 kg/m2      BSA (m2)   1.86 m2 1.86 m2        Wt Readings from Last 5 Encounters:   10/25/24 70.8 kg (156 lb 1.4 oz)   10/15/24 77.1 kg (170 lb)   10/05/24 74.4 kg (164 lb 0.4 oz)   09/08/24 79.4 kg (175 lb 0.7 oz)   08/06/24 73.7 kg (162 lb 7.7 oz)       Intake/Output Summary (Last 24 hours) at 10/25/2024 1146  Last data filed at 10/25/2024 0406  Gross per 24 hour   Intake 240 ml   Output 850 ml   Net -610 ml         MEDICATIONS  Infusions:     Scheduled:  acetaminophen, 650 mg, q6h  amiodarone, 200 mg, Daily  aspirin, 81 mg, Daily  atorvastatin, 40 mg, Nightly  carvedilol, 3.125 mg, BID  empagliflozin, 25 mg, Daily  heparin (porcine), 5,000 Units, q8h JANEY  insulin glargine, 15 Units, q24h  insulin lispro, 0-5 Units, TID  multivitamin with minerals, 1 tablet, Daily  mupirocin, , BID  nicotine, 1 patch, Daily   Followed by  [START ON 12/2/2024] nicotine, 1 patch, Daily   Followed by  [START ON 12/16/2024] nicotine, 1 patch, Daily  pantoprazole, 40 mg, Daily before breakfast  polyethylene glycol, 17 g, Daily  sacubitriL-valsartan, 0.5 tablet, BID  sennosides-docusate sodium, 2 tablet, BID  sertraline, 100 mg, Daily  spironolactone, 12.5 mg, Daily  thiamine, 100 mg, Daily  tiotropium, 2 puff, Daily      PRN:  dextrose, 12.5 g, q15 min PRN  dextrose, 25 g, q15 min PRN  glucagon, 1 mg, q15 min PRN  glucagon, 1 mg, q15 min PRN  lactulose, 30 g, BID PRN  artificial tears, 2 drop, 4x daily PRN  oxygen, , Continuous PRN - O2/gases  trimethobenzamide, 200 mg, q6h PRN      Prior to Admission Meds:  Medications Prior to Admission   Medication Sig Dispense Refill Last Dose/Taking    amiodarone (Pacerone) 200 mg tablet Take 1 tablet (200 mg) by mouth once daily. 30 tablet 0 Unknown    aspirin 81 mg chewable tablet Chew 1 tablet (81 mg) once daily. 30 tablet 5  "Unknown    atorvastatin (Lipitor) 80 mg tablet Take 1 tablet (80 mg) by mouth once daily at bedtime. 30 tablet 5 Unknown    blood sugar diagnostic strip Use as directed to test blood glucose up to four times daily and as needed. 200 each 5 Unknown    blood-glucose meter misc Inject 1 each under the skin 4 times a day with meals. Check blood glucose 4 times daily, before meals and at bedtime. 1 each 0 Unknown    empagliflozin (Jardiance) 10 mg TAKE 1 TABLET BY MOUTH ONCE DAILY 30 tablet 5 Unknown    glucose 4 gram chewable tablet Chew 2 tablets (8 g) if needed for low blood sugar - see comments. 50 tablet 12 Unknown    insulin glargine (Lantus) 100 unit/mL (3 mL) pen Inject 30 Units under the skin once daily at bedtime. 15 mL 5 Unknown    insulin lispro (HumaLOG) 100 unit/mL injection Inject 0-10 Units under the skin 3 times daily (morning, midday, late afternoon). Hypoglycemia protocol if Blood Glucose is between 0 - 70 mg/dL, 0 unit(s) if , 2 unit(s) if 151-200, 4 unit(s) if 201-250, 6 unit(s) if 251-300, 8 unit(s) if 301-350, 10 unit(s) if 351-400. Notify provider unit(s) if Blood Glucose is greater than 400 mg/dL 15 mL 5 Unknown    sertraline (Zoloft) 100 mg tablet Take 1 tablet (100 mg) by mouth once daily. 30 tablet 5 Unknown    carvedilol (Coreg) 3.125 mg tablet Take 1 tablet (3.125 mg) by mouth 2 times a day. 60 tablet 0 Unknown    lancets 33 gauge misc Inject 1 each under the skin 4 times a day. 100 each 5 Unknown    pen needle 1/2\" 29G X 12mm needle Use to inject 1-4 times daily as directed. 100 each 5 Unknown    sacubitriL-valsartan (Entresto) 24-26 mg tablet Take 1 tablet by mouth 2 times a day. 60 tablet 3 Unknown    spironolactone (Aldactone) 25 mg tablet Take 0.5 tablets (12.5 mg) by mouth once daily. 15 tablet 5 Unknown    tiotropium (Spiriva Respimat) 2.5 mcg/actuation inhaler Inhale 2 puffs once daily. 4 g 2 Unknown    torsemide (Demadex) 20 mg tablet Take 2 tablets (40 mg) by mouth once " daily. 60 tablet 3 Unknown       DATA:  CMP:  Recent Labs     10/24/24  1905 10/24/24  1033 10/23/24  1935 10/22/24  1902 10/22/24  0659 10/21/24  0530 10/20/24  1359 10/20/24  0333 10/19/24  1706 10/19/24  0711   * 130* 134* 131* 134* 134* 131* 131* 134* 127*   K 4.1 5.9* 5.7* 4.4 4.6 3.8 4.1 4.5 4.6 4.1   CL 99 95* 97* 98 100 100 93* 91* 93* 93*   CO2 21 30 29 25 29 28 27 27 23 21   ANIONGAP 15 11 14 12 10 10 15 18 23* 17   BUN 14 15 16 18 26* 41* 66* 67* 62* 58*   CREATININE 1.12 1.47* 1.29 1.17 1.43* 1.64* 2.62* 3.08* 2.58* 2.10*   EGFR 78 56* 65 74 58* 49* 28* 23* 29* 36*   MG 2.31  --  2.29 2.01 2.14 2.08  --  2.25 2.35 2.26     Recent Labs     10/24/24  1905 10/24/24  1033 10/23/24  1935 10/22/24  1902 10/22/24  0659 10/21/24  0530 10/20/24  1359 10/20/24  0333 10/19/24  1706 10/19/24  0711 10/18/24  0744 10/17/24  1934 10/17/24  0411 10/16/24  2238 10/15/24  1742 10/15/24  1032 10/01/24  1539 09/30/24  1701 09/08/24  0431 09/07/24  0949   ALBUMIN 3.0* 3.5 3.7 3.3* 3.2* 3.0* 3.1* 3.8   < > 3.8 3.7   < > 3.5 4.2   < > 4.5   < > 3.8   < > 3.8   ALT  --   --   --   --   --   --   --  22  --  24 21  --  29 36  --  50  --  33  --  70*   AST  --   --   --   --   --   --   --  16  --  21 15  --  15 19  --  42*  --  25  --  40*   BILITOT  --   --   --   --   --   --   --  0.6  --  0.9 0.8  --  0.5 0.5  --  1.2  --  1.0  --  1.0    < > = values in this interval not displayed.     CBC:  Recent Labs     10/24/24  1905 10/23/24  1935 10/22/24  1902 10/22/24  0659 10/21/24  0530 10/20/24  0333 10/19/24  1706 10/19/24  0711   WBC 8.7 9.8 9.5 5.7 7.7 7.4 11.1 11.0   HGB 18.0* 18.6* 16.1 15.9 17.1 19.9* 21.2* 19.6*   HCT 51.2 56.1* 47.9 47.6 51.2 57.5* 63.3* 58.3*   * 119* 101* 90* 97* 112* 132* 154   MCV 90 93 93 93 91 89 91 91     COAG:   Recent Labs     10/19/24  1930 09/30/24  1704 09/07/24  0949 12/10/23  0255 12/09/23 2057 10/23/23  1851 07/24/23  1330 03/02/23  0225   INR 1.2* 1.2* 1.2*  --  1.2* 1.3*  1.2* 1.3*   HAUF  --   --   --  0.1  --   --   --   --      ABO:   Recent Labs     09/30/24  1704   ABO A     HEME/ENDO:   Recent Labs     10/20/24  0333 10/17/24  1542 10/17/24  0027 10/15/24  1742 09/07/24  1016 07/30/24  0815 07/30/24  0731 06/20/24  0749 01/09/24  0653   FERRITIN 242  --   --   --   --   --   --   --   --    IRONSAT 14*  --   --   --   --   --   --   --   --    TSH  --  1.66 5.71*  --  2.68 2.65  --   --   --    FREET4  --   --  1.18  --   --   --   --   --   --    HGBA1C  --   --   --  9.9*  --   --  8.4* 6.9* 10.1*     CARDIAC:   Recent Labs     10/21/24  0530 10/20/24  0333 10/17/24  0027 10/16/24  2238 10/15/24  1147 10/15/24  1032 10/01/24  1033 09/30/24  2025 09/30/24  1844 09/30/24  1701 09/07/24  0128 09/07/24  0006 07/31/24  1851 07/30/24  0815 07/30/24  0731 02/16/24  0533 02/15/24  0656 02/12/24  1239 12/27/23  0759   CKMB  --   --   --   --   --   --   --   --   --   --   --   --   --   --   --   --   --   --  1.6   TROPHS 88* 100* 52 69* 50 70* 54* 67*   < >  --    < > 75*  --    < > 125*  --   --    < >  --    BNP  --   --   --  479*  --  2,833*  --   --   --  >5,000*  --  2,585* 1,014*  --  3,240* 2,900* 3,752*   < >  --     < > = values in this interval not displayed.     Recent Labs     09/07/24  0949 07/25/23  0820 06/18/23  0627   CHOL 127 156 168   LDLF  --  97 103*   LDLCALC 72  --   --    HDL 35.4 40.5 35.6*   TRIG 98 92 146     TOX:  Recent Labs     10/20/24  1001 10/17/24  0511 10/15/24  2238   AMPHETAMINE Presumptive Negative Presumptive Negative Presumptive Negative   BENZO Presumptive Negative Presumptive Negative Presumptive Negative   CANNABINOID Presumptive Negative Presumptive Negative Presumptive Negative   COCAI Presumptive Positive* Presumptive Positive* Presumptive Positive*   FENTANYL Presumptive Negative Presumptive Negative Presumptive Negative   OPIATE Presumptive Positive* Presumptive Negative Presumptive Negative   OXYCODONE Presumptive Negative  Presumptive Negative Presumptive Negative   PCP Presumptive Negative Presumptive Negative Presumptive Negative     MICRO:   Recent Labs     10/17/24  1000 10/17/24  0542 03/02/23  1112 03/02/23  0225   CRP 0.21  --   --  1.54*   PROCAL 0.27* 0.31* 1.55* 1.15*     Susceptibility data from last 90 days.  Collected Specimen Info Organism   10/20/24 Swab from Anterior Nares Methicillin Susceptible Staphylococcus aureus (MSSA)       FOLLOWUP:   Future Appointments   Date Time Provider Department Center   11/5/2024  9:00 AM Mayco Skinner MD [id number]

## 2024-10-25 NOTE — DISCHARGE SUMMARY
Discharge Diagnosis  ADHF    Issues Requiring Follow-Up  GDMT, Sobriety, Follow up care    Test Results Pending At Discharge  Pending Labs       Order Current Status    Opiate Confirmation, Urine Preliminary result          Hospital Course  54 y.o. male with a hx mixed ischemic/toxic mediated HFrEF (EF 5-10%, fibrosis and transmural infarcts seen on cMRI 8/2024) s/p [employer] single chamber ICD (3/2024), CKD3a, HTN, HLD, LUZ (crack cocaine), former tobacco use, COPD (nocturnal 2L O2 at baseline), DM2, remote left cerebellar hemorrhagic infarct, medication noncompliance, anxiety and depression, initially admitted for altered mental status and hypotension, transferred to the HFICU after Code blue in MRI on 10/18 for undetectable blood pressure and treated for shock w/ partial cardiogenic component, now transferred back to the floor.     TTE 10/17: EF 10-15%, gHK of LV, LV severely dilated, severe eccentric LVH, impaired DF, L mildly dilated, RV mildly enlarged with mildly reduced systolic fxn, RA mild to mod dilated, mild MR/TR, RVSP 25.8 wnl  - cMRI 8/5: mixed ICM/NICM, sev dilated LV EF 5-10%. Basal inferoseptal, mid inferoseptal, apical septal, apicolateral segments akinetic. Eccentric LVH. Transmural infarction of mid/apical inferoseptal/inferior segments and apicolateral wall (<25% wall thickeness). NICM pattern of mid wall septal fibrosis.    Not a candidate for advanced therapies related to ongoing substance use, noncompliance, psychiatric concerns. Per discussion with HF, CPR and intubation would not be beneficial, discussed with pt, pt agreeable with DNR/DNI.    Floor course    GDMT re-initiated and titrated as BP and HR allowed. K elevated 10/23 to 5.9, spironolactone and entresto on board. Lokelma x1 given with improvement to 4.0.    He remained overall euvolemic, not requiring any further diuresis.     Ongoing GOC and disposition discussion held with palliative care and SW. Patient agreed to and accepted  "to ICF with outpatient palliative Navigator from [facility] to follow.    On 10/25 ~2:45 pm, notified by nursing that patient suddenly saying he is ready to leave, stating \"he wants to leave now.... his ride is on his way....\" Patient had removed his telemetry monitor, peripheral IV, and was fully dressed upon my arrival to his room.     Capacity evaluation completed by myself and attending Dr. Stiven Brown. Patient able to consistently communicate his choice, demonstrate ability to understand relevant information, demonstrates ability to appreciate the situation and its consequences, and demonstrates ability to reason about treatment options as per the  Capacity Assessment Tool.     Patient agreeable to stay until discharge paperwork able to be completed and to have prescriptions sent to [facility] for him to .     Patient denied having any other home going needs. Follow up appointment scheduled.     Pt was discharged AGAINST MEDICAL ADVICE     Discharge weight: 70.8 kg     More than 60 minutes were spent in coordinating patient discharge.        Physical Exam At Time of Discharge:  General: NAD, lying in bed  Head/ neck: spacing in teeth. Atraumatic, poor dentition  Cardiac: RRR, regular S1 S2 , no murmur, no rub, no gallop  Pulm: CTA bilaterally, no wheezes, rales or rhonchi.    Vascular: Radial 2+ bilaterally  GI: Non distended  Extremities: no LE edema   Neuro: no focal neuro deficits   Psych: appropriate mood and behavior   Skin: warm and dry     Home Medications     Medication List      START taking these medications     Easy Touch Alcohol Prep Pads pads, medicated; Generic drug: alcohol   swabs; Use 4-8 per day to check blood glucose and for injectable   medications   FreeStyle Joseph 3 Plus Sensor device; Generic drug: blood-glucose   sensor; Change sensor every 15 days   FreeStyle Joseph 3 [place] misc; Generic drug: blood-glucose   meter,continuous; Use as instructed   mupirocin 2 % ointment; " Commonly known as: Bactroban; Apply topically 2   times a day for 4 days.     CHANGE how you take these medications     aspirin 81 mg chewable tablet; Chew 1 tablet (81 mg) once daily.; What   changed: when to take this   insulin glargine 100 unit/mL injection; Commonly known as: Lantus;   Inject 15 Units under the skin once daily at bedtime. Take as directed per   insulin instructions.; What changed: medication strength, how much to   take, additional instructions   Jardiance 25 mg; Generic drug: empagliflozin; Take 1 tablet (25 mg) by   mouth once daily.; Start taking on: October 26, 2024; What changed:   medication strength, how much to take   sacubitriL-valsartan 24-26 mg tablet; Commonly known as: Entresto; Take   0.5 tablets by mouth 2 times a day.; What changed: how much to take   * True Metrix Glucose Test Strip strip; Generic drug: blood sugar   diagnostic; Use as directed to test blood glucose up to four times daily   and as needed.; What changed: Another medication with the same name was   added. Make sure you understand how and when to take each.   * FreeStyle Precision Khalif Strips strip; Generic drug: blood sugar   diagnostic; Use 1 each 4 times a day before meals if needed as backup to   duran.; What changed: You were already taking a medication with the same   name, and this prescription was added. Make sure you understand how and   when to take each.   * TRUEplus Lancets 33 gauge misc; Generic drug: lancets; Inject 1 each   under the skin 4 times a day.; What changed: Another medication with the   same name was added. Make sure you understand how and when to take each.   * FreeStyle Lancets 28 gauge; Generic drug: FreeStyle lancets; Use as   instructed 4 times daily in the event that CGM sensor is not working; What   changed: You were already taking a medication with the same name, and this   prescription was added. Make sure you understand how and when to take   each.  * This list has 4 medication(s)  "that are the same as other medications   prescribed for you. Read the directions carefully, and ask your doctor or   other care provider to review them with you.     CONTINUE taking these medications     amiodarone 200 mg tablet; Commonly known as: Pacerone; Take 1 tablet   (200 mg) by mouth once daily.   atorvastatin 80 mg tablet; Commonly known as: Lipitor; Take 1 tablet (80   mg) by mouth once daily at bedtime.   carvedilol 3.125 mg tablet; Commonly known as: Coreg; Take 1 tablet   (3.125 mg) by mouth 2 times a day.   glucose 4 gram chewable tablet; Chew 2 tablets (8 g) if needed for low   blood sugar - see comments.   sertraline 100 mg tablet; Commonly known as: Zoloft; Take 1 tablet (100   mg) by mouth once daily.   Spiriva Respimat 2.5 mcg/actuation inhaler; Generic drug: tiotropium;   Inhale 2 puffs once daily.   spironolactone 25 mg tablet; Commonly known as: Aldactone; Take 0.5   tablets (12.5 mg) by mouth once daily.   True Metrix Glucose Meter misc; Generic drug: blood-glucose meter;   Inject 1 each under the skin 4 times a day with meals. Check blood glucose   4 times daily, before meals and at bedtime.   TRUEplus Pen Needle 29 gauge x 1/2\" needle; Generic drug: pen needle   1/2\"; Use to inject 1-4 times daily as directed.     STOP taking these medications     insulin lispro 100 unit/mL injection; Commonly known as: HumaLOG   torsemide 20 mg tablet; Commonly known as: Demadex       Outpatient Follow-Up  Future Appointments   Date Time Provider Department Center   11/5/2024  9:00 AM Mayco Skinner MD [id number]       Chase Blanca APRN-CNP  "

## 2024-10-25 NOTE — PROGRESS NOTES
Overnight Events:  No acute events overnight.    Subjective:  Patient seen and assessed this morning, lying in bed, NAD. Denies any CP or SOB at rest. Remains euvolemic and dyspneic with exertion. Updated on plan of care, agreeable to plan.    Today's Plan/Updates:   - walking pulse ox completed today, O2 sat >93% on RA despite dyspnea  - K 4.1 today s/p 1 dose Lokelma, Cr now 1.12  - resume Quinter 12.5 mg daily and Entresto 12-13 BID  - per SW, [facility] to [facility] with [facility] Navigator from [facility] to follow  - possible discharge tomorrow pending response to GDMT      Objective:  Vitals:    10/25/24 1143   BP: 104/69   Pulse: 63   Resp: 18   Temp: 36 °C (96.8 °F)   SpO2: 95%     Weight         10/22/2024  0443 10/23/2024  0510 10/24/2024  0535 10/25/2024  0406 10/25/2024  0600    Weight: 73.1 kg (161 lb 2.5 oz) 72.7 kg (160 lb 4.4 oz) 71.1 kg (156 lb 12 oz) 70.8 kg (156 lb 1.4 oz) 70.8 kg (156 lb 1.4 oz)            Intake/Output Summary (Last 24 hours) at 10/25/2024 1219  Last data filed at 10/25/2024 0406  Gross per 24 hour   Intake 240 ml   Output 850 ml   Net -610 ml     Recent Results (from the past 24 hours)   POCT GLUCOSE    Collection Time: 10/24/24  5:15 PM   Result Value Ref Range    POCT Glucose 295 (H) 74 - 99 mg/dL   CBC and Auto Differential    Collection Time: 10/24/24  7:05 PM   Result Value Ref Range    WBC 8.7 4.4 - 11.3 x10*3/uL    nRBC 0.0 0.0 - 0.0 /100 WBCs    RBC 5.71 4.50 - 5.90 x10*6/uL    Hemoglobin 18.0 (H) 13.5 - 17.5 g/dL    Hematocrit 51.2 41.0 - 52.0 %    MCV 90 80 - 100 fL    MCH 31.5 26.0 - 34.0 pg    MCHC 35.2 32.0 - 36.0 g/dL    RDW 14.7 (H) 11.5 - 14.5 %    Platelets 108 (L) 150 - 450 x10*3/uL    Neutrophils % 87.8 40.0 - 80.0 %    Immature Granulocytes %, Automated 0.2 0.0 - 0.9 %    Lymphocytes % 6.9 13.0 - 44.0 %    Monocytes % 4.5 2.0 - 10.0 %    Eosinophils % 0.5 0.0 - 6.0 %    Basophils % 0.1 0.0 - 2.0 %    Neutrophils Absolute 7.63 1.20 - 7.70 x10*3/uL    Immature  Granulocytes Absolute, Automated 0.02 0.00 - 0.70 x10*3/uL    Lymphocytes Absolute 0.60 (L) 1.20 - 4.80 x10*3/uL    Monocytes Absolute 0.39 0.10 - 1.00 x10*3/uL    Eosinophils Absolute 0.04 0.00 - 0.70 x10*3/uL    Basophils Absolute 0.01 0.00 - 0.10 x10*3/uL   Magnesium    Collection Time: 10/24/24  7:05 PM   Result Value Ref Range    Magnesium 2.31 1.60 - 2.40 mg/dL   Renal Function Panel    Collection Time: 10/24/24  7:05 PM   Result Value Ref Range    Glucose 282 (H) 74 - 99 mg/dL    Sodium 131 (L) 136 - 145 mmol/L    Potassium 4.1 3.5 - 5.3 mmol/L    Chloride 99 98 - 107 mmol/L    Bicarbonate 21 21 - 32 mmol/L    Anion Gap 15 10 - 20 mmol/L    Urea Nitrogen 14 6 - 23 mg/dL    Creatinine 1.12 0.50 - 1.30 mg/dL    eGFR 78 >60 mL/min/1.73m*2    Calcium 8.1 (L) 8.6 - 10.6 mg/dL    Phosphorus 2.6 2.5 - 4.9 mg/dL    Albumin 3.0 (L) 3.4 - 5.0 g/dL   POCT GLUCOSE    Collection Time: 10/24/24  8:21 PM   Result Value Ref Range    POCT Glucose 282 (H) 74 - 99 mg/dL   POCT GLUCOSE    Collection Time: 10/25/24  6:50 AM   Result Value Ref Range    POCT Glucose 166 (H) 74 - 99 mg/dL   POCT GLUCOSE    Collection Time: 10/25/24 11:39 AM   Result Value Ref Range    POCT Glucose 321 (H) 74 - 99 mg/dL     Inpatient Medications:  Scheduled medications   Medication Dose Route Frequency    acetaminophen  650 mg oral q6h    amiodarone  200 mg oral Daily    aspirin  81 mg oral Daily    atorvastatin  40 mg oral Nightly    carvedilol  3.125 mg oral BID    empagliflozin  25 mg oral Daily    heparin (porcine)  5,000 Units subcutaneous q8h JANEY    insulin glargine  15 Units subcutaneous q24h    insulin lispro  0-5 Units subcutaneous TID    multivitamin with minerals  1 tablet oral Daily    mupirocin   Topical BID    nicotine  1 patch transdermal Daily    Followed by    [START ON 12/2/2024] nicotine  1 patch transdermal Daily    Followed by    [START ON 12/16/2024] nicotine  1 patch transdermal Daily    pantoprazole  40 mg oral Daily before  breakfast    polyethylene glycol  17 g oral Daily    sacubitriL-valsartan  0.5 tablet oral BID    sennosides-docusate sodium  2 tablet oral BID    sertraline  100 mg oral Daily    spironolactone  12.5 mg oral Daily    thiamine  100 mg oral Daily    tiotropium  2 puff inhalation Daily     PRN medications   Medication    dextrose    dextrose    glucagon    glucagon    lactulose    artificial tears    oxygen    trimethobenzamide     Continuous Medications   Medication Dose Last Rate       Telemetry 10/25/2024 (personally reviewed): SR 60-70's    Physical exam:  General: NAD, lying in bed  Head/ neck: spacing in teeth. Atraumatic, poor dentition  Cardiac: RRR, regular S1 S2 , no murmur, no rub, no gallop  Pulm: CTA bilaterally, no wheezes, rales or rhonchi.    Vascular: Radial 2+ bilaterally  GI: Non distended  Extremities: no LE edema   Neuro: no focal neuro deficits   Psych: appropriate mood and behavior   Skin: warm and dry     Assessment/Plan   54 y.o. male with a hx mixed ischemic/toxic mediated HFrEF (EF 5-10%, fibrosis and transmural infarcts seen on cMRI 8/2024) s/p [employer] single chamber ICD (3/2024), CKD3a, HTN, HLD, LUZ (crack cocaine), former tobacco use, COPD (nocturnal 2L O2 at baseline), DM2, remote left cerebellar hemorrhagic infarct, medication noncompliance, anxiety and depression, initially admitted for altered mental status and hypotension, transferred to the HFICU after Code blue in MRI on 10/18 for undetectable blood pressure and treated for shock w/ partial cardiogenic component, now transferred back to the floor.      Acute on chronic decompensated mixed ischemic/nonischemic HFrEF 5-10%  s/p single chamber ICD 3/2024  - TTE 10/17: EF 10-15%, gHK of LV, LV severely dilated, severe eccentric LVH, impaired DF, L mildly dilated, RV mildly enlarged with mildly reduced systolic fxn, RA mild to mod dilated, mild MR/TR, RVSP 25.8 wnl  - cMRI 8/5: mixed ICM/NICM, sev dilated LV EF 5-10%. Basal  inferoseptal, mid inferoseptal, apical septal, apicolateral segments akinetic. Eccentric LVH. Transmural infarction of mid/apical inferoseptal/inferior segments and apicolateral wall (<25% wall thickeness). NICM pattern of mid wall septal fibrosis.  - Troponin mildly elevated 69>52>100>88  - Admit EKG: No acute ischemic changes  - Transmural infarcts on cardiac MRI. Coronary Calcifications on CT chest  - CXR 10/20: Marked enlargement of cardiac silhouette, no acute cardiopulmonary process   - Admit BNP 2833 (>5K)  - Admit Wt: 77.3 kg, last dc weight 74.4kg on 10/5  - home/prior GDMT: Carvedilol 3.125, Entresto 24-26 BID, Empagliflozin 10 daily, Spironolactone 25 daily, Diuretic: Torsemide 40 mg daily  - remains warm and dry, reintroduce/titrate GDMT as BP/renal function allows  - K 4.1 today s/p 1 dose Lokelma, Cr now 1.12  - resume [place] 12.5 mg daily and Entresto 12-13 BID  - cont Empagliflozin (25 mg daily for DM), Coreg 3.125mg BID t  - continue ASA 81, Atorvastatin 40 nightly for secondary prevention     GOC  End of Life  Anxiety/Depression  Acute pain  - Not a candidate for advanced therapies due to ongoing substance use, noncompliance, & psychiatric concerns   - per discussion with HF, CPR and intubation would not be beneficial, discussed with pt, pt agreeable with DNR/DNI  - Goals are mix of survival and time and improved quality of life  - Palliative care consulted/following, appreciate recs, patient intermittently receptive, s/p Palliative care discussion 10/24  - Psych consulted for depression/suicidal ideations, now signed off.   - if patient wants to leave AMA will need to have a capacity assessment done at that time   - cont sertraline 100 mg PO daily   - PO Tylenol juanita q 6hrs for pain      VT / VF   Hx of AF RVR   [employer] single chamber ICD (3/2024)   - Device interrogation 9/30: 4 sustained VT episodes since 9/1/24 requiring ATP and ATP/shock for arrhythmia termination and restoration of  "SR, last shock were recorded from 9/5/2024 which were appropriate for VT. : <1%. 12yr battery life. Lead fxn WNL.   - Patient without any shocks or tachyarrhythmias since amiodarone loading   - For episiode 9/5 prior EP consult noted:\" Possible dual tachycardia with PAF RVR and concurrent VT/VF in the setting of active cocaine use.\" Did not feel RA lead placement appropriate in light of ongoing cocaine use and increased risk of infection. NO OAC recommended.  - cont Amio 200 mg daily, Coreg 3.125 mg BID  - cont telemetry, keep K+ >4, Mag >2     GUICHO on CKD, stage 3a  - in the setting of cardiogenic shock, low output stage  - b/l Cr: 1.3-1.7   - Admit BUN/Cr (10/16): 44/2.41, Peak (10/20): 67/3.08  - MALCOLM 10/17: b/l echogenic kidney likely 2/2 medical renal disease   - Cr today: 1.12 (1.47, 1.29, 1.17, 1.43, 1.64, 2.62)   - Avoid nephrotoxins and hypotension, trend daily RFP while admitted      COPD  Former tobacco user  - 2L at night at baseline  - s/p 60 mg prednisone x 1 in ED 10/16  - walking pulse ox completed today, O2 sat >93% on RA, but reported he was dizzy and light headed HR 93, placed on 2L for comfort  - cont Spiriva  - cont nicotine patch   - keep O2 sat> 92%      Substance use disorder, cocaine  - cessation advised  - Admit Urine tox + cocaine      Type II Diabetes  - A1C 9.9% on 10/15  - Endocrinology consulted/following, appreciate recs  - cont Glargine 15 units daily, SSI scale #1, Jardiance 25 mg daily   - for discharge, continue glargine and jardiance without SSI  - diabetic diet, Accu-Cheks AC/HS, hypoglycemic protocol      Remote cerebellar Hemorrhagic infarct   Concern for CNS infection   Acute Headache, resolved  Vision Disturbance vs chronic near sightedness   - Ophtho consulted, recs appreciated, suspect vision likely 2/2 uncorrected refractive errors, outpatient follow up  - CT head 10/17: Negative   - MRI brain (10/19): no evidence of hemorrhage or mass effect. Remote left cerebellar " infarct.   - Neurology consulted 10/19 for concern of CNS infection, appreciate recs  - LP was initially recommended however discussed with pt and he refused   - Given significant & quick improvement, likelihood of CNS infection is low   - Per Neurology, no further need for CNS coverage atb's, Ceftriaxone, Vancomycin and Acyclovir stopped (10/19 -10/21), Neuro now s/o  - s/p IV thiamine 500mg TID for 3 days followed by 500mg PO daily   - 10/16 Blood Cx x2, negative and final, Strep/Legionella negative  -  MRSA nare swab +, treat with mupirocin x5 days  - 10/19 Blood Cx x2 , negative and final afebrile, WBC wnl  - cont artifical tears QID     Substance Use Disorder  - 10/17 UDS: cocaine +   - hx of daily cocaine use, now weekly cocaine use per pt  - Tobacco use: 3 cigarettes per day  - cont Nicotine patch   - Not interested in THRIVE. Feels he can quit on his own.     DVT ppx: subq Heparin    DISPO:   - Pending further medical optimization  - referral for [facility]  10/22, Clinical Pharmacy referral  10/18 by Annika, and [facility] referral 10/18  - PT rec no needs at discharge  - per SW, [facility] to [facility] with outpatient palliative Navigator from [facility] to follow  - possible discharge tomorrow pending response to GDMT    Code status: DNR and No Intubation    Patient seen and discussed with Dr. Stiven Brown.    Chase Blanca, APRN-CNP

## 2024-10-25 NOTE — CARE PLAN
The patient's goals for the shift include To be able to feel better before going home.    The clinical goals for the shift include Patient will rpeorts no light headedness or dizziness this shift    Over the shift, the patient had an episode of dizziness and light headedness after walking and was KING; however, SPO2 remained above 93%.  Patient given scheduled Tylenol as ordered.  Patient stated he was leaving shortly and needed his paper work.  Team notified and discharge order placed.  Patient removed himself from telemetry and and removed his IV, nursing looked at catheter and catheter tip intact.  Discharge reviewed with patient and nursing walked patient to [facility] for medication .  Patient discharged to home where he lives with his room-mate.      Problem: Pain - Adult  Goal: Verbalizes/displays adequate comfort level or baseline comfort level  Outcome: Progressing     Problem: Safety - Adult  Goal: Free from fall injury  Outcome: Progressing     Problem: Discharge Planning  Goal: Discharge to home or other facility with appropriate resources  Outcome: Progressing     Problem: Chronic Conditions and Co-morbidities  Goal: Patient's chronic conditions and co-morbidity symptoms are monitored and maintained or improved  Outcome: Progressing     Problem: Heart Failure  Goal: Improved gas exchange this shift  Outcome: Progressing  Goal: Improved urinary output this shift  Outcome: Progressing  Goal: Reduction in peripheral edema within 24 hours  Outcome: Progressing  Goal: Report improvement of dyspnea/breathlessness this shift  Outcome: Progressing  Goal: Weight from fluid excess reduced over 2-3 days, then stabilize  Outcome: Progressing  Goal: Increase self care and/or family involvement in 24 hours  Outcome: Progressing     Problem: Respiratory  Goal: Clear secretions with interventions this shift  Outcome: Progressing  Goal: Minimize anxiety/maximize coping throughout shift  Outcome: Progressing  Goal:  Minimal/no exertional discomfort or dyspnea this shift  Outcome: Progressing  Goal: No signs of respiratory distress (eg. Use of accessory muscles. Peds grunting)  Outcome: Progressing  Goal: Patent airway maintained this shift  Outcome: Progressing  Goal: Tolerate mechanical ventilation evidenced by VS/agitation level this shift  Outcome: Progressing  Goal: Tolerate pulmonary toileting this shift  Outcome: Progressing  Goal: Verbalize decreased shortness of breath this shift  Outcome: Progressing  Goal: Wean oxygen to maintain O2 saturation per order/standard this shift  Outcome: Progressing  Goal: Increase self care and/or family involvement in next 24 hours  Outcome: Progressing     Problem: Arrythmia/Dysrhythmia  Goal: Lab values return to normal range  Outcome: Progressing  Goal: No evidence of post procedure complications  Outcome: Progressing  Goal: Promote self management  Outcome: Progressing  Goal: Serial ECG will return to baseline  Outcome: Progressing  Goal: Verbalize understanding of procedures/devices  Outcome: Progressing  Goal: Vital signs return to baseline  Outcome: Progressing  Goal: Care and maintenance of device (specify)  Outcome: Progressing

## 2024-10-25 NOTE — NURSING NOTE
Discharge instructions including medications, appointments, diet, daily weights, living with heart failure booklet reviewed with patient.  Patient understands he is to take 1/2 tablet of Entresto and Spironolactone, pill splitter given to patient x 2 for these medications.  Time of medications reviewed with patient.  Nursing walked patient to [facility] to pick  up medications for discharge, they will be ready in 10 minutes.  Nursing called his ride and his friend will pick him up at [facility] entrance.  Patient Discharged.

## 2024-10-25 NOTE — DISCHARGE INSTRUCTIONS
Your medications have changed. Please take the medications listed on these instructions as prescribed until you are seen at a follow up appointment.   A bacterial infection was sound on your nasal swab, you have been prescribed an ointment to use for the next 4 days to treat this.   It is recommended that you remain inpatient to continue to monitor your response to medication changes, but will discharge you today as your insistence.    Prescriptions were sent to [facility] pharmacy.    We wish you the best and advise you to return to the emergency room should you have any concerning symptoms.

## 2024-10-28 LAB
6MAM UR CFM-MCNC: <25 NG/ML
ATRIAL RATE: 78 BPM
CODEINE UR CFM-MCNC: <50 NG/ML
HYDROCODONE CTO UR CFM-MCNC: <25 NG/ML
HYDROMORPHONE UR CFM-MCNC: <25 NG/ML
MORPHINE UR CFM-MCNC: 256 NG/ML
NORHYDROCODONE UR CFM-MCNC: <25 NG/ML
NOROXYCODONE UR CFM-MCNC: <25 NG/ML
OXYCODONE UR CFM-MCNC: <25 NG/ML
OXYMORPHONE UR CFM-MCNC: <25 NG/ML
P AXIS: 72 DEGREES
P OFFSET: 159 MS
P ONSET: 99 MS
PR INTERVAL: 202 MS
Q ONSET: 200 MS
QRS COUNT: 13 BEATS
QRS DURATION: 130 MS
QT INTERVAL: 444 MS
QTC CALCULATION(BAZETT): 506 MS
QTC FREDERICIA: 484 MS
R AXIS: -58 DEGREES
T AXIS: 98 DEGREES
T OFFSET: 422 MS
VENTRICULAR RATE: 78 BPM

## 2024-11-04 NOTE — PROGRESS NOTES
Music Therapy Note    Leonel Yu     Therapy Session  Referral Type: Referral from previous admission  Visit Type: New visit  Session Start Time: 1530  Session End Time: 1534  Intervention Delivery: In-person  Conflict of Service: Declined treatment               Treatment/Interventions       Post-assessment  Total Session Time (min): 4 minutes    Narrative  Assessment Detail: MT attempted session and pt politely declined services for this admission. MT will respect pt's request and sign off    Education Documentation  No documentation found.

## 2024-11-05 ENCOUNTER — OFFICE VISIT (OUTPATIENT)
Dept: CARDIOLOGY | Facility: HOSPITAL | Age: 54
End: 2024-11-05
Payer: COMMERCIAL

## 2024-11-05 ENCOUNTER — APPOINTMENT (OUTPATIENT)
Dept: RADIOLOGY | Facility: HOSPITAL | Age: 54
End: 2024-11-05
Payer: COMMERCIAL

## 2024-11-05 ENCOUNTER — HOSPITAL ENCOUNTER (INPATIENT)
Facility: HOSPITAL | Age: 54
End: 2024-11-05
Attending: EMERGENCY MEDICINE | Admitting: INTERNAL MEDICINE
Payer: COMMERCIAL

## 2024-11-05 ENCOUNTER — CLINICAL SUPPORT (OUTPATIENT)
Dept: EMERGENCY MEDICINE | Facility: HOSPITAL | Age: 54
End: 2024-11-05
Payer: COMMERCIAL

## 2024-11-05 VITALS
OXYGEN SATURATION: 95 % | DIASTOLIC BLOOD PRESSURE: 95 MMHG | SYSTOLIC BLOOD PRESSURE: 143 MMHG | HEART RATE: 81 BPM | HEIGHT: 69 IN | BODY MASS INDEX: 25.56 KG/M2 | WEIGHT: 172.6 LBS

## 2024-11-05 DIAGNOSIS — I49.01 VENTRICULAR FIBRILLATION (MULTI): ICD-10-CM

## 2024-11-05 DIAGNOSIS — I50.20 HFREF (HEART FAILURE WITH REDUCED EJECTION FRACTION): ICD-10-CM

## 2024-11-05 DIAGNOSIS — I50.43 ACUTE ON CHRONIC COMBINED SYSTOLIC AND DIASTOLIC HRT FAIL: Primary | ICD-10-CM

## 2024-11-05 DIAGNOSIS — N18.31 STAGE 3A CHRONIC KIDNEY DISEASE (CKD) (MULTI): ICD-10-CM

## 2024-11-05 DIAGNOSIS — J44.1 COPD EXACERBATION (MULTI): ICD-10-CM

## 2024-11-05 DIAGNOSIS — I50.9 CONGESTIVE HEART FAILURE, UNSPECIFIED HF CHRONICITY, UNSPECIFIED HEART FAILURE TYPE: ICD-10-CM

## 2024-11-05 DIAGNOSIS — E11.65 TYPE 2 DIABETES MELLITUS WITH HYPERGLYCEMIA, WITH LONG-TERM CURRENT USE OF INSULIN: ICD-10-CM

## 2024-11-05 DIAGNOSIS — I50.43 ACUTE ON CHRONIC COMBINED SYSTOLIC AND DIASTOLIC HEART FAILURE: ICD-10-CM

## 2024-11-05 DIAGNOSIS — I10 PRIMARY HYPERTENSION: ICD-10-CM

## 2024-11-05 DIAGNOSIS — I50.9 ACUTE ON CHRONIC CONGESTIVE HEART FAILURE, UNSPECIFIED HEART FAILURE TYPE: Primary | ICD-10-CM

## 2024-11-05 DIAGNOSIS — I50.43 ACUTE ON CHRONIC COMBINED SYSTOLIC AND DIASTOLIC HRT FAIL: ICD-10-CM

## 2024-11-05 DIAGNOSIS — F14.20 COCAINE USE DISORDER, SEVERE, DEPENDENCE (MULTI): ICD-10-CM

## 2024-11-05 DIAGNOSIS — Z79.4 TYPE 2 DIABETES MELLITUS WITH HYPERGLYCEMIA, WITH LONG-TERM CURRENT USE OF INSULIN: ICD-10-CM

## 2024-11-05 LAB
ALBUMIN SERPL BCP-MCNC: 3.6 G/DL (ref 3.4–5)
ALBUMIN SERPL BCP-MCNC: 3.7 G/DL (ref 3.4–5)
ALP SERPL-CCNC: 103 U/L (ref 33–120)
ALT SERPL W P-5'-P-CCNC: 45 U/L (ref 10–52)
AMPHETAMINES UR QL SCN: ABNORMAL
ANION GAP BLDV CALCULATED.4IONS-SCNC: 4 MMOL/L (ref 10–25)
ANION GAP SERPL CALC-SCNC: 12 MMOL/L (ref 10–20)
ANION GAP SERPL CALC-SCNC: 15 MMOL/L (ref 10–20)
AST SERPL W P-5'-P-CCNC: 25 U/L (ref 9–39)
ATRIAL RATE: 88 BPM
BARBITURATES UR QL SCN: ABNORMAL
BASE EXCESS BLDV CALC-SCNC: 3.4 MMOL/L (ref -2–3)
BASOPHILS # BLD AUTO: 0.04 X10*3/UL (ref 0–0.1)
BASOPHILS NFR BLD AUTO: 0.5 %
BENZODIAZ UR QL SCN: ABNORMAL
BILIRUB SERPL-MCNC: 0.9 MG/DL (ref 0–1.2)
BNP SERPL-MCNC: >5000 PG/ML (ref 0–99)
BODY TEMPERATURE: 37 DEGREES CELSIUS
BUN SERPL-MCNC: 18 MG/DL (ref 6–23)
BUN SERPL-MCNC: 19 MG/DL (ref 6–23)
BZE UR QL SCN: ABNORMAL
CA-I BLDV-SCNC: 1.23 MMOL/L (ref 1.1–1.33)
CALCIUM SERPL-MCNC: 8.9 MG/DL (ref 8.6–10.6)
CALCIUM SERPL-MCNC: 9.3 MG/DL (ref 8.6–10.6)
CANNABINOIDS UR QL SCN: ABNORMAL
CARDIAC TROPONIN I PNL SERPL HS: 69 NG/L (ref 0–53)
CARDIAC TROPONIN I PNL SERPL HS: 69 NG/L (ref 0–53)
CHLORIDE BLDV-SCNC: 106 MMOL/L (ref 98–107)
CHLORIDE SERPL-SCNC: 103 MMOL/L (ref 98–107)
CHLORIDE SERPL-SCNC: 105 MMOL/L (ref 98–107)
CO2 SERPL-SCNC: 24 MMOL/L (ref 21–32)
CO2 SERPL-SCNC: 29 MMOL/L (ref 21–32)
CREAT SERPL-MCNC: 1.43 MG/DL (ref 0.5–1.3)
CREAT SERPL-MCNC: 1.61 MG/DL (ref 0.5–1.3)
EGFRCR SERPLBLD CKD-EPI 2021: 50 ML/MIN/1.73M*2
EGFRCR SERPLBLD CKD-EPI 2021: 58 ML/MIN/1.73M*2
EOSINOPHIL # BLD AUTO: 0.06 X10*3/UL (ref 0–0.7)
EOSINOPHIL NFR BLD AUTO: 0.8 %
ERYTHROCYTE [DISTWIDTH] IN BLOOD BY AUTOMATED COUNT: 16.3 % (ref 11.5–14.5)
FENTANYL+NORFENTANYL UR QL SCN: ABNORMAL
GLUCOSE BLD MANUAL STRIP-MCNC: 203 MG/DL (ref 74–99)
GLUCOSE BLD MANUAL STRIP-MCNC: 226 MG/DL (ref 74–99)
GLUCOSE BLDV-MCNC: 244 MG/DL (ref 74–99)
GLUCOSE SERPL-MCNC: 202 MG/DL (ref 74–99)
GLUCOSE SERPL-MCNC: 217 MG/DL (ref 74–99)
HCO3 BLDV-SCNC: 30.1 MMOL/L (ref 22–26)
HCT VFR BLD AUTO: 46.2 % (ref 41–52)
HCT VFR BLD EST: 50 % (ref 41–52)
HGB BLD-MCNC: 15.7 G/DL (ref 13.5–17.5)
HGB BLDV-MCNC: 16.5 G/DL (ref 13.5–17.5)
IMM GRANULOCYTES # BLD AUTO: 0.03 X10*3/UL (ref 0–0.7)
IMM GRANULOCYTES NFR BLD AUTO: 0.4 % (ref 0–0.9)
INHALED O2 CONCENTRATION: 21 %
LACTATE BLDV-SCNC: 1.5 MMOL/L (ref 0.4–2)
LYMPHOCYTES # BLD AUTO: 0.89 X10*3/UL (ref 1.2–4.8)
LYMPHOCYTES NFR BLD AUTO: 11.4 %
MAGNESIUM SERPL-MCNC: 1.94 MG/DL (ref 1.6–2.4)
MCH RBC QN AUTO: 30.5 PG (ref 26–34)
MCHC RBC AUTO-ENTMCNC: 34 G/DL (ref 32–36)
MCV RBC AUTO: 90 FL (ref 80–100)
METHADONE UR QL SCN: ABNORMAL
MONOCYTES # BLD AUTO: 0.29 X10*3/UL (ref 0.1–1)
MONOCYTES NFR BLD AUTO: 3.7 %
NEUTROPHILS # BLD AUTO: 6.47 X10*3/UL (ref 1.2–7.7)
NEUTROPHILS NFR BLD AUTO: 83.2 %
NRBC BLD-RTO: 0 /100 WBCS (ref 0–0)
OPIATES UR QL SCN: ABNORMAL
OXYCODONE+OXYMORPHONE UR QL SCN: ABNORMAL
OXYHGB MFR BLDV: 16.1 % (ref 45–75)
P AXIS: 66 DEGREES
P OFFSET: 163 MS
P ONSET: 110 MS
PCO2 BLDV: 52 MM HG (ref 41–51)
PCP UR QL SCN: ABNORMAL
PH BLDV: 7.37 PH (ref 7.33–7.43)
PHOSPHATE SERPL-MCNC: 3.2 MG/DL (ref 2.5–4.9)
PHOSPHATE SERPL-MCNC: 3.5 MG/DL (ref 2.5–4.9)
PLATELET # BLD AUTO: 178 X10*3/UL (ref 150–450)
PO2 BLDV: 15 MM HG (ref 35–45)
POTASSIUM BLDV-SCNC: 5.4 MMOL/L (ref 3.5–5.3)
POTASSIUM SERPL-SCNC: 4.4 MMOL/L (ref 3.5–5.3)
POTASSIUM SERPL-SCNC: 5.6 MMOL/L (ref 3.5–5.3)
PR INTERVAL: 194 MS
PROT SERPL-MCNC: 6.6 G/DL (ref 6.4–8.2)
Q ONSET: 207 MS
QRS COUNT: 15 BEATS
QRS DURATION: 114 MS
QT INTERVAL: 422 MS
QTC CALCULATION(BAZETT): 510 MS
QTC FREDERICIA: 479 MS
R AXIS: 56 DEGREES
RBC # BLD AUTO: 5.14 X10*6/UL (ref 4.5–5.9)
SAO2 % BLDV: 16 % (ref 45–75)
SODIUM BLDV-SCNC: 135 MMOL/L (ref 136–145)
SODIUM SERPL-SCNC: 138 MMOL/L (ref 136–145)
SODIUM SERPL-SCNC: 140 MMOL/L (ref 136–145)
T AXIS: 36 DEGREES
T OFFSET: 418 MS
VENTRICULAR RATE: 88 BPM
WBC # BLD AUTO: 7.8 X10*3/UL (ref 4.4–11.3)

## 2024-11-05 PROCEDURE — 80307 DRUG TEST PRSMV CHEM ANLYZR: CPT

## 2024-11-05 PROCEDURE — 84132 ASSAY OF SERUM POTASSIUM: CPT

## 2024-11-05 PROCEDURE — 96374 THER/PROPH/DIAG INJ IV PUSH: CPT

## 2024-11-05 PROCEDURE — 93005 ELECTROCARDIOGRAM TRACING: CPT

## 2024-11-05 PROCEDURE — 83880 ASSAY OF NATRIURETIC PEPTIDE: CPT

## 2024-11-05 PROCEDURE — 84484 ASSAY OF TROPONIN QUANT: CPT

## 2024-11-05 PROCEDURE — 99223 1ST HOSP IP/OBS HIGH 75: CPT

## 2024-11-05 PROCEDURE — 84100 ASSAY OF PHOSPHORUS: CPT

## 2024-11-05 PROCEDURE — 2500000004 HC RX 250 GENERAL PHARMACY W/ HCPCS (ALT 636 FOR OP/ED): Mod: SE

## 2024-11-05 PROCEDURE — 71046 X-RAY EXAM CHEST 2 VIEWS: CPT | Mod: FOREIGN READ | Performed by: RADIOLOGY

## 2024-11-05 PROCEDURE — 2500000004 HC RX 250 GENERAL PHARMACY W/ HCPCS (ALT 636 FOR OP/ED)

## 2024-11-05 PROCEDURE — 71046 X-RAY EXAM CHEST 2 VIEWS: CPT

## 2024-11-05 PROCEDURE — 99285 EMERGENCY DEPT VISIT HI MDM: CPT | Mod: 25

## 2024-11-05 PROCEDURE — 83735 ASSAY OF MAGNESIUM: CPT

## 2024-11-05 PROCEDURE — 36415 COLL VENOUS BLD VENIPUNCTURE: CPT

## 2024-11-05 PROCEDURE — 3077F SYST BP >= 140 MM HG: CPT | Performed by: STUDENT IN AN ORGANIZED HEALTH CARE EDUCATION/TRAINING PROGRAM

## 2024-11-05 PROCEDURE — 82668 ASSAY OF ERYTHROPOIETIN: CPT | Performed by: NURSE PRACTITIONER

## 2024-11-05 PROCEDURE — 93010 ELECTROCARDIOGRAM REPORT: CPT | Performed by: EMERGENCY MEDICINE

## 2024-11-05 PROCEDURE — 99215 OFFICE O/P EST HI 40 MIN: CPT | Performed by: STUDENT IN AN ORGANIZED HEALTH CARE EDUCATION/TRAINING PROGRAM

## 2024-11-05 PROCEDURE — 82947 ASSAY GLUCOSE BLOOD QUANT: CPT

## 2024-11-05 PROCEDURE — 3080F DIAST BP >= 90 MM HG: CPT | Performed by: STUDENT IN AN ORGANIZED HEALTH CARE EDUCATION/TRAINING PROGRAM

## 2024-11-05 PROCEDURE — 2500000001 HC RX 250 WO HCPCS SELF ADMINISTERED DRUGS (ALT 637 FOR MEDICARE OP)

## 2024-11-05 PROCEDURE — 99285 EMERGENCY DEPT VISIT HI MDM: CPT | Performed by: EMERGENCY MEDICINE

## 2024-11-05 PROCEDURE — 2500000002 HC RX 250 W HCPCS SELF ADMINISTERED DRUGS (ALT 637 FOR MEDICARE OP, ALT 636 FOR OP/ED)

## 2024-11-05 PROCEDURE — 74018 RADEX ABDOMEN 1 VIEW: CPT | Performed by: RADIOLOGY

## 2024-11-05 PROCEDURE — 3008F BODY MASS INDEX DOCD: CPT | Performed by: STUDENT IN AN ORGANIZED HEALTH CARE EDUCATION/TRAINING PROGRAM

## 2024-11-05 PROCEDURE — 85025 COMPLETE CBC W/AUTO DIFF WBC: CPT

## 2024-11-05 PROCEDURE — 1210000001 HC SEMI-PRIVATE ROOM DAILY

## 2024-11-05 PROCEDURE — 74018 RADEX ABDOMEN 1 VIEW: CPT

## 2024-11-05 RX ORDER — INSULIN LISPRO 100 [IU]/ML
0-10 INJECTION, SOLUTION INTRAVENOUS; SUBCUTANEOUS
Status: DISPENSED | OUTPATIENT
Start: 2024-11-06

## 2024-11-05 RX ORDER — SERTRALINE HYDROCHLORIDE 50 MG/1
100 TABLET, FILM COATED ORAL DAILY
Status: DISPENSED | OUTPATIENT
Start: 2024-11-05

## 2024-11-05 RX ORDER — FUROSEMIDE 10 MG/ML
80 INJECTION INTRAMUSCULAR; INTRAVENOUS ONCE
Status: COMPLETED | OUTPATIENT
Start: 2024-11-05 | End: 2024-11-05

## 2024-11-05 RX ORDER — HEPARIN SODIUM 5000 [USP'U]/ML
5000 INJECTION, SOLUTION INTRAVENOUS; SUBCUTANEOUS EVERY 8 HOURS SCHEDULED
Status: DISPENSED | OUTPATIENT
Start: 2024-11-05

## 2024-11-05 RX ORDER — LANOLIN ALCOHOL/MO/W.PET/CERES
400 CREAM (GRAM) TOPICAL ONCE
Status: COMPLETED | OUTPATIENT
Start: 2024-11-05 | End: 2024-11-05

## 2024-11-05 RX ORDER — FUROSEMIDE 10 MG/ML
40 INJECTION INTRAMUSCULAR; INTRAVENOUS ONCE
Status: COMPLETED | OUTPATIENT
Start: 2024-11-05 | End: 2024-11-05

## 2024-11-05 RX ORDER — ATORVASTATIN CALCIUM 80 MG/1
80 TABLET, FILM COATED ORAL NIGHTLY
Status: DISPENSED | OUTPATIENT
Start: 2024-11-05

## 2024-11-05 RX ORDER — NAPROXEN SODIUM 220 MG/1
81 TABLET, FILM COATED ORAL DAILY
Status: DISPENSED | OUTPATIENT
Start: 2024-11-05

## 2024-11-05 RX ORDER — AMIODARONE HYDROCHLORIDE 200 MG/1
200 TABLET ORAL DAILY
Status: DISPENSED | OUTPATIENT
Start: 2024-11-05

## 2024-11-05 RX ORDER — INSULIN GLARGINE 100 [IU]/ML
12 INJECTION, SOLUTION SUBCUTANEOUS NIGHTLY
Status: DISCONTINUED | OUTPATIENT
Start: 2024-11-05 | End: 2024-11-07

## 2024-11-05 RX ADMIN — MAGNESIUM OXIDE TAB 400 MG (241.3 MG ELEMENTAL MG) 400 MG: 400 (241.3 MG) TAB at 21:40

## 2024-11-05 RX ADMIN — ASPIRIN 81 MG 81 MG: 81 TABLET ORAL at 19:41

## 2024-11-05 RX ADMIN — SACUBITRIL AND VALSARTAN 0.5 TABLET: 24; 26 TABLET, FILM COATED ORAL at 21:42

## 2024-11-05 RX ADMIN — HEPARIN SODIUM 5000 UNITS: 5000 INJECTION, SOLUTION INTRAVENOUS; SUBCUTANEOUS at 21:39

## 2024-11-05 RX ADMIN — ATORVASTATIN CALCIUM 80 MG: 80 TABLET, FILM COATED ORAL at 21:19

## 2024-11-05 RX ADMIN — FUROSEMIDE 80 MG: 10 INJECTION, SOLUTION INTRAMUSCULAR; INTRAVENOUS at 21:19

## 2024-11-05 RX ADMIN — SERTRALINE 100 MG: 50 TABLET, FILM COATED ORAL at 19:41

## 2024-11-05 RX ADMIN — EMPAGLIFLOZIN 25 MG: 25 TABLET, FILM COATED ORAL at 19:41

## 2024-11-05 RX ADMIN — INSULIN GLARGINE 12 UNITS: 100 INJECTION, SOLUTION SUBCUTANEOUS at 21:56

## 2024-11-05 RX ADMIN — FUROSEMIDE 40 MG: 10 INJECTION, SOLUTION INTRAMUSCULAR; INTRAVENOUS at 15:23

## 2024-11-05 RX ADMIN — AMIODARONE HYDROCHLORIDE 200 MG: 200 TABLET ORAL at 19:41

## 2024-11-05 ASSESSMENT — PAIN SCALES - GENERAL
PAINLEVEL_OUTOF10: 2
PAINLEVEL_OUTOF10: 0-NO PAIN

## 2024-11-05 ASSESSMENT — PATIENT HEALTH QUESTIONNAIRE - PHQ9
2. FEELING DOWN, DEPRESSED OR HOPELESS: NOT AT ALL
1. LITTLE INTEREST OR PLEASURE IN DOING THINGS: NOT AT ALL
SUM OF ALL RESPONSES TO PHQ9 QUESTIONS 1 AND 2: 0

## 2024-11-05 ASSESSMENT — COLUMBIA-SUICIDE SEVERITY RATING SCALE - C-SSRS
1. IN THE PAST MONTH, HAVE YOU WISHED YOU WERE DEAD OR WISHED YOU COULD GO TO SLEEP AND NOT WAKE UP?: NO
1. IN THE PAST MONTH, HAVE YOU WISHED YOU WERE DEAD OR WISHED YOU COULD GO TO SLEEP AND NOT WAKE UP?: NO
6. HAVE YOU EVER DONE ANYTHING, STARTED TO DO ANYTHING, OR PREPARED TO DO ANYTHING TO END YOUR LIFE?: NO
2. HAVE YOU ACTUALLY HAD ANY THOUGHTS OF KILLING YOURSELF?: NO
2. HAVE YOU ACTUALLY HAD ANY THOUGHTS OF KILLING YOURSELF?: NO
6. HAVE YOU EVER DONE ANYTHING, STARTED TO DO ANYTHING, OR PREPARED TO DO ANYTHING TO END YOUR LIFE?: NO

## 2024-11-05 ASSESSMENT — PAIN - FUNCTIONAL ASSESSMENT
PAIN_FUNCTIONAL_ASSESSMENT: 0-10
PAIN_FUNCTIONAL_ASSESSMENT: 0-10

## 2024-11-05 NOTE — PROGRESS NOTES
Primary Care Physician: No Assigned PCP Generic Provider, MD  Patient's Location: [facility] [id number]    Date of Visit: 11/05/2024  9:00 AM EST  Location of visit: [facility]   Type of Visit: New  Last visit: Visit date not found    HPI / Summary:   Leonel Yu is a very pleasant 54 y.o. male presenting for management of mixed ischemic and non-ischemic HFrEF 5-10% s/p [employer] single chamber ICD (3/2024), CKD3a, HTN, HLD, LUZ (crack cocaine), former tobacco use, COPD (on nocturnal 2L at baseline), DM2, remote left cerebellar hemorrhagic infarct, medication noncompliance, anxiety, and depression.     Initial CV History:   In the past year Mr. Yu has had 11 inpatient admission for heart failure. Management of his HF has been complicated by medication non-adherence, ongoing substance use and lack of outpatient followup.     Recent admissions:   9/30/2024 - 10/5/2024 (5 days)  [facility] Presented with leg swelling, abdominal pain, and reported multiple ICD shocks. Had been admitted three weeks prior for VT/VF, with appropriate ICD shocks and a CHF exacerbation treated with diuresis and initiation of amiodarone. Recent cMRI confirmed severe LV dilation, EF 5-10%, transmural infarcts, and fibrosis. IV Lasix administered with a good diuretic response; transitioned to PO torsemide. Entresto increased to 24/26 mg BID, and amiodarone decreased. Device interrogation revealed no new shocks since 9/5. Patient discharged with follow-up scheduled.    10/16/2024 - 10/25/2024 (9 days) [facility]  Presented with altered mental status and hypotension, later transferred to HFICU following a code for undetectable BP and cardiogenic shock.  TTE showed severely dilated LV with an EF of 10-15%, eccentric LVH, and mild RV systolic dysfunction. Re-initiation of GDMT as tolerated. Potassium levels corrected after an episode of hyperkalemia (5.9). Due to substance use, noncompliance,  and psychiatric concerns, the patient is not a candidate for advanced heart failure therapies. Patient agreed to DNR/DNI status. Patient accepted for ICF placement with outpatient palliative care follow-up but attempted to leave AMA. After a capacity assessment, the patient was discharged AMA with follow-up arranged.    Interval history:   He is using crack cocaine still.   Taking meds once in a morning.   He is severely short of breath.   He feels like is drowning. He has edema.   He has not taken his lasix because peeing too much.     Today:  He is accompanied by: self    Currently denies chest pain, palpitations. Patient denies headaches, dizziness or recent falls.       Patient reports shortness of breath and dyspnea on exertion. Patient is out of breath as he sits in the exam room and is struggling to talk.      Patient wears 2.5L of oxygen at night time. Patient reports orthopnea and PND.      Edema noted in BLE. Patient states he has Furosemide but does not the dose. He states he has not taken it the last few days.      Patient states he is very fatigued and struggles to stay awake.      Hospitalizations: Admitted 7/31/2024-8/6/2024 for ADHF. Admitted 9/7/2024-9/12/2024 for Vfib. Admitted 10/1/2024-10/5/2024 for ADHF. Admitted 10/17/2024-10/25/2024 ADHF.   Medication adherence: Patient states yes. Patient is only taking twice daily medications once a day in the morning.   Diet adherence: No diet   Exercise: No exercise     ROS: Full 10 pt review of symptoms of negative unless discussed above.     Objective   Medical History:   He has a past medical history of CHF (congestive heart failure), Chronic kidney disease, COPD (chronic obstructive pulmonary disease) (Multi), Diabetes mellitus (Multi), Hyperlipidemia, Hypertension, Lethargy (10/19/2024), Stroke (Multi), and Substance abuse (Multi).  Surgical Hx:   He has a past surgical history that includes Insert / replace / remove pacemaker.   Social Hx:   He  "reports that he has quit smoking. His smoking use included cigarettes. He has never used smokeless tobacco. He reports current drug use. Drug: \"Crack\" cocaine. He reports that he does not drink alcohol.  Family Hx:   His family history includes Heart disease in his father.   Allergies:   No Known Allergies  Exam:       10/25/2024    11:43 AM 10/25/2024     8:41 AM 10/25/2024     6:00 AM 10/25/2024     4:06 AM 10/24/2024    11:46 PM 10/24/2024     8:16 PM   Vitals   Systolic 104 113  105 113 91   Diastolic 69 78  72 82 62   Heart Rate 63 65  76 77 79   Temp 36 °C (96.8 °F) 36.1 °C (97 °F)  36.8 °C (98.2 °F) 36.9 °C (98.4 °F) 36.3 °C (97.3 °F)   Resp 18 20  20 20 20   Weight (lb)   156.09 156.09     BMI   23.05 kg/m2 23.05 kg/m2     BSA (m2)   1.86 m2 1.86 m2       Wt Readings from Last 5 Encounters:   10/25/24 70.8 kg (156 lb 1.4 oz)   10/15/24 77.1 kg (170 lb)   10/05/24 74.4 kg (164 lb 0.4 oz)   09/08/24 79.4 kg (175 lb 0.7 oz)   08/06/24 73.7 kg (162 lb 7.7 oz)     GEN: Pleasant, ill-appearing, + conversational dyspnea. Needed wheelchair  HEENT: JVP elevated, no icterus. + Kussmaul's  CHEST: No wheeze, no rales  CV: Normal rate, regular rhythm.   ABD: Soft, ND, NT  EXT: cool 2+ LE edema.   NEURO: Pleasant, Fatigued    Labs:   CMP:  Recent Labs     10/24/24  1905 10/24/24  1033 10/23/24  1935 10/22/24  1902 10/22/24  0659   * 130* 134* 131* 134*   K 4.1 5.9* 5.7* 4.4 4.6   CL 99 95* 97* 98 100   CO2 21 30 29 25 29   ANIONGAP 15 11 14 12 10   BUN 14 15 16 18 26*   CREATININE 1.12 1.47* 1.29 1.17 1.43*   EGFR 78 56* 65 74 58*   MG 2.31  --  2.29 2.01 2.14     Recent Labs     10/24/24  1905 10/24/24  1033 10/23/24  1935 10/20/24  1359 10/20/24  0333 10/19/24  1706 10/19/24  0711 10/18/24  0744   ALBUMIN 3.0* 3.5 3.7   < > 3.8   < > 3.8 3.7   ALKPHOS  --   --   --   --  68  --  77 76   ALT  --   --   --   --  22  --  24 21   AST  --   --   --   --  16  --  21 15   BILITOT  --   --   --   --  0.6  --  0.9 0.8    < " > = values in this interval not displayed.   CBC:  Recent Labs     10/24/24  1905 10/23/24  1935 10/22/24  1902 10/22/24  0659 10/21/24  0530   WBC 8.7 9.8 9.5 5.7 7.7   HGB 18.0* 18.6* 16.1 15.9 17.1   HCT 51.2 56.1* 47.9 47.6 51.2   * 119* 101* 90* 97*   MCV 90 93 93 93 91   HEME/ENDO:  Recent Labs     10/20/24  0333 10/17/24  1542 10/17/24  0027 10/15/24  1742 07/30/24  0815 07/30/24  0731 06/20/24  0749 01/09/24  0653   FERRITIN 242  --   --   --   --   --   --   --    IRONSAT 14*  --   --   --   --   --   --   --    TSH  --  1.66 5.71*  --    < >  --   --   --    FREET4  --   --  1.18  --   --   --   --   --    HGBA1C  --   --   --  9.9*  --  8.4* 6.9* 10.1*    < > = values in this interval not displayed.    CARDIAC:   Recent Labs     10/21/24  0530 10/20/24  0333 10/17/24  0027 10/16/24  2238 10/15/24  1147 10/15/24  1032 09/30/24  1844 09/30/24  1701 09/07/24  0128 09/07/24  0006 07/31/24  1851 02/12/24  1239 12/27/23  0759   CKMB  --   --   --   --   --   --   --   --   --   --   --   --  1.6   TROPHS 88* 100* 52 69* 50 70*   < >  --    < > 75*  --    < >  --    BNP  --   --   --  479*  --  2,833*  --  >5,000*  --  2,585* 1,014*   < >  --     < > = values in this interval not displayed.     Recent Labs     09/07/24  0949 07/25/23  0820 06/18/23  0627   CHOL 127 156 168   LDLF  --  97 103*   LDLCALC 72  --   --    HDL 35.4 40.5 35.6*   TRIG 98 92 146   MICRO:   Recent Labs     10/17/24  1000 10/17/24  0542 03/02/23  1112 03/02/23  0225   CRP 0.21  --   --  1.54*   PROCAL 0.27* 0.31* 1.55* 1.15*   Susceptibility data from last 90 days.  Collected Specimen Info Organism   10/20/24 Swab from Anterior Nares Methicillin Susceptible Staphylococcus aureus (MSSA)       Notable Studies, reviewed:   EKG:   Recent Labs     10/21/24  0555 10/19/24  0520 10/16/24  2344   VENTRATE 58 78 54   ATRRATE 58 78 54   QTCFRED 498 484 496   QRSDUR 116 130 118   QTCCALCB 494 506 487     Encounter Date: 10/16/24    Electrocardiogram, 12-lead PRN ACS symptoms   Result Value    Ventricular Rate 78    Atrial Rate 78    DE Interval 202    QRS Duration 130    QT Interval 444    QTC Calculation(Bazett) 506    P Axis 72    R Axis -58    T Axis 98    QRS Count 13    Q Onset 200    P Onset 99    P Offset 159    T Offset 422    QTC Fredericia 484    Narrative    Normal sinus rhythm  Biatrial enlargement  Left axis deviation  Left ventricular hypertrophy with QRS widening and repolarization abnormality  Inferior infarct (cited on or before 16-OCT-2024)  Abnormal ECG  When compared with ECG of 16-OCT-2024 22:51,  Nonspecific T wave abnormality no longer evident in Inferior leads  Confirmed by Regino Muse ([id number]) on 10/28/2024 9:43:28 AM     Echocardiogram:   Recent Labs     10/17/24  1149 07/31/24  1507   EF 13 10   LVIDD 7.64 8.90   RV 25.8 39.4   RVFRWALLPKSP 7.00 7.00   TAPSE 1.8 1.6   Transthoracic Echo (TTE) Complete With Contrast 10/17/2024    Left Ventricle: Left ventricular ejection fraction is severely decreased, by visual estimate at 10-15%. There is global hypokinesis of the left ventricle with minor regional variations. The left ventricular cavity size is severely dilated. Spectral Doppler shows an impaired relaxation pattern of left ventricular diastolic filling. There is no definite left ventricular thrombus visualized.  Left Atrium: The left atrium is mildly dilated.  Right Ventricle: The right ventricle is mildly enlarged. There is mildly reduced right ventricular systolic function.  Right Atrium: The right atrium is mild to moderately dilated.  Aortic Valve: The aortic valve is trileaflet. There is no evidence of aortic valve regurgitation. The peak instantaneous gradient of the aortic valve is 4.0 mmHg.  Mitral Valve: The mitral valve is normal in structure. There is mild mitral valve regurgitation.  Tricuspid Valve: The tricuspid valve is structurally normal. There is mild tricuspid regurgitation. The Doppler  estimated RVSP is within normal limits at 25.8 mmHg.  Pulmonic Valve: The pulmonic valve is structurally normal. There is trace pulmonic valve regurgitation.  Pericardium: There is no pericardial effusion noted.  Aorta: The aortic root is normal.  Systemic Veins: The inferior vena cava appears normal in size, with IVC inspiratory collapse greater than 50%.  In comparison to the previous echocardiogram(s): Compared with study dated 7/31/2024, the diastolic function is now grade I and the RVSP decreased from 39 mmHg to 26 mmHg. No significant differences in LV size and function.    CONCLUSIONS:  1. Left ventricular ejection fraction is severely decreased, by visual estimate at 10-15%.  2. There is global hypokinesis of the left ventricle with minor regional variations.  3. Spectral Doppler shows an impaired relaxation pattern of left ventricular diastolic filling.  4. Left ventricular cavity size is severely dilated.  5. No left ventricular thrombus visualized.  6. There is mildly reduced right ventricular systolic function.  7. Mildly enlarged right ventricle.  8. The left atrium is mildly dilated.  9. The right atrium is mild to moderately dilated.  10. Right ventricular systolic pressure is within normal limits.  11. Compared with study dated 7/31/2024, the diastolic function is now grade I and the RVSP decreased from 39 mmHg to 26 mmHg. No significant differences in LV size and function.    QUANTITATIVE DATA SUMMARY:    2D MEASUREMENTS:           Normal Ranges:  LAs:             3.60 cm   (2.7-4.0cm)  IVSd:            0.75 cm   (0.6-1.1cm)  LVPWd:           1.15 cm   (0.6-1.1cm)  LVIDd:           7.64 cm   (3.9-5.9cm)  LVIDs:           7.23 cm  LV Mass Index:   182 g/m2  LVEDV Index:     205 ml/m2  LV % FS          5.3 %      LA VOLUME:                    Normal Ranges:  LA Vol A4C:        60.3 ml    (22+/-6mL/m2)  LA Vol A2C:        73.5 ml  LA Vol BP:         67.0 ml  LA Vol Index A4C:  31.3ml/m2  LA Vol Index  A2C:  38.1 ml/m2  LA Vol Index BP:   34.8 ml/m2  LA Area A4C:       21.3 cm2  LA Area A2C:       23.7 cm2  LA Major Axis A4C: 6.4 cm  LA Major Axis A2C: 6.5 cm  LA Volume Index:   34.8 ml/m2      RA VOLUME BY A/L METHOD:            Normal Ranges:  RA Vol A4C:              94.0 ml    (8.3-19.5ml)  RA Vol Index A4C:        48.7 ml/m2  RA Area A4C:             26.6 cm2  RA Major Axis A4C:       6.4 cm      LV SYSTOLIC FUNCTION BY 2D PLANIMETRY (MOD):  Normal Ranges:  EF-A4C View:    15 % (>=55%)  EF-A2C View:    23 %  EF-Biplane:     16 %  EF-Visual:      13 %  LV EF Reported: 13 %      LV DIASTOLIC FUNCTION:             Normal Ranges:  MV Peak E:             0.32 m/s    (0.7-1.2 m/s)  MV Peak A:             0.85 m/s    (0.42-0.7 m/s)  E/A Ratio:             0.38        (1.0-2.2)  MV e'                  0.025 m/s   (>8.0)  MV lateral e'          0.03 m/s  MV medial e'           0.02 m/s  MV A Dur:              145.33 msec  E/e' Ratio:            12.86       (<8.0)      AORTIC VALVE:           Normal Ranges:  AoV Vmax:      1.00 m/s (<=1.7m/s)  AoV Peak P.0 mmHg (<20mmHg)  LVOT Max Jori:  0.87 m/s (<=1.1m/s)  LVOT VTI:      12.41 cm  LVOT Diameter: 2.20 cm  (1.8-2.4cm)  AoV Area,Vmax: 3.31 cm2 (2.5-4.5cm2)      RIGHT VENTRICLE:  RV Basal 4.40 cm  RV Mid   3.00 cm  RV Major 10.2 cm  TAPSE:   18.0 mm  RV s'    0.07 m/s      TRICUSPID VALVE/RVSP:          Normal Ranges:  Peak TR Velocity:     2.39 m/s  Est. RA Pressure:     3 mmHg  RV Syst Pressure:     26 mmHg  (< 30mmHg)  IVC Diam:             1.70 cm      PULMONIC VALVE:          Normal Ranges:  PV Max Jori:     0.9 m/s  (0.6-0.9m/s)  PV Max PG:      3.5 mmHg      [id number] Otto Merino MD  Electronically signed on 10/17/2024 at 6:07:22 PM        ** Final **    Coronary Angiography: No results found for this or any previous visit from the past 1800 days.    Right Heart Cath: No results found for this or any previous visit from the past 1800 days.    Cardiac Scoring:  No results found for this or any previous visit from the past 1800 days.    Cardiac MRI:   MR cardiac morphology and function w and wo IV contrast 08/05/2024  FINDINGS:  CARDIAC CHAMBERS  Normal atrioventricular and ventriculoarterial concordance    LEFT ATRIUM  Severely dilated (DEANNE-51 ml/m2).    RIGHT ATRIUM  Moderately dilated (RA max 4ch-31.1 cm2)    INTERATRIAL SEPTUM  Intact.    LEFT VENTRICLE:  1. Severely dilated LV with severely reduced systolic function (LVEF  5-10%).  2. The basal inferoseptal, mid inferoseptal, apical septal,  apicolateral segments are akinetic. All other segments are severely  hypokinetic.  3. Eccentric left ventricular hypertrophy.  4. No evidence of LV thrombus.  5. Following administration of gadolinium, in the late phase, there  is transmural enhancement of the mid/apical inferoseptal/inferior  segments, subendocardial enhancement of the apicolateral segment (<  25% wall thickness) and mid wall septal enhancement.    Quantitative left ventricular functional values are as follows:  EDV = 648 cc; EDVi = 344 cc/m2  ESV = 617 cc; ESVi = 328 cc/m2  Stroke volume = 31 cc; SVi = 16 cc/m2  LVEF = 5 %  Absolute Cardiac Output = 2 l/min.; COi = 1.06 l/min/m2  LV mass = 415 gm; LVMi = 220 gm/m2    LVEDD: 8.2 cm  LVESD: 7.4 cm  LV septal wall thickness (anterobasal): 1.1 cm  LV postero-inferior wall thickness: 0.6 cm    FINDINGS  ----------------------------------------------  LV SCAR SIZE (17 SEGMENT):  15 %    PERFUSION:  There is reduced contrast wash-in in the mid inferoseptal wall.    RIGHT VENTRICLE  The right ventricle appears dilated in size and has reduced  qualitative systolic function by qualitative assessment. No abnormal  delayed enhancement in the myocardium. RV lead visualized.    INTERVENTRICULAR SEPTUM  Intact.    AORTIC VALVE  The aortic valve is tricuspid with normal leaflet excursion.  There is  no aortic regurgitation.      MITRAL VALVE  There is  mild mitral regurgitation  by qualitative assessment.    TRICUSPID VALVE  There is qualitative mild tricuspid regurgitation.    PULMONARY VALVE:  Not assessed.    PERICARDIUM:  The pericardium is normal. There is no pericardial effusion.    THORACIC AORTA  The thoracic aorta appears normal in course, caliber, and contour.  There is no evidence for acute aortic pathology. The ascending  thoracic aorta is 3.0 x 2.8 cm in diameter The arch vessel branching  pattern is  normal.   All the arch branch vessels appear widely  patent in their proximal portions.      PULMONARY ARTERIES  The central pulmonary arteries appear  normal (MPA-2.8 cm, RPA-1.8  cm, LPA-2.1 cm).    SYSTEMIC AND PULMONARY VEINS  Normal systemic venous and pulmonary venous return.  The SVC is of normal caliber. IVC appears normal  Normal pulmonary venous anatomy.    CHEST  The chest wall is normal.  Limited imaging through the lungs reveals no gross abnormalities. No  pleural effusion.    UPPER ABDOMEN  Limited imaging through the upper abdomen reveals no abnormalities of  the visualized organs.    Impression  1. Severely dilated left ventricle (EDVi 344 ml/m2) with severely  impaired systolic function (LVEF 5-10%).  2. The basal-mid inferoseptal, apical septal, apicolateral segments  are akinetic. All other segments are severely hypokinetic.    3. Eccentric left ventricular hypertrophy.  4. No evidence of LV thrombus.  5. LGE imaging demonstrates transmural infarction of the mid/apical  inferoseptal/inferior segments and a separate small region of  subendocardial infarction of the apicolateral wall (<25% wall  thickness). Additionally, there is mid-wall septal fibrosis  (nonischemic pattern).  6. Dilated right ventricle with impaired systolic function  (qualitative assessment). CIED lead noted.    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    MACRO:  None    Signed by: Enoc Dorantes 8/5/2024 4:19 PM  Dictation workstation:    "[id number]    Nuclear:No results found for this or any previous visit from the past 1800 days.    Metabolic Stress: No results found for this or any previous visit from the past 1800 days.      Current Outpatient Medications   Medication Instructions    alcohol swabs (Alcohol Pads) pads, medicated Use 4-8 per day to check blood glucose and for injectable medications    amiodarone (PACERONE) 200 mg, oral, Daily    aspirin 81 mg, oral, Daily    atorvastatin (LIPITOR) 80 mg, oral, Nightly    blood sugar diagnostic strip Use as directed to test blood glucose up to four times daily and as needed.    blood sugar diagnostic strip Use 1 each 4 times a day before meals if needed as backup to joseph.    blood-glucose meter misc 1 each, subcutaneous, 4 times daily with meals and nightly, Check blood glucose 4 times daily, before meals and at bedtime.    carvedilol (COREG) 3.125 mg, oral, 2 times daily    FreeStyle Lancets 28 gauge Use as instructed 4 times daily in the event that CGM sensor is not working    FreeStyle Joseph 3 Plus Sensor device Change sensor every 15 days    FreeStyle Joseph 3 [place] misc Use as instructed    glucose 8 g, oral, As needed    insulin glargine (LANTUS) 15 Units, subcutaneous, Nightly, Take as directed per insulin instructions.    Jardiance 25 mg, oral, Daily    lancets 33 gauge misc 1 each, subcutaneous, 4 times daily    pen needle 1/2\" 29G X 12mm needle Use to inject 1-4 times daily as directed.    sacubitriL-valsartan (Entresto) 24-26 mg tablet 0.5 tablets, oral, 2 times daily    sertraline (ZOLOFT) 100 mg, oral, Daily    spironolactone (ALDACTONE) 12.5 mg, oral, Daily    tiotropium (Spiriva Respimat) 2.5 mcg/actuation inhaler 2 puffs, inhalation, Daily      Notable Therapies: *GDMT*   Class  Agent SAFETY    *ARNI* / ACE / ARB  Entrest 12-13mg BID Last BP:  , Last BUN/Cr (GFR): 10/24/2024: 14/1.12 (78)    *Beta-Blocker*  Carvedilol 3.125mg BID Last HR:      *MRA*  Spironolactone 25mg daily Last " K: 10/24/2024: 4.1     *SGLT2*  Jardiance 25mg daily Last A1c 10/15/2024: 9.9     Diuretic   Last BNP: 10/16/2024: 479    Hydralazine/ISDN       Digoxin  Last Digoxin level: 7/30/2024: <0.30    Anticoagulation   Last Hgb: 10/24/2024: 18.0    Anti-arrhythmic  Amiodarone 200mg daily Last QTc: 10/21/2024: 494    Antiplatelet  Aspirin 81mg daily Last Platelet: 10/24/2024: 108    Lipid-Lowering  atorvastatin 80mg daily Last Tchol 9/7/2024: 127 / LDL 7/25/2023: 97 or 9/7/2024: 72 / HDL 9/7/2024: 35.4 / TRIG 9/7/2024: 98    Other        Device(s)  Single Chamber ICD  03/2024 EF: 10/17/2024: 13%, QRS: 10/21/2024: 116ms    Cardiac Rehab,   if EF <35/MI/OHS        Problems Addressed:   # HFrEF / Chronic Systolic Heart Failure, last EF 10/17/2024: 13%, s/p single chamber ICD  # Mixed ischemic Non-ischemic Cardiomyopathy, ACC/AHA Stage D, NYHA IV, Cool Hypervolemic   # Medication non-adherance: not taking meds as prescribed. BID drugs daily. Not taking lasix. Unable to name medications. Does not understand purpose.   # Ongoing substance use: (crack cocaine)  Patient likely has end-stage HF with recurrent HF hospitalizations with non-adherence and ongoing substance use. He is not an advanced therapies candidate but may be palliated by HF therapies.   He is overly decompensated in clinic in setting of stopping diuretics and is dyspneic at rest. Will refer to the ER for inpatient admission for diuretic therapy, GDMT optimization, THRIVE consult, social work/case management.       Patient Instructions   If you have any questions or need cardiac medication refills, please call the Heart Failure office at [phone], option 6.  You may also contact the  Heart Failure Nursing team via email at [email or web address] (Please include your name and date of birth). To reach Dr. Skinner's office please call [phone] / Fax: [phone]. To schedule an office appointment call [phone].     If I placed a referral  today for a specialist/procedure, please call the general scheduling line at [phone]. You may also schedule appointments on the "[employer] herb. For radiology scheduling (Cardiac calcium score, CTA with HeartFlow, Cardiac MRI, NM cardiac stress test, PET viability study) the phone number is [phone].     Please go to the ER to be admitted to the cardiology service.        Orders:  No orders of the defined types were placed in this encounter.     Followup Appts:  Future Appointments   Date Time Provider Department Center   11/5/2024  9:00 AM Mayco Skinner MD [id number]       Time Spent: The encounter involved a comprehensive review of extensive data, including prior medical records, imaging studies, laboratory results, and consultations, followed by in-depth counseling regarding the patient's complex cardiac condition and comorbidities. We discussed treatment options, risks and benefits, and recommendations for ongoing care and careful monitoring of adverse effects from medications.     ____________________________________________________________  Mayco Skinner MD  Section of Advanced Heart Failure and Cardiac Transplantation  Division of Cardiovascular Medicine  [facility]  [facility]

## 2024-11-05 NOTE — ED PROVIDER NOTES
Emergency Department Provider Note        History of Present Illness     History provided by: Patient  Limitations to History: None  External Records Reviewed with Brief Summary: Discharge Summary from 10/25 which showed presented for altered mental status and hypotension was transferred to the heart failure ICU after CODE BLUE at MRI patient was treated for shock with partial cardiogenic component he ultimately left AMA    HPI:  Leonel Yu is a 54 y.o. male mixed ischemic/toxic mediated HFrEF (EF 5-10%, fibrosis and transmural infarcts seen on cMRI 2024) s/p [employer] [place] [employer] single chamber ICD (3/2024), CKD3a, HTN, HLD, LUZ (crack cocaine), former tobacco use, COPD (nocturnal 2L O2 at baseline), DM2, remote left cerebellar hemorrhagic infarct, medication noncompliance, anxiety and depression presenting for SOB.  Patient endorses orthopnea.  Denies any fevers, chills, sick contacts.  Reports he last used crack last night.  Patient reports noncompliance with medications.  Reports occasional EtOH and tobacco use.    Physical Exam   Triage vitals:  T 36.5 °C (97.7 °F)  HR 83  BP (!) 131/91  RR 18  O2 97 % None (Room air)    General: Awake, alert, in no acute distress  Eyes: Gaze conjugate.  No scleral icterus or injection  HENT: Normo-cephalic, atraumatic. No stridor  CV: Regular rate, regular rhythm. Radial pulses 2+ bilaterally  Resp: Breathing non-labored, speaking in full sentences.  Crackles heard in bilateral basilar lung fields  GI: Soft, non-distended, non-tender. No rebound or guarding.  : Deferred  MSK/Extremities: No gross bony deformities. Moving all extremities.  2+ pitting edema bilaterally  Skin: Warm. Appropriate color  Neuro: Alert. Oriented. Face symmetric. Speech is fluent.  Gross strength and sensation intact in b/l UE and LEs  Psych: Appropriate mood and affect    Medical Decision Making & ED Course   Medical Decision Makin y.o. male presentation at arrival most suggestive of heart  failure exacerbation. Other differentials, such as ACS, PNA, pericarditis, myocarditis were considered. Labs ordered: CBC, CMP, troponin, VBG, point-of-care glucose, BNP. Imaging ordered: Chest x-ray. Treatments including 40 IV Lasix were administered for fluid overload. Labs reviewed and notable for BNP greater than 5000. Imaging reviewed and notable for cardiomegaly with pulmonary vascular congestion and interstitial edema.  Patient was signed out pending delta troponin and final disposition.  ----    Social Determinants of Health which Significantly Impact Care: Substance use disorder     EKG Independent Interpretation: The EKG obtained at 0951 was independently interpreted by myself. It demonstrates normal rhythm with a ventricular rate of 88. Normal axis. Intervals significant for prolonged QTc. ST segments showed no evidence of ischemia.  Significant changes compared to prior EKG from October 21, 2024.    Independent Result Review and Interpretation: Relevant laboratory and radiographic results were reviewed and independently interpreted by myself.  As necessary, they are commented on in the ED Course.    Chronic conditions affecting the patient's care: As documented above in [facility]    The patient was discussed with the following consultants/services: None    Care Considerations: As documented above in [facility]    ED Course:  Diagnoses as of 11/05/24 1453   HFrEF (heart failure with reduced ejection fraction)   Acute on chronic congestive heart failure, unspecified heart failure type     Disposition   Patient was signed out to Dr. Graham at 3PM pending completion of their work-up.  Please see the next provider's transition of care note for the remainder of the patient's care.     Procedures   Procedures    Patient seen and discussed with ED attending physician.    Shruthi White MD  Emergency Medicine     Shruthi White MD  Resident  11/05/24 7184

## 2024-11-05 NOTE — SIGNIFICANT EVENT
HISTORY OF PRESENT ILLNESS: 54 y.o. M w/PMHx mixed ischemic/toxic mediated HFrEF (EF 10-15%, fibrosis and transmural infarcts seen on cMRI 8/2024) s/p [employer] single chamber ICD (3/2024), CKD3a (baseline Cr 1.6-1.7), HTN, DLD, LUZ (crack cocaine), former tobacco use, COPD (nocturnal 2L O2 at baseline), T2DM (A1c 9.9 10/2024), remote L cerebellar hemorrhagic infarct, medication noncompliance, anxiety and depression who was recommended for admission by his OP cardiologist Dr. Skinner due to SOB with c/f CHF exacerbation.     Patient reports increased dyspnea over the past 2-3 days. Endorses orthopnea, PND, and 'breathing heavier'. Also reports abdominal fullness and ankle swelling - usually says he can see his ankles but this time he cannot. Can usually walk half a block without issue, but recently can barely walk 10 steps without being SOB. These constellations of symptoms are usually how he knows he is in heart failure. He initially says he has not taken any of his medications for the past 2 days, but later in the interview reports he hasn't been taking his water pills for several weeks because it causes him to urinate too much. Unable to name what medications he takes. Doesn't know his dry weight. Notes he used crack-cocaine yesterday. Otherwise denies HA, vision changes, CP.     Recent cardiac history per OP note:  Initial CV History:   In the past year Mr. Yu has had 11 inpatient admission for heart failure. Management of his HF has been complicated by medication non-adherence, ongoing substance use and lack of outpatient followup.      Recent admissions:   9/30/2024 - 10/5/2024 (5 days)  [facility] Presented with leg swelling, abdominal pain, and reported multiple ICD shocks. Had been admitted three weeks prior for VT/VF, with appropriate ICD shocks and a CHF exacerbation treated with diuresis and initiation of amiodarone. Recent cMRI confirmed severe LV dilation, EF 5-10%, transmural  infarcts, and fibrosis. IV Lasix administered with a good diuretic response; transitioned to PO torsemide. Entresto increased to 24/26 mg BID, and amiodarone decreased. Device interrogation revealed no new shocks since 9/5. Patient discharged with follow-up scheduled.     10/16/2024 - 10/25/2024 (9 days) [facility]  Presented with altered mental status and hypotension, later transferred to HFICU following a code for undetectable BP and cardiogenic shock.  TTE showed severely dilated LV with an EF of 10-15%, eccentric LVH, and mild RV systolic dysfunction. Re-initiation of GDMT as tolerated. Potassium levels corrected after an episode of hyperkalemia (5.9). Due to substance use, noncompliance, and psychiatric concerns, the patient is not a candidate for advanced heart failure therapies. Patient agreed to DNR/DNI status. Patient accepted for ICF placement with outpatient palliative care follow-up but attempted to leave AMA. After a capacity assessment, the patient was discharged AMA with follow-up arranged.     Interval history:   He is using crack cocaine still.   Taking meds once in a morning.   He is severely short of breath.   He feels like is drowning. He has edema.   He has not taken his lasix because peeing too much.      Today:  He is accompanied by: self     Currently denies chest pain, palpitations. Patient denies headaches, dizziness or recent falls.       Patient reports shortness of breath and dyspnea on exertion. Patient is out of breath as he sits in the exam room and is struggling to talk.      Patient wears 2.5L of oxygen at night time. Patient reports orthopnea and PND.      Edema noted in BLE. Patient states he has Furosemide but does not the dose. He states he has not taken it the last few days.      Patient states he is very fatigued and struggles to stay awake.      Hospitalizations: Admitted 7/31/2024-8/6/2024 for ADHF. Admitted 9/7/2024-9/12/2024 for Vfib. Admitted  10/1/2024-10/5/2024 for ADHF. Admitted 10/17/2024-10/25/2024 ADHF.   Medication adherence: Patient states yes. Patient is only taking twice daily medications once a day in the morning.   Diet adherence: No diet   Exercise: No exercise     ED COURSE:  - Vital Signs:  AT 36.5C, HR 83-89, RR 18, -135/, 94-97 RA -> placed on 2L NC for increased WOB  - Labs:  CBC: WBC 7.8, HB 15.7, Plt 178  CHEM: Na 140, K 5.6 (H), Cl 105, Bicarb 29, BUN 19, Cr 1.43 (H, has had normal Cr 1.1), Glu 217  LFTs: Alb 3.6, Alk phos 103, ALT 45, AST 25, Cr 0.9  Calcium: 9.3  [id number]  TROP: 69 (H) -> 69 (H)  BNP: >5000 (has had multiple prior BNPs > 5000, recent hiwot 479 10/16/24)  UDS: + cocaine  - EKG: NSR, HR 88, QTc 510 ms  - Imaging:    CXR 24  IMPRESSION:  Cardiomegaly with pulmonary vascular congestion and interstitial  edema.    TTE 10/17/24  CONCLUSIONS:   1. Left ventricular ejection fraction is severely decreased, by visual estimate at 10-15%.   2. There is global hypokinesis of the left ventricle with minor regional variations.   3. Spectral Doppler shows an impaired relaxation pattern of left ventricular diastolic filling.   4. Left ventricular cavity size is severely dilated.   5. No left ventricular thrombus visualized.   6. There is mildly reduced right ventricular systolic function.   7. Mildly enlarged right ventricle.   8. The left atrium is mildly dilated.   9. The right atrium is mild to moderately dilated.  10. Right ventricular systolic pressure is within normal limits.  11. Compared with study dated 2024, the diastolic function is now grade I and the RVSP decreased from 39 mmHg to 26 mmHg. No significant differences in LV size and function.    - ED Interventions: xIV 40 lasix once    SOCIAL HISTORY:  - Tobacco: denies  - Alcohol: denies  - Drugs: last endorsed using crack cocaine last night    PHYSICAL EXAM:  General: pleasant, sitting up in bed in NAD  HEENT: PERRL  Skin: no suspect  lesions or rashes   Chest: crackles in bilateral lower lobes, some conversational dyspnea, on 2L NC, breathing comfortably   Cardiac: RRR, + JVD   Abdomen: soft, firm, non-tender, tympanic to palpation in all quadrants  : urinal at bedside filled with yellow urine  EXT: cold hands but without swelling. B/l LE - warm behind knees bilaterally, colder to touch in feet. 3+ pitting edema in ankles, 2+ edema in L leg extending to mid-calf and 1+ edema in R leg extending to mid-calf  Neuro: AOx3, moving all limbs spontaneously, follows commands    ASSESSMENT: 54 y.o. M w/PMHx mixed ischemic/toxic mediated HFrEF (EF 10-15%, fibrosis and transmural infarcts seen on cMRI 8/2024) s/p [place] [place] [employer] single chamber ICD (3/2024), CKD3a (baseline Cr 1.6-1.7), LUZ (crack cocaine), COPD (nocturnal 2L O2 at baseline), medication noncompliance being admitted for ADHF iso medication non-compliance, ongoing substance abuse (UDS today + cocaine), and lack of outpatient FU. HDS, on baseline O2 requirements. Labs notable for hyperK 5.6, Cr 1.43 (baseline 1.6-1.7). Lactate and LFTs wnl, so low c/f cardiogenic shock. BNP > 5000. Patient has end-stage HF with recurrent hospitalizations, and given substance use and poor FU he is not a candidate for advanced therapies. Will diurese with IV lasix 80 (has responded well during prior admissions), GDMT optimization, THRIVE consult.     PLAN:    #ADHF  #Mixed ischemic/toxic mediated HFrEF, ACC/AHA Stage D, NYHA 4  #Medication non-adherence  #Ongoing substance use  ::unable to say dry weight, but per DC summary 10/25, discharge weight 70.8 kg  ::admission weight 76.2 kg  ::BNP > 5000, trop 69 x2  ::CXR with cardiomegaly, pulm vascular congestion, interstitial edema  ::no current c/f cardiogenic shock: lactate and LFTs wnl   ::s/p IV 40 lasix in ED with good output  ::TTE 10/17 EF 10-15%, will defer repeat TTE at this time  ::at baseline O2 requirements  ::recent TSH/T4 and lipid panel normal  -repeat  VBG STAT given 'cold hands' which is new per patient on exam  -additional IV 80 lasix tonight, and can plan for daily IV 80 lasix thereafter as he has done well with this during prior admissions  -should plan for outpatient PO torsemide given gut edema on presentation   -HOLD home coreg iso ADHF  -c/w home Entresto 12/13 BID, empa 25 daily  -HOLD home Aldactone 12.5 once daily (says patient taking 25 mg daily but unable to confirm this so will c/w prescription dose) iso hyperK, can restart tomorrow pending K trend  -K>4, Mg>2  -daily weights  -strict I&O  -trop x2 stable, likely elevated iso ADHF, no need to continue trending    ?Abd distension/firmness  ::tympanic on exam, had a BM today, likely edema  -STAT KUB to ensure no other acute process and then c/w diuresis    #CKD3a  ::baseline Cr 1.6-1.7, admission Cr 1.4  -at baseline, ctm iso diuresis    #Substance use  ::admission UDS + cocaine  -consider THRIVE c/s in AM    #Misc: c/w home amio for prior VT/VFib, ASA, atorva, Zoloft, tiotropium    F: diuresis  E: K > 4, Mg > 2   N: cardiac  A: pIV  DVT PPx: subQ heparin  GI PPx: not indicated    CODE STATUS: FULL (confirmed on admission)   SURROGATE DECISION MAKER: Elizabet (roommate) [phone]

## 2024-11-05 NOTE — H&P
CHIEF COMPLAINT: short of breath    HISTORY OF PRESENT ILLNESS:   Leonel Yu is a 54 y.o. male with a PMHx significant for mixed ischemic and non-ischemic HFrEF 10-15% s/p [employer] single chamber ICD (3/2024), CKD3a, HTN, HLD, LUZ (crack cocaine), former tobacco use, COPD (on nocturnal 2L at baseline), DM2, remote left cerebellar hemorrhagic infarct, medication noncompliance, anxiety, and depression who presents to the ED from clinic with decompensated heart failure.    Patient was seen in cardiology clinic earlier today reporting worsening shortness of breath at rest and on exertion. He says his exercise capacity is half a block but he can still manage to go up the stairs in his home. He says he is not consistently adherent to his medication regimen. He most recently took some of his medications this morning but cannot recall which ones. He does not take his diuretic because he already urinates a lot. He has been fatigued and endorses orthopnea/PND. His hands and feet are cool, which is not typical for him. His cardiologist recommended he present to the ED for IV diuresis and GDMT titration.    He most recently used crack cocaine last night. He denies any chest pain, palpitations, dizziness, or presyncope, nor any symptoms at the time of drug use. Of note, patient has been admitted 11 times for heart failure in the past year. Most recent admission was 10/16-10/25 for cardiogenic shock. He had a blue code called and after recovering, left AMA.    REVIEW OF SYSTEMS:  12-point ROS reviewed and negative except as mentioned in HPI.     ED COURSE:  - Vital Signs:   Vitals:    11/05/24 1325   BP: (!) 135/108   Pulse: 89   Resp: 18   Temp: 36.7 °C (98 °F)   SpO2: 94%   - Labs:  CBC: WBC 7.8, Hb 15.7, Plt 178  CHEM: Na 140, K 5.6, Cl 105, CO2 29, BUN 19, Crt 1.43, glucose 217  LFTs: Alb 3.6, Alk Phos 103, ALT 45, AST 25, T bili 0.9  Calcium: 9.3  TROP: 69 > 69  BNP: >5,000  VBG: pH 7.37, pCO2 52, pO2 15,  lactate 1.5  - EKG:   normal sinus, LVH, early repolarization changes in anterior precordial leads, prolonged Qtc (510ms)  - Imaging: CXR showed cardiomegaly with pulmonary vascular congestion and interstitial edema  - ED Interventions: 1x IV Lasix 40mg    PAST MEDICAL HISTORY:   as above    PAST SURGICAL HISTORY:   as above    HOME MEDS:  See home med rec    ALLERGIES: see documented allergies in chart    FAMILY HISTORY:  Noncontributory     SOCIAL HISTORY:  - Living Situation: Lives at  - Functional Status: Independent   - Tobacco: denies  - Alcohol: denies  - Drugs: crack cocaine, most recently last night    PHYSICAL EXAM:  General: awake, alert, conversant, appears stated age  HEENT: pupils equal and round, no scleral icterus or conjunctivitis  Chest: CTAB, normal respiratory effort, on supplemental oxygen  Cardiac: regular rate and rhythm, normal s1, s2, no M/R/G, no JVD  Abdomen: soft, distended, NT, no involuntary guarding  : no flank pain or indwelling urinary catheter  EXT: Cool extremities, 2+ pitting edema, no asymmetry noted  MSK: no focal joint swelling noted  Neuro: AOx4, moving all limbs spontaneously, follows commands  Psych: coherent thought process, appropriate mood and affect    IMAGING  TTE 10/17:  1. Left ventricular ejection fraction is severely decreased, by visual estimate at 10-15%.   2. There is global hypokinesis of the left ventricle with minor regional variations.   3. Spectral Doppler shows an impaired relaxation pattern of left ventricular diastolic filling.   4. Left ventricular cavity size is severely dilated.   5. No left ventricular thrombus visualized.   6. There is mildly reduced right ventricular systolic function.   7. Mildly enlarged right ventricle.   8. The left atrium is mildly dilated.   9. The right atrium is mild to moderately dilated.  10. Right ventricular systolic pressure is within normal limits.  11. Compared with study dated 7/31/2024, the diastolic function is  now grade I and the RVSP decreased from 39 mmHg to 26 mmHg. No significant differences in LV size and function.    ASSESSMENT & PLAN:   Leonel Yu is a 54 y.o. male with a PMHx significant for mixed ischemic and non-ischemic HFrEF 10-15% s/p [employer] single chamber ICD (3/2024), CKD3a, HTN, HLD, LUZ (crack cocaine), former tobacco use, COPD (on nocturnal 2L at baseline), DM2, remote left cerebellar hemorrhagic infarct, medication noncompliance, anxiety, and depression who presents to the ED from clinic with decompensated heart failure iso medication non-adherence and ongoing crack cocaine usage as recently as 11/4. Patient only reporting dyspnea at this time. Currently on 2L NC, with only nocturnal oxygen use at baseline. Mild troponin elevation likely 2/2 demand ischemia from stimulant use vs decompensated state vs ACS, no EKG changes. Troponin currently stable. Patient is clearly volume overloaded and will require IV diuresis as well as education on importance of adhering to medications to prevent additional hospitalizations.    #Acute on chronic decompensated HFrEF (EF 10-15%)  #Mild troponemia  #Hx of VT/VF  #Dyspnea  #Edema  - BNP >5,000, troponin 69  - Patient non-adherent to medications, takes inconsistently or not at all  - Volume overloaded on exam, including significant abdominal edema  - CXR consistent with pulm vasc congestion  - Received 1x IV lasix 40 in the ED  PLAN  - 1x IV Lasix 80mg  - KUB for further evaluation of abdominal edema  - Continue home amiodarone 200mg daily  - Holding Coreg as currently decompensated and Spironolactone for hyperK  - Resume other home GDMT Entresto 24-26mg and Jardiance 25mg  - Strict I&Os and daily weights  - Consider cardioMEMs given hx of frequent hospitalizations for HF    #Substance use disorder  - patient reports ongoing crack cocaine use, as recently as 11/4  - UDS confirms cocaine use  - Social work consult for THRIVE    #HTN  #HLD  - Continue home  ASA 81mg daily and Lipitor 80mg daily    #DM2  #CKD3a  - Last A1c 9.9 on 10/15  - Insulin dependent, home regimen is lantus 15 at bedtime  PLAN  - Start SSI    #COPD  - no PFTs no file  - supposed to be taking tiotropium at home  PLAN  - tiotropium 2 puffs daily    #MDD with anxiety  - Continue home sertraline 100mg daily    F: Avoid because volume overloaded  E: Replete PRN  N: Cardiac  A: PIV  DVT PPx: Heparin subQ  GI PPx: None    CODE STATUS: Full code (confirmed on admission)   SURROGATE DECISION MAKER: Elizabet Santana (Friend) [phone]     Patient to be seen and discussed with attending physician Dr. Barraza.    Pietro Vargas MD

## 2024-11-05 NOTE — PROGRESS NOTES
Emergency Department Transition of Care Note       Signout   I received Leonel Yu in signout from Dr. White.  Please see the ED Provider Note for all HPI, PE and MDM up to the time of signout at 1500.  This is in addition to the primary record.    In brief Leonel Yu is an 54 y.o. male presenting for shortness of breath    At the time of signout we were awaiting:  Final disposition of patient with regards to where he would be admitted    ED Course & Medical Decision Making   Medical Decision Making:  Under my care, patient remained hemodynamically stable, ultimately admitted to cardiology, no other interventions were needed while under my care outside of the primary provider's note.    ED Course:  Diagnoses as of 11/05/24 1639   HFrEF (heart failure with reduced ejection fraction)   Acute on chronic congestive heart failure, unspecified heart failure type       Disposition   As a result of their workup, the patient will require admission to the hospital.  The patient was informed of his diagnosis.  The patient was given the opportunity to ask questions and I answered them. The patient agreed to be admitted to the hospital.    Procedures   Procedures    Melvin Fitzgerald DO  Emergency Medicine

## 2024-11-05 NOTE — PROGRESS NOTES
54 year old male/female here for HFrEF.     Social Hx: Denies smoking, ETOH, illicits  Family Hx: Father  of heart problems but patient does not know specifics.     Interval Hx:     Currently denies chest pain, palpitations. Patient denies headaches, dizziness or recent falls.      Patient reports shortness of breath and dyspnea on exertion. Patient is out of breath as he sits in the exam room and is struggling to talk.     Patient wears 2.5L of oxygen at night time. Patient reports orthopnea and PND.     Edema noted in BLE. Patient states he has Furosemide but does not the dose. He states he has not taken it the last few days.     Patient states he is very fatigued and struggles to stay awake.     Hospitalizations: Admitted 2024-2024 for ADHF. Admitted 2024-2024 for Vfib. Admitted 10/1/2024-10/5/2024 for ADHF. Admitted 10/17/2024-10/25/2024 ADHF.   Medication adherence: Patient states yes. Patient is only taking twice daily medications once a day in the morning.   Diet adherence: No diet   Exercise: No exercise     Echo 10/17/2024:   CONCLUSIONS:   1. Left ventricular ejection fraction is severely decreased, by visual estimate at 10-15%.   2. There is global hypokinesis of the left ventricle with minor regional variations.   3. Spectral Doppler shows an impaired relaxation pattern of left ventricular diastolic filling.   4. Left ventricular cavity size is severely dilated.   5. No left ventricular thrombus visualized.   6. There is mildly reduced right ventricular systolic function.   7. Mildly enlarged right ventricle.   8. The left atrium is mildly dilated.   9. The right atrium is mild to moderately dilated.  10. Right ventricular systolic pressure is within normal limits.  11. Compared with study dated 2024, the diastolic function is now grade I and the RVSP decreased from 39 mmHg to 26 mmHg. No significant differences in LV size and function.

## 2024-11-06 LAB
ALBUMIN SERPL BCP-MCNC: 3.1 G/DL (ref 3.4–5)
ALBUMIN SERPL BCP-MCNC: 3.6 G/DL (ref 3.4–5)
ALP SERPL-CCNC: 105 U/L (ref 33–120)
ALT SERPL W P-5'-P-CCNC: 46 U/L (ref 10–52)
ANION GAP SERPL CALC-SCNC: 15 MMOL/L (ref 10–20)
ANION GAP SERPL CALC-SCNC: 18 MMOL/L (ref 10–20)
AST SERPL W P-5'-P-CCNC: 23 U/L (ref 9–39)
BASOPHILS # BLD AUTO: 0.06 X10*3/UL (ref 0–0.1)
BASOPHILS NFR BLD AUTO: 0.7 %
BILIRUB SERPL-MCNC: 0.9 MG/DL (ref 0–1.2)
BUN SERPL-MCNC: 25 MG/DL (ref 6–23)
BUN SERPL-MCNC: 29 MG/DL (ref 6–23)
CALCIUM SERPL-MCNC: 7.9 MG/DL (ref 8.6–10.6)
CALCIUM SERPL-MCNC: 8.9 MG/DL (ref 8.6–10.6)
CHLORIDE SERPL-SCNC: 100 MMOL/L (ref 98–107)
CHLORIDE SERPL-SCNC: 102 MMOL/L (ref 98–107)
CO2 SERPL-SCNC: 24 MMOL/L (ref 21–32)
CO2 SERPL-SCNC: 26 MMOL/L (ref 21–32)
CREAT SERPL-MCNC: 1.4 MG/DL (ref 0.5–1.3)
CREAT SERPL-MCNC: 1.6 MG/DL (ref 0.5–1.3)
EGFRCR SERPLBLD CKD-EPI 2021: 51 ML/MIN/1.73M*2
EGFRCR SERPLBLD CKD-EPI 2021: 59 ML/MIN/1.73M*2
EOSINOPHIL # BLD AUTO: 0.08 X10*3/UL (ref 0–0.7)
EOSINOPHIL NFR BLD AUTO: 1 %
ERYTHROCYTE [DISTWIDTH] IN BLOOD BY AUTOMATED COUNT: 15.9 % (ref 11.5–14.5)
ERYTHROCYTE [DISTWIDTH] IN BLOOD BY AUTOMATED COUNT: 17.2 % (ref 11.5–14.5)
GLUCOSE BLD MANUAL STRIP-MCNC: 146 MG/DL (ref 74–99)
GLUCOSE BLD MANUAL STRIP-MCNC: 184 MG/DL (ref 74–99)
GLUCOSE BLD MANUAL STRIP-MCNC: 218 MG/DL (ref 74–99)
GLUCOSE BLD MANUAL STRIP-MCNC: 262 MG/DL (ref 74–99)
GLUCOSE BLD MANUAL STRIP-MCNC: 265 MG/DL (ref 74–99)
GLUCOSE BLD MANUAL STRIP-MCNC: 449 MG/DL (ref 74–99)
GLUCOSE SERPL-MCNC: 131 MG/DL (ref 74–99)
GLUCOSE SERPL-MCNC: 195 MG/DL (ref 74–99)
HCT VFR BLD AUTO: 52.2 % (ref 41–52)
HCT VFR BLD AUTO: 54.9 % (ref 41–52)
HGB BLD-MCNC: 18.7 G/DL (ref 13.5–17.5)
HGB BLD-MCNC: 19 G/DL (ref 13.5–17.5)
IMM GRANULOCYTES # BLD AUTO: 0.03 X10*3/UL (ref 0–0.7)
IMM GRANULOCYTES NFR BLD AUTO: 0.4 % (ref 0–0.9)
LYMPHOCYTES # BLD AUTO: 0.83 X10*3/UL (ref 1.2–4.8)
LYMPHOCYTES NFR BLD AUTO: 10.2 %
MAGNESIUM SERPL-MCNC: 1.98 MG/DL (ref 1.6–2.4)
MCH RBC QN AUTO: 30.7 PG (ref 26–34)
MCH RBC QN AUTO: 30.8 PG (ref 26–34)
MCHC RBC AUTO-ENTMCNC: 34.6 G/DL (ref 32–36)
MCHC RBC AUTO-ENTMCNC: 35.8 G/DL (ref 32–36)
MCV RBC AUTO: 86 FL (ref 80–100)
MCV RBC AUTO: 89 FL (ref 80–100)
MONOCYTES # BLD AUTO: 0.39 X10*3/UL (ref 0.1–1)
MONOCYTES NFR BLD AUTO: 4.8 %
NEUTROPHILS # BLD AUTO: 6.75 X10*3/UL (ref 1.2–7.7)
NEUTROPHILS NFR BLD AUTO: 82.9 %
NRBC BLD-RTO: 0 /100 WBCS (ref 0–0)
NRBC BLD-RTO: 0 /100 WBCS (ref 0–0)
PHOSPHATE SERPL-MCNC: 4.3 MG/DL (ref 2.5–4.9)
PHOSPHATE SERPL-MCNC: 4.4 MG/DL (ref 2.5–4.9)
PLATELET # BLD AUTO: 177 X10*3/UL (ref 150–450)
PLATELET # BLD AUTO: 207 X10*3/UL (ref 150–450)
POTASSIUM SERPL-SCNC: 3.9 MMOL/L (ref 3.5–5.3)
POTASSIUM SERPL-SCNC: 3.9 MMOL/L (ref 3.5–5.3)
PROT SERPL-MCNC: 7.1 G/DL (ref 6.4–8.2)
RBC # BLD AUTO: 6.07 X10*6/UL (ref 4.5–5.9)
RBC # BLD AUTO: 6.19 X10*6/UL (ref 4.5–5.9)
SODIUM SERPL-SCNC: 138 MMOL/L (ref 136–145)
SODIUM SERPL-SCNC: 139 MMOL/L (ref 136–145)
WBC # BLD AUTO: 7.6 X10*3/UL (ref 4.4–11.3)
WBC # BLD AUTO: 8.1 X10*3/UL (ref 4.4–11.3)

## 2024-11-06 PROCEDURE — 2500000002 HC RX 250 W HCPCS SELF ADMINISTERED DRUGS (ALT 637 FOR MEDICARE OP, ALT 636 FOR OP/ED)

## 2024-11-06 PROCEDURE — 82947 ASSAY GLUCOSE BLOOD QUANT: CPT

## 2024-11-06 PROCEDURE — 36415 COLL VENOUS BLD VENIPUNCTURE: CPT | Performed by: NURSE PRACTITIONER

## 2024-11-06 PROCEDURE — 1210000001 HC SEMI-PRIVATE ROOM DAILY

## 2024-11-06 PROCEDURE — 84100 ASSAY OF PHOSPHORUS: CPT | Performed by: NURSE PRACTITIONER

## 2024-11-06 PROCEDURE — 2500000001 HC RX 250 WO HCPCS SELF ADMINISTERED DRUGS (ALT 637 FOR MEDICARE OP)

## 2024-11-06 PROCEDURE — 2500000004 HC RX 250 GENERAL PHARMACY W/ HCPCS (ALT 636 FOR OP/ED)

## 2024-11-06 PROCEDURE — 84100 ASSAY OF PHOSPHORUS: CPT

## 2024-11-06 PROCEDURE — 2500000004 HC RX 250 GENERAL PHARMACY W/ HCPCS (ALT 636 FOR OP/ED): Performed by: NURSE PRACTITIONER

## 2024-11-06 PROCEDURE — 85025 COMPLETE CBC W/AUTO DIFF WBC: CPT

## 2024-11-06 PROCEDURE — 80053 COMPREHEN METABOLIC PANEL: CPT

## 2024-11-06 PROCEDURE — 83735 ASSAY OF MAGNESIUM: CPT

## 2024-11-06 PROCEDURE — 2500000002 HC RX 250 W HCPCS SELF ADMINISTERED DRUGS (ALT 637 FOR MEDICARE OP, ALT 636 FOR OP/ED): Performed by: NURSE PRACTITIONER

## 2024-11-06 PROCEDURE — 36415 COLL VENOUS BLD VENIPUNCTURE: CPT

## 2024-11-06 PROCEDURE — 85027 COMPLETE CBC AUTOMATED: CPT | Performed by: NURSE PRACTITIONER

## 2024-11-06 RX ORDER — INSULIN LISPRO 100 [IU]/ML
5 INJECTION, SOLUTION INTRAVENOUS; SUBCUTANEOUS
Status: DISPENSED | OUTPATIENT
Start: 2024-11-06

## 2024-11-06 RX ORDER — FUROSEMIDE 10 MG/ML
80 INJECTION INTRAMUSCULAR; INTRAVENOUS
Status: DISCONTINUED | OUTPATIENT
Start: 2024-11-06 | End: 2024-11-08

## 2024-11-06 RX ORDER — SPIRONOLACTONE 25 MG/1
12.5 TABLET ORAL DAILY
Status: DISPENSED | OUTPATIENT
Start: 2024-11-06

## 2024-11-06 RX ADMIN — INSULIN LISPRO 5 UNITS: 100 INJECTION, SOLUTION INTRAVENOUS; SUBCUTANEOUS at 17:15

## 2024-11-06 RX ADMIN — SPIRONOLACTONE 12.5 MG: 25 TABLET, FILM COATED ORAL at 10:53

## 2024-11-06 RX ADMIN — INSULIN GLARGINE 12 UNITS: 100 INJECTION, SOLUTION SUBCUTANEOUS at 20:41

## 2024-11-06 RX ADMIN — ASPIRIN 81 MG 81 MG: 81 TABLET ORAL at 08:47

## 2024-11-06 RX ADMIN — FUROSEMIDE 80 MG: 10 INJECTION, SOLUTION INTRAMUSCULAR; INTRAVENOUS at 16:29

## 2024-11-06 RX ADMIN — INSULIN LISPRO 6 UNITS: 100 INJECTION, SOLUTION INTRAVENOUS; SUBCUTANEOUS at 17:15

## 2024-11-06 RX ADMIN — INSULIN LISPRO 10 UNITS: 100 INJECTION, SOLUTION INTRAVENOUS; SUBCUTANEOUS at 11:13

## 2024-11-06 RX ADMIN — SERTRALINE 100 MG: 50 TABLET, FILM COATED ORAL at 08:47

## 2024-11-06 RX ADMIN — SACUBITRIL AND VALSARTAN 0.5 TABLET: 24; 26 TABLET, FILM COATED ORAL at 08:47

## 2024-11-06 RX ADMIN — HEPARIN SODIUM 5000 UNITS: 5000 INJECTION, SOLUTION INTRAVENOUS; SUBCUTANEOUS at 06:16

## 2024-11-06 RX ADMIN — EMPAGLIFLOZIN 25 MG: 25 TABLET, FILM COATED ORAL at 08:47

## 2024-11-06 RX ADMIN — ATORVASTATIN CALCIUM 80 MG: 80 TABLET, FILM COATED ORAL at 20:42

## 2024-11-06 RX ADMIN — SACUBITRIL AND VALSARTAN 0.5 TABLET: 24; 26 TABLET, FILM COATED ORAL at 20:43

## 2024-11-06 RX ADMIN — TIOTROPIUM BROMIDE INHALATION SPRAY 2 PUFF: 3.12 SPRAY, METERED RESPIRATORY (INHALATION) at 08:48

## 2024-11-06 RX ADMIN — AMIODARONE HYDROCHLORIDE 200 MG: 200 TABLET ORAL at 08:47

## 2024-11-06 SDOH — ECONOMIC STABILITY: FOOD INSECURITY: WITHIN THE PAST 12 MONTHS, YOU WORRIED THAT YOUR FOOD WOULD RUN OUT BEFORE YOU GOT MONEY TO BUY MORE.: OFTEN TRUE

## 2024-11-06 SDOH — ECONOMIC STABILITY: FOOD INSECURITY: WITHIN THE PAST 12 MONTHS, THE FOOD YOU BOUGHT JUST DIDN'T LAST AND YOU DIDN'T HAVE MONEY TO GET MORE.: OFTEN TRUE

## 2024-11-06 ASSESSMENT — PAIN - FUNCTIONAL ASSESSMENT: PAIN_FUNCTIONAL_ASSESSMENT: 0-10

## 2024-11-06 ASSESSMENT — PAIN SCALES - GENERAL
PAINLEVEL_OUTOF10: 0 - NO PAIN
PAINLEVEL_OUTOF10: 0 - NO PAIN

## 2024-11-06 ASSESSMENT — ACTIVITIES OF DAILY LIVING (ADL): LACK_OF_TRANSPORTATION: YES

## 2024-11-06 NOTE — PROGRESS NOTES
Pharmacy Medication History Review    Leonel Yu is a 54 y.o. male admitted for Acute on chronic congestive heart failure, unspecified heart failure type. Pharmacy reviewed the patient's [id number] medications and allergies for accuracy.    Medications ADDED:  None  Medications CHANGED:  None  Medications REMOVED:   Amiodarone  Lantus  Bactroban 2% ointment     The list below reflects the updated PTA list.   Prior to Admission Medications   Prescriptions Last Dose Informant   FreeStyle Lancets 28 gauge 11/4/2024 Self   Sig: Use as instructed 4 times daily in the event that CGM sensor is not working   FreeStyle Joseph 3 Plus Sensor device 11/4/2024 Self   Sig: Change sensor every 15 days   FreeStyle Joseph 3 [place] misc 11/4/2024 Self   Sig: Use as instructed   alcohol swabs (Alcohol Pads) pads, medicated 11/4/2024 Self   Sig: Use 4-8 per day to check blood glucose and for injectable medications   aspirin 81 mg chewable tablet 11/5/2024 Morning Self   Sig: Chew 1 tablet (81 mg) once daily.   atorvastatin (Lipitor) 80 mg tablet 11/5/2024 Self   Sig: Take 1 tablet (80 mg) by mouth once daily at bedtime.   blood sugar diagnostic strip 11/4/2024 Self   Sig: Use as directed to test blood glucose up to four times daily and as needed.   blood sugar diagnostic strip 11/4/2024 Self   Sig: Use 1 each 4 times a day before meals if needed as backup to joseph.   blood-glucose meter misc 11/4/2024 Self   Sig: Inject 1 each under the skin 4 times a day with meals. Check blood glucose 4 times daily, before meals and at bedtime.   carvedilol (Coreg) 3.125 mg tablet 11/5/2024 Self   Sig: Take 1 tablet (3.125 mg) by mouth 2 times a day.   empagliflozin (Jardiance) 25 mg 11/5/2024 Self   Sig: Take 1 tablet (25 mg) by mouth once daily.   glucose 4 gram chewable tablet 11/4/2024 Self   Sig: Chew 2 tablets (8 g) if needed for low blood sugar - see comments.  Patient has medication but has never had to use it    lancets 33 gauge  "misc 11/4/2024 Self   Sig: Inject 1 each under the skin 4 times a day.   pen needle 1/2\" 29G X 12mm needle 11/4/2024 Self   Sig: Use to inject 1-4 times daily as directed.   sacubitriL-valsartan (Entresto) 24-26 mg tablet 11/5/2024 Self   Sig: Take 0.5 tablets by mouth 2 times a day.  Patient reports that he will take either one-half tablet or a whole tablet. He takes the whole tablet if he cannot break the tablet in half. Last pharmacy dispense 10/5/24 SIG 1 tablet twice daily quantity 60 tablets for 30-day supply    sertraline (Zoloft) 100 mg tablet 11/5/2024 Self   Sig: Take 1 tablet (100 mg) by mouth once daily.   spironolactone (Aldactone) 25 mg tablet 11/4/2024 Self   Sig: Take 0.5 tablets (12.5 mg) by mouth once daily.  Patient reports that he will take either one-half tablet or a whole tablet. He takes the whole tablet if he cannot break the tablet in half. Last pharmacy dispense 10/25/24 SIG one-half tablet daily quantity 45 tablets for 90-day supply.    tiotropium (Spiriva Respimat) 2.5 mcg/actuation inhaler 11/4/2024 Self   Sig: Inhale 2 puffs once daily.  Patient reported that a doctor told him months ago to take 2 puffs every 6 hours. Last pharmacy dispense, 10/25/24 SIG inhale 2 puffs once daily. The patient was advised of the directions from the most recent fill and he stated that he needs a refill of albuterol inhaler       Facility-Administered Medications: None        The list below reflects the updated allergy list. Please review each documented allergy for additional clarification and justification.  Allergies  Reviewed by Shaheen Yancey PharmD on 11/6/2024   No Known Allergies         Patient accepts M2B at discharge.     Sources:   [facility]  Pharmacy dispense history  Patient interview Moderate historian, the patient was sedated and falling asleep throughout the med rec interview  Chart Review    Additional Comments:  None      Shaheen Yancey PharmD  Transitions of Care " "Pharmacist  11/06/24     Secure Chat preferred   If no response call [phone] or Vocera \"Med Rec\"    "

## 2024-11-06 NOTE — PROGRESS NOTES
Leonel Yu is a 54 y.o. male on day 1 of admission presenting with Acute on chronic congestive heart failure, unspecified heart failure type.       11/06/24 1140   Discharge Planning   Living Arrangements Other (Comment)  (roommate)   Support Systems None   Assistance Needed none   Type of Residence Private residence;Homeless   Number of Stairs to Enter Residence 5   Number of Stairs Within Residence 0   Do you have animals or pets at home? Yes   Type of Animals or Pets 3 dogs   Who is requesting discharge planning? Provider   Does the patient need discharge transport arranged? Yes   RoundTrip coordination needed? Yes   Financial Resource Strain   How hard is it for you to pay for the very basics like food, housing, medical care, and heating? Very Hard   Housing Stability   In the last 12 months, was there a time when you were not able to pay the mortgage or rent on time? Y   In the past 12 months, how many times have you moved where you were living? 1   At any time in the past 12 months, were you homeless or living in a shelter (including now)? Y   Transportation Needs   In the past 12 months, has lack of transportation kept you from medical appointments or from getting medications? yes   In the past 12 months, has lack of transportation kept you from meetings, work, or from getting things needed for daily living? Yes     TCC met with patient. He states before arrival he was living with a roommate who has since kicked him out due to nonpayment of rent. Patient states he would usually pay rent by working on his roommates cars, but cannot work on cars due to his SOB. The roommates girlfriend would cook for them but patient was only able to have a certain portion of the food; otherwise patient states he starves. Patient has no means to get food from the grocery store and no means of transportation. He states at previous visits he was given resources for food but he has no means to get to the pantry and states it  would be easier if they could deliver to him.  Patient admits to using crack cocaine for emotional stress, once a week on Fridays. I offered [facility] services to come speak with him and he declined stating he is not a regular user and has cut back on use from every day to only once a week.      Patient states he was previously admitted and was set to go into a nursing home but he is unsure why it didn't fall through. I looked at previous notes and could not find documentation on this as it seems he was at baseline function and discharged home with resources for food and transportation.   Patient states, once discharged, he does not want to go to a shelter and his main concern is getting food. SW to give patient resources prior to his transfer to inpatient unit.     CM/SW to follow for discharge needs.

## 2024-11-06 NOTE — PROGRESS NOTES
"Leonel Yu is a 54 y.o. male on day 1 of admission presenting with Acute on chronic congestive heart failure, unspecified heart failure type.    Subjective   - 80 mg Lasix BID today  - Hematocrit elevated, check Epo level  - after epo lvl, can discuss with heme. ...consider smear but no diagnosis consistent with order set at this time  - Resume [place] 12.5 mg daily       Objective     Physical Exam  Vitals reviewed.   Constitutional:       Appearance: Normal appearance.   HENT:      Head: Normocephalic and atraumatic.   Eyes:      Extraocular Movements: Extraocular movements intact.      Pupils: Pupils are equal, round, and reactive to light.   Cardiovascular:      Comments: Patient deferred  Pulmonary:      Comments: Patient deferred  Abdominal:      General: There is distension.   Musculoskeletal:         General: Normal range of motion.      Right lower le+ Pitting Edema present.      Left lower le+ Pitting Edema present.   Skin:     General: Skin is cool.   Neurological:      General: No focal deficit present.      Mental Status: He is alert and oriented to person, place, and time.   Psychiatric:         Mood and Affect: Mood normal.         Last Recorded Vitals  Blood pressure 132/89, pulse 71, temperature 36.7 °C (98.1 °F), temperature source Temporal, resp. rate 18, height 1.753 m (5' 9\"), weight 76.2 kg (168 lb), SpO2 96%.  Intake/Output last 3 Shifts:  I/O last 3 completed shifts:  In: 221 (2.9 mL/kg) [P.O.:221]  Out: 5325 (69.9 mL/kg) [Urine:5325 (1.9 mL/kg/hr)]  Weight: 76.2 kg     Results for orders placed or performed during the hospital encounter of 24 (from the past 24 hours)   POCT GLUCOSE   Result Value Ref Range    POCT Glucose 226 (H) 74 - 99 mg/dL   B-type natriuretic peptide   Result Value Ref Range    BNP >5,000 (H) 0 - 99 pg/mL   CBC and Auto Differential   Result Value Ref Range    WBC 7.8 4.4 - 11.3 x10*3/uL    nRBC 0.0 0.0 - 0.0 /100 WBCs    RBC 5.14 4.50 - 5.90 x10*6/uL "    Hemoglobin 15.7 13.5 - 17.5 g/dL    Hematocrit 46.2 41.0 - 52.0 %    MCV 90 80 - 100 fL    MCH 30.5 26.0 - 34.0 pg    MCHC 34.0 32.0 - 36.0 g/dL    RDW 16.3 (H) 11.5 - 14.5 %    Platelets 178 150 - 450 x10*3/uL    Neutrophils % 83.2 40.0 - 80.0 %    Immature Granulocytes %, Automated 0.4 0.0 - 0.9 %    Lymphocytes % 11.4 13.0 - 44.0 %    Monocytes % 3.7 2.0 - 10.0 %    Eosinophils % 0.8 0.0 - 6.0 %    Basophils % 0.5 0.0 - 2.0 %    Neutrophils Absolute 6.47 1.20 - 7.70 x10*3/uL    Immature Granulocytes Absolute, Automated 0.03 0.00 - 0.70 x10*3/uL    Lymphocytes Absolute 0.89 (L) 1.20 - 4.80 x10*3/uL    Monocytes Absolute 0.29 0.10 - 1.00 x10*3/uL    Eosinophils Absolute 0.06 0.00 - 0.70 x10*3/uL    Basophils Absolute 0.04 0.00 - 0.10 x10*3/uL   Comprehensive metabolic panel   Result Value Ref Range    Glucose 217 (H) 74 - 99 mg/dL    Sodium 140 136 - 145 mmol/L    Potassium 5.6 (H) 3.5 - 5.3 mmol/L    Chloride 105 98 - 107 mmol/L    Bicarbonate 29 21 - 32 mmol/L    Anion Gap 12 10 - 20 mmol/L    Urea Nitrogen 19 6 - 23 mg/dL    Creatinine 1.43 (H) 0.50 - 1.30 mg/dL    eGFR 58 (L) >60 mL/min/1.73m*2    Calcium 9.3 8.6 - 10.6 mg/dL    Albumin 3.6 3.4 - 5.0 g/dL    Alkaline Phosphatase 103 33 - 120 U/L    Total Protein 6.6 6.4 - 8.2 g/dL    AST 25 9 - 39 U/L    Bilirubin, Total 0.9 0.0 - 1.2 mg/dL    ALT 45 10 - 52 U/L   Blood Gas Venous Full Panel   Result Value Ref Range    POCT pH, Venous 7.37 7.33 - 7.43 pH    POCT pCO2, Venous 52 (H) 41 - 51 mm Hg    POCT pO2, Venous 15 (L) 35 - 45 mm Hg    POCT SO2, Venous 16 (L) 45 - 75 %    POCT Oxy Hemoglobin, Venous 16.1 (L) 45.0 - 75.0 %    POCT Hematocrit Calculated, Venous 50.0 41.0 - 52.0 %    POCT Sodium, Venous 135 (L) 136 - 145 mmol/L    POCT Potassium, Venous 5.4 (H) 3.5 - 5.3 mmol/L    POCT Chloride, Venous 106 98 - 107 mmol/L    POCT Ionized [place], Venous 1.23 1.10 - 1.33 mmol/L    POCT Glucose, Venous 244 (H) 74 - 99 mg/dL    POCT Lactate, Venous 1.5 0.4 -  2.0 mmol/L    POCT Base Excess, Venous 3.4 (H) -2.0 - 3.0 mmol/L    POCT HCO3 Calculated, Venous 30.1 (H) 22.0 - 26.0 mmol/L    POCT Hemoglobin, Venous 16.5 13.5 - 17.5 g/dL    POCT Anion Gap, Venous 4.0 (L) 10.0 - 25.0 mmol/L    Patient Temperature 37.0 degrees Celsius    FiO2 21 %   Troponin I, High Sensitivity, Initial   Result Value Ref Range    Troponin I, High Sensitivity (CMC) 69 (H) 0 - 53 ng/L   Magnesium   Result Value Ref Range    Magnesium 1.94 1.60 - 2.40 mg/dL   Phosphorus   Result Value Ref Range    Phosphorus 3.2 2.5 - 4.9 mg/dL   Troponin, High Sensitivity, 1 Hour   Result Value Ref Range    Troponin I, High Sensitivity (CMC) 69 (H) 0 - 53 ng/L   Drug Screen, Urine   Result Value Ref Range    Amphetamine Screen, Urine Presumptive Negative Presumptive Negative    Barbiturate Screen, Urine Presumptive Negative Presumptive Negative    Benzodiazepines Screen, Urine Presumptive Negative Presumptive Negative    Cannabinoid Screen, Urine Presumptive Negative Presumptive Negative    Cocaine Metabolite Screen, Urine Presumptive Positive (A) Presumptive Negative    Fentanyl Screen, Urine Presumptive Negative Presumptive Negative    Opiate Screen, Urine Presumptive Negative Presumptive Negative    Oxycodone Screen, Urine Presumptive Negative Presumptive Negative    PCP Screen, Urine Presumptive Negative Presumptive Negative    Methadone Screen, Urine Presumptive Negative Presumptive Negative   Renal function panel   Result Value Ref Range    Glucose 202 (H) 74 - 99 mg/dL    Sodium 138 136 - 145 mmol/L    Potassium 4.4 3.5 - 5.3 mmol/L    Chloride 103 98 - 107 mmol/L    Bicarbonate 24 21 - 32 mmol/L    Anion Gap 15 10 - 20 mmol/L    Urea Nitrogen 18 6 - 23 mg/dL    Creatinine 1.61 (H) 0.50 - 1.30 mg/dL    eGFR 50 (L) >60 mL/min/1.73m*2    Calcium 8.9 8.6 - 10.6 mg/dL    Phosphorus 3.5 2.5 - 4.9 mg/dL    Albumin 3.7 3.4 - 5.0 g/dL   POCT GLUCOSE   Result Value Ref Range    POCT Glucose 203 (H) 74 - 99 mg/dL    POCT GLUCOSE   Result Value Ref Range    POCT Glucose 218 (H) 74 - 99 mg/dL   CBC and Auto Differential   Result Value Ref Range    WBC 8.1 4.4 - 11.3 x10*3/uL    nRBC 0.0 0.0 - 0.0 /100 WBCs    RBC 6.19 (H) 4.50 - 5.90 x10*6/uL    Hemoglobin 19.0 (H) 13.5 - 17.5 g/dL    Hematocrit 54.9 (H) 41.0 - 52.0 %    MCV 89 80 - 100 fL    MCH 30.7 26.0 - 34.0 pg    MCHC 34.6 32.0 - 36.0 g/dL    RDW 17.2 (H) 11.5 - 14.5 %    Platelets 177 150 - 450 x10*3/uL    Neutrophils % 82.9 40.0 - 80.0 %    Immature Granulocytes %, Automated 0.4 0.0 - 0.9 %    Lymphocytes % 10.2 13.0 - 44.0 %    Monocytes % 4.8 2.0 - 10.0 %    Eosinophils % 1.0 0.0 - 6.0 %    Basophils % 0.7 0.0 - 2.0 %    Neutrophils Absolute 6.75 1.20 - 7.70 x10*3/uL    Immature Granulocytes Absolute, Automated 0.03 0.00 - 0.70 x10*3/uL    Lymphocytes Absolute 0.83 (L) 1.20 - 4.80 x10*3/uL    Monocytes Absolute 0.39 0.10 - 1.00 x10*3/uL    Eosinophils Absolute 0.08 0.00 - 0.70 x10*3/uL    Basophils Absolute 0.06 0.00 - 0.10 x10*3/uL   Comprehensive Metabolic Panel   Result Value Ref Range    Glucose 195 (H) 74 - 99 mg/dL    Sodium 138 136 - 145 mmol/L    Potassium 3.9 3.5 - 5.3 mmol/L    Chloride 100 98 - 107 mmol/L    Bicarbonate 24 21 - 32 mmol/L    Anion Gap 18 10 - 20 mmol/L    Urea Nitrogen 25 (H) 6 - 23 mg/dL    Creatinine 1.40 (H) 0.50 - 1.30 mg/dL    eGFR 59 (L) >60 mL/min/1.73m*2    Calcium 8.9 8.6 - 10.6 mg/dL    Albumin 3.6 3.4 - 5.0 g/dL    Alkaline Phosphatase 105 33 - 120 U/L    Total Protein 7.1 6.4 - 8.2 g/dL    AST 23 9 - 39 U/L    Bilirubin, Total 0.9 0.0 - 1.2 mg/dL    ALT 46 10 - 52 U/L   Magnesium   Result Value Ref Range    Magnesium 1.98 1.60 - 2.40 mg/dL   Phosphorus   Result Value Ref Range    Phosphorus 4.4 2.5 - 4.9 mg/dL   POCT GLUCOSE   Result Value Ref Range    POCT Glucose 146 (H) 74 - 99 mg/dL     Scheduled medications  amiodarone, 200 mg, oral, Daily  aspirin, 81 mg, oral, Daily  atorvastatin, 80 mg, oral, Nightly  empagliflozin, 25  mg, oral, Daily  furosemide, 80 mg, intravenous, BID AC  heparin (porcine), 5,000 Units, subcutaneous, q8h JANEY  insulin glargine, 12 Units, subcutaneous, Nightly  insulin lispro, 0-10 Units, subcutaneous, TID  sacubitriL-valsartan, 0.5 tablet, oral, BID  sertraline, 100 mg, oral, Daily  spironolactone, 12.5 mg, oral, Daily  tiotropium, 2 puff, inhalation, Daily      Continuous medications     PRN medications       Assessment/Plan   Assessment & Plan  Acute on chronic congestive heart failure, unspecified heart failure type     54 y.o. 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 HDS, on baseline O2 requirements. Labs notable for hyperK 5.6, Cr 1.43 (baseline 1.6-1.7). Lactate and LFTs wnl, so low c/f cardiogenic shock. BNP > 5000. Patient has end-stage HF with recurrent hospitalizations, and given substance use and poor FU he is not a candidate for advanced therapies. Will diurese with IV lasix 80 (has responded well during prior admissions), GDMT optimization, [facility] consult.      Acute on Chronic Systolic and diastolic HFrEF 5-10%, ACC/AHA Stage D, NYHA 4  Acutely decompensated heart failure   - iso Medication non-adherence  - iso Ongoing substance use  - TTE 10/17 EF 10-15%, will defer repeat TTE at this time  - at baseline O2 requirements, nighttime O2   -  DC weight 10/25 70.8 kg  - admission weight in ED 76.2 kg, pending formal standing weight   - BNP > 5000  - trop 69 x2  - CXR with cardiomegaly, pulm vascular congestion, interstitial edema  - no current c/f cardiogenic shock: lactate and LFTs wnl   - s/p  lasix in ED with good output, around 5L  - Plan to  continue 80mg IV BID today,   - should plan for resume PO torsemide given gut edema on presentation   - holding home coreg iso ADHF  - c/w home Entresto 12/13 BID, empa 25 daily, has been intolerant to further entresto adjustments   - restart [place] 12.5 BID daily (says patient taking 25 mg daily but unable to confirm this so will c/w - prescription dose) as K is WNL  - daily weights  - strict I&O    Erythrocytosis  - Hematocrit elevated, check Epo level  - after epo lvl, can discuss with heme. ...consider smear but no diagnosis consistent with order set at this time  - Request smear to be added to CBC, consider discussing with Heme after results     CKD3a  - baseline Cr 1.6-1.7,   - admit Cr 1.4  - at baseline, ctm iso diuresis     Substance use  - admission UDS + cocaine  - consider [facility] c/s in AM-> not interested in the past      Misc:  - c/w home amio for prior VT/VFib, ASA, atorva, Zoloft, tiotropium     F: diuresis  E: K > 4, Mg > 2   N: cardiac  A: pIV  DVT PPx: subQ heparin  GI PPx: not indicated     CODE STATUS: FULL (confirmed on admission)   SURROGATE DECISION MAKER: Elizabet (roommate) [phone]     Diso pending diuresis.     Seen and discussed with Dr. Barraza.     Bart Casas DNP, APRN-CNP  Cardiology       Carri Kamara, PA-S

## 2024-11-07 LAB
GLUCOSE BLD MANUAL STRIP-MCNC: 272 MG/DL (ref 74–99)
GLUCOSE BLD MANUAL STRIP-MCNC: 429 MG/DL (ref 74–99)
GLUCOSE BLD MANUAL STRIP-MCNC: 86 MG/DL (ref 74–99)
GLUCOSE BLD MANUAL STRIP-MCNC: 96 MG/DL (ref 74–99)

## 2024-11-07 PROCEDURE — 2500000001 HC RX 250 WO HCPCS SELF ADMINISTERED DRUGS (ALT 637 FOR MEDICARE OP)

## 2024-11-07 PROCEDURE — 2500000004 HC RX 250 GENERAL PHARMACY W/ HCPCS (ALT 636 FOR OP/ED): Performed by: NURSE PRACTITIONER

## 2024-11-07 PROCEDURE — 2500000002 HC RX 250 W HCPCS SELF ADMINISTERED DRUGS (ALT 637 FOR MEDICARE OP, ALT 636 FOR OP/ED)

## 2024-11-07 PROCEDURE — 99233 SBSQ HOSP IP/OBS HIGH 50: CPT | Performed by: INTERNAL MEDICINE

## 2024-11-07 PROCEDURE — 82947 ASSAY GLUCOSE BLOOD QUANT: CPT

## 2024-11-07 PROCEDURE — 1100000001 HC PRIVATE ROOM DAILY

## 2024-11-07 PROCEDURE — 2500000002 HC RX 250 W HCPCS SELF ADMINISTERED DRUGS (ALT 637 FOR MEDICARE OP, ALT 636 FOR OP/ED): Performed by: NURSE PRACTITIONER

## 2024-11-07 RX ORDER — INSULIN GLARGINE 100 [IU]/ML
15 INJECTION, SOLUTION SUBCUTANEOUS NIGHTLY
Status: DISPENSED | OUTPATIENT
Start: 2024-11-07

## 2024-11-07 RX ADMIN — FUROSEMIDE 80 MG: 10 INJECTION, SOLUTION INTRAMUSCULAR; INTRAVENOUS at 07:04

## 2024-11-07 RX ADMIN — INSULIN LISPRO 5 UNITS: 100 INJECTION, SOLUTION INTRAVENOUS; SUBCUTANEOUS at 11:54

## 2024-11-07 RX ADMIN — SERTRALINE 100 MG: 50 TABLET, FILM COATED ORAL at 09:10

## 2024-11-07 RX ADMIN — INSULIN LISPRO 5 UNITS: 100 INJECTION, SOLUTION INTRAVENOUS; SUBCUTANEOUS at 09:07

## 2024-11-07 RX ADMIN — AMIODARONE HYDROCHLORIDE 200 MG: 200 TABLET ORAL at 09:10

## 2024-11-07 RX ADMIN — SPIRONOLACTONE 12.5 MG: 25 TABLET, FILM COATED ORAL at 09:11

## 2024-11-07 RX ADMIN — TIOTROPIUM BROMIDE INHALATION SPRAY 2 PUFF: 3.12 SPRAY, METERED RESPIRATORY (INHALATION) at 09:11

## 2024-11-07 RX ADMIN — INSULIN GLARGINE 15 UNITS: 100 INJECTION, SOLUTION SUBCUTANEOUS at 21:53

## 2024-11-07 RX ADMIN — INSULIN LISPRO 10 UNITS: 100 INJECTION, SOLUTION INTRAVENOUS; SUBCUTANEOUS at 11:56

## 2024-11-07 RX ADMIN — SACUBITRIL AND VALSARTAN 0.5 TABLET: 24; 26 TABLET, FILM COATED ORAL at 21:25

## 2024-11-07 RX ADMIN — ATORVASTATIN CALCIUM 80 MG: 80 TABLET, FILM COATED ORAL at 21:25

## 2024-11-07 RX ADMIN — SACUBITRIL AND VALSARTAN 0.5 TABLET: 24; 26 TABLET, FILM COATED ORAL at 09:11

## 2024-11-07 RX ADMIN — INSULIN LISPRO 6 UNITS: 100 INJECTION, SOLUTION INTRAVENOUS; SUBCUTANEOUS at 09:08

## 2024-11-07 RX ADMIN — ASPIRIN 81 MG 81 MG: 81 TABLET ORAL at 09:10

## 2024-11-07 RX ADMIN — EMPAGLIFLOZIN 25 MG: 25 TABLET, FILM COATED ORAL at 09:10

## 2024-11-07 RX ADMIN — FUROSEMIDE 80 MG: 10 INJECTION, SOLUTION INTRAMUSCULAR; INTRAVENOUS at 16:04

## 2024-11-07 ASSESSMENT — PAIN DESCRIPTION - DESCRIPTORS: DESCRIPTORS: ACHING

## 2024-11-07 ASSESSMENT — PAIN SCALES - GENERAL
PAINLEVEL_OUTOF10: 4
PAINLEVEL_OUTOF10: 4

## 2024-11-07 NOTE — PROGRESS NOTES
"Leonel Yu is a 54 y.o. male on day 2 of admission presenting with Acute on chronic congestive heart failure, unspecified heart failure type.    Subjective   Patient seen this AM in ED. Exhibiting sighs of cheyne-bonilla breathing without O2 on. Supplemental O2 placed, sats in mid-90s. Patient reports he still has dyspnea. States he feels a \"little better\" than when he came into the ED.     - poor I&O record in ED but has 2 urinals full-> will continue 80 mg Lasix BID today  - Hematocrit elevated, waiting for Epo level to come back  - After epo level results, could consider discussing with heme  - remains cool on exam, but alert; most recent VBG lactate 2 days ago WNL-> will defer BB resumption at this time  - could consider CPAP initiation on floor if agreeable        Objective     Physical Exam  Vitals reviewed.   Constitutional:       Appearance: Normal appearance.   HENT:      Head: Normocephalic and atraumatic.   Eyes:      Extraocular Movements: Extraocular movements intact.      Pupils: Pupils are equal, round, and reactive to light.   Neck:      Vascular: JVD present.      Comments: JVD elevated to jaw  Cardiovascular:      Rate and Rhythm: Normal rate and regular rhythm.      Comments: Cool LE BL  Pulmonary:      Effort: No respiratory distress.      Breath sounds: Normal breath sounds.      Comments: On supplemental O2   Abdominal:      General: There is distension.   Musculoskeletal:         General: Normal range of motion.      Right lower le+ Edema present.      Left lower le+ Edema present.   Skin:     General: Skin is cool.   Neurological:      General: No focal deficit present.      Mental Status: He is alert and oriented to person, place, and time.   Psychiatric:         Mood and Affect: Mood normal.       Last Recorded Vitals  Blood pressure 119/65, pulse 86, temperature 36.7 °C (98.1 °F), temperature source Temporal, resp. rate 17, height 1.753 m (5' 9\"), weight 76.2 kg (168 lb), SpO2 " 97%.  Intake/Output last 3 Shifts:  I/O last 3 completed shifts:  In: 2041 (26.8 mL/kg) [P.O.:2041]  Out: 6355 (83.4 mL/kg) [Urine:6355 (2.3 mL/kg/hr)]  Weight: 76.2 kg     Results for orders placed or performed during the hospital encounter of 11/05/24 (from the past 24 hours)   POCT GLUCOSE   Result Value Ref Range    POCT Glucose 262 (H) 74 - 99 mg/dL   POCT GLUCOSE   Result Value Ref Range    POCT Glucose 265 (H) 74 - 99 mg/dL   CBC   Result Value Ref Range    WBC 7.6 4.4 - 11.3 x10*3/uL    nRBC 0.0 0.0 - 0.0 /100 WBCs    RBC 6.07 (H) 4.50 - 5.90 x10*6/uL    Hemoglobin 18.7 (H) 13.5 - 17.5 g/dL    Hematocrit 52.2 (H) 41.0 - 52.0 %    MCV 86 80 - 100 fL    MCH 30.8 26.0 - 34.0 pg    MCHC 35.8 32.0 - 36.0 g/dL    RDW 15.9 (H) 11.5 - 14.5 %    Platelets 207 150 - 450 x10*3/uL   Renal Function Panel   Result Value Ref Range    Glucose 131 (H) 74 - 99 mg/dL    Sodium 139 136 - 145 mmol/L    Potassium 3.9 3.5 - 5.3 mmol/L    Chloride 102 98 - 107 mmol/L    Bicarbonate 26 21 - 32 mmol/L    Anion Gap 15 10 - 20 mmol/L    Urea Nitrogen 29 (H) 6 - 23 mg/dL    Creatinine 1.60 (H) 0.50 - 1.30 mg/dL    eGFR 51 (L) >60 mL/min/1.73m*2    Calcium 7.9 (L) 8.6 - 10.6 mg/dL    Phosphorus 4.3 2.5 - 4.9 mg/dL    Albumin 3.1 (L) 3.4 - 5.0 g/dL   POCT GLUCOSE   Result Value Ref Range    POCT Glucose 184 (H) 74 - 99 mg/dL   POCT GLUCOSE   Result Value Ref Range    POCT Glucose 272 (H) 74 - 99 mg/dL     Scheduled medications  amiodarone, 200 mg, oral, Daily  aspirin, 81 mg, oral, Daily  atorvastatin, 80 mg, oral, Nightly  empagliflozin, 25 mg, oral, Daily  furosemide, 80 mg, intravenous, BID AC  heparin (porcine), 5,000 Units, subcutaneous, q8h JANEY  insulin glargine, 12 Units, subcutaneous, Nightly  insulin lispro, 0-10 Units, subcutaneous, TID  insulin lispro, 5 Units, subcutaneous, TID AC  sacubitriL-valsartan, 0.5 tablet, oral, BID  sertraline, 100 mg, oral, Daily  spironolactone, 12.5 mg, oral, Daily  tiotropium, 2 puff, inhalation,  Daily      Continuous medications     PRN medications       Assessment/Plan   Assessment & Plan  Acute on chronic congestive heart failure, unspecified heart failure type    Leonel Yu is a 54 y.o. M w/PMHx mixed ischemic/toxic mediated HFrEF (EF 10-15%, fibrosis and transmural infarcts seen on cMRI 8/2024) s/p [place] [employer] single chamber ICD (3/2024), CKD3a (baseline Cr 1.6-1.7), LUZ (crack cocaine), COPD (nocturnal 2L O2 at baseline), medication noncompliance being admitted for ADHF iso medication non-compliance, ongoing substance abuse (UDS today + cocaine), and lack of outpatient FU. HDS, on baseline O2 requirements. Labs notable for hyperK 5.6, Cr 1.43 (baseline 1.6-1.7). Lactate and LFTs wnl, so low c/f cardiogenic shock. BNP > 5000. Patient has end-stage HF with recurrent hospitalizations, and given substance use and poor FU he is not a candidate for advanced therapies. Admitted to [facility] for further management.      Acute on Chronic Systolic and diastolic HFrEF 5-10%, ACC/AHA Stage D, NYHA 4  Acutely decompensated heart failure   - iso Medication non-adherence, likely also component of cocaine usage   - TTE 10/17 EF 10-15%, will defer repeat TTE at this time  - at baseline O2 requirements, nighttime O2   - DC weight 10/25 70.8 kg  - admission weight in ED 76.2 kg, pending formal standing weight   - BNP > 5000  - trop 69 x2  - CXR with cardiomegaly, pulm vascular congestion, interstitial edema  - no current c/f cardiogenic shock: lactate and LFTs wnl   - s/p  lasix in ED with good output, around 5L on 11/6  - poor I&O record in ED but has 2 urinals full-> will continue 80 mg Lasix BID today  - remains cool on exam, but alert; most recent VBG lactate 2 days ago WNL-> will defer BB resumption at this time  - should plan for resume PO torsemide closer to euvolemia   - c/w home Entresto 12/13 BID, empa 25 daily, has been intolerant to further entresto adjustments   - Continue [place] 12.5 BID daily (says  patient taking 25 mg daily but unable to confirm this so will c/w - prescription dose) as K is WNL  - daily weights  - strict I&O    Erythrocytosis  - Hematocrit elevated, waiting for Epo result  - After epo level results, could consider discussing with heme     CKD3a  - baseline Cr 1.6-1.7,   - admit Cr 1.4  - daily Cr 1.6  - at baseline, ctm iso diuresis     HLD  - lipid panel 9/2024: cholesterol 127, triglyceride 98, HDL 35, LDL 72  - continue atorvastatin 80 mg nightly   - prior concern for possible CVA in 7/2023-> neurology felt pt high risk for cocain vasculopathy and stroke so recommended aspirin 81 mg daily, will continue      Type II DM  - A1C 9.9% on 10/15  - Endocrinology consulted previous admission  - increased Glargine to 15 units daily per prior Endo recs  - BG remain elevated (labile) today, can consider re- involving endo as needed for further recommendations   - continue SSI scale #1, Jardiance 25 mg daily   - diabetic diet, Accu-Cheks AC/HS, hypoglycemic protocol     Hx of VT / VF   Hx of AF RVR   [place] [employer] single chamber ICD (3/2024)   - most recent device interrogation 9/30: 4 sustained VT episodes since 9/1/24 requiring ATP and ATP/shock for arrhythmia termination and restoration of SR, last shock were recorded from 9/5/2024 which were appropriate for VT. : <1%. 12yr battery life. Lead fxn WNL.   - cont Amio 200 mg daily  - Coreg 3.125 mg BID on hold for ADHF  - cont telemetry, keep K+ >4, Mag >2    Anxiety/Depression  Substance use  - admission UDS + cocaine  - consider [facility]-> has declined in the past   - Not a candidate for advanced therapies due to ongoing substance use, noncompliance, & psychiatric concerns   - per prior admission discussion with HF, CPR and intubation would not be beneficial, discussed with pt, pt agreeable with DNR/DNI  - cont sertraline 100 mg PO daily   - has previously seen psych and palliative care in prior admissions-> can consider re- involvement as  needed      F: diuresis  E: K > 4, Mg > 2   N: cardiac  A: pIV  DVT PPx: subQ heparin  GI PPx: not indicated     CODE STATUS: FULL (confirmed on admission)   SURROGATE DECISION MAKER: Elizabet (roommate) [phone]     Diso pending diuresis.     Seen and discussed with Dr. Barraza.     Geri Olvera, APRN- CNP  Carri Kamaar, PA-S

## 2024-11-07 NOTE — HOSPITAL COURSE
Leonel Yu is a 54 y.o. male with a PMHx significant for mixed ischemic and non-ischemic HFrEF 10-15% s/p [place] [employer] single chamber ICD (3/2024), CKD3a, HTN, HLD, LUZ (crack cocaine), former tobacco use, COPD (on nocturnal 2L at baseline), DM2, remote left cerebellar hemorrhagic infarct, medication noncompliance, anxiety, and depression who presents to the ED from clinic with decompensated heart failure.     Patient was seen in cardiology clinic earlier today reporting worsening shortness of breath at rest and on exertion. He says his exercise capacity is half a block but he can still manage to go up the stairs in his home. He says he is not consistently adherent to his medication regimen. He most recently took some of his medications this morning but cannot recall which ones. He does not take his diuretic because he already urinates a lot. He has been fatigued and endorses orthopnea/PND. His hands and feet are cool, which is not typical for him. His cardiologist recommended he present to the ED for IV diuresis and GDMT titration.     He most recently used crack cocaine last night. He denies any chest pain, palpitations, dizziness, or presyncope, nor any symptoms at the time of drug use. Of note, patient has been admitted 11 times for heart failure in the past year. Most recent admission was 10/16-10/25 for cardiogenic shock. He had a code blue called and after recovering, left AMA.    Floor course:   Diuresed successfully with IV lasix boluses and converted to PO torsemide. Euvolemic on day of discharge, instructed to PO torsemide 20mg once daily PRN for shortness of breath, BLE swelling, or weight gain. GDMT optimized as able. No BB due to low cardiac output state and advanced heartfailure. Bradycardic on tele, but HR never below 40bpm (the set rate Of note on day of discharge, all medications too soon to fill for [id number] except for torsemide and entresto. Patient's roommate brought his remaining  medications from home to his bedside on day of discharge.    Hematocrit elevated, Epo level elevated. Suspected to be related to chronic CHF with hypoxia(?). Overnight, RT performed pulse ox study for which patient required 2L O2 N/C to reach pulse ox >88%. Desatted to 87% on RA while sleeping. Nocturnal oxygen initiated in-house.    Social work involved for housing insecurity. [facility] services consulted and patient agreeable to be discharged directly to inpatient facility.     Discharge weight: 71.9kg    After all labs and VS were reviewed the decision was made that the patient was medically stable for discharge.  The patient was discharged in satisfactory condition.    More than 60 minutes were spent in coordinating patient discharge.

## 2024-11-07 NOTE — PROGRESS NOTES
"Leonel Yu is a 54 y.o. male on day 2 of admission presenting with Acute on chronic congestive heart failure, unspecified heart failure type.    Assessment/Plan   Call received from transition care  Yadi from [employer]. [phone]. She is following Pt's hospitalizations. SW confirmed for her he no longer has a place to live and plans to discharge to shelter. Pt asking for resources with food. SW agreed to give information on shelter and places to get meals. SW will attempt to have both parties talk by IPAD today, as Pt allegedly doesn't have a phone according to [facility]. She said Pt has a history of leaving AM.   Met with Pt who was observed to be on 2L of oxygen. He explained he is no longer able to work. Pt confirmed he doesn't receive SSD. He recalled he had portable tanks but didn't have them once he moved from [place]. Pt obtained a concentrator second hand and uses oxygen overnight. Concentrator is at his residence, and he said he is able to obtain this upon discharge. He said he cannot return to his residence for nonpayment of rent. When asked where Pt plans to go after discharge, he stated, \"I thought insurance would send me to a nursing home.\" Pt recalled on his last hospitalization he was intending to go to [facility], however, he went to his friend's residence instead.   SW communicated to primary team barrier to discharge. PT eval requested to determine LOC need. Orders placed.   Spoke with [facility] again and she called later suggesting Pt go to [facility] if able ot apply. NOE called Sherin from [facility] [phone] and left a message asking if they accept Pt's on oxygen.   SW to continue to follow.        MICHAEL Craft      "

## 2024-11-07 NOTE — PROGRESS NOTES
Leonel Yu is a 54 y.o. male on day 2 of admission presenting with Acute on chronic congestive heart failure, unspecified heart failure type.    Sw met patient 11/6 regarding food insecurity noted in intake assessment. Provider completed food for life order. SW provided patient food for life flyer informing how to engage in program.

## 2024-11-08 LAB
ALBUMIN SERPL BCP-MCNC: 3.2 G/DL (ref 3.4–5)
ALBUMIN SERPL BCP-MCNC: 3.9 G/DL (ref 3.4–5)
ANION GAP SERPL CALC-SCNC: 15 MMOL/L (ref 10–20)
ANION GAP SERPL CALC-SCNC: 20 MMOL/L (ref 10–20)
BUN SERPL-MCNC: 45 MG/DL (ref 6–23)
BUN SERPL-MCNC: 46 MG/DL (ref 6–23)
CALCIUM SERPL-MCNC: 8.3 MG/DL (ref 8.6–10.6)
CALCIUM SERPL-MCNC: 9 MG/DL (ref 8.6–10.6)
CHLORIDE SERPL-SCNC: 100 MMOL/L (ref 98–107)
CHLORIDE SERPL-SCNC: 98 MMOL/L (ref 98–107)
CO2 SERPL-SCNC: 21 MMOL/L (ref 21–32)
CO2 SERPL-SCNC: 25 MMOL/L (ref 21–32)
CREAT SERPL-MCNC: 1.72 MG/DL (ref 0.5–1.3)
CREAT SERPL-MCNC: 1.84 MG/DL (ref 0.5–1.3)
EGFRCR SERPLBLD CKD-EPI 2021: 43 ML/MIN/1.73M*2
EGFRCR SERPLBLD CKD-EPI 2021: 46 ML/MIN/1.73M*2
EPO SERPL-ACNC: 29 MU/ML (ref 4–27)
ERYTHROCYTE [DISTWIDTH] IN BLOOD BY AUTOMATED COUNT: 16.5 % (ref 11.5–14.5)
ERYTHROCYTE [DISTWIDTH] IN BLOOD BY AUTOMATED COUNT: 17.6 % (ref 11.5–14.5)
GLUCOSE BLD MANUAL STRIP-MCNC: 122 MG/DL (ref 74–99)
GLUCOSE BLD MANUAL STRIP-MCNC: 122 MG/DL (ref 74–99)
GLUCOSE BLD MANUAL STRIP-MCNC: 152 MG/DL (ref 74–99)
GLUCOSE BLD MANUAL STRIP-MCNC: 274 MG/DL (ref 74–99)
GLUCOSE SERPL-MCNC: 132 MG/DL (ref 74–99)
GLUCOSE SERPL-MCNC: 91 MG/DL (ref 74–99)
HCT VFR BLD AUTO: 54.3 % (ref 41–52)
HCT VFR BLD AUTO: 56.4 % (ref 41–52)
HGB BLD-MCNC: 18.6 G/DL (ref 13.5–17.5)
HGB BLD-MCNC: 18.9 G/DL (ref 13.5–17.5)
MAGNESIUM SERPL-MCNC: 2.2 MG/DL (ref 1.6–2.4)
MAGNESIUM SERPL-MCNC: 2.54 MG/DL (ref 1.6–2.4)
MCH RBC QN AUTO: 30.7 PG (ref 26–34)
MCH RBC QN AUTO: 30.9 PG (ref 26–34)
MCHC RBC AUTO-ENTMCNC: 33.5 G/DL (ref 32–36)
MCHC RBC AUTO-ENTMCNC: 34.3 G/DL (ref 32–36)
MCV RBC AUTO: 90 FL (ref 80–100)
MCV RBC AUTO: 92 FL (ref 80–100)
NRBC BLD-RTO: 0 /100 WBCS (ref 0–0)
NRBC BLD-RTO: 0 /100 WBCS (ref 0–0)
PHOSPHATE SERPL-MCNC: 3.3 MG/DL (ref 2.5–4.9)
PHOSPHATE SERPL-MCNC: 3.6 MG/DL (ref 2.5–4.9)
PLATELET # BLD AUTO: 203 X10*3/UL (ref 150–450)
PLATELET # BLD AUTO: 218 X10*3/UL (ref 150–450)
POTASSIUM SERPL-SCNC: 3.5 MMOL/L (ref 3.5–5.3)
POTASSIUM SERPL-SCNC: 4.4 MMOL/L (ref 3.5–5.3)
RBC # BLD AUTO: 6.02 X10*6/UL (ref 4.5–5.9)
RBC # BLD AUTO: 6.15 X10*6/UL (ref 4.5–5.9)
SODIUM SERPL-SCNC: 135 MMOL/L (ref 136–145)
SODIUM SERPL-SCNC: 136 MMOL/L (ref 136–145)
WBC # BLD AUTO: 6 X10*3/UL (ref 4.4–11.3)
WBC # BLD AUTO: 6.5 X10*3/UL (ref 4.4–11.3)

## 2024-11-08 PROCEDURE — 1100000001 HC PRIVATE ROOM DAILY

## 2024-11-08 PROCEDURE — 97165 OT EVAL LOW COMPLEX 30 MIN: CPT | Mod: GO

## 2024-11-08 PROCEDURE — 83880 ASSAY OF NATRIURETIC PEPTIDE: CPT

## 2024-11-08 PROCEDURE — 36415 COLL VENOUS BLD VENIPUNCTURE: CPT | Performed by: NURSE PRACTITIONER

## 2024-11-08 PROCEDURE — 2500000002 HC RX 250 W HCPCS SELF ADMINISTERED DRUGS (ALT 637 FOR MEDICARE OP, ALT 636 FOR OP/ED): Performed by: NURSE PRACTITIONER

## 2024-11-08 PROCEDURE — 85027 COMPLETE CBC AUTOMATED: CPT | Performed by: NURSE PRACTITIONER

## 2024-11-08 PROCEDURE — 2500000002 HC RX 250 W HCPCS SELF ADMINISTERED DRUGS (ALT 637 FOR MEDICARE OP, ALT 636 FOR OP/ED)

## 2024-11-08 PROCEDURE — 80069 RENAL FUNCTION PANEL: CPT | Performed by: NURSE PRACTITIONER

## 2024-11-08 PROCEDURE — 2500000004 HC RX 250 GENERAL PHARMACY W/ HCPCS (ALT 636 FOR OP/ED): Performed by: NURSE PRACTITIONER

## 2024-11-08 PROCEDURE — 83735 ASSAY OF MAGNESIUM: CPT

## 2024-11-08 PROCEDURE — 2500000001 HC RX 250 WO HCPCS SELF ADMINISTERED DRUGS (ALT 637 FOR MEDICARE OP)

## 2024-11-08 PROCEDURE — 2500000004 HC RX 250 GENERAL PHARMACY W/ HCPCS (ALT 636 FOR OP/ED)

## 2024-11-08 PROCEDURE — 97161 PT EVAL LOW COMPLEX 20 MIN: CPT | Mod: GP

## 2024-11-08 PROCEDURE — 99233 SBSQ HOSP IP/OBS HIGH 50: CPT | Performed by: INTERNAL MEDICINE

## 2024-11-08 PROCEDURE — 82947 ASSAY GLUCOSE BLOOD QUANT: CPT

## 2024-11-08 RX ORDER — TORSEMIDE 20 MG/1
40 TABLET ORAL DAILY
Status: DISCONTINUED | OUTPATIENT
Start: 2024-11-09 | End: 2024-11-10

## 2024-11-08 RX ADMIN — INSULIN GLARGINE 15 UNITS: 100 INJECTION, SOLUTION SUBCUTANEOUS at 21:03

## 2024-11-08 RX ADMIN — SPIRONOLACTONE 12.5 MG: 25 TABLET, FILM COATED ORAL at 08:50

## 2024-11-08 RX ADMIN — SERTRALINE 100 MG: 50 TABLET, FILM COATED ORAL at 08:50

## 2024-11-08 RX ADMIN — TIOTROPIUM BROMIDE INHALATION SPRAY 2 PUFF: 3.12 SPRAY, METERED RESPIRATORY (INHALATION) at 08:52

## 2024-11-08 RX ADMIN — ASPIRIN 81 MG 81 MG: 81 TABLET ORAL at 08:50

## 2024-11-08 RX ADMIN — SACUBITRIL AND VALSARTAN 0.5 TABLET: 24; 26 TABLET, FILM COATED ORAL at 21:03

## 2024-11-08 RX ADMIN — SACUBITRIL AND VALSARTAN 0.5 TABLET: 24; 26 TABLET, FILM COATED ORAL at 08:48

## 2024-11-08 RX ADMIN — INSULIN LISPRO 6 UNITS: 100 INJECTION, SOLUTION INTRAVENOUS; SUBCUTANEOUS at 12:49

## 2024-11-08 RX ADMIN — ATORVASTATIN CALCIUM 80 MG: 80 TABLET, FILM COATED ORAL at 21:03

## 2024-11-08 RX ADMIN — FUROSEMIDE 80 MG: 10 INJECTION, SOLUTION INTRAMUSCULAR; INTRAVENOUS at 06:44

## 2024-11-08 RX ADMIN — EMPAGLIFLOZIN 25 MG: 25 TABLET, FILM COATED ORAL at 08:50

## 2024-11-08 RX ADMIN — INSULIN LISPRO 5 UNITS: 100 INJECTION, SOLUTION INTRAVENOUS; SUBCUTANEOUS at 12:48

## 2024-11-08 RX ADMIN — INSULIN LISPRO 2 UNITS: 100 INJECTION, SOLUTION INTRAVENOUS; SUBCUTANEOUS at 18:03

## 2024-11-08 RX ADMIN — AMIODARONE HYDROCHLORIDE 200 MG: 200 TABLET ORAL at 08:50

## 2024-11-08 RX ADMIN — INSULIN LISPRO 5 UNITS: 100 INJECTION, SOLUTION INTRAVENOUS; SUBCUTANEOUS at 18:03

## 2024-11-08 RX ADMIN — INSULIN LISPRO 5 UNITS: 100 INJECTION, SOLUTION INTRAVENOUS; SUBCUTANEOUS at 08:41

## 2024-11-08 ASSESSMENT — COGNITIVE AND FUNCTIONAL STATUS - GENERAL
CLIMB 3 TO 5 STEPS WITH RAILING: A LITTLE
DAILY ACTIVITIY SCORE: 24
MOBILITY SCORE: 23
DAILY ACTIVITIY SCORE: 24
MOBILITY SCORE: 24

## 2024-11-08 ASSESSMENT — PAIN - FUNCTIONAL ASSESSMENT
PAIN_FUNCTIONAL_ASSESSMENT: 0-10
PAIN_FUNCTIONAL_ASSESSMENT: 0-10

## 2024-11-08 ASSESSMENT — ACTIVITIES OF DAILY LIVING (ADL)
ADL_ASSISTANCE: INDEPENDENT
ADL_ASSISTANCE: INDEPENDENT

## 2024-11-08 ASSESSMENT — PAIN SCALES - GENERAL
PAINLEVEL_OUTOF10: 0 - NO PAIN
PAINLEVEL_OUTOF10: 0 - NO PAIN

## 2024-11-08 NOTE — PROGRESS NOTES
SW met with pt at bedside to offer support and obtain information. Pt is alert and fully oriented.   Pt confirms information of previous SW notes from yesterday, and reported if he has another place to go, he would rather not go back to his friend's house. Pt questions if he is eligible for SNF, and SW offered information that pt is recommended for no need as of now. SW suggested to attempt [facility] home and pt agrees to call [facility] intake interview with . SW and pt called together and [facility] staff informed that pt needs to call on the day of discharge. Pt is okay to call them back on his discharge. SW will continue to follow and assist.     Bernard Torrez, BLAKE, LSW

## 2024-11-08 NOTE — NURSING NOTE
Pt admitted in stable condition. Pt oriented to unit, call light.  Assessment, skin check, vitals completed. Pt has all belongings.  Pt has no further questions at this time. TEAM notified of arrival.

## 2024-11-08 NOTE — PROGRESS NOTES
Subjective:  Leg swelling resolved. Not SOB    Today in brief:  - volume improving. Switch to PO diuresis.   - Possible slow NSVT on monitor. On amiodarone. BP limits re-addition of coreg. Defer metoprolol given active cocaine use. If BP >100 tomorrow with switching ot PO diuretic,  retry coreg 3.125mg BID  - SW involved this admission in regards to housing.    Objective:  Vitals:    11/08/24 0345   BP: 97/77   Pulse: 62   Resp: 18   Temp: 36.2 °C (97.2 °F)   SpO2: 94%     Weight         11/5/2024  0945 11/8/2024 0345          Weight: 76.2 kg (168 lb) 71.4 kg (157 lb 6.5 oz)              Intake/Output Summary (Last 24 hours) at 11/8/2024 0712  Last data filed at 11/8/2024 0345  Gross per 24 hour   Intake 960 ml   Output --   Net 960 ml     Recent Results (from the past 24 hours)   POCT GLUCOSE    Collection Time: 11/07/24  8:07 AM   Result Value Ref Range    POCT Glucose 272 (H) 74 - 99 mg/dL   POCT GLUCOSE    Collection Time: 11/07/24 11:48 AM   Result Value Ref Range    POCT Glucose 429 (H) 74 - 99 mg/dL   POCT GLUCOSE    Collection Time: 11/07/24  4:11 PM   Result Value Ref Range    POCT Glucose 86 74 - 99 mg/dL   POCT GLUCOSE    Collection Time: 11/07/24  9:42 PM   Result Value Ref Range    POCT Glucose 96 74 - 99 mg/dL   CBC    Collection Time: 11/08/24  5:48 AM   Result Value Ref Range    WBC 6.0 4.4 - 11.3 x10*3/uL    nRBC 0.0 0.0 - 0.0 /100 WBCs    RBC 6.02 (H) 4.50 - 5.90 x10*6/uL    Hemoglobin 18.6 (H) 13.5 - 17.5 g/dL    Hematocrit 54.3 (H) 41.0 - 52.0 %    MCV 90 80 - 100 fL    MCH 30.9 26.0 - 34.0 pg    MCHC 34.3 32.0 - 36.0 g/dL    RDW 16.5 (H) 11.5 - 14.5 %    Platelets 203 150 - 450 x10*3/uL   POCT GLUCOSE    Collection Time: 11/08/24  6:18 AM   Result Value Ref Range    POCT Glucose 122 (H) 74 - 99 mg/dL     Inpatient Medications:  Scheduled medications   Medication Dose Route Frequency    amiodarone  200 mg oral Daily    aspirin  81 mg oral Daily    atorvastatin  80 mg oral Nightly     empagliflozin  25 mg oral Daily    furosemide  80 mg intravenous BID AC    heparin (porcine)  5,000 Units subcutaneous q8h JANEY    insulin glargine  15 Units subcutaneous Nightly    insulin lispro  0-10 Units subcutaneous TID    insulin lispro  5 Units subcutaneous TID AC    sacubitriL-valsartan  0.5 tablet oral BID    sertraline  100 mg oral Daily    spironolactone  12.5 mg oral Daily    tiotropium  2 puff inhalation Daily     PRN medications   Medication     Continuous Medications   Medication Dose Last Rate       Telemetry 11/8/2024 (personally reviewed): SR IVCD 60-70s, 3bt NSVT, possible slow NSVT 14bts, jessy to 40s during sleep    Physical exam:  General: NAD  Head/ neck: no JVD, negative HJR  Cardiac: RRR, regular S1 S2, no murmur, no rub, no gallop  Pulm: CTA bilaterally, no wheezes, rales or rhonchi.    Vascular: Radial 2+ bilaterally  GI: Non distended  Extremities: no LE edema   Neuro: no focal neuro deficits   Psych: appropriate mood and behavior   Skin: warm and dry     Assessment/Plan   Leonel Yu is a 54 y.o. M w/PMHx mixed ischemic/toxic mediated HFrEF (EF 10-15%, fibrosis and transmural infarcts seen on cMRI 8/2024) s/p [place] [employer] single chamber ICD (3/2024), CKD3a (baseline Cr 1.6-1.7), LUZ (crack cocaine), COPD (nocturnal 2L O2 at baseline), medication noncompliance being admitted for ADHF iso medication non-compliance, ongoing substance abuse (UDS today + cocaine), and lack of outpatient FU .    HDS, on baseline O2 requirements. Labs notable for hyperK 5.6, Cr 1.43 (baseline 1.6-1.7). Lactate and LFTs wnl, so low c/f cardiogenic shock. BNP > 5000. Patient has end-stage HF with recurrent hospitalizations, and given substance use and poor FU he is not a candidate for advanced therapies. Admitted to [facility] for further management.     Acute on Chronic Systolic and diastolic HFrEF 5-10%, ACC/AHA Stage D, NYHA 4  Acutely decompensated heart failure   - iso medication non-adherence, likely also  component of cocaine usage   - TTE 10/17 EF 10-15%, will defer repeat TTE at this time  - at baseline O2 requirements, nighttime O2   - DC weight 10/25 70.8 kg  - admission weight in ED 76.2 kg,   - today's weight: 71.4 kg  - BNP > 5000  - Troponin negative x2  - CXR with cardiomegaly, pulm vascular congestion, interstitial edema  - No current c/f cardiogenic shock: lactate and LFTs wnl   - s/p  lasix in ED with good output, around 5L on 11/6  - Diuresed well with 80 mg IV Lasix BID today. Lactate 11/5 WNL.  - Volume improving. Still using O2 at night for comfort. Warm on exam. Switch to PO diuretic (prior home torsemide 40mg daily) starting tomorrow  - c/w home Entresto 12/13 BID, empa 25 daily, has been intolerant to further entresto adjustments   - Continue [place] 12.5 BID daily (says patient taking 25 mg daily but unable to confirm this so will c/w - prescription dose) as K is WNL  - Possible slow NSVT on monitor. On amiodarone. BP limits re-addition of coreg. Defer metoprolol given active cocaine use. If SBP >100 tomorrow with switching ot PO diuretic,  retry coreg 3.125mg BID  - daily weights  - strict I&O     Erythrocytosis  - Hematocrit elevated  - Epo mildly elevated   - Evidence of polycythemia likely 2/2 chronic systolic HF      CKD3a  - baseline Cr 1.6-1.7,   - admit Cr 1.4  - daily Cr 1.72 (1.6)  - at baseline, ctm iso diuresis     HLD  - Lipid panel 9/2024: cholesterol 127, triglyceride 98, HDL 35, LDL 72 : overall controlled.  - Continue atorvastatin 80 mg nightly   - Prior concern for possible CVA 7/2023>neurology felt pt high risk for cocaine vasculopathy and stroke so recommended aspirin 81 mg daily, will continue      Type II DM  - A1C 9.9% on 10/15  - Endocrinology consulted previous admission  - 11/8: Labile glucose.  increased Glargine to 15 units daily per prior Endo recs  - continue SSI scale #1, Jardiance 25 mg daily   - diabetic diet, Accu-Cheks AC/HS, hypoglycemic protocol      Hx of  VT / VF   Hx of AF RVR   [place] [employer] single chamber ICD (3/2024)   - most recent device interrogation 9/30: 4 sustained VT episodes since 9/1/24 requiring ATP and ATP/shock for arrhythmia termination and restoration of SR, last shock were recorded from 9/5/24 which were appropriate for VT. : <1%. 12yr battery life. Lead fxn WNL.   - cont Amio 200 mg daily  - beta blocker as above  - Bradycardia during nocturnal sleeping hours. Suspect component of SOHAIL. Outpt sleep study recommended  - cont telemetry, keep K+ >4, Mag >2     Anxiety/Depression  Substance use  - admission UDS + cocaine  - consider [facility]-> has declined in the past   - Not a candidate for advanced therapies due to ongoing substance use, noncompliance, & psychiatric concerns   - per prior admission discussion with HF, CPR and intubation would not be beneficial, discussed with pt, pt agreeable with DNR/DNI  - cont sertraline 100 mg PO daily   - has previously seen psych and palliative care in prior admissions> Pt left AMA last admission when pending precert to facility with palliative resources.   - SW involved this admission in regards to housing.     DVT ppx: heparin  DISPO: pending volume stability and medication optimization  Code status: Full Code    Patient seen and discussed with Dr. Josias Barraza.  Vicky Harkins PA-C

## 2024-11-08 NOTE — PROGRESS NOTES
Patient attended self pay maintenance exercise session. Vitals and exercise logged per patient.   Subjective Data:   Patient examined this AM, lying nearly flat. Wearing 2L supplemental O2, he reports only when sleeping now. JVD remains elevated, but abdominal swelling improving. Will continue IV lasix 40 mg BID today.    - remains fluid overloaded-> continue IV lasix 40 mg BID today  - cMRI pending device clearance-> will do viability protocol   - cMRI unable to be completed today due to lack of inpatient availability-> will attempt for Monday  - coreg 3.215 mg BID started, episodes of NSVT on tele overnight     Overnight Events:    None     Objective Data:  Last Recorded Vitals:  Vitals:    08/01/24 2313 08/02/24 0342 08/02/24 0758 08/02/24 1014   BP: 116/80 114/81 123/79    Pulse: 84 78 74    Resp: 18 16     Temp: 36.4 °C (97.5 °F) 36.2 °C (97.2 °F) 36.3 °C (97.3 °F)    TempSrc:       SpO2: 96% 93% 98% 97%   Weight:       Height:           Last Labs:  CBC - 8/1/2024:  4:46 PM  6.7 17.2 178    50.7      CMP - 8/1/2024:  4:45 PM  9.4 6.7 28 --- 1.0   3.7 4.0 55 70      PTT - 10/23/2023:  6:51 PM  1.2   13.8 31     TROPHS   Date/Time Value Ref Range Status   07/30/2024 08:15  0 - 53 ng/L Final   07/30/2024 07:31  0 - 53 ng/L Final   02/12/2024 09:47  0 - 53 ng/L Final     Comment:     Previous result verified on 2/12/2024 1836 on specimen/case [id number] called with component TRPHS for procedure Troponin I, High Sensitivity with value 130 ng/L.   02/12/2024 06:11  0 - 53 ng/L Final     Comment:     Previous result verified on 2/12/2024 1836 on specimen/case [id number] called with component TRPHS for procedure Troponin I, High Sensitivity with value 130 ng/L.   02/12/2024 12:39  0 - 53 ng/L Final     BNP   Date/Time Value Ref Range Status   07/31/2024 06:51 PM 1,014 0 - 99 pg/mL Final   07/30/2024 07:31 AM 3,240 0 - 99 pg/mL Final     HGBA1C   Date/Time Value Ref Range Status   07/30/2024 07:31 AM 8.4 see below % Final   06/20/2024 07:49 AM 6.9 4.3 - 5.6 % Final      Comment:     American Diabetes Association guidelines indicate that patients with HgbA1c in the range 5.7-6.4% are at increased risk for development of diabetes, and intervention by lifestyle modification may be beneficial. HgbA1c greater or equal to 6.5% is considered diagnostic of diabetes.   01/09/2024 06:53 AM 10.1 4.3 - 5.6 % Final     Comment:     American Diabetes Association guidelines indicate that patients with HgbA1c in the range 5.7-6.4% are at increased risk for development of diabetes, and intervention by lifestyle modification may be beneficial. HgbA1c greater or equal to 6.5% is considered diagnostic of diabetes.   12/09/2023 08:57 PM 7.6 see below % Final     VLDL   Date/Time Value Ref Range Status   07/25/2023 08:20 AM 18 0 - 40 mg/dL Final   06/18/2023 06:27 AM 29 0 - 40 mg/dL Final      Last I/O:  I/O last 3 completed shifts:  In: 720 (9.4 mL/kg) [P.O.:720]  Out: 3925 (51.2 mL/kg) [Urine:3925 (1.4 mL/kg/hr)]  Weight: 76.7 kg     Ejection Fractions:  EF   Date/Time Value Ref Range Status   07/31/2024 03:07 PM 10 %          Inpatient Medications:  Scheduled medications   Medication Dose Route Frequency    aspirin  81 mg oral Daily    atorvastatin  80 mg oral Nightly    [Held by provider] carvedilol  3.125 mg oral BID    empagliflozin  10 mg oral Daily    enoxaparin  40 mg subcutaneous q24h    furosemide  40 mg intravenous BID AC    hydrALAZINE  50 mg oral q8h    insulin glargine  20 Units subcutaneous Nightly    insulin lispro  0-10 Units subcutaneous TID    isosorbide dinitrate  20 mg oral TID    melatonin  5 mg oral Nightly    sertraline  100 mg oral Daily    spironolactone  12.5 mg oral Daily    tiotropium  2 puff inhalation Daily     PRN medications   Medication    acetaminophen    dextrose    dextrose    glucagon    glucagon    oxygen    polyethylene glycol     Continuous Medications   Medication Dose Last Rate     Physical Exam  Constitutional:       General: He is not in acute distress.      Appearance: Normal appearance.   HENT:      Head:      Comments: Multiple missing teeth     Mouth/Throat:      Mouth: Mucous membranes are moist.   Neck:      Vascular: JVD present.   Cardiovascular:      Rate and Rhythm: Normal rate and regular rhythm.      Heart sounds: Normal heart sounds.      Comments: 5 beat NSVT overnight    Trace BL LE edema  Pulmonary:      Breath sounds: Normal breath sounds.      Comments: Conversationally dyspneic  Abdominal:      General: There is distension.      Tenderness: There is no abdominal tenderness.   Skin:     General: Skin is warm and dry.   Neurological:      General: No focal deficit present.      Mental Status: He is alert and oriented to person, place, and time.   Psychiatric:         Mood and Affect: Mood is depressed.         Behavior: Behavior normal.        Assessment/Plan   Leonel Yu is a 54 y.o. male presenting with PMH of HFrEF (LVEF 15- 20% as of 7/2023) s/p ICD (3/2024), CKD3a, HTN, HLD, LUZ (crack cocaine), former tobacco use, COPD (on nocturnal 2L at baseline), DM2, remote left cerebellar hemorrhagic infarct, medication noncompliance, anxiety, and depression who presented to [facility] today 7/30 with progressive shortness of breath over the last 3 days. Admitted to [facility] for acute on chronic heart failure management.      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 No ischemia or scar in the anterior, septal or anterolateral wall segments. Recommended further ischemic eval with cMRI or C  - TTE 7/2023: LV EF  15-20%, abnormal LV diastolic filling, severe eccentric LVH, LV severely dilated  - TTE 7/31/2024: LV EF 10%, abnormal pattern of DF, LV severely dilated, severe LVH, severely enlarged RV, mild- mod TR  - due to incidence of vfib, will pursue cMRI viability protocol   - cMRI pending device clearance  - cMRI unable to be completed today due to lack of inpatient availability-> will attempt for Monday  - admit BNP: 3240 (prev. 2900 2/2024)  - repeat BNP 1014  - tropinemia on admit: 125> 117. No ACS symptoms, EKG without ST changes. Likely 2/2 ADHF  - loaded with  on admit per ED  - DC weight 2/2024: 77kg  - Admit weight: 76.2 kg  - daily weight: pending (76.7kg)  - admit CXR: no evidence of infiltrate or effusion, emphysematous changes noted  - current meds: spironolactone 12.5 mg daily, hydralazine 50 mg q8, empagliflozin 10 mg daily, isordil 20 mg TID  - started coreg 3.125 mg BID today due to increasing runs of NSVT on tele  - robust output last 24 hours-> continue 40mg Lasix IV BID  - 2L fluid restriction, daily standing weights, strict I&O's      Palpitations  VF  - ICD placed 3/2024 (unclear where ICD was placed, not in our system)  - EKG on admit: NSR with sinus arrhythmia, right axis deviation, biventricular hypertrophy  - Device interrogation on admit:   --7/26/24; VF detected at 217 BPM, successful Tx wit ATP X1  --7/27/24: VF detected at 220 BPM successful Tx with ATP X1  - increasing runs of NSVT on tele, coreg 3.125 mg BID started  - will optimize lytes, Mag >2 and K >4     HTN  - SBP last 24 hour range: 90- 123  - continue isordil 20 mg TID, hydralazine 50 mg q8 hours  - coreg 3.125 mg BID started     HLD  - lipid panel 6/2024: cholesterol 124, triglyceride 79, HDL 37, LDL 71  - continue atorvastatin 80 mg nightly      Medication noncompliance  Housing insecurity  - 2/2 complex drug regimen, social barriers (homeless, unable to work due to HF symptoms), + cocaine on admit  - unclear which meds  patient was taking upon discharge from [facility] last admit; has multiple pill bottles in his room that do not match [facility] DC summary  - will need to determine simple homegoing regimen for patient upon discharge  - offered social work services for homegoing concerns-> initially declined but agreeable to SW involvement now (consult placed)     Active Cocaine use  - UDS pos for cocaine, patient admitted last use on 7/28   - Declined thrive consult this admit     Anxiety and Depression  HX SI/HI  - during 6/2023 admit, patient had admitted to suicidal and homicidal ideation towards ex- wife-> psychiatry was consulted and patient was admitted to inpatient psychiatry unit  - has had psychiatry involved in previous admissions for SI  - currently denies SI/HI on admit  - cont Sertraline 100mg daily (was recently increased at [facility] per their psych team)     CKD3a  - Baseline Cr ~1.3-1.5  - Cr at admission in 1.34  - daily Cr 1.43 (1.58, 1.36)  - Avoid nephrotoxins and hypotension  - Daily RFP while admitted, diuresis as above     Suspected COPD, stable  - no PFTs on file  - CT Chest 10/2023: emphysematous changes are present; bronchial wall thickening is present; stable right middle lobe nodule  - typically wears 2L O2 at night only   - cont Spiriva Respimat while inpatient     DMII  - 6/2024 HgbA1c 6.9%   - per patient, has run out of BG test strips and has not tested recently  - per patient, insulin regimen: lantus 30 units in the AM, SSI with meals  - continue lantus 20 units daily, can uptitrate as needed  - SSI #2, AC/HS     DVT prophylaxis: subcutaneous lovenox  Dispo: pending further diuresis, GDMT optimization     Code Status:  DNR confirmed on admission with patient; Per patient, okay with intubation but if his heart were to stop, would not want CPR performed.     Patient seen and discussed with Dr. Roberto Olvera, APRN-CNP

## 2024-11-08 NOTE — CARE PLAN
The clinical goals for the shift include Remain HDS    Over the shift, the patient remained HDS; pt blood pressure continues to be on low normal side; pt states no lightheaded or dizziness; pt on strict I&O and fluid restriction.

## 2024-11-08 NOTE — PROGRESS NOTES
Physical Therapy    Physical Therapy Evaluation    Patient Name: Leonel Yu  MRN: [id number]  Department: [place] [address]  Room: [address] [place] [address]  Today's Date: 11/8/2024   Time Calculation  Start Time: 1207  Stop Time: 1240  Time Calculation (min): 33 min    Assessment/Plan   PT Assessment  PT Assessment Results:  (no deficits)  Rehab Prognosis: Excellent  Barriers to Discharge: none  Evaluation/Treatment Tolerance: Patient tolerated treatment well  Medical Staff Made Aware: Yes  Strengths: Attitude of self, Coping skills, Physical health  Barriers to Participation: Comorbidities  End of Session Communication: Bedside nurse  Assessment Comment: The pt demonstrated independent and safe mobility without an assistive device.  End of Session Patient Position: Bed, 3 rail up, Alarm off, not on at start of session  IP OR SWING BED PT PLAN  Inpatient or Swing Bed: Inpatient  PT Plan  Treatment/Interventions: Bed mobility, Transfer training, Gait training, Balance training, Therapeutic activity  PT Plan: PT Eval only  PT Eval Only Reason: No acute PT needs identified  PT Frequency: PT eval only  PT Discharge Recommendations: No further acute PT  Equipment Recommended upon Discharge:  (none)  PT Recommended Transfer Status: Independent  PT - OK to Discharge: Yes    Subjective   General Visit Information:  General  Reason for Referral: Shortness of breath and decompensated heart failure  Past Medical History Relevant to Rehab: Mixed ischemic and non-ischemic HFrEF 10-15% s/p [employer] single chamber ICD (3/2024), CKD3a, HTN, HLD, LUZ (crack cocaine), former tobacco use, COPD (on nocturnal 2L at baseline), DM2, remote left cerebellar hemorrhagic infarct, medication noncompliance, anxiety, and depression  Prior to Session Communication: Bedside nurse  Patient Position Received: Bed, 3 rail up, Alarm off, not on at start of session  Preferred Learning Style: verbal, visual, written  General Comment: The pt was pleasant,  cooperative and willing to participate in therapy.  Home Living:  Home Living  Type of Home: House  Lives With: Friends (Roommate)  Home Adaptive Equipment: None  Home Layout: Multi-level, Laundry in basement, Stairs to alternate level with rails  Alternate Level Stairs-Rails: Right  Alternate Level Stairs-Number of Steps: 12  Home Access: Stairs to enter with rails  Entrance Stairs-Rails: Right  Entrance Stairs-Number of Steps: 5  Bathroom Shower/Tub: Tub/shower unit  Bathroom Toilet: Standard  Bathroom Equipment: None  Bathroom Accessibility: Pt uses the half bath in the basement.  Home Living Comments: The pt lives with roommate who owns the house, otherwise the pt is homeless.  Prior Level of Function:  Prior Function Per Pt/Caregiver Report  Level of [place]: Independent with ADLs and functional transfers, Independent with homemaking with ambulation  Receives Help From: Friends  ADL Assistance: Independent  Homemaking Assistance: Independent  Ambulatory Assistance: Independent  Vocational: Self employed (part time)  Leisure: Work on cars  Hand Dominance: Right  Prior Function Comments: The pt was independent with all mobility without an assistive device in the household and in the community.  Precautions:  Precautions  Hearing/Visual Limitations: Hearing and vision WFL  Medical Precautions: Cardiac precautions, Fall precautions  Precautions Comment: Pt in compliance with precautions throughout PT session.     Vital Signs (Past 2hrs)        Date/Time Vitals Session Patient Position Pulse Resp SpO2 BP MAP (mmHg)    11/08/24 1140 --  --  85  16  97 %  --  --                   Objective   Pain:  Pain Assessment  Pain Assessment: 0-10  0-10 (Numeric) Pain Score: 0 - No pain  Cognition:  Cognition  Overall Cognitive Status: Within Functional Limits  Orientation Level: Oriented X4  Following Commands: Follows all commands and directions without difficulty    General Assessments:  General Observation  General  Observation: The pt is independent and safe with all mobility without an assistive device.     Activity Tolerance  Endurance: Endurance does not limit participation in activity    Sensation  Light Touch: No apparent deficits    Strength  Strength Comments: WFL  Perception  Inattention/Neglect: Appears intact    Coordination  Movements are Fluid and Coordinated: Yes  Coordination Comment: WFL    Postural Control  Postural Control: Within Functional Limits  Posture Comment: Pt presented with good sitting and standing posture without an assistive device.    Static Sitting Balance  Static Sitting-Balance Support: Bilateral upper extremity supported, Feet supported  Static Sitting-Level of Assistance: Independent  Static Sitting-Comment/Number of Minutes: Sitting EOB  Dynamic Sitting Balance  Dynamic Sitting-Balance Support: Bilateral upper extremity supported, Feet supported  Dynamic Sitting-Level of Assistance: Independent  Dynamic Sitting-Comments: Sitting EOB    Static Standing Balance  Static Standing-Balance Support: No upper extremity supported  Static Standing-Level of Assistance: Independent  Static Standing-Comment/Number of Minutes: no device  Dynamic Standing Balance  Dynamic Standing-Balance Support: No upper extremity supported  Dynamic Standing-Level of Assistance: Independent  Dynamic Standing-Comments: no device  Functional Assessments:  Bed Mobility  Bed Mobility: Yes  Bed Mobility 1  Bed Mobility 1: Sitting to supine  Level of Assistance 1: Independent  Bed Mobility Comments 1: HOB slightly elevated    Transfers  Transfer: Yes  Transfer 1  Transfer From 1: Sit to  Transfer to 1: Stand  Transfer Device 1:  (no device)  Transfer Level of Assistance 1: Independent  Transfers 2  Transfer From 2: Stand to  Transfer to 2: Sit  Transfer Device 2:  (no device)  Transfer Level of Assistance 2: Independent    Ambulation/Gait Training  Ambulation/Gait Training Performed: Yes  Ambulation/Gait Training 1  Surface  1: Level tile  Device 1: No device  Assistance 1: Independent  Quality of Gait 1:  (steady)  Comments/Distance (ft) 1: 30ft    Stairs  Stairs: No  Extremity/Trunk Assessments:  Cervical Spine   Cervical Spine: Within Functional Limits  Lumbar Spine   Lumbar Spine : Within Functional Limits    RUE   RUE : Within Functional Limits  LUE   LUE: Within Functional Limits  RLE   RLE : Within Functional Limits  LLE   LLE : Within Functional Limits  Outcome Measures:  [facility] Basic Mobility  Turning from your back to your side while in a flat bed without using bedrails: None  Moving from lying on your back to sitting on the side of a flat bed without using bedrails: None  Moving to and from bed to chair (including a wheelchair): None  Standing up from a chair using your arms (e.g. wheelchair or bedside chair): None  To walk in hospital room: None  Climbing 3-5 steps with railing: None  Basic Mobility - Total Score: 24    Education Documentation  Body Mechanics, taught by Colby Lal PT at 11/8/2024  1:36 PM.  Learner: Patient  Readiness: Acceptance  Method: Explanation, Demonstration  Response: Verbalizes Understanding, Demonstrated Understanding  Comment: Pt demonstrated good understanding of received education on improving quality of life.    Precautions, taught by Colby Lal PT at 11/8/2024  1:36 PM.  Learner: Patient  Readiness: Acceptance  Method: Explanation, Demonstration  Response: Verbalizes Understanding, Demonstrated Understanding  Comment: Pt demonstrated good understanding of received education on improving quality of life.    Home Exercise Program, taught by Colby Lal PT at 11/8/2024  1:36 PM.  Learner: Patient  Readiness: Acceptance  Method: Explanation, Demonstration  Response: Verbalizes Understanding, Demonstrated Understanding  Comment: Pt demonstrated good understanding of received education on improving quality of life.    Mobility Training, taught by Colby Lal PT at  11/8/2024  1:36 PM.  Learner: Patient  Readiness: Acceptance  Method: Explanation, Demonstration  Response: Verbalizes Understanding, Demonstrated Understanding  Comment: Pt demonstrated good understanding of received education on improving quality of life.    Education Comments  No comments found.

## 2024-11-08 NOTE — PROGRESS NOTES
Occupational Therapy    Evaluation    Patient Name: Leonel Yu  MRN: [id number]  Today's Date: 11/8/2024  Room: [address] [place] [address]  Time Calculation  Start Time: 1053  Stop Time: 1107  Time Calculation (min): 14 min    Assessment  IP OT Assessment  OT Assessment: Pt appears to be functioning at or close to baseline. No OT needs anticipated during acute stay or after discharge.  Prognosis: Good  Evaluation/Treatment Tolerance: Patient tolerated treatment well  Medical Staff Made Aware: Yes  End of Session Communication: Bedside nurse  End of Session Patient Position: Bed, 3 rail up, Alarm off, not on at start of session (sitting EOB)    Plan:  Inpatient Plan  No Skilled OT: Independent with ADLs  OT Discharge Recommendations: No further acute OT, No OT needed after discharge  Equipment Recommended upon Discharge:  (none)  OT Recommended Transfer Status: Independent  OT - OK to Discharge:  (OT eval complete and d/c recs made)  OT Assessment  Prognosis: Good  Evaluation/Treatment Tolerance: Patient tolerated treatment well  Medical Staff Made Aware: Yes  Strengths: Ability to acquire knowledge, Attitude of self, Capable of completing ADLs semi/independent    Subjective   Current Problem:  1. Acute on chronic congestive heart failure, unspecified heart failure type  Referral to [facility]      2. HFrEF (heart failure with reduced ejection fraction)          General:  Reason for Referral: Per EMR: admitted for ADHF iso medication non-compliance, ongoing substance abuse (UDS today + cocaine), and lack of outpatient FU  Past Medical History Relevant to Rehab: Per EMR: mixed ischemic/toxic mediated HFrEF (EF 10-15%, fibrosis and transmural infarcts seen on cMRI 8/2024) s/p [employer] single chamber ICD (3/2024), CKD3a (baseline Cr 1.6-1.7), LUZ (crack cocaine), COPD (nocturnal 2L O2 at baseline), medication noncompliance  Prior to Session Communication: Bedside nurse  Patient Position Received: Bed, 3 rail up, Alarm off, not  "on at start of session (sitting EOB)  Family/Caregiver Present: No  General Comment: Pt seated EOB at beginning of session and agreeable to therapy. Pt completed LB dressing and functional mobility max household distances. Pt appears to be functioning at baseline, no OT needs anticipated during acute stay or after discharge.     Precautions:  Medical Precautions: Fall precautions, Cardiac precautions    Vital Signs:  Vital Signs (Past 2hrs)        Date/Time Vitals Session Patient Position Pulse Resp SpO2 BP MAP (mmHg)    11/08/24 1053 --  --  --  --  --  --  --     11/08/24 1108 --  --  75  18  93 %  100/69  80     11/08/24 1140 --  --  85  16  97 %  --  --                   Pain:  Pain Assessment  Pain Assessment:  (Pt did not endorse or demonstrate signs of pain)    Objective   Cognition:  Overall Cognitive Status: Within Functional Limits  Orientation Level:  (oriented to person, place, birthday, and month, but stated it was \"25\" for year. No other cognitive concerns present)  Attention: Within Functional Limits  Safety/Judgement: Within Functional Limits  Insight: Within function limits  Impulsive: Within functional limits  Processing Speed: Within funtional limits     Home Living:  Type of Home: House (Per chart, pt homeless. Pt stated he did not know where he would be returning. Home setup is from previous stay at friend's house)  Lives With:  (friend)  Home Adaptive Equipment: None  Home Layout:  (bed/bath downstairs)  Home Access: Stairs to enter with rails  Entrance Stairs-Number of Steps: 5  Bathroom Shower/Tub: Tub/shower unit  Bathroom Toilet: Standard  Bathroom Equipment: None     Prior Function:  Level of [place]: Independent with ADLs and functional transfers, Independent with homemaking with ambulation  ADL Assistance: Independent  Homemaking Assistance: Independent  Ambulatory Assistance: Independent  Vocational: Unemployed  Leisure: enjoys working on cars    IADL History:  IADL Comments: can " drive but does not have license    ADL:  Eating Assistance:  (while seated EOB, pt scooped food and brought to mouth)  Grooming Assistance:  (anticipate independence)  Bathing Assistance:  (anticipate independence)  UE Dressing Assistance:  (anticipate independence)  LE Dressing Assistance:  (While EOB, pt doffed and donned socks with SBA)  Toileting Assistance with Device:  (anticipate independence)  Functional Assistance:  (anticipate independence)    Activity Tolerance:  Endurance:  (Pt tolerated entire session - O2 93%-94% after functional mobility)    Balance:  Dynamic Sitting Balance  Dynamic Sitting-Level of Assistance: Independent  Dynamic Standing Balance  Dynamic Standing-Level of Assistance: Distant supervision  Static Sitting Balance  Static Sitting-Level of Assistance: Independent  Static Standing Balance  Static Standing-Level of Assistance: Distant supervision    Bed Mobility/Transfers: Bed Mobility  Bed Mobility: No  Functional Mobility  Functional Mobility Performed: Yes  Functional Mobility 1  Device 1: No device  Assistance 1: Close supervision  Comments 1: MAX household distances within the hallway   and Transfers  Transfer: Yes  Transfer 1  Transfer From 1: Bed to, Stand to  Transfer to 1: Stand, Bed  Transfer Device 1:  (no device)  Transfer Level of Assistance 1: Distant supervision  Trials/Comments 1: 2 trials each    IADL's:   IADL Comments: can drive but does not have license    Sensation:  Sensation Comment: did not endorse any n/t    Coordination:  Movements are Fluid and Coordinated: Yes     Hand Function:  Hand Function  Gross Grasp: Functional  Coordination: Functional    Extremities: RUE   ISREALE :  (WFL via ADLs), VERA GAMEZ:  (WFL via ADLs),  , and      Outcome Measures: [facility] Daily Activity  Putting on and taking off regular lower body clothing: None  Bathing (including washing, rinsing, drying): None  Putting on and taking off regular upper body clothing: None  Toileting, which  "includes using toilet, bedpan or urinal: None  Taking care of personal grooming such as brushing teeth: None  Eating Meals: None  Daily Activity - Total Score: 24  Brief Confusion Assessment Method (bCAM)  CAM Result:  (oriented to person, place, birthday, and current month but stated \"25\" for year - no other cognitive concerns during session)      ,          Education Documentation  No documentation found.  Education Comments  No comments found.      11/08/24 at 12:19 PM   JUDY Velasquez/BEATRIZ  Rehab Office: [phone]       "

## 2024-11-09 LAB
ALBUMIN SERPL BCP-MCNC: 3.6 G/DL (ref 3.4–5)
ANION GAP SERPL CALC-SCNC: 17 MMOL/L (ref 10–20)
BNP SERPL-MCNC: 363 PG/ML (ref 0–99)
BUN SERPL-MCNC: 37 MG/DL (ref 6–23)
CALCIUM SERPL-MCNC: 8.6 MG/DL (ref 8.6–10.6)
CHLORIDE SERPL-SCNC: 101 MMOL/L (ref 98–107)
CO2 SERPL-SCNC: 25 MMOL/L (ref 21–32)
CREAT SERPL-MCNC: 1.45 MG/DL (ref 0.5–1.3)
EGFRCR SERPLBLD CKD-EPI 2021: 57 ML/MIN/1.73M*2
ERYTHROCYTE [DISTWIDTH] IN BLOOD BY AUTOMATED COUNT: 16.4 % (ref 11.5–14.5)
GLUCOSE BLD MANUAL STRIP-MCNC: 127 MG/DL (ref 74–99)
GLUCOSE BLD MANUAL STRIP-MCNC: 131 MG/DL (ref 74–99)
GLUCOSE BLD MANUAL STRIP-MCNC: 178 MG/DL (ref 74–99)
GLUCOSE BLD MANUAL STRIP-MCNC: 95 MG/DL (ref 74–99)
GLUCOSE SERPL-MCNC: 98 MG/DL (ref 74–99)
HCT VFR BLD AUTO: 53.7 % (ref 41–52)
HGB BLD-MCNC: 18 G/DL (ref 13.5–17.5)
MAGNESIUM SERPL-MCNC: 2.36 MG/DL (ref 1.6–2.4)
MCH RBC QN AUTO: 31 PG (ref 26–34)
MCHC RBC AUTO-ENTMCNC: 33.5 G/DL (ref 32–36)
MCV RBC AUTO: 92 FL (ref 80–100)
NRBC BLD-RTO: 0 /100 WBCS (ref 0–0)
PHOSPHATE SERPL-MCNC: 3.3 MG/DL (ref 2.5–4.9)
PLATELET # BLD AUTO: 208 X10*3/UL (ref 150–450)
POTASSIUM SERPL-SCNC: 4.3 MMOL/L (ref 3.5–5.3)
RBC # BLD AUTO: 5.81 X10*6/UL (ref 4.5–5.9)
SODIUM SERPL-SCNC: 139 MMOL/L (ref 136–145)
WBC # BLD AUTO: 5.6 X10*3/UL (ref 4.4–11.3)

## 2024-11-09 PROCEDURE — 83735 ASSAY OF MAGNESIUM: CPT

## 2024-11-09 PROCEDURE — 36415 COLL VENOUS BLD VENIPUNCTURE: CPT | Performed by: NURSE PRACTITIONER

## 2024-11-09 PROCEDURE — 2500000002 HC RX 250 W HCPCS SELF ADMINISTERED DRUGS (ALT 637 FOR MEDICARE OP, ALT 636 FOR OP/ED)

## 2024-11-09 PROCEDURE — 93010 ELECTROCARDIOGRAM REPORT: CPT | Performed by: INTERNAL MEDICINE

## 2024-11-09 PROCEDURE — 85027 COMPLETE CBC AUTOMATED: CPT | Performed by: NURSE PRACTITIONER

## 2024-11-09 PROCEDURE — 82947 ASSAY GLUCOSE BLOOD QUANT: CPT

## 2024-11-09 PROCEDURE — 2500000002 HC RX 250 W HCPCS SELF ADMINISTERED DRUGS (ALT 637 FOR MEDICARE OP, ALT 636 FOR OP/ED): Performed by: NURSE PRACTITIONER

## 2024-11-09 PROCEDURE — 99222 1ST HOSP IP/OBS MODERATE 55: CPT | Performed by: INTERNAL MEDICINE

## 2024-11-09 PROCEDURE — 1100000001 HC PRIVATE ROOM DAILY

## 2024-11-09 PROCEDURE — 2500000001 HC RX 250 WO HCPCS SELF ADMINISTERED DRUGS (ALT 637 FOR MEDICARE OP): Performed by: PHYSICIAN ASSISTANT

## 2024-11-09 PROCEDURE — 80069 RENAL FUNCTION PANEL: CPT | Performed by: NURSE PRACTITIONER

## 2024-11-09 PROCEDURE — 94640 AIRWAY INHALATION TREATMENT: CPT

## 2024-11-09 PROCEDURE — 2500000001 HC RX 250 WO HCPCS SELF ADMINISTERED DRUGS (ALT 637 FOR MEDICARE OP)

## 2024-11-09 RX ADMIN — ATORVASTATIN CALCIUM 80 MG: 80 TABLET, FILM COATED ORAL at 20:49

## 2024-11-09 RX ADMIN — SPIRONOLACTONE 12.5 MG: 25 TABLET, FILM COATED ORAL at 09:11

## 2024-11-09 RX ADMIN — TIOTROPIUM BROMIDE INHALATION SPRAY 2 PUFF: 3.12 SPRAY, METERED RESPIRATORY (INHALATION) at 11:05

## 2024-11-09 RX ADMIN — INSULIN LISPRO 2 UNITS: 100 INJECTION, SOLUTION INTRAVENOUS; SUBCUTANEOUS at 10:29

## 2024-11-09 RX ADMIN — TORSEMIDE 40 MG: 20 TABLET ORAL at 09:10

## 2024-11-09 RX ADMIN — SACUBITRIL AND VALSARTAN 0.5 TABLET: 24; 26 TABLET, FILM COATED ORAL at 20:49

## 2024-11-09 RX ADMIN — INSULIN GLARGINE 15 UNITS: 100 INJECTION, SOLUTION SUBCUTANEOUS at 20:45

## 2024-11-09 RX ADMIN — INSULIN LISPRO 5 UNITS: 100 INJECTION, SOLUTION INTRAVENOUS; SUBCUTANEOUS at 13:09

## 2024-11-09 RX ADMIN — AMIODARONE HYDROCHLORIDE 200 MG: 200 TABLET ORAL at 09:11

## 2024-11-09 RX ADMIN — INSULIN LISPRO 5 UNITS: 100 INJECTION, SOLUTION INTRAVENOUS; SUBCUTANEOUS at 10:30

## 2024-11-09 RX ADMIN — SACUBITRIL AND VALSARTAN 0.5 TABLET: 24; 26 TABLET, FILM COATED ORAL at 09:11

## 2024-11-09 RX ADMIN — ASPIRIN 81 MG 81 MG: 81 TABLET ORAL at 09:11

## 2024-11-09 RX ADMIN — INSULIN LISPRO 5 UNITS: 100 INJECTION, SOLUTION INTRAVENOUS; SUBCUTANEOUS at 17:41

## 2024-11-09 RX ADMIN — SERTRALINE 100 MG: 50 TABLET, FILM COATED ORAL at 09:15

## 2024-11-09 RX ADMIN — EMPAGLIFLOZIN 25 MG: 25 TABLET, FILM COATED ORAL at 09:11

## 2024-11-09 ASSESSMENT — COGNITIVE AND FUNCTIONAL STATUS - GENERAL
MOBILITY SCORE: 23
CLIMB 3 TO 5 STEPS WITH RAILING: A LITTLE
DAILY ACTIVITIY SCORE: 24

## 2024-11-09 ASSESSMENT — PAIN SCALES - GENERAL: PAINLEVEL_OUTOF10: 0 - NO PAIN

## 2024-11-09 ASSESSMENT — PAIN - FUNCTIONAL ASSESSMENT: PAIN_FUNCTIONAL_ASSESSMENT: 0-10

## 2024-11-09 NOTE — PROGRESS NOTES
Subjective:  Patient see this Am, laying nearly flat on room air. Has no appreciable LE edema. SBPs remain borderline, 90-100s. Bradycardic on telemetry overnight, will continue to defer coreg.     - SBPs remain borderline, 90-100s. Bradycardic on telemetry overnight, will continue to defer coreg.   - BNP pending, appears euvolemic  - transitioned to torsemide today from IV lasix-> Cr slowly uptrending, may need lower dose tomorrow depending on renal function   - social work has been involved for housing issues; they are working with [facility] [employer]. Will need to call on date of discharge, can call Monday as likely will be medically ready     Objective:  Vitals:    11/09/24 0727   BP: 108/71   Pulse: 59   Resp: 18   Temp: 36.1 °C (97 °F)   SpO2: 97%     Weight         11/5/2024  0945 11/8/2024  0345 11/9/2024  0412 11/9/2024  0543    Weight: 76.2 kg (168 lb) 71.4 kg (157 lb 6.5 oz) 71.6 kg (157 lb 13.6 oz) 71.6 kg (157 lb 13.6 oz)            Intake/Output Summary (Last 24 hours) at 11/9/2024 1213  Last data filed at 11/9/2024 0727  Gross per 24 hour   Intake 918 ml   Output 1100 ml   Net -182 ml     Recent Results (from the past 24 hours)   POCT GLUCOSE    Collection Time: 11/08/24  5:03 PM   Result Value Ref Range    POCT Glucose 152 (H) 74 - 99 mg/dL   CBC    Collection Time: 11/08/24  6:27 PM   Result Value Ref Range    WBC 6.5 4.4 - 11.3 x10*3/uL    nRBC 0.0 0.0 - 0.0 /100 WBCs    RBC 6.15 (H) 4.50 - 5.90 x10*6/uL    Hemoglobin 18.9 (H) 13.5 - 17.5 g/dL    Hematocrit 56.4 (H) 41.0 - 52.0 %    MCV 92 80 - 100 fL    MCH 30.7 26.0 - 34.0 pg    MCHC 33.5 32.0 - 36.0 g/dL    RDW 17.6 (H) 11.5 - 14.5 %    Platelets 218 150 - 450 x10*3/uL   Renal Function Panel    Collection Time: 11/08/24  6:27 PM   Result Value Ref Range    Glucose 91 74 - 99 mg/dL    Sodium 135 (L) 136 - 145 mmol/L    Potassium 4.4 3.5 - 5.3 mmol/L    Chloride 98 98 - 107 mmol/L    Bicarbonate 21 21 - 32 mmol/L    Anion Gap 20 10  - 20 mmol/L    Urea Nitrogen 45 (H) 6 - 23 mg/dL    Creatinine 1.84 (H) 0.50 - 1.30 mg/dL    eGFR 43 (L) >60 mL/min/1.73m*2    Calcium 9.0 8.6 - 10.6 mg/dL    Phosphorus 3.6 2.5 - 4.9 mg/dL    Albumin 3.9 3.4 - 5.0 g/dL   Magnesium    Collection Time: 11/08/24  6:27 PM   Result Value Ref Range    Magnesium 2.54 (H) 1.60 - 2.40 mg/dL   POCT GLUCOSE    Collection Time: 11/08/24  8:55 PM   Result Value Ref Range    POCT Glucose 122 (H) 74 - 99 mg/dL   POCT GLUCOSE    Collection Time: 11/09/24  7:03 AM   Result Value Ref Range    POCT Glucose 178 (H) 74 - 99 mg/dL     Inpatient Medications:  Scheduled medications   Medication Dose Route Frequency    amiodarone  200 mg oral Daily    aspirin  81 mg oral Daily    atorvastatin  80 mg oral Nightly    empagliflozin  25 mg oral Daily    heparin (porcine)  5,000 Units subcutaneous q8h JANEY    insulin glargine  15 Units subcutaneous Nightly    insulin lispro  0-10 Units subcutaneous TID    insulin lispro  5 Units subcutaneous TID AC    sacubitriL-valsartan  0.5 tablet oral BID    sertraline  100 mg oral Daily    spironolactone  12.5 mg oral Daily    tiotropium  2 puff inhalation Daily    torsemide  40 mg oral Daily     PRN medications   Medication     Continuous Medications   Medication Dose Last Rate     Physical exam:  General: NAD  Head/ neck: no JVD, negative HJR  Cardiac: RRR, regular S1 S2, no murmur, no rub, no gallop  Pulm: CTA bilaterally, no wheezes, rales or rhonchi.    Vascular: Radial 2+ bilaterally  GI: Non distended  Extremities: no LE edema   Neuro: no focal neuro deficits   Psych: appropriate mood and behavior   Skin: warm and dry     Assessment/Plan   Leonel Yu is a 54 y.o. M w/PMHx mixed ischemic/toxic mediated HFrEF (EF 10-15%, fibrosis and transmural infarcts seen on cMRI 8/2024) s/p [place] [employer] single chamber ICD (3/2024), CKD3a (baseline Cr 1.6-1.7), LUZ (crack cocaine), COPD (nocturnal 2L O2 at baseline), medication noncompliance being admitted for  ADHF iso medication non-compliance, ongoing substance abuse (UDS today + cocaine), and lack of outpatient FU .    HDS, on baseline O2 requirements. Labs notable for hyperK 5.6, Cr 1.43 (baseline 1.6-1.7). Lactate and LFTs wnl, so low c/f cardiogenic shock. BNP > 5000. Patient has end-stage HF with recurrent hospitalizations, and given substance use and poor FU he is not a candidate for advanced therapies. Admitted to [facility] for further management.     Acute on Chronic Systolic and diastolic HFrEF 5-10%, ACC/AHA Stage D, NYHA 4  Acutely decompensated heart failure   - iso medication non-adherence, also component of cocaine usage   - TTE 10/17 EF 10-15%, will defer repeat TTE at this time  - at baseline O2 requirements, nighttime O2   - DC weight 10/25 70.8 kg  - admission weight in ED 76.2 kg,   - today's weight: 71.6kg (71.4 kg)  - BNP > 5000  - repeat BNP pending  - Troponin negative x2  - CXR with cardiomegaly, pulm vascular congestion, interstitial edema  - No current c/f cardiogenic shock: lactate and LFTs wnl   - s/p  lasix in ED with good output, around 5L on 11/6  - Diuresed well with 80 mg IV Lasix BID. Lactate 11/5 WNL.  - Volume improving. Still using O2 at night for comfort. Warm on exam->  Switched to PO diuretic of home torsemide 40mg daily today  - c/w home Entresto 12/13 BID, empa 25 daily, has been intolerant to further entresto adjustments   - Continue [place] 12.5 BID daily (says patient taking 25 mg daily but unable to confirm this so will c/w - prescription dose)  - SBPs remain borderline, 90-100s. Bradycardic on telemetry overnight, will continue to defer coreg.   - daily weights, strict I&O     Erythrocytosis  - Hematocrit elevated  - Epo mildly elevated   - Evidence of polycythemia likely 2/2 chronic systolic HF and chronic smoking      CKD3a  - baseline Cr 1.6-1.7,   - admit Cr 1.4  - daily Cr 1.84 (1.72, 1.6)  - transitioned to torsemide today from IV lasix-> Cr slowly uptrending, may need  lower dose tomorrow depending on renal function      HLD  - Lipid panel 9/2024: cholesterol 127, triglyceride 98, HDL 35, LDL 72 : overall controlled.  - Continue atorvastatin 80 mg nightly   - Prior concern for possible CVA 7/2023> neurology felt pt high risk for cocaine vasculopathy and stroke so recommended aspirin 81 mg daily, will continue      Type II DM  - A1C 9.9% on 10/15  - Endocrinology consulted previous admission  - Labile glucoses but with better control today, range: 122-178  - continue SSI scale #1, Jardiance 25 mg daily, glargine 15 units daily    - diabetic diet, Accu-Cheks AC/HS, hypoglycemic protocol      Hx of VT / VF   Hx of AF RVR   [place] [employer] single chamber ICD (3/2024)   - most recent device interrogation 9/30: 4 sustained VT episodes since 9/1/24 requiring ATP and ATP/shock for arrhythmia termination and restoration of SR, last shock were recorded from 9/5/24 which were appropriate for VT. : <1%. 12yr battery life. Lead fxn WNL.   - cont Amio 200 mg daily  - beta blocker held as above  - Bradycardia during nocturnal sleeping hours. Suspect component of SOHAIL. Outpt sleep study recommended  - cont telemetry, keep K+ >4, Mag >2     Anxiety/Depression  Substance use  - admission UDS + cocaine  - consider THRIVE-> has declined in the past   - Not a candidate for advanced therapies due to ongoing substance use, noncompliance, & psychiatric concerns   - per prior admission discussion with HF, CPR and intubation would not be beneficial, discussed with pt, pt agreeable with DNR/DNI  - cont sertraline 100 mg PO daily   - has previously seen psych and palliative care in prior admissions> Pt left AMA last admission when pending precert to facility with palliative resources.   - SW involved this admission in regards to housing.     DVT ppx: heparin subcutaneous  DISPO: pending determination of home going diuretic regimen dose and housing plan per    Code status: Full Code    Patient seen and  discussed with Dr. Jose E Olvera, APRN-CNP

## 2024-11-09 NOTE — DOCUMENTATION CLARIFICATION NOTE
"    PATIENT:               VINOD SHAFER  ACCT #:                  [id number]  MRN:                       [id number]  :                       1970  ADMIT DATE:       2024 11:59 AM  DISCH DATE:  RESPONDING PROVIDER #:        [id number]          PROVIDER RESPONSE TEXT:    Chronic Hypoxemic Respiratory Failure    CDI QUERY TEXT:    Clarification    Instruction:    Based on your assessment of the patient and the clinical information, please provide the requested documentation by clicking on the appropriate radio button and enter any additional information if prompted.    Question: Is there a diagnosis indicative of the clinical information    When answering this query, please exercise your independent professional judgment. The fact that a question is being asked, does not imply that any particular answer is desired or expected.    The patient's clinical indicators include:  Clinical Information:  24 H&P Sam Barraza MD \"54 y.o. male with a PMHx significant for mixed ischemic and non-ischemic HFrEF 10-15% s/p [place] [employer] single chamber ICD (3/2024), CKD3a, HTN, HLD, LUZ (crack cocaine), former tobacco use, COPD (on nocturnal 2L at baseline), DM2, remote left cerebellar hemorrhagic infarct, medication noncompliance, anxiety, and depression who presents to the ED from clinic with decompensated heart failure iso medication non-adherence and ongoing crack cocaine usage as recently as .    Clinical Indicators:  24 0945 36.5-83-/91 97% ra Pulse Ox % on O2 at night  24 1329 VBG 7.37-52-15-16 FiO2 21    24 ED PN Christopher Quick MD  'Resp: Breathing non-labored, speaking in full sentences.  Crackles heard in bilateral basilar lung fields\"  \"MSK/Extremities: No gross bony deformities. Moving all extremities.  2+ pitting edema bilaterally\"    24 CXR  \"IMPRESSION: Cardiomegaly with pulmonary vascular congestion and interstitial  edema.\"    Treatment:  CXR  labs  Pt. using " O2 at 2L overnight  Treatment directed towards acute on chronic heart failure    Risk Factors: COPD on @ L overnight, CHF exacerbation, noncompliance with medical care, occasional tobacco use  Options provided:  -- Chronic Hypoxemic Respiratory Failure  -- Chronic Hypercapnic Respiratory Failure  -- Chronic Hypoxemic and Hypercapnic Respiratory Failure  -- Other - I will add my own diagnosis  -- Refer to Clinical Documentation Reviewer    Query created by: Priyanka Norman on 11/8/2024 11:00 AM      Electronically signed by:  FREDI ALEJO MD 11/9/2024 4:08 PM

## 2024-11-10 VITALS
RESPIRATION RATE: 16 BRPM | SYSTOLIC BLOOD PRESSURE: 108 MMHG | WEIGHT: 158.95 LBS | BODY MASS INDEX: 23.54 KG/M2 | DIASTOLIC BLOOD PRESSURE: 75 MMHG | OXYGEN SATURATION: 95 % | HEART RATE: 73 BPM | HEIGHT: 69 IN | TEMPERATURE: 97.7 F

## 2024-11-10 LAB
ALBUMIN SERPL BCP-MCNC: 3.9 G/DL (ref 3.4–5)
ANION GAP SERPL CALC-SCNC: 15 MMOL/L (ref 10–20)
BUN SERPL-MCNC: 37 MG/DL (ref 6–23)
CALCIUM SERPL-MCNC: 9 MG/DL (ref 8.6–10.6)
CHLORIDE SERPL-SCNC: 98 MMOL/L (ref 98–107)
CO2 SERPL-SCNC: 27 MMOL/L (ref 21–32)
CREAT SERPL-MCNC: 1.94 MG/DL (ref 0.5–1.3)
EGFRCR SERPLBLD CKD-EPI 2021: 40 ML/MIN/1.73M*2
ERYTHROCYTE [DISTWIDTH] IN BLOOD BY AUTOMATED COUNT: 16.8 % (ref 11.5–14.5)
GLUCOSE BLD MANUAL STRIP-MCNC: 143 MG/DL (ref 74–99)
GLUCOSE BLD MANUAL STRIP-MCNC: 196 MG/DL (ref 74–99)
GLUCOSE BLD MANUAL STRIP-MCNC: 197 MG/DL (ref 74–99)
GLUCOSE BLD MANUAL STRIP-MCNC: 72 MG/DL (ref 74–99)
GLUCOSE SERPL-MCNC: 179 MG/DL (ref 74–99)
HCT VFR BLD AUTO: 55.3 % (ref 41–52)
HGB BLD-MCNC: 17.8 G/DL (ref 13.5–17.5)
MAGNESIUM SERPL-MCNC: 2.22 MG/DL (ref 1.6–2.4)
MCH RBC QN AUTO: 30.2 PG (ref 26–34)
MCHC RBC AUTO-ENTMCNC: 32.2 G/DL (ref 32–36)
MCV RBC AUTO: 94 FL (ref 80–100)
NRBC BLD-RTO: 0 /100 WBCS (ref 0–0)
PHOSPHATE SERPL-MCNC: 3.1 MG/DL (ref 2.5–4.9)
PLATELET # BLD AUTO: 200 X10*3/UL (ref 150–450)
POTASSIUM SERPL-SCNC: 4.1 MMOL/L (ref 3.5–5.3)
RBC # BLD AUTO: 5.9 X10*6/UL (ref 4.5–5.9)
SODIUM SERPL-SCNC: 136 MMOL/L (ref 136–145)
WBC # BLD AUTO: 5.7 X10*3/UL (ref 4.4–11.3)

## 2024-11-10 PROCEDURE — 94640 AIRWAY INHALATION TREATMENT: CPT

## 2024-11-10 PROCEDURE — 85027 COMPLETE CBC AUTOMATED: CPT | Performed by: NURSE PRACTITIONER

## 2024-11-10 PROCEDURE — 36415 COLL VENOUS BLD VENIPUNCTURE: CPT | Performed by: NURSE PRACTITIONER

## 2024-11-10 PROCEDURE — 94762 N-INVAS EAR/PLS OXIMTRY CONT: CPT

## 2024-11-10 PROCEDURE — 80069 RENAL FUNCTION PANEL: CPT | Performed by: NURSE PRACTITIONER

## 2024-11-10 PROCEDURE — 2500000001 HC RX 250 WO HCPCS SELF ADMINISTERED DRUGS (ALT 637 FOR MEDICARE OP)

## 2024-11-10 PROCEDURE — 83735 ASSAY OF MAGNESIUM: CPT

## 2024-11-10 PROCEDURE — 1200000002 HC GENERAL ROOM WITH TELEMETRY DAILY

## 2024-11-10 PROCEDURE — 2500000002 HC RX 250 W HCPCS SELF ADMINISTERED DRUGS (ALT 637 FOR MEDICARE OP, ALT 636 FOR OP/ED)

## 2024-11-10 PROCEDURE — 82947 ASSAY GLUCOSE BLOOD QUANT: CPT

## 2024-11-10 PROCEDURE — 2500000002 HC RX 250 W HCPCS SELF ADMINISTERED DRUGS (ALT 637 FOR MEDICARE OP, ALT 636 FOR OP/ED): Performed by: NURSE PRACTITIONER

## 2024-11-10 PROCEDURE — 99232 SBSQ HOSP IP/OBS MODERATE 35: CPT | Performed by: INTERNAL MEDICINE

## 2024-11-10 RX ORDER — DEXTROSE 50 % IN WATER (D50W) INTRAVENOUS SYRINGE
12.5
Status: ACTIVE | OUTPATIENT
Start: 2024-11-10

## 2024-11-10 RX ORDER — DEXTROSE 50 % IN WATER (D50W) INTRAVENOUS SYRINGE
25
Status: ACTIVE | OUTPATIENT
Start: 2024-11-10

## 2024-11-10 RX ORDER — TORSEMIDE 20 MG/1
20 TABLET ORAL DAILY
Status: DISPENSED | OUTPATIENT
Start: 2024-11-10

## 2024-11-10 RX ADMIN — SACUBITRIL AND VALSARTAN 0.5 TABLET: 24; 26 TABLET, FILM COATED ORAL at 10:01

## 2024-11-10 RX ADMIN — TORSEMIDE 20 MG: 20 TABLET ORAL at 10:00

## 2024-11-10 RX ADMIN — INSULIN GLARGINE 15 UNITS: 100 INJECTION, SOLUTION SUBCUTANEOUS at 21:04

## 2024-11-10 RX ADMIN — ATORVASTATIN CALCIUM 80 MG: 80 TABLET, FILM COATED ORAL at 21:04

## 2024-11-10 RX ADMIN — SACUBITRIL AND VALSARTAN 0.5 TABLET: 24; 26 TABLET, FILM COATED ORAL at 21:04

## 2024-11-10 RX ADMIN — INSULIN LISPRO 2 UNITS: 100 INJECTION, SOLUTION INTRAVENOUS; SUBCUTANEOUS at 17:28

## 2024-11-10 RX ADMIN — INSULIN LISPRO 5 UNITS: 100 INJECTION, SOLUTION INTRAVENOUS; SUBCUTANEOUS at 17:28

## 2024-11-10 RX ADMIN — INSULIN LISPRO 5 UNITS: 100 INJECTION, SOLUTION INTRAVENOUS; SUBCUTANEOUS at 09:41

## 2024-11-10 RX ADMIN — INSULIN LISPRO 2 UNITS: 100 INJECTION, SOLUTION INTRAVENOUS; SUBCUTANEOUS at 09:42

## 2024-11-10 RX ADMIN — AMIODARONE HYDROCHLORIDE 200 MG: 200 TABLET ORAL at 10:01

## 2024-11-10 RX ADMIN — TIOTROPIUM BROMIDE INHALATION SPRAY 2 PUFF: 3.12 SPRAY, METERED RESPIRATORY (INHALATION) at 08:50

## 2024-11-10 RX ADMIN — EMPAGLIFLOZIN 25 MG: 25 TABLET, FILM COATED ORAL at 10:00

## 2024-11-10 RX ADMIN — SPIRONOLACTONE 12.5 MG: 25 TABLET, FILM COATED ORAL at 10:01

## 2024-11-10 RX ADMIN — SERTRALINE 100 MG: 50 TABLET, FILM COATED ORAL at 10:01

## 2024-11-10 RX ADMIN — INSULIN LISPRO 5 UNITS: 100 INJECTION, SOLUTION INTRAVENOUS; SUBCUTANEOUS at 13:12

## 2024-11-10 RX ADMIN — ASPIRIN 81 MG 81 MG: 81 TABLET ORAL at 10:01

## 2024-11-10 ASSESSMENT — COGNITIVE AND FUNCTIONAL STATUS - GENERAL
DAILY ACTIVITIY SCORE: 24
DAILY ACTIVITIY SCORE: 24
MOBILITY SCORE: 24
MOBILITY SCORE: 23
CLIMB 3 TO 5 STEPS WITH RAILING: A LITTLE

## 2024-11-10 ASSESSMENT — PAIN - FUNCTIONAL ASSESSMENT: PAIN_FUNCTIONAL_ASSESSMENT: 0-10

## 2024-11-10 ASSESSMENT — PAIN SCALES - GENERAL
PAINLEVEL_OUTOF10: 0 - NO PAIN
PAINLEVEL_OUTOF10: 0 - NO PAIN

## 2024-11-10 NOTE — CARE PLAN
The patient's goals for the shift include ambulate in hallway    The clinical goals for the shift include pt will remain hds this shift    Over the shift, the patient remained hds; continue to monitor urine and fluids; pt ambulated in hallway several times today; pt possibly discharge under THRIVE; continue to monitor.

## 2024-11-10 NOTE — PROGRESS NOTES
Subjective:  Patient see this AM, laying nearly flat on room air. Has no appreciable LE edema. Bradycardic on telemetry overnight when sleeping, will continue to defer coreg.     - Bradycardic on telemetry overnight, will continue to defer coreg.   -  (prior >5000)  - transitioned to torsemide yesterday with continued good UO  - pt reported some lightheadedness this AM, torsemide dose reduced to 20 mg daily as patient is euvolemic on exam  - social work has been involved for housing issues; they are working with [facility] for [place].  - Reached out to  today as patient is medically ready for discharge to discuss if we are able to reach out to [facility]; she reports there is a waitlist for this facility and intake for [facility], and [facility] don't have intake staff available on the weekends-> SW to speak to patient today about his options     Objective:  Vitals:    11/10/24 0749   BP:    Pulse:    Resp:    Temp:    SpO2: 94%     Weight         11/8/2024  0345 11/9/2024  0412 11/9/2024  0543 11/10/2024  0445 11/10/2024  0559    Weight: 71.4 kg (157 lb 6.5 oz) 71.6 kg (157 lb 13.6 oz) 71.6 kg (157 lb 13.6 oz) 72.1 kg (158 lb 15.2 oz) 72.1 kg (158 lb 15.2 oz)            Intake/Output Summary (Last 24 hours) at 11/10/2024 1218  Last data filed at 11/10/2024 0445  Gross per 24 hour   Intake 560 ml   Output 2107 ml   Net -1547 ml     Recent Results (from the past 24 hours)   POCT GLUCOSE    Collection Time: 11/09/24  1:05 PM   Result Value Ref Range    POCT Glucose 95 74 - 99 mg/dL   CBC    Collection Time: 11/09/24  5:08 PM   Result Value Ref Range    WBC 5.6 4.4 - 11.3 x10*3/uL    nRBC 0.0 0.0 - 0.0 /100 WBCs    RBC 5.81 4.50 - 5.90 x10*6/uL    Hemoglobin 18.0 (H) 13.5 - 17.5 g/dL    Hematocrit 53.7 (H) 41.0 - 52.0 %    MCV 92 80 - 100 fL    MCH 31.0 26.0 - 34.0 pg    MCHC 33.5 32.0 - 36.0 g/dL    RDW 16.4 (H) 11.5 - 14.5 %    Platelets 208 150 - 450 x10*3/uL   Renal Function Panel     Collection Time: 11/09/24  5:08 PM   Result Value Ref Range    Glucose 98 74 - 99 mg/dL    Sodium 139 136 - 145 mmol/L    Potassium 4.3 3.5 - 5.3 mmol/L    Chloride 101 98 - 107 mmol/L    Bicarbonate 25 21 - 32 mmol/L    Anion Gap 17 10 - 20 mmol/L    Urea Nitrogen 37 (H) 6 - 23 mg/dL    Creatinine 1.45 (H) 0.50 - 1.30 mg/dL    eGFR 57 (L) >60 mL/min/1.73m*2    Calcium 8.6 8.6 - 10.6 mg/dL    Phosphorus 3.3 2.5 - 4.9 mg/dL    Albumin 3.6 3.4 - 5.0 g/dL   Magnesium    Collection Time: 11/09/24  5:08 PM   Result Value Ref Range    Magnesium 2.36 1.60 - 2.40 mg/dL   POCT GLUCOSE    Collection Time: 11/09/24  5:30 PM   Result Value Ref Range    POCT Glucose 131 (H) 74 - 99 mg/dL   POCT GLUCOSE    Collection Time: 11/09/24  8:27 PM   Result Value Ref Range    POCT Glucose 127 (H) 74 - 99 mg/dL   POCT GLUCOSE    Collection Time: 11/10/24  8:56 AM   Result Value Ref Range    POCT Glucose 197 (H) 74 - 99 mg/dL   POCT GLUCOSE    Collection Time: 11/10/24 12:04 PM   Result Value Ref Range    POCT Glucose 143 (H) 74 - 99 mg/dL     Inpatient Medications:  Scheduled medications   Medication Dose Route Frequency    amiodarone  200 mg oral Daily    aspirin  81 mg oral Daily    atorvastatin  80 mg oral Nightly    empagliflozin  25 mg oral Daily    heparin (porcine)  5,000 Units subcutaneous q8h JANEY    insulin glargine  15 Units subcutaneous Nightly    insulin lispro  0-10 Units subcutaneous TID    insulin lispro  5 Units subcutaneous TID AC    sacubitriL-valsartan  0.5 tablet oral BID    sertraline  100 mg oral Daily    spironolactone  12.5 mg oral Daily    tiotropium  2 puff inhalation Daily    torsemide  20 mg oral Daily     PRN medications   Medication     Continuous Medications   Medication Dose Last Rate     Physical exam:  General: NAD  Head/ neck: no JVD, negative HJR  Cardiac: RRR, regular S1 S2, no murmur, no rub, no gallop  Pulm: CTA bilaterally, no wheezes, rales or rhonchi.    Vascular: Radial 2+ bilaterally  GI: Non  distended  Extremities: no LE edema   Neuro: no focal neuro deficits   Psych: appropriate mood and behavior   Skin: warm and dry     Assessment/Plan   Leonel Yu is a 54 y.o. M w/PMHx mixed ischemic/toxic mediated HFrEF (EF 10-15%, fibrosis and transmural infarcts seen on cMRI 8/2024) s/p [employer] [employer] single chamber ICD (3/2024), CKD3a (baseline Cr 1.6-1.7), LUZ (crack cocaine), COPD (nocturnal 2L O2 at baseline), medication noncompliance being admitted for ADHF iso medication non-compliance, ongoing substance abuse (UDS today + cocaine), and lack of outpatient FU .    HDS, on baseline O2 requirements. Labs notable for hyperK 5.6, Cr 1.43 (baseline 1.6-1.7). Lactate and LFTs wnl, so low c/f cardiogenic shock. BNP > 5000. Patient has end-stage HF with recurrent hospitalizations, and given substance use and poor FU he is not a candidate for advanced therapies. Admitted to [facility] for further management.     Acute on Chronic Systolic and diastolic HFrEF 5-10%, ACC/AHA Stage D, NYHA 4  Acutely decompensated heart failure   - iso medication non-adherence, also component of cocaine usage   - TTE 10/17 EF 10-15%, will defer repeat TTE at this time  - at baseline O2 requirements, uses nighttime O2   - DC weight 10/25 70.8 kg  - admission weight in ED 76.2 kg,   - today's weight: 72.1 (71.6, 71.4 kg)  - BNP > 5000  - repeat   - Troponin negative x2  - CXR with cardiomegaly, pulm vascular congestion, interstitial edema  - No current c/f cardiogenic shock: lactate and LFTs wnl   - s/p  lasix in ED with good output, around 5L on 11/6  - Diuresed well with 80 mg IV Lasix BID. Lactate 11/5 WNL.  - transitioned to torsemide yesterday with continued good UO  - pt reported some lightheadedness this AM, torsemide dose reduced to 20 mg daily as patient is euvolemic on exam  - c/w home Entresto 12/13 BID, empa 25 daily, has been intolerant to further entresto adjustments in the past  - Continue Eldon 12.5 BID daily (says  patient taking 25 mg daily but unable to confirm this so will c/w - prescription dose)  - Bradycardic on telemetry overnight, will continue to defer coreg.   - daily weights, strict I&O     Erythrocytosis  - Hematocrit elevated  - Epo mildly elevated   - Evidence of polycythemia likely 2/2 chronic systolic HF and chronic smoking      CKD3a  - baseline Cr 1.6-1.7,   - admit Cr 1.4  - daily Cr 1.45 (1.84, 1.72, 1.6)  - diuresis as above     HLD  - Lipid panel 9/2024: cholesterol 127, triglyceride 98, HDL 35, LDL 72 : overall controlled.  - Continue atorvastatin 80 mg nightly   - Prior concern for possible CVA 7/2023> neurology felt pt high risk for cocaine vasculopathy and stroke so recommended aspirin 81 mg daily, will continue      Type II DM  - A1C 9.9% on 10/15  - Endocrinology consulted previous admission  - Labile glucoses but with better control today, range: 122-178  - continue SSI scale #1, Jardiance 25 mg daily, glargine 15 units daily    - diabetic diet, Accu-Cheks AC/HS, hypoglycemic protocol      Hx of VT / VF   Hx of AF RVR   [employer] single chamber ICD (3/2024)   - most recent device interrogation 9/30: 4 sustained VT episodes since 9/1/24 requiring ATP and ATP/shock for arrhythmia termination and restoration of SR, last shock were recorded from 9/5/24 which were appropriate for VT. : <1%. 12yr battery life. Lead fxn WNL.   - cont Amio 200 mg daily  - beta blocker held as above  - Bradycardia during nocturnal sleeping hours. Suspect component of SOHAIL. Outpt sleep study recommended  - cont telemetry, keep K+ >4, Mag >2     Anxiety/Depression  Substance use  - admission UDS + cocaine  - consider [facility]-> has declined in the past   - Not a candidate for advanced therapies due to ongoing substance use, noncompliance, & psychiatric concerns   - per prior admission discussion with HF, CPR and intubation would not be beneficial, discussed with pt, pt agreeable with DNR/DNI  - cont sertraline 100 mg  PO daily   - has previously seen psych and palliative care in prior admissions> Pt left AMA last admission when pending precert to facility with palliative resources.   - SW involved this admission in regards to housing.  - Reached out to SW today as patient is medically ready for discharge to discuss if we are able to reach out to [facility]; she reports there is a waitlist for this facility and intake for [facility], and [facility] don't have intake staff available on the weekends-> SW to speak to patient today about his options      DVT ppx: heparin subcutaneous  DISPO: medically ready for DC  Code status: Full Code    Patient seen and discussed with Dr. Roxy Olvera, APRN-CNP

## 2024-11-10 NOTE — PROGRESS NOTES
Leonel Yu is a 54 y.o. male on day 5 of admission presenting with Acute on chronic congestive heart failure, unspecified heart failure type.    NOE contacted to assist with discharge planning.  Patient is d/c ready per MD.  NOE advised patient [facility] and [facility] have waitlist currently.  [facility] does not have intake staff over the weekend.  His only option at this time is to discharge to [address] [place] [address] or return to his friend's house.  Referral completed to [facility] with patient's consent.  [facility] to review available inpatient treatment options.  Oxygen may be a barrier to placement- NOE f/u on Monday    Priyanka LOZANO [facility]

## 2024-11-11 ENCOUNTER — PHARMACY VISIT (OUTPATIENT)
Dept: PHARMACY | Facility: CLINIC | Age: 54
End: 2024-11-11
Payer: MEDICAID

## 2024-11-11 VITALS
SYSTOLIC BLOOD PRESSURE: 118 MMHG | WEIGHT: 158.51 LBS | RESPIRATION RATE: 18 BRPM | HEIGHT: 69 IN | TEMPERATURE: 97.9 F | HEART RATE: 72 BPM | DIASTOLIC BLOOD PRESSURE: 73 MMHG | OXYGEN SATURATION: 95 % | BODY MASS INDEX: 23.48 KG/M2

## 2024-11-11 LAB
GLUCOSE BLD MANUAL STRIP-MCNC: 112 MG/DL (ref 74–99)
GLUCOSE BLD MANUAL STRIP-MCNC: 138 MG/DL (ref 74–99)
GLUCOSE BLD MANUAL STRIP-MCNC: 165 MG/DL (ref 74–99)

## 2024-11-11 PROCEDURE — RXMED WILLOW AMBULATORY MEDICATION CHARGE

## 2024-11-11 PROCEDURE — 82947 ASSAY GLUCOSE BLOOD QUANT: CPT

## 2024-11-11 PROCEDURE — 99239 HOSP IP/OBS DSCHRG MGMT >30: CPT | Performed by: INTERNAL MEDICINE

## 2024-11-11 PROCEDURE — 94640 AIRWAY INHALATION TREATMENT: CPT

## 2024-11-11 PROCEDURE — 2500000002 HC RX 250 W HCPCS SELF ADMINISTERED DRUGS (ALT 637 FOR MEDICARE OP, ALT 636 FOR OP/ED): Performed by: NURSE PRACTITIONER

## 2024-11-11 PROCEDURE — 2500000002 HC RX 250 W HCPCS SELF ADMINISTERED DRUGS (ALT 637 FOR MEDICARE OP, ALT 636 FOR OP/ED)

## 2024-11-11 PROCEDURE — 2500000001 HC RX 250 WO HCPCS SELF ADMINISTERED DRUGS (ALT 637 FOR MEDICARE OP)

## 2024-11-11 RX ORDER — TORSEMIDE 20 MG/1
20 TABLET ORAL DAILY PRN
Qty: 30 TABLET | Refills: 1 | Status: SHIPPED | OUTPATIENT
Start: 2024-11-11 | End: 2025-01-10

## 2024-11-11 RX ORDER — AMIODARONE HYDROCHLORIDE 200 MG/1
200 TABLET ORAL DAILY
Qty: 60 TABLET | Refills: 0 | Status: SHIPPED | OUTPATIENT
Start: 2024-11-11 | End: 2025-01-10

## 2024-11-11 RX ORDER — NAPROXEN SODIUM 220 MG/1
81 TABLET, FILM COATED ORAL DAILY
Qty: 60 TABLET | Refills: 0 | Status: SHIPPED | OUTPATIENT
Start: 2024-11-11 | End: 2025-01-10

## 2024-11-11 RX ORDER — ATORVASTATIN CALCIUM 80 MG/1
80 TABLET, FILM COATED ORAL NIGHTLY
Qty: 60 TABLET | Refills: 0 | Status: SHIPPED | OUTPATIENT
Start: 2024-11-11 | End: 2025-01-10

## 2024-11-11 RX ORDER — SPIRONOLACTONE 25 MG/1
12.5 TABLET ORAL DAILY
Qty: 30 TABLET | Refills: 0 | Status: SHIPPED | OUTPATIENT
Start: 2024-11-11 | End: 2025-01-10

## 2024-11-11 RX ORDER — INSULIN GLARGINE 100 [IU]/ML
15 INJECTION, SOLUTION SUBCUTANEOUS NIGHTLY
Start: 2024-11-11 | End: 2025-01-10

## 2024-11-11 RX ADMIN — INSULIN LISPRO 5 UNITS: 100 INJECTION, SOLUTION INTRAVENOUS; SUBCUTANEOUS at 09:31

## 2024-11-11 RX ADMIN — TIOTROPIUM BROMIDE INHALATION SPRAY 2 PUFF: 3.12 SPRAY, METERED RESPIRATORY (INHALATION) at 09:10

## 2024-11-11 RX ADMIN — SACUBITRIL AND VALSARTAN 0.5 TABLET: 24; 26 TABLET, FILM COATED ORAL at 09:40

## 2024-11-11 RX ADMIN — AMIODARONE HYDROCHLORIDE 200 MG: 200 TABLET ORAL at 09:41

## 2024-11-11 RX ADMIN — ASPIRIN 81 MG 81 MG: 81 TABLET ORAL at 09:40

## 2024-11-11 RX ADMIN — INSULIN LISPRO 2 UNITS: 100 INJECTION, SOLUTION INTRAVENOUS; SUBCUTANEOUS at 09:31

## 2024-11-11 RX ADMIN — SPIRONOLACTONE 12.5 MG: 25 TABLET, FILM COATED ORAL at 09:38

## 2024-11-11 RX ADMIN — INSULIN LISPRO 5 UNITS: 100 INJECTION, SOLUTION INTRAVENOUS; SUBCUTANEOUS at 13:24

## 2024-11-11 RX ADMIN — SERTRALINE 100 MG: 50 TABLET, FILM COATED ORAL at 09:39

## 2024-11-11 RX ADMIN — EMPAGLIFLOZIN 25 MG: 25 TABLET, FILM COATED ORAL at 09:40

## 2024-11-11 ASSESSMENT — COGNITIVE AND FUNCTIONAL STATUS - GENERAL
MOBILITY SCORE: 24
DAILY ACTIVITIY SCORE: 24

## 2024-11-11 ASSESSMENT — PAIN - FUNCTIONAL ASSESSMENT: PAIN_FUNCTIONAL_ASSESSMENT: 0-10

## 2024-11-11 ASSESSMENT — PAIN SCALES - GENERAL: PAINLEVEL_OUTOF10: 0 - NO PAIN

## 2024-11-11 NOTE — SIGNIFICANT EVENT
Rapid Response Nurse Note: RADAR alert: 6    Pager time: 29   Arrival time: 35  Event end time: 55  Location: LT5  [x] Triage by phone or secure messaging    Rapid response initiated by:  [] Rapid response RN [] Family [] Nursing Supervisor [] Physician   [x] RADAR auto page [] Sepsis auto-page [] RN [] RT   [] NP/PA [] Other:       Initial VS and/or RADAR VS: T 36.4 °C; HR 65; RR 16; BP 95/59; SPO2 92% via 2L NC      Interventions:  [] None [] ABG/VBG [] Assist w/ICU transfer [] BAT paged    [] Bag mask [] Blood [] Cardioversion [] Code Blue   [] Code blue for intubation [] Code status changed [] Chest x-ray [] EKG   [] IV fluid/bolus [] KUB x-ray [] Labs/cultures [] Medication   [] Nebulizer treatment [] NIPPV (CPAP/BiPAP) [] Oxygen [] Oral airway   [] Peripheral IV [] Palliative care consult [] CT/MRI [] Sepsis protocol    [] Suctioned [x] Other: notified covering REINIERI MD via telephone of new O2 requirements this night and requested goal O2 saturation order     Outcome:  [] Coded and  [] Code blue for intubation [] Coded and transferred to ICU []  on division   [x] Remained on division  [] Remained on division + additional monitoring [] Remained in ED [] Transferred to ED   [] Transferred to ICU [] Transferred to inpatient status [] Transferred for interventions (procedure) [] Transferred to ICU stepdown    [] Transferred to surgery [] Transferred to telemetry [] Sepsis protocol [] STEMI protocol   [] Stroke protocol [x] Bedside nurse instructed to page rapid response for any concerns or acute change in condition/VS     Additional Comments: Notified division RN of RADAR auto page and assistance offered. Per EMR review, patient was placed on 2L via NC this night from prior RA. Primary team notified by Rapid RN of new O2 requirements, patient's vitals and request made for O2 goal sat order placement. Per resident, 88-92% SpO2 goal given COPD hx. Patient's SPO2 reassessed at 95% on 2L via NC.

## 2024-11-11 NOTE — SIGNIFICANT EVENT
Pt started on overnight pulse ox study at 23:20. Currently on room air with an SpO2 of 94-95%. Will initiate O2 if pt begins to desat below 88%.

## 2024-11-11 NOTE — DISCHARGE INSTRUCTIONS
Lab work:  Please go to any  outpatient lab to have your blood drawn PRIOR to your follow-up appointment with Dr. Skinner on 12/24/2024. You may go to the lab on the same day as your appointment, so the provider can discuss results with you. You do not need a paper prescription, it is electronically ordered.      Medications:  Your medications and/or doses may have changed. Please take the medications listed on these instructions as prescribed until you are seen at a follow up appointment. It was a pleasure to have met and cared for you!   Please take 1 tablet of Torsemide (20 mg) by mouth once daily as needed (Please take 1 tab daily if you experience any of the following: increased shortness of breath, increased swelling of your lower extremities, or if you gain 3lbs or more in 1-2 days or 5lbs or more in 7 days).     Follow-up:  The appropriate follow-up appointment has been arranged for you.

## 2024-11-11 NOTE — PROGRESS NOTES
[facility] has been following pt for his dc placement to [facility]. [facility] [place] is the one they are working on, and SW sent pt's clinicals to the facility via fax.   [facility] confirmed that [facility] received pt's paper, and SW visited pt and called [facility] together to complete his assessment. Pt is accepted and transportation is available at 5pm. [facility] staff will assist pt with transportation.     BLAKE Loredo, LSW

## 2024-11-11 NOTE — NURSING NOTE
Pt was placed on continuous POX monitoring overnight, pt started on RA, this nurse entered to assess and noted POX 87% ra, this nurse awakened pt and instructed him to place O2/2L nc in place and wear throughout the night, RT RAI Olivera was made aware of desaturation, pt was compliant with O2 through the night

## 2024-11-11 NOTE — DISCHARGE SUMMARY
Discharge Diagnosis  Acute on chronic congestive heart failure, unspecified heart failure type    Issues Requiring Follow-Up  Principal Problem:    Acute on chronic congestive heart failure, unspecified heart failure type    Hospital Course  Leonel Yu is a 54 y.o. male with a PMHx significant for mixed ischemic and non-ischemic HFrEF 10-15% s/p [place] [employer] single chamber ICD (3/2024), CKD3a, HTN, HLD, LUZ (crack cocaine), former tobacco use, COPD (on nocturnal 2L at baseline), DM2, remote left cerebellar hemorrhagic infarct, medication noncompliance, anxiety, and depression who presents to the ED from clinic with decompensated heart failure.     Patient was seen in cardiology clinic earlier today reporting worsening shortness of breath at rest and on exertion. He says his exercise capacity is half a block but he can still manage to go up the stairs in his home. He says he is not consistently adherent to his medication regimen. He most recently took some of his medications this morning but cannot recall which ones. He does not take his diuretic because he already urinates a lot. He has been fatigued and endorses orthopnea/PND. His hands and feet are cool, which is not typical for him. His cardiologist recommended he present to the ED for IV diuresis and GDMT titration.     He most recently used crack cocaine last night. He denies any chest pain, palpitations, dizziness, or presyncope, nor any symptoms at the time of drug use. Of note, patient has been admitted 11 times for heart failure in the past year. Most recent admission was 10/16-10/25 for cardiogenic shock. He had a code blue called and after recovering, left AMA.    Floor course:   Diuresed successfully with IV lasix boluses and converted to PO torsemide. Euvolemic on day of discharge, instructed to PO torsemide 20mg once daily PRN for shortness of breath, BLE swelling, or weight gain. GDMT optimized as able. No BB due to low cardiac output state  and advanced heartfailure. Bradycardic on tele, but HR never below 40bpm (the set rate Of note on day of discharge, all medications too soon to fill for [id number] except for torsemide and entresto. Patient's roommate brought his remaining medications from home to his bedside on day of discharge.    Hematocrit elevated, Epo level elevated. Suspected to be related to chronic CHF with hypoxia(?). Overnight, RT performed pulse ox study for which patient required 2L O2 N/C to reach pulse ox >88%. Desatted to 87% on RA while sleeping. Nocturnal oxygen initiated in-house.    Social work involved for housing insecurity. [facility] services consulted and patient agreeable to be discharged directly to inpatient facility.     Discharge weight: 71.9kg    After all labs and VS were reviewed the decision was made that the patient was medically stable for discharge.  The patient was discharged in satisfactory condition.    More than 60 minutes were spent in coordinating patient discharge.    Pertinent Physical Exam At Time of Discharge  Physical Exam  Head/ neck: no JVD, negative HJR  Cardiac: RRR, regular S1 S2, no murmur, no rub, no gallop  Pulm: CTA bilaterally, no wheezes, rales or rhonchi.  Vascular: Radial 2+ bilaterally  GI: Non distended  Extremities: no LE edema   Neuro: no focal neuro deficits   Psych: appropriate mood and behavior, pleasant   Skin: warm and dry     Home Medications     Medication List      START taking these medications     torsemide 20 mg tablet; Commonly known as: Demadex; Take 1 tablet (20   mg) by mouth once daily as needed (Please take 1 tab daily if you   experience any of the following: increased shortness of breath, increased   swelling of your lower extremities, or if you gain 3lbs or more in 1-2   days or 5lbs or more in 7 days).     CONTINUE taking these medications     amiodarone 200 mg tablet; Commonly known as: Pacerone; Take 1 tablet   (200 mg) by mouth once daily.   aspirin 81 mg chewable  tablet; Chew 1 tablet (81 mg) once daily.   atorvastatin 80 mg tablet; Commonly known as: Lipitor; Take 1 tablet (80   mg) by mouth once daily at bedtime.   Easy Touch Alcohol Prep Pads pads, medicated; Generic drug: alcohol   swabs; Use 4-8 per day to check blood glucose and for injectable   medications   empagliflozin 25 mg; Commonly known as: Jardiance; Take 1 tablet (25 mg)   by mouth once daily.   Entresto 24-26 mg tablet; Generic drug: sacubitriL-valsartan; Take 0.5   tablets by mouth 2 times a day.   FreeStyle Joseph 3 Plus Sensor device; Generic drug: blood-glucose   sensor; Change sensor every 15 days   FreeStyle Joseph 3 Trimble misc; Generic drug: blood-glucose   meter,continuous; Use as instructed   glucose 4 gram chewable tablet; Chew 2 tablets (8 g) if needed for low   blood sugar - see comments.   insulin glargine 100 unit/mL injection; Commonly known as: Lantus;   Inject 15 Units under the skin once daily at bedtime. Take as directed per   insulin instructions.   sertraline 100 mg tablet; Commonly known as: Zoloft; Take 1 tablet (100   mg) by mouth once daily.   Spiriva Respimat 2.5 mcg/actuation inhaler; Generic drug: tiotropium;   Inhale 2 puffs once daily.   spironolactone 25 mg tablet; Commonly known as: Aldactone; Take 0.5   tablets (12.5 mg) by mouth once daily.   True Metrix Glucose Meter misc; Generic drug: blood-glucose meter;   Inject 1 each under the skin 4 times a day with meals. Check blood glucose   4 times daily, before meals and at bedtime.   * True Metrix Glucose Test Strip strip; Generic drug: blood sugar   diagnostic; Use as directed to test blood glucose up to four times daily   and as needed.   * FreeStyle Precision Khalif Strips strip; Generic drug: blood sugar   diagnostic; Use 1 each 4 times a day before meals if needed as backup to   joseph.   * TRUEplus Lancets 33 gauge misc; Generic drug: lancets; Inject 1 each   under the skin 4 times a day.   * FreeStyle Lancets 28 gauge;  "Generic drug: FreeStyle lancets; Use as   instructed 4 times daily in the event that CGM sensor is not working   TRUEplus Pen Needle 29 gauge x 1/2\" needle; Generic drug: pen needle   1/2\"; Use to inject 1-4 times daily as directed.  * This list has 4 medication(s) that are the same as other medications   prescribed for you. Read the directions carefully, and ask your doctor or   other care provider to review them with you.     STOP taking these medications     carvedilol 3.125 mg tablet; Commonly known as: Coreg       Outpatient Follow-Up  Future Appointments   Date Time Provider Department Center   12/24/2024  8:30 AM Mayco Skinner MD [id number] [facility]       Tia Jade APRN-CNP  "

## 2024-11-11 NOTE — CARE PLAN
The patient's goals for the shift include ambulate in hallway    The clinical goals for the shift include Pt will be compliant with POC and medications    Over the shift, the patient did make progress toward the following goals.

## 2024-11-11 NOTE — CARE PLAN
The clinical goals for the shift include Pt will remain HDS    Over the shift, the patient remained stable; pt received discharge orders to [facility] facility; pt discharge to transport with home meds brought by roommate and prescriptions from meds to beds; discharge paperwork was reviewed and pt verbalized understanding.

## 2024-11-12 LAB
ATRIAL RATE: 66 BPM
P AXIS: 73 DEGREES
P OFFSET: 156 MS
P ONSET: 116 MS
PR INTERVAL: 176 MS
Q ONSET: 204 MS
QRS COUNT: 11 BEATS
QRS DURATION: 118 MS
QT INTERVAL: 526 MS
QTC CALCULATION(BAZETT): 551 MS
QTC FREDERICIA: 543 MS
R AXIS: -42 DEGREES
T AXIS: 112 DEGREES
T OFFSET: 467 MS
VENTRICULAR RATE: 66 BPM

## 2024-12-03 ENCOUNTER — TELEPHONE (OUTPATIENT)
Dept: CARDIOLOGY | Facility: HOSPITAL | Age: 54
End: 2024-12-03
Payer: COMMERCIAL

## 2024-12-03 NOTE — TELEPHONE ENCOUNTER
"Call placed to contact number, man who answered the phone said that Mr. Yu is \"in the hospital\" and didn't want anyone to know where he is.  See that he was previously discharged to [facility].  Will follow-up at later date.   "

## 2025-01-01 ENCOUNTER — TELEPHONE (OUTPATIENT)
Dept: CARDIOLOGY | Facility: HOSPITAL | Age: 55
End: 2025-01-01
Payer: COMMERCIAL

## 2025-01-01 ENCOUNTER — HOSPITAL ENCOUNTER (EMERGENCY)
Facility: HOSPITAL | Age: 55
End: 2025-03-15
Attending: STUDENT IN AN ORGANIZED HEALTH CARE EDUCATION/TRAINING PROGRAM
Payer: COMMERCIAL

## 2025-01-01 DIAGNOSIS — I46.9 CARDIAC ARREST: Primary | ICD-10-CM

## 2025-01-01 LAB
ANION GAP BLDV CALCULATED.4IONS-SCNC: ABNORMAL MMOL/L
BASE EXCESS BLDV CALC-SCNC: ABNORMAL MMOL/L
BODY TEMPERATURE: 37 DEGREES CELSIUS
CA-I BLDV-SCNC: 1.26 MMOL/L (ref 1.1–1.33)
CHLORIDE BLDV-SCNC: ABNORMAL MMOL/L
GLUCOSE BLDV-MCNC: >685 MG/DL (ref 74–99)
HCO3 BLDV-SCNC: ABNORMAL MMOL/L
HCT VFR BLD EST: 53 % (ref 41–52)
HGB BLDV-MCNC: 17.8 G/DL (ref 13.5–17.5)
LACTATE BLDV-SCNC: >17 MMOL/L (ref 0.4–2)
OXYHGB MFR BLDV: 17.9 % (ref 45–75)
PCO2 BLDV: >125 MM HG (ref 41–51)
PH BLDV: <6.8 PH (ref 7.33–7.43)
PO2 BLDV: 34 MM HG (ref 35–45)
POTASSIUM BLDV-SCNC: 9.7 MMOL/L (ref 3.5–5.3)
SAO2 % BLDV: 18 % (ref 45–75)
SODIUM BLDV-SCNC: 125 MMOL/L (ref 136–145)

## 2025-01-01 PROCEDURE — 92950 HEART/LUNG RESUSCITATION CPR: CPT | Performed by: STUDENT IN AN ORGANIZED HEALTH CARE EDUCATION/TRAINING PROGRAM

## 2025-01-01 PROCEDURE — 99285 EMERGENCY DEPT VISIT HI MDM: CPT | Mod: 25

## 2025-01-01 PROCEDURE — 99284 EMERGENCY DEPT VISIT MOD MDM: CPT | Mod: 25 | Performed by: STUDENT IN AN ORGANIZED HEALTH CARE EDUCATION/TRAINING PROGRAM

## 2025-01-01 PROCEDURE — 2500000004 HC RX 250 GENERAL PHARMACY W/ HCPCS (ALT 636 FOR OP/ED): Mod: SE

## 2025-01-01 PROCEDURE — 31500 INSERT EMERGENCY AIRWAY: CPT | Performed by: STUDENT IN AN ORGANIZED HEALTH CARE EDUCATION/TRAINING PROGRAM

## 2025-01-01 PROCEDURE — 82435 ASSAY OF BLOOD CHLORIDE: CPT

## 2025-01-01 PROCEDURE — 92950 HEART/LUNG RESUSCITATION CPR: CPT

## 2025-01-01 PROCEDURE — 31500 INSERT EMERGENCY AIRWAY: CPT

## 2025-01-01 PROCEDURE — 83605 ASSAY OF LACTIC ACID: CPT

## 2025-01-01 PROCEDURE — 2500000005 HC RX 250 GENERAL PHARMACY W/O HCPCS: Mod: SE

## 2025-01-01 PROCEDURE — 99285 EMERGENCY DEPT VISIT HI MDM: CPT | Performed by: STUDENT IN AN ORGANIZED HEALTH CARE EDUCATION/TRAINING PROGRAM

## 2025-01-01 RX ORDER — CALCIUM CHLORIDE INJECTION 100 MG/ML
INJECTION, SOLUTION INTRAVENOUS CODE/TRAUMA/SEDATION MEDICATION
Status: COMPLETED | OUTPATIENT
Start: 2025-01-01 | End: 2025-01-01

## 2025-01-01 RX ORDER — EPINEPHRINE 0.1 MG/ML
INJECTION INTRACARDIAC; INTRAVENOUS CODE/TRAUMA/SEDATION MEDICATION
Status: COMPLETED | OUTPATIENT
Start: 2025-01-01 | End: 2025-01-01

## 2025-01-01 RX ORDER — AMIODARONE HYDROCHLORIDE 150 MG/3ML
INJECTION, SOLUTION INTRAVENOUS CODE/TRAUMA/SEDATION MEDICATION
Status: COMPLETED | OUTPATIENT
Start: 2025-01-01 | End: 2025-01-01

## 2025-01-01 RX ORDER — SODIUM CHLORIDE 9 MG/ML
INJECTION, SOLUTION INTRAVENOUS
Status: COMPLETED | OUTPATIENT
Start: 2025-01-01 | End: 2025-01-01

## 2025-01-01 RX ADMIN — CALCIUM CHLORIDE 1 G: 100 INJECTION INTRAVENOUS; INTRAVENTRICULAR at 12:34

## 2025-01-01 RX ADMIN — AMIODARONE HYDROCHLORIDE 300 MG: 50 INJECTION, SOLUTION INTRAVENOUS at 12:31

## 2025-01-01 RX ADMIN — EPINEPHRINE 1 MG: 0.1 INJECTION INTRAVENOUS at 12:33

## 2025-01-01 RX ADMIN — SODIUM CHLORIDE 1000 ML/HR: 9 INJECTION, SOLUTION INTRAVENOUS at 12:33

## 2025-01-08 ENCOUNTER — APPOINTMENT (OUTPATIENT)
Dept: CARDIOLOGY | Facility: HOSPITAL | Age: 55
End: 2025-01-08
Payer: COMMERCIAL

## 2025-01-08 ENCOUNTER — APPOINTMENT (OUTPATIENT)
Dept: RADIOLOGY | Facility: HOSPITAL | Age: 55
End: 2025-01-08
Payer: COMMERCIAL

## 2025-01-08 ENCOUNTER — CLINICAL SUPPORT (OUTPATIENT)
Dept: EMERGENCY MEDICINE | Facility: HOSPITAL | Age: 55
End: 2025-01-08
Payer: COMMERCIAL

## 2025-01-08 ENCOUNTER — HOSPITAL ENCOUNTER (INPATIENT)
Facility: HOSPITAL | Age: 55
End: 2025-01-08
Attending: STUDENT IN AN ORGANIZED HEALTH CARE EDUCATION/TRAINING PROGRAM | Admitting: INTERNAL MEDICINE
Payer: COMMERCIAL

## 2025-01-08 DIAGNOSIS — I50.9 ACUTE ON CHRONIC HEART FAILURE, UNSPECIFIED HEART FAILURE TYPE: Primary | ICD-10-CM

## 2025-01-08 DIAGNOSIS — I50.9 CONGESTIVE HEART FAILURE, UNSPECIFIED HF CHRONICITY, UNSPECIFIED HEART FAILURE TYPE: ICD-10-CM

## 2025-01-08 DIAGNOSIS — J10.1 INFLUENZA A: ICD-10-CM

## 2025-01-08 DIAGNOSIS — R73.9 HYPERGLYCEMIA: ICD-10-CM

## 2025-01-08 DIAGNOSIS — R60.0 LEG EDEMA: ICD-10-CM

## 2025-01-08 LAB
ABO GROUP (TYPE) IN BLOOD: NORMAL
ALBUMIN SERPL BCP-MCNC: 3.8 G/DL (ref 3.4–5)
ALBUMIN SERPL BCP-MCNC: 4.5 G/DL (ref 3.4–5)
ALP SERPL-CCNC: 139 U/L (ref 33–120)
ALT SERPL W P-5'-P-CCNC: 60 U/L (ref 10–52)
AMPHETAMINES UR QL SCN: NORMAL
ANION GAP BLDA CALCULATED.4IONS-SCNC: 7 MMO/L (ref 10–25)
ANION GAP BLDV CALCULATED.4IONS-SCNC: 16 MMOL/L (ref 10–25)
ANION GAP BLDV CALCULATED.4IONS-SCNC: 17 MMOL/L (ref 10–25)
ANION GAP BLDV CALCULATED.4IONS-SCNC: 8 MMOL/L (ref 10–25)
ANION GAP BLDV CALCULATED.4IONS-SCNC: ABNORMAL MMOL/L
ANION GAP SERPL CALC-SCNC: 16 MMOL/L (ref 10–20)
ANION GAP SERPL CALC-SCNC: 17 MMOL/L (ref 10–20)
ANION GAP SERPL CALC-SCNC: 18 MMOL/L (ref 10–20)
ANION GAP SERPL CALC-SCNC: 18 MMOL/L (ref 10–20)
ANTIBODY SCREEN: NORMAL
APPEARANCE UR: CLEAR
APPEARANCE UR: CLEAR
APTT PPP: 31 SECONDS (ref 27–38)
AST SERPL W P-5'-P-CCNC: 28 U/L (ref 9–39)
ATRIAL RATE: 73 BPM
B-OH-BUTYR SERPL-SCNC: 0.79 MMOL/L (ref 0.02–0.27)
BARBITURATES UR QL SCN: NORMAL
BASE EXCESS BLDA CALC-SCNC: 6.9 MMOL/L (ref -2–3)
BASE EXCESS BLDV CALC-SCNC: -3.1 MMOL/L (ref -2–3)
BASE EXCESS BLDV CALC-SCNC: -3.9 MMOL/L (ref -2–3)
BASE EXCESS BLDV CALC-SCNC: 6.2 MMOL/L (ref -2–3)
BASE EXCESS BLDV CALC-SCNC: 7.1 MMOL/L (ref -2–3)
BASOPHILS # BLD AUTO: 0.04 X10*3/UL (ref 0–0.1)
BASOPHILS # BLD AUTO: 0.05 X10*3/UL (ref 0–0.1)
BASOPHILS NFR BLD AUTO: 0.5 %
BASOPHILS NFR BLD AUTO: 0.5 %
BENZODIAZ UR QL SCN: NORMAL
BILIRUB SERPL-MCNC: 1 MG/DL (ref 0–1.2)
BILIRUB UR STRIP.AUTO-MCNC: NEGATIVE MG/DL
BILIRUB UR STRIP.AUTO-MCNC: NEGATIVE MG/DL
BNP SERPL-MCNC: >5000 PG/ML (ref 0–99)
BODY TEMPERATURE: 37 DEGREES CELSIUS
BUN SERPL-MCNC: 29 MG/DL (ref 6–23)
BUN SERPL-MCNC: 32 MG/DL (ref 6–23)
BUN SERPL-MCNC: 33 MG/DL (ref 6–23)
BUN SERPL-MCNC: 33 MG/DL (ref 6–23)
BZE UR QL SCN: NORMAL
CA-I BLDA-SCNC: 1.24 MMOL/L (ref 1.1–1.33)
CA-I BLDV-SCNC: 1.01 MMOL/L (ref 1.1–1.33)
CA-I BLDV-SCNC: 1.03 MMOL/L (ref 1.1–1.33)
CA-I BLDV-SCNC: 1.12 MMOL/L (ref 1.1–1.33)
CA-I BLDV-SCNC: 1.14 MMOL/L (ref 1.1–1.33)
CALCIUM SERPL-MCNC: 10 MG/DL (ref 8.6–10.6)
CALCIUM SERPL-MCNC: 10.2 MG/DL (ref 8.6–10.6)
CALCIUM SERPL-MCNC: 9.4 MG/DL (ref 8.6–10.6)
CALCIUM SERPL-MCNC: 9.4 MG/DL (ref 8.6–10.6)
CANNABINOIDS UR QL SCN: NORMAL
CARDIAC TROPONIN I PNL SERPL HS: 64 NG/L (ref 0–53)
CARDIAC TROPONIN I PNL SERPL HS: 82 NG/L (ref 0–53)
CHLORIDE BLDA-SCNC: 99 MMOL/L (ref 98–107)
CHLORIDE BLDV-SCNC: 102 MMOL/L (ref 98–107)
CHLORIDE BLDV-SCNC: 104 MMOL/L (ref 98–107)
CHLORIDE BLDV-SCNC: 99 MMOL/L (ref 98–107)
CHLORIDE BLDV-SCNC: 99 MMOL/L (ref 98–107)
CHLORIDE SERPL-SCNC: 96 MMOL/L (ref 98–107)
CHLORIDE SERPL-SCNC: 96 MMOL/L (ref 98–107)
CHLORIDE SERPL-SCNC: 99 MMOL/L (ref 98–107)
CHLORIDE SERPL-SCNC: 99 MMOL/L (ref 98–107)
CO2 SERPL-SCNC: 22 MMOL/L (ref 21–32)
CO2 SERPL-SCNC: 22 MMOL/L (ref 21–32)
CO2 SERPL-SCNC: 28 MMOL/L (ref 21–32)
CO2 SERPL-SCNC: 28 MMOL/L (ref 21–32)
COLOR UR: COLORLESS
COLOR UR: COLORLESS
CREAT SERPL-MCNC: 1.5 MG/DL (ref 0.5–1.3)
CREAT SERPL-MCNC: 1.5 MG/DL (ref 0.5–1.3)
CREAT SERPL-MCNC: 1.55 MG/DL (ref 0.5–1.3)
CREAT SERPL-MCNC: 1.57 MG/DL (ref 0.5–1.3)
EGFRCR SERPLBLD CKD-EPI 2021: 52 ML/MIN/1.73M*2
EGFRCR SERPLBLD CKD-EPI 2021: 53 ML/MIN/1.73M*2
EGFRCR SERPLBLD CKD-EPI 2021: 55 ML/MIN/1.73M*2
EGFRCR SERPLBLD CKD-EPI 2021: 55 ML/MIN/1.73M*2
EOSINOPHIL # BLD AUTO: 0.01 X10*3/UL (ref 0–0.7)
EOSINOPHIL # BLD AUTO: 0.02 X10*3/UL (ref 0–0.7)
EOSINOPHIL NFR BLD AUTO: 0.1 %
EOSINOPHIL NFR BLD AUTO: 0.2 %
ERYTHROCYTE [DISTWIDTH] IN BLOOD BY AUTOMATED COUNT: 15.6 % (ref 11.5–14.5)
ERYTHROCYTE [DISTWIDTH] IN BLOOD BY AUTOMATED COUNT: 15.9 % (ref 11.5–14.5)
FENTANYL+NORFENTANYL UR QL SCN: NORMAL
FLUAV RNA RESP QL NAA+PROBE: NOT DETECTED
FLUBV RNA RESP QL NAA+PROBE: NOT DETECTED
GLUCOSE BLD MANUAL STRIP-MCNC: 124 MG/DL (ref 74–99)
GLUCOSE BLD MANUAL STRIP-MCNC: 188 MG/DL (ref 74–99)
GLUCOSE BLD MANUAL STRIP-MCNC: 391 MG/DL (ref 74–99)
GLUCOSE BLD MANUAL STRIP-MCNC: 87 MG/DL (ref 74–99)
GLUCOSE BLD MANUAL STRIP-MCNC: >600 MG/DL (ref 74–99)
GLUCOSE BLDA-MCNC: 343 MG/DL (ref 74–99)
GLUCOSE BLDV-MCNC: 122 MG/DL (ref 74–99)
GLUCOSE BLDV-MCNC: 148 MG/DL (ref 74–99)
GLUCOSE BLDV-MCNC: >685 MG/DL (ref 74–99)
GLUCOSE BLDV-MCNC: >685 MG/DL (ref 74–99)
GLUCOSE SERPL-MCNC: 263 MG/DL (ref 74–99)
GLUCOSE SERPL-MCNC: 380 MG/DL (ref 74–99)
GLUCOSE SERPL-MCNC: 739 MG/DL (ref 74–99)
GLUCOSE SERPL-MCNC: 739 MG/DL (ref 74–99)
GLUCOSE UR STRIP.AUTO-MCNC: ABNORMAL MG/DL
GLUCOSE UR STRIP.AUTO-MCNC: ABNORMAL MG/DL
HCO3 BLDA-SCNC: 31.3 MMOL/L (ref 22–26)
HCO3 BLDV-SCNC: 22.2 MMOL/L (ref 22–26)
HCO3 BLDV-SCNC: 23.2 MMOL/L (ref 22–26)
HCO3 BLDV-SCNC: 31.9 MMOL/L (ref 22–26)
HCO3 BLDV-SCNC: 32 MMOL/L (ref 22–26)
HCT VFR BLD AUTO: 47 % (ref 41–52)
HCT VFR BLD AUTO: 50.3 % (ref 41–52)
HCT VFR BLD EST: 47 % (ref 41–52)
HCT VFR BLD EST: 48 % (ref 41–52)
HCT VFR BLD EST: 54 % (ref 41–52)
HCT VFR BLD EST: 56 % (ref 41–52)
HCT VFR BLD EST: 57 % (ref 41–52)
HGB BLD-MCNC: 16.2 G/DL (ref 13.5–17.5)
HGB BLD-MCNC: 17.8 G/DL (ref 13.5–17.5)
HGB BLDA-MCNC: 18 G/DL (ref 13.5–17.5)
HGB BLDV-MCNC: 15.8 G/DL (ref 13.5–17.5)
HGB BLDV-MCNC: 16.1 G/DL (ref 13.5–17.5)
HGB BLDV-MCNC: 18.7 G/DL (ref 13.5–17.5)
HGB BLDV-MCNC: 18.9 G/DL (ref 13.5–17.5)
HOLD SPECIMEN: NORMAL
HYALINE CASTS #/AREA URNS AUTO: ABNORMAL /LPF
IMM GRANULOCYTES # BLD AUTO: 0.02 X10*3/UL (ref 0–0.7)
IMM GRANULOCYTES # BLD AUTO: 0.03 X10*3/UL (ref 0–0.7)
IMM GRANULOCYTES NFR BLD AUTO: 0.2 % (ref 0–0.9)
IMM GRANULOCYTES NFR BLD AUTO: 0.3 % (ref 0–0.9)
INHALED O2 CONCENTRATION: 21 %
INHALED O2 CONCENTRATION: 24 %
INHALED O2 CONCENTRATION: 26 %
INHALED O2 CONCENTRATION: 30 %
INHALED O2 CONCENTRATION: 36 %
INR PPP: 1.2 (ref 0.9–1.1)
INR PPP: 1.2 (ref 0.9–1.1)
KETONES UR STRIP.AUTO-MCNC: NEGATIVE MG/DL
KETONES UR STRIP.AUTO-MCNC: NEGATIVE MG/DL
LACTATE BLDA-SCNC: 2.3 MMOL/L (ref 0.4–2)
LACTATE BLDV-SCNC: 2.3 MMOL/L (ref 0.4–2)
LACTATE BLDV-SCNC: 2.5 MMOL/L (ref 0.4–2)
LACTATE BLDV-SCNC: 2.8 MMOL/L (ref 0.4–2)
LACTATE BLDV-SCNC: 3.1 MMOL/L (ref 0.4–2)
LACTATE BLDV-SCNC: 3.2 MMOL/L (ref 0.4–2)
LACTATE SERPL-SCNC: 2.7 MMOL/L (ref 0.4–2)
LACTATE SERPL-SCNC: 3 MMOL/L (ref 0.4–2)
LEUKOCYTE ESTERASE UR QL STRIP.AUTO: NEGATIVE
LEUKOCYTE ESTERASE UR QL STRIP.AUTO: NEGATIVE
LIPASE SERPL-CCNC: 13 U/L (ref 9–82)
LYMPHOCYTES # BLD AUTO: 0.56 X10*3/UL (ref 1.2–4.8)
LYMPHOCYTES # BLD AUTO: 0.9 X10*3/UL (ref 1.2–4.8)
LYMPHOCYTES NFR BLD AUTO: 6.4 %
LYMPHOCYTES NFR BLD AUTO: 9.5 %
MAGNESIUM SERPL-MCNC: 2.36 MG/DL (ref 1.6–2.4)
MAGNESIUM SERPL-MCNC: 2.83 MG/DL (ref 1.6–2.4)
MCH RBC QN AUTO: 31 PG (ref 26–34)
MCH RBC QN AUTO: 31 PG (ref 26–34)
MCHC RBC AUTO-ENTMCNC: 34.5 G/DL (ref 32–36)
MCHC RBC AUTO-ENTMCNC: 35.4 G/DL (ref 32–36)
MCV RBC AUTO: 88 FL (ref 80–100)
MCV RBC AUTO: 90 FL (ref 80–100)
METHADONE UR QL SCN: NORMAL
MONOCYTES # BLD AUTO: 0.28 X10*3/UL (ref 0.1–1)
MONOCYTES # BLD AUTO: 0.3 X10*3/UL (ref 0.1–1)
MONOCYTES NFR BLD AUTO: 3 %
MONOCYTES NFR BLD AUTO: 3.4 %
MUCOUS THREADS #/AREA URNS AUTO: ABNORMAL /LPF
NEUTROPHILS # BLD AUTO: 7.86 X10*3/UL (ref 1.2–7.7)
NEUTROPHILS # BLD AUTO: 8.17 X10*3/UL (ref 1.2–7.7)
NEUTROPHILS NFR BLD AUTO: 86.7 %
NEUTROPHILS NFR BLD AUTO: 89.2 %
NITRITE UR QL STRIP.AUTO: NEGATIVE
NITRITE UR QL STRIP.AUTO: NEGATIVE
NRBC BLD-RTO: 0 /100 WBCS (ref 0–0)
NRBC BLD-RTO: 0 /100 WBCS (ref 0–0)
OPIATES UR QL SCN: NORMAL
OXYCODONE+OXYMORPHONE UR QL SCN: NORMAL
OXYHGB MFR BLDA: 95.4 % (ref 94–98)
OXYHGB MFR BLDV: 53.3 % (ref 45–75)
OXYHGB MFR BLDV: 64 % (ref 45–75)
OXYHGB MFR BLDV: 82.3 % (ref 45–75)
OXYHGB MFR BLDV: 84.2 % (ref 45–75)
P AXIS: 52 DEGREES
P OFFSET: 171 MS
P ONSET: 112 MS
PCO2 BLDA: 42 MM HG (ref 38–42)
PCO2 BLDV: 43 MM HG (ref 41–51)
PCO2 BLDV: 44 MM HG (ref 41–51)
PCO2 BLDV: 45 MM HG (ref 41–51)
PCO2 BLDV: 47 MM HG (ref 41–51)
PCP UR QL SCN: NORMAL
PH BLDA: 7.48 PH (ref 7.38–7.42)
PH BLDV: 7.32 PH (ref 7.33–7.43)
PH BLDV: 7.32 PH (ref 7.33–7.43)
PH BLDV: 7.44 PH (ref 7.33–7.43)
PH BLDV: 7.47 PH (ref 7.33–7.43)
PH UR STRIP.AUTO: 5 [PH]
PH UR STRIP.AUTO: 6 [PH]
PHOSPHATE SERPL-MCNC: 3.7 MG/DL (ref 2.5–4.9)
PHOSPHATE SERPL-MCNC: 4.5 MG/DL (ref 2.5–4.9)
PLATELET # BLD AUTO: 203 X10*3/UL (ref 150–450)
PLATELET # BLD AUTO: 231 X10*3/UL (ref 150–450)
PO2 BLDA: 84 MM HG (ref 85–95)
PO2 BLDV: 36 MM HG (ref 35–45)
PO2 BLDV: 42 MM HG (ref 35–45)
PO2 BLDV: 56 MM HG (ref 35–45)
PO2 BLDV: 57 MM HG (ref 35–45)
POTASSIUM BLDA-SCNC: 3.5 MMOL/L (ref 3.5–5.3)
POTASSIUM BLDV-SCNC: 4.3 MMOL/L (ref 3.5–5.3)
POTASSIUM BLDV-SCNC: 4.7 MMOL/L (ref 3.5–5.3)
POTASSIUM BLDV-SCNC: 9.5 MMOL/L (ref 3.5–5.3)
POTASSIUM BLDV-SCNC: >10 MMOL/L (ref 3.5–5.3)
POTASSIUM SERPL-SCNC: 3.7 MMOL/L (ref 3.5–5.3)
POTASSIUM SERPL-SCNC: 4.5 MMOL/L (ref 3.5–5.3)
POTASSIUM SERPL-SCNC: 4.5 MMOL/L (ref 3.5–5.3)
POTASSIUM SERPL-SCNC: 6.1 MMOL/L (ref 3.5–5.3)
PR INTERVAL: 190 MS
PROT SERPL-MCNC: 7.6 G/DL (ref 6.4–8.2)
PROT UR STRIP.AUTO-MCNC: NEGATIVE MG/DL
PROT UR STRIP.AUTO-MCNC: NEGATIVE MG/DL
PROTHROMBIN TIME: 13.2 SECONDS (ref 9.8–12.8)
PROTHROMBIN TIME: 13.9 SECONDS (ref 9.8–12.8)
Q ONSET: 207 MS
QRS COUNT: 12 BEATS
QRS DURATION: 116 MS
QT INTERVAL: 482 MS
QTC CALCULATION(BAZETT): 531 MS
QTC FREDERICIA: 514 MS
R AXIS: -47 DEGREES
RBC # BLD AUTO: 5.23 X10*6/UL (ref 4.5–5.9)
RBC # BLD AUTO: 5.74 X10*6/UL (ref 4.5–5.9)
RBC # UR STRIP.AUTO: ABNORMAL /UL
RBC # UR STRIP.AUTO: NEGATIVE /UL
RBC #/AREA URNS AUTO: ABNORMAL /HPF
RH FACTOR (ANTIGEN D): NORMAL
SAO2 % BLDA: 98 % (ref 94–100)
SAO2 % BLDV: 55 % (ref 45–75)
SAO2 % BLDV: 66 % (ref 45–75)
SAO2 % BLDV: 85 % (ref 45–75)
SAO2 % BLDV: 87 % (ref 45–75)
SARS-COV-2 RNA RESP QL NAA+PROBE: NOT DETECTED
SODIUM BLDA-SCNC: 134 MMOL/L (ref 136–145)
SODIUM BLDV-SCNC: 130 MMOL/L (ref 136–145)
SODIUM BLDV-SCNC: 133 MMOL/L (ref 136–145)
SODIUM BLDV-SCNC: 134 MMOL/L (ref 136–145)
SODIUM BLDV-SCNC: 134 MMOL/L (ref 136–145)
SODIUM SERPL-SCNC: 131 MMOL/L (ref 136–145)
SODIUM SERPL-SCNC: 131 MMOL/L (ref 136–145)
SODIUM SERPL-SCNC: 137 MMOL/L (ref 136–145)
SODIUM SERPL-SCNC: 140 MMOL/L (ref 136–145)
SP GR UR STRIP.AUTO: 1.01
SP GR UR STRIP.AUTO: 1.01
T AXIS: 90 DEGREES
T OFFSET: 448 MS
UROBILINOGEN UR STRIP.AUTO-MCNC: NORMAL MG/DL
UROBILINOGEN UR STRIP.AUTO-MCNC: NORMAL MG/DL
VENTRICULAR RATE: 73 BPM
WBC # BLD AUTO: 8.8 X10*3/UL (ref 4.4–11.3)
WBC # BLD AUTO: 9.4 X10*3/UL (ref 4.4–11.3)
WBC #/AREA URNS AUTO: ABNORMAL /HPF

## 2025-01-08 PROCEDURE — 2500000004 HC RX 250 GENERAL PHARMACY W/ HCPCS (ALT 636 FOR OP/ED): Mod: SE

## 2025-01-08 PROCEDURE — 82010 KETONE BODYS QUAN: CPT | Performed by: STUDENT IN AN ORGANIZED HEALTH CARE EDUCATION/TRAINING PROGRAM

## 2025-01-08 PROCEDURE — 85610 PROTHROMBIN TIME: CPT

## 2025-01-08 PROCEDURE — 84132 ASSAY OF SERUM POTASSIUM: CPT

## 2025-01-08 PROCEDURE — 81001 URINALYSIS AUTO W/SCOPE: CPT

## 2025-01-08 PROCEDURE — 83735 ASSAY OF MAGNESIUM: CPT | Performed by: STUDENT IN AN ORGANIZED HEALTH CARE EDUCATION/TRAINING PROGRAM

## 2025-01-08 PROCEDURE — 2500000002 HC RX 250 W HCPCS SELF ADMINISTERED DRUGS (ALT 637 FOR MEDICARE OP, ALT 636 FOR OP/ED): Mod: SE

## 2025-01-08 PROCEDURE — 84132 ASSAY OF SERUM POTASSIUM: CPT | Performed by: STUDENT IN AN ORGANIZED HEALTH CARE EDUCATION/TRAINING PROGRAM

## 2025-01-08 PROCEDURE — 80307 DRUG TEST PRSMV CHEM ANLYZR: CPT

## 2025-01-08 PROCEDURE — 93282 PRGRMG EVAL IMPLANTABLE DFB: CPT

## 2025-01-08 PROCEDURE — 84132 ASSAY OF SERUM POTASSIUM: CPT | Performed by: PHYSICIAN ASSISTANT

## 2025-01-08 PROCEDURE — 93010 ELECTROCARDIOGRAM REPORT: CPT | Performed by: INTERNAL MEDICINE

## 2025-01-08 PROCEDURE — 84484 ASSAY OF TROPONIN QUANT: CPT | Performed by: STUDENT IN AN ORGANIZED HEALTH CARE EDUCATION/TRAINING PROGRAM

## 2025-01-08 PROCEDURE — 36415 COLL VENOUS BLD VENIPUNCTURE: CPT | Performed by: PHYSICIAN ASSISTANT

## 2025-01-08 PROCEDURE — 2500000001 HC RX 250 WO HCPCS SELF ADMINISTERED DRUGS (ALT 637 FOR MEDICARE OP): Mod: SE

## 2025-01-08 PROCEDURE — 2500000004 HC RX 250 GENERAL PHARMACY W/ HCPCS (ALT 636 FOR OP/ED): Mod: JZ,SE,TB

## 2025-01-08 PROCEDURE — 93005 ELECTROCARDIOGRAM TRACING: CPT

## 2025-01-08 PROCEDURE — 99291 CRITICAL CARE FIRST HOUR: CPT

## 2025-01-08 PROCEDURE — 93010 ELECTROCARDIOGRAM REPORT: CPT | Performed by: PHYSICIAN ASSISTANT

## 2025-01-08 PROCEDURE — 87636 SARSCOV2 & INF A&B AMP PRB: CPT | Performed by: PHYSICIAN ASSISTANT

## 2025-01-08 PROCEDURE — 2500000005 HC RX 250 GENERAL PHARMACY W/O HCPCS: Mod: SE

## 2025-01-08 PROCEDURE — 82947 ASSAY GLUCOSE BLOOD QUANT: CPT

## 2025-01-08 PROCEDURE — 85025 COMPLETE CBC W/AUTO DIFF WBC: CPT | Performed by: STUDENT IN AN ORGANIZED HEALTH CARE EDUCATION/TRAINING PROGRAM

## 2025-01-08 PROCEDURE — 83605 ASSAY OF LACTIC ACID: CPT

## 2025-01-08 PROCEDURE — 83880 ASSAY OF NATRIURETIC PEPTIDE: CPT | Performed by: STUDENT IN AN ORGANIZED HEALTH CARE EDUCATION/TRAINING PROGRAM

## 2025-01-08 PROCEDURE — 99285 EMERGENCY DEPT VISIT HI MDM: CPT | Performed by: PHYSICIAN ASSISTANT

## 2025-01-08 PROCEDURE — 2500000004 HC RX 250 GENERAL PHARMACY W/ HCPCS (ALT 636 FOR OP/ED): Mod: SE | Performed by: STUDENT IN AN ORGANIZED HEALTH CARE EDUCATION/TRAINING PROGRAM

## 2025-01-08 PROCEDURE — 71045 X-RAY EXAM CHEST 1 VIEW: CPT

## 2025-01-08 PROCEDURE — 83605 ASSAY OF LACTIC ACID: CPT | Performed by: STUDENT IN AN ORGANIZED HEALTH CARE EDUCATION/TRAINING PROGRAM

## 2025-01-08 PROCEDURE — 36415 COLL VENOUS BLD VENIPUNCTURE: CPT | Performed by: STUDENT IN AN ORGANIZED HEALTH CARE EDUCATION/TRAINING PROGRAM

## 2025-01-08 PROCEDURE — 2500000005 HC RX 250 GENERAL PHARMACY W/O HCPCS: Mod: SE | Performed by: STUDENT IN AN ORGANIZED HEALTH CARE EDUCATION/TRAINING PROGRAM

## 2025-01-08 PROCEDURE — 84132 ASSAY OF SERUM POTASSIUM: CPT | Performed by: INTERNAL MEDICINE

## 2025-01-08 PROCEDURE — 83690 ASSAY OF LIPASE: CPT

## 2025-01-08 PROCEDURE — 86901 BLOOD TYPING SEROLOGIC RH(D): CPT

## 2025-01-08 PROCEDURE — 96375 TX/PRO/DX INJ NEW DRUG ADDON: CPT

## 2025-01-08 PROCEDURE — 96365 THER/PROPH/DIAG IV INF INIT: CPT

## 2025-01-08 PROCEDURE — 85610 PROTHROMBIN TIME: CPT | Performed by: STUDENT IN AN ORGANIZED HEALTH CARE EDUCATION/TRAINING PROGRAM

## 2025-01-08 PROCEDURE — 94640 AIRWAY INHALATION TREATMENT: CPT | Mod: 59

## 2025-01-08 PROCEDURE — 94660 CPAP INITIATION&MGMT: CPT

## 2025-01-08 PROCEDURE — 2020000001 HC ICU ROOM DAILY

## 2025-01-08 PROCEDURE — 81003 URINALYSIS AUTO W/O SCOPE: CPT | Performed by: PHYSICIAN ASSISTANT

## 2025-01-08 PROCEDURE — 71045 X-RAY EXAM CHEST 1 VIEW: CPT | Performed by: RADIOLOGY

## 2025-01-08 PROCEDURE — 5A09357 ASSISTANCE WITH RESPIRATORY VENTILATION, LESS THAN 24 CONSECUTIVE HOURS, CONTINUOUS POSITIVE AIRWAY PRESSURE: ICD-10-PCS | Performed by: INTERNAL MEDICINE

## 2025-01-08 PROCEDURE — 99285 EMERGENCY DEPT VISIT HI MDM: CPT | Performed by: STUDENT IN AN ORGANIZED HEALTH CARE EDUCATION/TRAINING PROGRAM

## 2025-01-08 PROCEDURE — 83605 ASSAY OF LACTIC ACID: CPT | Performed by: INTERNAL MEDICINE

## 2025-01-08 PROCEDURE — 84100 ASSAY OF PHOSPHORUS: CPT | Performed by: STUDENT IN AN ORGANIZED HEALTH CARE EDUCATION/TRAINING PROGRAM

## 2025-01-08 PROCEDURE — 85025 COMPLETE CBC W/AUTO DIFF WBC: CPT

## 2025-01-08 PROCEDURE — 83735 ASSAY OF MAGNESIUM: CPT

## 2025-01-08 PROCEDURE — 2500000002 HC RX 250 W HCPCS SELF ADMINISTERED DRUGS (ALT 637 FOR MEDICARE OP, ALT 636 FOR OP/ED): Mod: SE | Performed by: STUDENT IN AN ORGANIZED HEALTH CARE EDUCATION/TRAINING PROGRAM

## 2025-01-08 RX ORDER — CALCIUM GLUCONATE 20 MG/ML
2 INJECTION, SOLUTION INTRAVENOUS ONCE
Status: COMPLETED | OUTPATIENT
Start: 2025-01-08 | End: 2025-01-08

## 2025-01-08 RX ORDER — ATORVASTATIN CALCIUM 80 MG/1
80 TABLET, FILM COATED ORAL NIGHTLY
Status: DISPENSED | OUTPATIENT
Start: 2025-01-08

## 2025-01-08 RX ORDER — INSULIN LISPRO 100 [IU]/ML
0-5 INJECTION, SOLUTION INTRAVENOUS; SUBCUTANEOUS
Status: DISCONTINUED | OUTPATIENT
Start: 2025-01-09 | End: 2025-01-09

## 2025-01-08 RX ORDER — DEXTROSE MONOHYDRATE AND SODIUM CHLORIDE 5; .9 G/100ML; G/100ML
150 INJECTION, SOLUTION INTRAVENOUS CONTINUOUS PRN
Status: DISCONTINUED | OUTPATIENT
Start: 2025-01-08 | End: 2025-01-09

## 2025-01-08 RX ORDER — BUMETANIDE 0.25 MG/ML
4 INJECTION, SOLUTION INTRAMUSCULAR; INTRAVENOUS ONCE
Status: COMPLETED | OUTPATIENT
Start: 2025-01-08 | End: 2025-01-08

## 2025-01-08 RX ORDER — DEXTROSE MONOHYDRATE 100 MG/ML
150 INJECTION, SOLUTION INTRAVENOUS CONTINUOUS PRN
Status: DISCONTINUED | OUTPATIENT
Start: 2025-01-08 | End: 2025-01-09

## 2025-01-08 RX ORDER — DEXTROSE 50 % IN WATER (D50W) INTRAVENOUS SYRINGE
25 ONCE
Status: COMPLETED | OUTPATIENT
Start: 2025-01-08 | End: 2025-01-08

## 2025-01-08 RX ORDER — POTASSIUM CHLORIDE 1.5 G/1.58G
20 POWDER, FOR SOLUTION ORAL ONCE
Status: COMPLETED | OUTPATIENT
Start: 2025-01-08 | End: 2025-01-08

## 2025-01-08 RX ORDER — INSULIN GLARGINE 100 [IU]/ML
10 INJECTION, SOLUTION SUBCUTANEOUS EVERY 24 HOURS
Status: DISCONTINUED | OUTPATIENT
Start: 2025-01-08 | End: 2025-01-09

## 2025-01-08 RX ORDER — ACETAMINOPHEN 325 MG/1
650 TABLET ORAL EVERY 6 HOURS PRN
Status: DISPENSED | OUTPATIENT
Start: 2025-01-08

## 2025-01-08 RX ORDER — NAPROXEN SODIUM 220 MG/1
81 TABLET, FILM COATED ORAL DAILY
Status: DISPENSED | OUTPATIENT
Start: 2025-01-08

## 2025-01-08 RX ORDER — POLYETHYLENE GLYCOL 3350 17 G/17G
17 POWDER, FOR SOLUTION ORAL DAILY
Status: DISPENSED | OUTPATIENT
Start: 2025-01-08

## 2025-01-08 RX ORDER — AMIODARONE HYDROCHLORIDE 200 MG/1
200 TABLET ORAL DAILY
Status: DISCONTINUED | OUTPATIENT
Start: 2025-01-08 | End: 2025-01-09

## 2025-01-08 RX ORDER — INSULIN GLARGINE 100 [IU]/ML
15 INJECTION, SOLUTION SUBCUTANEOUS EVERY 24 HOURS
Status: DISCONTINUED | OUTPATIENT
Start: 2025-01-08 | End: 2025-01-08

## 2025-01-08 RX ORDER — CALCIUM GLUCONATE 20 MG/ML
2 INJECTION, SOLUTION INTRAVENOUS ONCE
Status: DISCONTINUED | OUTPATIENT
Start: 2025-01-08 | End: 2025-01-08

## 2025-01-08 RX ORDER — DEXTROSE 50 % IN WATER (D50W) INTRAVENOUS SYRINGE
50
Status: ACTIVE | OUTPATIENT
Start: 2025-01-08

## 2025-01-08 RX ORDER — FUROSEMIDE 10 MG/ML
100 INJECTION INTRAMUSCULAR; INTRAVENOUS ONCE
Status: COMPLETED | OUTPATIENT
Start: 2025-01-08 | End: 2025-01-08

## 2025-01-08 RX ORDER — DEXTROSE 50 % IN WATER (D50W) INTRAVENOUS SYRINGE
12.5
Status: DISCONTINUED | OUTPATIENT
Start: 2025-01-08 | End: 2025-01-10

## 2025-01-08 RX ORDER — DEXTROSE 50 % IN WATER (D50W) INTRAVENOUS SYRINGE
25
Status: DISCONTINUED | OUTPATIENT
Start: 2025-01-08 | End: 2025-01-10

## 2025-01-08 RX ORDER — SERTRALINE HYDROCHLORIDE 50 MG/1
100 TABLET, FILM COATED ORAL DAILY
Status: DISPENSED | OUTPATIENT
Start: 2025-01-08

## 2025-01-08 RX ORDER — HEPARIN SODIUM 5000 [USP'U]/ML
5000 INJECTION, SOLUTION INTRAVENOUS; SUBCUTANEOUS EVERY 8 HOURS
Status: DISPENSED | OUTPATIENT
Start: 2025-01-08

## 2025-01-08 RX ORDER — IPRATROPIUM BROMIDE AND ALBUTEROL SULFATE 2.5; .5 MG/3ML; MG/3ML
3 SOLUTION RESPIRATORY (INHALATION) EVERY 20 MIN
Status: COMPLETED | OUTPATIENT
Start: 2025-01-08 | End: 2025-01-08

## 2025-01-08 RX ADMIN — SERTRALINE HYDROCHLORIDE 100 MG: 100 TABLET ORAL at 22:05

## 2025-01-08 RX ADMIN — IPRATROPIUM BROMIDE AND ALBUTEROL SULFATE 3 ML: .5; 3 SOLUTION RESPIRATORY (INHALATION) at 13:16

## 2025-01-08 RX ADMIN — INSULIN GLARGINE 10 UNITS: 100 INJECTION, SOLUTION SUBCUTANEOUS at 22:05

## 2025-01-08 RX ADMIN — BUMETANIDE 4 MG: 0.25 INJECTION INTRAMUSCULAR; INTRAVENOUS at 17:25

## 2025-01-08 RX ADMIN — HEPARIN SODIUM 5000 UNITS: 5000 INJECTION INTRAVENOUS; SUBCUTANEOUS at 18:15

## 2025-01-08 RX ADMIN — POTASSIUM CHLORIDE 20 MEQ: 1.5 POWDER, FOR SOLUTION ORAL at 23:03

## 2025-01-08 RX ADMIN — IPRATROPIUM BROMIDE AND ALBUTEROL SULFATE 3 ML: .5; 3 SOLUTION RESPIRATORY (INHALATION) at 13:22

## 2025-01-08 RX ADMIN — INSULIN HUMAN 10.5 UNITS/HR: 1 INJECTION, SOLUTION INTRAVENOUS at 15:00

## 2025-01-08 RX ADMIN — FUROSEMIDE 100 MG: 10 INJECTION, SOLUTION INTRAMUSCULAR; INTRAVENOUS at 13:16

## 2025-01-08 RX ADMIN — ATORVASTATIN CALCIUM 80 MG: 80 TABLET, FILM COATED ORAL at 22:05

## 2025-01-08 RX ADMIN — INSULIN HUMAN 10 UNITS: 100 INJECTION, SOLUTION PARENTERAL at 16:05

## 2025-01-08 RX ADMIN — INSULIN HUMAN 10 UNITS: 100 INJECTION, SOLUTION PARENTERAL at 21:23

## 2025-01-08 RX ADMIN — INSULIN HUMAN 10 UNITS: 100 INJECTION, SOLUTION PARENTERAL at 13:16

## 2025-01-08 RX ADMIN — IPRATROPIUM BROMIDE AND ALBUTEROL SULFATE 3 ML: .5; 3 SOLUTION RESPIRATORY (INHALATION) at 13:07

## 2025-01-08 RX ADMIN — SODIUM ZIRCONIUM CYCLOSILICATE 10 G: 10 POWDER, FOR SUSPENSION ORAL at 20:12

## 2025-01-08 RX ADMIN — ACETAMINOPHEN 650 MG: 325 TABLET ORAL at 22:05

## 2025-01-08 RX ADMIN — BUMETANIDE 2 MG/HR: 0.25 INJECTION INTRAMUSCULAR; INTRAVENOUS at 18:34

## 2025-01-08 RX ADMIN — INSULIN HUMAN 10.5 UNITS/HR: 1 INJECTION, SOLUTION INTRAVENOUS at 18:00

## 2025-01-08 RX ADMIN — DEXTROSE MONOHYDRATE 25 G: 25 INJECTION, SOLUTION INTRAVENOUS at 21:24

## 2025-01-08 RX ADMIN — Medication 30 PERCENT: at 13:11

## 2025-01-08 RX ADMIN — ASPIRIN 81 MG 81 MG: 81 TABLET ORAL at 22:05

## 2025-01-08 RX ADMIN — CALCIUM GLUCONATE 2 G: 20 INJECTION, SOLUTION INTRAVENOUS at 20:22

## 2025-01-08 SDOH — SOCIAL STABILITY: SOCIAL INSECURITY: HAS ANYONE EVER THREATENED TO HURT YOUR FAMILY OR YOUR PETS?: NO

## 2025-01-08 SDOH — SOCIAL STABILITY: SOCIAL INSECURITY: HAVE YOU HAD THOUGHTS OF HARMING ANYONE ELSE?: NO

## 2025-01-08 SDOH — SOCIAL STABILITY: SOCIAL INSECURITY: ABUSE: ADULT

## 2025-01-08 SDOH — SOCIAL STABILITY: SOCIAL INSECURITY: WITHIN THE LAST YEAR, HAVE YOU BEEN HUMILIATED OR EMOTIONALLY ABUSED IN OTHER WAYS BY YOUR PARTNER OR EX-PARTNER?: NO

## 2025-01-08 SDOH — SOCIAL STABILITY: SOCIAL INSECURITY: WERE YOU ABLE TO COMPLETE ALL THE BEHAVIORAL HEALTH SCREENINGS?: YES

## 2025-01-08 SDOH — ECONOMIC STABILITY: INCOME INSECURITY: IN THE PAST 12 MONTHS HAS THE ELECTRIC, GAS, OIL, OR WATER COMPANY THREATENED TO SHUT OFF SERVICES IN YOUR HOME?: YES

## 2025-01-08 SDOH — SOCIAL STABILITY: SOCIAL INSECURITY: DOES ANYONE TRY TO KEEP YOU FROM HAVING/CONTACTING OTHER FRIENDS OR DOING THINGS OUTSIDE YOUR HOME?: NO

## 2025-01-08 SDOH — SOCIAL STABILITY: SOCIAL INSECURITY: DO YOU FEEL ANYONE HAS EXPLOITED OR TAKEN ADVANTAGE OF YOU FINANCIALLY OR OF YOUR PERSONAL PROPERTY?: NO

## 2025-01-08 SDOH — SOCIAL STABILITY: SOCIAL INSECURITY: ARE THERE ANY APPARENT SIGNS OF INJURIES/BEHAVIORS THAT COULD BE RELATED TO ABUSE/NEGLECT?: NO

## 2025-01-08 SDOH — SOCIAL STABILITY: SOCIAL INSECURITY: ARE YOU OR HAVE YOU BEEN THREATENED OR ABUSED PHYSICALLY, EMOTIONALLY, OR SEXUALLY BY ANYONE?: NO

## 2025-01-08 SDOH — SOCIAL STABILITY: SOCIAL INSECURITY: DO YOU FEEL UNSAFE GOING BACK TO THE PLACE WHERE YOU ARE LIVING?: NO

## 2025-01-08 SDOH — SOCIAL STABILITY: SOCIAL INSECURITY: HAVE YOU HAD ANY THOUGHTS OF HARMING ANYONE ELSE?: NO

## 2025-01-08 ASSESSMENT — PAIN - FUNCTIONAL ASSESSMENT
PAIN_FUNCTIONAL_ASSESSMENT: 0-10

## 2025-01-08 ASSESSMENT — PAIN SCALES - GENERAL
PAINLEVEL_OUTOF10: 0 - NO PAIN

## 2025-01-08 ASSESSMENT — COGNITIVE AND FUNCTIONAL STATUS - GENERAL
DAILY ACTIVITIY SCORE: 24
MOBILITY SCORE: 24
PATIENT BASELINE BEDBOUND: NO
DAILY ACTIVITIY SCORE: 24

## 2025-01-08 ASSESSMENT — ACTIVITIES OF DAILY LIVING (ADL)
PATIENT'S MEMORY ADEQUATE TO SAFELY COMPLETE DAILY ACTIVITIES?: YES
GROOMING: INDEPENDENT
DRESSING YOURSELF: INDEPENDENT
TOILETING: INDEPENDENT
FEEDING YOURSELF: INDEPENDENT
HEARING - RIGHT EAR: FUNCTIONAL
BATHING: INDEPENDENT
WALKS IN HOME: INDEPENDENT
JUDGMENT_ADEQUATE_SAFELY_COMPLETE_DAILY_ACTIVITIES: YES
ADEQUATE_TO_COMPLETE_ADL: YES
LACK_OF_TRANSPORTATION: YES
HEARING - LEFT EAR: FUNCTIONAL

## 2025-01-08 ASSESSMENT — LIFESTYLE VARIABLES
HAVE PEOPLE ANNOYED YOU BY CRITICIZING YOUR DRINKING: NO
SKIP TO QUESTIONS 9-10: 1
HOW OFTEN DO YOU HAVE 6 OR MORE DRINKS ON ONE OCCASION: NEVER
TOTAL SCORE: 0
EVER HAD A DRINK FIRST THING IN THE MORNING TO STEADY YOUR NERVES TO GET RID OF A HANGOVER: NO
AUDIT-C TOTAL SCORE: 0
AUDIT-C TOTAL SCORE: 0
HOW MANY STANDARD DRINKS CONTAINING ALCOHOL DO YOU HAVE ON A TYPICAL DAY: PATIENT DOES NOT DRINK
HOW OFTEN DO YOU HAVE A DRINK CONTAINING ALCOHOL: NEVER
EVER FELT BAD OR GUILTY ABOUT YOUR DRINKING: NO
HAVE YOU EVER FELT YOU SHOULD CUT DOWN ON YOUR DRINKING: NO

## 2025-01-08 ASSESSMENT — COLUMBIA-SUICIDE SEVERITY RATING SCALE - C-SSRS
2. HAVE YOU ACTUALLY HAD ANY THOUGHTS OF KILLING YOURSELF?: NO
6. HAVE YOU EVER DONE ANYTHING, STARTED TO DO ANYTHING, OR PREPARED TO DO ANYTHING TO END YOUR LIFE?: NO
1. IN THE PAST MONTH, HAVE YOU WISHED YOU WERE DEAD OR WISHED YOU COULD GO TO SLEEP AND NOT WAKE UP?: NO
1. IN THE PAST MONTH, HAVE YOU WISHED YOU WERE DEAD OR WISHED YOU COULD GO TO SLEEP AND NOT WAKE UP?: NO
6. HAVE YOU EVER DONE ANYTHING, STARTED TO DO ANYTHING, OR PREPARED TO DO ANYTHING TO END YOUR LIFE?: NO
2. HAVE YOU ACTUALLY HAD ANY THOUGHTS OF KILLING YOURSELF?: NO

## 2025-01-08 ASSESSMENT — PATIENT HEALTH QUESTIONNAIRE - PHQ9
2. FEELING DOWN, DEPRESSED OR HOPELESS: NOT AT ALL
SUM OF ALL RESPONSES TO PHQ9 QUESTIONS 1 & 2: 0
1. LITTLE INTEREST OR PLEASURE IN DOING THINGS: NOT AT ALL

## 2025-01-08 NOTE — ED PROVIDER NOTES
Emergency Department Encounter  [facility] EMERGENCY MEDICINE    Patient: Leonel Yu  MRN: [id number]  : 1970  Date of Evaluation: 2025  ED Provider: Jayla Reyes PA-C      Chief Complaint       Chief Complaint   Patient presents with    Shortness of Breath     HPI    Leonel Yu is a 54 y.o. male who presents to the emergency department presenting for acute SOB x2 days. PMH HFrEF (EF 10%, [employer] PCM), IDDM2, COPD (nocturnal 2L), LUZ, CKD. Reports increased SOB both at rest and with exertion - no associated chest pain, HA, dizziness, syncope.  Endorses that both of his legs are very swollen as well.  Has not been taking any of his home medications, including torsemide, amiodarone, home insulin for several days.  Has been unable to check his blood sugars as well.  Reports he is having some epigastric abdominal pain, no associated nausea, vomiting, diarrhea.  Endorses feeling very fatigued.  Notes that his roommate brought him here to the emergency department today to be evaluated.  Endorses occasional cigarette usage, last smoked crack cocaine 2 days ago. Has been having +orthopnea since then. Denies fever/chills, changes in hearing/vision, dysuria, changes in urinary frequency or urgency, numbness/tingling, weakness, rash, cough and any other symptoms.    ROS:     Review of Systems  14 systems reviewed and otherwise acutely negative except as in the HPI.    Past History     Past Medical History:   Diagnosis Date    CHF (congestive heart failure)     Chronic kidney disease     COPD (chronic obstructive pulmonary disease) (Multi)     Diabetes mellitus (Multi)     Hyperlipidemia     Hypertension     Lethargy 10/19/2024    Stroke (Multi)     Substance abuse (Multi)      Past Surgical History:   Procedure Laterality Date    INSERT / REPLACE / REMOVE PACEMAKER       Social History     Socioeconomic History    Marital status: Single   Tobacco Use    Smoking status: Some  "Days     Types: Cigarettes    Smokeless tobacco: Never   Vaping Use    Vaping status: Never Used   Substance and Sexual Activity    Alcohol use: Never    Drug use: Yes     Types: \"Crack\" cocaine     Comment: once every 2 weeks    Sexual activity: Defer     Social Drivers of Health     Financial Resource Strain: High Risk (11/6/2024)    Overall Financial Resource Strain (CARDIA)     Difficulty of Paying Living Expenses: Very hard   Food Insecurity: Food Insecurity Present (12/7/2024)    Received from [facility]    Hunger Vital Sign     Worried About Running Out of Food in the Last Year: Never true     Ran Out of Food in the Last Year: Sometimes true   Transportation Needs: Unmet Transportation Needs (11/6/2024)    PRAPARE - Transportation     Lack of Transportation (Medical): Yes     Lack of Transportation (Non-Medical): Yes   Physical Activity: Patient Unable To Answer (10/20/2024)    Exercise Vital Sign     Days of Exercise per Week: Patient unable to answer     Minutes of Exercise per Session: Patient unable to answer   Stress: Stress Concern Present (10/20/2024)    [employer] [place] [employer] - Occupational Stress Questionnaire     Feeling of Stress : Very much   Social Connections: Patient Declined (10/20/2024)    Social Connection and Isolation Panel [NHANES]     Frequency of Communication with Friends and Family: Patient declined     Frequency of Social Gatherings with Friends and Family: Patient declined     Attends Gnosticist Services: Patient declined     Active Member of Clubs or Organizations: Patient declined     Attends Club or Organization Meetings: Patient declined     Marital Status: Patient declined   Intimate Partner Violence: Not At Risk (10/21/2024)    Humiliation, Afraid, Rape, and Kick questionnaire     Fear of Current or Ex-Partner: No     Emotionally Abused: No     Physically Abused: No     Sexually Abused: No   Housing Stability: High Risk (11/6/2024)    Housing Stability " "Vital Sign     Unable to Pay for Housing in the Last Year: Yes     Number of Times Moved in the Last Year: 1     Homeless in the Last Year: Yes       Medications/Allergies     Previous Medications    ALCOHOL SWABS (ALCOHOL PADS) PADS, MEDICATED    Use 4-8 per day to check blood glucose and for injectable medications    AMIODARONE (PACERONE) 200 MG TABLET    Take 1 tablet (200 mg) by mouth once daily.    ASPIRIN 81 MG CHEWABLE TABLET    Chew 1 tablet (81 mg) once daily.    ATORVASTATIN (LIPITOR) 80 MG TABLET    Take 1 tablet (80 mg) by mouth once daily at bedtime.    BLOOD SUGAR DIAGNOSTIC STRIP    Use as directed to test blood glucose up to four times daily and as needed.    BLOOD SUGAR DIAGNOSTIC STRIP    Use 1 each 4 times a day before meals if needed as backup to micheal.    BLOOD-GLUCOSE METER MISC    Inject 1 each under the skin 4 times a day with meals. Check blood glucose 4 times daily, before meals and at bedtime.    EMPAGLIFLOZIN (JARDIANCE) 25 MG    Take 1 tablet (25 mg) by mouth once daily.    FREESTYLE LANCETS 28 GAUGE    Use as instructed 4 times daily in the event that CGM sensor is not working    FREESTYLE MICHEAL 3 PLUS SENSOR DEVICE    Change sensor every 15 days    FREESTYLE MICHEAL 3 READER MISC    Use as instructed    GLUCOSE 4 GRAM CHEWABLE TABLET    Chew 2 tablets (8 g) if needed for low blood sugar - see comments.    INSULIN GLARGINE (LANTUS) 100 UNIT/ML INJECTION    Inject 15 Units under the skin once daily at bedtime. Take as directed per insulin instructions.    LANCETS 33 GAUGE MISC    Inject 1 each under the skin 4 times a day.    PEN NEEDLE 1/2\" 29G X 12MM NEEDLE    Use to inject 1-4 times daily as directed.    SACUBITRIL-VALSARTAN (ENTRESTO) 24-26 MG TABLET    Take 0.5 tablets by mouth 2 times a day.    SERTRALINE (ZOLOFT) 100 MG TABLET    Take 1 tablet (100 mg) by mouth once daily.    SPIRONOLACTONE (ALDACTONE) 25 MG TABLET    Take 0.5 tablets (12.5 mg) by mouth once daily.    TIOTROPIUM " (SPIRIVA RESPIMAT) 2.5 MCG/ACTUATION INHALER    Inhale 2 puffs once daily.    TORSEMIDE (DEMADEX) 20 MG TABLET    Take 1 tablet (20 mg) by mouth once daily as needed (Please take 1 tab daily if you experience any of the following: increased shortness of breath, increased swelling of your lower extremities, or if you gain 3lbs or more in 1-2 days or 5lbs or more in 7 days).     No Known Allergies     Physical Exam       ED Triage Vitals [01/08/25 1211]   Temperature Heart Rate Respirations BP   36.2 °C (97.2 °F) 76 20 (!) 152/107      Pulse Ox Temp Source Heart Rate Source Patient Position   96 % Temporal -- --      BP Location FiO2 (%)     -- --         Physical Exam    Physical Exam:    VS: As documented in the triage note and EMR flowsheet from this visit were reviewed.    Appearance: Alert, oriented, cooperative, in no acute distress. Well nourished & well hydrated.    Skin: Warm, intact and dry. No lesions, rash, or petechiae.    Neck: Supple, without meningismus. No lymphadenopathy.    Pulmonary: Clear bilaterally with good chest wall excursion. +scattered expiratory wheezing. No accessory muscle use or stridor. Nonlabored breathing, no supplemental oxygen.    Cardiac: Normal S1, S2 without murmur, rub, gallop or extrasystole.     Abdomen: Soft, nontender, active bowel sounds. No rebound or guarding.    Musculoskeletal: Spontaneously moving all extremities without limitation. Extremities warm and well-perfused, capillary refill less than 2 seconds. Pulses full and equal. 2+ pitting edema to bilateral lower extremities without associated erythema or increased warmth. Negative Carolynn's sign. Calves nontender and without palpable cords.     Neurological:  Cranial nerves II through XII are grossly intact, no focal findings identified.    Psychiatric: Appropriate mood and affect. Kempt appearance.     Diagnostics   Labs:  Labs Reviewed   CBC WITH AUTO DIFFERENTIAL - Abnormal       Result Value    WBC 8.8      nRBC  0.0      RBC 5.23      Hemoglobin 16.2      Hematocrit 47.0      MCV 90      MCH 31.0      MCHC 34.5      RDW 15.6 (*)     Platelets 203      Neutrophils % 89.2      Immature Granulocytes %, Automated 0.3      Lymphocytes % 6.4      Monocytes % 3.4      Eosinophils % 0.2      Basophils % 0.5      Neutrophils Absolute 7.86 (*)     Immature Granulocytes Absolute, Automated 0.03      Lymphocytes Absolute 0.56 (*)     Monocytes Absolute 0.30      Eosinophils Absolute 0.02      Basophils Absolute 0.04     COMPREHENSIVE METABOLIC PANEL - Abnormal    Glucose 739 (*)     Sodium 131 (*)     Potassium 4.5      Chloride 96 (*)     Bicarbonate 22      Anion Gap 18      Urea Nitrogen 33 (*)     Creatinine 1.50 (*)     eGFR 55 (*)     Calcium 9.4      Albumin 3.8      Alkaline Phosphatase 139 (*)     Total Protein 7.6      AST 28      Bilirubin, Total 1.0      ALT 60 (*)    B-TYPE NATRIURETIC PEPTIDE - Abnormal    BNP >5,000 (*)     Narrative:        <100 pg/mL - Heart failure unlikely  100-299 pg/mL - Intermediate probability of acute heart                  failure exacerbation. Correlate with clinical                  context and patient history.    >=300 pg/mL - Heart Failure likely. Correlate with clinical                  context and patient history.     Biotin interference may cause falsely decreased results. Patients taking a Biotin dose of up to 5 mg/day should refrain from taking Biotin for 24 hours before sample  collection. Providers may contact their local laboratory for further information.   PROTIME-INR - Abnormal    Protime 13.9 (*)     INR 1.2 (*)    SERIAL TROPONIN-INITIAL - Abnormal    Troponin I, High Sensitivity (CMC) 82 (*)     Narrative:     Less than 99th percentile of normal range cutoff-  Female and children under 18 years old <35 ng/L; Male <54 ng/L: Negative  Repeat testing should be performed if clinically indicated.     Female and children under 18 years old  ng/L; Male  ng/L:  Consistent  with possible cardiac damage and possible increased clinical   risk. Serial measurements may help to assess extent of myocardial damage.     >120 ng/L: Consistent with cardiac damage, increased clinical risk and  myocardial infarction. Serial measurements may help assess extent of   myocardial damage.      NOTE: Children less than 1 year old may have higher baseline troponin   levels and results should be interpreted in conjunction with the overall   clinical context.    NOTE: Troponin I testing is performed using a different   testing methodology at [facility] than at other   [facility]. Direct result comparisons should only   be made within the same method.     BLOOD GAS VENOUS FULL PANEL - Abnormal    POCT pH, Venous 7.32 (*)     POCT pCO2, Venous 45      POCT pO2, Venous 36      POCT SO2, Venous 55      POCT Oxy Hemoglobin, Venous 53.3      POCT Hematocrit Calculated, Venous 47.0      POCT Sodium, Venous 133 (*)     POCT Potassium, Venous 4.7      POCT Chloride, Venous 99      POCT Ionized Calicum, Venous 1.14      POCT Glucose, Venous >685 (*)     POCT Lactate, Venous 3.2 (*)     POCT Base Excess, Venous -3.1 (*)     POCT HCO3 Calculated, Venous 23.2      POCT Hemoglobin, Venous 15.8      POCT Anion Gap, Venous 16.0      Patient Temperature 37.0      FiO2 21     SERIAL TROPONIN, 1 HOUR - Abnormal    Troponin I, High Sensitivity (CMC) 64 (*)     Narrative:     Less than 99th percentile of normal range cutoff-  Female and children under 18 years old <35 ng/L; Male <54 ng/L: Negative  Repeat testing should be performed if clinically indicated.     Female and children under 18 years old  ng/L; Male  ng/L:  Consistent with possible cardiac damage and possible increased clinical   risk. Serial measurements may help to assess extent of myocardial damage.     >120 ng/L: Consistent with cardiac damage, increased clinical risk and  myocardial infarction. Serial measurements may help assess  extent of   myocardial damage.      NOTE: Children less than 1 year old may have higher baseline troponin   levels and results should be interpreted in conjunction with the overall   clinical context.    NOTE: Troponin I testing is performed using a different   testing methodology at [facility] than at other   [facility]. Direct result comparisons should only   be made within the same method.     BETA HYDROXYBUTYRATE - Abnormal    Beta-Hydroxybutyrate 0.79 (*)     Narrative:     The beta-hydroxybutyrate test performance characteristics have been validated by  [facility]. This test has not been approved by the FDA; however such approval is not necessary.     BLOOD GAS VENOUS FULL PANEL - Abnormal    POCT pH, Venous 7.32 (*)     POCT pCO2, Venous 43      POCT pO2, Venous 42      POCT SO2, Venous 66      POCT Oxy Hemoglobin, Venous 64.0      POCT Hematocrit Calculated, Venous 48.0      POCT Sodium, Venous 134 (*)     POCT Potassium, Venous 4.3      POCT Chloride, Venous 99      POCT Ionized Calicum, Venous 1.12      POCT Glucose, Venous >685 (*)     POCT Lactate, Venous 2.8 (*)     POCT Base Excess, Venous -3.9 (*)     POCT HCO3 Calculated, Venous 22.2      POCT Hemoglobin, Venous 16.1      POCT Anion Gap, Venous 17.0      Patient Temperature 37.0      FiO2 36     URINALYSIS WITH REFLEX CULTURE AND MICROSCOPIC - Abnormal    Color, Urine Colorless (*)     Appearance, Urine Clear      Specific Gravity, Urine 1.011      pH, Urine 6.0      Protein, Urine NEGATIVE      Glucose, Urine OVER (4+) (*)     Blood, Urine NEGATIVE      Ketones, Urine NEGATIVE      Bilirubin, Urine NEGATIVE      Urobilinogen, Urine Normal      Nitrite, Urine NEGATIVE      Leukocyte Esterase, Urine NEGATIVE      Narrative:     OVER is reported when the result is greater than the clinically reportable range.   BASIC METABOLIC PANEL - Abnormal    Glucose 739 (*)     Sodium 131 (*)      Potassium 4.5      Chloride 96 (*)     Bicarbonate 22      Anion Gap 18      Urea Nitrogen 33 (*)     Creatinine 1.50 (*)     eGFR 55 (*)     Calcium 9.4     POCT GLUCOSE - Abnormal    POCT Glucose >600 (*)    SARS-COV-2 AND INFLUENZA A/B PCR - Normal    Flu A Result Not Detected      Flu B Result Not Detected      Coronavirus 2019, PCR Not Detected      Narrative:     This assay has received FDA Emergency Use Authorization (EUA) and  is only authorized for the duration of time that circumstances exist to justify the authorization of the emergency use of in vitro diagnostic tests for the detection of SARS-CoV-2 virus and/or diagnosis of COVID-19 infection under section 564(b)(1) of the Act, 21 U.S.C. 360bbb-3(b)(1). Testing for SARS-CoV-2 is only recommended for patients who meet current clinical and/or epidemiological criteria as defined by federal, state, or local public health directives. This assay is an in vitro diagnostic nucleic acid amplification test for the qualitative detection of SARS-CoV-2, Influenza A, and Influenza B from nasopharyngeal specimens and has been validated for use at [facility]. Negative results do not preclude COVID-19 infections or Influenza A/B infections, and should not be used as the sole basis for diagnosis, treatment, or other management decisions. If Influenza A/B and RSV PCR results are negative, testing for Parainfluenza virus, Adenovirus and Metapneumovirus is routinely performed for [facility] pediatric oncology and intensive care inpatients, and is available on other patients by placing an add-on request.    MAGNESIUM - Normal    Magnesium 2.36     PHOSPHORUS - Normal    Phosphorus 3.7     TROPONIN SERIES- (INITIAL, 1 HR)    Narrative:     The following orders were created for panel order Troponin I Series, High Sensitivity (0, 1 HR).  Procedure                               Abnormality         Status                     ---------                               " -----------         ------                     Troponin I, High Sensiti...[[id number]]  Abnormal            Final result               Troponin, High Sensitivi...[[id number]]  Abnormal            Final result                 Please view results for these tests on the individual orders.   URINALYSIS WITH REFLEX CULTURE AND MICROSCOPIC    Narrative:     The following orders were created for panel order Urinalysis with Reflex Culture and Microscopic.  Procedure                               Abnormality         Status                     ---------                               -----------         ------                     Urinalysis with Reflex C...[[id number]]  Abnormal            Final result               Extra Urine Gray Tube[[id number]]                                                         Please view results for these tests on the individual orders.   EXTRA URINE GRAY TUBE   BLOOD GAS LACTIC ACID, VENOUS   POCT GLUCOSE METER   POCT GLUCOSE METER   POCT GLUCOSE METER   POCT GLUCOSE METER   POCT GLUCOSE METER   POCT GLUCOSE METER   POCT GLUCOSE METER   POCT GLUCOSE METER   POCT GLUCOSE METER   POCT GLUCOSE METER   POCT GLUCOSE METER   POCT GLUCOSE METER     Radiographs:  XR chest 1 view   Final Result        Cardiomegaly with pacemaker.        COPD changes with blebs and bullae particularly left apex unchanged.        No consolidation or edema.        Signed by: Taye Goodwin 1/8/2025 1:05 PM   Dictation workstation:   [id number]      Cardiac device check - Inpatient           EKG:      ED Course   Visit Vitals  /90   Pulse 84   Temp 36.2 °C (97.2 °F) (Temporal)   Resp 11   Ht 1.753 m (5' 9\")   Wt 74.8 kg (165 lb)   SpO2 100%   BMI 24.37 kg/m²   Smoking Status Some Days   BSA 1.91 m²     Medications   glucagon (Glucagen) injection 1 mg (has no administration in time range)   dextrose 50 % injection 25 g (has no administration in time range)   glucagon (Glucagen) injection 1 mg (has no administration in time " range)   dextrose 50 % injection 12.5 g (has no administration in time range)   oxygen (O2) therapy (30 percent inhalation Start 1/8/25 1311)   insulin regular 100 unit/100 mL (1 unit/mL) in 0.9 % NaCl infusion (10.5 Units/hr intravenous New Bag 1/8/25 1500)   dextrose 10 % in water (D10W) infusion (has no administration in time range)   dextrose 50 % injection 50 mL (has no administration in time range)   dextrose 10 % in water (D10W) infusion (has no administration in time range)   D5 % and 0.9 % sodium chloride infusion (has no administration in time range)   insulin regular (HumuLIN, NovoLIN) bolus from bag 7 Units (has no administration in time range)   ipratropium-albuteroL (Duo-Neb) 0.5-2.5 mg/3 mL nebulizer solution 3 mL (3 mL nebulization Given 1/8/25 1322)   furosemide (Lasix) injection 100 mg (100 mg intravenous Given 1/8/25 1316)   insulin regular (HumuLIN R,NovoLIN R) injection 10 Units (10 Units subcutaneous Given 1/8/25 1316)       Medical Decision Making     ED Course as of 01/08/25 1554   Wed Jan 08, 2025   1249 POCT Sodium, Venous(!): 133  Corrected Na 147    AG based on corrects Na is 25 [FN]   1256 XR chest 1 view  Significant pulmonary edema on chest x-ray on my interpretation [FN]   1259 BLOOD GAS VENOUS FULL PANEL(!!)  Elevated glucose.  Corrected sodium 147.  Corrected anion gap is 25.  Mild acidemia on VBG, elevated lactate.  Considering DKA on the differential however acidemia is mild so will not treat empirically at this time.  Will give insulin regular 10U SQ.  Unable to give fluids due to significant concern for volume overload.  Patient has bilateral leg edema.  Will give Lasix due to concern for vascular congestion.  Ketones are pending.  Device nurse contacted to interrogate patient's ICD to evaluate for arrhythmias.  Also considered HHS on the differential although patient is not encephalopathic or altered.  He is awake and alert AAO x 3.  He did use cocaine recently. [FN]   1347  Discussed with pharmacy   Will get rpt labs and decide next dose of insulin and potassium supplementation. [FN]   1355 ECG 12 lead [FN]   1403 ECG 1215  I independently interpreted the ECG:  Regular rate. Regular rhythm. Normal axis  ST segments elevations similar to prior from 11/9/2024 noted in lead V3  New nonspecific ST segment depression in V2    ECG 1308  Sinus rhythm, left axis deviation  ST segment elevations noted in the inferior leads without any reciprocal depressions.  Not consistent with STEMI pattern. [FN]   1408 Beta-Hydroxybutyrate(!): 0.79  Elevated [FN]   1408 BLOOD GAS VENOUS FULL PANEL(!!)  Repeat VBG shows similar pH, similar sodium, similar anion gap, still very elevated glucose.  Lactate is mildly improved from 3.2-2.8.  Discussed with pharmacy.  Plan to check creatinine/renal function and then begin insulin drip based on renal function.  There is concern for DKA at this time [FN]   1417 BLOOD GAS VENOUS FULL PANEL(!!)  No concern for respiratory acidosis or alkalosis.  Not having CO2 retention [FN]   1424 SODIUM(!): 131  Corrected sodium is 146   [FN]   1505 BIPAP 16/6 30% [FN]      ED Course User Index  [FN] Jennifer Campbell MD         Diagnoses as of 01/08/25 1554   Acute on chronic heart failure, unspecified heart failure type   Hyperglycemia     Upon patient arrival to ED room 37, fingerstick obtained and reads high.  Venous full panel Initially showing pH of 7.32 again with a glucose that is unreadable, lactate of 3.2.  Patient is placed on BiPAP for work of breathing, suspected fluid overload.  He is on BiPAP for 2 hours and repeat gases relatively unchanged, pH is unchanged, glucose still reading high.  Patient is subsequently given subcutaneous short acting insulin and lieu of his long-acting insulin for concerns that he may later require insulin drip for DKA.  BNP greater than 5000.  Troponins are elevated, however downtrending.  At the time of signout I was able to speak with   Danna, who accepts the patient to the heart failure ICU for further care. Staffed with supervising physician, Dr. MELITA Campbell, who agrees with workup and treatment plan.      Final Impression      1. Acute on chronic heart failure, unspecified heart failure type    2. Hyperglycemia          DISPOSITION  Disposition: admit to HFICU  Patient condition is: Stable    Comment: Please note this report has been produced using speech recognition software and may contain errors related to that system including errors in grammar, punctuation, and spelling, as well as words and phrases that may be inappropriate.  If there are any questions or concerns please feel free to contact the dictating provider for clarification.    VALE Espinosa PA-C  01/08/25 8732

## 2025-01-08 NOTE — H&P
"CICU Admission Note    Chief complaint: shortness of breath     HPI:  Leonel Yu is a 54 y.o. 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     Patient is on BiPAP on exam in the ICU and he is frustrated with not being able to talk through the mask, but he does give some exam. He says that over the last 2 days, he has had increased fatigue and shortness of breath. He says that this is what he feels like in the past when he has been admitted for acute decomp heart failure. Main complaint is of abdominal pain (no nausea, vomiting/ diarrhea, fevers, chills). He denies chest pain, reports shortness of breath and leg swelling. He says taht this is how he has felt in the past when he is volume overloaded and hasn't taken his medications. Reports last crack cocaine use was 2 days ago (smoking).     ED course  Vitals:   /90   Pulse 84   Temp 36.2 °C (97.2 °F) (Temporal)   Resp 11   Ht 1.753 m (5' 9\")   Wt 74.8 kg (165 lb)   SpO2 100%   BMI 24.37 kg/m²    - Labs:   CBC: WBC 8.8 Hgb 16.2  plt 203   BMP: Na 131; 131, K 4.5; 4.5 Cl 96; 96 HCO3 22; 22 BUN 33; 33 Cr 1.50; 1.50glu 739; 739   LFT: Ca 9.4; 9.4 tprot 7.6, alb 3.8 alkphos 139 AST28 ALT 60 tbili 1.0 dbili _   Electrolytes: PO4 _ Mg 2.36   Heme: PT 13.9, INR 1.2 aPTT 23  BNP >5,000 lactate 2.0 Troponin 64     - Imaging:   ECG 12 lead    Result Date: 1/8/2025  See ED provider note for full interpretation and clinical correlation Confirmed by Tashi Martinez ([id number]) on 1/8/2025 4:42:19 PM    XR chest 1 view    Result Date: 1/8/2025  Interpreted By:  Taye Goodwin, STUDY: XR CHEST 1 VIEW   INDICATION: " Signs/Symptoms:Chest Pain.   COMPARISON: November 5, 2024   ACCESSION NUMBER(S): [id number]   ORDERING CLINICIAN: LIBBY DE LA VEGA   FINDINGS: Cardiomegaly with pacemaker.   COPD changes with blebs and bullae particularly left apex unchanged.   No consolidation or edema.         Cardiomegaly with pacemaker.   COPD changes with blebs and bullae particularly left apex unchanged.   No consolidation or edema.   Signed by: Taye Goodwin 1/8/2025 1:05 PM Dictation workstation:   [id number]   - Interventions   - lasix 100mg IV  - started on Bipap  - started on insulin gtt   - device interrogation       CARDIAC DATA:  EKG:  Encounter Date: 01/08/25   ECG 12 lead   Result Value    Ventricular Rate 73    Atrial Rate 73    RI Interval 190    QRS Duration 116    QT Interval 482    QTC Calculation(Bazett) 531    P Axis 52    R Axis -47    T Axis 90    QRS Count 12    Q Onset 207    P Onset 112    P Offset 171    T Offset 448    QTC Fredericia 514    Narrative    See ED provider note for full interpretation and clinical correlation  Confirmed by Tashi Martinez ([id number]) on 1/8/2025 4:42:19 PM     Results for orders placed during the hospital encounter of 01/08/25    ECG 12 lead    Narrative  See ED provider note for full interpretation and clinical correlation  Confirmed by Tashi Martinez ([id number]) on 1/8/2025 4:42:19 PM      Results for orders placed during the hospital encounter of 11/05/24    ECG 12 lead (Preliminary)  This result has not been signed. Information might be incomplete.    Narrative  Sinus rhythm with 1st degree AV block  Left ventricular hypertrophy with QRS widening ( Sokolow-Nieves , [place] product , Romhilt-Dominguez )  Possible Inferior infarct (cited on or before 16-OCT-2024)  Anterolateral injury pattern  Prolonged QT  ** ** ACUTE MI / STEMI ** **  Abnormal ECG  When compared with ECG of 09-NOV-2024 08:40,  Significant changes have occurred      ECG 12 lead    Narrative  Normal sinus rhythm  Right atrial  enlargement  Left axis deviation  Left ventricular hypertrophy with QRS widening and repolarization abnormality  Inferior infarct (cited on or before 16-OCT-2024)  Prolonged QT  Abnormal ECG  When compared with ECG of 09-NOV-2024 08:39,  Serial changes of Inferior infarct Present  Confirmed by James Shah ([id number]) on 11/18/2024 8:18:08 AM      Results for orders placed during the hospital encounter of 10/16/24    Electrocardiogram, 12-lead PRN ACS symptoms    Narrative  Normal sinus rhythm  Biatrial enlargement  Left axis deviation  Left ventricular hypertrophy with QRS widening and repolarization abnormality  Inferior infarct (cited on or before 16-OCT-2024)  Abnormal ECG  When compared with ECG of 16-OCT-2024 22:51,  Nonspecific T wave abnormality no longer evident in Inferior leads  Confirmed by Regino Muse ([id number]) on 10/28/2024 9:43:28 AM      Electrocardiogram, 12-lead PRN ACS symptoms    Narrative  Sinus bradycardia  Left axis deviation  Left ventricular hypertrophy with QRS widening and repolarization abnormality  Inferior infarct (cited on or before 16-OCT-2024)  Abnormal ECG  When compared with ECG of 19-OCT-2024 22:33,  Serial changes of evolving Inferior infarct Present  Confirmed by James Shah ([id number]) on 10/21/2024 11:00:12 AM      ECG 12 lead    Narrative  Sinus bradycardia  Left axis deviation  Left ventricular hypertrophy with QRS widening and repolarization abnormality ( R in aVL , Sokolow-Nieves , [place] product , Romhilt-Dominguez )  Inferior infarct (cited on or before 16-OCT-2024)  Abnormal ECG  When compared with ECG of 15-OCT-2024 09:55,  Vent. rate has decreased BY  31 BPM  Nonspecific T wave abnormality now evident in Inferior leads  QT has shortened  See ED provider note for full interpretation and clinical correlation  Confirmed by Samantha Ace ([id number]) on 10/17/2024 12:50:32 AM      Results for orders placed during the hospital encounter of 10/15/24    ECG 12 lead    Narrative  Normal  sinus rhythm  Possible Left atrial enlargement  Left axis deviation  Pulmonary disease pattern  Inferior infarct (cited on or before 16-OCT-2024)  Prolonged QT  Abnormal ECG  When compared with ECG of 19-OCT-2024 05:18,  ST elevation has replaced ST depression in Lateral leads  T wave inversion now evident in Anterior leads  T wave inversion less evident in Lateral leads  Confirmed by James Shah ([id number]) on 10/24/2024 8:43:59 AM      ECG 12 lead    Narrative  Normal sinus rhythm  Possible Left atrial enlargement  Minimal voltage criteria for LVH, may be normal variant ( Brown product )  Anterior infarct , age undetermined  Prolonged QT  Abnormal ECG  When compared with ECG of 21-OCT-2024 05:52,  Significant changes have occurred    See ED provider note for full interpretation and clinical correlation  Confirmed by Jeanette Silva ([id number]) on 11/5/2024 11:42:26 PM      Results for orders placed during the hospital encounter of 09/30/24    ECG 12 Lead    Narrative   Poor data quality, interpretation may be adversely affected  Normal sinus rhythm  Left bundle branch block  Abnormal ECG  Confirmed by Parvez Lxu ([id number]) on 10/15/2024 4:59:23 PM      ECG 12 Lead    Narrative  Sinus bradycardia with 1st degree AV block  Left axis deviation  Left ventricular hypertrophy with QRS widening and repolarization abnormality  Inferior infarct (cited on or before 07-SEP-2024)  Abnormal ECG  When compared with ECG of 04-OCT-2024 13:11,  Serial changes of Inferior infarct Present  Confirmed by Neo Ferguson ([id number]) on 10/8/2024 1:09:41 PM      Results for orders placed during the hospital encounter of 09/06/24    Electrocardiogram, 12-lead PRN ACS symptoms    Narrative  Normal sinus rhythm  Possible Left atrial enlargement  Left axis deviation  Left ventricular hypertrophy with QRS widening and repolarization abnormality  Inferior infarct (cited on or before 07-SEP-2024)  Prolonged QT  Abnormal ECG  When compared with ECG of  07-SEP-2024 09:33,  Premature ventricular complexes are no longer Present  Confirmed by Leoncio Dias ([id number]) on 9/10/2024 4:02:49 PM      Electrocardiogram, 12-lead PRN ACS symptoms    Narrative  Sinus rhythm with occasional Premature ventricular complexes  Possible Left atrial enlargement  Left axis deviation  Left ventricular hypertrophy with repolarization abnormality  Inferior infarct , age undetermined  Abnormal ECG  When compared with ECG of 07-SEP-2024 00:07,  Premature ventricular complexes are now Present  Inferior infarct is now Present  Confirmed by James Shah ([id number]) on 9/18/2024 8:19:31 AM      ECG 12 Lead    Narrative  Normal sinus rhythm  Left axis deviation  Left ventricular hypertrophy with repolarization abnormality ( Sokolow-Nieves , Brown product )  Abnormal ECG  When compared with ECG of 06-SEP-2024 12:34,  No significant change was found      See ED provider note for full interpretation and clinical correlation  Confirmed by June Perez ([id number]) on 9/7/2024 2:16:33 AM      Results for orders placed during the hospital encounter of 07/30/24    ECG 12 Lead    Narrative  Normal sinus rhythm  Biatrial enlargement  Left axis deviation  Pulmonary disease pattern  Left ventricular hypertrophy with QRS widening and repolarization abnormality  Inferior infarct (cited on or before 12-FEB-2024)  Abnormal ECG  When compared with ECG of 30-JUL-2024 07:13,  Significant changes have occurred  Confirmed by Bradley Loya ([id number]) on 8/13/2024 11:30:39 AM      ECG 12 lead    Narrative  Normal sinus rhythm with sinus arrhythmia  Right axis deviation  Biventricular hypertrophy with repolarization abnormality  Cannot rule out Inferior infarct (cited on or before 12-FEB-2024)  Abnormal ECG  When compared with ECG of 30-JUL-2024 07:12,  Questionable change in QRS axis  QT has shortened    See ED provider note for full interpretation and clinical correlation  Confirmed by Jeanette Silva ([id number]) on 7/31/2024 11:11:03  PM      Results for orders placed during the hospital encounter of 02/12/24    ECG 12 Lead    Narrative  Normal sinus rhythm with sinus arrhythmia  Left axis deviation  Pulmonary disease pattern  Left ventricular hypertrophy with repolarization abnormality  Inferior infarct (cited on or before 12-FEB-2024)  Abnormal ECG  When compared with ECG of 12-FEB-2024 21:52,  T wave inversion in V5 is now evident    Confirmed by Stiven Brown ([id number]) on 2/14/2024 5:48:11 AM      ECG 12 lead    Narrative  Normal sinus rhythm  Possible Left atrial enlargement  Left axis deviation  Inferior infarct (cited on or before 12-FEB-2024)  Poor R-wave progression  T wave abnormality, consider lateral ischemia  Abnormal ECG  When compared with ECG of 12-FEB-2024 21:52,  No significant change was found  Confirmed by Stiven Brown ([id number]) on 2/14/2024 5:46:43 AM      ECG 12 lead    Narrative  Sinus tachycardia  Left axis deviation  Left ventricular hypertrophy with repolarization abnormality ( Sokolow-Nieves , [place] product , Romhilt-Dominguez )  Abnormal ECG  When compared with ECG of 27-DEC-2023 08:23,  Criteria for Inferior infarct are no longer Present  ST elevation now present in Inferior leads  ST elevation now present in Anterior leads  ST now depressed in Lateral leads  See ED provider note for full interpretation and clinical correlation  Confirmed by Denis Joseph ([id number]) on 2/12/2024 9:29:31 PM      Results for orders placed during the hospital encounter of 12/09/23    ECG 12 Lead    Narrative  Normal sinus rhythm  Biatrial enlargement  Left axis deviation  Left ventricular hypertrophy with QRS widening and repolarization abnormality  Incomplete left bundle branch block  Possible Inferior infarct , age undetermined  Abnormal ECG  Confirmed by Parvez Lux ([id number]) on 12/14/2023 3:01:54 PM      ECG 12 Lead    Narrative  See ED provider note for full interpretation and clinical correlation  Confirmed by Astrid Loja ([id number]) on  12/10/2023 5:18:32 AM      Results for orders placed during the hospital encounter of 10/23/23    ECG 12 lead    Narrative  Please see ED Provider Note for formal interpretation  Confirmed by Chayo Arteaga ([id number]) on 10/24/2023 4:06:34 AM      ECG 12 lead    Narrative  Please see ED Provider Note for formal interpretation  Confirmed by Chayo Arteaga ([id number]) on 10/24/2023 3:56:38 AM    Echocardiogram: IMGRESULT([id number])   Echocardiogram     Narrative  [facility], [address] [place] [address]  Tel [phone] and Fax [phone]    TRANSTHORACIC ECHOCARDIOGRAM REPORT      Patient Name:     VINOD Campos Physician:  [id number] Jeanette Ellington MD  Study Date:       7/26/2023          Referring           LINO LOZANO  Physician:  MRN/PID:          [id number]           PCP:  Accession/Order#: [id number]          Count includes the [facility] HHVI Non  Location:           Invasive  YOB: 1970           Fellow:             [id number] Devin Lozano MD  Gender:           M                  Nurse:              Patti Ghosh RN  Admit Date:       7/24/2023          Sonographer:        Samara Prieto [facility]  Admission Status: Inpatient -        Additional Staff:  Routine  Height:           177.80 cm          CC Report to:       Angel [address]  Weight:           71.22 kg           Study Type:         Echocardiogram  BSA:              1.88 m2  Blood Pressure: 112 /64 mmHg    Diagnosis/ICD: I50.42-Chronic combined systolic (congestive) and diastolic  (congestive) heart failure (CHF); G45.8-Other transient cerebral  ischemic attacks and related syndromes  Indication:    CHF; TIA  Procedure/CPT: Color Doppler-66561; Echo Limited-99319    Patient History:  Pertinent History: Congestive heart failure; TIA; HTN; HFrEF; substance uses  disorder; CKD; COPD with emphysema; h/o medication  non-compliance.    Study Detail: The following Echo studies were performed: 2D, M-Mode, color  flow  and Doppler. Agitated saline used as a contrast agent for  intraseptal flow evaluation.      PHYSICIAN INTERPRETATION:  Left Ventricle: The left ventricular systolic function is severely decreased, with an estimated ejection fraction of 15-20%. There is global hypokinesis of the left ventricle with minor regional variations. The left ventricular cavity size is severely dilated. There is severe eccentric left ventricular hypertrophy. Spectral Doppler shows an abnormal pattern of left ventricular diastolic filling. No LV thrombus seen, though there is continuous spontaneous echocontrast within the LV cavity consistent with low flow state.  Left Atrium: The left atrium is moderately dilated. A bubble study using agitated saline was performed. Bubble study is negative. Agitated saline contrast study was negative for intracardiac shunt.  Right Ventricle: The right ventricle is moderately enlarged. There is reduced right ventricular systolic function.  Right Atrium: The right atrium is mildly dilated.  Aortic Valve: The aortic valve is trileaflet. There is minimal aortic valve cusp calcification. There is no evidence of aortic valve regurgitation.  Mitral Valve: The mitral valve is normal in structure. There is mild mitral valve regurgitation.  Tricuspid Valve: The tricuspid valve is structurally normal. There is trace tricuspid regurgitation. The right ventricular systolic pressure is unable to be estimated.  Pulmonic Valve: The pulmonic valve is structurally normal. There is physiologic pulmonic valve regurgitation.  Pericardium: There is a trivial to small pericardial effusion.  Aorta: The aortic root is normal.  Systemic Veins: The inferior vena cava was not well visualized.  In comparison to the previous echocardiogram(s): Compared with study from 3/2/2023, no significant change. Compared to prior echo from 3/2/2023 , no significant change is appreciated on today's study.      CONCLUSIONS:  1. Left ventricular  systolic function is severely decreased with a 15-20% estimated ejection fraction.  2. No LV thrombus seen, though there is continuous spontaneous echocontrast within the LV cavity consistent with low flow state.  3. Spectral Doppler shows an abnormal pattern of left ventricular diastolic filling.  4. There is severe eccentric left ventricular hypertrophy.  5. Moderately enlarged right ventricle.  6. There is reduced right ventricular systolic function.  7. The left atrium is moderately dilated.  8. Agitated saline contrast study was negative for intracardiac shunt.  9. Left ventricular cavity size is severely dilated.  10. Compared to prior echo from 3/2/2023 , no significant change is appreciated on today's study.  11. There is global hypokinesis of the left ventricle with minor regional variations.    QUANTITATIVE DATA SUMMARY:  2D MEASUREMENTS:  Normal Ranges:  IVSd:          1.30 cm    (0.6-1.1cm)  LVPWd:         1.40 cm    (0.6-1.1cm)  LVIDd:         8.20 cm    (3.9-5.9cm)  LVIDs:         7.60 cm  LV Mass Index: 328.9 g/m2  LV % FS        7.3 %    LA VOLUME:  Normal Ranges:  LA Vol A4C:        66.2 ml    (22+/-6mL/m2)  LA Vol A2C:        90.1 ml  LA Vol BP:         79.2 ml  LA Vol Index A4C:  35.2ml/m2  LA Vol Index A2C:  47.9 ml/m2  LA Vol Index BP:   42.1 ml/m2  LA Area A4C:       21.8 cm2  LA Area A2C:       24.8 cm2  LA Major Axis A4C: 6.1 cm  LA Major Axis A2C: 5.8 cm  LA Volume Index:   40.6 ml/m2    RA VOLUME BY A/L METHOD:  Normal Ranges:  RA Vol A4C:        74.9 ml    (8.3-19.5ml)  RA Vol Index A4C:  39.8 ml/m2  RA Area A4C:       21.2 cm2  RA Major Axis A4C: 5.1 cm    AORTA MEASUREMENTS:  Normal Ranges:  Ao Sinus, d: 3.20 cm (2.1-3.5cm)  Asc Ao, d:   3.10 cm (2.1-3.4cm)    LV SYSTOLIC FUNCTION BY 2D PLANIMETRY (MOD):  Normal Ranges:  EF-A4C View: 15.6 % (>=55%)  EF-A2C View: 19.7 %  EF-Biplane:  23.1 %      RIGHT VENTRICLE:  RV Basal 4.80 cm  RV Mid   2.40 cm  RV Major 11.0 cm  TAPSE:   13.2  mm      [id number] Jeanette Ellington MD  Electronically signed on 7/26/2023 at 5:16:54 PM              Medications prior to admission:  Prior to Admission medications    Medication Sig Start Date End Date Taking? Authorizing Provider   alcohol swabs (Alcohol Pads) pads, medicated Use 4-8 per day to check blood glucose and for injectable medications 10/18/24   Katie Garza PA-C   amiodarone (Pacerone) 200 mg tablet Take 1 tablet (200 mg) by mouth once daily. 11/11/24 1/10/25  DEYVI Santamaria   aspirin 81 mg chewable tablet Chew 1 tablet (81 mg) once daily. 11/11/24 1/10/25  DEYVI Santamaria   atorvastatin (Lipitor) 80 mg tablet Take 1 tablet (80 mg) by mouth once daily at bedtime. 11/11/24 1/10/25  DEYVI Santamaria   blood sugar diagnostic strip Use as directed to test blood glucose up to four times daily and as needed. 8/5/24 2/1/25  DEYVI Ramos   blood sugar diagnostic strip Use 1 each 4 times a day before meals if needed as backup to joseph. 10/18/24 10/18/25  Katie Garza PA-C   blood-glucose meter misc Inject 1 each under the skin 4 times a day with meals. Check blood glucose 4 times daily, before meals and at bedtime. 8/5/24 8/5/25  DEYVI Ramos   empagliflozin (Jardiance) 25 mg Take 1 tablet (25 mg) by mouth once daily. 11/11/24 1/10/25  DEYVI Santamaria   FreeStyle Lancets 28 gauge Use as instructed 4 times daily in the event that CGM sensor is not working 10/18/24   Katie Garza PA-C   FreeStyle Joseph 3 Plus Sensor device Change sensor every 15 days 10/18/24   Katie Garza PA-C   FreeStyle Joseph 3 [place] misc Use as instructed 10/18/24   Katie Garza PA-C   glucose 4 gram chewable tablet Chew 2 tablets (8 g) if needed for low blood sugar - see comments. 8/5/24 2/1/25  Chase Blanca, APRN-CNP   insulin glargine (Lantus) 100 unit/mL injection Inject 15 Units under the skin once daily at bedtime. Take as directed per insulin  "instructions. 11/11/24 1/10/25  DEYVI Santamaria   lancets 33 gauge misc Inject 1 each under the skin 4 times a day. 8/5/24 2/1/25  DEYVI Ramos   pen needle 1/2\" 29G X 12mm needle Use to inject 1-4 times daily as directed. 8/5/24 8/5/25  DEYVI Ramos   sacubitriL-valsartan (Entresto) 24-26 mg tablet Take 0.5 tablets by mouth 2 times a day. 11/11/24 1/10/25  DEYVI Santamaria   sertraline (Zoloft) 100 mg tablet Take 1 tablet (100 mg) by mouth once daily. 10/25/24 4/23/25  DEYVI Ramos   spironolactone (Aldactone) 25 mg tablet Take 0.5 tablets (12.5 mg) by mouth once daily. 11/11/24 1/10/25  DEYVI Santamaria   tiotropium (Spiriva Respimat) 2.5 mcg/actuation inhaler Inhale 2 puffs once daily. 10/25/24   DEYVI Ramos   torsemide (Demadex) 20 mg tablet Take 1 tablet (20 mg) by mouth once daily as needed (Please take 1 tab daily if you experience any of the following: increased shortness of breath, increased swelling of your lower extremities, or if you gain 3lbs or more in 1-2 days or 5lbs or more in 7 days). 11/11/24 1/10/25  DEYVI Santamaria       Allergies:  No Known Allergies    Past medical history:  Past Medical History:   Diagnosis Date    CHF (congestive heart failure)     Chronic kidney disease     COPD (chronic obstructive pulmonary disease) (Multi)     Diabetes mellitus (Multi)     Hyperlipidemia     Hypertension     Lethargy 10/19/2024    Stroke (Multi)     Substance abuse (Multi)        Surgical history:  Past Surgical History:   Procedure Laterality Date    INSERT / REPLACE / REMOVE PACEMAKER         Family history:  Family History   Problem Relation Name Age of Onset    Heart disease Father       Non-contributory    Social history:   reports that he has been smoking cigarettes. He has never used smokeless tobacco. He reports current drug use. Drug: \"Crack\" cocaine. He reports that he does not drink alcohol.  Denies EtOH, " illicits, or alcohol use    Review of systems:  ROS negative except as noted per HPI.    Vitals:  24 Hour Vitals  Temp:  [36.2 °C (97.2 °F)] 36.2 °C (97.2 °F)  Heart Rate:  [72-84] 84  Resp:  [11-20] 11  BP: (126-152)/() 126/90  FiO2 (%):  [30 %] 30 %    Temp (24hrs), Av.2 °C (97.2 °F), Min:36.2 °C (97.2 °F), Max:36.2 °C (97.2 °F)     24 hour Intake/Output    Intake/Output Summary (Last 24 hours) at 2025 1727  Last data filed at 2025 1506  Gross per 24 hour   Intake --   Output 2000 ml   Net -2000 ml          Physical exam:  Constitutional: Well-developed male in no acute distress.  HEENT: NCAT. EOMI. No JVD  Respiratory: CTAB. No wheezes, crackles, or rhonchi. Normal respiratory effort on RA.  Cardiovascular: RRR, normal S1/S2, No murmurs, rubs, or gallops.   Abdominal: Soft, nondistended, nontender to palpation. No rebound or guarding  Neuro: CN II-XII grossly intact. Moving all extremities with no focal deficits  MSK: WWP, no peripheral edema  Skin: No lesions, wounds, or bruising  Psych: Appropriate mood and affect.    Labs:  Results for orders placed or performed during the hospital encounter of 25 (from the past 24 hours)   ECG 12 lead   Result Value Ref Range    Ventricular Rate 73 BPM    Atrial Rate 73 BPM    CA Interval 190 ms    QRS Duration 116 ms    QT Interval 482 ms    QTC Calculation(Bazett) 531 ms    P Axis 52 degrees    R Axis -47 degrees    T Axis 90 degrees    QRS Count 12 beats    Q Onset 207 ms    P Onset 112 ms    P Offset 171 ms    T Offset 448 ms    QTC Fredericia 514 ms   POCT GLUCOSE   Result Value Ref Range    POCT Glucose >600 (H) 74 - 99 mg/dL   CBC with Differential   Result Value Ref Range    WBC 8.8 4.4 - 11.3 x10*3/uL    nRBC 0.0 0.0 - 0.0 /100 WBCs    RBC 5.23 4.50 - 5.90 x10*6/uL    Hemoglobin 16.2 13.5 - 17.5 g/dL    Hematocrit 47.0 41.0 - 52.0 %    MCV 90 80 - 100 fL    MCH 31.0 26.0 - 34.0 pg    MCHC 34.5 32.0 - 36.0 g/dL    RDW 15.6 (H) 11.5 - 14.5 %     Platelets 203 150 - 450 x10*3/uL    Neutrophils % 89.2 40.0 - 80.0 %    Immature Granulocytes %, Automated 0.3 0.0 - 0.9 %    Lymphocytes % 6.4 13.0 - 44.0 %    Monocytes % 3.4 2.0 - 10.0 %    Eosinophils % 0.2 0.0 - 6.0 %    Basophils % 0.5 0.0 - 2.0 %    Neutrophils Absolute 7.86 (H) 1.20 - 7.70 x10*3/uL    Immature Granulocytes Absolute, Automated 0.03 0.00 - 0.70 x10*3/uL    Lymphocytes Absolute 0.56 (L) 1.20 - 4.80 x10*3/uL    Monocytes Absolute 0.30 0.10 - 1.00 x10*3/uL    Eosinophils Absolute 0.02 0.00 - 0.70 x10*3/uL    Basophils Absolute 0.04 0.00 - 0.10 x10*3/uL   Comprehensive Metabolic Panel   Result Value Ref Range    Glucose 739 (HH) 74 - 99 mg/dL    Sodium 131 (L) 136 - 145 mmol/L    Potassium 4.5 3.5 - 5.3 mmol/L    Chloride 96 (L) 98 - 107 mmol/L    Bicarbonate 22 21 - 32 mmol/L    Anion Gap 18 10 - 20 mmol/L    Urea Nitrogen 33 (H) 6 - 23 mg/dL    Creatinine 1.50 (H) 0.50 - 1.30 mg/dL    eGFR 55 (L) >60 mL/min/1.73m*2    Calcium 9.4 8.6 - 10.6 mg/dL    Albumin 3.8 3.4 - 5.0 g/dL    Alkaline Phosphatase 139 (H) 33 - 120 U/L    Total Protein 7.6 6.4 - 8.2 g/dL    AST 28 9 - 39 U/L    Bilirubin, Total 1.0 0.0 - 1.2 mg/dL    ALT 60 (H) 10 - 52 U/L   Brain Natriuretic Peptide   Result Value Ref Range    BNP >5,000 (H) 0 - 99 pg/mL   Protime-INR   Result Value Ref Range    Protime 13.9 (H) 9.8 - 12.8 seconds    INR 1.2 (H) 0.9 - 1.1   Troponin I, High Sensitivity, Initial   Result Value Ref Range    Troponin I, High Sensitivity (CMC) 82 (H) 0 - 53 ng/L   BLOOD GAS VENOUS FULL PANEL   Result Value Ref Range    POCT pH, Venous 7.32 (L) 7.33 - 7.43 pH    POCT pCO2, Venous 45 41 - 51 mm Hg    POCT pO2, Venous 36 35 - 45 mm Hg    POCT SO2, Venous 55 45 - 75 %    POCT Oxy Hemoglobin, Venous 53.3 45.0 - 75.0 %    POCT Hematocrit Calculated, Venous 47.0 41.0 - 52.0 %    POCT Sodium, Venous 133 (L) 136 - 145 mmol/L    POCT Potassium, Venous 4.7 3.5 - 5.3 mmol/L    POCT Chloride, Venous 99 98 - 107 mmol/L    POCT  Ionized Calicum, Venous 1.14 1.10 - 1.33 mmol/L    POCT Glucose, Venous >685 (HH) 74 - 99 mg/dL    POCT Lactate, Venous 3.2 (H) 0.4 - 2.0 mmol/L    POCT Base Excess, Venous -3.1 (L) -2.0 - 3.0 mmol/L    POCT HCO3 Calculated, Venous 23.2 22.0 - 26.0 mmol/L    POCT Hemoglobin, Venous 15.8 13.5 - 17.5 g/dL    POCT Anion Gap, Venous 16.0 10.0 - 25.0 mmol/L    Patient Temperature 37.0 degrees Celsius    FiO2 21 %   Beta Hydroxybutyrate   Result Value Ref Range    Beta-Hydroxybutyrate 0.79 (H) 0.02 - 0.27 mmol/L   Basic metabolic panel   Result Value Ref Range    Glucose 739 (HH) 74 - 99 mg/dL    Sodium 131 (L) 136 - 145 mmol/L    Potassium 4.5 3.5 - 5.3 mmol/L    Chloride 96 (L) 98 - 107 mmol/L    Bicarbonate 22 21 - 32 mmol/L    Anion Gap 18 10 - 20 mmol/L    Urea Nitrogen 33 (H) 6 - 23 mg/dL    Creatinine 1.50 (H) 0.50 - 1.30 mg/dL    eGFR 55 (L) >60 mL/min/1.73m*2    Calcium 9.4 8.6 - 10.6 mg/dL   Magnesium   Result Value Ref Range    Magnesium 2.36 1.60 - 2.40 mg/dL   Phosphorus   Result Value Ref Range    Phosphorus 3.7 2.5 - 4.9 mg/dL   Sars-CoV-2 and Influenza A/B PCR   Result Value Ref Range    Flu A Result Not Detected Not Detected    Flu B Result Not Detected Not Detected    Coronavirus 2019, PCR Not Detected Not Detected   Troponin, High Sensitivity, 1 Hour   Result Value Ref Range    Troponin I, High Sensitivity (CMC) 64 (H) 0 - 53 ng/L   Blood Gas Venous Full Panel   Result Value Ref Range    POCT pH, Venous 7.32 (L) 7.33 - 7.43 pH    POCT pCO2, Venous 43 41 - 51 mm Hg    POCT pO2, Venous 42 35 - 45 mm Hg    POCT SO2, Venous 66 45 - 75 %    POCT Oxy Hemoglobin, Venous 64.0 45.0 - 75.0 %    POCT Hematocrit Calculated, Venous 48.0 41.0 - 52.0 %    POCT Sodium, Venous 134 (L) 136 - 145 mmol/L    POCT Potassium, Venous 4.3 3.5 - 5.3 mmol/L    POCT Chloride, Venous 99 98 - 107 mmol/L    POCT Ionized Calicum, Venous 1.12 1.10 - 1.33 mmol/L    POCT Glucose, Venous >685 (HH) 74 - 99 mg/dL    POCT Lactate, Venous 2.8  (H) 0.4 - 2.0 mmol/L    POCT Base Excess, Venous -3.9 (L) -2.0 - 3.0 mmol/L    POCT HCO3 Calculated, Venous 22.2 22.0 - 26.0 mmol/L    POCT Hemoglobin, Venous 16.1 13.5 - 17.5 g/dL    POCT Anion Gap, Venous 17.0 10.0 - 25.0 mmol/L    Patient Temperature 37.0 degrees Celsius    FiO2 36 %   Urinalysis with Reflex Culture and Microscopic   Result Value Ref Range    Color, Urine Colorless (N) Light-Yellow, Yellow, Dark-Yellow    Appearance, Urine Clear Clear    Specific Gravity, Urine 1.011 1.005 - 1.035    pH, Urine 6.0 5.0, 5.5, 6.0, 6.5, 7.0, 7.5, 8.0    Protein, Urine NEGATIVE NEGATIVE, 10 (TRACE), 20 (TRACE) mg/dL    Glucose, Urine OVER (4+) (A) Normal mg/dL    Blood, Urine NEGATIVE NEGATIVE    Ketones, Urine NEGATIVE NEGATIVE mg/dL    Bilirubin, Urine NEGATIVE NEGATIVE    Urobilinogen, Urine Normal Normal mg/dL    Nitrite, Urine NEGATIVE NEGATIVE    Leukocyte Esterase, Urine NEGATIVE NEGATIVE   Blood Gas Lactic Acid, Venous   Result Value Ref Range    POCT Lactate, Venous 2.3 (H) 0.4 - 2.0 mmol/L   POCT GLUCOSE   Result Value Ref Range    POCT Glucose 391 (H) 74 - 99 mg/dL       Imaging:  === 10/16/24 ===    US RENAL COMPLETE    - Impression -  Bilateral echogenic kidney is, likely related to medical renal  disease. No nephrolithiasis or hydronephrosis.    I personally reviewed the images/study and I agree with the findings  as stated by resident physician Dr. Omari Patel . This study  was interpreted at [facility],  [place], [place].    MACRO:  None      Signed by: Gamaliel Brandon 10/17/2024 9:33 AM  Dictation workstation:   [id number]   === 10/16/24 ===    CT ABDOMEN PELVIS W IV CONTRAST    - Impression -  1. Wall thickening of the urinary bladder, which can be seen in the  setting of cystitis in the appropriate clinical setting.  2. Unchanged cardiomegaly with asymmetrical enlargement of the left  ventricle.  3. Stable 0.4 cm pulmonary nodule within the right  middle lobe dating  back to 2023.  4. Chronic emphysematous changes of the bilateral lungs.      I personally reviewed the images/study and resident's interpretation  and I agree with the findings as stated by Suzi Moyer MD (resident  radiologist). This study was analyzed and interpreted at [facility], [place], [place].    MACRO:  None.    Signed by: Gamaliel Brandon 10/19/2024 4:11 PM  Dictation workstation:   [id number]          Assessment and plan:  Leonel Yu is a 54 y.o. male with past medical history of mixed ischemic/nonischemic cardiomyopathy, HFrEF EF 10 to 15% 10/2024, prior VF/VT status post [employer] single-chamber ICD 3/2024, hypertension, hyperlipidemia who is admitted for acute decompensated heart failure as well as mild DKA.  Patient reports feeling short of breath and fatigue compared to his baseline over the last 2 days, says that he has not always been taking his medications.  Admission labs significant for glucose greater than 700 w/ mild acidosis and positive ketones. Likely that this is volume overload/decompensated heart failure in the setting of DKA and missed medication.     NEURO  #Substance use disorder  ::crack cocaine use last 2 days ago   Plan  Avoid beta blockers   UDS pending     #Depression  #Anxiety  - continue home sertraline    PULM  #Increased work of breathing  ::Pt w/normal oxygenation but placed on positive pressure for work of breathing  Plan:  Diuresis as below, wean as able     #COPD  - continue home spiriva     CARDIOLOGY  #Acute on chronic decompensated HFrEF (EF 10-15%)  #Mixed ischemic and nonischemic cardiomyopathy   #Type 2 MI  #Hx of VT/VF s/p ICD   #HLD  - BNP >5,000, troponin 80s>60s  - Patient reports not taking all of his medications consistently   - Appears hypervolemic on exam  - Received 1x IV lasix 100 in the ED  Plan:   - continue amiodarone 200mg daily   - Diuresis w/ bumex 4mg iv push then gtt at  2mg/hr  - continue home atorva 80mg daily   - continue aspirin 81mg daily   - device interrogated, no shocks or arrhythmia       GI  #Abdominal pain  :: likely in setting of volume overload and abdominal edema  - lipase WNL  Plan:   - continue to monitor, if worsens, will check imaging (CT abdomen pelvis)    RENAL  #CKD3a  ::unclear true baseline, seems most of his creatinine levels have been drawn when in [facility]  - seems to be somewhere in the low ones, but unclear  Plan:  Hold home entresto, empag, spironolactone, if Cr stable in the AM, will restart       HEME/ONC  BIPIN    ENDO  #T2DM  #Hyperglycemia  #Mild DKA  ::on admit, pH 7.32, glucose >700, lactate 3.2, + beta hydroxy butyrate but anion gap only mildly elevated  - ideally would give fluids, but patient volume overloaded  Plan:  Hold home insulin  Insulin drip until anion gap and BG < 200  When able to tolerate PO, would give subcutaneous lantus 10 units   A1c pending       ID  BIPIN       MSK/RHEUM  #L calf pain  ::tender to palpation  Plan:  LUE ultrasound ordered  If any change/worsening pain, check CT of leg       SKIN  BIPIN      F: caution, EF 10-15%  E: K>4, Mg>2  N: NPO while on NIPPV  Access/Tubes: PIV  Antimicrobials: None  DVT prophylaxis: heparin subq  GI prophylaxis: N/A  Bowel Reg: Miralax  Oxygen: 30%  Code status: Full code   NOK: Roommate Elizabet Santana [phone]    Patient and plan discussed with CICU fellow.     Sheldon Birmingham MD  PGY-2 Internal Medicine

## 2025-01-09 ENCOUNTER — APPOINTMENT (OUTPATIENT)
Dept: CARDIOLOGY | Facility: HOSPITAL | Age: 55
End: 2025-01-09
Payer: COMMERCIAL

## 2025-01-09 ENCOUNTER — APPOINTMENT (OUTPATIENT)
Dept: RADIOLOGY | Facility: HOSPITAL | Age: 55
End: 2025-01-09
Payer: COMMERCIAL

## 2025-01-09 LAB
ALBUMIN SERPL BCP-MCNC: 3.7 G/DL (ref 3.4–5)
ALBUMIN SERPL BCP-MCNC: 3.7 G/DL (ref 3.4–5)
ALBUMIN SERPL BCP-MCNC: 3.8 G/DL (ref 3.4–5)
ALP SERPL-CCNC: 123 U/L (ref 33–120)
ALT SERPL W P-5'-P-CCNC: 62 U/L (ref 10–52)
ANION GAP BLDV CALCULATED.4IONS-SCNC: 6 MMOL/L (ref 10–25)
ANION GAP BLDV CALCULATED.4IONS-SCNC: 8 MMOL/L (ref 10–25)
ANION GAP SERPL CALC-SCNC: 16 MMOL/L (ref 10–20)
ANION GAP SERPL CALC-SCNC: 17 MMOL/L (ref 10–20)
ANION GAP SERPL CALC-SCNC: 21 MMOL/L (ref 10–20)
APTT PPP: 30 SECONDS (ref 27–38)
AST SERPL W P-5'-P-CCNC: 33 U/L (ref 9–39)
ATRIAL RATE: 67 BPM
ATRIAL RATE: 67 BPM
ATRIAL RATE: 70 BPM
ATRIAL RATE: 77 BPM
BASE EXCESS BLDV CALC-SCNC: 6.6 MMOL/L (ref -2–3)
BASE EXCESS BLDV CALC-SCNC: 8.7 MMOL/L (ref -2–3)
BASOPHILS # BLD AUTO: 0.04 X10*3/UL (ref 0–0.1)
BASOPHILS NFR BLD AUTO: 0.4 %
BILIRUB DIRECT SERPL-MCNC: 0.2 MG/DL (ref 0–0.3)
BILIRUB SERPL-MCNC: 1.2 MG/DL (ref 0–1.2)
BODY TEMPERATURE: 37 DEGREES CELSIUS
BODY TEMPERATURE: 37 DEGREES CELSIUS
BUN SERPL-MCNC: 28 MG/DL (ref 6–23)
BUN SERPL-MCNC: 28 MG/DL (ref 6–23)
BUN SERPL-MCNC: 30 MG/DL (ref 6–23)
CA-I BLDV-SCNC: 1.14 MMOL/L (ref 1.1–1.33)
CA-I BLDV-SCNC: 1.16 MMOL/L (ref 1.1–1.33)
CALCIUM SERPL-MCNC: 10.2 MG/DL (ref 8.6–10.6)
CALCIUM SERPL-MCNC: 9.5 MG/DL (ref 8.6–10.6)
CALCIUM SERPL-MCNC: 9.7 MG/DL (ref 8.6–10.6)
CARDIAC TROPONIN I PNL SERPL HS: 87 NG/L (ref 0–53)
CHLORIDE BLDV-SCNC: 98 MMOL/L (ref 98–107)
CHLORIDE BLDV-SCNC: 99 MMOL/L (ref 98–107)
CHLORIDE SERPL-SCNC: 95 MMOL/L (ref 98–107)
CHLORIDE SERPL-SCNC: 97 MMOL/L (ref 98–107)
CHLORIDE SERPL-SCNC: 99 MMOL/L (ref 98–107)
CO2 SERPL-SCNC: 24 MMOL/L (ref 21–32)
CO2 SERPL-SCNC: 27 MMOL/L (ref 21–32)
CO2 SERPL-SCNC: 29 MMOL/L (ref 21–32)
CREAT SERPL-MCNC: 1.55 MG/DL (ref 0.5–1.3)
CREAT SERPL-MCNC: 1.63 MG/DL (ref 0.5–1.3)
CREAT SERPL-MCNC: 1.69 MG/DL (ref 0.5–1.3)
EGFRCR SERPLBLD CKD-EPI 2021: 47 ML/MIN/1.73M*2
EGFRCR SERPLBLD CKD-EPI 2021: 49 ML/MIN/1.73M*2
EGFRCR SERPLBLD CKD-EPI 2021: 53 ML/MIN/1.73M*2
EOSINOPHIL # BLD AUTO: 0.04 X10*3/UL (ref 0–0.7)
EOSINOPHIL NFR BLD AUTO: 0.4 %
ERYTHROCYTE [DISTWIDTH] IN BLOOD BY AUTOMATED COUNT: 15.8 % (ref 11.5–14.5)
GLUCOSE BLD MANUAL STRIP-MCNC: 163 MG/DL (ref 74–99)
GLUCOSE BLD MANUAL STRIP-MCNC: 201 MG/DL (ref 74–99)
GLUCOSE BLD MANUAL STRIP-MCNC: 205 MG/DL (ref 74–99)
GLUCOSE BLD MANUAL STRIP-MCNC: 250 MG/DL (ref 74–99)
GLUCOSE BLD MANUAL STRIP-MCNC: 335 MG/DL (ref 74–99)
GLUCOSE BLD MANUAL STRIP-MCNC: 388 MG/DL (ref 74–99)
GLUCOSE BLD MANUAL STRIP-MCNC: 424 MG/DL (ref 74–99)
GLUCOSE BLDV-MCNC: 176 MG/DL (ref 74–99)
GLUCOSE BLDV-MCNC: 188 MG/DL (ref 74–99)
GLUCOSE SERPL-MCNC: 174 MG/DL (ref 74–99)
GLUCOSE SERPL-MCNC: 176 MG/DL (ref 74–99)
GLUCOSE SERPL-MCNC: 224 MG/DL (ref 74–99)
HCO3 BLDV-SCNC: 32 MMOL/L (ref 22–26)
HCO3 BLDV-SCNC: 34.2 MMOL/L (ref 22–26)
HCT VFR BLD AUTO: 51.6 % (ref 41–52)
HCT VFR BLD EST: 56 % (ref 41–52)
HCT VFR BLD EST: 58 % (ref 41–52)
HGB BLD-MCNC: 18.4 G/DL (ref 13.5–17.5)
HGB BLDV-MCNC: 18.7 G/DL (ref 13.5–17.5)
HGB BLDV-MCNC: 19.3 G/DL (ref 13.5–17.5)
HOLD SPECIMEN: NORMAL
HOLD SPECIMEN: NORMAL
IMM GRANULOCYTES # BLD AUTO: 0.03 X10*3/UL (ref 0–0.7)
IMM GRANULOCYTES NFR BLD AUTO: 0.3 % (ref 0–0.9)
INHALED O2 CONCENTRATION: 26 %
INHALED O2 CONCENTRATION: 26 %
INR PPP: 1.2 (ref 0.9–1.1)
LACTATE BLDV-SCNC: 1.8 MMOL/L (ref 0.4–2)
LACTATE BLDV-SCNC: 2.8 MMOL/L (ref 0.4–2)
LACTATE SERPL-SCNC: 1.8 MMOL/L (ref 0.4–2)
LACTATE SERPL-SCNC: 3 MMOL/L (ref 0.4–2)
LYMPHOCYTES # BLD AUTO: 0.65 X10*3/UL (ref 1.2–4.8)
LYMPHOCYTES NFR BLD AUTO: 6.6 %
MAGNESIUM SERPL-MCNC: 2.13 MG/DL (ref 1.6–2.4)
MCH RBC QN AUTO: 31 PG (ref 26–34)
MCHC RBC AUTO-ENTMCNC: 35.7 G/DL (ref 32–36)
MCV RBC AUTO: 87 FL (ref 80–100)
MONOCYTES # BLD AUTO: 0.44 X10*3/UL (ref 0.1–1)
MONOCYTES NFR BLD AUTO: 4.5 %
NEUTROPHILS # BLD AUTO: 8.59 X10*3/UL (ref 1.2–7.7)
NEUTROPHILS NFR BLD AUTO: 87.8 %
NRBC BLD-RTO: 0 /100 WBCS (ref 0–0)
OXYHGB MFR BLDV: 69.6 % (ref 45–75)
OXYHGB MFR BLDV: 81.9 % (ref 45–75)
P AXIS: 36 DEGREES
P AXIS: 41 DEGREES
P AXIS: 56 DEGREES
P AXIS: 68 DEGREES
P OFFSET: 167 MS
P OFFSET: 171 MS
P OFFSET: 175 MS
P ONSET: 109 MS
P ONSET: 117 MS
P ONSET: 119 MS
PCO2 BLDV: 46 MM HG (ref 41–51)
PCO2 BLDV: 47 MM HG (ref 41–51)
PH BLDV: 7.45 PH (ref 7.33–7.43)
PH BLDV: 7.47 PH (ref 7.33–7.43)
PHOSPHATE SERPL-MCNC: 3.3 MG/DL (ref 2.5–4.9)
PHOSPHATE SERPL-MCNC: 3.7 MG/DL (ref 2.5–4.9)
PHOSPHATE SERPL-MCNC: 4.4 MG/DL (ref 2.5–4.9)
PLATELET # BLD AUTO: 228 X10*3/UL (ref 150–450)
PO2 BLDV: 45 MM HG (ref 35–45)
PO2 BLDV: 53 MM HG (ref 35–45)
POTASSIUM BLDV-SCNC: 3.7 MMOL/L (ref 3.5–5.3)
POTASSIUM BLDV-SCNC: 3.7 MMOL/L (ref 3.5–5.3)
POTASSIUM SERPL-SCNC: 3.6 MMOL/L (ref 3.5–5.3)
POTASSIUM SERPL-SCNC: 3.8 MMOL/L (ref 3.5–5.3)
POTASSIUM SERPL-SCNC: 4.2 MMOL/L (ref 3.5–5.3)
PR INTERVAL: 172 MS
PR INTERVAL: 176 MS
PR INTERVAL: 190 MS
PR INTERVAL: 216 MS
PROT SERPL-MCNC: 7.2 G/DL (ref 6.4–8.2)
PROTHROMBIN TIME: 13.6 SECONDS (ref 9.8–12.8)
Q ONSET: 203 MS
Q ONSET: 204 MS
Q ONSET: 207 MS
Q ONSET: 213 MS
QRS COUNT: 11 BEATS
QRS COUNT: 12 BEATS
QRS DURATION: 112 MS
QRS DURATION: 112 MS
QRS DURATION: 114 MS
QRS DURATION: 118 MS
QT INTERVAL: 468 MS
QT INTERVAL: 468 MS
QT INTERVAL: 490 MS
QT INTERVAL: 536 MS
QTC CALCULATION(BAZETT): 494 MS
QTC CALCULATION(BAZETT): 505 MS
QTC CALCULATION(BAZETT): 517 MS
QTC CALCULATION(BAZETT): 606 MS
QTC FREDERICIA: 485 MS
QTC FREDERICIA: 492 MS
QTC FREDERICIA: 508 MS
QTC FREDERICIA: 582 MS
R AXIS: -19 DEGREES
R AXIS: -31 DEGREES
R AXIS: -35 DEGREES
R AXIS: 35 DEGREES
RBC # BLD AUTO: 5.93 X10*6/UL (ref 4.5–5.9)
SAO2 % BLDV: 71 % (ref 45–75)
SAO2 % BLDV: 84 % (ref 45–75)
SODIUM BLDV-SCNC: 134 MMOL/L (ref 136–145)
SODIUM BLDV-SCNC: 135 MMOL/L (ref 136–145)
SODIUM SERPL-SCNC: 136 MMOL/L (ref 136–145)
SODIUM SERPL-SCNC: 138 MMOL/L (ref 136–145)
SODIUM SERPL-SCNC: 139 MMOL/L (ref 136–145)
T AXIS: 102 DEGREES
T AXIS: 106 DEGREES
T AXIS: 109 DEGREES
T AXIS: 56 DEGREES
T OFFSET: 437 MS
T OFFSET: 441 MS
T OFFSET: 449 MS
T OFFSET: 481 MS
VENTRICULAR RATE: 67 BPM
VENTRICULAR RATE: 67 BPM
VENTRICULAR RATE: 70 BPM
VENTRICULAR RATE: 77 BPM
WBC # BLD AUTO: 9.8 X10*3/UL (ref 4.4–11.3)

## 2025-01-09 PROCEDURE — 36415 COLL VENOUS BLD VENIPUNCTURE: CPT

## 2025-01-09 PROCEDURE — 84132 ASSAY OF SERUM POTASSIUM: CPT

## 2025-01-09 PROCEDURE — 82947 ASSAY GLUCOSE BLOOD QUANT: CPT

## 2025-01-09 PROCEDURE — 99291 CRITICAL CARE FIRST HOUR: CPT

## 2025-01-09 PROCEDURE — 93975 VASCULAR STUDY: CPT

## 2025-01-09 PROCEDURE — 2500000001 HC RX 250 WO HCPCS SELF ADMINISTERED DRUGS (ALT 637 FOR MEDICARE OP): Mod: SE

## 2025-01-09 PROCEDURE — 85610 PROTHROMBIN TIME: CPT

## 2025-01-09 PROCEDURE — 93005 ELECTROCARDIOGRAM TRACING: CPT

## 2025-01-09 PROCEDURE — 84484 ASSAY OF TROPONIN QUANT: CPT

## 2025-01-09 PROCEDURE — 2500000004 HC RX 250 GENERAL PHARMACY W/ HCPCS (ALT 636 FOR OP/ED): Mod: SE

## 2025-01-09 PROCEDURE — 93970 EXTREMITY STUDY: CPT

## 2025-01-09 PROCEDURE — 85025 COMPLETE CBC W/AUTO DIFF WBC: CPT

## 2025-01-09 PROCEDURE — 83735 ASSAY OF MAGNESIUM: CPT

## 2025-01-09 PROCEDURE — 76705 ECHO EXAM OF ABDOMEN: CPT

## 2025-01-09 PROCEDURE — 2500000002 HC RX 250 W HCPCS SELF ADMINISTERED DRUGS (ALT 637 FOR MEDICARE OP, ALT 636 FOR OP/ED): Mod: SE

## 2025-01-09 PROCEDURE — 97161 PT EVAL LOW COMPLEX 20 MIN: CPT | Mod: GP

## 2025-01-09 PROCEDURE — 93010 ELECTROCARDIOGRAM REPORT: CPT | Performed by: INTERNAL MEDICINE

## 2025-01-09 PROCEDURE — 82248 BILIRUBIN DIRECT: CPT

## 2025-01-09 PROCEDURE — 83605 ASSAY OF LACTIC ACID: CPT

## 2025-01-09 PROCEDURE — 94640 AIRWAY INHALATION TREATMENT: CPT

## 2025-01-09 PROCEDURE — 83605 ASSAY OF LACTIC ACID: CPT | Performed by: INTERNAL MEDICINE

## 2025-01-09 PROCEDURE — 1200000002 HC GENERAL ROOM WITH TELEMETRY DAILY

## 2025-01-09 PROCEDURE — 93971 EXTREMITY STUDY: CPT

## 2025-01-09 RX ORDER — LURASIDONE HYDROCHLORIDE 60 MG/1
60 TABLET, FILM COATED ORAL DAILY
Status: ON HOLD | COMMUNITY

## 2025-01-09 RX ORDER — FOLIC ACID 1 MG/1
1 TABLET ORAL DAILY
Status: ON HOLD | COMMUNITY

## 2025-01-09 RX ORDER — POTASSIUM CHLORIDE 20 MEQ/1
20 TABLET, EXTENDED RELEASE ORAL ONCE
Status: COMPLETED | OUTPATIENT
Start: 2025-01-09 | End: 2025-01-09

## 2025-01-09 RX ORDER — BUMETANIDE 0.25 MG/ML
4 INJECTION, SOLUTION INTRAMUSCULAR; INTRAVENOUS EVERY 12 HOURS
Status: COMPLETED | OUTPATIENT
Start: 2025-01-09 | End: 2025-01-09

## 2025-01-09 RX ORDER — ESCITALOPRAM OXALATE 10 MG/1
10 TABLET ORAL DAILY
Status: ON HOLD | COMMUNITY

## 2025-01-09 RX ORDER — INSULIN LISPRO 100 [IU]/ML
5 INJECTION, SOLUTION INTRAVENOUS; SUBCUTANEOUS ONCE
Status: COMPLETED | OUTPATIENT
Start: 2025-01-09 | End: 2025-01-09

## 2025-01-09 RX ORDER — FLUTICASONE PROPIONATE AND SALMETEROL 100; 50 UG/1; UG/1
1 POWDER RESPIRATORY (INHALATION)
Status: ON HOLD | COMMUNITY

## 2025-01-09 RX ORDER — POTASSIUM CHLORIDE 14.9 MG/ML
20 INJECTION INTRAVENOUS ONCE
Status: COMPLETED | OUTPATIENT
Start: 2025-01-09 | End: 2025-01-09

## 2025-01-09 RX ORDER — SPIRONOLACTONE 25 MG/1
25 TABLET ORAL DAILY
Status: DISCONTINUED | OUTPATIENT
Start: 2025-01-09 | End: 2025-01-10

## 2025-01-09 RX ORDER — DEXTROSE 50 % IN WATER (D50W) INTRAVENOUS SYRINGE
12.5
Status: ACTIVE | OUTPATIENT
Start: 2025-01-09

## 2025-01-09 RX ORDER — INSULIN GLARGINE 100 [IU]/ML
15 INJECTION, SOLUTION SUBCUTANEOUS EVERY 24 HOURS
Status: DISPENSED | OUTPATIENT
Start: 2025-01-09

## 2025-01-09 RX ORDER — INSULIN LISPRO 100 [IU]/ML
0-10 INJECTION, SOLUTION INTRAVENOUS; SUBCUTANEOUS
Status: DISPENSED | OUTPATIENT
Start: 2025-01-09

## 2025-01-09 RX ORDER — INSULIN GLARGINE 100 [IU]/ML
10 INJECTION, SOLUTION SUBCUTANEOUS ONCE
Status: COMPLETED | OUTPATIENT
Start: 2025-01-09 | End: 2025-01-09

## 2025-01-09 RX ORDER — ALBUTEROL SULFATE 90 UG/1
2 INHALANT RESPIRATORY (INHALATION) EVERY 6 HOURS PRN
Status: ON HOLD | COMMUNITY

## 2025-01-09 RX ORDER — NITROGLYCERIN 0.4 MG/1
0.4 TABLET SUBLINGUAL EVERY 5 MIN PRN
Status: DISCONTINUED | OUTPATIENT
Start: 2025-01-09 | End: 2025-01-09

## 2025-01-09 RX ORDER — DEXTROSE 50 % IN WATER (D50W) INTRAVENOUS SYRINGE
25
Status: ACTIVE | OUTPATIENT
Start: 2025-01-09

## 2025-01-09 RX ORDER — INSULIN LISPRO 100 [IU]/ML
0-10 INJECTION, SOLUTION INTRAVENOUS; SUBCUTANEOUS
Status: ON HOLD | COMMUNITY

## 2025-01-09 RX ORDER — INSULIN LISPRO 100 [IU]/ML
5 INJECTION, SOLUTION INTRAVENOUS; SUBCUTANEOUS
Status: DISPENSED | OUTPATIENT
Start: 2025-01-10

## 2025-01-09 RX ORDER — AMIODARONE HYDROCHLORIDE 200 MG/1
200 TABLET ORAL DAILY
Status: DISPENSED | OUTPATIENT
Start: 2025-01-09

## 2025-01-09 RX ADMIN — POTASSIUM CHLORIDE 20 MEQ: 1500 TABLET, EXTENDED RELEASE ORAL at 05:47

## 2025-01-09 RX ADMIN — POTASSIUM CHLORIDE 20 MEQ: 14.9 INJECTION, SOLUTION INTRAVENOUS at 05:46

## 2025-01-09 RX ADMIN — TIOTROPIUM BROMIDE INHALATION SPRAY 2 PUFF: 3.12 SPRAY, METERED RESPIRATORY (INHALATION) at 08:16

## 2025-01-09 RX ADMIN — EMPAGLIFLOZIN 10 MG: 10 TABLET, FILM COATED ORAL at 10:15

## 2025-01-09 RX ADMIN — BUMETANIDE 4 MG: 0.25 INJECTION INTRAMUSCULAR; INTRAVENOUS at 10:06

## 2025-01-09 RX ADMIN — INSULIN LISPRO 4 UNITS: 100 INJECTION, SOLUTION INTRAVENOUS; SUBCUTANEOUS at 11:51

## 2025-01-09 RX ADMIN — ACETAMINOPHEN 650 MG: 325 TABLET ORAL at 11:14

## 2025-01-09 RX ADMIN — HEPARIN SODIUM 5000 UNITS: 5000 INJECTION INTRAVENOUS; SUBCUTANEOUS at 00:51

## 2025-01-09 RX ADMIN — AMIODARONE HYDROCHLORIDE 200 MG: 200 TABLET ORAL at 10:14

## 2025-01-09 RX ADMIN — ASPIRIN 81 MG 81 MG: 81 TABLET ORAL at 09:03

## 2025-01-09 RX ADMIN — SPIRONOLACTONE 25 MG: 25 TABLET, FILM COATED ORAL at 10:14

## 2025-01-09 RX ADMIN — ACETAMINOPHEN 650 MG: 325 TABLET ORAL at 22:24

## 2025-01-09 RX ADMIN — POLYETHYLENE GLYCOL 3350 17 G: 17 POWDER, FOR SOLUTION ORAL at 08:15

## 2025-01-09 RX ADMIN — BUMETANIDE 2 MG/HR: 0.25 INJECTION INTRAMUSCULAR; INTRAVENOUS at 05:43

## 2025-01-09 RX ADMIN — ATORVASTATIN CALCIUM 80 MG: 80 TABLET, FILM COATED ORAL at 20:10

## 2025-01-09 RX ADMIN — INSULIN LISPRO 8 UNITS: 100 INJECTION, SOLUTION INTRAVENOUS; SUBCUTANEOUS at 15:42

## 2025-01-09 RX ADMIN — INSULIN GLARGINE 10 UNITS: 100 INJECTION, SOLUTION SUBCUTANEOUS at 08:09

## 2025-01-09 RX ADMIN — POTASSIUM CHLORIDE 20 MEQ: 1500 TABLET, EXTENDED RELEASE ORAL at 01:32

## 2025-01-09 RX ADMIN — BUMETANIDE 4 MG: 0.25 INJECTION INTRAMUSCULAR; INTRAVENOUS at 21:05

## 2025-01-09 RX ADMIN — SERTRALINE HYDROCHLORIDE 100 MG: 100 TABLET ORAL at 09:03

## 2025-01-09 RX ADMIN — INSULIN LISPRO 8 UNITS: 100 INJECTION, SOLUTION INTRAVENOUS; SUBCUTANEOUS at 08:09

## 2025-01-09 RX ADMIN — INSULIN GLARGINE 15 UNITS: 100 INJECTION, SOLUTION SUBCUTANEOUS at 19:58

## 2025-01-09 RX ADMIN — INSULIN LISPRO 5 UNITS: 100 INJECTION, SOLUTION INTRAVENOUS; SUBCUTANEOUS at 19:58

## 2025-01-09 SDOH — ECONOMIC STABILITY: HOUSING INSECURITY: IN THE PAST 12 MONTHS, HOW MANY TIMES HAVE YOU MOVED WHERE YOU WERE LIVING?: 0

## 2025-01-09 SDOH — ECONOMIC STABILITY: HOUSING INSECURITY: IN THE LAST 12 MONTHS, WAS THERE A TIME WHEN YOU WERE NOT ABLE TO PAY THE MORTGAGE OR RENT ON TIME?: YES

## 2025-01-09 SDOH — ECONOMIC STABILITY: FOOD INSECURITY: HOW HARD IS IT FOR YOU TO PAY FOR THE VERY BASICS LIKE FOOD, HOUSING, MEDICAL CARE, AND HEATING?: HARD

## 2025-01-09 SDOH — SOCIAL STABILITY: SOCIAL INSECURITY: WITHIN THE LAST YEAR, HAVE YOU BEEN HUMILIATED OR EMOTIONALLY ABUSED IN OTHER WAYS BY YOUR PARTNER OR EX-PARTNER?: NO

## 2025-01-09 SDOH — SOCIAL STABILITY: SOCIAL INSECURITY: WITHIN THE LAST YEAR, HAVE YOU BEEN AFRAID OF YOUR PARTNER OR EX-PARTNER?: NO

## 2025-01-09 SDOH — ECONOMIC STABILITY: HOUSING INSECURITY: AT ANY TIME IN THE PAST 12 MONTHS, WERE YOU HOMELESS OR LIVING IN A SHELTER (INCLUDING NOW)?: NO

## 2025-01-09 SDOH — ECONOMIC STABILITY: FOOD INSECURITY: WITHIN THE PAST 12 MONTHS, THE FOOD YOU BOUGHT JUST DIDN'T LAST AND YOU DIDN'T HAVE MONEY TO GET MORE.: OFTEN TRUE

## 2025-01-09 SDOH — ECONOMIC STABILITY: FOOD INSECURITY: WITHIN THE PAST 12 MONTHS, YOU WORRIED THAT YOUR FOOD WOULD RUN OUT BEFORE YOU GOT THE MONEY TO BUY MORE.: OFTEN TRUE

## 2025-01-09 SDOH — ECONOMIC STABILITY: GENERAL: SOCIAL DRIVERS OF HEALTH CONSIDERATIONS: SOCIOECONOMIC CONCERNS

## 2025-01-09 SDOH — ECONOMIC STABILITY: TRANSPORTATION INSECURITY: IN THE PAST 12 MONTHS, HAS LACK OF TRANSPORTATION KEPT YOU FROM MEDICAL APPOINTMENTS OR FROM GETTING MEDICATIONS?: NO

## 2025-01-09 SDOH — ECONOMIC STABILITY: INCOME INSECURITY: IN THE PAST 12 MONTHS HAS THE ELECTRIC, GAS, OIL, OR WATER COMPANY THREATENED TO SHUT OFF SERVICES IN YOUR HOME?: NO

## 2025-01-09 ASSESSMENT — PAIN DESCRIPTION - LOCATION: LOCATION: CHEST

## 2025-01-09 ASSESSMENT — PAIN - FUNCTIONAL ASSESSMENT
PAIN_FUNCTIONAL_ASSESSMENT: 0-10

## 2025-01-09 ASSESSMENT — PAIN SCALES - GENERAL
PAINLEVEL_OUTOF10: 0 - NO PAIN
PAINLEVEL_OUTOF10: 3
PAINLEVEL_OUTOF10: 0 - NO PAIN

## 2025-01-09 ASSESSMENT — COGNITIVE AND FUNCTIONAL STATUS - GENERAL
CLIMB 3 TO 5 STEPS WITH RAILING: TOTAL
MOBILITY SCORE: 14
MOVING TO AND FROM BED TO CHAIR: A LITTLE
TURNING FROM BACK TO SIDE WHILE IN FLAT BAD: A LITTLE
MOVING FROM LYING ON BACK TO SITTING ON SIDE OF FLAT BED WITH BEDRAILS: A LITTLE
STANDING UP FROM CHAIR USING ARMS: A LITTLE
WALKING IN HOSPITAL ROOM: TOTAL

## 2025-01-09 ASSESSMENT — ACTIVITIES OF DAILY LIVING (ADL)
LACK_OF_TRANSPORTATION: NO
LACK_OF_TRANSPORTATION: NO
ADL_ASSISTANCE: INDEPENDENT

## 2025-01-09 NOTE — PROGRESS NOTES
Leonel Yu is a 54 y.o. male on day 1 of admission presenting with Acute on chronic heart failure, unspecified heart failure type.    Subjective   NAEON. Patient feels much improved this morning, says that his breathing is much better. He is having a very good appetite now, tolerating PO without issue. Denies chest pain, palpitations, lightheadedness/dizziness, nausea, vomiting, fevers/chills.        Objective     Physical Exam  Constitutional:       General: He is not in acute distress.     Appearance: He is not ill-appearing or toxic-appearing.   HENT:      Head: Normocephalic.      Mouth/Throat:      Mouth: Mucous membranes are moist.      Comments: Poor dentition  Eyes:      General: No scleral icterus.     Extraocular Movements: Extraocular movements intact.      Conjunctiva/sclera: Conjunctivae normal.      Pupils: Pupils are equal, round, and reactive to light.   Cardiovascular:      Rate and Rhythm: Normal rate and regular rhythm.      Pulses: Normal pulses.      Heart sounds: No murmur heard.     No gallop.   Pulmonary:      Effort: Pulmonary effort is normal. No respiratory distress.      Breath sounds: Rales present. No wheezing.   Abdominal:      General: Abdomen is flat. Bowel sounds are normal.      Palpations: Abdomen is soft.      Tenderness: There is abdominal tenderness (deep palpation RUQ).   Musculoskeletal:         General: Normal range of motion.      Cervical back: Normal range of motion and neck supple.      Right lower leg: Edema present.      Left lower leg: Edema present.   Skin:     General: Skin is warm and dry.      Capillary Refill: Capillary refill takes less than 2 seconds.   Neurological:      General: No focal deficit present.      Mental Status: He is alert and oriented to person, place, and time.      Cranial Nerves: No cranial nerve deficit.      Sensory: No sensory deficit.   Psychiatric:         Mood and Affect: Mood normal.         Last Recorded Vitals  Blood pressure  "110/87, pulse 63, temperature 36.2 °C (97.2 °F), temperature source Temporal, resp. rate 20, height 1.778 m (5' 10\"), weight 70 kg (154 lb 5.2 oz), SpO2 94%.  Intake/Output last 3 Shifts:  I/O last 3 completed shifts:  In: 567.5 (8.1 mL/kg) [P.O.:460; I.V.:107.5 (1.5 mL/kg)]  Out: 5050 (72.1 mL/kg) [Urine:5050 (2 mL/kg/hr)]  Weight: 70 kg     Relevant Results  Scheduled medications  amiodarone, 200 mg, oral, Daily  aspirin, 81 mg, oral, Daily  atorvastatin, 80 mg, oral, Nightly  bumetanide, 4 mg, intravenous, q12h  empagliflozin, 10 mg, oral, Daily  heparin (porcine), 5,000 Units, subcutaneous, q8h  insulin glargine, 15 Units, subcutaneous, q24h  insulin lispro, 0-10 Units, subcutaneous, TID AC  polyethylene glycol, 17 g, oral, Daily  sertraline, 100 mg, oral, Daily  spironolactone, 25 mg, oral, Daily  tiotropium, 2 puff, inhalation, Daily      Continuous medications     PRN medications  PRN medications: acetaminophen, dextrose, dextrose, dextrose, dextrose, dextrose, glucagon, glucagon, glucagon, glucagon, nitroglycerin, oxygen  Results for orders placed or performed during the hospital encounter of 01/08/25 (from the past 24 hours)   ECG 12 lead   Result Value Ref Range    Ventricular Rate 73 BPM    Atrial Rate 73 BPM    DE Interval 190 ms    QRS Duration 116 ms    QT Interval 482 ms    QTC Calculation(Bazett) 531 ms    P Axis 52 degrees    R Axis -47 degrees    T Axis 90 degrees    QRS Count 12 beats    Q Onset 207 ms    P Onset 112 ms    P Offset 171 ms    T Offset 448 ms    QTC Fredericia 514 ms   POCT GLUCOSE   Result Value Ref Range    POCT Glucose >600 (H) 74 - 99 mg/dL   CBC with Differential   Result Value Ref Range    WBC 8.8 4.4 - 11.3 x10*3/uL    nRBC 0.0 0.0 - 0.0 /100 WBCs    RBC 5.23 4.50 - 5.90 x10*6/uL    Hemoglobin 16.2 13.5 - 17.5 g/dL    Hematocrit 47.0 41.0 - 52.0 %    MCV 90 80 - 100 fL    MCH 31.0 26.0 - 34.0 pg    MCHC 34.5 32.0 - 36.0 g/dL    RDW 15.6 (H) 11.5 - 14.5 %    Platelets 203 150 " - 450 x10*3/uL    Neutrophils % 89.2 40.0 - 80.0 %    Immature Granulocytes %, Automated 0.3 0.0 - 0.9 %    Lymphocytes % 6.4 13.0 - 44.0 %    Monocytes % 3.4 2.0 - 10.0 %    Eosinophils % 0.2 0.0 - 6.0 %    Basophils % 0.5 0.0 - 2.0 %    Neutrophils Absolute 7.86 (H) 1.20 - 7.70 x10*3/uL    Immature Granulocytes Absolute, Automated 0.03 0.00 - 0.70 x10*3/uL    Lymphocytes Absolute 0.56 (L) 1.20 - 4.80 x10*3/uL    Monocytes Absolute 0.30 0.10 - 1.00 x10*3/uL    Eosinophils Absolute 0.02 0.00 - 0.70 x10*3/uL    Basophils Absolute 0.04 0.00 - 0.10 x10*3/uL   Comprehensive Metabolic Panel   Result Value Ref Range    Glucose 739 (HH) 74 - 99 mg/dL    Sodium 131 (L) 136 - 145 mmol/L    Potassium 4.5 3.5 - 5.3 mmol/L    Chloride 96 (L) 98 - 107 mmol/L    Bicarbonate 22 21 - 32 mmol/L    Anion Gap 18 10 - 20 mmol/L    Urea Nitrogen 33 (H) 6 - 23 mg/dL    Creatinine 1.50 (H) 0.50 - 1.30 mg/dL    eGFR 55 (L) >60 mL/min/1.73m*2    Calcium 9.4 8.6 - 10.6 mg/dL    Albumin 3.8 3.4 - 5.0 g/dL    Alkaline Phosphatase 139 (H) 33 - 120 U/L    Total Protein 7.6 6.4 - 8.2 g/dL    AST 28 9 - 39 U/L    Bilirubin, Total 1.0 0.0 - 1.2 mg/dL    ALT 60 (H) 10 - 52 U/L   Brain Natriuretic Peptide   Result Value Ref Range    BNP >5,000 (H) 0 - 99 pg/mL   Protime-INR   Result Value Ref Range    Protime 13.9 (H) 9.8 - 12.8 seconds    INR 1.2 (H) 0.9 - 1.1   Troponin I, High Sensitivity, Initial   Result Value Ref Range    Troponin I, High Sensitivity (CMC) 82 (H) 0 - 53 ng/L   BLOOD GAS VENOUS FULL PANEL   Result Value Ref Range    POCT pH, Venous 7.32 (L) 7.33 - 7.43 pH    POCT pCO2, Venous 45 41 - 51 mm Hg    POCT pO2, Venous 36 35 - 45 mm Hg    POCT SO2, Venous 55 45 - 75 %    POCT Oxy Hemoglobin, Venous 53.3 45.0 - 75.0 %    POCT Hematocrit Calculated, Venous 47.0 41.0 - 52.0 %    POCT Sodium, Venous 133 (L) 136 - 145 mmol/L    POCT Potassium, Venous 4.7 3.5 - 5.3 mmol/L    POCT Chloride, Venous 99 98 - 107 mmol/L    POCT Ionized Calicum,  Venous 1.14 1.10 - 1.33 mmol/L    POCT Glucose, Venous >685 (HH) 74 - 99 mg/dL    POCT Lactate, Venous 3.2 (H) 0.4 - 2.0 mmol/L    POCT Base Excess, Venous -3.1 (L) -2.0 - 3.0 mmol/L    POCT HCO3 Calculated, Venous 23.2 22.0 - 26.0 mmol/L    POCT Hemoglobin, Venous 15.8 13.5 - 17.5 g/dL    POCT Anion Gap, Venous 16.0 10.0 - 25.0 mmol/L    Patient Temperature 37.0 degrees Celsius    FiO2 21 %   Beta Hydroxybutyrate   Result Value Ref Range    Beta-Hydroxybutyrate 0.79 (H) 0.02 - 0.27 mmol/L   Basic metabolic panel   Result Value Ref Range    Glucose 739 (HH) 74 - 99 mg/dL    Sodium 131 (L) 136 - 145 mmol/L    Potassium 4.5 3.5 - 5.3 mmol/L    Chloride 96 (L) 98 - 107 mmol/L    Bicarbonate 22 21 - 32 mmol/L    Anion Gap 18 10 - 20 mmol/L    Urea Nitrogen 33 (H) 6 - 23 mg/dL    Creatinine 1.50 (H) 0.50 - 1.30 mg/dL    eGFR 55 (L) >60 mL/min/1.73m*2    Calcium 9.4 8.6 - 10.6 mg/dL   Magnesium   Result Value Ref Range    Magnesium 2.36 1.60 - 2.40 mg/dL   Phosphorus   Result Value Ref Range    Phosphorus 3.7 2.5 - 4.9 mg/dL   Sars-CoV-2 and Influenza A/B PCR   Result Value Ref Range    Flu A Result Not Detected Not Detected    Flu B Result Not Detected Not Detected    Coronavirus 2019, PCR Not Detected Not Detected   Troponin, High Sensitivity, 1 Hour   Result Value Ref Range    Troponin I, High Sensitivity (CMC) 64 (H) 0 - 53 ng/L   Blood Gas Venous Full Panel   Result Value Ref Range    POCT pH, Venous 7.32 (L) 7.33 - 7.43 pH    POCT pCO2, Venous 43 41 - 51 mm Hg    POCT pO2, Venous 42 35 - 45 mm Hg    POCT SO2, Venous 66 45 - 75 %    POCT Oxy Hemoglobin, Venous 64.0 45.0 - 75.0 %    POCT Hematocrit Calculated, Venous 48.0 41.0 - 52.0 %    POCT Sodium, Venous 134 (L) 136 - 145 mmol/L    POCT Potassium, Venous 4.3 3.5 - 5.3 mmol/L    POCT Chloride, Venous 99 98 - 107 mmol/L    POCT Ionized Calicum, Venous 1.12 1.10 - 1.33 mmol/L    POCT Glucose, Venous >685 (HH) 74 - 99 mg/dL    POCT Lactate, Venous 2.8 (H) 0.4 - 2.0  mmol/L    POCT Base Excess, Venous -3.9 (L) -2.0 - 3.0 mmol/L    POCT HCO3 Calculated, Venous 22.2 22.0 - 26.0 mmol/L    POCT Hemoglobin, Venous 16.1 13.5 - 17.5 g/dL    POCT Anion Gap, Venous 17.0 10.0 - 25.0 mmol/L    Patient Temperature 37.0 degrees Celsius    FiO2 36 %   Urinalysis with Reflex Culture and Microscopic   Result Value Ref Range    Color, Urine Colorless (N) Light-Yellow, Yellow, Dark-Yellow    Appearance, Urine Clear Clear    Specific Gravity, Urine 1.011 1.005 - 1.035    pH, Urine 6.0 5.0, 5.5, 6.0, 6.5, 7.0, 7.5, 8.0    Protein, Urine NEGATIVE NEGATIVE, 10 (TRACE), 20 (TRACE) mg/dL    Glucose, Urine OVER (4+) (A) Normal mg/dL    Blood, Urine NEGATIVE NEGATIVE    Ketones, Urine NEGATIVE NEGATIVE mg/dL    Bilirubin, Urine NEGATIVE NEGATIVE    Urobilinogen, Urine Normal Normal mg/dL    Nitrite, Urine NEGATIVE NEGATIVE    Leukocyte Esterase, Urine NEGATIVE NEGATIVE   Extra Urine Gray Tube   Result Value Ref Range    Extra Tube Hold for add-ons.    ECG 12 lead   Result Value Ref Range    Ventricular Rate 67 BPM    Atrial Rate 67 BPM    AZ Interval 190 ms    QRS Duration 112 ms    QT Interval 490 ms    QTC Calculation(Bazett) 517 ms    P Axis 56 degrees    R Axis -35 degrees    T Axis 102 degrees    QRS Count 12 beats    Q Onset 204 ms    P Onset 109 ms    P Offset 167 ms    T Offset 449 ms    QTC Fredericia 508 ms   Blood Gas Lactic Acid, Venous   Result Value Ref Range    POCT Lactate, Venous 2.3 (H) 0.4 - 2.0 mmol/L   POCT GLUCOSE   Result Value Ref Range    POCT Glucose 391 (H) 74 - 99 mg/dL   Magnesium   Result Value Ref Range    Magnesium 2.83 (H) 1.60 - 2.40 mg/dL   Renal function panel   Result Value Ref Range    Glucose 380 (H) 74 - 99 mg/dL    Sodium 137 136 - 145 mmol/L    Potassium 6.1 (HH) 3.5 - 5.3 mmol/L    Chloride 99 98 - 107 mmol/L    Bicarbonate 28 21 - 32 mmol/L    Anion Gap 16 10 - 20 mmol/L    Urea Nitrogen 32 (H) 6 - 23 mg/dL    Creatinine 1.55 (H) 0.50 - 1.30 mg/dL    eGFR 53  (L) >60 mL/min/1.73m*2    Calcium 10.0 8.6 - 10.6 mg/dL    Phosphorus 4.5 2.5 - 4.9 mg/dL    Albumin 4.5 3.4 - 5.0 g/dL   Coagulation Screen   Result Value Ref Range    Protime 13.2 (H) 9.8 - 12.8 seconds    INR 1.2 (H) 0.9 - 1.1    aPTT 31 27 - 38 seconds   Lactate   Result Value Ref Range    Lactate 2.7 (H) 0.4 - 2.0 mmol/L   CBC and Auto Differential   Result Value Ref Range    WBC 9.4 4.4 - 11.3 x10*3/uL    nRBC 0.0 0.0 - 0.0 /100 WBCs    RBC 5.74 4.50 - 5.90 x10*6/uL    Hemoglobin 17.8 (H) 13.5 - 17.5 g/dL    Hematocrit 50.3 41.0 - 52.0 %    MCV 88 80 - 100 fL    MCH 31.0 26.0 - 34.0 pg    MCHC 35.4 32.0 - 36.0 g/dL    RDW 15.9 (H) 11.5 - 14.5 %    Platelets 231 150 - 450 x10*3/uL    Neutrophils % 86.7 40.0 - 80.0 %    Immature Granulocytes %, Automated 0.2 0.0 - 0.9 %    Lymphocytes % 9.5 13.0 - 44.0 %    Monocytes % 3.0 2.0 - 10.0 %    Eosinophils % 0.1 0.0 - 6.0 %    Basophils % 0.5 0.0 - 2.0 %    Neutrophils Absolute 8.17 (H) 1.20 - 7.70 x10*3/uL    Immature Granulocytes Absolute, Automated 0.02 0.00 - 0.70 x10*3/uL    Lymphocytes Absolute 0.90 (L) 1.20 - 4.80 x10*3/uL    Monocytes Absolute 0.28 0.10 - 1.00 x10*3/uL    Eosinophils Absolute 0.01 0.00 - 0.70 x10*3/uL    Basophils Absolute 0.05 0.00 - 0.10 x10*3/uL   Type and screen   Result Value Ref Range    ABO TYPE A     Rh TYPE POS     ANTIBODY SCREEN NEG    Lipase   Result Value Ref Range    Lipase 13 9 - 82 U/L   Electrocardiogram, 12-lead PRN ACS symptoms   Result Value Ref Range    Ventricular Rate 70 BPM    Atrial Rate 70 BPM    NV Interval 172 ms    QRS Duration 114 ms    QT Interval 468 ms    QTC Calculation(Bazett) 505 ms    P Axis 36 degrees    R Axis -19 degrees    T Axis 109 degrees    QRS Count 12 beats    Q Onset 203 ms    P Onset 117 ms    P Offset 171 ms    T Offset 437 ms    QTC Fredericia 492 ms   Drug Screen, Urine   Result Value Ref Range    Amphetamine Screen, Urine Presumptive Negative Presumptive Negative    Barbiturate Screen, Urine  Presumptive Negative Presumptive Negative    Benzodiazepines Screen, Urine Presumptive Negative Presumptive Negative    Cannabinoid Screen, Urine Presumptive Negative Presumptive Negative    Cocaine Metabolite Screen, Urine Presumptive Negative Presumptive Negative    Fentanyl Screen, Urine Presumptive Negative Presumptive Negative    Opiate Screen, Urine Presumptive Negative Presumptive Negative    Oxycodone Screen, Urine Presumptive Negative Presumptive Negative    PCP Screen, Urine Presumptive Negative Presumptive Negative    Methadone Screen, Urine Presumptive Negative Presumptive Negative   Urinalysis with Reflex Culture and Microscopic   Result Value Ref Range    Color, Urine Colorless (N) Light-Yellow, Yellow, Dark-Yellow    Appearance, Urine Clear Clear    Specific Gravity, Urine 1.007 1.005 - 1.035    pH, Urine 5.0 5.0, 5.5, 6.0, 6.5, 7.0, 7.5, 8.0    Protein, Urine NEGATIVE NEGATIVE, 10 (TRACE), 20 (TRACE) mg/dL    Glucose, Urine OVER (4+) (A) Normal mg/dL    Blood, Urine 0.1 (1+) (A) NEGATIVE    Ketones, Urine NEGATIVE NEGATIVE mg/dL    Bilirubin, Urine NEGATIVE NEGATIVE    Urobilinogen, Urine Normal Normal mg/dL    Nitrite, Urine NEGATIVE NEGATIVE    Leukocyte Esterase, Urine NEGATIVE NEGATIVE   Extra Urine Gray Tube   Result Value Ref Range    Extra Tube Hold for add-ons.    Urinalysis Microscopic   Result Value Ref Range    WBC, Urine NONE 1-5, NONE /HPF    RBC, Urine 1-2 NONE, 1-2, 3-5 /HPF    Mucus, Urine FEW Reference range not established. /LPF    Hyaline Casts, Urine 2+ (A) NONE /LPF   POCT GLUCOSE   Result Value Ref Range    POCT Glucose 188 (H) 74 - 99 mg/dL   Blood Gas Venous Full Panel   Result Value Ref Range    POCT pH, Venous 7.44 (H) 7.33 - 7.43 pH    POCT pCO2, Venous 47 41 - 51 mm Hg    POCT pO2, Venous 56 (H) 35 - 45 mm Hg    POCT SO2, Venous 85 (H) 45 - 75 %    POCT Oxy Hemoglobin, Venous 82.3 (H) 45.0 - 75.0 %    POCT Hematocrit Calculated, Venous 57.0 (H) 41.0 - 52.0 %    POCT  Sodium, Venous 134 (L) 136 - 145 mmol/L    POCT Potassium, Venous 9.5 (HH) 3.5 - 5.3 mmol/L    POCT Chloride, Venous 104 98 - 107 mmol/L    POCT Ionized Calicum, Venous 1.03 (L) 1.10 - 1.33 mmol/L    POCT Glucose, Venous 122 (H) 74 - 99 mg/dL    POCT Lactate, Venous 2.5 (H) 0.4 - 2.0 mmol/L    POCT Base Excess, Venous 6.2 (H) -2.0 - 3.0 mmol/L    POCT HCO3 Calculated, Venous 31.9 (H) 22.0 - 26.0 mmol/L    POCT Hemoglobin, Venous 18.9 (H) 13.5 - 17.5 g/dL    POCT Anion Gap, Venous 8.0 (L) 10.0 - 25.0 mmol/L    Patient Temperature 37.0 degrees Celsius    FiO2 30 %   POCT GLUCOSE   Result Value Ref Range    POCT Glucose 87 74 - 99 mg/dL   POCT GLUCOSE   Result Value Ref Range    POCT Glucose 124 (H) 74 - 99 mg/dL   Sterile Cup   Result Value Ref Range    Extra Tube Hold for add-ons.    BLOOD GAS VENOUS FULL PANEL   Result Value Ref Range    POCT pH, Venous 7.47 (H) 7.33 - 7.43 pH    POCT pCO2, Venous 44 41 - 51 mm Hg    POCT pO2, Venous 57 (H) 35 - 45 mm Hg    POCT SO2, Venous 87 (H) 45 - 75 %    POCT Oxy Hemoglobin, Venous 84.2 (H) 45.0 - 75.0 %    POCT Hematocrit Calculated, Venous 56.0 (H) 41.0 - 52.0 %    POCT Sodium, Venous 130 (L) 136 - 145 mmol/L    POCT Potassium, Venous >10.0 (HH) 3.5 - 5.3 mmol/L    POCT Chloride, Venous 102 98 - 107 mmol/L    POCT Ionized Calicum, Venous 1.01 (L) 1.10 - 1.33 mmol/L    POCT Glucose, Venous 148 (H) 74 - 99 mg/dL    POCT Lactate, Venous 3.1 (H) 0.4 - 2.0 mmol/L    POCT Base Excess, Venous 7.1 (H) -2.0 - 3.0 mmol/L    POCT HCO3 Calculated, Venous 32.0 (H) 22.0 - 26.0 mmol/L    POCT Hemoglobin, Venous 18.7 (H) 13.5 - 17.5 g/dL    POCT Anion Gap, Venous      Patient Temperature 37.0 degrees Celsius    FiO2 24 %   BLOOD GAS ARTERIAL FULL PANEL   Result Value Ref Range    POCT pH, Arterial 7.48 (H) 7.38 - 7.42 pH    POCT pCO2, Arterial 42 38 - 42 mm Hg    POCT pO2, Arterial 84 (L) 85 - 95 mm Hg    POCT SO2, Arterial 98 94 - 100 %    POCT Oxy Hemoglobin, Arterial 95.4 94.0 - 98.0 %     POCT Hematocrit Calculated, Arterial 54.0 (H) 41.0 - 52.0 %    POCT Sodium, Arterial 134 (L) 136 - 145 mmol/L    POCT Potassium, Arterial 3.5 3.5 - 5.3 mmol/L    POCT Chloride, Arterial 99 98 - 107 mmol/L    POCT Ionized Calcium, Arterial 1.24 1.10 - 1.33 mmol/L    POCT Glucose, Arterial 343 (H) 74 - 99 mg/dL    POCT Lactate, Arterial 2.3 (H) 0.4 - 2.0 mmol/L    POCT Base Excess, Arterial 6.9 (H) -2.0 - 3.0 mmol/L    POCT HCO3 Calculated, Arterial 31.3 (H) 22.0 - 26.0 mmol/L    POCT Hemoglobin, Arterial 18.0 (H) 13.5 - 17.5 g/dL    POCT Anion Gap, Arterial 7 (L) 10 - 25 mmo/L    Patient Temperature 37.0 degrees Celsius    FiO2 26 %   Lactate   Result Value Ref Range    Lactate 3.0 (H) 0.4 - 2.0 mmol/L   Basic Metabolic Panel   Result Value Ref Range    Glucose 263 (H) 74 - 99 mg/dL    Sodium 140 136 - 145 mmol/L    Potassium 3.7 3.5 - 5.3 mmol/L    Chloride 99 98 - 107 mmol/L    Bicarbonate 28 21 - 32 mmol/L    Anion Gap 17 10 - 20 mmol/L    Urea Nitrogen 29 (H) 6 - 23 mg/dL    Creatinine 1.57 (H) 0.50 - 1.30 mg/dL    eGFR 52 (L) >60 mL/min/1.73m*2    Calcium 10.2 8.6 - 10.6 mg/dL   BLOOD GAS VENOUS FULL PANEL   Result Value Ref Range    POCT pH, Venous 7.45 (H) 7.33 - 7.43 pH    POCT pCO2, Venous 46 41 - 51 mm Hg    POCT pO2, Venous 53 (H) 35 - 45 mm Hg    POCT SO2, Venous 84 (H) 45 - 75 %    POCT Oxy Hemoglobin, Venous 81.9 (H) 45.0 - 75.0 %    POCT Hematocrit Calculated, Venous 56.0 (H) 41.0 - 52.0 %    POCT Sodium, Venous 135 (L) 136 - 145 mmol/L    POCT Potassium, Venous 3.7 3.5 - 5.3 mmol/L    POCT Chloride, Venous 99 98 - 107 mmol/L    POCT Ionized Calicum, Venous 1.16 1.10 - 1.33 mmol/L    POCT Glucose, Venous 188 (H) 74 - 99 mg/dL    POCT Lactate, Venous 2.8 (H) 0.4 - 2.0 mmol/L    POCT Base Excess, Venous 6.6 (H) -2.0 - 3.0 mmol/L    POCT HCO3 Calculated, Venous 32.0 (H) 22.0 - 26.0 mmol/L    POCT Hemoglobin, Venous 18.7 (H) 13.5 - 17.5 g/dL    POCT Anion Gap, Venous 8.0 (L) 10.0 - 25.0 mmol/L     Patient Temperature 37.0 degrees Celsius    FiO2 26 %   Lactate   Result Value Ref Range    Lactate 3.0 (H) 0.4 - 2.0 mmol/L   Renal function panel   Result Value Ref Range    Glucose 176 (H) 74 - 99 mg/dL    Sodium 139 136 - 145 mmol/L    Potassium 3.8 3.5 - 5.3 mmol/L    Chloride 99 98 - 107 mmol/L    Bicarbonate 27 21 - 32 mmol/L    Anion Gap 17 10 - 20 mmol/L    Urea Nitrogen 28 (H) 6 - 23 mg/dL    Creatinine 1.63 (H) 0.50 - 1.30 mg/dL    eGFR 49 (L) >60 mL/min/1.73m*2    Calcium 9.7 8.6 - 10.6 mg/dL    Phosphorus 3.7 2.5 - 4.9 mg/dL    Albumin 3.7 3.4 - 5.0 g/dL   POCT GLUCOSE   Result Value Ref Range    POCT Glucose 201 (H) 74 - 99 mg/dL   Magnesium   Result Value Ref Range    Magnesium 2.13 1.60 - 2.40 mg/dL   CBC and Auto Differential   Result Value Ref Range    WBC 9.8 4.4 - 11.3 x10*3/uL    nRBC 0.0 0.0 - 0.0 /100 WBCs    RBC 5.93 (H) 4.50 - 5.90 x10*6/uL    Hemoglobin 18.4 (H) 13.5 - 17.5 g/dL    Hematocrit 51.6 41.0 - 52.0 %    MCV 87 80 - 100 fL    MCH 31.0 26.0 - 34.0 pg    MCHC 35.7 32.0 - 36.0 g/dL    RDW 15.8 (H) 11.5 - 14.5 %    Platelets 228 150 - 450 x10*3/uL    Neutrophils % 87.8 40.0 - 80.0 %    Immature Granulocytes %, Automated 0.3 0.0 - 0.9 %    Lymphocytes % 6.6 13.0 - 44.0 %    Monocytes % 4.5 2.0 - 10.0 %    Eosinophils % 0.4 0.0 - 6.0 %    Basophils % 0.4 0.0 - 2.0 %    Neutrophils Absolute 8.59 (H) 1.20 - 7.70 x10*3/uL    Immature Granulocytes Absolute, Automated 0.03 0.00 - 0.70 x10*3/uL    Lymphocytes Absolute 0.65 (L) 1.20 - 4.80 x10*3/uL    Monocytes Absolute 0.44 0.10 - 1.00 x10*3/uL    Eosinophils Absolute 0.04 0.00 - 0.70 x10*3/uL    Basophils Absolute 0.04 0.00 - 0.10 x10*3/uL   Hepatic Function Panel   Result Value Ref Range    Albumin 3.7 3.4 - 5.0 g/dL    Bilirubin, Total 1.2 0.0 - 1.2 mg/dL    Bilirubin, Direct 0.2 0.0 - 0.3 mg/dL    Alkaline Phosphatase 123 (H) 33 - 120 U/L    ALT 62 (H) 10 - 52 U/L    AST 33 9 - 39 U/L    Total Protein 7.2 6.4 - 8.2 g/dL   Phosphorus    Result Value Ref Range    Phosphorus 4.4 2.5 - 4.9 mg/dL   Basic Metabolic Panel   Result Value Ref Range    Glucose 174 (H) 74 - 99 mg/dL    Sodium 138 136 - 145 mmol/L    Potassium 3.6 3.5 - 5.3 mmol/L    Chloride 97 (L) 98 - 107 mmol/L    Bicarbonate 29 21 - 32 mmol/L    Anion Gap 16 10 - 20 mmol/L    Urea Nitrogen 28 (H) 6 - 23 mg/dL    Creatinine 1.55 (H) 0.50 - 1.30 mg/dL    eGFR 53 (L) >60 mL/min/1.73m*2    Calcium 9.5 8.6 - 10.6 mg/dL   Lactate   Result Value Ref Range    Lactate 1.8 0.4 - 2.0 mmol/L   Blood Gas Venous Full Panel   Result Value Ref Range    POCT pH, Venous 7.47 (H) 7.33 - 7.43 pH    POCT pCO2, Venous 47 41 - 51 mm Hg    POCT pO2, Venous 45 35 - 45 mm Hg    POCT SO2, Venous 71 45 - 75 %    POCT Oxy Hemoglobin, Venous 69.6 45.0 - 75.0 %    POCT Hematocrit Calculated, Venous 58.0 (H) 41.0 - 52.0 %    POCT Sodium, Venous 134 (L) 136 - 145 mmol/L    POCT Potassium, Venous 3.7 3.5 - 5.3 mmol/L    POCT Chloride, Venous 98 98 - 107 mmol/L    POCT Ionized Calicum, Venous 1.14 1.10 - 1.33 mmol/L    POCT Glucose, Venous 176 (H) 74 - 99 mg/dL    POCT Lactate, Venous 1.8 0.4 - 2.0 mmol/L    POCT Base Excess, Venous 8.7 (H) -2.0 - 3.0 mmol/L    POCT HCO3 Calculated, Venous 34.2 (H) 22.0 - 26.0 mmol/L    POCT Hemoglobin, Venous 19.3 (H) 13.5 - 17.5 g/dL    POCT Anion Gap, Venous 6.0 (L) 10.0 - 25.0 mmol/L    Patient Temperature 37.0 degrees Celsius    FiO2 26 %   POCT GLUCOSE   Result Value Ref Range    POCT Glucose 205 (H) 74 - 99 mg/dL   Coagulation Screen   Result Value Ref Range    Protime 13.6 (H) 9.8 - 12.8 seconds    INR 1.2 (H) 0.9 - 1.1    aPTT 30 27 - 38 seconds   POCT GLUCOSE   Result Value Ref Range    POCT Glucose 388 (H) 74 - 99 mg/dL   POCT GLUCOSE   Result Value Ref Range    POCT Glucose 250 (H) 74 - 99 mg/dL            Assessment/Plan   Assessment & Plan  Acute on chronic heart failure, unspecified heart failure type    Leonel Yu is a 54 y.o. 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  Patient reports feeling short of breath and fatigue compared to his baseline over the last 2 days, says that he has not always been taking his medications.  Admission labs significant for glucose greater than 700 w/ mild acidosis and positive ketones. Likely that this is volume overload/decompensated heart failure in the setting of DKA and missed medication.     Updates 1/9:   Patient more euvolemic on exam w/ decrease oxygen support  - stop bumex drip  - start bumex 4mg IV BID  - start spironolactone 25mg daily   - start empagliflozin 10mg daily  - insulin: lantus 10 units this AM, increase night time to 15 units, increase sliding scale to number 2     NEURO  #Substance use disorder  ::crack cocaine use last 2 days ago   Plan  Avoid beta blockers   UDS negative      #Depression  #Anxiety  - continue home sertraline     PULM  #Increased work of breathing, resolved   ::Pt w/normal oxygenation but placed on positive pressure for work of breathing  Plan:  Diuresis as below, wean as able   Now on nasal cannula      #COPD  - continue home spiriva      CARDIOLOGY  #Acute on chronic decompensated HFrEF (EF 10-15%)  #Mixed ischemic and nonischemic cardiomyopathy   #Type 2 MI  #Hx of VT/VF s/p ICD   #HLD  - BNP >5,000, troponin 80s>60s  - Patient reports not taking all of his medications consistently   - Appears hypervolemic on admission exam  - Received 1x IV lasix 100 in the ED  Plan:   - continue amiodarone 200mg daily   - stop bumex gtt, start bumex 4mg IV BID  - continue home atorva 80mg daily   - continue aspirin 81mg daily   - device interrogated, no shocks or arrhythmia         GI  #Abdominal pain  :: likely in setting of volume overload and abdominal edema  - lipase WNL  Plan:   -  RUQ ultrasound pending      RENAL  #CKD3a  ::unclear true baseline, seems most of his creatinine levels have been drawn when in [facility]  - seems to be somewhere in the low ones, but unclear  Plan:  Hold home entresto  Restart spironolactone 25mg   Restart empagliflozin 10mg         HEME/ONC  #Secondary polycythemia   - Patient baseline Hb ~17-19  - erythropoietin elevated 11/2024  - likely 2/2 crack cocaine and smoking and COPD      ENDO  #T2DM  #Hyperglycemia  #Mild DKA  ::on admit, pH 7.32, glucose >700, lactate 3.2, + beta hydroxy butyrate but anion gap only mildly elevated  - ideally would give fluids, but patient volume overloaded  Plan:  Off insulin drip, tolerating PO intake  Additional 10 units of lantus 1/9 AM, will increase his night time dose to 15 units  Sliding scale #2, hypoglycemia protocol         ID  BIPIN         MSK/RHEUM  #L calf pain  ::tender to palpation  Plan:  LUE ultrasound negative for DVT   If any change/worsening pain, check CT of leg         SKIN  BIPIN        F: caution, EF 10-15%  E: K>4, Mg>2  N: NPO while on NIPPV  Access/Tubes: PIV  Antimicrobials: None  DVT prophylaxis: heparin subq  GI prophylaxis: N/A  Bowel Reg: Miralax  Oxygen: nasal cannula   Code status: Full code   NOK: Roommate Elizabet Santana [phone]     Patient and plan discussed with the attending physician.           Sheldon Birmingham MD
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
## 2025-01-09 NOTE — PROGRESS NOTES
01/09/25 1541   Discharge Planning   Living Arrangements Other (Comment)  (a roommate/friend)   Support Systems Friends/neighbors   Assistance Needed TBD   Type of Residence Private residence   Who is requesting discharge planning? Provider   Home or Post Acute Services Post acute facilities (Rehab/SNF/etc)   Type of Post Acute Facility Services Skilled nursing   Does the patient need discharge transport arranged? Yes   Financial Resource Strain   How hard is it for you to pay for the very basics like food, housing, medical care, and heating? Hard   Housing Stability   In the last 12 months, was there a time when you were not able to pay the mortgage or rent on time? Y   In the past 12 months, how many times have you moved where you were living? 0   At any time in the past 12 months, were you homeless or living in a shelter (including now)? N   Transportation Needs   In the past 12 months, has lack of transportation kept you from medical appointments or from getting medications? no   In the past 12 months, has lack of transportation kept you from meetings, work, or from getting things needed for daily living? No     - ICU TREATMENT PLAN: Patient was admitted to CICU for Acute on chronic heart failure.  - Payer: [facility].  -Support System: Roommate/friend Sang Santana. Patient does not have children, parents, nor siblings. He does not have any family members. Patient would like for his friend/roommate Sang Santana to be his medical decision maker in the situations when he would not be able to make medical decisions for himself. Patient would like to complete HCPOA paperwork. SW later called patient's friend Sang Santana who agreed to be patient's HCPOA. SW will complete HCPOA paperwork with the patient when he is available.   - Planned Disposition: Pending medical outcome. PT recommends Mod intensity.  - Additional Information: SDOH and Social Work Discharge Planning assessments were  completed with the patient. Patient reports that he has been unemployed for two weeks and is behind on rent. He also does not have food stamps and enough funds to buy food. SW referred patient to W program for resources.     SW engaged patient in a conversation about his drug use. Patient reports that he uses Crack once a week. He denied using other illicit substances. Patient says that he is planning to stop using Crack. He declined substance abuse treatment resources.   - Barriers to discharge: None at this time. NOE will continue to follow.

## 2025-01-09 NOTE — PROGRESS NOTES
Physical Therapy    Physical Therapy Evaluation    Patient Name: Leonel Yu  MRN: [id number]  Department: [facility]  Room: 06/06-A  Today's Date: 1/9/2025   Time Calculation  Start Time: 1004  Stop Time: 1019  Time Calculation (min): 15 min    Assessment/Plan   PT Assessment  PT Assessment Results: Decreased strength, Decreased endurance, Impaired balance, Decreased mobility, Impaired judgement, Decreased safety awareness, Pain  Rehab Prognosis: Good  Barriers to Discharge Home: Physical needs, Social Drivers of Health considerations  Physical Needs: High falls risk due to function or environment, Ambulating household distances limited by function/safety, Stair navigation to access bath limited by function/safety, Stair navigation into home limited by function/safety  Social Drivers of Health Considerations: Socioeconomic concerns  Evaluation/Treatment Tolerance: Patient tolerated treatment well  Medical Staff Made Aware: Yes  End of Session Communication: Bedside nurse  Assessment Comment: Pt performed bed mobility with Min A and functional transfers with Min A; pt reported that he has been unable to ambulate >~5' at home due to fatigue and weakness; endorses bilateral calf pain in standing. Pt will continue to benefit from skilled PT to improve functional mobility.  End of Session Patient Position: Bed, 3 rail up, Alarm off, not on at start of session  IP OR SWING BED PT PLAN  Inpatient or Swing Bed: Inpatient  PT Plan  Treatment/Interventions: Bed mobility, Transfer training, Gait training, Stair training, Balance training, Strengthening, Endurance training, Therapeutic exercise, Therapeutic activity, Positioning  PT Plan: Ongoing PT  PT Frequency: 3 times per week  PT Discharge Recommendations: Moderate intensity level of continued care  PT Recommended Transfer Status: Assist x1  PT - OK to Discharge: Yes    Subjective   General Visit Information:  General  Reason for Referral: who is admitted for acute  decompensated heart failure as well as mild DKA.  Patient reports feeling short of breath and fatigue compared to his baseline over the last 2 days, says that he has not always been taking his medications.  Admission labs significant for glucose greater than 700 w/ mild acidosis and positive ketones. Likely that this is volume overload/decompensated heart failure in the setting of DKA and missed medication.  Past Medical History Relevant to Rehab: past medical history of mixed ischemic/nonischemic cardiomyopathy, HFrEF EF 10 to 15% 10/2024, prior VF/VT status post [employer] single-chamber ICD 3/2024, hypertension, hyperlipidemia  Prior to Session Communication: Bedside nurse  Patient Position Received: Bed, 3 rail up, Alarm off, not on at start of session  Preferred Learning Style: auditory, verbal, visual  General Comment: Pt lethargic at start of session however willing to participate; increased arousal throughout session. (Lines: tele, IV, BP cuff)  Home Living:  Home Living  Type of Home: House  Lives With:  (roommate)  Home Adaptive Equipment: None  Home Layout: Two level  Home Access: Stairs to enter without rails  Entrance Stairs-Number of Steps: 1  Home Living Comments: Pt reported that he has not been accessing the second floor of his home due to inability to perform stairs due to functional decline  Prior Level of Function:  Prior Function Per Pt/Caregiver Report  Level of [place]: Independent with ADLs and functional transfers  ADL Assistance: Independent (reported independence with dressing and bathing however also reports poor standing tolerance/inability to walk >5')  Homemaking Assistance: Needs assistance  Ambulatory Assistance:  (Reports functional decline over past few weeks/months in which he hasn't been able to ambulate more than ~5')  Precautions:  Precautions  Medical Precautions: Cardiac precautions, Fall precautions     Vital Signs        01/09/25 1004 01/09/25 1019   Vital Signs    Vitals Session Pre PT Post PT   Heart Rate 66 81   Heart Rate Source Monitor Monitor   SpO2 95 % 96 %   /77  --    MAP (mmHg) 86  --    BP Method Automatic  --    Patient Position Lying  --            Objective   Pain:  Pain Assessment  Pain Assessment: 0-10  0-10 (Numeric) Pain Score: 0 - No pain (0/10 at rest; 7/10 in bilat calves while standing)  Cognition:  Cognition  Overall Cognitive Status: Within Functional Limits  Orientation Level: Oriented X4    General Assessments:       Activity Tolerance  Endurance: Tolerates 10 - 20 min exercise with multiple rests  Early Mobility/Exercise Safety Screen: Proceed with mobilization - No exclusion criteria met  Activity Tolerance Comments: Vitals stable throughout session    Sensation  Light Touch: No apparent deficits    Strength  Strength Comments: Refer to LE strength section  Strength  Strength Comments: Refer to LE strength section         Coordination  Movements are Fluid and Coordinated: Yes    Postural Control  Postural Control: Within Functional Limits    Static Sitting Balance  Static Sitting-Balance Support: Feet supported, Bilateral upper extremity supported  Static Sitting-Level of Assistance: Contact guard    Static Standing Balance  Static Standing-Balance Support: No upper extremity supported  Static Standing-Level of Assistance: Minimum assistance  Functional Assessments:  Bed Mobility  Bed Mobility: Yes  Bed Mobility 1  Bed Mobility 1: Supine to sitting, Sitting to supine  Level of Assistance 1: Minimum assistance  Bed Mobility Comments 1: Min A for LE management and trunk elevation to seated    Transfers  Transfer: Yes  Transfer 1  Transfer From 1: Sit to, Stand to  Transfer to 1: Stand, Sit  Technique 1: Sit to stand, Stand to sit  Transfer Level of Assistance 1: Minimum assistance, Hand held assistance  Trials/Comments 1: x2 reps during session; Min A for hip elevation to standing and balance in standing    Ambulation/Gait  Training  Ambulation/Gait Training Performed: No (Pt unable to take steps due to fatigue after standing bouts; pt attempted standing marches x 6 reps however requesting to sit down due to fatigue and LE pain)  Extremity/Trunk Assessments:  Cervical Spine   Cervical Spine: Within Functional Limits  Lumbar Spine   Lumbar Spine : Within Functional Limits    Strength RLE  R Hip Flexion: 4-/5  R Knee Extension: 4-/5  Strength LLE  L Hip Flexion: 4-/5  L Knee Extension: 4-/5  Outcome Measures:  [facility] Basic Mobility  Turning from your back to your side while in a flat bed without using bedrails: A little  Moving from lying on your back to sitting on the side of a flat bed without using bedrails: A little  Moving to and from bed to chair (including a wheelchair): A little  Standing up from a chair using your arms (e.g. wheelchair or bedside chair): A little  To walk in hospital room: Total  Climbing 3-5 steps with railing: Total  Basic Mobility - Total Score: 14    FSS-ICU  Ambulation: Unable to attempt due to weakness  Rolling: Minimal assistance (performs 75% or more of task)  Sitting: Supervision or set-up only  Transfer Sit-to-Stand: Minimal assistance (performs 75% or more of task)  Transfer Supine-to-Sit: Minimal assistance (performs 75% or more of task)  Total Score: 17      Early Mobility/Exercise Safety Screen: Proceed with mobilization - No exclusion criteria met  ICU Mobility Scale: Marching on spot (at bedside) [6]    Encounter Problems       Encounter Problems (Active)       Balance       Pt will demonstrate improved sitting/standing static/dynamic balance activities without LOB via score of 24/28 on the [place] for increase in safety prior to DC.  (Progressing)       Start:  01/09/25    Expected End:  01/23/25               Mobility       Pt will ambulate >10ft with appropriate form, Min A or less, LRAD, and no LOB for safe DC.  (Progressing)       Start:  01/09/25    Expected End:  01/23/25            Pt will  tolerate >15 minutes of upright standing activity without seated rest breaks with no changes in vital signs for improved functional mobility.  (Progressing)       Start:  01/09/25    Expected End:  01/23/25               PT Transfers       Pt will perform bed mobility independently and use of LRAD to safely DC.  (Progressing)       Start:  01/09/25    Expected End:  01/23/25            Pt will perform sit<>stand transfers with CGA or less and use of LRAD to safely DC.  (Progressing)       Start:  01/09/25    Expected End:  01/23/25                   Education Documentation  Body Mechanics, taught by Patience Castle PT at 1/9/2025 12:20 PM.  Learner: Patient  Readiness: Acceptance  Method: Explanation  Response: Verbalizes Understanding    Mobility Training, taught by Patience Castle PT at 1/9/2025 12:20 PM.  Learner: Patient  Readiness: Acceptance  Method: Explanation  Response: Verbalizes Understanding    Education Comments  No comments found.      PATIENCE CASTLE PT

## 2025-01-09 NOTE — PROGRESS NOTES
Occupational Therapy                 Therapy Communication Note    Patient Name: Leonel Yu  MRN: [id number]  Department: [facility]  Room: 06/06-A  Today's Date: 1/9/2025     Discipline: Occupational Therapy          Missed Visit Reason: Missed Visit Reason: Patient placed on medical hold (1506 x2 attempt to see patient, inital attempt pt noted to have elevated RR and irregular rhythm. Pt complaining of chest and throat pain. Notified RN. RN notified resident. Later returning to room pt asleep with ultrasound in room. Will reattempt)    Missed Time: Attempt    Comment:

## 2025-01-09 NOTE — CARE PLAN
The clinical goals for the shift include Patient will remain HDS through the shift.    Problem: Skin  Goal: Decreased wound size/increased tissue granulation at next dressing change  Outcome: Progressing  Goal: Participates in plan/prevention/treatment measures  Outcome: Progressing  Goal: Prevent/manage excess moisture  Outcome: Progressing  Goal: Prevent/minimize sheer/friction injuries  Outcome: Progressing  Goal: Promote/optimize nutrition  Outcome: Progressing  Goal: Promote skin healing  Outcome: Progressing     Problem: Fall/Injury  Goal: Not fall by end of shift  Outcome: Progressing  Goal: Be free from injury by end of the shift  Outcome: Progressing  Goal: Verbalize understanding of personal risk factors for fall in the hospital  Outcome: Progressing  Goal: Verbalize understanding of risk factor reduction measures to prevent injury from fall in the home  Outcome: Progressing  Goal: Use assistive devices by end of the shift  Outcome: Progressing  Goal: Pace activities to prevent fatigue by end of the shift  Outcome: Progressing     Problem: Heart Failure  Goal: Improved gas exchange this shift  Outcome: Progressing  Goal: Improved urinary output this shift  Outcome: Progressing  Goal: Reduction in peripheral edema within 24 hours  Outcome: Progressing  Goal: Report improvement of dyspnea/breathlessness this shift  Outcome: Progressing  Goal: Weight from fluid excess reduced over 2-3 days, then stabilize  Outcome: Progressing  Goal: Increase self care and/or family involvement in 24 hours  Outcome: Progressing

## 2025-01-10 ENCOUNTER — APPOINTMENT (OUTPATIENT)
Dept: RADIOLOGY | Facility: HOSPITAL | Age: 55
End: 2025-01-10
Payer: COMMERCIAL

## 2025-01-10 ENCOUNTER — APPOINTMENT (OUTPATIENT)
Dept: CARDIOLOGY | Facility: HOSPITAL | Age: 55
End: 2025-01-10
Payer: COMMERCIAL

## 2025-01-10 LAB
ALBUMIN SERPL BCP-MCNC: 3.6 G/DL (ref 3.4–5)
ALBUMIN SERPL BCP-MCNC: 4.1 G/DL (ref 3.4–5)
ALP SERPL-CCNC: 126 U/L (ref 33–120)
ALT SERPL W P-5'-P-CCNC: 41 U/L (ref 10–52)
ANION GAP BLDV CALCULATED.4IONS-SCNC: 12 MMOL/L (ref 10–25)
ANION GAP SERPL CALC-SCNC: 20 MMOL/L (ref 10–20)
ANION GAP SERPL CALC-SCNC: 20 MMOL/L (ref 10–20)
APPEARANCE UR: CLEAR
APTT PPP: NORMAL S
APTT PPP: NORMAL S
AST SERPL W P-5'-P-CCNC: 25 U/L (ref 9–39)
B-OH-BUTYR SERPL-SCNC: 0.28 MMOL/L (ref 0.02–0.27)
BASE EXCESS BLDV CALC-SCNC: 4.5 MMOL/L (ref -2–3)
BASOPHILS # BLD AUTO: 0.03 X10*3/UL (ref 0–0.1)
BASOPHILS # BLD AUTO: 0.06 X10*3/UL (ref 0–0.1)
BASOPHILS NFR BLD AUTO: 0.2 %
BASOPHILS NFR BLD AUTO: 0.4 %
BILIRUB SERPL-MCNC: 1.2 MG/DL (ref 0–1.2)
BILIRUB UR STRIP.AUTO-MCNC: NEGATIVE MG/DL
BODY TEMPERATURE: ABNORMAL
BUN SERPL-MCNC: 38 MG/DL (ref 6–23)
BUN SERPL-MCNC: 40 MG/DL (ref 6–23)
CA-I BLDV-SCNC: 0.95 MMOL/L (ref 1.1–1.33)
CALCIUM SERPL-MCNC: 9.5 MG/DL (ref 8.6–10.6)
CALCIUM SERPL-MCNC: 9.6 MG/DL (ref 8.6–10.6)
CHLORIDE BLDV-SCNC: 87 MMOL/L (ref 98–107)
CHLORIDE SERPL-SCNC: 89 MMOL/L (ref 98–107)
CHLORIDE SERPL-SCNC: 91 MMOL/L (ref 98–107)
CO2 SERPL-SCNC: 26 MMOL/L (ref 21–32)
CO2 SERPL-SCNC: 28 MMOL/L (ref 21–32)
COLOR UR: ABNORMAL
CREAT SERPL-MCNC: 1.85 MG/DL (ref 0.5–1.3)
CREAT SERPL-MCNC: 1.97 MG/DL (ref 0.5–1.3)
EGFRCR SERPLBLD CKD-EPI 2021: 39 ML/MIN/1.73M*2
EGFRCR SERPLBLD CKD-EPI 2021: 42 ML/MIN/1.73M*2
EOSINOPHIL # BLD AUTO: 0.02 X10*3/UL (ref 0–0.7)
EOSINOPHIL # BLD AUTO: 0.03 X10*3/UL (ref 0–0.7)
EOSINOPHIL NFR BLD AUTO: 0.1 %
EOSINOPHIL NFR BLD AUTO: 0.2 %
ERYTHROCYTE [DISTWIDTH] IN BLOOD BY AUTOMATED COUNT: 15.5 % (ref 11.5–14.5)
ERYTHROCYTE [DISTWIDTH] IN BLOOD BY AUTOMATED COUNT: 15.8 % (ref 11.5–14.5)
GLUCOSE BLD MANUAL STRIP-MCNC: 276 MG/DL (ref 74–99)
GLUCOSE BLD MANUAL STRIP-MCNC: 281 MG/DL (ref 74–99)
GLUCOSE BLD MANUAL STRIP-MCNC: 283 MG/DL (ref 74–99)
GLUCOSE BLD MANUAL STRIP-MCNC: 309 MG/DL (ref 74–99)
GLUCOSE BLD MANUAL STRIP-MCNC: 398 MG/DL (ref 74–99)
GLUCOSE BLD MANUAL STRIP-MCNC: 479 MG/DL (ref 74–99)
GLUCOSE BLD MANUAL STRIP-MCNC: 98 MG/DL (ref 74–99)
GLUCOSE BLDV-MCNC: 389 MG/DL (ref 74–99)
GLUCOSE SERPL-MCNC: 145 MG/DL (ref 74–99)
GLUCOSE SERPL-MCNC: 434 MG/DL (ref 74–99)
GLUCOSE UR STRIP.AUTO-MCNC: ABNORMAL MG/DL
HCO3 BLDV-SCNC: 28.4 MMOL/L (ref 22–26)
HCT VFR BLD AUTO: 55.5 % (ref 41–52)
HCT VFR BLD AUTO: 57 % (ref 41–52)
HCT VFR BLD EST: ABNORMAL %
HGB BLD-MCNC: 20.1 G/DL (ref 13.5–17.5)
HGB BLD-MCNC: 20.2 G/DL (ref 13.5–17.5)
HGB BLDV-MCNC: ABNORMAL G/DL
HOLD SPECIMEN: NORMAL
HOLD SPECIMEN: NORMAL
IMM GRANULOCYTES # BLD AUTO: 0.05 X10*3/UL (ref 0–0.7)
IMM GRANULOCYTES # BLD AUTO: 0.05 X10*3/UL (ref 0–0.7)
IMM GRANULOCYTES NFR BLD AUTO: 0.3 % (ref 0–0.9)
IMM GRANULOCYTES NFR BLD AUTO: 0.4 % (ref 0–0.9)
INR PPP: NORMAL
INR PPP: NORMAL
KETONES UR STRIP.AUTO-MCNC: NEGATIVE MG/DL
LACTATE BLDV-SCNC: 2.3 MMOL/L (ref 0.4–2)
LACTATE SERPL-SCNC: 2.2 MMOL/L (ref 0.4–2)
LACTATE SERPL-SCNC: 2.7 MMOL/L (ref 0.4–2)
LACTATE SERPL-SCNC: 2.9 MMOL/L (ref 0.4–2)
LEUKOCYTE ESTERASE UR QL STRIP.AUTO: NEGATIVE
LYMPHOCYTES # BLD AUTO: 0.7 X10*3/UL (ref 1.2–4.8)
LYMPHOCYTES # BLD AUTO: 0.76 X10*3/UL (ref 1.2–4.8)
LYMPHOCYTES NFR BLD AUTO: 4.6 %
LYMPHOCYTES NFR BLD AUTO: 6.2 %
MAGNESIUM SERPL-MCNC: 2.46 MG/DL (ref 1.6–2.4)
MCH RBC QN AUTO: 31.5 PG (ref 26–34)
MCH RBC QN AUTO: 31.6 PG (ref 26–34)
MCHC RBC AUTO-ENTMCNC: 35.3 G/DL (ref 32–36)
MCHC RBC AUTO-ENTMCNC: 36.4 G/DL (ref 32–36)
MCV RBC AUTO: 86 FL (ref 80–100)
MCV RBC AUTO: 90 FL (ref 80–100)
MONOCYTES # BLD AUTO: 0.41 X10*3/UL (ref 0.1–1)
MONOCYTES # BLD AUTO: 0.56 X10*3/UL (ref 0.1–1)
MONOCYTES NFR BLD AUTO: 3.3 %
MONOCYTES NFR BLD AUTO: 3.7 %
NEUTROPHILS # BLD AUTO: 11.04 X10*3/UL (ref 1.2–7.7)
NEUTROPHILS # BLD AUTO: 13.68 X10*3/UL (ref 1.2–7.7)
NEUTROPHILS NFR BLD AUTO: 89.7 %
NEUTROPHILS NFR BLD AUTO: 90.9 %
NITRITE UR QL STRIP.AUTO: NEGATIVE
NRBC BLD-RTO: 0 /100 WBCS (ref 0–0)
NRBC BLD-RTO: 0 /100 WBCS (ref 0–0)
OXYHGB MFR BLDV: ABNORMAL %
PCO2 BLDV: 39 MM HG (ref 41–51)
PH BLDV: 7.47 PH (ref 7.33–7.43)
PH UR STRIP.AUTO: 5 [PH]
PHOSPHATE SERPL-MCNC: 4.7 MG/DL (ref 2.5–4.9)
PHOSPHATE SERPL-MCNC: 5.1 MG/DL (ref 2.5–4.9)
PLATELET # BLD AUTO: 212 X10*3/UL (ref 150–450)
PLATELET # BLD AUTO: 249 X10*3/UL (ref 150–450)
PO2 BLDV: 65 MM HG (ref 35–45)
POTASSIUM BLDV-SCNC: 4.4 MMOL/L (ref 3.5–5.3)
POTASSIUM SERPL-SCNC: 4.3 MMOL/L (ref 3.5–5.3)
POTASSIUM SERPL-SCNC: 5.1 MMOL/L (ref 3.5–5.3)
PROT SERPL-MCNC: 7.4 G/DL (ref 6.4–8.2)
PROT UR STRIP.AUTO-MCNC: NEGATIVE MG/DL
PROTHROMBIN TIME: NORMAL S
PROTHROMBIN TIME: NORMAL S
RBC # BLD AUTO: 6.36 X10*6/UL (ref 4.5–5.9)
RBC # BLD AUTO: 6.42 X10*6/UL (ref 4.5–5.9)
RBC # UR STRIP.AUTO: NEGATIVE /UL
SAO2 % BLDV: ABNORMAL %
SODIUM BLDV-SCNC: 123 MMOL/L (ref 136–145)
SODIUM SERPL-SCNC: 131 MMOL/L (ref 136–145)
SODIUM SERPL-SCNC: 134 MMOL/L (ref 136–145)
SP GR UR STRIP.AUTO: 1.02
UROBILINOGEN UR STRIP.AUTO-MCNC: NORMAL MG/DL
WBC # BLD AUTO: 12.3 X10*3/UL (ref 4.4–11.3)
WBC # BLD AUTO: 15.1 X10*3/UL (ref 4.4–11.3)

## 2025-01-10 PROCEDURE — 2500000001 HC RX 250 WO HCPCS SELF ADMINISTERED DRUGS (ALT 637 FOR MEDICARE OP): Mod: SE

## 2025-01-10 PROCEDURE — 99233 SBSQ HOSP IP/OBS HIGH 50: CPT | Performed by: STUDENT IN AN ORGANIZED HEALTH CARE EDUCATION/TRAINING PROGRAM

## 2025-01-10 PROCEDURE — 84075 ASSAY ALKALINE PHOSPHATASE: CPT

## 2025-01-10 PROCEDURE — 93005 ELECTROCARDIOGRAM TRACING: CPT

## 2025-01-10 PROCEDURE — 85610 PROTHROMBIN TIME: CPT | Performed by: STUDENT IN AN ORGANIZED HEALTH CARE EDUCATION/TRAINING PROGRAM

## 2025-01-10 PROCEDURE — 85610 PROTHROMBIN TIME: CPT

## 2025-01-10 PROCEDURE — 2500000004 HC RX 250 GENERAL PHARMACY W/ HCPCS (ALT 636 FOR OP/ED): Mod: SE

## 2025-01-10 PROCEDURE — 87040 BLOOD CULTURE FOR BACTERIA: CPT

## 2025-01-10 PROCEDURE — 83735 ASSAY OF MAGNESIUM: CPT

## 2025-01-10 PROCEDURE — 36415 COLL VENOUS BLD VENIPUNCTURE: CPT

## 2025-01-10 PROCEDURE — 83605 ASSAY OF LACTIC ACID: CPT

## 2025-01-10 PROCEDURE — 2500000002 HC RX 250 W HCPCS SELF ADMINISTERED DRUGS (ALT 637 FOR MEDICARE OP, ALT 636 FOR OP/ED): Mod: SE

## 2025-01-10 PROCEDURE — 71045 X-RAY EXAM CHEST 1 VIEW: CPT

## 2025-01-10 PROCEDURE — 1100000001 HC PRIVATE ROOM DAILY

## 2025-01-10 PROCEDURE — 84100 ASSAY OF PHOSPHORUS: CPT

## 2025-01-10 PROCEDURE — 84132 ASSAY OF SERUM POTASSIUM: CPT

## 2025-01-10 PROCEDURE — 85025 COMPLETE CBC W/AUTO DIFF WBC: CPT

## 2025-01-10 PROCEDURE — 80069 RENAL FUNCTION PANEL: CPT

## 2025-01-10 PROCEDURE — 82010 KETONE BODYS QUAN: CPT

## 2025-01-10 PROCEDURE — 93010 ELECTROCARDIOGRAM REPORT: CPT | Performed by: INTERNAL MEDICINE

## 2025-01-10 PROCEDURE — 71045 X-RAY EXAM CHEST 1 VIEW: CPT | Performed by: RADIOLOGY

## 2025-01-10 PROCEDURE — 81003 URINALYSIS AUTO W/O SCOPE: CPT

## 2025-01-10 PROCEDURE — 82947 ASSAY GLUCOSE BLOOD QUANT: CPT

## 2025-01-10 RX ORDER — VANCOMYCIN HYDROCHLORIDE 1 G/200ML
1000 INJECTION, SOLUTION INTRAVENOUS ONCE
Status: COMPLETED | OUTPATIENT
Start: 2025-01-10 | End: 2025-01-10

## 2025-01-10 RX ORDER — HYDRALAZINE HYDROCHLORIDE 25 MG/1
12.5 TABLET, FILM COATED ORAL 3 TIMES DAILY
Status: DISPENSED | OUTPATIENT
Start: 2025-01-10

## 2025-01-10 RX ORDER — VANCOMYCIN 2 GRAM/500 ML IN 0.9 % SODIUM CHLORIDE INTRAVENOUS
2 ONCE
Status: DISCONTINUED | OUTPATIENT
Start: 2025-01-10 | End: 2025-01-10

## 2025-01-10 RX ORDER — SPIRONOLACTONE 25 MG/1
12.5 TABLET ORAL DAILY
Status: DISPENSED | OUTPATIENT
Start: 2025-01-10

## 2025-01-10 RX ORDER — INSULIN LISPRO 100 [IU]/ML
10 INJECTION, SOLUTION INTRAVENOUS; SUBCUTANEOUS ONCE
Status: COMPLETED | OUTPATIENT
Start: 2025-01-10 | End: 2025-01-10

## 2025-01-10 RX ADMIN — PIPERACILLIN SODIUM AND TAZOBACTAM SODIUM 3.38 G: 3; .375 INJECTION, SOLUTION INTRAVENOUS at 17:46

## 2025-01-10 RX ADMIN — ACETAMINOPHEN 650 MG: 325 TABLET ORAL at 08:55

## 2025-01-10 RX ADMIN — ASPIRIN 81 MG 81 MG: 81 TABLET ORAL at 08:50

## 2025-01-10 RX ADMIN — INSULIN LISPRO 6 UNITS: 100 INJECTION, SOLUTION INTRAVENOUS; SUBCUTANEOUS at 19:36

## 2025-01-10 RX ADMIN — INSULIN LISPRO 5 UNITS: 100 INJECTION, SOLUTION INTRAVENOUS; SUBCUTANEOUS at 08:50

## 2025-01-10 RX ADMIN — INSULIN LISPRO 5 UNITS: 100 INJECTION, SOLUTION INTRAVENOUS; SUBCUTANEOUS at 14:04

## 2025-01-10 RX ADMIN — SPIRONOLACTONE 12.5 MG: 25 TABLET, FILM COATED ORAL at 08:49

## 2025-01-10 RX ADMIN — HEPARIN SODIUM 5000 UNITS: 5000 INJECTION INTRAVENOUS; SUBCUTANEOUS at 02:33

## 2025-01-10 RX ADMIN — INSULIN LISPRO 6 UNITS: 100 INJECTION, SOLUTION INTRAVENOUS; SUBCUTANEOUS at 14:04

## 2025-01-10 RX ADMIN — EMPAGLIFLOZIN 10 MG: 10 TABLET, FILM COATED ORAL at 08:50

## 2025-01-10 RX ADMIN — SODIUM CHLORIDE, SODIUM LACTATE, POTASSIUM CHLORIDE, AND CALCIUM CHLORIDE 500 ML: 600; 310; 30; 20 INJECTION, SOLUTION INTRAVENOUS at 11:30

## 2025-01-10 RX ADMIN — INSULIN LISPRO 8 UNITS: 100 INJECTION, SOLUTION INTRAVENOUS; SUBCUTANEOUS at 08:52

## 2025-01-10 RX ADMIN — INSULIN LISPRO 10 UNITS: 100 INJECTION, SOLUTION INTRAVENOUS; SUBCUTANEOUS at 11:11

## 2025-01-10 RX ADMIN — INSULIN GLARGINE 15 UNITS: 100 INJECTION, SOLUTION SUBCUTANEOUS at 21:27

## 2025-01-10 RX ADMIN — VANCOMYCIN HYDROCHLORIDE 1000 MG: 1 INJECTION, SOLUTION INTRAVENOUS at 12:42

## 2025-01-10 RX ADMIN — PIPERACILLIN SODIUM AND TAZOBACTAM SODIUM 3.38 G: 3; .375 INJECTION, SOLUTION INTRAVENOUS at 11:15

## 2025-01-10 RX ADMIN — AMIODARONE HYDROCHLORIDE 200 MG: 200 TABLET ORAL at 08:49

## 2025-01-10 RX ADMIN — SERTRALINE HYDROCHLORIDE 100 MG: 100 TABLET ORAL at 08:49

## 2025-01-10 RX ADMIN — INSULIN LISPRO 5 UNITS: 100 INJECTION, SOLUTION INTRAVENOUS; SUBCUTANEOUS at 19:35

## 2025-01-10 RX ADMIN — HYDRALAZINE HYDROCHLORIDE 12.5 MG: 25 TABLET ORAL at 21:26

## 2025-01-10 RX ADMIN — ATORVASTATIN CALCIUM 80 MG: 80 TABLET, FILM COATED ORAL at 21:26

## 2025-01-10 ASSESSMENT — COGNITIVE AND FUNCTIONAL STATUS - GENERAL
MOBILITY SCORE: 24
DAILY ACTIVITIY SCORE: 24

## 2025-01-10 ASSESSMENT — PAIN SCALES - GENERAL
PAINLEVEL_OUTOF10: 3
PAINLEVEL_OUTOF10: 0 - NO PAIN

## 2025-01-10 ASSESSMENT — PAIN - FUNCTIONAL ASSESSMENT
PAIN_FUNCTIONAL_ASSESSMENT: 0-10

## 2025-01-10 ASSESSMENT — ACTIVITIES OF DAILY LIVING (ADL): LACK_OF_TRANSPORTATION: NO

## 2025-01-10 NOTE — SIGNIFICANT EVENT
Rapid Response Nurse Note:     Pager time:   Arrival time: 104  Event end time: 114  Location: [place] [id number]      Rapid response initiated by:  [] Rapid response RN [] Family [] Nursing Supervisor [] Physician   [] RADAR auto page [] Sepsis auto-page [x] RN [] RT   [] NP/PA [] Other:     Primary reason for call:   [] BAT [] New CPAP/BiPAP [] Bleeding [] Change in mental status   [] Chest pain [] Code blue [] FiO2 >/= 50% [] HR </= 40 bpm   [] HR >/= 130 bpm [] Hyperglycemia [] Hypoglycemia [] RADAR    [] RR </= 8 bpm [] RR >/= 30 bpm [] SBP </= 90 mmHg [] SpO2 < 90%   [] Seizure [] Sepsis [] Shortness of breath  [x] Staff concern: see comments     Interventions:  [] None [x] VBG [] Assist w/ICU transfer [] BAT paged    [] Bag mask [] Blood [] Cardioversion [] Code Blue   [] Code blue for intubation [] Code status changed [x] Chest x-ray [x] EKG   [x] IV fluid/bolus [] KUB x-ray [x] Labs & cultures [x] Medication   [] Nebulizer treatment [] NIPPV (CPAP/BiPAP) [] Oxygen [] Oral airway   [] Peripheral IV [] Palliative care consult [] CT/MRI [] Sepsis protocol    [] Suctioned [] Other:     Outcome:  [] Coded and  [] Code blue for intubation [] Coded and transferred to ICU []  on division   [] Remained on division (no change) [x] Remained on division + continuous pulse ox [] Remained in ED [] Transferred to ED   [] Transferred to ICU [] Transferred to inpatient status [] Transferred for interventions (procedure) [] Transferred to ICU stepdown    [] Transferred to surgery [] Transferred to telemetry [] Sepsis protocol [] STEMI protocol   [] Stroke protocol       Additional Comments:      Rapid Response called for change in mental status.    Patient is drowsy, diaphoretic and clammy.    Blood sugar and vitals checked prior to rapid response.  Dr. Merino and team at bedside.  Blood cultures x 2 and labs sent.  VBG sent.  IVF bolus started. Antibiotics started.  EKG and CXR completed. Hyperglycemia treated  with insulin.    Of note, radar page received during rapid response: 35.6 75 16 90/62 96.    Continue to monitor patient on T5 with telemetry and now continuous pulse ox. Bedside nurse aware of plan and encouraged to call a rapid response as needed if patient status changes.

## 2025-01-10 NOTE — PROGRESS NOTES
Leonel Yu is a 54 y.o. male on day 2 of admission presenting with Acute on chronic heart failure, unspecified heart failure type.      Subjective   Transferred to cardiology floor from CICU.     Hosp course:  54 y.o. male with past medical history of mixed ischemic/nonischemic cardiomyopathy, HFrEF EF 10 to 15% 10/2024, prior VF/Vts/p [employer] single-chamber ICD 3/2024, HTN, HLD who was admitted for acute decompensated heart failure as well as mild DKA. Patient reports feeling short of breath and fatigued compared to his baseline over the last 2 days, also said that he has not always been taking his medications. Admission labs significant for glucose greater than 700 w/ mild acidosis and positive ketones. He required BiPAP in the ED and was taken off BiPAP after IV diuresis in the CICU. Being transferred to the floor.     Saw pt on katerine tower 5. Resting comfortably. Denies pain, discomfort. Ankle swelling has significantly improved. His appetite is coming back.          Objective     Last Recorded Vitals  BP 99/73   Pulse 83   Temp 35.6 °C (96.1 °F) (Temporal)   Resp 17   Wt 69.3 kg (152 lb 12.5 oz)   SpO2 94%   Intake/Output last 3 Shifts:    Intake/Output Summary (Last 24 hours) at 1/10/2025 0430  Last data filed at 1/10/2025 0200  Gross per 24 hour   Intake 1563.33 ml   Output 4400 ml   Net -2836.67 ml       Admission Weight  Weight: 74.8 kg (165 lb) (01/08/25 1227)    Daily Weight  01/10/25 : 69.3 kg (152 lb 12.5 oz)        Physical Exam  Constitutional:       General: He is not in acute distress.     Appearance: He is not ill-appearing or toxic-appearing.   HENT:      Head: Normocephalic.      Mouth/Throat:      Mouth: Mucous membranes are moist.      Comments: Poor dentition  Eyes:      General: No scleral icterus.     Extraocular Movements: Extraocular movements intact.      Conjunctiva/sclera: Conjunctivae normal.      Pupils: Pupils are equal, round, and reactive to light.    Cardiovascular:      Rate and Rhythm: Normal rate and regular rhythm.      Pulses: Normal pulses.      Heart sounds: No murmur heard.     No gallop.   Pulmonary:      Effort: Pulmonary effort is normal. No respiratory distress.      Breath sounds: No wheezing.   Abdominal:      General: Abdomen is flat. Bowel sounds are normal.      Palpations: Abdomen is soft.   Musculoskeletal:         General: Normal range of motion.      Cervical back: Normal range of motion and neck supple.     Minimal edema on extremities  Skin:     General: Skin is warm and dry.      Capillary Refill: Capillary refill takes less than 2 seconds.   Neurological:      General: No focal deficit present.      Mental Status: He is alert and oriented to person, place, and time.      Cranial Nerves: No cranial nerve deficit.      Sensory: No sensory deficit.   Psychiatric:         Mood and Affect: Mood normal.     Relevant Results    Scheduled medications  [Transfer Hold] amiodarone, 200 mg, oral, Daily  aspirin, 81 mg, oral, Daily  atorvastatin, 80 mg, oral, Nightly  [Transfer Hold] empagliflozin, 10 mg, oral, Daily  heparin (porcine), 5,000 Units, subcutaneous, q8h  [Transfer Hold] insulin glargine, 15 Units, subcutaneous, q24h  [Transfer Hold] insulin lispro, 0-10 Units, subcutaneous, TID AC  [Transfer Hold] insulin lispro, 5 Units, subcutaneous, TID AC  [Transfer Hold] polyethylene glycol, 17 g, oral, Daily  sertraline, 100 mg, oral, Daily  [Transfer Hold] spironolactone, 25 mg, oral, Daily  tiotropium, 2 puff, inhalation, Daily      Continuous medications     PRN medications  PRN medications: [Transfer Hold] acetaminophen, dextrose, [Transfer Hold] dextrose, dextrose, [Transfer Hold] dextrose, dextrose, glucagon, glucagon, [Transfer Hold] glucagon, [Transfer Hold] glucagon, oxygen                          Assessment & Plan  Acute on chronic heart failure, unspecified heart failure type    54 y.o. male with past medical history of mixed  ischemic/nonischemic cardiomyopathy, HFrEF EF 10 to 15% 10/2024, prior VF/Vts/p [employer] single-chamber ICD 3/2024, HTN, HLD who was admitted for acute decompensated heart failure iso DKA 2/2 poor medication adherence. Required BiPAP initially but weaned off with the help of IV diuresis. Taken off insulin gtt as well. Transferred to cardiology floor on 1/10.     #Acute on chronic decompensated HFrEF (EF 10-15%)  #Mixed ischemic and nonischemic cardiomyopathy   #Type 2 MI  #Hx of VT/VF s/p ICD   #HLD  - BNP >5,000, troponin 80s>60s  - Patient reports not taking all of his medications consistently   - takes torsemide 20 PRN daily at home.   -dry weight approx 70 kg. Admit weight 72.6 kg. Net negative 7 L this admission  Plan:   - continue amiodarone 200mg daily   - stopped bumex gtt. Got bumex 4 mg BID on 1/9.   - discuss home diuretic regimen  -GDMT: spironolactone 12.5 mg, jardiance 10 mg. Entresto held.  - continue home atorva 80mg daily   - continue aspirin 81mg daily   - device interrogated, no shocks or arrhythmia     #CKD3a  ::unclear true baseline, seems most of his creatinine levels have been drawn when in ADHF  - seems to be somewhere in the low ones, but unclear  Plan:  Hold home entresto    #T2DM (A1c 9.9 10/2024)  #Hyperglycemia  #Mild DKA - resolved  ::on admit, pH 7.32, glucose >700, lactate 3.2, + beta hydroxy butyrate but anion gap only mildly elevated  -off insulin drip.   -takes 10u glargine at bedtime   Plan:  Resume home regimen. Also added 5u lispro TID + SSI    #Secondary polycythemia   - Patient baseline Hb ~17-19  - erythropoietin elevated 11/2024  - likely 2/2 crack cocaine and smoking and COPD     F: caution, EF 10-15%  E: K>4, Mg>2  N: NPO while on NIPPV  Access/Tubes: PIV  Antimicrobials: None  DVT prophylaxis: heparin subq  GI prophylaxis: N/A  Bowel Reg: Miralax  Oxygen: nasal cannula   Code status: Full code   NOK: Roommate Elizabet Santana [phone]      Andreas Breaux  MD [place]

## 2025-01-10 NOTE — PROGRESS NOTES
SW was informed pt likes to have information and assistance with HCPOA.   SW met with pt at bedside to offer support. Pt is alert and fully oriented, but pt was sluggish and sleepy, not able to respond to the question regarding of HCPOA. Pt agrees with nodding when SW asked if he wants SW to visit him tomorrow or later. SW left a copy of a blank HCPOA, and pt also nodded to SW asking him to read the copy to help pt understand HCPOA clearly.  SW will continue to follow and assist.     Bernard Torrez, MSSA, LSW

## 2025-01-10 NOTE — CARE PLAN
CHW met with patient at bedside, Patient stated that he lives with a roommate who handles rent payment, and they are currently behind, CHW provided patient with community programs to assist with hardship, Patient also is interested applying for SNAP benefits, CHW provided [facility] [employer] contact to assist with over the phone application, CHW also provided pantries/hot meals to assist with food need, including upcoming produce calendar with different locations.    Community Resource Name:   Phone Number:   Staff Member:      Discussed the following topics on behalf of the patient:  []  Behavioral Health Assistance     []  Case Management  []   Assistance  []  Digital Equity Assistance  []  Dental Health Assistance  []  Education Assistance  []  Employment Assistance  [x]  Financial Strain Relief Assistance  [x]  Food Insecurity Assistance  []  Healthcare Coverage Assistance  []  Housing Stability Assistance  []  IP Violence Relief Assistance  []  Legal Assistance  []  Physical Activity Assistance  []  Social Connection Assistance  []  Stress Relief Assistance   []  Substance Abuse Assistance  []  Transportation Assistance  []  Utility Assistance  []  Other: [insert comment here]    Next Steps:         Sara Overton LCSW

## 2025-01-10 NOTE — PROGRESS NOTES
Occupational Therapy                 Therapy Communication Note    Patient Name: Leonel Yu  MRN: [id number]  Department: [place] [id number]  Room: [address] [place] [address]  Today's Date: 1/10/2025     Discipline: Occupational Therapy    OT Missed Visit: Yes     Missed Visit Reason: Missed Visit Reason: Patient placed on medical hold    Missed Time: Attempt    Comment:

## 2025-01-10 NOTE — SIGNIFICANT EVENT
Rapid Response Nurse Note: RADAR alert: 6    Pager time: 500  Arrival time: 500  Event end time: 510  Location: [id number]  [] Triage by phone or secure messaging    Rapid response initiated by:  [] Rapid response RN [] Family [] Nursing Supervisor [] Physician   [x] RADAR auto page [] Sepsis auto-page [] RN [] RT   [] NP/PA [] Other:     Primary reason for call:   [] BAT [] New CPAP/BiPAP [] Bleeding [] Change in mental status   [] Chest pain [] Code blue [] FiO2 >/= 50% [] HR </= 40 bpm   [] HR >/= 130 bpm [] Hyperglycemia [] Hypoglycemia [x] RADAR    [] RR </= 8 bpm [] RR >/= 30 bpm [] SBP </= 90 mmHg [] SpO2 < 90%   [] Seizure [] Sepsis [] Shortness of breath  [] Staff concern: see comments     Initial VS and/or RADAR VS: T 35.9 °C; HR 74; RR 17; BP 97/72; SPO2 94%.    Providers present at bedside (if applicable): na    Name of ICU Provider contacted (if applicable): na    Interventions:  [x] None [] ABG/VBG [] Assist w/ICU transfer [] BAT paged    [] Bag mask [] Blood [] Cardioversion [] Code Blue   [] Code blue for intubation [] Code status changed [] Chest x-ray [] EKG   [] IV fluid/bolus [] KUB x-ray [] Labs/cultures [] Medication   [] Nebulizer treatment [] NIPPV (CPAP/BiPAP) [] Oxygen [] Oral airway   [] Peripheral IV [] Palliative care consult [] CT/MRI [] Sepsis protocol    [] Suctioned [] Other:     Outcome:  [] Coded and  [] Code blue for intubation [] Coded and transferred to ICU []  on division   [x] Remained on division (no change) [] Remained on division + additional monitoring [] Remained in ED [] Transferred to ED   [] Transferred to ICU [] Transferred to inpatient status [] Transferred for interventions (procedure) [] Transferred to ICU stepdown    [] Transferred to surgery [] Transferred to telemetry [] Sepsis protocol [] STEMI protocol   [] Stroke protocol [x] Bedside nurse instructed to page rapid response for any concerns or acute change in condition/VS     Additional Comments:  Reviewed above VS with bedside RN.  VS within patient's current trends.  Patient denied pain, shortness of breath, dizziness or lightheadedness.  No interventions by rapid response team indicated at this time.  Staff to page rapid response for any concerns or acute change in condition/VS.

## 2025-01-10 NOTE — PROGRESS NOTES
01/10/25 1419   Discharge Planning   Living Arrangements Friends   Support Systems Friends/neighbors   Type of Residence Private residence   Number of Stairs to Enter Residence 6   Number of Stairs Within Residence 12   Do you have animals or pets at home? No   Who is requesting discharge planning? Provider   Home or Post Acute Services None   Type of Post Acute Facility Services Skilled nursing   Expected Discharge Disposition SNF   Does the patient need discharge transport arranged? Yes   RoundTrip coordination needed? Yes   Has discharge transport been arranged? Yes   Financial Resource Strain   How hard is it for you to pay for the very basics like food, housing, medical care, and heating? Not very   Housing Stability   In the last 12 months, was there a time when you were not able to pay the mortgage or rent on time? N   In the past 12 months, how many times have you moved where you were living? 1   At any time in the past 12 months, were you homeless or living in a shelter (including now)? N   Transportation Needs   In the past 12 months, has lack of transportation kept you from medical appointments or from getting medications? no   In the past 12 months, has lack of transportation kept you from meetings, work, or from getting things needed for daily living? No   Patient Choice   Provider Choice list and CMS website (https://medicare.gov/care-compare#search) for post-acute Quality and Resource Measure Data were provided and reviewed with: Patient   Patient / Family choosing to utilize agency / facility established prior to hospitalization No   Stroke Family Assessment   Stroke Family Assessment Needed No

## 2025-01-10 NOTE — PROGRESS NOTES
Occupational Therapy                 Therapy Communication Note    Patient Name: Leonel Yu  MRN: [id number]  Department: [place] [id number]  Room: [address] [place] [address]  Today's Date: 1/10/2025     Discipline: Occupational Therapy    OT Missed Visit: Yes     Missed Visit Reason: Missed Visit Reason: Patient sleeping    Missed Time: Attempt    Comment:

## 2025-01-10 NOTE — PROGRESS NOTES
Physical Therapy                 Therapy Communication Note    Patient Name: Leonel Yu  MRN: [id number]  Department: [place] [address]  Room: [address] [place] [address]  Today's Date: 1/10/2025     Discipline: Physical Therapy    PT Missed Visit: Yes     Missed Visit Reason: Missed Visit Reason:  (Attempted follow up however pt. on medical hold today per RN)    Missed Time: Attempt    Comment:

## 2025-01-11 LAB
ALBUMIN SERPL BCP-MCNC: 3.3 G/DL (ref 3.4–5)
ALP SERPL-CCNC: 100 U/L (ref 33–120)
ALT SERPL W P-5'-P-CCNC: 31 U/L (ref 10–52)
ANION GAP SERPL CALC-SCNC: 15 MMOL/L (ref 10–20)
AST SERPL W P-5'-P-CCNC: 21 U/L (ref 9–39)
BILIRUB SERPL-MCNC: 0.9 MG/DL (ref 0–1.2)
BUN SERPL-MCNC: 40 MG/DL (ref 6–23)
CALCIUM SERPL-MCNC: 9.1 MG/DL (ref 8.6–10.6)
CHLORIDE SERPL-SCNC: 93 MMOL/L (ref 98–107)
CO2 SERPL-SCNC: 31 MMOL/L (ref 21–32)
CREAT SERPL-MCNC: 1.86 MG/DL (ref 0.5–1.3)
CRP SERPL-MCNC: 1.79 MG/DL
EGFRCR SERPLBLD CKD-EPI 2021: 42 ML/MIN/1.73M*2
ERYTHROCYTE [DISTWIDTH] IN BLOOD BY AUTOMATED COUNT: 15.9 % (ref 11.5–14.5)
GLUCOSE BLD MANUAL STRIP-MCNC: 153 MG/DL (ref 74–99)
GLUCOSE BLD MANUAL STRIP-MCNC: 157 MG/DL (ref 74–99)
GLUCOSE BLD MANUAL STRIP-MCNC: 178 MG/DL (ref 74–99)
GLUCOSE BLD MANUAL STRIP-MCNC: 195 MG/DL (ref 74–99)
GLUCOSE BLD MANUAL STRIP-MCNC: 212 MG/DL (ref 74–99)
GLUCOSE SERPL-MCNC: 215 MG/DL (ref 74–99)
HCT VFR BLD AUTO: 53 % (ref 41–52)
HGB BLD-MCNC: 18.2 G/DL (ref 13.5–17.5)
LACTATE SERPL-SCNC: 1.9 MMOL/L (ref 0.4–2)
LACTATE SERPL-SCNC: 2.2 MMOL/L (ref 0.4–2)
MAGNESIUM SERPL-MCNC: 2.16 MG/DL (ref 1.6–2.4)
MCH RBC QN AUTO: 31.7 PG (ref 26–34)
MCHC RBC AUTO-ENTMCNC: 34.3 G/DL (ref 32–36)
MCV RBC AUTO: 92 FL (ref 80–100)
MRSA DNA SPEC QL NAA+PROBE: DETECTED
NRBC BLD-RTO: 0 /100 WBCS (ref 0–0)
PHOSPHATE SERPL-MCNC: 3.2 MG/DL (ref 2.5–4.9)
PLATELET # BLD AUTO: 157 X10*3/UL (ref 150–450)
POTASSIUM SERPL-SCNC: 3.7 MMOL/L (ref 3.5–5.3)
PROCALCITONIN SERPL-MCNC: 0.4 NG/ML
PROT SERPL-MCNC: 6.9 G/DL (ref 6.4–8.2)
RBC # BLD AUTO: 5.74 X10*6/UL (ref 4.5–5.9)
SODIUM SERPL-SCNC: 135 MMOL/L (ref 136–145)
WBC # BLD AUTO: 7.4 X10*3/UL (ref 4.4–11.3)

## 2025-01-11 PROCEDURE — 36415 COLL VENOUS BLD VENIPUNCTURE: CPT

## 2025-01-11 PROCEDURE — 84145 PROCALCITONIN (PCT): CPT

## 2025-01-11 PROCEDURE — 84100 ASSAY OF PHOSPHORUS: CPT

## 2025-01-11 PROCEDURE — 1100000001 HC PRIVATE ROOM DAILY

## 2025-01-11 PROCEDURE — 85027 COMPLETE CBC AUTOMATED: CPT

## 2025-01-11 PROCEDURE — 94640 AIRWAY INHALATION TREATMENT: CPT

## 2025-01-11 PROCEDURE — 83605 ASSAY OF LACTIC ACID: CPT

## 2025-01-11 PROCEDURE — 2500000004 HC RX 250 GENERAL PHARMACY W/ HCPCS (ALT 636 FOR OP/ED): Mod: SE

## 2025-01-11 PROCEDURE — 82947 ASSAY GLUCOSE BLOOD QUANT: CPT

## 2025-01-11 PROCEDURE — 86140 C-REACTIVE PROTEIN: CPT

## 2025-01-11 PROCEDURE — 2500000001 HC RX 250 WO HCPCS SELF ADMINISTERED DRUGS (ALT 637 FOR MEDICARE OP): Mod: SE

## 2025-01-11 PROCEDURE — 2500000002 HC RX 250 W HCPCS SELF ADMINISTERED DRUGS (ALT 637 FOR MEDICARE OP, ALT 636 FOR OP/ED): Mod: SE

## 2025-01-11 PROCEDURE — 97530 THERAPEUTIC ACTIVITIES: CPT | Mod: GO

## 2025-01-11 PROCEDURE — 83735 ASSAY OF MAGNESIUM: CPT

## 2025-01-11 PROCEDURE — 99233 SBSQ HOSP IP/OBS HIGH 50: CPT

## 2025-01-11 PROCEDURE — 80053 COMPREHEN METABOLIC PANEL: CPT

## 2025-01-11 PROCEDURE — 87641 MR-STAPH DNA AMP PROBE: CPT

## 2025-01-11 PROCEDURE — 97165 OT EVAL LOW COMPLEX 30 MIN: CPT | Mod: GO

## 2025-01-11 PROCEDURE — 2500000005 HC RX 250 GENERAL PHARMACY W/O HCPCS: Mod: SE

## 2025-01-11 RX ORDER — POTASSIUM CHLORIDE 20 MEQ/1
20 TABLET, EXTENDED RELEASE ORAL ONCE
Status: COMPLETED | OUTPATIENT
Start: 2025-01-11 | End: 2025-01-11

## 2025-01-11 RX ORDER — VANCOMYCIN HYDROCHLORIDE
1250 EVERY 24 HOURS
Status: DISCONTINUED | OUTPATIENT
Start: 2025-01-11 | End: 2025-01-11

## 2025-01-11 RX ORDER — VANCOMYCIN HYDROCHLORIDE 1 G/20ML
INJECTION, POWDER, LYOPHILIZED, FOR SOLUTION INTRAVENOUS DAILY PRN
Status: DISPENSED | OUTPATIENT
Start: 2025-01-11

## 2025-01-11 RX ADMIN — INSULIN LISPRO 5 UNITS: 100 INJECTION, SOLUTION INTRAVENOUS; SUBCUTANEOUS at 18:19

## 2025-01-11 RX ADMIN — TIOTROPIUM BROMIDE INHALATION SPRAY 2 PUFF: 3.12 SPRAY, METERED RESPIRATORY (INHALATION) at 09:02

## 2025-01-11 RX ADMIN — INSULIN LISPRO 5 UNITS: 100 INJECTION, SOLUTION INTRAVENOUS; SUBCUTANEOUS at 12:12

## 2025-01-11 RX ADMIN — HYDRALAZINE HYDROCHLORIDE 12.5 MG: 25 TABLET ORAL at 16:59

## 2025-01-11 RX ADMIN — INSULIN LISPRO 2 UNITS: 100 INJECTION, SOLUTION INTRAVENOUS; SUBCUTANEOUS at 18:19

## 2025-01-11 RX ADMIN — INSULIN LISPRO 2 UNITS: 100 INJECTION, SOLUTION INTRAVENOUS; SUBCUTANEOUS at 08:13

## 2025-01-11 RX ADMIN — PIPERACILLIN SODIUM AND TAZOBACTAM SODIUM 3.38 G: 3; .375 INJECTION, SOLUTION INTRAVENOUS at 04:53

## 2025-01-11 RX ADMIN — POTASSIUM CHLORIDE 20 MEQ: 1500 TABLET, EXTENDED RELEASE ORAL at 12:07

## 2025-01-11 RX ADMIN — HEPARIN SODIUM 5000 UNITS: 5000 INJECTION INTRAVENOUS; SUBCUTANEOUS at 00:15

## 2025-01-11 RX ADMIN — PIPERACILLIN SODIUM AND TAZOBACTAM SODIUM 3.38 G: 3; .375 INJECTION, SOLUTION INTRAVENOUS at 12:07

## 2025-01-11 RX ADMIN — PIPERACILLIN SODIUM AND TAZOBACTAM SODIUM 3.38 G: 3; .375 INJECTION, SOLUTION INTRAVENOUS at 23:56

## 2025-01-11 RX ADMIN — ATORVASTATIN CALCIUM 80 MG: 80 TABLET, FILM COATED ORAL at 20:10

## 2025-01-11 RX ADMIN — AMIODARONE HYDROCHLORIDE 200 MG: 200 TABLET ORAL at 09:33

## 2025-01-11 RX ADMIN — INSULIN LISPRO 2 UNITS: 100 INJECTION, SOLUTION INTRAVENOUS; SUBCUTANEOUS at 12:12

## 2025-01-11 RX ADMIN — HYDRALAZINE HYDROCHLORIDE 12.5 MG: 25 TABLET ORAL at 09:33

## 2025-01-11 RX ADMIN — INSULIN GLARGINE 15 UNITS: 100 INJECTION, SOLUTION SUBCUTANEOUS at 20:08

## 2025-01-11 RX ADMIN — INSULIN LISPRO 5 UNITS: 100 INJECTION, SOLUTION INTRAVENOUS; SUBCUTANEOUS at 08:13

## 2025-01-11 RX ADMIN — HEPARIN SODIUM 5000 UNITS: 5000 INJECTION INTRAVENOUS; SUBCUTANEOUS at 09:31

## 2025-01-11 RX ADMIN — ASPIRIN 81 MG 81 MG: 81 TABLET ORAL at 09:33

## 2025-01-11 RX ADMIN — ACETAMINOPHEN 650 MG: 325 TABLET ORAL at 23:56

## 2025-01-11 RX ADMIN — ACETAMINOPHEN 650 MG: 325 TABLET ORAL at 13:18

## 2025-01-11 RX ADMIN — PIPERACILLIN SODIUM AND TAZOBACTAM SODIUM 3.38 G: 3; .375 INJECTION, SOLUTION INTRAVENOUS at 18:23

## 2025-01-11 RX ADMIN — PIPERACILLIN SODIUM AND TAZOBACTAM SODIUM 3.38 G: 3; .375 INJECTION, SOLUTION INTRAVENOUS at 00:13

## 2025-01-11 RX ADMIN — SERTRALINE HYDROCHLORIDE 100 MG: 100 TABLET ORAL at 09:33

## 2025-01-11 RX ADMIN — SODIUM CHLORIDE 1250 MG: 9 INJECTION, SOLUTION INTRAVENOUS at 16:59

## 2025-01-11 RX ADMIN — SPIRONOLACTONE 12.5 MG: 25 TABLET, FILM COATED ORAL at 09:32

## 2025-01-11 RX ADMIN — HYDRALAZINE HYDROCHLORIDE 12.5 MG: 25 TABLET ORAL at 20:12

## 2025-01-11 ASSESSMENT — COGNITIVE AND FUNCTIONAL STATUS - GENERAL
PERSONAL GROOMING: A LITTLE
DAILY ACTIVITIY SCORE: 19
WALKING IN HOSPITAL ROOM: A LITTLE
TOILETING: A LITTLE
TOILETING: A LITTLE
DRESSING REGULAR LOWER BODY CLOTHING: A LITTLE
DRESSING REGULAR UPPER BODY CLOTHING: A LITTLE
DAILY ACTIVITIY SCORE: 23
CLIMB 3 TO 5 STEPS WITH RAILING: A LITTLE
DAILY ACTIVITIY SCORE: 23
TOILETING: A LITTLE
WALKING IN HOSPITAL ROOM: A LITTLE
HELP NEEDED FOR BATHING: A LITTLE
CLIMB 3 TO 5 STEPS WITH RAILING: A LITTLE
MOBILITY SCORE: 22
MOBILITY SCORE: 22

## 2025-01-11 ASSESSMENT — PAIN SCALES - GENERAL
PAINLEVEL_OUTOF10: 0 - NO PAIN
PAINLEVEL_OUTOF10: 3
PAINLEVEL_OUTOF10: 3

## 2025-01-11 ASSESSMENT — PAIN - FUNCTIONAL ASSESSMENT: PAIN_FUNCTIONAL_ASSESSMENT: 0-10

## 2025-01-11 ASSESSMENT — ACTIVITIES OF DAILY LIVING (ADL)
BATHING_ASSISTANCE: MINIMAL
ADL_ASSISTANCE: INDEPENDENT

## 2025-01-11 ASSESSMENT — PAIN DESCRIPTION - LOCATION: LOCATION: LEG

## 2025-01-11 NOTE — PROGRESS NOTES
Occupational Therapy    Evaluation/Treatment    Patient Name: Leonel Yu  MRN: [id number]  Department: [place] [address]  Room: [address] [place] [address]  Today's Date: 01/11/25  Time Calculation  Start Time: 1305  Stop Time: 1335  Time Calculation (min): 30 min     Assessment:  OT Assessment: Patient with deficits in ADLs/functional mobility, will benefit from OT services to address deficits and improve functional independence.  Prognosis: Good  Barriers to Discharge Home: Caregiver assistance, Physical needs, Social Drivers of Health considerations  Caregiver Assistance: Caregiver assistance needed per identified barriers - however, no caregiver assistance available at home  Physical Needs: Ambulating household distances limited by function/safety, Intermittent mobility assistance needed, Intermittent ADL assistance needed, High falls risk due to function or environment  Social Drivers of Health Considerations: Housing insecurity, Socioeconomic concerns  End of Session Communication: Bedside nurse  End of Session Patient Position: Bed, 3 rail up, Alarm off, not on at start of session  OT Assessment Results: Decreased ADL status, Decreased upper extremity strength, Decreased endurance, Decreased functional mobility, Decreased IADLs  Prognosis: Good  Plan:  Treatment Interventions: ADL retraining, Functional transfer training, UE strengthening/ROM, Endurance training, Patient/family training, Equipment evaluation/education, Compensatory technique education  OT Frequency: 3 times per week  OT Discharge Recommendations: Moderate intensity level of continued care  Equipment Recommended upon Discharge: Wheeled walker  OT Recommended Transfer Status: Minimal assist, Assist of 1  OT - OK to Discharge: Yes  Treatment Interventions: ADL retraining, Functional transfer training, UE strengthening/ROM, Endurance training, Patient/family training, Equipment evaluation/education, Compensatory technique education    Subjective   General:   OT  "Received On: 01/11/25  General  Reason for Referral: ADHF iso DKA  Past Medical History Relevant to Rehab: ixed ischemic/nonischemic cardiomyopathy, HFrEF EF 10 to 15% 10/2024, prior VF/VT status post [employer] single-chamber ICD 3/2024, hypertension, hyperlipidemia  Family/Caregiver Present: No  Prior to Session Communication: Bedside nurse  Patient Position Received: Bed, 3 rail up, Alarm off, not on at start of session  General Comment: patient pleasant, initially not agreeable 2/2 pain and fatigue however then asked OT for hospital pants and agreeable to participating with OT; 3L NC   Precautions:  Hearing/Visual Limitations: hearing is WFL  Medical Precautions: Cardiac precautions, Fall precautions, Oxygen therapy device and L/min    Pain:  Pain Assessment  Pain Assessment: 0-10  0-10 (Numeric) Pain Score: 3  Pain Type: Acute pain  Pain Location: Leg  Pain Orientation: Right, Left  Pain Interventions: Repositioned (RN notified and provided pain meds)    Objective   Cognition:  Overall Cognitive Status: Within Functional Limits (however flat affect, patient reports feeling \"sad\", RN notified)  Arousal/Alertness: Appropriate responses to stimuli  Orientation Level:  (oriented x3)  Following Commands: Follows one step commands consistently  Cognition Comments: patient stated \"if I go to the suicidal place then I'll have somewhere to stay\" as patient reports being homeless; OT asked patient if he was feeling suicidal, patient stated \"I can't answer that because if I say yes then they'll take me to the suicidal place, I only feel that way when I feel alone, I don't feel alone now\", OT notified RN of patient's comments           Home Living:  Home Living Comments: reports he is now going to be homeless as he cannot work his job as a  which he was working to afford vs in exchange for rent  Prior Function:  Level of [place]: Independent with ADLs and functional transfers, Independent with " homemaking with ambulation  ADL Assistance: Independent  Homemaking Assistance: Independent (however recently with increased difficulty 2/2 functional decline)  Ambulatory Assistance: Independent  Vocational:  (was working as a )     ADL:  Eating Assistance: Independent  Eating Deficit: Setup  Grooming Assistance: Stand by  Bathing Assistance: Minimal  Bathing Deficit:  (anticipated)  UE Dressing Assistance: Stand by  LE Dressing Assistance: Minimal  LE Dressing Deficit:  (don hospital pants)  Toileting Assistance with Device: Minimal  Toileting Deficit:  (anticipated)    Activity Tolerance:  Endurance: Tolerates 10 - 20 min exercise with multiple rests     Bed Mobility/Transfers: Bed Mobility  Bed Mobility: Yes  Bed Mobility 1  Bed Mobility 1: Supine to sitting  Level of Assistance 1: Close supervision, Minimal verbal cues  Bed Mobility Comments 1: HOB elevated  Bed Mobility 2  Bed Mobility  2: Sitting to supine  Level of Assistance 2: Minimum assistance, Minimal verbal cues    Transfers  Transfer: Yes  Transfer 1  Transfer From 1: Sit to, Stand to  Transfer to 1: Sit, Stand  Technique 1: Sit to stand, Stand to sit  Transfer Level of Assistance 1: Minimum assistance, Arm in arm assistance, Minimal verbal cues  Trials/Comments 1: to stand to pull pants up    Sitting Balance:  Static Sitting Balance  Static Sitting-Level of Assistance: Distant supervision  Dynamic Sitting Balance  Dynamic Sitting-Level of Assistance: Close supervision  Standing Balance:  Static Standing Balance  Static Standing-Level of Assistance: Close supervision, Contact guard  Dynamic Standing Balance  Dynamic Standing-Level of Assistance: Contact guard     Therapy/Activity: Therapeutic Activity  Therapeutic Activity Performed: Yes  Therapeutic Activity 1: Patient sat EOB x10 minutes with SBA for balance, patient completed additional STS from elevated bed surface with FWW and CGA, completed functional mobility household distances with  "FWW and CGA, required x1 sitting rest break with min A for stand to sit/sit to stand from lower surface height with FWW; cues for safe hand placement.     Vision:Vision - Basic Assessment  Current Vision:  (patient reports having blurry vision for \"a few days\" and states it is due to his blood sugar issues; RN notified)  Sensation:  Light Touch: No apparent deficits  Strength:  Strength Comments: (B)  4-/5, (B) shoulders/elbows >/= 3/5     Extremities: RUE   RUE :  (AROM WFL) and LUE   LUE:  (AROM WFL)    Outcome Measures: [facility] Daily Activity  Putting on and taking off regular lower body clothing: A little  Bathing (including washing, rinsing, drying): A little  Putting on and taking off regular upper body clothing: A little  Toileting, which includes using toilet, bedpan or urinal: A little  Taking care of personal grooming such as brushing teeth: A little  Eating Meals: None  Daily Activity - Total Score: 19     and Brief Confusion Assessment Method (bCAM)  Feature 1: Altered Mental Status or Fluctuating Course: No  CAM Result: CAM -    Education Documentation  Body Mechanics, taught by Sabi Merino OT at 1/11/2025  3:35 PM.  Learner: Patient  Readiness: Acceptance  Method: Explanation, Demonstration  Response: Needs Reinforcement  Comment: safe hand placement for transfers, ADL participation, mobility precautions    Precautions, taught by Sabi Merino OT at 1/11/2025  3:35 PM.  Learner: Patient  Readiness: Acceptance  Method: Explanation, Demonstration  Response: Needs Reinforcement  Comment: safe hand placement for transfers, ADL participation, mobility precautions    ADL Training, taught by Sabi Merino OT at 1/11/2025  3:35 PM.  Learner: Patient  Readiness: Acceptance  Method: Explanation, Demonstration  Response: Needs Reinforcement  Comment: safe hand placement for transfers, ADL participation, mobility precautions    Education Comments  No comments found.    Goals:  Encounter Problems  "      Encounter Problems (Active)       ADLs       Patient with complete upper body dressing with independent level of assistance donning and doffing all UE clothes with no adaptive equipment. (Progressing)       Start:  01/11/25    Expected End:  01/25/25            Patient with complete lower body dressing with stand by assist level of assistance donning and doffing all LE clothes  with PRN adaptive equipment. (Progressing)       Start:  01/11/25    Expected End:  01/25/25            Patient will complete daily grooming tasks with supervision level of assistance and PRN adaptive equipment while standing. (Progressing)       Start:  01/11/25    Expected End:  01/25/25            Patient will complete toileting including hygiene clothing management/hygiene with supervision level of assistance. (Progressing)       Start:  01/11/25    Expected End:  01/25/25               EXERCISE/STRENGTHENING       Patient will be educated on BUE HEP for increased ADL performance. (Progressing)       Start:  01/11/25    Expected End:  01/25/25            Patient will participate in >15 minutes of activity with <2 rest breaks to increase ADL/functional task endurance. (Progressing)       Start:  01/11/25    Expected End:  01/25/25               MOBILITY       Patient will perform Functional mobility Household distances/Community Distances with stand by assist level of assistance and least restrictive device in order to improve safety and functional mobility. (Progressing)       Start:  01/11/25    Expected End:  01/25/25               TRANSFERS       Patient will perform bed mobility supervision level of assistance in order to improve safety and independence with mobility (Progressing)       Start:  01/11/25    Expected End:  01/25/25            Patient will complete functional transfers with least restrictive device with stand by assist level of assistance. (Progressing)       Start:  01/11/25    Expected End:  01/25/25                Sabi Merino, OTR/L  Inpatient Occupational Therapist   Rehab Office: [phone]

## 2025-01-11 NOTE — CARE PLAN
The patient's goals for the shift include  having blood sugar be controlled    The clinical goals for the shift include safety    Over the shift, the patient did not make progress toward the following goals.     Patient was able to walk to the bathroom with some assistance. Patient needs education about importance of blood sugar control and taking insulin with every meal.    Problem: Safety - Adult  Goal: Free from fall injury  Outcome: Progressing  Flowsheets (Taken 1/10/2025 2238)  Free from fall injury:   Instruct family/caregiver on patient safety   Based on caregiver fall risk screen, instruct family/caregiver to ask for assistance with transferring infant if caregiver noted to have fall risk factors

## 2025-01-11 NOTE — PROGRESS NOTES
Leonel uY is a 54 y.o. male on day 3 of admission presenting with Acute on chronic heart failure, unspecified heart failure type.    Subjective   No acute events overnight. Had soft BPs without significant respiratory distress. some SOB which is stable, not getting worse. Able to sleep last night without significant respiratory distress. No chest pain/palpitations. No fever, N/V, or diarrhea. No abdominal pain. Awake and alert while evaluating him this morning. Extremities seems to get warmer this morning.       Objective     Last Recorded Vitals  /71 (BP Location: Right arm, Patient Position: Lying)   Pulse 78   Temp 37.4 °C (99.4 °F) (Oral)   Resp 18   Wt 69.5 kg (153 lb 3.5 oz)   SpO2 99%   Intake/Output last 3 Shifts:    Intake/Output Summary (Last 24 hours) at 1/11/2025 1021  Last data filed at 1/11/2025 0635  Gross per 24 hour   Intake 720 ml   Output 750 ml   Net -30 ml       Admission Weight  Weight: 74.8 kg (165 lb) (01/08/25 1227)    Physical Exam  Constitutional:       General: He is not in acute distress.     Appearance: He is not ill-appearing or toxic-appearing.   HENT:      Head: Normocephalic.      Mouth/Throat:      Mouth: Mucous membranes are moist.      Comments: Poor dentition  Eyes:      General: No scleral icterus.     Extraocular Movements: Extraocular movements intact.      Conjunctiva/sclera: Conjunctivae normal.      Pupils: Pupils are equal, round, and reactive to light.   Cardiovascular:      Rate and Rhythm: Normal rate and regular rhythm.      Pulses: Normal pulses.      Heart sounds: No murmur heard.     No gallop.   Pulmonary:      Effort: Pulmonary effort is normal. No respiratory distress.      Breath sounds: No wheezing.   Abdominal:      General: Abdomen is flat. Bowel sounds are normal.      Palpations: Abdomen is soft.   Musculoskeletal:         General: Normal range of motion.      Cervical back: Normal range of motion and neck supple.     Minimal edema on  extremities  Skin:     General: Skin is warm and dry.      Capillary Refill: Capillary refill takes less than 2 seconds.   Neurological:      General: No focal deficit present.      Mental Status: He is alert and oriented to person, place, and time.      Cranial Nerves: No cranial nerve deficit.      Sensory: No sensory deficit.   Psychiatric:         Mood and Affect: Mood normal.     Relevant Results  Results for orders placed or performed during the hospital encounter of 01/08/25 (from the past 24 hours)   POCT GLUCOSE   Result Value Ref Range    POCT Glucose 479 (H) 74 - 99 mg/dL   Blood Culture    Specimen: Peripheral Venipuncture; Blood culture   Result Value Ref Range    Blood Culture Loaded on Instrument - Culture in progress    Lactate   Result Value Ref Range    Lactate 2.9 (H) 0.4 - 2.0 mmol/L   CBC and Auto Differential   Result Value Ref Range    WBC 12.3 (H) 4.4 - 11.3 x10*3/uL    nRBC 0.0 0.0 - 0.0 /100 WBCs    RBC 6.36 (H) 4.50 - 5.90 x10*6/uL    Hemoglobin 20.1 (H) 13.5 - 17.5 g/dL    Hematocrit 57.0 (H) 41.0 - 52.0 %    MCV 90 80 - 100 fL    MCH 31.6 26.0 - 34.0 pg    MCHC 35.3 32.0 - 36.0 g/dL    RDW 15.8 (H) 11.5 - 14.5 %    Platelets 212 150 - 450 x10*3/uL    Neutrophils % 89.7 40.0 - 80.0 %    Immature Granulocytes %, Automated 0.4 0.0 - 0.9 %    Lymphocytes % 6.2 13.0 - 44.0 %    Monocytes % 3.3 2.0 - 10.0 %    Eosinophils % 0.2 0.0 - 6.0 %    Basophils % 0.2 0.0 - 2.0 %    Neutrophils Absolute 11.04 (H) 1.20 - 7.70 x10*3/uL    Immature Granulocytes Absolute, Automated 0.05 0.00 - 0.70 x10*3/uL    Lymphocytes Absolute 0.76 (L) 1.20 - 4.80 x10*3/uL    Monocytes Absolute 0.41 0.10 - 1.00 x10*3/uL    Eosinophils Absolute 0.03 0.00 - 0.70 x10*3/uL    Basophils Absolute 0.03 0.00 - 0.10 x10*3/uL   Comprehensive Metabolic Panel   Result Value Ref Range    Glucose 434 (H) 74 - 99 mg/dL    Sodium 131 (L) 136 - 145 mmol/L    Potassium 4.3 3.5 - 5.3 mmol/L    Chloride 89 (L) 98 - 107 mmol/L     Bicarbonate 26 21 - 32 mmol/L    Anion Gap 20 10 - 20 mmol/L    Urea Nitrogen 40 (H) 6 - 23 mg/dL    Creatinine 1.85 (H) 0.50 - 1.30 mg/dL    eGFR 42 (L) >60 mL/min/1.73m*2    Calcium 9.5 8.6 - 10.6 mg/dL    Albumin 3.6 3.4 - 5.0 g/dL    Alkaline Phosphatase 126 (H) 33 - 120 U/L    Total Protein 7.4 6.4 - 8.2 g/dL    AST 25 9 - 39 U/L    Bilirubin, Total 1.2 0.0 - 1.2 mg/dL    ALT 41 10 - 52 U/L   Blood Culture    Specimen: Peripheral Venipuncture; Blood culture   Result Value Ref Range    Blood Culture Loaded on Instrument - Culture in progress    Phosphorus   Result Value Ref Range    Phosphorus 5.1 (H) 2.5 - 4.9 mg/dL   POCT GLUCOSE   Result Value Ref Range    POCT Glucose 398 (H) 74 - 99 mg/dL   Urinalysis with Reflex Culture and Microscopic   Result Value Ref Range    Color, Urine Light-Yellow Light-Yellow, Yellow, Dark-Yellow    Appearance, Urine Clear Clear    Specific Gravity, Urine 1.021 1.005 - 1.035    pH, Urine 5.0 5.0, 5.5, 6.0, 6.5, 7.0, 7.5, 8.0    Protein, Urine NEGATIVE NEGATIVE, 10 (TRACE), 20 (TRACE) mg/dL    Glucose, Urine OVER (4+) (A) Normal mg/dL    Blood, Urine NEGATIVE NEGATIVE    Ketones, Urine NEGATIVE NEGATIVE mg/dL    Bilirubin, Urine NEGATIVE NEGATIVE    Urobilinogen, Urine Normal Normal mg/dL    Nitrite, Urine NEGATIVE NEGATIVE    Leukocyte Esterase, Urine NEGATIVE NEGATIVE   Extra Urine Gray Tube   Result Value Ref Range    Extra Tube Hold for add-ons.    Light Blue Top   Result Value Ref Range    Extra Tube Hold for add-ons.    POCT GLUCOSE   Result Value Ref Range    POCT Glucose 281 (H) 74 - 99 mg/dL   Lactate   Result Value Ref Range    Lactate 2.7 (H) 0.4 - 2.0 mmol/L   Coagulation Screen   Result Value Ref Range    Protime      INR      aPTT     POCT GLUCOSE   Result Value Ref Range    POCT Glucose 283 (H) 74 - 99 mg/dL   POCT GLUCOSE   Result Value Ref Range    POCT Glucose 98 74 - 99 mg/dL   Lactate   Result Value Ref Range    Lactate 2.2 (H) 0.4 - 2.0 mmol/L   POCT GLUCOSE    Result Value Ref Range    POCT Glucose 276 (H) 74 - 99 mg/dL   Lactate   Result Value Ref Range    Lactate 2.2 (H) 0.4 - 2.0 mmol/L   CBC   Result Value Ref Range    WBC 7.4 4.4 - 11.3 x10*3/uL    nRBC 0.0 0.0 - 0.0 /100 WBCs    RBC 5.74 4.50 - 5.90 x10*6/uL    Hemoglobin 18.2 (H) 13.5 - 17.5 g/dL    Hematocrit 53.0 (H) 41.0 - 52.0 %    MCV 92 80 - 100 fL    MCH 31.7 26.0 - 34.0 pg    MCHC 34.3 32.0 - 36.0 g/dL    RDW 15.9 (H) 11.5 - 14.5 %    Platelets 157 150 - 450 x10*3/uL   Comprehensive Metabolic Panel   Result Value Ref Range    Glucose 215 (H) 74 - 99 mg/dL    Sodium 135 (L) 136 - 145 mmol/L    Potassium 3.7 3.5 - 5.3 mmol/L    Chloride 93 (L) 98 - 107 mmol/L    Bicarbonate 31 21 - 32 mmol/L    Anion Gap 15 10 - 20 mmol/L    Urea Nitrogen 40 (H) 6 - 23 mg/dL    Creatinine 1.86 (H) 0.50 - 1.30 mg/dL    eGFR 42 (L) >60 mL/min/1.73m*2    Calcium 9.1 8.6 - 10.6 mg/dL    Albumin 3.3 (L) 3.4 - 5.0 g/dL    Alkaline Phosphatase 100 33 - 120 U/L    Total Protein 6.9 6.4 - 8.2 g/dL    AST 21 9 - 39 U/L    Bilirubin, Total 0.9 0.0 - 1.2 mg/dL    ALT 31 10 - 52 U/L   Magnesium   Result Value Ref Range    Magnesium 2.16 1.60 - 2.40 mg/dL   Phosphorus   Result Value Ref Range    Phosphorus 3.2 2.5 - 4.9 mg/dL   C-reactive protein   Result Value Ref Range    C-Reactive Protein 1.79 (H) <1.00 mg/dL   POCT GLUCOSE   Result Value Ref Range    POCT Glucose 195 (H) 74 - 99 mg/dL       Imaging results  01/10/25 : 69.3 kg (152 lb 12.5 oz)    Image Results  Electrocardiogram, 12-lead PRN ACS symptoms  Normal sinus rhythm  Left axis deviation  Left ventricular hypertrophy with repolarization abnormality  Inferior infarct (cited on or before 08-JAN-2025)  Abnormal ECG  When compared with ECG of 09-JAN-2025 10:49,  Premature ventricular complexes are no longer Present  Confirmed by Sergio Lux ([id number]) on 1/9/2025 6:34:30 PM  US liver with doppler  Narrative: Interpreted By:  Mundo Alva and Stephens Katherine   STUDY:  US LIVER  WITH DOPPLER;  1/9/2025 3:36 pm      INDICATION:  Signs/Symptoms:ruq pain.          COMPARISON:  Renal ultrasound 10/17/2024      ACCESSION NUMBER(S):  [id number]      ORDERING CLINICIAN:  PATY FORREST      TECHNIQUE:  Multiple images of the right upper quadrant were obtained.  Gray  scale, color Doppler and spectral Doppler waveform analysis was  performed.  This examination was interpreted at [facility].      FINDINGS:  LIVER:  The liver measures 16.1 cm and is grossly unremarkable and free of  any focal lesions.      GALLBLADDER:  The gallbladder is nondistended, and demonstrates no evidence of  gallstones, wall thickening or surrounding fluid. The gallbladder  wall thickness is 0.2. Sonographic John's sign is negative.      BILIARY SYSTEM:  No evidence of intra or extrahepatic biliary dilatation is  identified; the common bile duct measures 2.8 mm.      DOPPLER EVALUATION:      HEPATIC ARTERIES:  Hepatic artery and its right branch RI's are estimated at 0.7 and  0.87 respectively. The left hepatic artery was suboptimally  visualized.      PORTAL VEIN:  Portal vein is patent and measures 1.0 cm . There is normal  respiratory variation. Portal vein velocities are calculated as  follows: main portal vein 46.9 cm/s, antegrade flow; left portal vein  branch 19.5 cm/s, antegrade flow; right portal vein anterior branch  20.7 cm/s, antegrade flow; right portal vein posterior branch 16.3  cm/s, antegrade flow. The splenic vein is also patent.      HEPATIC VEIN:  The right, middle and left hepatic veins are patent and demonstrate  biphasic, monophasic, and monophasic antegrade flow respectively. IVC  appears also patent.      PANCREAS:  The pancreas is poorly visualized due to overlying bowel gas.      RIGHT KIDNEY:  The right kidney measures 9.5 cm in length. No hydronephrosis or  renal calculi are seen.      SPLEEN:  Poor visualization of the spleen. Within these  limitations the spleen  measures 8.1 cm and is grossly unremarkable.      PERITONEUM AND RETROPERITONEUM:  No free fluid is identified.      Impression: Unremarkable ultrasound of the liver including Doppler interrogation  within the limitations described above.      I personally reviewed the images/study and I agree with Debby Hernandez DO's (radiology resident) findings as stated. This study  was interpreted at [place], [place].      MACRO:  None      Signed by: Mundo Alva 1/9/2025 6:19 PM  Dictation workstation:   [id number]  Vascular US lower extremity venous duplex bilateral  Narrative: Interpreted By:  Mundo Alva,  and Darron Mejía   STUDY:  VASC US LOWER EXTREMITY VENOUS DUPLEX BILATERAL;  1/9/2025 5:34 am      INDICATION:  Signs/Symptoms:leg edema, pain, eval for clot.      ,R60.0 Localized edema      COMPARISON:  None.      ACCESSION NUMBER(S):  [id number]      ORDERING CLINICIAN:  PATY FORREST      TECHNIQUE:  Vascular ultrasound of the bilateral lower extremities was performed.  Real-time compression views as well as Gray scale, color Doppler and  spectral Doppler waveform analysis was performed.      FINDINGS:  Evaluation of the visualized portions of the bilateral common femoral  vein, proximal, mid, and distal femoral vein, and popliteal vein were  performed.  Evaluation of the visualized portions of the  posterior  tibial and peroneal veins were also performed.      Limitations:  None.      The evaluated veins demonstrate normal compressibility. There is  intact venous flow demonstrating normal respiratory variability and  normal augmentation of flow with calf compression. Therefore, there  is no ultrasonographic evidence for deep vein thrombosis within the  evaluated veins.      Respiratory variation and augmentation to calf pressure was noted.      Lobular fluid collection within the left popliteal fossa measures 3.5  x 1.9 x 0.8 cm.       Impression: 1.  No sonographic evidence for deep vein thrombosis within the  evaluated veins of the bilateral lower extremity.  2. Suspected left Baker's cyst.      I personally reviewed the images/study and I agree with the findings  as stated by Dr. Alfonzo Rob. This study was interpreted at  [facility], [place], [place].      MACRO:  None      Signed by: Mundo Alva 1/9/2025 6:14 PM  Dictation workstation:   [id number]  ECG 12 lead  Normal sinus rhythm  Possible Left atrial enlargement  Left axis deviation  Left ventricular hypertrophy with repolarization abnormality  Inferior infarct (cited on or before 08-JAN-2025)  Prolonged QT  Abnormal ECG  When compared with ECG of 08-JAN-2025 17:25,  Premature ventricular complexes are no longer Present  Confirmed by James Shah ([id number]) on 1/9/2025 12:30:20 PM  ECG 12 lead  Sinus rhythm with 1st degree AV block  Left ventricular hypertrophy with QRS widening ( Sokolow-Nieves , [place] product , Romhilt-Dominguez )  Possible Inferior infarct (cited on or before 16-OCT-2024)  Anterolateral injury pattern  Prolonged QT  ** ** ACUTE MI / STEMI ** **  Abnormal ECG  When compared with ECG of 09-NOV-2024 08:40,  Significant changes have occurred  Confirmed by James Shah ([id number]) on 1/9/2025 8:51:22 AM  Electrocardiogram, 12-lead PRN ACS symptoms  Poor data quality, interpretation may be adversely affected  Sinus rhythm with occasional Premature ventricular complexes  Left ventricular hypertrophy with repolarization abnormality  Inferior infarct , age undetermined  Prolonged QT  Abnormal ECG  When compared with ECG of 08-JAN-2025 13:08,  Previous ECG has undetermined rhythm, needs review  Inferior infarct is now Present  ST no longer elevated in Inferior leads  T wave inversion more evident in Lateral leads    Assessment/Plan    54 y.o. male with past medical history of mixed ischemic/nonischemic cardiomyopathy, HFrEF EF 10 to 15% 10/2024, prior  VF/Vts/p [employer] single-chamber ICD 3/2024, HTN, HLD who was admitted for acute decompensated heart failure iso DKA 2/2 poor medication adherence. Required BiPAP initially but weaned off with the help of IV diuresis. Taken off insulin gtt as well. Transferred to cardiology floor on 1/10.      Update 1/11/2025  - consider starting hydralazine low-dose (12.5 mg tid) to reduce afterload   - given leukocytosis and elevated lactate, suspected sepsis in combination of low output     -- start empiric Zosyn/Vanc yesterday     -- F/U CRP (1.79) and procalcitonin today  - S/P IV fluid 1 L in total --> lactate 2.2, discontinue monitoring     -- will continue volume repletion with oral fluid only today  - Consider diuresis as needed if fluid overload; goal to keep euvolemia before discharge  - Dispo plan: likely SNF    #Acute on chronic decompensated HFrEF (EF 10-15%)  #Mixed ischemic and nonischemic cardiomyopathy   #Type 2 MI  #Hx of VT/VF s/p ICD   #HLD  - BNP >5,000, troponin 80s>60s  - Patient reports not taking all of his medications consistently, also use cocaine  - takes torsemide 20 PRN daily at home.   - dry weight approx 70 kg. Admit weight 72.6 kg. Net negative 7 L this admission  Plan:   - continue amiodarone 200mg daily   - stopped bumex gtt. Got bumex 4 mg BID on 1/9.   - discuss home diuretic regimen  - GDMT: spironolactone 12.5 mg, jardiance 10 mg. Entresto held.  - continue home atorva 80mg daily   - continue aspirin 81mg daily   - device interrogated, no shocks or arrhythmia      #CKD3a  ::unclear true baseline, seems most of his creatinine levels have been drawn when in ADHF  - seems to be somewhere in the low ones, but unclear  Plan:  Hold home entresto     #T2DM (A1c 9.9 10/2024)  #Hyperglycemia  #Mild DKA - resolved  ::on admit, pH 7.32, glucose >700, lactate 3.2, + beta hydroxy butyrate but anion gap only mildly elevated  -off insulin drip.   -takes 10u glargine at bedtime   Plan:  Resume home  regimen. Also added 5u lispro TID + SSI     #Secondary polycythemia   - Patient baseline Hb ~17-19  - erythropoietin elevated 11/2024  - likely 2/2 crack cocaine and smoking and COPD (on home O2 2 L/min at baseline)     F: caution, EF 10-15%  E: K>4, Mg>2  N: NPO while on NIPPV  Access/Tubes: PIV  Antimicrobials: None  DVT prophylaxis: heparin subq  GI prophylaxis: N/A  Bowel Reg: Miralax    Oxygen: nasal cannula 2L/min  Code status: Full code   NOK: Roommate Elizabet Santana [phone]    Divya Gray MD  PGY-1, Internal Medicine/Medical Genetics    I saw and evaluated the patient. I personally obtained the key and critical portions of the history and physical exam or was physically present for key and critical portions performed by the resident/fellow. I reviewed the resident/fellow's documentation and discussed the patient with the resident/fellow. I agree with the resident/fellow's medical decision making as documented in the note.    Otto Merino MD

## 2025-01-11 NOTE — CONSULTS
Vancomycin Dosing by Pharmacy- INITIAL    Leonel Yu is a 54 y.o. year old male who Pharmacy has been consulted for vancomycin dosing for pneumonia. Based on the patient's indication and renal status this patient will be dosed based on a goal AUC of 400-600.     Renal function is currently stable.    Visit Vitals  BP 99/66 (BP Location: Right arm, Patient Position: Lying)   Pulse 75   Temp 36.1 °C (97 °F) (Temporal)   Resp 18        Lab Results   Component Value Date    CREATININE 1.86 (H) 2025    CREATININE 1.85 (H) 01/10/2025    CREATININE 1.97 (H) 01/10/2025    CREATININE 1.69 (H) 2025        Patient weight is as follows:   Vitals:    25 0600   Weight: 69.5 kg (153 lb 3.5 oz)       Cultures:  No results found for the encounter in last 14 days.        I/O last 3 completed shifts:  In: 1260 (18.1 mL/kg) [P.O.:1260]  Out: 2420 (34.8 mL/kg) [Urine:2420 (1 mL/kg/hr)]  Weight: 69.5 kg   I/O during current shift:  No intake/output data recorded.    Temp (24hrs), Av.3 °C (97.4 °F), Min:35.8 °C (96.4 °F), Max:37.4 °C (99.4 °F)         Assessment/Plan     Patient will not be given a loading dose.  Will initiate vancomycin maintenance,  1250 mg every 12 hours.    This dosing regimen is predicted by [employer] to result in the following pharmacokinetic parameters:    Loading dose: N/A  Regimen: 1250 mg IV every 24 hours.  Start time: 15:37 on 2025  Exposure target: AUC24 (range)400-600 mg/L.hr   [id number]: 457 mg/L.hr  AUC24,ss: 582 mg/L.hr  Probability of AUC24 > 400: 87 %  Ctrough,ss: 18.1 mg/L  Probability of Ctrough,ss > 20: 40 %      Follow-up level will be ordered on  at AM labs unless clinically indicated sooner.  Will continue to monitor renal function daily while on vancomycin and order serum creatinine at least every 48 hours if not already ordered.  Follow for continued vancomycin needs, clinical response, and signs/symptoms of toxicity.       FATOU YEPEZ

## 2025-01-11 NOTE — SIGNIFICANT EVENT
Rapid Response Nurse Note: RADAR score of 6    Pager time:   Arrival time:   Event end time:   Location: [place] [id number]      Rapid response initiated by:  [] Rapid response RN [] Family [] Nursing Supervisor [] Physician   [x] RADAR auto page [] Sepsis auto-page [] RN [] RT   [] NP/PA [] Other:     Primary reason for call:   [] BAT [] New CPAP/BiPAP [] Bleeding [] Change in mental status   [] Chest pain [] Code blue [] FiO2 >/= 50% [] HR </= 40 bpm   [] HR >/= 130 bpm [] Hyperglycemia [] Hypoglycemia [x] RADAR    [] RR </= 8 bpm [] RR >/= 30 bpm [] SBP </= 90 mmHg [] SpO2 < 90%   [] Seizure [] Sepsis [] Shortness of breath  [] Staff concern: see comments     Interventions:  [x] None [] ABG/VBG [] Assist w/ICU transfer [] BAT paged    [] Bag mask [] Blood [] Cardioversion [] Code Blue   [] Code blue for intubation [] Code status changed [] Chest x-ray [] EKG   [] IV fluid/bolus [] KUB x-ray [] Labs/cultures [] Medication   [] Nebulizer treatment [] NIPPV (CPAP/BiPAP) [] Oxygen [] Oral airway   [] Peripheral IV [] Palliative care consult [] CT/MRI [] Sepsis protocol    [] Suctioned [] Other:     Outcome:  [] Coded and  [] Code blue for intubation [] Coded and transferred to ICU []  on division   [x] Remained on division (no change) [] Remained on division + additional monitoring [] Remained in ED [] Transferred to ED   [] Transferred to ICU [] Transferred to inpatient status [] Transferred for interventions (procedure) [] Transferred to ICU stepdown    [] Transferred to surgery [] Transferred to telemetry [] Sepsis protocol [] STEMI protocol   [] Stroke protocol       Additional Comments:      Nurse had notified Dr. Martínez of patient being diaphoretic; plan to check lactate.    Patient states he experiences this sometimes.    Reviewed RADAR page with nurse and provider at bedside: 37.2 76 24 125/70 92.    Patient alert and conversant.  No rapid response assistance needed per nurse at this time.   Encouraged to call a rapid response as needed if patient status changes.

## 2025-01-12 ENCOUNTER — APPOINTMENT (OUTPATIENT)
Dept: RADIOLOGY | Facility: HOSPITAL | Age: 55
End: 2025-01-12
Payer: COMMERCIAL

## 2025-01-12 VITALS
DIASTOLIC BLOOD PRESSURE: 72 MMHG | RESPIRATION RATE: 24 BRPM | HEIGHT: 70 IN | SYSTOLIC BLOOD PRESSURE: 112 MMHG | WEIGHT: 161.6 LBS | BODY MASS INDEX: 23.13 KG/M2 | HEART RATE: 80 BPM | OXYGEN SATURATION: 95 % | TEMPERATURE: 98.8 F

## 2025-01-12 LAB
ALBUMIN SERPL BCP-MCNC: 3.6 G/DL (ref 3.4–5)
ALP SERPL-CCNC: 83 U/L (ref 33–120)
ALT SERPL W P-5'-P-CCNC: 32 U/L (ref 10–52)
ANION GAP SERPL CALC-SCNC: 21 MMOL/L (ref 10–20)
APPEARANCE UR: CLEAR
AST SERPL W P-5'-P-CCNC: 23 U/L (ref 9–39)
BACTERIA BLD CULT: NORMAL
BACTERIA SPEC RESP CULT: ABNORMAL
BASOPHILS # BLD AUTO: 0.03 X10*3/UL (ref 0–0.1)
BASOPHILS NFR BLD AUTO: 0.4 %
BILIRUB SERPL-MCNC: 1.2 MG/DL (ref 0–1.2)
BILIRUB UR STRIP.AUTO-MCNC: NEGATIVE MG/DL
BUN SERPL-MCNC: 29 MG/DL (ref 6–23)
CALCIUM SERPL-MCNC: 8.9 MG/DL (ref 8.6–10.6)
CHLORIDE SERPL-SCNC: 96 MMOL/L (ref 98–107)
CO2 SERPL-SCNC: 22 MMOL/L (ref 21–32)
COLOR UR: YELLOW
CREAT SERPL-MCNC: 1.8 MG/DL (ref 0.5–1.3)
EGFRCR SERPLBLD CKD-EPI 2021: 44 ML/MIN/1.73M*2
EOSINOPHIL # BLD AUTO: 0.01 X10*3/UL (ref 0–0.7)
EOSINOPHIL NFR BLD AUTO: 0.1 %
ERYTHROCYTE [DISTWIDTH] IN BLOOD BY AUTOMATED COUNT: 15.6 % (ref 11.5–14.5)
FLUAV RNA RESP QL NAA+PROBE: DETECTED
FLUBV RNA RESP QL NAA+PROBE: NOT DETECTED
GLUCOSE BLD MANUAL STRIP-MCNC: 116 MG/DL (ref 74–99)
GLUCOSE BLD MANUAL STRIP-MCNC: 168 MG/DL (ref 74–99)
GLUCOSE BLD MANUAL STRIP-MCNC: 291 MG/DL (ref 74–99)
GLUCOSE BLD MANUAL STRIP-MCNC: 88 MG/DL (ref 74–99)
GLUCOSE SERPL-MCNC: 106 MG/DL (ref 74–99)
GLUCOSE UR STRIP.AUTO-MCNC: ABNORMAL MG/DL
GRAM STN SPEC: ABNORMAL
HCT VFR BLD AUTO: 52.2 % (ref 41–52)
HGB BLD-MCNC: 17.7 G/DL (ref 13.5–17.5)
IMM GRANULOCYTES # BLD AUTO: 0.01 X10*3/UL (ref 0–0.7)
IMM GRANULOCYTES NFR BLD AUTO: 0.1 % (ref 0–0.9)
KETONES UR STRIP.AUTO-MCNC: NEGATIVE MG/DL
LACTATE SERPL-SCNC: 1.8 MMOL/L (ref 0.4–2)
LEUKOCYTE ESTERASE UR QL STRIP.AUTO: NEGATIVE
LYMPHOCYTES # BLD AUTO: 0.36 X10*3/UL (ref 1.2–4.8)
LYMPHOCYTES NFR BLD AUTO: 4.9 %
MAGNESIUM SERPL-MCNC: 2.32 MG/DL (ref 1.6–2.4)
MCH RBC QN AUTO: 31 PG (ref 26–34)
MCHC RBC AUTO-ENTMCNC: 33.9 G/DL (ref 32–36)
MCV RBC AUTO: 91 FL (ref 80–100)
MONOCYTES # BLD AUTO: 0.59 X10*3/UL (ref 0.1–1)
MONOCYTES NFR BLD AUTO: 8 %
NEUTROPHILS # BLD AUTO: 6.4 X10*3/UL (ref 1.2–7.7)
NEUTROPHILS NFR BLD AUTO: 86.5 %
NITRITE UR QL STRIP.AUTO: NEGATIVE
NRBC BLD-RTO: 0 /100 WBCS (ref 0–0)
PH UR STRIP.AUTO: 8 [PH]
PLATELET # BLD AUTO: 137 X10*3/UL (ref 150–450)
POTASSIUM SERPL-SCNC: 4.5 MMOL/L (ref 3.5–5.3)
PROCALCITONIN SERPL-MCNC: 0.51 NG/ML
PROT SERPL-MCNC: 7.4 G/DL (ref 6.4–8.2)
PROT UR STRIP.AUTO-MCNC: ABNORMAL MG/DL
RBC # BLD AUTO: 5.71 X10*6/UL (ref 4.5–5.9)
RBC # UR STRIP.AUTO: NEGATIVE /UL
RBC #/AREA URNS AUTO: NORMAL /HPF
SARS-COV-2 RNA RESP QL NAA+PROBE: NOT DETECTED
SODIUM SERPL-SCNC: 134 MMOL/L (ref 136–145)
SP GR UR STRIP.AUTO: 1.03
UROBILINOGEN UR STRIP.AUTO-MCNC: ABNORMAL MG/DL
VANCOMYCIN SERPL-MCNC: 10.1 UG/ML (ref 5–20)
WBC # BLD AUTO: 7.4 X10*3/UL (ref 4.4–11.3)
WBC #/AREA URNS AUTO: NORMAL /HPF

## 2025-01-12 PROCEDURE — 71045 X-RAY EXAM CHEST 1 VIEW: CPT

## 2025-01-12 PROCEDURE — 2500000004 HC RX 250 GENERAL PHARMACY W/ HCPCS (ALT 636 FOR OP/ED): Mod: SE

## 2025-01-12 PROCEDURE — 2500000001 HC RX 250 WO HCPCS SELF ADMINISTERED DRUGS (ALT 637 FOR MEDICARE OP): Mod: SE

## 2025-01-12 PROCEDURE — 80202 ASSAY OF VANCOMYCIN: CPT

## 2025-01-12 PROCEDURE — 71045 X-RAY EXAM CHEST 1 VIEW: CPT | Performed by: RADIOLOGY

## 2025-01-12 PROCEDURE — 2500000005 HC RX 250 GENERAL PHARMACY W/O HCPCS: Mod: SE

## 2025-01-12 PROCEDURE — 82947 ASSAY GLUCOSE BLOOD QUANT: CPT

## 2025-01-12 PROCEDURE — 2500000002 HC RX 250 W HCPCS SELF ADMINISTERED DRUGS (ALT 637 FOR MEDICARE OP, ALT 636 FOR OP/ED): Mod: SE

## 2025-01-12 PROCEDURE — 83605 ASSAY OF LACTIC ACID: CPT

## 2025-01-12 PROCEDURE — 36415 COLL VENOUS BLD VENIPUNCTURE: CPT

## 2025-01-12 PROCEDURE — 81001 URINALYSIS AUTO W/SCOPE: CPT

## 2025-01-12 PROCEDURE — 87636 SARSCOV2 & INF A&B AMP PRB: CPT

## 2025-01-12 PROCEDURE — 84145 PROCALCITONIN (PCT): CPT

## 2025-01-12 PROCEDURE — 1100000001 HC PRIVATE ROOM DAILY

## 2025-01-12 PROCEDURE — 85025 COMPLETE CBC W/AUTO DIFF WBC: CPT

## 2025-01-12 PROCEDURE — 87040 BLOOD CULTURE FOR BACTERIA: CPT

## 2025-01-12 PROCEDURE — 87205 SMEAR GRAM STAIN: CPT

## 2025-01-12 PROCEDURE — 99233 SBSQ HOSP IP/OBS HIGH 50: CPT

## 2025-01-12 PROCEDURE — 93010 ELECTROCARDIOGRAM REPORT: CPT | Performed by: INTERNAL MEDICINE

## 2025-01-12 PROCEDURE — 84075 ASSAY ALKALINE PHOSPHATASE: CPT

## 2025-01-12 RX ORDER — BENZONATATE 100 MG/1
100 CAPSULE ORAL 3 TIMES DAILY PRN
Status: DISPENSED | OUTPATIENT
Start: 2025-01-12

## 2025-01-12 RX ADMIN — HEPARIN SODIUM 5000 UNITS: 5000 INJECTION INTRAVENOUS; SUBCUTANEOUS at 00:16

## 2025-01-12 RX ADMIN — AMIODARONE HYDROCHLORIDE 200 MG: 200 TABLET ORAL at 08:04

## 2025-01-12 RX ADMIN — ATORVASTATIN CALCIUM 80 MG: 80 TABLET, FILM COATED ORAL at 20:43

## 2025-01-12 RX ADMIN — INSULIN GLARGINE 15 UNITS: 100 INJECTION, SOLUTION SUBCUTANEOUS at 20:43

## 2025-01-12 RX ADMIN — SPIRONOLACTONE 12.5 MG: 25 TABLET, FILM COATED ORAL at 08:04

## 2025-01-12 RX ADMIN — SODIUM CHLORIDE 1250 MG: 9 INJECTION, SOLUTION INTRAVENOUS at 16:13

## 2025-01-12 RX ADMIN — HYDRALAZINE HYDROCHLORIDE 12.5 MG: 25 TABLET ORAL at 20:43

## 2025-01-12 RX ADMIN — PIPERACILLIN SODIUM AND TAZOBACTAM SODIUM 3.38 G: 3; .375 INJECTION, SOLUTION INTRAVENOUS at 10:50

## 2025-01-12 RX ADMIN — HYDRALAZINE HYDROCHLORIDE 12.5 MG: 25 TABLET ORAL at 08:03

## 2025-01-12 RX ADMIN — HYDRALAZINE HYDROCHLORIDE 12.5 MG: 25 TABLET ORAL at 15:26

## 2025-01-12 RX ADMIN — SERTRALINE HYDROCHLORIDE 100 MG: 100 TABLET ORAL at 08:04

## 2025-01-12 RX ADMIN — PIPERACILLIN SODIUM AND TAZOBACTAM SODIUM 3.38 G: 3; .375 INJECTION, SOLUTION INTRAVENOUS at 16:14

## 2025-01-12 RX ADMIN — ASPIRIN 81 MG 81 MG: 81 TABLET ORAL at 08:04

## 2025-01-12 RX ADMIN — INSULIN LISPRO 5 UNITS: 100 INJECTION, SOLUTION INTRAVENOUS; SUBCUTANEOUS at 07:30

## 2025-01-12 RX ADMIN — HYDROMORPHONE HYDROCHLORIDE 0.4 MG: 0.5 INJECTION, SOLUTION INTRAMUSCULAR; INTRAVENOUS; SUBCUTANEOUS at 08:57

## 2025-01-12 RX ADMIN — INSULIN LISPRO 6 UNITS: 100 INJECTION, SOLUTION INTRAVENOUS; SUBCUTANEOUS at 16:36

## 2025-01-12 RX ADMIN — INSULIN LISPRO 5 UNITS: 100 INJECTION, SOLUTION INTRAVENOUS; SUBCUTANEOUS at 16:36

## 2025-01-12 RX ADMIN — PIPERACILLIN SODIUM AND TAZOBACTAM SODIUM 3.38 G: 3; .375 INJECTION, SOLUTION INTRAVENOUS at 04:59

## 2025-01-12 ASSESSMENT — COGNITIVE AND FUNCTIONAL STATUS - GENERAL
WALKING IN HOSPITAL ROOM: A LITTLE
DAILY ACTIVITIY SCORE: 23
TOILETING: A LITTLE
DAILY ACTIVITIY SCORE: 23
WALKING IN HOSPITAL ROOM: A LITTLE
CLIMB 3 TO 5 STEPS WITH RAILING: A LITTLE
MOBILITY SCORE: 22
TOILETING: A LITTLE
CLIMB 3 TO 5 STEPS WITH RAILING: A LITTLE
MOBILITY SCORE: 22

## 2025-01-12 ASSESSMENT — PAIN SCALES - GENERAL
PAINLEVEL_OUTOF10: 0 - NO PAIN
PAINLEVEL_OUTOF10: 0 - NO PAIN
PAINLEVEL_OUTOF10: 10 - WORST POSSIBLE PAIN
PAINLEVEL_OUTOF10: 0 - NO PAIN

## 2025-01-12 ASSESSMENT — PAIN - FUNCTIONAL ASSESSMENT
PAIN_FUNCTIONAL_ASSESSMENT: 0-10

## 2025-01-12 NOTE — PROGRESS NOTES
Vancomycin Dosing by Pharmacy- FOLLOW UP    Leonel Yu is a 54 y.o. year old male who Pharmacy has been consulted for vancomycin dosing for pneumonia. Based on the patient's indication and renal status this patient is being dosed based on a goal AUC of 400-600.     Renal function is currently stable.    Current vancomycin dose: 1,250 mg given every 24 hours    Estimated vancomycin AUC on current dose: 510 mg/L.hr     Visit Vitals  /71 (BP Location: Right arm, Patient Position: Sitting)   Pulse 92   Temp 37.8 °C (100 °F) (Oral)   Resp 20        Lab Results   Component Value Date    CREATININE 1.86 (H) 2025    CREATININE 1.85 (H) 01/10/2025    CREATININE 1.97 (H) 01/10/2025    CREATININE 1.69 (H) 2025        Patient weight is as follows:   Vitals:    25 0625   Weight: 73.3 kg (161 lb 9.6 oz)       Cultures:  No results found for the encounter in last 14 days.       I/O last 3 completed shifts:  In: 1020 (13.9 mL/kg) [P.O.:720; IV Piggyback:300]  Out: 1650 (22.5 mL/kg) [Urine:1650 (0.6 mL/kg/hr)]  Weight: 73.3 kg   I/O during current shift:  No intake/output data recorded.    Temp (24hrs), Av.9 °C (98.4 °F), Min:35.8 °C (96.4 °F), Max:38.8 °C (101.8 °F)      Assessment/Plan    Within goal AUC range. Continue current vancomycin regimen.    This dosing regimen is predicted by [employer] to result in the following pharmacokinetic parameters:  Regimen: 1250 mg IV every 24 hours.  Exposure target: AUC24 (range)400-600 mg/L.hr   [id number]: 426 mg/L.hr  AUC24,ss: 510 mg/L.hr  Probability of AUC24 > 400: 87 %  Ctrough,ss: 16.3 mg/L  Probability of Ctrough,ss > 20: 27 %    The next level will be obtained on  with AM labs. May be obtained sooner if clinically indicated.   Will continue to monitor renal function daily while on vancomycin and order serum creatinine at least every 48 hours if not already ordered.  Follow for continued vancomycin needs, clinical response, and signs/symptoms of  toxicity.       Kylie Casillas, PharmD

## 2025-01-12 NOTE — SIGNIFICANT EVENT
Rapid Response Nurse Note: Rapid Response    Pager time: [phone]  Arrival time: 832  Event end time: 843  Location: [place] [id number]  [] Triage by phone or secure messaging    Rapid response initiated by:  [] Rapid response RN [] Family [] Nursing Supervisor [] Physician   [] RADAR auto page [] Sepsis auto-page [x] RN [] RT   [] NP/PA [] Other:     Primary reason for call:   [] BAT [] New CPAP/BiPAP [] Bleeding [] Change in mental status   [x] Chest pain [] Code blue [] FiO2 >/= 50% [] HR </= 40 bpm   [] HR >/= 130 bpm [] Hyperglycemia [] Hypoglycemia [] RADAR    [] RR </= 8 bpm [] RR >/= 30 bpm [] SBP </= 90 mmHg [] SpO2 < 90%   [] Seizure [] Sepsis [] Shortness of breath  [] Staff concern: see comments       Providers present at bedside (if applicable): Madi (attending)      Interventions:  [] None [] ABG/VBG [] Assist w/ICU transfer [] BAT paged    [] Bag mask [] Blood [] Cardioversion [] Code Blue   [] Code blue for intubation [] Code status changed [] Chest x-ray [x] EKG   [] IV fluid/bolus [] KUB x-ray [x] Labs/cultures [] Medication   [] Nebulizer treatment [] NIPPV (CPAP/BiPAP) [] Oxygen [] Oral airway   [] Peripheral IV [] Palliative care consult [] CT/MRI [] Sepsis protocol    [] Suctioned [] Other:     Outcome:  [] Coded and  [] Code blue for intubation [] Coded and transferred to ICU []  on division   [x] Remained on division (no change) [] Remained on division + additional monitoring [] Remained in ED [] Transferred to ED   [] Transferred to ICU [] Transferred to inpatient status [] Transferred for interventions (procedure) [] Transferred to ICU stepdown    [] Transferred to surgery [] Transferred to telemetry [] Sepsis protocol [] STEMI protocol   [] Stroke protocol [x] Bedside nurse instructed to page rapid response for any concerns or acute change in condition/VS     Additional Comments: Rapid Response paged for chest pain.    On my arrival, primary team at bedside for eval.  12 lead EKG in  progress and reviewed by cardiology team. Labs sent for lactate, troponin. Vitals stable - see flow sheet.  Plan is for CXR and follow labs. Bedside RN to medicate for pain per MD.    No further rapid response interventions requested at this time. Please page rapid response for any concerns for deterioration or assistance needed.

## 2025-01-12 NOTE — PROGRESS NOTES
Leonel Yu is a 54 y.o. male on day 4 of admission presenting with Acute on chronic heart failure, unspecified heart failure type.    Subjective   Patient spiked a fever overnight of 101.8, was complaining of fevers and chills this AM. Patient had a rapid called in the morning because of chest pain and shortness of breath. The pain was aggravated by coughing. Lactate was 1.8  and patient was afebrile this AM with stable WBC.        Objective     Last Recorded Vitals  /71 (BP Location: Right arm, Patient Position: Sitting)   Pulse 92   Temp 37.8 °C (100 °F) (Oral)   Resp 20   Wt 73.3 kg (161 lb 9.6 oz)   SpO2 96%   Intake/Output last 3 Shifts:    Intake/Output Summary (Last 24 hours) at 1/12/2025 1001  Last data filed at 1/12/2025 0600  Gross per 24 hour   Intake 300 ml   Output 900 ml   Net -600 ml       Admission Weight  Weight: 74.8 kg (165 lb) (01/08/25 1227)    Constitutional:       General: He is not in acute distress.     Appearance: He is not ill-appearing or toxic-appearing.   HENT:      Head: Normocephalic.      Mouth/Throat:      Mouth: Mucous membranes are moist.      Comments: Poor dentition  Eyes:      General: No scleral icterus.     Extraocular Movements: Extraocular movements intact.      Conjunctiva/sclera: Conjunctivae normal.      Pupils: Pupils are equal, round, and reactive to light.   Cardiovascular:      Rate and Rhythm: Normal rate and regular rhythm.      Pulses: Normal pulses.      Heart sounds: No murmur heard.     No gallop.   Pulmonary:      Effort: Pulmonary effort is normal. No respiratory distress.      Breath sounds: No wheezing.   Abdominal:      General: Abdomen is flat. Bowel sounds are normal.      Palpations: Abdomen is soft.   Musculoskeletal:         General: Normal range of motion.      Cervical back: Normal range of motion and neck supple.     Minimal edema on extremities  Skin:     General: Skin is warm and dry.      Capillary Refill: Capillary refill takes  less than 2 seconds.   Neurological:      General: No focal deficit present.      Mental Status: He is alert and oriented to person, place, and time.      Cranial Nerves: No cranial nerve deficit.      Sensory: No sensory deficit.   Psychiatric:         Mood and Affect: Mood normal.     Relevant Results  Results for orders placed or performed during the hospital encounter of 01/08/25 (from the past 24 hours)   Procalcitonin   Result Value Ref Range    Procalcitonin 0.40 (H) <=0.07 ng/mL   POCT GLUCOSE   Result Value Ref Range    POCT Glucose 153 (H) 74 - 99 mg/dL   POCT GLUCOSE   Result Value Ref Range    POCT Glucose 157 (H) 74 - 99 mg/dL   POCT GLUCOSE   Result Value Ref Range    POCT Glucose 178 (H) 74 - 99 mg/dL   MRSA Surveillance for Vancomycin De-escalation, PCR    Specimen: Anterior Nares; Swab   Result Value Ref Range    MRSA PCR Detected (A) Not Detected   Lactate   Result Value Ref Range    Lactate 1.9 0.4 - 2.0 mmol/L   POCT GLUCOSE   Result Value Ref Range    POCT Glucose 212 (H) 74 - 99 mg/dL   POCT GLUCOSE   Result Value Ref Range    POCT Glucose 116 (H) 74 - 99 mg/dL   Lactate   Result Value Ref Range    Lactate 1.8 0.4 - 2.0 mmol/L   CBC and Auto Differential   Result Value Ref Range    WBC 7.4 4.4 - 11.3 x10*3/uL    nRBC 0.0 0.0 - 0.0 /100 WBCs    RBC 5.71 4.50 - 5.90 x10*6/uL    Hemoglobin 17.7 (H) 13.5 - 17.5 g/dL    Hematocrit 52.2 (H) 41.0 - 52.0 %    MCV 91 80 - 100 fL    MCH 31.0 26.0 - 34.0 pg    MCHC 33.9 32.0 - 36.0 g/dL    RDW 15.6 (H) 11.5 - 14.5 %    Platelets 137 (L) 150 - 450 x10*3/uL    Neutrophils % 86.5 40.0 - 80.0 %    Immature Granulocytes %, Automated 0.1 0.0 - 0.9 %    Lymphocytes % 4.9 13.0 - 44.0 %    Monocytes % 8.0 2.0 - 10.0 %    Eosinophils % 0.1 0.0 - 6.0 %    Basophils % 0.4 0.0 - 2.0 %    Neutrophils Absolute 6.40 1.20 - 7.70 x10*3/uL    Immature Granulocytes Absolute, Automated 0.01 0.00 - 0.70 x10*3/uL    Lymphocytes Absolute 0.36 (L) 1.20 - 4.80 x10*3/uL     Monocytes Absolute 0.59 0.10 - 1.00 x10*3/uL    Eosinophils Absolute 0.01 0.00 - 0.70 x10*3/uL    Basophils Absolute 0.03 0.00 - 0.10 x10*3/uL       Imaging results  01/10/25 : 69.3 kg (152 lb 12.5 oz)    Image Results  Electrocardiogram, 12-lead PRN ACS symptoms  Normal sinus rhythm  Left axis deviation  Left ventricular hypertrophy with repolarization abnormality  Inferior infarct (cited on or before 08-JAN-2025)  Abnormal ECG  When compared with ECG of 09-JAN-2025 10:49,  Premature ventricular complexes are no longer Present  Confirmed by Sergio Lux ([id number]) on 1/9/2025 6:34:30 PM  US liver with doppler  Narrative: Interpreted By:  Mundo Alva and Stephens Katherine   STUDY:  US LIVER WITH DOPPLER;  1/9/2025 3:36 pm      INDICATION:  Signs/Symptoms:ruq pain.          COMPARISON:  Renal ultrasound 10/17/2024      ACCESSION NUMBER(S):  [id number]      ORDERING CLINICIAN:  PATY FORREST      TECHNIQUE:  Multiple images of the right upper quadrant were obtained.  Gray  scale, color Doppler and spectral Doppler waveform analysis was  performed.  This examination was interpreted at [facility].      FINDINGS:  LIVER:  The liver measures 16.1 cm and is grossly unremarkable and free of  any focal lesions.      GALLBLADDER:  The gallbladder is nondistended, and demonstrates no evidence of  gallstones, wall thickening or surrounding fluid. The gallbladder  wall thickness is 0.2. Sonographic John's sign is negative.      BILIARY SYSTEM:  No evidence of intra or extrahepatic biliary dilatation is  identified; the common bile duct measures 2.8 mm.      DOPPLER EVALUATION:      HEPATIC ARTERIES:  Hepatic artery and its right branch RI's are estimated at 0.7 and  0.87 respectively. The left hepatic artery was suboptimally  visualized.      PORTAL VEIN:  Portal vein is patent and measures 1.0 cm . There is normal  respiratory variation. Portal vein velocities are  calculated as  follows: main portal vein 46.9 cm/s, antegrade flow; left portal vein  branch 19.5 cm/s, antegrade flow; right portal vein anterior branch  20.7 cm/s, antegrade flow; right portal vein posterior branch 16.3  cm/s, antegrade flow. The splenic vein is also patent.      HEPATIC VEIN:  The right, middle and left hepatic veins are patent and demonstrate  biphasic, monophasic, and monophasic antegrade flow respectively. IVC  appears also patent.      PANCREAS:  The pancreas is poorly visualized due to overlying bowel gas.      RIGHT KIDNEY:  The right kidney measures 9.5 cm in length. No hydronephrosis or  renal calculi are seen.      SPLEEN:  Poor visualization of the spleen. Within these limitations the spleen  measures 8.1 cm and is grossly unremarkable.      PERITONEUM AND RETROPERITONEUM:  No free fluid is identified.      Impression: Unremarkable ultrasound of the liver including Doppler interrogation  within the limitations described above.      I personally reviewed the images/study and I agree with Debby Hernandez DO's (radiology resident) findings as stated. This study  was interpreted at [place], [place].      MACRO:  None      Signed by: Mundo Alva 1/9/2025 6:19 PM  Dictation workstation:   [id number]  Vascular US lower extremity venous duplex bilateral  Narrative: Interpreted By:  Mundo Alva,  and Darron Mejía   STUDY:  [facility] US LOWER EXTREMITY VENOUS DUPLEX BILATERAL;  1/9/2025 5:34 am      INDICATION:  Signs/Symptoms:leg edema, pain, eval for clot.      ,R60.0 Localized edema      COMPARISON:  None.      ACCESSION NUMBER(S):  [id number]      ORDERING CLINICIAN:  PATY FORREST      TECHNIQUE:  Vascular ultrasound of the bilateral lower extremities was performed.  Real-time compression views as well as Gray scale, color Doppler and  spectral Doppler waveform analysis was performed.      FINDINGS:  Evaluation of the visualized portions of  the bilateral common femoral  vein, proximal, mid, and distal femoral vein, and popliteal vein were  performed.  Evaluation of the visualized portions of the  posterior  tibial and peroneal veins were also performed.      Limitations:  None.      The evaluated veins demonstrate normal compressibility. There is  intact venous flow demonstrating normal respiratory variability and  normal augmentation of flow with calf compression. Therefore, there  is no ultrasonographic evidence for deep vein thrombosis within the  evaluated veins.      Respiratory variation and augmentation to calf pressure was noted.      Lobular fluid collection within the left popliteal fossa measures 3.5  x 1.9 x 0.8 cm.      Impression: 1.  No sonographic evidence for deep vein thrombosis within the  evaluated veins of the bilateral lower extremity.  2. Suspected left Baker's cyst.      I personally reviewed the images/study and I agree with the findings  as stated by Dr. Alfonzo Rob. This study was interpreted at  [place], [place].      MACRO:  None      Signed by: Mundo Alva 1/9/2025 6:14 PM  Dictation workstation:   [id number]  ECG 12 lead  Normal sinus rhythm  Possible Left atrial enlargement  Left axis deviation  Left ventricular hypertrophy with repolarization abnormality  Inferior infarct (cited on or before 08-JAN-2025)  Prolonged QT  Abnormal ECG  When compared with ECG of 08-JAN-2025 17:25,  Premature ventricular complexes are no longer Present  Confirmed by James Shah ([id number]) on 1/9/2025 12:30:20 PM  ECG 12 lead  Sinus rhythm with 1st degree AV block  Left ventricular hypertrophy with QRS widening ( Sokolow-Nieves , [place] product , Romhilt-Dominguez )  Possible Inferior infarct (cited on or before 16-OCT-2024)  Anterolateral injury pattern  Prolonged QT  ** ** ACUTE MI / STEMI ** **  Abnormal ECG  When compared with ECG of 09-NOV-2024 08:40,  Significant changes have occurred  Confirmed by  Alyssa James ([id number]) on 1/9/2025 8:51:22 AM  Electrocardiogram, 12-lead PRN ACS symptoms  Poor data quality, interpretation may be adversely affected  Sinus rhythm with occasional Premature ventricular complexes  Left ventricular hypertrophy with repolarization abnormality  Inferior infarct , age undetermined  Prolonged QT  Abnormal ECG  When compared with ECG of 08-JAN-2025 13:08,  Previous ECG has undetermined rhythm, needs review  Inferior infarct is now Present  ST no longer elevated in Inferior leads  T wave inversion more evident in Lateral leads    Assessment/Plan    54 y.o. male with past medical history of mixed ischemic/nonischemic cardiomyopathy, HFrEF EF 10 to 15% 10/2024, prior VF/Vts/p [employer] single-chamber ICD 3/2024, HTN, HLD who was admitted for acute decompensated heart failure iso DKA 2/2 poor medication adherence. Required BiPAP initially but weaned off with the help of IV diuresis. Taken off insulin gtt as well. Transferred to cardiology floor on 1/10.      Update 1/12/2025  - continue vanc/zosyn   - fu sputum culture   - procal increased to 0.51, will repeat blood cultures   - fu flu/covid      #Acute on chronic decompensated HFrEF (EF 10-15%)  #Mixed ischemic and nonischemic cardiomyopathy   #Type 2 MI  #Hx of VT/VF s/p ICD   #HLD  - BNP >5,000, troponin 80s>60s  - Patient reports not taking all of his medications consistently, also use cocaine  - takes torsemide 20 PRN daily at home.   - dry weight approx 70 kg. Admit weight 72.6 kg. Net negative 7 L this admission  Plan:   - continue amiodarone 200mg daily   - stopped bumex gtt. Got bumex 4 mg BID on 1/9.   - discuss home diuretic regimen  - GDMT: spironolactone 12.5 mg, jardiance 10 mg. Entresto held.  - continue home atorva 80mg daily   - continue aspirin 81mg daily   - device interrogated, no shocks or arrhythmia      #CKD3a  ::unclear true baseline, seems most of his creatinine levels have been drawn when in ADHF  - seems to be  somewhere in the low ones, but unclear  Plan:  Hold home entresto     #T2DM (A1c 9.9 10/2024)  #Hyperglycemia  #Mild DKA - resolved  ::on admit, pH 7.32, glucose >700, lactate 3.2, + beta hydroxy butyrate but anion gap only mildly elevated  -off insulin drip.   -takes 10u glargine at bedtime   Plan:  Resume home regimen. Also added 5u lispro TID + SSI     #Secondary polycythemia   - Patient baseline Hb ~17-19  - erythropoietin elevated 11/2024  - likely 2/2 crack cocaine and smoking and COPD (on home O2 2 L/min at baseline)     F: caution, EF 10-15%  E: K>4, Mg>2  N: NPO while on NIPPV  Access/Tubes: PIV  Antimicrobials: None  DVT prophylaxis: heparin subq  GI prophylaxis: N/A  Bowel Reg: Miralax    Oxygen: nasal cannula 2L/min  Code status: Full code   NOK: Roommate Elizabet Santana [phone]    Ines Weir, DO  PGY-1, Internal Medicine/Medical Genetics

## 2025-01-12 NOTE — CARE PLAN
The patient's goals for the shift include      The clinical goals for the shift include pt will remain HDS throughout the shift    Over the shift, the patient did not make progress toward the following goals. Barriers to progression include ;the patient developing chest pain, fever,  and SOB resulting in a rapid response being called. Pt had an EKG done which was benign and had a respiratory panel completed which was positive for the flu.       Problem: Skin  Goal: Decreased wound size/increased tissue granulation at next dressing change  Outcome: Progressing  Flowsheets (Taken 1/12/2025 1645)  Decreased wound size/increased tissue granulation at next dressing change: Promote sleep for wound healing  Goal: Participates in plan/prevention/treatment measures  Outcome: Progressing  Flowsheets (Taken 1/12/2025 1645)  Participates in plan/prevention/treatment measures: Elevate heels  Goal: Prevent/manage excess moisture  Outcome: Progressing  Flowsheets (Taken 1/12/2025 1645)  Prevent/manage excess moisture: Moisturize dry skin  Goal: Prevent/minimize sheer/friction injuries  Outcome: Progressing  Flowsheets (Taken 1/12/2025 1645)  Prevent/minimize sheer/friction injuries: Use pull sheet  Goal: Promote/optimize nutrition  Outcome: Progressing  Flowsheets (Taken 1/12/2025 1645)  Promote/optimize nutrition: Monitor/record intake including meals  Goal: Promote skin healing  Outcome: Progressing  Flowsheets (Taken 1/12/2025 1645)  Promote skin healing: Assess skin/pad under line(s)/device(s)     Problem: Fall/Injury  Goal: Not fall by end of shift  Outcome: Progressing  Goal: Be free from injury by end of the shift  Outcome: Progressing  Goal: Verbalize understanding of personal risk factors for fall in the hospital  Outcome: Progressing  Goal: Verbalize understanding of risk factor reduction measures to prevent injury from fall in the home  Outcome: Progressing  Goal: Use assistive devices by end of the shift  Outcome:  Progressing  Goal: Pace activities to prevent fatigue by end of the shift  Outcome: Progressing     Problem: Heart Failure  Goal: Improved gas exchange this shift  Outcome: Progressing  Goal: Improved urinary output this shift  Outcome: Progressing  Goal: Reduction in peripheral edema within 24 hours  Outcome: Progressing  Goal: Report improvement of dyspnea/breathlessness this shift  Outcome: Progressing  Goal: Weight from fluid excess reduced over 2-3 days, then stabilize  Outcome: Progressing  Goal: Increase self care and/or family involvement in 24 hours  Outcome: Progressing     Problem: Safety - Adult  Goal: Free from fall injury  Outcome: Progressing

## 2025-01-12 NOTE — HOSPITAL COURSE
54 y.o. male with past medical history of mixed ischemic/nonischemic cardiomyopathy, HFrEF EF 10 to 15% 10/2024, prior VF/Vts/p [employer] single-chamber ICD 3/2024, HTN, HLD who was admitted for acute decompensated heart failure as well as mild DKA. Patient reports feeling short of breath and fatigued compared to his baseline over the last 2 days, also said that he has not always been taking his medications. Admission labs significant for glucose greater than 700 w/ mild acidosis and positive ketones. He required BiPAP in the ED and was taken off BiPAP after IV diuresis in the CICU. Being transferred to the floor on 1/10/25.     While on the floor, he had intermittent episodes of soft blood pressures with lethargy, cold clammy skin. Suspected with sepsis in combination with low-output state. He was then given IV fluid bolus and started Zosyn/Vanc empirically for suspected hospital-acquired infection due to elevated lactate with an improvement in blood pressures and lactate level. Procalcitonin of 0.5, and pending blood cultures (eventually negative).     Flu A resulted positive and tamiflu was started. We completed a vanc/zosyn 5 day course, and blood cultures started returning NGTD. We started entresto 12.5mg BID, and metoprolol succinate 12.5mg every day on 1/15 to complete his HFrEF GDMT and RFP remained stable.     Patient's GDMT and diuresis were managed and titrated appropriately while inpatient. He was evaluated by our PT and OT colleagues who initially recommended SNF for rehab. At time of discharge, he was hemodynamically stable and appropriate for discharge to SNF.     To Do:   - Follow-up with HF specialist on 02/25/2025  - Follow-up with PCP for management of chronic issues

## 2025-01-12 NOTE — PROGRESS NOTES
Transitional Care Coordinator Progress Note:     Spoke with Elizabet regarding pt Leonel Yu is a 54 y.o. male on day 4 of admission presenting with Acute on chronic heart failure, unspecified heart failure type. He was notified updated clinicals have been sent for SNF.       Balaji Yun RN, BSN, TCC

## 2025-01-12 NOTE — CARE PLAN
The patient's goals for the shift include      The clinical goals for the shift include Patient will remain afebrile during shift    Patient remained safe and free of falls during shift. Patient medicated for pain during shift. Patient remained afebrile during shift. Patient was diaphoretic during shift, lactate drawn and result is down to 1.9.

## 2025-01-13 ENCOUNTER — APPOINTMENT (OUTPATIENT)
Dept: CARDIOLOGY | Facility: HOSPITAL | Age: 55
End: 2025-01-13
Payer: COMMERCIAL

## 2025-01-13 LAB
ALBUMIN SERPL BCP-MCNC: 3.3 G/DL (ref 3.4–5)
ALBUMIN SERPL BCP-MCNC: 3.4 G/DL (ref 3.4–5)
ALP SERPL-CCNC: 86 U/L (ref 33–120)
ALT SERPL W P-5'-P-CCNC: 44 U/L (ref 10–52)
ANION GAP SERPL CALC-SCNC: 13 MMOL/L
ANION GAP SERPL CALC-SCNC: 19 MMOL/L (ref 10–20)
AST SERPL W P-5'-P-CCNC: 44 U/L (ref 9–39)
BASOPHILS # BLD AUTO: 0.01 X10*3/UL (ref 0–0.1)
BASOPHILS NFR BLD AUTO: 0.2 %
BILIRUB SERPL-MCNC: 0.8 MG/DL (ref 0–1.2)
BUN SERPL-MCNC: 21 MG/DL (ref 6–23)
BUN SERPL-MCNC: 22 MG/DL (ref 6–23)
CALCIUM SERPL-MCNC: 8.3 MG/DL (ref 8.6–10.6)
CALCIUM SERPL-MCNC: 8.3 MG/DL (ref 8.6–10.6)
CHLORIDE SERPL-SCNC: 97 MMOL/L (ref 98–107)
CHLORIDE SERPL-SCNC: 98 MMOL/L (ref 98–107)
CO2 SERPL-SCNC: 20 MMOL/L (ref 21–32)
CO2 SERPL-SCNC: 23 MMOL/L (ref 21–32)
CREAT SERPL-MCNC: 1.45 MG/DL (ref 0.5–1.3)
CREAT SERPL-MCNC: 1.54 MG/DL (ref 0.5–1.3)
EGFRCR SERPLBLD CKD-EPI 2021: 53 ML/MIN/1.73M*2
EGFRCR SERPLBLD CKD-EPI 2021: 57 ML/MIN/1.73M*2
EOSINOPHIL # BLD AUTO: 0 X10*3/UL (ref 0–0.7)
EOSINOPHIL NFR BLD AUTO: 0 %
ERYTHROCYTE [DISTWIDTH] IN BLOOD BY AUTOMATED COUNT: 15.7 % (ref 11.5–14.5)
GLUCOSE BLD MANUAL STRIP-MCNC: 169 MG/DL (ref 74–99)
GLUCOSE BLD MANUAL STRIP-MCNC: 186 MG/DL (ref 74–99)
GLUCOSE BLD MANUAL STRIP-MCNC: 200 MG/DL (ref 74–99)
GLUCOSE BLD MANUAL STRIP-MCNC: 202 MG/DL (ref 74–99)
GLUCOSE SERPL-MCNC: 154 MG/DL (ref 74–99)
GLUCOSE SERPL-MCNC: 218 MG/DL (ref 74–99)
HCT VFR BLD AUTO: 51.8 % (ref 41–52)
HGB BLD-MCNC: 17.5 G/DL (ref 13.5–17.5)
HOLD SPECIMEN: NORMAL
IMM GRANULOCYTES # BLD AUTO: 0.01 X10*3/UL (ref 0–0.7)
IMM GRANULOCYTES NFR BLD AUTO: 0.2 % (ref 0–0.9)
LYMPHOCYTES # BLD AUTO: 0.49 X10*3/UL (ref 1.2–4.8)
LYMPHOCYTES NFR BLD AUTO: 8.1 %
MAGNESIUM SERPL-MCNC: 2.54 MG/DL (ref 1.6–2.4)
MCH RBC QN AUTO: 31.4 PG (ref 26–34)
MCHC RBC AUTO-ENTMCNC: 33.8 G/DL (ref 32–36)
MCV RBC AUTO: 93 FL (ref 80–100)
MONOCYTES # BLD AUTO: 0.55 X10*3/UL (ref 0.1–1)
MONOCYTES NFR BLD AUTO: 9.1 %
NEUTROPHILS # BLD AUTO: 4.99 X10*3/UL (ref 1.2–7.7)
NEUTROPHILS NFR BLD AUTO: 82.4 %
NRBC BLD-RTO: 0 /100 WBCS (ref 0–0)
PHOSPHATE SERPL-MCNC: 3 MG/DL (ref 2.5–4.9)
PLATELET # BLD AUTO: 106 X10*3/UL (ref 150–450)
POTASSIUM SERPL-SCNC: 4.4 MMOL/L (ref 3.5–5.3)
POTASSIUM SERPL-SCNC: 5 MMOL/L (ref 3.5–5.3)
PROT SERPL-MCNC: 7.1 G/DL (ref 6.4–8.2)
RBC # BLD AUTO: 5.57 X10*6/UL (ref 4.5–5.9)
SODIUM SERPL-SCNC: 130 MMOL/L (ref 136–145)
SODIUM SERPL-SCNC: 131 MMOL/L (ref 136–145)
WBC # BLD AUTO: 6.1 X10*3/UL (ref 4.4–11.3)

## 2025-01-13 PROCEDURE — 82947 ASSAY GLUCOSE BLOOD QUANT: CPT

## 2025-01-13 PROCEDURE — 80053 COMPREHEN METABOLIC PANEL: CPT

## 2025-01-13 PROCEDURE — 2500000001 HC RX 250 WO HCPCS SELF ADMINISTERED DRUGS (ALT 637 FOR MEDICARE OP): Mod: SE

## 2025-01-13 PROCEDURE — 2500000005 HC RX 250 GENERAL PHARMACY W/O HCPCS: Mod: SE

## 2025-01-13 PROCEDURE — 94640 AIRWAY INHALATION TREATMENT: CPT

## 2025-01-13 PROCEDURE — 83735 ASSAY OF MAGNESIUM: CPT

## 2025-01-13 PROCEDURE — 97116 GAIT TRAINING THERAPY: CPT | Mod: GP,CQ

## 2025-01-13 PROCEDURE — 99232 SBSQ HOSP IP/OBS MODERATE 35: CPT

## 2025-01-13 PROCEDURE — 36415 COLL VENOUS BLD VENIPUNCTURE: CPT

## 2025-01-13 PROCEDURE — 2500000004 HC RX 250 GENERAL PHARMACY W/ HCPCS (ALT 636 FOR OP/ED): Mod: SE

## 2025-01-13 PROCEDURE — 93005 ELECTROCARDIOGRAM TRACING: CPT

## 2025-01-13 PROCEDURE — 2500000002 HC RX 250 W HCPCS SELF ADMINISTERED DRUGS (ALT 637 FOR MEDICARE OP, ALT 636 FOR OP/ED): Mod: SE

## 2025-01-13 PROCEDURE — 87081 CULTURE SCREEN ONLY: CPT

## 2025-01-13 PROCEDURE — 97110 THERAPEUTIC EXERCISES: CPT | Mod: GP,CQ

## 2025-01-13 PROCEDURE — 80069 RENAL FUNCTION PANEL: CPT | Mod: CCI

## 2025-01-13 PROCEDURE — 1100000001 HC PRIVATE ROOM DAILY

## 2025-01-13 PROCEDURE — 85025 COMPLETE CBC W/AUTO DIFF WBC: CPT

## 2025-01-13 RX ORDER — HYDRALAZINE HYDROCHLORIDE 10 MG/1
10 TABLET, FILM COATED ORAL 3 TIMES DAILY
Status: DISCONTINUED | OUTPATIENT
Start: 2025-01-13 | End: 2025-01-15

## 2025-01-13 RX ORDER — OSELTAMIVIR PHOSPHATE 30 MG/1
30 CAPSULE ORAL 2 TIMES DAILY
Status: DISCONTINUED | OUTPATIENT
Start: 2025-01-14 | End: 2025-01-16 | Stop reason: HOSPADM

## 2025-01-13 RX ORDER — OSELTAMIVIR PHOSPHATE 75 MG/1
75 CAPSULE ORAL ONCE
Status: COMPLETED | OUTPATIENT
Start: 2025-01-13 | End: 2025-01-13

## 2025-01-13 RX ADMIN — HYDRALAZINE HYDROCHLORIDE 12.5 MG: 25 TABLET ORAL at 08:03

## 2025-01-13 RX ADMIN — POLYETHYLENE GLYCOL 3350 17 G: 17 POWDER, FOR SOLUTION ORAL at 08:03

## 2025-01-13 RX ADMIN — ATORVASTATIN CALCIUM 80 MG: 80 TABLET, FILM COATED ORAL at 20:41

## 2025-01-13 RX ADMIN — INSULIN LISPRO 2 UNITS: 100 INJECTION, SOLUTION INTRAVENOUS; SUBCUTANEOUS at 16:39

## 2025-01-13 RX ADMIN — TIOTROPIUM BROMIDE INHALATION SPRAY 2 PUFF: 3.12 SPRAY, METERED RESPIRATORY (INHALATION) at 10:10

## 2025-01-13 RX ADMIN — HYDRALAZINE HYDROCHLORIDE 10 MG: 10 TABLET ORAL at 20:44

## 2025-01-13 RX ADMIN — INSULIN LISPRO 5 UNITS: 100 INJECTION, SOLUTION INTRAVENOUS; SUBCUTANEOUS at 12:23

## 2025-01-13 RX ADMIN — INSULIN LISPRO 5 UNITS: 100 INJECTION, SOLUTION INTRAVENOUS; SUBCUTANEOUS at 16:39

## 2025-01-13 RX ADMIN — SODIUM CHLORIDE 1250 MG: 9 INJECTION, SOLUTION INTRAVENOUS at 15:48

## 2025-01-13 RX ADMIN — PIPERACILLIN SODIUM AND TAZOBACTAM SODIUM 3.38 G: 3; .375 INJECTION, SOLUTION INTRAVENOUS at 00:00

## 2025-01-13 RX ADMIN — PIPERACILLIN SODIUM AND TAZOBACTAM SODIUM 3.38 G: 3; .375 INJECTION, SOLUTION INTRAVENOUS at 10:42

## 2025-01-13 RX ADMIN — HEPARIN SODIUM 5000 UNITS: 5000 INJECTION INTRAVENOUS; SUBCUTANEOUS at 00:03

## 2025-01-13 RX ADMIN — HYDRALAZINE HYDROCHLORIDE 10 MG: 10 TABLET ORAL at 15:03

## 2025-01-13 RX ADMIN — ASPIRIN 81 MG CHEWABLE TABLET 81 MG: 81 TABLET CHEWABLE at 08:04

## 2025-01-13 RX ADMIN — INSULIN LISPRO 4 UNITS: 100 INJECTION, SOLUTION INTRAVENOUS; SUBCUTANEOUS at 12:23

## 2025-01-13 RX ADMIN — SPIRONOLACTONE 12.5 MG: 25 TABLET, FILM COATED ORAL at 08:03

## 2025-01-13 RX ADMIN — AMIODARONE HYDROCHLORIDE 200 MG: 200 TABLET ORAL at 08:03

## 2025-01-13 RX ADMIN — INSULIN LISPRO 5 UNITS: 100 INJECTION, SOLUTION INTRAVENOUS; SUBCUTANEOUS at 07:13

## 2025-01-13 RX ADMIN — OSELTAMAVIR PHOSPHATE 75 MG: 75 CAPSULE ORAL at 11:58

## 2025-01-13 RX ADMIN — INSULIN LISPRO 2 UNITS: 100 INJECTION, SOLUTION INTRAVENOUS; SUBCUTANEOUS at 07:13

## 2025-01-13 RX ADMIN — BENZONATATE 100 MG: 100 CAPSULE ORAL at 20:41

## 2025-01-13 RX ADMIN — BENZONATATE 100 MG: 100 CAPSULE ORAL at 08:03

## 2025-01-13 RX ADMIN — INSULIN GLARGINE 15 UNITS: 100 INJECTION, SOLUTION SUBCUTANEOUS at 20:39

## 2025-01-13 RX ADMIN — SERTRALINE HYDROCHLORIDE 100 MG: 100 TABLET ORAL at 08:03

## 2025-01-13 RX ADMIN — BENZONATATE 100 MG: 100 CAPSULE ORAL at 00:02

## 2025-01-13 RX ADMIN — PIPERACILLIN SODIUM AND TAZOBACTAM SODIUM 3.38 G: 3; .375 INJECTION, SOLUTION INTRAVENOUS at 06:26

## 2025-01-13 RX ADMIN — PIPERACILLIN SODIUM AND TAZOBACTAM SODIUM 3.38 G: 3; .375 INJECTION, SOLUTION INTRAVENOUS at 16:15

## 2025-01-13 ASSESSMENT — COGNITIVE AND FUNCTIONAL STATUS - GENERAL
MOVING FROM LYING ON BACK TO SITTING ON SIDE OF FLAT BED WITH BEDRAILS: A LITTLE
CLIMB 3 TO 5 STEPS WITH RAILING: A LITTLE
MOBILITY SCORE: 22
CLIMB 3 TO 5 STEPS WITH RAILING: A LITTLE
TOILETING: A LITTLE
WALKING IN HOSPITAL ROOM: A LITTLE
TOILETING: A LITTLE
WALKING IN HOSPITAL ROOM: A LITTLE
MOVING TO AND FROM BED TO CHAIR: A LITTLE
TURNING FROM BACK TO SIDE WHILE IN FLAT BAD: A LITTLE
MOBILITY SCORE: 22
WALKING IN HOSPITAL ROOM: A LITTLE
CLIMB 3 TO 5 STEPS WITH RAILING: TOTAL
MOBILITY SCORE: 16
DAILY ACTIVITIY SCORE: 23
DAILY ACTIVITIY SCORE: 23
STANDING UP FROM CHAIR USING ARMS: A LITTLE

## 2025-01-13 ASSESSMENT — PAIN - FUNCTIONAL ASSESSMENT
PAIN_FUNCTIONAL_ASSESSMENT: 0-10

## 2025-01-13 ASSESSMENT — PAIN SCALES - GENERAL
PAINLEVEL_OUTOF10: 0 - NO PAIN
PAINLEVEL_OUTOF10: 0 - NO PAIN

## 2025-01-13 NOTE — CARE PLAN
Problem: Fall/Injury  Goal: Not fall by end of shift  Outcome: Progressing  Goal: Be free from injury by end of the shift  Outcome: Progressing  Goal: Verbalize understanding of personal risk factors for fall in the hospital  Outcome: Progressing  Goal: Verbalize understanding of risk factor reduction measures to prevent injury from fall in the home  Outcome: Progressing  Goal: Use assistive devices by end of the shift  Outcome: Progressing  Goal: Pace activities to prevent fatigue by end of the shift  Outcome: Progressing     Problem: Heart Failure  Goal: Improved gas exchange this shift  Outcome: Progressing  Goal: Improved urinary output this shift  Outcome: Progressing  Goal: Reduction in peripheral edema within 24 hours  Outcome: Progressing  Goal: Report improvement of dyspnea/breathlessness this shift  Outcome: Progressing  Goal: Weight from fluid excess reduced over 2-3 days, then stabilize  Outcome: Progressing  Goal: Increase self care and/or family involvement in 24 hours  Outcome: Progressing     Problem: Safety - Adult  Goal: Free from fall injury  Outcome: Progressing   The patient's goals for the shift include      The clinical goals for the shift include pt will remain HDS throughout the shift

## 2025-01-13 NOTE — PROGRESS NOTES
Physical Therapy    Physical Therapy Treatment    Patient Name: Leonel Yu  MRN: [id number]  Department: [place] [id number]  Room: [address] [place] [address]  Today's Date: 1/13/2025  Time Calculation  Start Time: 0840  Stop Time: 0910  Time Calculation (min): 30 min         Assessment/Plan   PT Assessment  PT Assessment Results: Decreased strength, Decreased endurance, Impaired balance, Decreased mobility, Impaired judgement, Decreased safety awareness, Pain  Barriers to Discharge Home: Physical needs, Social Drivers of Health considerations  Physical Needs: High falls risk due to function or environment, Ambulating household distances limited by function/safety, Stair navigation to access bath limited by function/safety, Stair navigation into home limited by function/safety  Social Drivers of Health Considerations: Socioeconomic concerns  Evaluation/Treatment Tolerance: Patient tolerated treatment well  Medical Staff Made Aware: Yes  End of Session Communication: Bedside nurse  Assessment Comment: Despite pt.'s complaints of general discomfort, pt. tolerated session well. Increased encouragement given. Pt. will need continued therapy at a mod intensity level post acute as pt. is deconditioned. Pt. agreeable.  End of Session Patient Position:  (Toilet- CTA in room when amb. pt. to bathroom therefore aware pt. needing assist to return to bed when finished. Alerted RN as well.)  PT Plan  Inpatient/Swing Bed or Outpatient: Inpatient  PT Plan  Treatment/Interventions: Bed mobility, Transfer training, Gait training, Stair training, Balance training, Strengthening, Endurance training, Therapeutic exercise, Therapeutic activity, Positioning  PT Plan: Ongoing PT  PT Frequency: 3 times per week  PT Discharge Recommendations: Moderate intensity level of continued care  Equipment Recommended upon Discharge: Wheeled walker  PT Recommended Transfer Status: Assist x1  PT - OK to Discharge: Yes      General Visit Information:   PT  Visit  PT Received  On: 01/13/25  General  Reason for Referral: ADHF iso DKA  Past Medical History Relevant to Rehab: ixed ischemic/nonischemic cardiomyopathy, HFrEF EF 10 to 15% 10/2024, prior VF/VT status post [employer] single-chamber ICD 3/2024, hypertension, hyperlipidemia  Family/Caregiver Present: No  Prior to Session Communication: Bedside nurse  Patient Position Received: Bed, 3 rail up, Alarm off, not on at start of session  General Comment: Pt. supine in bed complaining of generally not feeling well and citing the flu. Pt. is on 2L of 02 , PIV, tele. Spoke with RN who cleared pt. for therapy and stated that he really needs to move. Pt. apprehensive but with encouragement did participate.    Subjective   Precautions:  Precautions  Hearing/Visual Limitations: hearing is WFL  Medical Precautions: Cardiac precautions, Fall precautions, Oxygen therapy device and L/min    Vital Signs (Past 2hrs)        Date/Time Vitals Session Patient Position Pulse Resp SpO2 BP MAP (mmHg)    01/13/25 0840 --  --  86  --  96 %  --  --     01/13/25 0908 --  --  86  19  96 %  113/62  79                         Objective   Pain:  Pain Assessment  Pain Assessment: 0-10  0-10 (Numeric) Pain Score:  (Pt. does not report a pain rating - conveyed soreness.)  Cognition:  Cognition  Overall Cognitive Status: Within Functional Limits  Arousal/Alertness: Appropriate responses to stimuli  Orientation Level: Oriented X4  Following Commands: Follows one step commands without difficulty  Coordination:  Movements are Fluid and Coordinated: Yes  Postural Control:  Static Sitting Balance  Static Sitting-Balance Support: Bilateral upper extremity supported  Static Sitting-Level of Assistance:  (Supervision)  Static Standing Balance  Static Standing-Balance Support: Bilateral upper extremity supported  Static Standing-Level of Assistance: Contact guard  Extremity/Trunk Assessments:                      Activity Tolerance:  Activity Tolerance  Endurance:  "Tolerates 10 - 20 min exercise with multiple rests  Activity Tolerance Comments: Vitals WNL thru out.  Treatments:  Therapeutic Exercise  Therapeutic Exercise Performed: Yes  Therapeutic Exercise Activity 1: Supine bilat le ex AP'S, QS, SAQ'S, HS, AND ABD X 15 REPS. Increased time for pt. to become engaged- Asked pt. to count.    Therapeutic Activity  Therapeutic Activity Performed: Yes  Therapeutic Activity 1: Increased time sitting EOB as pt. moving very slowly.    Bed Mobility  Bed Mobility: Yes  Bed Mobility 1  Bed Mobility 1:  (Assisted pt. with log roll -cga/min for bilat le's.  Increased encouragment given as pt. states \"I dont feel like doing this\")    Ambulation/Gait Training  Ambulation/Gait Training Performed: Yes  Ambulation/Gait Training 1  Surface 1:  (Pt. amb. 15' using a wh. walker to door and back and cga- Pt. returned to room and let go of walker and amb. 5' to bathroom - pt. in a hurry due to urgency.)  Transfers  Transfer: Yes  Transfer 1  Transfer From 1: Sit to, Stand to  Transfer to 1: Sit, Stand  Transfer Level of Assistance 1: Contact guard  Trials/Comments 1: CGA to steady    Outcome Measures:  [facility] Basic Mobility  Turning from your back to your side while in a flat bed without using bedrails: A little  Moving from lying on your back to sitting on the side of a flat bed without using bedrails: A little  Moving to and from bed to chair (including a wheelchair): A little  Standing up from a chair using your arms (e.g. wheelchair or bedside chair): A little  To walk in hospital room: A little  Climbing 3-5 steps with railing: Total  Basic Mobility - Total Score: 16    Education Documentation  Body Mechanics, taught by Ashli Briceño PTA at 1/13/2025  9:30 AM.  Learner: Patient  Readiness: Acceptance  Method: Explanation, Demonstration  Response: Needs Reinforcement  Comment: Increased time educating pt. on the necessity of moving during this time in order to heal.    Home Exercise Program, " taught by Ashli Briceño PTA at 1/13/2025  9:30 AM.  Learner: Patient  Readiness: Acceptance  Method: Explanation, Demonstration  Response: Needs Reinforcement  Comment: Increased time educating pt. on the necessity of moving during this time in order to heal.    Mobility Training, taught by Ashli Briceño PTA at 1/13/2025  9:30 AM.  Learner: Patient  Readiness: Acceptance  Method: Explanation, Demonstration  Response: Needs Reinforcement  Comment: Increased time educating pt. on the necessity of moving during this time in order to heal.    Education Comments  No comments found.        OP EDUCATION:       Encounter Problems       Encounter Problems (Active)       Balance       Pt will demonstrate improved sitting/standing static/dynamic balance activities without LOB via score of 24/28 on the Tinetti for increase in safety prior to DC.  (Progressing)       Start:  01/09/25    Expected End:  01/23/25               Mobility       Pt will ambulate >10ft with appropriate form, Min A or less, LRAD, and no LOB for safe DC.  (Progressing)       Start:  01/09/25    Expected End:  01/23/25            Pt will tolerate >15 minutes of upright standing activity without seated rest breaks with no changes in vital signs for improved functional mobility.  (Progressing)       Start:  01/09/25    Expected End:  01/23/25               PT Transfers       Pt will perform bed mobility independently and use of LRAD to safely DC.  (Progressing)       Start:  01/09/25    Expected End:  01/23/25            Pt will perform sit<>stand transfers with CGA or less and use of LRAD to safely DC.  (Progressing)       Start:  01/09/25    Expected End:  01/23/25               Safety       LTG - Patient will adhere to hip precautions during ADL's and transfers       Start:  01/09/25            LTG - Patient will demonstrate safety requirements appropriate to situation/environment       Start:  01/09/25            LTG - Patient will utilize safety  techniques       Start:  01/09/25            STG - Patient uses call light consistently to request assistance with transfers       Start:  01/09/25

## 2025-01-13 NOTE — PROGRESS NOTES
Leonel Yu is a 54 y.o. male on day 5 of admission presenting with Acute on chronic heart failure, unspecified heart failure type.    Subjective   NAEO. Today, patient denies SOB, and was able to sleep overnight. Afebrile on exam today, but endorses tiredness.     Objective     Last Recorded Vitals  /62 (BP Location: Right arm, Patient Position: Sitting)   Pulse 86   Temp 36.6 °C (97.9 °F) (Temporal)   Resp 19   Wt 72.1 kg (158 lb 15.2 oz)   SpO2 96%   Intake/Output last 3 Shifts:    Intake/Output Summary (Last 24 hours) at 1/13/2025 1018  Last data filed at 1/13/2025 0003  Gross per 24 hour   Intake 1180 ml   Output 900 ml   Net 280 ml       Admission Weight  Weight: 74.8 kg (165 lb) (01/08/25 1227)    Constitutional:       General: He is not in acute distress.     Appearance: He is not ill-appearing or toxic-appearing.   HENT:      Head: Normocephalic.      Mouth/Throat:      Mouth: Mucous membranes are moist.      Comments: Poor dentition  Eyes:      General: No scleral icterus.     Extraocular Movements: Extraocular movements intact.      Conjunctiva/sclera: Conjunctivae normal.      Pupils: Pupils are equal, round, and reactive to light.   Cardiovascular:      Rate and Rhythm: Normal rate and regular rhythm.      Pulses: Normal pulses.      Heart sounds: No murmur heard.     No gallop.   Pulmonary:      Effort: Pulmonary effort is normal. No respiratory distress.      Breath sounds: No wheezing.   Abdominal:      General: Abdomen is flat. Bowel sounds are normal.      Palpations: Abdomen is soft.   Musculoskeletal:         General: Normal range of motion.      Cervical back: Normal range of motion and neck supple.     Minimal edema on extremities  Skin:     General: Skin is warm and dry.      Capillary Refill: Capillary refill takes less than 2 seconds.   Neurological:      General: No focal deficit present.      Mental Status: He is alert and oriented to person, place, and time.      Cranial  Nerves: No cranial nerve deficit.      Sensory: No sensory deficit.   Psychiatric:         Mood and Affect: Mood normal.     Relevant Results  Results for orders placed or performed during the hospital encounter of 01/08/25 (from the past 24 hours)   Urinalysis with Reflex Culture and Microscopic   Result Value Ref Range    Color, Urine Yellow Light-Yellow, Yellow, Dark-Yellow    Appearance, Urine Clear Clear    Specific Gravity, Urine 1.027 1.005 - 1.035    pH, Urine 8.0 5.0, 5.5, 6.0, 6.5, 7.0, 7.5, 8.0    Protein, Urine 50 (1+) (A) NEGATIVE, 10 (TRACE), 20 (TRACE) mg/dL    Glucose, Urine OVER (4+) (A) Normal mg/dL    Blood, Urine NEGATIVE NEGATIVE    Ketones, Urine NEGATIVE NEGATIVE mg/dL    Bilirubin, Urine NEGATIVE NEGATIVE    Urobilinogen, Urine 8 (3+) (A) Normal mg/dL    Nitrite, Urine NEGATIVE NEGATIVE    Leukocyte Esterase, Urine NEGATIVE NEGATIVE   Extra Urine Gray Tube   Result Value Ref Range    Extra Tube Hold for add-ons.    Urinalysis Microscopic   Result Value Ref Range    WBC, Urine NONE 1-5, NONE /HPF    RBC, Urine 1-2 NONE, 1-2, 3-5 /HPF   Influenza A, and B PCR   Result Value Ref Range    Flu A Result Detected (A) Not Detected    Flu B Result Not Detected Not Detected   Sars-CoV-2 PCR   Result Value Ref Range    Coronavirus 2019, PCR Not Detected Not Detected   POCT GLUCOSE   Result Value Ref Range    POCT Glucose 88 74 - 99 mg/dL   Respiratory Culture/Smear    Specimen: SPUTUM; Fluid   Result Value Ref Range    Respiratory Culture/Smear (A)      Culture not performed. See Gram stain findings. Recollect if clinically indicated.    Gram Stain (A)      Gram stain indicates specimen contains significant salivary contamination.   POCT GLUCOSE   Result Value Ref Range    POCT Glucose 291 (H) 74 - 99 mg/dL   Blood Culture    Specimen: Peripheral Venipuncture; Blood culture   Result Value Ref Range    Blood Culture Loaded on Instrument - Culture in progress    Blood Culture    Specimen: Peripheral  Venipuncture; Blood culture   Result Value Ref Range    Blood Culture Loaded on Instrument - Culture in progress    POCT GLUCOSE   Result Value Ref Range    POCT Glucose 168 (H) 74 - 99 mg/dL   Comprehensive metabolic panel   Result Value Ref Range    Glucose 154 (H) 74 - 99 mg/dL    Sodium 131 (L) 136 - 145 mmol/L    Potassium 5.0 3.5 - 5.3 mmol/L    Chloride 97 (L) 98 - 107 mmol/L    Bicarbonate 20 (L) 21 - 32 mmol/L    Anion Gap 19 10 - 20 mmol/L    Urea Nitrogen 22 6 - 23 mg/dL    Creatinine 1.54 (H) 0.50 - 1.30 mg/dL    eGFR 53 (L) >60 mL/min/1.73m*2    Calcium 8.3 (L) 8.6 - 10.6 mg/dL    Albumin 3.4 3.4 - 5.0 g/dL    Alkaline Phosphatase 86 33 - 120 U/L    Total Protein 7.1 6.4 - 8.2 g/dL    AST 44 (H) 9 - 39 U/L    Bilirubin, Total 0.8 0.0 - 1.2 mg/dL    ALT 44 10 - 52 U/L   Magnesium   Result Value Ref Range    Magnesium 2.54 (H) 1.60 - 2.40 mg/dL   CBC and Auto Differential   Result Value Ref Range    WBC 6.1 4.4 - 11.3 x10*3/uL    nRBC 0.0 0.0 - 0.0 /100 WBCs    RBC 5.57 4.50 - 5.90 x10*6/uL    Hemoglobin 17.5 13.5 - 17.5 g/dL    Hematocrit 51.8 41.0 - 52.0 %    MCV 93 80 - 100 fL    MCH 31.4 26.0 - 34.0 pg    MCHC 33.8 32.0 - 36.0 g/dL    RDW 15.7 (H) 11.5 - 14.5 %    Platelets 106 (L) 150 - 450 x10*3/uL    Neutrophils % 82.4 40.0 - 80.0 %    Immature Granulocytes %, Automated 0.2 0.0 - 0.9 %    Lymphocytes % 8.1 13.0 - 44.0 %    Monocytes % 9.1 2.0 - 10.0 %    Eosinophils % 0.0 0.0 - 6.0 %    Basophils % 0.2 0.0 - 2.0 %    Neutrophils Absolute 4.99 1.20 - 7.70 x10*3/uL    Immature Granulocytes Absolute, Automated 0.01 0.00 - 0.70 x10*3/uL    Lymphocytes Absolute 0.49 (L) 1.20 - 4.80 x10*3/uL    Monocytes Absolute 0.55 0.10 - 1.00 x10*3/uL    Eosinophils Absolute 0.00 0.00 - 0.70 x10*3/uL    Basophils Absolute 0.01 0.00 - 0.10 x10*3/uL   POCT GLUCOSE   Result Value Ref Range    POCT Glucose 186 (H) 74 - 99 mg/dL       Imaging results  01/10/25 : 69.3 kg (152 lb 12.5 oz)    Image  Results  Electrocardiogram, 12-lead PRN ACS symptoms  Normal sinus rhythm  Left axis deviation  Left ventricular hypertrophy with repolarization abnormality  Inferior infarct (cited on or before 08-JAN-2025)  Abnormal ECG  When compared with ECG of 09-JAN-2025 10:49,  Premature ventricular complexes are no longer Present  Confirmed by Sergio Lux ([id number]) on 1/9/2025 6:34:30 PM  US liver with doppler  Narrative: Interpreted By:  Mundo Alva and Stephens Katherine   STUDY:  US LIVER WITH DOPPLER;  1/9/2025 3:36 pm      INDICATION:  Signs/Symptoms:ruq pain.          COMPARISON:  Renal ultrasound 10/17/2024      ACCESSION NUMBER(S):  [id number]      ORDERING CLINICIAN:  PATY FORREST      TECHNIQUE:  Multiple images of the right upper quadrant were obtained.  Gray  scale, color Doppler and spectral Doppler waveform analysis was  performed.  This examination was interpreted at [facility].      FINDINGS:  LIVER:  The liver measures 16.1 cm and is grossly unremarkable and free of  any focal lesions.      GALLBLADDER:  The gallbladder is nondistended, and demonstrates no evidence of  gallstones, wall thickening or surrounding fluid. The gallbladder  wall thickness is 0.2. Sonographic John's sign is negative.      BILIARY SYSTEM:  No evidence of intra or extrahepatic biliary dilatation is  identified; the common bile duct measures 2.8 mm.      DOPPLER EVALUATION:      HEPATIC ARTERIES:  Hepatic artery and its right branch RI's are estimated at 0.7 and  0.87 respectively. The left hepatic artery was suboptimally  visualized.      PORTAL VEIN:  Portal vein is patent and measures 1.0 cm . There is normal  respiratory variation. Portal vein velocities are calculated as  follows: main portal vein 46.9 cm/s, antegrade flow; left portal vein  branch 19.5 cm/s, antegrade flow; right portal vein anterior branch  20.7 cm/s, antegrade flow; right portal vein posterior branch  16.3  cm/s, antegrade flow. The splenic vein is also patent.      HEPATIC VEIN:  The right, middle and left hepatic veins are patent and demonstrate  biphasic, monophasic, and monophasic antegrade flow respectively. IVC  appears also patent.      PANCREAS:  The pancreas is poorly visualized due to overlying bowel gas.      RIGHT KIDNEY:  The right kidney measures 9.5 cm in length. No hydronephrosis or  renal calculi are seen.      SPLEEN:  Poor visualization of the spleen. Within these limitations the spleen  measures 8.1 cm and is grossly unremarkable.      PERITONEUM AND RETROPERITONEUM:  No free fluid is identified.      Impression: Unremarkable ultrasound of the liver including Doppler interrogation  within the limitations described above.      I personally reviewed the images/study and I agree with Debby Hernandez DO's (radiology resident) findings as stated. This study  was interpreted at [place], [place].      MACRO:  None      Signed by: Mundo Alva 1/9/2025 6:19 PM  Dictation workstation:   [id number]  Vascular US lower extremity venous duplex bilateral  Narrative: Interpreted By:  Mundo Alva,  and Darron Mejía   STUDY:  [facility] US LOWER EXTREMITY VENOUS DUPLEX BILATERAL;  1/9/2025 5:34 am      INDICATION:  Signs/Symptoms:leg edema, pain, eval for clot.      ,R60.0 Localized edema      COMPARISON:  None.      ACCESSION NUMBER(S):  [id number]      ORDERING CLINICIAN:  PATY FORREST      TECHNIQUE:  Vascular ultrasound of the bilateral lower extremities was performed.  Real-time compression views as well as Gray scale, color Doppler and  spectral Doppler waveform analysis was performed.      FINDINGS:  Evaluation of the visualized portions of the bilateral common femoral  vein, proximal, mid, and distal femoral vein, and popliteal vein were  performed.  Evaluation of the visualized portions of the  posterior  tibial and peroneal veins were also performed.       Limitations:  None.      The evaluated veins demonstrate normal compressibility. There is  intact venous flow demonstrating normal respiratory variability and  normal augmentation of flow with calf compression. Therefore, there  is no ultrasonographic evidence for deep vein thrombosis within the  evaluated veins.      Respiratory variation and augmentation to calf pressure was noted.      Lobular fluid collection within the left popliteal fossa measures 3.5  x 1.9 x 0.8 cm.      Impression: 1.  No sonographic evidence for deep vein thrombosis within the  evaluated veins of the bilateral lower extremity.  2. Suspected left Baker's cyst.      I personally reviewed the images/study and I agree with the findings  as stated by Dr. Alfonzo Rob. This study was interpreted at  [place], [place].      MACRO:  None      Signed by: Mundo Alva 1/9/2025 6:14 PM  Dictation workstation:   [id number]  ECG 12 lead  Normal sinus rhythm  Possible Left atrial enlargement  Left axis deviation  Left ventricular hypertrophy with repolarization abnormality  Inferior infarct (cited on or before 08-JAN-2025)  Prolonged QT  Abnormal ECG  When compared with ECG of 08-JAN-2025 17:25,  Premature ventricular complexes are no longer Present  Confirmed by James Shah ([id number]) on 1/9/2025 12:30:20 PM  ECG 12 lead  Sinus rhythm with 1st degree AV block  Left ventricular hypertrophy with QRS widening ( Sokolow-Nieves , Brown product , Romhilt-Dominguez )  Possible Inferior infarct (cited on or before 16-OCT-2024)  Anterolateral injury pattern  Prolonged QT  ** ** ACUTE MI / STEMI ** **  Abnormal ECG  When compared with ECG of 09-NOV-2024 08:40,  Significant changes have occurred  Confirmed by James Shah ([id number]) on 1/9/2025 8:51:22 AM  Electrocardiogram, 12-lead PRN ACS symptoms  Poor data quality, interpretation may be adversely affected  Sinus rhythm with occasional Premature ventricular complexes  Left  ventricular hypertrophy with repolarization abnormality  Inferior infarct , age undetermined  Prolonged QT  Abnormal ECG  When compared with ECG of 08-JAN-2025 13:08,  Previous ECG has undetermined rhythm, needs review  Inferior infarct is now Present  ST no longer elevated in Inferior leads  T wave inversion more evident in Lateral leads    Assessment/Plan    54 y.o. male with past medical history of mixed ischemic/nonischemic cardiomyopathy, HFrEF EF 10 to 15% 10/2024, prior VF/Vts/p [employer] single-chamber ICD 3/2024, HTN, HLD who was admitted for acute decompensated heart failure iso DKA 2/2 poor medication adherence. Required BiPAP initially but weaned off with the help of IV diuresis. Taken off insulin gtt as well. Transferred to cardiology floor on 1/10.     Updates 1/13/2025  - flu a resulted positive -> starting tamiflu  - continue vanc/zosyn for one more day (5 day total course)  - starting home jardiance  - continuing spironolactone  - holding entresto, continuing 10mg hydralazine TID for now  - RFP BID    #Acute on chronic decompensated HFrEF (EF 10-15%)  #Mixed ischemic and nonischemic cardiomyopathy   #Type 2 MI  #Hx of VT/VF s/p ICD   #HLD  - BNP >5,000, troponin 80s>60s  - Patient reports not taking all of his medications consistently, also use cocaine  - takes torsemide 20 PRN daily at home.   - dry weight approx 70 kg. Admit weight 72.6 kg. Net negative 7 L this admission  Plan:   - continue amiodarone 200mg daily   - discuss home diuretic regimen  - GDMT: spironolactone 12.5 mg, jardiance 10 mg. Entresto held.  - continuing 10mg hydralazine TID for afterload reduction  - continue home atorva 80mg daily   - continue aspirin 81mg daily   - device interrogated, no shocks or arrhythmia      #CKD3a  ::unclear true baseline, seems most of his creatinine levels have been drawn when in ADHF  - seems to be somewhere in the low ones, but unclear  - improved creatinine 1/13/25 -> 1.54  Plan:  Hold  home entresto     #T2DM (A1c 9.9 10/2024)  #Hyperglycemia  #Mild DKA - resolved  ::on admit, pH 7.32, glucose >700, lactate 3.2, + beta hydroxy butyrate but anion gap only mildly elevated  -off insulin drip.   -takes 10u glargine at bedtime   Plan:  Resume home regimen. Also added 5u lispro TID + SSI     #Secondary polycythemia   - Patient baseline Hb ~17-19  - erythropoietin elevated 11/2024  - likely 2/2 crack cocaine and smoking and COPD (on home O2 2 L/min at baseline)     F: caution, EF 10-15%  E: K>4, Mg>2  N: NPO while on NIPPV  Access/Tubes: PIV  Antimicrobials: None  DVT prophylaxis: heparin subq  GI prophylaxis: N/A  Bowel Reg: Miralax    Oxygen: nasal cannula 2L/min  Code status: Full code   NOK: Roommate Elizabet Spauldingchcock [phone]    Bart Chapman, DO  PGY-1

## 2025-01-13 NOTE — SIGNIFICANT EVENT
Rapid Response Nurse Note:  [x] RADAR alert/Score 6    Pager time: 58  Arrival time: 58  Event end time: 100  Location: [facility] 5052  [x] Phone triage     Rapid response initiated by:  [] Rapid Response RN [] Family [] Nursing Supervisor [] Physician   [x] RADAR auto-page [] Sepsis auto-page [] RN [] RT   [] NP/PA [] Other:     Primary reason for call:   [] BAT [] New CPAP/BiPAP [] Bleeding [] Change in mental status   [] Chest pain [] Code blue [] FiO2 >/= 50% [] HR </= 40 bpm   [] HR >/= 130 bpm [] Hyperglycemia [] Hypoglycemia [x] RADAR    [] RR </= 8 bpm [] RR >/= 30 bpm [] SBP </= 90 mmHg [] SpO2 < 90%   [] Seizure [] Sepsis [] Staff concern:     Initial VS and/or RADAR VS:  radar vitals below in bold    Vitals:    25 1109 25 1504 25 2055 25 005   BP: 97/63 108/73 112/72 104/70   BP Location: Right arm Right arm Right arm Right arm   Patient Position: Lying Lying Lying Lying   Pulse: 73 85 80 73   Resp: 19 19 24 22   Temp: 36.3 °C (97.3 °F) (!) 38.6 °C (101.5 °F) 37.1 °C (98.8 °F) 36.8 °C (98.2 °F)   TempSrc: Oral Oral Oral Oral   SpO2: 96% 95% 95% 94%   Weight:       Height:            Interventions:  [x] None [] ABG [] Assist w/ICU transfer [] BAT paged    [] Bag mask [] Blood [] Cardioversion [] Code Blue   [] Code blue for intubation [] Code status changed [] Chest x-ray [] EKG   [] IV fluid/bolus [] KUB x-ray [] Labs/cultures [] Medication   [] Nebulizer treatment [] NIPPV (CPAP/BiPAP) [] Oxygen [] Oral airway   [] Peripheral IV [] Palliative care consult [] CT/MRI [] Sepsis protocol    [] Suctioned [] Other:       Outcome:  [] Coded and  [] Code blue for intubation [] Coded and transferred to ICU []  on division   [x] Remained on division (no change) [] Remained on division + additional monitoring [] Remained in ED [] Transferred to ED   [] Transferred to ICU [] Transferred to inpatient status [] Transferred for interventions (procedure) [] Transferred to ICU  stepdown    [] Transferred to surgery [] Transferred to telemetry [] Sepsis protocol [] STEMI protocol   [] Stroke protocol [x] Bedside nurse instructed to page rapid response for any concerns or acute change in condition/VS     Additional Comments: Spoke to RN regarding vital signs.  RN states that pt is resting.  He was recently found positive for the flu.  No concerns from RN at this time.

## 2025-01-14 LAB
ALBUMIN SERPL BCP-MCNC: 3.5 G/DL (ref 3.4–5)
ALP SERPL-CCNC: 82 U/L (ref 33–120)
ALT SERPL W P-5'-P-CCNC: 68 U/L (ref 10–52)
ANION GAP SERPL CALC-SCNC: 15 MMOL/L (ref 10–20)
AST SERPL W P-5'-P-CCNC: 68 U/L (ref 9–39)
BACTERIA BLD CULT: NORMAL
BACTERIA BLD CULT: NORMAL
BASOPHILS # BLD AUTO: 0.02 X10*3/UL (ref 0–0.1)
BASOPHILS NFR BLD AUTO: 0.3 %
BILIRUB SERPL-MCNC: 0.8 MG/DL (ref 0–1.2)
BUN SERPL-MCNC: 20 MG/DL (ref 6–23)
CALCIUM SERPL-MCNC: 8.6 MG/DL (ref 8.6–10.6)
CHLORIDE SERPL-SCNC: 99 MMOL/L (ref 98–107)
CO2 SERPL-SCNC: 21 MMOL/L (ref 21–32)
CREAT SERPL-MCNC: 1.43 MG/DL (ref 0.5–1.3)
EGFRCR SERPLBLD CKD-EPI 2021: 58 ML/MIN/1.73M*2
EOSINOPHIL # BLD AUTO: 0.01 X10*3/UL (ref 0–0.7)
EOSINOPHIL NFR BLD AUTO: 0.2 %
ERYTHROCYTE [DISTWIDTH] IN BLOOD BY AUTOMATED COUNT: 15.5 % (ref 11.5–14.5)
GLUCOSE BLD MANUAL STRIP-MCNC: 123 MG/DL (ref 74–99)
GLUCOSE BLD MANUAL STRIP-MCNC: 133 MG/DL (ref 74–99)
GLUCOSE BLD MANUAL STRIP-MCNC: 271 MG/DL (ref 74–99)
GLUCOSE BLD MANUAL STRIP-MCNC: 394 MG/DL (ref 74–99)
GLUCOSE SERPL-MCNC: 111 MG/DL (ref 74–99)
HCT VFR BLD AUTO: 54.2 % (ref 41–52)
HGB BLD-MCNC: 18.5 G/DL (ref 13.5–17.5)
IMM GRANULOCYTES # BLD AUTO: 0.02 X10*3/UL (ref 0–0.7)
IMM GRANULOCYTES NFR BLD AUTO: 0.3 % (ref 0–0.9)
LYMPHOCYTES # BLD AUTO: 0.52 X10*3/UL (ref 1.2–4.8)
LYMPHOCYTES NFR BLD AUTO: 8.3 %
MAGNESIUM SERPL-MCNC: 2.22 MG/DL (ref 1.6–2.4)
MCH RBC QN AUTO: 31.4 PG (ref 26–34)
MCHC RBC AUTO-ENTMCNC: 34.1 G/DL (ref 32–36)
MCV RBC AUTO: 92 FL (ref 80–100)
MONOCYTES # BLD AUTO: 0.42 X10*3/UL (ref 0.1–1)
MONOCYTES NFR BLD AUTO: 6.7 %
NEUTROPHILS # BLD AUTO: 5.27 X10*3/UL (ref 1.2–7.7)
NEUTROPHILS NFR BLD AUTO: 84.2 %
NRBC BLD-RTO: 0 /100 WBCS (ref 0–0)
PLATELET # BLD AUTO: 95 X10*3/UL (ref 150–450)
POTASSIUM SERPL-SCNC: 4.7 MMOL/L (ref 3.5–5.3)
PROT SERPL-MCNC: 7.4 G/DL (ref 6.4–8.2)
RBC # BLD AUTO: 5.89 X10*6/UL (ref 4.5–5.9)
SODIUM SERPL-SCNC: 130 MMOL/L (ref 136–145)
STAPHYLOCOCCUS SPEC CULT: NORMAL
VANCOMYCIN SERPL-MCNC: 8.7 UG/ML (ref 5–20)
WBC # BLD AUTO: 6.3 X10*3/UL (ref 4.4–11.3)

## 2025-01-14 PROCEDURE — 2500000002 HC RX 250 W HCPCS SELF ADMINISTERED DRUGS (ALT 637 FOR MEDICARE OP, ALT 636 FOR OP/ED): Mod: SE

## 2025-01-14 PROCEDURE — 80053 COMPREHEN METABOLIC PANEL: CPT

## 2025-01-14 PROCEDURE — 1100000001 HC PRIVATE ROOM DAILY

## 2025-01-14 PROCEDURE — 82947 ASSAY GLUCOSE BLOOD QUANT: CPT

## 2025-01-14 PROCEDURE — 2500000001 HC RX 250 WO HCPCS SELF ADMINISTERED DRUGS (ALT 637 FOR MEDICARE OP): Mod: SE

## 2025-01-14 PROCEDURE — 2500000004 HC RX 250 GENERAL PHARMACY W/ HCPCS (ALT 636 FOR OP/ED): Mod: SE

## 2025-01-14 PROCEDURE — 36415 COLL VENOUS BLD VENIPUNCTURE: CPT

## 2025-01-14 PROCEDURE — 83735 ASSAY OF MAGNESIUM: CPT

## 2025-01-14 PROCEDURE — 85025 COMPLETE CBC W/AUTO DIFF WBC: CPT

## 2025-01-14 PROCEDURE — 80202 ASSAY OF VANCOMYCIN: CPT

## 2025-01-14 PROCEDURE — 99232 SBSQ HOSP IP/OBS MODERATE 35: CPT

## 2025-01-14 RX ORDER — VANCOMYCIN HYDROCHLORIDE 1 G/200ML
1000 INJECTION, SOLUTION INTRAVENOUS EVERY 12 HOURS
Status: COMPLETED | OUTPATIENT
Start: 2025-01-14 | End: 2025-01-14

## 2025-01-14 RX ADMIN — PIPERACILLIN SODIUM AND TAZOBACTAM SODIUM 3.38 G: 3; .375 INJECTION, SOLUTION INTRAVENOUS at 10:22

## 2025-01-14 RX ADMIN — SPIRONOLACTONE 12.5 MG: 25 TABLET, FILM COATED ORAL at 08:29

## 2025-01-14 RX ADMIN — ASPIRIN 81 MG CHEWABLE TABLET 81 MG: 81 TABLET CHEWABLE at 08:39

## 2025-01-14 RX ADMIN — VANCOMYCIN HYDROCHLORIDE 1000 MG: 1 INJECTION, SOLUTION INTRAVENOUS at 20:28

## 2025-01-14 RX ADMIN — HYDRALAZINE HYDROCHLORIDE 10 MG: 10 TABLET ORAL at 20:24

## 2025-01-14 RX ADMIN — INSULIN LISPRO 5 UNITS: 100 INJECTION, SOLUTION INTRAVENOUS; SUBCUTANEOUS at 12:19

## 2025-01-14 RX ADMIN — PIPERACILLIN SODIUM AND TAZOBACTAM SODIUM 3.38 G: 3; .375 INJECTION, SOLUTION INTRAVENOUS at 05:33

## 2025-01-14 RX ADMIN — VANCOMYCIN HYDROCHLORIDE 1000 MG: 1 INJECTION, SOLUTION INTRAVENOUS at 09:34

## 2025-01-14 RX ADMIN — OSELTAMIVIR PHOSPHATE 30 MG: 30 CAPSULE ORAL at 20:24

## 2025-01-14 RX ADMIN — HEPARIN SODIUM 5000 UNITS: 5000 INJECTION INTRAVENOUS; SUBCUTANEOUS at 00:20

## 2025-01-14 RX ADMIN — INSULIN LISPRO 10 UNITS: 100 INJECTION, SOLUTION INTRAVENOUS; SUBCUTANEOUS at 12:18

## 2025-01-14 RX ADMIN — HYDRALAZINE HYDROCHLORIDE 10 MG: 10 TABLET ORAL at 14:04

## 2025-01-14 RX ADMIN — HYDRALAZINE HYDROCHLORIDE 10 MG: 10 TABLET ORAL at 08:29

## 2025-01-14 RX ADMIN — ATORVASTATIN CALCIUM 80 MG: 80 TABLET, FILM COATED ORAL at 20:24

## 2025-01-14 RX ADMIN — PIPERACILLIN SODIUM AND TAZOBACTAM SODIUM 3.38 G: 3; .375 INJECTION, SOLUTION INTRAVENOUS at 16:40

## 2025-01-14 RX ADMIN — PIPERACILLIN SODIUM AND TAZOBACTAM SODIUM 3.38 G: 3; .375 INJECTION, SOLUTION INTRAVENOUS at 00:20

## 2025-01-14 RX ADMIN — SERTRALINE HYDROCHLORIDE 100 MG: 100 TABLET ORAL at 08:29

## 2025-01-14 RX ADMIN — INSULIN GLARGINE 15 UNITS: 100 INJECTION, SOLUTION SUBCUTANEOUS at 20:24

## 2025-01-14 RX ADMIN — INSULIN LISPRO 5 UNITS: 100 INJECTION, SOLUTION INTRAVENOUS; SUBCUTANEOUS at 16:37

## 2025-01-14 RX ADMIN — AMIODARONE HYDROCHLORIDE 200 MG: 200 TABLET ORAL at 08:29

## 2025-01-14 RX ADMIN — OSELTAMIVIR PHOSPHATE 30 MG: 30 CAPSULE ORAL at 08:29

## 2025-01-14 RX ADMIN — INSULIN LISPRO 5 UNITS: 100 INJECTION, SOLUTION INTRAVENOUS; SUBCUTANEOUS at 08:28

## 2025-01-14 RX ADMIN — TIOTROPIUM BROMIDE INHALATION SPRAY 2 PUFF: 3.12 SPRAY, METERED RESPIRATORY (INHALATION) at 09:09

## 2025-01-14 RX ADMIN — EMPAGLIFLOZIN 10 MG: 10 TABLET, FILM COATED ORAL at 08:39

## 2025-01-14 RX ADMIN — POLYETHYLENE GLYCOL 3350 17 G: 17 POWDER, FOR SOLUTION ORAL at 08:29

## 2025-01-14 ASSESSMENT — PAIN - FUNCTIONAL ASSESSMENT
PAIN_FUNCTIONAL_ASSESSMENT: 0-10
PAIN_FUNCTIONAL_ASSESSMENT: 0-10

## 2025-01-14 ASSESSMENT — COGNITIVE AND FUNCTIONAL STATUS - GENERAL
DAILY ACTIVITIY SCORE: 24
MOBILITY SCORE: 22
DAILY ACTIVITIY SCORE: 24
CLIMB 3 TO 5 STEPS WITH RAILING: A LITTLE
MOBILITY SCORE: 22
WALKING IN HOSPITAL ROOM: A LITTLE
CLIMB 3 TO 5 STEPS WITH RAILING: A LITTLE
WALKING IN HOSPITAL ROOM: A LITTLE

## 2025-01-14 ASSESSMENT — PAIN SCALES - GENERAL
PAINLEVEL_OUTOF10: 0 - NO PAIN
PAINLEVEL_OUTOF10: 0 - NO PAIN

## 2025-01-14 NOTE — CARE PLAN
Problem: Skin  Goal: Decreased wound size/increased tissue granulation at next dressing change  Outcome: Progressing  Goal: Participates in plan/prevention/treatment measures  Outcome: Progressing  Goal: Prevent/manage excess moisture  Outcome: Progressing  Goal: Prevent/minimize sheer/friction injuries  Outcome: Progressing  Goal: Promote/optimize nutrition  Outcome: Progressing  Goal: Promote skin healing  Outcome: Progressing     Problem: Fall/Injury  Goal: Not fall by end of shift  Outcome: Progressing  Goal: Be free from injury by end of the shift  Outcome: Progressing  Goal: Verbalize understanding of personal risk factors for fall in the hospital  Outcome: Progressing  Goal: Verbalize understanding of risk factor reduction measures to prevent injury from fall in the home  Outcome: Progressing  Goal: Use assistive devices by end of the shift  Outcome: Progressing  Goal: Pace activities to prevent fatigue by end of the shift  Outcome: Progressing     Problem: Heart Failure  Goal: Improved gas exchange this shift  Outcome: Progressing  Goal: Improved urinary output this shift  Outcome: Progressing  Goal: Reduction in peripheral edema within 24 hours  Outcome: Progressing  Goal: Report improvement of dyspnea/breathlessness this shift  Outcome: Progressing  Goal: Weight from fluid excess reduced over 2-3 days, then stabilize  Outcome: Progressing  Goal: Increase self care and/or family involvement in 24 hours  Outcome: Progressing   The patient's goals for the shift include      The clinical goals for the shift include pt will have an improved temperature by the end of the shift

## 2025-01-14 NOTE — PROGRESS NOTES
Leonel Yu is a 54 y.o. male on day 6 of admission presenting with Acute on chronic heart failure, unspecified heart failure type.    Subjective   NAEO. Today, patient reported that his fatigue was improving.     Objective     Last Recorded Vitals  /75 (BP Location: Right arm, Patient Position: Lying)   Pulse 84   Temp 36.7 °C (98.1 °F) (Temporal)   Resp 18   Wt 69.9 kg (154 lb 1.6 oz)   SpO2 95%   Intake/Output last 3 Shifts:    Intake/Output Summary (Last 24 hours) at 1/14/2025 0650  Last data filed at 1/13/2025 1615  Gross per 24 hour   Intake 1245 ml   Output --   Net 1245 ml       Admission Weight  Weight: 74.8 kg (165 lb) (01/08/25 1227)    Constitutional:       General: He is not in acute distress.     Appearance: He is not ill-appearing or toxic-appearing.   HENT:      Head: Normocephalic.      Mouth/Throat:      Mouth: Mucous membranes are moist.      Comments: Poor dentition  Eyes:      General: No scleral icterus.     Extraocular Movements: Extraocular movements intact.      Conjunctiva/sclera: Conjunctivae normal.      Pupils: Pupils are equal, round, and reactive to light.   Cardiovascular:      Rate and Rhythm: Normal rate and regular rhythm.      Pulses: Normal pulses.      Heart sounds: No murmur heard.     No gallop.   Pulmonary:      Effort: Pulmonary effort is normal. No respiratory distress.      Breath sounds: No wheezing.   Abdominal:      General: Abdomen is flat. Bowel sounds are normal.      Palpations: Abdomen is soft.   Musculoskeletal:         General: Normal range of motion.      Cervical back: Normal range of motion and neck supple.     Minimal edema on extremities  Skin:     General: Skin is warm and dry.      Capillary Refill: Capillary refill takes less than 2 seconds.   Neurological:      General: No focal deficit present.      Mental Status: He is alert and oriented to person, place, and time.      Cranial Nerves: No cranial nerve deficit.      Sensory: No sensory  deficit.   Psychiatric:         Mood and Affect: Mood normal.     Relevant Results  Results for orders placed or performed during the hospital encounter of 01/08/25 (from the past 24 hours)   POCT GLUCOSE   Result Value Ref Range    POCT Glucose 202 (H) 74 - 99 mg/dL   Electrocardiogram, 12-lead PRN ACS symptoms   Result Value Ref Range    Ventricular Rate 87 BPM    Atrial Rate 87 BPM    OR Interval 186 ms    QRS Duration 112 ms    QT Interval 416 ms    QTC Calculation(Bazett) 500 ms    P Axis 44 degrees    R Axis -41 degrees    T Axis 103 degrees    QRS Count 15 beats    Q Onset 210 ms    P Onset 117 ms    P Offset 169 ms    T Offset 418 ms    QTC Fredericia 470 ms   Renal function panel   Result Value Ref Range    Glucose 218 (H) 74 - 99 mg/dL    Sodium 130 (L) 136 - 145 mmol/L    Potassium 4.4 3.5 - 5.3 mmol/L    Chloride 98 98 - 107 mmol/L    Bicarbonate 23 21 - 32 mmol/L    Anion Gap 13 mmol/L    Urea Nitrogen 21 6 - 23 mg/dL    Creatinine 1.45 (H) 0.50 - 1.30 mg/dL    eGFR 57 (L) >60 mL/min/1.73m*2    Calcium 8.3 (L) 8.6 - 10.6 mg/dL    Phosphorus 3.0 2.5 - 4.9 mg/dL    Albumin 3.3 (L) 3.4 - 5.0 g/dL   POCT GLUCOSE   Result Value Ref Range    POCT Glucose 200 (H) 74 - 99 mg/dL   POCT GLUCOSE   Result Value Ref Range    POCT Glucose 169 (H) 74 - 99 mg/dL       Imaging results  01/10/25 : 69.3 kg (152 lb 12.5 oz)    Image Results  Electrocardiogram, 12-lead PRN ACS symptoms  Normal sinus rhythm  Left axis deviation  Left ventricular hypertrophy with repolarization abnormality  Inferior infarct (cited on or before 08-JAN-2025)  Abnormal ECG  When compared with ECG of 09-JAN-2025 10:49,  Premature ventricular complexes are no longer Present  Confirmed by Sergio Lux ([id number]) on 1/9/2025 6:34:30 PM  US liver with doppler  Narrative: Interpreted By:  Mundo Alva and Stephens Katherine   STUDY:  US LIVER WITH DOPPLER;  1/9/2025 3:36 pm      INDICATION:  Signs/Symptoms:ruq pain.          COMPARISON:  Renal  ultrasound 10/17/2024      ACCESSION NUMBER(S):  [id number]      ORDERING CLINICIAN:  PATY FORREST      TECHNIQUE:  Multiple images of the right upper quadrant were obtained.  Gray  scale, color Doppler and spectral Doppler waveform analysis was  performed.  This examination was interpreted at [facility].      FINDINGS:  LIVER:  The liver measures 16.1 cm and is grossly unremarkable and free of  any focal lesions.      GALLBLADDER:  The gallbladder is nondistended, and demonstrates no evidence of  gallstones, wall thickening or surrounding fluid. The gallbladder  wall thickness is 0.2. Sonographic John's sign is negative.      BILIARY SYSTEM:  No evidence of intra or extrahepatic biliary dilatation is  identified; the common bile duct measures 2.8 mm.      DOPPLER EVALUATION:      HEPATIC ARTERIES:  Hepatic artery and its right branch RI's are estimated at 0.7 and  0.87 respectively. The left hepatic artery was suboptimally  visualized.      PORTAL VEIN:  Portal vein is patent and measures 1.0 cm . There is normal  respiratory variation. Portal vein velocities are calculated as  follows: main portal vein 46.9 cm/s, antegrade flow; left portal vein  branch 19.5 cm/s, antegrade flow; right portal vein anterior branch  20.7 cm/s, antegrade flow; right portal vein posterior branch 16.3  cm/s, antegrade flow. The splenic vein is also patent.      HEPATIC VEIN:  The right, middle and left hepatic veins are patent and demonstrate  biphasic, monophasic, and monophasic antegrade flow respectively. IVC  appears also patent.      PANCREAS:  The pancreas is poorly visualized due to overlying bowel gas.      RIGHT KIDNEY:  The right kidney measures 9.5 cm in length. No hydronephrosis or  renal calculi are seen.      SPLEEN:  Poor visualization of the spleen. Within these limitations the spleen  measures 8.1 cm and is grossly unremarkable.      PERITONEUM AND  RETROPERITONEUM:  No free fluid is identified.      Impression: Unremarkable ultrasound of the liver including Doppler interrogation  within the limitations described above.      I personally reviewed the images/study and I agree with Debby Hernandez DO's (radiology resident) findings as stated. This study  was interpreted at [place], [place].      MACRO:  None      Signed by: Mundo Alva 1/9/2025 6:19 PM  Dictation workstation:   [id number]  Vascular US lower extremity venous duplex bilateral  Narrative: Interpreted By:  Mundo Alva,  and Darron Mejía   STUDY:  [facility] US LOWER EXTREMITY VENOUS DUPLEX BILATERAL;  1/9/2025 5:34 am      INDICATION:  Signs/Symptoms:leg edema, pain, eval for clot.      ,R60.0 Localized edema      COMPARISON:  None.      ACCESSION NUMBER(S):  [id number]      ORDERING CLINICIAN:  PATY FORREST      TECHNIQUE:  Vascular ultrasound of the bilateral lower extremities was performed.  Real-time compression views as well as Gray scale, color Doppler and  spectral Doppler waveform analysis was performed.      FINDINGS:  Evaluation of the visualized portions of the bilateral common femoral  vein, proximal, mid, and distal femoral vein, and popliteal vein were  performed.  Evaluation of the visualized portions of the  posterior  tibial and peroneal veins were also performed.      Limitations:  None.      The evaluated veins demonstrate normal compressibility. There is  intact venous flow demonstrating normal respiratory variability and  normal augmentation of flow with calf compression. Therefore, there  is no ultrasonographic evidence for deep vein thrombosis within the  evaluated veins.      Respiratory variation and augmentation to calf pressure was noted.      Lobular fluid collection within the left popliteal fossa measures 3.5  x 1.9 x 0.8 cm.      Impression: 1.  No sonographic evidence for deep vein thrombosis within the  evaluated veins  of the bilateral lower extremity.  2. Suspected left Baker's cyst.      I personally reviewed the images/study and I agree with the findings  as stated by Dr. Alfonzo Rob. This study was interpreted at  [facility], [place], [place].      MACRO:  None      Signed by: Mundo Alva 1/9/2025 6:14 PM  Dictation workstation:   [id number]  ECG 12 lead  Normal sinus rhythm  Possible Left atrial enlargement  Left axis deviation  Left ventricular hypertrophy with repolarization abnormality  Inferior infarct (cited on or before 08-JAN-2025)  Prolonged QT  Abnormal ECG  When compared with ECG of 08-JAN-2025 17:25,  Premature ventricular complexes are no longer Present  Confirmed by Thal James ([id number]) on 1/9/2025 12:30:20 PM  ECG 12 lead  Sinus rhythm with 1st degree AV block  Left ventricular hypertrophy with QRS widening ( Sokolow-Nieves , [place] product , Romhilt-Dominguez )  Possible Inferior infarct (cited on or before 16-OCT-2024)  Anterolateral injury pattern  Prolonged QT  ** ** ACUTE MI / STEMI ** **  Abnormal ECG  When compared with ECG of 09-NOV-2024 08:40,  Significant changes have occurred  Confirmed by Thal James ([id number]) on 1/9/2025 8:51:22 AM  Electrocardiogram, 12-lead PRN ACS symptoms  Poor data quality, interpretation may be adversely affected  Sinus rhythm with occasional Premature ventricular complexes  Left ventricular hypertrophy with repolarization abnormality  Inferior infarct , age undetermined  Prolonged QT  Abnormal ECG  When compared with ECG of 08-JAN-2025 13:08,  Previous ECG has undetermined rhythm, needs review  Inferior infarct is now Present  ST no longer elevated in Inferior leads  T wave inversion more evident in Lateral leads    Assessment/Plan   Leonel Yu is a 54 y.o. male with past medical history of mixed ischemic/nonischemic cardiomyopathy, HFrEF EF 10 to 15% 10/2024, prior VF/Vts/p [employer] single-chamber ICD 3/2024, HTN, HLD who was admitted  for acute decompensated heart failure iso DKA 2/2 poor medication adherence. Required BiPAP initially but weaned off with the help of IV diuresis. Taken off insulin gtt as well. Transferred to cardiology floor on 1/10.     Updates 1/14/2025  - continue tamiflu with 1/17/25 end date  - last dose of vanc/zosyn   - continuing home jardiance  - continuing spironolactone  - holding entresto, continuing 10mg hydralazine TID for now  - RFP BID    #Acute on chronic decompensated HFrEF (EF 10-15%)  #Mixed ischemic and nonischemic cardiomyopathy   #Type 2 MI  #Hx of VT/VF s/p ICD   #HLD  - BNP >5,000, troponin 80s>60s  - Patient reports not taking all of his medications consistently, also use cocaine  - takes torsemide 20 PRN daily at home.   - dry weight approx 70 kg. Admit weight 72.6 kg. Net negative 7 L this admission  Plan:   - continue amiodarone 200mg daily   - GDMT: spironolactone 12.5 mg, jardiance 10 mg. Entresto held.  - continuing 10mg hydralazine TID for afterload reduction  - continue home atorva 80mg daily   - continue aspirin 81mg daily   - device interrogated, no shocks or arrhythmia      #CKD3a  ::unclear true baseline, seems most of his creatinine levels have been drawn when in AD[facility]  - seems to be somewhere in the low ones, but unclear  - improved creatinine 1/13/25 -> 1.54  Plan:  Holding home entresto     #T2DM (A1c 9.9 10/2024)  #Hyperglycemia  #Mild DKA - resolved  ::on admit, pH 7.32, glucose >700, lactate 3.2, + beta hydroxy butyrate but anion gap only mildly elevated  -off insulin drip.   -takes 10u glargine at bedtime   Plan:  Resume home regimen. Also added 5u lispro TID + SSI     #Secondary polycythemia   - Patient baseline Hb ~17-19  - erythropoietin elevated 11/2024  - likely 2/2 crack cocaine and smoking and COPD (on home O2 2 L/min at baseline)     F: caution, EF 10-15%  E: K>4, Mg>2  N: adult cardiac diet currently  Access/Tubes: PIV  Antimicrobials: None  DVT prophylaxis: heparin subq  GI  prophylaxis: N/A  Bowel Reg: Miralax    Oxygen: nasal cannula 2L/min  Code status: Full code   NOK: Roommate Elizabet Spauldingchcock [phone]    Bart Chapman DO  PGY-1

## 2025-01-14 NOTE — CARE PLAN
The patient's goals for the shift include      The clinical goals for the shift include NO SOB    Over the shift, the patient did  make progress toward the following goals. Barriers to progression include KING. Recommendations to address these barriers include keep 4 LNC on.

## 2025-01-14 NOTE — PROGRESS NOTES
Vancomycin Dosing by Pharmacy- FOLLOW UP    Leonel Yu is a 54 y.o. year old male who Pharmacy has been consulted for vancomycin dosing for pneumonia. Based on the patient's indication and renal status this patient is being dosed based on a goal AUC of 400-600.     Renal function is currently improving.    Current vancomycin dose: 1250 mg given every 24 hours    Estimated vancomycin AUC on current dose: 322 mg/L.hr     Visit Vitals  /75 (BP Location: Right arm, Patient Position: Lying)   Pulse 84   Temp 36.7 °C (98.1 °F) (Temporal)   Resp 18        Lab Results   Component Value Date    CREATININE 1.43 (H) 2025    CREATININE 1.45 (H) 2025    CREATININE 1.54 (H) 2025    CREATININE 1.80 (H) 2025        Patient weight is as follows:   Vitals:    25 0543   Weight: 69.9 kg (154 lb 1.6 oz)       Cultures:  No results found for the encounter in last 14 days.       I/O last 3 completed shifts:  In: 1725 (24.7 mL/kg) [P.O.:1200; IV Piggyback:525]  Out: 200 (2.9 mL/kg) [Urine:200 (0.1 mL/kg/hr)]  Weight: 69.9 kg   I/O during current shift:  No intake/output data recorded.    Temp (24hrs), Av.6 °C (97.9 °F), Min:36.2 °C (97.2 °F), Max:37.1 °C (98.8 °F)      Assessment/Plan    Below goal AUC. Orders placed for new vancomcyin regimen of 1000 mg every 12 hours to begin at 0900.     This dosing regimen is predicted by [employer] to result in the following pharmacokinetic parameters:  Regimen: 1000 mg IV every 12 hours.  Start time: 15:48 on 2025  Exposure target: AUC24 (range)400-600 mg/L.hr   [id number]: 475 mg/L.hr  AUC24,ss: 511 mg/L.hr  Probability of AUC24 > 400: 98 %  Ctrough,ss: 14.2 mg/L  Probability of Ctrough,ss > 20: 2 %    The next level will be obtained on 1/15/25 at 0600. May be obtained sooner if clinically indicated.   Will continue to monitor renal function daily while on vancomycin and order serum creatinine at least every 48 hours if not already ordered.  Follow for  continued vancomycin needs, clinical response, and signs/symptoms of toxicity.       Donnie King, PharmD

## 2025-01-15 LAB
ALBUMIN SERPL BCP-MCNC: 3.5 G/DL (ref 3.4–5)
ANION GAP SERPL CALC-SCNC: 14 MMOL/L (ref 10–20)
ATRIAL RATE: 87 BPM
BASOPHILS # BLD AUTO: 0.01 X10*3/UL (ref 0–0.1)
BASOPHILS NFR BLD AUTO: 0.2 %
BUN SERPL-MCNC: 21 MG/DL (ref 6–23)
CALCIUM SERPL-MCNC: 8.5 MG/DL (ref 8.6–10.6)
CHLORIDE SERPL-SCNC: 99 MMOL/L (ref 98–107)
CO2 SERPL-SCNC: 23 MMOL/L (ref 21–32)
CREAT SERPL-MCNC: 1.44 MG/DL (ref 0.5–1.3)
EGFRCR SERPLBLD CKD-EPI 2021: 57 ML/MIN/1.73M*2
EOSINOPHIL # BLD AUTO: 0 X10*3/UL (ref 0–0.7)
EOSINOPHIL NFR BLD AUTO: 0 %
ERYTHROCYTE [DISTWIDTH] IN BLOOD BY AUTOMATED COUNT: 15.5 % (ref 11.5–14.5)
GLUCOSE BLD MANUAL STRIP-MCNC: 146 MG/DL (ref 74–99)
GLUCOSE BLD MANUAL STRIP-MCNC: 195 MG/DL (ref 74–99)
GLUCOSE BLD MANUAL STRIP-MCNC: 237 MG/DL (ref 74–99)
GLUCOSE BLD MANUAL STRIP-MCNC: 239 MG/DL (ref 74–99)
GLUCOSE BLD MANUAL STRIP-MCNC: 73 MG/DL (ref 74–99)
GLUCOSE SERPL-MCNC: 111 MG/DL (ref 74–99)
HCT VFR BLD AUTO: 52.8 % (ref 41–52)
HGB BLD-MCNC: 18 G/DL (ref 13.5–17.5)
IMM GRANULOCYTES # BLD AUTO: 0.01 X10*3/UL (ref 0–0.7)
IMM GRANULOCYTES NFR BLD AUTO: 0.2 % (ref 0–0.9)
LYMPHOCYTES # BLD AUTO: 0.66 X10*3/UL (ref 1.2–4.8)
LYMPHOCYTES NFR BLD AUTO: 12.4 %
MAGNESIUM SERPL-MCNC: 2.15 MG/DL (ref 1.6–2.4)
MCH RBC QN AUTO: 31.7 PG (ref 26–34)
MCHC RBC AUTO-ENTMCNC: 34.1 G/DL (ref 32–36)
MCV RBC AUTO: 93 FL (ref 80–100)
MONOCYTES # BLD AUTO: 0.47 X10*3/UL (ref 0.1–1)
MONOCYTES NFR BLD AUTO: 8.8 %
NEUTROPHILS # BLD AUTO: 4.17 X10*3/UL (ref 1.2–7.7)
NEUTROPHILS NFR BLD AUTO: 78.4 %
NRBC BLD-RTO: 0 /100 WBCS (ref 0–0)
P AXIS: 44 DEGREES
P OFFSET: 169 MS
P ONSET: 117 MS
PHOSPHATE SERPL-MCNC: 3.5 MG/DL (ref 2.5–4.9)
PLATELET # BLD AUTO: 109 X10*3/UL (ref 150–450)
POTASSIUM SERPL-SCNC: 4.6 MMOL/L (ref 3.5–5.3)
PR INTERVAL: 186 MS
Q ONSET: 210 MS
QRS COUNT: 15 BEATS
QRS DURATION: 112 MS
QT INTERVAL: 416 MS
QTC CALCULATION(BAZETT): 500 MS
QTC FREDERICIA: 470 MS
R AXIS: -41 DEGREES
RBC # BLD AUTO: 5.67 X10*6/UL (ref 4.5–5.9)
SODIUM SERPL-SCNC: 131 MMOL/L (ref 136–145)
T AXIS: 103 DEGREES
T OFFSET: 418 MS
VENTRICULAR RATE: 87 BPM
WBC # BLD AUTO: 5.3 X10*3/UL (ref 4.4–11.3)

## 2025-01-15 PROCEDURE — 2500000001 HC RX 250 WO HCPCS SELF ADMINISTERED DRUGS (ALT 637 FOR MEDICARE OP): Mod: SE

## 2025-01-15 PROCEDURE — 83735 ASSAY OF MAGNESIUM: CPT

## 2025-01-15 PROCEDURE — 2500000002 HC RX 250 W HCPCS SELF ADMINISTERED DRUGS (ALT 637 FOR MEDICARE OP, ALT 636 FOR OP/ED): Mod: SE

## 2025-01-15 PROCEDURE — 1100000001 HC PRIVATE ROOM DAILY

## 2025-01-15 PROCEDURE — 2500000004 HC RX 250 GENERAL PHARMACY W/ HCPCS (ALT 636 FOR OP/ED): Mod: SE

## 2025-01-15 PROCEDURE — 97535 SELF CARE MNGMENT TRAINING: CPT | Mod: GO

## 2025-01-15 PROCEDURE — 80069 RENAL FUNCTION PANEL: CPT

## 2025-01-15 PROCEDURE — 82947 ASSAY GLUCOSE BLOOD QUANT: CPT

## 2025-01-15 PROCEDURE — 85025 COMPLETE CBC W/AUTO DIFF WBC: CPT

## 2025-01-15 PROCEDURE — 36415 COLL VENOUS BLD VENIPUNCTURE: CPT

## 2025-01-15 PROCEDURE — 99232 SBSQ HOSP IP/OBS MODERATE 35: CPT

## 2025-01-15 PROCEDURE — 97530 THERAPEUTIC ACTIVITIES: CPT | Mod: GP

## 2025-01-15 PROCEDURE — 94640 AIRWAY INHALATION TREATMENT: CPT

## 2025-01-15 RX ORDER — METOPROLOL SUCCINATE 25 MG/1
12.5 TABLET, EXTENDED RELEASE ORAL DAILY
Start: 2025-01-16 | End: 2025-01-16

## 2025-01-15 RX ORDER — POLYETHYLENE GLYCOL 3350 17 G/17G
17 POWDER, FOR SOLUTION ORAL DAILY
Qty: 30 PACKET | Refills: 0 | Status: SHIPPED | OUTPATIENT
Start: 2025-01-15 | End: 2025-01-16 | Stop reason: HOSPADM

## 2025-01-15 RX ORDER — NAPROXEN SODIUM 220 MG/1
81 TABLET, FILM COATED ORAL DAILY
Qty: 30 TABLET | Refills: 0 | Status: SHIPPED | OUTPATIENT
Start: 2025-01-15 | End: 2025-02-14

## 2025-01-15 RX ORDER — SACUBITRIL AND VALSARTAN 24; 26 MG/1; MG/1
0.5 TABLET, FILM COATED ORAL 2 TIMES DAILY
Status: DISCONTINUED | OUTPATIENT
Start: 2025-01-15 | End: 2025-01-16 | Stop reason: HOSPADM

## 2025-01-15 RX ORDER — SACUBITRIL AND VALSARTAN 24; 26 MG/1; MG/1
0.5 TABLET, FILM COATED ORAL 2 TIMES DAILY
Start: 2025-01-15 | End: 2025-01-16

## 2025-01-15 RX ORDER — METOPROLOL SUCCINATE 25 MG/1
12.5 TABLET, EXTENDED RELEASE ORAL DAILY
Status: DISCONTINUED | OUTPATIENT
Start: 2025-01-15 | End: 2025-01-16 | Stop reason: HOSPADM

## 2025-01-15 RX ORDER — OSELTAMIVIR PHOSPHATE 30 MG/1
30 CAPSULE ORAL 2 TIMES DAILY
Qty: 8 CAPSULE | Refills: 0 | Status: SHIPPED | OUTPATIENT
Start: 2025-01-15 | End: 2025-01-16

## 2025-01-15 RX ORDER — HYDRALAZINE HYDROCHLORIDE 10 MG/1
10 TABLET, FILM COATED ORAL 3 TIMES DAILY
Qty: 90 TABLET | Refills: 1 | Status: CANCELLED | OUTPATIENT
Start: 2025-01-15 | End: 2025-03-16

## 2025-01-15 RX ADMIN — INSULIN GLARGINE 15 UNITS: 100 INJECTION, SOLUTION SUBCUTANEOUS at 20:54

## 2025-01-15 RX ADMIN — INSULIN LISPRO 5 UNITS: 100 INJECTION, SOLUTION INTRAVENOUS; SUBCUTANEOUS at 11:36

## 2025-01-15 RX ADMIN — OSELTAMIVIR PHOSPHATE 30 MG: 30 CAPSULE ORAL at 08:05

## 2025-01-15 RX ADMIN — PIPERACILLIN SODIUM AND TAZOBACTAM SODIUM 3.38 G: 3; .375 INJECTION, SOLUTION INTRAVENOUS at 00:12

## 2025-01-15 RX ADMIN — ATORVASTATIN CALCIUM 80 MG: 80 TABLET, FILM COATED ORAL at 20:54

## 2025-01-15 RX ADMIN — TIOTROPIUM BROMIDE INHALATION SPRAY 2 PUFF: 3.12 SPRAY, METERED RESPIRATORY (INHALATION) at 10:10

## 2025-01-15 RX ADMIN — ASPIRIN 81 MG CHEWABLE TABLET 81 MG: 81 TABLET CHEWABLE at 08:05

## 2025-01-15 RX ADMIN — SACUBITRIL AND VALSARTAN 0.5 TABLET: 24; 26 TABLET, FILM COATED ORAL at 20:54

## 2025-01-15 RX ADMIN — SACUBITRIL AND VALSARTAN 0.5 TABLET: 24; 26 TABLET, FILM COATED ORAL at 09:48

## 2025-01-15 RX ADMIN — INSULIN LISPRO 5 UNITS: 100 INJECTION, SOLUTION INTRAVENOUS; SUBCUTANEOUS at 07:54

## 2025-01-15 RX ADMIN — METOPROLOL SUCCINATE 12.5 MG: 25 TABLET, EXTENDED RELEASE ORAL at 09:48

## 2025-01-15 RX ADMIN — INSULIN LISPRO 2 UNITS: 100 INJECTION, SOLUTION INTRAVENOUS; SUBCUTANEOUS at 11:35

## 2025-01-15 RX ADMIN — AMIODARONE HYDROCHLORIDE 200 MG: 200 TABLET ORAL at 08:13

## 2025-01-15 RX ADMIN — SPIRONOLACTONE 12.5 MG: 25 TABLET, FILM COATED ORAL at 08:05

## 2025-01-15 RX ADMIN — SERTRALINE HYDROCHLORIDE 100 MG: 100 TABLET ORAL at 08:05

## 2025-01-15 RX ADMIN — EMPAGLIFLOZIN 10 MG: 10 TABLET, FILM COATED ORAL at 08:06

## 2025-01-15 RX ADMIN — OSELTAMIVIR PHOSPHATE 30 MG: 30 CAPSULE ORAL at 20:54

## 2025-01-15 ASSESSMENT — COGNITIVE AND FUNCTIONAL STATUS - GENERAL
CLIMB 3 TO 5 STEPS WITH RAILING: A LITTLE
MOBILITY SCORE: 22
CLIMB 3 TO 5 STEPS WITH RAILING: A LOT
TURNING FROM BACK TO SIDE WHILE IN FLAT BAD: A LITTLE
PERSONAL GROOMING: A LITTLE
CLIMB 3 TO 5 STEPS WITH RAILING: A LITTLE
WALKING IN HOSPITAL ROOM: A LITTLE
DRESSING REGULAR LOWER BODY CLOTHING: A LITTLE
TOILETING: A LITTLE
HELP NEEDED FOR BATHING: A LITTLE
DAILY ACTIVITIY SCORE: 24
DAILY ACTIVITIY SCORE: 24
MOBILITY SCORE: 22
MOVING FROM LYING ON BACK TO SITTING ON SIDE OF FLAT BED WITH BEDRAILS: A LITTLE
MOBILITY SCORE: 17
DAILY ACTIVITIY SCORE: 20
MOVING TO AND FROM BED TO CHAIR: A LITTLE
STANDING UP FROM CHAIR USING ARMS: A LITTLE

## 2025-01-15 ASSESSMENT — ACTIVITIES OF DAILY LIVING (ADL): HOME_MANAGEMENT_TIME_ENTRY: 13

## 2025-01-15 ASSESSMENT — PAIN - FUNCTIONAL ASSESSMENT
PAIN_FUNCTIONAL_ASSESSMENT: 0-10

## 2025-01-15 ASSESSMENT — PAIN SCALES - GENERAL
PAINLEVEL_OUTOF10: 0 - NO PAIN

## 2025-01-15 NOTE — PROGRESS NOTES
Occupational Therapy    Occupational Therapy Treatment    Name: Leonel Yu  MRN: [id number]  Department: [place] [facility] [address]  Room: [address] [place] [address]  Date: 01/15/25  Time Calculation  Start Time: 1407  Stop Time: 1420  Time Calculation (min): 13 min    Assessment:  OT Assessment: difficulty I/ADLs, safety, fxnl mob  Prognosis: Good  Barriers to Discharge Home: Caregiver assistance, Physical needs, Social Drivers of Health considerations  Caregiver Assistance: Caregiver assistance needed per identified barriers - however, no caregiver assistance available at home  Physical Needs: Ambulating household distances limited by function/safety, Intermittent mobility assistance needed, Intermittent ADL assistance needed, High falls risk due to function or environment  Social Drivers of Health Considerations: Housing insecurity, Socioeconomic concerns  End of Session Communication: Bedside nurse  Plan:  Treatment Interventions: ADL retraining, Functional transfer training, UE strengthening/ROM, Endurance training, Patient/family training, Equipment evaluation/education, Compensatory technique education  OT Frequency: 2 times per week  OT Discharge Recommendations: Low intensity level of continued care (med management and activity tolerance)  Equipment Recommended upon Discharge: Wheeled walker  OT Recommended Transfer Status: Assist of 1  OT - OK to Discharge: Yes    Subjective   Previous Visit Info:  OT Last Visit  OT Received On: 01/15/25  General:  General  Reason for Referral: ADHF iso DKA  Past Medical History Relevant to Rehab: mixed ischemic/nonischemic cardiomyopathy, HFrEF EF 10 to 15% 10/2024, prior VF/Vts/p Cangrade single-chamber ICD 3/2024, HTN, HLD who was admitted for acute decompensated heart failure iso DKA 2/2 poor medication adherence. Required BiPAP initially but weaned off with the help of IV diuresis. Taken off insulin gtt as well. Transferred to cardiology floor on 1/10.  Family/Caregiver Present: No  Prior  to Session Communication: Bedside nurse  Patient Position Received: Bed, 3 rail up, Alarm off, not on at start of session  General Comment: pt supine O2 not on upon arrival RN notified  Precautions:  Medical Precautions: Cardiac precautions, Fall precautions, Oxygen therapy device and L/min droplet     Vital Signs (Past 2hrs)        Date/Time Vitals Session Patient Position Pulse Resp SpO2 BP MAP (mmHg)    01/15/25 1407 During OT  --  77  --  93 %  --  --     01/15/25 1415 --  --  --  --  96 %  --  --                        Pain Assessment:  Pain Assessment  Pain Assessment: 0-10  0-10 (Numeric) Pain Score: 0 - No pain     Objective   Cognition:  Overall Cognitive Status: Within Functional Limits  Arousal/Alertness: Appropriate responses to stimuli  Orientation Level: Oriented X4  Following Commands: Follows all commands and directions without difficulty  Activities of Daily Living:           UE Bathing  UE Bathing Level of Assistance:  (oral care seated EOB SBA setup)         UE Dressing  UE Dressing Level of Assistance:  (pt adjutsed gown I standing)          Bed Mobility/Transfers: Bed Mobility  Bed Mobility:  (sup to sit S)    Transfer 1  Transfer Level of Assistance 1:  (sit/stand SBA)      Functional Mobility:  Functional Mobility  Functional Mobility Performed:  (pt performed fxnl mob around bed in room CGA SOB minimal activity poor activity tolerance)     Other Activity: OT educated pt on use of phone alarms in google calendar matching his meds times and have pill box 2/2 pt reports forgets to take meds       Outcome Measures:  [facility] Daily Activity  Putting on and taking off regular lower body clothing: A little  Bathing (including washing, rinsing, drying): A little  Putting on and taking off regular upper body clothing: None  Toileting, which includes using toilet, bedpan or urinal: A little  Taking care of personal grooming such as brushing teeth: A little  Eating Meals: None  Daily Activity - Total  Score: 20        Education Documentation  Home Exercise Program, taught by Gay Cadet, OT at 1/15/2025  2:38 PM.  Learner: Patient  Readiness: Acceptance  Method: Explanation, Demonstration  Response: Verbalizes Understanding, Needs Reinforcement  Comment: fxnl mobADLs    Body Mechanics, taught by Gay Cadet, OT at 1/15/2025  2:38 PM.  Learner: Patient  Readiness: Acceptance  Method: Explanation, Demonstration  Response: Verbalizes Understanding, Needs Reinforcement  Comment: fxnl mobADLs    Precautions, taught by Gay Cadet, OT at 1/15/2025  2:38 PM.  Learner: Patient  Readiness: Acceptance  Method: Explanation, Demonstration  Response: Verbalizes Understanding, Needs Reinforcement  Comment: fxnl mobADLs    ADL Training, taught by Gay Cadet, OT at 1/15/2025  2:38 PM.  Learner: Patient  Readiness: Acceptance  Method: Explanation, Demonstration  Response: Verbalizes Understanding, Needs Reinforcement  Comment: fxnl mobADLs    Home Exercise Program, taught by Gay Cadet, OT at 1/15/2025  2:36 PM.  Learner: Patient  Readiness: Acceptance  Method: Explanation, Demonstration  Response: Verbalizes Understanding, Needs Reinforcement  Comment: fxnl mob    Body Mechanics, taught by Gay Cadet, OT at 1/15/2025  2:36 PM.  Learner: Patient  Readiness: Acceptance  Method: Explanation, Demonstration  Response: Verbalizes Understanding, Needs Reinforcement  Comment: fxnl mob    Precautions, taught by Gay Cadet, OT at 1/15/2025  2:36 PM.  Learner: Patient  Readiness: Acceptance  Method: Explanation, Demonstration  Response: Verbalizes Understanding, Needs Reinforcement  Comment: fxnl mob    ADL Training, taught by Gay Cadet, OT at 1/15/2025  2:36 PM.  Learner: Patient  Readiness: Acceptance  Method: Explanation, Demonstration  Response: Verbalizes Understanding, Needs Reinforcement  Comment: fxnl mob    Education Comments  No comments found.      Goals:  Encounter Problems        Encounter Problems (Active)       ADLs       Patient with complete upper body dressing with independent level of assistance donning and doffing all UE clothes with no adaptive equipment. (Progressing)       Start:  01/11/25    Expected End:  01/25/25            Patient with complete lower body dressing with stand by assist level of assistance donning and doffing all LE clothes  with PRN adaptive equipment. (Progressing)       Start:  01/11/25    Expected End:  01/25/25            Patient will complete daily grooming tasks with supervision level of assistance and PRN adaptive equipment while standing. (Progressing)       Start:  01/11/25    Expected End:  01/25/25            Patient will complete toileting including hygiene clothing management/hygiene with supervision level of assistance. (Progressing)       Start:  01/11/25    Expected End:  01/25/25               EXERCISE/STRENGTHENING       Patient will be educated on BUE HEP for increased ADL performance. (Progressing)       Start:  01/11/25    Expected End:  01/25/25            Patient will participate in >15 minutes of activity with <2 rest breaks to increase ADL/functional task endurance. (Progressing)       Start:  01/11/25    Expected End:  01/25/25               MOBILITY       Patient will perform Functional mobility Household distances/Community Distances with stand by assist level of assistance and least restrictive device in order to improve safety and functional mobility. (Progressing)       Start:  01/11/25    Expected End:  01/25/25               TRANSFERS       Patient will perform bed mobility supervision level of assistance in order to improve safety and independence with mobility (Progressing)       Start:  01/11/25    Expected End:  01/25/25            Patient will complete functional transfers with least restrictive device with stand by assist level of assistance. (Progressing)       Start:  01/11/25    Expected End:  01/25/25

## 2025-01-15 NOTE — PROGRESS NOTES
Physical Therapy    Physical Therapy Treatment    Patient Name: Leonel Yu  MRN: [id number]  Department: [place] [address]  Room: [address] [place] [address]  Today's Date: 1/15/2025  Time Calculation  Start Time: 1415  Stop Time: 1430  Time Calculation (min): 15 min         Assessment/Plan   PT Assessment  End of Session Communication: Bedside nurse  Assessment Comment: Patient tolerated session well but demonstrates decreased endurance. Patient is progressing well with therapy and largest dc barrier is social circumstances. Patient CGA-SBA for mobility. pt appropriate for low intensity therapy at dc  End of Session Patient Position:  (sitting EOB)  PT Plan  Inpatient/Swing Bed or Outpatient: Inpatient  PT Plan  Treatment/Interventions: Bed mobility, Transfer training, Gait training, Stair training, Balance training, Strengthening, Endurance training, Therapeutic exercise, Therapeutic activity, Positioning  PT Plan: Ongoing PT  PT Frequency: 3 times per week  PT Discharge Recommendations: Low intensity level of continued care  Equipment Recommended upon Discharge: Wheeled walker  PT Recommended Transfer Status: Assist x1  PT - OK to Discharge: Yes      General Visit Information:   PT  Visit  PT Received On: 01/15/25  Response to Previous Treatment: Patient with no complaints from previous session.  General  Prior to Session Communication: Bedside nurse  Patient Position Received:  (sitting EOB,OT in room)  General Comment: pt sitting EOB, RN cleared. OT in room just finishing up session. agreeable to PT with encouragement    Subjective   Precautions:  Precautions  Medical Precautions: Fall precautions  Precautions Comment: droplet flu    Vital Signs (Past 2hrs)        Date/Time Vitals Session Patient Position Pulse Resp SpO2 BP MAP (mmHg)    01/15/25 1407 During OT  --  77  --  93 %  --  --     01/15/25 1415 --  --  --  --  96 %  --  --                       Objective   Pain:  Pain Assessment  Pain Assessment: 0-10  0-10 (Numeric) Pain  Score: 0 - No pain  Cognition:  Cognition  Overall Cognitive Status: Within Functional Limits  Orientation Level: Oriented X4    Activity Tolerance:  Activity Tolerance  Endurance: Tolerates 10 - 20 min exercise with multiple rests  Treatments:     Therapeutic Activity  Therapeutic Activity Performed: Yes  Therapeutic Activity 1: pt stood twice from EOB, ambulated in room and tinetti performed 25/28    Bed Mobility  Bed Mobility: No (sitting EOB)    Ambulation/Gait Training  Ambulation/Gait Training Performed: Yes  Ambulation/Gait Training 1  Surface 1: Level tile  Device 1: No device  Assistance 1: Contact guard  Quality of Gait 1: Shuffling gait  Comments/Distance (ft) 1: ambulated in room for a total of 20', encouragement required.  Transfers  Transfer: Yes  Transfer 1  Transfer From 1: Sit to, Stand to  Transfer to 1: Sit, Stand  Technique 1: Sit to stand, Stand to sit  Transfer Level of Assistance 1:  (SBA)  Trials/Comments 1: pt stood from EOB twice, no AD    Outcome Measures:  [facility] Basic Mobility  Turning from your back to your side while in a flat bed without using bedrails: A little  Moving from lying on your back to sitting on the side of a flat bed without using bedrails: A little  Moving to and from bed to chair (including a wheelchair): A little  Standing up from a chair using your arms (e.g. wheelchair or bedside chair): A little  To walk in hospital room: A little  Climbing 3-5 steps with railing: A lot  Basic Mobility - Total Score: 17    Education Documentation  Precautions, taught by Inocencia Shah PT at 1/15/2025  2:44 PM.  Learner: Patient  Readiness: Acceptance  Method: Explanation  Response: Verbalizes Understanding  Comment: edu on role of PT, dc plan and safety. [facility] patient that [facility] is not a permanent housing facility.    Mobility Training, taught by Inocencia Shah PT at 1/15/2025  2:44 PM.  Learner: Patient  Readiness: Acceptance  Method: Explanation  Response: Verbalizes  Understanding  Comment: edu on role of PT, dc plan and safety. edu patient that [facility] is not a permanent housing facility.    Education Comments  No comments found.        OP EDUCATION:       Encounter Problems       Encounter Problems (Active)       PT Transfers       Pt will perform bed mobility independently and use of LRAD to safely DC.  (Progressing)       Start:  01/09/25    Expected End:  01/23/25            Pt will perform sit<>stand transfers with CGA or less and use of LRAD to safely DC.  (Met)       Start:  01/09/25    Expected End:  01/23/25    Resolved:  01/15/25            Safety       LTG - Patient will adhere to hip precautions during ADL's and transfers       Start:  01/09/25            LTG - Patient will demonstrate safety requirements appropriate to situation/environment       Start:  01/09/25            LTG - Patient will utilize safety techniques       Start:  01/09/25            STG - Patient uses call light consistently to request assistance with transfers       Start:  01/09/25                  Encounter Problems (Resolved)       Balance       Pt will demonstrate improved sitting/standing static/dynamic balance activities without LOB via score of 24/28 on the Tinetti for increase in safety prior to DC.  (Met)       Start:  01/09/25    Expected End:  01/23/25    Resolved:  01/15/25            Mobility       Pt will ambulate >10ft with appropriate form, Min A or less, LRAD, and no LOB for safe DC.  (Met)       Start:  01/09/25    Expected End:  01/23/25    Resolved:  01/15/25         Pt will tolerate >15 minutes of upright standing activity without seated rest breaks with no changes in vital signs for improved functional mobility.  (Met)       Start:  01/09/25    Expected End:  01/23/25    Resolved:  01/15/25

## 2025-01-15 NOTE — PROGRESS NOTES
Leonel Yu is a 54 y.o. male on day 7 of admission presenting with Acute on chronic heart failure, unspecified heart failure type.    Subjective   NAEO. Today, patient reported that his fatigue was improving. We discussed his care course.     Objective     Last Recorded Vitals  BP 88/59 (BP Location: Right arm, Patient Position: Lying)   Pulse 82   Temp 36.5 °C (97.7 °F) (Temporal)   Resp 18   Wt 70.2 kg (154 lb 12.2 oz)   SpO2 98%   Intake/Output last 3 Shifts:    Intake/Output Summary (Last 24 hours) at 1/15/2025 0829  Last data filed at 1/14/2025 1704  Gross per 24 hour   Intake 1120 ml   Output 1 ml   Net 1119 ml       Admission Weight  Weight: 74.8 kg (165 lb) (01/08/25 1227)    Constitutional:       General: He is not in acute distress.     Appearance: He is not ill-appearing or toxic-appearing.   HENT:      Head: Normocephalic.      Mouth/Throat:      Mouth: Mucous membranes are moist.      Comments: Poor dentition  Eyes:      General: No scleral icterus.     Extraocular Movements: Extraocular movements intact.      Conjunctiva/sclera: Conjunctivae normal.      Pupils: Pupils are equal, round, and reactive to light.   Cardiovascular:      Rate and Rhythm: Normal rate and regular rhythm.      Pulses: Normal pulses.      Heart sounds: No murmur heard.     No gallop.   Pulmonary:      Effort: Pulmonary effort is normal. No respiratory distress.      Breath sounds: No wheezing.   Abdominal:      General: Abdomen is flat. Bowel sounds are normal.      Palpations: Abdomen is soft.   Musculoskeletal:         General: Normal range of motion.      Cervical back: Normal range of motion and neck supple.     Minimal edema on extremities  Skin:     General: Skin is warm and dry.      Capillary Refill: Capillary refill takes less than 2 seconds.   Neurological:      General: No focal deficit present.      Mental Status: He is alert and oriented to person, place, and time.      Cranial Nerves: No cranial nerve  deficit.      Sensory: No sensory deficit.   Psychiatric:         Mood and Affect: Mood normal.     Relevant Results  Results for orders placed or performed during the hospital encounter of 01/08/25 (from the past 24 hours)   POCT GLUCOSE   Result Value Ref Range    POCT Glucose 394 (H) 74 - 99 mg/dL   POCT GLUCOSE   Result Value Ref Range    POCT Glucose 133 (H) 74 - 99 mg/dL   POCT GLUCOSE   Result Value Ref Range    POCT Glucose 271 (H) 74 - 99 mg/dL   POCT GLUCOSE   Result Value Ref Range    POCT Glucose 146 (H) 74 - 99 mg/dL   Magnesium   Result Value Ref Range    Magnesium 2.15 1.60 - 2.40 mg/dL   CBC and Auto Differential   Result Value Ref Range    WBC 5.3 4.4 - 11.3 x10*3/uL    nRBC 0.0 0.0 - 0.0 /100 WBCs    RBC 5.67 4.50 - 5.90 x10*6/uL    Hemoglobin 18.0 (H) 13.5 - 17.5 g/dL    Hematocrit 52.8 (H) 41.0 - 52.0 %    MCV 93 80 - 100 fL    MCH 31.7 26.0 - 34.0 pg    MCHC 34.1 32.0 - 36.0 g/dL    RDW 15.5 (H) 11.5 - 14.5 %    Platelets 109 (L) 150 - 450 x10*3/uL    Neutrophils % 78.4 40.0 - 80.0 %    Immature Granulocytes %, Automated 0.2 0.0 - 0.9 %    Lymphocytes % 12.4 13.0 - 44.0 %    Monocytes % 8.8 2.0 - 10.0 %    Eosinophils % 0.0 0.0 - 6.0 %    Basophils % 0.2 0.0 - 2.0 %    Neutrophils Absolute 4.17 1.20 - 7.70 x10*3/uL    Immature Granulocytes Absolute, Automated 0.01 0.00 - 0.70 x10*3/uL    Lymphocytes Absolute 0.66 (L) 1.20 - 4.80 x10*3/uL    Monocytes Absolute 0.47 0.10 - 1.00 x10*3/uL    Eosinophils Absolute 0.00 0.00 - 0.70 x10*3/uL    Basophils Absolute 0.01 0.00 - 0.10 x10*3/uL   Renal Function Panel   Result Value Ref Range    Glucose 111 (H) 74 - 99 mg/dL    Sodium 131 (L) 136 - 145 mmol/L    Potassium 4.6 3.5 - 5.3 mmol/L    Chloride 99 98 - 107 mmol/L    Bicarbonate 23 21 - 32 mmol/L    Anion Gap 14 10 - 20 mmol/L    Urea Nitrogen 21 6 - 23 mg/dL    Creatinine 1.44 (H) 0.50 - 1.30 mg/dL    eGFR 57 (L) >60 mL/min/1.73m*2    Calcium 8.5 (L) 8.6 - 10.6 mg/dL    Phosphorus 3.5 2.5 - 4.9 mg/dL     Albumin 3.5 3.4 - 5.0 g/dL       Imaging results  01/10/25 : 69.3 kg (152 lb 12.5 oz)    Image Results  Electrocardiogram, 12-lead PRN ACS symptoms  Normal sinus rhythm  Left axis deviation  Left ventricular hypertrophy with repolarization abnormality  Inferior infarct (cited on or before 08-JAN-2025)  Abnormal ECG  When compared with ECG of 09-JAN-2025 10:49,  Premature ventricular complexes are no longer Present  Confirmed by Sergio Lux ([id number]) on 1/9/2025 6:34:30 PM  US liver with doppler  Narrative: Interpreted By:  Mundo Alva and Stephens Katherine   STUDY:  US LIVER WITH DOPPLER;  1/9/2025 3:36 pm      INDICATION:  Signs/Symptoms:ruq pain.          COMPARISON:  Renal ultrasound 10/17/2024      ACCESSION NUMBER(S):  [id number]      ORDERING CLINICIAN:  PATY FORREST      TECHNIQUE:  Multiple images of the right upper quadrant were obtained.  Gray  scale, color Doppler and spectral Doppler waveform analysis was  performed.  This examination was interpreted at [facility].      FINDINGS:  LIVER:  The liver measures 16.1 cm and is grossly unremarkable and free of  any focal lesions.      GALLBLADDER:  The gallbladder is nondistended, and demonstrates no evidence of  gallstones, wall thickening or surrounding fluid. The gallbladder  wall thickness is 0.2. Sonographic John's sign is negative.      BILIARY SYSTEM:  No evidence of intra or extrahepatic biliary dilatation is  identified; the common bile duct measures 2.8 mm.      DOPPLER EVALUATION:      HEPATIC ARTERIES:  Hepatic artery and its right branch RI's are estimated at 0.7 and  0.87 respectively. The left hepatic artery was suboptimally  visualized.      PORTAL VEIN:  Portal vein is patent and measures 1.0 cm . There is normal  respiratory variation. Portal vein velocities are calculated as  follows: main portal vein 46.9 cm/s, antegrade flow; left portal vein  branch 19.5 cm/s, antegrade flow;  right portal vein anterior branch  20.7 cm/s, antegrade flow; right portal vein posterior branch 16.3  cm/s, antegrade flow. The splenic vein is also patent.      HEPATIC VEIN:  The right, middle and left hepatic veins are patent and demonstrate  biphasic, monophasic, and monophasic antegrade flow respectively. IVC  appears also patent.      PANCREAS:  The pancreas is poorly visualized due to overlying bowel gas.      RIGHT KIDNEY:  The right kidney measures 9.5 cm in length. No hydronephrosis or  renal calculi are seen.      SPLEEN:  Poor visualization of the spleen. Within these limitations the spleen  measures 8.1 cm and is grossly unremarkable.      PERITONEUM AND RETROPERITONEUM:  No free fluid is identified.      Impression: Unremarkable ultrasound of the liver including Doppler interrogation  within the limitations described above.      I personally reviewed the images/study and I agree with Debby Hernandez DO's (radiology resident) findings as stated. This study  was interpreted at [place], [place].      MACRO:  None      Signed by: Mundo Alva 1/9/2025 6:19 PM  Dictation workstation:   [id number]  Vascular US lower extremity venous duplex bilateral  Narrative: Interpreted By:  Mundo Alva,  and Darron Mejía   STUDY:  [facility] US LOWER EXTREMITY VENOUS DUPLEX BILATERAL;  1/9/2025 5:34 am      INDICATION:  Signs/Symptoms:leg edema, pain, eval for clot.      ,R60.0 Localized edema      COMPARISON:  None.      ACCESSION NUMBER(S):  [id number]      ORDERING CLINICIAN:  PATY FORREST      TECHNIQUE:  Vascular ultrasound of the bilateral lower extremities was performed.  Real-time compression views as well as Gray scale, color Doppler and  spectral Doppler waveform analysis was performed.      FINDINGS:  Evaluation of the visualized portions of the bilateral common femoral  vein, proximal, mid, and distal femoral vein, and popliteal vein were  performed.   Evaluation of the visualized portions of the  posterior  tibial and peroneal veins were also performed.      Limitations:  None.      The evaluated veins demonstrate normal compressibility. There is  intact venous flow demonstrating normal respiratory variability and  normal augmentation of flow with calf compression. Therefore, there  is no ultrasonographic evidence for deep vein thrombosis within the  evaluated veins.      Respiratory variation and augmentation to calf pressure was noted.      Lobular fluid collection within the left popliteal fossa measures 3.5  x 1.9 x 0.8 cm.      Impression: 1.  No sonographic evidence for deep vein thrombosis within the  evaluated veins of the bilateral lower extremity.  2. Suspected left Baker's cyst.      I personally reviewed the images/study and I agree with the findings  as stated by Dr. Alfonzo Rob. This study was interpreted at  [place], [place].      MACRO:  None      Signed by: Mundo Alva 1/9/2025 6:14 PM  Dictation workstation:   [id number]  ECG 12 lead  Normal sinus rhythm  Possible Left atrial enlargement  Left axis deviation  Left ventricular hypertrophy with repolarization abnormality  Inferior infarct (cited on or before 08-JAN-2025)  Prolonged QT  Abnormal ECG  When compared with ECG of 08-JAN-2025 17:25,  Premature ventricular complexes are no longer Present  Confirmed by ThalJayroo ([id number]) on 1/9/2025 12:30:20 PM  ECG 12 lead  Sinus rhythm with 1st degree AV block  Left ventricular hypertrophy with QRS widening ( Sokolow-Nieves , Brown product , Romhilt-Dominguez )  Possible Inferior infarct (cited on or before 16-OCT-2024)  Anterolateral injury pattern  Prolonged QT  ** ** ACUTE MI / STEMI ** **  Abnormal ECG  When compared with ECG of 09-NOV-2024 08:40,  Significant changes have occurred  Confirmed by Thal James ([id number]) on 1/9/2025 8:51:22 AM  Electrocardiogram, 12-lead PRN ACS symptoms  Poor data quality,  interpretation may be adversely affected  Sinus rhythm with occasional Premature ventricular complexes  Left ventricular hypertrophy with repolarization abnormality  Inferior infarct , age undetermined  Prolonged QT  Abnormal ECG  When compared with ECG of 08-JAN-2025 13:08,  Previous ECG has undetermined rhythm, needs review  Inferior infarct is now Present  ST no longer elevated in Inferior leads  T wave inversion more evident in Lateral leads    Assessment/Plan   Leonel Yu is a 54 y.o. male with past medical history of mixed ischemic/nonischemic cardiomyopathy, HFrEF EF 10 to 15% 10/2024, prior VF/Vts/p [employer] single-chamber ICD 3/2024, HTN, HLD who was admitted for acute decompensated heart failure iso DKA 2/2 poor medication adherence. Required BiPAP initially but weaned off with the help of IV diuresis. Taken off insulin gtt as well. Transferred to cardiology floor on 1/10.     Updates 1/15/2025  Patient reported he slept well overnight and is feeling symptomatic improvement. His BP was slightly lower this AM.   - continue tamiflu with 1/17/25 end date  - continuing home jardiance  - continuing spironolactone  - starting entresto 12.5 BID  - starting metoprolol succinate 12.5mg every day  - pending cert for dispo    #Acute on chronic decompensated HFrEF (EF 10-15%)  #Mixed ischemic and nonischemic cardiomyopathy   #Type 2 MI  #Hx of VT/VF s/p ICD   #HLD  ::BNP >5,000, troponin 80s>60s  ::Patient reports not taking all of his medications consistently, also use cocaine  ::takes torsemide 20 PRN daily at home.   ::dry weight approx 70 kg.  Plan:   - continue amiodarone 200mg daily   - GDMT: spironolactone 12.5 mg, jardiance 10 mg, entresto 12.5mg BID, metoprolol succinate 12.5mg QD  - continue home atorva 80mg daily   - continue aspirin 81mg daily      #CKD3a  ::unclear true baseline, seems most of his creatinine levels have been drawn when in [facility]  - seems to be somewhere in the low ones, but  unclear  - improved creatinine 1/13/25 -> 1.54  Plan:  - CTM     #T2DM (A1c 9.9 10/2024)  #Hyperglycemia  #Mild DKA - resolved  ::on admit, pH 7.32, glucose >700, lactate 3.2, + beta hydroxy butyrate but anion gap only mildly elevated  -off insulin drip.   -takes 10u glargine at bedtime   Plan:  Resume home regimen. Also added 5u lispro TID + SSI     #Secondary polycythemia   - Patient baseline Hb ~17-19  - erythropoietin elevated 11/2024  - likely 2/2 crack cocaine and smoking and COPD (on home O2 2 L/min at baseline)     F: caution, EF 10-15%  E: K>4, Mg>2  N: adult cardiac diet currently  Access/Tubes: PIV  Antimicrobials: None  DVT prophylaxis: heparin subq  GI prophylaxis: N/A  Bowel Reg: Miralax    Oxygen: nasal cannula 2L/min  Code status: Full code   NOK: Roommate Elizabet Santana [phone]    Bart Chapman, DO  PGY-1

## 2025-01-15 NOTE — CARE PLAN
The patient's goals for the shift include      The clinical goals for the shift include HDS    Over the shift, the patient did  make progress toward the following goals. Barriers to progression include soft Bp's. Recommendations to address these barriers include med mgmt.

## 2025-01-16 ENCOUNTER — DOCUMENTATION (OUTPATIENT)
Dept: HOME HEALTH SERVICES | Facility: HOME HEALTH | Age: 55
End: 2025-01-16
Payer: COMMERCIAL

## 2025-01-16 VITALS
DIASTOLIC BLOOD PRESSURE: 68 MMHG | RESPIRATION RATE: 20 BRPM | TEMPERATURE: 98.2 F | WEIGHT: 156.09 LBS | OXYGEN SATURATION: 95 % | HEIGHT: 70 IN | SYSTOLIC BLOOD PRESSURE: 144 MMHG | BODY MASS INDEX: 22.35 KG/M2 | HEART RATE: 68 BPM

## 2025-01-16 LAB
ALBUMIN SERPL BCP-MCNC: 3.2 G/DL (ref 3.4–5)
ANION GAP SERPL CALC-SCNC: 11 MMOL/L (ref 10–20)
BACTERIA BLD CULT: NORMAL
BACTERIA BLD CULT: NORMAL
BASOPHILS # BLD AUTO: 0.01 X10*3/UL (ref 0–0.1)
BASOPHILS NFR BLD AUTO: 0.2 %
BUN SERPL-MCNC: 19 MG/DL (ref 6–23)
CALCIUM SERPL-MCNC: 8.2 MG/DL (ref 8.6–10.6)
CHLORIDE SERPL-SCNC: 101 MMOL/L (ref 98–107)
CO2 SERPL-SCNC: 23 MMOL/L (ref 21–32)
CREAT SERPL-MCNC: 1.28 MG/DL (ref 0.5–1.3)
EGFRCR SERPLBLD CKD-EPI 2021: 66 ML/MIN/1.73M*2
EOSINOPHIL # BLD AUTO: 0 X10*3/UL (ref 0–0.7)
EOSINOPHIL NFR BLD AUTO: 0 %
ERYTHROCYTE [DISTWIDTH] IN BLOOD BY AUTOMATED COUNT: 15.2 % (ref 11.5–14.5)
GLUCOSE BLD MANUAL STRIP-MCNC: 134 MG/DL (ref 74–99)
GLUCOSE BLD MANUAL STRIP-MCNC: 138 MG/DL (ref 74–99)
GLUCOSE BLD MANUAL STRIP-MCNC: 152 MG/DL (ref 74–99)
GLUCOSE BLD MANUAL STRIP-MCNC: 154 MG/DL (ref 74–99)
GLUCOSE BLD MANUAL STRIP-MCNC: 174 MG/DL (ref 74–99)
GLUCOSE SERPL-MCNC: 92 MG/DL (ref 74–99)
HCT VFR BLD AUTO: 51.5 % (ref 41–52)
HGB BLD-MCNC: 17.7 G/DL (ref 13.5–17.5)
IMM GRANULOCYTES # BLD AUTO: 0.01 X10*3/UL (ref 0–0.7)
IMM GRANULOCYTES NFR BLD AUTO: 0.2 % (ref 0–0.9)
LYMPHOCYTES # BLD AUTO: 0.69 X10*3/UL (ref 1.2–4.8)
LYMPHOCYTES NFR BLD AUTO: 16.9 %
MAGNESIUM SERPL-MCNC: 2.16 MG/DL (ref 1.6–2.4)
MCH RBC QN AUTO: 31.5 PG (ref 26–34)
MCHC RBC AUTO-ENTMCNC: 34.4 G/DL (ref 32–36)
MCV RBC AUTO: 92 FL (ref 80–100)
MONOCYTES # BLD AUTO: 0.28 X10*3/UL (ref 0.1–1)
MONOCYTES NFR BLD AUTO: 6.8 %
NEUTROPHILS # BLD AUTO: 3.1 X10*3/UL (ref 1.2–7.7)
NEUTROPHILS NFR BLD AUTO: 75.9 %
NRBC BLD-RTO: 0 /100 WBCS (ref 0–0)
PHOSPHATE SERPL-MCNC: 2.7 MG/DL (ref 2.5–4.9)
PLATELET # BLD AUTO: 100 X10*3/UL (ref 150–450)
POTASSIUM SERPL-SCNC: 4 MMOL/L (ref 3.5–5.3)
RBC # BLD AUTO: 5.62 X10*6/UL (ref 4.5–5.9)
SODIUM SERPL-SCNC: 131 MMOL/L (ref 136–145)
WBC # BLD AUTO: 4.1 X10*3/UL (ref 4.4–11.3)

## 2025-01-16 PROCEDURE — 2500000001 HC RX 250 WO HCPCS SELF ADMINISTERED DRUGS (ALT 637 FOR MEDICARE OP): Mod: SE

## 2025-01-16 PROCEDURE — 36415 COLL VENOUS BLD VENIPUNCTURE: CPT

## 2025-01-16 PROCEDURE — 80069 RENAL FUNCTION PANEL: CPT

## 2025-01-16 PROCEDURE — 85025 COMPLETE CBC W/AUTO DIFF WBC: CPT

## 2025-01-16 PROCEDURE — 83735 ASSAY OF MAGNESIUM: CPT

## 2025-01-16 PROCEDURE — 94640 AIRWAY INHALATION TREATMENT: CPT

## 2025-01-16 PROCEDURE — 2500000002 HC RX 250 W HCPCS SELF ADMINISTERED DRUGS (ALT 637 FOR MEDICARE OP, ALT 636 FOR OP/ED): Mod: SE

## 2025-01-16 PROCEDURE — 99238 HOSP IP/OBS DSCHRG MGMT 30/<: CPT

## 2025-01-16 PROCEDURE — 82947 ASSAY GLUCOSE BLOOD QUANT: CPT

## 2025-01-16 RX ORDER — METOPROLOL SUCCINATE 25 MG/1
12.5 TABLET, EXTENDED RELEASE ORAL DAILY
Start: 2025-01-16 | End: 2025-02-15

## 2025-01-16 RX ORDER — SACUBITRIL AND VALSARTAN 24; 26 MG/1; MG/1
0.5 TABLET, FILM COATED ORAL 2 TIMES DAILY
Start: 2025-01-16

## 2025-01-16 RX ORDER — OSELTAMIVIR PHOSPHATE 30 MG/1
30 CAPSULE ORAL 2 TIMES DAILY
Start: 2025-01-16 | End: 2025-01-19

## 2025-01-16 RX ADMIN — INSULIN LISPRO 5 UNITS: 100 INJECTION, SOLUTION INTRAVENOUS; SUBCUTANEOUS at 12:11

## 2025-01-16 RX ADMIN — OSELTAMIVIR PHOSPHATE 30 MG: 30 CAPSULE ORAL at 08:52

## 2025-01-16 RX ADMIN — INSULIN LISPRO 2 UNITS: 100 INJECTION, SOLUTION INTRAVENOUS; SUBCUTANEOUS at 09:05

## 2025-01-16 RX ADMIN — SPIRONOLACTONE 12.5 MG: 25 TABLET, FILM COATED ORAL at 08:51

## 2025-01-16 RX ADMIN — INSULIN LISPRO 5 UNITS: 100 INJECTION, SOLUTION INTRAVENOUS; SUBCUTANEOUS at 18:37

## 2025-01-16 RX ADMIN — SERTRALINE HYDROCHLORIDE 100 MG: 100 TABLET ORAL at 08:51

## 2025-01-16 RX ADMIN — METOPROLOL SUCCINATE 12.5 MG: 25 TABLET, EXTENDED RELEASE ORAL at 08:51

## 2025-01-16 RX ADMIN — ASPIRIN 81 MG CHEWABLE TABLET 81 MG: 81 TABLET CHEWABLE at 08:51

## 2025-01-16 RX ADMIN — TIOTROPIUM BROMIDE INHALATION SPRAY 2 PUFF: 3.12 SPRAY, METERED RESPIRATORY (INHALATION) at 09:26

## 2025-01-16 RX ADMIN — SACUBITRIL AND VALSARTAN 0.5 TABLET: 24; 26 TABLET, FILM COATED ORAL at 08:52

## 2025-01-16 RX ADMIN — INSULIN LISPRO 2 UNITS: 100 INJECTION, SOLUTION INTRAVENOUS; SUBCUTANEOUS at 12:11

## 2025-01-16 RX ADMIN — INSULIN LISPRO 5 UNITS: 100 INJECTION, SOLUTION INTRAVENOUS; SUBCUTANEOUS at 09:05

## 2025-01-16 RX ADMIN — AMIODARONE HYDROCHLORIDE 200 MG: 200 TABLET ORAL at 08:51

## 2025-01-16 RX ADMIN — EMPAGLIFLOZIN 10 MG: 10 TABLET, FILM COATED ORAL at 08:51

## 2025-01-16 ASSESSMENT — COGNITIVE AND FUNCTIONAL STATUS - GENERAL
WALKING IN HOSPITAL ROOM: A LITTLE
DAILY ACTIVITIY SCORE: 24
CLIMB 3 TO 5 STEPS WITH RAILING: A LITTLE
MOBILITY SCORE: 22

## 2025-01-16 ASSESSMENT — PAIN SCALES - GENERAL: PAINLEVEL_OUTOF10: 0 - NO PAIN

## 2025-01-16 NOTE — DISCHARGE INSTRUCTIONS
Dear Leonel Yu,    You were admitted to [facility] for evaluation and management of a heart failure exacerbation.  This was evidenced by your fatigue, shortness of breath, and so swelling.  You also had labs concerning for hyperglycemia, or high blood sugars associated with something called keto acidosis.  Initially you required help breathing with something called BiPAP, but with medication to help get fluid off of your body, your breathing improved.  You also completed a course of antibiotics for suspected infection.  Testing came back that you were positive for the flu.  He will take a medication called Tamiflu to finish a course.  Your medications were titrated appropriately while here in the hospital.  It is important that you continue to take your medications as directed while outside of the hospital otherwise complete resolve risk for future heart failure exacerbation.  It was recommended during your hospitalization that you are discharged to a skilled nursing facility for rehabilitation.    Please follow-up with the following providers:     - Please follow-up with your heart failure specialist, Dr. Skinner scheduled for 02/25/2025  - Please follow-up with your PCP, referral sent     Medication changes:   -START: Metoprolol Succinate 25 mg every day, Tamiflu 30 mg twice daily for 3 days  -CHANGE: Jardiance 10 mg every day  -STOP: Albuterol inhaler     Thank you for choosing  for your care,   It was a pleasure taking care of you,

## 2025-01-16 NOTE — DISCHARGE SUMMARY
Discharge Diagnosis  Acute on chronic heart failure, unspecified heart failure type    Issues Requiring Follow-Up    HFrEF  Influenza A  CKD 3a  Hyperglycemia     Discharge Meds     Medication List      START taking these medications     metoprolol succinate XL 25 mg 24 hr tablet; Commonly known as:   Toprol-XL; Take 0.5 tablets (12.5 mg) by mouth once daily. Do not crush or   chew.   oseltamivir 30 mg capsule; Commonly known as: Tamiflu; Take 1 capsule   (30 mg) by mouth 2 times a day for 3 days.     CHANGE how you take these medications     empagliflozin 10 mg; Commonly known as: Jardiance; Take 1 tablet (10 mg)   by mouth once daily.; What changed: medication strength, how much to take   True Metrix Glucose Test Strip strip; Generic drug: blood sugar   diagnostic; Use as directed to test blood glucose up to four times daily   and as needed.; What changed: Another medication with the same name was   removed. Continue taking this medication, and follow the directions you   see here.     CONTINUE taking these medications     amiodarone 200 mg tablet; Commonly known as: Pacerone; Take 1 tablet   (200 mg) by mouth once daily.   aspirin 81 mg chewable tablet; Chew 1 tablet (81 mg) once daily.   atorvastatin 80 mg tablet; Commonly known as: Lipitor; Take 1 tablet (80   mg) by mouth once daily at bedtime.   Easy Touch Alcohol Prep Pads pads, medicated; Generic drug: alcohol   swabs; Use 4-8 per day to check blood glucose and for injectable   medications   escitalopram 10 mg tablet; Commonly known as: Lexapro   fluticasone propion-salmeteroL 100-50 mcg/dose diskus inhaler; Commonly   known as: Advair Diskus   folic acid 1 mg tablet; Commonly known as: Folvite   FreeStyle Joseph 3 Plus Sensor device; Generic drug: blood-glucose   sensor; Change sensor every 15 days   FreeStyle Joseph 3 [place] misc; Generic drug: blood-glucose   meter,continuous; Use as instructed   glucose 4 gram chewable tablet; Chew 2 tablets (8 g) if  "needed for low   blood sugar - see comments.   HumaLOG KwikPen Insulin 100 unit/mL injection; Generic drug: insulin   lispro   insulin glargine 100 unit/mL injection; Commonly known as: Lantus;   Inject 15 Units under the skin once daily at bedtime. Take as directed per   insulin instructions.   Latuda 60 mg tablet; Generic drug: lurasidone   sacubitriL-valsartan 24-26 mg tablet; Commonly known as: Entresto; Take   0.5 tablets by mouth 2 times a day.   sertraline 100 mg tablet; Commonly known as: Zoloft; Take 1 tablet (100   mg) by mouth once daily.   Spiriva Respimat 2.5 mcg/actuation inhaler; Generic drug: tiotropium;   Inhale 2 puffs once daily.   spironolactone 25 mg tablet; Commonly known as: Aldactone; Take 0.5   tablets (12.5 mg) by mouth once daily.   torsemide 20 mg tablet; Commonly known as: Demadex; Take 1 tablet (20   mg) by mouth once daily as needed (Please take 1 tab daily if you   experience any of the following: increased shortness of breath, increased   swelling of your lower extremities, or if you gain 3lbs or more in 1-2   days or 5lbs or more in 7 days).   True Metrix Glucose Meter misc; Generic drug: blood-glucose meter;   Inject 1 each under the skin 4 times a day with meals. Check blood glucose   4 times daily, before meals and at bedtime.   * TRUEplus Lancets 33 gauge misc; Generic drug: lancets; Inject 1 each   under the skin 4 times a day.   * FreeStyle Lancets 28 gauge; Generic drug: FreeStyle lancets; Use as   instructed 4 times daily in the event that CGM sensor is not working   TRUEplus Pen Needle 29 gauge x 1/2\" needle; Generic drug: pen needle   1/2\"; Use to inject 1-4 times daily as directed.  * This list has 2 medication(s) that are the same as other medications   prescribed for you. Read the directions carefully, and ask your doctor or   other care provider to review them with you.     STOP taking these medications     albuterol 90 mcg/actuation inhaler       Test Results Pending " At Discharge  Pending Labs       Order Current Status    Blood Culture Preliminary result    Blood Culture Preliminary result            Hospital Course  54 y.o. male with past medical history of mixed ischemic/nonischemic cardiomyopathy, HFrEF EF 10 to 15% 10/2024, prior VF/Vts/p [employer] single-chamber ICD 3/2024, HTN, HLD who was admitted for acute decompensated heart failure as well as mild DKA. Patient reports feeling short of breath and fatigued compared to his baseline over the last 2 days, also said that he has not always been taking his medications. Admission labs significant for glucose greater than 700 w/ mild acidosis and positive ketones. He required BiPAP in the ED and was taken off BiPAP after IV diuresis in the CICU. Being transferred to the floor on 1/10/25.     While on the floor, he had intermittent episodes of soft blood pressures with lethargy, cold clammy skin. Suspected with sepsis in combination with low-output state. He was then given IV fluid bolus and started Zosyn/Vanc empirically for suspected hospital-acquired infection due to elevated lactate with an improvement in blood pressures and lactate level. Procalcitonin of 0.5, and pending blood cultures (eventually negative).     Flu A resulted positive and tamiflu was started. We completed a vanc/zosyn 5 day course, and blood cultures started returning NGTD. We started entresto 12.5mg BID, and metoprolol succinate 12.5mg every day on 1/15 to complete his HFrEF GDMT and RFP remained stable.     Patient's GDMT and diuresis were managed and titrated appropriately while inpatient. He was evaluated by our PT and OT colleagues who initially recommended SNF for rehab. At time of discharge, he was hemodynamically stable and appropriate for discharge to SNF.     To Do:   - Follow-up with HF specialist on 02/25/2025  - Follow-up with PCP for management of chronic issues    Pertinent Physical Exam At Time of Discharge    Constitutional:        General: He is not in acute distress.     Appearance: He is not ill-appearing or toxic-appearing.   HENT:      Head: Normocephalic.      Mouth/Throat:      Mouth: Mucous membranes are moist.      Comments: Poor dentition  Eyes:      General: No scleral icterus.     Extraocular Movements: Extraocular movements intact.      Conjunctiva/sclera: Conjunctivae normal.      Pupils: Pupils are equal, round, and reactive to light.   Cardiovascular:      Rate and Rhythm: Normal rate and regular rhythm.      Pulses: Normal pulses.      Heart sounds: No murmur heard.     No gallop.   Pulmonary:      Effort: Pulmonary effort is normal. No respiratory distress.      Breath sounds: No wheezing.   Abdominal:      General: Abdomen is flat. Bowel sounds are normal.      Palpations: Abdomen is soft.   Musculoskeletal:         General: Normal range of motion.      Cervical back: Normal range of motion and neck supple.     Minimal edema on extremities  Skin:     General: Skin is warm and dry.      Capillary Refill: Capillary refill takes less than 2 seconds.   Neurological:      General: No focal deficit present.      Mental Status: He is alert and oriented to person, place, and time.      Cranial Nerves: No cranial nerve deficit.      Sensory: No sensory deficit.   Psychiatric:         Mood and Affect: Mood normal.     Outpatient Follow-Up  Future Appointments   Date Time Provider Department Center   2/25/2025 11:30 AM Mayco Skinner MD [id number] [facility]         Sharad Penn MD

## 2025-01-16 NOTE — CARE PLAN
The patient's goals for the shift include      The clinical goals for the shift include Pt will remain HDS throughout shift    Problem: Skin  Goal: Decreased wound size/increased tissue granulation at next dressing change  Outcome: Met  Goal: Participates in plan/prevention/treatment measures  Outcome: Met  Goal: Prevent/manage excess moisture  Outcome: Met  Goal: Prevent/minimize sheer/friction injuries  Outcome: Met  Goal: Promote/optimize nutrition  Outcome: Met  Goal: Promote skin healing  Outcome: Met     Problem: Fall/Injury  Goal: Not fall by end of shift  Outcome: Met  Goal: Be free from injury by end of the shift  Outcome: Met  Goal: Verbalize understanding of personal risk factors for fall in the hospital  Outcome: Met  Goal: Verbalize understanding of risk factor reduction measures to prevent injury from fall in the home  Outcome: Met  Goal: Use assistive devices by end of the shift  Outcome: Met  Goal: Pace activities to prevent fatigue by end of the shift  Outcome: Met     Problem: Heart Failure  Goal: Improved gas exchange this shift  Outcome: Met  Goal: Improved urinary output this shift  Outcome: Met  Goal: Reduction in peripheral edema within 24 hours  Outcome: Met  Goal: Report improvement of dyspnea/breathlessness this shift  Outcome: Met  Goal: Weight from fluid excess reduced over 2-3 days, then stabilize  Outcome: Met  Goal: Increase self care and/or family involvement in 24 hours  Outcome: Met     Problem: Safety - Adult  Goal: Free from fall injury  Outcome: Met

## 2025-01-16 NOTE — HH CARE COORDINATION
This referral has been made a Non Admit with  Home Care due to GOING TO REHAB . If you have further questions, feel free to reach out to our office at [phone]. Thank you, [facility] Intake.

## 2025-01-16 NOTE — PROGRESS NOTES
SW met with pt to offer support and information. Pt is alert and fully oriented. Pt said he is going to go to [facility] at this time, but agrees to look into the resources SW offered. Pt denied any further issues or questions on social work at the moment of assessment.     Bernard Torrez, PEDROA, LSW

## 2025-01-16 NOTE — SIGNIFICANT EVENT
Rapid Response Nurse Note: RADAR alert: 6    Pager time: 24  Arrival time: 35  Event end time: 45  Location: 5  [x] Triage by phone or secure messaging    Rapid response initiated by:  [] Rapid response RN [] Family [] Nursing Supervisor [] Physician   [x] RADAR auto page [] Sepsis auto-page [] RN [] RT   [] NP/PA [] Other:       Initial VS and/or RADAR VS: T 36.8 °C; HR 69; RR 19; BP 94/63; SPO2 92% RA      Interventions:  [x] None [] ABG/VBG [] Assist w/ICU transfer [] BAT paged    [] Bag mask [] Blood [] Cardioversion [] Code Blue   [] Code blue for intubation [] Code status changed [] Chest x-ray [] EKG   [] IV fluid/bolus [] KUB x-ray [] Labs/cultures [] Medication   [] Nebulizer treatment [] NIPPV (CPAP/BiPAP) [] Oxygen [] Oral airway   [] Peripheral IV [] Palliative care consult [] CT/MRI [] Sepsis protocol    [] Suctioned [] Other:     Outcome:  [] Coded and  [] Code blue for intubation [] Coded and transferred to ICU []  on division   [x] Remained on division (no change) [] Remained on division + additional monitoring [] Remained in ED [] Transferred to ED   [] Transferred to ICU [] Transferred to inpatient status [] Transferred for interventions (procedure) [] Transferred to ICU stepdown    [] Transferred to surgery [] Transferred to telemetry [] Sepsis protocol [] STEMI protocol   [] Stroke protocol [x] Bedside nurse instructed to page rapid response for any concerns or acute change in condition/VS     Additional Comments: Reviewed above RADAR VS with bedside RN via phone.  Vital signs within patient's current trends. Per RN, patient often self-removes his nasal cannula (2L baseline, 4L inpatient/current), above 92% reading was obtained on RA; RN will encourage patient wear supplemental O2.  No acute change in condition.  No interventions by rapid response team indicated at this time.  Staff to page rapid response for any concerns or acute change in condition or VS.

## 2025-01-16 NOTE — PROGRESS NOTES
Spoke with patient foc [facility] after being found out of network with [facility] started

## 2025-01-16 NOTE — NURSING NOTE
Called [facility] and gave nurse report to Karthik, [phone]. ETA with [facility] delayed to 1800.

## 2025-01-17 LAB
ATRIAL RATE: 68 BPM
ATRIAL RATE: 70 BPM
P AXIS: 15 DEGREES
P AXIS: 36 DEGREES
P OFFSET: 171 MS
P OFFSET: 177 MS
P ONSET: 117 MS
P ONSET: 125 MS
PR INTERVAL: 162 MS
PR INTERVAL: 172 MS
Q ONSET: 203 MS
Q ONSET: 206 MS
QRS COUNT: 11 BEATS
QRS COUNT: 12 BEATS
QRS DURATION: 110 MS
QRS DURATION: 114 MS
QT INTERVAL: 468 MS
QT INTERVAL: 496 MS
QTC CALCULATION(BAZETT): 505 MS
QTC CALCULATION(BAZETT): 527 MS
QTC FREDERICIA: 492 MS
QTC FREDERICIA: 517 MS
R AXIS: -19 DEGREES
R AXIS: -42 DEGREES
T AXIS: 108 DEGREES
T AXIS: 109 DEGREES
T OFFSET: 437 MS
T OFFSET: 454 MS
VENTRICULAR RATE: 68 BPM
VENTRICULAR RATE: 70 BPM

## 2025-01-17 NOTE — PROGRESS NOTES
Patient to discharge today at 4 pm going to [facility] being transported by [facility] patient nurse facility and medical team notified

## 2025-01-18 ENCOUNTER — CLINICAL SUPPORT (OUTPATIENT)
Dept: EMERGENCY MEDICINE | Facility: HOSPITAL | Age: 55
End: 2025-01-18
Payer: COMMERCIAL

## 2025-01-18 ENCOUNTER — APPOINTMENT (OUTPATIENT)
Dept: RADIOLOGY | Facility: HOSPITAL | Age: 55
End: 2025-01-18
Payer: COMMERCIAL

## 2025-01-18 ENCOUNTER — HOSPITAL ENCOUNTER (EMERGENCY)
Facility: HOSPITAL | Age: 55
Discharge: HOME | End: 2025-01-19
Attending: EMERGENCY MEDICINE
Payer: COMMERCIAL

## 2025-01-18 DIAGNOSIS — R06.02 SOB (SHORTNESS OF BREATH): Primary | ICD-10-CM

## 2025-01-18 LAB
ANION GAP SERPL CALC-SCNC: 12 MMOL/L (ref 10–20)
BASOPHILS # BLD AUTO: 0.01 X10*3/UL (ref 0–0.1)
BASOPHILS NFR BLD AUTO: 0.2 %
BNP SERPL-MCNC: 499 PG/ML (ref 0–99)
BNP SERPL-MCNC: 512 PG/ML (ref 0–99)
BUN SERPL-MCNC: 21 MG/DL (ref 6–23)
CALCIUM SERPL-MCNC: 8.2 MG/DL (ref 8.6–10.6)
CARDIAC TROPONIN I PNL SERPL HS: 77 NG/L (ref 0–53)
CARDIAC TROPONIN I PNL SERPL HS: 77 NG/L (ref 0–53)
CHLORIDE SERPL-SCNC: 103 MMOL/L (ref 98–107)
CO2 SERPL-SCNC: 24 MMOL/L (ref 21–32)
CREAT SERPL-MCNC: 1.16 MG/DL (ref 0.5–1.3)
EGFRCR SERPLBLD CKD-EPI 2021: 74 ML/MIN/1.73M*2
EOSINOPHIL # BLD AUTO: 0.03 X10*3/UL (ref 0–0.7)
EOSINOPHIL NFR BLD AUTO: 0.7 %
ERYTHROCYTE [DISTWIDTH] IN BLOOD BY AUTOMATED COUNT: 15.4 % (ref 11.5–14.5)
GLUCOSE SERPL-MCNC: 273 MG/DL (ref 74–99)
HCT VFR BLD AUTO: 50.4 % (ref 41–52)
HGB BLD-MCNC: 16.9 G/DL (ref 13.5–17.5)
IMM GRANULOCYTES # BLD AUTO: 0.02 X10*3/UL (ref 0–0.7)
IMM GRANULOCYTES NFR BLD AUTO: 0.5 % (ref 0–0.9)
LYMPHOCYTES # BLD AUTO: 0.74 X10*3/UL (ref 1.2–4.8)
LYMPHOCYTES NFR BLD AUTO: 17 %
MCH RBC QN AUTO: 30.6 PG (ref 26–34)
MCHC RBC AUTO-ENTMCNC: 33.5 G/DL (ref 32–36)
MCV RBC AUTO: 91 FL (ref 80–100)
MONOCYTES # BLD AUTO: 0.28 X10*3/UL (ref 0.1–1)
MONOCYTES NFR BLD AUTO: 6.4 %
NEUTROPHILS # BLD AUTO: 3.27 X10*3/UL (ref 1.2–7.7)
NEUTROPHILS NFR BLD AUTO: 75.2 %
NRBC BLD-RTO: 0 /100 WBCS (ref 0–0)
PLATELET # BLD AUTO: 129 X10*3/UL (ref 150–450)
POTASSIUM SERPL-SCNC: 4.6 MMOL/L (ref 3.5–5.3)
RBC # BLD AUTO: 5.52 X10*6/UL (ref 4.5–5.9)
SODIUM SERPL-SCNC: 134 MMOL/L (ref 136–145)
WBC # BLD AUTO: 4.4 X10*3/UL (ref 4.4–11.3)

## 2025-01-18 PROCEDURE — 83880 ASSAY OF NATRIURETIC PEPTIDE: CPT

## 2025-01-18 PROCEDURE — 85025 COMPLETE CBC W/AUTO DIFF WBC: CPT

## 2025-01-18 PROCEDURE — 71046 X-RAY EXAM CHEST 2 VIEWS: CPT

## 2025-01-18 PROCEDURE — 80048 BASIC METABOLIC PNL TOTAL CA: CPT

## 2025-01-18 PROCEDURE — 84484 ASSAY OF TROPONIN QUANT: CPT

## 2025-01-18 PROCEDURE — 71046 X-RAY EXAM CHEST 2 VIEWS: CPT | Mod: FOREIGN READ | Performed by: RADIOLOGY

## 2025-01-18 PROCEDURE — 99285 EMERGENCY DEPT VISIT HI MDM: CPT | Performed by: EMERGENCY MEDICINE

## 2025-01-18 PROCEDURE — 36415 COLL VENOUS BLD VENIPUNCTURE: CPT

## 2025-01-18 PROCEDURE — 71275 CT ANGIOGRAPHY CHEST: CPT | Mod: FOREIGN READ | Performed by: RADIOLOGY

## 2025-01-18 PROCEDURE — 71275 CT ANGIOGRAPHY CHEST: CPT

## 2025-01-18 PROCEDURE — 2550000001 HC RX 255 CONTRASTS: Mod: SE | Performed by: EMERGENCY MEDICINE

## 2025-01-18 PROCEDURE — 93005 ELECTROCARDIOGRAM TRACING: CPT

## 2025-01-18 PROCEDURE — 99285 EMERGENCY DEPT VISIT HI MDM: CPT | Mod: 25 | Performed by: EMERGENCY MEDICINE

## 2025-01-18 RX ADMIN — IOHEXOL 90 ML: 350 INJECTION, SOLUTION INTRAVENOUS at 23:00

## 2025-01-18 ASSESSMENT — PAIN - FUNCTIONAL ASSESSMENT: PAIN_FUNCTIONAL_ASSESSMENT: 0-10

## 2025-01-18 ASSESSMENT — PAIN SCALES - GENERAL: PAINLEVEL_OUTOF10: 0 - NO PAIN

## 2025-01-18 NOTE — ED TRIAGE NOTES
Pt to ed from [facility] with complaints of sob. Pt was found to have a pneumothorax from x ray that was done. Pt went to ed, vitals stable, on baseline o2 , non-labored breathing. Pt has hx of chf, copd. Denies cp, dizziness, lightheadedness

## 2025-01-18 NOTE — ED PROVIDER NOTES
History of Present Illness     History provided by: Patient  Limitations to History: None  External Records Reviewed with Brief Summary: None    HPI:  Leonel Yu is a 54-year-old male with a past medical history significant for HFrEF (10-15%), CKD, tobacco use disorder, and COPD on nocturnal 2 L O2 at baseline, who presents from [facility] with shortness of breath. According to the nursing home, a chest X-ray showed a pneumothorax. Leonel explains that he has baseline exertional shortness of breath, which has not worsened recently. He is on BiPAP at night. He denies leg swelling, nausea, diarrhea, fever, or chills. The patient states that the pneumothorax was found incidentally during a screening for tuberculosis at the [facility] home.    Physical Exam   Triage vitals:  T 36.1 °C (97 °F)  HR 65  BP 88/63  RR 16  O2 94 % (2L NC) Supplemental oxygen    Physical Exam  Constitutional:  Awake/Alert/Oriented to person place and time.  Somnolent but easily aroused  Cardiovascular: Regular rate and rhythm  Respiratory: Reduced breath sounds bilaterally. No wheezing, rales or rhonchi. Good chest wall expansion  Abdomen: Soft, Nontender. Bowel sounds appreciated  Extremities: Warm and dry.      Medical Decision Making & ED Course   Medical Decision Makin y.o. male with shortness of breath  Because of patient's cardiac history will order troponin, BNP, CT PE will be ordered to better visualize lung fields and investigate for PE given recent hospitalization  ----  ED Course as of 25 0719   Sat 2025   2100 Troponin I, High Sensitivity (CMC)(!): 77  Elevated, but when reviewing patient's previous labs he has consistently elevated troponins with most recent being 87 nine days ago. Will get Delta troponin and trend. Patient not currently endorsing chest pain. [AC]   2100 B-type natriuretic peptide(!)  Elevated but when compared to patient's BMP 10 days ago which was greater than 5000, it is  decreased. Patient on 2 L nasal cannula oxygen at nighttime. [AC]   2101 CBC and Auto Differential(!)  No leukocytosis, no anemia noted. [AC]   2101 XR chest 2 views  IMPRESSION:  1. No radiographic evidence of acute cardiopulmonary disease.  2. COPD.  3. Cardiomegaly.    No pneumothorax noted on my interpretation or final read. CT PE currently pending to evaluate further. [AC]   2230 On evaluation, patient is not reporting any pain. He states that he is not feeling short of breath at this time. He is on 2 L nasal cannula which he normally wears at nighttime and is maintaining good PO intake. [AC]   2253 Troponin I, High Sensitivity(!)  Repeat troponin is stable at 77. [AC]   Sun Jan 19, 2025   0044 CT angio chest for pulmonary embolism  IMPRESSION:  No evidence of pulmonary embolus.  Small area of airspace disease in the left lower lobe and a small  amount of contrast opacity in the right lung, suspicious for pneumonia  in the proper clinical setting.      Recently discharged from the hospital on 1/16/25 four acute on chronic heart failure and was found to be influenza A positive. He was treated with Tamiflu, vancomycin and Zosyn during his hospital stay. Lower concern for pneumonia as patient does not have leukocytosis, no new or worsening symptoms since discharge. Believe the finding on CT is likely secondary to sequelae of viral infection for which patient has been treated. He is not requiring more oxygen than what he is at baseline which is 2 L. [AC]      ED Course User Index  [AC] Jama Aparicio, DO         Diagnoses as of 01/21/25 0719   SOB (shortness of breath)     Disposition   Pending completion of patient's workup signed out to Jama Aparicio at 7 PM    Procedures   Procedures        Contreras Moreau MD  Emergency Medicine            Contreras Moreau MD  Resident  01/21/25 0701

## 2025-01-19 VITALS
HEART RATE: 58 BPM | TEMPERATURE: 97.3 F | BODY MASS INDEX: 25.11 KG/M2 | RESPIRATION RATE: 16 BRPM | HEIGHT: 67 IN | DIASTOLIC BLOOD PRESSURE: 70 MMHG | OXYGEN SATURATION: 95 % | WEIGHT: 160 LBS | SYSTOLIC BLOOD PRESSURE: 107 MMHG

## 2025-01-19 LAB
ATRIAL RATE: 57 BPM
P AXIS: 37 DEGREES
P OFFSET: 158 MS
P ONSET: 105 MS
PR INTERVAL: 206 MS
Q ONSET: 208 MS
QRS COUNT: 9 BEATS
QRS DURATION: 114 MS
QT INTERVAL: 540 MS
QTC CALCULATION(BAZETT): 525 MS
QTC FREDERICIA: 531 MS
R AXIS: -24 DEGREES
T AXIS: 106 DEGREES
T OFFSET: 478 MS
VENTRICULAR RATE: 57 BPM

## 2025-01-19 PROCEDURE — 93010 ELECTROCARDIOGRAM REPORT: CPT

## 2025-01-19 NOTE — DISCHARGE INSTRUCTIONS
You were evaluated in the emergency department for concern of shortness of breath and possible pneumothorax on outpatient chest x-ray. Imaging done in the ED did not find a pneumothorax. We have low concern for a pneumonia as you received antibiotics and Tamiflu during recent hospitalization. Please return to the ED if you have any new or worsening symptoms, increased oxygen requirement, worsening shortness of breath, fever, increasing sputum production or other concerns.

## 2025-01-19 NOTE — PROGRESS NOTES
Emergency Department Transition of Care Note       Signout   I received Leonel Yu in signout from Dr. Moreau.  Please see the ED Provider Note for all HPI, PE and MDM up to the time of signout at 19:00.  This is in addition to the primary record.    In brief Leonel Yu is an 54 y.o. male presenting for shortness of breath. There was concern that an x-ray completed at the nursing facility that the patient came from demonstrated a possible pneumothorax.    At the time of signout we were awaiting:  labs, chest x-ray, CT PE    ED Course & Medical Decision Making   Medical Decision Making:  Under my care, see ED course.    ED Course:  ED Course as of 01/19/25 0424   Sat Jan 18, 2025   2100 Troponin I, High Sensitivity (CMC)(!): 77  Elevated, but when reviewing patient's previous labs he has consistently elevated troponins with most recent being 87 nine days ago. Will get Delta troponin and trend. Patient not currently endorsing chest pain. [AC]   2100 B-type natriuretic peptide(!)  Elevated but when compared to patient's BMP 10 days ago which was greater than 5000, it is decreased. Patient on 2 L nasal cannula oxygen at nighttime. [AC]   2101 CBC and Auto Differential(!)  No leukocytosis, no anemia noted. [AC]   2101 XR chest 2 views  IMPRESSION:  1. No radiographic evidence of acute cardiopulmonary disease.  2. COPD.  3. Cardiomegaly.    No pneumothorax noted on my interpretation or final read. CT PE currently pending to evaluate further. [AC]   2230 On evaluation, patient is not reporting any pain. He states that he is not feeling short of breath at this time. He is on 2 L nasal cannula which he normally wears at nighttime and is maintaining good PO intake. [AC]   2253 Troponin I, High Sensitivity(!)  Repeat troponin is stable at 77. [AC]   Sun Jan 19, 2025   0044 CT angio chest for pulmonary embolism  IMPRESSION:  No evidence of pulmonary embolus.  Small area of airspace disease in the left lower lobe  and a small  amount of contrast opacity in the right lung, suspicious for pneumonia  in the proper clinical setting.      Recently discharged from the hospital on 1/16/25 four acute on chronic heart failure and was found to be influenza A positive. He was treated with Tamiflu, vancomycin and Zosyn during his hospital stay. Lower concern for pneumonia as patient does not have leukocytosis, no new or worsening symptoms since discharge. Believe the finding on CT is likely secondary to sequelae of viral infection for which patient has been treated. He is not requiring more oxygen than what he is at baseline which is 2 L. [AC]      ED Course User Index  [AC] Jama Aparicio DO         Diagnoses as of 01/19/25 0424   SOB (shortness of breath)     At this time patient is hemodynamically stable, not having increased oxygen requirements and reporting feeling at his baseline. Do not believe that the patient needs admission at this time based on his workup. Recheck patient's vitals as they were noted to be hypotensive at 88/63. After ambulation and rechecking patient's blood pressure was 99/67. When reviewing patient's previous admission and discharge summary, this was noted to be likely his baseline. Patient is maintaining appropriately and eating and drinking at bedside. Will discharge the patient back to the nursing facility with return precautions provided.      Disposition   As a result of the work-up, the patient was discharged home.  he was informed of his diagnosis and instructed to come back with any concerns or worsening of condition.  he and was agreeable to the plan as discussed above.  he was given the opportunity to ask questions.  All of the patient's questions were answered.    Procedures   Procedures    Patient seen and discussed with ED attending physician.    Jama Aparicio DO  Emergency Medicine  PGY1 Resident

## 2025-01-24 NOTE — DOCUMENTATION CLARIFICATION NOTE
"    PATIENT:               VINOD SHAFER  ACCT #:                  [id number]  MRN:                       [id number]  :                       1970  ADMIT DATE:       2025 12:21 PM  DISCH DATE:        2025 9:22 PM  RESPONDING PROVIDER #:        [id number]          PROVIDER RESPONSE TEXT:    Patient treated for Influenza A without Sepsis    CDI QUERY TEXT:    Clarification        Instruction:  Based on your assessment of the patient and the clinical information, please provide the requested documentation by clicking on the appropriate radio button and enter any additional information if prompted.    Question: Is there a diagnosis indicative of a patient meeting SIRS criteria and with organ dysfunction in the setting of Influenza A    When answering this query, please exercise your independent professional judgment. The fact that a question is being asked, does not imply that any particular answer is desired or expected.    The patient's clinical indicators include:  Clinical Information: Progress notes indicate pt is a 55 y/o male admitted with acute decompensated heart failure    Clinical Indicators:    1/10 Cardiology PN: \"- we attempted starting hydralazine low-dose (12.5 mg tid) to reduce afterload but SBP ran low (80-90s) and patient appears poorly perfused during the the visit - given leukocytosis and elevated lactate, suspected sepsis in combination of low output-  start empiric Zosyn/Vanc\"     Cardiology PN: \"suspected sepsis in combination of low output  - S/P IV fluid 1 L in total --> lactate 2.2, discontinue monitoring -- will continue volume repletion with oral fluid only today\"    -, MAPs ranging 70s-80s recorded     Cardiology PN: \"Continue broad spectrum antibiotics for possible hospital acquired pneumonia\"    , Influenza A detected, Procalcitonin 0.51     CXR:  Mildly increased central pulmonary vascular congestion and hazy perihilar opacities    -, platelets " "ranging     1/16 DCS: \"While on the floor, he had intermittent episodes of soft blood pressures with lethargy, cold clammy skin. Suspected with sepsis in combination with low-output state. He was then given IV fluid bolus and started Zosyn/Vanc empirically for suspected hospital-acquired infection\"    Treatment: IV vancomycin 1/10-1/14 , IV Zosyn 1/10-1/15, Tamiflu 1/13-1/16, IV Fluid Bolus 500ml on 1/10    Risk Factors: Influenza A  Options provided:  -- Sepsis with cardiac organ dysfunction of hypotension  -- Sepsis with hematological organ dysfunction of thrombocytopenia  -- Sepsis with other organ dysfunction, Please specify sepsis associated organ dysfunction below  -- Patient treated for Influenza A without Sepsis  -- Other - I will add my own diagnosis  -- Refer to Clinical Documentation Reviewer    Query created by: Eli Gonzales on 1/23/2025 6:03 PM      Electronically signed by:  ROBBIN OZUNA MD 1/24/2025 6:52 PM          "

## 2025-02-06 ENCOUNTER — TELEPHONE (OUTPATIENT)
Dept: CARDIOLOGY | Facility: HOSPITAL | Age: 55
End: 2025-02-06
Payer: COMMERCIAL

## 2025-02-06 NOTE — TELEPHONE ENCOUNTER
Spoke with Mr. Yu who is back home following being in a SNF.  Said that things are going overall well.  Discussed applying for SNAP and other forms of assistance.  Said that he has someone to help with that, but would welcome the information mailed to him.  Confirmed address and followed-up phone call with a [employer] application and this message:    Greetings Mr. Yu,     Thank you for taking the time to just talk on the phone.  Per our conversation, I have included the application for food, cash, and medical assistance.  I would be happy to complete this application with you either over the phone or in-person at [facility].  Could you please call me ([phone]) to let me know you received this information?  Thank you,  Rosas

## 2025-02-25 ENCOUNTER — APPOINTMENT (OUTPATIENT)
Dept: CARDIOLOGY | Facility: HOSPITAL | Age: 55
End: 2025-02-25
Payer: COMMERCIAL

## 2025-02-27 ENCOUNTER — TELEPHONE (OUTPATIENT)
Dept: CARDIOLOGY | Facility: HOSPITAL | Age: 55
End: 2025-02-27
Payer: COMMERCIAL

## 2025-02-27 NOTE — TELEPHONE ENCOUNTER
Follow-up call attempted with Mr. Yu.  Called Sang's number, left voicemail for Mr. Yu requesting call back.

## 2025-03-04 ENCOUNTER — APPOINTMENT (OUTPATIENT)
Dept: PRIMARY CARE | Facility: CLINIC | Age: 55
End: 2025-03-04
Payer: COMMERCIAL

## 2025-03-13 NOTE — TELEPHONE ENCOUNTER
Attempted to reach Leonel through friend Elizabet as I have in the past to check-in.  Voicemail box full.

## 2025-03-15 NOTE — ED PROVIDER NOTES
Emergency Department Provider Note        History of Present Illness   History provided by: EMS  Limitations to History: Cardiac Arrest    HPI:  Leonel Yu is a 54 y.o. male who is brought in by EMS in cardiac arrest. Per EMS report, patient had at least 10 minutes of on initiated bystander CPR along with an unknown downtime.  Upon their arrival to the scene, patient had no pulse and was in PEA arrest.  They gave 4 rounds of epinephrine and placed an Igel due to difficult airway.  Patient had 1 episode of V-fib on most recent pulse check prior to pulling into the ambulance bay for which he received 1 shock.  Patient is bagged and compressions are ongoing with auto pulse.    Physical Exam   General: unconscious, nonresponsive to noxious stimuli  Eyes: Pupils fixed and dilated  HENT: Normo-cephalic, atraumatic.  CV: Chest Compressions via autopulse in progress, femoral pulse palpable with compressions  Resp: BVM with symmetric breath sounds bilaterally  GI: Soft, no bruising  MSK/Extremities: No gross bony deformities.  Skin: Dry  Neuro: GCS 3. Patient is unresponsive with no spontaneous movements.    Medical Decision Making & ED Course   Medical Decision Makin y.o. male presenting to the ED in cardiac arrest.  ACLS with supervision of CPR was continued here.  Patient was bagged with ease.  Patient was continued on the auto pulse strong femoral pulse palpated with auto pulse. Patient was given 1 round of amiodarone upon arrival. Patient had multiple rounds of ACLS performed. Multiple attempts to establish definitive airway were attempted. Airway was very pale and edematous and anterior without availability of rigid stylette. I gel was used and patient was continued to be bagged with ease between attempts. VBG was not consistent with signs of life.  pH was less than 6.8, lactate greater than 17 and potassium of 9.7.  He was given 1 g of calcium chloride. No spontaneous rhythm noted and patient remained in  PEA arrest with no cardiac activity noted on point-of-care ultrasound. Time of death was called at 12:37 PM.    ED Course:  ED Course as of 03/15/25 1705   Sat Mar 15, 2025   021 Brother (Edward Yu - [phone]) from [place] (got him pacemaker) endorses heavy drug use by the patient. He was updated regarding patient's death and reports no other living immediate family in the area (parents and other siblings). Patient does have an ex-wife who he believes lives in the area. Brother notified that  or mortician would be in contact regarding next steps.  [RR]      ED Course User Index  [RR] Tresa Mayes MD       Disposition       Procedures   Procedures    Patient seen and discussed with ED attending physician.    Tresa Mayes MD  Emergency Medicine        Tresa Mayes MD  Resident  03/15/25 1305       Tresa Mayes MD  Resident  03/15/25 1705       Dom Ryder MD  [phone]

## 2025-03-15 NOTE — ED TRIAGE NOTES
Pt from home via [facility] for cardiac arrest. Pt last seen 1.5hrs prior to ems arrival. Found down by family, unwitnessed, no pre hospital CPR. En route with EMS PEA with 4 epis on board, vfib immediately prior to arrival, defibbed. Arrival to ED 1227

## 2025-03-15 NOTE — ED PROCEDURE NOTE
Procedure  Intubation    Performed by: Karie Gamble MD  Authorized by: Dom Ryder MD    Consent:     Consent obtained:  Emergent situation    Consent given by:  Healthcare agent  Universal protocol:     Patient identity confirmed:  Anonymous protocol, patient vented/unresponsive  Pre-procedure details:     Indications: cardio/pulmonary arrest      Look externally: no concerns      Mouth opening - incisor distance:  3 or more finger widths    Hyoid-mental distance: 3 or more finger widths      Hyoid-thyroid distance: 2 or more finger widths      Obstruction: none      Neck mobility: normal      Pharmacologic strategy: none      Induction agents:  None    Paralytics:  None  Procedure details:     Preoxygenation:  Supraglottic device    CPR in progress: yes      Number of attempts:  3  Successful intubation attempt details:     Intubation method:  Oral    Intubation technique: video assisted      Laryngoscope blade:  Mac 4    Bougie used: no      Grade view: III      Tube size (mm):  7.5    Tube type:  Cuffed    Tube visualized through cords: no    First unsuccessful intubation attempt details:     Intubation method:  Oral    Intubation technique:  Video assisted    Laryngoscope blade:  Hypercurved    Bougie used: no      Grade view: III      Tube size (mm):  7.5    Tube type:  Cuffed    Ventilation between 1st and 2nd attempt: yes with extraglottic device      Tube visualized through cords: no    Second unsuccessful intubation attempt details:     Intubation method:  Oral    Intubation technique:  Video assisted    Laryngoscope blade:  Hypercurved    Bougie used: no      Grade view: II      Tube size (mm):  7.5    Tube type:  Cuffed    Ventilation between 2nd and 3rd attempt: yes with extraglottic device      Tubes visualized through cords: no    Post-procedure details:     Procedure completion:  Procedure terminated electively by provider    Complications: cardiac arrest                 Karie Gamble  MD  Resident  03/15/25 6194

## 2025-03-17 NOTE — ED PROCEDURE NOTE
Procedure  Critical Care    Performed by: Dom Ryder MD  Authorized by: Dom Ryder MD    Critical care provider statement:     Critical care time (minutes):  10    Critical care was necessary to treat or prevent imminent or life-threatening deterioration of the following conditions:  Cardiac failure    Critical care was time spent personally by me on the following activities:  Ordering and review of laboratory studies and examination of patient (ACLS)               Dom Ryder MD  03/16/25 7130